# Patient Record
Sex: MALE | Race: WHITE | NOT HISPANIC OR LATINO | Employment: OTHER | URBAN - METROPOLITAN AREA
[De-identification: names, ages, dates, MRNs, and addresses within clinical notes are randomized per-mention and may not be internally consistent; named-entity substitution may affect disease eponyms.]

---

## 2018-07-09 ENCOUNTER — CLINICAL SUPPORT (OUTPATIENT)
Dept: FAMILY MEDICINE | Facility: CLINIC | Age: 63
End: 2018-07-09

## 2018-07-09 VITALS
WEIGHT: 220 LBS | DIASTOLIC BLOOD PRESSURE: 74 MMHG | HEART RATE: 79 BPM | TEMPERATURE: 97 F | HEIGHT: 68 IN | OXYGEN SATURATION: 100 % | SYSTOLIC BLOOD PRESSURE: 143 MMHG | BODY MASS INDEX: 33.34 KG/M2

## 2018-07-09 DIAGNOSIS — Z02.1 PRE-EMPLOYMENT EXAMINATION: Primary | ICD-10-CM

## 2018-07-09 PROCEDURE — 99499 UNLISTED E&M SERVICE: CPT | Mod: S$GLB,,, | Performed by: NURSE PRACTITIONER

## 2018-07-09 NOTE — PROGRESS NOTES
Chief Complaint  Chief Complaint   Patient presents with    Employment Physical     W/C       HPI  Sivakumar Thacker is a 63 y.o. male with medical diagnoses as listed within the medical history and problem list that presents for pre-employment physical exam. He notes hx HTN, lymphoma, HLP and has had a cervical fusion in the past. He is in remission from lymphoma and is only on maintenance therapy. He denies any alarming symptoms today and is compliant with medications and PCP follow up.     PAST MEDICAL HISTORY:  Past Medical History:   Diagnosis Date    Cancer     lymphoma currently on chemo    Hypertension     Neuropathy     Reflux esophagitis        PAST SURGICAL HISTORY:  Past Surgical History:   Procedure Laterality Date    HAND SURGERY      amputation left index finger    PORTACATH PLACEMENT      SPINE SURGERY         SOCIAL HISTORY:  Social History     Social History    Marital status:      Spouse name: N/A    Number of children: N/A    Years of education: N/A     Occupational History    Not on file.     Social History Main Topics    Smoking status: Former Smoker     Packs/day: 0.50     Years: 2.00    Smokeless tobacco: Not on file    Alcohol use Yes      Comment: rarely    Drug use: No    Sexual activity: Not on file     Other Topics Concern    Not on file     Social History Narrative    No narrative on file       FAMILY HISTORY:  History reviewed. No pertinent family history.    ALLERGIES AND MEDICATIONS: updated and reviewed.  Review of patient's allergies indicates:  No Known Allergies  Current Outpatient Prescriptions   Medication Sig Dispense Refill    amlodipine (NORVASC) 2.5 MG tablet Take 2.5 mg by mouth once daily.      atorvastatin (LIPITOR) 20 MG tablet Take 20 mg by mouth once daily.      gabapentin (NEURONTIN) 600 MG tablet Take 600 mg by mouth 2 (two) times daily.      INV IGI 10% injection Inject into the vein every 28 days.      losartan (COZAAR) 100 MG tablet  "Take 100 mg by mouth once daily.      METHYL-B12/L-MEFOLATE/B6 PHOS (METANX ORAL) Take 1-2 tablets by mouth 2 (two) times daily as needed.      montelukast (SINGULAIR) 10 mg tablet Take 10 mg by mouth nightly as needed.      MULTIVITAMIN ORAL Take 1 tablet by mouth once daily.      naproxen (NAPROSYN) 500 MG tablet Take 500 mg by mouth 2 (two) times daily as needed.      omeprazole (PRILOSEC) 40 MG capsule Take 40 mg by mouth once daily.      tizanidine (ZANAFLEX) 4 MG tablet Take 4 mg by mouth daily as needed.       No current facility-administered medications for this visit.          ROS  Review of Systems   Constitutional: Negative for fatigue.   Eyes: Negative for visual disturbance.   Respiratory: Negative for shortness of breath.    Cardiovascular: Negative for chest pain and palpitations.   Gastrointestinal: Negative for abdominal pain.   Genitourinary: Negative for difficulty urinating.   Musculoskeletal: Negative for back pain and neck pain.   Skin: Negative for pallor and rash.   Neurological: Negative for dizziness, light-headedness and headaches.           PHYSICAL EXAM  Vitals:    07/09/18 1043   BP: (!) 160/96   BP Location: Left arm   Patient Position: Sitting   BP Method: Large (Automatic)   Pulse: 79   Temp: 97.2 °F (36.2 °C)   TempSrc: Tympanic   SpO2: 100%   Weight: 99.8 kg (220 lb)   Height: 5' 8" (1.727 m)    Body mass index is 33.45 kg/m².  Weight: 99.8 kg (220 lb)   Height: 5' 8" (172.7 cm)       Physical Exam   Constitutional: He is oriented to person, place, and time. He appears well-developed and well-nourished.   HENT:   Head: Normocephalic and atraumatic.   Eyes: EOM are normal. Pupils are equal, round, and reactive to light.   Neck: Normal range of motion. Neck supple.   Cardiovascular: Normal rate and regular rhythm.    Pulmonary/Chest: Effort normal and breath sounds normal.   Abdominal: Soft. Bowel sounds are normal. No hernia.   Musculoskeletal: Normal range of motion. "   Neurological: He is alert and oriented to person, place, and time.   Psychiatric: He has a normal mood and affect. His behavior is normal. Judgment and thought content normal.   Vitals reviewed.        Health Maintenance    Patient has no pending health maintenance at this time              Assessment & Plan    Sivakumar was seen today for employment physical.    Diagnoses and all orders for this visit:    Pre-employment examination    Cleared for hire, follow up with PCP for HTN.    Follow-up: No Follow-up on file.

## 2018-12-04 VITALS — BODY MASS INDEX: 33.59 KG/M2 | WEIGHT: 214 LBS | HEIGHT: 67 IN

## 2018-12-04 DIAGNOSIS — D80.1 NONFAMILIAL HYPOGAMMAGLOBULINEMIA: ICD-10-CM

## 2018-12-04 DIAGNOSIS — C82.30 FOLLICULAR LYMPHOMA GRADE IIIA, UNSPECIFIED BODY REGION: ICD-10-CM

## 2019-01-07 NOTE — PROGRESS NOTES
[facility] Hematolgy/Oncology  History & Physical    Subjective:      Patient ID:   NAME: Sivakumar Thacker : 1955     63 y.o. male    Referring Doc: Lea Nunez MD  Other Physicians: Gary Brock        Chief Complaint: NHL      HPI:  63 y.o. male with diagnosis of Follicular NHL who has been referred by Dr Gary Chen with [facility] for continuation of care by medical hematology/oncology. He is here by himself. He was a  by trade. He originally was diagnosed in  and had parotid excision at [facility]. He subsequently went to [facility] and was treated with bendamustine-rituximab. He has been on rituximab maintenance every 8 weeks and IV IgG monthly. Dr Chen's plan was to keep him on maintenace therapy for as long as he could tolerate the treatments. He denies any new areas of swelling, night sweats, fevers or weight loss. He denies any CP, SOB, HA's or N/V. He no longer follows up with [facility].               ROS:   GEN: normal without any fever, night sweats or weight loss  HEENT: normal with no HA's, sore throat, stiff neck, changes in vision  CV: normal with no CP, SOB, PND, VASQUEZ or orthopnea  PULM: normal with no SOB, cough, hemoptysis, sputum or pleuritic pain  GI: normal with no abdominal pain, nausea, vomiting, constipation, diarrhea, melanotic stools, BRBPR, or hematemesis  : normal with no hematuria, dysuria  BREAST: normal with no mass, discharge, pain  SKIN: normal with no rash, erythema, bruising, or swelling       Past Medical/Surgical History:  Past Medical History:   Diagnosis Date    Cancer     lymphoma currently on chemo    Hypertension     Neuropathy     Reflux esophagitis      Past Surgical History:   Procedure Laterality Date    EXTRACTION-CATARACT-IOL Left 3/9/2016    [facility]    EXTRACTION-CATARACT-IOL Right 2016    Per[facility]    HAND SURGERY      amputation left index finger     PORTACATH PLACEMENT      SPINE SURGERY           Allergies:  Review of patient's allergies indicates:  No Known Allergies    Social/Family History:  Social History     Socioeconomic History    Marital status:      Spouse name: Not on file    Number of children: Not on file    Years of education: Not on file    Highest education level: Not on file   Social Needs    Financial resource strain: Not on file    Food insecurity - worry: Not on file    Food insecurity - inability: Not on file    Transportation needs - medical: Not on file    Transportation needs - non-medical: Not on file   Occupational History    Not on file   Tobacco Use    Smoking status: Former Smoker     Packs/day: 0.50     Years: 2.00     Pack years: 1.00   Substance and Sexual Activity    Alcohol use: Yes     Comment: rarely    Drug use: No    Sexual activity: Not on file   Other Topics Concern    Not on file   Social History Narrative    Not on file     History reviewed. No pertinent family history.      Medications:    Current Outpatient Medications:     amlodipine (NORVASC) 2.5 MG tablet, Take 2.5 mg by mouth once daily., Disp: , Rfl:     aspirin (ECOTRIN) 81 MG EC tablet, Take 81 mg by mouth once daily., Disp: , Rfl:     atorvastatin (LIPITOR) 20 MG tablet, Take 20 mg by mouth once daily., Disp: , Rfl:     gabapentin (NEURONTIN) 600 MG tablet, Take 600 mg by mouth 2 (two) times daily., Disp: , Rfl:     INV IGI 10% injection, Inject into the vein every 28 days., Disp: , Rfl:     losartan (COZAAR) 100 MG tablet, Take 100 mg by mouth once daily., Disp: , Rfl:     METHYL-B12/L-MEFOLATE/B6 PHOS (METANX ORAL), Take 1-2 tablets by mouth 2 (two) times daily as needed., Disp: , Rfl:     montelukast (SINGULAIR) 10 mg tablet, Take 10 mg by mouth nightly as needed., Disp: , Rfl:     MULTIVITAMIN ORAL, Take 1 tablet by mouth once daily., Disp: , Rfl:     naproxen (NAPROSYN) 500 MG tablet, Take 500 mg by mouth 2 (two) times  "daily as needed., Disp: , Rfl:     omeprazole (PRILOSEC) 40 MG capsule, Take 40 mg by mouth once daily., Disp: , Rfl:     tizanidine (ZANAFLEX) 4 MG tablet, Take 4 mg by mouth daily as needed., Disp: , Rfl:       Pathology:  Cancer Staging  No matching staging information was found for the patient.      Objective:   Vitals:  Blood pressure (!) 152/91, pulse 73, temperature 98.2 °F (36.8 °C), resp. rate 20, height 5' 8" (1.727 m), weight 100.2 kg (221 lb).    Physical Examination:   GEN: no apparent distress, comfortable; AAOx3  HEAD: atraumatic and normocephalic  EYES: no pallor, no icterus, PERRLA  ENT: OMM, no pharyngeal erythema, external ears WNL; no nasal discharge; no thrush  NECK: no masses, thyroid normal, trachea midline, no LAD/LN's, supple  CV: RRR with no murmur; normal pulse; normal S1 and S2; no pedal edema  CHEST: Normal respiratory effort; CTAB; normal breath sounds; no wheeze or crackles  ABDOM: nontender and nondistended; soft; normal bowel sounds; no rebound/guarding  MUSC/Skeletal: ROM normal; no crepitus; joints normal; no deformities or arthropathy  EXTREM: no clubbing, cyanosis, inflammation or swelling  SKIN: no rashes, lesions, ulcers, petechiae or subcutaneous nodules  : no gibbs  NEURO: grossly intact; motor/sensory WNL; AAOx3; no tremors  PSYCH: normal mood, affect and behavior  LYMPH: normal cervical, supraclavicular, axillary and groin LN's      Labs:       1/8/2018    Radiology/Diagnostic Studies:          All lab results and imaging results have been reviewed and discussed with the patient    Assessment:   (1) 63 y.o. male with diagnosis of NHL Follicular Stage IIIA who has been referred by Dr Gary Chen with [facility] for continuation of care by medical hematology/oncology.   - He originally was diagnosed in 2012 and had parotid excision at [facility]. He subsequently went to [facility] and was treated with bendamustine-rituximab. He has been on rituximab maintenance every 8 weeks " and IV IgG monthly. Dr Chen's plan was to keep him on maintenace therapy for as long as he could tolerate the treatments  - s/p 6 cycles of Bendamustine and Retuximab with subsequent complete remission  - 1st maintenance cycle rituximab was in March 2016  - he has been on maintenance rituximab and IV IgG - last rituximab 11/15/2018; last IV IgG on Dec 14th 2018  - last PET was on 9/26/2018 with no evidence of recurrence  - originally diagnosed in Dec 2012 with left parotid and left periparotid LN excision on 12/11/2012    (2) HTN    (3) Neuropathy    (4) GERD    (5) DM    (6) Hypogammaglobulinemia - on Iv IgG monthly    (7) Steatosis of liver    (8) Degenerative disc disease of back    (9) Diverticular disease        VISIT DIAGNOSES:              Follicular lymphoma grade IIIa, unspecified body region            Plan:     PLAN:  1. Continue his rituximab every 8 weeks as long as he is tolerating the therapy as per Dr Chen's prior recommendations and directives  2.continue IV IgG monthly  3. Check labs every 8 weeks  4. F/u with PCP  RTC in 8 weeks   Fax note to Lea Nunez MD; April        I have explained and the patient understands all of  the current recommendation(s). I have answered all of their questions to the best of my ability and to their complete satisfaction.             Thank you for allowing me to participate in this patient's care. Please call with any questions or concerns.    Electronically signed Jefferson Ibarra MD

## 2019-01-08 ENCOUNTER — OFFICE VISIT (OUTPATIENT)
Dept: HEMATOLOGY/ONCOLOGY | Facility: CLINIC | Age: 64
End: 2019-01-08
Payer: OTHER GOVERNMENT

## 2019-01-08 VITALS
TEMPERATURE: 98 F | RESPIRATION RATE: 20 BRPM | HEART RATE: 73 BPM | DIASTOLIC BLOOD PRESSURE: 91 MMHG | HEIGHT: 68 IN | SYSTOLIC BLOOD PRESSURE: 152 MMHG | WEIGHT: 221 LBS | BODY MASS INDEX: 33.49 KG/M2

## 2019-01-08 DIAGNOSIS — C82.30 FOLLICULAR LYMPHOMA GRADE IIIA, UNSPECIFIED BODY REGION: Primary | ICD-10-CM

## 2019-01-08 PROCEDURE — 99204 OFFICE O/P NEW MOD 45 MIN: CPT | Mod: ,,, | Performed by: INTERNAL MEDICINE

## 2019-01-08 PROCEDURE — 99204 PR OFFICE/OUTPT VISIT, NEW, LEVL IV, 45-59 MIN: ICD-10-PCS | Mod: ,,, | Performed by: INTERNAL MEDICINE

## 2019-01-08 RX ORDER — SODIUM CHLORIDE 0.9 % (FLUSH) 0.9 %
10 SYRINGE (ML) INJECTION
Status: CANCELLED
Start: 2019-01-10

## 2019-01-08 RX ORDER — FAMOTIDINE 10 MG/ML
20 INJECTION INTRAVENOUS
Status: CANCELLED | OUTPATIENT
Start: 2019-01-17

## 2019-01-08 RX ORDER — ASPIRIN 81 MG/1
81 TABLET ORAL DAILY
COMMUNITY

## 2019-01-08 RX ORDER — HEPARIN 100 UNIT/ML
500 SYRINGE INTRAVENOUS
Status: CANCELLED | OUTPATIENT
Start: 2019-01-10

## 2019-01-08 RX ORDER — HEPARIN 100 UNIT/ML
500 SYRINGE INTRAVENOUS
Status: CANCELLED | OUTPATIENT
Start: 2019-01-17

## 2019-01-08 RX ORDER — SODIUM CHLORIDE 0.9 % (FLUSH) 0.9 %
10 SYRINGE (ML) INJECTION
Status: CANCELLED | OUTPATIENT
Start: 2019-01-10

## 2019-01-08 RX ORDER — ACETAMINOPHEN 325 MG/1
650 TABLET ORAL
Status: CANCELLED | OUTPATIENT
Start: 2019-01-10

## 2019-01-08 RX ORDER — FAMOTIDINE 10 MG/ML
20 INJECTION INTRAVENOUS
Status: CANCELLED | OUTPATIENT
Start: 2019-01-10

## 2019-01-08 RX ORDER — ACETAMINOPHEN 325 MG/1
650 TABLET ORAL
Status: CANCELLED | OUTPATIENT
Start: 2019-01-17

## 2019-01-08 RX ORDER — SODIUM CHLORIDE 0.9 % (FLUSH) 0.9 %
10 SYRINGE (ML) INJECTION
Status: CANCELLED | OUTPATIENT
Start: 2019-01-17

## 2019-01-08 RX ORDER — MEPERIDINE HYDROCHLORIDE 50 MG/ML
25 INJECTION INTRAMUSCULAR; INTRAVENOUS; SUBCUTANEOUS
Status: CANCELLED | OUTPATIENT
Start: 2019-01-17

## 2019-01-08 RX ORDER — HEPARIN 100 UNIT/ML
500 SYRINGE INTRAVENOUS
Status: CANCELLED
Start: 2019-01-10

## 2019-01-08 NOTE — LETTER
January 8, 2019      Lea Nunez MD  [address]  [address]  [place] [address]           [facility] - Hematology Oncology  [address]  [address]  [facility] [address]  Phone: [phone]  Fax: [phone]          Patient: Sivakumar Thacker   MR Number: [id number]   YOB: 1955   Date of Visit: 1/8/2019       Dear Dr. Lea Nunez:    Thank you for referring Sivakumar Thacker to me for evaluation. Attached you will find relevant portions of my assessment and plan of care.    If you have questions, please do not hesitate to call me. I look forward to following Sivakumar Thacker along with you.    Sincerely,    Jefferson Ibarra MD    Enclosure  CC:  No Recipients    If you would like to receive this communication electronically, please contact [email or web address] or [phone] to request more information on [employer] Link access.    For providers and/or their staff who would like to refer a patient to [facility], please contact us through our one-stop-shop provider referral line, [facility], at [phone].    If you feel you have received this communication in error or would no longer like to receive these types of communications, please e-mail [email or web address]

## 2019-01-11 ENCOUNTER — TELEPHONE (OUTPATIENT)
Dept: HEMATOLOGY/ONCOLOGY | Facility: CLINIC | Age: 64
End: 2019-01-11

## 2019-01-11 DIAGNOSIS — C82.30 FOLLICULAR LYMPHOMA GRADE IIIA, UNSPECIFIED BODY REGION: Primary | ICD-10-CM

## 2019-01-11 DIAGNOSIS — D80.1 NONFAMILIAL HYPOGAMMAGLOBULINEMIA: ICD-10-CM

## 2019-01-11 LAB
ALBUMIN SERPL-MCNC: 4 G/DL (ref 3.1–4.7)
ALP SERPL-CCNC: 92 IU/L (ref 40–104)
ALT (SGPT): 35 IU/L (ref 3–33)
AST SERPL-CCNC: 36 IU/L (ref 10–40)
BASOPHILS NFR BLD: 0.1 K/UL (ref 0–0.2)
BASOPHILS NFR BLD: 1.2 %
BILIRUB SERPL-MCNC: 1.2 MG/DL (ref 0.3–1)
BUN SERPL-MCNC: 14 MG/DL (ref 8–20)
CALCIUM SERPL-MCNC: 8.9 MG/DL (ref 7.7–10.4)
CHLORIDE: 102 MMOL/L (ref 98–110)
CO2 SERPL-SCNC: 25.9 MMOL/L (ref 22.8–31.6)
CREATININE: 0.86 MG/DL (ref 0.6–1.4)
EOSINOPHIL NFR BLD: 0.1 K/UL (ref 0–0.7)
EOSINOPHIL NFR BLD: 2.9 %
ERYTHROCYTE [DISTWIDTH] IN BLOOD BY AUTOMATED COUNT: 12.8 % (ref 12.5–14.5)
GLUCOSE: 229 MG/DL (ref 70–99)
GRAN #: 2.3 K/UL (ref 1.4–6.5)
GRAN%: 57.1 %
HCT VFR BLD AUTO: 42.6 % (ref 39–55)
HGB BLD-MCNC: 14.5 G/DL (ref 14–16)
IMMATURE GRANS (ABS): 0.1 K/UL (ref 0–1)
IMMATURE GRANULOCYTES: 1.7 %
LYMPH #: 1 K/UL (ref 1.2–3.4)
LYMPH%: 24.8 %
MCH RBC QN AUTO: 30.7 PG (ref 25–35)
MCHC RBC AUTO-ENTMCNC: 34 G/DL (ref 31–36)
MCV RBC AUTO: 90.1 FL (ref 80–100)
MONO #: 0.5 K/UL (ref 0.1–0.6)
MONO%: 12.3 %
NUCLEATED RBCS: 0 %
NUCLEATED RED BLOOD CELLS: 0 /100 WBC
PERFORMED BY:: ABNORMAL
PLATELET # BLD AUTO: 208 K/UL (ref 140–440)
PMV BLD AUTO: 9.2 FL (ref 8.8–12.7)
POTASSIUM SERPL-SCNC: 4 MMOL/L (ref 3.5–5)
PROT SERPL-MCNC: 6.5 G/DL (ref 6–8.2)
RBC # BLD AUTO: 4.73 M/UL (ref 4.3–5.9)
SODIUM: 138 MMOL/L (ref 134–144)
WBC # BLD: 4.1 K/UL (ref 5–10)

## 2019-01-29 RX ORDER — FAMOTIDINE 10 MG/ML
20 INJECTION INTRAVENOUS
Status: CANCELLED | OUTPATIENT
Start: 2019-02-08

## 2019-01-29 RX ORDER — SODIUM CHLORIDE 0.9 % (FLUSH) 0.9 %
10 SYRINGE (ML) INJECTION
Status: CANCELLED | OUTPATIENT
Start: 2019-02-08

## 2019-01-29 RX ORDER — HEPARIN 100 UNIT/ML
500 SYRINGE INTRAVENOUS
Status: CANCELLED | OUTPATIENT
Start: 2019-02-08

## 2019-01-29 RX ORDER — ACETAMINOPHEN 325 MG/1
650 TABLET ORAL
Status: CANCELLED | OUTPATIENT
Start: 2019-02-08

## 2019-03-06 ENCOUNTER — TELEPHONE (OUTPATIENT)
Dept: HEMATOLOGY/ONCOLOGY | Facility: CLINIC | Age: 64
End: 2019-03-06

## 2019-03-06 RX ORDER — MEPERIDINE HYDROCHLORIDE 50 MG/ML
25 INJECTION INTRAMUSCULAR; INTRAVENOUS; SUBCUTANEOUS
Status: CANCELLED | OUTPATIENT
Start: 2019-03-07

## 2019-03-06 RX ORDER — HEPARIN 100 UNIT/ML
500 SYRINGE INTRAVENOUS
Status: CANCELLED | OUTPATIENT
Start: 2019-03-07

## 2019-03-06 RX ORDER — FAMOTIDINE 10 MG/ML
20 INJECTION INTRAVENOUS
Status: CANCELLED | OUTPATIENT
Start: 2019-03-07

## 2019-03-06 RX ORDER — SODIUM CHLORIDE 0.9 % (FLUSH) 0.9 %
10 SYRINGE (ML) INJECTION
Status: CANCELLED | OUTPATIENT
Start: 2019-03-07

## 2019-03-06 RX ORDER — ACETAMINOPHEN 325 MG/1
650 TABLET ORAL
Status: CANCELLED | OUTPATIENT
Start: 2019-03-07

## 2019-03-06 NOTE — PROGRESS NOTES
[facility] Hematology/Oncology  PROGRESS NOTE - 2nd Follow-up Visit      Subjective:       Patient ID:   NAME: Sivakumar Thacker : 1955     64 y.o. male    Referring Doc: Lea Nunez MD  Other Physicians: Vinod Chen    Chief Complaint:  NHL f/u    History of Present Illness:     Patient returns today for a 2nd regularly scheduled follow-up visit.  The patient is here today to go over the results of the recently ordered labs, tests and studies. He is now on metformin for diabetes per direction of Dr Nunez. He is doing weill with the rituximab infusions. He denies any CP, SOB, HA's or N/V. He is here by himself.             ROS:   GEN: normal without any fever, night sweats or weight loss  HEENT: normal with no HA's, sore throat, stiff neck, changes in vision  CV: normal with no CP, SOB, PND, VASQUEZ or orthopnea  PULM: normal with no SOB, cough, hemoptysis, sputum or pleuritic pain  GI: normal with no abdominal pain, nausea, vomiting, constipation, diarrhea, melanotic stools, BRBPR, or hematemesis  : normal with no hematuria, dysuria  BREAST: normal with no mass, discharge, pain  SKIN: normal with no rash, erythema, bruising, or swelling    Allergies:  Review of patient's allergies indicates:  No Known Allergies    Medications:    Current Outpatient Medications:     amlodipine (NORVASC) 2.5 MG tablet, Take 2.5 mg by mouth once daily., Disp: , Rfl:     aspirin (ECOTRIN) 81 MG EC tablet, Take 81 mg by mouth once daily., Disp: , Rfl:     atorvastatin (LIPITOR) 20 MG tablet, Take 20 mg by mouth once daily., Disp: , Rfl:     gabapentin (NEURONTIN) 600 MG tablet, Take 600 mg by mouth 2 (two) times daily., Disp: , Rfl:     INV IGI 10% injection, Inject into the vein every 28 days., Disp: , Rfl:     losartan (COZAAR) 100 MG tablet, Take 100 mg by mouth once daily., Disp: , Rfl:     metFORMIN (GLUCOPHAGE) 500 MG tablet, metformin 500 mg tablet  Take 2 tablets twice a day by oral route with meals for 90  days., Disp: , Rfl:     METHYL-B12/L-MEFOLATE/B6 PHOS (METANX ORAL), Take 1-2 tablets by mouth 2 (two) times daily as needed., Disp: , Rfl:     montelukast (SINGULAIR) 10 mg tablet, Take 10 mg by mouth nightly as needed., Disp: , Rfl:     MULTIVITAMIN ORAL, Take 1 tablet by mouth once daily., Disp: , Rfl:     naproxen (NAPROSYN) 500 MG tablet, Take 500 mg by mouth 2 (two) times daily as needed., Disp: , Rfl:     omeprazole (PRILOSEC) 40 MG capsule, Take 40 mg by mouth once daily., Disp: , Rfl:     tizanidine (ZANAFLEX) 4 MG tablet, Take 4 mg by mouth daily as needed., Disp: , Rfl:     PMHx/PSHx Updates:  See patient's last visit with me on 1/8/2019.  See H&P on 1/8/2019        Pathology:  Cancer Staging  No matching staging information was found for the patient.          Objective:     Vitals:  Blood pressure 137/83, pulse 73, temperature 98.9 °F (37.2 °C), temperature source Oral, resp. rate 20, weight 99.1 kg (218 lb 8 oz).    Physical Examination:   GEN: no apparent distress, comfortable; AAOx3  HEAD: atraumatic and normocephalic  EYES: no pallor, no icterus, PERRLA  ENT: OMM, no pharyngeal erythema, external ears WNL; no nasal discharge; no thrush  NECK: no masses, thyroid normal, trachea midline, no LAD/LN's, supple  CV: RRR with no murmur; normal pulse; normal S1 and S2; no pedal edema  CHEST: Normal respiratory effort; CTAB; normal breath sounds; no wheeze or crackles  ABDOM: nontender and nondistended; soft; normal bowel sounds; no rebound/guarding  MUSC/Skeletal: ROM normal; no crepitus; joints normal; no deformities or arthropathy  EXTREM: no clubbing, cyanosis, inflammation or swelling  SKIN: no rashes, lesions, ulcers, petechiae or subcutaneous nodules  : no gibbs  NEURO: grossly intact; motor/sensory WNL; AAOx3; no tremors  PSYCH: normal mood, affect and behavior  LYMPH: normal cervical, supraclavicular, axillary and groin LN's            Labs:     pending    Radiology/Diagnostic Studies:    No  results found.    I have reviewed all available lab results and radiology reports.    Assessment/Plan:   (1) 64 y.o. male with diagnosis of NHL Follicular Stage IIIA who has been referred by Dr Gary Chen with [facility] for continuation of care by medical hematology/oncology.   - He originally was diagnosed in 2012 and had parotid excision at [facility]. He subsequently went to [facility] and was treated with bendamustine-rituximab. He has been on rituximab maintenance every 8 weeks and IV IgG monthly. Dr Chen's plan was to keep him on maintenace therapy for as long as he could tolerate the treatments  - s/p 6 cycles of Bendamustine and Retuximab with subsequent complete remission  - 1st maintenance cycle rituximab was in March 2016  - he has been on maintenance rituximab and IV IgG - last rituximab 11/15/2018; last IV IgG on Dec 14th 2018  - last PET was on 9/26/2018 with no evidence of recurrence  - originally diagnosed in Dec 2012 with left parotid and left periparotid LN excision on 12/11/2012     (2) HTN     (3) Neuropathy     (4) GERD     (5) DM     (6) Hypogammaglobulinemia - on Iv IgG monthly     (7) Steatosis of liver     (8) Degenerative disc disease of back     (9) Diverticular disease           VISIT DIAGNOSES:      Follicular lymphoma grade IIIa, unspecified body region          PLAN:  1. Continue his rituximab every 8 weeks as long as he is tolerating the therapy as per Dr Chen's prior recommendations and directives  2. continue IV IgG monthly  3. Check labs every 8 weeks  4. F/u with PCP  5. PET - will schedule for Sept 2019  RTC in 8 weeks   Fax note to Lea Nunez MD; April         Discussion:       I spent over 25 mins of time with the patient. Reviewed results of the recently ordered labs, tests and studies; made directives with regards to the results. Over half of this time was spent couseling and coordinating care.    I have explained all of the above in detail and the patient  understands all of the current recommendation(s). I have answered all of their questions to the best of my ability and to their complete satisfaction.   The patient is to continue with the current management plan.            Electronically signed by Jefferson Ibarra MD

## 2019-03-07 ENCOUNTER — OFFICE VISIT (OUTPATIENT)
Dept: HEMATOLOGY/ONCOLOGY | Facility: CLINIC | Age: 64
End: 2019-03-07
Payer: OTHER GOVERNMENT

## 2019-03-07 ENCOUNTER — TELEPHONE (OUTPATIENT)
Dept: HEMATOLOGY/ONCOLOGY | Facility: CLINIC | Age: 64
End: 2019-03-07

## 2019-03-07 VITALS
WEIGHT: 218.5 LBS | HEART RATE: 73 BPM | DIASTOLIC BLOOD PRESSURE: 83 MMHG | RESPIRATION RATE: 20 BRPM | TEMPERATURE: 99 F | SYSTOLIC BLOOD PRESSURE: 137 MMHG | BODY MASS INDEX: 33.22 KG/M2

## 2019-03-07 DIAGNOSIS — C82.30 FOLLICULAR LYMPHOMA GRADE IIIA, UNSPECIFIED BODY REGION: Primary | ICD-10-CM

## 2019-03-07 PROCEDURE — 99214 OFFICE O/P EST MOD 30 MIN: CPT | Mod: ,,, | Performed by: INTERNAL MEDICINE

## 2019-03-07 PROCEDURE — 99214 PR OFFICE/OUTPT VISIT, EST, LEVL IV, 30-39 MIN: ICD-10-PCS | Mod: ,,, | Performed by: INTERNAL MEDICINE

## 2019-03-07 RX ORDER — HEPARIN 100 UNIT/ML
500 SYRINGE INTRAVENOUS
Status: CANCELLED | OUTPATIENT
Start: 2019-03-08

## 2019-03-07 RX ORDER — METFORMIN HYDROCHLORIDE 500 MG/1
TABLET ORAL
COMMUNITY
End: 2020-09-16 | Stop reason: SDUPTHER

## 2019-03-07 RX ORDER — SODIUM CHLORIDE 0.9 % (FLUSH) 0.9 %
10 SYRINGE (ML) INJECTION
Status: CANCELLED | OUTPATIENT
Start: 2019-03-08

## 2019-03-07 RX ORDER — ACETAMINOPHEN 325 MG/1
650 TABLET ORAL
Status: CANCELLED | OUTPATIENT
Start: 2019-03-08

## 2019-03-07 RX ORDER — FAMOTIDINE 10 MG/ML
20 INJECTION INTRAVENOUS
Status: CANCELLED | OUTPATIENT
Start: 2019-03-08

## 2019-03-07 NOTE — LETTER
March 7, 2019      Lea Nunez MD  [address]  [address]  [place] [address]           [facility] - Hematology Oncology  [address]  [address]  [place] [address]  Phone: [phone]  Fax: [phone]          Patient: Sivakumar Thacker   MR Number: [id number]   YOB: 1955   Date of Visit: 3/7/2019       Dear Dr. Lea Nunez:    Thank you for referring Sivakumar Thacker to me for evaluation. Attached you will find relevant portions of my assessment and plan of care.    If you have questions, please do not hesitate to call me. I look forward to following Sivakumar Thacker along with you.    Sincerely,    Jefferson Ibarra MD    Enclosure  CC:  No Recipients    If you would like to receive this communication electronically, please contact [email or web address] or [phone] to request more information on [employer] Link access.    For providers and/or their staff who would like to refer a patient to [facility], please contact us through our one-stop-shop provider referral line, [facility], at [phone].    If you feel you have received this communication in error or would no longer like to receive these types of communications, please e-mail [email or web address]

## 2019-03-11 ENCOUNTER — TELEPHONE (OUTPATIENT)
Dept: HEMATOLOGY/ONCOLOGY | Facility: CLINIC | Age: 64
End: 2019-03-11

## 2019-03-11 DIAGNOSIS — C82.81: Primary | ICD-10-CM

## 2019-03-12 ENCOUNTER — CLINICAL SUPPORT (OUTPATIENT)
Dept: INTERNAL MEDICINE | Facility: CLINIC | Age: 64
End: 2019-03-12

## 2019-03-12 VITALS
BODY MASS INDEX: 33.34 KG/M2 | HEIGHT: 68 IN | SYSTOLIC BLOOD PRESSURE: 150 MMHG | HEART RATE: 69 BPM | DIASTOLIC BLOOD PRESSURE: 78 MMHG | WEIGHT: 220 LBS | OXYGEN SATURATION: 99 %

## 2019-03-12 DIAGNOSIS — Z02.89 ENCOUNTER FOR PHYSICAL EXAMINATION RELATED TO EMPLOYMENT: Primary | ICD-10-CM

## 2019-03-12 PROCEDURE — 99499 PR PHYSICAL - DOT/CDL: ICD-10-PCS | Mod: ,,, | Performed by: NURSE PRACTITIONER

## 2019-03-12 PROCEDURE — 99499 UNLISTED E&M SERVICE: CPT | Mod: ,,, | Performed by: NURSE PRACTITIONER

## 2019-03-12 NOTE — PROGRESS NOTES
Pre-Employment or Annual Employment Physical > [facility] [employer] [facility] [employer] > See Handwritten Documentation Scanned Into Chart

## 2019-04-04 RX ORDER — FAMOTIDINE 10 MG/ML
20 INJECTION INTRAVENOUS
Status: CANCELLED | OUTPATIENT
Start: 2019-04-05

## 2019-04-04 RX ORDER — SODIUM CHLORIDE 0.9 % (FLUSH) 0.9 %
10 SYRINGE (ML) INJECTION
Status: CANCELLED | OUTPATIENT
Start: 2019-04-05

## 2019-04-04 RX ORDER — HEPARIN 100 UNIT/ML
500 SYRINGE INTRAVENOUS
Status: CANCELLED | OUTPATIENT
Start: 2019-04-05

## 2019-04-04 RX ORDER — ACETAMINOPHEN 325 MG/1
650 TABLET ORAL
Status: CANCELLED | OUTPATIENT
Start: 2019-04-05

## 2019-04-30 NOTE — PROGRESS NOTES
[facility] Hematology/Oncology  PROGRESS NOTE -  Follow-up Visit      Subjective:       Patient ID:   NAME: Sivakumar Thacker : 1955     64 y.o. male    Referring Doc: Lea Nunez MD  Other Physicians: Vinod Chen    Chief Complaint:  NHL f/u    History of Present Illness:     Patient returns today for a regularly scheduled follow-up visit.  The patient is here today to go over the results of the recently ordered labs, tests and studies. He has been on metformin for diabetes per direction of Dr Nunez and it seems to be working per patient. He is doing weill with the rituximab infusions. He denies any CP, SOB, HA's or N/V. He is here by himself.             ROS:   GEN: normal without any fever, night sweats or weight loss  HEENT: normal with no HA's, sore throat, stiff neck, changes in vision  CV: normal with no CP, SOB, PND, VASQUEZ or orthopnea  PULM: normal with no SOB, cough, hemoptysis, sputum or pleuritic pain  GI: normal with no abdominal pain, nausea, vomiting, constipation, diarrhea, melanotic stools, BRBPR, or hematemesis  : normal with no hematuria, dysuria  BREAST: normal with no mass, discharge, pain  SKIN: normal with no rash, erythema, bruising, or swelling    Allergies:  Review of patient's allergies indicates:  No Known Allergies    Medications:    Current Outpatient Medications:     amlodipine (NORVASC) 2.5 MG tablet, Take 2.5 mg by mouth once daily., Disp: , Rfl:     aspirin (ECOTRIN) 81 MG EC tablet, Take 81 mg by mouth once daily., Disp: , Rfl:     atorvastatin (LIPITOR) 20 MG tablet, Take 20 mg by mouth once daily., Disp: , Rfl:     gabapentin (NEURONTIN) 600 MG tablet, Take 600 mg by mouth 2 (two) times daily., Disp: , Rfl:     INV IGI 10% injection, Inject into the vein every 28 days., Disp: , Rfl:     losartan (COZAAR) 100 MG tablet, Take 100 mg by mouth once daily., Disp: , Rfl:     metFORMIN (GLUCOPHAGE) 500 MG tablet, metformin 500 mg tablet  Take 2 tablets twice a day by  oral route with meals for 90 days., Disp: , Rfl:     METHYL-B12/L-MEFOLATE/B6 PHOS (METANX ORAL), Take 1-2 tablets by mouth 2 (two) times daily as needed., Disp: , Rfl:     montelukast (SINGULAIR) 10 mg tablet, Take 10 mg by mouth nightly as needed., Disp: , Rfl:     MULTIVITAMIN ORAL, Take 1 tablet by mouth once daily., Disp: , Rfl:     naproxen (NAPROSYN) 500 MG tablet, Take 500 mg by mouth 2 (two) times daily as needed., Disp: , Rfl:     omeprazole (PRILOSEC) 40 MG capsule, Take 40 mg by mouth once daily., Disp: , Rfl:     tizanidine (ZANAFLEX) 4 MG tablet, Take 4 mg by mouth daily as needed., Disp: , Rfl:     PMHx/PSHx Updates:  See patient's last visit with me on 3/7/2019.  See H&P on 1/8/2019        Pathology:  Cancer Staging  No matching staging information was found for the patient.          Objective:     Vitals:  Blood pressure 137/85, pulse 75, temperature 98.6 °F (37 °C), temperature source Oral, resp. rate 20, weight 97.6 kg (215 lb 1.6 oz).    Physical Examination:   GEN: no apparent distress, comfortable; AAOx3  HEAD: atraumatic and normocephalic  EYES: no pallor, no icterus, PERRLA  ENT: OMM, no pharyngeal erythema, external ears WNL; no nasal discharge; no thrush  NECK: no masses, thyroid normal, trachea midline, no LAD/LN's, supple  CV: RRR with no murmur; normal pulse; normal S1 and S2; no pedal edema  CHEST: Normal respiratory effort; CTAB; normal breath sounds; mild diffuse wheeze  ABDOM: nontender and nondistended; soft; normal bowel sounds; no rebound/guarding  MUSC/Skeletal: ROM normal; no crepitus; joints normal; no deformities or arthropathy  EXTREM: no clubbing, cyanosis, inflammation or swelling  SKIN: no rashes, lesions, ulcers, petechiae or subcutaneous nodules  : no gibbs  NEURO: grossly intact; motor/sensory WNL; AAOx3; no tremors  PSYCH: normal mood, affect and behavior  LYMPH: normal cervical, supraclavicular, axillary and groin LN's            Labs:     4/25/2019  Lab  Results   Component Value Date    WBC 4.2 (L) 04/25/2019    HGB 14.7 04/25/2019    HCT 44.3 04/25/2019     04/25/2019     CMP  Sodium   Date Value Ref Range Status   04/25/2019 144 134 - 144 mmol/L      Potassium   Date Value Ref Range Status   04/25/2019 4.0 3.5 - 5.0 mmol/L      Chloride   Date Value Ref Range Status   04/25/2019 107 98 - 110 mmol/L      CO2   Date Value Ref Range Status   04/25/2019 26.2 22.8 - 31.6 mmol/L      Glucose   Date Value Ref Range Status   04/25/2019 177 (H) 70 - 99 mg/dL      BUN, Bld   Date Value Ref Range Status   04/25/2019 11 8 - 20 mg/dL      Creatinine   Date Value Ref Range Status   04/25/2019 0.83 0.60 - 1.40 mg/dL      Calcium   Date Value Ref Range Status   04/25/2019 9.4 7.7 - 10.4 mg/dL      Total Protein   Date Value Ref Range Status   04/25/2019 7.3 6.0 - 8.2 g/dL      Albumin   Date Value Ref Range Status   04/25/2019 4.4 3.1 - 4.7 g/dL      Total Bilirubin   Date Value Ref Range Status   04/25/2019 1.1 (H) 0.3 - 1.0 mg/dL      Alkaline Phosphatase   Date Value Ref Range Status   04/25/2019 91 40 - 104 IU/L      AST   Date Value Ref Range Status   04/25/2019 34 10 - 40 IU/L          Radiology/Diagnostic Studies:    No results found.    I have reviewed all available lab results and radiology reports.    Assessment/Plan:   (1) 64 y.o. male with diagnosis of NHL Follicular Stage IIIA who has been referred by Dr Gary Chen with [facility] for continuation of care by medical hematology/oncology.   - He originally was diagnosed in 2012 and had parotid excision at [facility]. He subsequently went to [facility] and was treated with bendamustine-rituximab. He has been on rituximab maintenance every 8 weeks and IV IgG monthly. Dr Chen's plan was to keep him on maintenace therapy for as long as he could tolerate the treatments  - s/p 6 cycles of Bendamustine and Retuximab with subsequent complete remission  - 1st maintenance cycle rituximab was in March 2016  - he has been  on maintenance rituximab and IV IgG - last rituximab 11/15/2018; last IV IgG on Dec 14th 2018  - last PET was on 9/26/2018 with no evidence of recurrence  - originally diagnosed in Dec 2012 with left parotid and left periparotid LN excision on 12/11/2012     (2) HTN     (3) Neuropathy     (4) GERD     (5) DM - on metformin per Dr Nunez     (6) Hypogammaglobulinemia - on Iv IgG monthly     (7) Steatosis of liver     (8) Degenerative disc disease of back     (9) Diverticular disease           VISIT DIAGNOSES:      Follicular lymphoma grade IIIa, unspecified body region          PLAN:  1. Continue his rituximab every 8 weeks as long as he is tolerating the therapy as per Dr Chen's prior recommendations and directives (due tomorrow)  2. continue IV IgG monthly (due Frid)  3. Check labs every 8 weeks  4. F/u with PCP  5. PET - scheduled for Sept 2019  RTC in 8 weeks   Fax note to Lea Nunez MD; April         Discussion:       I spent over 25 mins of time with the patient. Reviewed results of the recently ordered labs, tests and studies; made directives with regards to the results. Over half of this time was spent couseling and coordinating care.    I have explained all of the above in detail and the patient understands all of the current recommendation(s). I have answered all of their questions to the best of my ability and to their complete satisfaction.   The patient is to continue with the current management plan.            Electronically signed by Jefferson Ibarra MD

## 2019-05-01 ENCOUNTER — OFFICE VISIT (OUTPATIENT)
Dept: HEMATOLOGY/ONCOLOGY | Facility: CLINIC | Age: 64
End: 2019-05-01
Payer: OTHER GOVERNMENT

## 2019-05-01 VITALS
RESPIRATION RATE: 20 BRPM | TEMPERATURE: 99 F | BODY MASS INDEX: 32.71 KG/M2 | DIASTOLIC BLOOD PRESSURE: 85 MMHG | WEIGHT: 215.13 LBS | SYSTOLIC BLOOD PRESSURE: 137 MMHG | HEART RATE: 75 BPM

## 2019-05-01 DIAGNOSIS — C82.30 FOLLICULAR LYMPHOMA GRADE IIIA, UNSPECIFIED BODY REGION: Primary | ICD-10-CM

## 2019-05-01 PROCEDURE — 99214 OFFICE O/P EST MOD 30 MIN: CPT | Mod: ,,, | Performed by: INTERNAL MEDICINE

## 2019-05-01 PROCEDURE — 99214 PR OFFICE/OUTPT VISIT, EST, LEVL IV, 30-39 MIN: ICD-10-PCS | Mod: ,,, | Performed by: INTERNAL MEDICINE

## 2019-05-01 RX ORDER — HEPARIN 100 UNIT/ML
500 SYRINGE INTRAVENOUS
Status: CANCELLED | OUTPATIENT
Start: 2019-05-03

## 2019-05-01 RX ORDER — FAMOTIDINE 10 MG/ML
20 INJECTION INTRAVENOUS
Status: CANCELLED | OUTPATIENT
Start: 2019-05-02

## 2019-05-01 RX ORDER — SODIUM CHLORIDE 0.9 % (FLUSH) 0.9 %
10 SYRINGE (ML) INJECTION
Status: CANCELLED | OUTPATIENT
Start: 2019-05-02

## 2019-05-01 RX ORDER — ACETAMINOPHEN 325 MG/1
650 TABLET ORAL
Status: CANCELLED | OUTPATIENT
Start: 2019-05-02

## 2019-05-01 RX ORDER — SODIUM CHLORIDE 0.9 % (FLUSH) 0.9 %
10 SYRINGE (ML) INJECTION
Status: CANCELLED | OUTPATIENT
Start: 2019-05-03

## 2019-05-01 RX ORDER — HEPARIN 100 UNIT/ML
500 SYRINGE INTRAVENOUS
Status: CANCELLED | OUTPATIENT
Start: 2019-05-02

## 2019-05-01 RX ORDER — MEPERIDINE HYDROCHLORIDE 50 MG/ML
25 INJECTION INTRAMUSCULAR; INTRAVENOUS; SUBCUTANEOUS
Status: CANCELLED | OUTPATIENT
Start: 2019-05-02

## 2019-05-01 RX ORDER — ACETAMINOPHEN 325 MG/1
650 TABLET ORAL
Status: CANCELLED | OUTPATIENT
Start: 2019-05-03

## 2019-05-01 RX ORDER — FAMOTIDINE 10 MG/ML
20 INJECTION INTRAVENOUS
Status: CANCELLED | OUTPATIENT
Start: 2019-05-03

## 2019-05-01 NOTE — LETTER
May 1, 2019      Lea Nunez MD  [address]  [address]  [place] [address]           [facility] - Hematology Oncology  [address]  [address]  [place] [address]  Phone: [phone]  Fax: [phone]          Patient: Sivakumar Thacker   MR Number: [id number]   YOB: 1955   Date of Visit: 5/1/2019       Dear Dr. Lea Nunez:    Thank you for referring Sivakumar Thacker to me for evaluation. Attached you will find relevant portions of my assessment and plan of care.    If you have questions, please do not hesitate to call me. I look forward to following Sivakumar Thacker along with you.    Sincerely,    Jefferson Ibarra MD    Enclosure  CC:  No Recipients    If you would like to receive this communication electronically, please contact [email or web address] or [phone] to request more information on [employer] Link access.    For providers and/or their staff who would like to refer a patient to [facility], please contact us through our one-stop-shop provider referral line, [facility], at [phone].    If you feel you have received this communication in error or would no longer like to receive these types of communications, please e-mail [email or web address]

## 2019-05-27 LAB
BILIRUB SERPL-MCNC: ABNORMAL MG/DL
BLOOD, POC UA: ABNORMAL
GLUCOSE UR QL STRIP: ABNORMAL
KETONES UR QL STRIP: ABNORMAL
LEUKOCYTE ESTERASE URINE, POC: ABNORMAL
NITRITE, POC UA: ABNORMAL
PH, POC UA: ABNORMAL
PROTEIN, POC: ABNORMAL
SPECIFIC GRAVITY, POC UA: 1.01
UROBILINOGEN, POC UA: ABNORMAL

## 2019-05-30 RX ORDER — FAMOTIDINE 10 MG/ML
20 INJECTION INTRAVENOUS
Status: CANCELLED | OUTPATIENT
Start: 2019-05-31

## 2019-05-30 RX ORDER — HEPARIN 100 UNIT/ML
500 SYRINGE INTRAVENOUS
Status: CANCELLED | OUTPATIENT
Start: 2019-05-31

## 2019-05-30 RX ORDER — SODIUM CHLORIDE 0.9 % (FLUSH) 0.9 %
10 SYRINGE (ML) INJECTION
Status: CANCELLED | OUTPATIENT
Start: 2019-05-31

## 2019-05-30 RX ORDER — ACETAMINOPHEN 325 MG/1
650 TABLET ORAL
Status: CANCELLED | OUTPATIENT
Start: 2019-05-31

## 2019-05-31 ENCOUNTER — TELEPHONE (OUTPATIENT)
Dept: HEMATOLOGY/ONCOLOGY | Facility: CLINIC | Age: 64
End: 2019-05-31

## 2019-05-31 NOTE — TELEPHONE ENCOUNTER
Already scheduled     ----- Message from Tori Jacobo sent at 5/30/2019  4:44 PM CDT -----  Authorization received and has been approved. The patient is ready to be scheduled. IVIG etc

## 2019-06-20 RX ORDER — ACETAMINOPHEN 325 MG/1
650 TABLET ORAL
Status: CANCELLED | OUTPATIENT
Start: 2019-06-27

## 2019-06-20 RX ORDER — SODIUM CHLORIDE 0.9 % (FLUSH) 0.9 %
10 SYRINGE (ML) INJECTION
Status: CANCELLED | OUTPATIENT
Start: 2019-06-27

## 2019-06-20 RX ORDER — MEPERIDINE HYDROCHLORIDE 50 MG/ML
25 INJECTION INTRAMUSCULAR; INTRAVENOUS; SUBCUTANEOUS
Status: CANCELLED | OUTPATIENT
Start: 2019-06-27

## 2019-06-20 RX ORDER — FAMOTIDINE 10 MG/ML
20 INJECTION INTRAVENOUS
Status: CANCELLED | OUTPATIENT
Start: 2019-06-27

## 2019-06-20 RX ORDER — HEPARIN 100 UNIT/ML
500 SYRINGE INTRAVENOUS
Status: CANCELLED | OUTPATIENT
Start: 2019-06-27

## 2019-06-26 ENCOUNTER — TELEPHONE (OUTPATIENT)
Dept: HEMATOLOGY/ONCOLOGY | Facility: CLINIC | Age: 64
End: 2019-06-26

## 2019-06-26 DIAGNOSIS — C82.30 FOLLICULAR LYMPHOMA GRADE IIIA, UNSPECIFIED BODY REGION: Primary | ICD-10-CM

## 2019-06-26 DIAGNOSIS — D80.1 NONFAMILIAL HYPOGAMMAGLOBULINEMIA: ICD-10-CM

## 2019-06-26 NOTE — TELEPHONE ENCOUNTER
Called to see if the pt had any labs done prior to f/u appt.    SANDRA for the pt to have his labs rechecked @ [facility].

## 2019-06-26 NOTE — PROGRESS NOTES
[facility] Hematology/Oncology  PROGRESS NOTE -  Follow-up Visit      Subjective:       Patient ID:   NAME: Sivakumar Thacker : 1955     64 y.o. male    Referring Doc: Lea Nunez MD  Other Physicians: Vinod Chen    Chief Complaint:  NHL f/u    History of Present Illness:     Patient returns today for a regularly scheduled follow-up visit.  The patient is here today to go over the results of the recently ordered labs, tests and studies. . He is doing well with the rituximab infusions. He denies any CP, SOB, HA's or N/V. He is here by himself.             ROS:   GEN: normal without any fever, night sweats or weight loss  HEENT: normal with no HA's, sore throat, stiff neck, changes in vision  CV: normal with no CP, SOB, PND, VASQUEZ or orthopnea  PULM: normal with no SOB, cough, hemoptysis, sputum or pleuritic pain  GI: normal with no abdominal pain, nausea, vomiting, constipation, diarrhea, melanotic stools, BRBPR, or hematemesis  : normal with no hematuria, dysuria  BREAST: normal with no mass, discharge, pain  SKIN: normal with no rash, erythema, bruising, or swelling    Allergies:  Review of patient's allergies indicates:  No Known Allergies    Medications:    Current Outpatient Medications:     amlodipine (NORVASC) 2.5 MG tablet, Take 2.5 mg by mouth once daily., Disp: , Rfl:     aspirin (ECOTRIN) 81 MG EC tablet, Take 81 mg by mouth once daily., Disp: , Rfl:     atorvastatin (LIPITOR) 20 MG tablet, Take 20 mg by mouth once daily., Disp: , Rfl:     gabapentin (NEURONTIN) 600 MG tablet, Take 600 mg by mouth 2 (two) times daily., Disp: , Rfl:     INV IGI 10% injection, Inject into the vein every 28 days., Disp: , Rfl:     losartan (COZAAR) 100 MG tablet, Take 100 mg by mouth once daily., Disp: , Rfl:     metFORMIN (GLUCOPHAGE) 500 MG tablet, metformin 500 mg tablet  Take 2 tablets twice a day by oral route with meals for 90 days., Disp: , Rfl:     METHYL-B12/L-MEFOLATE/B6 PHOS (METANX ORAL), Take  1-2 tablets by mouth 2 (two) times daily as needed., Disp: , Rfl:     montelukast (SINGULAIR) 10 mg tablet, Take 10 mg by mouth nightly as needed., Disp: , Rfl:     MULTIVITAMIN ORAL, Take 1 tablet by mouth once daily., Disp: , Rfl:     naproxen (NAPROSYN) 500 MG tablet, Take 500 mg by mouth 2 (two) times daily as needed., Disp: , Rfl:     omeprazole (PRILOSEC) 40 MG capsule, Take 40 mg by mouth once daily., Disp: , Rfl:     tizanidine (ZANAFLEX) 4 MG tablet, Take 4 mg by mouth daily as needed., Disp: , Rfl:     PMHx/PSHx Updates:  See patient's last visit with me on 5/1/2019.  See H&P on 1/8/2019        Pathology:  Cancer Staging  No matching staging information was found for the patient.          Objective:     Vitals:  Blood pressure 135/87, pulse 73, temperature 98.7 °F (37.1 °C), resp. rate 20, weight 96.4 kg (212 lb 8.4 oz).    Physical Examination:   GEN: no apparent distress, comfortable; AAOx3  HEAD: atraumatic and normocephalic  EYES: no pallor, no icterus, PERRLA  ENT: OMM, no pharyngeal erythema, external ears WNL; no nasal discharge; no thrush  NECK: no masses, thyroid normal, trachea midline, no LAD/LN's, supple  CV: RRR with no murmur; normal pulse; normal S1 and S2; no pedal edema  CHEST: Normal respiratory effort; CTAB; normal breath sounds; mild diffuse wheeze  ABDOM: nontender and nondistended; soft; normal bowel sounds; no rebound/guarding  MUSC/Skeletal: ROM normal; no crepitus; joints normal; no deformities or arthropathy  EXTREM: no clubbing, cyanosis, inflammation or swelling  SKIN: no rashes, lesions, ulcers, petechiae or subcutaneous nodules  : no gibbs  NEURO: grossly intact; motor/sensory WNL; AAOx3; no tremors  PSYCH: normal mood, affect and behavior  LYMPH: normal cervical, supraclavicular, axillary and groin LN's            Labs:     6/26/2019 on chart      4/25/2019  Lab Results   Component Value Date    WBC 4.2 (L) 04/25/2019    HGB 14.7 04/25/2019    HCT 44.3 04/25/2019      04/25/2019     CMP  Sodium   Date Value Ref Range Status   04/25/2019 144 134 - 144 mmol/L      Potassium   Date Value Ref Range Status   04/25/2019 4.0 3.5 - 5.0 mmol/L      Chloride   Date Value Ref Range Status   04/25/2019 107 98 - 110 mmol/L      CO2   Date Value Ref Range Status   04/25/2019 26.2 22.8 - 31.6 mmol/L      Glucose   Date Value Ref Range Status   04/25/2019 177 (H) 70 - 99 mg/dL      BUN, Bld   Date Value Ref Range Status   04/25/2019 11 8 - 20 mg/dL      Creatinine   Date Value Ref Range Status   04/25/2019 0.83 0.60 - 1.40 mg/dL      Calcium   Date Value Ref Range Status   04/25/2019 9.4 7.7 - 10.4 mg/dL      Total Protein   Date Value Ref Range Status   04/25/2019 7.3 6.0 - 8.2 g/dL      Albumin   Date Value Ref Range Status   04/25/2019 4.4 3.1 - 4.7 g/dL      Total Bilirubin   Date Value Ref Range Status   04/25/2019 1.1 (H) 0.3 - 1.0 mg/dL      Alkaline Phosphatase   Date Value Ref Range Status   04/25/2019 91 40 - 104 IU/L      AST   Date Value Ref Range Status   04/25/2019 34 10 - 40 IU/L          Radiology/Diagnostic Studies:    No results found.    I have reviewed all available lab results and radiology reports.    Assessment/Plan:   (1) 64 y.o. male with diagnosis of NHL Follicular Stage IIIA who has been referred by Dr Gary Chen with [facility] for continuation of care by medical hematology/oncology.   - He originally was diagnosed in 2012 and had parotid excision at [facility]. He subsequently went to [facility] and was treated with bendamustine-rituximab. He has been on rituximab maintenance every 8 weeks and IV IgG monthly. Dr Chen's plan was to keep him on maintenace therapy for as long as he could tolerate the treatments  - s/p 6 cycles of Bendamustine and Retuximab with subsequent complete remission  - 1st maintenance cycle rituximab was in March 2016  - he has been on maintenance rituximab and IV IgG - last rituximab 11/15/2018; last IV IgG on Dec 14th 2018  - last PET  was on 9/26/2018 with no evidence of recurrence  - originally diagnosed in Dec 2012 with left parotid and left periparotid LN excision on 12/11/2012     (2) HTN     (3) Neuropathy     (4) GERD     (5) DM - on metformin per Dr Nunez     (6) Hypogammaglobulinemia - on Iv IgG monthly     (7) Steatosis of liver     (8) Degenerative disc disease of back     (9) Diverticular disease           VISIT DIAGNOSES:      Follicular lymphoma grade IIIa, unspecified body region          PLAN:  1. Continue his rituximab every 8 weeks as long as he is tolerating the therapy as per Dr Chen's prior recommendations and directives    2. continue IV IgG monthly   3. Check labs every 8 weeks  4. F/u with PCP  5. PET - scheduled for Sept 2019  RTC in 8 weeks   Fax note to Lea Nunez MD;          Discussion:       I spent over 25 mins of time with the patient. Reviewed results of the recently ordered labs, tests and studies; made directives with regards to the results. Over half of this time was spent couseling and coordinating care.    I have explained all of the above in detail and the patient understands all of the current recommendation(s). I have answered all of their questions to the best of my ability and to their complete satisfaction.   The patient is to continue with the current management plan.            Electronically signed by Jefferson Ibarra MD

## 2019-06-27 ENCOUNTER — OFFICE VISIT (OUTPATIENT)
Dept: HEMATOLOGY/ONCOLOGY | Facility: CLINIC | Age: 64
End: 2019-06-27
Payer: OTHER GOVERNMENT

## 2019-06-27 ENCOUNTER — TELEPHONE (OUTPATIENT)
Dept: HEMATOLOGY/ONCOLOGY | Facility: CLINIC | Age: 64
End: 2019-06-27

## 2019-06-27 VITALS
RESPIRATION RATE: 20 BRPM | HEART RATE: 73 BPM | WEIGHT: 212.5 LBS | BODY MASS INDEX: 32.31 KG/M2 | TEMPERATURE: 99 F | SYSTOLIC BLOOD PRESSURE: 135 MMHG | DIASTOLIC BLOOD PRESSURE: 87 MMHG

## 2019-06-27 DIAGNOSIS — C82.30 FOLLICULAR LYMPHOMA GRADE IIIA, UNSPECIFIED BODY REGION: Primary | ICD-10-CM

## 2019-06-27 PROCEDURE — 99214 OFFICE O/P EST MOD 30 MIN: CPT | Mod: ,,, | Performed by: INTERNAL MEDICINE

## 2019-06-27 PROCEDURE — 99214 PR OFFICE/OUTPT VISIT, EST, LEVL IV, 30-39 MIN: ICD-10-PCS | Mod: ,,, | Performed by: INTERNAL MEDICINE

## 2019-06-27 RX ORDER — SODIUM CHLORIDE 0.9 % (FLUSH) 0.9 %
10 SYRINGE (ML) INJECTION
Status: CANCELLED | OUTPATIENT
Start: 2019-06-28

## 2019-06-27 RX ORDER — ACETAMINOPHEN 500 MG
500 TABLET ORAL
Status: CANCELLED | OUTPATIENT
Start: 2019-06-28

## 2019-06-27 RX ORDER — HEPARIN 100 UNIT/ML
500 SYRINGE INTRAVENOUS
Status: CANCELLED | OUTPATIENT
Start: 2019-06-28

## 2019-06-27 RX ORDER — ACETAMINOPHEN 325 MG/1
650 TABLET ORAL
Status: CANCELLED | OUTPATIENT
Start: 2019-06-28

## 2019-06-27 RX ORDER — FAMOTIDINE 10 MG/ML
20 INJECTION INTRAVENOUS
Status: CANCELLED | OUTPATIENT
Start: 2019-06-28

## 2019-06-27 RX ORDER — DIPHENHYDRAMINE HCL 25 MG
25 CAPSULE ORAL
Status: CANCELLED | OUTPATIENT
Start: 2019-06-28

## 2019-06-27 RX ORDER — DIPHENHYDRAMINE HYDROCHLORIDE 50 MG/ML
25 INJECTION INTRAMUSCULAR; INTRAVENOUS
Status: CANCELLED | OUTPATIENT
Start: 2019-06-28

## 2019-06-27 NOTE — TELEPHONE ENCOUNTER
Patient already scheduled     ----- Message from Tori Jacobo sent at 6/27/2019  9:29 AM CDT -----  Authorization received and has been approved. The patient is ready to be scheduled. IVIG

## 2019-06-27 NOTE — LETTER
June 27, 2019      Lea Nunez MD  [address]  [address]  [place] [address]           [facility] - Hematology Oncology  [address]  [address]  [place] [address]  Phone: [phone]  Fax: [phone]          Patient: Sivakumar Thacker   MR Number: [id number]   YOB: 1955   Date of Visit: 6/27/2019       Dear Dr. Lea Nunez:    Thank you for referring Sivakumar Thacker to me for evaluation. Attached you will find relevant portions of my assessment and plan of care.    If you have questions, please do not hesitate to call me. I look forward to following Sivakumar Thacker along with you.    Sincerely,    Jefferson Ibarra MD    Enclosure  CC:  No Recipients    If you would like to receive this communication electronically, please contact [email or web address] or [phone] to request more information on [employer] Link access.    For providers and/or their staff who would like to refer a patient to [facility], please contact us through our one-stop-shop provider referral line, [facility], at [phone].    If you feel you have received this communication in error or would no longer like to receive these types of communications, please e-mail [email or web address]

## 2019-07-08 ENCOUNTER — TELEPHONE (OUTPATIENT)
Dept: FAMILY MEDICINE | Facility: CLINIC | Age: 64
End: 2019-07-08

## 2019-07-08 NOTE — TELEPHONE ENCOUNTER
Pt states that Dr. Mcmahon is not listed as a  provider.   Informed pt that I will send an email to get clarification of this, because these providers have been with  in the past.   Pt voiced understanding.         ----- Message from Livia Prado sent at 7/8/2019  2:18 PM CDT -----  Type: Needs Medical Advice    Who Called:  Ivette Mccabe  Best Call Back Number: [phone]  Additional Information: please contact to follow up on previous message regarding Dr. Mcmahon being recognized as a provider for     Thank you

## 2019-07-26 RX ORDER — DIPHENHYDRAMINE HYDROCHLORIDE 50 MG/ML
25 INJECTION INTRAMUSCULAR; INTRAVENOUS
Status: CANCELLED | OUTPATIENT
Start: 2019-08-23

## 2019-07-26 RX ORDER — DIPHENHYDRAMINE HCL 25 MG
25 CAPSULE ORAL
Status: CANCELLED | OUTPATIENT
Start: 2019-08-23

## 2019-07-26 RX ORDER — HEPARIN 100 UNIT/ML
500 SYRINGE INTRAVENOUS
Status: CANCELLED | OUTPATIENT
Start: 2019-08-23

## 2019-07-26 RX ORDER — SODIUM CHLORIDE 0.9 % (FLUSH) 0.9 %
10 SYRINGE (ML) INJECTION
Status: CANCELLED | OUTPATIENT
Start: 2019-08-23

## 2019-07-26 RX ORDER — ACETAMINOPHEN 500 MG
500 TABLET ORAL
Status: CANCELLED | OUTPATIENT
Start: 2019-08-23

## 2019-07-30 ENCOUNTER — TELEPHONE (OUTPATIENT)
Dept: FAMILY MEDICINE | Facility: CLINIC | Age: 64
End: 2019-07-30

## 2019-07-30 NOTE — TELEPHONE ENCOUNTER
----- Message from Kamilah Christianson LPN sent at 7/29/2019 11:50 AM CDT -----   Any news on ??  ----- Message -----  From: Saira Gibbons  Sent: 7/29/2019   9:37 AM  To: [facility] Staff    Type: Needs Medical Advice    Who Called:  Daughter / Linn Mccabe   Symptoms (please be specific):  Requesting to discuss  insurance How long has patient had these symptoms:  Has Dr Mcmahon been approved for primary care  management physician   Pharmacy name and phone #:     Best Call Back Number: [phone]   Additional Information: prefers to see DR Mcmahon only

## 2019-07-30 NOTE — TELEPHONE ENCOUNTER
I have spoken with [employer] at [phone] and Dr. Mcmahon is showing as an in-network provider.  Pt notified.  Dora

## 2019-07-30 NOTE — TELEPHONE ENCOUNTER
----- Message from Ny Cristina sent at 7/30/2019 10:56 AM CDT -----  Contact: pt  Pt stating  contacted  and they are telling him Dr Mcmahon is not a  provider manager ,that there  is a difference between a manger and provider. Would like to speak to adrian Meza about it....[phone]

## 2019-07-30 NOTE — TELEPHONE ENCOUNTER
I have called pt and informed him that I have emailed my manager in the attempt to have this situation fixed so that we can get him in to see Dr. Mcmahon.  Dora

## 2019-08-09 ENCOUNTER — TELEPHONE (OUTPATIENT)
Dept: FAMILY MEDICINE | Facility: CLINIC | Age: 64
End: 2019-08-09

## 2019-08-09 NOTE — TELEPHONE ENCOUNTER
Poke with patient about . I informed him it is the insurance and he will have to discuss with insurance company

## 2019-08-09 NOTE — TELEPHONE ENCOUNTER
----- Message from Kristian Ken sent at 8/9/2019 10:59 AM CDT -----  Contact: PATIENT  PATIENT WOULD LIKE TO SEE DR PARR AS A PRIMARY CARE MANAGER  BUT DR PARR ISN'T NETWORKED WITH [facility] IN THAT WAY .  PLEASE CALL PATIENT [phone]  ANUP MATT, 10/15/1956 HAS AN APPT WITH DR PARR IN A COUPLE OF WEEKS BUT IS IN THE SAME PREDICAMENT.

## 2019-08-13 RX ORDER — MEPERIDINE HYDROCHLORIDE 50 MG/ML
25 INJECTION INTRAMUSCULAR; INTRAVENOUS; SUBCUTANEOUS
Status: CANCELLED | OUTPATIENT
Start: 2019-08-22

## 2019-08-13 RX ORDER — SODIUM CHLORIDE 0.9 % (FLUSH) 0.9 %
10 SYRINGE (ML) INJECTION
Status: CANCELLED | OUTPATIENT
Start: 2019-08-22

## 2019-08-13 RX ORDER — FAMOTIDINE 10 MG/ML
20 INJECTION INTRAVENOUS
Status: CANCELLED | OUTPATIENT
Start: 2019-08-22

## 2019-08-13 RX ORDER — HEPARIN 100 UNIT/ML
500 SYRINGE INTRAVENOUS
Status: CANCELLED | OUTPATIENT
Start: 2019-08-22

## 2019-08-13 RX ORDER — ACETAMINOPHEN 325 MG/1
650 TABLET ORAL
Status: CANCELLED | OUTPATIENT
Start: 2019-08-22

## 2019-08-19 ENCOUNTER — LAB VISIT (OUTPATIENT)
Dept: LAB | Facility: HOSPITAL | Age: 64
End: 2019-08-19
Attending: INTERNAL MEDICINE
Payer: OTHER GOVERNMENT

## 2019-08-19 ENCOUNTER — TELEPHONE (OUTPATIENT)
Dept: HEMATOLOGY/ONCOLOGY | Facility: CLINIC | Age: 64
End: 2019-08-19

## 2019-08-19 DIAGNOSIS — C82.30 FOLLICULAR LYMPHOMA GRADE IIIA, UNSPECIFIED BODY REGION: ICD-10-CM

## 2019-08-19 DIAGNOSIS — C82.30 FOLLICULAR LYMPHOMA GRADE IIIA, UNSPECIFIED BODY REGION: Primary | ICD-10-CM

## 2019-08-19 LAB
ALBUMIN SERPL BCP-MCNC: 4.1 G/DL (ref 3.5–5.2)
ALP SERPL-CCNC: 72 U/L (ref 55–135)
ALT SERPL W/O P-5'-P-CCNC: 30 U/L (ref 10–44)
ANION GAP SERPL CALC-SCNC: 7 MMOL/L (ref 8–16)
AST SERPL-CCNC: 27 U/L (ref 10–40)
BASOPHILS # BLD AUTO: 0.05 K/UL (ref 0–0.2)
BASOPHILS NFR BLD: 1.3 % (ref 0–1.9)
BILIRUB SERPL-MCNC: 0.7 MG/DL (ref 0.1–1)
BUN SERPL-MCNC: 12 MG/DL (ref 8–23)
CALCIUM SERPL-MCNC: 9.1 MG/DL (ref 8.7–10.5)
CHLORIDE SERPL-SCNC: 107 MMOL/L (ref 95–110)
CO2 SERPL-SCNC: 27 MMOL/L (ref 23–29)
CREAT SERPL-MCNC: 0.9 MG/DL (ref 0.5–1.4)
DIFFERENTIAL METHOD: ABNORMAL
EOSINOPHIL # BLD AUTO: 0.4 K/UL (ref 0–0.5)
EOSINOPHIL NFR BLD: 10.4 % (ref 0–8)
ERYTHROCYTE [DISTWIDTH] IN BLOOD BY AUTOMATED COUNT: 13.3 % (ref 11.5–14.5)
EST. GFR  (AFRICAN AMERICAN): >60 ML/MIN/1.73 M^2
EST. GFR  (NON AFRICAN AMERICAN): >60 ML/MIN/1.73 M^2
GLUCOSE SERPL-MCNC: 126 MG/DL (ref 70–110)
HCT VFR BLD AUTO: 43 % (ref 40–54)
HGB BLD-MCNC: 14.5 G/DL (ref 14–18)
IMM GRANULOCYTES # BLD AUTO: 0.19 K/UL (ref 0–0.04)
IMM GRANULOCYTES NFR BLD AUTO: 4.8 % (ref 0–0.5)
LYMPHOCYTES # BLD AUTO: 1.1 K/UL (ref 1–4.8)
LYMPHOCYTES NFR BLD: 26.7 % (ref 18–48)
MCH RBC QN AUTO: 29.8 PG (ref 27–31)
MCHC RBC AUTO-ENTMCNC: 33.7 G/DL (ref 32–36)
MCV RBC AUTO: 89 FL (ref 82–98)
MONOCYTES # BLD AUTO: 0.5 K/UL (ref 0.3–1)
MONOCYTES NFR BLD: 12.7 % (ref 4–15)
NEUTROPHILS # BLD AUTO: 1.7 K/UL (ref 1.8–7.7)
NEUTROPHILS NFR BLD: 44.1 % (ref 38–73)
NRBC BLD-RTO: 0 /100 WBC
PLATELET # BLD AUTO: 226 K/UL (ref 150–350)
PMV BLD AUTO: 8.9 FL (ref 9.2–12.9)
POTASSIUM SERPL-SCNC: 3.8 MMOL/L (ref 3.5–5.1)
PROT SERPL-MCNC: 6.6 G/DL (ref 6–8.4)
RBC # BLD AUTO: 4.86 M/UL (ref 4.6–6.2)
SODIUM SERPL-SCNC: 141 MMOL/L (ref 136–145)
WBC # BLD AUTO: 3.93 K/UL (ref 3.9–12.7)

## 2019-08-19 PROCEDURE — 85025 COMPLETE CBC W/AUTO DIFF WBC: CPT

## 2019-08-19 PROCEDURE — 80053 COMPREHEN METABOLIC PANEL: CPT

## 2019-08-21 NOTE — PROGRESS NOTES
[facility] Hematology/Oncology  PROGRESS NOTE -  Follow-up Visit      Subjective:       Patient ID:   NAME: Sivakumar Thacker : 1955     64 y.o. male    Referring Doc: Lea Nunez MD  Other Physicians: Vinod Chen    Chief Complaint:  NHL f/u    History of Present Illness:     Patient returns today for a regularly scheduled follow-up visit.  The patient is here today to go over the results of the recently ordered labs, tests and studies. . He is doing well with the rituximab infusions. He denies any CP, SOB, HA's or N/V. He is here by himself. His wife unfortunately passed away recently.             ROS:   GEN: normal without any fever, night sweats or weight loss  HEENT: normal with no HA's, sore throat, stiff neck, changes in vision  CV: normal with no CP, SOB, PND, VASQUEZ or orthopnea  PULM: normal with no SOB, cough, hemoptysis, sputum or pleuritic pain  GI: normal with no abdominal pain, nausea, vomiting, constipation, diarrhea, melanotic stools, BRBPR, or hematemesis  : normal with no hematuria, dysuria  BREAST: normal with no mass, discharge, pain  SKIN: normal with no rash, erythema, bruising, or swelling    Allergies:  Review of patient's allergies indicates:  No Known Allergies    Medications:    Current Outpatient Medications:     amlodipine (NORVASC) 2.5 MG tablet, Take 2.5 mg by mouth once daily., Disp: , Rfl:     aspirin (ECOTRIN) 81 MG EC tablet, Take 81 mg by mouth once daily., Disp: , Rfl:     atorvastatin (LIPITOR) 20 MG tablet, Take 20 mg by mouth once daily., Disp: , Rfl:     gabapentin (NEURONTIN) 600 MG tablet, Take 600 mg by mouth 2 (two) times daily., Disp: , Rfl:     INV IGI 10% injection, Inject into the vein every 28 days., Disp: , Rfl:     losartan (COZAAR) 100 MG tablet, Take 100 mg by mouth once daily., Disp: , Rfl:     metFORMIN (GLUCOPHAGE) 500 MG tablet, metformin 500 mg tablet  Take 2 tablets twice a day by oral route with meals for 90 days., Disp: , Rfl:      METHYL-B12/L-MEFOLATE/B6 PHOS (METANX ORAL), Take 1-2 tablets by mouth 2 (two) times daily as needed., Disp: , Rfl:     montelukast (SINGULAIR) 10 mg tablet, Take 10 mg by mouth nightly as needed., Disp: , Rfl:     MULTIVITAMIN ORAL, Take 1 tablet by mouth once daily., Disp: , Rfl:     naproxen (NAPROSYN) 500 MG tablet, Take 500 mg by mouth 2 (two) times daily as needed., Disp: , Rfl:     omeprazole (PRILOSEC) 40 MG capsule, Take 40 mg by mouth once daily., Disp: , Rfl:     tizanidine (ZANAFLEX) 4 MG tablet, Take 4 mg by mouth daily as needed., Disp: , Rfl:   No current facility-administered medications for this visit.     Facility-Administered Medications Ordered in Other Visits:     alteplase injection 2 mg, 2 mg, Intra-Catheter, PRN, Jefferson Ibarra MD    heparin, porcine (PF) 100 unit/mL injection flush 500 Units, 500 Units, Intravenous, PRN, Jefferson Ibarra MD    meperidine (PF) injection 25 mg, 25 mg, Intravenous, PRN, Jefferson Ibarra MD    riTUXimab (RITUXAN) 375 mg/m2 = 821 mg in sodium chloride 0.9% 821 mL infusion (conc: 1 mg/mL), 375 mg/m2 (Treatment Plan Recorded), Intravenous, 1 time in Clinic/HOD, Jefferson Ibarra MD, 821 mg at 08/22/19 0839    sodium chloride 0.9% 250 mL flush bag, , Intravenous, 1 time in Clinic/HOD, Jefferson Ibarra MD    sodium chloride 0.9% flush 10 mL, 10 mL, Intravenous, PRN, Jefferson Ibarra MD    PMHx/PSHx Updates:  See patient's last visit with me on 6/27/2019.  See H&P on 1/8/2019        Pathology:  Cancer Staging  No matching staging information was found for the patient.          Objective:     Vitals:  Blood pressure (!) 144/84, pulse 80, temperature 98.6 °F (37 °C), resp. rate 20, weight 95.8 kg (211 lb 5 oz).    Physical Examination:   GEN: no apparent distress, comfortable; AAOx3  HEAD: atraumatic and normocephalic  EYES: no pallor, no icterus, PERRLA  ENT: OMM, no pharyngeal erythema, external ears WNL; no nasal discharge; no  thrush  NECK: no masses, thyroid normal, trachea midline, no LAD/LN's, supple  CV: RRR with no murmur; normal pulse; normal S1 and S2; no pedal edema  CHEST: Normal respiratory effort; CTAB; normal breath sounds; mild diffuse wheeze  ABDOM: nontender and nondistended; soft; normal bowel sounds; no rebound/guarding  MUSC/Skeletal: ROM normal; no crepitus; joints normal; no deformities or arthropathy  EXTREM: no clubbing, cyanosis, inflammation or swelling  SKIN: no rashes, lesions, ulcers, petechiae or subcutaneous nodules  : no gibbs  NEURO: grossly intact; motor/sensory WNL; AAOx3; no tremors  PSYCH: normal mood, affect and behavior  LYMPH: normal cervical, supraclavicular, axillary and groin LN's            Labs:     8/19/2019  Lab Results   Component Value Date    WBC 3.93 08/19/2019    HGB 14.5 08/19/2019    HCT 43.0 08/19/2019    MCV 89 08/19/2019     08/19/2019     CMP  Sodium   Date Value Ref Range Status   08/19/2019 141 136 - 145 mmol/L Final   04/25/2019 144 134 - 144 mmol/L      Potassium   Date Value Ref Range Status   08/19/2019 3.8 3.5 - 5.1 mmol/L Final     Chloride   Date Value Ref Range Status   08/19/2019 107 95 - 110 mmol/L Final   04/25/2019 107 98 - 110 mmol/L      CO2   Date Value Ref Range Status   08/19/2019 27 23 - 29 mmol/L Final     Glucose   Date Value Ref Range Status   08/19/2019 126 (H) 70 - 110 mg/dL Final   04/25/2019 177 (H) 70 - 99 mg/dL      BUN, Bld   Date Value Ref Range Status   08/19/2019 12 8 - 23 mg/dL Final     Creatinine   Date Value Ref Range Status   08/19/2019 0.9 0.5 - 1.4 mg/dL Final   04/25/2019 0.83 0.60 - 1.40 mg/dL      Calcium   Date Value Ref Range Status   08/19/2019 9.1 8.7 - 10.5 mg/dL Final     Total Protein   Date Value Ref Range Status   08/19/2019 6.6 6.0 - 8.4 g/dL Final     Albumin   Date Value Ref Range Status   08/19/2019 4.1 3.5 - 5.2 g/dL Final   04/25/2019 4.4 3.1 - 4.7 g/dL      Total Bilirubin   Date Value Ref Range Status   08/19/2019  0.7 0.1 - 1.0 mg/dL Final     Comment:     For infants and newborns, interpretation of results should be based  on gestational age, weight and in agreement with clinical  observations.  Premature Infant recommended reference ranges:  Up to 24 hours.............<8.0 mg/dL  Up to 48 hours............<12.0 mg/dL  3-5 days..................<15.0 mg/dL  6-29 days.................<15.0 mg/dL       Alkaline Phosphatase   Date Value Ref Range Status   08/19/2019 72 55 - 135 U/L Final     AST   Date Value Ref Range Status   08/19/2019 27 10 - 40 U/L Final     ALT   Date Value Ref Range Status   08/19/2019 30 10 - 44 U/L Final     Anion Gap   Date Value Ref Range Status   08/19/2019 7 (L) 8 - 16 mmol/L Final     eGFR if    Date Value Ref Range Status   08/19/2019 >60.0 >60 mL/min/1.73 m^2 Final     eGFR if non    Date Value Ref Range Status   08/19/2019 >60.0 >60 mL/min/1.73 m^2 Final     Comment:     Calculation used to obtain the estimated glomerular filtration  rate (eGFR) is the CKD-EPI equation.            Radiology/Diagnostic Studies:    No results found.    I have reviewed all available lab results and radiology reports.    Assessment/Plan:   (1) 64 y.o. male with diagnosis of NHL Follicular Stage IIIA who has been referred by Dr Gary Chen with [facility] for continuation of care by medical hematology/oncology.   - He originally was diagnosed in 2012 and had parotid excision at [facility]. He subsequently went to [facility] and was treated with bendamustine-rituximab. He has been on rituximab maintenance every 8 weeks and IV IgG monthly. Dr Chen's plan was to keep him on maintenace therapy for as long as he could tolerate the treatments  - s/p 6 cycles of Bendamustine and Retuximab with subsequent complete remission  - 1st maintenance cycle rituximab was in March 2016  - he has been on maintenance rituximab and IV IgG - last rituximab 11/15/2018; last IV IgG on Dec 14th 2018  - last PET  was on 9/26/2018 with no evidence of recurrence  - originally diagnosed in Dec 2012 with left parotid and left periparotid LN excision on 12/11/2012     (2) HTN     (3) Neuropathy     (4) GERD     (5) DM - on metformin per Dr Nunez     (6) Hypogammaglobulinemia - on Iv IgG monthly     (7) Steatosis of liver     (8) Degenerative disc disease of back     (9) Diverticular disease           VISIT DIAGNOSES:      Follicular lymphoma grade IIIa, unspecified body region          PLAN:  1. Continue his rituximab every 8 weeks as long as he is tolerating the therapy as per Dr Chen's prior recommendations and directives    2. continue IV IgG monthly   3. Check labs every 8 weeks  4. F/u with PCP  5. PET - scheduled for Sept 2019  RTC in 8 weeks   Fax note to Lea Nunez MD;          Discussion:       I spent over 25 mins of time with the patient. Reviewed results of the recently ordered labs, tests and studies; made directives with regards to the results. Over half of this time was spent couseling and coordinating care.    I have explained all of the above in detail and the patient understands all of the current recommendation(s). I have answered all of their questions to the best of my ability and to their complete satisfaction.   The patient is to continue with the current management plan.            Electronically signed by Jefferson Ibarra MD

## 2019-08-22 ENCOUNTER — OFFICE VISIT (OUTPATIENT)
Dept: HEMATOLOGY/ONCOLOGY | Facility: CLINIC | Age: 64
End: 2019-08-22
Payer: OTHER GOVERNMENT

## 2019-08-22 ENCOUNTER — INFUSION (OUTPATIENT)
Dept: INFUSION THERAPY | Facility: HOSPITAL | Age: 64
End: 2019-08-22
Attending: INTERNAL MEDICINE
Payer: OTHER GOVERNMENT

## 2019-08-22 VITALS
WEIGHT: 211.31 LBS | BODY MASS INDEX: 32.13 KG/M2 | DIASTOLIC BLOOD PRESSURE: 84 MMHG | SYSTOLIC BLOOD PRESSURE: 144 MMHG | TEMPERATURE: 99 F | RESPIRATION RATE: 20 BRPM | HEART RATE: 80 BPM

## 2019-08-22 VITALS
HEART RATE: 69 BPM | TEMPERATURE: 98 F | RESPIRATION RATE: 19 BRPM | BODY MASS INDEX: 32.13 KG/M2 | SYSTOLIC BLOOD PRESSURE: 147 MMHG | DIASTOLIC BLOOD PRESSURE: 82 MMHG | WEIGHT: 211.31 LBS

## 2019-08-22 DIAGNOSIS — C82.30 FOLLICULAR LYMPHOMA GRADE IIIA, UNSPECIFIED BODY REGION: Primary | ICD-10-CM

## 2019-08-22 PROCEDURE — 99214 PR OFFICE/OUTPT VISIT, EST, LEVL IV, 30-39 MIN: ICD-10-PCS | Mod: S$GLB,,, | Performed by: INTERNAL MEDICINE

## 2019-08-22 PROCEDURE — 99214 OFFICE O/P EST MOD 30 MIN: CPT | Mod: S$GLB,,, | Performed by: INTERNAL MEDICINE

## 2019-08-22 PROCEDURE — 96415 CHEMO IV INFUSION ADDL HR: CPT

## 2019-08-22 PROCEDURE — S0028 INJECTION, FAMOTIDINE, 20 MG: HCPCS | Performed by: INTERNAL MEDICINE

## 2019-08-22 PROCEDURE — 96375 TX/PRO/DX INJ NEW DRUG ADDON: CPT

## 2019-08-22 PROCEDURE — 96367 TX/PROPH/DG ADDL SEQ IV INF: CPT

## 2019-08-22 PROCEDURE — 63600175 PHARM REV CODE 636 W HCPCS: Performed by: INTERNAL MEDICINE

## 2019-08-22 PROCEDURE — 96413 CHEMO IV INFUSION 1 HR: CPT | Mod: PO

## 2019-08-22 PROCEDURE — 25000003 PHARM REV CODE 250: Performed by: INTERNAL MEDICINE

## 2019-08-22 RX ORDER — FAMOTIDINE 10 MG/ML
20 INJECTION INTRAVENOUS
Status: COMPLETED | OUTPATIENT
Start: 2019-08-22 | End: 2019-08-22

## 2019-08-22 RX ORDER — HEPARIN 100 UNIT/ML
500 SYRINGE INTRAVENOUS
Status: DISCONTINUED | OUTPATIENT
Start: 2019-08-22 | End: 2019-08-22 | Stop reason: HOSPADM

## 2019-08-22 RX ORDER — MEPERIDINE HYDROCHLORIDE 25 MG/ML
25 INJECTION INTRAMUSCULAR; INTRAVENOUS; SUBCUTANEOUS
Status: DISCONTINUED | OUTPATIENT
Start: 2019-08-22 | End: 2019-08-22 | Stop reason: HOSPADM

## 2019-08-22 RX ORDER — ACETAMINOPHEN 325 MG/1
650 TABLET ORAL
Status: COMPLETED | OUTPATIENT
Start: 2019-08-22 | End: 2019-08-22

## 2019-08-22 RX ORDER — SODIUM CHLORIDE 0.9 % (FLUSH) 0.9 %
10 SYRINGE (ML) INJECTION
Status: DISCONTINUED | OUTPATIENT
Start: 2019-08-22 | End: 2019-08-22 | Stop reason: HOSPADM

## 2019-08-22 RX ADMIN — DIPHENHYDRAMINE HYDROCHLORIDE 50 MG: 50 INJECTION INTRAMUSCULAR; INTRAVENOUS at 08:08

## 2019-08-22 RX ADMIN — HEPARIN 500 UNITS: 100 SYRINGE at 11:08

## 2019-08-22 RX ADMIN — ACETAMINOPHEN 650 MG: 325 TABLET ORAL at 08:08

## 2019-08-22 RX ADMIN — SODIUM CHLORIDE: 0.9 INJECTION, SOLUTION INTRAVENOUS at 08:08

## 2019-08-22 RX ADMIN — RITUXIMAB 821 MG: 10 INJECTION, SOLUTION INTRAVENOUS at 08:08

## 2019-08-22 RX ADMIN — FAMOTIDINE 20 MG: 10 INJECTION INTRAVENOUS at 08:08

## 2019-08-23 ENCOUNTER — INFUSION (OUTPATIENT)
Dept: INFUSION THERAPY | Facility: HOSPITAL | Age: 64
End: 2019-08-23
Attending: INTERNAL MEDICINE
Payer: OTHER GOVERNMENT

## 2019-08-23 VITALS
SYSTOLIC BLOOD PRESSURE: 151 MMHG | DIASTOLIC BLOOD PRESSURE: 87 MMHG | HEIGHT: 68 IN | TEMPERATURE: 98 F | WEIGHT: 211.38 LBS | BODY MASS INDEX: 32.04 KG/M2 | OXYGEN SATURATION: 98 % | HEART RATE: 68 BPM | RESPIRATION RATE: 18 BRPM

## 2019-08-23 DIAGNOSIS — C82.30 FOLLICULAR LYMPHOMA GRADE IIIA, UNSPECIFIED BODY REGION: Primary | ICD-10-CM

## 2019-08-23 PROCEDURE — 63600175 PHARM REV CODE 636 W HCPCS: Performed by: INTERNAL MEDICINE

## 2019-08-23 PROCEDURE — 96413 CHEMO IV INFUSION 1 HR: CPT | Mod: PO

## 2019-08-23 PROCEDURE — 96415 CHEMO IV INFUSION ADDL HR: CPT | Mod: PO

## 2019-08-23 PROCEDURE — 25000003 PHARM REV CODE 250: Performed by: INTERNAL MEDICINE

## 2019-08-23 RX ORDER — SODIUM CHLORIDE 0.9 % (FLUSH) 0.9 %
10 SYRINGE (ML) INJECTION
Status: CANCELLED | OUTPATIENT
Start: 2019-09-20

## 2019-08-23 RX ORDER — HEPARIN 100 UNIT/ML
500 SYRINGE INTRAVENOUS
Status: CANCELLED | OUTPATIENT
Start: 2019-09-20

## 2019-08-23 RX ORDER — DIPHENHYDRAMINE HYDROCHLORIDE 50 MG/ML
25 INJECTION INTRAMUSCULAR; INTRAVENOUS
Status: CANCELLED | OUTPATIENT
Start: 2019-09-20

## 2019-08-23 RX ORDER — ACETAMINOPHEN 500 MG
500 TABLET ORAL
Status: DISCONTINUED | OUTPATIENT
Start: 2019-08-23 | End: 2019-08-23 | Stop reason: HOSPADM

## 2019-08-23 RX ORDER — ACETAMINOPHEN 500 MG
500 TABLET ORAL
Status: CANCELLED | OUTPATIENT
Start: 2019-09-20

## 2019-08-23 RX ORDER — HEPARIN 100 UNIT/ML
500 SYRINGE INTRAVENOUS
Status: DISCONTINUED | OUTPATIENT
Start: 2019-08-23 | End: 2019-08-23 | Stop reason: HOSPADM

## 2019-08-23 RX ORDER — DIPHENHYDRAMINE HCL 25 MG
25 CAPSULE ORAL
Status: CANCELLED | OUTPATIENT
Start: 2019-09-20

## 2019-08-23 RX ORDER — DIPHENHYDRAMINE HYDROCHLORIDE 50 MG/ML
25 INJECTION INTRAMUSCULAR; INTRAVENOUS
Status: DISCONTINUED | OUTPATIENT
Start: 2019-08-23 | End: 2019-08-23 | Stop reason: SDUPTHER

## 2019-08-23 RX ORDER — SODIUM CHLORIDE 0.9 % (FLUSH) 0.9 %
10 SYRINGE (ML) INJECTION
Status: DISCONTINUED | OUTPATIENT
Start: 2019-08-23 | End: 2019-08-23 | Stop reason: HOSPADM

## 2019-08-23 RX ORDER — DIPHENHYDRAMINE HCL 25 MG
25 CAPSULE ORAL
Status: DISCONTINUED | OUTPATIENT
Start: 2019-08-23 | End: 2019-08-23 | Stop reason: HOSPADM

## 2019-08-23 RX ADMIN — DIPHENHYDRAMINE HYDROCHLORIDE 25 MG: 50 INJECTION INTRAMUSCULAR; INTRAVENOUS at 08:08

## 2019-08-23 RX ADMIN — ACETAMINOPHEN 500 MG: 500 TABLET ORAL at 08:08

## 2019-08-23 RX ADMIN — IMMUNE GLOBULIN (HUMAN) 30 G: 10 INJECTION INTRAVENOUS; SUBCUTANEOUS at 08:08

## 2019-08-23 RX ADMIN — HEPARIN 500 UNITS: 100 SYRINGE at 10:08

## 2019-09-11 ENCOUNTER — TELEPHONE (OUTPATIENT)
Dept: HEMATOLOGY/ONCOLOGY | Facility: CLINIC | Age: 64
End: 2019-09-11

## 2019-09-11 ENCOUNTER — HOSPITAL ENCOUNTER (OUTPATIENT)
Dept: RADIOLOGY | Facility: HOSPITAL | Age: 64
Discharge: HOME | End: 2019-09-11
Attending: INTERNAL MEDICINE
Payer: OTHER GOVERNMENT

## 2019-09-11 VITALS — BODY MASS INDEX: 32.58 KG/M2 | WEIGHT: 215 LBS | HEIGHT: 68 IN

## 2019-09-11 DIAGNOSIS — C82.30 FOLLICULAR LYMPHOMA GRADE IIIA, UNSPECIFIED BODY REGION: ICD-10-CM

## 2019-09-11 LAB — GLUCOSE SERPL-MCNC: 130 MG/DL (ref 70–110)

## 2019-09-11 PROCEDURE — 78815 PET IMAGE W/CT SKULL-THIGH: CPT | Mod: TC,PO

## 2019-09-11 PROCEDURE — A9552 F18 FDG: HCPCS | Mod: PO

## 2019-09-11 NOTE — TELEPHONE ENCOUNTER
Patient given PET results and verbalized understanding.    ----- Message from Jefferson Ibarra MD sent at 9/11/2019 11:15 AM CDT -----  Call him with good report

## 2019-09-20 ENCOUNTER — INFUSION (OUTPATIENT)
Dept: INFUSION THERAPY | Facility: HOSPITAL | Age: 64
End: 2019-09-20
Attending: INTERNAL MEDICINE
Payer: OTHER GOVERNMENT

## 2019-09-20 VITALS
DIASTOLIC BLOOD PRESSURE: 76 MMHG | RESPIRATION RATE: 18 BRPM | HEIGHT: 68 IN | BODY MASS INDEX: 31.24 KG/M2 | HEART RATE: 71 BPM | TEMPERATURE: 98 F | SYSTOLIC BLOOD PRESSURE: 127 MMHG | WEIGHT: 206.13 LBS | OXYGEN SATURATION: 96 %

## 2019-09-20 DIAGNOSIS — C82.30 FOLLICULAR LYMPHOMA GRADE IIIA, UNSPECIFIED BODY REGION: Primary | ICD-10-CM

## 2019-09-20 PROCEDURE — 96415 CHEMO IV INFUSION ADDL HR: CPT

## 2019-09-20 PROCEDURE — 25000003 PHARM REV CODE 250: Performed by: INTERNAL MEDICINE

## 2019-09-20 PROCEDURE — A4216 STERILE WATER/SALINE, 10 ML: HCPCS | Performed by: INTERNAL MEDICINE

## 2019-09-20 PROCEDURE — 96413 CHEMO IV INFUSION 1 HR: CPT

## 2019-09-20 PROCEDURE — 96367 TX/PROPH/DG ADDL SEQ IV INF: CPT

## 2019-09-20 PROCEDURE — 63600175 PHARM REV CODE 636 W HCPCS: Performed by: INTERNAL MEDICINE

## 2019-09-20 RX ORDER — HEPARIN 100 UNIT/ML
500 SYRINGE INTRAVENOUS
Status: DISCONTINUED | OUTPATIENT
Start: 2019-09-20 | End: 2019-09-20 | Stop reason: HOSPADM

## 2019-09-20 RX ORDER — ACETAMINOPHEN 500 MG
500 TABLET ORAL
Status: CANCELLED | OUTPATIENT
Start: 2019-10-18

## 2019-09-20 RX ORDER — SODIUM CHLORIDE 0.9 % (FLUSH) 0.9 %
10 SYRINGE (ML) INJECTION
Status: CANCELLED | OUTPATIENT
Start: 2019-10-18

## 2019-09-20 RX ORDER — HEPARIN 100 UNIT/ML
500 SYRINGE INTRAVENOUS
Status: CANCELLED | OUTPATIENT
Start: 2019-10-18

## 2019-09-20 RX ORDER — ACETAMINOPHEN 500 MG
500 TABLET ORAL
Status: DISCONTINUED | OUTPATIENT
Start: 2019-09-20 | End: 2019-09-20 | Stop reason: HOSPADM

## 2019-09-20 RX ORDER — SODIUM CHLORIDE 0.9 % (FLUSH) 0.9 %
10 SYRINGE (ML) INJECTION
Status: DISCONTINUED | OUTPATIENT
Start: 2019-09-20 | End: 2019-09-20 | Stop reason: HOSPADM

## 2019-09-20 RX ORDER — DIPHENHYDRAMINE HCL 25 MG
25 CAPSULE ORAL
Status: CANCELLED | OUTPATIENT
Start: 2019-10-18

## 2019-09-20 RX ORDER — DIPHENHYDRAMINE HYDROCHLORIDE 50 MG/ML
25 INJECTION INTRAMUSCULAR; INTRAVENOUS
Status: CANCELLED | OUTPATIENT
Start: 2019-10-18

## 2019-09-20 RX ORDER — DIPHENHYDRAMINE HCL 25 MG
25 CAPSULE ORAL
Status: DISCONTINUED | OUTPATIENT
Start: 2019-09-20 | End: 2019-09-20

## 2019-09-20 RX ADMIN — SODIUM CHLORIDE, PRESERVATIVE FREE 10 ML: 5 INJECTION INTRAVENOUS at 10:09

## 2019-09-20 RX ADMIN — DIPHENHYDRAMINE HYDROCHLORIDE 25 MG: 50 INJECTION, SOLUTION INTRAMUSCULAR; INTRAVENOUS at 08:09

## 2019-09-20 RX ADMIN — ACETAMINOPHEN 500 MG: 500 TABLET ORAL at 08:09

## 2019-09-20 RX ADMIN — IMMUNE GLOBULIN (HUMAN) 30 G: 10 INJECTION INTRAVENOUS; SUBCUTANEOUS at 08:09

## 2019-09-20 RX ADMIN — HEPARIN 500 UNITS: 100 SYRINGE at 10:09

## 2019-10-11 RX ORDER — FAMOTIDINE 10 MG/ML
20 INJECTION INTRAVENOUS
Status: CANCELLED | OUTPATIENT
Start: 2019-10-17

## 2019-10-11 RX ORDER — FAMOTIDINE 10 MG/ML
20 INJECTION INTRAVENOUS
Status: CANCELLED | OUTPATIENT
Start: 2019-10-11

## 2019-10-11 RX ORDER — ACETAMINOPHEN 325 MG/1
650 TABLET ORAL
Status: CANCELLED | OUTPATIENT
Start: 2019-10-17

## 2019-10-11 RX ORDER — SODIUM CHLORIDE 0.9 % (FLUSH) 0.9 %
10 SYRINGE (ML) INJECTION
Status: CANCELLED | OUTPATIENT
Start: 2019-10-17

## 2019-10-11 RX ORDER — HEPARIN 100 UNIT/ML
500 SYRINGE INTRAVENOUS
Status: CANCELLED | OUTPATIENT
Start: 2019-10-17

## 2019-10-11 RX ORDER — MEPERIDINE HYDROCHLORIDE 50 MG/ML
25 INJECTION INTRAMUSCULAR; INTRAVENOUS; SUBCUTANEOUS
Status: CANCELLED | OUTPATIENT
Start: 2019-10-17

## 2019-10-11 RX ORDER — HEPARIN 100 UNIT/ML
500 SYRINGE INTRAVENOUS
Status: CANCELLED | OUTPATIENT
Start: 2019-10-11

## 2019-10-11 RX ORDER — ACETAMINOPHEN 325 MG/1
650 TABLET ORAL
Status: CANCELLED | OUTPATIENT
Start: 2019-10-11

## 2019-10-11 RX ORDER — SODIUM CHLORIDE 0.9 % (FLUSH) 0.9 %
10 SYRINGE (ML) INJECTION
Status: CANCELLED | OUTPATIENT
Start: 2019-10-11

## 2019-10-16 ENCOUNTER — LAB VISIT (OUTPATIENT)
Dept: LAB | Facility: HOSPITAL | Age: 64
End: 2019-10-16
Attending: INTERNAL MEDICINE
Payer: OTHER GOVERNMENT

## 2019-10-16 ENCOUNTER — TELEPHONE (OUTPATIENT)
Dept: HEMATOLOGY/ONCOLOGY | Facility: CLINIC | Age: 64
End: 2019-10-16

## 2019-10-16 DIAGNOSIS — C82.30 FOLLICULAR LYMPHOMA GRADE IIIA, UNSPECIFIED BODY REGION: ICD-10-CM

## 2019-10-16 DIAGNOSIS — D80.1 NONFAMILIAL HYPOGAMMAGLOBULINEMIA: ICD-10-CM

## 2019-10-16 LAB
ALBUMIN SERPL BCP-MCNC: 4.3 G/DL (ref 3.5–5.2)
ALP SERPL-CCNC: 86 U/L (ref 55–135)
ALT SERPL W/O P-5'-P-CCNC: 30 U/L (ref 10–44)
ANION GAP SERPL CALC-SCNC: 12 MMOL/L (ref 8–16)
AST SERPL-CCNC: 27 U/L (ref 10–40)
BASOPHILS # BLD AUTO: 0.05 K/UL (ref 0–0.2)
BASOPHILS NFR BLD: 1 % (ref 0–1.9)
BILIRUB SERPL-MCNC: 0.6 MG/DL (ref 0.1–1)
BUN SERPL-MCNC: 17 MG/DL (ref 8–23)
CALCIUM SERPL-MCNC: 9.1 MG/DL (ref 8.7–10.5)
CHLORIDE SERPL-SCNC: 104 MMOL/L (ref 95–110)
CO2 SERPL-SCNC: 24 MMOL/L (ref 23–29)
CREAT SERPL-MCNC: 0.8 MG/DL (ref 0.5–1.4)
DIFFERENTIAL METHOD: ABNORMAL
EOSINOPHIL # BLD AUTO: 0.5 K/UL (ref 0–0.5)
EOSINOPHIL NFR BLD: 9.7 % (ref 0–8)
ERYTHROCYTE [DISTWIDTH] IN BLOOD BY AUTOMATED COUNT: 13.2 % (ref 11.5–14.5)
EST. GFR  (AFRICAN AMERICAN): >60 ML/MIN/1.73 M^2
EST. GFR  (NON AFRICAN AMERICAN): >60 ML/MIN/1.73 M^2
GLUCOSE SERPL-MCNC: 169 MG/DL (ref 70–110)
HCT VFR BLD AUTO: 45 % (ref 40–54)
HGB BLD-MCNC: 15.2 G/DL (ref 14–18)
IMM GRANULOCYTES # BLD AUTO: 0.07 K/UL (ref 0–0.04)
IMM GRANULOCYTES NFR BLD AUTO: 1.4 % (ref 0–0.5)
LYMPHOCYTES # BLD AUTO: 1.4 K/UL (ref 1–4.8)
LYMPHOCYTES NFR BLD: 29.6 % (ref 18–48)
MCH RBC QN AUTO: 30 PG (ref 27–31)
MCHC RBC AUTO-ENTMCNC: 33.8 G/DL (ref 32–36)
MCV RBC AUTO: 89 FL (ref 82–98)
MONOCYTES # BLD AUTO: 0.7 K/UL (ref 0.3–1)
MONOCYTES NFR BLD: 14.6 % (ref 4–15)
NEUTROPHILS # BLD AUTO: 2.1 K/UL (ref 1.8–7.7)
NEUTROPHILS NFR BLD: 43.7 % (ref 38–73)
NRBC BLD-RTO: 0 /100 WBC
PLATELET # BLD AUTO: 253 K/UL (ref 150–350)
PMV BLD AUTO: 9.1 FL (ref 9.2–12.9)
POTASSIUM SERPL-SCNC: 3.7 MMOL/L (ref 3.5–5.1)
PROT SERPL-MCNC: 7 G/DL (ref 6–8.4)
RBC # BLD AUTO: 5.06 M/UL (ref 4.6–6.2)
SODIUM SERPL-SCNC: 140 MMOL/L (ref 136–145)
WBC # BLD AUTO: 4.87 K/UL (ref 3.9–12.7)

## 2019-10-16 PROCEDURE — 36415 COLL VENOUS BLD VENIPUNCTURE: CPT

## 2019-10-16 PROCEDURE — 85025 COMPLETE CBC W/AUTO DIFF WBC: CPT

## 2019-10-16 PROCEDURE — 80053 COMPREHEN METABOLIC PANEL: CPT

## 2019-10-16 NOTE — PROGRESS NOTES
[facility] Hematology/Oncology  PROGRESS NOTE -  Follow-up Visit      Subjective:       Patient ID:   NAME: Sivakumar Thacker : 1955     64 y.o. male    Referring Doc: Lea Nunez MD  Other Physicians: Vinod Chen    Chief Complaint:  NHL f/u    History of Present Illness:     Patient returns today for a regularly scheduled follow-up visit.  The patient is here today to go over the results of the recently ordered labs, tests and studies. . He is doing well with the rituximab infusions. He denies any CP, SOB, HA's or N/V. He is here by himself. He has some sinus issues which have persisted but no fevers.           ROS:   GEN: normal without any fever, night sweats or weight loss  HEENT: normal with no HA's, sore throat, stiff neck, changes in vision; sinus issues   CV: normal with no CP, SOB, PND, VASQUEZ or orthopnea  PULM: normal with no SOB, cough, hemoptysis, sputum or pleuritic pain  GI: normal with no abdominal pain, nausea, vomiting, constipation, diarrhea, melanotic stools, BRBPR, or hematemesis  : normal with no hematuria, dysuria  BREAST: normal with no mass, discharge, pain  SKIN: normal with no rash, erythema, bruising, or swelling    Allergies:  Review of patient's allergies indicates:  No Known Allergies    Medications:    Current Outpatient Medications:     amlodipine (NORVASC) 2.5 MG tablet, Take 2.5 mg by mouth once daily., Disp: , Rfl:     aspirin (ECOTRIN) 81 MG EC tablet, Take 81 mg by mouth once daily., Disp: , Rfl:     atorvastatin (LIPITOR) 20 MG tablet, Take 20 mg by mouth once daily., Disp: , Rfl:     gabapentin (NEURONTIN) 600 MG tablet, Take 600 mg by mouth 2 (two) times daily., Disp: , Rfl:     INV IGI 10% injection, Inject into the vein every 28 days., Disp: , Rfl:     losartan (COZAAR) 100 MG tablet, Take 100 mg by mouth once daily., Disp: , Rfl:     metFORMIN (GLUCOPHAGE) 500 MG tablet, metformin 500 mg tablet  Take 2 tablets twice a day by oral route with meals for 90  days., Disp: , Rfl:     METHYL-B12/L-MEFOLATE/B6 PHOS (METANX ORAL), Take 1-2 tablets by mouth 2 (two) times daily as needed., Disp: , Rfl:     montelukast (SINGULAIR) 10 mg tablet, Take 10 mg by mouth nightly as needed., Disp: , Rfl:     MULTIVITAMIN ORAL, Take 1 tablet by mouth once daily., Disp: , Rfl:     naproxen (NAPROSYN) 500 MG tablet, Take 500 mg by mouth 2 (two) times daily as needed., Disp: , Rfl:     omeprazole (PRILOSEC) 40 MG capsule, Take 40 mg by mouth once daily., Disp: , Rfl:     tizanidine (ZANAFLEX) 4 MG tablet, Take 4 mg by mouth daily as needed., Disp: , Rfl:   No current facility-administered medications for this visit.     Facility-Administered Medications Ordered in Other Visits:     alteplase injection 2 mg, 2 mg, Intra-Catheter, PRN, Jefefrson Ibarra MD    heparin, porcine (PF) 100 unit/mL injection flush 500 Units, 500 Units, Intravenous, PRN, Jefferson Ibarra MD    meperidine (PF) injection 25 mg, 25 mg, Intravenous, PRN, Jefferson Ibarra MD    riTUXimab (RITUXAN) 375 mg/m2 = 821 mg in sodium chloride 0.9% 821 mL infusion (conc: 1 mg/mL), 375 mg/m2 (Treatment Plan Recorded), Intravenous, 1 time in Clinic/HOD, Jefferson Ibarra MD, 821 mg at 10/17/19 0906    sodium chloride 0.9% flush 10 mL, 10 mL, Intravenous, PRN, Jefferson Ibarra MD    PMHx/PSHx Updates:  See patient's last visit with me on 6/27/2019.  See H&P on 1/8/2019        Pathology:  Cancer Staging  No matching staging information was found for the patient.          Objective:     Vitals:  Blood pressure (!) 154/87, pulse 81, temperature 98.3 °F (36.8 °C), resp. rate 18, weight 94.3 kg (208 lb).    Physical Examination:   GEN: no apparent distress, comfortable; AAOx3  HEAD: atraumatic and normocephalic  EYES: no pallor, no icterus, PERRLA  ENT: OMM, no pharyngeal erythema, external ears WNL; no nasal discharge; no thrush  NECK: no masses, thyroid normal, trachea midline, no LAD/LN's, supple  CV: RRR with  no murmur; normal pulse; normal S1 and S2; no pedal edema  CHEST: Normal respiratory effort; CTAB; normal breath sounds; mild diffuse wheeze  ABDOM: nontender and nondistended; soft; normal bowel sounds; no rebound/guarding  MUSC/Skeletal: ROM normal; no crepitus; joints normal; no deformities or arthropathy  EXTREM: no clubbing, cyanosis, inflammation or swelling  SKIN: no rashes, lesions, ulcers, petechiae or subcutaneous nodules  : no gibbs  NEURO: grossly intact; motor/sensory WNL; AAOx3; no tremors  PSYCH: normal mood, affect and behavior  LYMPH: normal cervical, supraclavicular, axillary and groin LN's            Labs:     10/16/2019  Lab Results   Component Value Date    WBC 4.87 10/16/2019    HGB 15.2 10/16/2019    HCT 45.0 10/16/2019    MCV 89 10/16/2019     10/16/2019     CMP  Sodium   Date Value Ref Range Status   10/16/2019 140 136 - 145 mmol/L Final   04/25/2019 144 134 - 144 mmol/L      Potassium   Date Value Ref Range Status   10/16/2019 3.7 3.5 - 5.1 mmol/L Final     Chloride   Date Value Ref Range Status   10/16/2019 104 95 - 110 mmol/L Final   04/25/2019 107 98 - 110 mmol/L      CO2   Date Value Ref Range Status   10/16/2019 24 23 - 29 mmol/L Final     Glucose   Date Value Ref Range Status   10/16/2019 169 (H) 70 - 110 mg/dL Final   04/25/2019 177 (H) 70 - 99 mg/dL      BUN, Bld   Date Value Ref Range Status   10/16/2019 17 8 - 23 mg/dL Final     Creatinine   Date Value Ref Range Status   10/16/2019 0.8 0.5 - 1.4 mg/dL Final   04/25/2019 0.83 0.60 - 1.40 mg/dL      Calcium   Date Value Ref Range Status   10/16/2019 9.1 8.7 - 10.5 mg/dL Final     Total Protein   Date Value Ref Range Status   10/16/2019 7.0 6.0 - 8.4 g/dL Final     Albumin   Date Value Ref Range Status   10/16/2019 4.3 3.5 - 5.2 g/dL Final   04/25/2019 4.4 3.1 - 4.7 g/dL      Total Bilirubin   Date Value Ref Range Status   10/16/2019 0.6 0.1 - 1.0 mg/dL Final     Comment:     For infants and newborns, interpretation of  results should be based  on gestational age, weight and in agreement with clinical  observations.  Premature Infant recommended reference ranges:  Up to 24 hours.............<8.0 mg/dL  Up to 48 hours............<12.0 mg/dL  3-5 days..................<15.0 mg/dL  6-29 days.................<15.0 mg/dL       Alkaline Phosphatase   Date Value Ref Range Status   10/16/2019 86 55 - 135 U/L Final     AST   Date Value Ref Range Status   10/16/2019 27 10 - 40 U/L Final     ALT   Date Value Ref Range Status   10/16/2019 30 10 - 44 U/L Final     Anion Gap   Date Value Ref Range Status   10/16/2019 12 8 - 16 mmol/L Final     eGFR if    Date Value Ref Range Status   10/16/2019 >60.0 >60 mL/min/1.73 m^2 Final     eGFR if non    Date Value Ref Range Status   10/16/2019 >60.0 >60 mL/min/1.73 m^2 Final     Comment:     Calculation used to obtain the estimated glomerular filtration  rate (eGFR) is the CKD-EPI equation.            Radiology/Diagnostic Studies:    PET 9/11/2019   Impression       No evidence of FDG avid residual or recurrent lymphoma       I have reviewed all available lab results and radiology reports.    Assessment/Plan:   (1) 64 y.o. male with diagnosis of NHL Follicular Stage IIIA who has been referred by Dr Gary Chen with [facility] for continuation of care by medical hematology/oncology.   - He originally was diagnosed in 2012 and had parotid excision at [facility]. He subsequently went to [facility] and was treated with bendamustine-rituximab. He has been on rituximab maintenance every 8 weeks and IV IgG monthly. Dr Chen's plan was to keep him on maintenace therapy for as long as he could tolerate the treatments  - s/p 6 cycles of Bendamustine and Retuximab with subsequent complete remission  - 1st maintenance cycle rituximab was in March 2016  - he has been on maintenance rituximab and IV IgG - last rituximab 11/15/2018; last IV IgG on Dec 14th 2018  - last PET was on 9/26/2018  with no evidence of recurrence  - originally diagnosed in Dec 2012 with left parotid and left periparotid LN excision on 12/11/2012  - PEt 9/11/2019 was good     (2) HTN     (3) Neuropathy     (4) GERD     (5) DM - on metformin per Dr Nunez     (6) Hypogammaglobulinemia - on Iv IgG monthly     (7) Steatosis of liver     (8) Degenerative disc disease of back     (9) Diverticular disease           VISIT DIAGNOSES:      Follicular lymphoma grade IIIa, unspecified body region          PLAN:  1. Continue his rituximab every 8 weeks as long as he is tolerating the therapy as per Dr Chen's prior recommendations and directives    2. continue IV IgG monthly   3. Check labs every 8 weeks  4. F/u with PCP  5. Z-pack abx for the sinus issues     RTC in 8 weeks   Fax note to Lea Nunez MD;          Discussion:       I spent over 25 mins of time with the patient. Reviewed results of the recently ordered labs, tests and studies; made directives with regards to the results. Over half of this time was spent couseling and coordinating care.    I have explained all of the above in detail and the patient understands all of the current recommendation(s). I have answered all of their questions to the best of my ability and to their complete satisfaction.   The patient is to continue with the current management plan.            Electronically signed by Jefferson Ibarra MD

## 2019-10-16 NOTE — TELEPHONE ENCOUNTER
Called to see if the pt had any labs done prior to f/u appt.    Pt will have the labs done @ [facility].

## 2019-10-17 ENCOUNTER — INFUSION (OUTPATIENT)
Dept: INFUSION THERAPY | Facility: HOSPITAL | Age: 64
End: 2019-10-17
Attending: INTERNAL MEDICINE
Payer: OTHER GOVERNMENT

## 2019-10-17 ENCOUNTER — OFFICE VISIT (OUTPATIENT)
Dept: HEMATOLOGY/ONCOLOGY | Facility: CLINIC | Age: 64
End: 2019-10-17
Payer: OTHER GOVERNMENT

## 2019-10-17 VITALS
HEART RATE: 66 BPM | RESPIRATION RATE: 18 BRPM | OXYGEN SATURATION: 98 % | BODY MASS INDEX: 31.55 KG/M2 | WEIGHT: 208.19 LBS | TEMPERATURE: 98 F | HEIGHT: 68 IN | DIASTOLIC BLOOD PRESSURE: 81 MMHG | SYSTOLIC BLOOD PRESSURE: 150 MMHG

## 2019-10-17 VITALS
BODY MASS INDEX: 31.63 KG/M2 | TEMPERATURE: 98 F | HEART RATE: 81 BPM | WEIGHT: 208 LBS | SYSTOLIC BLOOD PRESSURE: 154 MMHG | DIASTOLIC BLOOD PRESSURE: 87 MMHG | RESPIRATION RATE: 18 BRPM

## 2019-10-17 DIAGNOSIS — C82.30 FOLLICULAR LYMPHOMA GRADE IIIA, UNSPECIFIED BODY REGION: Primary | ICD-10-CM

## 2019-10-17 DIAGNOSIS — J32.9 SINUSITIS, UNSPECIFIED CHRONICITY, UNSPECIFIED LOCATION: ICD-10-CM

## 2019-10-17 PROCEDURE — 99214 PR OFFICE/OUTPT VISIT, EST, LEVL IV, 30-39 MIN: ICD-10-PCS | Mod: S$GLB,,, | Performed by: INTERNAL MEDICINE

## 2019-10-17 PROCEDURE — S0028 INJECTION, FAMOTIDINE, 20 MG: HCPCS | Performed by: INTERNAL MEDICINE

## 2019-10-17 PROCEDURE — 96413 CHEMO IV INFUSION 1 HR: CPT

## 2019-10-17 PROCEDURE — A4216 STERILE WATER/SALINE, 10 ML: HCPCS | Performed by: INTERNAL MEDICINE

## 2019-10-17 PROCEDURE — 99214 OFFICE O/P EST MOD 30 MIN: CPT | Mod: S$GLB,,, | Performed by: INTERNAL MEDICINE

## 2019-10-17 PROCEDURE — 96367 TX/PROPH/DG ADDL SEQ IV INF: CPT

## 2019-10-17 PROCEDURE — 25000003 PHARM REV CODE 250: Performed by: INTERNAL MEDICINE

## 2019-10-17 PROCEDURE — 96375 TX/PRO/DX INJ NEW DRUG ADDON: CPT

## 2019-10-17 PROCEDURE — 96415 CHEMO IV INFUSION ADDL HR: CPT

## 2019-10-17 PROCEDURE — 63600175 PHARM REV CODE 636 W HCPCS: Mod: JG | Performed by: INTERNAL MEDICINE

## 2019-10-17 RX ORDER — FAMOTIDINE 10 MG/ML
20 INJECTION INTRAVENOUS
Status: COMPLETED | OUTPATIENT
Start: 2019-10-17 | End: 2019-10-17

## 2019-10-17 RX ORDER — HEPARIN 100 UNIT/ML
500 SYRINGE INTRAVENOUS
Status: DISCONTINUED | OUTPATIENT
Start: 2019-10-17 | End: 2019-10-17 | Stop reason: HOSPADM

## 2019-10-17 RX ORDER — SODIUM CHLORIDE 0.9 % (FLUSH) 0.9 %
10 SYRINGE (ML) INJECTION
Status: DISCONTINUED | OUTPATIENT
Start: 2019-10-17 | End: 2019-10-17 | Stop reason: HOSPADM

## 2019-10-17 RX ORDER — MEPERIDINE HYDROCHLORIDE 25 MG/ML
25 INJECTION INTRAMUSCULAR; INTRAVENOUS; SUBCUTANEOUS
Status: DISCONTINUED | OUTPATIENT
Start: 2019-10-17 | End: 2019-10-17 | Stop reason: HOSPADM

## 2019-10-17 RX ORDER — ACETAMINOPHEN 325 MG/1
650 TABLET ORAL
Status: COMPLETED | OUTPATIENT
Start: 2019-10-17 | End: 2019-10-17

## 2019-10-17 RX ORDER — AZITHROMYCIN 250 MG/1
TABLET, FILM COATED ORAL
Qty: 6 TABLET | Refills: 0 | Status: SHIPPED | OUTPATIENT
Start: 2019-10-17 | End: 2019-10-22

## 2019-10-17 RX ADMIN — ACETAMINOPHEN 650 MG: 325 TABLET ORAL at 08:10

## 2019-10-17 RX ADMIN — SODIUM CHLORIDE, PRESERVATIVE FREE 10 ML: 5 INJECTION INTRAVENOUS at 12:10

## 2019-10-17 RX ADMIN — HEPARIN 500 UNITS: 100 SYRINGE at 12:10

## 2019-10-17 RX ADMIN — FAMOTIDINE 20 MG: 10 INJECTION, SOLUTION INTRAVENOUS at 08:10

## 2019-10-17 RX ADMIN — DIPHENHYDRAMINE HYDROCHLORIDE 50 MG: 50 INJECTION INTRAMUSCULAR; INTRAVENOUS at 08:10

## 2019-10-17 RX ADMIN — RITUXIMAB 821 MG: 10 INJECTION, SOLUTION INTRAVENOUS at 09:10

## 2019-10-17 RX ADMIN — SODIUM CHLORIDE: 0.9 INJECTION, SOLUTION INTRAVENOUS at 08:10

## 2019-10-18 ENCOUNTER — INFUSION (OUTPATIENT)
Dept: INFUSION THERAPY | Facility: HOSPITAL | Age: 64
End: 2019-10-18
Attending: INTERNAL MEDICINE
Payer: OTHER GOVERNMENT

## 2019-10-18 VITALS
OXYGEN SATURATION: 99 % | SYSTOLIC BLOOD PRESSURE: 145 MMHG | RESPIRATION RATE: 18 BRPM | DIASTOLIC BLOOD PRESSURE: 85 MMHG | BODY MASS INDEX: 31.61 KG/M2 | WEIGHT: 207.88 LBS | TEMPERATURE: 98 F | HEART RATE: 68 BPM

## 2019-10-18 DIAGNOSIS — C82.30 FOLLICULAR LYMPHOMA GRADE IIIA, UNSPECIFIED BODY REGION: Primary | ICD-10-CM

## 2019-10-18 PROCEDURE — 96413 CHEMO IV INFUSION 1 HR: CPT

## 2019-10-18 PROCEDURE — 63600175 PHARM REV CODE 636 W HCPCS: Performed by: INTERNAL MEDICINE

## 2019-10-18 PROCEDURE — 96415 CHEMO IV INFUSION ADDL HR: CPT

## 2019-10-18 PROCEDURE — 96367 TX/PROPH/DG ADDL SEQ IV INF: CPT

## 2019-10-18 PROCEDURE — 25000003 PHARM REV CODE 250: Performed by: INTERNAL MEDICINE

## 2019-10-18 PROCEDURE — A4216 STERILE WATER/SALINE, 10 ML: HCPCS | Performed by: INTERNAL MEDICINE

## 2019-10-18 RX ORDER — HEPARIN 100 UNIT/ML
500 SYRINGE INTRAVENOUS
Status: DISCONTINUED | OUTPATIENT
Start: 2019-10-18 | End: 2019-10-18 | Stop reason: HOSPADM

## 2019-10-18 RX ORDER — ACETAMINOPHEN 500 MG
500 TABLET ORAL
Status: CANCELLED | OUTPATIENT
Start: 2019-11-15

## 2019-10-18 RX ORDER — HEPARIN 100 UNIT/ML
500 SYRINGE INTRAVENOUS
Status: CANCELLED | OUTPATIENT
Start: 2019-11-15

## 2019-10-18 RX ORDER — ACETAMINOPHEN 500 MG
500 TABLET ORAL
Status: DISCONTINUED | OUTPATIENT
Start: 2019-10-18 | End: 2019-10-18 | Stop reason: HOSPADM

## 2019-10-18 RX ORDER — DIPHENHYDRAMINE HCL 25 MG
25 CAPSULE ORAL
Status: CANCELLED | OUTPATIENT
Start: 2019-11-15

## 2019-10-18 RX ORDER — SODIUM CHLORIDE 0.9 % (FLUSH) 0.9 %
10 SYRINGE (ML) INJECTION
Status: CANCELLED | OUTPATIENT
Start: 2019-11-15

## 2019-10-18 RX ORDER — SODIUM CHLORIDE 0.9 % (FLUSH) 0.9 %
10 SYRINGE (ML) INJECTION
Status: DISCONTINUED | OUTPATIENT
Start: 2019-10-18 | End: 2019-10-18 | Stop reason: HOSPADM

## 2019-10-18 RX ADMIN — HEPARIN 500 UNITS: 100 SYRINGE at 10:10

## 2019-10-18 RX ADMIN — ACETAMINOPHEN 500 MG: 500 TABLET ORAL at 08:10

## 2019-10-18 RX ADMIN — IMMUNE GLOBULIN (HUMAN) 30 G: 10 INJECTION INTRAVENOUS; SUBCUTANEOUS at 08:10

## 2019-10-18 RX ADMIN — SODIUM CHLORIDE, PRESERVATIVE FREE 10 ML: 5 INJECTION INTRAVENOUS at 10:10

## 2019-10-18 RX ADMIN — SODIUM CHLORIDE: 0.9 INJECTION, SOLUTION INTRAVENOUS at 08:10

## 2019-10-18 RX ADMIN — DIPHENHYDRAMINE HYDROCHLORIDE 25 MG: 50 INJECTION INTRAMUSCULAR; INTRAVENOUS at 08:10

## 2019-11-15 ENCOUNTER — INFUSION (OUTPATIENT)
Dept: INFUSION THERAPY | Facility: HOSPITAL | Age: 64
End: 2019-11-15
Attending: INTERNAL MEDICINE
Payer: OTHER GOVERNMENT

## 2019-11-15 VITALS
DIASTOLIC BLOOD PRESSURE: 83 MMHG | RESPIRATION RATE: 18 BRPM | SYSTOLIC BLOOD PRESSURE: 147 MMHG | BODY MASS INDEX: 31.82 KG/M2 | HEART RATE: 69 BPM | WEIGHT: 209.31 LBS | TEMPERATURE: 98 F

## 2019-11-15 DIAGNOSIS — C82.30 FOLLICULAR LYMPHOMA GRADE IIIA, UNSPECIFIED BODY REGION: Primary | ICD-10-CM

## 2019-11-15 PROCEDURE — 96413 CHEMO IV INFUSION 1 HR: CPT

## 2019-11-15 PROCEDURE — 25000003 PHARM REV CODE 250: Performed by: INTERNAL MEDICINE

## 2019-11-15 PROCEDURE — 96367 TX/PROPH/DG ADDL SEQ IV INF: CPT

## 2019-11-15 PROCEDURE — 96415 CHEMO IV INFUSION ADDL HR: CPT

## 2019-11-15 PROCEDURE — 63600175 PHARM REV CODE 636 W HCPCS: Performed by: INTERNAL MEDICINE

## 2019-11-15 RX ORDER — HEPARIN 100 UNIT/ML
500 SYRINGE INTRAVENOUS
Status: DISCONTINUED | OUTPATIENT
Start: 2019-11-15 | End: 2019-11-15 | Stop reason: HOSPADM

## 2019-11-15 RX ORDER — DIPHENHYDRAMINE HCL 25 MG
25 CAPSULE ORAL
Status: DISCONTINUED | OUTPATIENT
Start: 2019-11-15 | End: 2019-11-15 | Stop reason: HOSPADM

## 2019-11-15 RX ORDER — ACETAMINOPHEN 500 MG
500 TABLET ORAL
Status: CANCELLED | OUTPATIENT
Start: 2019-12-13

## 2019-11-15 RX ORDER — DIPHENHYDRAMINE HCL 25 MG
25 CAPSULE ORAL
Status: CANCELLED | OUTPATIENT
Start: 2019-12-13

## 2019-11-15 RX ORDER — ACETAMINOPHEN 500 MG
500 TABLET ORAL
Status: DISCONTINUED | OUTPATIENT
Start: 2019-11-15 | End: 2019-11-15 | Stop reason: HOSPADM

## 2019-11-15 RX ORDER — HEPARIN 100 UNIT/ML
500 SYRINGE INTRAVENOUS
Status: CANCELLED | OUTPATIENT
Start: 2019-12-13

## 2019-11-15 RX ORDER — SODIUM CHLORIDE 0.9 % (FLUSH) 0.9 %
10 SYRINGE (ML) INJECTION
Status: DISCONTINUED | OUTPATIENT
Start: 2019-11-15 | End: 2019-11-15 | Stop reason: HOSPADM

## 2019-11-15 RX ORDER — SODIUM CHLORIDE 0.9 % (FLUSH) 0.9 %
10 SYRINGE (ML) INJECTION
Status: CANCELLED | OUTPATIENT
Start: 2019-12-13

## 2019-11-15 RX ADMIN — HEPARIN 500 UNITS: 100 SYRINGE at 10:11

## 2019-11-15 RX ADMIN — ACETAMINOPHEN 500 MG: 500 TABLET ORAL at 08:11

## 2019-11-15 RX ADMIN — IMMUNE GLOBULIN (HUMAN) 30 G: 10 INJECTION INTRAVENOUS; SUBCUTANEOUS at 08:11

## 2019-11-15 RX ADMIN — DIPHENHYDRAMINE HYDROCHLORIDE 25 MG: 50 INJECTION INTRAMUSCULAR; INTRAVENOUS at 08:11

## 2019-11-25 ENCOUNTER — OFFICE VISIT (OUTPATIENT)
Dept: FAMILY MEDICINE | Facility: CLINIC | Age: 64
End: 2019-11-25
Payer: OTHER GOVERNMENT

## 2019-11-25 VITALS
DIASTOLIC BLOOD PRESSURE: 87 MMHG | HEIGHT: 68 IN | BODY MASS INDEX: 31.95 KG/M2 | RESPIRATION RATE: 16 BRPM | WEIGHT: 210.81 LBS | OXYGEN SATURATION: 97 % | HEART RATE: 75 BPM | SYSTOLIC BLOOD PRESSURE: 143 MMHG

## 2019-11-25 DIAGNOSIS — E11.9 TYPE 2 DIABETES MELLITUS WITHOUT COMPLICATION, WITHOUT LONG-TERM CURRENT USE OF INSULIN: Primary | ICD-10-CM

## 2019-11-25 DIAGNOSIS — I10 ESSENTIAL HYPERTENSION: ICD-10-CM

## 2019-11-25 DIAGNOSIS — Z12.5 PROSTATE CANCER SCREENING: ICD-10-CM

## 2019-11-25 PROCEDURE — 99999 PR PBB SHADOW E&M-EST. PATIENT-LVL III: ICD-10-PCS | Mod: PBBFAC,,, | Performed by: FAMILY MEDICINE

## 2019-11-25 PROCEDURE — 99214 PR OFFICE/OUTPT VISIT, EST, LEVL IV, 30-39 MIN: ICD-10-PCS | Mod: S$PBB,,, | Performed by: FAMILY MEDICINE

## 2019-11-25 PROCEDURE — 99999 PR PBB SHADOW E&M-EST. PATIENT-LVL III: CPT | Mod: PBBFAC,,, | Performed by: FAMILY MEDICINE

## 2019-11-25 PROCEDURE — 99214 OFFICE O/P EST MOD 30 MIN: CPT | Mod: S$PBB,,, | Performed by: FAMILY MEDICINE

## 2019-11-25 PROCEDURE — 99213 OFFICE O/P EST LOW 20 MIN: CPT | Mod: PBBFAC,PN | Performed by: FAMILY MEDICINE

## 2019-11-25 RX ORDER — AMLODIPINE BESYLATE 10 MG/1
10 TABLET ORAL DAILY
Qty: 90 TABLET | Refills: 3 | Status: SHIPPED | OUTPATIENT
Start: 2019-11-25 | End: 2020-09-16 | Stop reason: SDUPTHER

## 2019-11-25 RX ORDER — AMLODIPINE BESYLATE 5 MG/1
5 TABLET ORAL DAILY
COMMUNITY
Start: 2019-09-24 | End: 2019-11-25 | Stop reason: SDUPTHER

## 2019-11-25 NOTE — PROGRESS NOTES
Subjective:       Patient ID: Sivakumar Thacker is a 64 y.o. male.    Chief Complaint: Establish Care (Colonoscopy completed @ [facility])    Establish care    In regard to the patient's diabetes, patient reports that blood sugars have been well controlled. Patient Denies hypoglycemia. Patient is not currently on insulin therapy. Patient is on ACE/ARB and is on statin. Last a1c was No results found for: HGBA1C        In regards to the patient's hypertension, patient denies chest pain/sob, and reports compliance with medication regimen.    In regards to the patient's dyslipidemia, patient reports compliance with medication regimen without side effects.    Follicular lymphoma  On bimonthly infusions  stable      Review of Systems   Constitutional: Negative for activity change, appetite change, fatigue and fever.   HENT: Negative for congestion, dental problem, ear discharge, hearing loss, postnasal drip, sinus pain, sneezing and trouble swallowing.    Eyes: Negative for photophobia and discharge.   Respiratory: Negative for apnea, cough and shortness of breath.    Cardiovascular: Negative for chest pain.   Gastrointestinal: Negative for abdominal distention, abdominal pain, blood in stool, diarrhea, nausea and vomiting.   Endocrine: Negative for cold intolerance.   Genitourinary: Negative for difficulty urinating, flank pain, frequency, hematuria and testicular pain.   Musculoskeletal: Negative for arthralgias and myalgias.   Skin: Negative for color change.   Allergic/Immunologic: Negative for environmental allergies, food allergies and immunocompromised state.   Neurological: Negative for dizziness, syncope, numbness and headaches.   Hematological: Negative for adenopathy. Does not bruise/bleed easily.   Psychiatric/Behavioral: Positive for dysphoric mood. Negative for agitation, confusion, decreased concentration, hallucinations, self-injury and sleep disturbance.   All other systems reviewed and are negative.     "    Reviewed family, medical, surgical, and social history.    Objective:      BP (!) 143/87 (BP Location: Left arm, Patient Position: Sitting, BP Method: Medium (Automatic))   Pulse 75   Resp 16   Ht 5' 8" (1.727 m)   Wt 95.6 kg (210 lb 12.8 oz)   SpO2 97%   BMI 32.05 kg/m²   Physical Exam   Constitutional: He is oriented to person, place, and time. He appears well-developed and well-nourished. No distress.   HENT:   Head: Normocephalic and atraumatic.   Nose: Nose normal.   Mouth/Throat: Oropharynx is clear and moist. No oropharyngeal exudate.   Eyes: Pupils are equal, round, and reactive to light. Conjunctivae and EOM are normal.   Neck: Normal range of motion. No thyromegaly present.   Cardiovascular: Normal rate, regular rhythm, normal heart sounds and intact distal pulses. Exam reveals no gallop and no friction rub.   No murmur heard.  Pulmonary/Chest: Effort normal and breath sounds normal. No respiratory distress. He has no wheezes. He has no rales.   Abdominal: Soft. He exhibits no distension. There is no tenderness. There is no guarding.   Musculoskeletal: Normal range of motion. He exhibits no edema, tenderness or deformity.   Neurological: He is alert and oriented to person, place, and time. He displays normal reflexes. No cranial nerve deficit or sensory deficit. He exhibits normal muscle tone. Coordination normal.   Skin: Skin is warm and dry. No rash noted. He is not diaphoretic. No erythema. No pallor.   Psychiatric: He has a normal mood and affect. His behavior is normal. Judgment and thought content normal.   Nursing note and vitals reviewed.      Assessment:       1. Type 2 diabetes mellitus without complication, without long-term current use of insulin    2. Essential hypertension    3. Prostate cancer screening        Plan:       Type 2 diabetes mellitus without complication, without long-term current use of insulin  -     CBC auto differential; Future; Expected date: 11/25/2019  -     " Comprehensive metabolic panel; Future; Expected date: 11/25/2019  -     Microalbumin/creatinine urine ratio; Future  -     Lipid panel; Future; Expected date: 11/25/2019  -     TSH; Future; Expected date: 11/25/2019  -     T4, free; Future; Expected date: 11/25/2019  -     Hemoglobin A1c; Future; Expected date: 11/25/2019  -     PSA, Screening; Future; Expected date: 11/25/2019    Essential hypertension  -     CBC auto differential; Future; Expected date: 11/25/2019  -     Comprehensive metabolic panel; Future; Expected date: 11/25/2019  -     Microalbumin/creatinine urine ratio; Future  -     Lipid panel; Future; Expected date: 11/25/2019  -     TSH; Future; Expected date: 11/25/2019  -     T4, free; Future; Expected date: 11/25/2019  -     Hemoglobin A1c; Future; Expected date: 11/25/2019  -     PSA, Screening; Future; Expected date: 11/25/2019  -     amLODIPine (NORVASC) 10 MG tablet; Take 1 tablet (10 mg total) by mouth once daily.  Dispense: 90 tablet; Refill: 3    Prostate cancer screening  -     CBC auto differential; Future; Expected date: 11/25/2019  -     Comprehensive metabolic panel; Future; Expected date: 11/25/2019  -     Microalbumin/creatinine urine ratio; Future  -     Lipid panel; Future; Expected date: 11/25/2019  -     TSH; Future; Expected date: 11/25/2019  -     T4, free; Future; Expected date: 11/25/2019  -     Hemoglobin A1c; Future; Expected date: 11/25/2019  -     PSA, Screening; Future; Expected date: 11/25/2019    Last a1c 6.3  HTN not controlled  Will follow up in two weeks with me with labs  Increase amlodipine to 10mg in interim        Risks, benefits, and side effects were discussed with the patient. All questions were answered to the fullest satisfaction of the patient, and pt verbalized understanding and agreement to treatment plan. Pt was to call with any new or worsening symptoms, or present to the ER.

## 2019-12-05 DIAGNOSIS — Z12.11 COLON CANCER SCREENING: ICD-10-CM

## 2019-12-08 RX ORDER — HEPARIN 100 UNIT/ML
500 SYRINGE INTRAVENOUS
Status: CANCELLED | OUTPATIENT
Start: 2019-12-12

## 2019-12-08 RX ORDER — MEPERIDINE HYDROCHLORIDE 50 MG/ML
25 INJECTION INTRAMUSCULAR; INTRAVENOUS; SUBCUTANEOUS
Status: CANCELLED | OUTPATIENT
Start: 2019-12-12

## 2019-12-08 RX ORDER — FAMOTIDINE 10 MG/ML
20 INJECTION INTRAVENOUS
Status: CANCELLED | OUTPATIENT
Start: 2019-12-12

## 2019-12-08 RX ORDER — SODIUM CHLORIDE 0.9 % (FLUSH) 0.9 %
10 SYRINGE (ML) INJECTION
Status: CANCELLED | OUTPATIENT
Start: 2019-12-12

## 2019-12-08 RX ORDER — ACETAMINOPHEN 325 MG/1
650 TABLET ORAL
Status: CANCELLED | OUTPATIENT
Start: 2019-12-12

## 2019-12-09 ENCOUNTER — LAB VISIT (OUTPATIENT)
Dept: LAB | Facility: HOSPITAL | Age: 64
End: 2019-12-09
Attending: INTERNAL MEDICINE
Payer: OTHER GOVERNMENT

## 2019-12-09 DIAGNOSIS — C82.30 FOLLICULAR LYMPHOMA GRADE IIIA, UNSPECIFIED BODY REGION: ICD-10-CM

## 2019-12-11 NOTE — PROGRESS NOTES
[facility] Hematology/Oncology  PROGRESS NOTE -  Follow-up Visit      Subjective:       Patient ID:   NAME: Sivakumar Thacker : 1955     64 y.o. male    Referring Doc: [facility] (New PCP in [place])  Other Physicians: Vinod Chen    Chief Complaint:  NHL f/u    History of Present Illness:     Patient returns today for a regularly scheduled follow-up visit.  The patient is here today to go over the results of the recently ordered labs, tests and studies. . He is doing well with the rituximab infusions. He denies any CP, SOB, HA's or N/V. He is here by himself.          ROS:   GEN: normal without any fever, night sweats or weight loss  HEENT: normal with no HA's, sore throat, stiff neck, changes in vision; sinus issues resolved   CV: normal with no CP, SOB, PND, VASQUEZ or orthopnea  PULM: normal with no SOB, cough, hemoptysis, sputum or pleuritic pain  GI: normal with no abdominal pain, nausea, vomiting, constipation, diarrhea, melanotic stools, BRBPR, or hematemesis  : normal with no hematuria, dysuria  BREAST: normal with no mass, discharge, pain  SKIN: normal with no rash, erythema, bruising, or swelling    Allergies:  Review of patient's allergies indicates:  No Known Allergies    Medications:    Current Outpatient Medications:     amLODIPine (NORVASC) 10 MG tablet, Take 1 tablet (10 mg total) by mouth once daily., Disp: 90 tablet, Rfl: 3    aspirin (ECOTRIN) 81 MG EC tablet, Take 81 mg by mouth once daily., Disp: , Rfl:     atorvastatin (LIPITOR) 20 MG tablet, Take 20 mg by mouth once daily., Disp: , Rfl:     INV IGI 10% injection, Inject into the vein every 28 days., Disp: , Rfl:     losartan (COZAAR) 100 MG tablet, Take 100 mg by mouth once daily., Disp: , Rfl:     metFORMIN (GLUCOPHAGE) 500 MG tablet, metformin 500 mg tablet  Take 2 tablets twice a day by oral route with meals for 90 days., Disp: , Rfl:     METHYL-B12/L-MEFOLATE/B6 PHOS (METANX ORAL), Take 1-2 tablets by mouth 2 (two) times daily  as needed., Disp: , Rfl:     montelukast (SINGULAIR) 10 mg tablet, Take 10 mg by mouth nightly as needed., Disp: , Rfl:     MULTIVITAMIN ORAL, Take 1 tablet by mouth once daily., Disp: , Rfl:     naproxen (NAPROSYN) 500 MG tablet, Take 500 mg by mouth 2 (two) times daily as needed., Disp: , Rfl:     omeprazole (PRILOSEC) 40 MG capsule, Take 40 mg by mouth once daily., Disp: , Rfl:     tizanidine (ZANAFLEX) 4 MG tablet, Take 4 mg by mouth daily as needed., Disp: , Rfl:   No current facility-administered medications for this visit.     Facility-Administered Medications Ordered in Other Visits:     alteplase injection 2 mg, 2 mg, Intra-Catheter, PRN, Jefferson Ibarra MD    heparin, porcine (PF) 100 unit/mL injection flush 500 Units, 500 Units, Intravenous, PRN, Jefferson Ibarra MD    meperidine (PF) injection 25 mg, 25 mg, Intravenous, PRN, Jefferson Ibarra MD    riTUXimab (RITUXAN) 375 mg/m2 = 821 mg in sodium chloride 0.9% 821 mL infusion (conc: 1 mg/mL), 375 mg/m2 (Treatment Plan Recorded), Intravenous, 1 time in Clinic/HOD, Jefferson Ibarra MD, 821 mg at 12/12/19 0902    sodium chloride 0.9% 250 mL flush bag, , Intravenous, 1 time in Clinic/HOD, Jefferson Ibarra MD    sodium chloride 0.9% flush 10 mL, 10 mL, Intravenous, PRN, Jefferson Ibarra MD    PMHx/PSHx Updates:  See patient's last visit with me on 10/17/2019.  See H&P on 1/8/2019        Pathology:  Cancer Staging  No matching staging information was found for the patient.          Objective:     Vitals:  Blood pressure (!) 145/88, pulse 79, temperature 98.4 °F (36.9 °C), resp. rate 18, weight 95.3 kg (210 lb 1.6 oz).    Physical Examination:   GEN: no apparent distress, comfortable; AAOx3  HEAD: atraumatic and normocephalic  EYES: no pallor, no icterus, PERRLA  ENT: OMM, no pharyngeal erythema, external ears WNL; no nasal discharge; no thrush  NECK: no masses, thyroid normal, trachea midline, no LAD/LN's, supple  CV: RRR with no  murmur; normal pulse; normal S1 and S2; no pedal edema  CHEST: Normal respiratory effort; CTAB; normal breath sounds; mild diffuse wheeze  ABDOM: nontender and nondistended; soft; normal bowel sounds; no rebound/guarding  MUSC/Skeletal: ROM normal; no crepitus; joints normal; no deformities or arthropathy  EXTREM: no clubbing, cyanosis, inflammation or swelling  SKIN: no rashes, lesions, ulcers, petechiae or subcutaneous nodules  : no gibbs  NEURO: grossly intact; motor/sensory WNL; AAOx3; no tremors  PSYCH: normal mood, affect and behavior  LYMPH: normal cervical, supraclavicular, axillary and groin LN's            Labs:     12/9/2019  Lab Results   Component Value Date    WBC 4.94 12/09/2019    HGB 15.6 12/09/2019    HCT 46.2 12/09/2019    MCV 89 12/09/2019     12/09/2019     CMP  Sodium   Date Value Ref Range Status   12/09/2019 142 136 - 145 mmol/L Final   04/25/2019 144 134 - 144 mmol/L      Potassium   Date Value Ref Range Status   12/09/2019 3.6 3.5 - 5.1 mmol/L Final     Chloride   Date Value Ref Range Status   12/09/2019 104 95 - 110 mmol/L Final   04/25/2019 107 98 - 110 mmol/L      CO2   Date Value Ref Range Status   12/09/2019 28 23 - 29 mmol/L Final     Glucose   Date Value Ref Range Status   12/09/2019 137 (H) 70 - 110 mg/dL Final   04/25/2019 177 (H) 70 - 99 mg/dL      BUN, Bld   Date Value Ref Range Status   12/09/2019 12 8 - 23 mg/dL Final     Creatinine   Date Value Ref Range Status   12/09/2019 0.7 0.5 - 1.4 mg/dL Final   04/25/2019 0.83 0.60 - 1.40 mg/dL      Calcium   Date Value Ref Range Status   12/09/2019 8.7 8.7 - 10.5 mg/dL Final     Total Protein   Date Value Ref Range Status   12/09/2019 7.3 6.0 - 8.4 g/dL Final     Albumin   Date Value Ref Range Status   12/09/2019 4.3 3.5 - 5.2 g/dL Final   04/25/2019 4.4 3.1 - 4.7 g/dL      Total Bilirubin   Date Value Ref Range Status   12/09/2019 0.3 0.1 - 1.0 mg/dL Final     Comment:     For infants and newborns, interpretation of results  should be based  on gestational age, weight and in agreement with clinical  observations.  Premature Infant recommended reference ranges:  Up to 24 hours.............<8.0 mg/dL  Up to 48 hours............<12.0 mg/dL  3-5 days..................<15.0 mg/dL  6-29 days.................<15.0 mg/dL       Alkaline Phosphatase   Date Value Ref Range Status   12/09/2019 88 55 - 135 U/L Final     AST   Date Value Ref Range Status   12/09/2019 28 10 - 40 U/L Final     ALT   Date Value Ref Range Status   12/09/2019 35 10 - 44 U/L Final     Anion Gap   Date Value Ref Range Status   12/09/2019 10 8 - 16 mmol/L Final     eGFR if    Date Value Ref Range Status   12/09/2019 >60.0 >60 mL/min/1.73 m^2 Final     eGFR if non    Date Value Ref Range Status   12/09/2019 >60.0 >60 mL/min/1.73 m^2 Final     Comment:     Calculation used to obtain the estimated glomerular filtration  rate (eGFR) is the CKD-EPI equation.            Radiology/Diagnostic Studies:    PET 9/11/2019   Impression       No evidence of FDG avid residual or recurrent lymphoma       I have reviewed all available lab results and radiology reports.    Assessment/Plan:   (1) 64 y.o. male with diagnosis of NHL Follicular Stage IIIA who has been referred by Dr Gary Chen with [facility] for continuation of care by medical hematology/oncology.   - He originally was diagnosed in 2012 and had parotid excision at [facility]. He subsequently went to [facility] and was treated with bendamustine-rituximab. He has been on rituximab maintenance every 8 weeks and IV IgG monthly. Dr Chen's plan was to keep him on maintenace therapy for as long as he could tolerate the treatments  - s/p 6 cycles of Bendamustine and Retuximab with subsequent complete remission  - 1st maintenance cycle rituximab was in March 2016  - he has been on maintenance rituximab and IV IgG - last rituximab 11/15/2018; last IV IgG on Dec 14th 2018  - last PET was on 9/26/2018 with no  evidence of recurrence  - originally diagnosed in Dec 2012 with left parotid and left periparotid LN excision on 12/11/2012  - PET scan done on  9/11/2019 was good     (2) HTN     (3) Neuropathy     (4) GERD     (5) DM - on metformin per Dr Nunez     (6) Hypogammaglobulinemia - on Iv IgG monthly     (7) Steatosis of liver     (8) Degenerative disc disease of back     (9) Diverticular disease           VISIT DIAGNOSES:      Follicular lymphoma grade IIIa, unspecified body region          PLAN:  1. Continue his rituximab every 8 weeks as long as he is tolerating the therapy as per Dr Chen's prior recommendations and directives    2. continue IV IgG monthly   3. Check labs every 8 weeks  4. F/u with PCP  5. Z-pack abx for the sinus issues     RTC in 8 weeks   Fax note to [facility];          Discussion:       I spent over 25 mins of time with the patient. Reviewed results of the recently ordered labs, tests and studies; made directives with regards to the results. Over half of this time was spent couseling and coordinating care.    I have explained all of the above in detail and the patient understands all of the current recommendation(s). I have answered all of their questions to the best of my ability and to their complete satisfaction.   The patient is to continue with the current management plan.            Electronically signed by Jefferson Ibarra MD

## 2019-12-12 ENCOUNTER — OFFICE VISIT (OUTPATIENT)
Dept: HEMATOLOGY/ONCOLOGY | Facility: CLINIC | Age: 64
End: 2019-12-12
Payer: OTHER GOVERNMENT

## 2019-12-12 ENCOUNTER — INFUSION (OUTPATIENT)
Dept: INFUSION THERAPY | Facility: HOSPITAL | Age: 64
End: 2019-12-12
Attending: INTERNAL MEDICINE
Payer: OTHER GOVERNMENT

## 2019-12-12 VITALS
SYSTOLIC BLOOD PRESSURE: 145 MMHG | WEIGHT: 210.13 LBS | TEMPERATURE: 98 F | HEART RATE: 79 BPM | BODY MASS INDEX: 31.95 KG/M2 | RESPIRATION RATE: 18 BRPM | DIASTOLIC BLOOD PRESSURE: 88 MMHG

## 2019-12-12 VITALS
WEIGHT: 210.19 LBS | OXYGEN SATURATION: 96 % | TEMPERATURE: 98 F | HEIGHT: 68 IN | DIASTOLIC BLOOD PRESSURE: 90 MMHG | RESPIRATION RATE: 18 BRPM | HEART RATE: 71 BPM | SYSTOLIC BLOOD PRESSURE: 152 MMHG | BODY MASS INDEX: 31.86 KG/M2

## 2019-12-12 DIAGNOSIS — C82.30 FOLLICULAR LYMPHOMA GRADE IIIA, UNSPECIFIED BODY REGION: Primary | ICD-10-CM

## 2019-12-12 PROCEDURE — 25000003 PHARM REV CODE 250: Performed by: INTERNAL MEDICINE

## 2019-12-12 PROCEDURE — 96367 TX/PROPH/DG ADDL SEQ IV INF: CPT

## 2019-12-12 PROCEDURE — 99214 OFFICE O/P EST MOD 30 MIN: CPT | Mod: S$GLB,,, | Performed by: INTERNAL MEDICINE

## 2019-12-12 PROCEDURE — 96375 TX/PRO/DX INJ NEW DRUG ADDON: CPT

## 2019-12-12 PROCEDURE — A4216 STERILE WATER/SALINE, 10 ML: HCPCS | Performed by: INTERNAL MEDICINE

## 2019-12-12 PROCEDURE — 63600175 PHARM REV CODE 636 W HCPCS: Performed by: INTERNAL MEDICINE

## 2019-12-12 PROCEDURE — 96413 CHEMO IV INFUSION 1 HR: CPT

## 2019-12-12 PROCEDURE — S0028 INJECTION, FAMOTIDINE, 20 MG: HCPCS | Performed by: INTERNAL MEDICINE

## 2019-12-12 PROCEDURE — 99214 PR OFFICE/OUTPT VISIT, EST, LEVL IV, 30-39 MIN: ICD-10-PCS | Mod: S$GLB,,, | Performed by: INTERNAL MEDICINE

## 2019-12-12 PROCEDURE — 96415 CHEMO IV INFUSION ADDL HR: CPT

## 2019-12-12 RX ORDER — ACETAMINOPHEN 325 MG/1
650 TABLET ORAL
Status: COMPLETED | OUTPATIENT
Start: 2019-12-12 | End: 2019-12-12

## 2019-12-12 RX ORDER — MEPERIDINE HYDROCHLORIDE 25 MG/ML
25 INJECTION INTRAMUSCULAR; INTRAVENOUS; SUBCUTANEOUS
Status: DISCONTINUED | OUTPATIENT
Start: 2019-12-12 | End: 2019-12-12 | Stop reason: HOSPADM

## 2019-12-12 RX ORDER — HEPARIN 100 UNIT/ML
500 SYRINGE INTRAVENOUS
Status: DISCONTINUED | OUTPATIENT
Start: 2019-12-12 | End: 2019-12-12 | Stop reason: HOSPADM

## 2019-12-12 RX ORDER — SODIUM CHLORIDE 0.9 % (FLUSH) 0.9 %
10 SYRINGE (ML) INJECTION
Status: DISCONTINUED | OUTPATIENT
Start: 2019-12-12 | End: 2019-12-12 | Stop reason: HOSPADM

## 2019-12-12 RX ORDER — FAMOTIDINE 10 MG/ML
20 INJECTION INTRAVENOUS
Status: COMPLETED | OUTPATIENT
Start: 2019-12-12 | End: 2019-12-12

## 2019-12-12 RX ADMIN — SODIUM CHLORIDE, PRESERVATIVE FREE 10 ML: 5 INJECTION INTRAVENOUS at 12:12

## 2019-12-12 RX ADMIN — RITUXIMAB 821 MG: 10 INJECTION, SOLUTION INTRAVENOUS at 09:12

## 2019-12-12 RX ADMIN — ACETAMINOPHEN 650 MG: 325 TABLET ORAL at 08:12

## 2019-12-12 RX ADMIN — DIPHENHYDRAMINE HYDROCHLORIDE 50 MG: 50 INJECTION INTRAMUSCULAR; INTRAVENOUS at 08:12

## 2019-12-12 RX ADMIN — HEPARIN 500 UNITS: 100 SYRINGE at 12:12

## 2019-12-12 RX ADMIN — SODIUM CHLORIDE: 0.9 INJECTION, SOLUTION INTRAVENOUS at 08:12

## 2019-12-12 RX ADMIN — FAMOTIDINE 20 MG: 10 INJECTION, SOLUTION INTRAVENOUS at 08:12

## 2019-12-13 ENCOUNTER — INFUSION (OUTPATIENT)
Dept: INFUSION THERAPY | Facility: HOSPITAL | Age: 64
End: 2019-12-13
Attending: INTERNAL MEDICINE
Payer: OTHER GOVERNMENT

## 2019-12-13 VITALS
BODY MASS INDEX: 31.98 KG/M2 | HEART RATE: 65 BPM | WEIGHT: 210.31 LBS | RESPIRATION RATE: 18 BRPM | DIASTOLIC BLOOD PRESSURE: 80 MMHG | SYSTOLIC BLOOD PRESSURE: 128 MMHG | TEMPERATURE: 98 F | OXYGEN SATURATION: 97 %

## 2019-12-13 DIAGNOSIS — C82.30 FOLLICULAR LYMPHOMA GRADE IIIA, UNSPECIFIED BODY REGION: Primary | ICD-10-CM

## 2019-12-13 DIAGNOSIS — D80.1 HYPOGAMMAGLOBULINEMIA: Primary | ICD-10-CM

## 2019-12-13 DIAGNOSIS — D80.1 NONFAMILIAL HYPOGAMMAGLOBULINEMIA: ICD-10-CM

## 2019-12-13 PROCEDURE — 96367 TX/PROPH/DG ADDL SEQ IV INF: CPT

## 2019-12-13 PROCEDURE — 25000003 PHARM REV CODE 250: Performed by: INTERNAL MEDICINE

## 2019-12-13 PROCEDURE — 96415 CHEMO IV INFUSION ADDL HR: CPT

## 2019-12-13 PROCEDURE — 63600175 PHARM REV CODE 636 W HCPCS: Mod: JG | Performed by: INTERNAL MEDICINE

## 2019-12-13 PROCEDURE — A4216 STERILE WATER/SALINE, 10 ML: HCPCS | Performed by: INTERNAL MEDICINE

## 2019-12-13 PROCEDURE — 96413 CHEMO IV INFUSION 1 HR: CPT

## 2019-12-13 RX ORDER — HEPARIN 100 UNIT/ML
500 SYRINGE INTRAVENOUS
Status: CANCELLED | OUTPATIENT
Start: 2020-01-10

## 2019-12-13 RX ORDER — SODIUM CHLORIDE 0.9 % (FLUSH) 0.9 %
10 SYRINGE (ML) INJECTION
Status: CANCELLED | OUTPATIENT
Start: 2020-01-10

## 2019-12-13 RX ORDER — DIPHENHYDRAMINE HCL 25 MG
25 CAPSULE ORAL
Status: CANCELLED | OUTPATIENT
Start: 2020-01-10

## 2019-12-13 RX ORDER — ACETAMINOPHEN 500 MG
500 TABLET ORAL
Status: DISCONTINUED | OUTPATIENT
Start: 2019-12-13 | End: 2019-12-13 | Stop reason: HOSPADM

## 2019-12-13 RX ORDER — SODIUM CHLORIDE 0.9 % (FLUSH) 0.9 %
10 SYRINGE (ML) INJECTION
Status: DISCONTINUED | OUTPATIENT
Start: 2019-12-13 | End: 2019-12-13 | Stop reason: HOSPADM

## 2019-12-13 RX ORDER — HEPARIN 100 UNIT/ML
500 SYRINGE INTRAVENOUS
Status: DISCONTINUED | OUTPATIENT
Start: 2019-12-13 | End: 2019-12-13 | Stop reason: HOSPADM

## 2019-12-13 RX ORDER — ACETAMINOPHEN 500 MG
500 TABLET ORAL
Status: CANCELLED | OUTPATIENT
Start: 2020-01-10

## 2019-12-13 RX ADMIN — ACETAMINOPHEN 500 MG: 500 TABLET ORAL at 08:12

## 2019-12-13 RX ADMIN — DIPHENHYDRAMINE HYDROCHLORIDE 25 MG: 50 INJECTION INTRAMUSCULAR; INTRAVENOUS at 08:12

## 2019-12-13 RX ADMIN — SODIUM CHLORIDE, PRESERVATIVE FREE 10 ML: 5 INJECTION INTRAVENOUS at 10:12

## 2019-12-13 RX ADMIN — SODIUM CHLORIDE: 0.9 INJECTION, SOLUTION INTRAVENOUS at 08:12

## 2019-12-13 RX ADMIN — IMMUNE GLOBULIN (HUMAN) 30 G: 10 INJECTION INTRAVENOUS; SUBCUTANEOUS at 09:12

## 2019-12-13 RX ADMIN — HEPARIN 500 UNITS: 100 SYRINGE at 10:12

## 2019-12-16 ENCOUNTER — PATIENT OUTREACH (OUTPATIENT)
Dept: ADMINISTRATIVE | Facility: HOSPITAL | Age: 64
End: 2019-12-16

## 2019-12-16 NOTE — LETTER
December 24, 2019    Sivakumar Thacker  [address]  [facility] [place] [address]             [facility]  [address]  [facility] [address]  Phone: [phone] Dear Michael [facility] is committed to your overall health and would like to ensure that you are up to date on your recommended test and/or procedures.   Barry Mcmahon MD  has found that your chart shows you may be due for the following:     Shingles Vaccine(1 of 2) due on 03/05/2006   Colonoscopy due on 03/05/2006     If you have had any of the above done at another facility, please let us know so that we may obtain copies from that facility.  If you have a copy of these records, please provide a copy for us to scan into your chart.  You are welcome to request that the report be faxed to us at  ([phone]).     Otherwise, please schedule these appointments at your earliest convenience by calling [phone] or going to [email or web address].     If you have an upcoming scheduled appointment for the item above, please disregard this letter.     Sincerely,   Your [facility] Team   MD Lucia Mireles L.P.N. Clinical Care Coordinator   [address] [place] [address], [place] [address] [phone]

## 2019-12-23 ENCOUNTER — TELEPHONE (OUTPATIENT)
Dept: FAMILY MEDICINE | Facility: CLINIC | Age: 64
End: 2019-12-23

## 2019-12-23 NOTE — TELEPHONE ENCOUNTER
Confirmed with patient that appointment is for 7am on 12/30/19 & he does need to bring outside results with him. Verbalized understanding.    ----- Message from [facility] sent at 12/23/2019  3:43 PM CST -----  Type: Needs Medical Advice    Who Called:  Patient    Best Call Back Number: [phone] (home)     Additional Information: Wants to talk to office concerning if he needs to bring in his outside colonoscopy results at next appointment. He states he is still coming in for 7:00am. Please call to advise.

## 2019-12-24 NOTE — PROGRESS NOTES
"Attempted to outreach patient for pre-visit via "[facility]LF", no answer after a week. Sending outreach via Mail Out Letter now.    "

## 2019-12-30 ENCOUNTER — OFFICE VISIT (OUTPATIENT)
Dept: FAMILY MEDICINE | Facility: CLINIC | Age: 64
End: 2019-12-30
Payer: OTHER GOVERNMENT

## 2019-12-30 ENCOUNTER — PATIENT OUTREACH (OUTPATIENT)
Dept: ADMINISTRATIVE | Facility: HOSPITAL | Age: 64
End: 2019-12-30

## 2019-12-30 VITALS
WEIGHT: 211.81 LBS | BODY MASS INDEX: 32.1 KG/M2 | DIASTOLIC BLOOD PRESSURE: 84 MMHG | RESPIRATION RATE: 20 BRPM | SYSTOLIC BLOOD PRESSURE: 133 MMHG | HEART RATE: 73 BPM | OXYGEN SATURATION: 96 % | HEIGHT: 68 IN

## 2019-12-30 DIAGNOSIS — R73.03 PREDIABETES: ICD-10-CM

## 2019-12-30 DIAGNOSIS — E78.41 ELEVATED LIPOPROTEIN(A): ICD-10-CM

## 2019-12-30 DIAGNOSIS — I10 ESSENTIAL HYPERTENSION: ICD-10-CM

## 2019-12-30 PROCEDURE — 99213 OFFICE O/P EST LOW 20 MIN: CPT | Mod: PBBFAC,PN | Performed by: FAMILY MEDICINE

## 2019-12-30 PROCEDURE — 99214 OFFICE O/P EST MOD 30 MIN: CPT | Mod: S$PBB,,, | Performed by: FAMILY MEDICINE

## 2019-12-30 PROCEDURE — 99999 PR PBB SHADOW E&M-EST. PATIENT-LVL III: CPT | Mod: PBBFAC,,, | Performed by: FAMILY MEDICINE

## 2019-12-30 PROCEDURE — 99999 PR PBB SHADOW E&M-EST. PATIENT-LVL III: ICD-10-PCS | Mod: PBBFAC,,, | Performed by: FAMILY MEDICINE

## 2019-12-30 PROCEDURE — 99214 PR OFFICE/OUTPT VISIT, EST, LEVL IV, 30-39 MIN: ICD-10-PCS | Mod: S$PBB,,, | Performed by: FAMILY MEDICINE

## 2019-12-30 NOTE — PROGRESS NOTES
"Subjective:       Patient ID: Sivakumar Thacker is a 64 y.o. male.    Chief Complaint: Follow-up (BW review)    In regards to the patient's dyslipidemia, patient reports compliance with medication regimen without side effects.    In regards to the patient's hypertension, patient denies chest pain/sob, and reports compliance with medication regimen.    Review of Systems   Constitutional: Negative for activity change, appetite change, fatigue and fever.   HENT: Negative for congestion, dental problem, ear discharge, hearing loss, postnasal drip, sinus pain, sneezing and trouble swallowing.    Eyes: Negative for photophobia and discharge.   Respiratory: Negative for apnea, cough and shortness of breath.    Cardiovascular: Negative for chest pain.   Gastrointestinal: Negative for abdominal distention, abdominal pain, blood in stool, diarrhea, nausea and vomiting.   Endocrine: Negative for cold intolerance.   Genitourinary: Negative for difficulty urinating, flank pain, frequency, hematuria and testicular pain.   Musculoskeletal: Negative for arthralgias and myalgias.   Skin: Negative for color change.   Allergic/Immunologic: Negative for environmental allergies, food allergies and immunocompromised state.   Neurological: Negative for dizziness, syncope, numbness and headaches.   Hematological: Negative for adenopathy. Does not bruise/bleed easily.   Psychiatric/Behavioral: Negative for agitation, confusion, decreased concentration, hallucinations, self-injury and sleep disturbance.   All other systems reviewed and are negative.        Reviewed family, medical, surgical, and social history.    Objective:      /84 (BP Location: Left arm, Patient Position: Sitting, BP Method: Medium (Automatic))   Pulse 73   Resp 20   Ht 5' 8" (1.727 m)   Wt 96.1 kg (211 lb 12.8 oz)   SpO2 96%   BMI 32.20 kg/m²   Physical Exam   Constitutional: He is oriented to person, place, and time. He appears well-developed and " well-nourished. No distress.   HENT:   Head: Normocephalic and atraumatic.   Nose: Nose normal.   Mouth/Throat: Oropharynx is clear and moist. No oropharyngeal exudate.   Eyes: Pupils are equal, round, and reactive to light. Conjunctivae and EOM are normal.   Neck: Normal range of motion. No thyromegaly present.   Cardiovascular: Normal rate, regular rhythm, normal heart sounds and intact distal pulses. Exam reveals no gallop and no friction rub.   No murmur heard.  Pulmonary/Chest: Effort normal and breath sounds normal. No respiratory distress. He has no wheezes. He has no rales.   Abdominal: Soft. He exhibits no distension. There is no tenderness. There is no guarding.   Musculoskeletal: Normal range of motion. He exhibits no edema, tenderness or deformity.   Neurological: He is alert and oriented to person, place, and time. He displays normal reflexes. No cranial nerve deficit or sensory deficit. He exhibits normal muscle tone. Coordination normal.   Skin: Skin is warm and dry. No rash noted. He is not diaphoretic. No erythema. No pallor.   Psychiatric: He has a normal mood and affect. His behavior is normal. Judgment and thought content normal.   Nursing note and vitals reviewed.      Assessment:       1. Essential hypertension    2. Elevated lipoprotein(a)    3. Prediabetes        Plan:       Essential hypertension    Elevated lipoprotein(a)    Prediabetes    All 3 well controlled, continue current med, reviewed lab work with patient.        Risks, benefits, and side effects were discussed with the patient. All questions were answered to the fullest satisfaction of the patient, and pt verbalized understanding and agreement to treatment plan. Pt was to call with any new or worsening symptoms, or present to the ER.

## 2020-01-10 ENCOUNTER — INFUSION (OUTPATIENT)
Dept: INFUSION THERAPY | Facility: HOSPITAL | Age: 65
End: 2020-01-10
Attending: INTERNAL MEDICINE
Payer: OTHER GOVERNMENT

## 2020-01-10 ENCOUNTER — OFFICE VISIT (OUTPATIENT)
Dept: HEMATOLOGY/ONCOLOGY | Facility: CLINIC | Age: 65
End: 2020-01-10
Payer: OTHER GOVERNMENT

## 2020-01-10 VITALS
TEMPERATURE: 99 F | HEART RATE: 98 BPM | HEIGHT: 68 IN | SYSTOLIC BLOOD PRESSURE: 151 MMHG | RESPIRATION RATE: 20 BRPM | DIASTOLIC BLOOD PRESSURE: 80 MMHG | WEIGHT: 210.31 LBS | BODY MASS INDEX: 31.87 KG/M2

## 2020-01-10 VITALS
TEMPERATURE: 99 F | RESPIRATION RATE: 18 BRPM | DIASTOLIC BLOOD PRESSURE: 77 MMHG | WEIGHT: 210.31 LBS | BODY MASS INDEX: 31.87 KG/M2 | HEIGHT: 68 IN | SYSTOLIC BLOOD PRESSURE: 128 MMHG | HEART RATE: 90 BPM | OXYGEN SATURATION: 100 %

## 2020-01-10 DIAGNOSIS — R05.9 COUGH: ICD-10-CM

## 2020-01-10 DIAGNOSIS — D80.1 NONFAMILIAL HYPOGAMMAGLOBULINEMIA: ICD-10-CM

## 2020-01-10 DIAGNOSIS — D80.1 HYPOGAMMAGLOBULINEMIA: Primary | ICD-10-CM

## 2020-01-10 DIAGNOSIS — C82.30 FOLLICULAR LYMPHOMA GRADE IIIA, UNSPECIFIED BODY REGION: ICD-10-CM

## 2020-01-10 DIAGNOSIS — J32.9 SINUSITIS, UNSPECIFIED CHRONICITY, UNSPECIFIED LOCATION: ICD-10-CM

## 2020-01-10 DIAGNOSIS — C82.30 FOLLICULAR LYMPHOMA GRADE IIIA, UNSPECIFIED BODY REGION: Primary | ICD-10-CM

## 2020-01-10 PROCEDURE — 25000003 PHARM REV CODE 250: Performed by: INTERNAL MEDICINE

## 2020-01-10 PROCEDURE — 63600175 PHARM REV CODE 636 W HCPCS: Performed by: INTERNAL MEDICINE

## 2020-01-10 PROCEDURE — 96413 CHEMO IV INFUSION 1 HR: CPT

## 2020-01-10 PROCEDURE — 96367 TX/PROPH/DG ADDL SEQ IV INF: CPT

## 2020-01-10 PROCEDURE — 99213 OFFICE O/P EST LOW 20 MIN: CPT | Mod: S$GLB,,, | Performed by: NURSE PRACTITIONER

## 2020-01-10 PROCEDURE — 99213 PR OFFICE/OUTPT VISIT, EST, LEVL III, 20-29 MIN: ICD-10-PCS | Mod: S$GLB,,, | Performed by: NURSE PRACTITIONER

## 2020-01-10 PROCEDURE — A4216 STERILE WATER/SALINE, 10 ML: HCPCS | Performed by: INTERNAL MEDICINE

## 2020-01-10 RX ORDER — SODIUM CHLORIDE 0.9 % (FLUSH) 0.9 %
10 SYRINGE (ML) INJECTION
Status: CANCELLED | OUTPATIENT
Start: 2020-02-07

## 2020-01-10 RX ORDER — SODIUM CHLORIDE 0.9 % (FLUSH) 0.9 %
10 SYRINGE (ML) INJECTION
Status: DISCONTINUED | OUTPATIENT
Start: 2020-01-10 | End: 2020-01-10 | Stop reason: HOSPADM

## 2020-01-10 RX ORDER — ACETAMINOPHEN 500 MG
500 TABLET ORAL
Status: CANCELLED | OUTPATIENT
Start: 2020-02-07

## 2020-01-10 RX ORDER — HEPARIN 100 UNIT/ML
500 SYRINGE INTRAVENOUS
Status: DISCONTINUED | OUTPATIENT
Start: 2020-01-10 | End: 2020-01-10 | Stop reason: HOSPADM

## 2020-01-10 RX ORDER — FLUTICASONE PROPIONATE 50 MCG
1 SPRAY, SUSPENSION (ML) NASAL 2 TIMES DAILY
Qty: 15.8 ML | Refills: 2 | Status: SHIPPED | OUTPATIENT
Start: 2020-01-10 | End: 2020-03-23 | Stop reason: SDUPTHER

## 2020-01-10 RX ORDER — DOXYCYCLINE 100 MG/1
100 CAPSULE ORAL 2 TIMES DAILY
Qty: 20 CAPSULE | Refills: 0 | Status: SHIPPED | OUTPATIENT
Start: 2020-01-10 | End: 2020-01-10

## 2020-01-10 RX ORDER — DIPHENHYDRAMINE HCL 25 MG
25 CAPSULE ORAL
Status: CANCELLED | OUTPATIENT
Start: 2020-02-07

## 2020-01-10 RX ORDER — HEPARIN 100 UNIT/ML
500 SYRINGE INTRAVENOUS
Status: CANCELLED | OUTPATIENT
Start: 2020-02-07

## 2020-01-10 RX ORDER — ACETAMINOPHEN 500 MG
500 TABLET ORAL
Status: DISCONTINUED | OUTPATIENT
Start: 2020-01-10 | End: 2020-01-10 | Stop reason: HOSPADM

## 2020-01-10 RX ORDER — AMOXICILLIN AND CLAVULANATE POTASSIUM 875; 125 MG/1; MG/1
1 TABLET, FILM COATED ORAL 2 TIMES DAILY
Qty: 20 TABLET | Refills: 0 | Status: SHIPPED | OUTPATIENT
Start: 2020-01-10 | End: 2020-01-20

## 2020-01-10 RX ADMIN — IMMUNE GLOBULIN (HUMAN) 30 G: 10 INJECTION INTRAVENOUS; SUBCUTANEOUS at 01:01

## 2020-01-10 RX ADMIN — SODIUM CHLORIDE, PRESERVATIVE FREE 10 ML: 5 INJECTION INTRAVENOUS at 03:01

## 2020-01-10 RX ADMIN — ACETAMINOPHEN 500 MG: 500 TABLET ORAL at 12:01

## 2020-01-10 RX ADMIN — DIPHENHYDRAMINE HYDROCHLORIDE 25 MG: 50 INJECTION INTRAMUSCULAR; INTRAVENOUS at 01:01

## 2020-01-10 RX ADMIN — HEPARIN 500 UNITS: 100 SYRINGE at 03:01

## 2020-01-10 RX ADMIN — SODIUM CHLORIDE: 0.9 INJECTION, SOLUTION INTRAVENOUS at 12:01

## 2020-01-10 NOTE — PROGRESS NOTES
[facility] HEM/ONC PROGRESS NOTE      Subjective:       Patient ID:   Sivakumar Thacker  64 y.o. male.  1955    Chief Complaint:  Sinus Problem and Cough      HPI    Patient returns today for an add on visit for cough, sinus congestions for 7 days. Patient denies fever, chills, SOB Wheezing, diarrhea, sore throat, nausea, or vomiting. Denies otalgia, does have some ear fullness.   Review of Systems   Constitutional: Positive for malaise/fatigue. Negative for chills, diaphoresis, fever and weight loss.   HENT: Positive for congestion and sinus pain. Negative for ear discharge, ear pain, hearing loss and sore throat.    Respiratory: Positive for cough and sputum production (green/yellow sputum). Negative for hemoptysis, shortness of breath and wheezing.    Cardiovascular: Positive for PND. Negative for chest pain, palpitations, orthopnea, claudication and leg swelling.   Gastrointestinal: Negative for abdominal pain, blood in stool, constipation, diarrhea, heartburn, melena, nausea and vomiting.   Genitourinary: Negative for dysuria, frequency and urgency.     GEN: normal without any fever, night sweats or weight loss  HEENT: No visual changes.   CV: No chest pain or SOB.   PULM: No SOB. + cough.   GI: No abdominal pain, melena, BRBPR.   : normal with no hematuria, dysuria  SKIN: No rash    Allergies:  Review of patient's allergies indicates:  No Known Allergies    Medications:    Current Outpatient Medications:     amLODIPine (NORVASC) 10 MG tablet, Take 1 tablet (10 mg total) by mouth once daily., Disp: 90 tablet, Rfl: 3    amoxicillin-clavulanate 875-125mg (AUGMENTIN) 875-125 mg per tablet, Take 1 tablet by mouth 2 (two) times daily. for 10 days, Disp: 20 tablet, Rfl: 0    aspirin (ECOTRIN) 81 MG EC tablet, Take 81 mg by mouth once daily., Disp: , Rfl:     atorvastatin (LIPITOR) 20 MG tablet, Take 20 mg by mouth once daily., Disp: , Rfl:     fluticasone propionate (FLONASE) 50 mcg/actuation nasal spray, 1  "spray (50 mcg total) by Each Nostril route 2 (two) times daily., Disp: 15.8 mL, Rfl: 2    INV IGI 10% injection, Inject into the vein every 28 days., Disp: , Rfl:     losartan (COZAAR) 100 MG tablet, Take 100 mg by mouth once daily., Disp: , Rfl:     metFORMIN (GLUCOPHAGE) 500 MG tablet, metformin 500 mg tablet  Take 2 tablets twice a day by oral route with meals for 90 days., Disp: , Rfl:     METHYL-B12/L-MEFOLATE/B6 PHOS (METANX ORAL), Take 1-2 tablets by mouth 2 (two) times daily as needed., Disp: , Rfl:     montelukast (SINGULAIR) 10 mg tablet, Take 10 mg by mouth nightly as needed., Disp: , Rfl:     MULTIVITAMIN ORAL, Take 1 tablet by mouth once daily., Disp: , Rfl:     naproxen (NAPROSYN) 500 MG tablet, Take 500 mg by mouth 2 (two) times daily as needed., Disp: , Rfl:     omeprazole (PRILOSEC) 40 MG capsule, Take 40 mg by mouth once daily., Disp: , Rfl:     tizanidine (ZANAFLEX) 4 MG tablet, Take 4 mg by mouth daily as needed., Disp: , Rfl:   No current facility-administered medications for this visit.     Facility-Administered Medications Ordered in Other Visits:     acetaminophen tablet 500 mg, 500 mg, Oral, PRN, Jefferson Ibarra MD, 500 mg at 01/10/20 1255    heparin, porcine (PF) 100 unit/mL injection flush 500 Units, 500 Units, Intravenous, PRN, Jefferson Ibarra MD    immune globulin (human) (IGG) injection 10% (GAMUNEX), 30 g, Intravenous, 1 time in Clinic/HOD, Jefferson Ibarra MD, 30 g at 01/10/20 1330    sodium chloride 0.9% flush 10 mL, 10 mL, Intravenous, PRN, Jefferson Ibarra MD      Objective:     Vitals:  Blood pressure (!) 151/80, pulse 98, temperature 98.7 °F (37.1 °C), resp. rate 20, height 5' 8" (1.727 m), weight 95.4 kg (210 lb 5.1 oz).    Physical Exam   Constitutional: He is well-developed, well-nourished, and in no distress.   HENT:   Head: Normocephalic and atraumatic.   Right Ear: External ear normal.   Left Ear: External ear normal.   Mouth/Throat: Oropharynx is " clear and moist.   Nasal Turbinates red and irritated   Neck: Normal range of motion. Neck supple.   Cardiovascular: Normal rate and regular rhythm. Exam reveals friction rub. Exam reveals no gallop.   No murmur heard.  Pulmonary/Chest: Effort normal and breath sounds normal. No respiratory distress. He has no wheezes. He has no rales. He exhibits no tenderness.   Abdominal: Soft. Bowel sounds are normal. He exhibits no distension.      GEN: no apparent distress, comfortable; AAOx3  HEAD: atraumatic and normocephalic  EYES: no pallor, no icterus, PERRLA  ENT: OMM, no pharyngeal erythema, external ears WNL; no nasal discharge; no thrush  NECK: no masses, thyroid normal, trachea midline, no LAD/LN's, supple  CV: RRR with no murmur; normal pulse; normal S1 and S2; no pedal edema  CHEST: Normal respiratory effort; CTAB; normal breath sounds; no wheeze or crackles  ABDOM: nontender and nondistended; soft; normal bowel sounds.   EXTREM: no clubbing, cyanosis, inflammation or swelling  NEURO: grossly intact; motor/sensory grossly intact; AAOx3; no tremors  PSYCH: normal mood, affect and behavior  LYMPH: normal with no cervical, supraclavicular, axillary lymph nodes palpable.       Labs:   Lab Results   Component Value Date    WBC 4.94 12/09/2019    HGB 15.6 12/09/2019    HCT 46.2 12/09/2019    MCV 89 12/09/2019     12/09/2019    CMP  Sodium   Date Value Ref Range Status   12/09/2019 142 136 - 145 mmol/L Final   04/25/2019 144 134 - 144 mmol/L      Potassium   Date Value Ref Range Status   12/09/2019 3.6 3.5 - 5.1 mmol/L Final     Chloride   Date Value Ref Range Status   12/09/2019 104 95 - 110 mmol/L Final   04/25/2019 107 98 - 110 mmol/L      CO2   Date Value Ref Range Status   12/09/2019 28 23 - 29 mmol/L Final     Glucose   Date Value Ref Range Status   12/09/2019 137 (H) 70 - 110 mg/dL Final   04/25/2019 177 (H) 70 - 99 mg/dL      BUN, Bld   Date Value Ref Range Status   12/09/2019 12 8 - 23 mg/dL Final      Creatinine   Date Value Ref Range Status   12/09/2019 0.7 0.5 - 1.4 mg/dL Final   04/25/2019 0.83 0.60 - 1.40 mg/dL      Calcium   Date Value Ref Range Status   12/09/2019 8.7 8.7 - 10.5 mg/dL Final     Total Protein   Date Value Ref Range Status   12/09/2019 7.3 6.0 - 8.4 g/dL Final     Albumin   Date Value Ref Range Status   12/09/2019 4.3 3.5 - 5.2 g/dL Final   04/25/2019 4.4 3.1 - 4.7 g/dL      Total Bilirubin   Date Value Ref Range Status   12/09/2019 0.3 0.1 - 1.0 mg/dL Final     Comment:     For infants and newborns, interpretation of results should be based  on gestational age, weight and in agreement with clinical  observations.  Premature Infant recommended reference ranges:  Up to 24 hours.............<8.0 mg/dL  Up to 48 hours............<12.0 mg/dL  3-5 days..................<15.0 mg/dL  6-29 days.................<15.0 mg/dL       Alkaline Phosphatase   Date Value Ref Range Status   12/09/2019 88 55 - 135 U/L Final     AST   Date Value Ref Range Status   12/09/2019 28 10 - 40 U/L Final     ALT   Date Value Ref Range Status   12/09/2019 35 10 - 44 U/L Final     Anion Gap   Date Value Ref Range Status   12/09/2019 10 8 - 16 mmol/L Final     eGFR if    Date Value Ref Range Status   12/09/2019 >60.0 >60 mL/min/1.73 m^2 Final     eGFR if non    Date Value Ref Range Status   12/09/2019 >60.0 >60 mL/min/1.73 m^2 Final     Comment:     Calculation used to obtain the estimated glomerular filtration  rate (eGFR) is the CKD-EPI equation.        I have reviewed all available lab results and radiology reports    Assessment/Plan:   (1) 64 y.o. male with diagnosis of Follicular Lympphoma    (2) Nonfamilial Hypogammaglobulinemia- Continue with IVIG    (3) Sinusitis- Start Augmentin BID, Flonase BID, Saline nasal spray BID prior to Flonase.    (4) Cough- Patient has an OTC cough medicine at home and declines Tessalon Perles. Continue Mucinex BID.        ICD-10-CM ICD-9-CM   1.  Follicular lymphoma grade IIIa, unspecified body region C82.30 202.00   2. Nonfamilial hypogammaglobulinemia D80.1 279.00   3. Sinusitis, unspecified chronicity, unspecified location J32.9 473.9   4. Cough R05 786.2       Discussion:     I have explained and the patient understands all of  the current recommendation(s). I have answered all of their questions to the best of my ability and to their complete satisfaction.   The patient is to continue with the current management plan.    RTC 2/6/2020 as scheduled.        Electronically signed by: Portia Mcfarland, MSN, APRN, AGNP-C, OCN

## 2020-01-10 NOTE — NURSING
"Seen by SULMA Mcfarland NP for "cold" and given prescription for antibiotic and cough medication. Patient tolerated treatment well and aware to call [facility] office if symptoms worsens or doesn't improve. Patient voiced understanding  "

## 2020-01-30 ENCOUNTER — TELEPHONE (OUTPATIENT)
Dept: FAMILY MEDICINE | Facility: CLINIC | Age: 65
End: 2020-01-30

## 2020-01-30 NOTE — TELEPHONE ENCOUNTER
Appt scheduled tomorrow at 2pm.  No other concerns at this time.        ----- Message from Honey Fishman sent at 1/30/2020 10:03 AM CST -----  Type:  Appointment Request    Caller is requesting a same day appointment.  Caller declined first available appointment listed below.      Name of Caller:  Sivakumar  Symptoms:  Bad cough for 5-6 weeks  Best Call Back Number:  [phone]  Additional Information:   He is requesting an appt for tomorrow. He only wants to see Dr Mcmahon.  Thank you!

## 2020-01-31 ENCOUNTER — HOSPITAL ENCOUNTER (OUTPATIENT)
Dept: RADIOLOGY | Facility: HOSPITAL | Age: 65
Discharge: HOME | End: 2020-01-31
Attending: FAMILY MEDICINE
Payer: OTHER GOVERNMENT

## 2020-01-31 ENCOUNTER — OFFICE VISIT (OUTPATIENT)
Dept: FAMILY MEDICINE | Facility: CLINIC | Age: 65
End: 2020-01-31
Payer: OTHER GOVERNMENT

## 2020-01-31 VITALS
DIASTOLIC BLOOD PRESSURE: 88 MMHG | WEIGHT: 205.69 LBS | OXYGEN SATURATION: 96 % | SYSTOLIC BLOOD PRESSURE: 138 MMHG | RESPIRATION RATE: 20 BRPM | TEMPERATURE: 100 F | HEIGHT: 68 IN | HEART RATE: 97 BPM | BODY MASS INDEX: 31.17 KG/M2

## 2020-01-31 DIAGNOSIS — J40 BRONCHITIS: Primary | ICD-10-CM

## 2020-01-31 DIAGNOSIS — J40 BRONCHITIS: ICD-10-CM

## 2020-01-31 PROCEDURE — 71046 X-RAY EXAM CHEST 2 VIEWS: CPT | Mod: TC,PN

## 2020-01-31 PROCEDURE — 71046 XR CHEST PA AND LATERAL: ICD-10-PCS | Mod: 26,,, | Performed by: RADIOLOGY

## 2020-01-31 PROCEDURE — 96372 THER/PROPH/DIAG INJ SC/IM: CPT | Mod: PBBFAC,PN

## 2020-01-31 PROCEDURE — 71046 X-RAY EXAM CHEST 2 VIEWS: CPT | Mod: 26,,, | Performed by: RADIOLOGY

## 2020-01-31 PROCEDURE — 99999 PR PBB SHADOW E&M-EST. PATIENT-LVL III: ICD-10-PCS | Mod: PBBFAC,,, | Performed by: FAMILY MEDICINE

## 2020-01-31 PROCEDURE — 99999 PR PBB SHADOW E&M-EST. PATIENT-LVL III: CPT | Mod: PBBFAC,,, | Performed by: FAMILY MEDICINE

## 2020-01-31 PROCEDURE — 99214 OFFICE O/P EST MOD 30 MIN: CPT | Mod: S$PBB,,, | Performed by: FAMILY MEDICINE

## 2020-01-31 PROCEDURE — 99214 PR OFFICE/OUTPT VISIT, EST, LEVL IV, 30-39 MIN: ICD-10-PCS | Mod: S$PBB,,, | Performed by: FAMILY MEDICINE

## 2020-01-31 PROCEDURE — 99213 OFFICE O/P EST LOW 20 MIN: CPT | Mod: PBBFAC,PN | Performed by: FAMILY MEDICINE

## 2020-01-31 RX ORDER — DOXYCYCLINE HYCLATE 100 MG
100 TABLET ORAL 2 TIMES DAILY
Qty: 20 TABLET | Refills: 0 | Status: SHIPPED | OUTPATIENT
Start: 2020-01-31 | End: 2020-02-19

## 2020-01-31 RX ORDER — CEFTRIAXONE 1 G/1
1 INJECTION, POWDER, FOR SOLUTION INTRAMUSCULAR; INTRAVENOUS
Status: COMPLETED | OUTPATIENT
Start: 2020-01-31 | End: 2020-01-31

## 2020-01-31 RX ORDER — DEXAMETHASONE SODIUM PHOSPHATE 100 MG/10ML
10 INJECTION INTRAMUSCULAR; INTRAVENOUS
Status: COMPLETED | OUTPATIENT
Start: 2020-01-31 | End: 2020-01-31

## 2020-01-31 RX ORDER — PREDNISONE 20 MG/1
20 TABLET ORAL 2 TIMES DAILY
Qty: 10 TABLET | Refills: 0 | Status: SHIPPED | OUTPATIENT
Start: 2020-01-31 | End: 2020-02-05

## 2020-01-31 RX ORDER — BENZONATATE 200 MG/1
200 CAPSULE ORAL 3 TIMES DAILY PRN
Qty: 30 CAPSULE | Refills: 0 | Status: SHIPPED | OUTPATIENT
Start: 2020-01-31 | End: 2020-02-10

## 2020-01-31 RX ADMIN — DEXAMETHASONE SODIUM PHOSPHATE 10 MG: 10 INJECTION INTRAMUSCULAR; INTRAVENOUS at 02:01

## 2020-01-31 RX ADMIN — CEFTRIAXONE SODIUM 1 G: 1 INJECTION, POWDER, FOR SOLUTION INTRAMUSCULAR; INTRAVENOUS at 02:01

## 2020-01-31 NOTE — PROGRESS NOTES
"Subjective:       Patient ID: Sivakumar Thacker is a 64 y.o. male.    Chief Complaint: Follow-up and Cough    Cough  Last 4 weeks  Worsening  Associated with wheezing, non-productive  Nothing makes better or worse  + sick contacts      Review of Systems   Constitutional: Positive for fever. Negative for activity change, appetite change and fatigue.   HENT: Positive for congestion, ear pain, postnasal drip, rhinorrhea, sinus pressure, sinus pain, sneezing and sore throat. Negative for dental problem, ear discharge, hearing loss and trouble swallowing.    Eyes: Negative for photophobia and discharge.   Respiratory: Positive for shortness of breath and wheezing. Negative for apnea and cough.    Cardiovascular: Negative for chest pain.   Gastrointestinal: Negative for abdominal distention, abdominal pain, blood in stool, diarrhea, nausea and vomiting.   Endocrine: Negative for cold intolerance.   Genitourinary: Negative for difficulty urinating, flank pain, frequency, hematuria and testicular pain.   Musculoskeletal: Negative for arthralgias and myalgias.   Skin: Negative for color change.   Allergic/Immunologic: Negative for environmental allergies, food allergies and immunocompromised state.   Neurological: Negative for dizziness, syncope, numbness and headaches.   Hematological: Negative for adenopathy. Does not bruise/bleed easily.   Psychiatric/Behavioral: Negative for agitation, confusion, decreased concentration, hallucinations, self-injury and sleep disturbance.   All other systems reviewed and are negative.        Reviewed family, medical, surgical, and social history.    Objective:      /88   Pulse 97   Temp 99.9 °F (37.7 °C)   Resp 20   Ht 5' 8" (1.727 m)   Wt 93.3 kg (205 lb 11.2 oz)   SpO2 96%   BMI 31.28 kg/m²   Physical Exam   Constitutional: He is oriented to person, place, and time. He appears well-developed and well-nourished. No distress.   HENT:   Head: Normocephalic and atraumatic. "   Nose: Rhinorrhea present. Right sinus exhibits maxillary sinus tenderness and frontal sinus tenderness. Left sinus exhibits maxillary sinus tenderness and frontal sinus tenderness.   Mouth/Throat: Posterior oropharyngeal edema and posterior oropharyngeal erythema present. No oropharyngeal exudate.   Eyes: Pupils are equal, round, and reactive to light. Conjunctivae and EOM are normal.   Neck: Normal range of motion. No thyromegaly present.   Cardiovascular: Normal rate, regular rhythm, normal heart sounds and intact distal pulses. Exam reveals no gallop and no friction rub.   No murmur heard.  Pulmonary/Chest: Effort normal. No respiratory distress. He has wheezes. He has no rales.   Abdominal: Soft. He exhibits no distension. There is no tenderness. There is no guarding.   Musculoskeletal: Normal range of motion. He exhibits no edema, tenderness or deformity.   Neurological: He is alert and oriented to person, place, and time. He displays normal reflexes. No cranial nerve deficit or sensory deficit. He exhibits normal muscle tone. Coordination normal.   Skin: Skin is warm and dry. No rash noted. He is not diaphoretic. No erythema. No pallor.   Psychiatric: He has a normal mood and affect. His behavior is normal. Judgment and thought content normal.   Nursing note and vitals reviewed.      Assessment:       1. Bronchitis        Plan:       Bronchitis  -     doxycycline (VIBRA-TABS) 100 MG tablet; Take 1 tablet (100 mg total) by mouth 2 (two) times daily.  Dispense: 20 tablet; Refill: 0  -     predniSONE (DELTASONE) 20 MG tablet; Take 1 tablet (20 mg total) by mouth 2 (two) times daily. for 5 days  Dispense: 10 tablet; Refill: 0  -     benzonatate (TESSALON) 200 MG capsule; Take 1 capsule (200 mg total) by mouth 3 (three) times daily as needed for Cough.  Dispense: 30 capsule; Refill: 0  -     X-Ray Chest PA And Lateral; Future; Expected date: 01/31/2020  -     dexamethasone injection 10 mg  -     cefTRIAXone  injection 1 g            Risks, benefits, and side effects were discussed with the patient. All questions were answered to the fullest satisfaction of the patient, and pt verbalized understanding and agreement to treatment plan. Pt was to call with any new or worsening symptoms, or present to the ER.

## 2020-02-02 RX ORDER — ACETAMINOPHEN 325 MG/1
650 TABLET ORAL
Status: CANCELLED | OUTPATIENT
Start: 2020-02-06

## 2020-02-02 RX ORDER — MEPERIDINE HYDROCHLORIDE 50 MG/ML
25 INJECTION INTRAMUSCULAR; INTRAVENOUS; SUBCUTANEOUS
Status: CANCELLED | OUTPATIENT
Start: 2020-02-06

## 2020-02-02 RX ORDER — SODIUM CHLORIDE 0.9 % (FLUSH) 0.9 %
10 SYRINGE (ML) INJECTION
Status: CANCELLED | OUTPATIENT
Start: 2020-02-06

## 2020-02-02 RX ORDER — HEPARIN 100 UNIT/ML
500 SYRINGE INTRAVENOUS
Status: CANCELLED | OUTPATIENT
Start: 2020-02-06

## 2020-02-02 RX ORDER — FAMOTIDINE 10 MG/ML
20 INJECTION INTRAVENOUS
Status: CANCELLED | OUTPATIENT
Start: 2020-02-06

## 2020-02-05 ENCOUNTER — LAB VISIT (OUTPATIENT)
Dept: LAB | Facility: HOSPITAL | Age: 65
End: 2020-02-05
Attending: INTERNAL MEDICINE
Payer: OTHER GOVERNMENT

## 2020-02-05 DIAGNOSIS — C82.30 FOLLICULAR LYMPHOMA GRADE IIIA, UNSPECIFIED BODY REGION: ICD-10-CM

## 2020-02-05 LAB
ALBUMIN SERPL BCP-MCNC: 4 G/DL (ref 3.5–5.2)
ALP SERPL-CCNC: 78 U/L (ref 55–135)
ALT SERPL W/O P-5'-P-CCNC: 24 U/L (ref 10–44)
ANION GAP SERPL CALC-SCNC: 13 MMOL/L (ref 8–16)
AST SERPL-CCNC: 30 U/L (ref 10–40)
BASOPHILS NFR BLD: 0 % (ref 0–1.9)
BILIRUB SERPL-MCNC: 0.3 MG/DL (ref 0.1–1)
BUN SERPL-MCNC: 19 MG/DL (ref 8–23)
CALCIUM SERPL-MCNC: 9 MG/DL (ref 8.7–10.5)
CHLORIDE SERPL-SCNC: 102 MMOL/L (ref 95–110)
CO2 SERPL-SCNC: 21 MMOL/L (ref 23–29)
CREAT SERPL-MCNC: 1.1 MG/DL (ref 0.5–1.4)
DIFFERENTIAL METHOD: NORMAL
EOSINOPHIL NFR BLD: 0 % (ref 0–8)
ERYTHROCYTE [DISTWIDTH] IN BLOOD BY AUTOMATED COUNT: 12.6 % (ref 11.5–14.5)
EST. GFR  (AFRICAN AMERICAN): >60 ML/MIN/1.73 M^2
EST. GFR  (NON AFRICAN AMERICAN): >60 ML/MIN/1.73 M^2
GLUCOSE SERPL-MCNC: 295 MG/DL (ref 70–110)
HCT VFR BLD AUTO: 44.7 % (ref 40–54)
HGB BLD-MCNC: 15.3 G/DL (ref 14–18)
IMM GRANULOCYTES # BLD AUTO: NORMAL K/UL (ref 0–0.04)
IMM GRANULOCYTES NFR BLD AUTO: NORMAL % (ref 0–0.5)
LYMPHOCYTES NFR BLD: 29 % (ref 18–48)
MCH RBC QN AUTO: 29.9 PG (ref 27–31)
MCHC RBC AUTO-ENTMCNC: 34.2 G/DL (ref 32–36)
MCV RBC AUTO: 88 FL (ref 82–98)
MONOCYTES NFR BLD: 4 % (ref 4–15)
NEUTROPHILS NFR BLD: 65 % (ref 38–73)
NEUTS BAND NFR BLD MANUAL: 2 %
NRBC BLD-RTO: 0 /100 WBC
PLATELET # BLD AUTO: 278 K/UL (ref 150–350)
PMV BLD AUTO: 9.3 FL (ref 9.2–12.9)
POTASSIUM SERPL-SCNC: 3.9 MMOL/L (ref 3.5–5.1)
PROT SERPL-MCNC: 6.7 G/DL (ref 6–8.4)
RBC # BLD AUTO: 5.11 M/UL (ref 4.6–6.2)
SODIUM SERPL-SCNC: 136 MMOL/L (ref 136–145)
WBC # BLD AUTO: 7.07 K/UL (ref 3.9–12.7)

## 2020-02-05 PROCEDURE — 80053 COMPREHEN METABOLIC PANEL: CPT

## 2020-02-05 PROCEDURE — 85007 BL SMEAR W/DIFF WBC COUNT: CPT

## 2020-02-05 PROCEDURE — 85027 COMPLETE CBC AUTOMATED: CPT

## 2020-02-05 PROCEDURE — 36415 COLL VENOUS BLD VENIPUNCTURE: CPT

## 2020-02-05 NOTE — PROGRESS NOTES
[facility] Hematology/Oncology  PROGRESS NOTE -  Follow-up Visit      Subjective:       Patient ID:   NAME: Sivakumar Thacker : 1955     64 y.o. male    Referring Doc: Itz (New PCP in [place])  Other Physicians: Vinod Chen    Chief Complaint:  NHL f/u    History of Present Illness:     Patient returns today for a regularly scheduled follow-up visit.  The patient is here today to go over the results of the recently ordered labs, tests and studies. . He is doing well with the rituximab infusions. He denies any CP, SOB, HA's or N/V. He is here by himself. He had recent upper respiratory cold treated with antibiotics. He is feeling better now.           ROS:   GEN: normal without any fever, night sweats or weight loss  HEENT: normal with no HA's, sore throat, stiff neck, changes in vision; sinus issues resolving  CV: normal with no CP, SOB, PND, VASQUEZ or orthopnea  PULM: normal with no SOB, cough, hemoptysis, sputum or pleuritic pain  GI: normal with no abdominal pain, nausea, vomiting, constipation, diarrhea, melanotic stools, BRBPR, or hematemesis  : normal with no hematuria, dysuria  BREAST: normal with no mass, discharge, pain  SKIN: normal with no rash, erythema, bruising, or swelling    Allergies:  Review of patient's allergies indicates:  No Known Allergies    Medications:    Current Outpatient Medications:     amLODIPine (NORVASC) 10 MG tablet, Take 1 tablet (10 mg total) by mouth once daily., Disp: 90 tablet, Rfl: 3    aspirin (ECOTRIN) 81 MG EC tablet, Take 81 mg by mouth once daily., Disp: , Rfl:     atorvastatin (LIPITOR) 20 MG tablet, Take 20 mg by mouth once daily., Disp: , Rfl:     benzonatate (TESSALON) 200 MG capsule, Take 1 capsule (200 mg total) by mouth 3 (three) times daily as needed for Cough., Disp: 30 capsule, Rfl: 0    doxycycline (VIBRA-TABS) 100 MG tablet, Take 1 tablet (100 mg total) by mouth 2 (two) times daily., Disp: 20 tablet, Rfl: 0    fluticasone propionate (FLONASE)  50 mcg/actuation nasal spray, 1 spray (50 mcg total) by Each Nostril route 2 (two) times daily., Disp: 15.8 mL, Rfl: 2    INV IGI 10% injection, Inject into the vein every 28 days., Disp: , Rfl:     losartan (COZAAR) 100 MG tablet, Take 100 mg by mouth once daily., Disp: , Rfl:     metFORMIN (GLUCOPHAGE) 500 MG tablet, metformin 500 mg tablet  Take 2 tablets twice a day by oral route with meals for 90 days., Disp: , Rfl:     METHYL-B12/L-MEFOLATE/B6 PHOS (METANX ORAL), Take 1-2 tablets by mouth 2 (two) times daily as needed., Disp: , Rfl:     montelukast (SINGULAIR) 10 mg tablet, Take 10 mg by mouth nightly as needed., Disp: , Rfl:     MULTIVITAMIN ORAL, Take 1 tablet by mouth once daily., Disp: , Rfl:     naproxen (NAPROSYN) 500 MG tablet, Take 500 mg by mouth 2 (two) times daily as needed., Disp: , Rfl:     omeprazole (PRILOSEC) 40 MG capsule, Take 40 mg by mouth once daily., Disp: , Rfl:     tizanidine (ZANAFLEX) 4 MG tablet, Take 4 mg by mouth daily as needed., Disp: , Rfl:   No current facility-administered medications for this visit.     Facility-Administered Medications Ordered in Other Visits:     alteplase injection 2 mg, 2 mg, Intra-Catheter, PRN, Jefferson Ibarra MD    heparin, porcine (PF) 100 unit/mL injection flush 500 Units, 500 Units, Intravenous, PRN, Jefferson Ibarra MD    meperidine (PF) injection 25 mg, 25 mg, Intravenous, PRN, Jefferson Ibarra MD    riTUXimab (RITUXAN) 375 mg/m2 = 821 mg in sodium chloride 0.9% 821 mL infusion (conc: 1 mg/mL), 375 mg/m2 (Treatment Plan Recorded), Intravenous, 1 time in Clinic/HOD, Jefferson Ibarra MD    sodium chloride 0.9% 250 mL flush bag, , Intravenous, 1 time in Clinic/HOD, Jefferson Ibarra MD    sodium chloride 0.9% flush 10 mL, 10 mL, Intravenous, PRN, Jefferson Ibarra MD    PMHx/PSHx Updates:  See patient's last visit with me on 12/12/2019.  See H&P on 1/8/2019        Pathology:  Cancer Staging  No matching staging  information was found for the patient.          Objective:     Vitals:  Blood pressure (!) 150/89, pulse 89, temperature 98 °F (36.7 °C), resp. rate 18, weight 93.9 kg (207 lb).    Physical Examination:   GEN: no apparent distress, comfortable; AAOx3  HEAD: atraumatic and normocephalic  EYES: no pallor, no icterus, PERRLA  ENT: OMM, no pharyngeal erythema, external ears WNL; no nasal discharge; no thrush  NECK: no masses, thyroid normal, trachea midline, no LAD/LN's, supple  CV: RRR with no murmur; normal pulse; normal S1 and S2; no pedal edema  CHEST: Normal respiratory effort; CTAB; normal breath sounds; no current wheeze  ABDOM: nontender and nondistended; soft; normal bowel sounds; no rebound/guarding  MUSC/Skeletal: ROM normal; no crepitus; joints normal; no deformities or arthropathy  EXTREM: no clubbing, cyanosis, inflammation or swelling  SKIN: no rashes, lesions, ulcers, petechiae or subcutaneous nodules  : no gibbs  NEURO: grossly intact; motor/sensory WNL; AAOx3; no tremors  PSYCH: normal mood, affect and behavior  LYMPH: normal cervical, supraclavicular, axillary and groin LN's            Labs:     2/5/2020  Lab Results   Component Value Date    WBC 7.07 02/05/2020    HGB 15.3 02/05/2020    HCT 44.7 02/05/2020    MCV 88 02/05/2020     02/05/2020     CMP  Sodium   Date Value Ref Range Status   02/05/2020 136 136 - 145 mmol/L Final   04/25/2019 144 134 - 144 mmol/L      Potassium   Date Value Ref Range Status   02/05/2020 3.9 3.5 - 5.1 mmol/L Final     Chloride   Date Value Ref Range Status   02/05/2020 102 95 - 110 mmol/L Final   04/25/2019 107 98 - 110 mmol/L      CO2   Date Value Ref Range Status   02/05/2020 21 (L) 23 - 29 mmol/L Final     Glucose   Date Value Ref Range Status   02/05/2020 295 (H) 70 - 110 mg/dL Final   04/25/2019 177 (H) 70 - 99 mg/dL      BUN, Bld   Date Value Ref Range Status   02/05/2020 19 8 - 23 mg/dL Final     Creatinine   Date Value Ref Range Status   02/05/2020 1.1  0.5 - 1.4 mg/dL Final   04/25/2019 0.83 0.60 - 1.40 mg/dL      Calcium   Date Value Ref Range Status   02/05/2020 9.0 8.7 - 10.5 mg/dL Final     Total Protein   Date Value Ref Range Status   02/05/2020 6.7 6.0 - 8.4 g/dL Final     Albumin   Date Value Ref Range Status   02/05/2020 4.0 3.5 - 5.2 g/dL Final   04/25/2019 4.4 3.1 - 4.7 g/dL      Total Bilirubin   Date Value Ref Range Status   02/05/2020 0.3 0.1 - 1.0 mg/dL Final     Comment:     For infants and newborns, interpretation of results should be based  on gestational age, weight and in agreement with clinical  observations.  Premature Infant recommended reference ranges:  Up to 24 hours.............<8.0 mg/dL  Up to 48 hours............<12.0 mg/dL  3-5 days..................<15.0 mg/dL  6-29 days.................<15.0 mg/dL       Alkaline Phosphatase   Date Value Ref Range Status   02/05/2020 78 55 - 135 U/L Final     AST   Date Value Ref Range Status   02/05/2020 30 10 - 40 U/L Final     ALT   Date Value Ref Range Status   02/05/2020 24 10 - 44 U/L Final     Anion Gap   Date Value Ref Range Status   02/05/2020 13 8 - 16 mmol/L Final     eGFR if    Date Value Ref Range Status   02/05/2020 >60.0 >60 mL/min/1.73 m^2 Final     eGFR if non    Date Value Ref Range Status   02/05/2020 >60.0 >60 mL/min/1.73 m^2 Final     Comment:     Calculation used to obtain the estimated glomerular filtration  rate (eGFR) is the CKD-EPI equation.            Radiology/Diagnostic Studies:    CXR  1/31/2020    Impression       1. No acute chest disease.  2. Left subclavian Port-A-Cath.               PET 9/11/2019   Impression       No evidence of FDG avid residual or recurrent lymphoma       I have reviewed all available lab results and radiology reports.    Assessment/Plan:   (1) 64 y.o. male with diagnosis of NHL Follicular Stage IIIA who has been referred by Dr Gary Chen with [facility] for continuation of care by medical hematology/oncology.   - He  originally was diagnosed in 2012 and had parotid excision at [facility]. He subsequently went to [facility] and was treated with bendamustine-rituximab. He has been on rituximab maintenance every 8 weeks and IV IgG monthly. Dr Chen's plan was to keep him on maintenace therapy for as long as he could tolerate the treatments  - s/p 6 cycles of Bendamustine and Retuximab with subsequent complete remission  - 1st maintenance cycle rituximab was in March 2016  - he has been on maintenance rituximab and IV IgG - last rituximab 11/15/2018; last IV IgG on Dec 14th 2018  - last PET was on 9/26/2018 with no evidence of recurrence  - originally diagnosed in Dec 2012 with left parotid and left periparotid LN excision on 12/11/2012  - PET scan done on  9/11/2019 was good     (2) HTN     (3) Neuropathy     (4) GERD     (5) DM - on metformin per Dr Nunez     (6) Hypogammaglobulinemia - on Iv IgG monthly     (7) Steatosis of liver     (8) Degenerative disc disease of back     (9) Diverticular disease           VISIT DIAGNOSES:      Follicular lymphoma grade IIIa, unspecified body region          PLAN:  1. Continue his rituximab every 8 weeks as long as he is tolerating the therapy as per Dr Chen's prior recommendations and directives    2. continue IV IgG monthly   3. Check labs every 8 weeks  4. F/u with PCP  5. Repeat PEt in Sept 2020        RTC in 8 weeks   Fax note to Itz;          Discussion:       I spent over 25 mins of time with the patient. Reviewed results of the recently ordered labs, tests and studies; made directives with regards to the results. Over half of this time was spent couseling and coordinating care.    I have explained all of the above in detail and the patient understands all of the current recommendation(s). I have answered all of their questions to the best of my ability and to their complete satisfaction.   The patient is to continue with the current management plan.            Electronically  signed by Jefferson Ibarra MD

## 2020-02-06 ENCOUNTER — OFFICE VISIT (OUTPATIENT)
Dept: HEMATOLOGY/ONCOLOGY | Facility: CLINIC | Age: 65
End: 2020-02-06
Payer: OTHER GOVERNMENT

## 2020-02-06 ENCOUNTER — INFUSION (OUTPATIENT)
Dept: INFUSION THERAPY | Facility: HOSPITAL | Age: 65
End: 2020-02-06
Attending: INTERNAL MEDICINE
Payer: OTHER GOVERNMENT

## 2020-02-06 VITALS
DIASTOLIC BLOOD PRESSURE: 87 MMHG | WEIGHT: 207.13 LBS | TEMPERATURE: 64 F | SYSTOLIC BLOOD PRESSURE: 151 MMHG | BODY MASS INDEX: 31.39 KG/M2 | OXYGEN SATURATION: 99 % | HEIGHT: 68 IN | RESPIRATION RATE: 80 BRPM | HEART RATE: 75 BPM

## 2020-02-06 VITALS
SYSTOLIC BLOOD PRESSURE: 150 MMHG | HEART RATE: 89 BPM | BODY MASS INDEX: 31.47 KG/M2 | WEIGHT: 207 LBS | RESPIRATION RATE: 18 BRPM | DIASTOLIC BLOOD PRESSURE: 89 MMHG | TEMPERATURE: 98 F

## 2020-02-06 DIAGNOSIS — D80.1 HYPOGAMMAGLOBULINEMIA: Primary | ICD-10-CM

## 2020-02-06 DIAGNOSIS — C82.30 FOLLICULAR LYMPHOMA GRADE IIIA, UNSPECIFIED BODY REGION: ICD-10-CM

## 2020-02-06 DIAGNOSIS — C82.30 FOLLICULAR LYMPHOMA GRADE IIIA, UNSPECIFIED BODY REGION: Primary | ICD-10-CM

## 2020-02-06 PROCEDURE — 99214 OFFICE O/P EST MOD 30 MIN: CPT | Mod: S$GLB,,, | Performed by: INTERNAL MEDICINE

## 2020-02-06 PROCEDURE — 96375 TX/PRO/DX INJ NEW DRUG ADDON: CPT

## 2020-02-06 PROCEDURE — 96413 CHEMO IV INFUSION 1 HR: CPT

## 2020-02-06 PROCEDURE — A4216 STERILE WATER/SALINE, 10 ML: HCPCS | Performed by: INTERNAL MEDICINE

## 2020-02-06 PROCEDURE — 96415 CHEMO IV INFUSION ADDL HR: CPT

## 2020-02-06 PROCEDURE — S0028 INJECTION, FAMOTIDINE, 20 MG: HCPCS | Performed by: INTERNAL MEDICINE

## 2020-02-06 PROCEDURE — 25000003 PHARM REV CODE 250: Performed by: INTERNAL MEDICINE

## 2020-02-06 PROCEDURE — 96367 TX/PROPH/DG ADDL SEQ IV INF: CPT

## 2020-02-06 PROCEDURE — 99214 PR OFFICE/OUTPT VISIT, EST, LEVL IV, 30-39 MIN: ICD-10-PCS | Mod: S$GLB,,, | Performed by: INTERNAL MEDICINE

## 2020-02-06 PROCEDURE — 63600175 PHARM REV CODE 636 W HCPCS: Performed by: INTERNAL MEDICINE

## 2020-02-06 RX ORDER — ACETAMINOPHEN 325 MG/1
650 TABLET ORAL
Status: COMPLETED | OUTPATIENT
Start: 2020-02-06 | End: 2020-02-06

## 2020-02-06 RX ORDER — HEPARIN 100 UNIT/ML
500 SYRINGE INTRAVENOUS
Status: DISCONTINUED | OUTPATIENT
Start: 2020-02-06 | End: 2020-02-06 | Stop reason: HOSPADM

## 2020-02-06 RX ORDER — MEPERIDINE HYDROCHLORIDE 25 MG/ML
25 INJECTION INTRAMUSCULAR; INTRAVENOUS; SUBCUTANEOUS
Status: DISCONTINUED | OUTPATIENT
Start: 2020-02-06 | End: 2020-02-06 | Stop reason: HOSPADM

## 2020-02-06 RX ORDER — SODIUM CHLORIDE 0.9 % (FLUSH) 0.9 %
10 SYRINGE (ML) INJECTION
Status: DISCONTINUED | OUTPATIENT
Start: 2020-02-06 | End: 2020-02-06 | Stop reason: HOSPADM

## 2020-02-06 RX ORDER — FAMOTIDINE 10 MG/ML
20 INJECTION INTRAVENOUS
Status: COMPLETED | OUTPATIENT
Start: 2020-02-06 | End: 2020-02-06

## 2020-02-06 RX ADMIN — DIPHENHYDRAMINE HYDROCHLORIDE 50 MG: 50 INJECTION INTRAMUSCULAR; INTRAVENOUS at 08:02

## 2020-02-06 RX ADMIN — RITUXIMAB 821 MG: 10 INJECTION, SOLUTION INTRAVENOUS at 09:02

## 2020-02-06 RX ADMIN — ACETAMINOPHEN 650 MG: 325 TABLET ORAL at 08:02

## 2020-02-06 RX ADMIN — SODIUM CHLORIDE, PRESERVATIVE FREE 10 ML: 5 INJECTION INTRAVENOUS at 12:02

## 2020-02-06 RX ADMIN — SODIUM CHLORIDE: 0.9 INJECTION, SOLUTION INTRAVENOUS at 08:02

## 2020-02-06 RX ADMIN — FAMOTIDINE 20 MG: 10 INJECTION, SOLUTION INTRAVENOUS at 09:02

## 2020-02-06 RX ADMIN — HEPARIN 500 UNITS: 100 SYRINGE at 12:02

## 2020-02-07 ENCOUNTER — INFUSION (OUTPATIENT)
Dept: INFUSION THERAPY | Facility: HOSPITAL | Age: 65
End: 2020-02-07
Attending: INTERNAL MEDICINE
Payer: OTHER GOVERNMENT

## 2020-02-07 VITALS
DIASTOLIC BLOOD PRESSURE: 85 MMHG | BODY MASS INDEX: 31.32 KG/M2 | WEIGHT: 206 LBS | OXYGEN SATURATION: 95 % | TEMPERATURE: 99 F | SYSTOLIC BLOOD PRESSURE: 136 MMHG | HEART RATE: 77 BPM | RESPIRATION RATE: 20 BRPM

## 2020-02-07 DIAGNOSIS — D80.1 NONFAMILIAL HYPOGAMMAGLOBULINEMIA: ICD-10-CM

## 2020-02-07 DIAGNOSIS — C82.30 FOLLICULAR LYMPHOMA GRADE IIIA, UNSPECIFIED BODY REGION: ICD-10-CM

## 2020-02-07 DIAGNOSIS — D80.1 HYPOGAMMAGLOBULINEMIA: Primary | ICD-10-CM

## 2020-02-07 PROCEDURE — 25000003 PHARM REV CODE 250: Performed by: INTERNAL MEDICINE

## 2020-02-07 PROCEDURE — 96367 TX/PROPH/DG ADDL SEQ IV INF: CPT

## 2020-02-07 PROCEDURE — A4216 STERILE WATER/SALINE, 10 ML: HCPCS | Performed by: INTERNAL MEDICINE

## 2020-02-07 PROCEDURE — 63600175 PHARM REV CODE 636 W HCPCS: Mod: JG | Performed by: INTERNAL MEDICINE

## 2020-02-07 PROCEDURE — 96413 CHEMO IV INFUSION 1 HR: CPT

## 2020-02-07 PROCEDURE — 96415 CHEMO IV INFUSION ADDL HR: CPT

## 2020-02-07 RX ORDER — SODIUM CHLORIDE 0.9 % (FLUSH) 0.9 %
10 SYRINGE (ML) INJECTION
Status: CANCELLED | OUTPATIENT
Start: 2020-03-06

## 2020-02-07 RX ORDER — SODIUM CHLORIDE 0.9 % (FLUSH) 0.9 %
10 SYRINGE (ML) INJECTION
Status: DISCONTINUED | OUTPATIENT
Start: 2020-02-07 | End: 2020-02-07 | Stop reason: HOSPADM

## 2020-02-07 RX ORDER — HEPARIN 100 UNIT/ML
500 SYRINGE INTRAVENOUS
Status: DISCONTINUED | OUTPATIENT
Start: 2020-02-07 | End: 2020-02-07 | Stop reason: HOSPADM

## 2020-02-07 RX ORDER — DIPHENHYDRAMINE HCL 25 MG
25 CAPSULE ORAL
Status: CANCELLED | OUTPATIENT
Start: 2020-03-06

## 2020-02-07 RX ORDER — ACETAMINOPHEN 500 MG
500 TABLET ORAL
Status: DISCONTINUED | OUTPATIENT
Start: 2020-02-07 | End: 2020-02-07 | Stop reason: HOSPADM

## 2020-02-07 RX ORDER — ACETAMINOPHEN 500 MG
500 TABLET ORAL
Status: CANCELLED | OUTPATIENT
Start: 2020-03-06

## 2020-02-07 RX ORDER — HEPARIN 100 UNIT/ML
500 SYRINGE INTRAVENOUS
Status: CANCELLED | OUTPATIENT
Start: 2020-03-06

## 2020-02-07 RX ADMIN — DIPHENHYDRAMINE HYDROCHLORIDE 25 MG: 50 INJECTION INTRAMUSCULAR; INTRAVENOUS at 08:02

## 2020-02-07 RX ADMIN — HEPARIN 500 UNITS: 100 SYRINGE at 10:02

## 2020-02-07 RX ADMIN — SODIUM CHLORIDE, PRESERVATIVE FREE 10 ML: 5 INJECTION INTRAVENOUS at 10:02

## 2020-02-07 RX ADMIN — SODIUM CHLORIDE: 0.9 INJECTION, SOLUTION INTRAVENOUS at 08:02

## 2020-02-07 RX ADMIN — IMMUNE GLOBULIN (HUMAN) 30 G: 10 INJECTION INTRAVENOUS; SUBCUTANEOUS at 08:02

## 2020-02-07 RX ADMIN — ACETAMINOPHEN 500 MG: 500 TABLET ORAL at 08:02

## 2020-02-19 ENCOUNTER — OFFICE VISIT (OUTPATIENT)
Dept: FAMILY MEDICINE | Facility: CLINIC | Age: 65
End: 2020-02-19
Payer: OTHER GOVERNMENT

## 2020-02-19 ENCOUNTER — HOSPITAL ENCOUNTER (OUTPATIENT)
Dept: RADIOLOGY | Facility: HOSPITAL | Age: 65
Discharge: HOME | End: 2020-02-19
Attending: FAMILY MEDICINE
Payer: OTHER GOVERNMENT

## 2020-02-19 VITALS
SYSTOLIC BLOOD PRESSURE: 147 MMHG | DIASTOLIC BLOOD PRESSURE: 87 MMHG | RESPIRATION RATE: 17 BRPM | WEIGHT: 204 LBS | HEIGHT: 68 IN | TEMPERATURE: 97 F | OXYGEN SATURATION: 97 % | BODY MASS INDEX: 30.92 KG/M2 | HEART RATE: 99 BPM

## 2020-02-19 DIAGNOSIS — R05.9 COUGH: ICD-10-CM

## 2020-02-19 DIAGNOSIS — R10.30 LOWER ABDOMINAL PAIN: ICD-10-CM

## 2020-02-19 DIAGNOSIS — K57.92 DIVERTICULITIS: ICD-10-CM

## 2020-02-19 DIAGNOSIS — K92.1 HEMATOCHEZIA: Primary | ICD-10-CM

## 2020-02-19 DIAGNOSIS — K92.1 HEMATOCHEZIA: ICD-10-CM

## 2020-02-19 PROCEDURE — 99215 OFFICE O/P EST HI 40 MIN: CPT | Mod: S$GLB,,, | Performed by: FAMILY MEDICINE

## 2020-02-19 PROCEDURE — 74177 CT ABD & PELVIS W/CONTRAST: CPT | Mod: TC

## 2020-02-19 PROCEDURE — 71046 X-RAY EXAM CHEST 2 VIEWS: CPT | Mod: 26,,, | Performed by: RADIOLOGY

## 2020-02-19 PROCEDURE — 71046 X-RAY EXAM CHEST 2 VIEWS: CPT | Mod: TC,FY

## 2020-02-19 PROCEDURE — 25500020 PHARM REV CODE 255: Performed by: FAMILY MEDICINE

## 2020-02-19 PROCEDURE — 74177 CT ABDOMEN PELVIS WITH CONTRAST: ICD-10-PCS | Mod: 26,,, | Performed by: RADIOLOGY

## 2020-02-19 PROCEDURE — 71046 XR CHEST PA AND LATERAL: ICD-10-PCS | Mod: 26,,, | Performed by: RADIOLOGY

## 2020-02-19 PROCEDURE — 74177 CT ABD & PELVIS W/CONTRAST: CPT | Mod: 26,,, | Performed by: RADIOLOGY

## 2020-02-19 PROCEDURE — 99215 PR OFFICE/OUTPT VISIT, EST, LEVL V, 40-54 MIN: ICD-10-PCS | Mod: S$GLB,,, | Performed by: FAMILY MEDICINE

## 2020-02-19 RX ADMIN — IOHEXOL 100 ML: 350 INJECTION, SOLUTION INTRAVENOUS at 07:02

## 2020-02-19 NOTE — PROGRESS NOTES
"  [facility] - Clinic Note    Subjective      Mr. Thacker is a 64 y.o. male who presents to clinic for acute care visit.  Friday  Lifted a table saw into the truck.  Slimy balls of stool over the weekend, tarry black  Cough - January - has had two rounds of antibiotics plus steroid shots  Cough is starting to come back  Sore throat  Productive cough  Augmentin and flonase   RLQ pain  Colonoscopy - 2 years ago was normal  Bright red blood in the toilet      PMH Sivakumar has a past medical history of Cancer, Diabetes mellitus, type 2, Hypertension, Neuropathy, and Reflux esophagitis.   PSXH Sivakumar has a past surgical history that includes Portacath placement; Hand surgery; Spine surgery; and Colonoscopy (2018).    Sivakumar's family history includes Diabetes in his father.   SH Sivakumar reports that he has quit smoking. He has a 1.00 pack-year smoking history. He has never used smokeless tobacco. He reports that he drinks alcohol. He reports that he does not use drugs.   ALG Sivakumar has No Known Allergies.   MED Sivakumar has a current medication list which includes the following prescription(s): amlodipine, aspirin, atorvastatin, fluticasone propionate, losartan, metformin, montelukast, multivitamin, naproxen, omeprazole, tizanidine, benzonatate, ciprofloxacin hcl, and metronidazole.     Review of Systems   Constitutional: Negative for fever and unexpected weight change.   HENT: Positive for congestion, postnasal drip, rhinorrhea and sore throat. Negative for ear discharge.    Respiratory: Positive for cough.    Gastrointestinal: Positive for abdominal pain and blood in stool (black and bright red).     ROS otherwise negative  Objective     BP (!) 147/87 (BP Location: Right arm, Patient Position: Sitting, BP Method: Medium (Automatic))   Pulse 99   Temp 97 °F (36.1 °C) (Oral)   Resp 17   Ht 5' 8" (1.727 m)   Wt 92.5 kg (204 lb)   SpO2 97%   BMI 31.02 kg/m²     Physical Exam   Constitutional: He is oriented to " person, place, and time. No distress.   Anxious, concerned, uncomfortable, nontoxic   HENT:   Head: Normocephalic and atraumatic.   Right Ear: Tympanic membrane is retracted.   Left Ear: Tympanic membrane is retracted.   Nose: Rhinorrhea present.   Mouth/Throat: Posterior oropharyngeal erythema present.   Eyes: Pupils are equal, round, and reactive to light. Conjunctivae are normal.   Cardiovascular: Regular rhythm, normal heart sounds and intact distal pulses.   No murmur heard.  Mild tachycardia   Pulmonary/Chest: Effort normal and breath sounds normal.   Upper airway transmitted noises, possible scattered wheezes   Abdominal: Soft. Bowel sounds are normal. He exhibits no distension and no mass. There is tenderness (generalized, worse in lower abdominal region). There is no guarding.   Musculoskeletal: He exhibits no edema or deformity.   Neurological: He is alert and oriented to person, place, and time.   Skin: Skin is warm and dry. He is not diaphoretic.   Psychiatric: He has a normal mood and affect.   Vitals reviewed.     Assessment/Plan     1. Hematochezia  CT Abdomen Pelvis With Contrast    Comprehensive metabolic panel    CBC auto differential   2. Lower abdominal pain  CT Abdomen Pelvis With Contrast    Comprehensive metabolic panel    CBC auto differential   3. Cough  X-Ray Chest PA And Lateral    benzonatate (TESSALON) 100 MG capsule   4. Diverticulitis  ciprofloxacin HCl (CIPRO) 500 MG tablet    metroNIDAZOLE (FLAGYL) 500 MG tablet       Reviewed imaging and lab work with patient. Will treat for mild diverticulitis given sx. Strict precautions given to patient for presentation to ER.   Cipro will have cross coverage for possible respiratory infection though less likely      Future Appointments   Date Time Provider Department [place]   3/6/2020  8:00 AM CHAIR 07 [facility] CHEMO [facility] [place]   3/23/2020  7:00 AM Barry Mcmahon MD [facility]   4/2/2020  8:00 AM CHAIR 02 [facility] CHEMO  [facility] [place]   4/2/2020  8:45 AM Jefferson Ibarra MD [facility] HEM ONC [facility] [place]   4/3/2020  8:00 AM CHAIR 11 [facility] CHEMO [facility] [place]   4/30/2020  8:00 AM CHAIR 04 [facility] CHEMO [facility] [place]   5/28/2020  8:00 AM CHAIR 04 [facility] CHEMO [facility] [place]   7/23/2020  8:00 AM CHAIR 04 [facility] CHEMO [facility] [place] Ivey MD  Family Medicine  [facility]  [phone]

## 2020-02-20 RX ORDER — METRONIDAZOLE 500 MG/1
500 TABLET ORAL EVERY 8 HOURS
Qty: 30 TABLET | Refills: 0 | Status: SHIPPED | OUTPATIENT
Start: 2020-02-20 | End: 2020-08-17

## 2020-02-20 RX ORDER — CIPROFLOXACIN 500 MG/1
500 TABLET ORAL EVERY 12 HOURS
Qty: 20 TABLET | Refills: 0 | Status: SHIPPED | OUTPATIENT
Start: 2020-02-20 | End: 2020-06-16 | Stop reason: ALTCHOICE

## 2020-02-20 RX ORDER — BENZONATATE 100 MG/1
100 CAPSULE ORAL 3 TIMES DAILY PRN
Qty: 60 CAPSULE | Refills: 0 | Status: SHIPPED | OUTPATIENT
Start: 2020-02-20 | End: 2021-02-01 | Stop reason: SDUPTHER

## 2020-03-06 ENCOUNTER — INFUSION (OUTPATIENT)
Dept: INFUSION THERAPY | Facility: HOSPITAL | Age: 65
End: 2020-03-06
Attending: INTERNAL MEDICINE
Payer: OTHER GOVERNMENT

## 2020-03-06 VITALS
SYSTOLIC BLOOD PRESSURE: 124 MMHG | HEIGHT: 68 IN | TEMPERATURE: 98 F | DIASTOLIC BLOOD PRESSURE: 76 MMHG | BODY MASS INDEX: 31.72 KG/M2 | HEART RATE: 90 BPM | OXYGEN SATURATION: 96 % | WEIGHT: 209.31 LBS | RESPIRATION RATE: 18 BRPM

## 2020-03-06 DIAGNOSIS — D80.1 HYPOGAMMAGLOBULINEMIA: Primary | ICD-10-CM

## 2020-03-06 DIAGNOSIS — D80.1 NONFAMILIAL HYPOGAMMAGLOBULINEMIA: ICD-10-CM

## 2020-03-06 DIAGNOSIS — C82.30 FOLLICULAR LYMPHOMA GRADE IIIA, UNSPECIFIED BODY REGION: ICD-10-CM

## 2020-03-06 PROCEDURE — 63600175 PHARM REV CODE 636 W HCPCS: Mod: JG | Performed by: INTERNAL MEDICINE

## 2020-03-06 PROCEDURE — 96367 TX/PROPH/DG ADDL SEQ IV INF: CPT

## 2020-03-06 PROCEDURE — 96415 CHEMO IV INFUSION ADDL HR: CPT

## 2020-03-06 PROCEDURE — 96413 CHEMO IV INFUSION 1 HR: CPT

## 2020-03-06 PROCEDURE — 25000003 PHARM REV CODE 250: Performed by: INTERNAL MEDICINE

## 2020-03-06 RX ORDER — DIPHENHYDRAMINE HCL 25 MG
25 CAPSULE ORAL
Status: CANCELLED | OUTPATIENT
Start: 2020-04-03

## 2020-03-06 RX ORDER — SODIUM CHLORIDE 0.9 % (FLUSH) 0.9 %
10 SYRINGE (ML) INJECTION
Status: CANCELLED | OUTPATIENT
Start: 2020-04-03

## 2020-03-06 RX ORDER — ACETAMINOPHEN 500 MG
500 TABLET ORAL
Status: DISCONTINUED | OUTPATIENT
Start: 2020-03-06 | End: 2020-03-06 | Stop reason: HOSPADM

## 2020-03-06 RX ORDER — HEPARIN 100 UNIT/ML
500 SYRINGE INTRAVENOUS
Status: DISCONTINUED | OUTPATIENT
Start: 2020-03-06 | End: 2020-03-06 | Stop reason: HOSPADM

## 2020-03-06 RX ORDER — HEPARIN 100 UNIT/ML
500 SYRINGE INTRAVENOUS
Status: CANCELLED | OUTPATIENT
Start: 2020-04-03

## 2020-03-06 RX ORDER — SODIUM CHLORIDE 0.9 % (FLUSH) 0.9 %
10 SYRINGE (ML) INJECTION
Status: DISCONTINUED | OUTPATIENT
Start: 2020-03-06 | End: 2020-03-06 | Stop reason: HOSPADM

## 2020-03-06 RX ORDER — ACETAMINOPHEN 500 MG
500 TABLET ORAL
Status: CANCELLED | OUTPATIENT
Start: 2020-04-03

## 2020-03-06 RX ADMIN — DIPHENHYDRAMINE HYDROCHLORIDE 25 MG: 50 INJECTION INTRAMUSCULAR; INTRAVENOUS at 08:03

## 2020-03-06 RX ADMIN — IMMUNE GLOBULIN (HUMAN) 30 G: 10 INJECTION INTRAVENOUS; SUBCUTANEOUS at 08:03

## 2020-03-06 RX ADMIN — ACETAMINOPHEN 500 MG: 500 TABLET ORAL at 08:03

## 2020-03-06 NOTE — PLAN OF CARE
Problem: Fatigue  Goal: Improved Activity Tolerance  Outcome: Ongoing, Progressing  Intervention: Promote Energy Conservation  Flowsheets (Taken 3/6/2020 3721)  Fatigue Management: frequent rest breaks encouraged  Sleep/Rest Enhancement: reading promoted  Activity Management: ambulated - L4; activity encouraged

## 2020-03-17 ENCOUNTER — TELEPHONE (OUTPATIENT)
Dept: FAMILY MEDICINE | Facility: CLINIC | Age: 65
End: 2020-03-17

## 2020-03-17 NOTE — TELEPHONE ENCOUNTER
----- Message from Danya Khan sent at 3/17/2020  3:38 PM CDT -----  Contact: pt daughter Natalie [phone]  Pt daughter called, pt is experiencing symptoms and wants to know what he should do. She said Dr Mcmahon treated him for Bronchitis and his coughing breathing is not getting better please call [phone]

## 2020-03-18 ENCOUNTER — TELEPHONE (OUTPATIENT)
Dept: FAMILY MEDICINE | Facility: CLINIC | Age: 65
End: 2020-03-18

## 2020-03-18 DIAGNOSIS — R05.3 COUGH, PERSISTENT: ICD-10-CM

## 2020-03-18 NOTE — TELEPHONE ENCOUNTER
----- Message from Cathy Paulino sent at 3/18/2020  4:22 PM CDT -----  Contact: Patient daughter apurva peterson #[phone]   Patient daughter apurva peterson #[phone]   Requesting medical advice from Dr Mcmahon   Patient have had a cough off and on for months  Patient has a history of Hodgkins Lymphoma   Patient daughter called on 3/17/2020 was connected to the covid hotline and they advised patient to contact pcp.  Patient daughter Dr Mcmahon off on 3/17/2020 left message and still haven't heard back.

## 2020-03-19 ENCOUNTER — HOSPITAL ENCOUNTER (OUTPATIENT)
Dept: RADIOLOGY | Facility: HOSPITAL | Age: 65
Discharge: HOME | End: 2020-03-19
Attending: FAMILY MEDICINE
Payer: OTHER GOVERNMENT

## 2020-03-19 DIAGNOSIS — R05.3 COUGH, PERSISTENT: ICD-10-CM

## 2020-03-19 PROCEDURE — 71046 X-RAY EXAM CHEST 2 VIEWS: CPT | Mod: TC,PN

## 2020-03-19 PROCEDURE — 71046 X-RAY EXAM CHEST 2 VIEWS: CPT | Mod: 26,,, | Performed by: RADIOLOGY

## 2020-03-19 PROCEDURE — 71046 XR CHEST PA AND LATERAL: ICD-10-PCS | Mod: 26,,, | Performed by: RADIOLOGY

## 2020-03-19 PROCEDURE — 71250 CT THORAX DX C-: CPT | Mod: TC,PN

## 2020-03-19 PROCEDURE — 71250 CT CHEST WITHOUT CONTRAST: ICD-10-PCS | Mod: 26,,, | Performed by: RADIOLOGY

## 2020-03-19 PROCEDURE — 71250 CT THORAX DX C-: CPT | Mod: 26,,, | Performed by: RADIOLOGY

## 2020-03-20 DIAGNOSIS — R05.3 CHRONIC COUGH: Primary | ICD-10-CM

## 2020-03-20 RX ORDER — LEVOFLOXACIN 500 MG/1
500 TABLET, FILM COATED ORAL DAILY
Qty: 5 TABLET | Refills: 0 | Status: SHIPPED | OUTPATIENT
Start: 2020-03-20 | End: 2020-06-16 | Stop reason: ALTCHOICE

## 2020-03-20 RX ORDER — PREDNISONE 20 MG/1
20 TABLET ORAL 2 TIMES DAILY
Qty: 10 TABLET | Refills: 0 | Status: SHIPPED | OUTPATIENT
Start: 2020-03-20 | End: 2020-03-25

## 2020-03-23 ENCOUNTER — OFFICE VISIT (OUTPATIENT)
Dept: FAMILY MEDICINE | Facility: CLINIC | Age: 65
End: 2020-03-23
Payer: OTHER GOVERNMENT

## 2020-03-23 VITALS
TEMPERATURE: 97 F | SYSTOLIC BLOOD PRESSURE: 137 MMHG | OXYGEN SATURATION: 98 % | DIASTOLIC BLOOD PRESSURE: 77 MMHG | WEIGHT: 204.13 LBS | HEART RATE: 74 BPM | RESPIRATION RATE: 16 BRPM | HEIGHT: 68 IN | BODY MASS INDEX: 30.94 KG/M2

## 2020-03-23 DIAGNOSIS — J32.9 SINUSITIS, UNSPECIFIED CHRONICITY, UNSPECIFIED LOCATION: ICD-10-CM

## 2020-03-23 DIAGNOSIS — R05.3 CHRONIC COUGH: Primary | ICD-10-CM

## 2020-03-23 PROCEDURE — 99999 PR PBB SHADOW E&M-EST. PATIENT-LVL III: CPT | Mod: PBBFAC,,, | Performed by: FAMILY MEDICINE

## 2020-03-23 PROCEDURE — 99999 PR PBB SHADOW E&M-EST. PATIENT-LVL III: ICD-10-PCS | Mod: PBBFAC,,, | Performed by: FAMILY MEDICINE

## 2020-03-23 PROCEDURE — 99213 OFFICE O/P EST LOW 20 MIN: CPT | Mod: PBBFAC,PN | Performed by: FAMILY MEDICINE

## 2020-03-23 PROCEDURE — 99214 OFFICE O/P EST MOD 30 MIN: CPT | Mod: S$PBB,,, | Performed by: FAMILY MEDICINE

## 2020-03-23 PROCEDURE — 99214 PR OFFICE/OUTPT VISIT, EST, LEVL IV, 30-39 MIN: ICD-10-PCS | Mod: S$PBB,,, | Performed by: FAMILY MEDICINE

## 2020-03-23 RX ORDER — FLUTICASONE PROPIONATE 50 MCG
1 SPRAY, SUSPENSION (ML) NASAL 2 TIMES DAILY
Qty: 15.8 ML | Refills: 2 | Status: SHIPPED | OUTPATIENT
Start: 2020-03-23 | End: 2020-09-16 | Stop reason: SDUPTHER

## 2020-03-23 RX ORDER — ALBUTEROL SULFATE 90 UG/1
2 AEROSOL, METERED RESPIRATORY (INHALATION) EVERY 6 HOURS PRN
Qty: 18 G | Refills: 0 | Status: SHIPPED | OUTPATIENT
Start: 2020-03-23 | End: 2021-04-26

## 2020-03-23 RX ORDER — PROMETHAZINE HYDROCHLORIDE AND CODEINE PHOSPHATE 6.25; 1 MG/5ML; MG/5ML
5 SOLUTION ORAL EVERY 4 HOURS PRN
Qty: 240 ML | Refills: 1 | Status: SHIPPED | OUTPATIENT
Start: 2020-03-23 | End: 2020-03-23 | Stop reason: SDUPTHER

## 2020-03-23 RX ORDER — PROMETHAZINE HYDROCHLORIDE AND CODEINE PHOSPHATE 6.25; 1 MG/5ML; MG/5ML
5 SOLUTION ORAL EVERY 4 HOURS PRN
Qty: 240 ML | Refills: 1 | Status: SHIPPED | OUTPATIENT
Start: 2020-03-23 | End: 2020-04-02

## 2020-03-23 RX ORDER — DOXYCYCLINE HYCLATE 100 MG
100 TABLET ORAL 2 TIMES DAILY
Qty: 28 TABLET | Refills: 0 | Status: SHIPPED | OUTPATIENT
Start: 2020-03-23 | End: 2020-04-06

## 2020-03-23 NOTE — PROGRESS NOTES
"Subjective:       Patient ID: Sivakumar Thacker is a 65 y.o. male.    Chief Complaint: Follow-up    Chronic cough  Last 3 months  CT showed atypical infection  Multiple rounds of abx and steroids with partial, temporary relief  Some sob with coughing spells  No fevers    Review of Systems   Constitutional: Negative for activity change, appetite change, fatigue and fever.   HENT: Negative for congestion, dental problem, ear discharge, hearing loss, postnasal drip, sinus pain, sneezing and trouble swallowing.    Eyes: Negative for photophobia and discharge.   Respiratory: Positive for cough, choking and shortness of breath. Negative for apnea.    Cardiovascular: Negative for chest pain.   Gastrointestinal: Negative for abdominal distention, abdominal pain, blood in stool, diarrhea, nausea and vomiting.   Endocrine: Negative for cold intolerance.   Genitourinary: Negative for difficulty urinating, flank pain, frequency, hematuria and testicular pain.   Musculoskeletal: Negative for arthralgias and myalgias.   Skin: Negative for color change.   Allergic/Immunologic: Negative for environmental allergies, food allergies and immunocompromised state.   Neurological: Negative for dizziness, syncope, numbness and headaches.   Hematological: Negative for adenopathy. Does not bruise/bleed easily.   Psychiatric/Behavioral: Negative for agitation, confusion, decreased concentration, hallucinations, self-injury and sleep disturbance.   All other systems reviewed and are negative.        Reviewed family, medical, surgical, and social history.    Objective:      /77 (BP Location: Right arm, Patient Position: Sitting, BP Method: Medium (Automatic))   Pulse 74   Temp 97 °F (36.1 °C) (Tympanic)   Resp 16   Ht 5' 8" (1.727 m)   Wt 92.6 kg (204 lb 1.6 oz)   SpO2 98%   BMI 31.03 kg/m²   Physical Exam   Constitutional: He is oriented to person, place, and time. He appears well-developed and well-nourished. No distress.   HENT: "   Head: Normocephalic and atraumatic.   Nose: Nose normal.   Mouth/Throat: Oropharynx is clear and moist. No oropharyngeal exudate.   Eyes: Pupils are equal, round, and reactive to light. Conjunctivae and EOM are normal.   Neck: Normal range of motion. No thyromegaly present.   Cardiovascular: Normal rate, regular rhythm, normal heart sounds and intact distal pulses. Exam reveals no gallop and no friction rub.   No murmur heard.  Pulmonary/Chest: Effort normal and breath sounds normal. No respiratory distress. He has no wheezes. He has no rales.   Abdominal: Soft. He exhibits no distension. There is no tenderness. There is no guarding.   Musculoskeletal: Normal range of motion. He exhibits no edema, tenderness or deformity.   Neurological: He is alert and oriented to person, place, and time. He displays normal reflexes. No cranial nerve deficit or sensory deficit. He exhibits normal muscle tone. Coordination normal.   Skin: Skin is warm and dry. No rash noted. He is not diaphoretic. No erythema. No pallor.   Psychiatric: He has a normal mood and affect. His behavior is normal. Judgment and thought content normal.   Nursing note and vitals reviewed.      Assessment:       1. Chronic cough    2. Sinusitis, unspecified chronicity, unspecified location        Plan:       Chronic cough  -     Ambulatory referral/consult to Pulmonology; Future; Expected date: 03/30/2020  -     doxycycline (VIBRA-TABS) 100 MG tablet; Take 1 tablet (100 mg total) by mouth 2 (two) times daily. for 14 days  Dispense: 28 tablet; Refill: 0  -     Discontinue: promethazine-codeine 6.25-10 mg/5 ml (PHENERGAN WITH CODEINE) 6.25-10 mg/5 mL syrup; Take 5 mLs by mouth every 4 (four) hours as needed for Cough.  Dispense: 240 mL; Refill: 1  -     albuterol (PROVENTIL HFA) 90 mcg/actuation inhaler; Inhale 2 puffs into the lungs every 6 (six) hours as needed for Wheezing. Rescue  Dispense: 18 g; Refill: 0  -     Discontinue: promethazine-codeine 6.25-10  mg/5 ml (PHENERGAN WITH CODEINE) 6.25-10 mg/5 mL syrup; Take 5 mLs by mouth every 4 (four) hours as needed for Cough.  Dispense: 240 mL; Refill: 1  -     promethazine-codeine 6.25-10 mg/5 ml (PHENERGAN WITH CODEINE) 6.25-10 mg/5 mL syrup; Take 5 mLs by mouth every 4 (four) hours as needed for Cough.  Dispense: 240 mL; Refill: 1    Sinusitis, unspecified chronicity, unspecified location  -     fluticasone propionate (FLONASE) 50 mcg/actuation nasal spray; 1 spray (50 mcg total) by Each Nostril route 2 (two) times daily.  Dispense: 15.8 mL; Refill: 2            Risks, benefits, and side effects were discussed with the patient. All questions were answered to the fullest satisfaction of the patient, and pt verbalized understanding and agreement to treatment plan. Pt was to call with any new or worsening symptoms, or present to the ER.

## 2020-03-26 ENCOUNTER — TELEPHONE (OUTPATIENT)
Dept: FAMILY MEDICINE | Facility: CLINIC | Age: 65
End: 2020-03-26

## 2020-03-26 NOTE — TELEPHONE ENCOUNTER
Referral sent to [phone] with confirmation.       ----- Message from Honey Fishman sent at 3/26/2020 11:40 AM CDT -----  Contact: self [phone]  He is calling to follow up on the referral to Dr Veliz.  It is pending review.

## 2020-03-27 RX ORDER — HEPARIN 100 UNIT/ML
500 SYRINGE INTRAVENOUS
Status: CANCELLED | OUTPATIENT
Start: 2020-04-02

## 2020-03-27 RX ORDER — ACETAMINOPHEN 325 MG/1
650 TABLET ORAL
Status: CANCELLED | OUTPATIENT
Start: 2020-04-02

## 2020-03-27 RX ORDER — MEPERIDINE HYDROCHLORIDE 50 MG/ML
25 INJECTION INTRAMUSCULAR; INTRAVENOUS; SUBCUTANEOUS
Status: CANCELLED | OUTPATIENT
Start: 2020-04-02

## 2020-03-27 RX ORDER — FAMOTIDINE 10 MG/ML
20 INJECTION INTRAVENOUS
Status: CANCELLED | OUTPATIENT
Start: 2020-04-02

## 2020-03-27 RX ORDER — SODIUM CHLORIDE 0.9 % (FLUSH) 0.9 %
10 SYRINGE (ML) INJECTION
Status: CANCELLED | OUTPATIENT
Start: 2020-04-02

## 2020-03-30 ENCOUNTER — TELEPHONE (OUTPATIENT)
Dept: FAMILY MEDICINE | Facility: CLINIC | Age: 65
End: 2020-03-30

## 2020-03-30 NOTE — TELEPHONE ENCOUNTER
Re submitted referral.    Spoke with pt. Informed referral will be re faxed and sent to [facility]. Verbalized an understanding. Informed of clinic phone number.     ----- Message from Vivienne Basilio sent at 3/30/2020 10:44 AM CDT -----  Contact: Patient  Type: Needs Medical Advice    Who Called:  Patient  Best Call Back Number:   Additional Information: Calling to check the status of the authorization for his referral to Dr Veliz.

## 2020-04-01 ENCOUNTER — LAB VISIT (OUTPATIENT)
Dept: LAB | Facility: HOSPITAL | Age: 65
End: 2020-04-01
Attending: INTERNAL MEDICINE
Payer: OTHER GOVERNMENT

## 2020-04-01 DIAGNOSIS — D80.1 NONFAMILIAL HYPOGAMMAGLOBULINEMIA: ICD-10-CM

## 2020-04-01 DIAGNOSIS — C82.30 FOLLICULAR LYMPHOMA GRADE IIIA, UNSPECIFIED BODY REGION: ICD-10-CM

## 2020-04-01 PROBLEM — Z51.11 ENCOUNTER FOR ANTINEOPLASTIC CHEMOTHERAPY: Status: ACTIVE | Noted: 2020-04-01

## 2020-04-01 LAB
ALBUMIN SERPL BCP-MCNC: 4.3 G/DL (ref 3.5–5.2)
ALP SERPL-CCNC: 87 U/L (ref 55–135)
ALT SERPL W/O P-5'-P-CCNC: 29 U/L (ref 10–44)
ANION GAP SERPL CALC-SCNC: 11 MMOL/L (ref 8–16)
AST SERPL-CCNC: 39 U/L (ref 10–40)
BASOPHILS # BLD AUTO: 0.05 K/UL (ref 0–0.2)
BASOPHILS NFR BLD: 0.9 % (ref 0–1.9)
BILIRUB SERPL-MCNC: 0.7 MG/DL (ref 0.1–1)
BUN SERPL-MCNC: 18 MG/DL (ref 8–23)
CALCIUM SERPL-MCNC: 9.2 MG/DL (ref 8.7–10.5)
CHLORIDE SERPL-SCNC: 104 MMOL/L (ref 95–110)
CO2 SERPL-SCNC: 25 MMOL/L (ref 23–29)
CREAT SERPL-MCNC: 0.7 MG/DL (ref 0.5–1.4)
DIFFERENTIAL METHOD: ABNORMAL
EOSINOPHIL # BLD AUTO: 0.6 K/UL (ref 0–0.5)
EOSINOPHIL NFR BLD: 10.6 % (ref 0–8)
ERYTHROCYTE [DISTWIDTH] IN BLOOD BY AUTOMATED COUNT: 12.9 % (ref 11.5–14.5)
EST. GFR  (AFRICAN AMERICAN): >60 ML/MIN/1.73 M^2
EST. GFR  (NON AFRICAN AMERICAN): >60 ML/MIN/1.73 M^2
GLUCOSE SERPL-MCNC: 193 MG/DL (ref 70–110)
HCT VFR BLD AUTO: 44.9 % (ref 40–54)
HGB BLD-MCNC: 14.9 G/DL (ref 14–18)
IMM GRANULOCYTES # BLD AUTO: 0.15 K/UL (ref 0–0.04)
IMM GRANULOCYTES NFR BLD AUTO: 2.8 % (ref 0–0.5)
LYMPHOCYTES # BLD AUTO: 1.2 K/UL (ref 1–4.8)
LYMPHOCYTES NFR BLD: 22.9 % (ref 18–48)
MCH RBC QN AUTO: 29.4 PG (ref 27–31)
MCHC RBC AUTO-ENTMCNC: 33.2 G/DL (ref 32–36)
MCV RBC AUTO: 89 FL (ref 82–98)
MONOCYTES # BLD AUTO: 0.7 K/UL (ref 0.3–1)
MONOCYTES NFR BLD: 13.8 % (ref 4–15)
NEUTROPHILS # BLD AUTO: 2.6 K/UL (ref 1.8–7.7)
NEUTROPHILS NFR BLD: 49 % (ref 38–73)
NRBC BLD-RTO: 0 /100 WBC
PLATELET # BLD AUTO: 269 K/UL (ref 150–350)
PMV BLD AUTO: 9.2 FL (ref 9.2–12.9)
POTASSIUM SERPL-SCNC: 3.8 MMOL/L (ref 3.5–5.1)
PROT SERPL-MCNC: 7 G/DL (ref 6–8.4)
RBC # BLD AUTO: 5.06 M/UL (ref 4.6–6.2)
SODIUM SERPL-SCNC: 140 MMOL/L (ref 136–145)
WBC # BLD AUTO: 5.28 K/UL (ref 3.9–12.7)

## 2020-04-01 PROCEDURE — 85025 COMPLETE CBC W/AUTO DIFF WBC: CPT

## 2020-04-01 PROCEDURE — 36415 COLL VENOUS BLD VENIPUNCTURE: CPT

## 2020-04-01 PROCEDURE — 80053 COMPREHEN METABOLIC PANEL: CPT

## 2020-04-01 NOTE — PROGRESS NOTES
[facility] Hematology/Oncology  PROGRESS NOTE -  Follow-up Visit      Subjective:       Patient ID:   NAME: Sivakumar Thacker : 1955     65 y.o. male    Referring Doc: [facility] (New PCP in [place])  Other Physicians: Vinod Chen    Chief Complaint:  NHL f/u    History of Present Illness:     Patient returns today for a regularly scheduled follow-up visit.  The patient is here today to go over the results of the recently ordered labs, tests and studies. . He is doing well with the rituximab infusions. He denies any CP, SOB, HA's or N/V. He is here by himself.     I discussed covid19 precautions        ROS:   GEN: normal without any fever, night sweats or weight loss  HEENT: normal with no HA's, sore throat, stiff neck, changes in vision; sinus issues resolved  CV: normal with no CP, SOB, PND, VASQUEZ or orthopnea  PULM: normal with no SOB, cough, hemoptysis, sputum or pleuritic pain  GI: normal with no abdominal pain, nausea, vomiting, constipation, diarrhea, melanotic stools, BRBPR, or hematemesis  : normal with no hematuria, dysuria  BREAST: normal with no mass, discharge, pain  SKIN: normal with no rash, erythema, bruising, or swelling    Allergies:  Review of patient's allergies indicates:  No Known Allergies    Medications:    Current Outpatient Medications:     albuterol (PROVENTIL HFA) 90 mcg/actuation inhaler, Inhale 2 puffs into the lungs every 6 (six) hours as needed for Wheezing. Rescue, Disp: 18 g, Rfl: 0    amLODIPine (NORVASC) 10 MG tablet, Take 1 tablet (10 mg total) by mouth once daily., Disp: 90 tablet, Rfl: 3    aspirin (ECOTRIN) 81 MG EC tablet, Take 81 mg by mouth once daily., Disp: , Rfl:     atorvastatin (LIPITOR) 20 MG tablet, Take 20 mg by mouth once daily., Disp: , Rfl:     ciprofloxacin HCl (CIPRO) 500 MG tablet, Take 1 tablet (500 mg total) by mouth every 12 (twelve) hours., Disp: 20 tablet, Rfl: 0    doxycycline (VIBRA-TABS) 100 MG tablet, Take 1 tablet (100 mg total) by mouth 2  (two) times daily. for 14 days, Disp: 28 tablet, Rfl: 0    fluticasone propionate (FLONASE) 50 mcg/actuation nasal spray, 1 spray (50 mcg total) by Each Nostril route 2 (two) times daily., Disp: 15.8 mL, Rfl: 2    levoFLOXacin (LEVAQUIN) 500 MG tablet, Take 1 tablet (500 mg total) by mouth once daily., Disp: 5 tablet, Rfl: 0    losartan (COZAAR) 100 MG tablet, Take 100 mg by mouth once daily., Disp: , Rfl:     metFORMIN (GLUCOPHAGE) 500 MG tablet, metformin 500 mg tablet  Take 2 tablets twice a day by oral route with meals for 90 days., Disp: , Rfl:     metroNIDAZOLE (FLAGYL) 500 MG tablet, Take 1 tablet (500 mg total) by mouth every 8 (eight) hours., Disp: 30 tablet, Rfl: 0    montelukast (SINGULAIR) 10 mg tablet, Take 10 mg by mouth nightly as needed., Disp: , Rfl:     MULTIVITAMIN ORAL, Take 1 tablet by mouth once daily., Disp: , Rfl:     naproxen (NAPROSYN) 500 MG tablet, Take 500 mg by mouth 2 (two) times daily as needed., Disp: , Rfl:     omeprazole (PRILOSEC) 40 MG capsule, Take 40 mg by mouth once daily., Disp: , Rfl:     promethazine-codeine 6.25-10 mg/5 ml (PHENERGAN WITH CODEINE) 6.25-10 mg/5 mL syrup, Take 5 mLs by mouth every 4 (four) hours as needed for Cough., Disp: 240 mL, Rfl: 1    tizanidine (ZANAFLEX) 4 MG tablet, Take 4 mg by mouth daily as needed., Disp: , Rfl:   No current facility-administered medications for this visit.     Facility-Administered Medications Ordered in Other Visits:     diphenhydramine (BENADRYL) 50 mg in NS 50 mL IVPB, 50 mg, Intravenous, 1 time in Clinic/HOD, Jefferson Ibarra MD, Last Rate: 150 mL/hr at 04/02/20 0823, 50 mg at 04/02/20 0823    heparin, porcine (PF) 100 unit/mL injection flush 500 Units, 500 Units, Intravenous, PRN, Jefferson Ibarra MD    meperidine (PF) injection 25 mg, 25 mg, Intravenous, PRN, Jefferson Ibarra MD    riTUXimab (RITUXAN) 375 mg/m2 = 821 mg in sodium chloride 0.9% 821 mL infusion (conc: 1 mg/mL), 375 mg/m2 (Treatment  Plan Recorded), Intravenous, 1 time in Clinic/HOD, Jefferson Ibarra MD    sodium chloride 0.9% 250 mL flush bag, , Intravenous, 1 time in Clinic/HOD, Jefferson Ibarra MD    sodium chloride 0.9% flush 10 mL, 10 mL, Intravenous, PRN, Jefferson Ibarra MD    PMHx/PSHx Updates:  See patient's last visit with me on 2/6/2020  See H&P on 1/8/2019        Pathology:  Cancer Staging  No matching staging information was found for the patient.          Objective:     Vitals:  Blood pressure (!) 150/91, pulse 80, temperature 98.4 °F (36.9 °C), temperature source Oral, resp. rate 18, weight 94.2 kg (207 lb 10.8 oz).    Physical Examination:   GEN: no apparent distress, comfortable; AAOx3  HEAD: atraumatic and normocephalic  EYES: no pallor, no icterus, PERRLA  ENT: OMM, no pharyngeal erythema, external ears WNL; no nasal discharge; no thrush  NECK: no masses, thyroid normal, trachea midline, no LAD/LN's, supple  CV: RRR with no murmur; normal pulse; normal S1 and S2; no pedal edema  CHEST: Normal respiratory effort; CTAB; normal breath sounds; no current wheeze  ABDOM: nontender and nondistended; soft; normal bowel sounds; no rebound/guarding  MUSC/Skeletal: ROM normal; no crepitus; joints normal; no deformities or arthropathy  EXTREM: no clubbing, cyanosis, inflammation or swelling  SKIN: no rashes, lesions, ulcers, petechiae or subcutaneous nodules  : no gibbs  NEURO: grossly intact; motor/sensory WNL; AAOx3; no tremors  PSYCH: normal mood, affect and behavior  LYMPH: normal cervical, supraclavicular, axillary and groin LN's            Labs:     4/1/2020  Lab Results   Component Value Date    WBC 5.28 04/01/2020    HGB 14.9 04/01/2020    HCT 44.9 04/01/2020    MCV 89 04/01/2020     04/01/2020     CMP  Sodium   Date Value Ref Range Status   04/01/2020 140 136 - 145 mmol/L Final   04/25/2019 144 134 - 144 mmol/L      Potassium   Date Value Ref Range Status   04/01/2020 3.8 3.5 - 5.1 mmol/L Final     Chloride    Date Value Ref Range Status   04/01/2020 104 95 - 110 mmol/L Final   04/25/2019 107 98 - 110 mmol/L      CO2   Date Value Ref Range Status   04/01/2020 25 23 - 29 mmol/L Final     Glucose   Date Value Ref Range Status   04/01/2020 193 (H) 70 - 110 mg/dL Final   04/25/2019 177 (H) 70 - 99 mg/dL      BUN, Bld   Date Value Ref Range Status   04/01/2020 18 8 - 23 mg/dL Final     Creatinine   Date Value Ref Range Status   04/01/2020 0.7 0.5 - 1.4 mg/dL Final   04/25/2019 0.83 0.60 - 1.40 mg/dL      Calcium   Date Value Ref Range Status   04/01/2020 9.2 8.7 - 10.5 mg/dL Final     Total Protein   Date Value Ref Range Status   04/01/2020 7.0 6.0 - 8.4 g/dL Final     Albumin   Date Value Ref Range Status   04/01/2020 4.3 3.5 - 5.2 g/dL Final   04/25/2019 4.4 3.1 - 4.7 g/dL      Total Bilirubin   Date Value Ref Range Status   04/01/2020 0.7 0.1 - 1.0 mg/dL Final     Comment:     For infants and newborns, interpretation of results should be based  on gestational age, weight and in agreement with clinical  observations.  Premature Infant recommended reference ranges:  Up to 24 hours.............<8.0 mg/dL  Up to 48 hours............<12.0 mg/dL  3-5 days..................<15.0 mg/dL  6-29 days.................<15.0 mg/dL       Alkaline Phosphatase   Date Value Ref Range Status   04/01/2020 87 55 - 135 U/L Final     AST   Date Value Ref Range Status   04/01/2020 39 10 - 40 U/L Final     ALT   Date Value Ref Range Status   04/01/2020 29 10 - 44 U/L Final     Anion Gap   Date Value Ref Range Status   04/01/2020 11 8 - 16 mmol/L Final     eGFR if    Date Value Ref Range Status   04/01/2020 >60.0 >60 mL/min/1.73 m^2 Final     eGFR if non    Date Value Ref Range Status   04/01/2020 >60.0 >60 mL/min/1.73 m^2 Final     Comment:     Calculation used to obtain the estimated glomerular filtration  rate (eGFR) is the CKD-EPI equation.            Radiology/Diagnostic Studies:    CXR  1/31/2020    Impression        1. No acute chest disease.  2. Left subclavian Port-A-Cath.               PET 9/11/2019   Impression       No evidence of FDG avid residual or recurrent lymphoma       I have reviewed all available lab results and radiology reports.    Assessment/Plan:   (1) 65 y.o. male with diagnosis of NHL Follicular Stage IIIA who has been referred by Dr Gary Chen with [facility] for continuation of care by medical hematology/oncology.   - He originally was diagnosed in 2012 and had parotid excision at [facility]. He subsequently went to [facility] and was treated with bendamustine-rituximab. He has been on rituximab maintenance every 8 weeks and IV IgG monthly. Dr Chen's plan was to keep him on maintenace therapy for as long as he could tolerate the treatments  - s/p 6 cycles of Bendamustine and Retuximab with subsequent complete remission  - 1st maintenance cycle rituximab was in March 2016  - he has been on maintenance rituximab and IV IgG - last rituximab 11/15/2018; last IV IgG on Dec 14th 2018  - last PET was on 9/26/2018 with no evidence of recurrence  - originally diagnosed in Dec 2012 with left parotid and left periparotid LN excision on 12/11/2012  - PET scan done on  9/11/2019 was good     (2) HTN     (3) Neuropathy     (4) GERD     (5) DM - on metformin per Dr Nunez     (6) Hypogammaglobulinemia - on Iv IgG monthly     (7) Steatosis of liver     (8) Degenerative disc disease of back     (9) Diverticular disease           VISIT DIAGNOSES:      Follicular lymphoma grade IIIa, unspecified body region    Encounter for antineoplastic chemotherapy          PLAN:  1. Continue his rituximab every 8 weeks as long as he is tolerating the therapy as per Dr Chen's prior recommendations and directives    2. continue IV IgG monthly   3. Check labs every 8 weeks  4. F/u with PCP  5. Repeat PEt in Sept 2020        RTC in 8 weeks   Fax note to [facility];          Discussion:       I spent over 25 mins of time with the  patient. Reviewed results of the recently ordered labs, tests and studies; made directives with regards to the results. Over half of this time was spent couseling and coordinating care.    I have explained all of the above in detail and the patient understands all of the current recommendation(s). I have answered all of their questions to the best of my ability and to their complete satisfaction.   The patient is to continue with the current management plan.            Electronically signed by Jefferson Ibarra MD

## 2020-04-02 ENCOUNTER — OFFICE VISIT (OUTPATIENT)
Dept: HEMATOLOGY/ONCOLOGY | Facility: CLINIC | Age: 65
End: 2020-04-02
Payer: OTHER GOVERNMENT

## 2020-04-02 ENCOUNTER — INFUSION (OUTPATIENT)
Dept: INFUSION THERAPY | Facility: HOSPITAL | Age: 65
End: 2020-04-02
Attending: INTERNAL MEDICINE
Payer: OTHER GOVERNMENT

## 2020-04-02 VITALS
BODY MASS INDEX: 31.58 KG/M2 | SYSTOLIC BLOOD PRESSURE: 150 MMHG | WEIGHT: 207.69 LBS | TEMPERATURE: 98 F | DIASTOLIC BLOOD PRESSURE: 91 MMHG | RESPIRATION RATE: 18 BRPM | HEART RATE: 80 BPM

## 2020-04-02 VITALS
OXYGEN SATURATION: 97 % | HEART RATE: 72 BPM | HEIGHT: 68 IN | WEIGHT: 207.63 LBS | SYSTOLIC BLOOD PRESSURE: 149 MMHG | BODY MASS INDEX: 31.47 KG/M2 | TEMPERATURE: 98 F | DIASTOLIC BLOOD PRESSURE: 88 MMHG | RESPIRATION RATE: 18 BRPM

## 2020-04-02 DIAGNOSIS — C82.30 FOLLICULAR LYMPHOMA GRADE IIIA, UNSPECIFIED BODY REGION: ICD-10-CM

## 2020-04-02 DIAGNOSIS — D80.1 HYPOGAMMAGLOBULINEMIA: Primary | ICD-10-CM

## 2020-04-02 DIAGNOSIS — Z51.11 ENCOUNTER FOR ANTINEOPLASTIC CHEMOTHERAPY: ICD-10-CM

## 2020-04-02 DIAGNOSIS — C82.30 FOLLICULAR LYMPHOMA GRADE IIIA, UNSPECIFIED BODY REGION: Primary | ICD-10-CM

## 2020-04-02 PROCEDURE — 25000003 PHARM REV CODE 250: Performed by: INTERNAL MEDICINE

## 2020-04-02 PROCEDURE — 63600175 PHARM REV CODE 636 W HCPCS: Performed by: INTERNAL MEDICINE

## 2020-04-02 PROCEDURE — 96367 TX/PROPH/DG ADDL SEQ IV INF: CPT

## 2020-04-02 PROCEDURE — 99214 OFFICE O/P EST MOD 30 MIN: CPT | Mod: S$GLB,,, | Performed by: INTERNAL MEDICINE

## 2020-04-02 PROCEDURE — 96375 TX/PRO/DX INJ NEW DRUG ADDON: CPT

## 2020-04-02 PROCEDURE — 99214 PR OFFICE/OUTPT VISIT, EST, LEVL IV, 30-39 MIN: ICD-10-PCS | Mod: S$GLB,,, | Performed by: INTERNAL MEDICINE

## 2020-04-02 PROCEDURE — 96413 CHEMO IV INFUSION 1 HR: CPT

## 2020-04-02 PROCEDURE — 96415 CHEMO IV INFUSION ADDL HR: CPT

## 2020-04-02 PROCEDURE — A4216 STERILE WATER/SALINE, 10 ML: HCPCS | Performed by: INTERNAL MEDICINE

## 2020-04-02 PROCEDURE — S0028 INJECTION, FAMOTIDINE, 20 MG: HCPCS | Performed by: INTERNAL MEDICINE

## 2020-04-02 RX ORDER — ACETAMINOPHEN 325 MG/1
650 TABLET ORAL
Status: COMPLETED | OUTPATIENT
Start: 2020-04-02 | End: 2020-04-02

## 2020-04-02 RX ORDER — FAMOTIDINE 10 MG/ML
20 INJECTION INTRAVENOUS
Status: COMPLETED | OUTPATIENT
Start: 2020-04-02 | End: 2020-04-02

## 2020-04-02 RX ORDER — MEPERIDINE HYDROCHLORIDE 25 MG/ML
25 INJECTION INTRAMUSCULAR; INTRAVENOUS; SUBCUTANEOUS
Status: DISCONTINUED | OUTPATIENT
Start: 2020-04-02 | End: 2020-04-02 | Stop reason: HOSPADM

## 2020-04-02 RX ORDER — HEPARIN 100 UNIT/ML
500 SYRINGE INTRAVENOUS
Status: DISCONTINUED | OUTPATIENT
Start: 2020-04-02 | End: 2020-04-02 | Stop reason: HOSPADM

## 2020-04-02 RX ORDER — SODIUM CHLORIDE 0.9 % (FLUSH) 0.9 %
10 SYRINGE (ML) INJECTION
Status: DISCONTINUED | OUTPATIENT
Start: 2020-04-02 | End: 2020-04-02 | Stop reason: HOSPADM

## 2020-04-02 RX ADMIN — ACETAMINOPHEN 650 MG: 325 TABLET ORAL at 08:04

## 2020-04-02 RX ADMIN — RITUXIMAB 821 MG: 10 INJECTION, SOLUTION INTRAVENOUS at 08:04

## 2020-04-02 RX ADMIN — FAMOTIDINE 20 MG: 10 INJECTION, SOLUTION INTRAVENOUS at 08:04

## 2020-04-02 RX ADMIN — HEPARIN 500 UNITS: 100 SYRINGE at 11:04

## 2020-04-02 RX ADMIN — SODIUM CHLORIDE, PRESERVATIVE FREE 10 ML: 5 INJECTION INTRAVENOUS at 11:04

## 2020-04-02 RX ADMIN — DIPHENHYDRAMINE HYDROCHLORIDE 50 MG: 50 INJECTION INTRAMUSCULAR; INTRAVENOUS at 08:04

## 2020-04-02 NOTE — PLAN OF CARE
Problem: Fatigue  Goal: Improved Activity Tolerance  Outcome: Ongoing, Progressing  Intervention: Promote Energy Conservation  Flowsheets (Taken 4/2/2020 5774)  Fatigue Management: frequent rest breaks encouraged; paced activity encouraged  Activity Management: activity encouraged

## 2020-04-03 ENCOUNTER — INFUSION (OUTPATIENT)
Dept: INFUSION THERAPY | Facility: HOSPITAL | Age: 65
End: 2020-04-03
Attending: INTERNAL MEDICINE
Payer: OTHER GOVERNMENT

## 2020-04-03 VITALS
BODY MASS INDEX: 31.45 KG/M2 | OXYGEN SATURATION: 97 % | HEIGHT: 68 IN | RESPIRATION RATE: 18 BRPM | HEART RATE: 79 BPM | WEIGHT: 207.5 LBS | TEMPERATURE: 99 F | DIASTOLIC BLOOD PRESSURE: 80 MMHG | SYSTOLIC BLOOD PRESSURE: 149 MMHG

## 2020-04-03 DIAGNOSIS — D80.1 NONFAMILIAL HYPOGAMMAGLOBULINEMIA: ICD-10-CM

## 2020-04-03 DIAGNOSIS — D80.1 HYPOGAMMAGLOBULINEMIA: Primary | ICD-10-CM

## 2020-04-03 DIAGNOSIS — C82.30 FOLLICULAR LYMPHOMA GRADE IIIA, UNSPECIFIED BODY REGION: ICD-10-CM

## 2020-04-03 PROCEDURE — A4216 STERILE WATER/SALINE, 10 ML: HCPCS | Performed by: INTERNAL MEDICINE

## 2020-04-03 PROCEDURE — 96415 CHEMO IV INFUSION ADDL HR: CPT

## 2020-04-03 PROCEDURE — 63600175 PHARM REV CODE 636 W HCPCS: Performed by: INTERNAL MEDICINE

## 2020-04-03 PROCEDURE — 96413 CHEMO IV INFUSION 1 HR: CPT

## 2020-04-03 PROCEDURE — 96367 TX/PROPH/DG ADDL SEQ IV INF: CPT

## 2020-04-03 PROCEDURE — 25000003 PHARM REV CODE 250: Performed by: INTERNAL MEDICINE

## 2020-04-03 RX ORDER — DIPHENHYDRAMINE HCL 25 MG
25 CAPSULE ORAL
Status: CANCELLED | OUTPATIENT
Start: 2020-05-01

## 2020-04-03 RX ORDER — ACETAMINOPHEN 500 MG
500 TABLET ORAL
Status: CANCELLED | OUTPATIENT
Start: 2020-05-01

## 2020-04-03 RX ORDER — HEPARIN 100 UNIT/ML
500 SYRINGE INTRAVENOUS
Status: DISCONTINUED | OUTPATIENT
Start: 2020-04-03 | End: 2020-04-03 | Stop reason: HOSPADM

## 2020-04-03 RX ORDER — SODIUM CHLORIDE 0.9 % (FLUSH) 0.9 %
10 SYRINGE (ML) INJECTION
Status: DISCONTINUED | OUTPATIENT
Start: 2020-04-03 | End: 2020-04-03 | Stop reason: HOSPADM

## 2020-04-03 RX ORDER — ACETAMINOPHEN 500 MG
500 TABLET ORAL
Status: DISCONTINUED | OUTPATIENT
Start: 2020-04-03 | End: 2020-04-03 | Stop reason: HOSPADM

## 2020-04-03 RX ORDER — HEPARIN 100 UNIT/ML
500 SYRINGE INTRAVENOUS
Status: CANCELLED | OUTPATIENT
Start: 2020-05-01

## 2020-04-03 RX ORDER — SODIUM CHLORIDE 0.9 % (FLUSH) 0.9 %
10 SYRINGE (ML) INJECTION
Status: CANCELLED | OUTPATIENT
Start: 2020-05-01

## 2020-04-03 RX ADMIN — ACETAMINOPHEN 500 MG: 500 TABLET ORAL at 08:04

## 2020-04-03 RX ADMIN — HEPARIN 500 UNITS: 100 SYRINGE at 10:04

## 2020-04-03 RX ADMIN — DIPHENHYDRAMINE HYDROCHLORIDE 25 MG: 50 INJECTION INTRAMUSCULAR; INTRAVENOUS at 08:04

## 2020-04-03 RX ADMIN — SODIUM CHLORIDE, PRESERVATIVE FREE 10 ML: 5 INJECTION INTRAVENOUS at 10:04

## 2020-04-03 RX ADMIN — IMMUNE GLOBULIN (HUMAN) 30 G: 10 INJECTION INTRAVENOUS; SUBCUTANEOUS at 08:04

## 2020-04-03 NOTE — PLAN OF CARE
Problem: Fatigue  Goal: Improved Activity Tolerance  Outcome: Ongoing, Progressing  Intervention: Promote Energy Conservation  Flowsheets (Taken 4/3/2020 8740)  Fatigue Management: frequent rest breaks encouraged; paced activity encouraged  Activity Management: activity encouraged

## 2020-04-09 ENCOUNTER — PATIENT MESSAGE (OUTPATIENT)
Dept: FAMILY MEDICINE | Facility: CLINIC | Age: 65
End: 2020-04-09

## 2020-04-09 ENCOUNTER — TELEPHONE (OUTPATIENT)
Dept: FAMILY MEDICINE | Facility: CLINIC | Age: 65
End: 2020-04-09

## 2020-04-09 NOTE — TELEPHONE ENCOUNTER
----- Message from Vivienne Basilio sent at 4/9/2020 11:05 AM CDT -----  Contact: Patient  Type: Needs Medical Advice  Who Called:  Patient  Best Call Back Number:   Additional Information: Patient advised he has been waiting to speak with someone about his referral to Dr Veliz since 3/31. Would like to speak to the nurse.

## 2020-04-09 NOTE — TELEPHONE ENCOUNTER
Called patient back.  He stated that he called [facility] and [facility] states they are missing information from us in order to process the referral.  Advised patient that I would resend with OV notes.  Patient verbalized understanding.

## 2020-04-10 ENCOUNTER — PATIENT MESSAGE (OUTPATIENT)
Dept: FAMILY MEDICINE | Facility: CLINIC | Age: 65
End: 2020-04-10

## 2020-04-14 ENCOUNTER — PATIENT MESSAGE (OUTPATIENT)
Dept: FAMILY MEDICINE | Facility: CLINIC | Age: 65
End: 2020-04-14

## 2020-04-17 ENCOUNTER — TELEPHONE (OUTPATIENT)
Dept: PULMONOLOGY | Facility: CLINIC | Age: 65
End: 2020-04-17

## 2020-04-17 NOTE — TELEPHONE ENCOUNTER
Gave appt for 4/20/2020 with Lucia Georges NP at 9:00am.       ----- Message from Narda Barboza MA sent at 4/17/2020 10:04 AM CDT -----  Contact: patient  Name of Caller Sivakumar   Reason for Visit/Symptoms infection  Best Contact Number or Confirm if Mychart Preferred   Preferred Date/Time of Appointment asap   Interested in Virtual Visit (yes/no)  Additional Information also wants to check on referral

## 2020-04-20 ENCOUNTER — LAB VISIT (OUTPATIENT)
Dept: LAB | Facility: HOSPITAL | Age: 65
End: 2020-04-20
Attending: NURSE PRACTITIONER
Payer: OTHER GOVERNMENT

## 2020-04-20 ENCOUNTER — OFFICE VISIT (OUTPATIENT)
Dept: PULMONOLOGY | Facility: CLINIC | Age: 65
End: 2020-04-20
Payer: OTHER GOVERNMENT

## 2020-04-20 VITALS
SYSTOLIC BLOOD PRESSURE: 149 MMHG | DIASTOLIC BLOOD PRESSURE: 78 MMHG | WEIGHT: 207.88 LBS | HEART RATE: 80 BPM | BODY MASS INDEX: 31.61 KG/M2 | TEMPERATURE: 100 F | OXYGEN SATURATION: 98 %

## 2020-04-20 DIAGNOSIS — J47.0 BRONCHIECTASIS WITH ACUTE LOWER RESPIRATORY INFECTION: Primary | ICD-10-CM

## 2020-04-20 DIAGNOSIS — R05.3 CHRONIC COUGH: ICD-10-CM

## 2020-04-20 DIAGNOSIS — J47.0 BRONCHIECTASIS WITH ACUTE LOWER RESPIRATORY INFECTION: ICD-10-CM

## 2020-04-20 DIAGNOSIS — J44.9 CHRONIC OBSTRUCTIVE PULMONARY DISEASE, UNSPECIFIED COPD TYPE: ICD-10-CM

## 2020-04-20 PROCEDURE — 99999 PR PBB SHADOW E&M-EST. PATIENT-LVL III: CPT | Mod: PBBFAC,,, | Performed by: NURSE PRACTITIONER

## 2020-04-20 PROCEDURE — 99999 PR PBB SHADOW E&M-EST. PATIENT-LVL III: ICD-10-PCS | Mod: PBBFAC,,, | Performed by: NURSE PRACTITIONER

## 2020-04-20 PROCEDURE — 99205 PR OFFICE/OUTPT VISIT, NEW, LEVL V, 60-74 MIN: ICD-10-PCS | Mod: S$PBB,,, | Performed by: NURSE PRACTITIONER

## 2020-04-20 PROCEDURE — 99205 OFFICE O/P NEW HI 60 MIN: CPT | Mod: S$PBB,,, | Performed by: NURSE PRACTITIONER

## 2020-04-20 PROCEDURE — 87070 CULTURE OTHR SPECIMN AEROBIC: CPT

## 2020-04-20 PROCEDURE — 99213 OFFICE O/P EST LOW 20 MIN: CPT | Mod: PBBFAC,PO | Performed by: NURSE PRACTITIONER

## 2020-04-20 PROCEDURE — 87205 SMEAR GRAM STAIN: CPT

## 2020-04-20 RX ORDER — PROMETHAZINE HYDROCHLORIDE AND CODEINE PHOSPHATE 6.25; 1 MG/5ML; MG/5ML
5 SOLUTION ORAL EVERY 4 HOURS PRN
Qty: 210 ML | Refills: 0 | Status: SHIPPED | OUTPATIENT
Start: 2020-04-20 | End: 2020-04-27

## 2020-04-20 RX ORDER — GUAIFENESIN 600 MG/1
1200 TABLET, EXTENDED RELEASE ORAL 2 TIMES DAILY
Qty: 120 TABLET | Refills: 2 | Status: SHIPPED | OUTPATIENT
Start: 2020-04-20 | End: 2020-05-20

## 2020-04-20 RX ORDER — IPRATROPIUM BROMIDE AND ALBUTEROL SULFATE 2.5; .5 MG/3ML; MG/3ML
3 SOLUTION RESPIRATORY (INHALATION) EVERY 6 HOURS PRN
Qty: 120 VIAL | Refills: 5 | Status: SHIPPED | OUTPATIENT
Start: 2020-04-20 | End: 2022-10-17 | Stop reason: SDUPTHER

## 2020-04-20 NOTE — PATIENT INSTRUCTIONS
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   Do breathing treatments 2 x a day followed by 10 minutes of laying face down on cough or bed with 2-3 pillows under stomach. Allow gravity to pull mucous out of lower airways and spit mucous into trash can  Have someone beat on upper back or use hand held massager on back if able to help dislodge mucous    Start medication Trelegy 1 puff once a day every day, rinse mouth after using due to risk for thrush if mouth or tongue has white sores contact clinic to evaluate for COPD    Lung function test when hospital starts doing outpatient test again

## 2020-04-20 NOTE — PROGRESS NOTES
4/20/2020    Sivakumar Thacker  New Patient Consult    Chief Complaint   Patient presents with    Cough       HPI: 4/20/2020- Chronic cough onset 2013, followed by pulmonologist Dr. Case in [place]. Put Advair with no benefit. Had negative bronchoscopy and negative AFB, had appointment with Dr. Brock in 2014. Hx of non Hodgkins lymphoma followed by Dr. Ibarra on Rotuxin and immune therapy.  Cough Worsened in Jan 2020, associated with recurrent fever improves with tylenol, states cough improves with antibiotics but returns when finished medications. Productive occasionally, can feel something rattling in chest, thick white mucous. 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 2 pack year  Family Hx: no Lung Cancer, no COPD, no Asthma  Medical Hx: no previous pneumonia ; no previous shoulder/chest surgery        The chief compliant  problem is new to me  PFSH:  Past Medical History:   Diagnosis Date    Cancer     lymphoma currently on chemo    Diabetes mellitus, type 2     Encounter for antineoplastic chemotherapy 4/1/2020    Hypertension     Neuropathy     Reflux esophagitis          Past Surgical History:   Procedure Laterality Date    COLONOSCOPY  2018    HAND SURGERY      amputation left index finger    PORTACATH PLACEMENT      SPINE SURGERY       Social History     Tobacco Use    Smoking status: Former Smoker     Packs/day: 0.50     Years: 2.00     Pack years: 1.00    Smokeless tobacco: Never Used   Substance Use Topics    Alcohol use: Yes     Comment: rarely    Drug use: No     Family History   Problem Relation Age of Onset    Diabetes Father      Review of patient's allergies indicates:  No Known Allergies  I have  reviewed past medical, family, and social history. I have reviewed previous nurse notes.    Performance Status:The patient's activity level is no limits with regular activity.          Review of Systems   Constitutional: Negative for activity change, appetite change, chills, and unexpected weight change. Positive for fever, fatigue, diaphoresis,   HENT: Negative for dental problem, sinus pressure, sinus pain, sneezing, sore throat, trouble swallowing and voice change.  Positive sinus drainage, post nasal drip,   Respiratory: Negative for apnea, chest tightness, shortness of breath, wheezing and stridor.  Positive for cough  Cardiovascular: Negative for chest pain, palpitations and leg swelling.   Gastrointestinal: Negative for abdominal distention, abdominal pain, constipation and nausea.   Musculoskeletal: Negative for gait problem, myalgias and neck pain.   Skin: Negative for color change and pallor.   Allergic/Immunologic: Negative for environmental allergies and food allergies.   Neurological: Negative for dizziness, speech difficulty, weakness, light-headedness, numbness and headaches.   Hematological: Negative for adenopathy. Does not bruise/bleed easily.   Psychiatric/Behavioral: Negative for dysphoric mood and sleep disturbance. The patient is not nervous/anxious.           Exam:Comprehensive exam done. BP (!) 149/78 (BP Location: Left arm, Patient Position: Sitting)   Pulse 80   Temp 99.6 °F (37.6 °C)   Wt 94.3 kg (207 lb 14.3 oz)   SpO2 98% Comment: on room air at rest  BMI 31.61 kg/m²   Exam included Vitals as listed  Constitutional: He is oriented to person, place, and time. He appears well-developed. No distress.   Nose: Nose normal.   Mouth/Throat: Uvula is midline, oropharynx is clear and moist and mucous membranes are normal. No dental caries. No oropharyngeal exudate, posterior oropharyngeal edema, posterior oropharyngeal erythema or tonsillar abscesses.  Mallapatti (M) score 2  Eyes: Pupils  are equal, round, and reactive to light.   Neck: No JVD present. No thyromegaly present.   Cardiovascular: Normal rate, regular rhythm and normal heart sounds. Exam reveals no gallop and no friction rub.   No murmur heard.  Pulmonary/Chest: Effort normal and breath sounds normal. No accessory muscle usage or stridor. No apnea and no tachypnea. No respiratory distress, decreased breath sounds, wheezes, rhonchi, rales, or tenderness.   Abdominal: Soft. He exhibits no mass. There is no tenderness. No hepatosplenomegaly, hernias and normoactive bowel sounds  Musculoskeletal: Normal range of motion. exhibits no edema.   Lymphadenopathy:     He has no cervical adenopathy.     He has no axillary adenopathy.   Neurological:  alert and oriented to person, place, and time. not disoriented.   Skin: Skin is warm and dry. Capillary refill takes less 2 sec. No cyanosis or erythema. No pallor. Nails show no clubbing.   Psychiatric: normal mood and affect. behavior is normal. Judgment and thought content normal.       Radiographs (ct chest and cxr) reviewed: view by direct vision   X-Ray Chest PA And Lateral 03/19/2020   No acute cardiopulmonary abnormality appreciated radiographically.  No interval detrimental change in the radiographic appearance of the chest when compared to the previous study.     CT Chest Without 03/19/2020   1. Mild bronchiectasis in the left lower lobe and tree-in-bud micro nodules at the left lung base suggesting underlying bronchiolitis, possibly due to a non tuberculous mycobacterial infection or viral bronchiolitis.  2. Fatty infiltration of the liver  3. Small hiatal hernia.     NM PET CT Routine Skull to Mid Thigh 09/11/2019   No evidence of FDG avid residual or recurrent lymphoma         Labs reviewed       Lab Results   Component Value Date    WBC 5.28 04/01/2020    RBC 5.06 04/01/2020    HGB 14.9 04/01/2020    HCT 44.9 04/01/2020    MCV 89 04/01/2020    MCH 29.4 04/01/2020    MCHC 33.2 04/01/2020     RDW 12.9 04/01/2020     04/01/2020    MPV 9.2 04/01/2020    GRAN 2.6 04/01/2020    GRAN 49.0 04/01/2020    LYMPH 1.2 04/01/2020    LYMPH 22.9 04/01/2020    MONO 0.7 04/01/2020    MONO 13.8 04/01/2020    EOS 0.6 (H) 04/01/2020    BASO 0.05 04/01/2020    EOSINOPHIL 10.6 (H) 04/01/2020    BASOPHIL 0.9 04/01/2020   Results for CHRIS GUTIERREZ (MRN [id number]) as of 4/20/2020 09:15   Ref. Range 4/1/2020 15:45   Albumin Latest Ref Range: 3.5 - 5.2 g/dL 4.3   BILIRUBIN TOTAL Latest Ref Range: 0.1 - 1.0 mg/dL 0.7   AST Latest Ref Range: 10 - 40 U/L 39       PFT will be done and results to be reviewed  Pulmonary Functions Testing Results:    7/17/2013 PFT Data: Complete pulmonary function studies were obtained today and are independently reviewed. Spirometry shows no evidence of airflow obstruction today. Lung was performed by plethysmography and are consistent with air trapping without hyperinflation. Residual volume is 122% predicted, and the total capacity is 98% predicted. Diffusion capacity for carbon monoxide is in the normal range at 102% predicted. Overall, these are fairly normal pulmonary function studies and do not suggest any obstructive lung disease.       Plan:  Clinical impression is apparently straight forward and impression with management as below.    Chris was seen today for cough.    Diagnoses and all orders for this visit:    Bronchiectasis with acute lower respiratory infection  -     albuterol-ipratropium (DUO-NEB) 2.5 mg-0.5 mg/3 mL nebulizer solution; Take 3 mLs by nebulization every 6 (six) hours as needed for Wheezing or Shortness of Breath. Rescue  -     AFB Culture & Smear; Standing  -     Culture, Respiratory with Gram Stain; Future  -     guaiFENesin (MUCINEX) 600 mg 12 hr tablet; Take 2 tablets (1,200 mg total) by mouth 2 (two) times daily.  -     Complete PFT with bronchodilator; Future  -     promethazine-codeine 6.25-10 mg/5 ml (PHENERGAN WITH CODEINE) 6.25-10 mg/5 mL syrup; Take 5  mLs by mouth every 4 (four) hours as needed for Cough.    Chronic obstructive pulmonary disease, unspecified COPD type  -     albuterol-ipratropium (DUO-NEB) 2.5 mg-0.5 mg/3 mL nebulizer solution; Take 3 mLs by nebulization every 6 (six) hours as needed for Wheezing or Shortness of Breath. Rescue  -     fluticasone-umeclidin-vilanter (TRELEGY ELLIPTA) 100-62.5-25 mcg DsDv; Inhale 1 puff into the lungs once daily.    Chronic cough  -     promethazine-codeine 6.25-10 mg/5 ml (PHENERGAN WITH CODEINE) 6.25-10 mg/5 mL syrup; Take 5 mLs by mouth every 4 (four) hours as needed for Cough.        Follow up in about 2 months (around 6/20/2020), or if symptoms worsen or fail to improve.    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   Do breathing treatments 2 x a day followed by 10 minutes of laying face down on cough or bed with 2-3 pillows under stomach. Allow gravity to pull mucous out of lower airways and spit mucous into trash can  Have someone beat on upper back or use hand held massager on back if able to help dislodge mucous    Start medication Trelegy 1 puff once a day every day, rinse mouth after using due to risk for thrush if mouth or tongue has white sores contact clinic to evaluate for COPD    Lung function test when hospital starts doing outpatient test again

## 2020-04-20 NOTE — LETTER
April 20, 2020      Barry Mcmahon MD  [address]  [place] [address]           [facility]  [address]  [place] [address]  Phone: [phone]  Fax: [phone]          Patient: Sivakumar Thacker   MR Number: [id number]   YOB: 1955   Date of Visit: 4/20/2020       Dear Dr. Barry Mcmahon:    Thank you for referring Sivakumar Thacker to me for evaluation. Attached you will find relevant portions of my assessment and plan of care.    If you have questions, please do not hesitate to call me. I look forward to following Sivakumar Thacker along with you.    Sincerely,    Lucia Georges, DAMI    Enclosure  CC:  No Recipients    If you would like to receive this communication electronically, please contact [email or web address] or [phone] to request more information on [employer] Link access.    For providers and/or their staff who would like to refer a patient to [facility], please contact us through our one-stop-shop provider referral line, [facility], at [phone].    If you feel you have received this communication in error or would no longer like to receive these types of communications, please e-mail [email or web address]

## 2020-04-23 LAB
BACTERIA SPEC AEROBE CULT: NORMAL
BACTERIA SPEC AEROBE CULT: NORMAL
GRAM STN SPEC: NORMAL

## 2020-04-27 ENCOUNTER — LAB VISIT (OUTPATIENT)
Dept: LAB | Facility: HOSPITAL | Age: 65
End: 2020-04-27
Attending: NURSE PRACTITIONER
Payer: OTHER GOVERNMENT

## 2020-04-27 DIAGNOSIS — J47.0 BRONCHIECTASIS WITH ACUTE LOWER RESPIRATORY INFECTION: ICD-10-CM

## 2020-04-27 DIAGNOSIS — Z03.818 ENCNTR FOR OBS FOR SUSP EXPSR TO OTH BIOLG AGENTS RULED OUT: Primary | ICD-10-CM

## 2020-04-27 PROCEDURE — 87015 SPECIMEN INFECT AGNT CONCNTJ: CPT

## 2020-04-27 PROCEDURE — 87206 SMEAR FLUORESCENT/ACID STAI: CPT

## 2020-04-27 PROCEDURE — 87116 MYCOBACTERIA CULTURE: CPT

## 2020-04-28 ENCOUNTER — LAB VISIT (OUTPATIENT)
Dept: INFUSION THERAPY | Facility: HOSPITAL | Age: 65
End: 2020-04-28
Attending: NURSE PRACTITIONER
Payer: OTHER GOVERNMENT

## 2020-04-28 DIAGNOSIS — Z03.818 ENCNTR FOR OBS FOR SUSP EXPSR TO OTH BIOLG AGENTS RULED OUT: ICD-10-CM

## 2020-04-28 PROCEDURE — U0002 COVID-19 LAB TEST NON-CDC: HCPCS

## 2020-04-29 LAB — SARS-COV-2 RNA RESP QL NAA+PROBE: NOT DETECTED

## 2020-05-01 ENCOUNTER — INFUSION (OUTPATIENT)
Dept: INFUSION THERAPY | Facility: HOSPITAL | Age: 65
End: 2020-05-01
Attending: INTERNAL MEDICINE
Payer: OTHER GOVERNMENT

## 2020-05-01 VITALS
BODY MASS INDEX: 31.77 KG/M2 | OXYGEN SATURATION: 98 % | DIASTOLIC BLOOD PRESSURE: 84 MMHG | RESPIRATION RATE: 18 BRPM | HEIGHT: 68 IN | HEART RATE: 70 BPM | TEMPERATURE: 98 F | SYSTOLIC BLOOD PRESSURE: 136 MMHG | WEIGHT: 209.63 LBS

## 2020-05-01 DIAGNOSIS — C82.30 FOLLICULAR LYMPHOMA GRADE IIIA, UNSPECIFIED BODY REGION: ICD-10-CM

## 2020-05-01 DIAGNOSIS — D80.1 HYPOGAMMAGLOBULINEMIA: Primary | ICD-10-CM

## 2020-05-01 DIAGNOSIS — D80.1 NONFAMILIAL HYPOGAMMAGLOBULINEMIA: ICD-10-CM

## 2020-05-01 PROCEDURE — 25000003 PHARM REV CODE 250: Performed by: INTERNAL MEDICINE

## 2020-05-01 PROCEDURE — 96367 TX/PROPH/DG ADDL SEQ IV INF: CPT

## 2020-05-01 PROCEDURE — 63600175 PHARM REV CODE 636 W HCPCS: Performed by: INTERNAL MEDICINE

## 2020-05-01 PROCEDURE — 96413 CHEMO IV INFUSION 1 HR: CPT

## 2020-05-01 PROCEDURE — 96415 CHEMO IV INFUSION ADDL HR: CPT

## 2020-05-01 RX ORDER — ACETAMINOPHEN 500 MG
500 TABLET ORAL
Status: DISCONTINUED | OUTPATIENT
Start: 2020-05-01 | End: 2020-05-01 | Stop reason: HOSPADM

## 2020-05-01 RX ORDER — ACETAMINOPHEN 500 MG
500 TABLET ORAL
Status: CANCELLED | OUTPATIENT
Start: 2020-05-29

## 2020-05-01 RX ORDER — HEPARIN 100 UNIT/ML
500 SYRINGE INTRAVENOUS
Status: DISCONTINUED | OUTPATIENT
Start: 2020-05-01 | End: 2020-05-01 | Stop reason: HOSPADM

## 2020-05-01 RX ORDER — SODIUM CHLORIDE 0.9 % (FLUSH) 0.9 %
10 SYRINGE (ML) INJECTION
Status: DISCONTINUED | OUTPATIENT
Start: 2020-05-01 | End: 2020-05-01 | Stop reason: HOSPADM

## 2020-05-01 RX ORDER — HEPARIN 100 UNIT/ML
500 SYRINGE INTRAVENOUS
Status: CANCELLED | OUTPATIENT
Start: 2020-05-29

## 2020-05-01 RX ORDER — DIPHENHYDRAMINE HCL 25 MG
25 CAPSULE ORAL
Status: CANCELLED | OUTPATIENT
Start: 2020-05-29

## 2020-05-01 RX ORDER — SODIUM CHLORIDE 0.9 % (FLUSH) 0.9 %
10 SYRINGE (ML) INJECTION
Status: CANCELLED | OUTPATIENT
Start: 2020-05-29

## 2020-05-01 RX ADMIN — HEPARIN 500 UNITS: 100 SYRINGE at 11:05

## 2020-05-01 RX ADMIN — DIPHENHYDRAMINE HYDROCHLORIDE 25 MG: 50 INJECTION INTRAMUSCULAR; INTRAVENOUS at 08:05

## 2020-05-01 RX ADMIN — IMMUNE GLOBULIN (HUMAN) 30 G: 10 INJECTION INTRAVENOUS; SUBCUTANEOUS at 08:05

## 2020-05-01 RX ADMIN — ACETAMINOPHEN 500 MG: 500 TABLET ORAL at 08:05

## 2020-05-01 NOTE — PLAN OF CARE
Problem: Neutropenia  Goal: Absence of Infection  Outcome: Ongoing, Progressing  Intervention: Prevent Infection and Maximize Resistance  Flowsheets (Taken 5/1/2020 0807)  Infection Prevention: personal protective equipment utilized; equipment surfaces disinfected; visitors restricted/screened

## 2020-05-05 ENCOUNTER — PATIENT MESSAGE (OUTPATIENT)
Dept: ADMINISTRATIVE | Facility: HOSPITAL | Age: 65
End: 2020-05-05

## 2020-05-11 ENCOUNTER — LAB VISIT (OUTPATIENT)
Dept: LAB | Facility: HOSPITAL | Age: 65
End: 2020-05-11
Attending: NURSE PRACTITIONER
Payer: OTHER GOVERNMENT

## 2020-05-11 DIAGNOSIS — J47.0 BRONCHIECTASIS WITH ACUTE LOWER RESPIRATORY INFECTION: ICD-10-CM

## 2020-05-11 PROCEDURE — 87116 MYCOBACTERIA CULTURE: CPT

## 2020-05-11 PROCEDURE — 87015 SPECIMEN INFECT AGNT CONCNTJ: CPT

## 2020-05-11 PROCEDURE — 87206 SMEAR FLUORESCENT/ACID STAI: CPT

## 2020-05-15 ENCOUNTER — PATIENT MESSAGE (OUTPATIENT)
Dept: PULMONOLOGY | Facility: CLINIC | Age: 65
End: 2020-05-15

## 2020-05-20 ENCOUNTER — PATIENT MESSAGE (OUTPATIENT)
Dept: ADMINISTRATIVE | Facility: HOSPITAL | Age: 65
End: 2020-05-20

## 2020-05-21 DIAGNOSIS — Z03.818 ENCNTR FOR OBS FOR SUSP EXPSR TO OTH BIOLG AGENTS RULED OUT: Primary | ICD-10-CM

## 2020-05-25 NOTE — PROGRESS NOTES
[facility] Hematology/Oncology  PROGRESS NOTE -  Follow-up Visit      Subjective:       Patient ID:   NAME: Sivakumar Thacker : 1955     65 y.o. male    Referring Doc: Barry Mcmahon (New PCP in [place])  Other Physicians: Vinod Chen/José Manuel    Chief Complaint:  NHL f/u    History of Present Illness:     Patient returns today for a regularly scheduled follow-up visit.  The patient is here today to go over the results of the recently ordered labs, tests and studies. . He is doing well with the rituximab infusions. He denies any CP, SOB, HA's or N/V. He is here by himself.     He has been seeing pulmonary about his chronic cough issues and they have ordered TONY studies. He saw Lucia Georges on 2020. He had Chest CT on 3/19/2020    I discussed continued Covid19 precautions        ROS:   GEN: normal without any fever, night sweats or weight loss  HEENT: normal with no HA's, sore throat, stiff neck, changes in vision; sinus issues resolved  CV: normal with no CP, SOB, PND, VASQUEZ or orthopnea  PULM: normal with no SOB,  hemoptysis, sputum or pleuritic pain; chronic cough since 2020  GI: normal with no abdominal pain, nausea, vomiting, constipation, diarrhea, melanotic stools, BRBPR, or hematemesis  : normal with no hematuria, dysuria  BREAST: normal with no mass, discharge, pain  SKIN: normal with no rash, erythema, bruising, or swelling    Allergies:  Review of patient's allergies indicates:  No Known Allergies    Medications:    Current Outpatient Medications:     albuterol (PROVENTIL HFA) 90 mcg/actuation inhaler, Inhale 2 puffs into the lungs every 6 (six) hours as needed for Wheezing. Rescue, Disp: 18 g, Rfl: 0    albuterol-ipratropium (DUO-NEB) 2.5 mg-0.5 mg/3 mL nebulizer solution, Take 3 mLs by nebulization every 6 (six) hours as needed for Wheezing or Shortness of Breath. Rescue, Disp: 120 vial, Rfl: 5    amLODIPine (NORVASC) 10 MG tablet, Take 1 tablet (10 mg total) by mouth once daily., Disp:  90 tablet, Rfl: 3    aspirin (ECOTRIN) 81 MG EC tablet, Take 81 mg by mouth once daily., Disp: , Rfl:     atorvastatin (LIPITOR) 20 MG tablet, Take 20 mg by mouth once daily., Disp: , Rfl:     ciprofloxacin HCl (CIPRO) 500 MG tablet, Take 1 tablet (500 mg total) by mouth every 12 (twelve) hours. (Patient not taking: Reported on 4/20/2020), Disp: 20 tablet, Rfl: 0    fluticasone propionate (FLONASE) 50 mcg/actuation nasal spray, 1 spray (50 mcg total) by Each Nostril route 2 (two) times daily., Disp: 15.8 mL, Rfl: 2    fluticasone-umeclidin-vilanter (TRELEGY ELLIPTA) 100-62.5-25 mcg DsDv, Inhale 1 puff into the lungs once daily., Disp: 60 each, Rfl: 5    levoFLOXacin (LEVAQUIN) 500 MG tablet, Take 1 tablet (500 mg total) by mouth once daily., Disp: 5 tablet, Rfl: 0    losartan (COZAAR) 100 MG tablet, Take 100 mg by mouth once daily., Disp: , Rfl:     metFORMIN (GLUCOPHAGE) 500 MG tablet, metformin 500 mg tablet  Take 2 tablets twice a day by oral route with meals for 90 days., Disp: , Rfl:     metroNIDAZOLE (FLAGYL) 500 MG tablet, Take 1 tablet (500 mg total) by mouth every 8 (eight) hours., Disp: 30 tablet, Rfl: 0    montelukast (SINGULAIR) 10 mg tablet, Take 10 mg by mouth nightly as needed., Disp: , Rfl:     MULTIVITAMIN ORAL, Take 1 tablet by mouth once daily., Disp: , Rfl:     naproxen (NAPROSYN) 500 MG tablet, Take 500 mg by mouth 2 (two) times daily as needed., Disp: , Rfl:     omeprazole (PRILOSEC) 40 MG capsule, Take 40 mg by mouth once daily., Disp: , Rfl:     tizanidine (ZANAFLEX) 4 MG tablet, Take 4 mg by mouth daily as needed., Disp: , Rfl:   No current facility-administered medications for this visit.     Facility-Administered Medications Ordered in Other Visits:     acetaminophen tablet 650 mg, 650 mg, Oral, 1 time in Clinic/HOD, MIRELA Woo    alteplase injection 2 mg, 2 mg, Intra-Catheter, PRN, MIRELA Woo    diphenhydramine (BENADRYL) 50 mg in NS 50 mL IVPB, 50 mg,  Intravenous, 1 time in Clinic/HOD, MIRELA Woo    famotidine (PF) injection 20 mg, 20 mg, Intravenous, 1 time in Clinic/HOD, MIRELA Woo    heparin, porcine (PF) 100 unit/mL injection flush 500 Units, 500 Units, Intravenous, PRN, MIRELA Woo    meperidine (PF) injection 25 mg, 25 mg, Intravenous, PRN, MIRELA Woo    riTUXimab (RITUXAN) 375 mg/m2 = 821 mg in sodium chloride 0.9% 821 mL infusion (conc: 1 mg/mL), 375 mg/m2 (Treatment Plan Recorded), Intravenous, 1 time in Clinic/HOD, MIRELA Woo    sodium chloride 0.9% 250 mL flush bag, , Intravenous, 1 time in Clinic/HOD, MIRELA Woo    sodium chloride 0.9% flush 10 mL, 10 mL, Intravenous, PRN, MIRELA Woo    PMHx/PSHx Updates:  See patient's last visit with me on 4/2/2020  See H&P on 1/8/2019        Pathology:  Cancer Staging  No matching staging information was found for the patient.          Objective:     Vitals:  Blood pressure 135/77, pulse 76, temperature 98.2 °F (36.8 °C), resp. rate 18, weight 95.3 kg (210 lb 1.6 oz).    Physical Examination:   GEN: no apparent distress, comfortable; AAOx3  HEAD: atraumatic and normocephalic  EYES: no pallor, no icterus, PERRLA  ENT: OMM, no pharyngeal erythema, external ears WNL; no nasal discharge; no thrush  NECK: no masses, thyroid normal, trachea midline, no LAD/LN's, supple  CV: RRR with no murmur; normal pulse; normal S1 and S2; no pedal edema  CHEST: Normal respiratory effort; CTAB; normal breath sounds; no current wheeze  ABDOM: nontender and nondistended; soft; normal bowel sounds; no rebound/guarding  MUSC/Skeletal: ROM normal; no crepitus; joints normal; no deformities or arthropathy  EXTREM: no clubbing, cyanosis, inflammation or swelling  SKIN: no rashes, lesions, ulcers, petechiae or subcutaneous nodules  : no gibbs  NEURO: grossly intact; motor/sensory WNL; AAOx3; no tremors  PSYCH: normal mood, affect and behavior  LYMPH: normal cervical,  supraclavicular, axillary and groin LN's            Labs:   5/28/2020  Pending    4/1/2020  Lab Results   Component Value Date    WBC 5.28 04/01/2020    HGB 14.9 04/01/2020    HCT 44.9 04/01/2020    MCV 89 04/01/2020     04/01/2020     CMP  Sodium   Date Value Ref Range Status   04/01/2020 140 136 - 145 mmol/L Final   04/25/2019 144 134 - 144 mmol/L      Potassium   Date Value Ref Range Status   04/01/2020 3.8 3.5 - 5.1 mmol/L Final     Chloride   Date Value Ref Range Status   04/01/2020 104 95 - 110 mmol/L Final   04/25/2019 107 98 - 110 mmol/L      CO2   Date Value Ref Range Status   04/01/2020 25 23 - 29 mmol/L Final     Glucose   Date Value Ref Range Status   04/01/2020 193 (H) 70 - 110 mg/dL Final   04/25/2019 177 (H) 70 - 99 mg/dL      BUN, Bld   Date Value Ref Range Status   04/01/2020 18 8 - 23 mg/dL Final     Creatinine   Date Value Ref Range Status   04/01/2020 0.7 0.5 - 1.4 mg/dL Final   04/25/2019 0.83 0.60 - 1.40 mg/dL      Calcium   Date Value Ref Range Status   04/01/2020 9.2 8.7 - 10.5 mg/dL Final     Total Protein   Date Value Ref Range Status   04/01/2020 7.0 6.0 - 8.4 g/dL Final     Albumin   Date Value Ref Range Status   04/01/2020 4.3 3.5 - 5.2 g/dL Final   04/25/2019 4.4 3.1 - 4.7 g/dL      Total Bilirubin   Date Value Ref Range Status   04/01/2020 0.7 0.1 - 1.0 mg/dL Final     Comment:     For infants and newborns, interpretation of results should be based  on gestational age, weight and in agreement with clinical  observations.  Premature Infant recommended reference ranges:  Up to 24 hours.............<8.0 mg/dL  Up to 48 hours............<12.0 mg/dL  3-5 days..................<15.0 mg/dL  6-29 days.................<15.0 mg/dL       Alkaline Phosphatase   Date Value Ref Range Status   04/01/2020 87 55 - 135 U/L Final     AST   Date Value Ref Range Status   04/01/2020 39 10 - 40 U/L Final     ALT   Date Value Ref Range Status   04/01/2020 29 10 - 44 U/L Final     Anion Gap   Date Value  Ref Range Status   04/01/2020 11 8 - 16 mmol/L Final     eGFR if    Date Value Ref Range Status   04/01/2020 >60.0 >60 mL/min/1.73 m^2 Final     eGFR if non    Date Value Ref Range Status   04/01/2020 >60.0 >60 mL/min/1.73 m^2 Final     Comment:     Calculation used to obtain the estimated glomerular filtration  rate (eGFR) is the CKD-EPI equation.            Radiology/Diagnostic Studies:    Chest CT  3/19/2020:      Impression       1. Mild bronchiectasis in the left lower lobe and tree-in-bud micro nodules at the left lung base suggesting underlying bronchiolitis, possibly due to a non tuberculous mycobacterial infection or viral bronchiolitis.  2. Fatty infiltration of the liver  3. Small hiatal hernia.           CXR  1/31/2020    Impression       1. No acute chest disease.  2. Left subclavian Port-A-Cath.               PET 9/11/2019   Impression       No evidence of FDG avid residual or recurrent lymphoma       I have reviewed all available lab results and radiology reports.    Assessment/Plan:   (1) 65 y.o. male with diagnosis of NHL Follicular Stage IIIA who has been referred by Dr Gary Chen with [facility] for continuation of care by medical hematology/oncology.   - He originally was diagnosed in 2012 and had parotid excision at [facility]. He subsequently went to [facility] and was treated with bendamustine-rituximab. He has been on rituximab maintenance every 8 weeks and IV IgG monthly. Dr Chen's plan was to keep him on maintenace therapy for as long as he could tolerate the treatments  - s/p 6 cycles of Bendamustine and Retuximab with subsequent complete remission  - 1st maintenance cycle rituximab was in March 2016  - he has been on maintenance rituximab and IV IgG - last rituximab 11/15/2018; last IV IgG on Dec 14th 2018  - last PET was on 9/26/2018 with no evidence of recurrence  - originally diagnosed in Dec 2012 with left parotid and left periparotid LN excision on  12/11/2012  - PET scan done on  9/11/2019 was good     (2) HTN     (3) Neuropathy     (4) GERD     (5) DM - on metformin per Dr Nunez     (6) Hypogammaglobulinemia - on Iv IgG monthly     (7) Steatosis of liver     (8) Degenerative disc disease of back     (9) Diverticular disease    (10) Mild bronchiectasis in the left lower lobe and tree-in-bud micro nodules at the left lung base suggesting underlying bronchiolitis  - seeing Lucia Georges           VISIT DIAGNOSES:      Follicular lymphoma grade IIIa, unspecified body region    Encounter for antineoplastic chemotherapy          PLAN:  1. Continue his rituximab every 8 weeks as long as he is tolerating the therapy as per Dr Chen's prior recommendations and directives    2. continue IV IgG monthly   3. Check labs every 8 weeks  4. F/u with PCP, Pulm etc  5. Repeat PET in Sept 2020        RTC in 8 weeks   Fax note to Barry Mcmahon; José Manuel Brock         Discussion:       I spent over 25 mins of time with the patient. Reviewed results of the recently ordered labs, tests and studies; made directives with regards to the results. Over half of this time was spent couseling and coordinating care.      COVID-19 Discussion:    I had long discussion with patient and any applicable family about the COVID-19 coronavirus epidemic and the recommended precautions with regard to cancer and/or hematology patients. I have re-iterated the CDC recommendations for adequate hand washing, use of hand -like products, and coughing into elbow, etc. In addition, especially for our patients who are on chemotherapy and/or our otherwise immunocompromised patients, I have recommended avoidance of crowds, including movie theaters, restaurants, churches, etc. I have recommended avoidance of any sick or symptomatic family members and/or friends. I have also recommended avoidance of any raw and unwashed food products, and general avoidance of food items that have not been prepared by  themselves. The patient has been asked to call us immediately with any symptom developments, issues, questions or other general concerns.     I have explained all of the above in detail and the patient understands all of the current recommendation(s). I have answered all of their questions to the best of my ability and to their complete satisfaction.   The patient is to continue with the current management plan.            Electronically signed by Jefferson Ibarra MD        Answers for HPI/ROS submitted by the patient on 5/26/2020   appetite change : No  unexpected weight change: No  visual disturbance: No  cough: Yes  shortness of breath: No  chest pain: No  abdominal pain: No  diarrhea: No  frequency: No  back pain: No  rash: No  headaches: No  adenopathy: No  nervous/ anxious: No

## 2020-05-26 ENCOUNTER — LAB VISIT (OUTPATIENT)
Dept: LAB | Facility: HOSPITAL | Age: 65
End: 2020-05-26
Attending: NURSE PRACTITIONER
Payer: OTHER GOVERNMENT

## 2020-05-26 ENCOUNTER — LAB VISIT (OUTPATIENT)
Dept: INFUSION THERAPY | Facility: HOSPITAL | Age: 65
End: 2020-05-26
Attending: INTERNAL MEDICINE
Payer: OTHER GOVERNMENT

## 2020-05-26 DIAGNOSIS — Z03.818 ENCNTR FOR OBS FOR SUSP EXPSR TO OTH BIOLG AGENTS RULED OUT: ICD-10-CM

## 2020-05-26 DIAGNOSIS — J47.0 BRONCHIECTASIS WITH ACUTE LOWER RESPIRATORY INFECTION: ICD-10-CM

## 2020-05-26 PROCEDURE — 87015 SPECIMEN INFECT AGNT CONCNTJ: CPT

## 2020-05-26 PROCEDURE — 87116 MYCOBACTERIA CULTURE: CPT

## 2020-05-26 PROCEDURE — U0003 INFECTIOUS AGENT DETECTION BY NUCLEIC ACID (DNA OR RNA); SEVERE ACUTE RESPIRATORY SYNDROME CORONAVIRUS 2 (SARS-COV-2) (CORONAVIRUS DISEASE [COVID-19]), AMPLIFIED PROBE TECHNIQUE, MAKING USE OF HIGH THROUGHPUT TECHNOLOGIES AS DESCRIBED BY CMS-2020-01-R: HCPCS

## 2020-05-26 PROCEDURE — 87206 SMEAR FLUORESCENT/ACID STAI: CPT

## 2020-05-27 LAB — SARS-COV-2 RNA RESP QL NAA+PROBE: NOT DETECTED

## 2020-05-27 RX ORDER — HEPARIN 100 UNIT/ML
500 SYRINGE INTRAVENOUS
Status: CANCELLED | OUTPATIENT
Start: 2020-05-28

## 2020-05-27 RX ORDER — SODIUM CHLORIDE 0.9 % (FLUSH) 0.9 %
10 SYRINGE (ML) INJECTION
Status: CANCELLED | OUTPATIENT
Start: 2020-05-28

## 2020-05-27 RX ORDER — MEPERIDINE HYDROCHLORIDE 50 MG/ML
25 INJECTION INTRAMUSCULAR; INTRAVENOUS; SUBCUTANEOUS
Status: CANCELLED | OUTPATIENT
Start: 2020-05-28

## 2020-05-27 RX ORDER — FAMOTIDINE 10 MG/ML
20 INJECTION INTRAVENOUS
Status: CANCELLED | OUTPATIENT
Start: 2020-05-28

## 2020-05-27 RX ORDER — ACETAMINOPHEN 325 MG/1
650 TABLET ORAL
Status: CANCELLED | OUTPATIENT
Start: 2020-05-28

## 2020-05-28 ENCOUNTER — OFFICE VISIT (OUTPATIENT)
Dept: HEMATOLOGY/ONCOLOGY | Facility: CLINIC | Age: 65
End: 2020-05-28
Payer: OTHER GOVERNMENT

## 2020-05-28 ENCOUNTER — PATIENT MESSAGE (OUTPATIENT)
Dept: HEMATOLOGY/ONCOLOGY | Facility: CLINIC | Age: 65
End: 2020-05-28

## 2020-05-28 ENCOUNTER — INFUSION (OUTPATIENT)
Dept: INFUSION THERAPY | Facility: HOSPITAL | Age: 65
End: 2020-05-28
Attending: INTERNAL MEDICINE
Payer: OTHER GOVERNMENT

## 2020-05-28 VITALS
HEART RATE: 76 BPM | WEIGHT: 210.13 LBS | RESPIRATION RATE: 18 BRPM | BODY MASS INDEX: 31.95 KG/M2 | TEMPERATURE: 98 F | SYSTOLIC BLOOD PRESSURE: 135 MMHG | DIASTOLIC BLOOD PRESSURE: 77 MMHG

## 2020-05-28 VITALS
TEMPERATURE: 98 F | OXYGEN SATURATION: 96 % | RESPIRATION RATE: 18 BRPM | HEIGHT: 68 IN | WEIGHT: 210.13 LBS | BODY MASS INDEX: 31.85 KG/M2 | HEART RATE: 77 BPM | SYSTOLIC BLOOD PRESSURE: 133 MMHG | DIASTOLIC BLOOD PRESSURE: 81 MMHG

## 2020-05-28 DIAGNOSIS — I10 ESSENTIAL HYPERTENSION: ICD-10-CM

## 2020-05-28 DIAGNOSIS — C82.30 FOLLICULAR LYMPHOMA GRADE IIIA, UNSPECIFIED BODY REGION: ICD-10-CM

## 2020-05-28 DIAGNOSIS — E78.41 ELEVATED LIPOPROTEIN(A): ICD-10-CM

## 2020-05-28 DIAGNOSIS — Z51.11 ENCOUNTER FOR ANTINEOPLASTIC CHEMOTHERAPY: ICD-10-CM

## 2020-05-28 DIAGNOSIS — D80.1 NONFAMILIAL HYPOGAMMAGLOBULINEMIA: ICD-10-CM

## 2020-05-28 DIAGNOSIS — D80.1 HYPOGAMMAGLOBULINEMIA: Primary | ICD-10-CM

## 2020-05-28 DIAGNOSIS — C82.30 FOLLICULAR LYMPHOMA GRADE IIIA, UNSPECIFIED BODY REGION: Primary | ICD-10-CM

## 2020-05-28 DIAGNOSIS — R73.03 PREDIABETES: ICD-10-CM

## 2020-05-28 LAB
ALBUMIN SERPL BCP-MCNC: 4.1 G/DL (ref 3.5–5.2)
ALP SERPL-CCNC: 100 U/L (ref 55–135)
ALT SERPL W/O P-5'-P-CCNC: 23 U/L (ref 10–44)
ANION GAP SERPL CALC-SCNC: 10 MMOL/L (ref 8–16)
AST SERPL-CCNC: 28 U/L (ref 10–40)
BASOPHILS # BLD AUTO: 0.03 K/UL (ref 0–0.2)
BASOPHILS NFR BLD: 0.9 % (ref 0–1.9)
BILIRUB SERPL-MCNC: 0.6 MG/DL (ref 0.1–1)
BUN SERPL-MCNC: 12 MG/DL (ref 8–23)
CALCIUM SERPL-MCNC: 8.6 MG/DL (ref 8.7–10.5)
CHLORIDE SERPL-SCNC: 107 MMOL/L (ref 95–110)
CO2 SERPL-SCNC: 23 MMOL/L (ref 23–29)
CREAT SERPL-MCNC: 0.8 MG/DL (ref 0.5–1.4)
DIFFERENTIAL METHOD: ABNORMAL
EOSINOPHIL # BLD AUTO: 0.3 K/UL (ref 0–0.5)
EOSINOPHIL NFR BLD: 8.3 % (ref 0–8)
ERYTHROCYTE [DISTWIDTH] IN BLOOD BY AUTOMATED COUNT: 13.2 % (ref 11.5–14.5)
EST. GFR  (AFRICAN AMERICAN): >60 ML/MIN/1.73 M^2
EST. GFR  (NON AFRICAN AMERICAN): >60 ML/MIN/1.73 M^2
GLUCOSE SERPL-MCNC: 191 MG/DL (ref 70–110)
HCT VFR BLD AUTO: 41.2 % (ref 40–54)
HGB BLD-MCNC: 13.4 G/DL (ref 14–18)
IMM GRANULOCYTES # BLD AUTO: 0.08 K/UL (ref 0–0.04)
IMM GRANULOCYTES NFR BLD AUTO: 2.4 % (ref 0–0.5)
LYMPHOCYTES # BLD AUTO: 0.9 K/UL (ref 1–4.8)
LYMPHOCYTES NFR BLD: 26.6 % (ref 18–48)
MCH RBC QN AUTO: 29.2 PG (ref 27–31)
MCHC RBC AUTO-ENTMCNC: 32.5 G/DL (ref 32–36)
MCV RBC AUTO: 90 FL (ref 82–98)
MONOCYTES # BLD AUTO: 0.5 K/UL (ref 0.3–1)
MONOCYTES NFR BLD: 14.1 % (ref 4–15)
NEUTROPHILS # BLD AUTO: 1.6 K/UL (ref 1.8–7.7)
NEUTROPHILS NFR BLD: 47.7 % (ref 38–73)
NRBC BLD-RTO: 0 /100 WBC
PLATELET # BLD AUTO: 263 K/UL (ref 150–350)
PMV BLD AUTO: 8.9 FL (ref 9.2–12.9)
POTASSIUM SERPL-SCNC: 3.9 MMOL/L (ref 3.5–5.1)
PROT SERPL-MCNC: 6.6 G/DL (ref 6–8.4)
RBC # BLD AUTO: 4.59 M/UL (ref 4.6–6.2)
SODIUM SERPL-SCNC: 140 MMOL/L (ref 136–145)
WBC # BLD AUTO: 3.27 K/UL (ref 3.9–12.7)

## 2020-05-28 PROCEDURE — 99214 OFFICE O/P EST MOD 30 MIN: CPT | Mod: S$GLB,,, | Performed by: INTERNAL MEDICINE

## 2020-05-28 PROCEDURE — 96376 TX/PRO/DX INJ SAME DRUG ADON: CPT

## 2020-05-28 PROCEDURE — 96367 TX/PROPH/DG ADDL SEQ IV INF: CPT

## 2020-05-28 PROCEDURE — 85025 COMPLETE CBC W/AUTO DIFF WBC: CPT

## 2020-05-28 PROCEDURE — 80053 COMPREHEN METABOLIC PANEL: CPT

## 2020-05-28 PROCEDURE — 96415 CHEMO IV INFUSION ADDL HR: CPT

## 2020-05-28 PROCEDURE — 25000003 PHARM REV CODE 250: Performed by: NURSE PRACTITIONER

## 2020-05-28 PROCEDURE — 96413 CHEMO IV INFUSION 1 HR: CPT

## 2020-05-28 PROCEDURE — 99214 PR OFFICE/OUTPT VISIT, EST, LEVL IV, 30-39 MIN: ICD-10-PCS | Mod: S$GLB,,, | Performed by: INTERNAL MEDICINE

## 2020-05-28 PROCEDURE — S0028 INJECTION, FAMOTIDINE, 20 MG: HCPCS | Performed by: NURSE PRACTITIONER

## 2020-05-28 PROCEDURE — A4216 STERILE WATER/SALINE, 10 ML: HCPCS | Performed by: NURSE PRACTITIONER

## 2020-05-28 PROCEDURE — 63600175 PHARM REV CODE 636 W HCPCS: Performed by: NURSE PRACTITIONER

## 2020-05-28 RX ORDER — FAMOTIDINE 10 MG/ML
20 INJECTION INTRAVENOUS
Status: COMPLETED | OUTPATIENT
Start: 2020-05-28 | End: 2020-05-28

## 2020-05-28 RX ORDER — SODIUM CHLORIDE 0.9 % (FLUSH) 0.9 %
10 SYRINGE (ML) INJECTION
Status: DISCONTINUED | OUTPATIENT
Start: 2020-05-28 | End: 2020-05-28 | Stop reason: HOSPADM

## 2020-05-28 RX ORDER — ACETAMINOPHEN 325 MG/1
650 TABLET ORAL
Status: COMPLETED | OUTPATIENT
Start: 2020-05-28 | End: 2020-05-28

## 2020-05-28 RX ORDER — HEPARIN 100 UNIT/ML
500 SYRINGE INTRAVENOUS
Status: DISCONTINUED | OUTPATIENT
Start: 2020-05-28 | End: 2020-05-28 | Stop reason: HOSPADM

## 2020-05-28 RX ORDER — MEPERIDINE HYDROCHLORIDE 25 MG/ML
25 INJECTION INTRAMUSCULAR; INTRAVENOUS; SUBCUTANEOUS
Status: DISCONTINUED | OUTPATIENT
Start: 2020-05-28 | End: 2020-05-28 | Stop reason: HOSPADM

## 2020-05-28 RX ADMIN — FAMOTIDINE 20 MG: 10 INJECTION, SOLUTION INTRAVENOUS at 08:05

## 2020-05-28 RX ADMIN — HEPARIN 500 UNITS: 100 SYRINGE at 12:05

## 2020-05-28 RX ADMIN — RITUXIMAB 821 MG: 10 INJECTION, SOLUTION INTRAVENOUS at 09:05

## 2020-05-28 RX ADMIN — Medication 50 MG: at 08:05

## 2020-05-28 RX ADMIN — SODIUM CHLORIDE, PRESERVATIVE FREE 10 ML: 5 INJECTION INTRAVENOUS at 12:05

## 2020-05-28 RX ADMIN — ACETAMINOPHEN 650 MG: 325 TABLET ORAL at 08:05

## 2020-05-29 ENCOUNTER — INFUSION (OUTPATIENT)
Dept: INFUSION THERAPY | Facility: HOSPITAL | Age: 65
End: 2020-05-29
Attending: INTERNAL MEDICINE
Payer: OTHER GOVERNMENT

## 2020-05-29 VITALS
WEIGHT: 209.31 LBS | DIASTOLIC BLOOD PRESSURE: 85 MMHG | TEMPERATURE: 99 F | BODY MASS INDEX: 31.83 KG/M2 | HEART RATE: 79 BPM | RESPIRATION RATE: 18 BRPM | SYSTOLIC BLOOD PRESSURE: 149 MMHG

## 2020-05-29 DIAGNOSIS — D80.1 NONFAMILIAL HYPOGAMMAGLOBULINEMIA: ICD-10-CM

## 2020-05-29 DIAGNOSIS — C82.30 FOLLICULAR LYMPHOMA GRADE IIIA, UNSPECIFIED BODY REGION: ICD-10-CM

## 2020-05-29 DIAGNOSIS — D80.1 HYPOGAMMAGLOBULINEMIA: Primary | ICD-10-CM

## 2020-05-29 PROCEDURE — 25000003 PHARM REV CODE 250: Performed by: INTERNAL MEDICINE

## 2020-05-29 PROCEDURE — 96367 TX/PROPH/DG ADDL SEQ IV INF: CPT

## 2020-05-29 PROCEDURE — 63600175 PHARM REV CODE 636 W HCPCS: Mod: JG | Performed by: INTERNAL MEDICINE

## 2020-05-29 PROCEDURE — 96415 CHEMO IV INFUSION ADDL HR: CPT

## 2020-05-29 PROCEDURE — 96413 CHEMO IV INFUSION 1 HR: CPT

## 2020-05-29 RX ORDER — HEPARIN 100 UNIT/ML
500 SYRINGE INTRAVENOUS
Status: DISCONTINUED | OUTPATIENT
Start: 2020-05-29 | End: 2020-05-29 | Stop reason: HOSPADM

## 2020-05-29 RX ORDER — HEPARIN 100 UNIT/ML
500 SYRINGE INTRAVENOUS
Status: CANCELLED | OUTPATIENT
Start: 2020-06-26

## 2020-05-29 RX ORDER — DIPHENHYDRAMINE HCL 25 MG
25 CAPSULE ORAL
Status: CANCELLED | OUTPATIENT
Start: 2020-06-26

## 2020-05-29 RX ORDER — DIPHENHYDRAMINE HCL 25 MG
25 CAPSULE ORAL
Status: DISCONTINUED | OUTPATIENT
Start: 2020-05-29 | End: 2020-05-29 | Stop reason: SDUPTHER

## 2020-05-29 RX ORDER — ACETAMINOPHEN 500 MG
500 TABLET ORAL
Status: CANCELLED | OUTPATIENT
Start: 2020-06-26

## 2020-05-29 RX ORDER — SODIUM CHLORIDE 0.9 % (FLUSH) 0.9 %
10 SYRINGE (ML) INJECTION
Status: CANCELLED | OUTPATIENT
Start: 2020-06-26

## 2020-05-29 RX ORDER — SODIUM CHLORIDE 0.9 % (FLUSH) 0.9 %
10 SYRINGE (ML) INJECTION
Status: DISCONTINUED | OUTPATIENT
Start: 2020-05-29 | End: 2020-05-29 | Stop reason: HOSPADM

## 2020-05-29 RX ORDER — ACETAMINOPHEN 500 MG
500 TABLET ORAL
Status: DISCONTINUED | OUTPATIENT
Start: 2020-05-29 | End: 2020-05-29 | Stop reason: HOSPADM

## 2020-05-29 RX ADMIN — IMMUNE GLOBULIN (HUMAN) 30 G: 10 INJECTION INTRAVENOUS; SUBCUTANEOUS at 08:05

## 2020-05-29 RX ADMIN — DIPHENHYDRAMINE HYDROCHLORIDE 25 MG: 50 INJECTION INTRAMUSCULAR; INTRAVENOUS at 08:05

## 2020-05-29 RX ADMIN — HEPARIN 500 UNITS: 100 SYRINGE at 11:05

## 2020-05-29 RX ADMIN — ACETAMINOPHEN 500 MG: 500 TABLET ORAL at 08:05

## 2020-06-10 ENCOUNTER — TELEPHONE (OUTPATIENT)
Dept: PULMONOLOGY | Facility: CLINIC | Age: 65
End: 2020-06-10

## 2020-06-10 ENCOUNTER — LAB VISIT (OUTPATIENT)
Dept: LAB | Facility: HOSPITAL | Age: 65
End: 2020-06-10
Attending: NURSE PRACTITIONER
Payer: OTHER GOVERNMENT

## 2020-06-10 DIAGNOSIS — J47.0 BRONCHIECTASIS WITH ACUTE LOWER RESPIRATORY INFECTION: ICD-10-CM

## 2020-06-10 DIAGNOSIS — J47.0 BRONCHIECTASIS WITH ACUTE LOWER RESPIRATORY INFECTION: Primary | ICD-10-CM

## 2020-06-10 PROCEDURE — 87070 CULTURE OTHR SPECIMN AEROBIC: CPT

## 2020-06-10 PROCEDURE — 87205 SMEAR GRAM STAIN: CPT

## 2020-06-12 ENCOUNTER — TELEPHONE (OUTPATIENT)
Dept: PULMONOLOGY | Facility: CLINIC | Age: 65
End: 2020-06-12

## 2020-06-12 LAB
BACTERIA SPEC AEROBE CULT: NORMAL
BACTERIA SPEC AEROBE CULT: NORMAL
GRAM STN SPEC: NORMAL

## 2020-06-12 NOTE — TELEPHONE ENCOUNTER
Reviewed results with patient, patient verbally understood.     ----- Message from Lucia Georges NP sent at 6/12/2020  2:08 PM CDT -----  Sputum culture is negative for abnormal bacteria. Continue current treatment plan. Will review images and results at next appointment.

## 2020-06-15 ENCOUNTER — PATIENT OUTREACH (OUTPATIENT)
Dept: ADMINISTRATIVE | Facility: OTHER | Age: 65
End: 2020-06-15

## 2020-06-15 NOTE — PROGRESS NOTES
Patient's chart was reviewed.   Requested updates within Care Everywhere.   Patient was not found in LINKS.   Health Maintenance was updated.

## 2020-06-16 ENCOUNTER — OFFICE VISIT (OUTPATIENT)
Dept: PULMONOLOGY | Facility: CLINIC | Age: 65
End: 2020-06-16
Payer: OTHER GOVERNMENT

## 2020-06-16 VITALS
WEIGHT: 204.94 LBS | TEMPERATURE: 98 F | BODY MASS INDEX: 31.16 KG/M2 | OXYGEN SATURATION: 96 % | SYSTOLIC BLOOD PRESSURE: 142 MMHG | DIASTOLIC BLOOD PRESSURE: 81 MMHG | HEART RATE: 97 BPM

## 2020-06-16 DIAGNOSIS — R05.9 COUGH: ICD-10-CM

## 2020-06-16 DIAGNOSIS — J45.51 SEVERE PERSISTENT ASTHMA WITH ACUTE EXACERBATION: ICD-10-CM

## 2020-06-16 DIAGNOSIS — J47.0 BRONCHIECTASIS WITH ACUTE LOWER RESPIRATORY INFECTION: ICD-10-CM

## 2020-06-16 DIAGNOSIS — J45.51 SEVERE PERSISTENT ASTHMA WITH ACUTE EXACERBATION: Primary | ICD-10-CM

## 2020-06-16 PROCEDURE — 99214 OFFICE O/P EST MOD 30 MIN: CPT | Mod: PBBFAC,PO | Performed by: NURSE PRACTITIONER

## 2020-06-16 PROCEDURE — 99999 PR PBB SHADOW E&M-EST. PATIENT-LVL IV: ICD-10-PCS | Mod: PBBFAC,,, | Performed by: NURSE PRACTITIONER

## 2020-06-16 PROCEDURE — 99213 OFFICE O/P EST LOW 20 MIN: CPT | Mod: S$PBB,,, | Performed by: NURSE PRACTITIONER

## 2020-06-16 PROCEDURE — 99999 PR PBB SHADOW E&M-EST. PATIENT-LVL IV: CPT | Mod: PBBFAC,,, | Performed by: NURSE PRACTITIONER

## 2020-06-16 PROCEDURE — 99213 PR OFFICE/OUTPT VISIT, EST, LEVL III, 20-29 MIN: ICD-10-PCS | Mod: S$PBB,,, | Performed by: NURSE PRACTITIONER

## 2020-06-16 RX ORDER — FLUTICASONE FUROATE AND VILANTEROL TRIFENATATE 200; 25 UG/1; UG/1
1 POWDER RESPIRATORY (INHALATION) DAILY
Qty: 1 EACH | Refills: 5 | Status: SHIPPED | OUTPATIENT
Start: 2020-06-16 | End: 2020-06-16 | Stop reason: SDUPTHER

## 2020-06-16 RX ORDER — SULFAMETHOXAZOLE AND TRIMETHOPRIM 400; 80 MG/1; MG/1
2 TABLET ORAL 2 TIMES DAILY
Qty: 28 TABLET | Refills: 0 | Status: SHIPPED | OUTPATIENT
Start: 2020-06-16 | End: 2020-06-23

## 2020-06-16 RX ORDER — FLUTICASONE FUROATE AND VILANTEROL TRIFENATATE 200; 25 UG/1; UG/1
1 POWDER RESPIRATORY (INHALATION) DAILY
Qty: 1 EACH | Refills: 5 | Status: SHIPPED | OUTPATIENT
Start: 2020-06-16 | End: 2020-11-17 | Stop reason: ALTCHOICE

## 2020-06-16 RX ORDER — BUDESONIDE 0.5 MG/2ML
0.5 INHALANT ORAL 2 TIMES DAILY
Qty: 120 ML | Refills: 5 | Status: SHIPPED | OUTPATIENT
Start: 2020-06-16 | End: 2020-11-17 | Stop reason: ALTCHOICE

## 2020-06-16 RX ORDER — CODEINE PHOSPHATE AND GUAIFENESIN 10; 100 MG/5ML; MG/5ML
5 SOLUTION ORAL EVERY 4 HOURS PRN
Qty: 300 ML | Refills: 0 | Status: SHIPPED | OUTPATIENT
Start: 2020-06-16 | End: 2020-06-26

## 2020-06-16 NOTE — PROGRESS NOTES
6/16/2020    Sivakumar Thacker  New Patient Consult    Chief Complaint   Patient presents with    Cough     2 month f/u       HPI:    6/16/2020- Cough unchanged with coughing fits when trying to sleep. States no improvement with Trelegy inhaler. States Levaquin antibiotic help a little bit and cough returned after finishing. Has been on IgG replacement therapy every 4 weeks since 2012.  Brought in 5 sputum sample, all still processing.       4/20/2020- Chronic cough onset 2013, followed by pulmonologist Dr. Case in [place]. Put Advair with no benefit. Had negative bronchoscopy and negative AFB, had appointment with Dr. Brock in 2014. Hx of non Hodgkins lymphoma followed by Dr. Ibarra on Rotuxin and immune therapy.  Cough Worsened in Jan 2020, associated with recurrent fever improves with tylenol, states cough improves with antibiotics but returns when finished medications. Productive occasionally, can feel something rattling in chest, thick white mucous. 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 2 pack year  Family Hx: no Lung Cancer, no COPD, no Asthma  Medical Hx: no previous pneumonia ; no previous shoulder/chest surgery        The chief compliant  problem is stable  PFSH:  Past Medical History:   Diagnosis Date    Cancer     lymphoma currently on chemo    Diabetes mellitus, type 2     Encounter for antineoplastic chemotherapy 4/1/2020    Hypertension     Neuropathy     Reflux esophagitis          Past Surgical History:   Procedure Laterality Date    COLONOSCOPY  2018    HAND SURGERY      amputation left index finger    PORTACATH PLACEMENT      SPINE SURGERY       Social History     Tobacco Use    Smoking status: Former  Smoker     Packs/day: 0.50     Years: 2.00     Pack years: 1.00    Smokeless tobacco: Never Used   Substance Use Topics    Alcohol use: Yes     Comment: rarely    Drug use: No     Family History   Problem Relation Age of Onset    Diabetes Father      Review of patient's allergies indicates:  No Known Allergies  I have reviewed past medical, family, and social history. I have reviewed previous nurse notes.    Performance Status:The patient's activity level is no limits with regular activity.          Review of Systems   Constitutional: Negative for activity change, appetite change, chills, and unexpected weight change. Positive for fever, fatigue, diaphoresis,   HENT: Negative for dental problem, sinus pressure, sinus pain, sneezing, sore throat, trouble swallowing and voice change.  Positive sinus drainage, post nasal drip,   Respiratory: Negative for apnea, chest tightness, shortness of breath, wheezing and stridor.  Positive for cough  Cardiovascular: Negative for chest pain, palpitations and leg swelling.   Gastrointestinal: Negative for abdominal distention, abdominal pain, constipation and nausea.   Musculoskeletal: Negative for gait problem, myalgias and neck pain.   Skin: Negative for color change and pallor.   Allergic/Immunologic: Negative for environmental allergies and food allergies.   Neurological: Negative for dizziness, speech difficulty, weakness, light-headedness, numbness and headaches.   Hematological: Negative for adenopathy. Does not bruise/bleed easily.   Psychiatric/Behavioral: Negative for dysphoric mood and sleep disturbance. The patient is not nervous/anxious.           Exam:Comprehensive exam done. BP (!) 142/81 (BP Location: Left arm, Patient Position: Sitting)   Pulse 97   Temp 98.2 °F (36.8 °C)   Wt 92.9 kg (204 lb 14.7 oz)   SpO2 96% Comment: on room air at rest  BMI 31.16 kg/m²   Exam included Vitals as listed  Constitutional: He is oriented to person, place, and time. He  appears well-developed. No distress.   Nose: Nose normal.   Mouth/Throat: Uvula is midline, oropharynx is clear and moist and mucous membranes are normal. No dental caries. No oropharyngeal exudate, posterior oropharyngeal edema, posterior oropharyngeal erythema or tonsillar abscesses.  Mallapatti (M) score 2  Eyes: Pupils are equal, round, and reactive to light.   Neck: No JVD present. No thyromegaly present.   Cardiovascular: Normal rate, regular rhythm and normal heart sounds. Exam reveals no gallop and no friction rub.   No murmur heard.  Pulmonary/Chest: Effort normal and breath sounds abnormal: bilateral rhonchi and wheeze.   No accessory muscle usage or stridor. No apnea and no tachypnea. No respiratory distress, decreased breath sounds, or tenderness.   Abdominal: Soft. He exhibits no mass. There is no tenderness. No hepatosplenomegaly, hernias and normoactive bowel sounds  Musculoskeletal: Normal range of motion. exhibits no edema.   Lymphadenopathy:     He has no cervical adenopathy.   Neurological:  alert and oriented to person, place, and time. not disoriented.   Skin: Skin is warm and dry. Capillary refill takes less 2 sec. No cyanosis or erythema. No pallor. Nails show no clubbing.   Psychiatric: normal mood and affect. behavior is normal. Judgment and thought content normal.       Radiographs (ct chest and cxr) reviewed: view by direct vision   X-Ray Chest PA And Lateral 03/19/2020   No acute cardiopulmonary abnormality appreciated radiographically.  No interval detrimental change in the radiographic appearance of the chest when compared to the previous study.     CT Chest Without 03/19/2020   1. Mild bronchiectasis in the left lower lobe and tree-in-bud micro nodules at the left lung base suggesting underlying bronchiolitis, possibly due to a non tuberculous mycobacterial infection or viral bronchiolitis.  2. Fatty infiltration of the liver  3. Small hiatal hernia.     NM PET CT Routine Skull to Mid  Thigh 09/11/2019   No evidence of FDG avid residual or recurrent lymphoma         Labs reviewed       Lab Results   Component Value Date    WBC 3.27 (L) 05/28/2020    RBC 4.59 (L) 05/28/2020    HGB 13.4 (L) 05/28/2020    HCT 41.2 05/28/2020    MCV 90 05/28/2020    MCH 29.2 05/28/2020    MCHC 32.5 05/28/2020    RDW 13.2 05/28/2020     05/28/2020    MPV 8.9 (L) 05/28/2020    GRAN 1.6 (L) 05/28/2020    GRAN 47.7 05/28/2020    LYMPH 0.9 (L) 05/28/2020    LYMPH 26.6 05/28/2020    MONO 0.5 05/28/2020    MONO 14.1 05/28/2020    EOS 0.3 05/28/2020    BASO 0.03 05/28/2020    EOSINOPHIL 8.3 (H) 05/28/2020    BASOPHIL 0.9 05/28/2020   Results for CHRIS UGTIERREZ (MRN [id number]) as of 4/20/2020 09:15   Ref. Range 4/1/2020 15:45   Albumin Latest Ref Range: 3.5 - 5.2 g/dL 4.3   BILIRUBIN TOTAL Latest Ref Range: 0.1 - 1.0 mg/dL 0.7   AST Latest Ref Range: 10 - 40 U/L 39       PFT will be done and results to be reviewed  Pulmonary Functions Testing Results:    7/17/2013 PFT Data: Complete pulmonary function studies were obtained today and are independently reviewed. Spirometry shows no evidence of airflow obstruction today. Lung was performed by plethysmography and are consistent with air trapping without hyperinflation. Residual volume is 122% predicted, and the total capacity is 98% predicted. Diffusion capacity for carbon monoxide is in the normal range at 102% predicted. Overall, these are fairly normal pulmonary function studies and do not suggest any obstructive lung disease.       Plan:  Clinical impression is apparently straight forward and impression with management as below.    Chris was seen today for cough.    Diagnoses and all orders for this visit:    Severe persistent asthma with acute exacerbation  -     fluticasone furoate-vilanteroL (BREO ELLIPTA) 200-25 mcg/dose DsDv diskus inhaler; Inhale 1 puff into the lungs once daily. Controller  -     Complete PFT with bronchodilator; Future  -     budesonide  (PULMICORT) 0.5 mg/2 mL nebulizer solution; Take 2 mLs (0.5 mg total) by nebulization 2 (two) times daily. Controller    Bronchiectasis with acute lower respiratory infection  -     sulfamethoxazole-trimethoprim 400-80mg (BACTRIM,SEPTRA) 400-80 mg per tablet; Take 2 tablets by mouth 2 (two) times daily. for 7 days  -     Basic metabolic panel; Future  -     guaifenesin-codeine 100-10 mg/5 ml (TUSSI-ORGANIDIN NR)  mg/5 mL syrup; Take 5 mLs by mouth every 4 (four) hours as needed for Cough.  -     budesonide (PULMICORT) 0.5 mg/2 mL nebulizer solution; Take 2 mLs (0.5 mg total) by nebulization 2 (two) times daily. Controller    Cough  -     guaifenesin-codeine 100-10 mg/5 ml (TUSSI-ORGANIDIN NR)  mg/5 mL syrup; Take 5 mLs by mouth every 4 (four) hours as needed for Cough.        Follow up in about 2 months (around 8/16/2020), or if symptoms worsen or fail to improve.    Discussed with patient above for education the following:      Patient Instructions   Scheduling a lung function test    Stop Trelegy and start   Breo 200 1 puff once a day every day, rinse mouth after using due to risk for thrush if mouth or tongue has white sores contact clinic      Will have to wait for 2 positive sputum samples before able to start therapy    Take Bactrim antibiotic now    Percussion therapy instruction  Do breathing treatments 2 x a day followed by 10 minutes of laying face down on cough or bed with 2-3 pillows under stomach. Make sure someone else is home in case you become dizzy.  Have someone beat on upper back or use hand held massager on back

## 2020-06-16 NOTE — PATIENT INSTRUCTIONS
Scheduling a lung function test    Stop Trelegy and start   Breo 200 1 puff once a day every day, rinse mouth after using due to risk for thrush if mouth or tongue has white sores contact clinic      Will have to wait for 2 positive sputum samples before able to start therapy    Take Bactrim antibiotic now    Percussion therapy instruction  Do breathing treatments 2 x a day followed by 10 minutes of laying face down on cough or bed with 2-3 pillows under stomach. Make sure someone else is home in case you become dizzy.  Have someone beat on upper back or use hand held massager on back

## 2020-06-23 ENCOUNTER — HOSPITAL ENCOUNTER (OUTPATIENT)
Dept: PULMONOLOGY | Facility: HOSPITAL | Age: 65
Discharge: HOME | End: 2020-06-23
Attending: NURSE PRACTITIONER
Payer: OTHER GOVERNMENT

## 2020-06-23 DIAGNOSIS — J45.51 SEVERE PERSISTENT ASTHMA WITH ACUTE EXACERBATION: ICD-10-CM

## 2020-06-23 LAB
BRPFT: ABNORMAL
DLCO ADJ PRE: 29.49 ML/(MIN*MMHG) (ref 19.85–33.7)
DLCO SINGLE BREATH LLN: 19.85
DLCO SINGLE BREATH PRE REF: 110.1 %
DLCO SINGLE BREATH REF: 26.77
DLCOC SBVA LLN: 2.74
DLCOC SBVA PRE REF: 122.5 %
DLCOC SBVA REF: 3.97
DLCOC SINGLE BREATH LLN: 19.85
DLCOC SINGLE BREATH PRE REF: 110.1 %
DLCOC SINGLE BREATH REF: 26.77
DLCOVA LLN: 2.74
DLCOVA PRE REF: 122.5 %
DLCOVA PRE: 4.87 ML/(MIN*MMHG*L) (ref 2.74–5.2)
DLCOVA REF: 3.97
DLVAADJ PRE: 4.87 ML/(MIN*MMHG*L) (ref 2.74–5.2)
ERVN2 LLN: -16448.91
ERVN2 PRE REF: 21.9 %
ERVN2 PRE: 0.24 L (ref -16448.91–16451.09)
ERVN2 REF: 1.09
FEF 25 75 CHG: 3.6 %
FEF 25 75 LLN: 1.22
FEF 25 75 POST REF: 140.8 %
FEF 25 75 PRE REF: 136 %
FEF 25 75 REF: 2.62
FET100 CHG: 13.5 %
FEV1 CHG: 1 %
FEV1 FVC CHG: 0.9 %
FEV1 FVC LLN: 65
FEV1 FVC POST REF: 105 %
FEV1 FVC PRE REF: 104.1 %
FEV1 FVC REF: 77
FEV1 LLN: 2.39
FEV1 POST REF: 108.8 %
FEV1 PRE REF: 107.7 %
FEV1 REF: 3.22
FRCN2 LLN: 2.55
FRCN2 PRE REF: 32.5 %
FRCN2 REF: 3.53
FVC CHG: 0.1 %
FVC LLN: 3.16
FVC POST REF: 103.4 %
FVC PRE REF: 103.3 %
FVC REF: 4.19
IVC PRE: 3.92 L (ref 3.16–5.22)
IVC SINGLE BREATH LLN: 3.16
IVC SINGLE BREATH PRE REF: 93.7 %
IVC SINGLE BREATH REF: 4.19
MVV LLN: 105
MVV PRE REF: 75.2 %
MVV REF: 124
PEF CHG: -21 %
PEF LLN: 6.3
PEF POST REF: 57.8 %
PEF PRE REF: 73.2 %
PEF REF: 8.49
POST FEF 25 75: 3.69 L/S (ref 1.22–4.01)
POST FET 100: 6.54 SEC
POST FEV1 FVC: 81.04 % (ref 64.52–89.8)
POST FEV1: 3.51 L (ref 2.39–4.06)
POST FVC: 4.33 L (ref 3.16–5.22)
POST PEF: 4.91 L/S (ref 6.3–10.68)
PRE DLCO: 29.49 ML/(MIN*MMHG) (ref 19.85–33.7)
PRE FEF 25 75: 3.56 L/S (ref 1.22–4.01)
PRE FET 100: 5.76 SEC
PRE FEV1 FVC: 80.32 % (ref 64.52–89.8)
PRE FEV1: 3.47 L (ref 2.39–4.06)
PRE FRC N2: 1.15 L
PRE FVC: 4.33 L (ref 3.16–5.22)
PRE MVV: 93 L/MIN (ref 105.16–142.28)
PRE PEF: 6.21 L/S (ref 6.3–10.68)
RVN2 LLN: 1.77
RVN2 PRE REF: 12.2 %
RVN2 PRE: 0.3 L (ref 1.77–3.12)
RVN2 REF: 2.44
RVN2TLCN2 LLN: 29.94
RVN2TLCN2 PRE REF: 16.6 %
RVN2TLCN2 PRE: 6.45 % (ref 29.94–47.9)
RVN2TLCN2 REF: 38.92
TLCN2 LLN: 5.59
TLCN2 PRE REF: 68.6 %
TLCN2 PRE: 4.62 L (ref 5.59–7.89)
TLCN2 REF: 6.74
VA PRE: 6.06 L (ref 6.59–6.59)
VA SINGLE BREATH LLN: 6.59
VA SINGLE BREATH PRE REF: 92 %
VA SINGLE BREATH REF: 6.59
VCMAXN2 LLN: 3.16
VCMAXN2 PRE REF: 103.3 %
VCMAXN2 PRE: 4.33 L (ref 3.16–5.22)
VCMAXN2 REF: 4.19

## 2020-06-23 PROCEDURE — 94729 DIFFUSING CAPACITY: CPT | Mod: 26,,, | Performed by: INTERNAL MEDICINE

## 2020-06-23 PROCEDURE — 94727 GAS DIL/WSHOT DETER LNG VOL: CPT | Mod: 26,,, | Performed by: INTERNAL MEDICINE

## 2020-06-23 PROCEDURE — 94729 DIFFUSING CAPACITY: CPT

## 2020-06-23 PROCEDURE — 94060 PR EVAL OF BRONCHOSPASM: ICD-10-PCS | Mod: 26,,, | Performed by: INTERNAL MEDICINE

## 2020-06-23 PROCEDURE — 94727 PR PULM FUNCTION TEST BY GAS: ICD-10-PCS | Mod: 26,,, | Performed by: INTERNAL MEDICINE

## 2020-06-23 PROCEDURE — 94060 EVALUATION OF WHEEZING: CPT

## 2020-06-23 PROCEDURE — 94729 PR C02/MEMBANE DIFFUSE CAPACITY: ICD-10-PCS | Mod: 26,,, | Performed by: INTERNAL MEDICINE

## 2020-06-23 PROCEDURE — 94727 GAS DIL/WSHOT DETER LNG VOL: CPT

## 2020-06-23 PROCEDURE — 94060 EVALUATION OF WHEEZING: CPT | Mod: 26,,, | Performed by: INTERNAL MEDICINE

## 2020-06-24 ENCOUNTER — LAB VISIT (OUTPATIENT)
Dept: LAB | Facility: HOSPITAL | Age: 65
End: 2020-06-24
Attending: FAMILY MEDICINE
Payer: OTHER GOVERNMENT

## 2020-06-24 ENCOUNTER — LAB VISIT (OUTPATIENT)
Dept: LAB | Facility: HOSPITAL | Age: 65
End: 2020-06-24
Attending: INTERNAL MEDICINE
Payer: OTHER GOVERNMENT

## 2020-06-24 DIAGNOSIS — D80.1 NONFAMILIAL HYPOGAMMAGLOBULINEMIA: ICD-10-CM

## 2020-06-24 DIAGNOSIS — C82.30 FOLLICULAR LYMPHOMA GRADE IIIA, UNSPECIFIED BODY REGION: ICD-10-CM

## 2020-06-24 DIAGNOSIS — E78.41 ELEVATED LIPOPROTEIN(A): ICD-10-CM

## 2020-06-24 DIAGNOSIS — R73.03 PREDIABETES: ICD-10-CM

## 2020-06-24 DIAGNOSIS — I10 ESSENTIAL HYPERTENSION: ICD-10-CM

## 2020-06-24 LAB
ALBUMIN SERPL BCP-MCNC: 4.3 G/DL (ref 3.5–5.2)
ALP SERPL-CCNC: 97 U/L (ref 55–135)
ALT SERPL W/O P-5'-P-CCNC: 25 U/L (ref 10–44)
ANION GAP SERPL CALC-SCNC: 9 MMOL/L (ref 8–16)
AST SERPL-CCNC: 25 U/L (ref 10–40)
BASOPHILS # BLD AUTO: 0.04 K/UL (ref 0–0.2)
BASOPHILS NFR BLD: 1 % (ref 0–1.9)
BILIRUB SERPL-MCNC: 0.3 MG/DL (ref 0.1–1)
BUN SERPL-MCNC: 17 MG/DL (ref 8–23)
CALCIUM SERPL-MCNC: 9.6 MG/DL (ref 8.7–10.5)
CHLORIDE SERPL-SCNC: 103 MMOL/L (ref 95–110)
CO2 SERPL-SCNC: 26 MMOL/L (ref 23–29)
CREAT SERPL-MCNC: 1 MG/DL (ref 0.5–1.4)
DIFFERENTIAL METHOD: ABNORMAL
EOSINOPHIL # BLD AUTO: 0.4 K/UL (ref 0–0.5)
EOSINOPHIL NFR BLD: 11.3 % (ref 0–8)
ERYTHROCYTE [DISTWIDTH] IN BLOOD BY AUTOMATED COUNT: 12.7 % (ref 11.5–14.5)
EST. GFR  (AFRICAN AMERICAN): >60 ML/MIN/1.73 M^2
EST. GFR  (NON AFRICAN AMERICAN): >60 ML/MIN/1.73 M^2
ESTIMATED AVG GLUCOSE: 143 MG/DL (ref 68–131)
GLUCOSE SERPL-MCNC: 123 MG/DL (ref 70–110)
HBA1C MFR BLD HPLC: 6.6 % (ref 4.5–6.2)
HCT VFR BLD AUTO: 43 % (ref 40–54)
HGB BLD-MCNC: 14.1 G/DL (ref 14–18)
IMM GRANULOCYTES # BLD AUTO: 0.07 K/UL (ref 0–0.04)
IMM GRANULOCYTES NFR BLD AUTO: 1.8 % (ref 0–0.5)
LYMPHOCYTES # BLD AUTO: 1.2 K/UL (ref 1–4.8)
LYMPHOCYTES NFR BLD: 31.8 % (ref 18–48)
MCH RBC QN AUTO: 29.1 PG (ref 27–31)
MCHC RBC AUTO-ENTMCNC: 32.8 G/DL (ref 32–36)
MCV RBC AUTO: 89 FL (ref 82–98)
MONOCYTES # BLD AUTO: 0.6 K/UL (ref 0.3–1)
MONOCYTES NFR BLD: 15.1 % (ref 4–15)
NEUTROPHILS # BLD AUTO: 1.5 K/UL (ref 1.8–7.7)
NEUTROPHILS NFR BLD: 39 % (ref 38–73)
NRBC BLD-RTO: 0 /100 WBC
PLATELET # BLD AUTO: 266 K/UL (ref 150–350)
PMV BLD AUTO: 8.7 FL (ref 9.2–12.9)
POTASSIUM SERPL-SCNC: 4.5 MMOL/L (ref 3.5–5.1)
PROT SERPL-MCNC: 6.9 G/DL (ref 6–8.4)
RBC # BLD AUTO: 4.85 M/UL (ref 4.6–6.2)
SODIUM SERPL-SCNC: 138 MMOL/L (ref 136–145)
WBC # BLD AUTO: 3.9 K/UL (ref 3.9–12.7)

## 2020-06-24 PROCEDURE — 85025 COMPLETE CBC W/AUTO DIFF WBC: CPT

## 2020-06-24 PROCEDURE — 80053 COMPREHEN METABOLIC PANEL: CPT

## 2020-06-24 PROCEDURE — 83036 HEMOGLOBIN GLYCOSYLATED A1C: CPT

## 2020-06-25 ENCOUNTER — TELEPHONE (OUTPATIENT)
Dept: PULMONOLOGY | Facility: CLINIC | Age: 65
End: 2020-06-25

## 2020-06-25 NOTE — TELEPHONE ENCOUNTER
Reviewed results with patient and paper verbally understood.     ----- Message from Lucia Georges NP sent at 6/24/2020  7:56 PM CDT -----  Lung function test is good, Continue current treatment plan. Will review images and results at next appointment.

## 2020-06-26 ENCOUNTER — INFUSION (OUTPATIENT)
Dept: INFUSION THERAPY | Facility: HOSPITAL | Age: 65
End: 2020-06-26
Attending: INTERNAL MEDICINE
Payer: OTHER GOVERNMENT

## 2020-06-26 VITALS
DIASTOLIC BLOOD PRESSURE: 75 MMHG | RESPIRATION RATE: 18 BRPM | TEMPERATURE: 98 F | HEIGHT: 68 IN | OXYGEN SATURATION: 98 % | WEIGHT: 205.19 LBS | HEART RATE: 93 BPM | SYSTOLIC BLOOD PRESSURE: 115 MMHG | BODY MASS INDEX: 31.1 KG/M2

## 2020-06-26 DIAGNOSIS — C82.30 FOLLICULAR LYMPHOMA GRADE IIIA, UNSPECIFIED BODY REGION: ICD-10-CM

## 2020-06-26 DIAGNOSIS — D80.1 NONFAMILIAL HYPOGAMMAGLOBULINEMIA: ICD-10-CM

## 2020-06-26 DIAGNOSIS — D80.1 HYPOGAMMAGLOBULINEMIA: Primary | ICD-10-CM

## 2020-06-26 PROCEDURE — 63600175 PHARM REV CODE 636 W HCPCS: Performed by: INTERNAL MEDICINE

## 2020-06-26 PROCEDURE — A4216 STERILE WATER/SALINE, 10 ML: HCPCS | Performed by: INTERNAL MEDICINE

## 2020-06-26 PROCEDURE — 96415 CHEMO IV INFUSION ADDL HR: CPT

## 2020-06-26 PROCEDURE — 25000003 PHARM REV CODE 250: Performed by: INTERNAL MEDICINE

## 2020-06-26 PROCEDURE — 96367 TX/PROPH/DG ADDL SEQ IV INF: CPT

## 2020-06-26 PROCEDURE — 96413 CHEMO IV INFUSION 1 HR: CPT

## 2020-06-26 RX ORDER — SODIUM CHLORIDE 0.9 % (FLUSH) 0.9 %
10 SYRINGE (ML) INJECTION
Status: CANCELLED | OUTPATIENT
Start: 2020-07-24

## 2020-06-26 RX ORDER — ACETAMINOPHEN 500 MG
500 TABLET ORAL
Status: CANCELLED | OUTPATIENT
Start: 2020-07-24

## 2020-06-26 RX ORDER — HEPARIN 100 UNIT/ML
500 SYRINGE INTRAVENOUS
Status: CANCELLED | OUTPATIENT
Start: 2020-07-24

## 2020-06-26 RX ORDER — SODIUM CHLORIDE 0.9 % (FLUSH) 0.9 %
10 SYRINGE (ML) INJECTION
Status: DISCONTINUED | OUTPATIENT
Start: 2020-06-26 | End: 2020-06-26 | Stop reason: HOSPADM

## 2020-06-26 RX ORDER — HEPARIN 100 UNIT/ML
500 SYRINGE INTRAVENOUS
Status: DISCONTINUED | OUTPATIENT
Start: 2020-06-26 | End: 2020-06-26 | Stop reason: HOSPADM

## 2020-06-26 RX ORDER — ACETAMINOPHEN 500 MG
500 TABLET ORAL
Status: DISCONTINUED | OUTPATIENT
Start: 2020-06-26 | End: 2020-06-26 | Stop reason: HOSPADM

## 2020-06-26 RX ORDER — DIPHENHYDRAMINE HCL 25 MG
25 CAPSULE ORAL
Status: CANCELLED | OUTPATIENT
Start: 2020-07-24

## 2020-06-26 RX ADMIN — ACETAMINOPHEN 500 MG: 500 TABLET ORAL at 08:06

## 2020-06-26 RX ADMIN — SODIUM CHLORIDE, PRESERVATIVE FREE 10 ML: 5 INJECTION INTRAVENOUS at 11:06

## 2020-06-26 RX ADMIN — HEPARIN 500 UNITS: 100 SYRINGE at 11:06

## 2020-06-26 RX ADMIN — IMMUNE GLOBULIN (HUMAN) 30 G: 10 INJECTION INTRAVENOUS; SUBCUTANEOUS at 08:06

## 2020-06-26 RX ADMIN — DIPHENHYDRAMINE HYDROCHLORIDE 25 MG: 50 INJECTION INTRAMUSCULAR; INTRAVENOUS at 08:06

## 2020-06-26 NOTE — PLAN OF CARE
Problem: Infection  Goal: Infection Symptom Resolution  Outcome: Ongoing, Progressing  Intervention: Prevent or Manage Infection  Flowsheets (Taken 6/26/2020 0814)  Infection Management: aseptic technique maintained  Isolation Precautions: protective environment maintained

## 2020-06-30 LAB
ACID FAST MOD KINY STN SPEC: NORMAL
MYCOBACTERIUM SPEC QL CULT: NORMAL

## 2020-07-14 LAB
ACID FAST MOD KINY STN SPEC: NORMAL
MYCOBACTERIUM SPEC QL CULT: NORMAL

## 2020-07-16 RX ORDER — HEPARIN 100 UNIT/ML
500 SYRINGE INTRAVENOUS
Status: CANCELLED | OUTPATIENT
Start: 2020-07-23

## 2020-07-16 RX ORDER — ACETAMINOPHEN 325 MG/1
650 TABLET ORAL
Status: CANCELLED | OUTPATIENT
Start: 2020-07-23

## 2020-07-16 RX ORDER — FAMOTIDINE 10 MG/ML
20 INJECTION INTRAVENOUS
Status: CANCELLED | OUTPATIENT
Start: 2020-07-23

## 2020-07-16 RX ORDER — MEPERIDINE HYDROCHLORIDE 50 MG/ML
25 INJECTION INTRAMUSCULAR; INTRAVENOUS; SUBCUTANEOUS
Status: CANCELLED | OUTPATIENT
Start: 2020-07-23

## 2020-07-16 RX ORDER — SODIUM CHLORIDE 0.9 % (FLUSH) 0.9 %
10 SYRINGE (ML) INJECTION
Status: CANCELLED | OUTPATIENT
Start: 2020-07-23

## 2020-07-20 ENCOUNTER — LAB VISIT (OUTPATIENT)
Dept: LAB | Facility: HOSPITAL | Age: 65
End: 2020-07-20
Attending: INTERNAL MEDICINE
Payer: OTHER GOVERNMENT

## 2020-07-20 DIAGNOSIS — C82.30 FOLLICULAR LYMPHOMA GRADE IIIA, UNSPECIFIED BODY REGION: ICD-10-CM

## 2020-07-20 DIAGNOSIS — D80.1 NONFAMILIAL HYPOGAMMAGLOBULINEMIA: ICD-10-CM

## 2020-07-20 LAB
ALBUMIN SERPL BCP-MCNC: 4.5 G/DL (ref 3.5–5.2)
ALP SERPL-CCNC: 96 U/L (ref 55–135)
ALT SERPL W/O P-5'-P-CCNC: 28 U/L (ref 10–44)
ANION GAP SERPL CALC-SCNC: 11 MMOL/L (ref 8–16)
AST SERPL-CCNC: 27 U/L (ref 10–40)
BASOPHILS # BLD AUTO: 0.05 K/UL (ref 0–0.2)
BASOPHILS NFR BLD: 1 % (ref 0–1.9)
BILIRUB SERPL-MCNC: 0.6 MG/DL (ref 0.1–1)
BUN SERPL-MCNC: 16 MG/DL (ref 8–23)
CALCIUM SERPL-MCNC: 9.5 MG/DL (ref 8.7–10.5)
CHLORIDE SERPL-SCNC: 103 MMOL/L (ref 95–110)
CO2 SERPL-SCNC: 26 MMOL/L (ref 23–29)
CREAT SERPL-MCNC: 0.8 MG/DL (ref 0.5–1.4)
DIFFERENTIAL METHOD: ABNORMAL
EOSINOPHIL # BLD AUTO: 0.2 K/UL (ref 0–0.5)
EOSINOPHIL NFR BLD: 4.9 % (ref 0–8)
ERYTHROCYTE [DISTWIDTH] IN BLOOD BY AUTOMATED COUNT: 12.6 % (ref 11.5–14.5)
EST. GFR  (AFRICAN AMERICAN): >60 ML/MIN/1.73 M^2
EST. GFR  (NON AFRICAN AMERICAN): >60 ML/MIN/1.73 M^2
GLUCOSE SERPL-MCNC: 153 MG/DL (ref 70–110)
HCT VFR BLD AUTO: 45.2 % (ref 40–54)
HGB BLD-MCNC: 15.1 G/DL (ref 14–18)
IMM GRANULOCYTES # BLD AUTO: 0.15 K/UL (ref 0–0.04)
IMM GRANULOCYTES NFR BLD AUTO: 3 % (ref 0–0.5)
LYMPHOCYTES # BLD AUTO: 1.4 K/UL (ref 1–4.8)
LYMPHOCYTES NFR BLD: 27.4 % (ref 18–48)
MCH RBC QN AUTO: 29.4 PG (ref 27–31)
MCHC RBC AUTO-ENTMCNC: 33.4 G/DL (ref 32–36)
MCV RBC AUTO: 88 FL (ref 82–98)
MONOCYTES # BLD AUTO: 0.7 K/UL (ref 0.3–1)
MONOCYTES NFR BLD: 13.8 % (ref 4–15)
NEUTROPHILS # BLD AUTO: 2.5 K/UL (ref 1.8–7.7)
NEUTROPHILS NFR BLD: 49.9 % (ref 38–73)
NRBC BLD-RTO: 0 /100 WBC
PLATELET # BLD AUTO: 283 K/UL (ref 150–350)
PMV BLD AUTO: 9.2 FL (ref 9.2–12.9)
POTASSIUM SERPL-SCNC: 3.9 MMOL/L (ref 3.5–5.1)
PROT SERPL-MCNC: 7.1 G/DL (ref 6–8.4)
RBC # BLD AUTO: 5.13 M/UL (ref 4.6–6.2)
SODIUM SERPL-SCNC: 140 MMOL/L (ref 136–145)
WBC # BLD AUTO: 4.92 K/UL (ref 3.9–12.7)

## 2020-07-20 PROCEDURE — 36415 COLL VENOUS BLD VENIPUNCTURE: CPT

## 2020-07-20 PROCEDURE — 85025 COMPLETE CBC W/AUTO DIFF WBC: CPT

## 2020-07-20 PROCEDURE — 80053 COMPREHEN METABOLIC PANEL: CPT

## 2020-07-22 NOTE — PROGRESS NOTES
[facility] Hematology/Oncology  PROGRESS NOTE -  Follow-up Visit      Subjective:       Patient ID:   NAME: Sivakumar Thacker : 1955     65 y.o. male    Referring Doc: Barry Mcmahon (New PCP in [place])  Other Physicians: Vinod Chen/José Manuel    Chief Complaint:  NHL f/u    History of Present Illness:     Patient returns today for a regularly scheduled follow-up visit.  The patient is here today to go over the results of the recently ordered labs, tests and studies. . He is doing well with the rituximab infusions. He denies any CP, SOB, HA's or N/V. He is here by himself.     He has been seeing pulmonary about his chronic cough  He saw Lucia Georges again on 2020. He had Chest CT on 3/19/2020    He has a nodule on the auricle of the left ear which looks like a skin cancer - will refer him to ENT    He is due for a repeat PEt scan    I discussed continued Covid19 precautions        ROS:   GEN: normal without any fever, night sweats or weight loss  HEENT: normal with no HA's, sore throat, stiff neck, changes in vision; sinus issues resolved  CV: normal with no CP, SOB, PND, VASQUEZ or orthopnea  PULM: normal with no SOB,  hemoptysis, sputum or pleuritic pain; chronic cough since 2020  GI: normal with no abdominal pain, nausea, vomiting, constipation, diarrhea, melanotic stools, BRBPR, or hematemesis  : normal with no hematuria, dysuria  BREAST: normal with no mass, discharge, pain  SKIN: normal with no rash, erythema, bruising, or swelling    Allergies:  Review of patient's allergies indicates:  No Known Allergies    Medications:    Current Outpatient Medications:     albuterol (PROVENTIL HFA) 90 mcg/actuation inhaler, Inhale 2 puffs into the lungs every 6 (six) hours as needed for Wheezing. Rescue, Disp: 18 g, Rfl: 0    albuterol-ipratropium (DUO-NEB) 2.5 mg-0.5 mg/3 mL nebulizer solution, Take 3 mLs by nebulization every 6 (six) hours as needed for Wheezing or Shortness of Breath. Rescue, Disp: 120  vial, Rfl: 5    amLODIPine (NORVASC) 10 MG tablet, Take 1 tablet (10 mg total) by mouth once daily., Disp: 90 tablet, Rfl: 3    aspirin (ECOTRIN) 81 MG EC tablet, Take 81 mg by mouth once daily., Disp: , Rfl:     atorvastatin (LIPITOR) 20 MG tablet, Take 20 mg by mouth once daily., Disp: , Rfl:     budesonide (PULMICORT) 0.5 mg/2 mL nebulizer solution, Take 2 mLs (0.5 mg total) by nebulization 2 (two) times daily. Controller, Disp: 120 mL, Rfl: 5    fluticasone furoate-vilanteroL (BREO ELLIPTA) 200-25 mcg/dose DsDv diskus inhaler, Inhale 1 puff into the lungs once daily. Controller, Disp: 1 each, Rfl: 5    fluticasone propionate (FLONASE) 50 mcg/actuation nasal spray, 1 spray (50 mcg total) by Each Nostril route 2 (two) times daily., Disp: 15.8 mL, Rfl: 2    losartan (COZAAR) 100 MG tablet, Take 100 mg by mouth once daily., Disp: , Rfl:     metFORMIN (GLUCOPHAGE) 500 MG tablet, metformin 500 mg tablet  Take 2 tablets twice a day by oral route with meals for 90 days., Disp: , Rfl:     metroNIDAZOLE (FLAGYL) 500 MG tablet, Take 1 tablet (500 mg total) by mouth every 8 (eight) hours., Disp: 30 tablet, Rfl: 0    montelukast (SINGULAIR) 10 mg tablet, Take 10 mg by mouth nightly as needed., Disp: , Rfl:     MULTIVITAMIN ORAL, Take 1 tablet by mouth once daily., Disp: , Rfl:     naproxen (NAPROSYN) 500 MG tablet, Take 500 mg by mouth 2 (two) times daily as needed., Disp: , Rfl:     omeprazole (PRILOSEC) 40 MG capsule, Take 40 mg by mouth once daily., Disp: , Rfl:     tizanidine (ZANAFLEX) 4 MG tablet, Take 4 mg by mouth daily as needed., Disp: , Rfl:   No current facility-administered medications for this visit.     Facility-Administered Medications Ordered in Other Visits:     acetaminophen tablet 650 mg, 650 mg, Oral, 1 time in Clinic/HOD, Jefferson Ibarra MD    alteplase injection 2 mg, 2 mg, Intra-Catheter, PRN, Jefferson Ibarra MD    diphenhydramine (BENADRYL) 50 mg in NS 50 mL IVPB, 50 mg,  Intravenous, 1 time in Clinic/HOD, Jefferson Ibarra MD    famotidine (PF) injection 20 mg, 20 mg, Intravenous, 1 time in Clinic/HOD, Jefferson Ibarra MD    heparin, porcine (PF) 100 unit/mL injection flush 500 Units, 500 Units, Intravenous, PRN, Jefferson Ibarra MD    meperidine (PF) injection 25 mg, 25 mg, Intravenous, PRN, Jefferson Ibarra MD    riTUXimab (RITUXAN) 375 mg/m2 = 821 mg in sodium chloride 0.9% 821 mL infusion (conc: 1 mg/mL), 375 mg/m2 (Treatment Plan Recorded), Intravenous, 1 time in Clinic/HOD, Jefferson Ibarra MD    sodium chloride 0.9% 250 mL flush bag, , Intravenous, 1 time in Clinic/HOD, Jefferson Ibarra MD    sodium chloride 0.9% flush 10 mL, 10 mL, Intravenous, PRN, Jefferson Ibarra MD    PMHx/PSHx Updates:  See patient's last visit with me on 4/2/2020  See H&P on 1/8/2019        Pathology:  Cancer Staging  No matching staging information was found for the patient.          Objective:     Vitals:  Blood pressure (!) 144/83, pulse 87, temperature 97.9 °F (36.6 °C), resp. rate 18, weight 97.9 kg (215 lb 13.3 oz).    Physical Examination:   GEN: no apparent distress, comfortable; AAOx3  HEAD: atraumatic and normocephalic  EYES: no pallor, no icterus, PERRLA  ENT: OMM, no pharyngeal erythema, external ears WNL; no nasal discharge; no thrush; new nodule on auricle of left ear (painful)  NECK: no masses, thyroid normal, trachea midline, no LAD/LN's, supple  CV: RRR with no murmur; normal pulse; normal S1 and S2; no pedal edema; portacath  CHEST: Normal respiratory effort; CTAB; normal breath sounds; no current wheeze  ABDOM: nontender and nondistended; soft; normal bowel sounds; no rebound/guarding  MUSC/Skeletal: ROM normal; no crepitus; joints normal; no deformities or arthropathy  EXTREM: no clubbing, cyanosis, inflammation or swelling  SKIN: no rashes, lesions, ulcers, petechiae or subcutaneous nodules (see above HEENt)  : no gibbs  NEURO: grossly intact;  motor/sensory WNL; AAOx3; no tremors  PSYCH: normal mood, affect and behavior  LYMPH: normal cervical, supraclavicular, axillary and groin LN's            Labs:     Lab Results   Component Value Date    WBC 4.92 07/20/2020    HGB 15.1 07/20/2020    HCT 45.2 07/20/2020    MCV 88 07/20/2020     07/20/2020     CMP  Sodium   Date Value Ref Range Status   07/20/2020 140 136 - 145 mmol/L Final   04/25/2019 144 134 - 144 mmol/L      Potassium   Date Value Ref Range Status   07/20/2020 3.9 3.5 - 5.1 mmol/L Final     Chloride   Date Value Ref Range Status   07/20/2020 103 95 - 110 mmol/L Final   04/25/2019 107 98 - 110 mmol/L      CO2   Date Value Ref Range Status   07/20/2020 26 23 - 29 mmol/L Final     Glucose   Date Value Ref Range Status   07/20/2020 153 (H) 70 - 110 mg/dL Final   04/25/2019 177 (H) 70 - 99 mg/dL      BUN, Bld   Date Value Ref Range Status   07/20/2020 16 8 - 23 mg/dL Final     Creatinine   Date Value Ref Range Status   07/20/2020 0.8 0.5 - 1.4 mg/dL Final   04/25/2019 0.83 0.60 - 1.40 mg/dL      Calcium   Date Value Ref Range Status   07/20/2020 9.5 8.7 - 10.5 mg/dL Final     Total Protein   Date Value Ref Range Status   07/20/2020 7.1 6.0 - 8.4 g/dL Final     Albumin   Date Value Ref Range Status   07/20/2020 4.5 3.5 - 5.2 g/dL Final   04/25/2019 4.4 3.1 - 4.7 g/dL      Total Bilirubin   Date Value Ref Range Status   07/20/2020 0.6 0.1 - 1.0 mg/dL Final     Comment:     For infants and newborns, interpretation of results should be based  on gestational age, weight and in agreement with clinical  observations.  Premature Infant recommended reference ranges:  Up to 24 hours.............<8.0 mg/dL  Up to 48 hours............<12.0 mg/dL  3-5 days..................<15.0 mg/dL  6-29 days.................<15.0 mg/dL       Alkaline Phosphatase   Date Value Ref Range Status   07/20/2020 96 55 - 135 U/L Final     AST   Date Value Ref Range Status   07/20/2020 27 10 - 40 U/L Final     ALT   Date Value Ref  Range Status   07/20/2020 28 10 - 44 U/L Final     Anion Gap   Date Value Ref Range Status   07/20/2020 11 8 - 16 mmol/L Final     eGFR if    Date Value Ref Range Status   07/20/2020 >60.0 >60 mL/min/1.73 m^2 Final     eGFR if non    Date Value Ref Range Status   07/20/2020 >60.0 >60 mL/min/1.73 m^2 Final     Comment:     Calculation used to obtain the estimated glomerular filtration  rate (eGFR) is the CKD-EPI equation.            Radiology/Diagnostic Studies:    Chest CT  3/19/2020:      Impression       1. Mild bronchiectasis in the left lower lobe and tree-in-bud micro nodules at the left lung base suggesting underlying bronchiolitis, possibly due to a non tuberculous mycobacterial infection or viral bronchiolitis.  2. Fatty infiltration of the liver  3. Small hiatal hernia.           CXR  1/31/2020    Impression       1. No acute chest disease.  2. Left subclavian Port-A-Cath.               PET 9/11/2019   Impression       No evidence of FDG avid residual or recurrent lymphoma       I have reviewed all available lab results and radiology reports.    Assessment/Plan:   (1) 65 y.o. male with diagnosis of NHL Follicular Stage IIIA who has been referred by Dr Gary Chen with [facility] for continuation of care by medical hematology/oncology.   - He originally was diagnosed in 2012 and had parotid excision at [facility]. He subsequently went to [facility] and was treated with bendamustine-rituximab. He has been on rituximab maintenance every 8 weeks and IV IgG monthly. Dr Chen's plan was to keep him on maintenace therapy for as long as he could tolerate the treatments  - s/p 6 cycles of Bendamustine and Retuximab with subsequent complete remission  - 1st maintenance cycle rituximab was in March 2016  - he has been on maintenance rituximab and IV IgG - last rituximab 11/15/2018; last IV IgG on Dec 14th 2018  - last PET was on 9/26/2018 with no evidence of recurrence  - originally  diagnosed in Dec 2012 with left parotid and left periparotid LN excision on 12/11/2012  - PET scan done on  9/11/2019 was good    7/23/2020:  - new nodule on auricle of left ear suspicious for a skin cancer - will refer him to Dr Avilez with ENT  - he is due for repeat PET     (2) HTN     (3) Neuropathy     (4) GERD     (5) DM - on metformin per Dr Nunez     (6) Hypogammaglobulinemia - on Iv IgG monthly     (7) Steatosis of liver     (8) Degenerative disc disease of back     (9) Diverticular disease    (10) Mild bronchiectasis in the left lower lobe and tree-in-bud micro nodules at the left lung base suggesting underlying bronchiolitis  - seeing Lucia Georges           VISIT DIAGNOSES:      Follicular lymphoma grade IIIa, unspecified body region    Encounter for antineoplastic chemotherapy          PLAN:  1. Continue his rituximab every 8 weeks as long as he is tolerating the therapy as per Dr Chen's prior recommendations and directives    2. continue IV IgG monthly   3. Check labs every 8 weeks  4. F/u with PCP, Pulm etc  5. Repeat PET in Sept 2020  6. Refer to Dr Avilez with ENT for probable skin cancer on left ear        RTC in 8 weeks   Fax note to Barry Mcmahon; José Manuel Brock; Fatmata         Discussion:       I spent over 25 mins of time with the patient. Reviewed results of the recently ordered labs, tests and studies; made directives with regards to the results. Over half of this time was spent couseling and coordinating care.      COVID-19 Discussion:    I had long discussion with patient and any applicable family about the COVID-19 coronavirus epidemic and the recommended precautions with regard to cancer and/or hematology patients. I have re-iterated the CDC recommendations for adequate hand washing, use of hand -like products, and coughing into elbow, etc. In addition, especially for our patients who are on chemotherapy and/or our otherwise immunocompromised patients, I have recommended  avoidance of crowds, including movie theaters, restaurants, churches, etc. I have recommended avoidance of any sick or symptomatic family members and/or friends. I have also recommended avoidance of any raw and unwashed food products, and general avoidance of food items that have not been prepared by themselves. The patient has been asked to call us immediately with any symptom developments, issues, questions or other general concerns.     I have explained all of the above in detail and the patient understands all of the current recommendation(s). I have answered all of their questions to the best of my ability and to their complete satisfaction.   The patient is to continue with the current management plan.            Electronically signed by Jefferson Ibarra MD             Answers for HPI/ROS submitted by the patient on 7/21/2020   appetite change : No  unexpected weight change: No  visual disturbance: No  cough: No  shortness of breath: No  chest pain: No  abdominal pain: No  diarrhea: No  frequency: No  back pain: No  rash: Yes  headaches: No  adenopathy: No  nervous/ anxious: No

## 2020-07-23 ENCOUNTER — TELEPHONE (OUTPATIENT)
Dept: HEMATOLOGY/ONCOLOGY | Facility: CLINIC | Age: 65
End: 2020-07-23

## 2020-07-23 ENCOUNTER — TELEPHONE (OUTPATIENT)
Dept: OTOLARYNGOLOGY | Facility: CLINIC | Age: 65
End: 2020-07-23

## 2020-07-23 ENCOUNTER — INFUSION (OUTPATIENT)
Dept: INFUSION THERAPY | Facility: HOSPITAL | Age: 65
End: 2020-07-23
Attending: INTERNAL MEDICINE
Payer: OTHER GOVERNMENT

## 2020-07-23 ENCOUNTER — OFFICE VISIT (OUTPATIENT)
Dept: HEMATOLOGY/ONCOLOGY | Facility: CLINIC | Age: 65
End: 2020-07-23
Payer: OTHER GOVERNMENT

## 2020-07-23 VITALS
BODY MASS INDEX: 32.82 KG/M2 | TEMPERATURE: 98 F | RESPIRATION RATE: 18 BRPM | WEIGHT: 215.81 LBS | DIASTOLIC BLOOD PRESSURE: 83 MMHG | HEART RATE: 87 BPM | SYSTOLIC BLOOD PRESSURE: 144 MMHG

## 2020-07-23 VITALS
RESPIRATION RATE: 16 BRPM | SYSTOLIC BLOOD PRESSURE: 150 MMHG | WEIGHT: 208.13 LBS | OXYGEN SATURATION: 99 % | TEMPERATURE: 97 F | BODY MASS INDEX: 31.54 KG/M2 | HEART RATE: 81 BPM | HEIGHT: 68 IN | DIASTOLIC BLOOD PRESSURE: 76 MMHG

## 2020-07-23 DIAGNOSIS — C82.30 FOLLICULAR LYMPHOMA GRADE IIIA, UNSPECIFIED BODY REGION: Primary | ICD-10-CM

## 2020-07-23 DIAGNOSIS — Z51.11 ENCOUNTER FOR ANTINEOPLASTIC CHEMOTHERAPY: ICD-10-CM

## 2020-07-23 DIAGNOSIS — C82.81: ICD-10-CM

## 2020-07-23 DIAGNOSIS — C82.30 FOLLICULAR LYMPHOMA GRADE IIIA, UNSPECIFIED BODY REGION: ICD-10-CM

## 2020-07-23 DIAGNOSIS — D80.1 HYPOGAMMAGLOBULINEMIA: Primary | ICD-10-CM

## 2020-07-23 PROCEDURE — 96415 CHEMO IV INFUSION ADDL HR: CPT

## 2020-07-23 PROCEDURE — 96375 TX/PRO/DX INJ NEW DRUG ADDON: CPT

## 2020-07-23 PROCEDURE — 99214 PR OFFICE/OUTPT VISIT, EST, LEVL IV, 30-39 MIN: ICD-10-PCS | Mod: S$GLB,,, | Performed by: INTERNAL MEDICINE

## 2020-07-23 PROCEDURE — 63600175 PHARM REV CODE 636 W HCPCS: Performed by: INTERNAL MEDICINE

## 2020-07-23 PROCEDURE — 96413 CHEMO IV INFUSION 1 HR: CPT

## 2020-07-23 PROCEDURE — 96367 TX/PROPH/DG ADDL SEQ IV INF: CPT

## 2020-07-23 PROCEDURE — A4216 STERILE WATER/SALINE, 10 ML: HCPCS | Performed by: INTERNAL MEDICINE

## 2020-07-23 PROCEDURE — 25000003 PHARM REV CODE 250: Performed by: INTERNAL MEDICINE

## 2020-07-23 PROCEDURE — 99214 OFFICE O/P EST MOD 30 MIN: CPT | Mod: S$GLB,,, | Performed by: INTERNAL MEDICINE

## 2020-07-23 PROCEDURE — S0028 INJECTION, FAMOTIDINE, 20 MG: HCPCS | Performed by: INTERNAL MEDICINE

## 2020-07-23 RX ORDER — FAMOTIDINE 10 MG/ML
20 INJECTION INTRAVENOUS
Status: COMPLETED | OUTPATIENT
Start: 2020-07-23 | End: 2020-07-23

## 2020-07-23 RX ORDER — HEPARIN 100 UNIT/ML
500 SYRINGE INTRAVENOUS
Status: DISCONTINUED | OUTPATIENT
Start: 2020-07-23 | End: 2020-07-23 | Stop reason: HOSPADM

## 2020-07-23 RX ORDER — MEPERIDINE HYDROCHLORIDE 25 MG/ML
25 INJECTION INTRAMUSCULAR; INTRAVENOUS; SUBCUTANEOUS
Status: DISCONTINUED | OUTPATIENT
Start: 2020-07-23 | End: 2020-07-23 | Stop reason: HOSPADM

## 2020-07-23 RX ORDER — ACETAMINOPHEN 325 MG/1
650 TABLET ORAL
Status: COMPLETED | OUTPATIENT
Start: 2020-07-23 | End: 2020-07-23

## 2020-07-23 RX ORDER — SODIUM CHLORIDE 0.9 % (FLUSH) 0.9 %
10 SYRINGE (ML) INJECTION
Status: DISCONTINUED | OUTPATIENT
Start: 2020-07-23 | End: 2020-07-23 | Stop reason: HOSPADM

## 2020-07-23 RX ADMIN — HEPARIN 500 UNITS: 100 SYRINGE at 12:07

## 2020-07-23 RX ADMIN — DIPHENHYDRAMINE HYDROCHLORIDE 50 MG: 50 INJECTION INTRAMUSCULAR; INTRAVENOUS at 08:07

## 2020-07-23 RX ADMIN — RITUXIMAB 821 MG: 10 INJECTION, SOLUTION INTRAVENOUS at 09:07

## 2020-07-23 RX ADMIN — SODIUM CHLORIDE: 0.9 INJECTION, SOLUTION INTRAVENOUS at 08:07

## 2020-07-23 RX ADMIN — SODIUM CHLORIDE, PRESERVATIVE FREE 10 ML: 5 INJECTION INTRAVENOUS at 12:07

## 2020-07-23 RX ADMIN — FAMOTIDINE 20 MG: 10 INJECTION INTRAVENOUS at 08:07

## 2020-07-23 RX ADMIN — ACETAMINOPHEN 650 MG: 325 TABLET ORAL at 08:07

## 2020-07-23 NOTE — TELEPHONE ENCOUNTER
----- Message from Sara Watkins, RN sent at 7/23/2020 12:15 PM CDT -----  I spoke with the pt and offered an appt next week in [place] for something sooner or Tuesday 8/4 when Dr. Avilez is in [place]. Pt requests to wait to schedule til Jose Luis approves it. Pt states he will call when he knows.     I can put him down for whenever as soon as he gets aproval.  ----- Message -----  From: Jeaneth Juan  Sent: 7/23/2020   8:24 AM CDT  To: Fatmata Gonzalez Staff    Good morning,    Dr. Ibarra saw Mr. Thacker this morning and is referring him to see Dr. Avilez in [place]. Dr. Ibarra suspects he may have a spot of cancer in the ear. Could someone please give him a call with an appointment?    Thank you.

## 2020-07-23 NOTE — PLAN OF CARE
Problem: Fatigue  Goal: Improved Activity Tolerance  Outcome: Ongoing, Progressing  Intervention: Promote Energy Conservation  Flowsheets (Taken 7/23/2020 0805)  Fatigue Management: frequent rest breaks encouraged  Sleep/Rest Enhancement: regular sleep/rest pattern promoted  Activity Management: activity encouraged

## 2020-07-23 NOTE — TELEPHONE ENCOUNTER
Spoke with  Kirstie regarding setting him up to see Dr. Avilez regarding referral from Dr. Richardson for a spot in his ear. I offered an appt in [place] for 7/28 if he was willing to drive down here or an appt when Dr. Avilez is in [place] for 8/4. Pt lynn he is waiting for [facility] to approve the referral and requests to wait to schedule once it is approved.

## 2020-07-24 ENCOUNTER — INFUSION (OUTPATIENT)
Dept: INFUSION THERAPY | Facility: HOSPITAL | Age: 65
End: 2020-07-24
Attending: INTERNAL MEDICINE
Payer: OTHER GOVERNMENT

## 2020-07-24 VITALS
WEIGHT: 210 LBS | BODY MASS INDEX: 31.93 KG/M2 | OXYGEN SATURATION: 99 % | RESPIRATION RATE: 18 BRPM | TEMPERATURE: 98 F | DIASTOLIC BLOOD PRESSURE: 86 MMHG | SYSTOLIC BLOOD PRESSURE: 136 MMHG | HEART RATE: 69 BPM

## 2020-07-24 DIAGNOSIS — D80.1 NONFAMILIAL HYPOGAMMAGLOBULINEMIA: ICD-10-CM

## 2020-07-24 DIAGNOSIS — D80.1 HYPOGAMMAGLOBULINEMIA: Primary | ICD-10-CM

## 2020-07-24 DIAGNOSIS — C82.30 FOLLICULAR LYMPHOMA GRADE IIIA, UNSPECIFIED BODY REGION: ICD-10-CM

## 2020-07-24 PROCEDURE — 25000003 PHARM REV CODE 250: Performed by: NURSE PRACTITIONER

## 2020-07-24 PROCEDURE — 63600175 PHARM REV CODE 636 W HCPCS: Performed by: INTERNAL MEDICINE

## 2020-07-24 PROCEDURE — 96413 CHEMO IV INFUSION 1 HR: CPT

## 2020-07-24 PROCEDURE — 25000003 PHARM REV CODE 250: Performed by: INTERNAL MEDICINE

## 2020-07-24 PROCEDURE — A4216 STERILE WATER/SALINE, 10 ML: HCPCS | Performed by: NURSE PRACTITIONER

## 2020-07-24 PROCEDURE — 96415 CHEMO IV INFUSION ADDL HR: CPT

## 2020-07-24 PROCEDURE — 63600175 PHARM REV CODE 636 W HCPCS: Performed by: NURSE PRACTITIONER

## 2020-07-24 PROCEDURE — 96367 TX/PROPH/DG ADDL SEQ IV INF: CPT

## 2020-07-24 RX ORDER — DIPHENHYDRAMINE HCL 25 MG
25 CAPSULE ORAL
Status: CANCELLED | OUTPATIENT
Start: 2020-07-24

## 2020-07-24 RX ORDER — ACETAMINOPHEN 500 MG
500 TABLET ORAL
Status: CANCELLED | OUTPATIENT
Start: 2020-08-21

## 2020-07-24 RX ORDER — SODIUM CHLORIDE 0.9 % (FLUSH) 0.9 %
10 SYRINGE (ML) INJECTION
Status: DISCONTINUED | OUTPATIENT
Start: 2020-07-24 | End: 2020-07-24 | Stop reason: HOSPADM

## 2020-07-24 RX ORDER — ACETAMINOPHEN 500 MG
500 TABLET ORAL
Status: CANCELLED | OUTPATIENT
Start: 2020-07-24

## 2020-07-24 RX ORDER — ACETAMINOPHEN 500 MG
500 TABLET ORAL
Status: DISCONTINUED | OUTPATIENT
Start: 2020-07-24 | End: 2020-07-24 | Stop reason: HOSPADM

## 2020-07-24 RX ORDER — HEPARIN 100 UNIT/ML
500 SYRINGE INTRAVENOUS
Status: CANCELLED | OUTPATIENT
Start: 2020-07-24

## 2020-07-24 RX ORDER — HEPARIN 100 UNIT/ML
500 SYRINGE INTRAVENOUS
Status: CANCELLED | OUTPATIENT
Start: 2020-08-21

## 2020-07-24 RX ORDER — DIPHENHYDRAMINE HCL 25 MG
25 CAPSULE ORAL
Status: CANCELLED | OUTPATIENT
Start: 2020-08-21

## 2020-07-24 RX ORDER — SODIUM CHLORIDE 0.9 % (FLUSH) 0.9 %
10 SYRINGE (ML) INJECTION
Status: CANCELLED | OUTPATIENT
Start: 2020-07-24

## 2020-07-24 RX ORDER — SODIUM CHLORIDE 0.9 % (FLUSH) 0.9 %
10 SYRINGE (ML) INJECTION
Status: CANCELLED | OUTPATIENT
Start: 2020-08-21

## 2020-07-24 RX ORDER — HEPARIN 100 UNIT/ML
500 SYRINGE INTRAVENOUS
Status: DISCONTINUED | OUTPATIENT
Start: 2020-07-24 | End: 2020-07-24 | Stop reason: HOSPADM

## 2020-07-24 RX ADMIN — IMMUNE GLOBULIN (HUMAN) 30 G: 10 INJECTION INTRAVENOUS; SUBCUTANEOUS at 09:07

## 2020-07-24 RX ADMIN — HEPARIN 500 UNITS: 100 SYRINGE at 10:07

## 2020-07-24 RX ADMIN — SODIUM CHLORIDE, PRESERVATIVE FREE 10 ML: 5 INJECTION INTRAVENOUS at 10:07

## 2020-07-24 RX ADMIN — ACETAMINOPHEN 500 MG: 500 TABLET ORAL at 08:07

## 2020-07-24 RX ADMIN — DIPHENHYDRAMINE HYDROCHLORIDE 25 MG: 50 INJECTION INTRAMUSCULAR; INTRAVENOUS at 08:07

## 2020-07-28 LAB
ACID FAST MOD KINY STN SPEC: NORMAL
MYCOBACTERIUM SPEC QL CULT: NORMAL

## 2020-07-30 LAB
LEFT EYE DM RETINOPATHY: NEGATIVE
RIGHT EYE DM RETINOPATHY: NEGATIVE

## 2020-08-17 ENCOUNTER — OFFICE VISIT (OUTPATIENT)
Dept: PULMONOLOGY | Facility: CLINIC | Age: 65
End: 2020-08-17
Payer: OTHER GOVERNMENT

## 2020-08-17 VITALS
OXYGEN SATURATION: 98 % | DIASTOLIC BLOOD PRESSURE: 80 MMHG | SYSTOLIC BLOOD PRESSURE: 136 MMHG | WEIGHT: 208.44 LBS | BODY MASS INDEX: 31.69 KG/M2 | HEART RATE: 93 BPM

## 2020-08-17 DIAGNOSIS — R05.3 PERSISTENT COUGH: ICD-10-CM

## 2020-08-17 DIAGNOSIS — J47.0 BRONCHIECTASIS WITH ACUTE LOWER RESPIRATORY INFECTION: Primary | ICD-10-CM

## 2020-08-17 PROCEDURE — 99213 PR OFFICE/OUTPT VISIT, EST, LEVL III, 20-29 MIN: ICD-10-PCS | Mod: S$PBB,,, | Performed by: NURSE PRACTITIONER

## 2020-08-17 PROCEDURE — 99213 OFFICE O/P EST LOW 20 MIN: CPT | Mod: S$PBB,,, | Performed by: NURSE PRACTITIONER

## 2020-08-17 PROCEDURE — 99214 OFFICE O/P EST MOD 30 MIN: CPT | Mod: PBBFAC,PO | Performed by: NURSE PRACTITIONER

## 2020-08-17 PROCEDURE — 99999 PR PBB SHADOW E&M-EST. PATIENT-LVL IV: ICD-10-PCS | Mod: PBBFAC,,, | Performed by: NURSE PRACTITIONER

## 2020-08-17 PROCEDURE — 99999 PR PBB SHADOW E&M-EST. PATIENT-LVL IV: CPT | Mod: PBBFAC,,, | Performed by: NURSE PRACTITIONER

## 2020-08-17 NOTE — PROGRESS NOTES
8/17/2020    Sivakumar Thacker  Office note    Chief Complaint   Patient presents with    Follow-up     some cough still, green mucus    Results     PFT results       HPI:    8/17/20- Cough- improved following antibiotic therapy, returned after 8 weeks, productive, light green color, dime size, worse in early morning, no chest tightness or wheeze.  No SOB. Using nebulizer 2x daily for 2 weeks, undergoing chemo and IVIG for Lymphoma and skin cancer.     6/16/2020- Cough unchanged with coughing fits when trying to sleep. States no improvement with Trelegy inhaler. States Levaquin antibiotic help a little bit and cough returned after finishing. Has been on IgG replacement therapy every 4 weeks since 2012.  Brought in 5 sputum sample, all still processing.       4/20/2020- Chronic cough onset 2013, followed by pulmonologist Dr. Case in [place]. Put Advair with no benefit. Had negative bronchoscopy and negative AFB, had appointment with Dr. Brock in 2014. Hx of non Hodgkins lymphoma followed by Dr. Ibarra on Rotuxin and immune therapy.  Cough Worsened in Jan 2020, associated with recurrent fever improves with tylenol, states cough improves with antibiotics but returns when finished medications. Productive occasionally, can feel something rattling in chest, thick white mucous. 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 2 pack year  Family Hx: no Lung Cancer, no COPD, no Asthma  Medical Hx: no previous pneumonia ; no previous shoulder/chest surgery        The chief compliant  problem is stable  PFSH:  Past Medical History:   Diagnosis Date    Cancer     lymphoma currently on chemo    Diabetes mellitus, type 2     Encounter for  antineoplastic chemotherapy 4/1/2020    Hypertension     Neuropathy     Reflux esophagitis          Past Surgical History:   Procedure Laterality Date    COLONOSCOPY  2018    HAND SURGERY      amputation left index finger    PORTACATH PLACEMENT      SPINE SURGERY       Social History     Tobacco Use    Smoking status: Former Smoker     Packs/day: 0.50     Years: 2.00     Pack years: 1.00    Smokeless tobacco: Never Used   Substance Use Topics    Alcohol use: Yes     Comment: rarely    Drug use: No     Family History   Problem Relation Age of Onset    Diabetes Father      Review of patient's allergies indicates:  No Known Allergies  I have reviewed past medical, family, and social history. I have reviewed previous nurse notes.    Performance Status:The patient's activity level is no limits with regular activity.          Review of Systems   Constitutional: Negative for activity change, appetite change, chills, and unexpected weight change. Positive for fatigue, diaphoresis,   HENT: Negative for dental problem, sinus pressure, sinus pain, sneezing, sore throat, trouble swallowing and voice change.  Positive sinus drainage, post nasal drip,   Respiratory: Negative for apnea, chest tightness, shortness of breath, wheezing and stridor.  Positive for cough  Cardiovascular: Negative for chest pain, palpitations and leg swelling.   Gastrointestinal: Negative for abdominal distention, abdominal pain, constipation and nausea.   Musculoskeletal: Negative for gait problem, myalgias and neck pain.   Skin: Negative for color change and pallor.   Allergic/Immunologic: Negative for environmental allergies and food allergies.   Neurological: Negative for dizziness, speech difficulty, weakness, light-headedness, numbness and headaches.   Hematological: Negative for adenopathy. Does not bruise/bleed easily.   Psychiatric/Behavioral: Negative for dysphoric mood and sleep disturbance. The patient is not nervous/anxious.            Exam:Comprehensive exam done. /80 (BP Location: Right arm, Patient Position: Sitting)   Pulse 93   Wt 94.6 kg (208 lb 7.1 oz)   SpO2 98% Comment: on room air at rest  BMI 31.69 kg/m²   Exam included Vitals as listed  Constitutional: He is oriented to person, place, and time. He appears well-developed. No distress.   Nose: Nose normal.   Mouth/Throat: Uvula is midline, oropharynx is clear and moist and mucous membranes are normal. No dental caries. No oropharyngeal exudate, posterior oropharyngeal edema, posterior oropharyngeal erythema or tonsillar abscesses.  Mallapatti (M) score 2  Eyes: Pupils are equal, round, and reactive to light.   Neck: No JVD present. No thyromegaly present.   Cardiovascular: Normal rate, regular rhythm and normal heart sounds. Exam reveals no gallop and no friction rub.   No murmur heard.  Pulmonary/Chest: Effort normal and breath sounds abnormal: bilateral rhonchi and wheeze.   No accessory muscle usage or stridor. No apnea and no tachypnea. No respiratory distress, decreased breath sounds, or tenderness.   Abdominal: Soft. He exhibits no mass. There is no tenderness. No hepatosplenomegaly, hernias and normoactive bowel sounds  Musculoskeletal: Normal range of motion. exhibits no edema.   Lymphadenopathy:     He has no cervical adenopathy.   Neurological:  alert and oriented to person, place, and time. not disoriented.   Skin: Skin is warm and dry. Capillary refill takes less 2 sec. No cyanosis or erythema. No pallor. Nails show no clubbing.   Psychiatric: normal mood and affect. behavior is normal. Judgment and thought content normal.       Radiographs (ct chest and cxr) reviewed: view by direct vision   X-Ray Chest PA And Lateral 03/19/2020   No acute cardiopulmonary abnormality appreciated radiographically.  No interval detrimental change in the radiographic appearance of the chest when compared to the previous study.     CT Chest Without 03/19/2020   1. Mild  bronchiectasis in the left lower lobe and tree-in-bud micro nodules at the left lung base suggesting underlying bronchiolitis, possibly due to a non tuberculous mycobacterial infection or viral bronchiolitis.  2. Fatty infiltration of the liver  3. Small hiatal hernia.     NM PET CT Routine Skull to Mid Thigh 09/11/2019   No evidence of FDG avid residual or recurrent lymphoma         Labs reviewed       Lab Results   Component Value Date    WBC 4.92 07/20/2020    RBC 5.13 07/20/2020    HGB 15.1 07/20/2020    HCT 45.2 07/20/2020    MCV 88 07/20/2020    MCH 29.4 07/20/2020    MCHC 33.4 07/20/2020    RDW 12.6 07/20/2020     07/20/2020    MPV 9.2 07/20/2020    GRAN 2.5 07/20/2020    GRAN 49.9 07/20/2020    LYMPH 1.4 07/20/2020    LYMPH 27.4 07/20/2020    MONO 0.7 07/20/2020    MONO 13.8 07/20/2020    EOS 0.2 07/20/2020    BASO 0.05 07/20/2020    EOSINOPHIL 4.9 07/20/2020    BASOPHIL 1.0 07/20/2020   Results for CHRIS GUTIERREZ (MRN [id number]) as of 4/20/2020 09:15   Ref. Range 4/1/2020 15:45   Albumin Latest Ref Range: 3.5 - 5.2 g/dL 4.3   BILIRUBIN TOTAL Latest Ref Range: 0.1 - 1.0 mg/dL 0.7   AST Latest Ref Range: 10 - 40 U/L 39     AFB x 3 all negative following 8 week incubation, Respiratory cultures show normal dank only    PFT  reviewed  Pulmonary Functions Testing Results:    7/17/2013 PFT Data: Complete pulmonary function studies were obtained today and are independently reviewed. Spirometry shows no evidence of airflow obstruction today. Lung was performed by plethysmography and are consistent with air trapping without hyperinflation. Residual volume is 122% predicted, and the total capacity is 98% predicted. Diffusion capacity for carbon monoxide is in the normal range at 102% predicted. Overall, these are fairly normal pulmonary function studies and do not suggest any obstructive lung disease.    Spirometry bronchodilator, lung volume by gas dilution, diffusion capacity measured June 23, 2020.  The  FEV1 FVC ratio was 80% indicating no airflow obstruction by spirometry technique.  The FEV1 was 108% of predicted or 3.5 L.  There was no   significant improvement following bronchodilator.  Total lung capacity on lung volume by gas dilution was 69% and a little reduced.  Diffusion was normal.       Spirometry was normal and was no significant bronchodilator response.  Total lung capacity was reduced consistent with restriction.  Diffusion was normal.  Clinical correlation recommended.         Plan:  Clinical impression is apparently straight forward and impression with management as below.    Sivakumar was seen today for follow-up and results.    Diagnoses and all orders for this visit:    Bronchiectasis with acute lower respiratory infection  -     Case request GI: Bronchoscopy    Persistent cough  -     Case request GI: Bronchoscopy        Follow up in about 3 months (around 11/17/2020), or if symptoms worsen or fail to improve.    Discussed with patient above for education the following:      Patient Instructions   Due to sputum sample being negative, will proceed with bronchoscopy for diagnosis of respiratory bug.    This is a result of your weakened immune system from your lymphoma disease.     Treating with sporadic doses of antibiotics will only lead to resistance. Will not treat with antibiotic now to preserve bronchoscopy sample.  Use nebulizer before procedure.     Need diagnosis to start 18 month to 5 years therapy plan to eradicate offending bacteria.    Lung function test is normal. No COPD is seen.

## 2020-08-17 NOTE — PATIENT INSTRUCTIONS
Due to sputum sample being negative, will proceed with bronchoscopy for diagnosis of respiratory bug.    This is a result of your weakened immune system from your lymphoma disease.     Treating with sporadic doses of antibiotics will only lead to resistance. Will not treat with antibiotic now to preserve bronchoscopy sample.  Use nebulizer before procedure.     Need diagnosis to start 18 month to 5 years therapy plan to eradicate offending bacteria.    Lung function test is normal. No COPD is seen.

## 2020-08-18 ENCOUNTER — TELEPHONE (OUTPATIENT)
Dept: PULMONOLOGY | Facility: CLINIC | Age: 65
End: 2020-08-18

## 2020-08-18 ENCOUNTER — LAB VISIT (OUTPATIENT)
Dept: LAB | Facility: HOSPITAL | Age: 65
End: 2020-08-18
Attending: INTERNAL MEDICINE
Payer: OTHER GOVERNMENT

## 2020-08-18 DIAGNOSIS — C82.30 FOLLICULAR LYMPHOMA GRADE IIIA, UNSPECIFIED BODY REGION: ICD-10-CM

## 2020-08-18 DIAGNOSIS — D80.1 NONFAMILIAL HYPOGAMMAGLOBULINEMIA: ICD-10-CM

## 2020-08-18 LAB
ALBUMIN SERPL BCP-MCNC: 4.4 G/DL (ref 3.5–5.2)
ALP SERPL-CCNC: 98 U/L (ref 55–135)
ALT SERPL W/O P-5'-P-CCNC: 23 U/L (ref 10–44)
ANION GAP SERPL CALC-SCNC: 12 MMOL/L (ref 8–16)
AST SERPL-CCNC: 29 U/L (ref 10–40)
BASOPHILS # BLD AUTO: 0.04 K/UL (ref 0–0.2)
BASOPHILS NFR BLD: 1 % (ref 0–1.9)
BILIRUB SERPL-MCNC: 0.9 MG/DL (ref 0.1–1)
BUN SERPL-MCNC: 16 MG/DL (ref 8–23)
CALCIUM SERPL-MCNC: 9.4 MG/DL (ref 8.7–10.5)
CHLORIDE SERPL-SCNC: 101 MMOL/L (ref 95–110)
CO2 SERPL-SCNC: 23 MMOL/L (ref 23–29)
CREAT SERPL-MCNC: 0.8 MG/DL (ref 0.5–1.4)
DIFFERENTIAL METHOD: ABNORMAL
EOSINOPHIL # BLD AUTO: 0.2 K/UL (ref 0–0.5)
EOSINOPHIL NFR BLD: 4.3 % (ref 0–8)
ERYTHROCYTE [DISTWIDTH] IN BLOOD BY AUTOMATED COUNT: 12.8 % (ref 11.5–14.5)
EST. GFR  (AFRICAN AMERICAN): >60 ML/MIN/1.73 M^2
EST. GFR  (NON AFRICAN AMERICAN): >60 ML/MIN/1.73 M^2
GLUCOSE SERPL-MCNC: 144 MG/DL (ref 70–110)
HCT VFR BLD AUTO: 44.3 % (ref 40–54)
HGB BLD-MCNC: 14.7 G/DL (ref 14–18)
IMM GRANULOCYTES # BLD AUTO: 0.2 K/UL (ref 0–0.04)
IMM GRANULOCYTES NFR BLD AUTO: 5 % (ref 0–0.5)
LYMPHOCYTES # BLD AUTO: 1.1 K/UL (ref 1–4.8)
LYMPHOCYTES NFR BLD: 28 % (ref 18–48)
MCH RBC QN AUTO: 28.9 PG (ref 27–31)
MCHC RBC AUTO-ENTMCNC: 33.2 G/DL (ref 32–36)
MCV RBC AUTO: 87 FL (ref 82–98)
MONOCYTES # BLD AUTO: 0.6 K/UL (ref 0.3–1)
MONOCYTES NFR BLD: 15 % (ref 4–15)
NEUTROPHILS # BLD AUTO: 1.9 K/UL (ref 1.8–7.7)
NEUTROPHILS NFR BLD: 46.7 % (ref 38–73)
NRBC BLD-RTO: 0 /100 WBC
PLATELET # BLD AUTO: 261 K/UL (ref 150–350)
PMV BLD AUTO: 9.4 FL (ref 9.2–12.9)
POTASSIUM SERPL-SCNC: 4.7 MMOL/L (ref 3.5–5.1)
PROT SERPL-MCNC: 7.2 G/DL (ref 6–8.4)
RBC # BLD AUTO: 5.09 M/UL (ref 4.6–6.2)
SODIUM SERPL-SCNC: 136 MMOL/L (ref 136–145)
WBC # BLD AUTO: 4 K/UL (ref 3.9–12.7)

## 2020-08-18 PROCEDURE — 85027 COMPLETE CBC AUTOMATED: CPT

## 2020-08-18 PROCEDURE — 85007 BL SMEAR W/DIFF WBC COUNT: CPT

## 2020-08-18 PROCEDURE — 80053 COMPREHEN METABOLIC PANEL: CPT

## 2020-08-18 PROCEDURE — 36415 COLL VENOUS BLD VENIPUNCTURE: CPT

## 2020-08-18 NOTE — TELEPHONE ENCOUNTER
Spoke to patient. Informed him that upcoming Bronch tis friday with Dr. Veliz was going to be rescheduled to another date and time. One of the providers would call him to reschedule.  ----- Message from Haven Reich sent at 8/18/2020  1:35 PM CDT -----  Regarding: Pls call pt to reschedule procedure on friday  Contact: Sivakumar vaz  Type: Needs Medical Advice  Who Called:  Sivakumar Wong Call Back Number: [phone]  Additional Information: Pls call pt regarding rescheduling procedure on friday

## 2020-08-21 ENCOUNTER — INFUSION (OUTPATIENT)
Dept: INFUSION THERAPY | Facility: HOSPITAL | Age: 65
End: 2020-08-21
Attending: INTERNAL MEDICINE
Payer: OTHER GOVERNMENT

## 2020-08-21 VITALS
WEIGHT: 211.31 LBS | TEMPERATURE: 97 F | BODY MASS INDEX: 32.13 KG/M2 | DIASTOLIC BLOOD PRESSURE: 70 MMHG | RESPIRATION RATE: 18 BRPM | OXYGEN SATURATION: 97 % | SYSTOLIC BLOOD PRESSURE: 115 MMHG | HEART RATE: 79 BPM

## 2020-08-21 DIAGNOSIS — C82.30 FOLLICULAR LYMPHOMA GRADE IIIA, UNSPECIFIED BODY REGION: ICD-10-CM

## 2020-08-21 DIAGNOSIS — D80.1 HYPOGAMMAGLOBULINEMIA: Primary | ICD-10-CM

## 2020-08-21 DIAGNOSIS — D80.1 NONFAMILIAL HYPOGAMMAGLOBULINEMIA: ICD-10-CM

## 2020-08-21 PROCEDURE — 63600175 PHARM REV CODE 636 W HCPCS: Mod: JG | Performed by: NURSE PRACTITIONER

## 2020-08-21 PROCEDURE — 96413 CHEMO IV INFUSION 1 HR: CPT

## 2020-08-21 PROCEDURE — 96415 CHEMO IV INFUSION ADDL HR: CPT

## 2020-08-21 PROCEDURE — 25000003 PHARM REV CODE 250: Performed by: NURSE PRACTITIONER

## 2020-08-21 PROCEDURE — 96367 TX/PROPH/DG ADDL SEQ IV INF: CPT

## 2020-08-21 RX ORDER — SODIUM CHLORIDE 0.9 % (FLUSH) 0.9 %
10 SYRINGE (ML) INJECTION
Status: DISCONTINUED | OUTPATIENT
Start: 2020-08-21 | End: 2020-08-21 | Stop reason: HOSPADM

## 2020-08-21 RX ORDER — DIPHENHYDRAMINE HCL 25 MG
25 CAPSULE ORAL
Status: CANCELLED | OUTPATIENT
Start: 2020-09-18

## 2020-08-21 RX ORDER — HEPARIN 100 UNIT/ML
500 SYRINGE INTRAVENOUS
Status: CANCELLED | OUTPATIENT
Start: 2020-09-18

## 2020-08-21 RX ORDER — ACETAMINOPHEN 500 MG
500 TABLET ORAL
Status: DISCONTINUED | OUTPATIENT
Start: 2020-08-21 | End: 2020-08-21 | Stop reason: HOSPADM

## 2020-08-21 RX ORDER — HEPARIN 100 UNIT/ML
500 SYRINGE INTRAVENOUS
Status: DISCONTINUED | OUTPATIENT
Start: 2020-08-21 | End: 2020-08-21 | Stop reason: HOSPADM

## 2020-08-21 RX ORDER — SODIUM CHLORIDE 0.9 % (FLUSH) 0.9 %
10 SYRINGE (ML) INJECTION
Status: CANCELLED | OUTPATIENT
Start: 2020-09-18

## 2020-08-21 RX ORDER — ACETAMINOPHEN 500 MG
500 TABLET ORAL
Status: CANCELLED | OUTPATIENT
Start: 2020-09-18

## 2020-08-21 RX ORDER — DIPHENHYDRAMINE HCL 25 MG
25 CAPSULE ORAL
Status: DISCONTINUED | OUTPATIENT
Start: 2020-08-21 | End: 2020-08-21 | Stop reason: HOSPADM

## 2020-08-21 RX ADMIN — SODIUM CHLORIDE: 0.9 INJECTION, SOLUTION INTRAVENOUS at 09:08

## 2020-08-21 RX ADMIN — IMMUNE GLOBULIN (HUMAN) 30 G: 10 INJECTION INTRAVENOUS; SUBCUTANEOUS at 09:08

## 2020-08-21 RX ADMIN — DIPHENHYDRAMINE HYDROCHLORIDE 25 MG: 50 INJECTION INTRAMUSCULAR; INTRAVENOUS at 09:08

## 2020-08-21 RX ADMIN — HEPARIN 500 UNITS: 100 SYRINGE at 11:08

## 2020-08-21 NOTE — PLAN OF CARE
Patient given IVIG with premedications per MD orders. Tolerated well. Port remains free of signs and symptoms of infection.

## 2020-08-25 ENCOUNTER — TELEPHONE (OUTPATIENT)
Dept: PULMONOLOGY | Facility: CLINIC | Age: 65
End: 2020-08-25

## 2020-08-25 DIAGNOSIS — J47.0 BRONCHIECTASIS WITH ACUTE LOWER RESPIRATORY INFECTION: ICD-10-CM

## 2020-08-25 DIAGNOSIS — R91.8 MULTIPLE NODULES OF LUNG: Primary | ICD-10-CM

## 2020-08-25 NOTE — TELEPHONE ENCOUNTER
Spoke to pt on phone, will delay bronchoscopy until repeat CT of chest and sputum cultures reviewed.

## 2020-08-28 ENCOUNTER — LAB VISIT (OUTPATIENT)
Dept: LAB | Facility: HOSPITAL | Age: 65
End: 2020-08-28
Attending: NURSE PRACTITIONER
Payer: OTHER GOVERNMENT

## 2020-08-28 DIAGNOSIS — R91.8 MULTIPLE NODULES OF LUNG: ICD-10-CM

## 2020-08-28 DIAGNOSIS — J47.0 BRONCHIECTASIS WITH ACUTE LOWER RESPIRATORY INFECTION: ICD-10-CM

## 2020-08-28 PROCEDURE — 87205 SMEAR GRAM STAIN: CPT

## 2020-08-28 PROCEDURE — 87070 CULTURE OTHR SPECIMN AEROBIC: CPT

## 2020-08-31 LAB
BACTERIA SPEC AEROBE CULT: NORMAL
BACTERIA SPEC AEROBE CULT: NORMAL
GRAM STN SPEC: NORMAL

## 2020-09-01 ENCOUNTER — HOSPITAL ENCOUNTER (OUTPATIENT)
Dept: RADIOLOGY | Facility: HOSPITAL | Age: 65
Discharge: HOME | End: 2020-09-01
Attending: NURSE PRACTITIONER
Payer: OTHER GOVERNMENT

## 2020-09-01 DIAGNOSIS — J47.0 BRONCHIECTASIS WITH ACUTE LOWER RESPIRATORY INFECTION: ICD-10-CM

## 2020-09-01 DIAGNOSIS — R91.8 MULTIPLE NODULES OF LUNG: ICD-10-CM

## 2020-09-01 PROCEDURE — 71250 CT THORAX DX C-: CPT | Mod: TC

## 2020-09-01 PROCEDURE — 71250 CT CHEST WITHOUT CONTRAST: ICD-10-PCS | Mod: 26,,, | Performed by: RADIOLOGY

## 2020-09-01 PROCEDURE — 71250 CT THORAX DX C-: CPT | Mod: 26,,, | Performed by: RADIOLOGY

## 2020-09-02 ENCOUNTER — HOSPITAL ENCOUNTER (OUTPATIENT)
Dept: RADIOLOGY | Facility: HOSPITAL | Age: 65
Discharge: HOME | End: 2020-09-02
Attending: INTERNAL MEDICINE
Payer: OTHER GOVERNMENT

## 2020-09-02 VITALS — WEIGHT: 210 LBS | HEIGHT: 68 IN | BODY MASS INDEX: 31.83 KG/M2

## 2020-09-02 DIAGNOSIS — C82.81: ICD-10-CM

## 2020-09-02 LAB — GLUCOSE SERPL-MCNC: 128 MG/DL (ref 70–110)

## 2020-09-02 PROCEDURE — A9552 F18 FDG: HCPCS | Mod: PO

## 2020-09-02 PROCEDURE — 78815 PET IMAGE W/CT SKULL-THIGH: CPT | Mod: TC,PO

## 2020-09-03 ENCOUNTER — TELEPHONE (OUTPATIENT)
Dept: HEMATOLOGY/ONCOLOGY | Facility: CLINIC | Age: 65
End: 2020-09-03

## 2020-09-03 NOTE — TELEPHONE ENCOUNTER
----- Message from Jefferson Ibarra MD sent at 9/2/2020 12:18 PM CDT -----  Call him with the stable report

## 2020-09-04 ENCOUNTER — TELEPHONE (OUTPATIENT)
Dept: PULMONOLOGY | Facility: CLINIC | Age: 65
End: 2020-09-04

## 2020-09-04 NOTE — TELEPHONE ENCOUNTER
Spoke with patient and reviewed CT results, patient verbally understood.     ----- Message from Lucia Georges NP sent at 9/4/2020 11:34 AM CDT -----  CT is stable. No changes, area of concern appears to be old scare tissue and not current infection.

## 2020-09-13 RX ORDER — ACETAMINOPHEN 325 MG/1
650 TABLET ORAL
Status: CANCELLED | OUTPATIENT
Start: 2020-09-17

## 2020-09-13 RX ORDER — FAMOTIDINE 10 MG/ML
20 INJECTION INTRAVENOUS
Status: CANCELLED | OUTPATIENT
Start: 2020-09-17

## 2020-09-13 RX ORDER — MEPERIDINE HYDROCHLORIDE 50 MG/ML
25 INJECTION INTRAMUSCULAR; INTRAVENOUS; SUBCUTANEOUS
Status: CANCELLED | OUTPATIENT
Start: 2020-09-17

## 2020-09-13 RX ORDER — HEPARIN 100 UNIT/ML
500 SYRINGE INTRAVENOUS
Status: CANCELLED | OUTPATIENT
Start: 2020-09-17

## 2020-09-13 RX ORDER — SODIUM CHLORIDE 0.9 % (FLUSH) 0.9 %
10 SYRINGE (ML) INJECTION
Status: CANCELLED | OUTPATIENT
Start: 2020-09-17

## 2020-09-16 ENCOUNTER — LAB VISIT (OUTPATIENT)
Dept: LAB | Facility: HOSPITAL | Age: 65
End: 2020-09-16
Attending: INTERNAL MEDICINE
Payer: OTHER GOVERNMENT

## 2020-09-16 ENCOUNTER — OFFICE VISIT (OUTPATIENT)
Dept: FAMILY MEDICINE | Facility: CLINIC | Age: 65
End: 2020-09-16
Payer: OTHER GOVERNMENT

## 2020-09-16 ENCOUNTER — TELEPHONE (OUTPATIENT)
Dept: FAMILY MEDICINE | Facility: CLINIC | Age: 65
End: 2020-09-16

## 2020-09-16 VITALS
DIASTOLIC BLOOD PRESSURE: 72 MMHG | HEART RATE: 77 BPM | HEIGHT: 68 IN | WEIGHT: 209.13 LBS | RESPIRATION RATE: 20 BRPM | TEMPERATURE: 98 F | OXYGEN SATURATION: 97 % | BODY MASS INDEX: 31.7 KG/M2 | SYSTOLIC BLOOD PRESSURE: 129 MMHG

## 2020-09-16 DIAGNOSIS — R91.8 MULTIPLE NODULES OF LUNG: ICD-10-CM

## 2020-09-16 DIAGNOSIS — I10 ESSENTIAL HYPERTENSION: ICD-10-CM

## 2020-09-16 DIAGNOSIS — C82.30 FOLLICULAR LYMPHOMA GRADE IIIA, UNSPECIFIED BODY REGION: ICD-10-CM

## 2020-09-16 DIAGNOSIS — J47.0 BRONCHIECTASIS WITH ACUTE LOWER RESPIRATORY INFECTION: ICD-10-CM

## 2020-09-16 DIAGNOSIS — J32.9 SINUSITIS, UNSPECIFIED CHRONICITY, UNSPECIFIED LOCATION: ICD-10-CM

## 2020-09-16 DIAGNOSIS — E11.9 TYPE 2 DIABETES MELLITUS WITHOUT COMPLICATION, WITHOUT LONG-TERM CURRENT USE OF INSULIN: Primary | ICD-10-CM

## 2020-09-16 LAB
ALBUMIN SERPL BCP-MCNC: 3.9 G/DL (ref 3.5–5.2)
ALP SERPL-CCNC: 93 U/L (ref 55–135)
ALT SERPL W/O P-5'-P-CCNC: 26 U/L (ref 10–44)
ANION GAP SERPL CALC-SCNC: 11 MMOL/L (ref 8–16)
AST SERPL-CCNC: 23 U/L (ref 10–40)
BASOPHILS # BLD AUTO: 0.02 K/UL (ref 0–0.2)
BASOPHILS NFR BLD: 0.5 % (ref 0–1.9)
BILIRUB SERPL-MCNC: 0.5 MG/DL (ref 0.1–1)
BUN SERPL-MCNC: 12 MG/DL (ref 8–23)
CALCIUM SERPL-MCNC: 8.5 MG/DL (ref 8.7–10.5)
CHLORIDE SERPL-SCNC: 102 MMOL/L (ref 95–110)
CO2 SERPL-SCNC: 26 MMOL/L (ref 23–29)
CREAT SERPL-MCNC: 0.8 MG/DL (ref 0.5–1.4)
DIFFERENTIAL METHOD: ABNORMAL
EOSINOPHIL # BLD AUTO: 0.2 K/UL (ref 0–0.5)
EOSINOPHIL NFR BLD: 4.2 % (ref 0–8)
ERYTHROCYTE [DISTWIDTH] IN BLOOD BY AUTOMATED COUNT: 13.2 % (ref 11.5–14.5)
EST. GFR  (AFRICAN AMERICAN): >60 ML/MIN/1.73 M^2
EST. GFR  (NON AFRICAN AMERICAN): >60 ML/MIN/1.73 M^2
GLUCOSE SERPL-MCNC: 166 MG/DL (ref 70–110)
HCT VFR BLD AUTO: 41.6 % (ref 40–54)
HGB BLD-MCNC: 14 G/DL (ref 14–18)
IMM GRANULOCYTES # BLD AUTO: 0.17 K/UL (ref 0–0.04)
IMM GRANULOCYTES NFR BLD AUTO: 4 % (ref 0–0.5)
LYMPHOCYTES # BLD AUTO: 1.6 K/UL (ref 1–4.8)
LYMPHOCYTES NFR BLD: 36.6 % (ref 18–48)
MCH RBC QN AUTO: 29.1 PG (ref 27–31)
MCHC RBC AUTO-ENTMCNC: 33.7 G/DL (ref 32–36)
MCV RBC AUTO: 87 FL (ref 82–98)
MONOCYTES # BLD AUTO: 0.6 K/UL (ref 0.3–1)
MONOCYTES NFR BLD: 14.3 % (ref 4–15)
NEUTROPHILS # BLD AUTO: 1.7 K/UL (ref 1.8–7.7)
NEUTROPHILS NFR BLD: 40.4 % (ref 38–73)
NRBC BLD-RTO: 0 /100 WBC
PLATELET # BLD AUTO: 262 K/UL (ref 150–350)
PMV BLD AUTO: 9.5 FL (ref 9.2–12.9)
POTASSIUM SERPL-SCNC: 3.4 MMOL/L (ref 3.5–5.1)
PROT SERPL-MCNC: 6.5 G/DL (ref 6–8.4)
RBC # BLD AUTO: 4.81 M/UL (ref 4.6–6.2)
SODIUM SERPL-SCNC: 139 MMOL/L (ref 136–145)
WBC # BLD AUTO: 4.26 K/UL (ref 3.9–12.7)

## 2020-09-16 PROCEDURE — 80053 COMPREHEN METABOLIC PANEL: CPT

## 2020-09-16 PROCEDURE — 87205 SMEAR GRAM STAIN: CPT

## 2020-09-16 PROCEDURE — 99999 PR PBB SHADOW E&M-EST. PATIENT-LVL IV: CPT | Mod: PBBFAC,,, | Performed by: FAMILY MEDICINE

## 2020-09-16 PROCEDURE — 99214 OFFICE O/P EST MOD 30 MIN: CPT | Mod: S$PBB,,, | Performed by: FAMILY MEDICINE

## 2020-09-16 PROCEDURE — 99214 OFFICE O/P EST MOD 30 MIN: CPT | Mod: PBBFAC,PN | Performed by: FAMILY MEDICINE

## 2020-09-16 PROCEDURE — 99999 PR PBB SHADOW E&M-EST. PATIENT-LVL IV: ICD-10-PCS | Mod: PBBFAC,,, | Performed by: FAMILY MEDICINE

## 2020-09-16 PROCEDURE — 87070 CULTURE OTHR SPECIMN AEROBIC: CPT

## 2020-09-16 PROCEDURE — 87077 CULTURE AEROBIC IDENTIFY: CPT

## 2020-09-16 PROCEDURE — 87185 SC STD ENZYME DETCJ PER NZM: CPT

## 2020-09-16 PROCEDURE — 99214 PR OFFICE/OUTPT VISIT, EST, LEVL IV, 30-39 MIN: ICD-10-PCS | Mod: S$PBB,,, | Performed by: FAMILY MEDICINE

## 2020-09-16 PROCEDURE — 85025 COMPLETE CBC W/AUTO DIFF WBC: CPT

## 2020-09-16 PROCEDURE — 36415 COLL VENOUS BLD VENIPUNCTURE: CPT

## 2020-09-16 RX ORDER — FLUTICASONE PROPIONATE 50 MCG
1 SPRAY, SUSPENSION (ML) NASAL 2 TIMES DAILY
Qty: 15.8 ML | Refills: 2 | Status: SHIPPED | OUTPATIENT
Start: 2020-09-16 | End: 2021-06-23

## 2020-09-16 RX ORDER — BLOOD-GLUCOSE METER
KIT MISCELLANEOUS
Qty: 200 EACH | Refills: 11 | Status: SHIPPED | OUTPATIENT
Start: 2020-09-16 | End: 2021-06-23 | Stop reason: SDUPTHER

## 2020-09-16 RX ORDER — OMEPRAZOLE 40 MG/1
40 CAPSULE, DELAYED RELEASE ORAL DAILY
Qty: 90 CAPSULE | Refills: 3 | Status: SHIPPED | OUTPATIENT
Start: 2020-09-16 | End: 2021-06-23 | Stop reason: SDUPTHER

## 2020-09-16 RX ORDER — TIZANIDINE 4 MG/1
4 TABLET ORAL DAILY PRN
Qty: 90 TABLET | Refills: 3 | Status: SHIPPED | OUTPATIENT
Start: 2020-09-16 | End: 2021-06-23 | Stop reason: SDUPTHER

## 2020-09-16 RX ORDER — METFORMIN HYDROCHLORIDE 500 MG/1
TABLET ORAL
Qty: 360 TABLET | Refills: 3 | Status: SHIPPED | OUTPATIENT
Start: 2020-09-16 | End: 2021-06-23 | Stop reason: SDUPTHER

## 2020-09-16 RX ORDER — ATORVASTATIN CALCIUM 20 MG/1
20 TABLET, FILM COATED ORAL DAILY
Qty: 90 TABLET | Refills: 3 | Status: SHIPPED | OUTPATIENT
Start: 2020-09-16 | End: 2021-06-23 | Stop reason: SDUPTHER

## 2020-09-16 RX ORDER — AMLODIPINE BESYLATE 10 MG/1
10 TABLET ORAL DAILY
Qty: 90 TABLET | Refills: 3 | Status: SHIPPED | OUTPATIENT
Start: 2020-09-16 | End: 2021-06-23 | Stop reason: SDUPTHER

## 2020-09-16 RX ORDER — LOSARTAN POTASSIUM 100 MG/1
100 TABLET ORAL DAILY
Qty: 90 TABLET | Refills: 3 | Status: SHIPPED | OUTPATIENT
Start: 2020-09-16 | End: 2021-06-23 | Stop reason: SDUPTHER

## 2020-09-16 RX ORDER — MONTELUKAST SODIUM 10 MG/1
10 TABLET ORAL NIGHTLY PRN
Qty: 90 TABLET | Refills: 3 | Status: SHIPPED | OUTPATIENT
Start: 2020-09-16 | End: 2021-06-23 | Stop reason: SDUPTHER

## 2020-09-16 RX ORDER — NAPROXEN 500 MG/1
500 TABLET ORAL 2 TIMES DAILY PRN
Qty: 180 TABLET | Refills: 3 | Status: SHIPPED | OUTPATIENT
Start: 2020-09-16 | End: 2021-06-23 | Stop reason: SDUPTHER

## 2020-09-16 NOTE — PROGRESS NOTES
Subjective:       Patient ID: Sivakumar Thacker is a 65 y.o. male.    Chief Complaint: Follow-up (Eye exam completed w/Dr. Andersen, would like foot exam ) and Medication Refill    In regard to the patient's diabetes, patient reports that blood sugars have been well controlled. Patient Denies hypoglycemia. Patient is not currently on insulin therapy. Patient is on ACE/ARB and is on statin. Last a1c was Hemoglobin A1C       Date                     Value               Ref Range           Status                06/24/2020               6.6 (H)             4.5 - 6.2 %         Final              Comment:    According to ADA guidelines, hemoglobin A1C <7.0% represents  optimal control in non-pregnant diabetic patients.  Different  metrics may apply to specific populations.   Standards of Medical Care in Diabetes - 2016.  For the purpose of screening for the presence of diabetes:  <5.7%     Consistent with the absence of diabetes  5.7-6.4%  Consistent with increasing risk for diabetes   (prediabetes)  >or=6.5%  Consistent with diabetes  Currently no consensus exists for use of hemoglobin A1C  for diagnosis of diabetes for children.         12/09/2019               6.2                 4.5 - 6.2 %         Final              Comment:    According to ADA guidelines, hemoglobin A1C <7.0% represents  optimal control in non-pregnant diabetic patients.  Different  metrics may apply to specific populations.   Standards of Medical Care in Diabetes - 2016.  For the purpose of screening for the presence of diabetes:  <5.7%     Consistent with the absence of diabetes  5.7-6.4%  Consistent with increasing risk for diabetes   (prediabetes)  >or=6.5%  Consistent with diabetes  Currently no consensus exists for use of hemoglobin A1C  for diagnosis of diabetes for children.    ----------      Review of Systems   Constitutional: Negative for activity change, appetite change, fatigue and fever.   HENT: Negative for congestion, dental problem,  "ear discharge, hearing loss, postnasal drip, sinus pain, sneezing and trouble swallowing.    Eyes: Negative for photophobia and discharge.   Respiratory: Negative for apnea, cough and shortness of breath.    Cardiovascular: Negative for chest pain.   Gastrointestinal: Negative for abdominal distention, abdominal pain, blood in stool, diarrhea, nausea and vomiting.   Endocrine: Negative for cold intolerance.   Genitourinary: Negative for difficulty urinating, flank pain, frequency, hematuria and testicular pain.   Musculoskeletal: Negative for arthralgias and myalgias.   Skin: Negative for color change.   Allergic/Immunologic: Negative for environmental allergies, food allergies and immunocompromised state.   Neurological: Negative for dizziness, syncope, numbness and headaches.   Hematological: Negative for adenopathy. Does not bruise/bleed easily.   Psychiatric/Behavioral: Negative for agitation, confusion, decreased concentration, hallucinations, self-injury and sleep disturbance.   All other systems reviewed and are negative.        Reviewed family, medical, surgical, and social history.    Objective:      /72 (BP Location: Right arm, Patient Position: Sitting, BP Method: Medium (Automatic))   Pulse 77   Temp 98 °F (36.7 °C) (Oral)   Resp 20   Ht 5' 8" (1.727 m)   Wt 94.8 kg (209 lb 1.6 oz)   SpO2 97%   BMI 31.79 kg/m²   Physical Exam  Vitals signs and nursing note reviewed.   Constitutional:       General: He is not in acute distress.     Appearance: He is well-developed. He is not diaphoretic.   HENT:      Head: Normocephalic and atraumatic.      Nose: Nose normal.      Mouth/Throat:      Pharynx: No oropharyngeal exudate.   Eyes:      Conjunctiva/sclera: Conjunctivae normal.      Pupils: Pupils are equal, round, and reactive to light.   Neck:      Musculoskeletal: Normal range of motion.      Thyroid: No thyromegaly.   Cardiovascular:      Rate and Rhythm: Normal rate and regular rhythm.      " Heart sounds: Normal heart sounds. No murmur. No friction rub. No gallop.    Pulmonary:      Effort: Pulmonary effort is normal. No respiratory distress.      Breath sounds: Normal breath sounds. No wheezing or rales.   Abdominal:      General: There is no distension.      Palpations: Abdomen is soft.      Tenderness: There is no abdominal tenderness. There is no guarding.   Musculoskeletal: Normal range of motion.         General: No tenderness or deformity.   Skin:     General: Skin is warm and dry.      Coloration: Skin is not pale.      Findings: No erythema or rash.   Neurological:      Mental Status: He is alert and oriented to person, place, and time.      Cranial Nerves: No cranial nerve deficit.      Sensory: No sensory deficit.      Motor: No abnormal muscle tone.      Coordination: Coordination normal.      Deep Tendon Reflexes: Reflexes normal.   Psychiatric:         Behavior: Behavior normal.         Thought Content: Thought content normal.         Judgment: Judgment normal.         Assessment:       1. Type 2 diabetes mellitus without complication, without long-term current use of insulin    2. Sinusitis, unspecified chronicity, unspecified location    3. Essential hypertension        Plan:       Type 2 diabetes mellitus without complication, without long-term current use of insulin  -     Lipid Panel; Future; Expected date: 12/16/2020  -     CBC auto differential; Future; Expected date: 12/16/2020  -     Comprehensive metabolic panel; Future; Expected date: 12/16/2020  -     TSH; Future; Expected date: 12/16/2020  -     Microalbumin/creatinine urine ratio; Future; Expected date: 12/16/2020  -     Hemoglobin A1C; Future; Expected date: 12/16/2020    Sinusitis, unspecified chronicity, unspecified location  -     fluticasone propionate (FLONASE) 50 mcg/actuation nasal spray; 1 spray (50 mcg total) by Each Nostril route 2 (two) times daily.  Dispense: 15.8 mL; Refill: 2    Essential hypertension  -      amLODIPine (NORVASC) 10 MG tablet; Take 1 tablet (10 mg total) by mouth once daily.  Dispense: 90 tablet; Refill: 3    Other orders  -     atorvastatin (LIPITOR) 20 MG tablet; Take 1 tablet (20 mg total) by mouth once daily.  Dispense: 90 tablet; Refill: 3  -     losartan (COZAAR) 100 MG tablet; Take 1 tablet (100 mg total) by mouth once daily.  Dispense: 90 tablet; Refill: 3  -     metFORMIN (GLUCOPHAGE) 500 MG tablet; metformin 500 mg tablet  Take 2 tablets twice a day by oral route with meals for 90 days.  Dispense: 360 tablet; Refill: 3  -     montelukast (SINGULAIR) 10 mg tablet; Take 1 tablet (10 mg total) by mouth nightly as needed.  Dispense: 90 tablet; Refill: 3  -     naproxen (NAPROSYN) 500 MG tablet; Take 1 tablet (500 mg total) by mouth 2 (two) times daily as needed.  Dispense: 180 tablet; Refill: 3  -     omeprazole (PRILOSEC) 40 MG capsule; Take 1 capsule (40 mg total) by mouth once daily.  Dispense: 90 capsule; Refill: 3  -     tiZANidine (ZANAFLEX) 4 MG tablet; Take 1 tablet (4 mg total) by mouth daily as needed.  Dispense: 90 tablet; Refill: 3  -     blood sugar diagnostic (FREESTYLE LITE STRIPS) Strp; Check blood sugar 2 (two) times daily as needed.  Dispense: 200 each; Refill: 11            Risks, benefits, and side effects were discussed with the patient. All questions were answered to the fullest satisfaction of the patient, and pt verbalized understanding and agreement to treatment plan. Pt was to call with any new or worsening symptoms, or present to the ER.

## 2020-09-16 NOTE — TELEPHONE ENCOUNTER
Same day appt scheduled for med refills as confirmed in MyChart message.  No other concerns at this time.

## 2020-09-16 NOTE — PROGRESS NOTES
[facility] Hematology/Oncology  PROGRESS NOTE -  Follow-up Visit      Subjective:       Patient ID:   NAME: Sivakumar Thacker : 1955     65 y.o. male    Referring Doc: Barry Mcmahon (New PCP in [place])  Other Physicians: Vinod Chen/José Manuel    Chief Complaint:  NHL f/u    History of Present Illness:     Patient returns today for a regularly scheduled follow-up visit.  The patient is here today to go over the results of the recently ordered labs, tests and studies. . He is doing well with the rituximab infusions. He denies any CP, SOB, HA's or N/V. He is here by himself.     He has been seeing pulmonary about his chronic cough  He saw Lucia Georges again on 2020. He had PET on 2020    [facility] - I asked patient to make sure they send us the path reports         I discussed continued Covid19 precautions        ROS:   GEN: normal without any fever, night sweats or weight loss  HEENT: normal with no HA's, sore throat, stiff neck, changes in vision; sinus issues resolved  CV: normal with no CP, SOB, PND, VASQUEZ or orthopnea  PULM: normal with no SOB,  hemoptysis, sputum or pleuritic pain; chronic cough since 2020  GI: normal with no abdominal pain, nausea, vomiting, constipation, diarrhea, melanotic stools, BRBPR, or hematemesis  : normal with no hematuria, dysuria  BREAST: normal with no mass, discharge, pain  SKIN: normal with no rash, erythema, bruising, or swelling    Allergies:  Review of patient's allergies indicates:  No Known Allergies    Medications:    Current Outpatient Medications:     albuterol (PROVENTIL HFA) 90 mcg/actuation inhaler, Inhale 2 puffs into the lungs every 6 (six) hours as needed for Wheezing. Rescue, Disp: 18 g, Rfl: 0    albuterol-ipratropium (DUO-NEB) 2.5 mg-0.5 mg/3 mL nebulizer solution, Take 3 mLs by nebulization every 6 (six) hours as needed for Wheezing or Shortness of Breath. Rescue, Disp: 120 vial, Rfl:  5    amLODIPine (NORVASC) 10 MG tablet, Take 1 tablet (10 mg total) by mouth once daily., Disp: 90 tablet, Rfl: 3    aspirin (ECOTRIN) 81 MG EC tablet, Take 81 mg by mouth once daily., Disp: , Rfl:     atorvastatin (LIPITOR) 20 MG tablet, Take 1 tablet (20 mg total) by mouth once daily., Disp: 90 tablet, Rfl: 3    blood sugar diagnostic (FREESTYLE LITE STRIPS) Strp, Check blood sugar 2 (two) times daily as needed., Disp: 200 each, Rfl: 11    budesonide (PULMICORT) 0.5 mg/2 mL nebulizer solution, Take 2 mLs (0.5 mg total) by nebulization 2 (two) times daily. Controller, Disp: 120 mL, Rfl: 5    fluticasone furoate-vilanteroL (BREO ELLIPTA) 200-25 mcg/dose DsDv diskus inhaler, Inhale 1 puff into the lungs once daily. Controller, Disp: 1 each, Rfl: 5    fluticasone propionate (FLONASE) 50 mcg/actuation nasal spray, 1 spray (50 mcg total) by Each Nostril route 2 (two) times daily., Disp: 15.8 mL, Rfl: 2    losartan (COZAAR) 100 MG tablet, Take 1 tablet (100 mg total) by mouth once daily., Disp: 90 tablet, Rfl: 3    metFORMIN (GLUCOPHAGE) 500 MG tablet, metformin 500 mg tablet  Take 2 tablets twice a day by oral route with meals for 90 days., Disp: 360 tablet, Rfl: 3    montelukast (SINGULAIR) 10 mg tablet, Take 1 tablet (10 mg total) by mouth nightly as needed., Disp: 90 tablet, Rfl: 3    MULTIVITAMIN ORAL, Take 1 tablet by mouth once daily., Disp: , Rfl:     naproxen (NAPROSYN) 500 MG tablet, Take 1 tablet (500 mg total) by mouth 2 (two) times daily as needed., Disp: 180 tablet, Rfl: 3    omeprazole (PRILOSEC) 40 MG capsule, Take 1 capsule (40 mg total) by mouth once daily., Disp: 90 capsule, Rfl: 3    tiZANidine (ZANAFLEX) 4 MG tablet, Take 1 tablet (4 mg total) by mouth daily as needed., Disp: 90 tablet, Rfl: 3  No current facility-administered medications for this visit.     Facility-Administered Medications Ordered in Other Visits:     alteplase injection 2 mg, 2 mg, Intra-Catheter, MARTHAN, Jefferson GAMBOA  MD Stacey    heparin, porcine (PF) 100 unit/mL injection flush 500 Units, 500 Units, Intravenous, PRN, Jefferson Ibarra MD    meperidine (PF) injection 25 mg, 25 mg, Intravenous, PRN, Jefferson Ibarra MD    riTUXimab (RITUXAN) 821 mg in sodium chloride 0.9% 821 mL infusion (conc: 1 mg/mL), 375 mg/m2 (Treatment Plan Recorded), Intravenous, 1 time in Clinic/HOD, Jefferson Ibarra MD    sodium chloride 0.9% flush 10 mL, 10 mL, Intravenous, PRN, Jefferson Ibarra MD    PMHx/PSHx Updates:  See patient's last visit with me on 7/23/2020  See H&P on 1/8/2019        Pathology:  Cancer Staging  No matching staging information was found for the patient.          Objective:     Vitals:  Blood pressure (!) 149/79, pulse 72, temperature 98 °F (36.7 °C), resp. rate 17, weight 95.3 kg (210 lb).    Physical Examination:   GEN: no apparent distress, comfortable; AAOx3  HEAD: atraumatic and normocephalic  EYES: no pallor, no icterus, PERRLA  ENT: OMM, no pharyngeal erythema, external ears WNL; no nasal discharge; no thrush; new nodule on auricle of left ear (painful)  NECK: no masses, thyroid normal, trachea midline, no LAD/LN's, supple  CV: RRR with no murmur; normal pulse; normal S1 and S2; no pedal edema; portacath  CHEST: Normal respiratory effort; CTAB; normal breath sounds; no current wheeze  ABDOM: nontender and nondistended; soft; normal bowel sounds; no rebound/guarding  MUSC/Skeletal: ROM normal; no crepitus; joints normal; no deformities or arthropathy  EXTREM: no clubbing, cyanosis, inflammation or swelling  SKIN: no rashes, lesions, ulcers, petechiae or subcutaneous nodules (see above HEENt)  : no gibbs  NEURO: grossly intact; motor/sensory WNL; AAOx3; no tremors  PSYCH: normal mood, affect and behavior  LYMPH: normal cervical, supraclavicular, axillary and groin LN's            Labs:     Lab Results   Component Value Date    WBC 4.26 09/16/2020    HGB 14.0 09/16/2020    HCT 41.6 09/16/2020    MCV 87  09/16/2020     09/16/2020     CMP  Sodium   Date Value Ref Range Status   09/16/2020 139 136 - 145 mmol/L Final   04/25/2019 144 134 - 144 mmol/L      Potassium   Date Value Ref Range Status   09/16/2020 3.4 (L) 3.5 - 5.1 mmol/L Final     Chloride   Date Value Ref Range Status   09/16/2020 102 95 - 110 mmol/L Final   04/25/2019 107 98 - 110 mmol/L      CO2   Date Value Ref Range Status   09/16/2020 26 23 - 29 mmol/L Final     Glucose   Date Value Ref Range Status   09/16/2020 166 (H) 70 - 110 mg/dL Final   04/25/2019 177 (H) 70 - 99 mg/dL      BUN, Bld   Date Value Ref Range Status   09/16/2020 12 8 - 23 mg/dL Final     Creatinine   Date Value Ref Range Status   09/16/2020 0.8 0.5 - 1.4 mg/dL Final   04/25/2019 0.83 0.60 - 1.40 mg/dL      Calcium   Date Value Ref Range Status   09/16/2020 8.5 (L) 8.7 - 10.5 mg/dL Final     Total Protein   Date Value Ref Range Status   09/16/2020 6.5 6.0 - 8.4 g/dL Final     Albumin   Date Value Ref Range Status   09/16/2020 3.9 3.5 - 5.2 g/dL Final   04/25/2019 4.4 3.1 - 4.7 g/dL      Total Bilirubin   Date Value Ref Range Status   09/16/2020 0.5 0.1 - 1.0 mg/dL Final     Comment:     For infants and newborns, interpretation of results should be based  on gestational age, weight and in agreement with clinical  observations.  Premature Infant recommended reference ranges:  Up to 24 hours.............<8.0 mg/dL  Up to 48 hours............<12.0 mg/dL  3-5 days..................<15.0 mg/dL  6-29 days.................<15.0 mg/dL       Alkaline Phosphatase   Date Value Ref Range Status   09/16/2020 93 55 - 135 U/L Final     AST   Date Value Ref Range Status   09/16/2020 23 10 - 40 U/L Final     ALT   Date Value Ref Range Status   09/16/2020 26 10 - 44 U/L Final     Anion Gap   Date Value Ref Range Status   09/16/2020 11 8 - 16 mmol/L Final     eGFR if    Date Value Ref Range Status   09/16/2020 >60.0 >60 mL/min/1.73 m^2 Final     eGFR if non    Date  Value Ref Range Status   09/16/2020 >60.0 >60 mL/min/1.73 m^2 Final     Comment:     Calculation used to obtain the estimated glomerular filtration  rate (eGFR) is the CKD-EPI equation.            Radiology/Diagnostic Studies:    PET 9/2/2020:      Impression:     Negative for active lymphoproliferative disease.         Chest CT  3/19/2020:      Impression       1. Mild bronchiectasis in the left lower lobe and tree-in-bud micro nodules at the left lung base suggesting underlying bronchiolitis, possibly due to a non tuberculous mycobacterial infection or viral bronchiolitis.  2. Fatty infiltration of the liver  3. Small hiatal hernia.           CXR  1/31/2020    Impression       1. No acute chest disease.  2. Left subclavian Port-A-Cath.               PET 9/11/2019   Impression       No evidence of FDG avid residual or recurrent lymphoma       I have reviewed all available lab results and radiology reports.    Assessment/Plan:   (1) 65 y.o. male with diagnosis of NHL Follicular Stage IIIA who has been referred by Dr Gary Chen with [facility] for continuation of care by medical hematology/oncology.   - He originally was diagnosed in 2012 and had parotid excision at [facility]. He subsequently went to [facility] and was treated with bendamustine-rituximab. He has been on rituximab maintenance every 8 weeks and IV IgG monthly. Dr Chen's plan was to keep him on maintenace therapy for as long as he could tolerate the treatments  - s/p 6 cycles of Bendamustine and Retuximab with subsequent complete remission  - 1st maintenance cycle rituximab was in March 2016  - he has been on maintenance rituximab and IV IgG - last rituximab 11/15/2018; last IV IgG on Dec 14th 2018  - last PET was on 9/26/2018 with no evidence of recurrence  - originally diagnosed in Dec 2012 with left parotid and left periparotid LN excision on 12/11/2012  - PET scan done on  9/11/2019 was good    7/23/2020:  - new nodule on auricle of left ear  suspicious for a skin cancer - will refer him to Dr Avilez with ENT  - he is due for repeat PET    9/17/2020:  - PET scan on 9/2/2020 is adequate  - [facility]      (2) HTN     (3) Neuropathy     (4) GERD     (5) DM - on metformin per Dr Nunez     (6) Hypogammaglobulinemia - on Iv IgG monthly     (7) Steatosis of liver     (8) Degenerative disc disease of back     (9) Diverticular disease    (10) Mild bronchiectasis in the left lower lobe and tree-in-bud micro nodules at the left lung base suggesting underlying bronchiolitis  - seeing Lucia Georges           VISIT DIAGNOSES:      Follicular lymphoma grade IIIa, unspecified body region    Encounter for antineoplastic chemotherapy          PLAN:  1. Continue his rituximab every 8 weeks as long as he is tolerating the therapy as per Dr Chen's prior recommendations and directives    2. continue IV IgG monthly   3. Check labs every 8 weeks  4. F/u with PCP, Pulm etc  5. [facility]        RTC in 8 weeks   Fax note to Barry Mcmahon; José Manuel Brock;          Discussion:       I spent over 25 mins of time with the patient. Reviewed results of the recently ordered labs, tests and studies; made directives with regards to the results. Over half of this time was spent couseling and coordinating care.      COVID-19 Discussion:    I had long discussion with patient and any applicable family about the COVID-19 coronavirus epidemic and the recommended precautions with regard to cancer and/or hematology patients. I have re-iterated the CDC recommendations for adequate hand washing, use of hand -like products, and coughing into elbow, etc. In addition, especially for our patients who are on chemotherapy and/or our otherwise immunocompromised patients, I have recommended avoidance of crowds, including movie theaters, restaurants, churches, etc. I have recommended avoidance of any  sick or symptomatic family members and/or friends. I have also recommended avoidance of any raw and unwashed food products, and general avoidance of food items that have not been prepared by themselves. The patient has been asked to call us immediately with any symptom developments, issues, questions or other general concerns.     I have explained all of the above in detail and the patient understands all of the current recommendation(s). I have answered all of their questions to the best of my ability and to their complete satisfaction.   The patient is to continue with the current management plan.            Electronically signed by Jefferson Ibarra MD                Answers for HPI/ROS submitted by the patient on 9/15/2020   appetite change : No  unexpected weight change: No  mouth sores: No  visual disturbance: No  cough: Yes  shortness of breath: No  chest pain: No  abdominal pain: No  diarrhea: No  frequency: No  back pain: No  rash: No  headaches: No  adenopathy: No  nervous/ anxious: No

## 2020-09-16 NOTE — TELEPHONE ENCOUNTER
----- Message from Honey Fishman sent at 9/16/2020 10:48 AM CDT -----  Patient is calling to confirm his appt this afternoon with Dr Mcmahon.  I do not see the appt, but I do see notes in his chart about scheduling one for today at 3:40.  Please call him about this at [phone].  Thank you!

## 2020-09-17 ENCOUNTER — INFUSION (OUTPATIENT)
Dept: INFUSION THERAPY | Facility: HOSPITAL | Age: 65
End: 2020-09-17
Attending: INTERNAL MEDICINE
Payer: OTHER GOVERNMENT

## 2020-09-17 ENCOUNTER — OFFICE VISIT (OUTPATIENT)
Dept: HEMATOLOGY/ONCOLOGY | Facility: CLINIC | Age: 65
End: 2020-09-17
Payer: OTHER GOVERNMENT

## 2020-09-17 VITALS
SYSTOLIC BLOOD PRESSURE: 149 MMHG | RESPIRATION RATE: 17 BRPM | HEART RATE: 72 BPM | TEMPERATURE: 98 F | WEIGHT: 210 LBS | DIASTOLIC BLOOD PRESSURE: 79 MMHG | BODY MASS INDEX: 31.93 KG/M2

## 2020-09-17 VITALS
BODY MASS INDEX: 31.85 KG/M2 | WEIGHT: 210.13 LBS | HEIGHT: 68 IN | HEART RATE: 79 BPM | SYSTOLIC BLOOD PRESSURE: 165 MMHG | RESPIRATION RATE: 17 BRPM | DIASTOLIC BLOOD PRESSURE: 88 MMHG | OXYGEN SATURATION: 99 % | TEMPERATURE: 98 F

## 2020-09-17 DIAGNOSIS — C82.30 FOLLICULAR LYMPHOMA GRADE IIIA, UNSPECIFIED BODY REGION: Primary | ICD-10-CM

## 2020-09-17 DIAGNOSIS — D80.1 HYPOGAMMAGLOBULINEMIA: Primary | ICD-10-CM

## 2020-09-17 DIAGNOSIS — Z51.11 ENCOUNTER FOR ANTINEOPLASTIC CHEMOTHERAPY: ICD-10-CM

## 2020-09-17 DIAGNOSIS — C82.30 FOLLICULAR LYMPHOMA GRADE IIIA, UNSPECIFIED BODY REGION: ICD-10-CM

## 2020-09-17 PROCEDURE — A4216 STERILE WATER/SALINE, 10 ML: HCPCS | Performed by: INTERNAL MEDICINE

## 2020-09-17 PROCEDURE — 96413 CHEMO IV INFUSION 1 HR: CPT

## 2020-09-17 PROCEDURE — 99214 PR OFFICE/OUTPT VISIT, EST, LEVL IV, 30-39 MIN: ICD-10-PCS | Mod: S$GLB,,, | Performed by: INTERNAL MEDICINE

## 2020-09-17 PROCEDURE — 63600175 PHARM REV CODE 636 W HCPCS: Performed by: INTERNAL MEDICINE

## 2020-09-17 PROCEDURE — 96367 TX/PROPH/DG ADDL SEQ IV INF: CPT

## 2020-09-17 PROCEDURE — 96415 CHEMO IV INFUSION ADDL HR: CPT

## 2020-09-17 PROCEDURE — 99214 OFFICE O/P EST MOD 30 MIN: CPT | Mod: S$GLB,,, | Performed by: INTERNAL MEDICINE

## 2020-09-17 PROCEDURE — 25000003 PHARM REV CODE 250: Performed by: INTERNAL MEDICINE

## 2020-09-17 PROCEDURE — 96375 TX/PRO/DX INJ NEW DRUG ADDON: CPT

## 2020-09-17 PROCEDURE — S0028 INJECTION, FAMOTIDINE, 20 MG: HCPCS | Performed by: INTERNAL MEDICINE

## 2020-09-17 RX ORDER — ACETAMINOPHEN 325 MG/1
650 TABLET ORAL
Status: COMPLETED | OUTPATIENT
Start: 2020-09-17 | End: 2020-09-17

## 2020-09-17 RX ORDER — MEPERIDINE HYDROCHLORIDE 25 MG/ML
25 INJECTION INTRAMUSCULAR; INTRAVENOUS; SUBCUTANEOUS
Status: DISCONTINUED | OUTPATIENT
Start: 2020-09-17 | End: 2020-09-17 | Stop reason: HOSPADM

## 2020-09-17 RX ORDER — HEPARIN 100 UNIT/ML
500 SYRINGE INTRAVENOUS
Status: DISCONTINUED | OUTPATIENT
Start: 2020-09-17 | End: 2020-09-17 | Stop reason: HOSPADM

## 2020-09-17 RX ORDER — SODIUM CHLORIDE 0.9 % (FLUSH) 0.9 %
10 SYRINGE (ML) INJECTION
Status: DISCONTINUED | OUTPATIENT
Start: 2020-09-17 | End: 2020-09-17 | Stop reason: HOSPADM

## 2020-09-17 RX ORDER — FAMOTIDINE 10 MG/ML
20 INJECTION INTRAVENOUS
Status: COMPLETED | OUTPATIENT
Start: 2020-09-17 | End: 2020-09-17

## 2020-09-17 RX ADMIN — DIPHENHYDRAMINE HYDROCHLORIDE 50 MG: 50 INJECTION INTRAMUSCULAR; INTRAVENOUS at 08:09

## 2020-09-17 RX ADMIN — ACETAMINOPHEN 650 MG: 325 TABLET ORAL at 08:09

## 2020-09-17 RX ADMIN — SODIUM CHLORIDE, PRESERVATIVE FREE 10 ML: 5 INJECTION INTRAVENOUS at 12:09

## 2020-09-17 RX ADMIN — HEPARIN 500 UNITS: 100 SYRINGE at 12:09

## 2020-09-17 RX ADMIN — RITUXIMAB 821 MG: 10 INJECTION, SOLUTION INTRAVENOUS at 09:09

## 2020-09-17 RX ADMIN — SODIUM CHLORIDE: 0.9 INJECTION, SOLUTION INTRAVENOUS at 08:09

## 2020-09-17 RX ADMIN — FAMOTIDINE 20 MG: 10 INJECTION INTRAVENOUS at 08:09

## 2020-09-18 ENCOUNTER — TELEPHONE (OUTPATIENT)
Dept: PULMONOLOGY | Facility: CLINIC | Age: 65
End: 2020-09-18

## 2020-09-18 ENCOUNTER — INFUSION (OUTPATIENT)
Dept: INFUSION THERAPY | Facility: HOSPITAL | Age: 65
End: 2020-09-18
Attending: INTERNAL MEDICINE
Payer: OTHER GOVERNMENT

## 2020-09-18 VITALS
WEIGHT: 212.06 LBS | SYSTOLIC BLOOD PRESSURE: 146 MMHG | TEMPERATURE: 99 F | DIASTOLIC BLOOD PRESSURE: 77 MMHG | HEART RATE: 73 BPM | HEIGHT: 68 IN | BODY MASS INDEX: 32.14 KG/M2 | RESPIRATION RATE: 18 BRPM | OXYGEN SATURATION: 98 %

## 2020-09-18 DIAGNOSIS — J47.0 BRONCHIECTASIS WITH ACUTE LOWER RESPIRATORY INFECTION: Primary | ICD-10-CM

## 2020-09-18 DIAGNOSIS — D80.1 NONFAMILIAL HYPOGAMMAGLOBULINEMIA: ICD-10-CM

## 2020-09-18 DIAGNOSIS — D80.1 HYPOGAMMAGLOBULINEMIA: Primary | ICD-10-CM

## 2020-09-18 DIAGNOSIS — C82.30 FOLLICULAR LYMPHOMA GRADE IIIA, UNSPECIFIED BODY REGION: ICD-10-CM

## 2020-09-18 LAB
BACTERIA SPEC AEROBE CULT: ABNORMAL
BACTERIA SPEC AEROBE CULT: ABNORMAL
GRAM STN SPEC: ABNORMAL

## 2020-09-18 PROCEDURE — 96367 TX/PROPH/DG ADDL SEQ IV INF: CPT

## 2020-09-18 PROCEDURE — 25000003 PHARM REV CODE 250: Performed by: NURSE PRACTITIONER

## 2020-09-18 PROCEDURE — 96413 CHEMO IV INFUSION 1 HR: CPT

## 2020-09-18 PROCEDURE — 96415 CHEMO IV INFUSION ADDL HR: CPT

## 2020-09-18 PROCEDURE — 63600175 PHARM REV CODE 636 W HCPCS: Mod: JG | Performed by: NURSE PRACTITIONER

## 2020-09-18 RX ORDER — SODIUM CHLORIDE 0.9 % (FLUSH) 0.9 %
10 SYRINGE (ML) INJECTION
Status: CANCELLED | OUTPATIENT
Start: 2020-10-16

## 2020-09-18 RX ORDER — AMOXICILLIN 500 MG/1
500 TABLET, FILM COATED ORAL 3 TIMES DAILY
Qty: 42 TABLET | Refills: 0 | Status: SHIPPED | OUTPATIENT
Start: 2020-09-18 | End: 2020-10-02

## 2020-09-18 RX ORDER — ACETAMINOPHEN 500 MG
500 TABLET ORAL
Status: CANCELLED | OUTPATIENT
Start: 2020-10-16

## 2020-09-18 RX ORDER — HEPARIN 100 UNIT/ML
500 SYRINGE INTRAVENOUS
Status: DISCONTINUED | OUTPATIENT
Start: 2020-09-18 | End: 2020-09-18 | Stop reason: HOSPADM

## 2020-09-18 RX ORDER — SODIUM CHLORIDE 0.9 % (FLUSH) 0.9 %
10 SYRINGE (ML) INJECTION
Status: DISCONTINUED | OUTPATIENT
Start: 2020-09-18 | End: 2020-09-18 | Stop reason: HOSPADM

## 2020-09-18 RX ORDER — DIPHENHYDRAMINE HCL 25 MG
25 CAPSULE ORAL
Status: CANCELLED | OUTPATIENT
Start: 2020-10-16

## 2020-09-18 RX ORDER — HEPARIN 100 UNIT/ML
500 SYRINGE INTRAVENOUS
Status: CANCELLED | OUTPATIENT
Start: 2020-10-16

## 2020-09-18 RX ORDER — ACETAMINOPHEN 500 MG
500 TABLET ORAL
Status: DISCONTINUED | OUTPATIENT
Start: 2020-09-18 | End: 2020-09-18 | Stop reason: HOSPADM

## 2020-09-18 RX ADMIN — DIPHENHYDRAMINE HYDROCHLORIDE 25 MG: 50 INJECTION INTRAMUSCULAR; INTRAVENOUS at 07:09

## 2020-09-18 RX ADMIN — IMMUNE GLOBULIN (HUMAN) 30 G: 10 INJECTION INTRAVENOUS; SUBCUTANEOUS at 07:09

## 2020-09-18 RX ADMIN — HEPARIN 500 UNITS: 100 SYRINGE at 10:09

## 2020-09-18 RX ADMIN — ACETAMINOPHEN 500 MG: 500 TABLET ORAL at 07:09

## 2020-09-18 NOTE — TELEPHONE ENCOUNTER
Spoke with patient. Notified him of sputum results. Instructed patient to  antibiotic from pharmacy and start today.----- Message from Lucia Georges NP sent at 9/18/2020  3:53 PM CDT -----  Sputum sample shows a particular bacteria. Antibiotic sent to pharmacy

## 2020-10-05 ENCOUNTER — PATIENT MESSAGE (OUTPATIENT)
Dept: ADMINISTRATIVE | Facility: HOSPITAL | Age: 65
End: 2020-10-05

## 2020-10-12 ENCOUNTER — TELEPHONE (OUTPATIENT)
Dept: HEMATOLOGY/ONCOLOGY | Facility: CLINIC | Age: 65
End: 2020-10-12

## 2020-10-12 DIAGNOSIS — C82.30 FOLLICULAR LYMPHOMA GRADE IIIA, UNSPECIFIED BODY REGION: Primary | ICD-10-CM

## 2020-10-12 DIAGNOSIS — D80.1 NONFAMILIAL HYPOGAMMAGLOBULINEMIA: ICD-10-CM

## 2020-10-12 NOTE — TELEPHONE ENCOUNTER
----- Message from Livia Bro sent at 10/12/2020  2:38 PM CDT -----  The patient needs new standing orders for labs put in to [facility] lab. He is going tomorrow.

## 2020-10-13 ENCOUNTER — LAB VISIT (OUTPATIENT)
Dept: LAB | Facility: HOSPITAL | Age: 65
End: 2020-10-13
Attending: INTERNAL MEDICINE
Payer: OTHER GOVERNMENT

## 2020-10-13 DIAGNOSIS — C82.30 FOLLICULAR LYMPHOMA GRADE IIIA, UNSPECIFIED BODY REGION: ICD-10-CM

## 2020-10-13 DIAGNOSIS — D80.1 NONFAMILIAL HYPOGAMMAGLOBULINEMIA: ICD-10-CM

## 2020-10-13 LAB
ALBUMIN SERPL BCP-MCNC: 4.6 G/DL (ref 3.5–5.2)
ALP SERPL-CCNC: 71 U/L (ref 55–135)
ALT SERPL W/O P-5'-P-CCNC: 29 U/L (ref 10–44)
ANION GAP SERPL CALC-SCNC: 10 MMOL/L (ref 8–16)
AST SERPL-CCNC: 29 U/L (ref 10–40)
BASOPHILS # BLD AUTO: 0.04 K/UL (ref 0–0.2)
BASOPHILS NFR BLD: 0.9 % (ref 0–1.9)
BILIRUB SERPL-MCNC: 1.4 MG/DL (ref 0.1–1)
BUN SERPL-MCNC: 15 MG/DL (ref 8–23)
CALCIUM SERPL-MCNC: 9.3 MG/DL (ref 8.7–10.5)
CHLORIDE SERPL-SCNC: 107 MMOL/L (ref 95–110)
CO2 SERPL-SCNC: 26 MMOL/L (ref 23–29)
CREAT SERPL-MCNC: 0.9 MG/DL (ref 0.5–1.4)
DIFFERENTIAL METHOD: ABNORMAL
EOSINOPHIL # BLD AUTO: 0.1 K/UL (ref 0–0.5)
EOSINOPHIL NFR BLD: 3.2 % (ref 0–8)
ERYTHROCYTE [DISTWIDTH] IN BLOOD BY AUTOMATED COUNT: 13.9 % (ref 11.5–14.5)
EST. GFR  (AFRICAN AMERICAN): >60 ML/MIN/1.73 M^2
EST. GFR  (NON AFRICAN AMERICAN): >60 ML/MIN/1.73 M^2
GLUCOSE SERPL-MCNC: 149 MG/DL (ref 70–110)
HCT VFR BLD AUTO: 44.1 % (ref 40–54)
HGB BLD-MCNC: 14.9 G/DL (ref 14–18)
IMM GRANULOCYTES # BLD AUTO: 0.06 K/UL (ref 0–0.04)
IMM GRANULOCYTES NFR BLD AUTO: 1.4 % (ref 0–0.5)
LYMPHOCYTES # BLD AUTO: 1 K/UL (ref 1–4.8)
LYMPHOCYTES NFR BLD: 23.9 % (ref 18–48)
MCH RBC QN AUTO: 29.1 PG (ref 27–31)
MCHC RBC AUTO-ENTMCNC: 33.8 G/DL (ref 32–36)
MCV RBC AUTO: 86 FL (ref 82–98)
MONOCYTES # BLD AUTO: 0.5 K/UL (ref 0.3–1)
MONOCYTES NFR BLD: 10.8 % (ref 4–15)
NEUTROPHILS # BLD AUTO: 2.6 K/UL (ref 1.8–7.7)
NEUTROPHILS NFR BLD: 59.8 % (ref 38–73)
NRBC BLD-RTO: 0 /100 WBC
PLATELET # BLD AUTO: 275 K/UL (ref 150–350)
PMV BLD AUTO: 9 FL (ref 9.2–12.9)
POTASSIUM SERPL-SCNC: 3.6 MMOL/L (ref 3.5–5.1)
PROT SERPL-MCNC: 7.2 G/DL (ref 6–8.4)
RBC # BLD AUTO: 5.12 M/UL (ref 4.6–6.2)
SODIUM SERPL-SCNC: 143 MMOL/L (ref 136–145)
WBC # BLD AUTO: 4.36 K/UL (ref 3.9–12.7)

## 2020-10-13 PROCEDURE — 85025 COMPLETE CBC W/AUTO DIFF WBC: CPT

## 2020-10-13 PROCEDURE — 80053 COMPREHEN METABOLIC PANEL: CPT

## 2020-10-13 PROCEDURE — 36415 COLL VENOUS BLD VENIPUNCTURE: CPT

## 2020-10-16 ENCOUNTER — INFUSION (OUTPATIENT)
Dept: INFUSION THERAPY | Facility: HOSPITAL | Age: 65
End: 2020-10-16
Attending: INTERNAL MEDICINE
Payer: OTHER GOVERNMENT

## 2020-10-16 VITALS
RESPIRATION RATE: 18 BRPM | TEMPERATURE: 98 F | WEIGHT: 206.19 LBS | HEART RATE: 73 BPM | DIASTOLIC BLOOD PRESSURE: 84 MMHG | BODY MASS INDEX: 31.35 KG/M2 | SYSTOLIC BLOOD PRESSURE: 136 MMHG

## 2020-10-16 DIAGNOSIS — C82.30 FOLLICULAR LYMPHOMA GRADE IIIA, UNSPECIFIED BODY REGION: ICD-10-CM

## 2020-10-16 DIAGNOSIS — D80.1 HYPOGAMMAGLOBULINEMIA: Primary | ICD-10-CM

## 2020-10-16 DIAGNOSIS — D80.1 NONFAMILIAL HYPOGAMMAGLOBULINEMIA: ICD-10-CM

## 2020-10-16 PROCEDURE — 63600175 PHARM REV CODE 636 W HCPCS: Mod: JG | Performed by: NURSE PRACTITIONER

## 2020-10-16 PROCEDURE — 96415 CHEMO IV INFUSION ADDL HR: CPT

## 2020-10-16 PROCEDURE — 25000003 PHARM REV CODE 250: Performed by: NURSE PRACTITIONER

## 2020-10-16 PROCEDURE — 96367 TX/PROPH/DG ADDL SEQ IV INF: CPT

## 2020-10-16 PROCEDURE — 96413 CHEMO IV INFUSION 1 HR: CPT

## 2020-10-16 RX ORDER — SODIUM CHLORIDE 0.9 % (FLUSH) 0.9 %
10 SYRINGE (ML) INJECTION
Status: DISCONTINUED | OUTPATIENT
Start: 2020-10-16 | End: 2020-10-19 | Stop reason: HOSPADM

## 2020-10-16 RX ORDER — ACETAMINOPHEN 500 MG
500 TABLET ORAL
Status: CANCELLED | OUTPATIENT
Start: 2020-11-13

## 2020-10-16 RX ORDER — SODIUM CHLORIDE 0.9 % (FLUSH) 0.9 %
10 SYRINGE (ML) INJECTION
Status: CANCELLED | OUTPATIENT
Start: 2020-11-13

## 2020-10-16 RX ORDER — ACETAMINOPHEN 500 MG
500 TABLET ORAL
Status: DISCONTINUED | OUTPATIENT
Start: 2020-10-16 | End: 2020-10-19 | Stop reason: HOSPADM

## 2020-10-16 RX ORDER — DIPHENHYDRAMINE HCL 25 MG
25 CAPSULE ORAL
Status: CANCELLED | OUTPATIENT
Start: 2020-11-13

## 2020-10-16 RX ORDER — DIPHENHYDRAMINE HCL 25 MG
25 CAPSULE ORAL
Status: DISCONTINUED | OUTPATIENT
Start: 2020-10-16 | End: 2020-10-19 | Stop reason: HOSPADM

## 2020-10-16 RX ORDER — HEPARIN 100 UNIT/ML
500 SYRINGE INTRAVENOUS
Status: CANCELLED | OUTPATIENT
Start: 2020-11-13

## 2020-10-16 RX ORDER — HEPARIN 100 UNIT/ML
500 SYRINGE INTRAVENOUS
Status: DISCONTINUED | OUTPATIENT
Start: 2020-10-16 | End: 2020-10-19 | Stop reason: HOSPADM

## 2020-10-16 RX ADMIN — IMMUNE GLOBULIN (HUMAN) 30 G: 10 INJECTION INTRAVENOUS; SUBCUTANEOUS at 08:10

## 2020-10-16 RX ADMIN — DIPHENHYDRAMINE HYDROCHLORIDE 25 MG: 50 INJECTION INTRAMUSCULAR; INTRAVENOUS at 08:10

## 2020-10-16 RX ADMIN — ACETAMINOPHEN 500 MG: 500 TABLET ORAL at 08:10

## 2020-10-16 RX ADMIN — HEPARIN 500 UNITS: 100 SYRINGE at 11:10

## 2020-11-06 ENCOUNTER — PATIENT MESSAGE (OUTPATIENT)
Dept: HEMATOLOGY/ONCOLOGY | Facility: CLINIC | Age: 65
End: 2020-11-06

## 2020-11-06 ENCOUNTER — LAB VISIT (OUTPATIENT)
Dept: LAB | Facility: HOSPITAL | Age: 65
End: 2020-11-06
Attending: INTERNAL MEDICINE
Payer: OTHER GOVERNMENT

## 2020-11-06 DIAGNOSIS — D80.1 NONFAMILIAL HYPOGAMMAGLOBULINEMIA: ICD-10-CM

## 2020-11-06 DIAGNOSIS — C82.30 FOLLICULAR LYMPHOMA GRADE IIIA, UNSPECIFIED BODY REGION: ICD-10-CM

## 2020-11-06 LAB
ALBUMIN SERPL BCP-MCNC: 4.4 G/DL (ref 3.5–5.2)
ALP SERPL-CCNC: 83 U/L (ref 55–135)
ALT SERPL W/O P-5'-P-CCNC: 28 U/L (ref 10–44)
ANION GAP SERPL CALC-SCNC: 11 MMOL/L (ref 8–16)
AST SERPL-CCNC: 30 U/L (ref 10–40)
BASOPHILS # BLD AUTO: 0.03 K/UL (ref 0–0.2)
BASOPHILS NFR BLD: 0.7 % (ref 0–1.9)
BILIRUB SERPL-MCNC: 0.7 MG/DL (ref 0.1–1)
BUN SERPL-MCNC: 15 MG/DL (ref 8–23)
CALCIUM SERPL-MCNC: 9.1 MG/DL (ref 8.7–10.5)
CHLORIDE SERPL-SCNC: 104 MMOL/L (ref 95–110)
CO2 SERPL-SCNC: 25 MMOL/L (ref 23–29)
CREAT SERPL-MCNC: 0.8 MG/DL (ref 0.5–1.4)
DIFFERENTIAL METHOD: ABNORMAL
EOSINOPHIL # BLD AUTO: 0.1 K/UL (ref 0–0.5)
EOSINOPHIL NFR BLD: 2.2 % (ref 0–8)
ERYTHROCYTE [DISTWIDTH] IN BLOOD BY AUTOMATED COUNT: 13.2 % (ref 11.5–14.5)
EST. GFR  (AFRICAN AMERICAN): >60 ML/MIN/1.73 M^2
EST. GFR  (NON AFRICAN AMERICAN): >60 ML/MIN/1.73 M^2
GLUCOSE SERPL-MCNC: 158 MG/DL (ref 70–110)
HCT VFR BLD AUTO: 45.5 % (ref 40–54)
HGB BLD-MCNC: 15.2 G/DL (ref 14–18)
IMM GRANULOCYTES # BLD AUTO: 0.16 K/UL (ref 0–0.04)
IMM GRANULOCYTES NFR BLD AUTO: 3.5 % (ref 0–0.5)
LYMPHOCYTES # BLD AUTO: 1.2 K/UL (ref 1–4.8)
LYMPHOCYTES NFR BLD: 25.6 % (ref 18–48)
MCH RBC QN AUTO: 29.4 PG (ref 27–31)
MCHC RBC AUTO-ENTMCNC: 33.4 G/DL (ref 32–36)
MCV RBC AUTO: 88 FL (ref 82–98)
MONOCYTES # BLD AUTO: 0.6 K/UL (ref 0.3–1)
MONOCYTES NFR BLD: 12.5 % (ref 4–15)
NEUTROPHILS # BLD AUTO: 2.5 K/UL (ref 1.8–7.7)
NEUTROPHILS NFR BLD: 55.5 % (ref 38–73)
NRBC BLD-RTO: 0 /100 WBC
PLATELET # BLD AUTO: 284 K/UL (ref 150–350)
PMV BLD AUTO: 9.7 FL (ref 9.2–12.9)
POTASSIUM SERPL-SCNC: 3.8 MMOL/L (ref 3.5–5.1)
PROT SERPL-MCNC: 7.2 G/DL (ref 6–8.4)
RBC # BLD AUTO: 5.17 M/UL (ref 4.6–6.2)
SODIUM SERPL-SCNC: 140 MMOL/L (ref 136–145)
WBC # BLD AUTO: 4.57 K/UL (ref 3.9–12.7)

## 2020-11-06 PROCEDURE — 85025 COMPLETE CBC W/AUTO DIFF WBC: CPT

## 2020-11-06 PROCEDURE — 80053 COMPREHEN METABOLIC PANEL: CPT

## 2020-11-06 PROCEDURE — 36415 COLL VENOUS BLD VENIPUNCTURE: CPT

## 2020-11-09 ENCOUNTER — TELEPHONE (OUTPATIENT)
Dept: INFUSION THERAPY | Facility: HOSPITAL | Age: 65
End: 2020-11-09

## 2020-11-09 NOTE — TELEPHONE ENCOUNTER
----- Message from Hanna Álvarez RN sent at 11/6/2020  3:16 PM CST -----  Regarding: FW: Rituxan orders  Portia is putting in new orders for the Rituxan.  He is due on the 12th of November.  He will also need a covid test.  ----- Message -----  From: Nohelia Keller  Sent: 11/6/2020  11:55 AM CST  To: Stacey Paulino Staff, #  Subject: Rituxan orders                                   Patient called saying he should be due for Rituxan.   Please advise if needing to be scheduled for more Rituxan and add orders.        Thank you!

## 2020-11-10 ENCOUNTER — APPOINTMENT (OUTPATIENT)
Dept: INFUSION THERAPY | Facility: HOSPITAL | Age: 65
End: 2020-11-10
Attending: NURSE PRACTITIONER
Payer: OTHER GOVERNMENT

## 2020-11-10 DIAGNOSIS — Z03.818 ENCOUNTER FOR OBSERVATION FOR SUSPECTED EXPOSURE TO OTHER BIOLOGICAL AGENTS RULED OUT: Primary | ICD-10-CM

## 2020-11-10 PROCEDURE — U0003 INFECTIOUS AGENT DETECTION BY NUCLEIC ACID (DNA OR RNA); SEVERE ACUTE RESPIRATORY SYNDROME CORONAVIRUS 2 (SARS-COV-2) (CORONAVIRUS DISEASE [COVID-19]), AMPLIFIED PROBE TECHNIQUE, MAKING USE OF HIGH THROUGHPUT TECHNOLOGIES AS DESCRIBED BY CMS-2020-01-R: HCPCS

## 2020-11-10 NOTE — PROGRESS NOTES
[facility] Hematology/Oncology  PROGRESS NOTE -  Follow-up Visit      Subjective:       Patient ID:   NAME: Sivakumar Thacker : 1955     65 y.o. male    Referring Doc: Barry Mcmahon (New PCP in [place])  Other Physicians: Vinod Chen/José Manuel    Chief Complaint:  NHL f/u    History of Present Illness:     Patient returns today for a regularly scheduled follow-up visit.  The patient is here today to go over the results of the recently ordered labs, tests and studies. . He is doing well with the rituximab infusions. He denies any CP, SOB, HA's or N/V. He is here by himself.     He has been seeing pulmonary about his chronic cough  He last saw Lucia Georges again on 2020. He had latest PET on 2020    I discussed continued Covid19 precautions        ROS:   GEN: normal without any fever, night sweats or weight loss  HEENT: normal with no HA's, sore throat, stiff neck, changes in vision; sinus issues resolved  CV: normal with no CP, SOB, PND, VASQUEZ or orthopnea  PULM: normal with no SOB,  hemoptysis, sputum or pleuritic pain; chronic cough since 2020  GI: normal with no abdominal pain, nausea, vomiting, constipation, diarrhea, melanotic stools, BRBPR, or hematemesis  : normal with no hematuria, dysuria  BREAST: normal with no mass, discharge, pain  SKIN: normal with no rash, erythema, bruising, or swelling    Allergies:  Review of patient's allergies indicates:  No Known Allergies    Medications:    Current Outpatient Medications:     albuterol (PROVENTIL HFA) 90 mcg/actuation inhaler, Inhale 2 puffs into the lungs every 6 (six) hours as needed for Wheezing. Rescue, Disp: 18 g, Rfl: 0    albuterol-ipratropium (DUO-NEB) 2.5 mg-0.5 mg/3 mL nebulizer solution, Take 3 mLs by nebulization every 6 (six) hours as needed for Wheezing or Shortness of Breath. Rescue, Disp: 120 vial, Rfl: 5    amLODIPine (NORVASC) 10 MG tablet, Take 1 tablet (10 mg total) by mouth once daily., Disp: 90 tablet, Rfl: 3    aspirin  (ECOTRIN) 81 MG EC tablet, Take 81 mg by mouth once daily., Disp: , Rfl:     atorvastatin (LIPITOR) 20 MG tablet, Take 1 tablet (20 mg total) by mouth once daily., Disp: 90 tablet, Rfl: 3    blood sugar diagnostic (FREESTYLE LITE STRIPS) Strp, Check blood sugar 2 (two) times daily as needed., Disp: 200 each, Rfl: 11    budesonide (PULMICORT) 0.5 mg/2 mL nebulizer solution, Take 2 mLs (0.5 mg total) by nebulization 2 (two) times daily. Controller, Disp: 120 mL, Rfl: 5    fluticasone furoate-vilanteroL (BREO ELLIPTA) 200-25 mcg/dose DsDv diskus inhaler, Inhale 1 puff into the lungs once daily. Controller, Disp: 1 each, Rfl: 5    fluticasone propionate (FLONASE) 50 mcg/actuation nasal spray, 1 spray (50 mcg total) by Each Nostril route 2 (two) times daily., Disp: 15.8 mL, Rfl: 2    losartan (COZAAR) 100 MG tablet, Take 1 tablet (100 mg total) by mouth once daily., Disp: 90 tablet, Rfl: 3    metFORMIN (GLUCOPHAGE) 500 MG tablet, metformin 500 mg tablet  Take 2 tablets twice a day by oral route with meals for 90 days., Disp: 360 tablet, Rfl: 3    montelukast (SINGULAIR) 10 mg tablet, Take 1 tablet (10 mg total) by mouth nightly as needed., Disp: 90 tablet, Rfl: 3    MULTIVITAMIN ORAL, Take 1 tablet by mouth once daily., Disp: , Rfl:     naproxen (NAPROSYN) 500 MG tablet, Take 1 tablet (500 mg total) by mouth 2 (two) times daily as needed., Disp: 180 tablet, Rfl: 3    omeprazole (PRILOSEC) 40 MG capsule, Take 1 capsule (40 mg total) by mouth once daily., Disp: 90 capsule, Rfl: 3    tiZANidine (ZANAFLEX) 4 MG tablet, Take 1 tablet (4 mg total) by mouth daily as needed., Disp: 90 tablet, Rfl: 3  No current facility-administered medications for this visit.     Facility-Administered Medications Ordered in Other Visits:     alteplase injection 2 mg, 2 mg, Intra-Catheter, PRN, Jefferson R. Calabresi, MD    heparin, porcine (PF) 100 unit/mL injection flush 500 Units, 500 Units, Intravenous, PRN, Jefferson Ibarra,  MD    meperidine (PF) injection 25 mg, 25 mg, Intravenous, PRN, Jefferson Ibarra MD    riTUXimab (RITUXAN) 800 mg in sodium chloride 0.9% 800 mL infusion (conc: 1 mg/mL), 800 mg, Intravenous, 1 time in Clinic/HOD, Jefferson Ibarra MD, 800 mg at 11/12/20 0744    sodium chloride 0.9% flush 10 mL, 10 mL, Intravenous, PRN, Jefferson Ibarra MD    PMHx/PSHx Updates:  See patient's last visit with me on 9/17/2020  See H&P on 1/8/2019        Pathology:  Cancer Staging  No matching staging information was found for the patient.          Objective:     Vitals:  Blood pressure 134/77, pulse 79, temperature 97.7 °F (36.5 °C), resp. rate 18, weight 94.3 kg (208 lb).    Physical Examination:   GEN: no apparent distress, comfortable; AAOx3  HEAD: atraumatic and normocephalic  EYES: no pallor, no icterus, PERRLA  ENT: OMM, no pharyngeal erythema, external ears WNL; no nasal discharge; no thrush; new nodule on auricle of left ear (painful)  NECK: no masses, thyroid normal, trachea midline, no LAD/LN's, supple  CV: RRR with no murmur; normal pulse; normal S1 and S2; no pedal edema; portacath  CHEST: Normal respiratory effort; CTAB; normal breath sounds; no current wheeze  ABDOM: nontender and nondistended; soft; normal bowel sounds; no rebound/guarding  MUSC/Skeletal: ROM normal; no crepitus; joints normal; no deformities or arthropathy  EXTREM: no clubbing, cyanosis, inflammation or swelling  SKIN: no rashes, lesions, ulcers, petechiae or subcutaneous nodules (see above HEENt)  : no gibbs  NEURO: grossly intact; motor/sensory WNL; AAOx3; no tremors  PSYCH: normal mood, affect and behavior  LYMPH: normal cervical, supraclavicular, axillary and groin LN's            Labs:     Lab Results   Component Value Date    WBC 4.57 11/06/2020    HGB 15.2 11/06/2020    HCT 45.5 11/06/2020    MCV 88 11/06/2020     11/06/2020     CMP  Sodium   Date Value Ref Range Status   11/06/2020 140 136 - 145 mmol/L Final   04/25/2019 144  134 - 144 mmol/L      Potassium   Date Value Ref Range Status   11/06/2020 3.8 3.5 - 5.1 mmol/L Final     Chloride   Date Value Ref Range Status   11/06/2020 104 95 - 110 mmol/L Final   04/25/2019 107 98 - 110 mmol/L      CO2   Date Value Ref Range Status   11/06/2020 25 23 - 29 mmol/L Final     Glucose   Date Value Ref Range Status   11/06/2020 158 (H) 70 - 110 mg/dL Final   04/25/2019 177 (H) 70 - 99 mg/dL      BUN   Date Value Ref Range Status   11/06/2020 15 8 - 23 mg/dL Final     Creatinine   Date Value Ref Range Status   11/06/2020 0.8 0.5 - 1.4 mg/dL Final   04/25/2019 0.83 0.60 - 1.40 mg/dL      Calcium   Date Value Ref Range Status   11/06/2020 9.1 8.7 - 10.5 mg/dL Final     Total Protein   Date Value Ref Range Status   11/06/2020 7.2 6.0 - 8.4 g/dL Final     Albumin   Date Value Ref Range Status   11/06/2020 4.4 3.5 - 5.2 g/dL Final   04/25/2019 4.4 3.1 - 4.7 g/dL      Total Bilirubin   Date Value Ref Range Status   11/06/2020 0.7 0.1 - 1.0 mg/dL Final     Comment:     For infants and newborns, interpretation of results should be based  on gestational age, weight and in agreement with clinical  observations.  Premature Infant recommended reference ranges:  Up to 24 hours.............<8.0 mg/dL  Up to 48 hours............<12.0 mg/dL  3-5 days..................<15.0 mg/dL  6-29 days.................<15.0 mg/dL       Alkaline Phosphatase   Date Value Ref Range Status   11/06/2020 83 55 - 135 U/L Final     AST   Date Value Ref Range Status   11/06/2020 30 10 - 40 U/L Final     ALT   Date Value Ref Range Status   11/06/2020 28 10 - 44 U/L Final     Anion Gap   Date Value Ref Range Status   11/06/2020 11 8 - 16 mmol/L Final     eGFR if    Date Value Ref Range Status   11/06/2020 >60.0 >60 mL/min/1.73 m^2 Final     eGFR if non    Date Value Ref Range Status   11/06/2020 >60.0 >60 mL/min/1.73 m^2 Final     Comment:     Calculation used to obtain the estimated glomerular  filtration  rate (eGFR) is the CKD-EPI equation.            Radiology/Diagnostic Studies:    PET 9/2/2020:      Impression:     Negative for active lymphoproliferative disease.         Chest CT  3/19/2020:      Impression       1. Mild bronchiectasis in the left lower lobe and tree-in-bud micro nodules at the left lung base suggesting underlying bronchiolitis, possibly due to a non tuberculous mycobacterial infection or viral bronchiolitis.  2. Fatty infiltration of the liver  3. Small hiatal hernia.           CXR  1/31/2020    Impression       1. No acute chest disease.  2. Left subclavian Port-A-Cath.               PET 9/11/2019   Impression       No evidence of FDG avid residual or recurrent lymphoma       I have reviewed all available lab results and radiology reports.    Assessment/Plan:   (1) 65 y.o. male with diagnosis of NHL Follicular Stage IIIA who has been referred by Dr Gary Chen with [facility] for continuation of care by medical hematology/oncology.   - He originally was diagnosed in 2012 and had parotid excision at [facility]. He subsequently went to [facility] and was treated with bendamustine-rituximab. He has been on rituximab maintenance every 8 weeks and IV IgG monthly. Dr Chen's plan was to keep him on maintenace therapy for as long as he could tolerate the treatments  - s/p 6 cycles of Bendamustine and Retuximab with subsequent complete remission  - 1st maintenance cycle rituximab was in March 2016  - he has been on maintenance rituximab and IV IgG - last rituximab 11/15/2018; last IV IgG on Dec 14th 2018  - last PET was on 9/26/2018 with no evidence of recurrence  - originally diagnosed in Dec 2012 with left parotid and left periparotid LN excision on 12/11/2012  - PET scan done on  9/11/2019 was good    7/23/2020:  - new nodule on auricle of left ear suspicious for a skin cancer - will refer him to Dr Avilez with ENT  - he is due for repeat PET    9/17/2020:  - PET scan on 9/2/2020 is  adequate  - [facility]     11/12/2020:  - continued on the current regimen  - doing well with no new issues     (2) HTN     (3) Neuropathy     (4) GERD     (5) DM - on metformin per Dr Nunez     (6) Hypogammaglobulinemia - on Iv IgG monthly     (7) Steatosis of liver     (8) Degenerative disc disease of back     (9) Diverticular disease    (10) Mild bronchiectasis in the left lower lobe and tree-in-bud micro nodules at the left lung base suggesting underlying bronchiolitis  - seeing Lucia Georges           VISIT DIAGNOSES:      Follicular lymphoma grade IIIa, unspecified body region    Encounter for antineoplastic chemotherapy          PLAN:  1. Continue his rituximab every 8 weeks as long as he is tolerating the therapy as per Dr Chen's prior recommendations and directives    2. continue IV IgG monthly   3. Check labs every 8 weeks  4. F/u with PCP, Pulm etc        RTC in 8 weeks   Fax note to Barry Mcmahon; José Manuel Brock;          Discussion:       I spent over 25 mins of time with the patient. Reviewed results of the recently ordered labs, tests and studies; made directives with regards to the results. Over half of this time was spent couseling and coordinating care.      COVID-19 Discussion:    I had long discussion with patient and any applicable family about the COVID-19 coronavirus epidemic and the recommended precautions with regard to cancer and/or hematology patients. I have re-iterated the CDC recommendations for adequate hand washing, use of hand -like products, and coughing into elbow, etc. In addition, especially for our patients who are on chemotherapy and/or our otherwise immunocompromised patients, I have recommended avoidance of crowds, including movie theaters, restaurants, churches, etc. I have recommended avoidance of any sick or symptomatic family members and/or friends. I have also recommended avoidance of any raw and  unwashed food products, and general avoidance of food items that have not been prepared by themselves. The patient has been asked to call us immediately with any symptom developments, issues, questions or other general concerns.     I have explained all of the above in detail and the patient understands all of the current recommendation(s). I have answered all of their questions to the best of my ability and to their complete satisfaction.   The patient is to continue with the current management plan.            Electronically signed by Jefferson Ibarra MD                     Answers for HPI/ROS submitted by the patient on 11/9/2020   appetite change : No  unexpected weight change: No  mouth sores: No  visual disturbance: No  cough: No  shortness of breath: No  chest pain: No  abdominal pain: No  diarrhea: No  frequency: No  back pain: No  rash: No  headaches: No  adenopathy: No  nervous/ anxious: No

## 2020-11-11 LAB — SARS-COV-2 RNA RESP QL NAA+PROBE: NOT DETECTED

## 2020-11-11 RX ORDER — SODIUM CHLORIDE 0.9 % (FLUSH) 0.9 %
10 SYRINGE (ML) INJECTION
Status: CANCELLED | OUTPATIENT
Start: 2020-11-12

## 2020-11-11 RX ORDER — HEPARIN 100 UNIT/ML
500 SYRINGE INTRAVENOUS
Status: CANCELLED | OUTPATIENT
Start: 2020-11-12

## 2020-11-11 RX ORDER — MEPERIDINE HYDROCHLORIDE 25 MG/ML
25 INJECTION INTRAMUSCULAR; INTRAVENOUS; SUBCUTANEOUS
Status: CANCELLED | OUTPATIENT
Start: 2020-11-12

## 2020-11-11 RX ORDER — ACETAMINOPHEN 325 MG/1
650 TABLET ORAL
Status: CANCELLED | OUTPATIENT
Start: 2020-11-12

## 2020-11-11 RX ORDER — FAMOTIDINE 10 MG/ML
20 INJECTION INTRAVENOUS
Status: CANCELLED | OUTPATIENT
Start: 2020-11-12

## 2020-11-12 ENCOUNTER — INFUSION (OUTPATIENT)
Dept: INFUSION THERAPY | Facility: HOSPITAL | Age: 65
End: 2020-11-12
Attending: INTERNAL MEDICINE
Payer: OTHER GOVERNMENT

## 2020-11-12 ENCOUNTER — OFFICE VISIT (OUTPATIENT)
Dept: HEMATOLOGY/ONCOLOGY | Facility: CLINIC | Age: 65
End: 2020-11-12
Payer: OTHER GOVERNMENT

## 2020-11-12 ENCOUNTER — TELEPHONE (OUTPATIENT)
Dept: HEMATOLOGY/ONCOLOGY | Facility: CLINIC | Age: 65
End: 2020-11-12

## 2020-11-12 VITALS
BODY MASS INDEX: 31.67 KG/M2 | HEIGHT: 68 IN | TEMPERATURE: 97 F | DIASTOLIC BLOOD PRESSURE: 79 MMHG | WEIGHT: 209 LBS | HEART RATE: 82 BPM | RESPIRATION RATE: 18 BRPM | OXYGEN SATURATION: 98 % | SYSTOLIC BLOOD PRESSURE: 139 MMHG

## 2020-11-12 VITALS
WEIGHT: 208 LBS | RESPIRATION RATE: 18 BRPM | SYSTOLIC BLOOD PRESSURE: 134 MMHG | HEART RATE: 79 BPM | TEMPERATURE: 98 F | DIASTOLIC BLOOD PRESSURE: 77 MMHG | BODY MASS INDEX: 31.63 KG/M2

## 2020-11-12 DIAGNOSIS — Z03.818 ENCNTR FOR OBS FOR SUSP EXPSR TO OTH BIOLG AGENTS RULED OUT: Primary | ICD-10-CM

## 2020-11-12 DIAGNOSIS — C82.30 FOLLICULAR LYMPHOMA GRADE IIIA, UNSPECIFIED BODY REGION: ICD-10-CM

## 2020-11-12 DIAGNOSIS — D80.1 HYPOGAMMAGLOBULINEMIA: Primary | ICD-10-CM

## 2020-11-12 DIAGNOSIS — C82.30 FOLLICULAR LYMPHOMA GRADE IIIA, UNSPECIFIED BODY REGION: Primary | ICD-10-CM

## 2020-11-12 DIAGNOSIS — Z51.11 ENCOUNTER FOR ANTINEOPLASTIC CHEMOTHERAPY: ICD-10-CM

## 2020-11-12 PROCEDURE — A4216 STERILE WATER/SALINE, 10 ML: HCPCS | Performed by: INTERNAL MEDICINE

## 2020-11-12 PROCEDURE — 99214 PR OFFICE/OUTPT VISIT, EST, LEVL IV, 30-39 MIN: ICD-10-PCS | Mod: S$GLB,,, | Performed by: INTERNAL MEDICINE

## 2020-11-12 PROCEDURE — 25000003 PHARM REV CODE 250: Performed by: INTERNAL MEDICINE

## 2020-11-12 PROCEDURE — 96375 TX/PRO/DX INJ NEW DRUG ADDON: CPT

## 2020-11-12 PROCEDURE — 63600175 PHARM REV CODE 636 W HCPCS: Performed by: INTERNAL MEDICINE

## 2020-11-12 PROCEDURE — 99214 OFFICE O/P EST MOD 30 MIN: CPT | Mod: S$GLB,,, | Performed by: INTERNAL MEDICINE

## 2020-11-12 PROCEDURE — 96415 CHEMO IV INFUSION ADDL HR: CPT

## 2020-11-12 PROCEDURE — 96367 TX/PROPH/DG ADDL SEQ IV INF: CPT

## 2020-11-12 PROCEDURE — 96413 CHEMO IV INFUSION 1 HR: CPT

## 2020-11-12 RX ORDER — ACETAMINOPHEN 325 MG/1
650 TABLET ORAL
Status: COMPLETED | OUTPATIENT
Start: 2020-11-12 | End: 2020-11-12

## 2020-11-12 RX ORDER — MEPERIDINE HYDROCHLORIDE 25 MG/ML
25 INJECTION INTRAMUSCULAR; INTRAVENOUS; SUBCUTANEOUS
Status: DISCONTINUED | OUTPATIENT
Start: 2020-11-12 | End: 2020-11-12 | Stop reason: HOSPADM

## 2020-11-12 RX ORDER — FAMOTIDINE 10 MG/ML
20 INJECTION INTRAVENOUS
Status: COMPLETED | OUTPATIENT
Start: 2020-11-12 | End: 2020-11-12

## 2020-11-12 RX ORDER — HEPARIN 100 UNIT/ML
500 SYRINGE INTRAVENOUS
Status: DISCONTINUED | OUTPATIENT
Start: 2020-11-12 | End: 2020-11-12 | Stop reason: HOSPADM

## 2020-11-12 RX ORDER — SODIUM CHLORIDE 0.9 % (FLUSH) 0.9 %
10 SYRINGE (ML) INJECTION
Status: DISCONTINUED | OUTPATIENT
Start: 2020-11-12 | End: 2020-11-12 | Stop reason: HOSPADM

## 2020-11-12 RX ADMIN — RITUXIMAB 800 MG: 10 INJECTION, SOLUTION INTRAVENOUS at 07:11

## 2020-11-12 RX ADMIN — HEPARIN 500 UNITS: 100 SYRINGE at 10:11

## 2020-11-12 RX ADMIN — SODIUM CHLORIDE, PRESERVATIVE FREE 10 ML: 5 INJECTION INTRAVENOUS at 10:11

## 2020-11-12 RX ADMIN — ACETAMINOPHEN 650 MG: 325 TABLET ORAL at 07:11

## 2020-11-12 RX ADMIN — FAMOTIDINE 20 MG: 10 INJECTION INTRAVENOUS at 07:11

## 2020-11-12 RX ADMIN — DIPHENHYDRAMINE HYDROCHLORIDE 50 MG: 50 INJECTION INTRAMUSCULAR; INTRAVENOUS at 07:11

## 2020-11-12 RX ADMIN — SODIUM CHLORIDE: 0.9 INJECTION, SOLUTION INTRAVENOUS at 07:11

## 2020-11-12 NOTE — TELEPHONE ENCOUNTER
----- Message from Nohelia Keller sent at 11/12/2020 11:54 AM CST -----  Please enter a Covid Test order for [facility], clinic collect.    Need testing prior to Rituxan maintenance Q 8 weeks.       Thank you!

## 2020-11-12 NOTE — PROGRESS NOTES
Rituxan dose rounded from 821 mg to 800 mg (within 5% for chemotherapy and 10% for monoclonal antibodies) per rounding protocol; original order 375 mg/m2

## 2020-11-13 ENCOUNTER — INFUSION (OUTPATIENT)
Dept: INFUSION THERAPY | Facility: HOSPITAL | Age: 65
End: 2020-11-13
Attending: INTERNAL MEDICINE
Payer: OTHER GOVERNMENT

## 2020-11-13 VITALS
OXYGEN SATURATION: 97 % | TEMPERATURE: 98 F | BODY MASS INDEX: 31.78 KG/M2 | HEIGHT: 68 IN | HEART RATE: 76 BPM | RESPIRATION RATE: 18 BRPM | DIASTOLIC BLOOD PRESSURE: 80 MMHG | WEIGHT: 209.69 LBS | SYSTOLIC BLOOD PRESSURE: 143 MMHG

## 2020-11-13 DIAGNOSIS — D80.1 HYPOGAMMAGLOBULINEMIA: Primary | ICD-10-CM

## 2020-11-13 DIAGNOSIS — C82.30 FOLLICULAR LYMPHOMA GRADE IIIA, UNSPECIFIED BODY REGION: ICD-10-CM

## 2020-11-13 DIAGNOSIS — D80.1 NONFAMILIAL HYPOGAMMAGLOBULINEMIA: ICD-10-CM

## 2020-11-13 PROCEDURE — 96367 TX/PROPH/DG ADDL SEQ IV INF: CPT

## 2020-11-13 PROCEDURE — 96415 CHEMO IV INFUSION ADDL HR: CPT

## 2020-11-13 PROCEDURE — 96413 CHEMO IV INFUSION 1 HR: CPT

## 2020-11-13 PROCEDURE — 25000003 PHARM REV CODE 250: Performed by: NURSE PRACTITIONER

## 2020-11-13 PROCEDURE — 63600175 PHARM REV CODE 636 W HCPCS: Mod: JG | Performed by: NURSE PRACTITIONER

## 2020-11-13 RX ORDER — HEPARIN 100 UNIT/ML
500 SYRINGE INTRAVENOUS
Status: CANCELLED | OUTPATIENT
Start: 2020-12-11

## 2020-11-13 RX ORDER — DIPHENHYDRAMINE HCL 25 MG
25 CAPSULE ORAL
Status: CANCELLED | OUTPATIENT
Start: 2020-12-11

## 2020-11-13 RX ORDER — SODIUM CHLORIDE 0.9 % (FLUSH) 0.9 %
10 SYRINGE (ML) INJECTION
Status: CANCELLED | OUTPATIENT
Start: 2020-12-11

## 2020-11-13 RX ORDER — ACETAMINOPHEN 500 MG
500 TABLET ORAL
Status: CANCELLED | OUTPATIENT
Start: 2020-12-11

## 2020-11-13 RX ORDER — SODIUM CHLORIDE 0.9 % (FLUSH) 0.9 %
10 SYRINGE (ML) INJECTION
Status: DISCONTINUED | OUTPATIENT
Start: 2020-11-13 | End: 2020-11-13 | Stop reason: HOSPADM

## 2020-11-13 RX ORDER — ACETAMINOPHEN 500 MG
500 TABLET ORAL
Status: DISCONTINUED | OUTPATIENT
Start: 2020-11-13 | End: 2020-11-13 | Stop reason: HOSPADM

## 2020-11-13 RX ORDER — HEPARIN 100 UNIT/ML
500 SYRINGE INTRAVENOUS
Status: DISCONTINUED | OUTPATIENT
Start: 2020-11-13 | End: 2020-11-13 | Stop reason: HOSPADM

## 2020-11-13 RX ADMIN — HEPARIN 500 UNITS: 100 SYRINGE at 10:11

## 2020-11-13 RX ADMIN — SODIUM CHLORIDE: 9 INJECTION, SOLUTION INTRAVENOUS at 08:11

## 2020-11-13 RX ADMIN — IMMUNE GLOBULIN (HUMAN) 30 G: 10 INJECTION INTRAVENOUS; SUBCUTANEOUS at 08:11

## 2020-11-13 RX ADMIN — DIPHENHYDRAMINE HYDROCHLORIDE 25 MG: 50 INJECTION INTRAMUSCULAR; INTRAVENOUS at 08:11

## 2020-11-13 RX ADMIN — ACETAMINOPHEN 500 MG: 500 TABLET ORAL at 08:11

## 2020-11-16 ENCOUNTER — PATIENT OUTREACH (OUTPATIENT)
Dept: ADMINISTRATIVE | Facility: OTHER | Age: 65
End: 2020-11-16

## 2020-11-16 NOTE — PROGRESS NOTES
Chart was reviewed for overdue Proactive [facility] Encounters (FIGUEROA)  topics  Updates were requested from care everywhere  Health Maintenance has been updated  [place] immunization registry triggered  Per Dr. Mcmahon eye exam is up to date for the year with Dr. Andersen. In order to request records more info is needed as to location.

## 2020-11-17 ENCOUNTER — OFFICE VISIT (OUTPATIENT)
Dept: PULMONOLOGY | Facility: CLINIC | Age: 65
End: 2020-11-17
Payer: OTHER GOVERNMENT

## 2020-11-17 VITALS
DIASTOLIC BLOOD PRESSURE: 83 MMHG | HEART RATE: 72 BPM | WEIGHT: 209.56 LBS | BODY MASS INDEX: 31.76 KG/M2 | HEIGHT: 68 IN | SYSTOLIC BLOOD PRESSURE: 128 MMHG | OXYGEN SATURATION: 98 %

## 2020-11-17 DIAGNOSIS — J47.9 BRONCHIECTASIS WITHOUT COMPLICATION: Primary | ICD-10-CM

## 2020-11-17 PROCEDURE — 99999 PR PBB SHADOW E&M-EST. PATIENT-LVL IV: CPT | Mod: PBBFAC,,, | Performed by: NURSE PRACTITIONER

## 2020-11-17 PROCEDURE — 99999 PR PBB SHADOW E&M-EST. PATIENT-LVL IV: ICD-10-PCS | Mod: PBBFAC,,, | Performed by: NURSE PRACTITIONER

## 2020-11-17 PROCEDURE — 99213 OFFICE O/P EST LOW 20 MIN: CPT | Mod: S$PBB,,, | Performed by: NURSE PRACTITIONER

## 2020-11-17 PROCEDURE — 99213 PR OFFICE/OUTPT VISIT, EST, LEVL III, 20-29 MIN: ICD-10-PCS | Mod: S$PBB,,, | Performed by: NURSE PRACTITIONER

## 2020-11-17 PROCEDURE — 99214 OFFICE O/P EST MOD 30 MIN: CPT | Mod: PBBFAC,PO | Performed by: NURSE PRACTITIONER

## 2020-11-17 NOTE — PATIENT INSTRUCTIONS
Take singulair daily for allergies      Percussion therapy instruction for bronchiectasis  Do breathing treatments 3 x a week followed by 10 minutes of laying face down on cough or bed with 2-3 pillows under stomach. Make sure someone else is home in case you become dizzy.  Have someone beat on upper back or use hand held massager on back

## 2020-11-17 NOTE — PROGRESS NOTES
11/17/2020    Sivakumar Thacker  Office note    Chief Complaint   Patient presents with    Follow-up     3m f/u       HPI:    11/17/2020- states doing well, cough has decreased to occasional to 2 times weekly that is mild, productive clear mucous.   States no fever, night sweats, shortness of breath,   Complaint of sinus drainage: daily, clear, singulair most days,      8/17/20- Cough- improved following antibiotic therapy, returned after 8 weeks, productive, light green color, dime size, worse in early morning, no chest tightness or wheeze.  No SOB. Using nebulizer 2x daily for 2 weeks, undergoing chemo and IVIG for Lymphoma and skin cancer.     6/16/2020- Cough unchanged with coughing fits when trying to sleep. States no improvement with Trelegy inhaler. States Levaquin antibiotic help a little bit and cough returned after finishing. Has been on IgG replacement therapy every 4 weeks since 2012.  Brought in 5 sputum sample, all still processing.       4/20/2020- Chronic cough onset 2013, followed by pulmonologist Dr. Case in [place]. Put Advair with no benefit. Had negative bronchoscopy and negative AFB, had appointment with Dr. Brock in 2014. Hx of non Hodgkins lymphoma followed by Dr. Ibarra on Rotuxin and immune therapy.  Cough Worsened in Jan 2020, associated with recurrent fever improves with tylenol, states cough improves with antibiotics but returns when finished medications. Productive occasionally, can feel something rattling in chest, thick white mucous. 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 2 pack year  Family Hx: no Lung Cancer, no COPD, no Asthma  Medical Hx: no previous pneumonia ; no previous shoulder/chest  surgery        The chief compliant  problem is stable  PFSH:  Past Medical History:   Diagnosis Date    Cancer     lymphoma currently on chemo    Diabetes mellitus, type 2     Encounter for antineoplastic chemotherapy 4/1/2020    Hypertension     Neuropathy     Reflux esophagitis          Past Surgical History:   Procedure Laterality Date    COLONOSCOPY  2018    HAND SURGERY      amputation left index finger    PORTACATH PLACEMENT      SPINE SURGERY       Social History     Tobacco Use    Smoking status: Former Smoker     Packs/day: 0.50     Years: 2.00     Pack years: 1.00    Smokeless tobacco: Never Used   Substance Use Topics    Alcohol use: Yes     Comment: rarely    Drug use: No     Family History   Problem Relation Age of Onset    Diabetes Father      Review of patient's allergies indicates:  No Known Allergies  I have reviewed past medical, family, and social history. I have reviewed previous nurse notes.    Performance Status:The patient's activity level is no limits with regular activity.          Review of Systems   Constitutional: Negative for activity change, appetite change, chills, and unexpected weight change. Positive for fatigue, diaphoresis,   HENT: Negative for dental problem, sinus pressure, sinus pain, sneezing, sore throat, trouble swallowing and voice change.  Positive sinus drainage, post nasal drip,   Respiratory: Negative for apnea, chest tightness, shortness of breath, wheezing and stridor.  Positive for cough  Cardiovascular: Negative for chest pain, palpitations and leg swelling.   Gastrointestinal: Negative for abdominal distention, abdominal pain, constipation and nausea.   Musculoskeletal: Negative for gait problem, myalgias and neck pain.   Skin: Negative for color change and pallor.   Allergic/Immunologic: Negative for environmental allergies and food allergies.   Neurological: Negative for dizziness, speech difficulty, weakness, light-headedness, numbness and  "headaches.   Hematological: Negative for adenopathy. Does not bruise/bleed easily.   Psychiatric/Behavioral: Negative for dysphoric mood and sleep disturbance. The patient is not nervous/anxious.           Exam:Comprehensive exam done. /83 (BP Location: Left arm, Patient Position: Sitting, BP Method: Medium (Automatic))   Pulse 72   Ht 5' 8" (1.727 m)   Wt 95.1 kg (209 lb 8.8 oz)   SpO2 98% Comment: on room air at rest  BMI 31.86 kg/m²   Exam included Vitals as listed  Constitutional: He is oriented to person, place, and time. He appears well-developed. No distress.   Nose: Nose normal.   Mouth/Throat: Uvula is midline, oropharynx is clear and moist and mucous membranes are normal. No dental caries. No oropharyngeal exudate, posterior oropharyngeal edema, posterior oropharyngeal erythema or tonsillar abscesses.  Mallapatti (M) score 2  Eyes: Pupils are equal, round, and reactive to light.   Neck: No JVD present. No thyromegaly present.   Cardiovascular: Normal rate, regular rhythm and normal heart sounds. Exam reveals no gallop and no friction rub.   No murmur heard.  Pulmonary/Chest: Effort normal and breath sounds abnormal: bilateral rhonchi and wheeze.   No accessory muscle usage or stridor. No apnea and no tachypnea. No respiratory distress, decreased breath sounds, or tenderness.   Abdominal: Soft. He exhibits no mass. There is no tenderness. No hepatosplenomegaly, hernias and normoactive bowel sounds  Musculoskeletal: Normal range of motion. exhibits no edema.   Lymphadenopathy:     He has no cervical adenopathy.   Neurological:  alert and oriented to person, place, and time. not disoriented.   Skin: Skin is warm and dry. Capillary refill takes less 2 sec. No cyanosis or erythema. No pallor. Nails show no clubbing.   Psychiatric: normal mood and affect. behavior is normal. Judgment and thought content normal.       Radiographs (ct chest and cxr) reviewed: view by direct vision   X-Ray Chest PA And " Lateral 03/19/2020   No acute cardiopulmonary abnormality appreciated radiographically.  No interval detrimental change in the radiographic appearance of the chest when compared to the previous study.     CT Chest Without 03/19/2020   1. Mild bronchiectasis in the left lower lobe and tree-in-bud micro nodules at the left lung base suggesting underlying bronchiolitis, possibly due to a non tuberculous mycobacterial infection or viral bronchiolitis.  2. Fatty infiltration of the liver  3. Small hiatal hernia.     NM PET CT Routine Skull to Mid Thigh 09/11/2019   No evidence of FDG avid residual or recurrent lymphoma       CT Chest Without Contrast 09/01/2020   Unchanged left upper lobe 3 mm nodule.     Mild old granulomatous disease.     Minimal bronchiectasis which is nonspecific.  Adjacent micro nodules and tree-in-bud opacities are also unchanged.  Findings favor sequelae of an old atypical infectious process.     Atherosclerosis.  Small hiatal hernia.      Labs reviewed       Lab Results   Component Value Date    WBC 4.57 11/06/2020    RBC 5.17 11/06/2020    HGB 15.2 11/06/2020    HCT 45.5 11/06/2020    MCV 88 11/06/2020    MCH 29.4 11/06/2020    MCHC 33.4 11/06/2020    RDW 13.2 11/06/2020     11/06/2020    MPV 9.7 11/06/2020    GRAN 2.5 11/06/2020    GRAN 55.5 11/06/2020    LYMPH 1.2 11/06/2020    LYMPH 25.6 11/06/2020    MONO 0.6 11/06/2020    MONO 12.5 11/06/2020    EOS 0.1 11/06/2020    BASO 0.03 11/06/2020    EOSINOPHIL 2.2 11/06/2020    BASOPHIL 0.7 11/06/2020   Results for CHRIS GUTIERREZ (MRN [id number]) as of 4/20/2020 09:15   Ref. Range 4/1/2020 15:45   Albumin Latest Ref Range: 3.5 - 5.2 g/dL 4.3   BILIRUBIN TOTAL Latest Ref Range: 0.1 - 1.0 mg/dL 0.7   AST Latest Ref Range: 10 - 40 U/L 39     AFB x 3 all negative following 8 week incubation, Respiratory cultures show normal dank only    Culture, Respiratory with Gram Stain 09/18/20   HAEMOPHILUS INFLUENZAE       PFT  reviewed  Pulmonary  Functions Testing Results:    7/17/2013 PFT Data: Complete pulmonary function studies were obtained today and are independently reviewed. Spirometry shows no evidence of airflow obstruction today. Lung was performed by plethysmography and are consistent with air trapping without hyperinflation. Residual volume is 122% predicted, and the total capacity is 98% predicted. Diffusion capacity for carbon monoxide is in the normal range at 102% predicted. Overall, these are fairly normal pulmonary function studies and do not suggest any obstructive lung disease.    Spirometry bronchodilator, lung volume by gas dilution, diffusion capacity measured June 23, 2020.  The FEV1 FVC ratio was 80% indicating no airflow obstruction by spirometry technique.  The FEV1 was 108% of predicted or 3.5 L.  There was no   significant improvement following bronchodilator.  Total lung capacity on lung volume by gas dilution was 69% and a little reduced.  Diffusion was normal.       Spirometry was normal and was no significant bronchodilator response.  Total lung capacity was reduced consistent with restriction.  Diffusion was normal.  Clinical correlation recommended.         Plan:  Clinical impression is apparently straight forward and impression with management as below.    Sivakumar was seen today for follow-up.    Diagnoses and all orders for this visit:    Bronchiectasis without complication  -     Culture, Respiratory with Gram Stain; Standing  -     AFB Culture & Smear; Standing        Follow up in about 6 months (around 5/17/2021), or if symptoms worsen or fail to improve.    Discussed with patient above for education the following:      Patient Instructions   Take singulair daily for allergies      Percussion therapy instruction for bronchiectasis  Do breathing treatments 3 x a week followed by 10 minutes of laying face down on cough or bed with 2-3 pillows under stomach. Make sure someone else is home in case you become dizzy.  Have  someone beat on upper back or use hand held massager on back

## 2020-12-04 DIAGNOSIS — E11.9 TYPE 2 DIABETES MELLITUS WITHOUT COMPLICATION, UNSPECIFIED WHETHER LONG TERM INSULIN USE: ICD-10-CM

## 2020-12-11 ENCOUNTER — INFUSION (OUTPATIENT)
Dept: INFUSION THERAPY | Facility: HOSPITAL | Age: 65
End: 2020-12-11
Attending: INTERNAL MEDICINE
Payer: OTHER GOVERNMENT

## 2020-12-11 VITALS
SYSTOLIC BLOOD PRESSURE: 132 MMHG | HEART RATE: 71 BPM | TEMPERATURE: 97 F | DIASTOLIC BLOOD PRESSURE: 72 MMHG | HEIGHT: 68 IN | RESPIRATION RATE: 18 BRPM | OXYGEN SATURATION: 99 % | BODY MASS INDEX: 31.7 KG/M2 | WEIGHT: 209.13 LBS

## 2020-12-11 DIAGNOSIS — C82.30 FOLLICULAR LYMPHOMA GRADE IIIA, UNSPECIFIED BODY REGION: ICD-10-CM

## 2020-12-11 DIAGNOSIS — D80.1 HYPOGAMMAGLOBULINEMIA: Primary | ICD-10-CM

## 2020-12-11 DIAGNOSIS — D80.1 NONFAMILIAL HYPOGAMMAGLOBULINEMIA: ICD-10-CM

## 2020-12-11 PROCEDURE — 63600175 PHARM REV CODE 636 W HCPCS: Performed by: NURSE PRACTITIONER

## 2020-12-11 PROCEDURE — 96415 CHEMO IV INFUSION ADDL HR: CPT

## 2020-12-11 PROCEDURE — 96367 TX/PROPH/DG ADDL SEQ IV INF: CPT

## 2020-12-11 PROCEDURE — 25000003 PHARM REV CODE 250: Performed by: NURSE PRACTITIONER

## 2020-12-11 PROCEDURE — 96413 CHEMO IV INFUSION 1 HR: CPT

## 2020-12-11 RX ORDER — DIPHENHYDRAMINE HCL 25 MG
25 CAPSULE ORAL
Status: CANCELLED | OUTPATIENT
Start: 2021-01-08

## 2020-12-11 RX ORDER — ACETAMINOPHEN 500 MG
500 TABLET ORAL
Status: CANCELLED | OUTPATIENT
Start: 2021-01-08

## 2020-12-11 RX ORDER — SODIUM CHLORIDE 0.9 % (FLUSH) 0.9 %
10 SYRINGE (ML) INJECTION
Status: DISCONTINUED | OUTPATIENT
Start: 2020-12-11 | End: 2020-12-11 | Stop reason: HOSPADM

## 2020-12-11 RX ORDER — HEPARIN 100 UNIT/ML
500 SYRINGE INTRAVENOUS
Status: CANCELLED | OUTPATIENT
Start: 2021-01-08

## 2020-12-11 RX ORDER — SODIUM CHLORIDE 0.9 % (FLUSH) 0.9 %
10 SYRINGE (ML) INJECTION
Status: CANCELLED | OUTPATIENT
Start: 2021-01-08

## 2020-12-11 RX ORDER — ACETAMINOPHEN 500 MG
500 TABLET ORAL
Status: DISCONTINUED | OUTPATIENT
Start: 2020-12-11 | End: 2020-12-11 | Stop reason: HOSPADM

## 2020-12-11 RX ORDER — HEPARIN 100 UNIT/ML
500 SYRINGE INTRAVENOUS
Status: DISCONTINUED | OUTPATIENT
Start: 2020-12-11 | End: 2020-12-11 | Stop reason: HOSPADM

## 2020-12-11 RX ADMIN — IMMUNE GLOBULIN (HUMAN) 30 G: 10 INJECTION INTRAVENOUS; SUBCUTANEOUS at 08:12

## 2020-12-11 RX ADMIN — DIPHENHYDRAMINE HYDROCHLORIDE 25 MG: 50 INJECTION INTRAMUSCULAR; INTRAVENOUS at 08:12

## 2020-12-11 RX ADMIN — HEPARIN 500 UNITS: 100 SYRINGE at 11:12

## 2020-12-11 RX ADMIN — ACETAMINOPHEN 500 MG: 500 TABLET ORAL at 08:12

## 2020-12-14 ENCOUNTER — LAB VISIT (OUTPATIENT)
Dept: LAB | Facility: HOSPITAL | Age: 65
End: 2020-12-14
Attending: NURSE PRACTITIONER
Payer: OTHER GOVERNMENT

## 2020-12-14 DIAGNOSIS — E11.9 TYPE 2 DIABETES MELLITUS WITHOUT COMPLICATION, WITHOUT LONG-TERM CURRENT USE OF INSULIN: ICD-10-CM

## 2020-12-14 DIAGNOSIS — J47.0 BRONCHIECTASIS WITH ACUTE LOWER RESPIRATORY INFECTION: ICD-10-CM

## 2020-12-14 LAB
ALBUMIN SERPL BCP-MCNC: 4.1 G/DL (ref 3.5–5.2)
ALP SERPL-CCNC: 72 U/L (ref 55–135)
ALT SERPL W/O P-5'-P-CCNC: 24 U/L (ref 10–44)
ANION GAP SERPL CALC-SCNC: 10 MMOL/L (ref 8–16)
ANION GAP SERPL CALC-SCNC: 10 MMOL/L (ref 8–16)
AST SERPL-CCNC: 29 U/L (ref 10–40)
BASOPHILS # BLD AUTO: 0.03 K/UL (ref 0–0.2)
BASOPHILS NFR BLD: 0.8 % (ref 0–1.9)
BILIRUB SERPL-MCNC: 0.6 MG/DL (ref 0.1–1)
BUN SERPL-MCNC: 16 MG/DL (ref 8–23)
BUN SERPL-MCNC: 16 MG/DL (ref 8–23)
CALCIUM SERPL-MCNC: 9.5 MG/DL (ref 8.7–10.5)
CALCIUM SERPL-MCNC: 9.5 MG/DL (ref 8.7–10.5)
CHLORIDE SERPL-SCNC: 101 MMOL/L (ref 95–110)
CHLORIDE SERPL-SCNC: 101 MMOL/L (ref 95–110)
CHOLEST SERPL-MCNC: 110 MG/DL (ref 120–199)
CHOLEST/HDLC SERPL: 4.1 {RATIO} (ref 2–5)
CO2 SERPL-SCNC: 31 MMOL/L (ref 23–29)
CO2 SERPL-SCNC: 31 MMOL/L (ref 23–29)
CREAT SERPL-MCNC: 0.9 MG/DL (ref 0.5–1.4)
CREAT SERPL-MCNC: 0.9 MG/DL (ref 0.5–1.4)
DIFFERENTIAL METHOD: ABNORMAL
EOSINOPHIL # BLD AUTO: 0.1 K/UL (ref 0–0.5)
EOSINOPHIL NFR BLD: 3.6 % (ref 0–8)
ERYTHROCYTE [DISTWIDTH] IN BLOOD BY AUTOMATED COUNT: 12.8 % (ref 11.5–14.5)
EST. GFR  (AFRICAN AMERICAN): >60 ML/MIN/1.73 M^2
EST. GFR  (AFRICAN AMERICAN): >60 ML/MIN/1.73 M^2
EST. GFR  (NON AFRICAN AMERICAN): >60 ML/MIN/1.73 M^2
EST. GFR  (NON AFRICAN AMERICAN): >60 ML/MIN/1.73 M^2
ESTIMATED AVG GLUCOSE: 126 MG/DL (ref 68–131)
GLUCOSE SERPL-MCNC: 116 MG/DL (ref 70–110)
GLUCOSE SERPL-MCNC: 116 MG/DL (ref 70–110)
HBA1C MFR BLD HPLC: 6 % (ref 4.5–6.2)
HCT VFR BLD AUTO: 44.6 % (ref 40–54)
HDLC SERPL-MCNC: 27 MG/DL (ref 40–75)
HDLC SERPL: 24.5 % (ref 20–50)
HGB BLD-MCNC: 14.9 G/DL (ref 14–18)
IMM GRANULOCYTES # BLD AUTO: 0.06 K/UL (ref 0–0.04)
IMM GRANULOCYTES NFR BLD AUTO: 1.5 % (ref 0–0.5)
LDLC SERPL CALC-MCNC: 51 MG/DL (ref 63–159)
LYMPHOCYTES # BLD AUTO: 1.3 K/UL (ref 1–4.8)
LYMPHOCYTES NFR BLD: 32.7 % (ref 18–48)
MCH RBC QN AUTO: 29.6 PG (ref 27–31)
MCHC RBC AUTO-ENTMCNC: 33.4 G/DL (ref 32–36)
MCV RBC AUTO: 89 FL (ref 82–98)
MONOCYTES # BLD AUTO: 0.5 K/UL (ref 0.3–1)
MONOCYTES NFR BLD: 11.9 % (ref 4–15)
NEUTROPHILS # BLD AUTO: 1.9 K/UL (ref 1.8–7.7)
NEUTROPHILS NFR BLD: 49.5 % (ref 38–73)
NONHDLC SERPL-MCNC: 83 MG/DL
NRBC BLD-RTO: 0 /100 WBC
PLATELET # BLD AUTO: 266 K/UL (ref 150–350)
PMV BLD AUTO: 8.7 FL (ref 9.2–12.9)
POTASSIUM SERPL-SCNC: 3.8 MMOL/L (ref 3.5–5.1)
POTASSIUM SERPL-SCNC: 3.8 MMOL/L (ref 3.5–5.1)
PROT SERPL-MCNC: 7.3 G/DL (ref 6–8.4)
RBC # BLD AUTO: 5.03 M/UL (ref 4.6–6.2)
SODIUM SERPL-SCNC: 142 MMOL/L (ref 136–145)
SODIUM SERPL-SCNC: 142 MMOL/L (ref 136–145)
TRIGL SERPL-MCNC: 160 MG/DL (ref 30–150)
TSH SERPL DL<=0.005 MIU/L-ACNC: 5.19 UIU/ML (ref 0.34–5.6)
WBC # BLD AUTO: 3.88 K/UL (ref 3.9–12.7)

## 2020-12-14 PROCEDURE — 36415 COLL VENOUS BLD VENIPUNCTURE: CPT

## 2020-12-14 PROCEDURE — 80053 COMPREHEN METABOLIC PANEL: CPT

## 2020-12-14 PROCEDURE — 85025 COMPLETE CBC W/AUTO DIFF WBC: CPT

## 2020-12-14 PROCEDURE — 84443 ASSAY THYROID STIM HORMONE: CPT

## 2020-12-14 PROCEDURE — 80061 LIPID PANEL: CPT

## 2020-12-14 PROCEDURE — 83036 HEMOGLOBIN GLYCOSYLATED A1C: CPT

## 2020-12-22 ENCOUNTER — TELEPHONE (OUTPATIENT)
Dept: FAMILY MEDICINE | Facility: CLINIC | Age: 65
End: 2020-12-22

## 2020-12-22 NOTE — TELEPHONE ENCOUNTER
Called Ashli (Jennie) at [phone]. She was not in at the moment and I wanted to let her know that  referral/ PA for new visits with Dr. Ibarra has been started. PA is still Pending and Jennie states that pt does have appt with Dr. Ibarra on 1/7/20

## 2020-12-22 NOTE — TELEPHONE ENCOUNTER
----- Message from Liza Cerda sent at 12/22/2020 10:55 AM CST -----  Contact: Ashli with DR Perdomo OFFICE  Call placed to pod.   Ashli called back from Dr Perdomo office she is returning a call back to the nurse Zander     Call back [phone]  direct line

## 2020-12-23 ENCOUNTER — OFFICE VISIT (OUTPATIENT)
Dept: FAMILY MEDICINE | Facility: CLINIC | Age: 65
End: 2020-12-23
Payer: OTHER GOVERNMENT

## 2020-12-23 VITALS
SYSTOLIC BLOOD PRESSURE: 134 MMHG | BODY MASS INDEX: 30.92 KG/M2 | TEMPERATURE: 98 F | RESPIRATION RATE: 15 BRPM | DIASTOLIC BLOOD PRESSURE: 83 MMHG | HEIGHT: 68 IN | WEIGHT: 204 LBS | HEART RATE: 94 BPM | OXYGEN SATURATION: 97 %

## 2020-12-23 DIAGNOSIS — E78.41 ELEVATED LIPOPROTEIN(A): ICD-10-CM

## 2020-12-23 DIAGNOSIS — R73.03 PREDIABETES: ICD-10-CM

## 2020-12-23 DIAGNOSIS — I10 ESSENTIAL HYPERTENSION: Primary | ICD-10-CM

## 2020-12-23 PROCEDURE — 99215 OFFICE O/P EST HI 40 MIN: CPT | Mod: PBBFAC,PN | Performed by: FAMILY MEDICINE

## 2020-12-23 PROCEDURE — 99999 PR PBB SHADOW E&M-EST. PATIENT-LVL V: CPT | Mod: PBBFAC,,, | Performed by: FAMILY MEDICINE

## 2020-12-23 PROCEDURE — 99214 PR OFFICE/OUTPT VISIT, EST, LEVL IV, 30-39 MIN: ICD-10-PCS | Mod: S$PBB,,, | Performed by: FAMILY MEDICINE

## 2020-12-23 PROCEDURE — 99214 OFFICE O/P EST MOD 30 MIN: CPT | Mod: S$PBB,,, | Performed by: FAMILY MEDICINE

## 2020-12-23 PROCEDURE — 99999 PR PBB SHADOW E&M-EST. PATIENT-LVL V: ICD-10-PCS | Mod: PBBFAC,,, | Performed by: FAMILY MEDICINE

## 2020-12-23 NOTE — PROGRESS NOTES
Subjective:       Patient ID: Sivakumar Thacker is a 65 y.o. male.    Chief Complaint: Follow-up (Pt c/o cough persistent x 1 year; Pt brought in BP log to discuss.)    In regard to the patient's diabetes, patient reports that blood sugars have been well controlled. Patient Denies hypoglycemia. Patient is not currently on insulin therapy. Patient is on ACE/ARB and is on statin. Last a1c was Hemoglobin A1C       Date                     Value               Ref Range           Status                12/14/2020               6.0                 4.5 - 6.2 %         Final              Comment:    According to ADA guidelines, hemoglobin A1C <7.0% represents  optimal control in non-pregnant diabetic patients.  Different  metrics may apply to specific populations.   Standards of Medical Care in Diabetes - 2016.  For the purpose of screening for the presence of diabetes:  <5.7%     Consistent with the absence of diabetes  5.7-6.4%  Consistent with increasing risk for diabetes   (prediabetes)  >or=6.5%  Consistent with diabetes  Currently no consensus exists for use of hemoglobin A1C  for diagnosis of diabetes for children.         06/24/2020               6.6 (H)             4.5 - 6.2 %         Final              Comment:    According to ADA guidelines, hemoglobin A1C <7.0% represents  optimal control in non-pregnant diabetic patients.  Different  metrics may apply to specific populations.   Standards of Medical Care in Diabetes - 2016.  For the purpose of screening for the presence of diabetes:  <5.7%     Consistent with the absence of diabetes  5.7-6.4%  Consistent with increasing risk for diabetes   (prediabetes)  >or=6.5%  Consistent with diabetes  Currently no consensus exists for use of hemoglobin A1C  for diagnosis of diabetes for children.         12/09/2019               6.2                 4.5 - 6.2 %         Final              Comment:    According to ADA guidelines, hemoglobin A1C <7.0% represents  optimal control  in non-pregnant diabetic patients.  Different  metrics may apply to specific populations.   Standards of Medical Care in Diabetes - 2016.  For the purpose of screening for the presence of diabetes:  <5.7%     Consistent with the absence of diabetes  5.7-6.4%  Consistent with increasing risk for diabetes   (prediabetes)  >or=6.5%  Consistent with diabetes  Currently no consensus exists for use of hemoglobin A1C  for diagnosis of diabetes for children.    ----------    In regards to the patient's dyslipidemia, patient reports compliance with medication regimen without side effects.    In regards to the patient's hypertension, patient denies chest pain/sob, and reports compliance with medication regimen.    Review of Systems   Constitutional: Negative for activity change, appetite change, fatigue and fever.   HENT: Negative for congestion, dental problem, ear discharge, hearing loss, postnasal drip, sinus pain, sneezing and trouble swallowing.    Eyes: Negative for photophobia and discharge.   Respiratory: Negative for apnea, cough and shortness of breath.    Cardiovascular: Negative for chest pain.   Gastrointestinal: Negative for abdominal distention, abdominal pain, blood in stool, diarrhea, nausea and vomiting.   Endocrine: Negative for cold intolerance.   Genitourinary: Negative for difficulty urinating, flank pain, frequency, hematuria and testicular pain.   Musculoskeletal: Negative for arthralgias and myalgias.   Skin: Negative for color change.   Allergic/Immunologic: Negative for environmental allergies, food allergies and immunocompromised state.   Neurological: Negative for dizziness, syncope, numbness and headaches.   Hematological: Negative for adenopathy. Does not bruise/bleed easily.   Psychiatric/Behavioral: Negative for agitation, confusion, decreased concentration, hallucinations, self-injury and sleep disturbance.   All other systems reviewed and are negative.        Reviewed family, medical,  "surgical, and social history.    Objective:      /83 (BP Location: Left arm, Patient Position: Sitting, BP Method: Medium (Manual))   Pulse 94   Temp 98.1 °F (36.7 °C) (Temporal)   Resp 15   Ht 5' 8" (1.727 m)   Wt 92.5 kg (204 lb)   SpO2 97%   BMI 31.02 kg/m²   Physical Exam  Vitals signs and nursing note reviewed.   Constitutional:       General: He is not in acute distress.     Appearance: He is well-developed. He is not diaphoretic.   HENT:      Head: Normocephalic and atraumatic.      Nose: Nose normal.      Mouth/Throat:      Pharynx: No oropharyngeal exudate.   Eyes:      Conjunctiva/sclera: Conjunctivae normal.      Pupils: Pupils are equal, round, and reactive to light.   Neck:      Musculoskeletal: Normal range of motion.      Thyroid: No thyromegaly.   Cardiovascular:      Rate and Rhythm: Normal rate and regular rhythm.      Heart sounds: Normal heart sounds. No murmur. No friction rub. No gallop.    Pulmonary:      Effort: Pulmonary effort is normal. No respiratory distress.      Breath sounds: Normal breath sounds. No wheezing or rales.   Abdominal:      General: There is no distension.      Palpations: Abdomen is soft.      Tenderness: There is no abdominal tenderness. There is no guarding.   Musculoskeletal: Normal range of motion.         General: No tenderness or deformity.   Skin:     General: Skin is warm and dry.      Coloration: Skin is not pale.      Findings: No erythema or rash.   Neurological:      Mental Status: He is alert and oriented to person, place, and time.      Cranial Nerves: No cranial nerve deficit.      Sensory: No sensory deficit.      Motor: No abnormal muscle tone.      Coordination: Coordination normal.      Deep Tendon Reflexes: Reflexes normal.   Psychiatric:         Behavior: Behavior normal.         Thought Content: Thought content normal.         Judgment: Judgment normal.         Assessment:       1. Essential hypertension    2. Elevated lipoprotein(a)  "   3. Prediabetes        Plan:       Essential hypertension    Elevated lipoprotein(a)    Prediabetes    Well controlled  Continue current medicines        Risks, benefits, and side effects were discussed with the patient. All questions were answered to the fullest satisfaction of the patient, and pt verbalized understanding and agreement to treatment plan. Pt was to call with any new or worsening symptoms, or present to the ER.

## 2020-12-24 ENCOUNTER — LAB VISIT (OUTPATIENT)
Dept: LAB | Facility: HOSPITAL | Age: 65
End: 2020-12-24
Attending: NURSE PRACTITIONER
Payer: OTHER GOVERNMENT

## 2020-12-24 DIAGNOSIS — J47.9 BRONCHIECTASIS WITHOUT COMPLICATION: ICD-10-CM

## 2020-12-24 PROCEDURE — 87015 SPECIMEN INFECT AGNT CONCNTJ: CPT

## 2020-12-24 PROCEDURE — 87206 SMEAR FLUORESCENT/ACID STAI: CPT

## 2020-12-24 PROCEDURE — 87116 MYCOBACTERIA CULTURE: CPT

## 2020-12-30 ENCOUNTER — LAB VISIT (OUTPATIENT)
Dept: LAB | Facility: HOSPITAL | Age: 65
End: 2020-12-30
Attending: NURSE PRACTITIONER
Payer: OTHER GOVERNMENT

## 2020-12-30 DIAGNOSIS — J47.9 BRONCHIECTASIS WITHOUT COMPLICATION: ICD-10-CM

## 2020-12-30 PROCEDURE — 87015 SPECIMEN INFECT AGNT CONCNTJ: CPT

## 2020-12-30 PROCEDURE — 87116 MYCOBACTERIA CULTURE: CPT

## 2020-12-30 PROCEDURE — 87205 SMEAR GRAM STAIN: CPT

## 2020-12-30 PROCEDURE — 87070 CULTURE OTHR SPECIMN AEROBIC: CPT

## 2020-12-30 PROCEDURE — 87206 SMEAR FLUORESCENT/ACID STAI: CPT

## 2021-01-02 LAB
BACTERIA SPEC AEROBE CULT: NORMAL
BACTERIA SPEC AEROBE CULT: NORMAL
GRAM STN SPEC: NORMAL

## 2021-01-04 ENCOUNTER — PATIENT MESSAGE (OUTPATIENT)
Dept: ADMINISTRATIVE | Facility: HOSPITAL | Age: 66
End: 2021-01-04

## 2021-01-04 ENCOUNTER — LAB VISIT (OUTPATIENT)
Dept: LAB | Facility: HOSPITAL | Age: 66
End: 2021-01-04
Attending: INTERNAL MEDICINE
Payer: OTHER GOVERNMENT

## 2021-01-04 ENCOUNTER — LAB VISIT (OUTPATIENT)
Dept: FAMILY MEDICINE | Facility: CLINIC | Age: 66
End: 2021-01-04
Payer: OTHER GOVERNMENT

## 2021-01-04 DIAGNOSIS — C82.30 FOLLICULAR LYMPHOMA GRADE IIIA, UNSPECIFIED BODY REGION: ICD-10-CM

## 2021-01-04 DIAGNOSIS — Z03.818 ENCNTR FOR OBS FOR SUSP EXPSR TO OTH BIOLG AGENTS RULED OUT: ICD-10-CM

## 2021-01-04 DIAGNOSIS — D80.1 NONFAMILIAL HYPOGAMMAGLOBULINEMIA: ICD-10-CM

## 2021-01-04 LAB
ALBUMIN SERPL BCP-MCNC: 4.2 G/DL (ref 3.5–5.2)
ALP SERPL-CCNC: 107 U/L (ref 55–135)
ALT SERPL W/O P-5'-P-CCNC: 25 U/L (ref 10–44)
ANION GAP SERPL CALC-SCNC: 8 MMOL/L (ref 8–16)
AST SERPL-CCNC: 26 U/L (ref 10–40)
BASOPHILS # BLD AUTO: 0.05 K/UL (ref 0–0.2)
BASOPHILS NFR BLD: 0.9 % (ref 0–1.9)
BILIRUB SERPL-MCNC: 0.5 MG/DL (ref 0.1–1)
BUN SERPL-MCNC: 19 MG/DL (ref 8–23)
CALCIUM SERPL-MCNC: 9 MG/DL (ref 8.7–10.5)
CHLORIDE SERPL-SCNC: 103 MMOL/L (ref 95–110)
CO2 SERPL-SCNC: 30 MMOL/L (ref 23–29)
CREAT SERPL-MCNC: 1 MG/DL (ref 0.5–1.4)
DIFFERENTIAL METHOD: ABNORMAL
EOSINOPHIL # BLD AUTO: 0.3 K/UL (ref 0–0.5)
EOSINOPHIL NFR BLD: 5.9 % (ref 0–8)
ERYTHROCYTE [DISTWIDTH] IN BLOOD BY AUTOMATED COUNT: 12.4 % (ref 11.5–14.5)
EST. GFR  (AFRICAN AMERICAN): >60 ML/MIN/1.73 M^2
EST. GFR  (NON AFRICAN AMERICAN): >60 ML/MIN/1.73 M^2
GLUCOSE SERPL-MCNC: 142 MG/DL (ref 70–110)
HCT VFR BLD AUTO: 45.9 % (ref 40–54)
HGB BLD-MCNC: 15.2 G/DL (ref 14–18)
IMM GRANULOCYTES # BLD AUTO: 0.13 K/UL (ref 0–0.04)
IMM GRANULOCYTES NFR BLD AUTO: 2.4 % (ref 0–0.5)
LYMPHOCYTES # BLD AUTO: 2 K/UL (ref 1–4.8)
LYMPHOCYTES NFR BLD: 36.7 % (ref 18–48)
MCH RBC QN AUTO: 29.4 PG (ref 27–31)
MCHC RBC AUTO-ENTMCNC: 33.1 G/DL (ref 32–36)
MCV RBC AUTO: 89 FL (ref 82–98)
MONOCYTES # BLD AUTO: 0.7 K/UL (ref 0.3–1)
MONOCYTES NFR BLD: 12.2 % (ref 4–15)
NEUTROPHILS # BLD AUTO: 2.3 K/UL (ref 1.8–7.7)
NEUTROPHILS NFR BLD: 41.9 % (ref 38–73)
NRBC BLD-RTO: 0 /100 WBC
PLATELET # BLD AUTO: 297 K/UL (ref 150–350)
PMV BLD AUTO: 9 FL (ref 9.2–12.9)
POTASSIUM SERPL-SCNC: 4 MMOL/L (ref 3.5–5.1)
PROT SERPL-MCNC: 6.9 G/DL (ref 6–8.4)
RBC # BLD AUTO: 5.17 M/UL (ref 4.6–6.2)
SARS-COV-2 RNA RESP QL NAA+PROBE: NOT DETECTED
SODIUM SERPL-SCNC: 141 MMOL/L (ref 136–145)
WBC # BLD AUTO: 5.39 K/UL (ref 3.9–12.7)

## 2021-01-04 PROCEDURE — 85025 COMPLETE CBC W/AUTO DIFF WBC: CPT

## 2021-01-04 PROCEDURE — 80053 COMPREHEN METABOLIC PANEL: CPT

## 2021-01-04 PROCEDURE — U0003 INFECTIOUS AGENT DETECTION BY NUCLEIC ACID (DNA OR RNA); SEVERE ACUTE RESPIRATORY SYNDROME CORONAVIRUS 2 (SARS-COV-2) (CORONAVIRUS DISEASE [COVID-19]), AMPLIFIED PROBE TECHNIQUE, MAKING USE OF HIGH THROUGHPUT TECHNOLOGIES AS DESCRIBED BY CMS-2020-01-R: HCPCS

## 2021-01-04 PROCEDURE — 36415 COLL VENOUS BLD VENIPUNCTURE: CPT

## 2021-01-05 RX ORDER — SODIUM CHLORIDE 0.9 % (FLUSH) 0.9 %
10 SYRINGE (ML) INJECTION
Status: CANCELLED | OUTPATIENT
Start: 2021-01-07

## 2021-01-05 RX ORDER — ACETAMINOPHEN 325 MG/1
650 TABLET ORAL
Status: CANCELLED | OUTPATIENT
Start: 2021-01-07

## 2021-01-05 RX ORDER — MEPERIDINE HYDROCHLORIDE 25 MG/ML
25 INJECTION INTRAMUSCULAR; INTRAVENOUS; SUBCUTANEOUS
Status: CANCELLED | OUTPATIENT
Start: 2021-01-07

## 2021-01-05 RX ORDER — HEPARIN 100 UNIT/ML
500 SYRINGE INTRAVENOUS
Status: CANCELLED | OUTPATIENT
Start: 2021-01-07

## 2021-01-05 RX ORDER — FAMOTIDINE 10 MG/ML
20 INJECTION INTRAVENOUS
Status: CANCELLED | OUTPATIENT
Start: 2021-01-07

## 2021-01-06 ENCOUNTER — TELEPHONE (OUTPATIENT)
Dept: PULMONOLOGY | Facility: CLINIC | Age: 66
End: 2021-01-06

## 2021-01-07 ENCOUNTER — INFUSION (OUTPATIENT)
Dept: INFUSION THERAPY | Facility: HOSPITAL | Age: 66
End: 2021-01-07
Attending: INTERNAL MEDICINE
Payer: OTHER GOVERNMENT

## 2021-01-07 ENCOUNTER — OFFICE VISIT (OUTPATIENT)
Dept: HEMATOLOGY/ONCOLOGY | Facility: CLINIC | Age: 66
End: 2021-01-07
Payer: OTHER GOVERNMENT

## 2021-01-07 VITALS
HEIGHT: 68 IN | HEART RATE: 83 BPM | RESPIRATION RATE: 18 BRPM | OXYGEN SATURATION: 100 % | SYSTOLIC BLOOD PRESSURE: 136 MMHG | TEMPERATURE: 98 F | DIASTOLIC BLOOD PRESSURE: 89 MMHG | WEIGHT: 209.5 LBS | BODY MASS INDEX: 31.75 KG/M2

## 2021-01-07 VITALS
TEMPERATURE: 98 F | WEIGHT: 209.44 LBS | BODY MASS INDEX: 31.84 KG/M2 | RESPIRATION RATE: 18 BRPM | HEART RATE: 88 BPM | SYSTOLIC BLOOD PRESSURE: 134 MMHG | DIASTOLIC BLOOD PRESSURE: 82 MMHG

## 2021-01-07 DIAGNOSIS — Z51.11 ENCOUNTER FOR ANTINEOPLASTIC CHEMOTHERAPY: ICD-10-CM

## 2021-01-07 DIAGNOSIS — C82.30 FOLLICULAR LYMPHOMA GRADE IIIA, UNSPECIFIED BODY REGION: ICD-10-CM

## 2021-01-07 DIAGNOSIS — D80.1 HYPOGAMMAGLOBULINEMIA: Primary | ICD-10-CM

## 2021-01-07 DIAGNOSIS — C82.30 FOLLICULAR LYMPHOMA GRADE IIIA, UNSPECIFIED BODY REGION: Primary | ICD-10-CM

## 2021-01-07 PROCEDURE — 99214 PR OFFICE/OUTPT VISIT, EST, LEVL IV, 30-39 MIN: ICD-10-PCS | Mod: S$GLB,,, | Performed by: INTERNAL MEDICINE

## 2021-01-07 PROCEDURE — 63600175 PHARM REV CODE 636 W HCPCS: Performed by: INTERNAL MEDICINE

## 2021-01-07 PROCEDURE — 96375 TX/PRO/DX INJ NEW DRUG ADDON: CPT

## 2021-01-07 PROCEDURE — 96367 TX/PROPH/DG ADDL SEQ IV INF: CPT

## 2021-01-07 PROCEDURE — 96415 CHEMO IV INFUSION ADDL HR: CPT

## 2021-01-07 PROCEDURE — A4216 STERILE WATER/SALINE, 10 ML: HCPCS | Performed by: INTERNAL MEDICINE

## 2021-01-07 PROCEDURE — 25000003 PHARM REV CODE 250: Performed by: INTERNAL MEDICINE

## 2021-01-07 PROCEDURE — 99214 OFFICE O/P EST MOD 30 MIN: CPT | Mod: S$GLB,,, | Performed by: INTERNAL MEDICINE

## 2021-01-07 PROCEDURE — 96413 CHEMO IV INFUSION 1 HR: CPT

## 2021-01-07 RX ORDER — MEPERIDINE HYDROCHLORIDE 25 MG/ML
25 INJECTION INTRAMUSCULAR; INTRAVENOUS; SUBCUTANEOUS
Status: DISCONTINUED | OUTPATIENT
Start: 2021-01-07 | End: 2021-01-07 | Stop reason: HOSPADM

## 2021-01-07 RX ORDER — ACETAMINOPHEN 325 MG/1
650 TABLET ORAL
Status: COMPLETED | OUTPATIENT
Start: 2021-01-07 | End: 2021-01-07

## 2021-01-07 RX ORDER — SODIUM CHLORIDE 0.9 % (FLUSH) 0.9 %
10 SYRINGE (ML) INJECTION
Status: DISCONTINUED | OUTPATIENT
Start: 2021-01-07 | End: 2021-01-07 | Stop reason: HOSPADM

## 2021-01-07 RX ORDER — HEPARIN 100 UNIT/ML
500 SYRINGE INTRAVENOUS
Status: DISCONTINUED | OUTPATIENT
Start: 2021-01-07 | End: 2021-01-07 | Stop reason: HOSPADM

## 2021-01-07 RX ORDER — FAMOTIDINE 10 MG/ML
20 INJECTION INTRAVENOUS
Status: COMPLETED | OUTPATIENT
Start: 2021-01-07 | End: 2021-01-07

## 2021-01-07 RX ADMIN — ACETAMINOPHEN 650 MG: 325 TABLET ORAL at 08:01

## 2021-01-07 RX ADMIN — FAMOTIDINE 20 MG: 10 INJECTION INTRAVENOUS at 08:01

## 2021-01-07 RX ADMIN — SODIUM CHLORIDE: 0.9 INJECTION, SOLUTION INTRAVENOUS at 08:01

## 2021-01-07 RX ADMIN — SODIUM CHLORIDE, PRESERVATIVE FREE 10 ML: 5 INJECTION INTRAVENOUS at 12:01

## 2021-01-07 RX ADMIN — HEPARIN 500 UNITS: 100 SYRINGE at 12:01

## 2021-01-07 RX ADMIN — RITUXIMAB 800 MG: 10 INJECTION, SOLUTION INTRAVENOUS at 09:01

## 2021-01-07 RX ADMIN — DIPHENHYDRAMINE HYDROCHLORIDE 50 MG: 50 INJECTION INTRAMUSCULAR; INTRAVENOUS at 08:01

## 2021-01-08 ENCOUNTER — INFUSION (OUTPATIENT)
Dept: INFUSION THERAPY | Facility: HOSPITAL | Age: 66
End: 2021-01-08
Attending: INTERNAL MEDICINE
Payer: OTHER GOVERNMENT

## 2021-01-08 VITALS
BODY MASS INDEX: 31.99 KG/M2 | OXYGEN SATURATION: 98 % | WEIGHT: 210.38 LBS | TEMPERATURE: 98 F | SYSTOLIC BLOOD PRESSURE: 134 MMHG | RESPIRATION RATE: 18 BRPM | DIASTOLIC BLOOD PRESSURE: 78 MMHG | HEART RATE: 76 BPM

## 2021-01-08 DIAGNOSIS — D80.1 NONFAMILIAL HYPOGAMMAGLOBULINEMIA: ICD-10-CM

## 2021-01-08 DIAGNOSIS — C82.30 FOLLICULAR LYMPHOMA GRADE IIIA, UNSPECIFIED BODY REGION: ICD-10-CM

## 2021-01-08 DIAGNOSIS — D80.1 HYPOGAMMAGLOBULINEMIA: Primary | ICD-10-CM

## 2021-01-08 PROCEDURE — 96415 CHEMO IV INFUSION ADDL HR: CPT

## 2021-01-08 PROCEDURE — 25000003 PHARM REV CODE 250: Performed by: NURSE PRACTITIONER

## 2021-01-08 PROCEDURE — 96413 CHEMO IV INFUSION 1 HR: CPT

## 2021-01-08 PROCEDURE — A4216 STERILE WATER/SALINE, 10 ML: HCPCS | Performed by: NURSE PRACTITIONER

## 2021-01-08 PROCEDURE — 63600175 PHARM REV CODE 636 W HCPCS: Performed by: NURSE PRACTITIONER

## 2021-01-08 PROCEDURE — 96367 TX/PROPH/DG ADDL SEQ IV INF: CPT

## 2021-01-08 RX ORDER — SODIUM CHLORIDE 0.9 % (FLUSH) 0.9 %
10 SYRINGE (ML) INJECTION
Status: DISCONTINUED | OUTPATIENT
Start: 2021-01-08 | End: 2021-01-08 | Stop reason: HOSPADM

## 2021-01-08 RX ORDER — ACETAMINOPHEN 500 MG
500 TABLET ORAL
Status: DISCONTINUED | OUTPATIENT
Start: 2021-01-08 | End: 2021-01-08 | Stop reason: HOSPADM

## 2021-01-08 RX ORDER — HEPARIN 100 UNIT/ML
500 SYRINGE INTRAVENOUS
Status: DISCONTINUED | OUTPATIENT
Start: 2021-01-08 | End: 2021-01-08 | Stop reason: HOSPADM

## 2021-01-08 RX ORDER — SODIUM CHLORIDE 0.9 % (FLUSH) 0.9 %
10 SYRINGE (ML) INJECTION
Status: CANCELLED | OUTPATIENT
Start: 2021-02-05

## 2021-01-08 RX ORDER — HEPARIN 100 UNIT/ML
500 SYRINGE INTRAVENOUS
Status: CANCELLED | OUTPATIENT
Start: 2021-02-05

## 2021-01-08 RX ORDER — DIPHENHYDRAMINE HCL 25 MG
25 CAPSULE ORAL
Status: CANCELLED | OUTPATIENT
Start: 2021-02-05

## 2021-01-08 RX ORDER — ACETAMINOPHEN 500 MG
500 TABLET ORAL
Status: CANCELLED | OUTPATIENT
Start: 2021-02-05

## 2021-01-08 RX ADMIN — IMMUNE GLOBULIN (HUMAN) 30 G: 10 INJECTION INTRAVENOUS; SUBCUTANEOUS at 08:01

## 2021-01-08 RX ADMIN — DIPHENHYDRAMINE HYDROCHLORIDE 25 MG: 50 INJECTION INTRAMUSCULAR; INTRAVENOUS at 08:01

## 2021-01-08 RX ADMIN — HEPARIN 500 UNITS: 100 SYRINGE at 10:01

## 2021-01-08 RX ADMIN — SODIUM CHLORIDE: 0.9 INJECTION, SOLUTION INTRAVENOUS at 08:01

## 2021-01-08 RX ADMIN — SODIUM CHLORIDE, PRESERVATIVE FREE 10 ML: 5 INJECTION INTRAVENOUS at 10:01

## 2021-01-08 RX ADMIN — ACETAMINOPHEN 500 MG: 500 TABLET ORAL at 08:01

## 2021-01-15 ENCOUNTER — LAB VISIT (OUTPATIENT)
Dept: LAB | Facility: HOSPITAL | Age: 66
End: 2021-01-15
Attending: NURSE PRACTITIONER
Payer: OTHER GOVERNMENT

## 2021-01-15 DIAGNOSIS — J47.9 BRONCHIECTASIS WITHOUT COMPLICATION: ICD-10-CM

## 2021-01-15 PROCEDURE — 87185 SC STD ENZYME DETCJ PER NZM: CPT

## 2021-01-15 PROCEDURE — 87205 SMEAR GRAM STAIN: CPT

## 2021-01-15 PROCEDURE — 87077 CULTURE AEROBIC IDENTIFY: CPT

## 2021-01-15 PROCEDURE — 87116 MYCOBACTERIA CULTURE: CPT

## 2021-01-15 PROCEDURE — 87015 SPECIMEN INFECT AGNT CONCNTJ: CPT

## 2021-01-15 PROCEDURE — 87206 SMEAR FLUORESCENT/ACID STAI: CPT

## 2021-01-15 PROCEDURE — 87070 CULTURE OTHR SPECIMN AEROBIC: CPT

## 2021-01-18 LAB
BACTERIA SPEC AEROBE CULT: ABNORMAL
BACTERIA SPEC AEROBE CULT: ABNORMAL
GRAM STN SPEC: ABNORMAL

## 2021-01-21 ENCOUNTER — PATIENT MESSAGE (OUTPATIENT)
Dept: PULMONOLOGY | Facility: CLINIC | Age: 66
End: 2021-01-21

## 2021-01-21 ENCOUNTER — LAB VISIT (OUTPATIENT)
Dept: LAB | Facility: HOSPITAL | Age: 66
End: 2021-01-21
Attending: NURSE PRACTITIONER
Payer: OTHER GOVERNMENT

## 2021-01-21 ENCOUNTER — TELEPHONE (OUTPATIENT)
Dept: PULMONOLOGY | Facility: CLINIC | Age: 66
End: 2021-01-21

## 2021-01-21 DIAGNOSIS — J47.0 BRONCHIECTASIS WITH ACUTE LOWER RESPIRATORY INFECTION: ICD-10-CM

## 2021-01-21 DIAGNOSIS — R05.3 PERSISTENT COUGH: ICD-10-CM

## 2021-01-21 DIAGNOSIS — R05.3 CHRONIC COUGH: ICD-10-CM

## 2021-01-21 DIAGNOSIS — R05.3 PERSISTENT COUGH: Primary | ICD-10-CM

## 2021-01-21 PROCEDURE — 87206 SMEAR FLUORESCENT/ACID STAI: CPT

## 2021-01-21 PROCEDURE — 87185 SC STD ENZYME DETCJ PER NZM: CPT

## 2021-01-21 PROCEDURE — 87077 CULTURE AEROBIC IDENTIFY: CPT

## 2021-01-21 PROCEDURE — 87015 SPECIMEN INFECT AGNT CONCNTJ: CPT

## 2021-01-21 PROCEDURE — 87205 SMEAR GRAM STAIN: CPT

## 2021-01-21 PROCEDURE — 87070 CULTURE OTHR SPECIMN AEROBIC: CPT

## 2021-01-21 PROCEDURE — 87116 MYCOBACTERIA CULTURE: CPT

## 2021-01-23 LAB
BACTERIA SPEC AEROBE CULT: ABNORMAL
BACTERIA SPEC AEROBE CULT: ABNORMAL
GRAM STN SPEC: ABNORMAL

## 2021-01-26 RX ORDER — ALBUTEROL SULFATE 90 UG/1
2 AEROSOL, METERED RESPIRATORY (INHALATION) EVERY 6 HOURS PRN
Qty: 18 G | Refills: 0 | OUTPATIENT
Start: 2021-01-26 | End: 2022-01-26

## 2021-01-29 DIAGNOSIS — A49.2 HAEMOPHILUS INFLUENZAE INFECTION: ICD-10-CM

## 2021-01-29 DIAGNOSIS — J47.0 BRONCHIECTASIS WITH ACUTE LOWER RESPIRATORY INFECTION: Primary | ICD-10-CM

## 2021-01-29 RX ORDER — AMOXICILLIN AND CLAVULANATE POTASSIUM 875; 125 MG/1; MG/1
1 TABLET, FILM COATED ORAL 2 TIMES DAILY
Qty: 20 TABLET | Refills: 0 | Status: SHIPPED | OUTPATIENT
Start: 2021-01-29 | End: 2021-02-08

## 2021-02-01 ENCOUNTER — TELEPHONE (OUTPATIENT)
Dept: PULMONOLOGY | Facility: CLINIC | Age: 66
End: 2021-02-01

## 2021-02-01 DIAGNOSIS — R05.9 COUGH: Primary | ICD-10-CM

## 2021-02-01 RX ORDER — BENZONATATE 100 MG/1
100 CAPSULE ORAL 3 TIMES DAILY PRN
Qty: 60 CAPSULE | Refills: 0 | Status: SHIPPED | OUTPATIENT
Start: 2021-02-01 | End: 2021-02-11

## 2021-02-05 ENCOUNTER — INFUSION (OUTPATIENT)
Dept: INFUSION THERAPY | Facility: HOSPITAL | Age: 66
End: 2021-02-05
Attending: INTERNAL MEDICINE
Payer: OTHER GOVERNMENT

## 2021-02-05 VITALS
HEART RATE: 75 BPM | BODY MASS INDEX: 32.1 KG/M2 | WEIGHT: 211.13 LBS | TEMPERATURE: 98 F | SYSTOLIC BLOOD PRESSURE: 142 MMHG | OXYGEN SATURATION: 97 % | RESPIRATION RATE: 16 BRPM | DIASTOLIC BLOOD PRESSURE: 82 MMHG

## 2021-02-05 DIAGNOSIS — C82.30 FOLLICULAR LYMPHOMA GRADE IIIA, UNSPECIFIED BODY REGION: ICD-10-CM

## 2021-02-05 DIAGNOSIS — D80.1 NONFAMILIAL HYPOGAMMAGLOBULINEMIA: ICD-10-CM

## 2021-02-05 DIAGNOSIS — D80.1 HYPOGAMMAGLOBULINEMIA: Primary | ICD-10-CM

## 2021-02-05 PROCEDURE — 96367 TX/PROPH/DG ADDL SEQ IV INF: CPT

## 2021-02-05 PROCEDURE — A4216 STERILE WATER/SALINE, 10 ML: HCPCS | Performed by: NURSE PRACTITIONER

## 2021-02-05 PROCEDURE — 96415 CHEMO IV INFUSION ADDL HR: CPT

## 2021-02-05 PROCEDURE — 96413 CHEMO IV INFUSION 1 HR: CPT

## 2021-02-05 PROCEDURE — 25000003 PHARM REV CODE 250: Performed by: NURSE PRACTITIONER

## 2021-02-05 PROCEDURE — 63600175 PHARM REV CODE 636 W HCPCS: Performed by: NURSE PRACTITIONER

## 2021-02-05 RX ORDER — SODIUM CHLORIDE 0.9 % (FLUSH) 0.9 %
10 SYRINGE (ML) INJECTION
Status: DISCONTINUED | OUTPATIENT
Start: 2021-02-05 | End: 2021-02-05 | Stop reason: HOSPADM

## 2021-02-05 RX ORDER — ACETAMINOPHEN 500 MG
500 TABLET ORAL
Status: DISCONTINUED | OUTPATIENT
Start: 2021-02-05 | End: 2021-02-05 | Stop reason: HOSPADM

## 2021-02-05 RX ORDER — DIPHENHYDRAMINE HCL 25 MG
25 CAPSULE ORAL
Status: CANCELLED | OUTPATIENT
Start: 2021-03-05

## 2021-02-05 RX ORDER — SODIUM CHLORIDE 0.9 % (FLUSH) 0.9 %
10 SYRINGE (ML) INJECTION
Status: CANCELLED | OUTPATIENT
Start: 2021-03-05

## 2021-02-05 RX ORDER — HEPARIN 100 UNIT/ML
500 SYRINGE INTRAVENOUS
Status: DISCONTINUED | OUTPATIENT
Start: 2021-02-05 | End: 2021-02-05 | Stop reason: HOSPADM

## 2021-02-05 RX ORDER — HEPARIN 100 UNIT/ML
500 SYRINGE INTRAVENOUS
Status: CANCELLED | OUTPATIENT
Start: 2021-03-05

## 2021-02-05 RX ORDER — ACETAMINOPHEN 500 MG
500 TABLET ORAL
Status: CANCELLED | OUTPATIENT
Start: 2021-03-05

## 2021-02-05 RX ADMIN — IMMUNE GLOBULIN (HUMAN) 30 G: 10 INJECTION INTRAVENOUS; SUBCUTANEOUS at 08:02

## 2021-02-05 RX ADMIN — HEPARIN 500 UNITS: 100 SYRINGE at 10:02

## 2021-02-05 RX ADMIN — SODIUM CHLORIDE, PRESERVATIVE FREE 10 ML: 5 INJECTION INTRAVENOUS at 10:02

## 2021-02-05 RX ADMIN — DIPHENHYDRAMINE HYDROCHLORIDE 25 MG: 50 INJECTION INTRAMUSCULAR; INTRAVENOUS at 08:02

## 2021-02-05 RX ADMIN — ACETAMINOPHEN 500 MG: 500 TABLET ORAL at 08:02

## 2021-02-05 RX ADMIN — SODIUM CHLORIDE: 0.9 INJECTION, SOLUTION INTRAVENOUS at 08:02

## 2021-02-11 ENCOUNTER — PATIENT MESSAGE (OUTPATIENT)
Dept: HEMATOLOGY/ONCOLOGY | Facility: CLINIC | Age: 66
End: 2021-02-11

## 2021-02-25 LAB
ACID FAST MOD KINY STN SPEC: NORMAL
MYCOBACTERIUM SPEC QL CULT: NORMAL

## 2021-03-01 ENCOUNTER — LAB VISIT (OUTPATIENT)
Dept: PRIMARY CARE CLINIC | Facility: CLINIC | Age: 66
End: 2021-03-01
Payer: MEDICARE

## 2021-03-01 DIAGNOSIS — Z03.818 ENCOUNTER FOR OBSERVATION FOR SUSPECTED EXPOSURE TO OTHER BIOLOGICAL AGENTS RULED OUT: ICD-10-CM

## 2021-03-01 PROCEDURE — U0003 INFECTIOUS AGENT DETECTION BY NUCLEIC ACID (DNA OR RNA); SEVERE ACUTE RESPIRATORY SYNDROME CORONAVIRUS 2 (SARS-COV-2) (CORONAVIRUS DISEASE [COVID-19]), AMPLIFIED PROBE TECHNIQUE, MAKING USE OF HIGH THROUGHPUT TECHNOLOGIES AS DESCRIBED BY CMS-2020-01-R: HCPCS

## 2021-03-01 PROCEDURE — U0005 INFEC AGEN DETEC AMPLI PROBE: HCPCS

## 2021-03-02 ENCOUNTER — LAB VISIT (OUTPATIENT)
Dept: LAB | Facility: HOSPITAL | Age: 66
End: 2021-03-02
Attending: INTERNAL MEDICINE
Payer: MEDICARE

## 2021-03-02 DIAGNOSIS — D80.1 NONFAMILIAL HYPOGAMMAGLOBULINEMIA: ICD-10-CM

## 2021-03-02 DIAGNOSIS — C82.30 FOLLICULAR LYMPHOMA GRADE IIIA, UNSPECIFIED BODY REGION: ICD-10-CM

## 2021-03-02 LAB
ALBUMIN SERPL BCP-MCNC: 4.3 G/DL (ref 3.5–5.2)
ALP SERPL-CCNC: 102 U/L (ref 55–135)
ALT SERPL W/O P-5'-P-CCNC: 23 U/L (ref 10–44)
ANION GAP SERPL CALC-SCNC: 9 MMOL/L (ref 8–16)
AST SERPL-CCNC: 26 U/L (ref 10–40)
BASOPHILS # BLD AUTO: 0.04 K/UL (ref 0–0.2)
BASOPHILS NFR BLD: 0.8 % (ref 0–1.9)
BILIRUB SERPL-MCNC: 0.7 MG/DL (ref 0.1–1)
BUN SERPL-MCNC: 16 MG/DL (ref 8–23)
CALCIUM SERPL-MCNC: 9.2 MG/DL (ref 8.7–10.5)
CHLORIDE SERPL-SCNC: 104 MMOL/L (ref 95–110)
CO2 SERPL-SCNC: 28 MMOL/L (ref 23–29)
CREAT SERPL-MCNC: 1 MG/DL (ref 0.5–1.4)
DIFFERENTIAL METHOD: ABNORMAL
EOSINOPHIL # BLD AUTO: 0.2 K/UL (ref 0–0.5)
EOSINOPHIL NFR BLD: 4.6 % (ref 0–8)
ERYTHROCYTE [DISTWIDTH] IN BLOOD BY AUTOMATED COUNT: 13.2 % (ref 11.5–14.5)
EST. GFR  (AFRICAN AMERICAN): >60 ML/MIN/1.73 M^2
EST. GFR  (NON AFRICAN AMERICAN): >60 ML/MIN/1.73 M^2
GLUCOSE SERPL-MCNC: 162 MG/DL (ref 70–110)
HCT VFR BLD AUTO: 45.8 % (ref 40–54)
HGB BLD-MCNC: 15.2 G/DL (ref 14–18)
IMM GRANULOCYTES # BLD AUTO: 0.13 K/UL (ref 0–0.04)
IMM GRANULOCYTES NFR BLD AUTO: 2.6 % (ref 0–0.5)
LYMPHOCYTES # BLD AUTO: 1.6 K/UL (ref 1–4.8)
LYMPHOCYTES NFR BLD: 32.4 % (ref 18–48)
MCH RBC QN AUTO: 29.2 PG (ref 27–31)
MCHC RBC AUTO-ENTMCNC: 33.2 G/DL (ref 32–36)
MCV RBC AUTO: 88 FL (ref 82–98)
MONOCYTES # BLD AUTO: 0.6 K/UL (ref 0.3–1)
MONOCYTES NFR BLD: 12.5 % (ref 4–15)
NEUTROPHILS # BLD AUTO: 2.4 K/UL (ref 1.8–7.7)
NEUTROPHILS NFR BLD: 47.1 % (ref 38–73)
NRBC BLD-RTO: 0 /100 WBC
PLATELET # BLD AUTO: 302 K/UL (ref 150–350)
PMV BLD AUTO: 9.2 FL (ref 9.2–12.9)
POTASSIUM SERPL-SCNC: 3.8 MMOL/L (ref 3.5–5.1)
PROT SERPL-MCNC: 7.2 G/DL (ref 6–8.4)
RBC # BLD AUTO: 5.2 M/UL (ref 4.6–6.2)
SARS-COV-2 RNA RESP QL NAA+PROBE: NOT DETECTED
SODIUM SERPL-SCNC: 141 MMOL/L (ref 136–145)
WBC # BLD AUTO: 5.03 K/UL (ref 3.9–12.7)

## 2021-03-02 PROCEDURE — 80053 COMPREHEN METABOLIC PANEL: CPT

## 2021-03-02 PROCEDURE — 85025 COMPLETE CBC W/AUTO DIFF WBC: CPT

## 2021-03-02 PROCEDURE — 36415 COLL VENOUS BLD VENIPUNCTURE: CPT

## 2021-03-02 RX ORDER — MEPERIDINE HYDROCHLORIDE 25 MG/ML
25 INJECTION INTRAMUSCULAR; INTRAVENOUS; SUBCUTANEOUS
Status: CANCELLED | OUTPATIENT
Start: 2021-03-04 | End: 2021-03-04

## 2021-03-02 RX ORDER — ACETAMINOPHEN 325 MG/1
650 TABLET ORAL
Status: CANCELLED | OUTPATIENT
Start: 2021-03-04

## 2021-03-02 RX ORDER — HEPARIN 100 UNIT/ML
500 SYRINGE INTRAVENOUS
Status: CANCELLED | OUTPATIENT
Start: 2021-03-04

## 2021-03-02 RX ORDER — FAMOTIDINE 10 MG/ML
20 INJECTION INTRAVENOUS
Status: CANCELLED | OUTPATIENT
Start: 2021-03-04

## 2021-03-02 RX ORDER — SODIUM CHLORIDE 0.9 % (FLUSH) 0.9 %
10 SYRINGE (ML) INJECTION
Status: CANCELLED | OUTPATIENT
Start: 2021-03-04

## 2021-03-03 LAB
ACID FAST MOD KINY STN SPEC: NORMAL
MYCOBACTERIUM SPEC QL CULT: NORMAL

## 2021-03-04 ENCOUNTER — OFFICE VISIT (OUTPATIENT)
Dept: HEMATOLOGY/ONCOLOGY | Facility: CLINIC | Age: 66
End: 2021-03-04
Payer: MEDICARE

## 2021-03-04 ENCOUNTER — INFUSION (OUTPATIENT)
Dept: INFUSION THERAPY | Facility: HOSPITAL | Age: 66
End: 2021-03-04
Attending: INTERNAL MEDICINE
Payer: MEDICARE

## 2021-03-04 VITALS
SYSTOLIC BLOOD PRESSURE: 124 MMHG | HEART RATE: 76 BPM | WEIGHT: 208 LBS | TEMPERATURE: 99 F | RESPIRATION RATE: 18 BRPM | DIASTOLIC BLOOD PRESSURE: 78 MMHG | OXYGEN SATURATION: 97 % | BODY MASS INDEX: 31.52 KG/M2 | HEIGHT: 68 IN

## 2021-03-04 VITALS
WEIGHT: 207.88 LBS | BODY MASS INDEX: 31.61 KG/M2 | RESPIRATION RATE: 18 BRPM | DIASTOLIC BLOOD PRESSURE: 80 MMHG | HEART RATE: 88 BPM | OXYGEN SATURATION: 96 % | TEMPERATURE: 98 F | SYSTOLIC BLOOD PRESSURE: 144 MMHG

## 2021-03-04 DIAGNOSIS — C82.30 FOLLICULAR LYMPHOMA GRADE IIIA, UNSPECIFIED BODY REGION: ICD-10-CM

## 2021-03-04 DIAGNOSIS — D80.1 HYPOGAMMAGLOBULINEMIA: Primary | ICD-10-CM

## 2021-03-04 DIAGNOSIS — C82.30 FOLLICULAR LYMPHOMA GRADE IIIA, UNSPECIFIED BODY REGION: Primary | ICD-10-CM

## 2021-03-04 DIAGNOSIS — R91.8 OTHER NONSPECIFIC ABNORMAL FINDING OF LUNG FIELD: ICD-10-CM

## 2021-03-04 PROCEDURE — 96413 CHEMO IV INFUSION 1 HR: CPT

## 2021-03-04 PROCEDURE — 99214 OFFICE O/P EST MOD 30 MIN: CPT | Mod: S$GLB,,, | Performed by: INTERNAL MEDICINE

## 2021-03-04 PROCEDURE — 96415 CHEMO IV INFUSION ADDL HR: CPT

## 2021-03-04 PROCEDURE — 99214 PR OFFICE/OUTPT VISIT, EST, LEVL IV, 30-39 MIN: ICD-10-PCS | Mod: S$GLB,,, | Performed by: INTERNAL MEDICINE

## 2021-03-04 PROCEDURE — 63600175 PHARM REV CODE 636 W HCPCS: Performed by: INTERNAL MEDICINE

## 2021-03-04 PROCEDURE — 96375 TX/PRO/DX INJ NEW DRUG ADDON: CPT

## 2021-03-04 PROCEDURE — 96367 TX/PROPH/DG ADDL SEQ IV INF: CPT

## 2021-03-04 PROCEDURE — 25000003 PHARM REV CODE 250: Performed by: INTERNAL MEDICINE

## 2021-03-04 RX ORDER — MEPERIDINE HYDROCHLORIDE 25 MG/ML
25 INJECTION INTRAMUSCULAR; INTRAVENOUS; SUBCUTANEOUS
Status: DISCONTINUED | OUTPATIENT
Start: 2021-03-04 | End: 2021-03-04 | Stop reason: HOSPADM

## 2021-03-04 RX ORDER — ACETAMINOPHEN 325 MG/1
650 TABLET ORAL
Status: COMPLETED | OUTPATIENT
Start: 2021-03-04 | End: 2021-03-04

## 2021-03-04 RX ORDER — FAMOTIDINE 10 MG/ML
20 INJECTION INTRAVENOUS
Status: COMPLETED | OUTPATIENT
Start: 2021-03-04 | End: 2021-03-04

## 2021-03-04 RX ORDER — SODIUM CHLORIDE 0.9 % (FLUSH) 0.9 %
10 SYRINGE (ML) INJECTION
Status: DISCONTINUED | OUTPATIENT
Start: 2021-03-04 | End: 2021-03-04 | Stop reason: HOSPADM

## 2021-03-04 RX ORDER — HEPARIN 100 UNIT/ML
500 SYRINGE INTRAVENOUS
Status: DISCONTINUED | OUTPATIENT
Start: 2021-03-04 | End: 2021-03-04 | Stop reason: HOSPADM

## 2021-03-04 RX ADMIN — DIPHENHYDRAMINE HYDROCHLORIDE 50 MG: 50 INJECTION INTRAMUSCULAR; INTRAVENOUS at 08:03

## 2021-03-04 RX ADMIN — SODIUM CHLORIDE: 0.9 INJECTION, SOLUTION INTRAVENOUS at 08:03

## 2021-03-04 RX ADMIN — HEPARIN 500 UNITS: 100 SYRINGE at 12:03

## 2021-03-04 RX ADMIN — FAMOTIDINE 20 MG: 10 INJECTION INTRAVENOUS at 08:03

## 2021-03-04 RX ADMIN — ACETAMINOPHEN 650 MG: 325 TABLET ORAL at 08:03

## 2021-03-04 RX ADMIN — RITUXIMAB 800 MG: 10 INJECTION, SOLUTION INTRAVENOUS at 09:03

## 2021-03-05 ENCOUNTER — INFUSION (OUTPATIENT)
Dept: INFUSION THERAPY | Facility: HOSPITAL | Age: 66
End: 2021-03-05
Attending: INTERNAL MEDICINE
Payer: MEDICARE

## 2021-03-05 VITALS
OXYGEN SATURATION: 97 % | WEIGHT: 208.5 LBS | SYSTOLIC BLOOD PRESSURE: 141 MMHG | TEMPERATURE: 98 F | DIASTOLIC BLOOD PRESSURE: 72 MMHG | RESPIRATION RATE: 18 BRPM | HEART RATE: 87 BPM | BODY MASS INDEX: 31.7 KG/M2

## 2021-03-05 DIAGNOSIS — D80.1 NONFAMILIAL HYPOGAMMAGLOBULINEMIA: ICD-10-CM

## 2021-03-05 DIAGNOSIS — C82.30 FOLLICULAR LYMPHOMA GRADE IIIA, UNSPECIFIED BODY REGION: ICD-10-CM

## 2021-03-05 DIAGNOSIS — D80.1 HYPOGAMMAGLOBULINEMIA: Primary | ICD-10-CM

## 2021-03-05 PROCEDURE — 96413 CHEMO IV INFUSION 1 HR: CPT

## 2021-03-05 PROCEDURE — 25000003 PHARM REV CODE 250: Performed by: NURSE PRACTITIONER

## 2021-03-05 PROCEDURE — A4216 STERILE WATER/SALINE, 10 ML: HCPCS | Performed by: NURSE PRACTITIONER

## 2021-03-05 PROCEDURE — 96415 CHEMO IV INFUSION ADDL HR: CPT

## 2021-03-05 PROCEDURE — 96367 TX/PROPH/DG ADDL SEQ IV INF: CPT

## 2021-03-05 PROCEDURE — 63600175 PHARM REV CODE 636 W HCPCS: Mod: JG | Performed by: NURSE PRACTITIONER

## 2021-03-05 RX ORDER — SODIUM CHLORIDE 0.9 % (FLUSH) 0.9 %
10 SYRINGE (ML) INJECTION
Status: CANCELLED | OUTPATIENT
Start: 2021-04-02

## 2021-03-05 RX ORDER — ACETAMINOPHEN 500 MG
500 TABLET ORAL
Status: DISCONTINUED | OUTPATIENT
Start: 2021-03-05 | End: 2021-03-05 | Stop reason: HOSPADM

## 2021-03-05 RX ORDER — ACETAMINOPHEN 500 MG
500 TABLET ORAL
Status: CANCELLED | OUTPATIENT
Start: 2021-04-02

## 2021-03-05 RX ORDER — HEPARIN 100 UNIT/ML
500 SYRINGE INTRAVENOUS
Status: DISCONTINUED | OUTPATIENT
Start: 2021-03-05 | End: 2021-03-05 | Stop reason: HOSPADM

## 2021-03-05 RX ORDER — DIPHENHYDRAMINE HCL 25 MG
25 CAPSULE ORAL
Status: CANCELLED | OUTPATIENT
Start: 2021-04-02

## 2021-03-05 RX ORDER — HEPARIN 100 UNIT/ML
500 SYRINGE INTRAVENOUS
Status: CANCELLED | OUTPATIENT
Start: 2021-04-02

## 2021-03-05 RX ORDER — SODIUM CHLORIDE 0.9 % (FLUSH) 0.9 %
10 SYRINGE (ML) INJECTION
Status: DISCONTINUED | OUTPATIENT
Start: 2021-03-05 | End: 2021-03-05 | Stop reason: HOSPADM

## 2021-03-05 RX ADMIN — DIPHENHYDRAMINE HYDROCHLORIDE 25 MG: 50 INJECTION INTRAMUSCULAR; INTRAVENOUS at 08:03

## 2021-03-05 RX ADMIN — ACETAMINOPHEN 500 MG: 500 TABLET ORAL at 08:03

## 2021-03-05 RX ADMIN — SODIUM CHLORIDE, PRESERVATIVE FREE 10 ML: 5 INJECTION INTRAVENOUS at 10:03

## 2021-03-05 RX ADMIN — HEPARIN 500 UNITS: 100 SYRINGE at 10:03

## 2021-03-05 RX ADMIN — SODIUM CHLORIDE: 0.9 INJECTION, SOLUTION INTRAVENOUS at 08:03

## 2021-03-05 RX ADMIN — IMMUNE GLOBULIN (HUMAN) 30 G: 10 INJECTION INTRAVENOUS; SUBCUTANEOUS at 08:03

## 2021-03-19 ENCOUNTER — HOSPITAL ENCOUNTER (OUTPATIENT)
Dept: RADIOLOGY | Facility: HOSPITAL | Age: 66
Discharge: HOME | End: 2021-03-19
Attending: INTERNAL MEDICINE
Payer: MEDICARE

## 2021-03-19 VITALS — BODY MASS INDEX: 31.83 KG/M2 | HEIGHT: 68 IN | WEIGHT: 210 LBS

## 2021-03-19 DIAGNOSIS — C82.30 FOLLICULAR LYMPHOMA GRADE IIIA, UNSPECIFIED BODY REGION: ICD-10-CM

## 2021-03-19 DIAGNOSIS — R91.8 OTHER NONSPECIFIC ABNORMAL FINDING OF LUNG FIELD: ICD-10-CM

## 2021-03-19 LAB
ACID FAST MOD KINY STN SPEC: NORMAL
GLUCOSE SERPL-MCNC: 187 MG/DL (ref 70–110)
MYCOBACTERIUM SPEC QL CULT: NORMAL

## 2021-03-19 PROCEDURE — 78815 PET IMAGE W/CT SKULL-THIGH: CPT | Mod: TC,PO

## 2021-03-23 ENCOUNTER — TELEPHONE (OUTPATIENT)
Dept: PULMONOLOGY | Facility: CLINIC | Age: 66
End: 2021-03-23

## 2021-03-23 DIAGNOSIS — A49.2 HAEMOPHILUS INFLUENZAE INFECTION: ICD-10-CM

## 2021-03-23 DIAGNOSIS — J44.0 CHRONIC OBSTRUCTIVE PULMONARY DISEASE WITH ACUTE LOWER RESPIRATORY INFECTION: Primary | ICD-10-CM

## 2021-03-23 RX ORDER — LEVOFLOXACIN 500 MG/1
500 TABLET, FILM COATED ORAL DAILY
Qty: 7 TABLET | Refills: 0 | Status: SHIPPED | OUTPATIENT
Start: 2021-03-23 | End: 2021-03-30

## 2021-03-24 ENCOUNTER — OFFICE VISIT (OUTPATIENT)
Dept: FAMILY MEDICINE | Facility: CLINIC | Age: 66
End: 2021-03-24
Payer: MEDICARE

## 2021-03-24 VITALS
SYSTOLIC BLOOD PRESSURE: 137 MMHG | RESPIRATION RATE: 18 BRPM | WEIGHT: 210 LBS | TEMPERATURE: 98 F | DIASTOLIC BLOOD PRESSURE: 88 MMHG | BODY MASS INDEX: 31.83 KG/M2 | OXYGEN SATURATION: 96 % | HEIGHT: 68 IN | HEART RATE: 88 BPM

## 2021-03-24 DIAGNOSIS — R05.9 COUGH: ICD-10-CM

## 2021-03-24 DIAGNOSIS — E11.9 ENCOUNTER FOR DIABETIC FOOT EXAM: ICD-10-CM

## 2021-03-24 DIAGNOSIS — E11.9 TYPE 2 DIABETES MELLITUS WITHOUT COMPLICATION, WITHOUT LONG-TERM CURRENT USE OF INSULIN: Primary | ICD-10-CM

## 2021-03-24 PROCEDURE — 99215 OFFICE O/P EST HI 40 MIN: CPT | Mod: PBBFAC,PN | Performed by: FAMILY MEDICINE

## 2021-03-24 PROCEDURE — 99999 PR PBB SHADOW E&M-EST. PATIENT-LVL V: ICD-10-PCS | Mod: PBBFAC,,, | Performed by: FAMILY MEDICINE

## 2021-03-24 PROCEDURE — 99999 PR PBB SHADOW E&M-EST. PATIENT-LVL V: CPT | Mod: PBBFAC,,, | Performed by: FAMILY MEDICINE

## 2021-03-24 PROCEDURE — 99213 OFFICE O/P EST LOW 20 MIN: CPT | Mod: S$PBB,,, | Performed by: FAMILY MEDICINE

## 2021-03-24 PROCEDURE — 99213 PR OFFICE/OUTPT VISIT, EST, LEVL III, 20-29 MIN: ICD-10-PCS | Mod: S$PBB,,, | Performed by: FAMILY MEDICINE

## 2021-03-24 RX ORDER — BENZONATATE 200 MG/1
200 CAPSULE ORAL 3 TIMES DAILY PRN
Qty: 90 CAPSULE | Refills: 2 | Status: SHIPPED | OUTPATIENT
Start: 2021-03-24 | End: 2021-04-26

## 2021-03-25 ENCOUNTER — HOSPITAL ENCOUNTER (OUTPATIENT)
Dept: RADIOLOGY | Facility: HOSPITAL | Age: 66
Discharge: HOME | End: 2021-03-25
Attending: NURSE PRACTITIONER
Payer: MEDICARE

## 2021-03-25 DIAGNOSIS — A49.2 HAEMOPHILUS INFLUENZAE INFECTION: ICD-10-CM

## 2021-03-25 DIAGNOSIS — J44.0 CHRONIC OBSTRUCTIVE PULMONARY DISEASE WITH ACUTE LOWER RESPIRATORY INFECTION: ICD-10-CM

## 2021-03-25 LAB
ACID FAST MOD KINY STN SPEC: NORMAL
MYCOBACTERIUM SPEC QL CULT: NORMAL

## 2021-03-25 PROCEDURE — 71046 X-RAY EXAM CHEST 2 VIEWS: CPT | Mod: TC,FY

## 2021-03-25 PROCEDURE — 71046 XR CHEST PA AND LATERAL: ICD-10-PCS | Mod: 26,,, | Performed by: RADIOLOGY

## 2021-03-25 PROCEDURE — 71046 X-RAY EXAM CHEST 2 VIEWS: CPT | Mod: 26,,, | Performed by: RADIOLOGY

## 2021-03-26 ENCOUNTER — TELEPHONE (OUTPATIENT)
Dept: PULMONOLOGY | Facility: CLINIC | Age: 66
End: 2021-03-26

## 2021-03-29 ENCOUNTER — TELEPHONE (OUTPATIENT)
Dept: PULMONOLOGY | Facility: CLINIC | Age: 66
End: 2021-03-29

## 2021-03-31 ENCOUNTER — INFUSION (OUTPATIENT)
Dept: INFUSION THERAPY | Facility: HOSPITAL | Age: 66
End: 2021-03-31
Attending: INTERNAL MEDICINE
Payer: MEDICARE

## 2021-03-31 VITALS
RESPIRATION RATE: 18 BRPM | HEART RATE: 87 BPM | BODY MASS INDEX: 31.37 KG/M2 | TEMPERATURE: 98 F | DIASTOLIC BLOOD PRESSURE: 79 MMHG | SYSTOLIC BLOOD PRESSURE: 122 MMHG | OXYGEN SATURATION: 97 % | WEIGHT: 207 LBS | HEIGHT: 68 IN

## 2021-03-31 DIAGNOSIS — D80.1 HYPOGAMMAGLOBULINEMIA: Primary | ICD-10-CM

## 2021-03-31 DIAGNOSIS — C82.30 FOLLICULAR LYMPHOMA GRADE IIIA, UNSPECIFIED BODY REGION: ICD-10-CM

## 2021-03-31 DIAGNOSIS — D80.1 NONFAMILIAL HYPOGAMMAGLOBULINEMIA: ICD-10-CM

## 2021-03-31 PROCEDURE — A4216 STERILE WATER/SALINE, 10 ML: HCPCS | Performed by: NURSE PRACTITIONER

## 2021-03-31 PROCEDURE — 96415 CHEMO IV INFUSION ADDL HR: CPT

## 2021-03-31 PROCEDURE — 96413 CHEMO IV INFUSION 1 HR: CPT

## 2021-03-31 PROCEDURE — 63600175 PHARM REV CODE 636 W HCPCS: Performed by: NURSE PRACTITIONER

## 2021-03-31 PROCEDURE — 96367 TX/PROPH/DG ADDL SEQ IV INF: CPT

## 2021-03-31 PROCEDURE — 25000003 PHARM REV CODE 250: Performed by: NURSE PRACTITIONER

## 2021-03-31 RX ORDER — DIPHENHYDRAMINE HCL 25 MG
25 CAPSULE ORAL
Status: CANCELLED | OUTPATIENT
Start: 2021-04-28

## 2021-03-31 RX ORDER — SODIUM CHLORIDE 0.9 % (FLUSH) 0.9 %
10 SYRINGE (ML) INJECTION
Status: CANCELLED | OUTPATIENT
Start: 2021-04-28

## 2021-03-31 RX ORDER — SODIUM CHLORIDE 0.9 % (FLUSH) 0.9 %
10 SYRINGE (ML) INJECTION
Status: DISCONTINUED | OUTPATIENT
Start: 2021-03-31 | End: 2021-03-31 | Stop reason: HOSPADM

## 2021-03-31 RX ORDER — HEPARIN 100 UNIT/ML
500 SYRINGE INTRAVENOUS
Status: CANCELLED | OUTPATIENT
Start: 2021-04-28

## 2021-03-31 RX ORDER — ACETAMINOPHEN 500 MG
500 TABLET ORAL
Status: DISCONTINUED | OUTPATIENT
Start: 2021-03-31 | End: 2021-03-31 | Stop reason: HOSPADM

## 2021-03-31 RX ORDER — ACETAMINOPHEN 500 MG
500 TABLET ORAL
Status: CANCELLED | OUTPATIENT
Start: 2021-04-28

## 2021-03-31 RX ORDER — HEPARIN 100 UNIT/ML
500 SYRINGE INTRAVENOUS
Status: DISCONTINUED | OUTPATIENT
Start: 2021-03-31 | End: 2021-03-31 | Stop reason: HOSPADM

## 2021-03-31 RX ADMIN — ACETAMINOPHEN 500 MG: 500 TABLET ORAL at 08:03

## 2021-03-31 RX ADMIN — HEPARIN 500 UNITS: 100 SYRINGE at 10:03

## 2021-03-31 RX ADMIN — IMMUNE GLOBULIN (HUMAN) 30 G: 10 INJECTION INTRAVENOUS; SUBCUTANEOUS at 08:03

## 2021-03-31 RX ADMIN — SODIUM CHLORIDE 10 ML: 9 INJECTION INTRAMUSCULAR; INTRAVENOUS; SUBCUTANEOUS at 10:03

## 2021-03-31 RX ADMIN — DIPHENHYDRAMINE HYDROCHLORIDE 25 MG: 50 INJECTION INTRAMUSCULAR; INTRAVENOUS at 08:03

## 2021-04-12 ENCOUNTER — PATIENT MESSAGE (OUTPATIENT)
Dept: HEMATOLOGY/ONCOLOGY | Facility: CLINIC | Age: 66
End: 2021-04-12

## 2021-04-12 ENCOUNTER — TELEPHONE (OUTPATIENT)
Dept: HEMATOLOGY/ONCOLOGY | Facility: CLINIC | Age: 66
End: 2021-04-12

## 2021-04-13 ENCOUNTER — TELEPHONE (OUTPATIENT)
Dept: HEMATOLOGY/ONCOLOGY | Facility: CLINIC | Age: 66
End: 2021-04-13

## 2021-04-13 ENCOUNTER — PATIENT MESSAGE (OUTPATIENT)
Dept: HEMATOLOGY/ONCOLOGY | Facility: CLINIC | Age: 66
End: 2021-04-13

## 2021-04-14 ENCOUNTER — PATIENT MESSAGE (OUTPATIENT)
Dept: HEMATOLOGY/ONCOLOGY | Facility: CLINIC | Age: 66
End: 2021-04-14

## 2021-04-15 ENCOUNTER — TELEPHONE (OUTPATIENT)
Dept: PODIATRY | Facility: CLINIC | Age: 66
End: 2021-04-15

## 2021-04-19 ENCOUNTER — PATIENT MESSAGE (OUTPATIENT)
Dept: HEMATOLOGY/ONCOLOGY | Facility: CLINIC | Age: 66
End: 2021-04-19

## 2021-04-22 ENCOUNTER — PATIENT OUTREACH (OUTPATIENT)
Dept: ADMINISTRATIVE | Facility: OTHER | Age: 66
End: 2021-04-22

## 2021-04-23 ENCOUNTER — OFFICE VISIT (OUTPATIENT)
Dept: PODIATRY | Facility: CLINIC | Age: 66
End: 2021-04-23
Payer: MEDICARE

## 2021-04-23 ENCOUNTER — LAB VISIT (OUTPATIENT)
Dept: LAB | Facility: HOSPITAL | Age: 66
End: 2021-04-23
Attending: INTERNAL MEDICINE
Payer: MEDICARE

## 2021-04-23 VITALS
HEART RATE: 78 BPM | DIASTOLIC BLOOD PRESSURE: 82 MMHG | BODY MASS INDEX: 31.83 KG/M2 | TEMPERATURE: 97 F | WEIGHT: 210 LBS | RESPIRATION RATE: 18 BRPM | HEIGHT: 68 IN | SYSTOLIC BLOOD PRESSURE: 129 MMHG

## 2021-04-23 DIAGNOSIS — E11.9 COMPREHENSIVE DIABETIC FOOT EXAMINATION, TYPE 2 DM, ENCOUNTER FOR: ICD-10-CM

## 2021-04-23 DIAGNOSIS — D80.1 NONFAMILIAL HYPOGAMMAGLOBULINEMIA: ICD-10-CM

## 2021-04-23 DIAGNOSIS — C82.30 FOLLICULAR LYMPHOMA GRADE IIIA, UNSPECIFIED BODY REGION: Primary | ICD-10-CM

## 2021-04-23 DIAGNOSIS — Z51.11 ENCOUNTER FOR ANTINEOPLASTIC CHEMOTHERAPY: ICD-10-CM

## 2021-04-23 DIAGNOSIS — E11.49 TYPE II DIABETES MELLITUS WITH NEUROLOGICAL MANIFESTATIONS: Primary | ICD-10-CM

## 2021-04-23 DIAGNOSIS — C82.30 FOLLICULAR LYMPHOMA GRADE IIIA, UNSPECIFIED BODY REGION: ICD-10-CM

## 2021-04-23 DIAGNOSIS — R91.8 OTHER NONSPECIFIC ABNORMAL FINDING OF LUNG FIELD: ICD-10-CM

## 2021-04-23 DIAGNOSIS — Z51.11 MAINTENANCE ANTINEOPLASTIC CHEMOTHERAPY: ICD-10-CM

## 2021-04-23 LAB
ALBUMIN SERPL BCP-MCNC: 4.3 G/DL (ref 3.5–5.2)
ALP SERPL-CCNC: 98 U/L (ref 55–135)
ALT SERPL W/O P-5'-P-CCNC: 30 U/L (ref 10–44)
ANION GAP SERPL CALC-SCNC: 11 MMOL/L (ref 8–16)
AST SERPL-CCNC: 27 U/L (ref 10–40)
BASOPHILS # BLD AUTO: 0.04 K/UL (ref 0–0.2)
BASOPHILS NFR BLD: 0.9 % (ref 0–1.9)
BILIRUB SERPL-MCNC: 0.7 MG/DL (ref 0.1–1)
BUN SERPL-MCNC: 20 MG/DL (ref 8–23)
CALCIUM SERPL-MCNC: 9.5 MG/DL (ref 8.7–10.5)
CHLORIDE SERPL-SCNC: 106 MMOL/L (ref 95–110)
CO2 SERPL-SCNC: 25 MMOL/L (ref 23–29)
CREAT SERPL-MCNC: 0.8 MG/DL (ref 0.5–1.4)
DIFFERENTIAL METHOD: ABNORMAL
EOSINOPHIL # BLD AUTO: 0.1 K/UL (ref 0–0.5)
EOSINOPHIL NFR BLD: 3 % (ref 0–8)
ERYTHROCYTE [DISTWIDTH] IN BLOOD BY AUTOMATED COUNT: 13.3 % (ref 11.5–14.5)
EST. GFR  (AFRICAN AMERICAN): >60 ML/MIN/1.73 M^2
EST. GFR  (NON AFRICAN AMERICAN): >60 ML/MIN/1.73 M^2
GLUCOSE SERPL-MCNC: 153 MG/DL (ref 70–110)
HCT VFR BLD AUTO: 41.8 % (ref 40–54)
HGB BLD-MCNC: 14.2 G/DL (ref 14–18)
IMM GRANULOCYTES # BLD AUTO: 0.11 K/UL (ref 0–0.04)
IMM GRANULOCYTES NFR BLD AUTO: 2.4 % (ref 0–0.5)
LYMPHOCYTES # BLD AUTO: 1.3 K/UL (ref 1–4.8)
LYMPHOCYTES NFR BLD: 27.5 % (ref 18–48)
MCH RBC QN AUTO: 29.5 PG (ref 27–31)
MCHC RBC AUTO-ENTMCNC: 34 G/DL (ref 32–36)
MCV RBC AUTO: 87 FL (ref 82–98)
MONOCYTES # BLD AUTO: 0.6 K/UL (ref 0.3–1)
MONOCYTES NFR BLD: 11.9 % (ref 4–15)
NEUTROPHILS # BLD AUTO: 2.5 K/UL (ref 1.8–7.7)
NEUTROPHILS NFR BLD: 54.3 % (ref 38–73)
NRBC BLD-RTO: 0 /100 WBC
PLATELET # BLD AUTO: 266 K/UL (ref 150–450)
PMV BLD AUTO: 9.2 FL (ref 9.2–12.9)
POTASSIUM SERPL-SCNC: 4.1 MMOL/L (ref 3.5–5.1)
PROT SERPL-MCNC: 6.7 G/DL (ref 6–8.4)
RBC # BLD AUTO: 4.82 M/UL (ref 4.6–6.2)
SODIUM SERPL-SCNC: 142 MMOL/L (ref 136–145)
WBC # BLD AUTO: 4.62 K/UL (ref 3.9–12.7)

## 2021-04-23 PROCEDURE — 99203 PR OFFICE/OUTPT VISIT, NEW, LEVL III, 30-44 MIN: ICD-10-PCS | Mod: S$PBB,,, | Performed by: PODIATRIST

## 2021-04-23 PROCEDURE — 99999 PR PBB SHADOW E&M-EST. PATIENT-LVL IV: CPT | Mod: PBBFAC,,, | Performed by: PODIATRIST

## 2021-04-23 PROCEDURE — 99203 OFFICE O/P NEW LOW 30 MIN: CPT | Mod: S$PBB,,, | Performed by: PODIATRIST

## 2021-04-23 PROCEDURE — 36415 COLL VENOUS BLD VENIPUNCTURE: CPT | Performed by: INTERNAL MEDICINE

## 2021-04-23 PROCEDURE — 99214 OFFICE O/P EST MOD 30 MIN: CPT | Mod: PBBFAC | Performed by: PODIATRIST

## 2021-04-23 PROCEDURE — 80053 COMPREHEN METABOLIC PANEL: CPT | Performed by: INTERNAL MEDICINE

## 2021-04-23 PROCEDURE — 99999 PR PBB SHADOW E&M-EST. PATIENT-LVL IV: ICD-10-PCS | Mod: PBBFAC,,, | Performed by: PODIATRIST

## 2021-04-23 PROCEDURE — 85025 COMPLETE CBC W/AUTO DIFF WBC: CPT | Performed by: INTERNAL MEDICINE

## 2021-04-23 RX ORDER — BENZONATATE 100 MG/1
CAPSULE ORAL
COMMUNITY
Start: 2021-02-01 | End: 2021-04-26

## 2021-04-23 RX ORDER — GABAPENTIN 100 MG/1
100 CAPSULE ORAL 3 TIMES DAILY
Qty: 42 CAPSULE | Refills: 0 | Status: SHIPPED | OUTPATIENT
Start: 2021-04-23 | End: 2021-05-07 | Stop reason: DRUGHIGH

## 2021-04-26 PROBLEM — E11.49 TYPE II DIABETES MELLITUS WITH NEUROLOGICAL MANIFESTATIONS: Status: ACTIVE | Noted: 2021-04-26

## 2021-04-28 RX ORDER — ACETAMINOPHEN 325 MG/1
650 TABLET ORAL
Status: CANCELLED | OUTPATIENT
Start: 2021-04-29

## 2021-04-28 RX ORDER — MEPERIDINE HYDROCHLORIDE 25 MG/ML
25 INJECTION INTRAMUSCULAR; INTRAVENOUS; SUBCUTANEOUS
Status: CANCELLED | OUTPATIENT
Start: 2021-04-29 | End: 2021-04-29

## 2021-04-28 RX ORDER — FAMOTIDINE 10 MG/ML
20 INJECTION INTRAVENOUS
Status: CANCELLED | OUTPATIENT
Start: 2021-04-29

## 2021-04-28 RX ORDER — HEPARIN 100 UNIT/ML
500 SYRINGE INTRAVENOUS
Status: CANCELLED | OUTPATIENT
Start: 2021-04-29

## 2021-04-28 RX ORDER — SODIUM CHLORIDE 0.9 % (FLUSH) 0.9 %
10 SYRINGE (ML) INJECTION
Status: CANCELLED | OUTPATIENT
Start: 2021-04-29

## 2021-04-29 ENCOUNTER — OFFICE VISIT (OUTPATIENT)
Dept: HEMATOLOGY/ONCOLOGY | Facility: CLINIC | Age: 66
End: 2021-04-29
Payer: MEDICARE

## 2021-04-29 ENCOUNTER — INFUSION (OUTPATIENT)
Dept: INFUSION THERAPY | Facility: HOSPITAL | Age: 66
End: 2021-04-29
Attending: INTERNAL MEDICINE
Payer: MEDICARE

## 2021-04-29 VITALS
RESPIRATION RATE: 18 BRPM | HEART RATE: 65 BPM | SYSTOLIC BLOOD PRESSURE: 140 MMHG | BODY MASS INDEX: 32.66 KG/M2 | DIASTOLIC BLOOD PRESSURE: 79 MMHG | TEMPERATURE: 98 F | OXYGEN SATURATION: 97 % | WEIGHT: 214.81 LBS

## 2021-04-29 VITALS
SYSTOLIC BLOOD PRESSURE: 111 MMHG | DIASTOLIC BLOOD PRESSURE: 71 MMHG | WEIGHT: 214 LBS | RESPIRATION RATE: 18 BRPM | OXYGEN SATURATION: 98 % | BODY MASS INDEX: 32.43 KG/M2 | HEART RATE: 81 BPM | HEIGHT: 68 IN

## 2021-04-29 DIAGNOSIS — C82.30 FOLLICULAR LYMPHOMA GRADE IIIA, UNSPECIFIED BODY REGION: ICD-10-CM

## 2021-04-29 DIAGNOSIS — Z51.11 ENCOUNTER FOR ANTINEOPLASTIC CHEMOTHERAPY: ICD-10-CM

## 2021-04-29 DIAGNOSIS — D80.1 HYPOGAMMAGLOBULINEMIA: Primary | ICD-10-CM

## 2021-04-29 DIAGNOSIS — C82.30 FOLLICULAR LYMPHOMA GRADE IIIA, UNSPECIFIED BODY REGION: Primary | ICD-10-CM

## 2021-04-29 PROCEDURE — 96375 TX/PRO/DX INJ NEW DRUG ADDON: CPT

## 2021-04-29 PROCEDURE — 96415 CHEMO IV INFUSION ADDL HR: CPT

## 2021-04-29 PROCEDURE — 99214 PR OFFICE/OUTPT VISIT, EST, LEVL IV, 30-39 MIN: ICD-10-PCS | Mod: S$GLB,,, | Performed by: INTERNAL MEDICINE

## 2021-04-29 PROCEDURE — 99214 OFFICE O/P EST MOD 30 MIN: CPT | Mod: S$GLB,,, | Performed by: INTERNAL MEDICINE

## 2021-04-29 PROCEDURE — A4216 STERILE WATER/SALINE, 10 ML: HCPCS | Performed by: INTERNAL MEDICINE

## 2021-04-29 PROCEDURE — 63600175 PHARM REV CODE 636 W HCPCS: Performed by: INTERNAL MEDICINE

## 2021-04-29 PROCEDURE — 96413 CHEMO IV INFUSION 1 HR: CPT

## 2021-04-29 PROCEDURE — 96367 TX/PROPH/DG ADDL SEQ IV INF: CPT

## 2021-04-29 PROCEDURE — 25000003 PHARM REV CODE 250: Performed by: INTERNAL MEDICINE

## 2021-04-29 RX ORDER — SODIUM CHLORIDE 0.9 % (FLUSH) 0.9 %
10 SYRINGE (ML) INJECTION
Status: DISCONTINUED | OUTPATIENT
Start: 2021-04-29 | End: 2021-04-29 | Stop reason: HOSPADM

## 2021-04-29 RX ORDER — MEPERIDINE HYDROCHLORIDE 25 MG/ML
25 INJECTION INTRAMUSCULAR; INTRAVENOUS; SUBCUTANEOUS
Status: DISCONTINUED | OUTPATIENT
Start: 2021-04-29 | End: 2021-04-29 | Stop reason: HOSPADM

## 2021-04-29 RX ORDER — FAMOTIDINE 10 MG/ML
20 INJECTION INTRAVENOUS
Status: COMPLETED | OUTPATIENT
Start: 2021-04-29 | End: 2021-04-29

## 2021-04-29 RX ORDER — HEPARIN 100 UNIT/ML
500 SYRINGE INTRAVENOUS
Status: DISCONTINUED | OUTPATIENT
Start: 2021-04-29 | End: 2021-04-29 | Stop reason: HOSPADM

## 2021-04-29 RX ORDER — ACETAMINOPHEN 325 MG/1
650 TABLET ORAL
Status: COMPLETED | OUTPATIENT
Start: 2021-04-29 | End: 2021-04-29

## 2021-04-29 RX ADMIN — ACETAMINOPHEN 650 MG: 325 TABLET ORAL at 08:04

## 2021-04-29 RX ADMIN — HEPARIN 500 UNITS: 100 SYRINGE at 12:04

## 2021-04-29 RX ADMIN — SODIUM CHLORIDE, PRESERVATIVE FREE 10 ML: 5 INJECTION INTRAVENOUS at 12:04

## 2021-04-29 RX ADMIN — DIPHENHYDRAMINE HYDROCHLORIDE 50 MG: 50 INJECTION INTRAMUSCULAR; INTRAVENOUS at 08:04

## 2021-04-29 RX ADMIN — SODIUM CHLORIDE: 0.9 INJECTION, SOLUTION INTRAVENOUS at 08:04

## 2021-04-29 RX ADMIN — FAMOTIDINE 20 MG: 10 INJECTION INTRAVENOUS at 08:04

## 2021-04-29 RX ADMIN — RITUXIMAB 800 MG: 10 INJECTION, SOLUTION INTRAVENOUS at 08:04

## 2021-04-30 ENCOUNTER — INFUSION (OUTPATIENT)
Dept: INFUSION THERAPY | Facility: HOSPITAL | Age: 66
End: 2021-04-30
Attending: INTERNAL MEDICINE
Payer: MEDICARE

## 2021-04-30 VITALS
SYSTOLIC BLOOD PRESSURE: 117 MMHG | OXYGEN SATURATION: 97 % | TEMPERATURE: 98 F | RESPIRATION RATE: 16 BRPM | BODY MASS INDEX: 32.95 KG/M2 | DIASTOLIC BLOOD PRESSURE: 71 MMHG | HEART RATE: 70 BPM | WEIGHT: 216.69 LBS

## 2021-04-30 DIAGNOSIS — D80.1 HYPOGAMMAGLOBULINEMIA: Primary | ICD-10-CM

## 2021-04-30 DIAGNOSIS — D80.1 NONFAMILIAL HYPOGAMMAGLOBULINEMIA: ICD-10-CM

## 2021-04-30 DIAGNOSIS — C82.30 FOLLICULAR LYMPHOMA GRADE IIIA, UNSPECIFIED BODY REGION: ICD-10-CM

## 2021-04-30 PROCEDURE — 96413 CHEMO IV INFUSION 1 HR: CPT

## 2021-04-30 PROCEDURE — 25000003 PHARM REV CODE 250: Performed by: NURSE PRACTITIONER

## 2021-04-30 PROCEDURE — 96415 CHEMO IV INFUSION ADDL HR: CPT

## 2021-04-30 PROCEDURE — A4216 STERILE WATER/SALINE, 10 ML: HCPCS | Performed by: NURSE PRACTITIONER

## 2021-04-30 PROCEDURE — 63600175 PHARM REV CODE 636 W HCPCS: Performed by: NURSE PRACTITIONER

## 2021-04-30 PROCEDURE — 96367 TX/PROPH/DG ADDL SEQ IV INF: CPT

## 2021-04-30 RX ORDER — HEPARIN 100 UNIT/ML
500 SYRINGE INTRAVENOUS
Status: DISCONTINUED | OUTPATIENT
Start: 2021-04-30 | End: 2021-04-30 | Stop reason: HOSPADM

## 2021-04-30 RX ORDER — ACETAMINOPHEN 500 MG
500 TABLET ORAL
Status: CANCELLED | OUTPATIENT
Start: 2021-05-28

## 2021-04-30 RX ORDER — HEPARIN 100 UNIT/ML
500 SYRINGE INTRAVENOUS
Status: CANCELLED | OUTPATIENT
Start: 2021-05-28

## 2021-04-30 RX ORDER — SODIUM CHLORIDE 0.9 % (FLUSH) 0.9 %
10 SYRINGE (ML) INJECTION
Status: CANCELLED | OUTPATIENT
Start: 2021-05-28

## 2021-04-30 RX ORDER — ACETAMINOPHEN 500 MG
500 TABLET ORAL
Status: DISCONTINUED | OUTPATIENT
Start: 2021-04-30 | End: 2021-04-30 | Stop reason: HOSPADM

## 2021-04-30 RX ORDER — DIPHENHYDRAMINE HCL 25 MG
25 CAPSULE ORAL
Status: DISCONTINUED | OUTPATIENT
Start: 2021-04-30 | End: 2021-04-30 | Stop reason: HOSPADM

## 2021-04-30 RX ORDER — DIPHENHYDRAMINE HCL 25 MG
25 CAPSULE ORAL
Status: CANCELLED | OUTPATIENT
Start: 2021-05-28

## 2021-04-30 RX ORDER — SODIUM CHLORIDE 0.9 % (FLUSH) 0.9 %
10 SYRINGE (ML) INJECTION
Status: DISCONTINUED | OUTPATIENT
Start: 2021-04-30 | End: 2021-04-30 | Stop reason: HOSPADM

## 2021-04-30 RX ADMIN — SODIUM CHLORIDE, PRESERVATIVE FREE 10 ML: 5 INJECTION INTRAVENOUS at 10:04

## 2021-04-30 RX ADMIN — SODIUM CHLORIDE: 0.9 INJECTION, SOLUTION INTRAVENOUS at 08:04

## 2021-04-30 RX ADMIN — IMMUNE GLOBULIN (HUMAN) 30 G: 10 INJECTION INTRAVENOUS; SUBCUTANEOUS at 08:04

## 2021-04-30 RX ADMIN — HEPARIN 500 UNITS: 100 SYRINGE at 10:04

## 2021-04-30 RX ADMIN — DIPHENHYDRAMINE HYDROCHLORIDE 25 MG: 50 INJECTION INTRAMUSCULAR; INTRAVENOUS at 08:04

## 2021-05-07 ENCOUNTER — OFFICE VISIT (OUTPATIENT)
Dept: PODIATRY | Facility: CLINIC | Age: 66
End: 2021-05-07
Payer: MEDICARE

## 2021-05-07 VITALS
WEIGHT: 214 LBS | SYSTOLIC BLOOD PRESSURE: 145 MMHG | HEART RATE: 75 BPM | RESPIRATION RATE: 18 BRPM | BODY MASS INDEX: 32.43 KG/M2 | DIASTOLIC BLOOD PRESSURE: 86 MMHG | TEMPERATURE: 98 F | HEIGHT: 68 IN

## 2021-05-07 DIAGNOSIS — M79.672 BILATERAL FOOT PAIN: ICD-10-CM

## 2021-05-07 DIAGNOSIS — E11.49 TYPE II DIABETES MELLITUS WITH NEUROLOGICAL MANIFESTATIONS: Primary | ICD-10-CM

## 2021-05-07 DIAGNOSIS — G57.93 NEUROPATHY OF BOTH FEET: ICD-10-CM

## 2021-05-07 DIAGNOSIS — M79.671 BILATERAL FOOT PAIN: ICD-10-CM

## 2021-05-07 PROCEDURE — 99999 PR PBB SHADOW E&M-EST. PATIENT-LVL IV: CPT | Mod: PBBFAC,,, | Performed by: PODIATRIST

## 2021-05-07 PROCEDURE — 99214 PR OFFICE/OUTPT VISIT, EST, LEVL IV, 30-39 MIN: ICD-10-PCS | Mod: S$PBB,,, | Performed by: PODIATRIST

## 2021-05-07 PROCEDURE — 99214 OFFICE O/P EST MOD 30 MIN: CPT | Mod: S$PBB,,, | Performed by: PODIATRIST

## 2021-05-07 PROCEDURE — 99999 PR PBB SHADOW E&M-EST. PATIENT-LVL IV: ICD-10-PCS | Mod: PBBFAC,,, | Performed by: PODIATRIST

## 2021-05-07 PROCEDURE — 99214 OFFICE O/P EST MOD 30 MIN: CPT | Mod: PBBFAC | Performed by: PODIATRIST

## 2021-05-07 RX ORDER — GABAPENTIN 300 MG/1
300 CAPSULE ORAL 3 TIMES DAILY
Qty: 90 CAPSULE | Refills: 2 | Status: SHIPPED | OUTPATIENT
Start: 2021-05-07 | End: 2021-06-23 | Stop reason: SDUPTHER

## 2021-05-17 ENCOUNTER — OFFICE VISIT (OUTPATIENT)
Dept: PULMONOLOGY | Facility: CLINIC | Age: 66
End: 2021-05-17
Payer: MEDICARE

## 2021-05-17 VITALS
SYSTOLIC BLOOD PRESSURE: 141 MMHG | HEART RATE: 85 BPM | HEIGHT: 68 IN | OXYGEN SATURATION: 96 % | DIASTOLIC BLOOD PRESSURE: 85 MMHG | BODY MASS INDEX: 32.73 KG/M2 | WEIGHT: 215.94 LBS

## 2021-05-17 DIAGNOSIS — J84.9 ILD (INTERSTITIAL LUNG DISEASE): ICD-10-CM

## 2021-05-17 DIAGNOSIS — J47.9 BRONCHIECTASIS WITHOUT COMPLICATION: Primary | ICD-10-CM

## 2021-05-17 PROCEDURE — 99213 PR OFFICE/OUTPT VISIT, EST, LEVL III, 20-29 MIN: ICD-10-PCS | Mod: S$PBB,,, | Performed by: NURSE PRACTITIONER

## 2021-05-17 PROCEDURE — 99999 PR PBB SHADOW E&M-EST. PATIENT-LVL IV: ICD-10-PCS | Mod: PBBFAC,,, | Performed by: NURSE PRACTITIONER

## 2021-05-17 PROCEDURE — 99999 PR PBB SHADOW E&M-EST. PATIENT-LVL IV: CPT | Mod: PBBFAC,,, | Performed by: NURSE PRACTITIONER

## 2021-05-17 PROCEDURE — 99213 OFFICE O/P EST LOW 20 MIN: CPT | Mod: S$PBB,,, | Performed by: NURSE PRACTITIONER

## 2021-05-17 PROCEDURE — 99214 OFFICE O/P EST MOD 30 MIN: CPT | Mod: PBBFAC,PO | Performed by: NURSE PRACTITIONER

## 2021-05-17 RX ORDER — AMOXICILLIN AND CLAVULANATE POTASSIUM 875; 125 MG/1; MG/1
TABLET, FILM COATED ORAL
COMMUNITY
Start: 2021-01-29 | End: 2021-05-17 | Stop reason: ALTCHOICE

## 2021-05-17 RX ORDER — AMOXICILLIN 500 MG/1
TABLET, FILM COATED ORAL
COMMUNITY
Start: 2020-09-18 | End: 2021-05-17 | Stop reason: ALTCHOICE

## 2021-05-17 RX ORDER — BENZONATATE 200 MG/1
200 CAPSULE ORAL 2 TIMES DAILY PRN
COMMUNITY
Start: 2021-03-24 | End: 2022-02-14

## 2021-05-17 RX ORDER — BUDESONIDE 0.5 MG/2ML
INHALANT ORAL
COMMUNITY
Start: 2020-06-16 | End: 2021-05-17 | Stop reason: ALTCHOICE

## 2021-05-17 RX ORDER — GUAIFENESIN 1200 MG/1
1200 TABLET, EXTENDED RELEASE ORAL DAILY
Qty: 30 TABLET | Refills: 1 | Status: SHIPPED | OUTPATIENT
Start: 2021-05-17 | End: 2021-06-16

## 2021-05-18 ENCOUNTER — PATIENT MESSAGE (OUTPATIENT)
Dept: ADMINISTRATIVE | Facility: HOSPITAL | Age: 66
End: 2021-05-18

## 2021-05-24 ENCOUNTER — PATIENT MESSAGE (OUTPATIENT)
Dept: HEMATOLOGY/ONCOLOGY | Facility: CLINIC | Age: 66
End: 2021-05-24

## 2021-05-28 ENCOUNTER — INFUSION (OUTPATIENT)
Dept: INFUSION THERAPY | Facility: HOSPITAL | Age: 66
End: 2021-05-28
Attending: INTERNAL MEDICINE
Payer: MEDICARE

## 2021-05-28 VITALS
DIASTOLIC BLOOD PRESSURE: 78 MMHG | TEMPERATURE: 97 F | RESPIRATION RATE: 18 BRPM | HEIGHT: 68 IN | WEIGHT: 217.13 LBS | SYSTOLIC BLOOD PRESSURE: 136 MMHG | OXYGEN SATURATION: 97 % | HEART RATE: 78 BPM | BODY MASS INDEX: 32.91 KG/M2

## 2021-05-28 DIAGNOSIS — C82.30 FOLLICULAR LYMPHOMA GRADE IIIA, UNSPECIFIED BODY REGION: ICD-10-CM

## 2021-05-28 DIAGNOSIS — D80.1 HYPOGAMMAGLOBULINEMIA: Primary | ICD-10-CM

## 2021-05-28 DIAGNOSIS — D80.1 NONFAMILIAL HYPOGAMMAGLOBULINEMIA: ICD-10-CM

## 2021-05-28 PROCEDURE — 63600175 PHARM REV CODE 636 W HCPCS: Performed by: NURSE PRACTITIONER

## 2021-05-28 PROCEDURE — 96367 TX/PROPH/DG ADDL SEQ IV INF: CPT

## 2021-05-28 PROCEDURE — A4216 STERILE WATER/SALINE, 10 ML: HCPCS | Performed by: NURSE PRACTITIONER

## 2021-05-28 PROCEDURE — 96415 CHEMO IV INFUSION ADDL HR: CPT

## 2021-05-28 PROCEDURE — 25000003 PHARM REV CODE 250: Performed by: NURSE PRACTITIONER

## 2021-05-28 PROCEDURE — 96413 CHEMO IV INFUSION 1 HR: CPT

## 2021-05-28 RX ORDER — SODIUM CHLORIDE 0.9 % (FLUSH) 0.9 %
10 SYRINGE (ML) INJECTION
Status: CANCELLED | OUTPATIENT
Start: 2021-06-25

## 2021-05-28 RX ORDER — SODIUM CHLORIDE 0.9 % (FLUSH) 0.9 %
10 SYRINGE (ML) INJECTION
Status: DISCONTINUED | OUTPATIENT
Start: 2021-05-28 | End: 2021-05-28 | Stop reason: HOSPADM

## 2021-05-28 RX ORDER — HEPARIN 100 UNIT/ML
500 SYRINGE INTRAVENOUS
Status: CANCELLED | OUTPATIENT
Start: 2021-06-25

## 2021-05-28 RX ORDER — HEPARIN 100 UNIT/ML
500 SYRINGE INTRAVENOUS
Status: DISCONTINUED | OUTPATIENT
Start: 2021-05-28 | End: 2021-05-28 | Stop reason: HOSPADM

## 2021-05-28 RX ADMIN — IMMUNE GLOBULIN (HUMAN) 30 G: 10 INJECTION INTRAVENOUS; SUBCUTANEOUS at 07:05

## 2021-05-28 RX ADMIN — DIPHENHYDRAMINE HYDROCHLORIDE 25 MG: 50 INJECTION INTRAMUSCULAR; INTRAVENOUS at 07:05

## 2021-05-28 RX ADMIN — HEPARIN 500 UNITS: 100 SYRINGE at 09:05

## 2021-05-28 RX ADMIN — SODIUM CHLORIDE: 0.9 INJECTION, SOLUTION INTRAVENOUS at 07:05

## 2021-05-28 RX ADMIN — SODIUM CHLORIDE 10 ML: 9 INJECTION INTRAMUSCULAR; INTRAVENOUS; SUBCUTANEOUS at 09:05

## 2021-06-09 ENCOUNTER — PATIENT MESSAGE (OUTPATIENT)
Dept: ADMINISTRATIVE | Facility: HOSPITAL | Age: 66
End: 2021-06-09

## 2021-06-09 ENCOUNTER — PATIENT OUTREACH (OUTPATIENT)
Dept: ADMINISTRATIVE | Facility: HOSPITAL | Age: 66
End: 2021-06-09

## 2021-06-10 ENCOUNTER — PATIENT OUTREACH (OUTPATIENT)
Dept: ADMINISTRATIVE | Facility: HOSPITAL | Age: 66
End: 2021-06-10

## 2021-06-21 ENCOUNTER — HOSPITAL ENCOUNTER (OUTPATIENT)
Dept: RADIOLOGY | Facility: HOSPITAL | Age: 66
Discharge: HOME | End: 2021-06-21
Attending: NURSE PRACTITIONER
Payer: MEDICARE

## 2021-06-21 DIAGNOSIS — J84.9 ILD (INTERSTITIAL LUNG DISEASE): ICD-10-CM

## 2021-06-21 PROCEDURE — 71250 CT THORAX DX C-: CPT | Mod: 26,,, | Performed by: RADIOLOGY

## 2021-06-21 PROCEDURE — 71250 CT THORAX DX C-: CPT | Mod: TC

## 2021-06-21 PROCEDURE — 71250 CT CHEST WITHOUT CONTRAST: ICD-10-PCS | Mod: 26,,, | Performed by: RADIOLOGY

## 2021-06-22 RX ORDER — FAMOTIDINE 10 MG/ML
20 INJECTION INTRAVENOUS
Status: CANCELLED | OUTPATIENT
Start: 2021-06-24

## 2021-06-22 RX ORDER — SODIUM CHLORIDE 0.9 % (FLUSH) 0.9 %
10 SYRINGE (ML) INJECTION
Status: CANCELLED | OUTPATIENT
Start: 2021-06-24

## 2021-06-22 RX ORDER — MEPERIDINE HYDROCHLORIDE 25 MG/ML
25 INJECTION INTRAMUSCULAR; INTRAVENOUS; SUBCUTANEOUS
Status: CANCELLED | OUTPATIENT
Start: 2021-06-24 | End: 2021-06-24

## 2021-06-22 RX ORDER — HEPARIN 100 UNIT/ML
500 SYRINGE INTRAVENOUS
Status: CANCELLED | OUTPATIENT
Start: 2021-06-24

## 2021-06-22 RX ORDER — ACETAMINOPHEN 325 MG/1
650 TABLET ORAL
Status: CANCELLED | OUTPATIENT
Start: 2021-06-24

## 2021-06-23 ENCOUNTER — PATIENT MESSAGE (OUTPATIENT)
Dept: FAMILY MEDICINE | Facility: CLINIC | Age: 66
End: 2021-06-23

## 2021-06-23 ENCOUNTER — LAB VISIT (OUTPATIENT)
Dept: LAB | Facility: HOSPITAL | Age: 66
End: 2021-06-23
Attending: FAMILY MEDICINE
Payer: MEDICARE

## 2021-06-23 ENCOUNTER — OFFICE VISIT (OUTPATIENT)
Dept: FAMILY MEDICINE | Facility: CLINIC | Age: 66
End: 2021-06-23
Payer: MEDICARE

## 2021-06-23 VITALS
HEART RATE: 77 BPM | DIASTOLIC BLOOD PRESSURE: 80 MMHG | HEIGHT: 68 IN | WEIGHT: 216.81 LBS | OXYGEN SATURATION: 97 % | SYSTOLIC BLOOD PRESSURE: 138 MMHG | BODY MASS INDEX: 32.86 KG/M2 | RESPIRATION RATE: 17 BRPM

## 2021-06-23 DIAGNOSIS — J32.9 SINUSITIS, UNSPECIFIED CHRONICITY, UNSPECIFIED LOCATION: ICD-10-CM

## 2021-06-23 DIAGNOSIS — L98.9 SKIN LESION: ICD-10-CM

## 2021-06-23 DIAGNOSIS — I10 ESSENTIAL HYPERTENSION: ICD-10-CM

## 2021-06-23 DIAGNOSIS — E11.9 TYPE 2 DIABETES MELLITUS WITHOUT COMPLICATION, WITHOUT LONG-TERM CURRENT USE OF INSULIN: Primary | ICD-10-CM

## 2021-06-23 DIAGNOSIS — E11.9 TYPE 2 DIABETES MELLITUS WITHOUT COMPLICATION, WITHOUT LONG-TERM CURRENT USE OF INSULIN: ICD-10-CM

## 2021-06-23 LAB
ESTIMATED AVG GLUCOSE: 146 MG/DL (ref 68–131)
HBA1C MFR BLD: 6.7 % (ref 4–5.6)

## 2021-06-23 PROCEDURE — 99214 OFFICE O/P EST MOD 30 MIN: CPT | Mod: S$PBB,,, | Performed by: FAMILY MEDICINE

## 2021-06-23 PROCEDURE — 36415 COLL VENOUS BLD VENIPUNCTURE: CPT | Performed by: FAMILY MEDICINE

## 2021-06-23 PROCEDURE — 99999 PR PBB SHADOW E&M-EST. PATIENT-LVL III: ICD-10-PCS | Mod: PBBFAC,,, | Performed by: FAMILY MEDICINE

## 2021-06-23 PROCEDURE — 99999 PR PBB SHADOW E&M-EST. PATIENT-LVL III: CPT | Mod: PBBFAC,,, | Performed by: FAMILY MEDICINE

## 2021-06-23 PROCEDURE — 99213 OFFICE O/P EST LOW 20 MIN: CPT | Mod: PBBFAC,PN | Performed by: FAMILY MEDICINE

## 2021-06-23 PROCEDURE — 99214 PR OFFICE/OUTPT VISIT, EST, LEVL IV, 30-39 MIN: ICD-10-PCS | Mod: S$PBB,,, | Performed by: FAMILY MEDICINE

## 2021-06-23 PROCEDURE — 83036 HEMOGLOBIN GLYCOSYLATED A1C: CPT | Performed by: FAMILY MEDICINE

## 2021-06-23 RX ORDER — GABAPENTIN 300 MG/1
300 CAPSULE ORAL 3 TIMES DAILY
Qty: 90 CAPSULE | Refills: 2 | Status: SHIPPED | OUTPATIENT
Start: 2021-06-23 | End: 2022-02-14 | Stop reason: SDUPTHER

## 2021-06-23 RX ORDER — OMEPRAZOLE 40 MG/1
40 CAPSULE, DELAYED RELEASE ORAL DAILY
Qty: 90 CAPSULE | Refills: 3 | Status: SHIPPED | OUTPATIENT
Start: 2021-06-23 | End: 2022-07-01 | Stop reason: SDUPTHER

## 2021-06-23 RX ORDER — AMLODIPINE BESYLATE 10 MG/1
10 TABLET ORAL DAILY
Qty: 90 TABLET | Refills: 3 | Status: SHIPPED | OUTPATIENT
Start: 2021-06-23 | End: 2022-07-01 | Stop reason: SDUPTHER

## 2021-06-23 RX ORDER — MONTELUKAST SODIUM 10 MG/1
10 TABLET ORAL NIGHTLY PRN
Qty: 90 TABLET | Refills: 3 | Status: SHIPPED | OUTPATIENT
Start: 2021-06-23 | End: 2022-07-01 | Stop reason: SDUPTHER

## 2021-06-23 RX ORDER — NAPROXEN 500 MG/1
500 TABLET ORAL 2 TIMES DAILY PRN
Qty: 180 TABLET | Refills: 3 | Status: SHIPPED | OUTPATIENT
Start: 2021-06-23 | End: 2022-07-01 | Stop reason: SDUPTHER

## 2021-06-23 RX ORDER — ATORVASTATIN CALCIUM 20 MG/1
20 TABLET, FILM COATED ORAL DAILY
Qty: 90 TABLET | Refills: 3 | Status: SHIPPED | OUTPATIENT
Start: 2021-06-23 | End: 2023-01-17 | Stop reason: SDUPTHER

## 2021-06-23 RX ORDER — LOSARTAN POTASSIUM 100 MG/1
100 TABLET ORAL DAILY
Qty: 90 TABLET | Refills: 3 | Status: SHIPPED | OUTPATIENT
Start: 2021-06-23 | End: 2022-07-01 | Stop reason: SDUPTHER

## 2021-06-23 RX ORDER — FLUTICASONE PROPIONATE 50 MCG
SPRAY, SUSPENSION (ML) NASAL
Qty: 48 G | Refills: 11 | Status: SHIPPED | OUTPATIENT
Start: 2021-06-23 | End: 2022-07-01 | Stop reason: SDUPTHER

## 2021-06-23 RX ORDER — METFORMIN HYDROCHLORIDE 500 MG/1
TABLET ORAL
Qty: 360 TABLET | Refills: 3 | Status: SHIPPED | OUTPATIENT
Start: 2021-06-23 | End: 2022-07-01 | Stop reason: SDUPTHER

## 2021-06-23 RX ORDER — TIZANIDINE 4 MG/1
4 TABLET ORAL DAILY PRN
Qty: 90 TABLET | Refills: 3 | Status: SHIPPED | OUTPATIENT
Start: 2021-06-23 | End: 2022-07-01 | Stop reason: SDUPTHER

## 2021-06-24 ENCOUNTER — INFUSION (OUTPATIENT)
Dept: INFUSION THERAPY | Facility: HOSPITAL | Age: 66
End: 2021-06-24
Attending: INTERNAL MEDICINE
Payer: MEDICARE

## 2021-06-24 ENCOUNTER — OFFICE VISIT (OUTPATIENT)
Dept: HEMATOLOGY/ONCOLOGY | Facility: CLINIC | Age: 66
End: 2021-06-24
Payer: MEDICARE

## 2021-06-24 VITALS
RESPIRATION RATE: 18 BRPM | BODY MASS INDEX: 32.84 KG/M2 | HEIGHT: 68 IN | WEIGHT: 216.69 LBS | DIASTOLIC BLOOD PRESSURE: 79 MMHG | TEMPERATURE: 98 F | SYSTOLIC BLOOD PRESSURE: 135 MMHG | HEART RATE: 73 BPM | OXYGEN SATURATION: 97 %

## 2021-06-24 VITALS
RESPIRATION RATE: 18 BRPM | OXYGEN SATURATION: 98 % | HEART RATE: 80 BPM | DIASTOLIC BLOOD PRESSURE: 78 MMHG | SYSTOLIC BLOOD PRESSURE: 114 MMHG | WEIGHT: 216 LBS | HEIGHT: 68 IN | BODY MASS INDEX: 32.74 KG/M2 | TEMPERATURE: 98 F

## 2021-06-24 DIAGNOSIS — C82.30 FOLLICULAR LYMPHOMA GRADE IIIA, UNSPECIFIED BODY REGION: Primary | ICD-10-CM

## 2021-06-24 DIAGNOSIS — D80.1 HYPOGAMMAGLOBULINEMIA: ICD-10-CM

## 2021-06-24 DIAGNOSIS — C82.30 FOLLICULAR LYMPHOMA GRADE IIIA, UNSPECIFIED BODY REGION: ICD-10-CM

## 2021-06-24 DIAGNOSIS — D80.1 HYPOGAMMAGLOBULINEMIA: Primary | ICD-10-CM

## 2021-06-24 DIAGNOSIS — E11.9 TYPE 2 DIABETES MELLITUS WITHOUT COMPLICATION, WITHOUT LONG-TERM CURRENT USE OF INSULIN: ICD-10-CM

## 2021-06-24 PROCEDURE — A4216 STERILE WATER/SALINE, 10 ML: HCPCS | Performed by: INTERNAL MEDICINE

## 2021-06-24 PROCEDURE — 96375 TX/PRO/DX INJ NEW DRUG ADDON: CPT

## 2021-06-24 PROCEDURE — 96367 TX/PROPH/DG ADDL SEQ IV INF: CPT

## 2021-06-24 PROCEDURE — 63600175 PHARM REV CODE 636 W HCPCS: Performed by: INTERNAL MEDICINE

## 2021-06-24 PROCEDURE — 99214 OFFICE O/P EST MOD 30 MIN: CPT | Mod: S$GLB,,, | Performed by: NURSE PRACTITIONER

## 2021-06-24 PROCEDURE — 25000003 PHARM REV CODE 250: Performed by: INTERNAL MEDICINE

## 2021-06-24 PROCEDURE — 96415 CHEMO IV INFUSION ADDL HR: CPT

## 2021-06-24 PROCEDURE — 99214 PR OFFICE/OUTPT VISIT, EST, LEVL IV, 30-39 MIN: ICD-10-PCS | Mod: S$GLB,,, | Performed by: NURSE PRACTITIONER

## 2021-06-24 PROCEDURE — 96413 CHEMO IV INFUSION 1 HR: CPT

## 2021-06-24 RX ORDER — SODIUM CHLORIDE 0.9 % (FLUSH) 0.9 %
10 SYRINGE (ML) INJECTION
Status: DISCONTINUED | OUTPATIENT
Start: 2021-06-24 | End: 2021-06-24 | Stop reason: HOSPADM

## 2021-06-24 RX ORDER — HEPARIN 100 UNIT/ML
500 SYRINGE INTRAVENOUS
Status: DISCONTINUED | OUTPATIENT
Start: 2021-06-24 | End: 2021-06-24 | Stop reason: HOSPADM

## 2021-06-24 RX ORDER — ACETAMINOPHEN 325 MG/1
650 TABLET ORAL
Status: COMPLETED | OUTPATIENT
Start: 2021-06-24 | End: 2021-06-24

## 2021-06-24 RX ORDER — MEPERIDINE HYDROCHLORIDE 25 MG/ML
25 INJECTION INTRAMUSCULAR; INTRAVENOUS; SUBCUTANEOUS
Status: DISCONTINUED | OUTPATIENT
Start: 2021-06-24 | End: 2021-06-24 | Stop reason: HOSPADM

## 2021-06-24 RX ORDER — FAMOTIDINE 10 MG/ML
20 INJECTION INTRAVENOUS
Status: COMPLETED | OUTPATIENT
Start: 2021-06-24 | End: 2021-06-24

## 2021-06-24 RX ADMIN — ACETAMINOPHEN 650 MG: 325 TABLET ORAL at 08:06

## 2021-06-24 RX ADMIN — HEPARIN 500 UNITS: 100 SYRINGE at 11:06

## 2021-06-24 RX ADMIN — FAMOTIDINE 20 MG: 10 INJECTION INTRAVENOUS at 08:06

## 2021-06-24 RX ADMIN — RITUXIMAB 800 MG: 10 INJECTION, SOLUTION INTRAVENOUS at 08:06

## 2021-06-24 RX ADMIN — SODIUM CHLORIDE: 0.9 INJECTION, SOLUTION INTRAVENOUS at 08:06

## 2021-06-24 RX ADMIN — SODIUM CHLORIDE, PRESERVATIVE FREE 10 ML: 5 INJECTION INTRAVENOUS at 11:06

## 2021-06-24 RX ADMIN — DIPHENHYDRAMINE HYDROCHLORIDE 50 MG: 50 INJECTION INTRAMUSCULAR; INTRAVENOUS at 08:06

## 2021-06-25 ENCOUNTER — INFUSION (OUTPATIENT)
Dept: INFUSION THERAPY | Facility: HOSPITAL | Age: 66
End: 2021-06-25
Attending: INTERNAL MEDICINE
Payer: MEDICARE

## 2021-06-25 VITALS
BODY MASS INDEX: 33.21 KG/M2 | TEMPERATURE: 98 F | HEART RATE: 77 BPM | OXYGEN SATURATION: 98 % | DIASTOLIC BLOOD PRESSURE: 52 MMHG | SYSTOLIC BLOOD PRESSURE: 151 MMHG | WEIGHT: 218.38 LBS | RESPIRATION RATE: 18 BRPM

## 2021-06-25 DIAGNOSIS — C82.30 FOLLICULAR LYMPHOMA GRADE IIIA, UNSPECIFIED BODY REGION: ICD-10-CM

## 2021-06-25 DIAGNOSIS — D80.1 NONFAMILIAL HYPOGAMMAGLOBULINEMIA: ICD-10-CM

## 2021-06-25 DIAGNOSIS — D80.1 HYPOGAMMAGLOBULINEMIA: Primary | ICD-10-CM

## 2021-06-25 PROCEDURE — 25000003 PHARM REV CODE 250: Performed by: NURSE PRACTITIONER

## 2021-06-25 PROCEDURE — 96413 CHEMO IV INFUSION 1 HR: CPT

## 2021-06-25 PROCEDURE — 96415 CHEMO IV INFUSION ADDL HR: CPT

## 2021-06-25 PROCEDURE — A4216 STERILE WATER/SALINE, 10 ML: HCPCS | Performed by: NURSE PRACTITIONER

## 2021-06-25 PROCEDURE — 63600175 PHARM REV CODE 636 W HCPCS: Performed by: NURSE PRACTITIONER

## 2021-06-25 PROCEDURE — 96367 TX/PROPH/DG ADDL SEQ IV INF: CPT

## 2021-06-25 RX ORDER — SODIUM CHLORIDE 0.9 % (FLUSH) 0.9 %
10 SYRINGE (ML) INJECTION
Status: CANCELLED | OUTPATIENT
Start: 2021-07-23

## 2021-06-25 RX ORDER — HEPARIN 100 UNIT/ML
500 SYRINGE INTRAVENOUS
Status: DISCONTINUED | OUTPATIENT
Start: 2021-06-25 | End: 2021-06-25 | Stop reason: HOSPADM

## 2021-06-25 RX ORDER — HEPARIN 100 UNIT/ML
500 SYRINGE INTRAVENOUS
Status: CANCELLED | OUTPATIENT
Start: 2021-07-23

## 2021-06-25 RX ORDER — SODIUM CHLORIDE 0.9 % (FLUSH) 0.9 %
10 SYRINGE (ML) INJECTION
Status: DISCONTINUED | OUTPATIENT
Start: 2021-06-25 | End: 2021-06-25 | Stop reason: HOSPADM

## 2021-06-25 RX ADMIN — SODIUM CHLORIDE, PRESERVATIVE FREE 10 ML: 5 INJECTION INTRAVENOUS at 11:06

## 2021-06-25 RX ADMIN — IMMUNE GLOBULIN (HUMAN) 30 G: 10 INJECTION INTRAVENOUS; SUBCUTANEOUS at 08:06

## 2021-06-25 RX ADMIN — SODIUM CHLORIDE: 0.9 INJECTION, SOLUTION INTRAVENOUS at 08:06

## 2021-06-25 RX ADMIN — HEPARIN 500 UNITS: 100 SYRINGE at 11:06

## 2021-06-25 RX ADMIN — DIPHENHYDRAMINE HYDROCHLORIDE 25 MG: 50 INJECTION INTRAMUSCULAR; INTRAVENOUS at 08:06

## 2021-06-28 ENCOUNTER — PATIENT MESSAGE (OUTPATIENT)
Dept: PULMONOLOGY | Facility: CLINIC | Age: 66
End: 2021-06-28

## 2021-07-22 ENCOUNTER — PATIENT MESSAGE (OUTPATIENT)
Dept: PULMONOLOGY | Facility: CLINIC | Age: 66
End: 2021-07-22

## 2021-07-22 ENCOUNTER — LAB VISIT (OUTPATIENT)
Dept: LAB | Facility: HOSPITAL | Age: 66
End: 2021-07-22
Attending: NURSE PRACTITIONER
Payer: MEDICARE

## 2021-07-22 DIAGNOSIS — J47.9 BRONCHIECTASIS WITHOUT COMPLICATION: ICD-10-CM

## 2021-07-22 PROCEDURE — 87205 SMEAR GRAM STAIN: CPT | Performed by: NURSE PRACTITIONER

## 2021-07-22 PROCEDURE — 87070 CULTURE OTHR SPECIMN AEROBIC: CPT | Performed by: NURSE PRACTITIONER

## 2021-07-22 PROCEDURE — 87077 CULTURE AEROBIC IDENTIFY: CPT | Performed by: NURSE PRACTITIONER

## 2021-07-22 PROCEDURE — 87185 SC STD ENZYME DETCJ PER NZM: CPT | Performed by: NURSE PRACTITIONER

## 2021-07-22 PROCEDURE — 87186 SC STD MICRODIL/AGAR DIL: CPT | Mod: 59 | Performed by: NURSE PRACTITIONER

## 2021-07-22 RX ORDER — FAMOTIDINE 10 MG/ML
20 INJECTION INTRAVENOUS
Status: CANCELLED | OUTPATIENT
Start: 2021-08-19

## 2021-07-22 RX ORDER — ACETAMINOPHEN 325 MG/1
650 TABLET ORAL
Status: CANCELLED | OUTPATIENT
Start: 2021-08-19

## 2021-07-22 RX ORDER — MEPERIDINE HYDROCHLORIDE 25 MG/ML
25 INJECTION INTRAMUSCULAR; INTRAVENOUS; SUBCUTANEOUS
Status: CANCELLED | OUTPATIENT
Start: 2021-08-19 | End: 2021-08-19

## 2021-07-22 RX ORDER — SODIUM CHLORIDE 0.9 % (FLUSH) 0.9 %
10 SYRINGE (ML) INJECTION
Status: CANCELLED | OUTPATIENT
Start: 2021-08-19

## 2021-07-22 RX ORDER — HEPARIN 100 UNIT/ML
500 SYRINGE INTRAVENOUS
Status: CANCELLED | OUTPATIENT
Start: 2021-08-19

## 2021-07-23 ENCOUNTER — INFUSION (OUTPATIENT)
Dept: INFUSION THERAPY | Facility: HOSPITAL | Age: 66
End: 2021-07-23
Attending: INTERNAL MEDICINE
Payer: MEDICARE

## 2021-07-23 VITALS
WEIGHT: 214.63 LBS | RESPIRATION RATE: 18 BRPM | TEMPERATURE: 99 F | DIASTOLIC BLOOD PRESSURE: 83 MMHG | BODY MASS INDEX: 32.63 KG/M2 | SYSTOLIC BLOOD PRESSURE: 152 MMHG | OXYGEN SATURATION: 95 % | HEART RATE: 94 BPM

## 2021-07-23 DIAGNOSIS — C82.30 FOLLICULAR LYMPHOMA GRADE IIIA, UNSPECIFIED BODY REGION: ICD-10-CM

## 2021-07-23 DIAGNOSIS — D80.1 NONFAMILIAL HYPOGAMMAGLOBULINEMIA: ICD-10-CM

## 2021-07-23 DIAGNOSIS — D80.1 HYPOGAMMAGLOBULINEMIA: Primary | ICD-10-CM

## 2021-07-23 PROCEDURE — 63600175 PHARM REV CODE 636 W HCPCS: Mod: JG | Performed by: NURSE PRACTITIONER

## 2021-07-23 PROCEDURE — A4216 STERILE WATER/SALINE, 10 ML: HCPCS | Performed by: NURSE PRACTITIONER

## 2021-07-23 PROCEDURE — 96415 CHEMO IV INFUSION ADDL HR: CPT

## 2021-07-23 PROCEDURE — 25000003 PHARM REV CODE 250: Performed by: NURSE PRACTITIONER

## 2021-07-23 PROCEDURE — 96367 TX/PROPH/DG ADDL SEQ IV INF: CPT

## 2021-07-23 PROCEDURE — 96413 CHEMO IV INFUSION 1 HR: CPT

## 2021-07-23 RX ORDER — HEPARIN 100 UNIT/ML
500 SYRINGE INTRAVENOUS
Status: CANCELLED | OUTPATIENT
Start: 2021-08-20

## 2021-07-23 RX ORDER — SODIUM CHLORIDE 0.9 % (FLUSH) 0.9 %
10 SYRINGE (ML) INJECTION
Status: DISCONTINUED | OUTPATIENT
Start: 2021-07-23 | End: 2021-07-23 | Stop reason: HOSPADM

## 2021-07-23 RX ORDER — SODIUM CHLORIDE 0.9 % (FLUSH) 0.9 %
10 SYRINGE (ML) INJECTION
Status: CANCELLED | OUTPATIENT
Start: 2021-08-20

## 2021-07-23 RX ORDER — HEPARIN 100 UNIT/ML
500 SYRINGE INTRAVENOUS
Status: DISCONTINUED | OUTPATIENT
Start: 2021-07-23 | End: 2021-07-23 | Stop reason: HOSPADM

## 2021-07-23 RX ADMIN — DIPHENHYDRAMINE HYDROCHLORIDE 25 MG: 50 INJECTION INTRAMUSCULAR; INTRAVENOUS at 08:07

## 2021-07-23 RX ADMIN — SODIUM CHLORIDE: 0.9 INJECTION, SOLUTION INTRAVENOUS at 08:07

## 2021-07-23 RX ADMIN — SODIUM CHLORIDE, PRESERVATIVE FREE 10 ML: 5 INJECTION INTRAVENOUS at 11:07

## 2021-07-23 RX ADMIN — IMMUNE GLOBULIN (HUMAN) 30 G: 10 INJECTION INTRAVENOUS; SUBCUTANEOUS at 08:07

## 2021-07-23 RX ADMIN — HEPARIN 500 UNITS: 100 SYRINGE at 11:07

## 2021-07-26 ENCOUNTER — PATIENT MESSAGE (OUTPATIENT)
Dept: PULMONOLOGY | Facility: CLINIC | Age: 66
End: 2021-07-26

## 2021-07-26 LAB
BACTERIA SPEC AEROBE CULT: ABNORMAL
GRAM STN SPEC: ABNORMAL

## 2021-07-28 ENCOUNTER — PATIENT MESSAGE (OUTPATIENT)
Dept: PULMONOLOGY | Facility: CLINIC | Age: 66
End: 2021-07-28

## 2021-07-28 DIAGNOSIS — J47.0 BRONCHIECTASIS WITH ACUTE LOWER RESPIRATORY INFECTION: Primary | ICD-10-CM

## 2021-07-28 RX ORDER — CIPROFLOXACIN 500 MG/1
500 TABLET ORAL 2 TIMES DAILY
Qty: 28 TABLET | Refills: 0 | Status: SHIPPED | OUTPATIENT
Start: 2021-07-28 | End: 2021-08-17 | Stop reason: SDUPTHER

## 2021-08-03 ENCOUNTER — PATIENT MESSAGE (OUTPATIENT)
Dept: PULMONOLOGY | Facility: CLINIC | Age: 66
End: 2021-08-03

## 2021-08-03 DIAGNOSIS — A49.8 PSEUDOMONAS AERUGINOSA INFECTION: Primary | ICD-10-CM

## 2021-08-04 ENCOUNTER — PATIENT OUTREACH (OUTPATIENT)
Dept: ADMINISTRATIVE | Facility: OTHER | Age: 66
End: 2021-08-04

## 2021-08-10 ENCOUNTER — OFFICE VISIT (OUTPATIENT)
Dept: PODIATRY | Facility: CLINIC | Age: 66
End: 2021-08-10
Payer: MEDICARE

## 2021-08-10 VITALS
BODY MASS INDEX: 34.1 KG/M2 | DIASTOLIC BLOOD PRESSURE: 70 MMHG | RESPIRATION RATE: 18 BRPM | HEIGHT: 68 IN | HEART RATE: 91 BPM | WEIGHT: 225 LBS | SYSTOLIC BLOOD PRESSURE: 105 MMHG | TEMPERATURE: 98 F

## 2021-08-10 DIAGNOSIS — G57.93 NEUROPATHY OF BOTH FEET: Primary | ICD-10-CM

## 2021-08-10 DIAGNOSIS — E11.49 TYPE II DIABETES MELLITUS WITH NEUROLOGICAL MANIFESTATIONS: ICD-10-CM

## 2021-08-10 PROCEDURE — 99213 OFFICE O/P EST LOW 20 MIN: CPT | Mod: S$PBB,,, | Performed by: PODIATRIST

## 2021-08-10 PROCEDURE — 99999 PR PBB SHADOW E&M-EST. PATIENT-LVL V: CPT | Mod: PBBFAC,,, | Performed by: PODIATRIST

## 2021-08-10 PROCEDURE — 99215 OFFICE O/P EST HI 40 MIN: CPT | Mod: PBBFAC | Performed by: PODIATRIST

## 2021-08-10 PROCEDURE — 99213 PR OFFICE/OUTPT VISIT, EST, LEVL III, 20-29 MIN: ICD-10-PCS | Mod: S$PBB,,, | Performed by: PODIATRIST

## 2021-08-10 PROCEDURE — 99999 PR PBB SHADOW E&M-EST. PATIENT-LVL V: ICD-10-PCS | Mod: PBBFAC,,, | Performed by: PODIATRIST

## 2021-08-10 RX ORDER — MONTELUKAST SODIUM 10 MG/1
TABLET ORAL
COMMUNITY
Start: 2021-06-23 | End: 2021-08-12 | Stop reason: SDUPTHER

## 2021-08-12 ENCOUNTER — OFFICE VISIT (OUTPATIENT)
Dept: DERMATOLOGY | Facility: CLINIC | Age: 66
End: 2021-08-12
Payer: MEDICARE

## 2021-08-12 DIAGNOSIS — L57.0 ACTINIC KERATOSIS: ICD-10-CM

## 2021-08-12 DIAGNOSIS — L82.0 SEBORRHEIC KERATOSES, INFLAMED: ICD-10-CM

## 2021-08-12 DIAGNOSIS — D23.9 DERMATOFIBROMA: ICD-10-CM

## 2021-08-12 DIAGNOSIS — Z85.828 HISTORY OF NONMELANOMA SKIN CANCER: ICD-10-CM

## 2021-08-12 DIAGNOSIS — D22.9 BENIGN NEVUS: ICD-10-CM

## 2021-08-12 DIAGNOSIS — L90.5 SCAR: ICD-10-CM

## 2021-08-12 DIAGNOSIS — L82.1 SEBORRHEIC KERATOSES: Primary | ICD-10-CM

## 2021-08-12 PROCEDURE — 17000 DESTRUCT PREMALG LESION: CPT | Mod: S$PBB,,, | Performed by: STUDENT IN AN ORGANIZED HEALTH CARE EDUCATION/TRAINING PROGRAM

## 2021-08-12 PROCEDURE — 17003 DESTRUCTION, PREMALIGNANT LESIONS; SECOND THROUGH 14 LESIONS: ICD-10-PCS | Mod: S$PBB,,, | Performed by: STUDENT IN AN ORGANIZED HEALTH CARE EDUCATION/TRAINING PROGRAM

## 2021-08-12 PROCEDURE — 99203 OFFICE O/P NEW LOW 30 MIN: CPT | Mod: 25,S$PBB,, | Performed by: STUDENT IN AN ORGANIZED HEALTH CARE EDUCATION/TRAINING PROGRAM

## 2021-08-12 PROCEDURE — 17003 DESTRUCT PREMALG LES 2-14: CPT | Mod: S$PBB,,, | Performed by: STUDENT IN AN ORGANIZED HEALTH CARE EDUCATION/TRAINING PROGRAM

## 2021-08-12 PROCEDURE — 17000 PR DESTRUCTION(LASER SURGERY,CRYOSURGERY,CHEMOSURGERY),PREMALIGNANT LESIONS,FIRST LESION: ICD-10-PCS | Mod: S$PBB,,, | Performed by: STUDENT IN AN ORGANIZED HEALTH CARE EDUCATION/TRAINING PROGRAM

## 2021-08-12 PROCEDURE — 17000 DESTRUCT PREMALG LESION: CPT | Mod: PBBFAC,PO | Performed by: STUDENT IN AN ORGANIZED HEALTH CARE EDUCATION/TRAINING PROGRAM

## 2021-08-12 PROCEDURE — 99203 PR OFFICE/OUTPT VISIT, NEW, LEVL III, 30-44 MIN: ICD-10-PCS | Mod: 25,S$PBB,, | Performed by: STUDENT IN AN ORGANIZED HEALTH CARE EDUCATION/TRAINING PROGRAM

## 2021-08-12 PROCEDURE — 99214 OFFICE O/P EST MOD 30 MIN: CPT | Mod: PBBFAC,PO | Performed by: STUDENT IN AN ORGANIZED HEALTH CARE EDUCATION/TRAINING PROGRAM

## 2021-08-12 PROCEDURE — 99999 PR PBB SHADOW E&M-EST. PATIENT-LVL IV: CPT | Mod: PBBFAC,,, | Performed by: STUDENT IN AN ORGANIZED HEALTH CARE EDUCATION/TRAINING PROGRAM

## 2021-08-12 PROCEDURE — 99999 PR PBB SHADOW E&M-EST. PATIENT-LVL IV: ICD-10-PCS | Mod: PBBFAC,,, | Performed by: STUDENT IN AN ORGANIZED HEALTH CARE EDUCATION/TRAINING PROGRAM

## 2021-08-12 PROCEDURE — 17003 DESTRUCT PREMALG LES 2-14: CPT | Mod: PBBFAC,PO | Performed by: STUDENT IN AN ORGANIZED HEALTH CARE EDUCATION/TRAINING PROGRAM

## 2021-08-13 ENCOUNTER — LAB VISIT (OUTPATIENT)
Dept: LAB | Facility: HOSPITAL | Age: 66
End: 2021-08-13
Attending: INTERNAL MEDICINE
Payer: MEDICARE

## 2021-08-13 DIAGNOSIS — Z51.11 ENCOUNTER FOR ANTINEOPLASTIC CHEMOTHERAPY: ICD-10-CM

## 2021-08-13 DIAGNOSIS — R91.8 OTHER NONSPECIFIC ABNORMAL FINDING OF LUNG FIELD: ICD-10-CM

## 2021-08-13 DIAGNOSIS — C82.30 FOLLICULAR LYMPHOMA GRADE IIIA, UNSPECIFIED BODY REGION: ICD-10-CM

## 2021-08-13 DIAGNOSIS — D80.1 NONFAMILIAL HYPOGAMMAGLOBULINEMIA: ICD-10-CM

## 2021-08-13 LAB
ALBUMIN SERPL BCP-MCNC: 4 G/DL (ref 3.5–5.2)
ALP SERPL-CCNC: 117 U/L (ref 55–135)
ALT SERPL W/O P-5'-P-CCNC: 23 U/L (ref 10–44)
ANION GAP SERPL CALC-SCNC: 12 MMOL/L (ref 8–16)
AST SERPL-CCNC: 21 U/L (ref 10–40)
BASOPHILS # BLD AUTO: 0.08 K/UL (ref 0–0.2)
BASOPHILS NFR BLD: 1.2 % (ref 0–1.9)
BILIRUB SERPL-MCNC: 0.3 MG/DL (ref 0.1–1)
BUN SERPL-MCNC: 19 MG/DL (ref 8–23)
CALCIUM SERPL-MCNC: 9.6 MG/DL (ref 8.7–10.5)
CHLORIDE SERPL-SCNC: 104 MMOL/L (ref 95–110)
CO2 SERPL-SCNC: 25 MMOL/L (ref 23–29)
CREAT SERPL-MCNC: 0.9 MG/DL (ref 0.5–1.4)
DIFFERENTIAL METHOD: ABNORMAL
EOSINOPHIL # BLD AUTO: 0.6 K/UL (ref 0–0.5)
EOSINOPHIL NFR BLD: 9.2 % (ref 0–8)
ERYTHROCYTE [DISTWIDTH] IN BLOOD BY AUTOMATED COUNT: 13 % (ref 11.5–14.5)
EST. GFR  (AFRICAN AMERICAN): >60 ML/MIN/1.73 M^2
EST. GFR  (NON AFRICAN AMERICAN): >60 ML/MIN/1.73 M^2
GLUCOSE SERPL-MCNC: 227 MG/DL (ref 70–110)
HCT VFR BLD AUTO: 45.3 % (ref 40–54)
HGB BLD-MCNC: 15.1 G/DL (ref 14–18)
IMM GRANULOCYTES # BLD AUTO: 0.23 K/UL (ref 0–0.04)
IMM GRANULOCYTES NFR BLD AUTO: 3.6 % (ref 0–0.5)
LYMPHOCYTES # BLD AUTO: 1.3 K/UL (ref 1–4.8)
LYMPHOCYTES NFR BLD: 19.8 % (ref 18–48)
MCH RBC QN AUTO: 29.5 PG (ref 27–31)
MCHC RBC AUTO-ENTMCNC: 33.3 G/DL (ref 32–36)
MCV RBC AUTO: 89 FL (ref 82–98)
MONOCYTES # BLD AUTO: 0.8 K/UL (ref 0.3–1)
MONOCYTES NFR BLD: 12.6 % (ref 4–15)
NEUTROPHILS # BLD AUTO: 3.5 K/UL (ref 1.8–7.7)
NEUTROPHILS NFR BLD: 53.6 % (ref 38–73)
NRBC BLD-RTO: 0 /100 WBC
PLATELET # BLD AUTO: 297 K/UL (ref 150–450)
PMV BLD AUTO: 9.3 FL (ref 9.2–12.9)
POTASSIUM SERPL-SCNC: 4.4 MMOL/L (ref 3.5–5.1)
PROT SERPL-MCNC: 6.8 G/DL (ref 6–8.4)
RBC # BLD AUTO: 5.11 M/UL (ref 4.6–6.2)
SODIUM SERPL-SCNC: 141 MMOL/L (ref 136–145)
WBC # BLD AUTO: 6.43 K/UL (ref 3.9–12.7)

## 2021-08-13 PROCEDURE — 80053 COMPREHEN METABOLIC PANEL: CPT | Performed by: INTERNAL MEDICINE

## 2021-08-13 PROCEDURE — 85025 COMPLETE CBC W/AUTO DIFF WBC: CPT | Performed by: INTERNAL MEDICINE

## 2021-08-13 PROCEDURE — 36415 COLL VENOUS BLD VENIPUNCTURE: CPT | Performed by: INTERNAL MEDICINE

## 2021-08-17 ENCOUNTER — TELEPHONE (OUTPATIENT)
Dept: PULMONOLOGY | Facility: CLINIC | Age: 66
End: 2021-08-17

## 2021-08-17 ENCOUNTER — OFFICE VISIT (OUTPATIENT)
Dept: PULMONOLOGY | Facility: CLINIC | Age: 66
End: 2021-08-17
Payer: MEDICARE

## 2021-08-17 VITALS
OXYGEN SATURATION: 97 % | TEMPERATURE: 98 F | HEIGHT: 68 IN | DIASTOLIC BLOOD PRESSURE: 69 MMHG | HEART RATE: 89 BPM | BODY MASS INDEX: 32.83 KG/M2 | SYSTOLIC BLOOD PRESSURE: 119 MMHG | WEIGHT: 216.63 LBS

## 2021-08-17 DIAGNOSIS — A49.8 PSEUDOMONAS AERUGINOSA INFECTION: ICD-10-CM

## 2021-08-17 DIAGNOSIS — J47.0 BRONCHIECTASIS WITH ACUTE LOWER RESPIRATORY INFECTION: Primary | ICD-10-CM

## 2021-08-17 PROCEDURE — 99214 OFFICE O/P EST MOD 30 MIN: CPT | Mod: S$PBB,,, | Performed by: NURSE PRACTITIONER

## 2021-08-17 PROCEDURE — 99999 PR PBB SHADOW E&M-EST. PATIENT-LVL V: CPT | Mod: PBBFAC,,, | Performed by: NURSE PRACTITIONER

## 2021-08-17 PROCEDURE — 99215 OFFICE O/P EST HI 40 MIN: CPT | Mod: PBBFAC,PO | Performed by: NURSE PRACTITIONER

## 2021-08-17 PROCEDURE — 99999 PR PBB SHADOW E&M-EST. PATIENT-LVL V: ICD-10-PCS | Mod: PBBFAC,,, | Performed by: NURSE PRACTITIONER

## 2021-08-17 PROCEDURE — 99214 PR OFFICE/OUTPT VISIT, EST, LEVL IV, 30-39 MIN: ICD-10-PCS | Mod: S$PBB,,, | Performed by: NURSE PRACTITIONER

## 2021-08-17 RX ORDER — FLUTICASONE PROPIONATE 220 UG/1
1 AEROSOL, METERED RESPIRATORY (INHALATION) 2 TIMES DAILY
Qty: 12 G | Refills: 2 | Status: SHIPPED | OUTPATIENT
Start: 2021-08-17 | End: 2022-08-17

## 2021-08-17 RX ORDER — CIPROFLOXACIN 500 MG/1
500 TABLET ORAL 2 TIMES DAILY
Qty: 28 TABLET | Refills: 0 | Status: SHIPPED | OUTPATIENT
Start: 2021-08-17 | End: 2021-08-31

## 2021-08-18 ENCOUNTER — PATIENT MESSAGE (OUTPATIENT)
Dept: PULMONOLOGY | Facility: CLINIC | Age: 66
End: 2021-08-18

## 2021-08-19 ENCOUNTER — OFFICE VISIT (OUTPATIENT)
Dept: HEMATOLOGY/ONCOLOGY | Facility: CLINIC | Age: 66
End: 2021-08-19
Payer: MEDICARE

## 2021-08-19 ENCOUNTER — INFUSION (OUTPATIENT)
Dept: INFUSION THERAPY | Facility: HOSPITAL | Age: 66
End: 2021-08-19
Attending: INTERNAL MEDICINE
Payer: MEDICARE

## 2021-08-19 VITALS
HEART RATE: 89 BPM | HEIGHT: 68 IN | RESPIRATION RATE: 18 BRPM | SYSTOLIC BLOOD PRESSURE: 140 MMHG | OXYGEN SATURATION: 96 % | TEMPERATURE: 99 F | DIASTOLIC BLOOD PRESSURE: 79 MMHG | BODY MASS INDEX: 32.54 KG/M2 | WEIGHT: 214.69 LBS

## 2021-08-19 VITALS
OXYGEN SATURATION: 96 % | RESPIRATION RATE: 17 BRPM | WEIGHT: 214 LBS | BODY MASS INDEX: 32.54 KG/M2 | DIASTOLIC BLOOD PRESSURE: 74 MMHG | SYSTOLIC BLOOD PRESSURE: 129 MMHG | TEMPERATURE: 98 F | HEART RATE: 93 BPM

## 2021-08-19 DIAGNOSIS — C83.39 DIFFUSE LARGE B-CELL LYMPHOMA, EXTRANODAL AND SOLID ORGAN SITES: ICD-10-CM

## 2021-08-19 DIAGNOSIS — Z51.11 ENCOUNTER FOR ANTINEOPLASTIC CHEMOTHERAPY: ICD-10-CM

## 2021-08-19 DIAGNOSIS — C82.30 FOLLICULAR LYMPHOMA GRADE IIIA, UNSPECIFIED BODY REGION: Primary | ICD-10-CM

## 2021-08-19 DIAGNOSIS — D80.1 HYPOGAMMAGLOBULINEMIA: Primary | ICD-10-CM

## 2021-08-19 DIAGNOSIS — C82.30 FOLLICULAR LYMPHOMA GRADE IIIA, UNSPECIFIED BODY REGION: ICD-10-CM

## 2021-08-19 PROCEDURE — 63600175 PHARM REV CODE 636 W HCPCS: Performed by: INTERNAL MEDICINE

## 2021-08-19 PROCEDURE — 25000003 PHARM REV CODE 250: Performed by: INTERNAL MEDICINE

## 2021-08-19 PROCEDURE — 99214 OFFICE O/P EST MOD 30 MIN: CPT | Mod: S$GLB,,, | Performed by: INTERNAL MEDICINE

## 2021-08-19 PROCEDURE — 96375 TX/PRO/DX INJ NEW DRUG ADDON: CPT

## 2021-08-19 PROCEDURE — 96415 CHEMO IV INFUSION ADDL HR: CPT

## 2021-08-19 PROCEDURE — 96367 TX/PROPH/DG ADDL SEQ IV INF: CPT

## 2021-08-19 PROCEDURE — 96413 CHEMO IV INFUSION 1 HR: CPT

## 2021-08-19 PROCEDURE — 99214 PR OFFICE/OUTPT VISIT, EST, LEVL IV, 30-39 MIN: ICD-10-PCS | Mod: S$GLB,,, | Performed by: INTERNAL MEDICINE

## 2021-08-19 RX ORDER — ACETAMINOPHEN 325 MG/1
650 TABLET ORAL
Status: COMPLETED | OUTPATIENT
Start: 2021-08-19 | End: 2021-08-19

## 2021-08-19 RX ORDER — MEPERIDINE HYDROCHLORIDE 25 MG/ML
25 INJECTION INTRAMUSCULAR; INTRAVENOUS; SUBCUTANEOUS
Status: DISCONTINUED | OUTPATIENT
Start: 2021-08-19 | End: 2021-08-19 | Stop reason: HOSPADM

## 2021-08-19 RX ORDER — HEPARIN 100 UNIT/ML
500 SYRINGE INTRAVENOUS
Status: CANCELLED | OUTPATIENT
Start: 2021-08-20

## 2021-08-19 RX ORDER — SODIUM CHLORIDE 0.9 % (FLUSH) 0.9 %
10 SYRINGE (ML) INJECTION
Status: CANCELLED | OUTPATIENT
Start: 2021-08-20

## 2021-08-19 RX ORDER — HEPARIN 100 UNIT/ML
500 SYRINGE INTRAVENOUS
Status: DISCONTINUED | OUTPATIENT
Start: 2021-08-19 | End: 2021-08-19 | Stop reason: HOSPADM

## 2021-08-19 RX ORDER — FAMOTIDINE 10 MG/ML
20 INJECTION INTRAVENOUS
Status: COMPLETED | OUTPATIENT
Start: 2021-08-19 | End: 2021-08-19

## 2021-08-19 RX ORDER — SODIUM CHLORIDE 0.9 % (FLUSH) 0.9 %
10 SYRINGE (ML) INJECTION
Status: DISCONTINUED | OUTPATIENT
Start: 2021-08-19 | End: 2021-08-19 | Stop reason: HOSPADM

## 2021-08-19 RX ADMIN — DIPHENHYDRAMINE HYDROCHLORIDE 50 MG: 50 INJECTION INTRAMUSCULAR; INTRAVENOUS at 08:08

## 2021-08-19 RX ADMIN — RITUXIMAB 800 MG: 10 INJECTION, SOLUTION INTRAVENOUS at 08:08

## 2021-08-19 RX ADMIN — HEPARIN 500 UNITS: 100 SYRINGE at 11:08

## 2021-08-19 RX ADMIN — FAMOTIDINE 20 MG: 10 INJECTION INTRAVENOUS at 08:08

## 2021-08-19 RX ADMIN — ACETAMINOPHEN 650 MG: 325 TABLET ORAL at 08:08

## 2021-08-20 ENCOUNTER — INFUSION (OUTPATIENT)
Dept: INFUSION THERAPY | Facility: HOSPITAL | Age: 66
End: 2021-08-20
Attending: INTERNAL MEDICINE
Payer: MEDICARE

## 2021-08-20 VITALS
OXYGEN SATURATION: 95 % | SYSTOLIC BLOOD PRESSURE: 104 MMHG | WEIGHT: 215.5 LBS | DIASTOLIC BLOOD PRESSURE: 66 MMHG | RESPIRATION RATE: 18 BRPM | HEART RATE: 76 BPM | TEMPERATURE: 98 F | HEIGHT: 68 IN | BODY MASS INDEX: 32.66 KG/M2

## 2021-08-20 DIAGNOSIS — C82.30 FOLLICULAR LYMPHOMA GRADE IIIA, UNSPECIFIED BODY REGION: ICD-10-CM

## 2021-08-20 DIAGNOSIS — D80.1 NONFAMILIAL HYPOGAMMAGLOBULINEMIA: ICD-10-CM

## 2021-08-20 DIAGNOSIS — D80.1 HYPOGAMMAGLOBULINEMIA: Primary | ICD-10-CM

## 2021-08-20 LAB — GLUCOSE SERPL-MCNC: 238 MG/DL (ref 70–110)

## 2021-08-20 PROCEDURE — 96415 CHEMO IV INFUSION ADDL HR: CPT

## 2021-08-20 PROCEDURE — 96413 CHEMO IV INFUSION 1 HR: CPT

## 2021-08-20 PROCEDURE — 25000003 PHARM REV CODE 250: Performed by: NURSE PRACTITIONER

## 2021-08-20 PROCEDURE — 63600175 PHARM REV CODE 636 W HCPCS: Performed by: NURSE PRACTITIONER

## 2021-08-20 PROCEDURE — 96367 TX/PROPH/DG ADDL SEQ IV INF: CPT

## 2021-08-20 RX ORDER — HEPARIN 100 UNIT/ML
500 SYRINGE INTRAVENOUS
Status: CANCELLED | OUTPATIENT
Start: 2021-09-17

## 2021-08-20 RX ORDER — SODIUM CHLORIDE 0.9 % (FLUSH) 0.9 %
10 SYRINGE (ML) INJECTION
Status: CANCELLED | OUTPATIENT
Start: 2021-09-17

## 2021-08-20 RX ORDER — HEPARIN 100 UNIT/ML
500 SYRINGE INTRAVENOUS
Status: DISCONTINUED | OUTPATIENT
Start: 2021-08-20 | End: 2021-08-20 | Stop reason: HOSPADM

## 2021-08-20 RX ORDER — SODIUM CHLORIDE 0.9 % (FLUSH) 0.9 %
10 SYRINGE (ML) INJECTION
Status: DISCONTINUED | OUTPATIENT
Start: 2021-08-20 | End: 2021-08-20 | Stop reason: HOSPADM

## 2021-08-20 RX ADMIN — SODIUM CHLORIDE: 0.9 INJECTION, SOLUTION INTRAVENOUS at 08:08

## 2021-08-20 RX ADMIN — DIPHENHYDRAMINE HYDROCHLORIDE 25 MG: 50 INJECTION INTRAMUSCULAR; INTRAVENOUS at 08:08

## 2021-08-20 RX ADMIN — IMMUNE GLOBULIN (HUMAN) 30 G: 10 INJECTION INTRAVENOUS; SUBCUTANEOUS at 08:08

## 2021-08-20 RX ADMIN — HEPARIN 500 UNITS: 100 SYRINGE at 10:08

## 2021-09-17 ENCOUNTER — INFUSION (OUTPATIENT)
Dept: INFUSION THERAPY | Facility: HOSPITAL | Age: 66
End: 2021-09-17
Attending: INTERNAL MEDICINE
Payer: MEDICARE

## 2021-09-17 VITALS
HEIGHT: 68 IN | TEMPERATURE: 98 F | DIASTOLIC BLOOD PRESSURE: 83 MMHG | HEART RATE: 85 BPM | RESPIRATION RATE: 18 BRPM | BODY MASS INDEX: 32.31 KG/M2 | SYSTOLIC BLOOD PRESSURE: 145 MMHG | WEIGHT: 213.19 LBS | OXYGEN SATURATION: 96 %

## 2021-09-17 DIAGNOSIS — C82.30 FOLLICULAR LYMPHOMA GRADE IIIA, UNSPECIFIED BODY REGION: ICD-10-CM

## 2021-09-17 DIAGNOSIS — D80.1 HYPOGAMMAGLOBULINEMIA: Primary | ICD-10-CM

## 2021-09-17 DIAGNOSIS — D80.1 NONFAMILIAL HYPOGAMMAGLOBULINEMIA: ICD-10-CM

## 2021-09-17 PROCEDURE — 96413 CHEMO IV INFUSION 1 HR: CPT

## 2021-09-17 PROCEDURE — 96415 CHEMO IV INFUSION ADDL HR: CPT

## 2021-09-17 PROCEDURE — 63600175 PHARM REV CODE 636 W HCPCS: Performed by: NURSE PRACTITIONER

## 2021-09-17 PROCEDURE — 96367 TX/PROPH/DG ADDL SEQ IV INF: CPT

## 2021-09-17 PROCEDURE — 25000003 PHARM REV CODE 250: Performed by: NURSE PRACTITIONER

## 2021-09-17 RX ORDER — HEPARIN 100 UNIT/ML
500 SYRINGE INTRAVENOUS
Status: CANCELLED | OUTPATIENT
Start: 2021-10-15

## 2021-09-17 RX ORDER — HEPARIN 100 UNIT/ML
500 SYRINGE INTRAVENOUS
Status: DISCONTINUED | OUTPATIENT
Start: 2021-09-17 | End: 2021-09-17 | Stop reason: HOSPADM

## 2021-09-17 RX ORDER — SODIUM CHLORIDE 0.9 % (FLUSH) 0.9 %
10 SYRINGE (ML) INJECTION
Status: CANCELLED | OUTPATIENT
Start: 2021-10-15

## 2021-09-17 RX ORDER — SODIUM CHLORIDE 0.9 % (FLUSH) 0.9 %
10 SYRINGE (ML) INJECTION
Status: DISCONTINUED | OUTPATIENT
Start: 2021-09-17 | End: 2021-09-17 | Stop reason: HOSPADM

## 2021-09-17 RX ADMIN — DIPHENHYDRAMINE HYDROCHLORIDE 25 MG: 50 INJECTION INTRAMUSCULAR; INTRAVENOUS at 08:09

## 2021-09-17 RX ADMIN — IMMUNE GLOBULIN (HUMAN) 30 G: 10 INJECTION INTRAVENOUS; SUBCUTANEOUS at 08:09

## 2021-09-17 RX ADMIN — HEPARIN 500 UNITS: 100 SYRINGE at 10:09

## 2021-09-20 ENCOUNTER — OFFICE VISIT (OUTPATIENT)
Dept: FAMILY MEDICINE | Facility: CLINIC | Age: 66
End: 2021-09-20
Payer: MEDICARE

## 2021-09-20 VITALS
RESPIRATION RATE: 18 BRPM | OXYGEN SATURATION: 93 % | WEIGHT: 211 LBS | BODY MASS INDEX: 31.98 KG/M2 | SYSTOLIC BLOOD PRESSURE: 137 MMHG | HEIGHT: 68 IN | HEART RATE: 90 BPM | DIASTOLIC BLOOD PRESSURE: 80 MMHG

## 2021-09-20 DIAGNOSIS — Z12.5 PROSTATE CANCER SCREENING: ICD-10-CM

## 2021-09-20 DIAGNOSIS — E11.9 TYPE 2 DIABETES MELLITUS WITHOUT COMPLICATION, WITHOUT LONG-TERM CURRENT USE OF INSULIN: Primary | ICD-10-CM

## 2021-09-20 DIAGNOSIS — J40 BRONCHITIS: ICD-10-CM

## 2021-09-20 PROCEDURE — 99999 PR PBB SHADOW E&M-EST. PATIENT-LVL IV: ICD-10-PCS | Mod: PBBFAC,,, | Performed by: FAMILY MEDICINE

## 2021-09-20 PROCEDURE — 99214 OFFICE O/P EST MOD 30 MIN: CPT | Mod: S$PBB,,, | Performed by: FAMILY MEDICINE

## 2021-09-20 PROCEDURE — 99214 OFFICE O/P EST MOD 30 MIN: CPT | Mod: PBBFAC,PN | Performed by: FAMILY MEDICINE

## 2021-09-20 PROCEDURE — 99999 PR PBB SHADOW E&M-EST. PATIENT-LVL IV: CPT | Mod: PBBFAC,,, | Performed by: FAMILY MEDICINE

## 2021-09-20 PROCEDURE — 99214 PR OFFICE/OUTPT VISIT, EST, LEVL IV, 30-39 MIN: ICD-10-PCS | Mod: S$PBB,,, | Performed by: FAMILY MEDICINE

## 2021-09-20 RX ORDER — AZITHROMYCIN 250 MG/1
TABLET, FILM COATED ORAL
Qty: 6 TABLET | Refills: 0 | Status: SHIPPED | OUTPATIENT
Start: 2021-09-20 | End: 2021-11-17 | Stop reason: ALTCHOICE

## 2021-09-20 RX ORDER — PREDNISONE 20 MG/1
20 TABLET ORAL 2 TIMES DAILY
Qty: 10 TABLET | Refills: 0 | Status: SHIPPED | OUTPATIENT
Start: 2021-09-20 | End: 2021-09-25

## 2021-09-20 RX ORDER — PROMETHAZINE HYDROCHLORIDE AND DEXTROMETHORPHAN HYDROBROMIDE 6.25; 15 MG/5ML; MG/5ML
5 SYRUP ORAL EVERY 6 HOURS PRN
Qty: 240 ML | Refills: 1 | Status: SHIPPED | OUTPATIENT
Start: 2021-09-20 | End: 2022-02-14

## 2021-10-04 ENCOUNTER — PATIENT MESSAGE (OUTPATIENT)
Dept: PULMONOLOGY | Facility: CLINIC | Age: 66
End: 2021-10-04

## 2021-10-04 ENCOUNTER — HOSPITAL ENCOUNTER (OUTPATIENT)
Dept: RADIOLOGY | Facility: HOSPITAL | Age: 66
Discharge: HOME | End: 2021-10-04
Attending: INTERNAL MEDICINE
Payer: MEDICARE

## 2021-10-04 VITALS — BODY MASS INDEX: 32.58 KG/M2 | WEIGHT: 215 LBS | HEIGHT: 68 IN

## 2021-10-04 DIAGNOSIS — C83.39 DIFFUSE LARGE B-CELL LYMPHOMA, EXTRANODAL AND SOLID ORGAN SITES: ICD-10-CM

## 2021-10-04 DIAGNOSIS — C82.30 FOLLICULAR LYMPHOMA GRADE IIIA, UNSPECIFIED BODY REGION: ICD-10-CM

## 2021-10-04 LAB — GLUCOSE SERPL-MCNC: 181 MG/DL (ref 70–110)

## 2021-10-04 PROCEDURE — 78815 PET IMAGE W/CT SKULL-THIGH: CPT | Mod: TC,PO

## 2021-10-08 ENCOUNTER — PATIENT MESSAGE (OUTPATIENT)
Dept: FAMILY MEDICINE | Facility: CLINIC | Age: 66
End: 2021-10-08

## 2021-10-11 RX ORDER — AMOXICILLIN AND CLAVULANATE POTASSIUM 875; 125 MG/1; MG/1
1 TABLET, FILM COATED ORAL EVERY 12 HOURS
Qty: 20 TABLET | Refills: 0 | Status: SHIPPED | OUTPATIENT
Start: 2021-10-11 | End: 2021-10-21

## 2021-10-12 ENCOUNTER — TELEPHONE (OUTPATIENT)
Dept: HEMATOLOGY/ONCOLOGY | Facility: CLINIC | Age: 66
End: 2021-10-12

## 2021-10-12 ENCOUNTER — LAB VISIT (OUTPATIENT)
Dept: LAB | Facility: HOSPITAL | Age: 66
End: 2021-10-12
Attending: INTERNAL MEDICINE
Payer: MEDICARE

## 2021-10-12 DIAGNOSIS — C82.30 FOLLICULAR LYMPHOMA GRADE IIIA, UNSPECIFIED BODY REGION: ICD-10-CM

## 2021-10-12 DIAGNOSIS — R91.8 OTHER NONSPECIFIC ABNORMAL FINDING OF LUNG FIELD: ICD-10-CM

## 2021-10-12 DIAGNOSIS — D80.1 NONFAMILIAL HYPOGAMMAGLOBULINEMIA: ICD-10-CM

## 2021-10-12 DIAGNOSIS — Z51.11 ENCOUNTER FOR ANTINEOPLASTIC CHEMOTHERAPY: ICD-10-CM

## 2021-10-12 LAB
ALBUMIN SERPL BCP-MCNC: 3.7 G/DL (ref 3.5–5.2)
ALP SERPL-CCNC: 124 U/L (ref 55–135)
ALT SERPL W/O P-5'-P-CCNC: 21 U/L (ref 10–44)
ANION GAP SERPL CALC-SCNC: 13 MMOL/L (ref 8–16)
AST SERPL-CCNC: 25 U/L (ref 10–40)
BASOPHILS NFR BLD: 1 % (ref 0–1.9)
BILIRUB SERPL-MCNC: 0.4 MG/DL (ref 0.1–1)
BUN SERPL-MCNC: 11 MG/DL (ref 8–23)
CALCIUM SERPL-MCNC: 9.7 MG/DL (ref 8.7–10.5)
CHLORIDE SERPL-SCNC: 101 MMOL/L (ref 95–110)
CO2 SERPL-SCNC: 25 MMOL/L (ref 23–29)
CREAT SERPL-MCNC: 1 MG/DL (ref 0.5–1.4)
DIFFERENTIAL METHOD: ABNORMAL
EOSINOPHIL NFR BLD: 3 % (ref 0–8)
ERYTHROCYTE [DISTWIDTH] IN BLOOD BY AUTOMATED COUNT: 13.6 % (ref 11.5–14.5)
EST. GFR  (AFRICAN AMERICAN): >60 ML/MIN/1.73 M^2
EST. GFR  (NON AFRICAN AMERICAN): >60 ML/MIN/1.73 M^2
GLUCOSE SERPL-MCNC: 190 MG/DL (ref 70–110)
HCT VFR BLD AUTO: 43.9 % (ref 40–54)
HGB BLD-MCNC: 14.7 G/DL (ref 14–18)
IMM GRANULOCYTES # BLD AUTO: ABNORMAL K/UL
IMM GRANULOCYTES NFR BLD AUTO: ABNORMAL %
LYMPHOCYTES NFR BLD: 42 % (ref 18–48)
MCH RBC QN AUTO: 29.5 PG (ref 27–31)
MCHC RBC AUTO-ENTMCNC: 33.5 G/DL (ref 32–36)
MCV RBC AUTO: 88 FL (ref 82–98)
MONOCYTES NFR BLD: 3 % (ref 4–15)
NEUTROPHILS NFR BLD: 51 % (ref 38–73)
NRBC BLD-RTO: 0 /100 WBC
PLATELET # BLD AUTO: 270 K/UL (ref 150–450)
PMV BLD AUTO: 8.8 FL (ref 9.2–12.9)
POTASSIUM SERPL-SCNC: 4 MMOL/L (ref 3.5–5.1)
PROT SERPL-MCNC: 6.9 G/DL (ref 6–8.4)
RBC # BLD AUTO: 4.98 M/UL (ref 4.6–6.2)
SODIUM SERPL-SCNC: 139 MMOL/L (ref 136–145)
WBC # BLD AUTO: 3.96 K/UL (ref 3.9–12.7)

## 2021-10-12 PROCEDURE — 80053 COMPREHEN METABOLIC PANEL: CPT | Performed by: INTERNAL MEDICINE

## 2021-10-12 PROCEDURE — 36415 COLL VENOUS BLD VENIPUNCTURE: CPT | Performed by: INTERNAL MEDICINE

## 2021-10-12 PROCEDURE — 85027 COMPLETE CBC AUTOMATED: CPT | Performed by: INTERNAL MEDICINE

## 2021-10-12 PROCEDURE — 85007 BL SMEAR W/DIFF WBC COUNT: CPT | Performed by: INTERNAL MEDICINE

## 2021-10-13 RX ORDER — MEPERIDINE HYDROCHLORIDE 25 MG/ML
25 INJECTION INTRAMUSCULAR; INTRAVENOUS; SUBCUTANEOUS
Status: CANCELLED | OUTPATIENT
Start: 2021-10-14 | End: 2021-10-14

## 2021-10-13 RX ORDER — SODIUM CHLORIDE 0.9 % (FLUSH) 0.9 %
10 SYRINGE (ML) INJECTION
Status: CANCELLED | OUTPATIENT
Start: 2021-10-14

## 2021-10-13 RX ORDER — HEPARIN 100 UNIT/ML
500 SYRINGE INTRAVENOUS
Status: CANCELLED | OUTPATIENT
Start: 2021-10-14

## 2021-10-13 RX ORDER — ACETAMINOPHEN 325 MG/1
650 TABLET ORAL
Status: CANCELLED | OUTPATIENT
Start: 2021-10-14

## 2021-10-13 RX ORDER — FAMOTIDINE 10 MG/ML
20 INJECTION INTRAVENOUS
Status: CANCELLED | OUTPATIENT
Start: 2021-10-14

## 2021-10-14 ENCOUNTER — INFUSION (OUTPATIENT)
Dept: INFUSION THERAPY | Facility: HOSPITAL | Age: 66
End: 2021-10-14
Attending: INTERNAL MEDICINE
Payer: MEDICARE

## 2021-10-14 ENCOUNTER — OFFICE VISIT (OUTPATIENT)
Dept: HEMATOLOGY/ONCOLOGY | Facility: CLINIC | Age: 66
End: 2021-10-14
Payer: MEDICARE

## 2021-10-14 VITALS
RESPIRATION RATE: 18 BRPM | SYSTOLIC BLOOD PRESSURE: 128 MMHG | HEART RATE: 87 BPM | OXYGEN SATURATION: 97 % | WEIGHT: 206.19 LBS | BODY MASS INDEX: 31.25 KG/M2 | HEIGHT: 68 IN | DIASTOLIC BLOOD PRESSURE: 80 MMHG | TEMPERATURE: 98 F

## 2021-10-14 VITALS
HEIGHT: 68 IN | DIASTOLIC BLOOD PRESSURE: 82 MMHG | RESPIRATION RATE: 17 BRPM | HEART RATE: 99 BPM | WEIGHT: 206 LBS | OXYGEN SATURATION: 97 % | BODY MASS INDEX: 31.22 KG/M2 | SYSTOLIC BLOOD PRESSURE: 138 MMHG

## 2021-10-14 DIAGNOSIS — C82.30 FOLLICULAR LYMPHOMA GRADE IIIA, UNSPECIFIED BODY REGION: ICD-10-CM

## 2021-10-14 DIAGNOSIS — D80.1 HYPOGAMMAGLOBULINEMIA: Primary | ICD-10-CM

## 2021-10-14 DIAGNOSIS — C82.30 FOLLICULAR LYMPHOMA GRADE IIIA, UNSPECIFIED BODY REGION: Primary | ICD-10-CM

## 2021-10-14 DIAGNOSIS — D80.1 HYPOGAMMAGLOBULINEMIA: ICD-10-CM

## 2021-10-14 DIAGNOSIS — Z51.11 ENCOUNTER FOR ANTINEOPLASTIC CHEMOTHERAPY: ICD-10-CM

## 2021-10-14 PROCEDURE — 96367 TX/PROPH/DG ADDL SEQ IV INF: CPT

## 2021-10-14 PROCEDURE — 99214 PR OFFICE/OUTPT VISIT, EST, LEVL IV, 30-39 MIN: ICD-10-PCS | Mod: S$GLB,,, | Performed by: INTERNAL MEDICINE

## 2021-10-14 PROCEDURE — 96413 CHEMO IV INFUSION 1 HR: CPT

## 2021-10-14 PROCEDURE — 63600175 PHARM REV CODE 636 W HCPCS: Performed by: INTERNAL MEDICINE

## 2021-10-14 PROCEDURE — 96415 CHEMO IV INFUSION ADDL HR: CPT

## 2021-10-14 PROCEDURE — 25000003 PHARM REV CODE 250: Performed by: INTERNAL MEDICINE

## 2021-10-14 PROCEDURE — 99214 OFFICE O/P EST MOD 30 MIN: CPT | Mod: S$GLB,,, | Performed by: INTERNAL MEDICINE

## 2021-10-14 PROCEDURE — 96375 TX/PRO/DX INJ NEW DRUG ADDON: CPT

## 2021-10-14 RX ORDER — HEPARIN 100 UNIT/ML
500 SYRINGE INTRAVENOUS
Status: DISCONTINUED | OUTPATIENT
Start: 2021-10-14 | End: 2021-10-14 | Stop reason: HOSPADM

## 2021-10-14 RX ORDER — FAMOTIDINE 10 MG/ML
20 INJECTION INTRAVENOUS
Status: COMPLETED | OUTPATIENT
Start: 2021-10-14 | End: 2021-10-14

## 2021-10-14 RX ORDER — MEPERIDINE HYDROCHLORIDE 25 MG/ML
25 INJECTION INTRAMUSCULAR; INTRAVENOUS; SUBCUTANEOUS
Status: DISCONTINUED | OUTPATIENT
Start: 2021-10-14 | End: 2021-10-14 | Stop reason: HOSPADM

## 2021-10-14 RX ORDER — ACETAMINOPHEN 325 MG/1
650 TABLET ORAL
Status: COMPLETED | OUTPATIENT
Start: 2021-10-14 | End: 2021-10-14

## 2021-10-14 RX ORDER — SODIUM CHLORIDE 0.9 % (FLUSH) 0.9 %
10 SYRINGE (ML) INJECTION
Status: DISCONTINUED | OUTPATIENT
Start: 2021-10-14 | End: 2021-10-14 | Stop reason: HOSPADM

## 2021-10-14 RX ADMIN — ACETAMINOPHEN 650 MG: 325 TABLET ORAL at 08:10

## 2021-10-14 RX ADMIN — DIPHENHYDRAMINE HYDROCHLORIDE 50 MG: 50 INJECTION INTRAMUSCULAR; INTRAVENOUS at 08:10

## 2021-10-14 RX ADMIN — HEPARIN 500 UNITS: 100 SYRINGE at 11:10

## 2021-10-14 RX ADMIN — RITUXIMAB 800 MG: 10 INJECTION, SOLUTION INTRAVENOUS at 08:10

## 2021-10-14 RX ADMIN — FAMOTIDINE 20 MG: 10 INJECTION INTRAVENOUS at 08:10

## 2021-10-14 RX ADMIN — SODIUM CHLORIDE: 9 INJECTION, SOLUTION INTRAVENOUS at 08:10

## 2021-10-15 ENCOUNTER — INFUSION (OUTPATIENT)
Dept: INFUSION THERAPY | Facility: HOSPITAL | Age: 66
End: 2021-10-15
Attending: INTERNAL MEDICINE
Payer: MEDICARE

## 2021-10-15 VITALS
SYSTOLIC BLOOD PRESSURE: 136 MMHG | RESPIRATION RATE: 18 BRPM | HEIGHT: 68 IN | OXYGEN SATURATION: 95 % | WEIGHT: 207.31 LBS | HEART RATE: 92 BPM | BODY MASS INDEX: 31.42 KG/M2 | DIASTOLIC BLOOD PRESSURE: 83 MMHG | TEMPERATURE: 99 F

## 2021-10-15 DIAGNOSIS — C82.30 FOLLICULAR LYMPHOMA GRADE IIIA, UNSPECIFIED BODY REGION: ICD-10-CM

## 2021-10-15 DIAGNOSIS — D80.1 HYPOGAMMAGLOBULINEMIA: Primary | ICD-10-CM

## 2021-10-15 DIAGNOSIS — D80.1 NONFAMILIAL HYPOGAMMAGLOBULINEMIA: ICD-10-CM

## 2021-10-15 PROCEDURE — 25000003 PHARM REV CODE 250: Performed by: NURSE PRACTITIONER

## 2021-10-15 PROCEDURE — A4216 STERILE WATER/SALINE, 10 ML: HCPCS | Performed by: NURSE PRACTITIONER

## 2021-10-15 PROCEDURE — 63600175 PHARM REV CODE 636 W HCPCS: Performed by: NURSE PRACTITIONER

## 2021-10-15 PROCEDURE — 96367 TX/PROPH/DG ADDL SEQ IV INF: CPT

## 2021-10-15 PROCEDURE — 96413 CHEMO IV INFUSION 1 HR: CPT

## 2021-10-15 PROCEDURE — 96415 CHEMO IV INFUSION ADDL HR: CPT

## 2021-10-15 RX ORDER — HEPARIN 100 UNIT/ML
500 SYRINGE INTRAVENOUS
Status: DISCONTINUED | OUTPATIENT
Start: 2021-10-15 | End: 2021-10-15 | Stop reason: HOSPADM

## 2021-10-15 RX ORDER — SODIUM CHLORIDE 0.9 % (FLUSH) 0.9 %
10 SYRINGE (ML) INJECTION
Status: DISCONTINUED | OUTPATIENT
Start: 2021-10-15 | End: 2021-10-15 | Stop reason: HOSPADM

## 2021-10-15 RX ORDER — HEPARIN 100 UNIT/ML
500 SYRINGE INTRAVENOUS
Status: CANCELLED | OUTPATIENT
Start: 2021-11-12

## 2021-10-15 RX ORDER — SODIUM CHLORIDE 0.9 % (FLUSH) 0.9 %
10 SYRINGE (ML) INJECTION
Status: CANCELLED | OUTPATIENT
Start: 2021-11-12

## 2021-10-15 RX ADMIN — HEPARIN 500 UNITS: 100 SYRINGE at 10:10

## 2021-10-15 RX ADMIN — DIPHENHYDRAMINE HYDROCHLORIDE 25 MG: 50 INJECTION INTRAMUSCULAR; INTRAVENOUS at 08:10

## 2021-10-15 RX ADMIN — SODIUM CHLORIDE: 9 INJECTION, SOLUTION INTRAVENOUS at 08:10

## 2021-10-15 RX ADMIN — SODIUM CHLORIDE, PRESERVATIVE FREE 10 ML: 5 INJECTION INTRAVENOUS at 09:10

## 2021-10-15 RX ADMIN — IMMUNE GLOBULIN (HUMAN) 30 G: 10 INJECTION INTRAVENOUS; SUBCUTANEOUS at 08:10

## 2021-10-19 DIAGNOSIS — B99.9 RECURRENT INFECTIONS: Primary | ICD-10-CM

## 2021-10-29 ENCOUNTER — TELEPHONE (OUTPATIENT)
Dept: FAMILY MEDICINE | Facility: CLINIC | Age: 66
End: 2021-10-29
Payer: MEDICARE

## 2021-11-01 ENCOUNTER — CLINICAL SUPPORT (OUTPATIENT)
Dept: FAMILY MEDICINE | Facility: CLINIC | Age: 66
End: 2021-11-01
Attending: FAMILY MEDICINE
Payer: MEDICARE

## 2021-11-01 DIAGNOSIS — E11.9 TYPE 2 DIABETES MELLITUS WITHOUT COMPLICATION, UNSPECIFIED WHETHER LONG TERM INSULIN USE: ICD-10-CM

## 2021-11-01 PROCEDURE — 92228 IMG RTA DETC/MNTR DS PHY/QHP: CPT | Mod: PBBFAC,PN

## 2021-11-02 ENCOUNTER — LAB VISIT (OUTPATIENT)
Dept: LAB | Facility: HOSPITAL | Age: 66
End: 2021-11-02
Attending: NURSE PRACTITIONER
Payer: MEDICARE

## 2021-11-02 DIAGNOSIS — R05.3 PERSISTENT COUGH: ICD-10-CM

## 2021-11-02 DIAGNOSIS — A49.8 PSEUDOMONAS AERUGINOSA INFECTION: ICD-10-CM

## 2021-11-02 DIAGNOSIS — J47.0 BRONCHIECTASIS WITH ACUTE LOWER RESPIRATORY INFECTION: ICD-10-CM

## 2021-11-02 PROCEDURE — 87205 SMEAR GRAM STAIN: CPT | Performed by: NURSE PRACTITIONER

## 2021-11-02 PROCEDURE — 87070 CULTURE OTHR SPECIMN AEROBIC: CPT | Performed by: NURSE PRACTITIONER

## 2021-11-02 PROCEDURE — 87206 SMEAR FLUORESCENT/ACID STAI: CPT | Performed by: NURSE PRACTITIONER

## 2021-11-02 PROCEDURE — 87185 SC STD ENZYME DETCJ PER NZM: CPT | Performed by: NURSE PRACTITIONER

## 2021-11-02 PROCEDURE — 87116 MYCOBACTERIA CULTURE: CPT | Performed by: NURSE PRACTITIONER

## 2021-11-02 PROCEDURE — 87015 SPECIMEN INFECT AGNT CONCNTJ: CPT | Performed by: NURSE PRACTITIONER

## 2021-11-02 PROCEDURE — 87077 CULTURE AEROBIC IDENTIFY: CPT | Performed by: NURSE PRACTITIONER

## 2021-11-04 ENCOUNTER — TELEPHONE (OUTPATIENT)
Dept: PULMONOLOGY | Facility: CLINIC | Age: 66
End: 2021-11-04
Payer: MEDICARE

## 2021-11-04 DIAGNOSIS — J14 PNEUMONIA DUE TO HAEMOPHILUS INFLUENZAE, UNSPECIFIED LATERALITY, UNSPECIFIED PART OF LUNG: Primary | ICD-10-CM

## 2021-11-04 RX ORDER — AMOXICILLIN AND CLAVULANATE POTASSIUM 875; 125 MG/1; MG/1
1 TABLET, FILM COATED ORAL 2 TIMES DAILY
Qty: 20 TABLET | Refills: 0 | Status: SHIPPED | OUTPATIENT
Start: 2021-11-04 | End: 2021-11-14

## 2021-11-05 LAB
BACTERIA SPEC AEROBE CULT: ABNORMAL
BACTERIA SPEC AEROBE CULT: ABNORMAL
GRAM STN SPEC: ABNORMAL

## 2021-11-12 ENCOUNTER — INFUSION (OUTPATIENT)
Dept: INFUSION THERAPY | Facility: HOSPITAL | Age: 66
End: 2021-11-12
Attending: INTERNAL MEDICINE
Payer: MEDICARE

## 2021-11-12 VITALS
HEART RATE: 81 BPM | BODY MASS INDEX: 31.72 KG/M2 | RESPIRATION RATE: 18 BRPM | SYSTOLIC BLOOD PRESSURE: 112 MMHG | DIASTOLIC BLOOD PRESSURE: 87 MMHG | OXYGEN SATURATION: 96 % | HEIGHT: 68 IN | TEMPERATURE: 98 F | WEIGHT: 209.31 LBS

## 2021-11-12 DIAGNOSIS — C82.30 FOLLICULAR LYMPHOMA GRADE IIIA, UNSPECIFIED BODY REGION: ICD-10-CM

## 2021-11-12 DIAGNOSIS — D80.1 NONFAMILIAL HYPOGAMMAGLOBULINEMIA: ICD-10-CM

## 2021-11-12 DIAGNOSIS — D80.1 HYPOGAMMAGLOBULINEMIA: Primary | ICD-10-CM

## 2021-11-12 PROCEDURE — 63600175 PHARM REV CODE 636 W HCPCS: Performed by: NURSE PRACTITIONER

## 2021-11-12 PROCEDURE — 25000003 PHARM REV CODE 250: Performed by: NURSE PRACTITIONER

## 2021-11-12 PROCEDURE — 96367 TX/PROPH/DG ADDL SEQ IV INF: CPT

## 2021-11-12 PROCEDURE — 96415 CHEMO IV INFUSION ADDL HR: CPT

## 2021-11-12 PROCEDURE — 96413 CHEMO IV INFUSION 1 HR: CPT

## 2021-11-12 RX ORDER — HEPARIN 100 UNIT/ML
500 SYRINGE INTRAVENOUS
Status: DISCONTINUED | OUTPATIENT
Start: 2021-11-12 | End: 2021-11-12 | Stop reason: HOSPADM

## 2021-11-12 RX ORDER — SODIUM CHLORIDE 0.9 % (FLUSH) 0.9 %
10 SYRINGE (ML) INJECTION
Status: DISCONTINUED | OUTPATIENT
Start: 2021-11-12 | End: 2021-11-12 | Stop reason: HOSPADM

## 2021-11-12 RX ORDER — HEPARIN 100 UNIT/ML
500 SYRINGE INTRAVENOUS
Status: CANCELLED | OUTPATIENT
Start: 2021-12-10

## 2021-11-12 RX ORDER — SODIUM CHLORIDE 0.9 % (FLUSH) 0.9 %
10 SYRINGE (ML) INJECTION
Status: CANCELLED | OUTPATIENT
Start: 2021-12-10

## 2021-11-12 RX ADMIN — HEPARIN 500 UNITS: 100 SYRINGE at 10:11

## 2021-11-12 RX ADMIN — DIPHENHYDRAMINE HYDROCHLORIDE 25 MG: 50 INJECTION INTRAMUSCULAR; INTRAVENOUS at 08:11

## 2021-11-12 RX ADMIN — IMMUNE GLOBULIN (HUMAN) 30 G: 10 INJECTION INTRAVENOUS; SUBCUTANEOUS at 08:11

## 2021-11-12 RX ADMIN — SODIUM CHLORIDE: 9 INJECTION, SOLUTION INTRAVENOUS at 08:11

## 2021-11-16 ENCOUNTER — PATIENT OUTREACH (OUTPATIENT)
Dept: ADMINISTRATIVE | Facility: OTHER | Age: 66
End: 2021-11-16
Payer: MEDICARE

## 2021-11-17 ENCOUNTER — OFFICE VISIT (OUTPATIENT)
Dept: PULMONOLOGY | Facility: CLINIC | Age: 66
End: 2021-11-17
Payer: MEDICARE

## 2021-11-17 VITALS
HEIGHT: 68 IN | TEMPERATURE: 99 F | BODY MASS INDEX: 31.29 KG/M2 | OXYGEN SATURATION: 96 % | DIASTOLIC BLOOD PRESSURE: 80 MMHG | SYSTOLIC BLOOD PRESSURE: 131 MMHG | WEIGHT: 206.44 LBS | HEART RATE: 94 BPM

## 2021-11-17 DIAGNOSIS — J45.50 SEVERE PERSISTENT ASTHMA WITHOUT COMPLICATION: ICD-10-CM

## 2021-11-17 DIAGNOSIS — J47.0 BRONCHIECTASIS WITH ACUTE LOWER RESPIRATORY INFECTION: Primary | ICD-10-CM

## 2021-11-17 PROCEDURE — 99999 PR PBB SHADOW E&M-EST. PATIENT-LVL V: ICD-10-PCS | Mod: PBBFAC,,, | Performed by: NURSE PRACTITIONER

## 2021-11-17 PROCEDURE — 99213 OFFICE O/P EST LOW 20 MIN: CPT | Mod: S$PBB,,, | Performed by: NURSE PRACTITIONER

## 2021-11-17 PROCEDURE — 99999 PR PBB SHADOW E&M-EST. PATIENT-LVL V: CPT | Mod: PBBFAC,,, | Performed by: NURSE PRACTITIONER

## 2021-11-17 PROCEDURE — 99213 PR OFFICE/OUTPT VISIT, EST, LEVL III, 20-29 MIN: ICD-10-PCS | Mod: S$PBB,,, | Performed by: NURSE PRACTITIONER

## 2021-11-17 PROCEDURE — 99215 OFFICE O/P EST HI 40 MIN: CPT | Mod: PBBFAC,PO | Performed by: NURSE PRACTITIONER

## 2021-11-17 RX ORDER — FLUTICASONE FUROATE, UMECLIDINIUM BROMIDE AND VILANTEROL TRIFENATATE 200; 62.5; 25 UG/1; UG/1; UG/1
1 POWDER RESPIRATORY (INHALATION) DAILY
Qty: 60 EACH | Refills: 0 | Status: SHIPPED | OUTPATIENT
Start: 2021-11-17 | End: 2022-07-01

## 2021-11-17 RX ORDER — AZITHROMYCIN 500 MG/1
500 TABLET, FILM COATED ORAL
Qty: 39 TABLET | Refills: 0 | Status: SHIPPED | OUTPATIENT
Start: 2021-11-17 | End: 2022-02-17 | Stop reason: SDUPTHER

## 2021-12-04 ENCOUNTER — LAB VISIT (OUTPATIENT)
Dept: LAB | Facility: HOSPITAL | Age: 66
End: 2021-12-04
Attending: FAMILY MEDICINE
Payer: MEDICARE

## 2021-12-04 DIAGNOSIS — Z51.11 ENCOUNTER FOR ANTINEOPLASTIC CHEMOTHERAPY: ICD-10-CM

## 2021-12-04 DIAGNOSIS — C82.30 FOLLICULAR LYMPHOMA GRADE IIIA, UNSPECIFIED BODY REGION: ICD-10-CM

## 2021-12-04 DIAGNOSIS — R91.8 OTHER NONSPECIFIC ABNORMAL FINDING OF LUNG FIELD: ICD-10-CM

## 2021-12-04 DIAGNOSIS — D80.1 NONFAMILIAL HYPOGAMMAGLOBULINEMIA: ICD-10-CM

## 2021-12-04 LAB
ALBUMIN SERPL BCP-MCNC: 4.3 G/DL (ref 3.5–5.2)
ALP SERPL-CCNC: 114 U/L (ref 55–135)
ALT SERPL W/O P-5'-P-CCNC: 26 U/L (ref 10–44)
ANION GAP SERPL CALC-SCNC: 12 MMOL/L (ref 8–16)
AST SERPL-CCNC: 29 U/L (ref 10–40)
BASOPHILS # BLD AUTO: 0.04 K/UL (ref 0–0.2)
BASOPHILS NFR BLD: 0.8 % (ref 0–1.9)
BILIRUB SERPL-MCNC: 0.4 MG/DL (ref 0.1–1)
BUN SERPL-MCNC: 11 MG/DL (ref 8–23)
CALCIUM SERPL-MCNC: 10 MG/DL (ref 8.7–10.5)
CHLORIDE SERPL-SCNC: 105 MMOL/L (ref 95–110)
CO2 SERPL-SCNC: 27 MMOL/L (ref 23–29)
CREAT SERPL-MCNC: 0.9 MG/DL (ref 0.5–1.4)
DIFFERENTIAL METHOD: ABNORMAL
EOSINOPHIL # BLD AUTO: 0.3 K/UL (ref 0–0.5)
EOSINOPHIL NFR BLD: 6 % (ref 0–8)
ERYTHROCYTE [DISTWIDTH] IN BLOOD BY AUTOMATED COUNT: 13.2 % (ref 11.5–14.5)
EST. GFR  (AFRICAN AMERICAN): >60 ML/MIN/1.73 M^2
EST. GFR  (NON AFRICAN AMERICAN): >60 ML/MIN/1.73 M^2
GLUCOSE SERPL-MCNC: 161 MG/DL (ref 70–110)
HCT VFR BLD AUTO: 44.4 % (ref 40–54)
HGB BLD-MCNC: 15 G/DL (ref 14–18)
IMM GRANULOCYTES # BLD AUTO: 0.09 K/UL (ref 0–0.04)
IMM GRANULOCYTES NFR BLD AUTO: 1.7 % (ref 0–0.5)
LYMPHOCYTES # BLD AUTO: 1.6 K/UL (ref 1–4.8)
LYMPHOCYTES NFR BLD: 31.6 % (ref 18–48)
MCH RBC QN AUTO: 29.8 PG (ref 27–31)
MCHC RBC AUTO-ENTMCNC: 33.8 G/DL (ref 32–36)
MCV RBC AUTO: 88 FL (ref 82–98)
MONOCYTES # BLD AUTO: 0.7 K/UL (ref 0.3–1)
MONOCYTES NFR BLD: 14 % (ref 4–15)
NEUTROPHILS # BLD AUTO: 2.4 K/UL (ref 1.8–7.7)
NEUTROPHILS NFR BLD: 45.9 % (ref 38–73)
NRBC BLD-RTO: 0 /100 WBC
PLATELET # BLD AUTO: 283 K/UL (ref 150–450)
PMV BLD AUTO: 8.8 FL (ref 9.2–12.9)
POTASSIUM SERPL-SCNC: 4 MMOL/L (ref 3.5–5.1)
PROT SERPL-MCNC: 6.8 G/DL (ref 6–8.4)
RBC # BLD AUTO: 5.04 M/UL (ref 4.6–6.2)
SODIUM SERPL-SCNC: 144 MMOL/L (ref 136–145)
WBC # BLD AUTO: 5.16 K/UL (ref 3.9–12.7)

## 2021-12-04 PROCEDURE — 36415 COLL VENOUS BLD VENIPUNCTURE: CPT | Performed by: INTERNAL MEDICINE

## 2021-12-04 PROCEDURE — 85025 COMPLETE CBC W/AUTO DIFF WBC: CPT | Performed by: INTERNAL MEDICINE

## 2021-12-04 PROCEDURE — 80053 COMPREHEN METABOLIC PANEL: CPT | Performed by: INTERNAL MEDICINE

## 2021-12-08 RX ORDER — FAMOTIDINE 10 MG/ML
20 INJECTION INTRAVENOUS
Status: CANCELLED | OUTPATIENT
Start: 2021-12-09

## 2021-12-08 RX ORDER — HEPARIN 100 UNIT/ML
500 SYRINGE INTRAVENOUS
Status: CANCELLED | OUTPATIENT
Start: 2021-12-09

## 2021-12-08 RX ORDER — ACETAMINOPHEN 325 MG/1
650 TABLET ORAL
Status: CANCELLED | OUTPATIENT
Start: 2021-12-09

## 2021-12-08 RX ORDER — SODIUM CHLORIDE 0.9 % (FLUSH) 0.9 %
10 SYRINGE (ML) INJECTION
Status: CANCELLED | OUTPATIENT
Start: 2021-12-09

## 2021-12-08 RX ORDER — MEPERIDINE HYDROCHLORIDE 25 MG/ML
25 INJECTION INTRAMUSCULAR; INTRAVENOUS; SUBCUTANEOUS
Status: CANCELLED | OUTPATIENT
Start: 2021-12-09 | End: 2021-12-09

## 2021-12-09 ENCOUNTER — OFFICE VISIT (OUTPATIENT)
Dept: HEMATOLOGY/ONCOLOGY | Facility: CLINIC | Age: 66
End: 2021-12-09
Payer: MEDICARE

## 2021-12-09 ENCOUNTER — INFUSION (OUTPATIENT)
Dept: INFUSION THERAPY | Facility: HOSPITAL | Age: 66
End: 2021-12-09
Attending: INTERNAL MEDICINE
Payer: MEDICARE

## 2021-12-09 VITALS
DIASTOLIC BLOOD PRESSURE: 87 MMHG | RESPIRATION RATE: 18 BRPM | TEMPERATURE: 98 F | SYSTOLIC BLOOD PRESSURE: 139 MMHG | OXYGEN SATURATION: 96 % | BODY MASS INDEX: 31.83 KG/M2 | WEIGHT: 210 LBS | HEIGHT: 68 IN | HEART RATE: 95 BPM

## 2021-12-09 VITALS
RESPIRATION RATE: 18 BRPM | OXYGEN SATURATION: 97 % | DIASTOLIC BLOOD PRESSURE: 80 MMHG | WEIGHT: 210 LBS | SYSTOLIC BLOOD PRESSURE: 144 MMHG | HEIGHT: 68 IN | HEART RATE: 89 BPM | BODY MASS INDEX: 31.83 KG/M2 | TEMPERATURE: 98 F

## 2021-12-09 DIAGNOSIS — C82.30 FOLLICULAR LYMPHOMA GRADE IIIA, UNSPECIFIED BODY REGION: ICD-10-CM

## 2021-12-09 DIAGNOSIS — Z51.11 ENCOUNTER FOR ANTINEOPLASTIC CHEMOTHERAPY: ICD-10-CM

## 2021-12-09 DIAGNOSIS — C82.30 FOLLICULAR LYMPHOMA GRADE IIIA, UNSPECIFIED BODY REGION: Primary | ICD-10-CM

## 2021-12-09 DIAGNOSIS — C83.38 DIFFUSE LARGE B-CELL LYMPHOMA, LYMPH NODES OF MULTIPLE SITES: ICD-10-CM

## 2021-12-09 DIAGNOSIS — D80.1 HYPOGAMMAGLOBULINEMIA: Primary | ICD-10-CM

## 2021-12-09 PROCEDURE — 96375 TX/PRO/DX INJ NEW DRUG ADDON: CPT

## 2021-12-09 PROCEDURE — 25000003 PHARM REV CODE 250: Performed by: INTERNAL MEDICINE

## 2021-12-09 PROCEDURE — 99214 OFFICE O/P EST MOD 30 MIN: CPT | Mod: S$GLB,,, | Performed by: INTERNAL MEDICINE

## 2021-12-09 PROCEDURE — 96367 TX/PROPH/DG ADDL SEQ IV INF: CPT

## 2021-12-09 PROCEDURE — 96415 CHEMO IV INFUSION ADDL HR: CPT

## 2021-12-09 PROCEDURE — 63600175 PHARM REV CODE 636 W HCPCS: Performed by: INTERNAL MEDICINE

## 2021-12-09 PROCEDURE — 96413 CHEMO IV INFUSION 1 HR: CPT

## 2021-12-09 PROCEDURE — 99214 PR OFFICE/OUTPT VISIT, EST, LEVL IV, 30-39 MIN: ICD-10-PCS | Mod: S$GLB,,, | Performed by: INTERNAL MEDICINE

## 2021-12-09 PROCEDURE — A4216 STERILE WATER/SALINE, 10 ML: HCPCS | Performed by: INTERNAL MEDICINE

## 2021-12-09 RX ORDER — FAMOTIDINE 10 MG/ML
20 INJECTION INTRAVENOUS
Status: COMPLETED | OUTPATIENT
Start: 2021-12-09 | End: 2021-12-09

## 2021-12-09 RX ORDER — SODIUM CHLORIDE 0.9 % (FLUSH) 0.9 %
10 SYRINGE (ML) INJECTION
Status: DISCONTINUED | OUTPATIENT
Start: 2021-12-09 | End: 2021-12-09 | Stop reason: HOSPADM

## 2021-12-09 RX ORDER — ACETAMINOPHEN 325 MG/1
650 TABLET ORAL
Status: COMPLETED | OUTPATIENT
Start: 2021-12-09 | End: 2021-12-09

## 2021-12-09 RX ORDER — MEPERIDINE HYDROCHLORIDE 25 MG/ML
25 INJECTION INTRAMUSCULAR; INTRAVENOUS; SUBCUTANEOUS
Status: DISCONTINUED | OUTPATIENT
Start: 2021-12-09 | End: 2021-12-09 | Stop reason: HOSPADM

## 2021-12-09 RX ORDER — HEPARIN 100 UNIT/ML
500 SYRINGE INTRAVENOUS
Status: DISCONTINUED | OUTPATIENT
Start: 2021-12-09 | End: 2021-12-09 | Stop reason: HOSPADM

## 2021-12-09 RX ADMIN — DIPHENHYDRAMINE HYDROCHLORIDE 50 MG: 50 INJECTION INTRAMUSCULAR; INTRAVENOUS at 08:12

## 2021-12-09 RX ADMIN — SODIUM CHLORIDE 10 ML: 9 INJECTION INTRAMUSCULAR; INTRAVENOUS; SUBCUTANEOUS at 11:12

## 2021-12-09 RX ADMIN — RITUXIMAB 800 MG: 10 INJECTION, SOLUTION INTRAVENOUS at 08:12

## 2021-12-09 RX ADMIN — ACETAMINOPHEN 650 MG: 325 TABLET ORAL at 08:12

## 2021-12-09 RX ADMIN — FAMOTIDINE 20 MG: 10 INJECTION INTRAVENOUS at 08:12

## 2021-12-09 RX ADMIN — HEPARIN 500 UNITS: 100 SYRINGE at 11:12

## 2021-12-10 ENCOUNTER — INFUSION (OUTPATIENT)
Dept: INFUSION THERAPY | Facility: HOSPITAL | Age: 66
End: 2021-12-10
Attending: INTERNAL MEDICINE
Payer: MEDICARE

## 2021-12-10 VITALS
WEIGHT: 203 LBS | SYSTOLIC BLOOD PRESSURE: 144 MMHG | OXYGEN SATURATION: 96 % | BODY MASS INDEX: 30.77 KG/M2 | HEIGHT: 68 IN | HEART RATE: 91 BPM | RESPIRATION RATE: 18 BRPM | TEMPERATURE: 98 F | DIASTOLIC BLOOD PRESSURE: 87 MMHG

## 2021-12-10 DIAGNOSIS — D80.1 NONFAMILIAL HYPOGAMMAGLOBULINEMIA: ICD-10-CM

## 2021-12-10 DIAGNOSIS — C82.30 FOLLICULAR LYMPHOMA GRADE IIIA, UNSPECIFIED BODY REGION: ICD-10-CM

## 2021-12-10 DIAGNOSIS — D80.1 HYPOGAMMAGLOBULINEMIA: Primary | ICD-10-CM

## 2021-12-10 PROCEDURE — 63600175 PHARM REV CODE 636 W HCPCS: Performed by: NURSE PRACTITIONER

## 2021-12-10 PROCEDURE — 96415 CHEMO IV INFUSION ADDL HR: CPT

## 2021-12-10 PROCEDURE — 96367 TX/PROPH/DG ADDL SEQ IV INF: CPT

## 2021-12-10 PROCEDURE — 25000003 PHARM REV CODE 250: Performed by: NURSE PRACTITIONER

## 2021-12-10 PROCEDURE — A4216 STERILE WATER/SALINE, 10 ML: HCPCS | Performed by: NURSE PRACTITIONER

## 2021-12-10 PROCEDURE — 96413 CHEMO IV INFUSION 1 HR: CPT

## 2021-12-10 RX ORDER — SODIUM CHLORIDE 0.9 % (FLUSH) 0.9 %
10 SYRINGE (ML) INJECTION
Status: DISCONTINUED | OUTPATIENT
Start: 2021-12-10 | End: 2021-12-10 | Stop reason: HOSPADM

## 2021-12-10 RX ORDER — HEPARIN 100 UNIT/ML
500 SYRINGE INTRAVENOUS
Status: CANCELLED | OUTPATIENT
Start: 2022-01-07

## 2021-12-10 RX ORDER — HEPARIN 100 UNIT/ML
500 SYRINGE INTRAVENOUS
Status: DISCONTINUED | OUTPATIENT
Start: 2021-12-10 | End: 2021-12-10 | Stop reason: HOSPADM

## 2021-12-10 RX ORDER — SODIUM CHLORIDE 0.9 % (FLUSH) 0.9 %
10 SYRINGE (ML) INJECTION
Status: CANCELLED | OUTPATIENT
Start: 2022-01-07

## 2021-12-10 RX ADMIN — HEPARIN 500 UNITS: 100 SYRINGE at 10:12

## 2021-12-10 RX ADMIN — SODIUM CHLORIDE, PRESERVATIVE FREE 10 ML: 5 INJECTION INTRAVENOUS at 10:12

## 2021-12-10 RX ADMIN — IMMUNE GLOBULIN (HUMAN) 30 G: 10 INJECTION INTRAVENOUS; SUBCUTANEOUS at 08:12

## 2021-12-10 RX ADMIN — DIPHENHYDRAMINE HYDROCHLORIDE 25 MG: 50 INJECTION INTRAMUSCULAR; INTRAVENOUS at 08:12

## 2021-12-13 ENCOUNTER — LAB VISIT (OUTPATIENT)
Dept: LAB | Facility: HOSPITAL | Age: 66
End: 2021-12-13
Attending: FAMILY MEDICINE
Payer: MEDICARE

## 2021-12-13 DIAGNOSIS — E11.9 TYPE 2 DIABETES MELLITUS WITHOUT COMPLICATION, WITHOUT LONG-TERM CURRENT USE OF INSULIN: ICD-10-CM

## 2021-12-13 DIAGNOSIS — Z12.5 PROSTATE CANCER SCREENING: ICD-10-CM

## 2021-12-13 LAB
ALBUMIN SERPL BCP-MCNC: 4.1 G/DL (ref 3.5–5.2)
ALP SERPL-CCNC: 99 U/L (ref 55–135)
ALT SERPL W/O P-5'-P-CCNC: 25 U/L (ref 10–44)
ANION GAP SERPL CALC-SCNC: 13 MMOL/L (ref 8–16)
AST SERPL-CCNC: 30 U/L (ref 10–40)
BASOPHILS # BLD AUTO: 0.04 K/UL (ref 0–0.2)
BASOPHILS NFR BLD: 1 % (ref 0–1.9)
BILIRUB SERPL-MCNC: 0.6 MG/DL (ref 0.1–1)
BUN SERPL-MCNC: 16 MG/DL (ref 8–23)
CALCIUM SERPL-MCNC: 9.5 MG/DL (ref 8.7–10.5)
CHLORIDE SERPL-SCNC: 102 MMOL/L (ref 95–110)
CHOLEST SERPL-MCNC: 118 MG/DL (ref 120–199)
CHOLEST/HDLC SERPL: 4.1 {RATIO} (ref 2–5)
CO2 SERPL-SCNC: 25 MMOL/L (ref 23–29)
COMPLEXED PSA SERPL-MCNC: 0.55 NG/ML (ref 0–4)
CREAT SERPL-MCNC: 1 MG/DL (ref 0.5–1.4)
DIFFERENTIAL METHOD: ABNORMAL
EOSINOPHIL # BLD AUTO: 0.2 K/UL (ref 0–0.5)
EOSINOPHIL NFR BLD: 4.3 % (ref 0–8)
ERYTHROCYTE [DISTWIDTH] IN BLOOD BY AUTOMATED COUNT: 12.7 % (ref 11.5–14.5)
EST. GFR  (AFRICAN AMERICAN): >60 ML/MIN/1.73 M^2
EST. GFR  (NON AFRICAN AMERICAN): >60 ML/MIN/1.73 M^2
ESTIMATED AVG GLUCOSE: 146 MG/DL (ref 68–131)
GLUCOSE SERPL-MCNC: 156 MG/DL (ref 70–110)
HBA1C MFR BLD: 6.7 % (ref 4–5.6)
HCT VFR BLD AUTO: 45.1 % (ref 40–54)
HDLC SERPL-MCNC: 29 MG/DL (ref 40–75)
HDLC SERPL: 24.6 % (ref 20–50)
HGB BLD-MCNC: 15.1 G/DL (ref 14–18)
IMM GRANULOCYTES # BLD AUTO: 0.13 K/UL (ref 0–0.04)
IMM GRANULOCYTES NFR BLD AUTO: 3.1 % (ref 0–0.5)
LDLC SERPL CALC-MCNC: 31.4 MG/DL (ref 63–159)
LYMPHOCYTES # BLD AUTO: 1.2 K/UL (ref 1–4.8)
LYMPHOCYTES NFR BLD: 29.1 % (ref 18–48)
MCH RBC QN AUTO: 29.7 PG (ref 27–31)
MCHC RBC AUTO-ENTMCNC: 33.5 G/DL (ref 32–36)
MCV RBC AUTO: 89 FL (ref 82–98)
MONOCYTES # BLD AUTO: 0.6 K/UL (ref 0.3–1)
MONOCYTES NFR BLD: 13.5 % (ref 4–15)
NEUTROPHILS # BLD AUTO: 2 K/UL (ref 1.8–7.7)
NEUTROPHILS NFR BLD: 49 % (ref 38–73)
NONHDLC SERPL-MCNC: 89 MG/DL
NRBC BLD-RTO: 0 /100 WBC
PLATELET # BLD AUTO: 276 K/UL (ref 150–450)
PMV BLD AUTO: 8.9 FL (ref 9.2–12.9)
POTASSIUM SERPL-SCNC: 4.1 MMOL/L (ref 3.5–5.1)
PROT SERPL-MCNC: 7.7 G/DL (ref 6–8.4)
RBC # BLD AUTO: 5.08 M/UL (ref 4.6–6.2)
SODIUM SERPL-SCNC: 140 MMOL/L (ref 136–145)
TRIGL SERPL-MCNC: 288 MG/DL (ref 30–150)
TSH SERPL DL<=0.005 MIU/L-ACNC: 3.96 UIU/ML (ref 0.4–4)
WBC # BLD AUTO: 4.16 K/UL (ref 3.9–12.7)

## 2021-12-13 PROCEDURE — 36415 COLL VENOUS BLD VENIPUNCTURE: CPT | Performed by: FAMILY MEDICINE

## 2021-12-13 PROCEDURE — 83036 HEMOGLOBIN GLYCOSYLATED A1C: CPT | Performed by: FAMILY MEDICINE

## 2021-12-13 PROCEDURE — 84153 ASSAY OF PSA TOTAL: CPT | Performed by: FAMILY MEDICINE

## 2021-12-13 PROCEDURE — 84443 ASSAY THYROID STIM HORMONE: CPT | Performed by: FAMILY MEDICINE

## 2021-12-13 PROCEDURE — 80061 LIPID PANEL: CPT | Performed by: FAMILY MEDICINE

## 2021-12-13 PROCEDURE — 80053 COMPREHEN METABOLIC PANEL: CPT | Performed by: FAMILY MEDICINE

## 2021-12-13 PROCEDURE — 85025 COMPLETE CBC W/AUTO DIFF WBC: CPT | Performed by: FAMILY MEDICINE

## 2021-12-15 ENCOUNTER — TELEPHONE (OUTPATIENT)
Dept: FAMILY MEDICINE | Facility: CLINIC | Age: 66
End: 2021-12-15
Payer: MEDICARE

## 2021-12-22 LAB
ACID FAST MOD KINY STN SPEC: NORMAL
MYCOBACTERIUM SPEC QL CULT: NORMAL

## 2021-12-27 ENCOUNTER — OFFICE VISIT (OUTPATIENT)
Dept: FAMILY MEDICINE | Facility: CLINIC | Age: 66
End: 2021-12-27
Payer: MEDICARE

## 2021-12-27 VITALS
TEMPERATURE: 98 F | SYSTOLIC BLOOD PRESSURE: 112 MMHG | WEIGHT: 212.63 LBS | HEART RATE: 93 BPM | RESPIRATION RATE: 18 BRPM | BODY MASS INDEX: 32.33 KG/M2 | OXYGEN SATURATION: 94 % | DIASTOLIC BLOOD PRESSURE: 69 MMHG

## 2021-12-27 DIAGNOSIS — E11.9 TYPE 2 DIABETES MELLITUS WITHOUT COMPLICATION, WITHOUT LONG-TERM CURRENT USE OF INSULIN: Primary | ICD-10-CM

## 2021-12-27 DIAGNOSIS — I10 ESSENTIAL HYPERTENSION: ICD-10-CM

## 2021-12-27 PROCEDURE — 99999 PR PBB SHADOW E&M-EST. PATIENT-LVL IV: ICD-10-PCS | Mod: PBBFAC,,, | Performed by: FAMILY MEDICINE

## 2021-12-27 PROCEDURE — 99213 OFFICE O/P EST LOW 20 MIN: CPT | Mod: S$PBB,,, | Performed by: FAMILY MEDICINE

## 2021-12-27 PROCEDURE — 99999 PR PBB SHADOW E&M-EST. PATIENT-LVL IV: CPT | Mod: PBBFAC,,, | Performed by: FAMILY MEDICINE

## 2021-12-27 PROCEDURE — 99213 PR OFFICE/OUTPT VISIT, EST, LEVL III, 20-29 MIN: ICD-10-PCS | Mod: S$PBB,,, | Performed by: FAMILY MEDICINE

## 2021-12-27 PROCEDURE — 99214 OFFICE O/P EST MOD 30 MIN: CPT | Mod: PBBFAC,PN | Performed by: FAMILY MEDICINE

## 2022-01-07 ENCOUNTER — INFUSION (OUTPATIENT)
Dept: INFUSION THERAPY | Facility: HOSPITAL | Age: 67
End: 2022-01-07
Attending: INTERNAL MEDICINE
Payer: MEDICARE

## 2022-01-07 VITALS
WEIGHT: 209.69 LBS | SYSTOLIC BLOOD PRESSURE: 154 MMHG | TEMPERATURE: 99 F | BODY MASS INDEX: 31.78 KG/M2 | RESPIRATION RATE: 17 BRPM | DIASTOLIC BLOOD PRESSURE: 83 MMHG | HEIGHT: 68 IN | OXYGEN SATURATION: 94 % | HEART RATE: 86 BPM

## 2022-01-07 DIAGNOSIS — C82.30 FOLLICULAR LYMPHOMA GRADE IIIA, UNSPECIFIED BODY REGION: ICD-10-CM

## 2022-01-07 DIAGNOSIS — D80.1 HYPOGAMMAGLOBULINEMIA: Primary | ICD-10-CM

## 2022-01-07 DIAGNOSIS — D80.1 NONFAMILIAL HYPOGAMMAGLOBULINEMIA: ICD-10-CM

## 2022-01-07 PROCEDURE — 96413 CHEMO IV INFUSION 1 HR: CPT

## 2022-01-07 PROCEDURE — 63600175 PHARM REV CODE 636 W HCPCS: Performed by: NURSE PRACTITIONER

## 2022-01-07 PROCEDURE — 96415 CHEMO IV INFUSION ADDL HR: CPT

## 2022-01-07 PROCEDURE — 25000003 PHARM REV CODE 250: Performed by: NURSE PRACTITIONER

## 2022-01-07 PROCEDURE — 96367 TX/PROPH/DG ADDL SEQ IV INF: CPT

## 2022-01-07 RX ORDER — SODIUM CHLORIDE 0.9 % (FLUSH) 0.9 %
10 SYRINGE (ML) INJECTION
Status: CANCELLED | OUTPATIENT
Start: 2022-02-04

## 2022-01-07 RX ORDER — SODIUM CHLORIDE 0.9 % (FLUSH) 0.9 %
10 SYRINGE (ML) INJECTION
Status: DISCONTINUED | OUTPATIENT
Start: 2022-01-07 | End: 2022-01-07 | Stop reason: HOSPADM

## 2022-01-07 RX ORDER — HEPARIN 100 UNIT/ML
500 SYRINGE INTRAVENOUS
Status: CANCELLED | OUTPATIENT
Start: 2022-02-04

## 2022-01-07 RX ORDER — HEPARIN 100 UNIT/ML
500 SYRINGE INTRAVENOUS
Status: DISCONTINUED | OUTPATIENT
Start: 2022-01-07 | End: 2022-01-07 | Stop reason: HOSPADM

## 2022-01-07 RX ADMIN — IMMUNE GLOBULIN (HUMAN) 30 G: 10 INJECTION INTRAVENOUS; SUBCUTANEOUS at 08:01

## 2022-01-07 RX ADMIN — HEPARIN 500 UNITS: 100 SYRINGE at 10:01

## 2022-01-07 RX ADMIN — DIPHENHYDRAMINE HYDROCHLORIDE 25 MG: 50 INJECTION INTRAMUSCULAR; INTRAVENOUS at 08:01

## 2022-01-07 NOTE — PLAN OF CARE
Problem: Fatigue  Goal: Improved Activity Tolerance  Intervention: Promote Improved Energy  Flowsheets (Taken 1/7/2022 0812)  Fatigue Management: fatigue-related activity identified  Activity Management: Ambulated -L4

## 2022-01-31 ENCOUNTER — LAB VISIT (OUTPATIENT)
Dept: LAB | Facility: HOSPITAL | Age: 67
End: 2022-01-31
Attending: INTERNAL MEDICINE
Payer: MEDICARE

## 2022-01-31 DIAGNOSIS — D80.1 NONFAMILIAL HYPOGAMMAGLOBULINEMIA: ICD-10-CM

## 2022-01-31 DIAGNOSIS — Z51.11 ENCOUNTER FOR ANTINEOPLASTIC CHEMOTHERAPY: ICD-10-CM

## 2022-01-31 DIAGNOSIS — R91.8 OTHER NONSPECIFIC ABNORMAL FINDING OF LUNG FIELD: ICD-10-CM

## 2022-01-31 DIAGNOSIS — C82.30 FOLLICULAR LYMPHOMA GRADE IIIA, UNSPECIFIED BODY REGION: ICD-10-CM

## 2022-01-31 LAB
ALBUMIN SERPL BCP-MCNC: 3.9 G/DL (ref 3.5–5.2)
ALP SERPL-CCNC: 120 U/L (ref 55–135)
ALT SERPL W/O P-5'-P-CCNC: 25 U/L (ref 10–44)
ANION GAP SERPL CALC-SCNC: 14 MMOL/L (ref 8–16)
AST SERPL-CCNC: 27 U/L (ref 10–40)
BASOPHILS # BLD AUTO: 0.05 K/UL (ref 0–0.2)
BASOPHILS NFR BLD: 0.9 % (ref 0–1.9)
BILIRUB SERPL-MCNC: 0.3 MG/DL (ref 0.1–1)
BUN SERPL-MCNC: 21 MG/DL (ref 8–23)
CALCIUM SERPL-MCNC: 9.5 MG/DL (ref 8.7–10.5)
CHLORIDE SERPL-SCNC: 103 MMOL/L (ref 95–110)
CO2 SERPL-SCNC: 24 MMOL/L (ref 23–29)
CREAT SERPL-MCNC: 1 MG/DL (ref 0.5–1.4)
DIFFERENTIAL METHOD: ABNORMAL
EOSINOPHIL # BLD AUTO: 0.2 K/UL (ref 0–0.5)
EOSINOPHIL NFR BLD: 3.9 % (ref 0–8)
ERYTHROCYTE [DISTWIDTH] IN BLOOD BY AUTOMATED COUNT: 13.1 % (ref 11.5–14.5)
EST. GFR  (AFRICAN AMERICAN): >60 ML/MIN/1.73 M^2
EST. GFR  (NON AFRICAN AMERICAN): >60 ML/MIN/1.73 M^2
GLUCOSE SERPL-MCNC: 245 MG/DL (ref 70–110)
HCT VFR BLD AUTO: 42.8 % (ref 40–54)
HGB BLD-MCNC: 13.9 G/DL (ref 14–18)
IMM GRANULOCYTES # BLD AUTO: 0.09 K/UL (ref 0–0.04)
IMM GRANULOCYTES NFR BLD AUTO: 1.5 % (ref 0–0.5)
LYMPHOCYTES # BLD AUTO: 1.7 K/UL (ref 1–4.8)
LYMPHOCYTES NFR BLD: 28.3 % (ref 18–48)
MCH RBC QN AUTO: 29.7 PG (ref 27–31)
MCHC RBC AUTO-ENTMCNC: 32.5 G/DL (ref 32–36)
MCV RBC AUTO: 92 FL (ref 82–98)
MONOCYTES # BLD AUTO: 0.7 K/UL (ref 0.3–1)
MONOCYTES NFR BLD: 11.2 % (ref 4–15)
NEUTROPHILS # BLD AUTO: 3.2 K/UL (ref 1.8–7.7)
NEUTROPHILS NFR BLD: 54.2 % (ref 38–73)
NRBC BLD-RTO: 0 /100 WBC
PLATELET # BLD AUTO: 333 K/UL (ref 150–450)
PMV BLD AUTO: 9.2 FL (ref 9.2–12.9)
POTASSIUM SERPL-SCNC: 4.3 MMOL/L (ref 3.5–5.1)
PROT SERPL-MCNC: 6.8 G/DL (ref 6–8.4)
RBC # BLD AUTO: 4.68 M/UL (ref 4.6–6.2)
SODIUM SERPL-SCNC: 141 MMOL/L (ref 136–145)
WBC # BLD AUTO: 5.87 K/UL (ref 3.9–12.7)

## 2022-01-31 PROCEDURE — 36415 COLL VENOUS BLD VENIPUNCTURE: CPT | Performed by: INTERNAL MEDICINE

## 2022-01-31 PROCEDURE — 85025 COMPLETE CBC W/AUTO DIFF WBC: CPT | Performed by: INTERNAL MEDICINE

## 2022-01-31 PROCEDURE — 80053 COMPREHEN METABOLIC PANEL: CPT | Performed by: INTERNAL MEDICINE

## 2022-02-02 RX ORDER — FAMOTIDINE 10 MG/ML
20 INJECTION INTRAVENOUS
Status: CANCELLED | OUTPATIENT
Start: 2022-07-21

## 2022-02-02 RX ORDER — SODIUM CHLORIDE 0.9 % (FLUSH) 0.9 %
10 SYRINGE (ML) INJECTION
Status: CANCELLED | OUTPATIENT
Start: 2022-07-21

## 2022-02-02 RX ORDER — ACETAMINOPHEN 325 MG/1
650 TABLET ORAL
Status: CANCELLED | OUTPATIENT
Start: 2022-07-21

## 2022-02-02 RX ORDER — MEPERIDINE HYDROCHLORIDE 25 MG/ML
25 INJECTION INTRAMUSCULAR; INTRAVENOUS; SUBCUTANEOUS
Status: CANCELLED | OUTPATIENT
Start: 2022-07-21 | End: 2022-02-03

## 2022-02-02 RX ORDER — HEPARIN 100 UNIT/ML
500 SYRINGE INTRAVENOUS
Status: CANCELLED | OUTPATIENT
Start: 2022-07-21

## 2022-02-02 NOTE — PROGRESS NOTES
[facility] Hematology/Oncology  PROGRESS NOTE -  Follow-up Visit      Subjective:       Patient ID:   NAME: Sivakumar Thacker : 1955     66 y.o. male    Referring Doc: Barry Mcmahon (PCP in [place])  Other Physicians: Vinod Chen/José Manuel      Chief Complaint:  NHL f/u    History of Present Illness:     Patient returns today for a regularly scheduled follow-up visit.  The patient is here today to go over the results of the recently ordered labs, tests and studies. He is here by himself.       He is doing well with the rituximab infusions. He denies any CP, SOB, HA's or N/V. He has been seeing pulmonary about his chronic cough     He is on new antibiotic, and inhaler and is breathing better but still has the cough. He sees pulmonary again in 2 weeks (2022)    continued on Gabapentin for the neuropathy issues in his feet.     He last saw Dr Barry Mcmahon on 2021    I discussed continued Covid19 precautions        ROS:   GEN: normal without any fever, night sweats or weight loss  HEENT: normal with no HA's, sore throat, stiff neck, changes in vision; sinus issues resolved  CV: normal with no CP, SOB, PND, VASQUEZ or orthopnea  PULM: normal with no SOB,  hemoptysis, sputum or pleuritic pain; chronic cough since 2020  GI: normal with no abdominal pain, nausea, vomiting, constipation, diarrhea, melanotic stools, BRBPR, or hematemesis  : normal with no hematuria, dysuria  BREAST: normal with no mass, discharge, pain  SKIN: normal with no rash, erythema, bruising, or swelling    Allergies:  Review of patient's allergies indicates:  No Known Allergies    Medications:    Current Outpatient Medications:     albuterol-ipratropium (DUO-NEB) 2.5 mg-0.5 mg/3 mL nebulizer solution, Take 3 mLs by nebulization every 6 (six) hours as needed for Wheezing or Shortness of Breath. Rescue, Disp: 120 vial, Rfl: 5    amLODIPine (NORVASC) 10 MG tablet, Take 1 tablet (10 mg total) by mouth once daily., Disp: 90 tablet, Rfl:  3    aspirin (ECOTRIN) 81 MG EC tablet, Take 81 mg by mouth once daily., Disp: , Rfl:     atorvastatin (LIPITOR) 20 MG tablet, Take 1 tablet (20 mg total) by mouth once daily., Disp: 90 tablet, Rfl: 3    benzonatate (TESSALON) 200 MG capsule, Take 200 mg by mouth 2 (two) times daily as needed for Cough. , Disp: , Rfl:     blood sugar diagnostic (BLOOD GLUCOSE TEST) Strp, , Disp: , Rfl:     blood sugar diagnostic (FREESTYLE LITE STRIPS) Strp, Check blood sugar 2 (two) times daily as needed., Disp: 200 each, Rfl: 11    fluticasone propionate (FLONASE) 50 mcg/actuation nasal spray, USE 1 SPRAY IN EACH NOSTRIL TWICE A DAY, Disp: 48 g, Rfl: 11    fluticasone propionate (FLOVENT HFA) 220 mcg/actuation inhaler, Inhale 1 puff into the lungs 2 (two) times daily. Controller, Disp: 12 g, Rfl: 2    fluticasone-umeclidin-vilanter (TRELEGY ELLIPTA) 200-62.5-25 mcg inhaler, Inhale 1 puff into the lungs once daily., Disp: 60 each, Rfl: 0    gabapentin (NEURONTIN) 300 MG capsule, Take 1 capsule (300 mg total) by mouth 3 (three) times daily., Disp: 90 capsule, Rfl: 2    losartan (COZAAR) 100 MG tablet, Take 1 tablet (100 mg total) by mouth once daily., Disp: 90 tablet, Rfl: 3    metFORMIN (GLUCOPHAGE) 500 MG tablet, metformin 500 mg tablet  Take 2 tablets twice a day by oral route with meals for 90 days., Disp: 360 tablet, Rfl: 3    montelukast (SINGULAIR) 10 mg tablet, Take 1 tablet (10 mg total) by mouth nightly as needed., Disp: 90 tablet, Rfl: 3    mucus clearing device (ACAPELLA, FLUTTER), 1 Device by Misc.(Non-Drug; Combo Route) route 2 (two) times daily., Disp: 1 Device, Rfl: 0    MULTIVITAMIN ORAL, Take 1 tablet by mouth once daily., Disp: , Rfl:     naproxen (NAPROSYN) 500 MG tablet, Take 1 tablet (500 mg total) by mouth 2 (two) times daily as needed., Disp: 180 tablet, Rfl: 3    omeprazole (PRILOSEC) 40 MG capsule, Take 1 capsule (40 mg total) by mouth once daily., Disp: 90 capsule, Rfl: 3     "promethazine-dextromethorphan (PROMETHAZINE-DM) 6.25-15 mg/5 mL Syrp, Take 5 mLs by mouth every 6 (six) hours as needed (cough)., Disp: 240 mL, Rfl: 1    tiZANidine (ZANAFLEX) 4 MG tablet, Take 1 tablet (4 mg total) by mouth daily as needed., Disp: 90 tablet, Rfl: 3    PMHx/PSHx Updates:  See patient's last visit with me on 12/9/2021  See H&P on 1/8/2019        Pathology:  Cancer Staging  No matching staging information was found for the patient.          Objective:     Vitals:  Blood pressure (!) 149/91, pulse 98, temperature 98 °F (36.7 °C), resp. rate 16, height 5' 8" (1.727 m), weight 94.8 kg (209 lb), SpO2 96 %.    Physical Examination:   GEN: no apparent distress, comfortable; AAOx3  HEAD: atraumatic and normocephalic  EYES: no pallor, no icterus, PERRLA  ENT: OMM, no pharyngeal erythema, external ears WNL; no nasal discharge; no thrush;    NECK: no masses, thyroid normal, trachea midline, no LAD/LN's, supple  CV: RRR with no murmur; normal pulse; normal S1 and S2; no pedal edema; portacath  CHEST: Normal respiratory effort; CTAB; normal breath sounds; no current wheeze  ABDOM: nontender and nondistended; soft; normal bowel sounds; no rebound/guarding  MUSC/Skeletal: ROM normal; no crepitus; joints normal; no deformities or arthropathy  EXTREM: no clubbing, cyanosis, inflammation or swelling  SKIN: no rashes, lesions, ulcers, petechiae or subcutaneous nodules (see above HEENt)  : no gibbs  NEURO: grossly intact; motor/sensory WNL; AAOx3; no tremors  PSYCH: normal mood, affect and behavior  LYMPH: normal cervical, supraclavicular, axillary and groin LN's            Labs:     Lab Results   Component Value Date    WBC 5.87 01/31/2022    HGB 13.9 (L) 01/31/2022    HCT 42.8 01/31/2022    MCV 92 01/31/2022     01/31/2022     CMP  Sodium   Date Value Ref Range Status   01/31/2022 141 136 - 145 mmol/L Final   04/25/2019 144 134 - 144 mmol/L      Potassium   Date Value Ref Range Status   01/31/2022 4.3 3.5 - " 5.1 mmol/L Final     Chloride   Date Value Ref Range Status   01/31/2022 103 95 - 110 mmol/L Final   04/25/2019 107 98 - 110 mmol/L      CO2   Date Value Ref Range Status   01/31/2022 24 23 - 29 mmol/L Final     Glucose   Date Value Ref Range Status   01/31/2022 245 (H) 70 - 110 mg/dL Final   04/25/2019 177 (H) 70 - 99 mg/dL      BUN   Date Value Ref Range Status   01/31/2022 21 8 - 23 mg/dL Final     Creatinine   Date Value Ref Range Status   01/31/2022 1.0 0.5 - 1.4 mg/dL Final   04/25/2019 0.83 0.60 - 1.40 mg/dL      Calcium   Date Value Ref Range Status   01/31/2022 9.5 8.7 - 10.5 mg/dL Final     Total Protein   Date Value Ref Range Status   01/31/2022 6.8 6.0 - 8.4 g/dL Final     Albumin   Date Value Ref Range Status   01/31/2022 3.9 3.5 - 5.2 g/dL Final   04/25/2019 4.4 3.1 - 4.7 g/dL      Total Bilirubin   Date Value Ref Range Status   01/31/2022 0.3 0.1 - 1.0 mg/dL Final     Comment:     For infants and newborns, interpretation of results should be based  on gestational age, weight and in agreement with clinical  observations.    Premature Infant recommended reference ranges:  Up to 24 hours.............<8.0 mg/dL  Up to 48 hours............<12.0 mg/dL  3-5 days..................<15.0 mg/dL  6-29 days.................<15.0 mg/dL       Alkaline Phosphatase   Date Value Ref Range Status   01/31/2022 120 55 - 135 U/L Final     AST   Date Value Ref Range Status   01/31/2022 27 10 - 40 U/L Final     ALT   Date Value Ref Range Status   01/31/2022 25 10 - 44 U/L Final     Anion Gap   Date Value Ref Range Status   01/31/2022 14 8 - 16 mmol/L Final     eGFR if    Date Value Ref Range Status   01/31/2022 >60.0 >60 mL/min/1.73 m^2 Final     eGFR if non    Date Value Ref Range Status   01/31/2022 >60.0 >60 mL/min/1.73 m^2 Final     Comment:     Calculation used to obtain the estimated glomerular filtration  rate (eGFR) is the CKD-EPI equation.            Radiology/Diagnostic  Studies:    PET 10/4/2021:  Impression:     1. Negative for FDG avid recurrent lymphoproliferative disease.  2. New left maxillary sinusitis.  3. New bilateral reticulonodular pulmonary opacities suggesting infectious or inflammatory pneumonitis.  Clinical and laboratory correlation is requested.  4. Coronary artery calcification.         CXR  3/25/2021:    Impression:     Mild interstitial prominence, similar to previous exams.  No focal consolidation      PET 3/19/2021:  IMPRESSION:  1. Mild patchy airspace opacity in the superior segment of the left  lower lobe with increased FDG activity from background suggestive of a  small focal pneumonia (possibly viral in etiology). Consider  correlation with the symptoms, history and CT follow-up in 3 months to  document resolution.  2. No FDG avid lymphadenopathy or additional sites of disease.         PET 9/2/2020:      Impression:     Negative for active lymphoproliferative disease.         Chest CT  3/19/2020:      Impression       1. Mild bronchiectasis in the left lower lobe and tree-in-bud micro nodules at the left lung base suggesting underlying bronchiolitis, possibly due to a non tuberculous mycobacterial infection or viral bronchiolitis.  2. Fatty infiltration of the liver  3. Small hiatal hernia.           CXR  1/31/2020    Impression       1. No acute chest disease.  2. Left subclavian Port-A-Cath.               PET 9/11/2019   Impression       No evidence of FDG avid residual or recurrent lymphoma       I have reviewed all available lab results and radiology reports.    Assessment/Plan:   (1) 66 y.o. male with diagnosis of NHL Follicular Stage IIIA who has been referred by Dr Gary Chen with [facility] for continuation of care by medical hematology/oncology.   - He originally was diagnosed in 2012 and had parotid excision at [facility]. He subsequently went to [facility] and was treated with bendamustine-rituximab. He has been on rituximab maintenance every 8  weeks and IV IgG monthly. Dr Chen's plan was to keep him on maintenace therapy for as long as he could tolerate the treatments  - s/p 6 cycles of Bendamustine and Retuximab with subsequent complete remission  - 1st maintenance cycle rituximab was in March 2016  - he has been on maintenance rituximab and IV IgG - last rituximab 11/15/2018; last IV IgG on Dec 14th 2018  - last PET was on 9/26/2018 with no evidence of recurrence  - originally diagnosed in Dec 2012 with left parotid and left periparotid LN excision on 12/11/2012  - PET scan done on  9/11/2019 was good    7/23/2020:  - new nodule on auricle of left ear suspicious for a skin cancer - will refer him to Dr Avilez with ENT  - he is due for repeat PET    9/17/2020:  - PET scan on 9/2/2020 is adequate  - [facility]     11/12/2020:  - continued on the current regimen  - doing well with no new issues    1/7/2021:  - he is having some persistent cough issues for which he has been obtaining sputum for José Manuel  - last PET was Sept 2020    3/4/2021:  - doing ok  - chronic cough issues - followed by José Manuel  - will set up f/u PET    4/29/2021:  - he had PET scan on 3/19/2021  - He has been seeing pulmonary about his chronic cough  Lucia Georges with pulmonary has seen the recent PET and reported to him that the CXR on 3/25 was stable and he is on some oral antibiotics with improvement of the symptoms  - He is also on Gabapentin for the neuropathy issues in his feet.   - He saw Dr Barry Mcmahon on 3/24/2021 with family med    8/19/2021:  - He has been seeing pulmonary about his chronic cough - Lucia Georges with pulmonary and he has been on periodic treatments of antibiotics. He saw her last just yesterday on 8/17/2021 and is currently on antibiotics.  - Pulmonary is planning to refer him to an ID specialist  - he is on the rituximab every 8 weeks; I am not convinced that this is contributing to the infection  issues as it is not reducing his WBC; however,if pulmonary and/or ID feel it is a contributing factor then we can discontinue. The risk of his lymphoma recurring or progressing would be higher if he were to discontinue the therapy      10/14/2021:  - continued on oral antibiotics per pulmonary and plans to see Pulmonary ID specialist in near future  - continued on current therapy with rituximab  - recent PEt on 10/4/2021    12/9/2021:  - continued on rituximab  - on new antibiotics per pulmonary and he sees them again in Feb 2022  - repeat scans in Feb 2022 or sooner if pulmonary says otherwise    2/2/2022:  - he is seeing pulmonary again in two weeks  - he does not think that the new triple antibiotic regimen is working  - we can get PET in Feb/mar 2022 if pulmonary is inclined, otherwise 6 month surveillance scan in April/May 2022    (2) HTN     (3) Neuropathy     (4) GERD     (5) DM - on metformin per Dr Nunez     (6) Hypogammaglobulinemia - on Iv IgG monthly     (7) Steatosis of liver     (8) Degenerative disc disease of back     (9) Diverticular disease    (10) Mild bronchiectasis in the left lower lobe and tree-in-bud micro nodules at the left lung base suggesting underlying bronchiolitis  - seeing Lucia Georges           VISIT DIAGNOSES:      Follicular lymphoma grade IIIa, unspecified body region          PLAN:  1. Continue his rituximab every 8 weeks as long as he is tolerating the therapy as per Dr Chen's prior recommendations and directives    2. continue IV IgG monthly   3. Check labs every 8 weeks  4. Set up PET scan again in April/March 2022 or sooner if pulmonary desires  5. F/u with PCP, Pulm etc - he sees pulm in 2 weeks        RTC in 8 weeks   Fax note to Barry Mcmahon; José Manuel Brock;          Discussion:       I spent over 25 mins of time with the patient. Reviewed results of the recently ordered labs, tests and studies; made directives with regards to the results. Over half of this time was  spent couseling and coordinating care.      COVID-19 Discussion:    I had long discussion with patient and any applicable family about the COVID-19 coronavirus epidemic and the recommended precautions with regard to cancer and/or hematology patients. I have re-iterated the CDC recommendations for adequate hand washing, use of hand -like products, and coughing into elbow, etc. In addition, especially for our patients who are on chemotherapy and/or our otherwise immunocompromised patients, I have recommended avoidance of crowds, including movie theaters, restaurants, churches, etc. I have recommended avoidance of any sick or symptomatic family members and/or friends. I have also recommended avoidance of any raw and unwashed food products, and general avoidance of food items that have not been prepared by themselves. The patient has been asked to call us immediately with any symptom developments, issues, questions or other general concerns.     I have explained all of the above in detail and the patient understands all of the current recommendation(s). I have answered all of their questions to the best of my ability and to their complete satisfaction.   The patient is to continue with the current management plan.            Electronically signed by Jefferson Ibarra MD                         Answers for HPI/ROS submitted by the patient on 2/1/2022  appetite change : No  unexpected weight change: No  mouth sores: No  visual disturbance: No  cough: Yes  shortness of breath: Yes  chest pain: No  abdominal pain: No  diarrhea: No  frequency: Yes  back pain: No  rash: No  headaches: No  adenopathy: No  nervous/ anxious: No

## 2022-02-03 ENCOUNTER — OFFICE VISIT (OUTPATIENT)
Dept: HEMATOLOGY/ONCOLOGY | Facility: CLINIC | Age: 67
End: 2022-02-03
Payer: MEDICARE

## 2022-02-03 ENCOUNTER — INFUSION (OUTPATIENT)
Dept: INFUSION THERAPY | Facility: HOSPITAL | Age: 67
End: 2022-02-03
Attending: INTERNAL MEDICINE
Payer: MEDICARE

## 2022-02-03 VITALS
TEMPERATURE: 98 F | OXYGEN SATURATION: 96 % | HEIGHT: 68 IN | HEART RATE: 98 BPM | WEIGHT: 209 LBS | DIASTOLIC BLOOD PRESSURE: 91 MMHG | RESPIRATION RATE: 16 BRPM | SYSTOLIC BLOOD PRESSURE: 149 MMHG | BODY MASS INDEX: 31.67 KG/M2

## 2022-02-03 VITALS
RESPIRATION RATE: 16 BRPM | OXYGEN SATURATION: 97 % | HEIGHT: 68 IN | BODY MASS INDEX: 31.77 KG/M2 | DIASTOLIC BLOOD PRESSURE: 82 MMHG | TEMPERATURE: 98 F | HEART RATE: 91 BPM | WEIGHT: 209.63 LBS | SYSTOLIC BLOOD PRESSURE: 146 MMHG

## 2022-02-03 DIAGNOSIS — D80.1 HYPOGAMMAGLOBULINEMIA: Primary | ICD-10-CM

## 2022-02-03 DIAGNOSIS — C82.30 FOLLICULAR LYMPHOMA GRADE IIIA, UNSPECIFIED BODY REGION: Primary | ICD-10-CM

## 2022-02-03 DIAGNOSIS — C82.30 FOLLICULAR LYMPHOMA GRADE IIIA, UNSPECIFIED BODY REGION: ICD-10-CM

## 2022-02-03 PROCEDURE — 96415 CHEMO IV INFUSION ADDL HR: CPT

## 2022-02-03 PROCEDURE — 99214 OFFICE O/P EST MOD 30 MIN: CPT | Mod: S$GLB,,, | Performed by: INTERNAL MEDICINE

## 2022-02-03 PROCEDURE — 99214 PR OFFICE/OUTPT VISIT, EST, LEVL IV, 30-39 MIN: ICD-10-PCS | Mod: S$GLB,,, | Performed by: INTERNAL MEDICINE

## 2022-02-03 PROCEDURE — 25000003 PHARM REV CODE 250: Performed by: INTERNAL MEDICINE

## 2022-02-03 PROCEDURE — 63600175 PHARM REV CODE 636 W HCPCS: Performed by: INTERNAL MEDICINE

## 2022-02-03 PROCEDURE — 96375 TX/PRO/DX INJ NEW DRUG ADDON: CPT

## 2022-02-03 PROCEDURE — 96367 TX/PROPH/DG ADDL SEQ IV INF: CPT

## 2022-02-03 PROCEDURE — 96413 CHEMO IV INFUSION 1 HR: CPT

## 2022-02-03 RX ORDER — HEPARIN 100 UNIT/ML
500 SYRINGE INTRAVENOUS
Status: DISCONTINUED | OUTPATIENT
Start: 2022-02-03 | End: 2022-02-03 | Stop reason: HOSPADM

## 2022-02-03 RX ORDER — MEPERIDINE HYDROCHLORIDE 25 MG/ML
25 INJECTION INTRAMUSCULAR; INTRAVENOUS; SUBCUTANEOUS
Status: DISCONTINUED | OUTPATIENT
Start: 2022-02-03 | End: 2022-02-03 | Stop reason: HOSPADM

## 2022-02-03 RX ORDER — ACETAMINOPHEN 325 MG/1
650 TABLET ORAL
Status: CANCELLED | OUTPATIENT
Start: 2022-02-03

## 2022-02-03 RX ORDER — MEPERIDINE HYDROCHLORIDE 25 MG/ML
25 INJECTION INTRAMUSCULAR; INTRAVENOUS; SUBCUTANEOUS
Status: CANCELLED | OUTPATIENT
Start: 2022-02-03 | End: 2022-02-03

## 2022-02-03 RX ORDER — FAMOTIDINE 10 MG/ML
20 INJECTION INTRAVENOUS
Status: COMPLETED | OUTPATIENT
Start: 2022-02-03 | End: 2022-02-03

## 2022-02-03 RX ORDER — ACETAMINOPHEN 325 MG/1
650 TABLET ORAL
Status: COMPLETED | OUTPATIENT
Start: 2022-02-03 | End: 2022-02-03

## 2022-02-03 RX ORDER — FAMOTIDINE 10 MG/ML
20 INJECTION INTRAVENOUS
Status: CANCELLED | OUTPATIENT
Start: 2022-02-03

## 2022-02-03 RX ORDER — HEPARIN 100 UNIT/ML
500 SYRINGE INTRAVENOUS
Status: CANCELLED | OUTPATIENT
Start: 2022-02-03

## 2022-02-03 RX ORDER — SODIUM CHLORIDE 0.9 % (FLUSH) 0.9 %
10 SYRINGE (ML) INJECTION
Status: DISCONTINUED | OUTPATIENT
Start: 2022-02-03 | End: 2022-02-03 | Stop reason: HOSPADM

## 2022-02-03 RX ORDER — SODIUM CHLORIDE 0.9 % (FLUSH) 0.9 %
10 SYRINGE (ML) INJECTION
Status: CANCELLED | OUTPATIENT
Start: 2022-02-03

## 2022-02-03 RX ADMIN — DIPHENHYDRAMINE HYDROCHLORIDE 50 MG: 50 INJECTION INTRAMUSCULAR; INTRAVENOUS at 08:02

## 2022-02-03 RX ADMIN — SODIUM CHLORIDE: 0.9 INJECTION, SOLUTION INTRAVENOUS at 08:02

## 2022-02-03 RX ADMIN — RITUXIMAB 800 MG: 10 INJECTION, SOLUTION INTRAVENOUS at 09:02

## 2022-02-03 RX ADMIN — FAMOTIDINE 20 MG: 10 INJECTION INTRAVENOUS at 08:02

## 2022-02-03 RX ADMIN — ACETAMINOPHEN 650 MG: 325 TABLET ORAL at 08:02

## 2022-02-03 NOTE — PLAN OF CARE
Problem: Fatigue  Goal: Improved Activity Tolerance  Outcome: Ongoing, Progressing  Intervention: Promote Improved Energy  Flowsheets (Taken 2/3/2022 0820)  Fatigue Management: frequent rest breaks encouraged  Sleep/Rest Enhancement:   regular sleep/rest pattern promoted   relaxation techniques promoted  Activity Management: Ambulated -L4

## 2022-02-04 ENCOUNTER — INFUSION (OUTPATIENT)
Dept: INFUSION THERAPY | Facility: HOSPITAL | Age: 67
End: 2022-02-04
Attending: INTERNAL MEDICINE
Payer: MEDICARE

## 2022-02-04 VITALS
RESPIRATION RATE: 16 BRPM | WEIGHT: 212.13 LBS | BODY MASS INDEX: 32.15 KG/M2 | DIASTOLIC BLOOD PRESSURE: 84 MMHG | OXYGEN SATURATION: 98 % | SYSTOLIC BLOOD PRESSURE: 147 MMHG | HEIGHT: 68 IN | TEMPERATURE: 98 F | HEART RATE: 96 BPM

## 2022-02-04 DIAGNOSIS — C82.30 FOLLICULAR LYMPHOMA GRADE IIIA, UNSPECIFIED BODY REGION: ICD-10-CM

## 2022-02-04 DIAGNOSIS — D80.1 NONFAMILIAL HYPOGAMMAGLOBULINEMIA: ICD-10-CM

## 2022-02-04 DIAGNOSIS — D80.1 HYPOGAMMAGLOBULINEMIA: Primary | ICD-10-CM

## 2022-02-04 PROCEDURE — 63600175 PHARM REV CODE 636 W HCPCS: Mod: JG | Performed by: NURSE PRACTITIONER

## 2022-02-04 PROCEDURE — 96415 CHEMO IV INFUSION ADDL HR: CPT

## 2022-02-04 PROCEDURE — 96413 CHEMO IV INFUSION 1 HR: CPT

## 2022-02-04 PROCEDURE — 25000003 PHARM REV CODE 250: Performed by: NURSE PRACTITIONER

## 2022-02-04 PROCEDURE — A4216 STERILE WATER/SALINE, 10 ML: HCPCS | Performed by: NURSE PRACTITIONER

## 2022-02-04 PROCEDURE — 96367 TX/PROPH/DG ADDL SEQ IV INF: CPT

## 2022-02-04 RX ORDER — HEPARIN 100 UNIT/ML
500 SYRINGE INTRAVENOUS
Status: CANCELLED | OUTPATIENT
Start: 2022-03-04

## 2022-02-04 RX ORDER — SODIUM CHLORIDE 0.9 % (FLUSH) 0.9 %
10 SYRINGE (ML) INJECTION
Status: DISCONTINUED | OUTPATIENT
Start: 2022-02-04 | End: 2022-02-04 | Stop reason: HOSPADM

## 2022-02-04 RX ORDER — HEPARIN 100 UNIT/ML
500 SYRINGE INTRAVENOUS
Status: DISCONTINUED | OUTPATIENT
Start: 2022-02-04 | End: 2022-02-04 | Stop reason: HOSPADM

## 2022-02-04 RX ORDER — SODIUM CHLORIDE 0.9 % (FLUSH) 0.9 %
10 SYRINGE (ML) INJECTION
Status: CANCELLED | OUTPATIENT
Start: 2022-03-04

## 2022-02-04 RX ADMIN — SODIUM CHLORIDE 10 ML: 9 INJECTION, SOLUTION INTRAMUSCULAR; INTRAVENOUS; SUBCUTANEOUS at 10:02

## 2022-02-04 RX ADMIN — DIPHENHYDRAMINE HYDROCHLORIDE 25 MG: 50 INJECTION INTRAMUSCULAR; INTRAVENOUS at 08:02

## 2022-02-04 RX ADMIN — IMMUNE GLOBULIN (HUMAN) 30 G: 10 INJECTION INTRAVENOUS; SUBCUTANEOUS at 08:02

## 2022-02-04 RX ADMIN — HEPARIN 500 UNITS: 100 SYRINGE at 10:02

## 2022-02-04 RX ADMIN — SODIUM CHLORIDE: 0.9 INJECTION, SOLUTION INTRAVENOUS at 08:02

## 2022-02-04 NOTE — PLAN OF CARE
Problem: Fatigue  Goal: Improved Activity Tolerance  Outcome: Ongoing, Progressing  Intervention: Promote Improved Energy  Flowsheets (Taken 2/4/2022 0024)  Fatigue Management: frequent rest breaks encouraged  Sleep/Rest Enhancement:   regular sleep/rest pattern promoted   relaxation techniques promoted  Activity Management: Ambulated -L4

## 2022-02-10 ENCOUNTER — OFFICE VISIT (OUTPATIENT)
Dept: PODIATRY | Facility: CLINIC | Age: 67
End: 2022-02-10
Payer: MEDICARE

## 2022-02-10 VITALS
HEIGHT: 68 IN | BODY MASS INDEX: 31.67 KG/M2 | HEART RATE: 94 BPM | SYSTOLIC BLOOD PRESSURE: 149 MMHG | RESPIRATION RATE: 18 BRPM | WEIGHT: 209 LBS | DIASTOLIC BLOOD PRESSURE: 91 MMHG

## 2022-02-10 DIAGNOSIS — E11.9 COMPREHENSIVE DIABETIC FOOT EXAMINATION, TYPE 2 DM, ENCOUNTER FOR: Primary | ICD-10-CM

## 2022-02-10 DIAGNOSIS — E11.49 TYPE II DIABETES MELLITUS WITH NEUROLOGICAL MANIFESTATIONS: ICD-10-CM

## 2022-02-10 DIAGNOSIS — R21 RASH OF BOTH FEET: ICD-10-CM

## 2022-02-10 DIAGNOSIS — R73.09 ELEVATED GLUCOSE: ICD-10-CM

## 2022-02-10 DIAGNOSIS — G57.93 NEUROPATHIC PAIN OF BOTH FEET: ICD-10-CM

## 2022-02-10 LAB — GLUCOSE SERPL-MCNC: 222 MG/DL (ref 70–110)

## 2022-02-10 PROCEDURE — 99214 OFFICE O/P EST MOD 30 MIN: CPT | Mod: S$PBB,,, | Performed by: PODIATRIST

## 2022-02-10 PROCEDURE — 99215 OFFICE O/P EST HI 40 MIN: CPT | Mod: PBBFAC | Performed by: PODIATRIST

## 2022-02-10 PROCEDURE — 82962 GLUCOSE BLOOD TEST: CPT | Mod: PBBFAC | Performed by: PODIATRIST

## 2022-02-10 PROCEDURE — 99999 PR PBB SHADOW E&M-EST. PATIENT-LVL V: ICD-10-PCS | Mod: PBBFAC,,, | Performed by: PODIATRIST

## 2022-02-10 PROCEDURE — 99999 PR PBB SHADOW E&M-EST. PATIENT-LVL V: CPT | Mod: PBBFAC,,, | Performed by: PODIATRIST

## 2022-02-10 PROCEDURE — 99214 PR OFFICE/OUTPT VISIT, EST, LEVL IV, 30-39 MIN: ICD-10-PCS | Mod: S$PBB,,, | Performed by: PODIATRIST

## 2022-02-10 RX ORDER — AMOXICILLIN AND CLAVULANATE POTASSIUM 875; 125 MG/1; MG/1
TABLET, FILM COATED ORAL
COMMUNITY
Start: 2021-11-04 | End: 2022-02-14

## 2022-02-10 RX ORDER — GABAPENTIN 300 MG/1
CAPSULE ORAL
COMMUNITY
End: 2022-07-01 | Stop reason: SDUPTHER

## 2022-02-10 RX ORDER — AZITHROMYCIN 250 MG/1
TABLET, FILM COATED ORAL
COMMUNITY
End: 2022-02-14

## 2022-02-10 RX ORDER — AZITHROMYCIN 500 MG/1
TABLET, FILM COATED ORAL
COMMUNITY
Start: 2022-01-09 | End: 2022-02-14

## 2022-02-10 RX ORDER — PREDNISONE 20 MG/1
TABLET ORAL
COMMUNITY
Start: 2021-09-20 | End: 2022-07-01

## 2022-02-10 RX ORDER — CIPROFLOXACIN 500 MG/1
TABLET ORAL
COMMUNITY
End: 2022-02-14

## 2022-02-10 RX ORDER — GABAPENTIN 100 MG/1
CAPSULE ORAL
COMMUNITY
End: 2022-07-01

## 2022-02-10 RX ORDER — AZITHROMYCIN 500 MG/1
TABLET, FILM COATED ORAL
COMMUNITY
Start: 2021-11-17 | End: 2022-02-14

## 2022-02-10 RX ORDER — NAPROXEN 500 MG/1
TABLET ORAL
COMMUNITY
Start: 2021-06-23 | End: 2022-02-14 | Stop reason: SDUPTHER

## 2022-02-10 NOTE — PROGRESS NOTES
Subjective:       Patient ID: Sivakumar Thacker is a 66 y.o. male.    Chief Complaint: Follow-up, Diabetes Mellitus, and Rash  Patient for annual diabetic foot exam, complaint pain in both feet, rash.  States feels like there is a tight pad on the ball of his feet when he tries to flex his toes, shooting tingling pain.  Does have neuropathy his feet.  Type 2 diabetic x3 years.  States it is well controlled with A1c between 6 and 7. States he does not check glucose daily.  Also has a history of cervical fusion following an injury June 2000. PCP is increased gabapentin from twice a day to 3 times daily.  Additionally he is on chemotherapy infusion every 8 weeks, IgG every 4 weeks for follicular lymphoma.  He susceptible to long infections and has been on multiple antibiotics.  States he feels well today except for his feet      Past Medical History:   Diagnosis Date    Cancer     lymphoma currently on chemo    Diabetes mellitus, type 2     Encounter for antineoplastic chemotherapy 4/1/2020    Hypertension     Neuropathy     Reflux esophagitis      Past Surgical History:   Procedure Laterality Date    COLONOSCOPY  2018    EYE SURGERY  Approximately 3 years ago    Cataract    HAND SURGERY      amputation left index finger    PORTACATH PLACEMENT      SKIN CANCER EXCISION  09/30/2020    [facility]    SPINE SURGERY      VASECTOMY  1985 ?     Family History   Problem Relation Age of Onset    Diabetes Father      Social History     Socioeconomic History    Marital status:    Tobacco Use    Smoking status: Former Smoker     Packs/day: 0.50     Years: 2.00     Pack years: 1.00    Smokeless tobacco: Never Used   Substance and Sexual Activity    Alcohol use: Not Currently     Alcohol/week: 0.0 standard drinks     Comment: rarely    Drug use: No    Sexual activity: Not Currently       Current Outpatient Medications   Medication Sig Dispense Refill    amLODIPine (NORVASC) 10 MG tablet Take 1 tablet (10 mg  total) by mouth once daily. 90 tablet 3    aspirin (ECOTRIN) 81 MG EC tablet Take 81 mg by mouth once daily.      atorvastatin (LIPITOR) 20 MG tablet Take 1 tablet (20 mg total) by mouth once daily. 90 tablet 3    blood sugar diagnostic (FREESTYLE LITE STRIPS MISC) FreeStyle Lite Strips      blood sugar diagnostic (FREESTYLE LITE STRIPS) Strp Check blood sugar 2 (two) times daily as needed. 200 each 11    blood sugar diagnostic Strp       fluticasone propionate (FLONASE) 50 mcg/actuation nasal spray USE 1 SPRAY IN EACH NOSTRIL TWICE A DAY 48 g 11    fluticasone propionate (FLOVENT HFA) 220 mcg/actuation inhaler Inhale 1 puff into the lungs 2 (two) times daily. Controller 12 g 2    fluticasone-umeclidin-vilanter (TRELEGY ELLIPTA) 200-62.5-25 mcg inhaler Inhale 1 puff into the lungs once daily. 60 each 0    gabapentin (NEURONTIN) 100 MG capsule gabapentin 100 mg capsule      gabapentin (NEURONTIN) 300 MG capsule gabapentin 300 mg capsule      losartan (COZAAR) 100 MG tablet Take 1 tablet (100 mg total) by mouth once daily. 90 tablet 3    metFORMIN (GLUCOPHAGE) 500 MG tablet metformin 500 mg tablet  Take 2 tablets twice a day by oral route with meals for 90 days. 360 tablet 3    montelukast (SINGULAIR) 10 mg tablet Take 1 tablet (10 mg total) by mouth nightly as needed. 90 tablet 3    mucus clearing device (ACAPELLA, FLUTTER) 1 Device by Misc.(Non-Drug; Combo Route) route 2 (two) times daily. 1 Device 0    MULTIVITAMIN ORAL Take 1 tablet by mouth once daily.      naproxen (NAPROSYN) 500 MG tablet Take 1 tablet (500 mg total) by mouth 2 (two) times daily as needed. 180 tablet 3    omeprazole (PRILOSEC) 40 MG capsule Take 1 capsule (40 mg total) by mouth once daily. 90 capsule 3    tiZANidine (ZANAFLEX) 4 MG tablet Take 1 tablet (4 mg total) by mouth daily as needed. 90 tablet 3    albuterol-ipratropium (DUO-NEB) 2.5 mg-0.5 mg/3 mL nebulizer solution Take 3 mLs by nebulization every 6 (six) hours as  "needed for Wheezing or Shortness of Breath. Rescue 120 vial 5    predniSONE (DELTASONE) 20 MG tablet prednisone 20 mg tablet       No current facility-administered medications for this visit.     Review of patient's allergies indicates:  No Known Allergies    Review of Systems   Constitutional: Negative for fever.   HENT: Negative for congestion.    Respiratory: Negative for cough and shortness of breath.    Cardiovascular: Negative for leg swelling.   Musculoskeletal: Negative for gait problem.   Neurological:        Numbness, tingling feet   All other systems reviewed and are negative.      Objective:      Vitals:    02/10/22 0843   BP: (!) 149/91   Pulse: 94   Resp: 18   Weight: 94.8 kg (209 lb)   Height: 5' 8" (1.727 m)     Physical Exam  Vitals and nursing note reviewed.   Constitutional:       General: He is not in acute distress.  Cardiovascular:      Pulses:           Dorsalis pedis pulses are 2+ on the right side and 2+ on the left side.        Posterior tibial pulses are 2+ on the right side and 2+ on the left side.   Pulmonary:      Effort: Pulmonary effort is normal.   Musculoskeletal:      Right foot: No deformity.      Left foot: No deformity.   Feet:      Right foot:      Skin integrity: Skin integrity normal.      Left foot:      Skin integrity: Skin integrity normal.   Skin:     Capillary Refill: Capillary refill takes 2 to 3 seconds.   Neurological:      General: No focal deficit present.      Comments: Decreased pain, paresthesias, diabetic neuropathy well controlled with the increased gabapentin at this time   Psychiatric:         Mood and Affect: Mood normal.         Behavior: Behavior normal.       POCT Glucose, Hand-Held Device    Component Ref Range & Units    POC Glucose 70 - 110 MG/ Abnormal                Hemoglobin A1C    Component Ref Range & Units 2 mo ago   (12/13/21) 7 mo ago   (6/23/21) 1 yr ago   (12/14/20)   Hemoglobin A1C 4.0 - 5.6 % 6.7 High   6.7 High  CM  6.0 R, CM  "   Estimated Avg Glucose 68 - 131 mg/dL 146 High   146 High   126                                           Assessment:       1. Comprehensive diabetic foot examination, type 2 DM, encounter for    2. Type II diabetes mellitus with neurological manifestations    3. Neuropathic pain of both feet    4. Elevated glucose    5. Rash of both feet        Plan:           A diabetic foot exam performed.  Reviewed results of monofilament testing, diminished sensation feet, concern level neuropathic pain changes in feet diabetic related cervical.    Discussion patient regarding the difference between diabetic neuropathy and neuropathy due to spinal issues as well as chemotherapy.  Advised patient gabapentin can be slowly increased by his PCP as long as well tolerated and beneficial but he needs to perform other treatments to come nerves down in his feet as well.  Needs to control swelling and we discussed options including light compression sock and elevation.  We discussed the need to add some type of movement to soothe nerves specially in the evening and discussed massage, soaking with jet/bubbles.  Advised movement/distraction of nerves can be very effective if done on a daily basis  Reviewed diabetic education.    Discussed the importance of continued control and daily glucose and A1c   Instructed patient to use over-the-counter cortisone, hydrocortisone, according 8 cream apply twice daily to rash, should resolve in 3 days  We discussed IM cortisone injection since he does have some evidence of neuritis/inflammation in his feet along with the rash, however glucose checked in the office today was 222  Reviewed importance of proper shoes, wide light appropriate shoes indoors, shoes at all times especially indoors to cushion nerve endings and protect feet  Discussed maintenance of skin and nails and potential complications.    Reviewed need for daily foot checks and instructed patient to contact the office with any area of  redness or swelling which has not improved within 3 days.  Patient was in understanding and agreement with treatment plan.  I counseled the patient on their conditions, implications and medical management.  Instructed patient to contact the office with any changes, questions, concerns, worsening of symptoms.   Total face-to-face time, exam, assessment, treatment, discussion, documentation 20 minutes, more than half this time spent on consultation and coordination of care.  Follow up prn, 3-6 months, 12 months next diabetic foot exam      This note was created using M*Modal voice recognition software that occasionally misinterpreted phrases or words.

## 2022-02-17 ENCOUNTER — OFFICE VISIT (OUTPATIENT)
Dept: PULMONOLOGY | Facility: CLINIC | Age: 67
End: 2022-02-17
Payer: MEDICARE

## 2022-02-17 VITALS
BODY MASS INDEX: 31.83 KG/M2 | HEART RATE: 94 BPM | OXYGEN SATURATION: 96 % | SYSTOLIC BLOOD PRESSURE: 153 MMHG | WEIGHT: 210 LBS | HEIGHT: 68 IN | DIASTOLIC BLOOD PRESSURE: 83 MMHG

## 2022-02-17 DIAGNOSIS — J47.0 BRONCHIECTASIS WITH ACUTE LOWER RESPIRATORY INFECTION: Primary | ICD-10-CM

## 2022-02-17 DIAGNOSIS — R91.8 MULTIPLE LUNG NODULES: ICD-10-CM

## 2022-02-17 DIAGNOSIS — J30.1 SEASONAL ALLERGIC RHINITIS DUE TO POLLEN: ICD-10-CM

## 2022-02-17 PROCEDURE — 99213 OFFICE O/P EST LOW 20 MIN: CPT | Mod: S$PBB,,, | Performed by: NURSE PRACTITIONER

## 2022-02-17 PROCEDURE — 99213 PR OFFICE/OUTPT VISIT, EST, LEVL III, 20-29 MIN: ICD-10-PCS | Mod: S$PBB,,, | Performed by: NURSE PRACTITIONER

## 2022-02-17 PROCEDURE — 99214 OFFICE O/P EST MOD 30 MIN: CPT | Mod: PBBFAC,PO | Performed by: NURSE PRACTITIONER

## 2022-02-17 PROCEDURE — 99999 PR PBB SHADOW E&M-EST. PATIENT-LVL IV: CPT | Mod: PBBFAC,,, | Performed by: NURSE PRACTITIONER

## 2022-02-17 PROCEDURE — 99999 PR PBB SHADOW E&M-EST. PATIENT-LVL IV: ICD-10-PCS | Mod: PBBFAC,,, | Performed by: NURSE PRACTITIONER

## 2022-02-17 RX ORDER — AZITHROMYCIN 500 MG/1
500 TABLET, FILM COATED ORAL
Qty: 39 TABLET | Refills: 1 | Status: SHIPPED | OUTPATIENT
Start: 2022-02-18 | End: 2022-02-17 | Stop reason: SDUPTHER

## 2022-02-17 RX ORDER — PREDNISONE 10 MG/1
TABLET ORAL
Qty: 12 TABLET | Refills: 0 | Status: SHIPPED | OUTPATIENT
Start: 2022-02-17 | End: 2022-07-01

## 2022-02-17 RX ORDER — AZITHROMYCIN 500 MG/1
500 TABLET, FILM COATED ORAL
Qty: 39 TABLET | Refills: 1 | Status: SHIPPED | OUTPATIENT
Start: 2022-02-18 | End: 2022-02-21

## 2022-02-17 NOTE — PATIENT INSTRUCTIONS
Continue current medication regiment    Continue three times a week Azithromcyin regiment    Will review PET scan at next appointment to monitor lung nodules previously seen.    Bring sputum sample to any [facility] lab with in 4 hours of collecting    Need to use VEST device, aerobika, and nebulizer together at least daily to clear excessive mucous and prevent lung infections.

## 2022-02-17 NOTE — PROGRESS NOTES
2/17/2022    Sivakumar Thacker  Office note    Chief Complaint   Patient presents with    3m f/u    Cough       HPI:    2/17/2022-  Noticed improvement after starting vest device and Azithromcyin 3x weekly, using nebulizer with Aerobika 2x daily. Prescription for azithromycin ran out 2 weeks prior.   Complaint of sinus drainage onset 4 weeks, clear drainage, tried flonase with benefit.      On IV IG for 4 years followed by Dr. Ibarra.     11/17/2021- Cough- persistent complaint, daily, through out, nocturnal arousals nightly, using albuterol nebulizer and vest 2x daily. Productive green mucous, dime size amount of mucous. tx with antibiotics for H.Flu with no benefit. Associated with chest tightness and wheeze. Tried Tesselon perles with no benefit. No benefit with codeine cough syrup.     8/17/2021- Cough- recurrent complaint, onset 3 days, worse in morning and during day, improve with albuterol nebulizer, able to clear yellow/green mucous. Started antibiotics 2 weeks ago due to green mucous, took Cipro, cough returned after finishing Cipro prescription. Nocturnal arousals 3x weekly.   Has aerobika from when wife was hospitalized, using.   Using nebulizer daily with Musinex.   SOB- worsened since finishing antibiotic, worse with exertion, no chest tightness or wheeze, improve with coughing up mucous. Associated with fatigue      5/17/2021- had persistent cough with occasional thick, yellow mucous onset January lasted 4 weeks, Felt rattle in chest., improved with antibiotics Augmentin.   has nebulizer but not currently using.   Current cough- mild, 2x weekly, feels mucous in chest but unable to cough up.    No shortness of breath, chest tightness, or wheeze.    11/17/2020- states doing well, cough has decreased to occasional to 2 times weekly that is mild, productive clear mucous.   States no fever, night sweats, shortness of breath,   Complaint of sinus drainage: daily, clear, singulair most days,      8/17/20-  Cough- improved following antibiotic therapy, returned after 8 weeks, productive, light green color, dime size, worse in early morning, no chest tightness or wheeze.  No SOB. Using nebulizer 2x daily for 2 weeks, undergoing chemo and IVIG for Lymphoma and skin cancer.     6/16/2020- Cough unchanged with coughing fits when trying to sleep. States no improvement with Trelegy inhaler. States Levaquin antibiotic help a little bit and cough returned after finishing. Has been on IgG replacement therapy every 4 weeks since 2012.  Brought in 5 sputum sample, all still processing.       4/20/2020- Chronic cough onset 2013, followed by pulmonologist Dr. Case in [place]. Put Advair with no benefit. Had negative bronchoscopy and negative AFB, had appointment with Dr. Brock in 2014. Hx of non Hodgkins lymphoma followed by Dr. Ibarra on Rotuxin and immune therapy.  Cough Worsened in Jan 2020, associated with recurrent fever improves with tylenol, states cough improves with antibiotics but returns when finished medications. Productive occasionally, can feel something rattling in chest, thick white mucous. 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 2 pack year  Family Hx: no Lung Cancer, no COPD, no Asthma  Medical Hx: no previous pneumonia ; no previous shoulder/chest surgery        The chief compliant  problem is stable  PFSH:  Past Medical History:   Diagnosis Date    Cancer     lymphoma currently on chemo    Diabetes mellitus, type 2     Encounter for antineoplastic chemotherapy 4/1/2020    Hypertension     Neuropathy     Reflux esophagitis          Past Surgical History:   Procedure Laterality Date    COLONOSCOPY  2018    EYE SURGERY   "Approximately 3 years ago    Cataract    HAND SURGERY      amputation left index finger    PORTACATH PLACEMENT      SKIN CANCER EXCISION  09/30/2020    [facility]    SPINE SURGERY      VASECTOMY  1985 ?     Social History     Tobacco Use    Smoking status: Former Smoker     Packs/day: 0.50     Years: 2.00     Pack years: 1.00    Smokeless tobacco: Never Used   Substance Use Topics    Alcohol use: Not Currently     Alcohol/week: 0.0 standard drinks     Comment: rarely    Drug use: No     Family History   Problem Relation Age of Onset    Diabetes Father      Review of patient's allergies indicates:  No Known Allergies  I have reviewed past medical, family, and social history. I have reviewed previous nurse notes.    Performance Status:The patient's activity level is no limits with regular activity.      Review of Systems:  a review of eleven systems covering constitutional, Eye, HEENT, Psych, Respiratory, Cardiac, GI, , Musculoskeletal, Endocrine, Dermatologic was negative except for pertinent findings as listed ABOVE and below: pertinent positive as above, rest is good  Cough, shortness of breath, sinus drainage         Exam:Comprehensive exam done. BP (!) 153/83 (BP Location: Right arm, Patient Position: Sitting, BP Method: Medium (Automatic))   Pulse 94   Ht 5' 8" (1.727 m)   Wt 95.2 kg (209 lb 15.8 oz)   SpO2 96% Comment: on room air at rest  BMI 31.93 kg/m²   Exam included Vitals as listed, and patient's appearance and affect and alertness and mood, oral exam for yeast and hygiene and pharynx lesions and Mallapatti (M) score, neck with inspection for jvd and masses and thyroid abnormalities and lymph nodes (supraclavicular and infraclavicular nodes and axillary also examined and noted if abn), chest exam included symmetry and effort and fremitus and percussion and auscultation, cardiac exam included rhythm and gallops and murmur and rubs and jvd and edema, abdominal exam for mass and " hepatosplenomegaly and tenderness and hernias and bowel sounds, Musculoskeletal exam with muscle tone and posture and mobility/gait and  strength, and skin for rashes and cyanosis and pallor and turgor, extremity for clubbing.  Findings were normal except for pertinent findings listed below: M2, bilateral rhonchi.       Radiographs (ct chest and cxr) reviewed: view by direct vision   NM PET CT Routine Skull to Mid Thigh 10/04/2021   New bilateral reticulonodular pulmonary opacities suggesting infectious or inflammatory pneumonitis. Clinical and laboratory correlation is requested.     CT CHEST WITHOUT CONTRAST  06/21/2021   Granulomatous change.  2. Multiple small pulmonary nodules.  Follow-up per Fleischner guidelines.  For multiple solid nodules all <6 mm, Fleischner Society 2017 guidelines recommend no routine follow up for a low risk patient, or follow up with non-contrast chest CT at 12 months after discovery in a high risk patient.  3. Minimal bronchiectasis of the bilateral lower lobes.  4. No significant lymphadenopathy.      NM PET CT Routine Skull to Mid Thigh 03/19/21   1. Mild patchy airspace opacity in the superior segment of the left  lower lobe with increased FDG activity from background suggestive of a  small focal pneumonia (possibly viral in etiology). Consider  correlation with the symptoms, history and CT follow-up in 3 months to  document resolution.    X-Ray Chest PA And Lateral  03/25/2021   Mild interstitial prominence, similar to previous exams.  No focal consolidation.     CT Chest Without 03/19/2020   1. Mild bronchiectasis in the left lower lobe and tree-in-bud micro nodules at the left lung base suggesting underlying bronchiolitis, possibly due to a non tuberculous mycobacterial infection or viral bronchiolitis.  2. Fatty infiltration of the liver  3. Small hiatal hernia.     CT Chest Without Contrast 09/01/2020   Unchanged left upper lobe 3 mm nodule.  Mild old granulomatous  disease.  Minimal bronchiectasis which is nonspecific.  Adjacent micro nodules and tree-in-bud opacities are also unchanged.  Findings favor sequelae of an old atypical infectious process.  Atherosclerosis.  Small hiatal hernia.      Labs reviewed       Lab Results   Component Value Date    WBC 5.87 01/31/2022    RBC 4.68 01/31/2022    HGB 13.9 (L) 01/31/2022    HCT 42.8 01/31/2022    MCV 92 01/31/2022    MCH 29.7 01/31/2022    MCHC 32.5 01/31/2022    RDW 13.1 01/31/2022     01/31/2022    MPV 9.2 01/31/2022    GRAN 3.2 01/31/2022    GRAN 54.2 01/31/2022    LYMPH 1.7 01/31/2022    LYMPH 28.3 01/31/2022    MONO 0.7 01/31/2022    MONO 11.2 01/31/2022    EOS 0.2 01/31/2022    BASO 0.05 01/31/2022    EOSINOPHIL 3.9 01/31/2022    BASOPHIL 0.9 01/31/2022       AFB x 3 all negative following 8 week incubation, Respiratory cultures show normal dank only    Culture, Respiratory with Gram Stain 09/18/20 HAEMOPHILUS INFLUENZAE   Culture, Respiratory with Gram Stain 1/21/2021 HAEMOPHILUS INFLUENZAE   Respiratory with Gram Stain 7/22/21, PSEUDOMONAS AERUGINOSA and HAEMOPHILUS INFLUENZAE       PFT  reviewed  Pulmonary Functions Testing Results:  Spirometry bronchodilator, lung volume by gas dilution, diffusion capacity measured June 23, 2020.  The FEV1 FVC ratio was 80% indicating no airflow obstruction by spirometry technique.  The FEV1 was 108% of predicted or 3.5 L.  There was no   significant improvement following bronchodilator.  Total lung capacity on lung volume by gas dilution was 69% and a little reduced.  Diffusion was normal.       Spirometry was normal and was no significant bronchodilator response.  Total lung capacity was reduced consistent with restriction.  Diffusion was normal.  Clinical correlation recommended.     7/17/2013 PFT Data: Complete pulmonary function studies were obtained today and are independently reviewed. Spirometry shows no evidence of airflow obstruction today. Lung was performed by  plethysmography and are consistent with air trapping without hyperinflation. Residual volume is 122% predicted, and the total capacity is 98% predicted. Diffusion capacity for carbon monoxide is in the normal range at 102% predicted. Overall, these are fairly normal pulmonary function studies and do not suggest any obstructive lung disease.    Spirometry bronchodilator, lung volume by gas dilution, diffusion capacity measured June 23, 2020.  The FEV1 FVC ratio was 80% indicating no airflow obstruction by spirometry technique.  The FEV1 was 108% of predicted or 3.5 L.  There was no   significant improvement following bronchodilator.  Total lung capacity on lung volume by gas dilution was 69% and a little reduced.  Diffusion was normal.       Spirometry was normal and was no significant bronchodilator response.  Total lung capacity was reduced consistent with restriction.  Diffusion was normal.  Clinical correlation recommended.         Plan:  Clinical impression is apparently straight forward and impression with management as below.    Sivakumar was seen today for 3m f/u and cough.    Diagnoses and all orders for this visit:    Bronchiectasis with acute lower respiratory infection  -     Discontinue: azithromycin (ZITHROMAX) 500 MG tablet; Take 1 tablet (500 mg total) by mouth every Mon, Wed, Fri. for 3 days  -     Culture, Respiratory with Gram Stain; Future  -     azithromycin (ZITHROMAX) 500 MG tablet; Take 1 tablet (500 mg total) by mouth every Mon, Wed, Fri. for 3 days  -     predniSONE (DELTASONE) 10 MG tablet; 1-2 pills for 3 days; repeat for cough and sinus congestion    Multiple lung nodules    Seasonal allergic rhinitis due to pollen  -     predniSONE (DELTASONE) 10 MG tablet; 1-2 pills for 3 days; repeat for cough and sinus congestion     - Follow PET scan ordered by Oncology scheduled April 2022    Follow up in about 3 months (around 5/17/2022), or if symptoms worsen or fail to improve.    Discussed with  patient above for education the following:      Patient Instructions   Continue current medication regiment    Continue three times a week Azithromcyin regiment    Will review PET scan at next appointment to monitor lung nodules previously seen.    Bring sputum sample to any [facility] lab with in 4 hours of collecting    Need to use VEST device, aerobika, and nebulizer together at least daily to clear excessive mucous and prevent lung infections.

## 2022-02-18 ENCOUNTER — TELEPHONE (OUTPATIENT)
Dept: PULMONOLOGY | Facility: CLINIC | Age: 67
End: 2022-02-18
Payer: MEDICARE

## 2022-02-18 NOTE — TELEPHONE ENCOUNTER
Spoke with pharmacist Howard. Clarified order for zithromax.    ----- Message from Lino Torres sent at 2/18/2022  3:11 PM CST -----  Regarding: clarifcation  [facility] Nitesh Dowling want to speak with a nurse regarding clarification on antibiotic, please call back at [phone]

## 2022-03-04 ENCOUNTER — INFUSION (OUTPATIENT)
Dept: INFUSION THERAPY | Facility: HOSPITAL | Age: 67
End: 2022-03-04
Attending: INTERNAL MEDICINE
Payer: MEDICARE

## 2022-03-04 VITALS
DIASTOLIC BLOOD PRESSURE: 84 MMHG | HEART RATE: 90 BPM | TEMPERATURE: 98 F | WEIGHT: 210.38 LBS | SYSTOLIC BLOOD PRESSURE: 150 MMHG | OXYGEN SATURATION: 96 % | BODY MASS INDEX: 31.89 KG/M2 | RESPIRATION RATE: 20 BRPM | HEIGHT: 68 IN

## 2022-03-04 DIAGNOSIS — C82.30 FOLLICULAR LYMPHOMA GRADE IIIA, UNSPECIFIED BODY REGION: ICD-10-CM

## 2022-03-04 DIAGNOSIS — D80.1 NONFAMILIAL HYPOGAMMAGLOBULINEMIA: ICD-10-CM

## 2022-03-04 DIAGNOSIS — D80.1 HYPOGAMMAGLOBULINEMIA: Primary | ICD-10-CM

## 2022-03-04 PROCEDURE — 96413 CHEMO IV INFUSION 1 HR: CPT

## 2022-03-04 PROCEDURE — 63600175 PHARM REV CODE 636 W HCPCS: Mod: JG | Performed by: NURSE PRACTITIONER

## 2022-03-04 PROCEDURE — 25000003 PHARM REV CODE 250: Performed by: NURSE PRACTITIONER

## 2022-03-04 PROCEDURE — A4216 STERILE WATER/SALINE, 10 ML: HCPCS | Performed by: NURSE PRACTITIONER

## 2022-03-04 PROCEDURE — 96367 TX/PROPH/DG ADDL SEQ IV INF: CPT

## 2022-03-04 PROCEDURE — 96415 CHEMO IV INFUSION ADDL HR: CPT

## 2022-03-04 RX ORDER — HEPARIN 100 UNIT/ML
500 SYRINGE INTRAVENOUS
Status: CANCELLED | OUTPATIENT
Start: 2022-04-01

## 2022-03-04 RX ORDER — SODIUM CHLORIDE 0.9 % (FLUSH) 0.9 %
10 SYRINGE (ML) INJECTION
Status: CANCELLED | OUTPATIENT
Start: 2022-04-01

## 2022-03-04 RX ORDER — SODIUM CHLORIDE 0.9 % (FLUSH) 0.9 %
10 SYRINGE (ML) INJECTION
Status: DISCONTINUED | OUTPATIENT
Start: 2022-03-04 | End: 2022-03-04 | Stop reason: HOSPADM

## 2022-03-04 RX ORDER — HEPARIN 100 UNIT/ML
500 SYRINGE INTRAVENOUS
Status: DISCONTINUED | OUTPATIENT
Start: 2022-03-04 | End: 2022-03-04 | Stop reason: HOSPADM

## 2022-03-04 RX ADMIN — IMMUNE GLOBULIN (HUMAN) 30 G: 10 INJECTION INTRAVENOUS; SUBCUTANEOUS at 08:03

## 2022-03-04 RX ADMIN — Medication 500 UNITS: at 10:03

## 2022-03-04 RX ADMIN — SODIUM CHLORIDE, PRESERVATIVE FREE 10 ML: 5 INJECTION INTRAVENOUS at 10:03

## 2022-03-04 RX ADMIN — DIPHENHYDRAMINE HYDROCHLORIDE 25 MG: 50 INJECTION INTRAMUSCULAR; INTRAVENOUS at 08:03

## 2022-03-04 RX ADMIN — SODIUM CHLORIDE: 9 INJECTION, SOLUTION INTRAVENOUS at 08:03

## 2022-03-04 NOTE — PLAN OF CARE
Problem: Fatigue  Goal: Improved Activity Tolerance  Outcome: Ongoing, Progressing  Intervention: Promote Improved Energy  Flowsheets (Taken 3/4/2022 0820)  Fatigue Management: frequent rest breaks encouraged

## 2022-03-28 ENCOUNTER — OFFICE VISIT (OUTPATIENT)
Dept: FAMILY MEDICINE | Facility: CLINIC | Age: 67
End: 2022-03-28
Payer: MEDICARE

## 2022-03-28 ENCOUNTER — PATIENT MESSAGE (OUTPATIENT)
Dept: HEMATOLOGY/ONCOLOGY | Facility: CLINIC | Age: 67
End: 2022-03-28

## 2022-03-28 VITALS
OXYGEN SATURATION: 95 % | HEIGHT: 68 IN | BODY MASS INDEX: 31.16 KG/M2 | SYSTOLIC BLOOD PRESSURE: 130 MMHG | HEART RATE: 74 BPM | DIASTOLIC BLOOD PRESSURE: 78 MMHG | WEIGHT: 205.63 LBS

## 2022-03-28 DIAGNOSIS — J44.9 CHRONIC OBSTRUCTIVE PULMONARY DISEASE, UNSPECIFIED COPD TYPE: ICD-10-CM

## 2022-03-28 DIAGNOSIS — E11.9 TYPE 2 DIABETES MELLITUS WITHOUT COMPLICATION, WITHOUT LONG-TERM CURRENT USE OF INSULIN: Primary | ICD-10-CM

## 2022-03-28 DIAGNOSIS — D80.1 HYPOGAMMAGLOBULINEMIA: ICD-10-CM

## 2022-03-28 PROCEDURE — 99214 OFFICE O/P EST MOD 30 MIN: CPT | Mod: S$PBB,,, | Performed by: FAMILY MEDICINE

## 2022-03-28 PROCEDURE — 99999 PR PBB SHADOW E&M-EST. PATIENT-LVL V: ICD-10-PCS | Mod: PBBFAC,,, | Performed by: FAMILY MEDICINE

## 2022-03-28 PROCEDURE — 99999 PR PBB SHADOW E&M-EST. PATIENT-LVL V: CPT | Mod: PBBFAC,,, | Performed by: FAMILY MEDICINE

## 2022-03-28 PROCEDURE — 99214 PR OFFICE/OUTPT VISIT, EST, LEVL IV, 30-39 MIN: ICD-10-PCS | Mod: S$PBB,,, | Performed by: FAMILY MEDICINE

## 2022-03-28 PROCEDURE — 99215 OFFICE O/P EST HI 40 MIN: CPT | Mod: PBBFAC,PN | Performed by: FAMILY MEDICINE

## 2022-03-28 NOTE — PROGRESS NOTES
" Patient ID: Sivakumar Thacker is a 67 y.o. male.    Chief Complaint: Follow-up (3 mth f/u)      Reviewed family, medical, surgical, and social history.    No cp/sob  No change in mental status  No fever  No asymmetrical limb swelling    Objective:      /78 (BP Location: Right arm, Patient Position: Sitting, BP Method: Medium (Manual))   Pulse 74   Ht 5' 8" (1.727 m)   Wt 93.3 kg (205 lb 9.6 oz)   SpO2 95%   BMI 31.26 kg/m²   RRR, CTAB, s/nt/nd, no c/c/e, non-toxic appearing, no focal weakness  Assessment:       1. Type 2 diabetes mellitus without complication, without long-term current use of insulin    2. Chronic obstructive pulmonary disease, unspecified COPD type    3. Hypogammaglobulinemia        Plan:       Type 2 diabetes mellitus without complication, without long-term current use of insulin  -     Hemoglobin A1C; Future; Expected date: 03/28/2022    Chronic obstructive pulmonary disease, unspecified COPD type    Hypogammaglobulinemia            Reviewed, monitored, evaluated, and assessed chronic conditions as outlined above  Continue current medicines, any changes noted above  As shown  Labs, radiology studies, and procedures/notes from the last 3 months were reviewed.    Risks, benefits, and side effects were discussed with the patient. All questions were answered to the fullest satisfaction of the patient, and pt verbalized understanding and agreement to treatment plan. Pt was to call with any new or worsening symptoms, or present to the ER.    "

## 2022-03-29 ENCOUNTER — LAB VISIT (OUTPATIENT)
Dept: LAB | Facility: HOSPITAL | Age: 67
End: 2022-03-29
Attending: INTERNAL MEDICINE
Payer: MEDICARE

## 2022-03-29 ENCOUNTER — TELEPHONE (OUTPATIENT)
Dept: HEMATOLOGY/ONCOLOGY | Facility: CLINIC | Age: 67
End: 2022-03-29
Payer: MEDICARE

## 2022-03-29 DIAGNOSIS — C82.30 FOLLICULAR LYMPHOMA GRADE IIIA, UNSPECIFIED BODY REGION: Primary | ICD-10-CM

## 2022-03-29 DIAGNOSIS — C82.30 FOLLICULAR LYMPHOMA GRADE IIIA, UNSPECIFIED BODY REGION: ICD-10-CM

## 2022-03-29 LAB
ALBUMIN SERPL BCP-MCNC: 3.9 G/DL (ref 3.5–5.2)
ALP SERPL-CCNC: 93 U/L (ref 55–135)
ALT SERPL W/O P-5'-P-CCNC: 19 U/L (ref 10–44)
ANION GAP SERPL CALC-SCNC: 11 MMOL/L (ref 8–16)
AST SERPL-CCNC: 25 U/L (ref 10–40)
BASOPHILS # BLD AUTO: 0.05 K/UL (ref 0–0.2)
BASOPHILS NFR BLD: 1.2 % (ref 0–1.9)
BILIRUB SERPL-MCNC: 0.3 MG/DL (ref 0.1–1)
BUN SERPL-MCNC: 13 MG/DL (ref 8–23)
CALCIUM SERPL-MCNC: 8.8 MG/DL (ref 8.7–10.5)
CHLORIDE SERPL-SCNC: 108 MMOL/L (ref 95–110)
CO2 SERPL-SCNC: 25 MMOL/L (ref 23–29)
CREAT SERPL-MCNC: 0.9 MG/DL (ref 0.5–1.4)
DIFFERENTIAL METHOD: ABNORMAL
EOSINOPHIL # BLD AUTO: 0.2 K/UL (ref 0–0.5)
EOSINOPHIL NFR BLD: 4.8 % (ref 0–8)
ERYTHROCYTE [DISTWIDTH] IN BLOOD BY AUTOMATED COUNT: 13.2 % (ref 11.5–14.5)
EST. GFR  (AFRICAN AMERICAN): >60 ML/MIN/1.73 M^2
EST. GFR  (NON AFRICAN AMERICAN): >60 ML/MIN/1.73 M^2
GLUCOSE SERPL-MCNC: 130 MG/DL (ref 70–110)
HCT VFR BLD AUTO: 41.5 % (ref 40–54)
HGB BLD-MCNC: 13.8 G/DL (ref 14–18)
IMM GRANULOCYTES # BLD AUTO: 0.06 K/UL (ref 0–0.04)
IMM GRANULOCYTES NFR BLD AUTO: 1.4 % (ref 0–0.5)
LYMPHOCYTES # BLD AUTO: 1.1 K/UL (ref 1–4.8)
LYMPHOCYTES NFR BLD: 26.2 % (ref 18–48)
MCH RBC QN AUTO: 29.5 PG (ref 27–31)
MCHC RBC AUTO-ENTMCNC: 33.3 G/DL (ref 32–36)
MCV RBC AUTO: 89 FL (ref 82–98)
MONOCYTES # BLD AUTO: 0.6 K/UL (ref 0.3–1)
MONOCYTES NFR BLD: 15.1 % (ref 4–15)
NEUTROPHILS # BLD AUTO: 2.1 K/UL (ref 1.8–7.7)
NEUTROPHILS NFR BLD: 51.3 % (ref 38–73)
NRBC BLD-RTO: 0 /100 WBC
PLATELET # BLD AUTO: 286 K/UL (ref 150–450)
PMV BLD AUTO: 8.7 FL (ref 9.2–12.9)
POTASSIUM SERPL-SCNC: 3.7 MMOL/L (ref 3.5–5.1)
PROT SERPL-MCNC: 6.7 G/DL (ref 6–8.4)
RBC # BLD AUTO: 4.68 M/UL (ref 4.6–6.2)
SODIUM SERPL-SCNC: 144 MMOL/L (ref 136–145)
WBC # BLD AUTO: 4.16 K/UL (ref 3.9–12.7)

## 2022-03-29 PROCEDURE — 80053 COMPREHEN METABOLIC PANEL: CPT | Performed by: INTERNAL MEDICINE

## 2022-03-29 PROCEDURE — 85025 COMPLETE CBC W/AUTO DIFF WBC: CPT | Performed by: INTERNAL MEDICINE

## 2022-03-29 PROCEDURE — 36415 COLL VENOUS BLD VENIPUNCTURE: CPT | Performed by: INTERNAL MEDICINE

## 2022-03-30 NOTE — PROGRESS NOTES
[facility] Hematology/Oncology  PROGRESS NOTE -  Follow-up Visit      Subjective:       Patient ID:   NAME: Sivakumar Thacker : 1955     67 y.o. male    Referring Doc: Barry Mcmahon (PCP in [place])  Other Physicians: Vinod Chen/José Manuel      Chief Complaint:  NHL f/u    History of Present Illness:     Patient returns today for a regularly scheduled follow-up visit.  The patient is here today to go over the results of the recently ordered labs, tests and studies. He is here by himself.       He is doing well with the rituximab infusions. He denies any CP, SOB, HA's or N/V. He has been seeing pulmonary about his chronic cough and TONY issues; he is on antibiotics 3x/week and he reports that pulmonary feels that he is stable       PET scan scheduled for 2022    continued on Gabapentin for the neuropathy issues in his feet.     He last saw Dr Barry Mcmahon on 3/28/2021 and José aMnuel on 2022    I discussed continued Covid19 precautions        ROS:   GEN: normal without any fever, night sweats or weight loss  HEENT: normal with no HA's, sore throat, stiff neck, changes in vision; sinus issues resolved  CV: normal with no CP, SOB, PND, VASQUEZ or orthopnea  PULM: normal with no SOB,  hemoptysis, sputum or pleuritic pain; chronic cough since 2020  GI: normal with no abdominal pain, nausea, vomiting, constipation, diarrhea, melanotic stools, BRBPR, or hematemesis  : normal with no hematuria, dysuria  BREAST: normal with no mass, discharge, pain  SKIN: normal with no rash, erythema, bruising, or swelling    Allergies:  Review of patient's allergies indicates:  No Known Allergies    Medications:    Current Outpatient Medications:     albuterol-ipratropium (DUO-NEB) 2.5 mg-0.5 mg/3 mL nebulizer solution, Take 3 mLs by nebulization every 6 (six) hours as needed for Wheezing or Shortness of Breath. Rescue, Disp: 120 vial, Rfl: 5    amLODIPine (NORVASC) 10 MG tablet, Take 1 tablet (10 mg total) by mouth once daily.,  Disp: 90 tablet, Rfl: 3    aspirin (ECOTRIN) 81 MG EC tablet, Take 81 mg by mouth once daily., Disp: , Rfl:     atorvastatin (LIPITOR) 20 MG tablet, Take 1 tablet (20 mg total) by mouth once daily., Disp: 90 tablet, Rfl: 3    blood sugar diagnostic (FREESTYLE LITE STRIPS MISC), FreeStyle Lite Strips, Disp: , Rfl:     blood sugar diagnostic (FREESTYLE LITE STRIPS) Strp, Check blood sugar 2 (two) times daily as needed., Disp: 200 each, Rfl: 11    blood sugar diagnostic Strp, , Disp: , Rfl:     fluticasone propionate (FLONASE) 50 mcg/actuation nasal spray, USE 1 SPRAY IN EACH NOSTRIL TWICE A DAY, Disp: 48 g, Rfl: 11    fluticasone propionate (FLOVENT HFA) 220 mcg/actuation inhaler, Inhale 1 puff into the lungs 2 (two) times daily. Controller, Disp: 12 g, Rfl: 2    fluticasone-umeclidin-vilanter (TRELEGY ELLIPTA) 200-62.5-25 mcg inhaler, Inhale 1 puff into the lungs once daily., Disp: 60 each, Rfl: 0    gabapentin (NEURONTIN) 100 MG capsule, gabapentin 100 mg capsule, Disp: , Rfl:     gabapentin (NEURONTIN) 300 MG capsule, gabapentin 300 mg capsule, Disp: , Rfl:     losartan (COZAAR) 100 MG tablet, Take 1 tablet (100 mg total) by mouth once daily., Disp: 90 tablet, Rfl: 3    metFORMIN (GLUCOPHAGE) 500 MG tablet, metformin 500 mg tablet  Take 2 tablets twice a day by oral route with meals for 90 days., Disp: 360 tablet, Rfl: 3    montelukast (SINGULAIR) 10 mg tablet, Take 1 tablet (10 mg total) by mouth nightly as needed., Disp: 90 tablet, Rfl: 3    mucus clearing device (ACAPELLA, FLUTTER), 1 Device by Misc.(Non-Drug; Combo Route) route 2 (two) times daily., Disp: 1 Device, Rfl: 0    MULTIVITAMIN ORAL, Take 1 tablet by mouth once daily., Disp: , Rfl:     naproxen (NAPROSYN) 500 MG tablet, Take 1 tablet (500 mg total) by mouth 2 (two) times daily as needed., Disp: 180 tablet, Rfl: 3    omeprazole (PRILOSEC) 40 MG capsule, Take 1 capsule (40 mg total) by mouth once daily., Disp: 90 capsule, Rfl: 3     "predniSONE (DELTASONE) 10 MG tablet, 1-2 pills for 3 days; repeat for cough and sinus congestion, Disp: 12 tablet, Rfl: 0    predniSONE (DELTASONE) 20 MG tablet, prednisone 20 mg tablet, Disp: , Rfl:     tiZANidine (ZANAFLEX) 4 MG tablet, Take 1 tablet (4 mg total) by mouth daily as needed., Disp: 90 tablet, Rfl: 3  No current facility-administered medications for this visit.    Facility-Administered Medications Ordered in Other Visits:     acetaminophen (TYLENOL) 325 MG tablet, , , ,     diphenhydramine 50 mg in NS 50 mL 50 mg IVPB, , , ,     famotidine (PF) 20 mg/2 mL injection, , , ,     heparin, porcine (PF) 100 unit/mL injection flush 500 Units, 500 Units, Intravenous, PRN, Jefferson Ibarra MD    meperidine (PF) injection 25 mg, 25 mg, Intravenous, PRN, Jefferson Ibarra MD    riTUXimab (RITUXAN) 800 mg in sodium chloride 0.9% 800 mL infusion (conc: 1 mg/mL), 800 mg, Intravenous, 1 time in Clinic/HOD, Jefferson Ibarra MD, 800 mg at 03/31/22 0848    sodium chloride 0.9% flush 10 mL, 10 mL, Intravenous, PRN, Jefferson Ibarra MD    PMHx/PSHx Updates:  See patient's last visit with me on 2/3/2022  See H&P on 1/8/2019        Pathology:  Cancer Staging  No matching staging information was found for the patient.          Objective:     Vitals:  Blood pressure (!) 161/84, pulse 87, temperature 98 °F (36.7 °C), resp. rate 18, height 5' 8" (1.727 m), weight 93.4 kg (206 lb).    Physical Examination:   GEN: no apparent distress, comfortable; AAOx3  HEAD: atraumatic and normocephalic  EYES: no pallor, no icterus, PERRLA  ENT: OMM, no pharyngeal erythema, external ears WNL; no nasal discharge; no thrush;    NECK: no masses, thyroid normal, trachea midline, no LAD/LN's, supple  CV: RRR with no murmur; normal pulse; normal S1 and S2; no pedal edema; portacath  CHEST: Normal respiratory effort; chronic coarseness, wheeze bilaterally but only mild wheeze  ABDOM: nontender and nondistended; soft; normal bowel " sounds; no rebound/guarding  MUSC/Skeletal: ROM normal; no crepitus; joints normal; no deformities or arthropathy  EXTREM: no clubbing, cyanosis, inflammation or swelling  SKIN: no rashes, lesions, ulcers, petechiae or subcutaneous nodules (see above HEENt)  : no gibbs  NEURO: grossly intact; motor/sensory WNL; AAOx3; no tremors  PSYCH: normal mood, affect and behavior  LYMPH: normal cervical, supraclavicular, axillary and groin LN's            Labs:     Lab Results   Component Value Date    WBC 4.16 03/29/2022    HGB 13.8 (L) 03/29/2022    HCT 41.5 03/29/2022    MCV 89 03/29/2022     03/29/2022     CMP  Sodium   Date Value Ref Range Status   03/29/2022 144 136 - 145 mmol/L Final   04/25/2019 144 134 - 144 mmol/L      Potassium   Date Value Ref Range Status   03/29/2022 3.7 3.5 - 5.1 mmol/L Final     Chloride   Date Value Ref Range Status   03/29/2022 108 95 - 110 mmol/L Final   04/25/2019 107 98 - 110 mmol/L      CO2   Date Value Ref Range Status   03/29/2022 25 23 - 29 mmol/L Final     Glucose   Date Value Ref Range Status   03/29/2022 130 (H) 70 - 110 mg/dL Final   04/25/2019 177 (H) 70 - 99 mg/dL      BUN   Date Value Ref Range Status   03/29/2022 13 8 - 23 mg/dL Final     Creatinine   Date Value Ref Range Status   03/29/2022 0.9 0.5 - 1.4 mg/dL Final   04/25/2019 0.83 0.60 - 1.40 mg/dL      Calcium   Date Value Ref Range Status   03/29/2022 8.8 8.7 - 10.5 mg/dL Final     Total Protein   Date Value Ref Range Status   03/29/2022 6.7 6.0 - 8.4 g/dL Final     Albumin   Date Value Ref Range Status   03/29/2022 3.9 3.5 - 5.2 g/dL Final   04/25/2019 4.4 3.1 - 4.7 g/dL      Total Bilirubin   Date Value Ref Range Status   03/29/2022 0.3 0.1 - 1.0 mg/dL Final     Comment:     For infants and newborns, interpretation of results should be based  on gestational age, weight and in agreement with clinical  observations.    Premature Infant recommended reference ranges:  Up to 24 hours.............<8.0 mg/dL  Up to 48  hours............<12.0 mg/dL  3-5 days..................<15.0 mg/dL  6-29 days.................<15.0 mg/dL       Alkaline Phosphatase   Date Value Ref Range Status   03/29/2022 93 55 - 135 U/L Final     AST   Date Value Ref Range Status   03/29/2022 25 10 - 40 U/L Final     ALT   Date Value Ref Range Status   03/29/2022 19 10 - 44 U/L Final     Anion Gap   Date Value Ref Range Status   03/29/2022 11 8 - 16 mmol/L Final     eGFR if    Date Value Ref Range Status   03/29/2022 >60.0 >60 mL/min/1.73 m^2 Final     eGFR if non    Date Value Ref Range Status   03/29/2022 >60.0 >60 mL/min/1.73 m^2 Final     Comment:     Calculation used to obtain the estimated glomerular filtration  rate (eGFR) is the CKD-EPI equation.            Radiology/Diagnostic Studies:    PET 10/4/2021:  Impression:     1. Negative for FDG avid recurrent lymphoproliferative disease.  2. New left maxillary sinusitis.  3. New bilateral reticulonodular pulmonary opacities suggesting infectious or inflammatory pneumonitis.  Clinical and laboratory correlation is requested.  4. Coronary artery calcification.         CXR  3/25/2021:    Impression:     Mild interstitial prominence, similar to previous exams.  No focal consolidation      PET 3/19/2021:  IMPRESSION:  1. Mild patchy airspace opacity in the superior segment of the left  lower lobe with increased FDG activity from background suggestive of a  small focal pneumonia (possibly viral in etiology). Consider  correlation with the symptoms, history and CT follow-up in 3 months to  document resolution.  2. No FDG avid lymphadenopathy or additional sites of disease.         PET 9/2/2020:      Impression:     Negative for active lymphoproliferative disease.         Chest CT  3/19/2020:      Impression       1. Mild bronchiectasis in the left lower lobe and tree-in-bud micro nodules at the left lung base suggesting underlying bronchiolitis, possibly due to a non tuberculous  mycobacterial infection or viral bronchiolitis.  2. Fatty infiltration of the liver  3. Small hiatal hernia.           CXR  1/31/2020    Impression       1. No acute chest disease.  2. Left subclavian Port-A-Cath.               PET 9/11/2019   Impression       No evidence of FDG avid residual or recurrent lymphoma       I have reviewed all available lab results and radiology reports.    Assessment/Plan:   (1) 67 y.o. male with diagnosis of NHL Follicular Stage IIIA who has been referred by Dr Gary Chen with [facility] for continuation of care by medical hematology/oncology.   - He originally was diagnosed in 2012 and had parotid excision at [facility]. He subsequently went to [facility] and was treated with bendamustine-rituximab. He has been on rituximab maintenance every 8 weeks and IV IgG monthly. Dr Chen's plan was to keep him on maintenace therapy for as long as he could tolerate the treatments  - s/p 6 cycles of Bendamustine and Retuximab with subsequent complete remission  - 1st maintenance cycle rituximab was in March 2016  - he has been on maintenance rituximab and IV IgG - last rituximab 11/15/2018; last IV IgG on Dec 14th 2018  - last PET was on 9/26/2018 with no evidence of recurrence  - originally diagnosed in Dec 2012 with left parotid and left periparotid LN excision on 12/11/2012  - PET scan done on  9/11/2019 was good    7/23/2020:  - new nodule on auricle of left ear suspicious for a skin cancer - will refer him to Dr Avilez with ENT  - he is due for repeat PET    9/17/2020:  - PET scan on 9/2/2020 is adequate  - he is [facility] in [facility] near future     11/12/2020:  - continued on the current regimen  - doing well with no new issues    1/7/2021:  - he is having some persistent cough issues for which he has been obtaining sputum for José Manuel  - last PET was Sept 2020    3/4/2021:  - doing ok  - chronic cough issues - followed by José Manuel  - will set up f/u  PET    4/29/2021:  - he had PET scan on 3/19/2021  - He has been seeing pulmonary about his chronic cough  Lucia Georges with pulmonary has seen the recent PET and reported to him that the CXR on 3/25 was stable and he is on some oral antibiotics with improvement of the symptoms  - He is also on Gabapentin for the neuropathy issues in his feet.   - He saw Dr Barry Mcmahon on 3/24/2021 with family [facility]    8/19/2021:  - He has been seeing pulmonary about his chronic cough - Lucia José Manuel with pulmonary and he has been on periodic treatments of antibiotics. He saw her last just yesterday on 8/17/2021 and is currently on antibiotics.  - Pulmonary is planning to refer him to an ID specialist  - he is on the rituximab every 8 weeks; I am not convinced that this is contributing to the infection issues as it is not reducing his WBC; however,if pulmonary and/or ID feel it is a contributing factor then we can discontinue. The risk of his lymphoma recurring or progressing would be higher if he were to discontinue the therapy      10/14/2021:  - continued on oral antibiotics per pulmonary and plans to see Pulmonary ID specialist in near future  - continued on current therapy with rituximab  - recent PEt on 10/4/2021    12/9/2021:  - continued on rituximab  - on new antibiotics per pulmonary and he sees them again in Feb 2022  - repeat scans in Feb 2022 or sooner if pulmonary says otherwise    2/2/2022:  - he is seeing pulmonary again in two weeks  - he does not think that the new triple antibiotic regimen is working  - we can get PET in Feb/mar 2022 if pulmonary is inclined, otherwise 6 month surveillance scan in April/May 2022    3/31/2022:  - He has been seeing pulmonary about his chronic cough and TONY issues; he is on antibiotics 3x/week and he reports that pulmonary feels that he is stable  - PET scan scheduled for 4/14/2022  - He last saw Dr Barry Mcmahon on 3/28/2021 and José Manuel on 2/17/2022  - pulmonary is fine with him  continuing the ritxuimab per his report      (2) HTN     (3) Neuropathy     (4) GERD     (5) DM - on metformin per Dr Nunez     (6) Hypogammaglobulinemia - on Iv IgG monthly     (7) Steatosis of liver     (8) Degenerative disc disease of back     (9) Diverticular disease    (10) Mild bronchiectasis in the left lower lobe and tree-in-bud micro nodules at the left lung base suggesting underlying bronchiolitis  - seeing Lucia Georges           VISIT DIAGNOSES:      Follicular lymphoma grade IIIa, unspecified body region          PLAN:  1. Continue his rituximab every 8 weeks as long as he is tolerating the therapy as per Dr Chen's prior recommendations and directives    2. continue IV IgG monthly   3. Check labs every 8 weeks  4. PEt due on 4/14/2022  5. F/u with PCP, Pulm etcc        RTC in 8 weeks   Fax note to Barry Mcmahon; José Manuel Brock;          Discussion:       I spent over 25 mins of time with the patient. Reviewed results of the recently ordered labs, tests and studies; made directives with regards to the results. Over half of this time was spent couseling and coordinating care.      COVID-19 Discussion:    I had long discussion with patient and any applicable family about the COVID-19 coronavirus epidemic and the recommended precautions with regard to cancer and/or hematology patients. I have re-iterated the CDC recommendations for adequate hand washing, use of hand -like products, and coughing into elbow, etc. In addition, especially for our patients who are on chemotherapy and/or our otherwise immunocompromised patients, I have recommended avoidance of crowds, including movie theaters, restaurants, churches, etc. I have recommended avoidance of any sick or symptomatic family members and/or friends. I have also recommended avoidance of any raw and unwashed food products, and general avoidance of food items that have not been prepared by themselves. The patient has been asked to call us  immediately with any symptom developments, issues, questions or other general concerns.     I have explained all of the above in detail and the patient understands all of the current recommendation(s). I have answered all of their questions to the best of my ability and to their complete satisfaction.   The patient is to continue with the current management plan.            Electronically signed by Jefferson Ibarra MD                              Answers for HPI/ROS submitted by the patient on 3/28/2022  appetite change : No  unexpected weight change: No  mouth sores: No  visual disturbance: No  cough: Yes  shortness of breath: No  chest pain: No  abdominal pain: No  diarrhea: No  frequency: No  back pain: No  rash: No  headaches: No  adenopathy: No  nervous/ anxious: No

## 2022-03-31 ENCOUNTER — OFFICE VISIT (OUTPATIENT)
Dept: HEMATOLOGY/ONCOLOGY | Facility: CLINIC | Age: 67
End: 2022-03-31
Payer: MEDICARE

## 2022-03-31 ENCOUNTER — INFUSION (OUTPATIENT)
Dept: INFUSION THERAPY | Facility: HOSPITAL | Age: 67
End: 2022-03-31
Attending: INTERNAL MEDICINE
Payer: MEDICARE

## 2022-03-31 VITALS
DIASTOLIC BLOOD PRESSURE: 82 MMHG | SYSTOLIC BLOOD PRESSURE: 139 MMHG | HEART RATE: 86 BPM | BODY MASS INDEX: 31.25 KG/M2 | RESPIRATION RATE: 16 BRPM | HEIGHT: 68 IN | OXYGEN SATURATION: 95 % | WEIGHT: 206.19 LBS | TEMPERATURE: 98 F

## 2022-03-31 VITALS
SYSTOLIC BLOOD PRESSURE: 161 MMHG | HEIGHT: 68 IN | HEART RATE: 87 BPM | RESPIRATION RATE: 18 BRPM | WEIGHT: 206 LBS | DIASTOLIC BLOOD PRESSURE: 84 MMHG | BODY MASS INDEX: 31.22 KG/M2 | TEMPERATURE: 98 F

## 2022-03-31 DIAGNOSIS — C82.30 FOLLICULAR LYMPHOMA GRADE IIIA, UNSPECIFIED BODY REGION: ICD-10-CM

## 2022-03-31 DIAGNOSIS — D80.1 HYPOGAMMAGLOBULINEMIA: Primary | ICD-10-CM

## 2022-03-31 DIAGNOSIS — C82.30 FOLLICULAR LYMPHOMA GRADE IIIA, UNSPECIFIED BODY REGION: Primary | ICD-10-CM

## 2022-03-31 PROCEDURE — 63600175 PHARM REV CODE 636 W HCPCS

## 2022-03-31 PROCEDURE — 96367 TX/PROPH/DG ADDL SEQ IV INF: CPT

## 2022-03-31 PROCEDURE — 96415 CHEMO IV INFUSION ADDL HR: CPT

## 2022-03-31 PROCEDURE — 96413 CHEMO IV INFUSION 1 HR: CPT

## 2022-03-31 PROCEDURE — 99214 OFFICE O/P EST MOD 30 MIN: CPT | Mod: S$GLB,,, | Performed by: INTERNAL MEDICINE

## 2022-03-31 PROCEDURE — 99214 PR OFFICE/OUTPT VISIT, EST, LEVL IV, 30-39 MIN: ICD-10-PCS | Mod: S$GLB,,, | Performed by: INTERNAL MEDICINE

## 2022-03-31 PROCEDURE — 96375 TX/PRO/DX INJ NEW DRUG ADDON: CPT

## 2022-03-31 PROCEDURE — 63600175 PHARM REV CODE 636 W HCPCS: Performed by: INTERNAL MEDICINE

## 2022-03-31 PROCEDURE — 25000003 PHARM REV CODE 250: Performed by: INTERNAL MEDICINE

## 2022-03-31 PROCEDURE — 25000003 PHARM REV CODE 250

## 2022-03-31 PROCEDURE — A4216 STERILE WATER/SALINE, 10 ML: HCPCS | Performed by: INTERNAL MEDICINE

## 2022-03-31 RX ORDER — ACETAMINOPHEN 325 MG/1
650 TABLET ORAL
Status: COMPLETED | OUTPATIENT
Start: 2022-03-31 | End: 2022-03-31

## 2022-03-31 RX ORDER — MEPERIDINE HYDROCHLORIDE 25 MG/ML
25 INJECTION INTRAMUSCULAR; INTRAVENOUS; SUBCUTANEOUS
Status: DISCONTINUED | OUTPATIENT
Start: 2022-03-31 | End: 2022-03-31 | Stop reason: HOSPADM

## 2022-03-31 RX ORDER — FAMOTIDINE 10 MG/ML
INJECTION INTRAVENOUS
Status: DISCONTINUED
Start: 2022-03-31 | End: 2022-03-31 | Stop reason: HOSPADM

## 2022-03-31 RX ORDER — HEPARIN 100 UNIT/ML
SYRINGE INTRAVENOUS
Status: DISCONTINUED
Start: 2022-03-31 | End: 2022-03-31 | Stop reason: HOSPADM

## 2022-03-31 RX ORDER — SODIUM CHLORIDE 0.9 % (FLUSH) 0.9 %
10 SYRINGE (ML) INJECTION
Status: CANCELLED | OUTPATIENT
Start: 2022-03-31

## 2022-03-31 RX ORDER — HEPARIN 100 UNIT/ML
500 SYRINGE INTRAVENOUS
Status: DISCONTINUED | OUTPATIENT
Start: 2022-03-31 | End: 2022-03-31 | Stop reason: HOSPADM

## 2022-03-31 RX ORDER — FAMOTIDINE 10 MG/ML
20 INJECTION INTRAVENOUS
Status: CANCELLED | OUTPATIENT
Start: 2022-03-31

## 2022-03-31 RX ORDER — ACETAMINOPHEN 325 MG/1
650 TABLET ORAL
Status: CANCELLED | OUTPATIENT
Start: 2022-03-31

## 2022-03-31 RX ORDER — MEPERIDINE HYDROCHLORIDE 25 MG/ML
25 INJECTION INTRAMUSCULAR; INTRAVENOUS; SUBCUTANEOUS
Status: CANCELLED | OUTPATIENT
Start: 2022-03-31 | End: 2022-03-31

## 2022-03-31 RX ORDER — FAMOTIDINE 10 MG/ML
20 INJECTION INTRAVENOUS
Status: COMPLETED | OUTPATIENT
Start: 2022-03-31 | End: 2022-03-31

## 2022-03-31 RX ORDER — ACETAMINOPHEN 325 MG/1
TABLET ORAL
Status: DISCONTINUED
Start: 2022-03-31 | End: 2022-03-31 | Stop reason: HOSPADM

## 2022-03-31 RX ORDER — SODIUM CHLORIDE 0.9 % (FLUSH) 0.9 %
10 SYRINGE (ML) INJECTION
Status: DISCONTINUED | OUTPATIENT
Start: 2022-03-31 | End: 2022-03-31 | Stop reason: HOSPADM

## 2022-03-31 RX ORDER — HEPARIN 100 UNIT/ML
500 SYRINGE INTRAVENOUS
Status: CANCELLED | OUTPATIENT
Start: 2022-03-31

## 2022-03-31 RX ADMIN — Medication 500 UNITS: at 11:03

## 2022-03-31 RX ADMIN — DIPHENHYDRAMINE HYDROCHLORIDE 50 MG: 50 INJECTION, SOLUTION INTRAMUSCULAR; INTRAVENOUS at 08:03

## 2022-03-31 RX ADMIN — RITUXIMAB 800 MG: 10 INJECTION, SOLUTION INTRAVENOUS at 08:03

## 2022-03-31 RX ADMIN — SODIUM CHLORIDE, PRESERVATIVE FREE 10 ML: 5 INJECTION INTRAVENOUS at 11:03

## 2022-03-31 RX ADMIN — ACETAMINOPHEN 650 MG: 325 TABLET ORAL at 08:03

## 2022-03-31 RX ADMIN — SODIUM CHLORIDE: 9 INJECTION, SOLUTION INTRAVENOUS at 08:03

## 2022-03-31 RX ADMIN — FAMOTIDINE 20 MG: 10 INJECTION INTRAVENOUS at 08:03

## 2022-03-31 NOTE — PLAN OF CARE
Problem: Fatigue  Goal: Improved Activity Tolerance  Outcome: Ongoing, Progressing  Intervention: Promote Improved Energy  Flowsheets (Taken 3/31/2022 1146)  Fatigue Management: frequent rest breaks encouraged  Sleep/Rest Enhancement:   regular sleep/rest pattern promoted   relaxation techniques promoted  Activity Management: Ambulated -L4

## 2022-04-01 ENCOUNTER — INFUSION (OUTPATIENT)
Dept: INFUSION THERAPY | Facility: HOSPITAL | Age: 67
End: 2022-04-01
Attending: INTERNAL MEDICINE
Payer: MEDICARE

## 2022-04-01 VITALS
DIASTOLIC BLOOD PRESSURE: 81 MMHG | SYSTOLIC BLOOD PRESSURE: 139 MMHG | BODY MASS INDEX: 31.35 KG/M2 | HEART RATE: 83 BPM | WEIGHT: 206.88 LBS | OXYGEN SATURATION: 96 % | HEIGHT: 68 IN | TEMPERATURE: 98 F | RESPIRATION RATE: 16 BRPM

## 2022-04-01 DIAGNOSIS — D80.1 NONFAMILIAL HYPOGAMMAGLOBULINEMIA: ICD-10-CM

## 2022-04-01 DIAGNOSIS — D80.1 HYPOGAMMAGLOBULINEMIA: Primary | ICD-10-CM

## 2022-04-01 DIAGNOSIS — C82.30 FOLLICULAR LYMPHOMA GRADE IIIA, UNSPECIFIED BODY REGION: ICD-10-CM

## 2022-04-01 PROCEDURE — 96415 CHEMO IV INFUSION ADDL HR: CPT

## 2022-04-01 PROCEDURE — 25000003 PHARM REV CODE 250: Performed by: NURSE PRACTITIONER

## 2022-04-01 PROCEDURE — 96413 CHEMO IV INFUSION 1 HR: CPT

## 2022-04-01 PROCEDURE — 96367 TX/PROPH/DG ADDL SEQ IV INF: CPT

## 2022-04-01 PROCEDURE — A4216 STERILE WATER/SALINE, 10 ML: HCPCS | Performed by: NURSE PRACTITIONER

## 2022-04-01 PROCEDURE — 63600175 PHARM REV CODE 636 W HCPCS: Performed by: NURSE PRACTITIONER

## 2022-04-01 RX ORDER — HEPARIN 100 UNIT/ML
500 SYRINGE INTRAVENOUS
Status: CANCELLED | OUTPATIENT
Start: 2022-04-29

## 2022-04-01 RX ORDER — SODIUM CHLORIDE 0.9 % (FLUSH) 0.9 %
10 SYRINGE (ML) INJECTION
Status: DISCONTINUED | OUTPATIENT
Start: 2022-04-01 | End: 2022-04-01 | Stop reason: HOSPADM

## 2022-04-01 RX ORDER — HEPARIN 100 UNIT/ML
500 SYRINGE INTRAVENOUS
Status: DISCONTINUED | OUTPATIENT
Start: 2022-04-01 | End: 2022-04-01 | Stop reason: HOSPADM

## 2022-04-01 RX ORDER — SODIUM CHLORIDE 0.9 % (FLUSH) 0.9 %
10 SYRINGE (ML) INJECTION
Status: CANCELLED | OUTPATIENT
Start: 2022-04-29

## 2022-04-01 RX ORDER — HEPARIN 100 UNIT/ML
SYRINGE INTRAVENOUS
Status: DISCONTINUED
Start: 2022-04-01 | End: 2022-04-01 | Stop reason: HOSPADM

## 2022-04-01 RX ADMIN — SODIUM CHLORIDE: 0.9 INJECTION, SOLUTION INTRAVENOUS at 08:04

## 2022-04-01 RX ADMIN — Medication 500 UNITS: at 10:04

## 2022-04-01 RX ADMIN — SODIUM CHLORIDE, PRESERVATIVE FREE 10 ML: 5 INJECTION INTRAVENOUS at 10:04

## 2022-04-01 RX ADMIN — DIPHENHYDRAMINE HYDROCHLORIDE 25 MG: 50 INJECTION INTRAMUSCULAR; INTRAVENOUS at 08:04

## 2022-04-01 RX ADMIN — IMMUNE GLOBULIN (HUMAN) 30 G: 10 INJECTION INTRAVENOUS; SUBCUTANEOUS at 08:04

## 2022-04-01 NOTE — PLAN OF CARE
Problem: Fatigue  Goal: Improved Activity Tolerance  Outcome: Ongoing, Progressing  Intervention: Promote Improved Energy  Flowsheets (Taken 4/1/2022 0810)  Fatigue Management: frequent rest breaks encouraged  Sleep/Rest Enhancement:   regular sleep/rest pattern promoted   relaxation techniques promoted  Activity Management: Ambulated -L4

## 2022-04-05 ENCOUNTER — HOSPITAL ENCOUNTER (OUTPATIENT)
Dept: RADIOLOGY | Facility: HOSPITAL | Age: 67
Discharge: HOME | End: 2022-04-05
Attending: INTERNAL MEDICINE
Payer: MEDICARE

## 2022-04-05 VITALS — HEIGHT: 68 IN | WEIGHT: 209 LBS | BODY MASS INDEX: 31.67 KG/M2

## 2022-04-05 DIAGNOSIS — C83.38 DIFFUSE LARGE B-CELL LYMPHOMA, LYMPH NODES OF MULTIPLE SITES: ICD-10-CM

## 2022-04-05 DIAGNOSIS — C82.30 FOLLICULAR LYMPHOMA GRADE IIIA, UNSPECIFIED BODY REGION: ICD-10-CM

## 2022-04-05 LAB — GLUCOSE SERPL-MCNC: 137 MG/DL (ref 70–110)

## 2022-04-05 PROCEDURE — 78815 PET IMAGE W/CT SKULL-THIGH: CPT | Mod: TC,PO,PS

## 2022-04-06 ENCOUNTER — TELEPHONE (OUTPATIENT)
Dept: HEMATOLOGY/ONCOLOGY | Facility: CLINIC | Age: 67
End: 2022-04-06
Payer: MEDICARE

## 2022-04-06 NOTE — TELEPHONE ENCOUNTER
----- Message from Jefferson Ibarra MD sent at 4/5/2022 10:13 AM CDT -----  Please call him with the report

## 2022-04-29 ENCOUNTER — INFUSION (OUTPATIENT)
Dept: INFUSION THERAPY | Facility: HOSPITAL | Age: 67
End: 2022-04-29
Attending: INTERNAL MEDICINE
Payer: MEDICARE

## 2022-04-29 VITALS
TEMPERATURE: 98 F | OXYGEN SATURATION: 94 % | BODY MASS INDEX: 30.92 KG/M2 | WEIGHT: 204 LBS | SYSTOLIC BLOOD PRESSURE: 125 MMHG | RESPIRATION RATE: 18 BRPM | HEIGHT: 68 IN | DIASTOLIC BLOOD PRESSURE: 81 MMHG | HEART RATE: 95 BPM

## 2022-04-29 DIAGNOSIS — C82.30 FOLLICULAR LYMPHOMA GRADE IIIA, UNSPECIFIED BODY REGION: ICD-10-CM

## 2022-04-29 DIAGNOSIS — D80.1 NONFAMILIAL HYPOGAMMAGLOBULINEMIA: ICD-10-CM

## 2022-04-29 DIAGNOSIS — D80.1 HYPOGAMMAGLOBULINEMIA: Primary | ICD-10-CM

## 2022-04-29 PROCEDURE — 96415 CHEMO IV INFUSION ADDL HR: CPT

## 2022-04-29 PROCEDURE — 96367 TX/PROPH/DG ADDL SEQ IV INF: CPT

## 2022-04-29 PROCEDURE — A4216 STERILE WATER/SALINE, 10 ML: HCPCS | Performed by: NURSE PRACTITIONER

## 2022-04-29 PROCEDURE — 25000003 PHARM REV CODE 250: Performed by: NURSE PRACTITIONER

## 2022-04-29 PROCEDURE — 63600175 PHARM REV CODE 636 W HCPCS: Mod: JG | Performed by: NURSE PRACTITIONER

## 2022-04-29 PROCEDURE — 96413 CHEMO IV INFUSION 1 HR: CPT

## 2022-04-29 RX ORDER — HEPARIN 100 UNIT/ML
500 SYRINGE INTRAVENOUS
Status: DISCONTINUED | OUTPATIENT
Start: 2022-04-29 | End: 2022-04-29 | Stop reason: HOSPADM

## 2022-04-29 RX ORDER — HEPARIN 100 UNIT/ML
500 SYRINGE INTRAVENOUS
Status: CANCELLED | OUTPATIENT
Start: 2022-05-27

## 2022-04-29 RX ORDER — SODIUM CHLORIDE 0.9 % (FLUSH) 0.9 %
10 SYRINGE (ML) INJECTION
Status: CANCELLED | OUTPATIENT
Start: 2022-05-27

## 2022-04-29 RX ORDER — SODIUM CHLORIDE 0.9 % (FLUSH) 0.9 %
10 SYRINGE (ML) INJECTION
Status: DISCONTINUED | OUTPATIENT
Start: 2022-04-29 | End: 2022-04-29 | Stop reason: HOSPADM

## 2022-04-29 RX ADMIN — IMMUNE GLOBULIN (HUMAN) 30 G: 10 INJECTION INTRAVENOUS; SUBCUTANEOUS at 08:04

## 2022-04-29 RX ADMIN — Medication 500 UNITS: at 10:04

## 2022-04-29 RX ADMIN — SODIUM CHLORIDE: 9 INJECTION, SOLUTION INTRAVENOUS at 08:04

## 2022-04-29 RX ADMIN — SODIUM CHLORIDE, PRESERVATIVE FREE 10 ML: 5 INJECTION INTRAVENOUS at 10:04

## 2022-04-29 RX ADMIN — DIPHENHYDRAMINE HYDROCHLORIDE 25 MG: 50 INJECTION INTRAMUSCULAR; INTRAVENOUS at 08:04

## 2022-05-17 ENCOUNTER — OFFICE VISIT (OUTPATIENT)
Dept: PULMONOLOGY | Facility: CLINIC | Age: 67
End: 2022-05-17
Payer: MEDICARE

## 2022-05-17 VITALS
SYSTOLIC BLOOD PRESSURE: 140 MMHG | BODY MASS INDEX: 31.24 KG/M2 | WEIGHT: 205.5 LBS | HEART RATE: 88 BPM | OXYGEN SATURATION: 97 % | DIASTOLIC BLOOD PRESSURE: 78 MMHG

## 2022-05-17 DIAGNOSIS — J14 PNEUMONIA DUE TO HAEMOPHILUS INFLUENZAE, UNSPECIFIED LATERALITY, UNSPECIFIED PART OF LUNG: ICD-10-CM

## 2022-05-17 DIAGNOSIS — A49.8 PSEUDOMONAS AERUGINOSA INFECTION: ICD-10-CM

## 2022-05-17 DIAGNOSIS — J47.0 BRONCHIECTASIS WITH ACUTE LOWER RESPIRATORY INFECTION: Primary | ICD-10-CM

## 2022-05-17 PROCEDURE — 99214 OFFICE O/P EST MOD 30 MIN: CPT | Mod: PBBFAC,PO | Performed by: NURSE PRACTITIONER

## 2022-05-17 PROCEDURE — 99999 PR PBB SHADOW E&M-EST. PATIENT-LVL IV: CPT | Mod: PBBFAC,,, | Performed by: NURSE PRACTITIONER

## 2022-05-17 PROCEDURE — 99999 PR PBB SHADOW E&M-EST. PATIENT-LVL IV: ICD-10-PCS | Mod: PBBFAC,,, | Performed by: NURSE PRACTITIONER

## 2022-05-17 PROCEDURE — 99214 PR OFFICE/OUTPT VISIT, EST, LEVL IV, 30-39 MIN: ICD-10-PCS | Mod: S$PBB,,, | Performed by: NURSE PRACTITIONER

## 2022-05-17 PROCEDURE — 99214 OFFICE O/P EST MOD 30 MIN: CPT | Mod: S$PBB,,, | Performed by: NURSE PRACTITIONER

## 2022-05-17 RX ORDER — CODEINE PHOSPHATE AND GUAIFENESIN 10; 100 MG/5ML; MG/5ML
5 SOLUTION ORAL EVERY 4 HOURS PRN
Qty: 300 ML | Refills: 0 | Status: SHIPPED | OUTPATIENT
Start: 2022-05-17 | End: 2022-05-27

## 2022-05-17 RX ORDER — DOXYCYCLINE HYCLATE 100 MG
100 TABLET ORAL 2 TIMES DAILY
Qty: 20 TABLET | Refills: 0 | Status: SHIPPED | OUTPATIENT
Start: 2022-05-17 | End: 2022-05-27

## 2022-05-17 NOTE — PROGRESS NOTES
5/17/2022    Sivakumar Thacker  Office note    Chief Complaint   Patient presents with    Bronchiectasis       HPI:    5/17/2022- Cough is persistent complaint, on Vest and nebulizer therapy daily, antibiotic azithromycin 500 mg three days weekly,   Sinus complaints persistent.   Cough- coarse cough, productive yellow/green mucous. Associated with shortness of breath.     2/17/2022-  Noticed improvement after starting vest device and Azithromcyin 3x weekly, using nebulizer with Aerobika 2x daily. Prescription for azithromycin ran out 2 weeks prior.   Complaint of sinus drainage onset 4 weeks, clear drainage, tried flonase with benefit.      On IV IG for 4 years followed by Dr. Ibarra.     11/17/2021- Cough- persistent complaint, daily, through out, nocturnal arousals nightly, using albuterol nebulizer and vest 2x daily. Productive green mucous, dime size amount of mucous. tx with antibiotics for H.Flu with no benefit. Associated with chest tightness and wheeze. Tried Tesselon perles with no benefit. No benefit with codeine cough syrup.     8/17/2021- Cough- recurrent complaint, onset 3 days, worse in morning and during day, improve with albuterol nebulizer, able to clear yellow/green mucous. Started antibiotics 2 weeks ago due to green mucous, took Cipro, cough returned after finishing Cipro prescription. Nocturnal arousals 3x weekly.   Has aerobika from when wife was hospitalized, using.   Using nebulizer daily with Musinex.   SOB- worsened since finishing antibiotic, worse with exertion, no chest tightness or wheeze, improve with coughing up mucous. Associated with fatigue      5/17/2021- had persistent cough with occasional thick, yellow mucous onset January lasted 4 weeks, Felt rattle in chest., improved with antibiotics Augmentin.   has nebulizer but not currently using.   Current cough- mild, 2x weekly, feels mucous in chest but unable to cough up.    No shortness of breath, chest tightness, or  wheeze.    11/17/2020- states doing well, cough has decreased to occasional to 2 times weekly that is mild, productive clear mucous.   States no fever, night sweats, shortness of breath,   Complaint of sinus drainage: daily, clear, singulair most days,      8/17/20- Cough- improved following antibiotic therapy, returned after 8 weeks, productive, light green color, dime size, worse in early morning, no chest tightness or wheeze.  No SOB. Using nebulizer 2x daily for 2 weeks, undergoing chemo and IVIG for Lymphoma and skin cancer.     6/16/2020- Cough unchanged with coughing fits when trying to sleep. States no improvement with Trelegy inhaler. States Levaquin antibiotic help a little bit and cough returned after finishing. Has been on IgG replacement therapy every 4 weeks since 2012.  Brought in 5 sputum sample, all still processing.       4/20/2020- Chronic cough onset 2013, followed by pulmonologist Dr. Case in [place]. Put Advair with no benefit. Had negative bronchoscopy and negative AFB, had appointment with Dr. Brock in 2014. Hx of non Hodgkins lymphoma followed by Dr. Ibarra on Rotuxin and immune therapy.  Cough Worsened in Jan 2020, associated with recurrent fever improves with tylenol, states cough improves with antibiotics but returns when finished medications. Productive occasionally, can feel something rattling in chest, thick white mucous. 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 2 pack year  Family Hx: no Lung Cancer, no COPD, no Asthma  Medical Hx: no previous pneumonia ; no previous shoulder/chest surgery        The chief compliant  problem is stable  PFSH:  Past Medical History:   Diagnosis Date    Cancer      lymphoma currently on chemo    Diabetes mellitus, type 2     Encounter for antineoplastic chemotherapy 4/1/2020    Hypertension     Neuropathy     Reflux esophagitis          Past Surgical History:   Procedure Laterality Date    COLONOSCOPY  2018    EYE SURGERY  Approximately 3 years ago    Cataract    HAND SURGERY      amputation left index finger    PORTACATH PLACEMENT      SKIN CANCER EXCISION  09/30/2020    [facility]    SPINE SURGERY      VASECTOMY  1985 ?     Social History     Tobacco Use    Smoking status: Former Smoker     Packs/day: 0.50     Years: 2.00     Pack years: 1.00    Smokeless tobacco: Never Used   Substance Use Topics    Alcohol use: Not Currently     Alcohol/week: 0.0 standard drinks     Comment: rarely    Drug use: No     Family History   Problem Relation Age of Onset    Diabetes Father      Review of patient's allergies indicates:  No Known Allergies  I have reviewed past medical, family, and social history. I have reviewed previous nurse notes.    Performance Status:The patient's activity level is no limits with regular activity.      Review of Systems:  a review of eleven systems covering constitutional, Eye, HEENT, Psych, Respiratory, Cardiac, GI, , Musculoskeletal, Endocrine, Dermatologic was negative except for pertinent findings as listed ABOVE and below: pertinent positive as above, rest is good  Cough, shortness of breath, sinus drainage         Exam:Comprehensive exam done. BP (!) 140/78 (BP Location: Right arm, Patient Position: Sitting)   Pulse 88   Wt 93.2 kg (205 lb 7.5 oz)   SpO2 97%   BMI 31.24 kg/m²   Exam included Vitals as listed, and patient's appearance and affect and alertness and mood, oral exam for yeast and hygiene and pharynx lesions and Mallapatti (M) score, neck with inspection for jvd and masses and thyroid abnormalities and lymph nodes (supraclavicular and infraclavicular nodes and axillary also examined and noted if abn), chest exam included  symmetry and effort and fremitus and percussion and auscultation, cardiac exam included rhythm and gallops and murmur and rubs and jvd and edema, abdominal exam for mass and hepatosplenomegaly and tenderness and hernias and bowel sounds, Musculoskeletal exam with muscle tone and posture and mobility/gait and  strength, and skin for rashes and cyanosis and pallor and turgor, extremity for clubbing.  Findings were normal except for pertinent findings listed below: M2, bilateral rhonchi.       Radiographs (ct chest and cxr) reviewed: view by direct vision   NM PET CT Routine Skull to Mid Thigh 4/5/2022 visualized improvement in bilateral lower lobe mucous plugging when compared to 10/4/21 scan  CT images of the chest show no suspicious pulmonary nodules or masses, with no pleural or pericardial effusion. There is scattered linear subsegmental atelectasis in both lungs, with aortic and coronary arterial calcifications.     NM PET CT Routine Skull to Mid Thigh 10/04/2021   New bilateral reticulonodular pulmonary opacities suggesting infectious or inflammatory pneumonitis. Clinical and laboratory correlation is requested.     CT CHEST WITHOUT CONTRAST  06/21/2021   Granulomatous change.  2. Multiple small pulmonary nodules.  Follow-up per Fleischner guidelines.  For multiple solid nodules all <6 mm, Fleischner Society 2017 guidelines recommend no routine follow up for a low risk patient, or follow up with non-contrast chest CT at 12 months after discovery in a high risk patient.  3. Minimal bronchiectasis of the bilateral lower lobes.  4. No significant lymphadenopathy.      NM PET CT Routine Skull to Mid Thigh 03/19/21   1. Mild patchy airspace opacity in the superior segment of the left  lower lobe with increased FDG activity from background suggestive of a  small focal pneumonia (possibly viral in etiology). Consider  correlation with the symptoms, history and CT follow-up in 3 months to  document  resolution.    X-Ray Chest PA And Lateral  03/25/2021   Mild interstitial prominence, similar to previous exams.  No focal consolidation.     CT Chest Without 03/19/2020   1. Mild bronchiectasis in the left lower lobe and tree-in-bud micro nodules at the left lung base suggesting underlying bronchiolitis, possibly due to a non tuberculous mycobacterial infection or viral bronchiolitis.  2. Fatty infiltration of the liver  3. Small hiatal hernia.     CT Chest Without Contrast 09/01/2020   Unchanged left upper lobe 3 mm nodule.  Mild old granulomatous disease.  Minimal bronchiectasis which is nonspecific.  Adjacent micro nodules and tree-in-bud opacities are also unchanged.  Findings favor sequelae of an old atypical infectious process.  Atherosclerosis.  Small hiatal hernia.      Labs reviewed       Lab Results   Component Value Date    WBC 4.16 03/29/2022    RBC 4.68 03/29/2022    HGB 13.8 (L) 03/29/2022    HCT 41.5 03/29/2022    MCV 89 03/29/2022    MCH 29.5 03/29/2022    MCHC 33.3 03/29/2022    RDW 13.2 03/29/2022     03/29/2022    MPV 8.7 (L) 03/29/2022    GRAN 2.1 03/29/2022    GRAN 51.3 03/29/2022    LYMPH 1.1 03/29/2022    LYMPH 26.2 03/29/2022    MONO 0.6 03/29/2022    MONO 15.1 (H) 03/29/2022    EOS 0.2 03/29/2022    BASO 0.05 03/29/2022    EOSINOPHIL 4.8 03/29/2022    BASOPHIL 1.2 03/29/2022       AFB x 3 all negative following 8 week incubation, Respiratory cultures show normal dank only    Culture, Respiratory with Gram Stain 09/18/20 HAEMOPHILUS INFLUENZAE   Culture, Respiratory with Gram Stain 1/21/2021 HAEMOPHILUS INFLUENZAE   Respiratory with Gram Stain 7/22/21, PSEUDOMONAS AERUGINOSA and HAEMOPHILUS INFLUENZAE       PFT  reviewed  Pulmonary Functions Testing Results:  Spirometry bronchodilator, lung volume by gas dilution, diffusion capacity measured June 23, 2020.  The FEV1 FVC ratio was 80% indicating no airflow obstruction by spirometry technique.  The FEV1 was 108% of predicted or 3.5 L.   There was no   significant improvement following bronchodilator.  Total lung capacity on lung volume by gas dilution was 69% and a little reduced.  Diffusion was normal.       Spirometry was normal and was no significant bronchodilator response.  Total lung capacity was reduced consistent with restriction.  Diffusion was normal.  Clinical correlation recommended.     7/17/2013 PFT Data: Complete pulmonary function studies were obtained today and are independently reviewed. Spirometry shows no evidence of airflow obstruction today. Lung was performed by plethysmography and are consistent with air trapping without hyperinflation. Residual volume is 122% predicted, and the total capacity is 98% predicted. Diffusion capacity for carbon monoxide is in the normal range at 102% predicted. Overall, these are fairly normal pulmonary function studies and do not suggest any obstructive lung disease.    Spirometry bronchodilator, lung volume by gas dilution, diffusion capacity measured June 23, 2020.  The FEV1 FVC ratio was 80% indicating no airflow obstruction by spirometry technique.  The FEV1 was 108% of predicted or 3.5 L.  There was no   significant improvement following bronchodilator.  Total lung capacity on lung volume by gas dilution was 69% and a little reduced.  Diffusion was normal.       Spirometry was normal and was no significant bronchodilator response.  Total lung capacity was reduced consistent with restriction.  Diffusion was normal.  Clinical correlation recommended.        I spent over 30 mins of time with the patient. Reviewed results of the recently ordered labs, tests and studies; made directives with regards to the results. Over half of this time was spent couseling and coordinating care.     I have explained all of the above in detail and the patient understands all of the current recommendation(s). I have answered all of their questions to the best of my ability and to their complete satisfaction.    The patient is to continue with the current management plan.      Plan:  Clinical impression is apparently straight forward and impression with management as below.    Sivakumar was seen today for bronchiectasis.    Diagnoses and all orders for this visit:    Bronchiectasis with acute lower respiratory infection  -     doxycycline (VIBRA-TABS) 100 MG tablet; Take 1 tablet (100 mg total) by mouth 2 (two) times daily. for 10 days  -     guaiFENesin-codeine 100-10 mg/5 ml (TUSSI-ORGANIDIN NR)  mg/5 mL syrup; Take 5 mLs by mouth every 4 (four) hours as needed for Cough.    Pneumonia due to Haemophilus influenzae, unspecified laterality, unspecified part of lung  -     doxycycline (VIBRA-TABS) 100 MG tablet; Take 1 tablet (100 mg total) by mouth 2 (two) times daily. for 10 days    Pseudomonas aeruginosa infection  -     doxycycline (VIBRA-TABS) 100 MG tablet; Take 1 tablet (100 mg total) by mouth 2 (two) times daily. for 10 days        Follow up in about 3 months (around 8/17/2022), or if symptoms worsen or fail to improve.    Discussed with patient above for education the following:      Patient Instructions   Will continue current Bronchiectasis therapy with daily vest and nebulizer treatments 1-2 times daily    Doxy antibiotic for 10 days then resume three day weekly azithromycin    Have next appointment with MD for further evaluation and treatment.

## 2022-05-17 NOTE — PATIENT INSTRUCTIONS
Will continue current Bronchiectasis therapy with daily vest and nebulizer treatments 1-2 times daily    Doxy antibiotic for 10 days then resume three day weekly azithromycin    Have next appointment with MD for further evaluation and treatment.

## 2022-05-18 ENCOUNTER — PATIENT MESSAGE (OUTPATIENT)
Dept: PULMONOLOGY | Facility: CLINIC | Age: 67
End: 2022-05-18
Payer: MEDICARE

## 2022-05-23 ENCOUNTER — LAB VISIT (OUTPATIENT)
Dept: LAB | Facility: HOSPITAL | Age: 67
End: 2022-05-23
Attending: FAMILY MEDICINE
Payer: MEDICARE

## 2022-05-23 DIAGNOSIS — E11.9 TYPE 2 DIABETES MELLITUS WITHOUT COMPLICATION, WITHOUT LONG-TERM CURRENT USE OF INSULIN: ICD-10-CM

## 2022-05-23 LAB
ESTIMATED AVG GLUCOSE: 146 MG/DL (ref 68–131)
HBA1C MFR BLD: 6.7 % (ref 4–5.6)

## 2022-05-23 PROCEDURE — 83036 HEMOGLOBIN GLYCOSYLATED A1C: CPT | Performed by: FAMILY MEDICINE

## 2022-05-23 PROCEDURE — 36415 COLL VENOUS BLD VENIPUNCTURE: CPT | Performed by: FAMILY MEDICINE

## 2022-05-25 RX ORDER — HEPARIN 100 UNIT/ML
500 SYRINGE INTRAVENOUS
Status: CANCELLED | OUTPATIENT
Start: 2022-05-26

## 2022-05-25 RX ORDER — FAMOTIDINE 10 MG/ML
20 INJECTION INTRAVENOUS
Status: CANCELLED | OUTPATIENT
Start: 2022-05-26

## 2022-05-25 RX ORDER — MEPERIDINE HYDROCHLORIDE 25 MG/ML
25 INJECTION INTRAMUSCULAR; INTRAVENOUS; SUBCUTANEOUS
Status: CANCELLED | OUTPATIENT
Start: 2022-05-26 | End: 2022-05-26

## 2022-05-25 RX ORDER — ACETAMINOPHEN 325 MG/1
650 TABLET ORAL
Status: CANCELLED | OUTPATIENT
Start: 2022-05-26

## 2022-05-25 RX ORDER — SODIUM CHLORIDE 0.9 % (FLUSH) 0.9 %
10 SYRINGE (ML) INJECTION
Status: CANCELLED | OUTPATIENT
Start: 2022-05-26

## 2022-05-25 NOTE — PROGRESS NOTES
[facility] Hematology/Oncology  PROGRESS NOTE -  Follow-up Visit      Subjective:       Patient ID:   NAME: Sivakumar Thacker : 1955     67 y.o. male    Referring Doc: Barry Mcmahon (PCP in [place])  Other Physicians: Vinod Chen/José Manuel      Chief Complaint:  NHL f/u    History of Present Illness:     Patient returns today for a regularly scheduled follow-up visit.  The patient is here today to go over the results of the recently ordered labs, tests and studies. He is here by himself.       He is doing well with the rituximab infusions. He denies any CP, SOB, HA's or N/V.     He has been seeing pulmonary about his chronic cough and TONY issues; he saw oRse on 2022 and is now on a new antibiotic and he reports that pulmonary said the PEt scan was better than the last one; he is planning to see Dr Veliz in the near future     PET scan was done on 2022    continued on Gabapentin for the neuropathy issues in his feet.     He last saw Dr Barry Mcmahon on 3/28/2022 and has been on metformin    I discussed continued Covid19 precautions        ROS:   GEN: normal without any fever, night sweats or weight loss  HEENT: normal with no HA's, sore throat, stiff neck, changes in vision; sinus issues resolved  CV: normal with no CP, SOB, PND, VASQUEZ or orthopnea  PULM: normal with no SOB,  hemoptysis, sputum or pleuritic pain; chronic cough since 2020  GI: normal with no abdominal pain, nausea, vomiting, constipation, diarrhea, melanotic stools, BRBPR, or hematemesis  : normal with no hematuria, dysuria  BREAST: normal with no mass, discharge, pain  SKIN: normal with no rash, erythema, bruising, or swelling    Allergies:  Review of patient's allergies indicates:  No Known Allergies    Medications:    Current Outpatient Medications:     albuterol-ipratropium (DUO-NEB) 2.5 mg-0.5 mg/3 mL nebulizer solution, Take 3 mLs by nebulization every 6 (six) hours as needed for Wheezing or Shortness of Breath. Rescue, Disp:  120 vial, Rfl: 5    amLODIPine (NORVASC) 10 MG tablet, Take 1 tablet (10 mg total) by mouth once daily., Disp: 90 tablet, Rfl: 3    aspirin (ECOTRIN) 81 MG EC tablet, Take 81 mg by mouth once daily., Disp: , Rfl:     atorvastatin (LIPITOR) 20 MG tablet, Take 1 tablet (20 mg total) by mouth once daily., Disp: 90 tablet, Rfl: 3    blood sugar diagnostic (FREESTYLE LITE STRIPS MISC), FreeStyle Lite Strips, Disp: , Rfl:     blood sugar diagnostic (FREESTYLE LITE STRIPS) Strp, Check blood sugar 2 (two) times daily as needed., Disp: 200 each, Rfl: 11    blood sugar diagnostic Strp, , Disp: , Rfl:     doxycycline (VIBRA-TABS) 100 MG tablet, Take 1 tablet (100 mg total) by mouth 2 (two) times daily. for 10 days, Disp: 20 tablet, Rfl: 0    fluticasone propionate (FLONASE) 50 mcg/actuation nasal spray, USE 1 SPRAY IN EACH NOSTRIL TWICE A DAY, Disp: 48 g, Rfl: 11    fluticasone propionate (FLOVENT HFA) 220 mcg/actuation inhaler, Inhale 1 puff into the lungs 2 (two) times daily. Controller, Disp: 12 g, Rfl: 2    fluticasone-umeclidin-vilanter (TRELEGY ELLIPTA) 200-62.5-25 mcg inhaler, Inhale 1 puff into the lungs once daily. (Patient not taking: Reported on 5/17/2022), Disp: 60 each, Rfl: 0    gabapentin (NEURONTIN) 100 MG capsule, gabapentin 100 mg capsule, Disp: , Rfl:     gabapentin (NEURONTIN) 300 MG capsule, gabapentin 300 mg capsule, Disp: , Rfl:     guaiFENesin-codeine 100-10 mg/5 ml (TUSSI-ORGANIDIN NR)  mg/5 mL syrup, Take 5 mLs by mouth every 4 (four) hours as needed for Cough., Disp: 300 mL, Rfl: 0    losartan (COZAAR) 100 MG tablet, Take 1 tablet (100 mg total) by mouth once daily., Disp: 90 tablet, Rfl: 3    metFORMIN (GLUCOPHAGE) 500 MG tablet, metformin 500 mg tablet  Take 2 tablets twice a day by oral route with meals for 90 days., Disp: 360 tablet, Rfl: 3    montelukast (SINGULAIR) 10 mg tablet, Take 1 tablet (10 mg total) by mouth nightly as needed., Disp: 90 tablet, Rfl: 3    mucus  "clearing device (ACAPELLA, FLUTTER), 1 Device by Misc.(Non-Drug; Combo Route) route 2 (two) times daily., Disp: 1 Device, Rfl: 0    MULTIVITAMIN ORAL, Take 1 tablet by mouth once daily., Disp: , Rfl:     naproxen (NAPROSYN) 500 MG tablet, Take 1 tablet (500 mg total) by mouth 2 (two) times daily as needed., Disp: 180 tablet, Rfl: 3    omeprazole (PRILOSEC) 40 MG capsule, Take 1 capsule (40 mg total) by mouth once daily., Disp: 90 capsule, Rfl: 3    predniSONE (DELTASONE) 10 MG tablet, 1-2 pills for 3 days; repeat for cough and sinus congestion (Patient not taking: Reported on 5/17/2022), Disp: 12 tablet, Rfl: 0    predniSONE (DELTASONE) 20 MG tablet, prednisone 20 mg tablet, Disp: , Rfl:     tiZANidine (ZANAFLEX) 4 MG tablet, Take 1 tablet (4 mg total) by mouth daily as needed., Disp: 90 tablet, Rfl: 3  No current facility-administered medications for this visit.    Facility-Administered Medications Ordered in Other Visits:     heparin, porcine (PF) 100 unit/mL injection flush 500 Units, 500 Units, Intravenous, PRN, Jefferson Ibarra MD    meperidine (PF) injection 25 mg, 25 mg, Intravenous, PRN, Jefferson Ibarra MD    riTUXimab (RITUXAN) 800 mg in sodium chloride 0.9% 800 mL infusion (conc: 1 mg/mL), 800 mg, Intravenous, 1 time in Clinic/HOD, Jefferson Ibarra MD, 800 mg at 05/26/22 0858    sodium chloride 0.9% flush 10 mL, 10 mL, Intravenous, PRN, Jefferson Ibarra MD    PMHx/PSHx Updates:  See patient's last visit with me on 3/31/2022  See H&P on 1/8/2019        Pathology:  Cancer Staging  No matching staging information was found for the patient.          Objective:     Vitals:  Blood pressure (!) 146/84, pulse 85, temperature 98.5 °F (36.9 °C), resp. rate 18, height 5' 8" (1.727 m), weight 91.2 kg (201 lb), SpO2 96 %.    Physical Examination:   GEN: no apparent distress, comfortable; AAOx3  HEAD: atraumatic and normocephalic  EYES: no pallor, no icterus, PERRLA  ENT: OMM, no pharyngeal " erythema, external ears WNL; no nasal discharge; no thrush;    NECK: no masses, thyroid normal, trachea midline, no LAD/LN's, supple  CV: RRR with no murmur; normal pulse; normal S1 and S2; no pedal edema; portacath  CHEST: Normal respiratory effort; chronic coarseness, wheeze bilaterally but only mild wheeze  ABDOM: nontender and nondistended; soft; normal bowel sounds; no rebound/guarding  MUSC/Skeletal: ROM normal; no crepitus; joints normal; no deformities or arthropathy  EXTREM: no clubbing, cyanosis, inflammation or swelling  SKIN: no rashes, lesions, ulcers, petechiae or subcutaneous nodules (see above HEENt)  : no gibbs  NEURO: grossly intact; motor/sensory WNL; AAOx3; no tremors  PSYCH: normal mood, affect and behavior  LYMPH: normal cervical, supraclavicular, axillary and groin LN's            Labs:     Lab Results   Component Value Date    WBC 4.47 05/23/2022    HGB 14.4 05/23/2022    HCT 44.0 05/23/2022    MCV 89 05/23/2022     05/23/2022     CMP  Sodium   Date Value Ref Range Status   05/23/2022 141 136 - 145 mmol/L Final   04/25/2019 144 134 - 144 mmol/L      Potassium   Date Value Ref Range Status   05/23/2022 4.4 3.5 - 5.1 mmol/L Final     Chloride   Date Value Ref Range Status   05/23/2022 105 95 - 110 mmol/L Final   04/25/2019 107 98 - 110 mmol/L      CO2   Date Value Ref Range Status   05/23/2022 26 23 - 29 mmol/L Final     Glucose   Date Value Ref Range Status   05/23/2022 168 (H) 70 - 110 mg/dL Final   04/25/2019 177 (H) 70 - 99 mg/dL      BUN   Date Value Ref Range Status   05/23/2022 17 8 - 23 mg/dL Final     Creatinine   Date Value Ref Range Status   05/23/2022 0.9 0.5 - 1.4 mg/dL Final   04/25/2019 0.83 0.60 - 1.40 mg/dL      Calcium   Date Value Ref Range Status   05/23/2022 9.1 8.7 - 10.5 mg/dL Final     Total Protein   Date Value Ref Range Status   05/23/2022 6.5 6.0 - 8.4 g/dL Final     Albumin   Date Value Ref Range Status   05/23/2022 3.8 3.5 - 5.2 g/dL Final   04/25/2019 4.4  3.1 - 4.7 g/dL      Total Bilirubin   Date Value Ref Range Status   05/23/2022 0.5 0.1 - 1.0 mg/dL Final     Comment:     For infants and newborns, interpretation of results should be based  on gestational age, weight and in agreement with clinical  observations.    Premature Infant recommended reference ranges:  Up to 24 hours.............<8.0 mg/dL  Up to 48 hours............<12.0 mg/dL  3-5 days..................<15.0 mg/dL  6-29 days.................<15.0 mg/dL       Alkaline Phosphatase   Date Value Ref Range Status   05/23/2022 101 55 - 135 U/L Final     AST   Date Value Ref Range Status   05/23/2022 20 10 - 40 U/L Final     ALT   Date Value Ref Range Status   05/23/2022 19 10 - 44 U/L Final     Anion Gap   Date Value Ref Range Status   05/23/2022 10 8 - 16 mmol/L Final     eGFR if    Date Value Ref Range Status   05/23/2022 >60.0 >60 mL/min/1.73 m^2 Final     eGFR if non    Date Value Ref Range Status   05/23/2022 >60.0 >60 mL/min/1.73 m^2 Final     Comment:     Calculation used to obtain the estimated glomerular filtration  rate (eGFR) is the CKD-EPI equation.            Radiology/Diagnostic Studies:    PET  4/5/2022:    IMPRESSION: No evidence of recurrent or active lymphoproliferative disease        PET 10/4/2021:  Impression:     1. Negative for FDG avid recurrent lymphoproliferative disease.  2. New left maxillary sinusitis.  3. New bilateral reticulonodular pulmonary opacities suggesting infectious or inflammatory pneumonitis.  Clinical and laboratory correlation is requested.  4. Coronary artery calcification.         CXR  3/25/2021:    Impression:     Mild interstitial prominence, similar to previous exams.  No focal consolidation      PET 3/19/2021:  IMPRESSION:  1. Mild patchy airspace opacity in the superior segment of the left  lower lobe with increased FDG activity from background suggestive of a  small focal pneumonia (possibly viral in etiology).  Consider  correlation with the symptoms, history and CT follow-up in 3 months to  document resolution.  2. No FDG avid lymphadenopathy or additional sites of disease.         PET 9/2/2020:      Impression:     Negative for active lymphoproliferative disease.         Chest CT  3/19/2020:      Impression       1. Mild bronchiectasis in the left lower lobe and tree-in-bud micro nodules at the left lung base suggesting underlying bronchiolitis, possibly due to a non tuberculous mycobacterial infection or viral bronchiolitis.  2. Fatty infiltration of the liver  3. Small hiatal hernia.           CXR  1/31/2020    Impression       1. No acute chest disease.  2. Left subclavian Port-A-Cath.               PET 9/11/2019   Impression       No evidence of FDG avid residual or recurrent lymphoma       I have reviewed all available lab results and radiology reports.    Assessment/Plan:   (1) 67 y.o. male with diagnosis of NHL Follicular Stage IIIA who has been referred by Dr Gary Chen with [facility] for continuation of care by medical hematology/oncology.   - He originally was diagnosed in 2012 and had parotid excision at [facility]. He subsequently went to [facility] and was treated with bendamustine-rituximab. He has been on rituximab maintenance every 8 weeks and IV IgG monthly. Dr Chen's plan was to keep him on maintenace therapy for as long as he could tolerate the treatments  - s/p 6 cycles of Bendamustine and Retuximab with subsequent complete remission  - 1st maintenance cycle rituximab was in March 2016  - he has been on maintenance rituximab and IV IgG - last rituximab 11/15/2018; last IV IgG on Dec 14th 2018  - last PET was on 9/26/2018 with no evidence of recurrence  - originally diagnosed in Dec 2012 with left parotid and left periparotid LN excision on 12/11/2012  - PET scan done on  9/11/2019 was good    7/23/2020:  - new nodule on auricle of left ear suspicious for a skin cancer - will refer him to Dr Avilez  with ENT  - he is due for repeat PET    9/17/2020:  - PET scan on 9/2/2020 is adequate  - [facility]     11/12/2020:  - continued on the current regimen  - doing well with no new issues    1/7/2021:  - he is having some persistent cough issues for which he has been obtaining sputum for José Manuel  - last PET was Sept 2020    3/4/2021:  - doing ok  - chronic cough issues - followed by José Manuel  - will set up f/u PET    4/29/2021:  - he had PET scan on 3/19/2021  - He has been seeing pulmonary about his chronic cough  Lucia José Manuel with pulmonary has seen the recent PET and reported to him that the CXR on 3/25 was stable and he is on some oral antibiotics with improvement of the symptoms  - He is also on Gabapentin for the neuropathy issues in his feet.   - He saw Dr Barry Mcamhon on 3/24/2021 with [facility]    8/19/2021:  - He has been seeing pulmonary about his chronic cough - Lucia Georges with pulmonary and he has been on periodic treatments of antibiotics. He saw her last just yesterday on 8/17/2021 and is currently on antibiotics.  - Pulmonary is planning to refer him to an ID specialist  - he is on the rituximab every 8 weeks; I am not convinced that this is contributing to the infection issues as it is not reducing his WBC; however,if pulmonary and/or ID feel it is a contributing factor then we can discontinue. The risk of his lymphoma recurring or progressing would be higher if he were to discontinue the therapy      10/14/2021:  - continued on oral antibiotics per pulmonary and plans to see Pulmonary ID specialist in near future  - continued on current therapy with rituximab  - recent PEt on 10/4/2021    12/9/2021:  - continued on rituximab  - on new antibiotics per pulmonary and he sees them again in Feb 2022  - repeat scans in Feb 2022 or sooner if pulmonary says otherwise    2/2/2022:  - he is seeing pulmonary again in two weeks  - he does not think that the  new triple antibiotic regimen is working  - we can get PET in Feb/mar 2022 if pulmonary is inclined, otherwise 6 month surveillance scan in April/May 2022    3/31/2022:  - He has been seeing pulmonary about his chronic cough and TONY issues; he is on antibiotics 3x/week and he reports that pulmonary feels that he is stable  - PET scan scheduled for 4/14/2022  - He last saw Dr Barry Mcmahon on 3/28/2021 and José Manuel on 2/17/2022  - pulmonary is fine with him continuing the ritxuimab per his report    5/26/2022:  - He has been seeing pulmonary about his chronic cough and TONY issues; he saw Rose on 5/17/2022 and is now on a new antibiotic and he reports that pulmonary said the PEt scan was better than the last one; he is planning to see Dr Veliz in the near future   - PET scan was done on 4/5/2022 with no evidence of recurrence  - continued on rituximab maintenance therapy        (2) HTN     (3) Neuropathy     (4) GERD     (5) DM - on metformin per Dr Nunez     (6) Hypogammaglobulinemia - on Iv IgG monthly     (7) Steatosis of liver     (8) Degenerative disc disease of back     (9) Diverticular disease    (10) Mild bronchiectasis in the left lower lobe and tree-in-bud micro nodules at the left lung base suggesting underlying bronchiolitis  - seeing Lucia Georges           VISIT DIAGNOSES:      Follicular lymphoma grade IIIa, unspecified body region    Encounter for antineoplastic chemotherapy          PLAN:  1. Continue his rituximab every 8 weeks as long as he is tolerating the therapy as per Dr Chen's prior recommendations and directives    2. continue IV IgG monthly   3. Check labs every 8 weeks  4. Repeat PET in 6 months from last one  5. F/u with PCP, Pulm etcc        RTC in 8 weeks   Fax note to Barry Mcmahon; José Manuel Veliz;          Discussion:       I spent over 25 mins of time with the patient. Reviewed results of the recently ordered labs, tests and studies; made directives with regards to the results. Over  half of this time was spent couseling and coordinating care.      COVID-19 Discussion:    I had long discussion with patient and any applicable family about the COVID-19 coronavirus epidemic and the recommended precautions with regard to cancer and/or hematology patients. I have re-iterated the CDC recommendations for adequate hand washing, use of hand -like products, and coughing into elbow, etc. In addition, especially for our patients who are on chemotherapy and/or our otherwise immunocompromised patients, I have recommended avoidance of crowds, including movie theaters, restaurants, churches, etc. I have recommended avoidance of any sick or symptomatic family members and/or friends. I have also recommended avoidance of any raw and unwashed food products, and general avoidance of food items that have not been prepared by themselves. The patient has been asked to call us immediately with any symptom developments, issues, questions or other general concerns.     I have explained all of the above in detail and the patient understands all of the current recommendation(s). I have answered all of their questions to the best of my ability and to their complete satisfaction.   The patient is to continue with the current management plan.            Electronically signed by Jefferson Ibarra MD                            Answers for HPI/ROS submitted by the patient on 5/23/2022  appetite change : No  unexpected weight change: No  mouth sores: No  visual disturbance: No  cough: Yes  shortness of breath: No  chest pain: No  abdominal pain: No  diarrhea: No  frequency: No  back pain: No  rash: No  headaches: No  adenopathy: No  nervous/ anxious: No

## 2022-05-26 ENCOUNTER — OFFICE VISIT (OUTPATIENT)
Dept: HEMATOLOGY/ONCOLOGY | Facility: CLINIC | Age: 67
End: 2022-05-26
Payer: MEDICARE

## 2022-05-26 ENCOUNTER — INFUSION (OUTPATIENT)
Dept: INFUSION THERAPY | Facility: HOSPITAL | Age: 67
End: 2022-05-26
Attending: INTERNAL MEDICINE
Payer: MEDICARE

## 2022-05-26 ENCOUNTER — PATIENT MESSAGE (OUTPATIENT)
Dept: DERMATOLOGY | Facility: CLINIC | Age: 67
End: 2022-05-26
Payer: MEDICARE

## 2022-05-26 VITALS
DIASTOLIC BLOOD PRESSURE: 84 MMHG | WEIGHT: 201 LBS | TEMPERATURE: 98 F | OXYGEN SATURATION: 98 % | SYSTOLIC BLOOD PRESSURE: 147 MMHG | BODY MASS INDEX: 30.46 KG/M2 | HEIGHT: 68 IN | RESPIRATION RATE: 16 BRPM | HEART RATE: 77 BPM

## 2022-05-26 VITALS
HEART RATE: 85 BPM | DIASTOLIC BLOOD PRESSURE: 84 MMHG | WEIGHT: 201 LBS | SYSTOLIC BLOOD PRESSURE: 146 MMHG | OXYGEN SATURATION: 96 % | BODY MASS INDEX: 30.46 KG/M2 | RESPIRATION RATE: 18 BRPM | HEIGHT: 68 IN | TEMPERATURE: 99 F

## 2022-05-26 DIAGNOSIS — C82.30 FOLLICULAR LYMPHOMA GRADE IIIA, UNSPECIFIED BODY REGION: ICD-10-CM

## 2022-05-26 DIAGNOSIS — C82.30 FOLLICULAR LYMPHOMA GRADE IIIA, UNSPECIFIED BODY REGION: Primary | ICD-10-CM

## 2022-05-26 DIAGNOSIS — Z51.11 ENCOUNTER FOR ANTINEOPLASTIC CHEMOTHERAPY: ICD-10-CM

## 2022-05-26 DIAGNOSIS — D80.1 HYPOGAMMAGLOBULINEMIA: Primary | ICD-10-CM

## 2022-05-26 PROCEDURE — 25000003 PHARM REV CODE 250: Performed by: INTERNAL MEDICINE

## 2022-05-26 PROCEDURE — 96415 CHEMO IV INFUSION ADDL HR: CPT

## 2022-05-26 PROCEDURE — 96367 TX/PROPH/DG ADDL SEQ IV INF: CPT

## 2022-05-26 PROCEDURE — 99214 PR OFFICE/OUTPT VISIT, EST, LEVL IV, 30-39 MIN: ICD-10-PCS | Mod: S$GLB,,, | Performed by: INTERNAL MEDICINE

## 2022-05-26 PROCEDURE — A4216 STERILE WATER/SALINE, 10 ML: HCPCS | Performed by: INTERNAL MEDICINE

## 2022-05-26 PROCEDURE — 63600175 PHARM REV CODE 636 W HCPCS: Mod: JG | Performed by: INTERNAL MEDICINE

## 2022-05-26 PROCEDURE — 99214 OFFICE O/P EST MOD 30 MIN: CPT | Mod: S$GLB,,, | Performed by: INTERNAL MEDICINE

## 2022-05-26 PROCEDURE — 96413 CHEMO IV INFUSION 1 HR: CPT

## 2022-05-26 PROCEDURE — 96375 TX/PRO/DX INJ NEW DRUG ADDON: CPT

## 2022-05-26 RX ORDER — HEPARIN 100 UNIT/ML
500 SYRINGE INTRAVENOUS
Status: DISCONTINUED | OUTPATIENT
Start: 2022-05-26 | End: 2022-05-26 | Stop reason: HOSPADM

## 2022-05-26 RX ORDER — MEPERIDINE HYDROCHLORIDE 25 MG/ML
25 INJECTION INTRAMUSCULAR; INTRAVENOUS; SUBCUTANEOUS
Status: DISCONTINUED | OUTPATIENT
Start: 2022-05-26 | End: 2022-05-26 | Stop reason: HOSPADM

## 2022-05-26 RX ORDER — SODIUM CHLORIDE 0.9 % (FLUSH) 0.9 %
10 SYRINGE (ML) INJECTION
Status: DISCONTINUED | OUTPATIENT
Start: 2022-05-26 | End: 2022-05-26 | Stop reason: HOSPADM

## 2022-05-26 RX ORDER — ACETAMINOPHEN 325 MG/1
650 TABLET ORAL
Status: COMPLETED | OUTPATIENT
Start: 2022-05-26 | End: 2022-05-26

## 2022-05-26 RX ORDER — FAMOTIDINE 10 MG/ML
20 INJECTION INTRAVENOUS
Status: COMPLETED | OUTPATIENT
Start: 2022-05-26 | End: 2022-05-26

## 2022-05-26 RX ADMIN — Medication 500 UNITS: at 11:05

## 2022-05-26 RX ADMIN — FAMOTIDINE 20 MG: 10 INJECTION INTRAVENOUS at 08:05

## 2022-05-26 RX ADMIN — SODIUM CHLORIDE, PRESERVATIVE FREE 10 ML: 5 INJECTION INTRAVENOUS at 11:05

## 2022-05-26 RX ADMIN — ACETAMINOPHEN 650 MG: 325 TABLET ORAL at 08:05

## 2022-05-26 RX ADMIN — SODIUM CHLORIDE: 9 INJECTION, SOLUTION INTRAVENOUS at 08:05

## 2022-05-26 RX ADMIN — DIPHENHYDRAMINE HYDROCHLORIDE 50 MG: 50 INJECTION, SOLUTION INTRAMUSCULAR; INTRAVENOUS at 08:05

## 2022-05-26 RX ADMIN — RITUXIMAB 800 MG: 10 INJECTION, SOLUTION INTRAVENOUS at 08:05

## 2022-05-26 NOTE — PLAN OF CARE
Problem: Fall Injury Risk  Goal: Absence of Fall and Fall-Related Injury  Outcome: Ongoing, Progressing  Intervention: Identify and Manage Contributors  Flowsheets (Taken 5/26/2022 0808)  Self-Care Promotion: independence encouraged  Medication Review/Management: medications reviewed  Intervention: Promote Injury-Free Environment  Flowsheets (Taken 5/26/2022 0808)  Safety Promotion/Fall Prevention: medications reviewed

## 2022-05-27 ENCOUNTER — PATIENT MESSAGE (OUTPATIENT)
Dept: PULMONOLOGY | Facility: CLINIC | Age: 67
End: 2022-05-27
Payer: MEDICARE

## 2022-05-27 ENCOUNTER — INFUSION (OUTPATIENT)
Dept: INFUSION THERAPY | Facility: HOSPITAL | Age: 67
End: 2022-05-27
Attending: INTERNAL MEDICINE
Payer: MEDICARE

## 2022-05-27 VITALS
HEART RATE: 77 BPM | WEIGHT: 207.38 LBS | DIASTOLIC BLOOD PRESSURE: 73 MMHG | BODY MASS INDEX: 31.43 KG/M2 | SYSTOLIC BLOOD PRESSURE: 121 MMHG | RESPIRATION RATE: 16 BRPM | HEIGHT: 68 IN | OXYGEN SATURATION: 94 %

## 2022-05-27 DIAGNOSIS — J84.9 ILD (INTERSTITIAL LUNG DISEASE): Primary | ICD-10-CM

## 2022-05-27 DIAGNOSIS — D80.1 HYPOGAMMAGLOBULINEMIA: Primary | ICD-10-CM

## 2022-05-27 DIAGNOSIS — C82.30 FOLLICULAR LYMPHOMA GRADE IIIA, UNSPECIFIED BODY REGION: ICD-10-CM

## 2022-05-27 DIAGNOSIS — D80.1 NONFAMILIAL HYPOGAMMAGLOBULINEMIA: ICD-10-CM

## 2022-05-27 PROCEDURE — 96413 CHEMO IV INFUSION 1 HR: CPT

## 2022-05-27 PROCEDURE — 96415 CHEMO IV INFUSION ADDL HR: CPT

## 2022-05-27 PROCEDURE — 96367 TX/PROPH/DG ADDL SEQ IV INF: CPT

## 2022-05-27 PROCEDURE — 63600175 PHARM REV CODE 636 W HCPCS: Mod: JG | Performed by: NURSE PRACTITIONER

## 2022-05-27 PROCEDURE — A4216 STERILE WATER/SALINE, 10 ML: HCPCS | Performed by: NURSE PRACTITIONER

## 2022-05-27 PROCEDURE — 25000003 PHARM REV CODE 250: Performed by: NURSE PRACTITIONER

## 2022-05-27 RX ORDER — SODIUM CHLORIDE 0.9 % (FLUSH) 0.9 %
10 SYRINGE (ML) INJECTION
Status: DISCONTINUED | OUTPATIENT
Start: 2022-05-27 | End: 2022-05-27 | Stop reason: HOSPADM

## 2022-05-27 RX ORDER — HEPARIN 100 UNIT/ML
500 SYRINGE INTRAVENOUS
Status: CANCELLED | OUTPATIENT
Start: 2022-06-24

## 2022-05-27 RX ORDER — HEPARIN 100 UNIT/ML
500 SYRINGE INTRAVENOUS
Status: DISCONTINUED | OUTPATIENT
Start: 2022-05-27 | End: 2022-05-27 | Stop reason: HOSPADM

## 2022-05-27 RX ORDER — SODIUM CHLORIDE 0.9 % (FLUSH) 0.9 %
10 SYRINGE (ML) INJECTION
Status: CANCELLED | OUTPATIENT
Start: 2022-06-24

## 2022-05-27 RX ADMIN — SODIUM CHLORIDE: 9 INJECTION, SOLUTION INTRAVENOUS at 08:05

## 2022-05-27 RX ADMIN — IMMUNE GLOBULIN (HUMAN) 30 G: 10 INJECTION INTRAVENOUS; SUBCUTANEOUS at 08:05

## 2022-05-27 RX ADMIN — SODIUM CHLORIDE, PRESERVATIVE FREE 10 ML: 5 INJECTION INTRAVENOUS at 10:05

## 2022-05-27 RX ADMIN — DIPHENHYDRAMINE HYDROCHLORIDE 25 MG: 50 INJECTION, SOLUTION INTRAMUSCULAR; INTRAVENOUS at 08:05

## 2022-05-27 RX ADMIN — Medication 500 UNITS: at 10:05

## 2022-05-27 NOTE — PLAN OF CARE
Problem: Fall Injury Risk  Goal: Absence of Fall and Fall-Related Injury  Outcome: Ongoing, Progressing  Intervention: Identify and Manage Contributors  Flowsheets (Taken 5/27/2022 0805)  Self-Care Promotion: independence encouraged  Medication Review/Management: medications reviewed  Intervention: Promote Injury-Free Environment  Flowsheets (Taken 5/27/2022 0805)  Safety Promotion/Fall Prevention: medications reviewed

## 2022-06-06 ENCOUNTER — PATIENT MESSAGE (OUTPATIENT)
Dept: FAMILY MEDICINE | Facility: CLINIC | Age: 67
End: 2022-06-06
Payer: MEDICARE

## 2022-06-07 ENCOUNTER — TELEPHONE (OUTPATIENT)
Dept: FAMILY MEDICINE | Facility: CLINIC | Age: 67
End: 2022-06-07
Payer: MEDICARE

## 2022-06-07 NOTE — TELEPHONE ENCOUNTER
"----- Message from Ruth Ann Obrien sent at 6/7/2022 10:22 AM CDT -----  Regarding: Appointment  "Type:  Patient Call Back    Who Called:CHRIS GUTIERREZ [[id number]]    What is the reqeust in detail:Pt requesting call back in regards to appointment. Please advise    Can the clinic reply by [facility]?no    Best Call Back Number:[phone]      Additional Information:Pt received an call that doctor will be out of office on 6/28. First available is showing September pt states that want work.            "

## 2022-06-24 ENCOUNTER — INFUSION (OUTPATIENT)
Dept: INFUSION THERAPY | Facility: HOSPITAL | Age: 67
End: 2022-06-24
Attending: INTERNAL MEDICINE
Payer: MEDICARE

## 2022-06-24 VITALS
TEMPERATURE: 98 F | OXYGEN SATURATION: 95 % | DIASTOLIC BLOOD PRESSURE: 77 MMHG | HEART RATE: 87 BPM | RESPIRATION RATE: 18 BRPM | HEIGHT: 68 IN | SYSTOLIC BLOOD PRESSURE: 138 MMHG | BODY MASS INDEX: 31.25 KG/M2 | WEIGHT: 206.19 LBS

## 2022-06-24 DIAGNOSIS — C82.30 FOLLICULAR LYMPHOMA GRADE IIIA, UNSPECIFIED BODY REGION: ICD-10-CM

## 2022-06-24 DIAGNOSIS — D80.1 NONFAMILIAL HYPOGAMMAGLOBULINEMIA: ICD-10-CM

## 2022-06-24 DIAGNOSIS — D80.1 HYPOGAMMAGLOBULINEMIA: Primary | ICD-10-CM

## 2022-06-24 PROCEDURE — 63600175 PHARM REV CODE 636 W HCPCS: Performed by: NURSE PRACTITIONER

## 2022-06-24 PROCEDURE — 96367 TX/PROPH/DG ADDL SEQ IV INF: CPT

## 2022-06-24 PROCEDURE — 96413 CHEMO IV INFUSION 1 HR: CPT

## 2022-06-24 PROCEDURE — 96415 CHEMO IV INFUSION ADDL HR: CPT

## 2022-06-24 PROCEDURE — A4216 STERILE WATER/SALINE, 10 ML: HCPCS | Performed by: NURSE PRACTITIONER

## 2022-06-24 PROCEDURE — 25000003 PHARM REV CODE 250: Performed by: NURSE PRACTITIONER

## 2022-06-24 RX ORDER — HEPARIN 100 UNIT/ML
500 SYRINGE INTRAVENOUS
Status: DISCONTINUED | OUTPATIENT
Start: 2022-06-24 | End: 2022-06-24 | Stop reason: HOSPADM

## 2022-06-24 RX ORDER — SODIUM CHLORIDE 0.9 % (FLUSH) 0.9 %
10 SYRINGE (ML) INJECTION
Status: DISCONTINUED | OUTPATIENT
Start: 2022-06-24 | End: 2022-06-24 | Stop reason: HOSPADM

## 2022-06-24 RX ORDER — SODIUM CHLORIDE 0.9 % (FLUSH) 0.9 %
10 SYRINGE (ML) INJECTION
Status: CANCELLED | OUTPATIENT
Start: 2022-07-22

## 2022-06-24 RX ORDER — HEPARIN 100 UNIT/ML
500 SYRINGE INTRAVENOUS
Status: CANCELLED | OUTPATIENT
Start: 2022-07-22

## 2022-06-24 RX ADMIN — SODIUM CHLORIDE, PRESERVATIVE FREE 10 ML: 5 INJECTION INTRAVENOUS at 10:06

## 2022-06-24 RX ADMIN — IMMUNE GLOBULIN (HUMAN) 30 G: 10 INJECTION INTRAVENOUS; SUBCUTANEOUS at 08:06

## 2022-06-24 RX ADMIN — HEPARIN 500 UNITS: 100 SYRINGE at 10:06

## 2022-06-24 RX ADMIN — SODIUM CHLORIDE: 9 INJECTION, SOLUTION INTRAVENOUS at 08:06

## 2022-06-24 RX ADMIN — DIPHENHYDRAMINE HYDROCHLORIDE 25 MG: 50 INJECTION INTRAMUSCULAR; INTRAVENOUS at 08:06

## 2022-06-24 NOTE — PLAN OF CARE
Problem: Fatigue  Goal: Improved Activity Tolerance  Outcome: Ongoing, Progressing  Intervention: Promote Improved Energy  Flowsheets (Taken 6/24/2022 0821)  Fatigue Management: frequent rest breaks encouraged  Sleep/Rest Enhancement:   regular sleep/rest pattern promoted   relaxation techniques promoted  Activity Management: Ambulated -L4   IVIG administered per MD order. Patient tolerated well.

## 2022-07-01 ENCOUNTER — OFFICE VISIT (OUTPATIENT)
Dept: FAMILY MEDICINE | Facility: CLINIC | Age: 67
End: 2022-07-01
Payer: MEDICARE

## 2022-07-01 VITALS
WEIGHT: 205.88 LBS | BODY MASS INDEX: 31.2 KG/M2 | HEIGHT: 68 IN | OXYGEN SATURATION: 95 % | SYSTOLIC BLOOD PRESSURE: 135 MMHG | HEART RATE: 92 BPM | RESPIRATION RATE: 19 BRPM | DIASTOLIC BLOOD PRESSURE: 81 MMHG

## 2022-07-01 DIAGNOSIS — I10 ESSENTIAL HYPERTENSION: ICD-10-CM

## 2022-07-01 DIAGNOSIS — J32.9 SINUSITIS, UNSPECIFIED CHRONICITY, UNSPECIFIED LOCATION: ICD-10-CM

## 2022-07-01 DIAGNOSIS — J30.89 NON-SEASONAL ALLERGIC RHINITIS DUE TO OTHER ALLERGIC TRIGGER: Primary | ICD-10-CM

## 2022-07-01 DIAGNOSIS — E11.9 TYPE 2 DIABETES MELLITUS WITHOUT COMPLICATION, WITHOUT LONG-TERM CURRENT USE OF INSULIN: ICD-10-CM

## 2022-07-01 DIAGNOSIS — Z12.5 PROSTATE CANCER SCREENING: ICD-10-CM

## 2022-07-01 PROCEDURE — 99213 OFFICE O/P EST LOW 20 MIN: CPT | Mod: PBBFAC,PN | Performed by: FAMILY MEDICINE

## 2022-07-01 PROCEDURE — 99999 PR PBB SHADOW E&M-EST. PATIENT-LVL III: ICD-10-PCS | Mod: PBBFAC,,, | Performed by: FAMILY MEDICINE

## 2022-07-01 PROCEDURE — 96372 THER/PROPH/DIAG INJ SC/IM: CPT | Mod: PBBFAC,PN

## 2022-07-01 PROCEDURE — 99214 PR OFFICE/OUTPT VISIT, EST, LEVL IV, 30-39 MIN: ICD-10-PCS | Mod: S$PBB,,, | Performed by: FAMILY MEDICINE

## 2022-07-01 PROCEDURE — 99999 PR PBB SHADOW E&M-EST. PATIENT-LVL III: CPT | Mod: PBBFAC,,, | Performed by: FAMILY MEDICINE

## 2022-07-01 PROCEDURE — 99214 OFFICE O/P EST MOD 30 MIN: CPT | Mod: S$PBB,,, | Performed by: FAMILY MEDICINE

## 2022-07-01 RX ORDER — AMLODIPINE BESYLATE 10 MG/1
10 TABLET ORAL DAILY
Qty: 90 TABLET | Refills: 3 | Status: ON HOLD | OUTPATIENT
Start: 2022-07-01 | End: 2022-10-08 | Stop reason: HOSPADM

## 2022-07-01 RX ORDER — METFORMIN HYDROCHLORIDE 500 MG/1
TABLET ORAL
Qty: 360 TABLET | Refills: 3 | Status: SHIPPED | OUTPATIENT
Start: 2022-07-01 | End: 2023-01-17 | Stop reason: SDUPTHER

## 2022-07-01 RX ORDER — MONTELUKAST SODIUM 10 MG/1
10 TABLET ORAL NIGHTLY PRN
Qty: 90 TABLET | Refills: 3 | Status: SHIPPED | OUTPATIENT
Start: 2022-07-01 | End: 2023-01-17 | Stop reason: SDUPTHER

## 2022-07-01 RX ORDER — NAPROXEN 500 MG/1
500 TABLET ORAL 2 TIMES DAILY PRN
Qty: 180 TABLET | Refills: 3 | Status: ON HOLD | OUTPATIENT
Start: 2022-07-01 | End: 2022-10-08 | Stop reason: SDUPTHER

## 2022-07-01 RX ORDER — FLUTICASONE PROPIONATE 50 MCG
1 SPRAY, SUSPENSION (ML) NASAL 2 TIMES DAILY
Qty: 48 G | Refills: 11 | Status: SHIPPED | OUTPATIENT
Start: 2022-07-01 | End: 2023-01-17 | Stop reason: SDUPTHER

## 2022-07-01 RX ORDER — LOSARTAN POTASSIUM 100 MG/1
100 TABLET ORAL DAILY
Qty: 90 TABLET | Refills: 3 | Status: ON HOLD | OUTPATIENT
Start: 2022-07-01 | End: 2022-10-08 | Stop reason: SDUPTHER

## 2022-07-01 RX ORDER — DEXAMETHASONE SODIUM PHOSPHATE 4 MG/ML
8 INJECTION, SOLUTION INTRA-ARTICULAR; INTRALESIONAL; INTRAMUSCULAR; INTRAVENOUS; SOFT TISSUE ONCE
Status: COMPLETED | OUTPATIENT
Start: 2022-07-01 | End: 2022-07-01

## 2022-07-01 RX ORDER — OMEPRAZOLE 40 MG/1
40 CAPSULE, DELAYED RELEASE ORAL DAILY
Qty: 90 CAPSULE | Refills: 3 | Status: SHIPPED | OUTPATIENT
Start: 2022-07-01 | End: 2023-01-17 | Stop reason: SDUPTHER

## 2022-07-01 RX ORDER — TIZANIDINE 4 MG/1
4 TABLET ORAL DAILY PRN
Qty: 90 TABLET | Refills: 3 | Status: ON HOLD | OUTPATIENT
Start: 2022-07-01 | End: 2022-10-08 | Stop reason: HOSPADM

## 2022-07-01 RX ORDER — GABAPENTIN 300 MG/1
300 CAPSULE ORAL 2 TIMES DAILY
Qty: 180 CAPSULE | Refills: 3 | Status: SHIPPED | OUTPATIENT
Start: 2022-07-01 | End: 2023-01-17 | Stop reason: SDUPTHER

## 2022-07-01 RX ORDER — AMOXICILLIN AND CLAVULANATE POTASSIUM 875; 125 MG/1; MG/1
1 TABLET, FILM COATED ORAL EVERY 12 HOURS
Qty: 20 TABLET | Refills: 0 | Status: SHIPPED | OUTPATIENT
Start: 2022-07-01 | End: 2022-08-15 | Stop reason: SDUPTHER

## 2022-07-01 RX ORDER — PROMETHAZINE HYDROCHLORIDE AND DEXTROMETHORPHAN HYDROBROMIDE 6.25; 15 MG/5ML; MG/5ML
5 SYRUP ORAL EVERY 6 HOURS PRN
Qty: 240 ML | Refills: 1 | Status: SHIPPED | OUTPATIENT
Start: 2022-07-01 | End: 2022-11-11

## 2022-07-01 RX ADMIN — DEXAMETHASONE SODIUM PHOSPHATE 8 MG: 4 INJECTION INTRA-ARTICULAR; INTRALESIONAL; INTRAMUSCULAR; INTRAVENOUS; SOFT TISSUE at 08:07

## 2022-07-01 NOTE — PROGRESS NOTES
" Patient ID: Sivakumar Thacker is a 67 y.o. male.    Chief Complaint: Follow-up (BW review), Cough, Sinus Problem, and Medication Refill      Reviewed family, medical, surgical, and social history.    No cp/sob  No change in mental status  No fever  No asymmetrical limb swelling    Objective:      /81 (BP Location: Right arm, Patient Position: Sitting, BP Method: Medium (Manual))   Pulse 92   Resp 19   Ht 5' 8" (1.727 m)   Wt 93.4 kg (205 lb 14.4 oz)   SpO2 95%   BMI 31.31 kg/m²   RRR, CTAB, s/nt/nd, no c/c/e, non-toxic appearing, no focal weakness  Assessment:       1. Non-seasonal allergic rhinitis due to other allergic trigger    2. Sinusitis, unspecified chronicity, unspecified location    3. Essential hypertension    4. Type 2 diabetes mellitus without complication, without long-term current use of insulin    5. Prostate cancer screening        Plan:       Non-seasonal allergic rhinitis due to other allergic trigger  -     fluticasone propionate (FLONASE) 50 mcg/actuation nasal spray; 1 spray (50 mcg total) by Each Nostril route 2 (two) times daily.  Dispense: 48 g; Refill: 11  -     amLODIPine (NORVASC) 10 MG tablet; Take 1 tablet (10 mg total) by mouth once daily.  Dispense: 90 tablet; Refill: 3  -     gabapentin (NEURONTIN) 300 MG capsule; Take 1 capsule (300 mg total) by mouth 2 (two) times daily.  Dispense: 180 capsule; Refill: 3  -     losartan (COZAAR) 100 MG tablet; Take 1 tablet (100 mg total) by mouth once daily.  Dispense: 90 tablet; Refill: 3  -     metFORMIN (GLUCOPHAGE) 500 MG tablet; metformin 500 mg tablet  Take 2 tablets twice a day by oral route with meals for 90 days.  Dispense: 360 tablet; Refill: 3  -     montelukast (SINGULAIR) 10 mg tablet; Take 1 tablet (10 mg total) by mouth nightly as needed.  Dispense: 90 tablet; Refill: 3  -     naproxen (NAPROSYN) 500 MG tablet; Take 1 tablet (500 mg total) by mouth 2 (two) times daily as needed.  Dispense: 180 tablet; Refill: 3  -     " omeprazole (PRILOSEC) 40 MG capsule; Take 1 capsule (40 mg total) by mouth once daily.  Dispense: 90 capsule; Refill: 3  -     tiZANidine (ZANAFLEX) 4 MG tablet; Take 1 tablet (4 mg total) by mouth daily as needed.  Dispense: 90 tablet; Refill: 3  -     CBC Auto Differential; Future; Expected date: 07/01/2022  -     Comprehensive Metabolic Panel; Future; Expected date: 07/01/2022  -     Microalbumin/Creatinine Ratio, Urine; Future  -     Lipid Panel; Future; Expected date: 07/01/2022  -     TSH; Future; Expected date: 07/01/2022  -     T4, Free; Future; Expected date: 07/01/2022  -     Hemoglobin A1C; Future; Expected date: 07/01/2022  -     PSA, Screening; Future; Expected date: 01/01/2023    Sinusitis, unspecified chronicity, unspecified location  -     fluticasone propionate (FLONASE) 50 mcg/actuation nasal spray; 1 spray (50 mcg total) by Each Nostril route 2 (two) times daily.  Dispense: 48 g; Refill: 11  -     amLODIPine (NORVASC) 10 MG tablet; Take 1 tablet (10 mg total) by mouth once daily.  Dispense: 90 tablet; Refill: 3  -     gabapentin (NEURONTIN) 300 MG capsule; Take 1 capsule (300 mg total) by mouth 2 (two) times daily.  Dispense: 180 capsule; Refill: 3  -     losartan (COZAAR) 100 MG tablet; Take 1 tablet (100 mg total) by mouth once daily.  Dispense: 90 tablet; Refill: 3  -     metFORMIN (GLUCOPHAGE) 500 MG tablet; metformin 500 mg tablet  Take 2 tablets twice a day by oral route with meals for 90 days.  Dispense: 360 tablet; Refill: 3  -     montelukast (SINGULAIR) 10 mg tablet; Take 1 tablet (10 mg total) by mouth nightly as needed.  Dispense: 90 tablet; Refill: 3  -     naproxen (NAPROSYN) 500 MG tablet; Take 1 tablet (500 mg total) by mouth 2 (two) times daily as needed.  Dispense: 180 tablet; Refill: 3  -     omeprazole (PRILOSEC) 40 MG capsule; Take 1 capsule (40 mg total) by mouth once daily.  Dispense: 90 capsule; Refill: 3  -     tiZANidine (ZANAFLEX) 4 MG tablet; Take 1 tablet (4 mg total)  by mouth daily as needed.  Dispense: 90 tablet; Refill: 3  -     CBC Auto Differential; Future; Expected date: 07/01/2022  -     Comprehensive Metabolic Panel; Future; Expected date: 07/01/2022  -     Microalbumin/Creatinine Ratio, Urine; Future  -     Lipid Panel; Future; Expected date: 07/01/2022  -     TSH; Future; Expected date: 07/01/2022  -     T4, Free; Future; Expected date: 07/01/2022  -     Hemoglobin A1C; Future; Expected date: 07/01/2022  -     PSA, Screening; Future; Expected date: 01/01/2023    Essential hypertension  -     fluticasone propionate (FLONASE) 50 mcg/actuation nasal spray; 1 spray (50 mcg total) by Each Nostril route 2 (two) times daily.  Dispense: 48 g; Refill: 11  -     amLODIPine (NORVASC) 10 MG tablet; Take 1 tablet (10 mg total) by mouth once daily.  Dispense: 90 tablet; Refill: 3  -     gabapentin (NEURONTIN) 300 MG capsule; Take 1 capsule (300 mg total) by mouth 2 (two) times daily.  Dispense: 180 capsule; Refill: 3  -     losartan (COZAAR) 100 MG tablet; Take 1 tablet (100 mg total) by mouth once daily.  Dispense: 90 tablet; Refill: 3  -     metFORMIN (GLUCOPHAGE) 500 MG tablet; metformin 500 mg tablet  Take 2 tablets twice a day by oral route with meals for 90 days.  Dispense: 360 tablet; Refill: 3  -     montelukast (SINGULAIR) 10 mg tablet; Take 1 tablet (10 mg total) by mouth nightly as needed.  Dispense: 90 tablet; Refill: 3  -     naproxen (NAPROSYN) 500 MG tablet; Take 1 tablet (500 mg total) by mouth 2 (two) times daily as needed.  Dispense: 180 tablet; Refill: 3  -     omeprazole (PRILOSEC) 40 MG capsule; Take 1 capsule (40 mg total) by mouth once daily.  Dispense: 90 capsule; Refill: 3  -     tiZANidine (ZANAFLEX) 4 MG tablet; Take 1 tablet (4 mg total) by mouth daily as needed.  Dispense: 90 tablet; Refill: 3  -     CBC Auto Differential; Future; Expected date: 07/01/2022  -     Comprehensive Metabolic Panel; Future; Expected date: 07/01/2022  -      Microalbumin/Creatinine Ratio, Urine; Future  -     Lipid Panel; Future; Expected date: 07/01/2022  -     TSH; Future; Expected date: 07/01/2022  -     T4, Free; Future; Expected date: 07/01/2022  -     Hemoglobin A1C; Future; Expected date: 07/01/2022  -     PSA, Screening; Future; Expected date: 01/01/2023    Type 2 diabetes mellitus without complication, without long-term current use of insulin  -     fluticasone propionate (FLONASE) 50 mcg/actuation nasal spray; 1 spray (50 mcg total) by Each Nostril route 2 (two) times daily.  Dispense: 48 g; Refill: 11  -     amLODIPine (NORVASC) 10 MG tablet; Take 1 tablet (10 mg total) by mouth once daily.  Dispense: 90 tablet; Refill: 3  -     gabapentin (NEURONTIN) 300 MG capsule; Take 1 capsule (300 mg total) by mouth 2 (two) times daily.  Dispense: 180 capsule; Refill: 3  -     losartan (COZAAR) 100 MG tablet; Take 1 tablet (100 mg total) by mouth once daily.  Dispense: 90 tablet; Refill: 3  -     metFORMIN (GLUCOPHAGE) 500 MG tablet; metformin 500 mg tablet  Take 2 tablets twice a day by oral route with meals for 90 days.  Dispense: 360 tablet; Refill: 3  -     montelukast (SINGULAIR) 10 mg tablet; Take 1 tablet (10 mg total) by mouth nightly as needed.  Dispense: 90 tablet; Refill: 3  -     naproxen (NAPROSYN) 500 MG tablet; Take 1 tablet (500 mg total) by mouth 2 (two) times daily as needed.  Dispense: 180 tablet; Refill: 3  -     omeprazole (PRILOSEC) 40 MG capsule; Take 1 capsule (40 mg total) by mouth once daily.  Dispense: 90 capsule; Refill: 3  -     tiZANidine (ZANAFLEX) 4 MG tablet; Take 1 tablet (4 mg total) by mouth daily as needed.  Dispense: 90 tablet; Refill: 3  -     CBC Auto Differential; Future; Expected date: 07/01/2022  -     Comprehensive Metabolic Panel; Future; Expected date: 07/01/2022  -     Microalbumin/Creatinine Ratio, Urine; Future  -     Lipid Panel; Future; Expected date: 07/01/2022  -     TSH; Future; Expected date: 07/01/2022  -     T4,  Free; Future; Expected date: 07/01/2022  -     Hemoglobin A1C; Future; Expected date: 07/01/2022  -     PSA, Screening; Future; Expected date: 01/01/2023    Prostate cancer screening  -     CBC Auto Differential; Future; Expected date: 07/01/2022  -     Comprehensive Metabolic Panel; Future; Expected date: 07/01/2022  -     Microalbumin/Creatinine Ratio, Urine; Future  -     Lipid Panel; Future; Expected date: 07/01/2022  -     TSH; Future; Expected date: 07/01/2022  -     T4, Free; Future; Expected date: 07/01/2022  -     Hemoglobin A1C; Future; Expected date: 07/01/2022  -     PSA, Screening; Future; Expected date: 01/01/2023    Other orders  -     amoxicillin-clavulanate 875-125mg (AUGMENTIN) 875-125 mg per tablet; Take 1 tablet by mouth every 12 (twelve) hours. for 10 days  Dispense: 20 tablet; Refill: 0  -     dexamethasone injection 8 mg  -     promethazine-dextromethorphan (PROMETHAZINE-DM) 6.25-15 mg/5 mL Syrp; Take 5 mLs by mouth every 6 (six) hours as needed (cough).  Dispense: 240 mL; Refill: 1            Reviewed, monitored, evaluated, and assessed chronic conditions as outlined above  Continue current medicines, any changes noted above  As shown  Labs, radiology studies, and procedures/notes from the last 3 months were reviewed.    Risks, benefits, and side effects were discussed with the patient. All questions were answered to the fullest satisfaction of the patient, and pt verbalized understanding and agreement to treatment plan. Pt was to call with any new or worsening symptoms, or present to the ER.

## 2022-07-11 ENCOUNTER — OFFICE VISIT (OUTPATIENT)
Dept: DERMATOLOGY | Facility: CLINIC | Age: 67
End: 2022-07-11
Payer: MEDICARE

## 2022-07-11 DIAGNOSIS — L90.5 SCAR: ICD-10-CM

## 2022-07-11 DIAGNOSIS — D22.9 BENIGN NEVUS: ICD-10-CM

## 2022-07-11 DIAGNOSIS — L82.1 SEBORRHEIC KERATOSES: Primary | ICD-10-CM

## 2022-07-11 DIAGNOSIS — D18.01 CHERRY ANGIOMA: ICD-10-CM

## 2022-07-11 DIAGNOSIS — L82.0 SEBORRHEIC KERATOSES, INFLAMED: ICD-10-CM

## 2022-07-11 DIAGNOSIS — L81.4 LENTIGO: ICD-10-CM

## 2022-07-11 DIAGNOSIS — Z85.828 HISTORY OF NONMELANOMA SKIN CANCER: ICD-10-CM

## 2022-07-11 PROCEDURE — 99213 PR OFFICE/OUTPT VISIT, EST, LEVL III, 20-29 MIN: ICD-10-PCS | Mod: S$PBB,,, | Performed by: STUDENT IN AN ORGANIZED HEALTH CARE EDUCATION/TRAINING PROGRAM

## 2022-07-11 PROCEDURE — 99999 PR PBB SHADOW E&M-EST. PATIENT-LVL II: CPT | Mod: PBBFAC,,, | Performed by: STUDENT IN AN ORGANIZED HEALTH CARE EDUCATION/TRAINING PROGRAM

## 2022-07-11 PROCEDURE — 99212 OFFICE O/P EST SF 10 MIN: CPT | Mod: PBBFAC,PO | Performed by: STUDENT IN AN ORGANIZED HEALTH CARE EDUCATION/TRAINING PROGRAM

## 2022-07-11 PROCEDURE — 99999 PR PBB SHADOW E&M-EST. PATIENT-LVL II: ICD-10-PCS | Mod: PBBFAC,,, | Performed by: STUDENT IN AN ORGANIZED HEALTH CARE EDUCATION/TRAINING PROGRAM

## 2022-07-11 PROCEDURE — 99213 OFFICE O/P EST LOW 20 MIN: CPT | Mod: S$PBB,,, | Performed by: STUDENT IN AN ORGANIZED HEALTH CARE EDUCATION/TRAINING PROGRAM

## 2022-07-11 NOTE — PROGRESS NOTES
Subjective:       Patient ID:  Sivakumar Thacker is a 67 y.o. 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     Hx of nonhodgkin lymphoma currently on chemo with rituximab Q8 weeks,         Review of Systems   Constitutional: Negative for fever, chills and fatigue.   Respiratory: Negative for cough and shortness of breath.    Gastrointestinal: Negative for nausea and vomiting.   Skin: Positive for activity-related sunscreen use and wears hat. Negative for daily sunscreen use.   Hematologic/Lymphatic: Does not bruise/bleed easily (asa).        Objective:    Physical Exam   Constitutional: He appears well-developed and well-nourished. No distress.   Neurological: He is alert and oriented to person, place, and time. He is not disoriented.   Psychiatric: He has a normal mood and affect.   Skin:   Areas Examined (abnormalities noted in diagram):   Scalp / Hair Palpated and Inspected  Head / Face Inspection Performed  Neck Inspection Performed  Chest / Axilla Inspection Performed  Abdomen Inspection Performed  Back Inspection Performed  RUE Inspected  LUE Inspection Performed  Nails and Digits Inspection Performed                       Diagram Legend     Erythematous scaling macule/papule c/w actinic keratosis       Vascular papule c/w angioma      Pigmented verrucoid papule/plaque c/w seborrheic keratosis      Yellow umbilicated papule c/w sebaceous hyperplasia      Irregularly shaped tan macule c/w lentigo     1-2 mm smooth white papules consistent with Milia      Movable subcutaneous cyst with punctum c/w epidermal inclusion cyst      Subcutaneous movable cyst c/w pilar cyst      Firm pink to brown papule c/w dermatofibroma      Pedunculated fleshy papule(s) c/w  skin tag(s)      Evenly pigmented macule c/w junctional nevus     Mildly variegated pigmented, slightly irregular-bordered macule c/w mildly atypical nevus      Flesh colored to evenly pigmented papule c/w intradermal nevus       Pink pearly papule/plaque c/w basal cell carcinoma      Erythematous hyperkeratotic cursted plaque c/w SCC      Surgical scar with no sign of skin cancer recurrence      Open and closed comedones      Inflammatory papules and pustules      Verrucoid papule consistent consistent with wart     Erythematous eczematous patches and plaques     Dystrophic onycholytic nail with subungual debris c/w onychomycosis     Umbilicated papule    Erythematous-base heme-crusted tan verrucoid plaque consistent with inflamed seborrheic keratosis     Erythematous Silvery Scaling Plaque c/w Psoriasis     See annotation      Assessment / Plan:        Seborrheic keratoses  These are benign inherited growths without a malignant potential. Reassurance given to patient. No treatment is necessary.     Lentigo  This is a benign hyperpigmented sun induced lesion. Daily sun protection will reduce the number of new lesions. Treatment of these benign lesions are considered cosmetic.    Seborrheic keratoses, inflamed  - left temple  - discussed risks and benefits of LN2    Cherry angioma  This is a benign vascular lesion. Reassurance given. No treatment required.     Benign nevus  Careful dermoscopy evaluation of nevi performed with none identified as needing biopsy today  Monitor for new mole or moles that are becoming bigger, darker, irritated, or developing irregular borders.     History of nonmelanoma skin cancer  Scar  Area(s) of previous NMSC evaluated with no signs of recurrence.    Upper body skin examination performed today including at least 9 points as noted in physical examination. Suspicious lesions noted.  Patient instructed in importance in daily broad spectrum sun protection of at least spf 30. Mineral  sunscreen ingredients preferred (Zinc +/- Titanium) and can be found OTC.   Patient encouraged to wear hat for all outdoor exposure.   Also discussed sun avoidance and use of protective clothing.             1 year  No follow-ups on file.

## 2022-07-15 ENCOUNTER — PATIENT MESSAGE (OUTPATIENT)
Dept: FAMILY MEDICINE | Facility: CLINIC | Age: 67
End: 2022-07-15
Payer: MEDICARE

## 2022-07-15 DIAGNOSIS — E11.49 TYPE II DIABETES MELLITUS WITH NEUROLOGICAL MANIFESTATIONS: Primary | ICD-10-CM

## 2022-07-20 NOTE — PROGRESS NOTES
[facility] Hematology/Oncology  PROGRESS NOTE -  Follow-up Visit      Subjective:       Patient ID:   NAME: Sivakumar Thacker : 1955     67 y.o. male    Referring Doc: Barry Mcmahon (PCP in [place])  Other Physicians: Vinod Chen/José Manuel      Chief Complaint:  NHL f/u    History of Present Illness:     Patient returns today for a regularly scheduled follow-up visit.  The patient is here today to go over the results of the recently ordered labs, tests and studies. He is here by himself.       He is doing well with the rituximab infusions. He denies any CP, SOB, HA's or N/V.     He has been seeing pulmonary about his chronic cough and TONY issues; he saw Rose on 2022 and is now on a new antibiotic and he reports that pulmonary said the PEt scan was better than the last one; he is planning to see Dr Veliz in Aug 2022     PET scan was done on 2022    continued on Gabapentin for the neuropathy issues in his feet.     [place] Dr Barry Mcmahon recently on 2022      I discussed continued Covid19 precautions        ROS:   GEN: normal without any fever, night sweats or weight loss  HEENT: normal with no HA's, sore throat, stiff neck, changes in vision; sinus issues resolved  CV: normal with no CP, SOB, PND, VASQUEZ or orthopnea  PULM: normal with no SOB,  hemoptysis, sputum or pleuritic pain; chronic cough since 2020  GI: normal with no abdominal pain, nausea, vomiting, constipation, diarrhea, melanotic stools, BRBPR, or hematemesis  : normal with no hematuria, dysuria  BREAST: normal with no mass, discharge, pain  SKIN: normal with no rash, erythema, bruising, or swelling    Allergies:  Review of patient's allergies indicates:  No Known Allergies    Medications:    Current Outpatient Medications:     albuterol-ipratropium (DUO-NEB) 2.5 mg-0.5 mg/3 mL nebulizer solution, Take 3 mLs by nebulization every 6 (six) hours as needed for Wheezing or Shortness of Breath. Rescue, Disp: 120 vial, Rfl: 5     amLODIPine (NORVASC) 10 MG tablet, Take 1 tablet (10 mg total) by mouth once daily., Disp: 90 tablet, Rfl: 3    aspirin (ECOTRIN) 81 MG EC tablet, Take 81 mg by mouth once daily., Disp: , Rfl:     atorvastatin (LIPITOR) 20 MG tablet, Take 1 tablet (20 mg total) by mouth once daily., Disp: 90 tablet, Rfl: 3    blood sugar diagnostic (FREESTYLE LITE STRIPS MISC), FreeStyle Lite Strips, Disp: , Rfl:     blood sugar diagnostic (FREESTYLE LITE STRIPS) Strp, Check blood sugar 2 (two) times daily as needed., Disp: 200 each, Rfl: 11    blood sugar diagnostic Strp, , Disp: , Rfl:     fluticasone propionate (FLONASE) 50 mcg/actuation nasal spray, 1 spray (50 mcg total) by Each Nostril route 2 (two) times daily., Disp: 48 g, Rfl: 11    fluticasone propionate (FLOVENT HFA) 220 mcg/actuation inhaler, Inhale 1 puff into the lungs 2 (two) times daily. Controller, Disp: 12 g, Rfl: 2    gabapentin (NEURONTIN) 300 MG capsule, Take 1 capsule (300 mg total) by mouth 2 (two) times daily., Disp: 180 capsule, Rfl: 3    losartan (COZAAR) 100 MG tablet, Take 1 tablet (100 mg total) by mouth once daily., Disp: 90 tablet, Rfl: 3    metFORMIN (GLUCOPHAGE) 500 MG tablet, metformin 500 mg tablet  Take 2 tablets twice a day by oral route with meals for 90 days., Disp: 360 tablet, Rfl: 3    montelukast (SINGULAIR) 10 mg tablet, Take 1 tablet (10 mg total) by mouth nightly as needed., Disp: 90 tablet, Rfl: 3    mucus clearing device (ACAPELLA, FLUTTER), 1 Device by Misc.(Non-Drug; Combo Route) route 2 (two) times daily., Disp: 1 Device, Rfl: 0    MULTIVITAMIN ORAL, Take 1 tablet by mouth once daily., Disp: , Rfl:     naproxen (NAPROSYN) 500 MG tablet, Take 1 tablet (500 mg total) by mouth 2 (two) times daily as needed., Disp: 180 tablet, Rfl: 3    omeprazole (PRILOSEC) 40 MG capsule, Take 1 capsule (40 mg total) by mouth once daily., Disp: 90 capsule, Rfl: 3    promethazine-dextromethorphan (PROMETHAZINE-DM) 6.25-15 mg/5 mL Syrp,  "Take 5 mLs by mouth every 6 (six) hours as needed (cough)., Disp: 240 mL, Rfl: 1    tiZANidine (ZANAFLEX) 4 MG tablet, Take 1 tablet (4 mg total) by mouth daily as needed., Disp: 90 tablet, Rfl: 3  No current facility-administered medications for this visit.    Facility-Administered Medications Ordered in Other Visits:     acetaminophen tablet 650 mg, 650 mg, Oral, 1 time in Clinic/HOD, Jefferson Ibarra MD    alteplase injection 2 mg, 2 mg, Intra-Catheter, PRN, Jefferson Ibarra MD    diphenhydramine (BENADRYL) 50 mg in NS 50 mL IVPB, 50 mg, Intravenous, 1 time in Clinic/HOD, Jefferson Ibarra MD    famotidine (PF) injection 20 mg, 20 mg, Intravenous, 1 time in Clinic/HOD, Jefferson Ibarra MD    heparin, porcine (PF) 100 unit/mL injection flush 500 Units, 500 Units, Intravenous, PRN, Jefferson Ibarra MD    meperidine (PF) injection 25 mg, 25 mg, Intravenous, PRN, Jefferson Ibarra MD    riTUXimab (RITUXAN) 800 mg in sodium chloride 0.9% 800 mL infusion (conc: 1 mg/mL), 800 mg, Intravenous, 1 time in Clinic/HOD, Jefferson Ibarra MD    sodium chloride 0.9% 250 mL flush bag, , Intravenous, 1 time in Clinic/HOD, Jefferson Ibarra MD    sodium chloride 0.9% flush 10 mL, 10 mL, Intravenous, PRN, Jefferson Ibarra MD    PMHx/PSHx Updates:  See patient's last visit with me on 5/26/2022  See H&P on 1/8/2019        Pathology:  Cancer Staging  No matching staging information was found for the patient.          Objective:     Vitals:  Blood pressure 132/82, pulse 74, temperature 97.7 °F (36.5 °C), resp. rate 18, height 5' 8" (1.727 m), weight 93.4 kg (206 lb), SpO2 96 %.    Physical Examination:   GEN: no apparent distress, comfortable; AAOx3  HEAD: atraumatic and normocephalic  EYES: no pallor, no icterus, PERRLA  ENT: OMM, no pharyngeal erythema, external ears WNL; no nasal discharge; no thrush;    NECK: no masses, thyroid normal, trachea midline, no LAD/LN's, supple  CV: RRR with no murmur; " normal pulse; normal S1 and S2; no pedal edema; portacath  CHEST: Normal respiratory effort; chronic coarseness, wheeze bilaterally but only mild wheeze  ABDOM: nontender and nondistended; soft; normal bowel sounds; no rebound/guarding  MUSC/Skeletal: ROM normal; no crepitus; joints normal; no deformities or arthropathy  EXTREM: no clubbing, cyanosis, inflammation or swelling  SKIN: no rashes, lesions, ulcers, petechiae or subcutaneous nodules (see above HEENt)  : no gibbs  NEURO: grossly intact; motor/sensory WNL; AAOx3; no tremors  PSYCH: normal mood, affect and behavior  LYMPH: normal cervical, supraclavicular, axillary and groin LN's            Labs:       Labs pending for today      Lab Results   Component Value Date    WBC 4.47 05/23/2022    HGB 14.4 05/23/2022    HCT 44.0 05/23/2022    MCV 89 05/23/2022     05/23/2022     CMP  Sodium   Date Value Ref Range Status   05/23/2022 141 136 - 145 mmol/L Final   04/25/2019 144 134 - 144 mmol/L      Potassium   Date Value Ref Range Status   05/23/2022 4.4 3.5 - 5.1 mmol/L Final     Chloride   Date Value Ref Range Status   05/23/2022 105 95 - 110 mmol/L Final   04/25/2019 107 98 - 110 mmol/L      CO2   Date Value Ref Range Status   05/23/2022 26 23 - 29 mmol/L Final     Glucose   Date Value Ref Range Status   05/23/2022 168 (H) 70 - 110 mg/dL Final   04/25/2019 177 (H) 70 - 99 mg/dL      BUN   Date Value Ref Range Status   05/23/2022 17 8 - 23 mg/dL Final     Creatinine   Date Value Ref Range Status   05/23/2022 0.9 0.5 - 1.4 mg/dL Final   04/25/2019 0.83 0.60 - 1.40 mg/dL      Calcium   Date Value Ref Range Status   05/23/2022 9.1 8.7 - 10.5 mg/dL Final     Total Protein   Date Value Ref Range Status   05/23/2022 6.5 6.0 - 8.4 g/dL Final     Albumin   Date Value Ref Range Status   05/23/2022 3.8 3.5 - 5.2 g/dL Final   04/25/2019 4.4 3.1 - 4.7 g/dL      Total Bilirubin   Date Value Ref Range Status   05/23/2022 0.5 0.1 - 1.0 mg/dL Final     Comment:     For  infants and newborns, interpretation of results should be based  on gestational age, weight and in agreement with clinical  observations.    Premature Infant recommended reference ranges:  Up to 24 hours.............<8.0 mg/dL  Up to 48 hours............<12.0 mg/dL  3-5 days..................<15.0 mg/dL  6-29 days.................<15.0 mg/dL       Alkaline Phosphatase   Date Value Ref Range Status   05/23/2022 101 55 - 135 U/L Final     AST   Date Value Ref Range Status   05/23/2022 20 10 - 40 U/L Final     ALT   Date Value Ref Range Status   05/23/2022 19 10 - 44 U/L Final     Anion Gap   Date Value Ref Range Status   05/23/2022 10 8 - 16 mmol/L Final     eGFR if    Date Value Ref Range Status   05/23/2022 >60.0 >60 mL/min/1.73 m^2 Final     eGFR if non    Date Value Ref Range Status   05/23/2022 >60.0 >60 mL/min/1.73 m^2 Final     Comment:     Calculation used to obtain the estimated glomerular filtration  rate (eGFR) is the CKD-EPI equation.            Radiology/Diagnostic Studies:    PET  4/5/2022:    IMPRESSION: No evidence of recurrent or active lymphoproliferative disease        PET 10/4/2021:  Impression:     1. Negative for FDG avid recurrent lymphoproliferative disease.  2. New left maxillary sinusitis.  3. New bilateral reticulonodular pulmonary opacities suggesting infectious or inflammatory pneumonitis.  Clinical and laboratory correlation is requested.  4. Coronary artery calcification.         CXR  3/25/2021:    Impression:     Mild interstitial prominence, similar to previous exams.  No focal consolidation      PET 3/19/2021:  IMPRESSION:  1. Mild patchy airspace opacity in the superior segment of the left  lower lobe with increased FDG activity from background suggestive of a  small focal pneumonia (possibly viral in etiology). Consider  correlation with the symptoms, history and CT follow-up in 3 months to  document resolution.  2. No FDG avid lymphadenopathy or  additional sites of disease.         PET 9/2/2020:      Impression:     Negative for active lymphoproliferative disease.         Chest CT  3/19/2020:      Impression       1. Mild bronchiectasis in the left lower lobe and tree-in-bud micro nodules at the left lung base suggesting underlying bronchiolitis, possibly due to a non tuberculous mycobacterial infection or viral bronchiolitis.  2. Fatty infiltration of the liver  3. Small hiatal hernia.           CXR  1/31/2020    Impression       1. No acute chest disease.  2. Left subclavian Port-A-Cath.               PET 9/11/2019   Impression       No evidence of FDG avid residual or recurrent lymphoma       I have reviewed all available lab results and radiology reports.    Assessment/Plan:   (1) 67 y.o. male with diagnosis of NHL Follicular Stage IIIA who has been referred by Dr Gary Chen with [facility] for continuation of care by medical hematology/oncology.   - He originally was diagnosed in 2012 and had parotid excision at [facility]. He subsequently went to [facility] and was treated with bendamustine-rituximab. He has been on rituximab maintenance every 8 weeks and IV IgG monthly. Dr Chen's plan was to keep him on maintenace therapy for as long as he could tolerate the treatments  - s/p 6 cycles of Bendamustine and Retuximab with subsequent complete remission  - 1st maintenance cycle rituximab was in March 2016  - he has been on maintenance rituximab and IV IgG - last rituximab 11/15/2018; last IV IgG on Dec 14th 2018  - last PET was on 9/26/2018 with no evidence of recurrence  - originally diagnosed in Dec 2012 with left parotid and left periparotid LN excision on 12/11/2012  - PET scan done on  9/11/2019 was good    7/23/2020:  - new nodule on auricle of left ear suspicious for a skin cancer - will refer him to Dr Avilez with ENT  - he is due for repeat PET    9/17/2020:  - PET scan on 9/2/2020 is adequate  - [facility]     11/12/2020:  - continued on the current regimen  - doing well with no new issues    1/7/2021:  - he is having some persistent cough issues for which he has been obtaining sputum for José Manuel  - last PET was Sept 2020    3/4/2021:  - doing ok  - chronic cough issues - followed by José Manuel  - will set up f/u PET    4/29/2021:  - he had PET scan on 3/19/2021  - He has been seeing pulmonary about his chronic cough  Lucia José Manuel with pulmonary has seen the recent PET and reported to him that the CXR on 3/25 was stable and he is on some oral antibiotics with improvement of the symptoms  - He is also on Gabapentin for the neuropathy issues in his feet.   - He saw Dr Barry Mcmahon on 3/24/2021 with [facility]    8/19/2021:  - He has been seeing pulmonary about his chronic cough - Lucia Georges with pulmonary and he has been on periodic treatments of antibiotics. He saw her last just yesterday on 8/17/2021 and is currently on antibiotics.  - Pulmonary is planning to refer him to an ID specialist  - he is on the rituximab every 8 weeks; I am not convinced that this is contributing to the infection issues as it is not reducing his WBC; however,if pulmonary and/or ID feel it is a contributing factor then we can discontinue. The risk of his lymphoma recurring or progressing would be higher if he were to discontinue the therapy      10/14/2021:  - continued on oral antibiotics per pulmonary and plans to see Pulmonary ID specialist in near future  - continued on current therapy with rituximab  - recent PEt on 10/4/2021    12/9/2021:  - continued on rituximab  - on new antibiotics per pulmonary and he sees them again in Feb 2022  - repeat scans in Feb 2022 or sooner if pulmonary says otherwise    2/2/2022:  - he is seeing pulmonary again in two weeks  - he does not think that the new triple antibiotic regimen is working  - we can get PET in Feb/mar 2022 if pulmonary is inclined, otherwise 6 month surveillance scan in  April/May 2022    3/31/2022:  - He has been seeing pulmonary about his chronic cough and TONY issues; he is on antibiotics 3x/week and he reports that pulmonary feels that he is stable  - PET scan scheduled for 4/14/2022  - He last saw Dr Barry Mcmahon on 3/28/2021 and José Manuel on 2/17/2022  - pulmonary is fine with him continuing the ritxuimab per his report    5/26/2022:  - He has been seeing pulmonary about his chronic cough and TONY issues; he saw Rose on 5/17/2022 and is now on a new antibiotic and he reports that pulmonary said the PEt scan was better than the last one; he is planning to see Dr Veliz in the near future   - PET scan was done on 4/5/2022 with no evidence of recurrence  - continued on rituximab maintenance therapy    7/21/2022:  - continued under the care of pulmonary  - he is seeing Dr Veliz in Aug 2022  - continued on monthly IV IgG and rituximab every other month  - repeat PET in Oct 2022 expected        (2) HTN     (3) Neuropathy     (4) GERD     (5) DM - on metformin per Dr Nunez     (6) Hypogammaglobulinemia - on Iv IgG monthly     (7) Steatosis of liver     (8) Degenerative disc disease of back     (9) Diverticular disease    (10) Mild bronchiectasis in the left lower lobe and tree-in-bud micro nodules at the left lung base suggesting underlying bronchiolitis  - seeing Lucia Georges           VISIT DIAGNOSES:      Follicular lymphoma grade IIIa, unspecified body region    Encounter for antineoplastic chemotherapy          PLAN:  1. Continue his rituximab every 8 weeks as long as he is tolerating the therapy as per Dr Chen's prior recommendations and directives    2. continue IV IgG monthly   3. Check labs every 8 weeks  4. Repeat PET in 6 months from last one (Oct 2022)  5. F/u with PCP, Pulm etc - sees Dr Veliz in Aug 2022        RTC in 8 weeks   Fax note to Barry Mcmahon; José Manuel Veliz;          Discussion:       I spent over 25 mins of time with the patient. Reviewed results of the  recently ordered labs, tests and studies; made directives with regards to the results. Over half of this time was spent couseling and coordinating care.      COVID-19 Discussion:    I had long discussion with patient and any applicable family about the COVID-19 coronavirus epidemic and the recommended precautions with regard to cancer and/or hematology patients. I have re-iterated the CDC recommendations for adequate hand washing, use of hand -like products, and coughing into elbow, etc. In addition, especially for our patients who are on chemotherapy and/or our otherwise immunocompromised patients, I have recommended avoidance of crowds, including movie theaters, restaurants, churches, etc. I have recommended avoidance of any sick or symptomatic family members and/or friends. I have also recommended avoidance of any raw and unwashed food products, and general avoidance of food items that have not been prepared by themselves. The patient has been asked to call us immediately with any symptom developments, issues, questions or other general concerns.     I have explained all of the above in detail and the patient understands all of the current recommendation(s). I have answered all of their questions to the best of my ability and to their complete satisfaction.   The patient is to continue with the current management plan.            Electronically signed by Jefferson Ibarra MD                                 Answers for HPI/ROS submitted by the patient on 7/18/2022  appetite change : No  unexpected weight change: No  mouth sores: No  visual disturbance: No  cough: Yes  shortness of breath: No  chest pain: No  abdominal pain: No  diarrhea: No  frequency: No  back pain: No  rash: No  headaches: No  adenopathy: No

## 2022-07-21 ENCOUNTER — OFFICE VISIT (OUTPATIENT)
Dept: HEMATOLOGY/ONCOLOGY | Facility: CLINIC | Age: 67
End: 2022-07-21
Payer: MEDICARE

## 2022-07-21 ENCOUNTER — INFUSION (OUTPATIENT)
Dept: INFUSION THERAPY | Facility: HOSPITAL | Age: 67
End: 2022-07-21
Attending: INTERNAL MEDICINE
Payer: MEDICARE

## 2022-07-21 VITALS
SYSTOLIC BLOOD PRESSURE: 143 MMHG | HEIGHT: 68 IN | OXYGEN SATURATION: 99 % | RESPIRATION RATE: 18 BRPM | BODY MASS INDEX: 31.28 KG/M2 | WEIGHT: 206.38 LBS | DIASTOLIC BLOOD PRESSURE: 88 MMHG | TEMPERATURE: 98 F | HEART RATE: 76 BPM

## 2022-07-21 VITALS
BODY MASS INDEX: 31.22 KG/M2 | SYSTOLIC BLOOD PRESSURE: 132 MMHG | HEART RATE: 74 BPM | HEIGHT: 68 IN | RESPIRATION RATE: 18 BRPM | WEIGHT: 206 LBS | OXYGEN SATURATION: 96 % | TEMPERATURE: 98 F | DIASTOLIC BLOOD PRESSURE: 82 MMHG

## 2022-07-21 DIAGNOSIS — C82.30 FOLLICULAR LYMPHOMA GRADE IIIA, UNSPECIFIED BODY REGION: Primary | ICD-10-CM

## 2022-07-21 DIAGNOSIS — Z51.11 ENCOUNTER FOR ANTINEOPLASTIC CHEMOTHERAPY: ICD-10-CM

## 2022-07-21 DIAGNOSIS — D80.1 NONFAMILIAL HYPOGAMMAGLOBULINEMIA: ICD-10-CM

## 2022-07-21 DIAGNOSIS — C83.38 DIFFUSE LARGE B-CELL LYMPHOMA, LYMPH NODES OF MULTIPLE SITES: ICD-10-CM

## 2022-07-21 DIAGNOSIS — D80.1 HYPOGAMMAGLOBULINEMIA: ICD-10-CM

## 2022-07-21 LAB
ALBUMIN SERPL BCP-MCNC: 4.1 G/DL (ref 3.5–5.2)
ALP SERPL-CCNC: 89 U/L (ref 55–135)
ALT SERPL W/O P-5'-P-CCNC: 20 U/L (ref 10–44)
ANION GAP SERPL CALC-SCNC: 10 MMOL/L (ref 8–16)
AST SERPL-CCNC: 28 U/L (ref 10–40)
BASOPHILS # BLD AUTO: 0.03 K/UL (ref 0–0.2)
BASOPHILS NFR BLD: 0.7 % (ref 0–1.9)
BILIRUB SERPL-MCNC: 0.8 MG/DL (ref 0.1–1)
BUN SERPL-MCNC: 15 MG/DL (ref 8–23)
CALCIUM SERPL-MCNC: 8.9 MG/DL (ref 8.7–10.5)
CHLORIDE SERPL-SCNC: 101 MMOL/L (ref 95–110)
CO2 SERPL-SCNC: 28 MMOL/L (ref 23–29)
CREAT SERPL-MCNC: 0.9 MG/DL (ref 0.5–1.4)
DIFFERENTIAL METHOD: ABNORMAL
EOSINOPHIL # BLD AUTO: 0.3 K/UL (ref 0–0.5)
EOSINOPHIL NFR BLD: 7.1 % (ref 0–8)
ERYTHROCYTE [DISTWIDTH] IN BLOOD BY AUTOMATED COUNT: 13.2 % (ref 11.5–14.5)
EST. GFR  (AFRICAN AMERICAN): >60 ML/MIN/1.73 M^2
EST. GFR  (NON AFRICAN AMERICAN): >60 ML/MIN/1.73 M^2
GLUCOSE SERPL-MCNC: 207 MG/DL (ref 70–110)
HCT VFR BLD AUTO: 41.9 % (ref 40–54)
HGB BLD-MCNC: 14.1 G/DL (ref 14–18)
IMM GRANULOCYTES # BLD AUTO: 0.1 K/UL (ref 0–0.04)
IMM GRANULOCYTES NFR BLD AUTO: 2.4 % (ref 0–0.5)
LYMPHOCYTES # BLD AUTO: 0.9 K/UL (ref 1–4.8)
LYMPHOCYTES NFR BLD: 21.4 % (ref 18–48)
MCH RBC QN AUTO: 29.6 PG (ref 27–31)
MCHC RBC AUTO-ENTMCNC: 33.7 G/DL (ref 32–36)
MCV RBC AUTO: 88 FL (ref 82–98)
MONOCYTES # BLD AUTO: 0.7 K/UL (ref 0.3–1)
MONOCYTES NFR BLD: 15.5 % (ref 4–15)
NEUTROPHILS # BLD AUTO: 2.2 K/UL (ref 1.8–7.7)
NEUTROPHILS NFR BLD: 52.9 % (ref 38–73)
NRBC BLD-RTO: 0 /100 WBC
PLATELET # BLD AUTO: 262 K/UL (ref 150–450)
PMV BLD AUTO: 9.4 FL (ref 9.2–12.9)
POTASSIUM SERPL-SCNC: 3.9 MMOL/L (ref 3.5–5.1)
PROT SERPL-MCNC: 6.9 G/DL (ref 6–8.4)
RBC # BLD AUTO: 4.77 M/UL (ref 4.6–6.2)
SODIUM SERPL-SCNC: 139 MMOL/L (ref 136–145)
WBC # BLD AUTO: 4.2 K/UL (ref 3.9–12.7)

## 2022-07-21 PROCEDURE — 96375 TX/PRO/DX INJ NEW DRUG ADDON: CPT

## 2022-07-21 PROCEDURE — 25000003 PHARM REV CODE 250: Performed by: INTERNAL MEDICINE

## 2022-07-21 PROCEDURE — 99214 PR OFFICE/OUTPT VISIT, EST, LEVL IV, 30-39 MIN: ICD-10-PCS | Mod: S$GLB,,, | Performed by: INTERNAL MEDICINE

## 2022-07-21 PROCEDURE — 80053 COMPREHEN METABOLIC PANEL: CPT | Performed by: INTERNAL MEDICINE

## 2022-07-21 PROCEDURE — 99214 OFFICE O/P EST MOD 30 MIN: CPT | Mod: S$GLB,,, | Performed by: INTERNAL MEDICINE

## 2022-07-21 PROCEDURE — 96415 CHEMO IV INFUSION ADDL HR: CPT

## 2022-07-21 PROCEDURE — 63600175 PHARM REV CODE 636 W HCPCS: Performed by: INTERNAL MEDICINE

## 2022-07-21 PROCEDURE — 85025 COMPLETE CBC W/AUTO DIFF WBC: CPT | Performed by: INTERNAL MEDICINE

## 2022-07-21 PROCEDURE — 96413 CHEMO IV INFUSION 1 HR: CPT

## 2022-07-21 PROCEDURE — A4216 STERILE WATER/SALINE, 10 ML: HCPCS | Performed by: INTERNAL MEDICINE

## 2022-07-21 PROCEDURE — 96367 TX/PROPH/DG ADDL SEQ IV INF: CPT

## 2022-07-21 RX ORDER — HEPARIN 100 UNIT/ML
500 SYRINGE INTRAVENOUS
Status: CANCELLED | OUTPATIENT
Start: 2022-08-18

## 2022-07-21 RX ORDER — HEPARIN 100 UNIT/ML
500 SYRINGE INTRAVENOUS
Status: DISCONTINUED | OUTPATIENT
Start: 2022-07-21 | End: 2022-07-21 | Stop reason: CLARIF

## 2022-07-21 RX ORDER — SODIUM CHLORIDE 0.9 % (FLUSH) 0.9 %
10 SYRINGE (ML) INJECTION
Status: CANCELLED | OUTPATIENT
Start: 2022-08-18

## 2022-07-21 RX ORDER — ACETAMINOPHEN 325 MG/1
650 TABLET ORAL
Status: COMPLETED | OUTPATIENT
Start: 2022-07-21 | End: 2022-07-21

## 2022-07-21 RX ORDER — HEPARIN 100 UNIT/ML
500 SYRINGE INTRAVENOUS
Status: DISCONTINUED | OUTPATIENT
Start: 2022-07-21 | End: 2022-07-21 | Stop reason: HOSPADM

## 2022-07-21 RX ORDER — FAMOTIDINE 10 MG/ML
20 INJECTION INTRAVENOUS
Status: COMPLETED | OUTPATIENT
Start: 2022-07-21 | End: 2022-07-21

## 2022-07-21 RX ORDER — SODIUM CHLORIDE 0.9 % (FLUSH) 0.9 %
10 SYRINGE (ML) INJECTION
Status: DISCONTINUED | OUTPATIENT
Start: 2022-07-21 | End: 2022-07-21 | Stop reason: HOSPADM

## 2022-07-21 RX ORDER — SODIUM CHLORIDE 0.9 % (FLUSH) 0.9 %
10 SYRINGE (ML) INJECTION
Status: DISCONTINUED | OUTPATIENT
Start: 2022-07-21 | End: 2022-07-21 | Stop reason: CLARIF

## 2022-07-21 RX ORDER — MEPERIDINE HYDROCHLORIDE 25 MG/ML
25 INJECTION INTRAMUSCULAR; INTRAVENOUS; SUBCUTANEOUS
Status: DISCONTINUED | OUTPATIENT
Start: 2022-07-21 | End: 2022-07-21 | Stop reason: HOSPADM

## 2022-07-21 RX ADMIN — DIPHENHYDRAMINE HYDROCHLORIDE 50 MG: 50 INJECTION, SOLUTION INTRAMUSCULAR; INTRAVENOUS at 08:07

## 2022-07-21 RX ADMIN — RITUXIMAB 800 MG: 10 INJECTION, SOLUTION INTRAVENOUS at 08:07

## 2022-07-21 RX ADMIN — HEPARIN 500 UNITS: 100 SYRINGE at 11:07

## 2022-07-21 RX ADMIN — SODIUM CHLORIDE, PRESERVATIVE FREE 10 ML: 5 INJECTION INTRAVENOUS at 11:07

## 2022-07-21 RX ADMIN — ACETAMINOPHEN 650 MG: 325 TABLET ORAL at 08:07

## 2022-07-21 RX ADMIN — FAMOTIDINE 20 MG: 10 INJECTION INTRAVENOUS at 08:07

## 2022-07-21 NOTE — NURSING
Patient failed to get COVID test done prior to Rituxan infusion today. Dr. Ibarra made aware. Per MD, okay to proceed with treatment without results.

## 2022-07-21 NOTE — PLAN OF CARE
Problem: Fatigue  Goal: Improved Activity Tolerance  Outcome: Ongoing, Progressing  Intervention: Promote Improved Energy  Flowsheets (Taken 7/21/2022 0806)  Fatigue Management:   frequent rest breaks encouraged   fatigue-related activity identified   paced activity encouraged  Sleep/Rest Enhancement:   regular sleep/rest pattern promoted   relaxation techniques promoted

## 2022-07-22 ENCOUNTER — INFUSION (OUTPATIENT)
Dept: INFUSION THERAPY | Facility: HOSPITAL | Age: 67
End: 2022-07-22
Attending: INTERNAL MEDICINE
Payer: MEDICARE

## 2022-07-22 VITALS
HEIGHT: 68 IN | HEART RATE: 81 BPM | TEMPERATURE: 98 F | BODY MASS INDEX: 31.54 KG/M2 | DIASTOLIC BLOOD PRESSURE: 78 MMHG | RESPIRATION RATE: 18 BRPM | OXYGEN SATURATION: 97 % | WEIGHT: 208.13 LBS | SYSTOLIC BLOOD PRESSURE: 134 MMHG

## 2022-07-22 DIAGNOSIS — C82.30 FOLLICULAR LYMPHOMA GRADE IIIA, UNSPECIFIED BODY REGION: ICD-10-CM

## 2022-07-22 DIAGNOSIS — D80.1 NONFAMILIAL HYPOGAMMAGLOBULINEMIA: ICD-10-CM

## 2022-07-22 DIAGNOSIS — D80.1 HYPOGAMMAGLOBULINEMIA: Primary | ICD-10-CM

## 2022-07-22 PROCEDURE — 63600175 PHARM REV CODE 636 W HCPCS: Performed by: NURSE PRACTITIONER

## 2022-07-22 PROCEDURE — 96413 CHEMO IV INFUSION 1 HR: CPT

## 2022-07-22 PROCEDURE — 96415 CHEMO IV INFUSION ADDL HR: CPT

## 2022-07-22 PROCEDURE — 25000003 PHARM REV CODE 250: Performed by: NURSE PRACTITIONER

## 2022-07-22 PROCEDURE — 96367 TX/PROPH/DG ADDL SEQ IV INF: CPT

## 2022-07-22 RX ORDER — HEPARIN 100 UNIT/ML
500 SYRINGE INTRAVENOUS
Status: DISCONTINUED | OUTPATIENT
Start: 2022-07-22 | End: 2022-07-22 | Stop reason: HOSPADM

## 2022-07-22 RX ORDER — HEPARIN 100 UNIT/ML
500 SYRINGE INTRAVENOUS
Status: CANCELLED | OUTPATIENT
Start: 2022-08-19

## 2022-07-22 RX ORDER — SODIUM CHLORIDE 0.9 % (FLUSH) 0.9 %
10 SYRINGE (ML) INJECTION
Status: CANCELLED | OUTPATIENT
Start: 2022-08-19

## 2022-07-22 RX ADMIN — IMMUNE GLOBULIN (HUMAN) 30 G: 10 INJECTION INTRAVENOUS; SUBCUTANEOUS at 08:07

## 2022-07-22 RX ADMIN — DIPHENHYDRAMINE HYDROCHLORIDE 25 MG: 50 INJECTION, SOLUTION INTRAMUSCULAR; INTRAVENOUS at 08:07

## 2022-07-22 RX ADMIN — HEPARIN 500 UNITS: 100 SYRINGE at 10:07

## 2022-07-22 NOTE — PLAN OF CARE
Problem: Fatigue  Goal: Improved Activity Tolerance  Outcome: Ongoing, Progressing  Intervention: Promote Improved Energy  Flowsheets (Taken 7/22/2022 0802)  Fatigue Management:   frequent rest breaks encouraged   fatigue-related activity identified   paced activity encouraged  Sleep/Rest Enhancement:   regular sleep/rest pattern promoted   relaxation techniques promoted

## 2022-08-12 ENCOUNTER — HOSPITAL ENCOUNTER (OUTPATIENT)
Dept: PULMONOLOGY | Facility: HOSPITAL | Age: 67
Discharge: HOME | End: 2022-08-12
Attending: NURSE PRACTITIONER
Payer: MEDICARE

## 2022-08-12 DIAGNOSIS — J84.9 ILD (INTERSTITIAL LUNG DISEASE): ICD-10-CM

## 2022-08-12 PROCEDURE — 94727 GAS DIL/WSHOT DETER LNG VOL: CPT | Mod: 26,,, | Performed by: INTERNAL MEDICINE

## 2022-08-12 PROCEDURE — 94727 GAS DIL/WSHOT DETER LNG VOL: CPT

## 2022-08-12 PROCEDURE — 94729 DIFFUSING CAPACITY: CPT

## 2022-08-12 PROCEDURE — 94060 PR EVAL OF BRONCHOSPASM: ICD-10-PCS | Mod: 26,,, | Performed by: INTERNAL MEDICINE

## 2022-08-12 PROCEDURE — 94729 DIFFUSING CAPACITY: CPT | Mod: 26,,, | Performed by: INTERNAL MEDICINE

## 2022-08-12 PROCEDURE — 94729 PR C02/MEMBANE DIFFUSE CAPACITY: ICD-10-PCS | Mod: 26,,, | Performed by: INTERNAL MEDICINE

## 2022-08-12 PROCEDURE — 94060 EVALUATION OF WHEEZING: CPT

## 2022-08-12 PROCEDURE — 94727 PR PULM FUNCTION TEST BY GAS: ICD-10-PCS | Mod: 26,,, | Performed by: INTERNAL MEDICINE

## 2022-08-12 PROCEDURE — 94060 EVALUATION OF WHEEZING: CPT | Mod: 26,,, | Performed by: INTERNAL MEDICINE

## 2022-08-15 ENCOUNTER — OFFICE VISIT (OUTPATIENT)
Dept: PULMONOLOGY | Facility: CLINIC | Age: 67
End: 2022-08-15
Payer: MEDICARE

## 2022-08-15 VITALS
HEIGHT: 68 IN | SYSTOLIC BLOOD PRESSURE: 144 MMHG | WEIGHT: 207.25 LBS | BODY MASS INDEX: 31.41 KG/M2 | DIASTOLIC BLOOD PRESSURE: 81 MMHG | HEART RATE: 94 BPM | OXYGEN SATURATION: 95 %

## 2022-08-15 DIAGNOSIS — J44.9 CHRONIC OBSTRUCTIVE PULMONARY DISEASE, UNSPECIFIED COPD TYPE: ICD-10-CM

## 2022-08-15 DIAGNOSIS — J47.0 BRONCHIECTASIS WITH ACUTE LOWER RESPIRATORY INFECTION: Primary | ICD-10-CM

## 2022-08-15 DIAGNOSIS — R05.3 CHRONIC COUGH: ICD-10-CM

## 2022-08-15 DIAGNOSIS — D84.9 IMMUNE DEFICIENCY DISORDER: ICD-10-CM

## 2022-08-15 PROCEDURE — 99999 PR PBB SHADOW E&M-EST. PATIENT-LVL IV: CPT | Mod: PBBFAC,,, | Performed by: INTERNAL MEDICINE

## 2022-08-15 PROCEDURE — 99999 PR PBB SHADOW E&M-EST. PATIENT-LVL IV: ICD-10-PCS | Mod: PBBFAC,,, | Performed by: INTERNAL MEDICINE

## 2022-08-15 PROCEDURE — 99214 PR OFFICE/OUTPT VISIT, EST, LEVL IV, 30-39 MIN: ICD-10-PCS | Mod: S$PBB,,, | Performed by: INTERNAL MEDICINE

## 2022-08-15 PROCEDURE — 99214 OFFICE O/P EST MOD 30 MIN: CPT | Mod: S$PBB,,, | Performed by: INTERNAL MEDICINE

## 2022-08-15 PROCEDURE — 99214 OFFICE O/P EST MOD 30 MIN: CPT | Mod: PBBFAC,PO | Performed by: INTERNAL MEDICINE

## 2022-08-15 RX ORDER — PREDNISONE 20 MG/1
TABLET ORAL
Qty: 21 TABLET | Refills: 2 | Status: ON HOLD | OUTPATIENT
Start: 2022-08-15 | End: 2022-10-08 | Stop reason: HOSPADM

## 2022-08-15 RX ORDER — BENZONATATE 200 MG/1
200 CAPSULE ORAL 3 TIMES DAILY PRN
Qty: 270 CAPSULE | Refills: 3 | Status: SHIPPED | OUTPATIENT
Start: 2022-08-15 | End: 2022-08-25

## 2022-08-15 RX ORDER — FLUTICASONE FUROATE, UMECLIDINIUM BROMIDE AND VILANTEROL TRIFENATATE 200; 62.5; 25 UG/1; UG/1; UG/1
1 POWDER RESPIRATORY (INHALATION) DAILY
Qty: 180 EACH | Refills: 3 | Status: SHIPPED | OUTPATIENT
Start: 2022-08-15 | End: 2022-09-12

## 2022-08-15 RX ORDER — AMOXICILLIN AND CLAVULANATE POTASSIUM 875; 125 MG/1; MG/1
1 TABLET, FILM COATED ORAL DAILY
Qty: 90 TABLET | Refills: 3 | Status: ON HOLD | OUTPATIENT
Start: 2022-08-15 | End: 2022-10-08 | Stop reason: HOSPADM

## 2022-08-15 NOTE — PROGRESS NOTES
8/15/2022    Sivakumar Thacker  Office note    Chief Complaint   Patient presents with    Cough     Has had a sever cough for 5 years. Coughs up mucus, color green.    Shortness of Breath     A little bit.    Follow-up     Patient has been having a really sever cough.       HPI:      8/15/2022 -- pt on rituxan and igg replacement for nhl.  Has green mucous.   Had freq sinus infections -- took augmentin last month for sinuses with good results.  On azithromycin 3/wk- last yr with improved chest rattle.   No asthma-- pft worsened with fev 108 % in 2020 to 76% 8/10/2022.     Below from DAMI Georges  5/17/2022- Cough is persistent complaint, on Vest and nebulizer therapy daily, antibiotic azithromycin 500 mg three days weekly,   Sinus complaints persistent.   Cough- coarse cough, productive yellow/green mucous. Associated with shortness of breath.   Patient Instructions   Will continue current Bronchiectasis therapy with daily vest and nebulizer treatments 1-2 times daily    Doxy antibiotic for 10 days then resume three day weekly azithromycin    Have next appointment with MD for further evaluation and treatment.   2/17/2022-  Noticed improvement after starting vest device and Azithromcyin 3x weekly, using nebulizer with Aerobika 2x daily. Prescription for azithromycin ran out 2 weeks prior.   Complaint of sinus drainage onset 4 weeks, clear drainage, tried flonase with benefit.      On IV IG for 4 years followed by Dr. Ibarra.     11/17/2021- Cough- persistent complaint, daily, through out, nocturnal arousals nightly, using albuterol nebulizer and vest 2x daily. Productive green mucous, dime size amount of mucous. tx with antibiotics for H.Flu with no benefit. Associated with chest tightness and wheeze. Tried Tesselon perles with no benefit. No benefit with codeine cough syrup.     8/17/2021- Cough- recurrent complaint, onset 3 days, worse in morning and during day, improve with albuterol nebulizer, able to clear  yellow/green mucous. Started antibiotics 2 weeks ago due to green mucous, took Cipro, cough returned after finishing Cipro prescription. Nocturnal arousals 3x weekly.   Has aerobika from when wife was hospitalized, using.   Using nebulizer daily with Musinex.   SOB- worsened since finishing antibiotic, worse with exertion, no chest tightness or wheeze, improve with coughing up mucous. Associated with fatigue      5/17/2021- had persistent cough with occasional thick, yellow mucous onset January lasted 4 weeks, Felt rattle in chest., improved with antibiotics Augmentin.   has nebulizer but not currently using.   Current cough- mild, 2x weekly, feels mucous in chest but unable to cough up.    No shortness of breath, chest tightness, or wheeze.    11/17/2020- states doing well, cough has decreased to occasional to 2 times weekly that is mild, productive clear mucous.   States no fever, night sweats, shortness of breath,   Complaint of sinus drainage: daily, clear, singulair most days,      8/17/20- Cough- improved following antibiotic therapy, returned after 8 weeks, productive, light green color, dime size, worse in early morning, no chest tightness or wheeze.  No SOB. Using nebulizer 2x daily for 2 weeks, undergoing chemo and IVIG for Lymphoma and skin cancer.     6/16/2020- Cough unchanged with coughing fits when trying to sleep. States no improvement with Trelegy inhaler. States Levaquin antibiotic help a little bit and cough returned after finishing. Has been on IgG replacement therapy every 4 weeks since 2012.  Brought in 5 sputum sample, all still processing.       4/20/2020- Chronic cough onset 2013, followed by pulmonologist Dr. Case in [place]. Put Advair with no benefit. Had negative bronchoscopy and negative AFB, had appointment with Dr. Brock in 2014. Hx of non Hodgkins lymphoma followed by Dr. Ibarra on Rotuxin and immune therapy.  Cough Worsened in Jan 2020, associated with recurrent fever  improves with tylenol, states cough improves with antibiotics but returns when finished medications. Productive occasionally, can feel something rattling in chest, thick white mucous. 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 2 pack year  Family Hx: no Lung Cancer, no COPD, no Asthma  Medical Hx: no previous pneumonia ; no previous shoulder/chest surgery        The chief compliant  problem is stable  PFSH:  Past Medical History:   Diagnosis Date    Cancer     lymphoma currently on chemo    Diabetes mellitus, type 2     Encounter for antineoplastic chemotherapy 04/01/2020    Hypertension     Neuropathy     Reflux esophagitis          Past Surgical History:   Procedure Laterality Date    COLONOSCOPY  2018    EYE SURGERY  Approximately 3 years ago    Cataract    HAND SURGERY      amputation left index finger    PORTACATH PLACEMENT      SKIN CANCER EXCISION  09/30/2020    [facility]    SPINE SURGERY      VASECTOMY  1985 ?     Social History     Tobacco Use    Smoking status: Former Smoker     Packs/day: 0.50     Years: 2.00     Pack years: 1.00    Smokeless tobacco: Never Used   Substance Use Topics    Alcohol use: Not Currently     Alcohol/week: 0.0 standard drinks     Comment: rarely    Drug use: No     Family History   Problem Relation Age of Onset    Diabetes Father      Review of patient's allergies indicates:  No Known Allergies  I have reviewed past medical, family, and social history. I have reviewed previous nurse notes.    Performance Status:The patient's activity level is no limits with regular activity.      Review of Systems:  a review of eleven systems covering constitutional, Eye, HEENT, Psych, Respiratory, Cardiac, GI,  ", Musculoskeletal, Endocrine, Dermatologic was negative except for pertinent findings as listed ABOVE and below: pertinent positive as above, rest is good  Cough, shortness of breath, sinus drainage         Exam:Comprehensive exam done. BP (!) 144/81 (BP Location: Left arm, Patient Position: Sitting, BP Method: Medium (Automatic))   Pulse 94   Ht 5' 8" (1.727 m)   Wt 94 kg (207 lb 3.7 oz)   SpO2 95% Comment: room air at rest  BMI 31.51 kg/m²   Exam included Vitals as listed, and patient's appearance and affect and alertness and mood, oral exam for yeast and hygiene and pharynx lesions and Mallapatti (M) score, neck with inspection for jvd and masses and thyroid abnormalities and lymph nodes (supraclavicular and infraclavicular nodes and axillary also examined and noted if abn), chest exam included symmetry and effort and fremitus and percussion and auscultation, cardiac exam included rhythm and gallops and murmur and rubs and jvd and edema, abdominal exam for mass and hepatosplenomegaly and tenderness and hernias and bowel sounds, Musculoskeletal exam with muscle tone and posture and mobility/gait and  strength, and skin for rashes and cyanosis and pallor and turgor, extremity for clubbing.  Findings were normal except for pertinent findings listed below: M2, bilateral rhonchi. Wheezes. No clubbing.      Radiographs (ct chest and cxr) reviewed: view by direct vision   NM PET CT Routine Skull to Mid Thigh 4/5/2022 visualized improvement in bilateral lower lobe mucous plugging when compared to 10/4/21 scan  CT images of the chest show no suspicious pulmonary nodules or masses, with no pleural or pericardial effusion. There is scattered linear subsegmental atelectasis in both lungs, with aortic and coronary arterial calcifications.     NM PET CT Routine Skull to Mid Thigh 10/04/2021   New bilateral reticulonodular pulmonary opacities suggesting infectious or inflammatory pneumonitis. Clinical and laboratory " correlation is requested.     CT CHEST WITHOUT CONTRAST  06/21/2021   Granulomatous change.  2. Multiple small pulmonary nodules.  Follow-up per Fleischner guidelines.  For multiple solid nodules all <6 mm, Fleischner Society 2017 guidelines recommend no routine follow up for a low risk patient, or follow up with non-contrast chest CT at 12 months after discovery in a high risk patient.  3. Minimal bronchiectasis of the bilateral lower lobes.  4. No significant lymphadenopathy.      NM PET CT Routine Skull to Mid Thigh 03/19/21   1. Mild patchy airspace opacity in the superior segment of the left  lower lobe with increased FDG activity from background suggestive of a  small focal pneumonia (possibly viral in etiology). Consider  correlation with the symptoms, history and CT follow-up in 3 months to  document resolution.    X-Ray Chest PA And Lateral  03/25/2021   Mild interstitial prominence, similar to previous exams.  No focal consolidation.     CT Chest Without 03/19/2020   1. Mild bronchiectasis in the left lower lobe and tree-in-bud micro nodules at the left lung base suggesting underlying bronchiolitis, possibly due to a non tuberculous mycobacterial infection or viral bronchiolitis.  2. Fatty infiltration of the liver  3. Small hiatal hernia.     CT Chest Without Contrast 09/01/2020   Unchanged left upper lobe 3 mm nodule.  Mild old granulomatous disease.  Minimal bronchiectasis which is nonspecific.  Adjacent micro nodules and tree-in-bud opacities are also unchanged.  Findings favor sequelae of an old atypical infectious process.  Atherosclerosis.  Small hiatal hernia.      Labs reviewed       Lab Results   Component Value Date    WBC 4.20 07/21/2022    RBC 4.77 07/21/2022    HGB 14.1 07/21/2022    HCT 41.9 07/21/2022    MCV 88 07/21/2022    MCH 29.6 07/21/2022    MCHC 33.7 07/21/2022    RDW 13.2 07/21/2022     07/21/2022    MPV 9.4 07/21/2022    GRAN 2.2 07/21/2022    GRAN 52.9 07/21/2022    LYMPH  0.9 (L) 07/21/2022    LYMPH 21.4 07/21/2022    MONO 0.7 07/21/2022    MONO 15.5 (H) 07/21/2022    EOS 0.3 07/21/2022    BASO 0.03 07/21/2022    EOSINOPHIL 7.1 07/21/2022    BASOPHIL 0.7 07/21/2022       AFB x 3 all negative following 8 week incubation, Respiratory cultures show normal dank only    Culture, Respiratory with Gram Stain 09/18/20 HAEMOPHILUS INFLUENZAE   Culture, Respiratory with Gram Stain 1/21/2021 HAEMOPHILUS INFLUENZAE   Respiratory with Gram Stain 7/22/21, PSEUDOMONAS AERUGINOSA and HAEMOPHILUS INFLUENZAE       PFT  reviewed  Pulmonary Functions Testing Results:  Spirometry bronchodilator, lung volume by gas dilution, diffusion capacity measured June 23, 2020.  The FEV1 FVC ratio was 80% indicating no airflow obstruction by spirometry technique.  The FEV1 was 108% of predicted or 3.5 L.  There was no   significant improvement following bronchodilator.  Total lung capacity on lung volume by gas dilution was 69% and a little reduced.  Diffusion was normal.       Spirometry was normal and was no significant bronchodilator response.  Total lung capacity was reduced consistent with restriction.  Diffusion was normal.  Clinical correlation recommended.     7/17/2013 PFT Data: Complete pulmonary function studies were obtained today and are independently reviewed. Spirometry shows no evidence of airflow obstruction today. Lung was performed by plethysmography and are consistent with air trapping without hyperinflation. Residual volume is 122% predicted, and the total capacity is 98% predicted. Diffusion capacity for carbon monoxide is in the normal range at 102% predicted. Overall, these are fairly normal pulmonary function studies and do not suggest any obstructive lung disease.    Spirometry bronchodilator, lung volume by gas dilution, diffusion capacity measured June 23, 2020.  The FEV1 FVC ratio was 80% indicating no airflow obstruction by spirometry technique.  The FEV1 was 108% of predicted or 3.5  L.  There was no   significant improvement following bronchodilator.  Total lung capacity on lung volume by gas dilution was 69% and a little reduced.  Diffusion was normal.       Spirometry was normal and was no significant bronchodilator response.  Total lung capacity was reduced consistent with restriction.  Diffusion was normal.  Clinical correlation recommended.        I spent over 30 mins of time with the patient. Reviewed results of the recently ordered labs, tests and studies; made directives with regards to the results. Over half of this time was spent couseling and coordinating care.     I have explained all of the above in detail and the patient understands all of the current recommendation(s). I have answered all of their questions to the best of my ability and to their complete satisfaction.   The patient is to continue with the current management plan.      Plan:  Clinical impression is apparently straight forward and impression with management as below.    Sivakumar was seen today for cough, shortness of breath and follow-up.    Diagnoses and all orders for this visit:    Bronchiectasis with acute lower respiratory infection  -     Culture, Respiratory with Gram Stain; Standing  -     amoxicillin-clavulanate 875-125mg (AUGMENTIN) 875-125 mg per tablet; Take 1 tablet by mouth once daily at 6am.  -     fluticasone-umeclidin-vilanter (TRELEGY ELLIPTA) 200-62.5-25 mcg inhaler; Inhale 1 puff into the lungs once daily.  -     predniSONE (DELTASONE) 20 MG tablet; One daily for 3 days and repeat for flare of lung symptoms as intructed    Chronic obstructive pulmonary disease, unspecified COPD type  -     fluticasone-umeclidin-vilanter (TRELEGY ELLIPTA) 200-62.5-25 mcg inhaler; Inhale 1 puff into the lungs once daily.  -     predniSONE (DELTASONE) 20 MG tablet; One daily for 3 days and repeat for flare of lung symptoms as intructed    Immune deficiency disorder  -     IgG; Future  -     amoxicillin-clavulanate  875-125mg (AUGMENTIN) 875-125 mg per tablet; Take 1 tablet by mouth once daily at 6am.    Chronic cough  -     benzonatate (TESSALON) 200 MG capsule; Take 1 capsule (200 mg total) by mouth 3 (three) times daily as needed for Cough.        Follow up in about 2 months (around 10/15/2022).    Discussed with patient above for education the following:      Patient Instructions   Immune system weakness would cause sinus/lung infections --  would check igg level prior to next infusion on Friday-- need to have good level to have protection all month long.      Use augmentin daily to suppress infections.  Hold off azithromycin    You may have infections from bronchiectasis (pseudomonas germ)-- special antibiotic would be needed. Occasionally iv antibiotics are needed.    Do sputum culture monthly if not doing well to assure antibiotic appropriate.    Pulmonary toilet needed-- breathing medication with flutter device at least 2/d.    Steroids help mucous clearing-- flovent is inhaled steroid-- trelegy has inhaled steroids and 24/7 bronchial medication-- use trelegy if available, otherwise use flovent..      Try to note which antibiotic effective.     Use tessalon as needed    Use prednisone for cough/wheezes.  Use once daily for 3 days.

## 2022-08-15 NOTE — PATIENT INSTRUCTIONS
Immune system weakness would cause sinus/lung infections --  would check igg level prior to next infusion on Friday-- need to have good level to have protection all month long.      Use augmentin daily to suppress infections.  Hold off azithromycin    You may have infections from bronchiectasis (pseudomonas germ)-- special antibiotic would be needed. Occasionally iv antibiotics are needed.    Do sputum culture monthly if not doing well to assure antibiotic appropriate.    Pulmonary toilet needed-- breathing medication with flutter device at least 2/d.    Steroids help mucous clearing-- flovent is inhaled steroid-- trelegy has inhaled steroids and 24/7 bronchial medication-- use trelegy if available, otherwise use flovent..      Try to note which antibiotic effective.     Use tessalon as needed    Use prednisone for cough/wheezes.  Use once daily for 3 days.

## 2022-08-17 ENCOUNTER — LAB VISIT (OUTPATIENT)
Dept: LAB | Facility: HOSPITAL | Age: 67
End: 2022-08-17
Attending: INTERNAL MEDICINE
Payer: MEDICARE

## 2022-08-17 DIAGNOSIS — D84.9 IMMUNE DEFICIENCY DISORDER: ICD-10-CM

## 2022-08-17 DIAGNOSIS — J47.0 BRONCHIECTASIS WITH ACUTE LOWER RESPIRATORY INFECTION: ICD-10-CM

## 2022-08-17 LAB — IGG SERPL-MCNC: 650 MG/DL (ref 650–1600)

## 2022-08-17 PROCEDURE — 36415 COLL VENOUS BLD VENIPUNCTURE: CPT | Performed by: INTERNAL MEDICINE

## 2022-08-17 PROCEDURE — 87077 CULTURE AEROBIC IDENTIFY: CPT | Performed by: INTERNAL MEDICINE

## 2022-08-17 PROCEDURE — 87205 SMEAR GRAM STAIN: CPT | Performed by: INTERNAL MEDICINE

## 2022-08-17 PROCEDURE — 87185 SC STD ENZYME DETCJ PER NZM: CPT | Performed by: INTERNAL MEDICINE

## 2022-08-17 PROCEDURE — 87070 CULTURE OTHR SPECIMN AEROBIC: CPT | Performed by: INTERNAL MEDICINE

## 2022-08-17 PROCEDURE — 82784 ASSAY IGA/IGD/IGG/IGM EACH: CPT | Performed by: INTERNAL MEDICINE

## 2022-08-19 ENCOUNTER — PATIENT MESSAGE (OUTPATIENT)
Dept: PULMONOLOGY | Facility: CLINIC | Age: 67
End: 2022-08-19
Payer: MEDICARE

## 2022-08-19 ENCOUNTER — INFUSION (OUTPATIENT)
Dept: INFUSION THERAPY | Facility: HOSPITAL | Age: 67
End: 2022-08-19
Attending: INTERNAL MEDICINE
Payer: MEDICARE

## 2022-08-19 ENCOUNTER — TELEPHONE (OUTPATIENT)
Dept: PULMONOLOGY | Facility: CLINIC | Age: 67
End: 2022-08-19
Payer: MEDICARE

## 2022-08-19 VITALS
HEIGHT: 68 IN | WEIGHT: 212.06 LBS | DIASTOLIC BLOOD PRESSURE: 81 MMHG | OXYGEN SATURATION: 95 % | HEART RATE: 81 BPM | SYSTOLIC BLOOD PRESSURE: 154 MMHG | BODY MASS INDEX: 32.14 KG/M2 | RESPIRATION RATE: 18 BRPM | TEMPERATURE: 98 F

## 2022-08-19 DIAGNOSIS — C82.30 FOLLICULAR LYMPHOMA GRADE IIIA, UNSPECIFIED BODY REGION: ICD-10-CM

## 2022-08-19 DIAGNOSIS — D80.1 NONFAMILIAL HYPOGAMMAGLOBULINEMIA: ICD-10-CM

## 2022-08-19 DIAGNOSIS — D80.1 HYPOGAMMAGLOBULINEMIA: Primary | ICD-10-CM

## 2022-08-19 LAB
BRPFT: NORMAL
DLCO ADJ PRE: 23.6 ML/(MIN*MMHG)
DLCO SINGLE BREATH LLN: 19.45
DLCO SINGLE BREATH PRE REF: 89.5 %
DLCO SINGLE BREATH REF: 26.38
DLCOC SBVA LLN: 2.68
DLCOC SBVA PRE REF: 120 %
DLCOC SBVA REF: 3.91
DLCOC SINGLE BREATH LLN: 19.45
DLCOC SINGLE BREATH PRE REF: 89.5 %
DLCOC SINGLE BREATH REF: 26.38
DLCOVA LLN: 2.68
DLCOVA PRE REF: 120 %
DLCOVA PRE: 4.69 ML/(MIN*MMHG*L)
DLCOVA REF: 3.91
DLVAADJ PRE: 4.69 ML/(MIN*MMHG*L)
ERVN2 LLN: -16448.94
ERVN2 PRE REF: 33.2 %
ERVN2 PRE: 0.35 L
ERVN2 REF: 1.06
FEF 25 75 CHG: -24.1 %
FEF 25 75 LLN: 1.14
FEF 25 75 POST REF: 69.9 %
FEF 25 75 PRE REF: 92.1 %
FEF 25 75 REF: 2.51
FET100 CHG: -2 %
FEV1 CHG: -13.8 %
FEV1 FVC CHG: -0.8 %
FEV1 FVC LLN: 64
FEV1 FVC POST REF: 101.3 %
FEV1 FVC PRE REF: 102.2 %
FEV1 FVC REF: 77
FEV1 LLN: 2.31
FEV1 POST REF: 67.3 %
FEV1 PRE REF: 78 %
FEV1 REF: 3.15
FRCN2 LLN: 2.57
FRCN2 PRE REF: 69.8 %
FRCN2 REF: 3.55
FVC CHG: -13.1 %
FVC LLN: 3.08
FVC POST REF: 66.3 %
FVC PRE REF: 76.2 %
FVC REF: 4.11
IVC PRE: 2.75 L
IVC SINGLE BREATH LLN: 3.08
IVC SINGLE BREATH PRE REF: 66.9 %
IVC SINGLE BREATH REF: 4.11
MVV LLN: 103
MVV PRE REF: 62.6 %
MVV REF: 121
PEF CHG: 16.4 %
PEF LLN: 6.12
PEF POST REF: 82.3 %
PEF PRE REF: 70.7 %
PEF REF: 8.32
POST FEF 25 75: 1.75 L/S
POST FET 100: 7.31 SEC
POST FEV1 FVC: 77.85 %
POST FEV1: 2.12 L
POST FVC: 2.72 L
POST PEF: 6.85 L/S
PRE DLCO: 23.6 ML/(MIN*MMHG)
PRE FEF 25 75: 2.31 L/S
PRE FET 100: 7.46 SEC
PRE FEV1 FVC: 78.51 %
PRE FEV1: 2.46 L
PRE FRC N2: 2.48 L
PRE FVC: 3.13 L
PRE MVV: 76 L/MIN
PRE PEF: 5.88 L/S
RVN2 LLN: 1.81
RVN2 PRE REF: 79.5 %
RVN2 PRE: 1.98 L
RVN2 REF: 2.49
RVN2TLCN2 LLN: 30.72
RVN2TLCN2 PRE REF: 97.5 %
RVN2TLCN2 PRE: 38.72 %
RVN2TLCN2 REF: 39.7
TLCN2 LLN: 5.59
TLCN2 PRE REF: 75.8 %
TLCN2 PRE: 5.11 L
TLCN2 REF: 6.74
VA PRE: 5.03 L
VA SINGLE BREATH LLN: 6.59
VA SINGLE BREATH PRE REF: 76.3 %
VA SINGLE BREATH REF: 6.59
VCMAXN2 LLN: 3.08
VCMAXN2 PRE REF: 76.2 %
VCMAXN2 PRE: 3.13 L
VCMAXN2 REF: 4.11

## 2022-08-19 PROCEDURE — 96367 TX/PROPH/DG ADDL SEQ IV INF: CPT

## 2022-08-19 PROCEDURE — 63600175 PHARM REV CODE 636 W HCPCS: Performed by: NURSE PRACTITIONER

## 2022-08-19 PROCEDURE — 96413 CHEMO IV INFUSION 1 HR: CPT

## 2022-08-19 PROCEDURE — 96415 CHEMO IV INFUSION ADDL HR: CPT

## 2022-08-19 PROCEDURE — 25000003 PHARM REV CODE 250: Performed by: NURSE PRACTITIONER

## 2022-08-19 RX ORDER — HEPARIN 100 UNIT/ML
500 SYRINGE INTRAVENOUS
Status: CANCELLED | OUTPATIENT
Start: 2022-09-16

## 2022-08-19 RX ORDER — SODIUM CHLORIDE 0.9 % (FLUSH) 0.9 %
10 SYRINGE (ML) INJECTION
Status: DISCONTINUED | OUTPATIENT
Start: 2022-08-19 | End: 2022-08-19 | Stop reason: HOSPADM

## 2022-08-19 RX ORDER — SODIUM CHLORIDE 0.9 % (FLUSH) 0.9 %
10 SYRINGE (ML) INJECTION
Status: CANCELLED | OUTPATIENT
Start: 2022-09-16

## 2022-08-19 RX ORDER — HEPARIN 100 UNIT/ML
500 SYRINGE INTRAVENOUS
Status: DISCONTINUED | OUTPATIENT
Start: 2022-08-19 | End: 2022-08-19 | Stop reason: HOSPADM

## 2022-08-19 RX ADMIN — HEPARIN 500 UNITS: 100 SYRINGE at 10:08

## 2022-08-19 RX ADMIN — IMMUNE GLOBULIN (HUMAN) 30 G: 10 INJECTION INTRAVENOUS; SUBCUTANEOUS at 08:08

## 2022-08-19 RX ADMIN — SODIUM CHLORIDE: 9 INJECTION, SOLUTION INTRAVENOUS at 08:08

## 2022-08-19 RX ADMIN — DIPHENHYDRAMINE HYDROCHLORIDE 25 MG: 50 INJECTION INTRAMUSCULAR; INTRAVENOUS at 08:08

## 2022-08-19 NOTE — TELEPHONE ENCOUNTER
Relayed msg via portal    ----- Message from Raffaele Veliz MD sent at 8/18/2022  4:19 PM CDT -----   Notify patient IgG level is at low normal.  Likely would be better if IgG level was little higher.  It looks like immune deficiency is causing the recurrent lung problems.  Would request Dr. Vee increase ig dosing?  Could send to immunology as may do better on sub q igg?

## 2022-08-19 NOTE — PLAN OF CARE
Problem: Fatigue  Goal: Improved Activity Tolerance  Outcome: Ongoing, Progressing  Intervention: Promote Improved Energy  Flowsheets (Taken 8/19/2022 0810)  Fatigue Management: frequent rest breaks encouraged  Sleep/Rest Enhancement:   regular sleep/rest pattern promoted   relaxation techniques promoted  Activity Management: Ambulated -L4

## 2022-08-20 LAB
BACTERIA SPEC AEROBE CULT: ABNORMAL
BACTERIA SPEC AEROBE CULT: ABNORMAL
GRAM STN SPEC: ABNORMAL

## 2022-08-22 ENCOUNTER — PATIENT MESSAGE (OUTPATIENT)
Dept: HEMATOLOGY/ONCOLOGY | Facility: CLINIC | Age: 67
End: 2022-08-22

## 2022-08-22 ENCOUNTER — PATIENT MESSAGE (OUTPATIENT)
Dept: PULMONOLOGY | Facility: CLINIC | Age: 67
End: 2022-08-22
Payer: MEDICARE

## 2022-08-22 ENCOUNTER — TELEPHONE (OUTPATIENT)
Dept: PULMONOLOGY | Facility: CLINIC | Age: 67
End: 2022-08-22
Payer: MEDICARE

## 2022-08-22 NOTE — TELEPHONE ENCOUNTER
Sent portal message  ----- Message from Lucia Georges NP sent at 8/19/2022  5:38 PM CDT -----  Lung function test shows mild restriction. Continue current treatment plan. Will review images and results at next appointment.

## 2022-08-23 ENCOUNTER — PATIENT MESSAGE (OUTPATIENT)
Dept: PULMONOLOGY | Facility: CLINIC | Age: 67
End: 2022-08-23
Payer: MEDICARE

## 2022-08-23 ENCOUNTER — PATIENT MESSAGE (OUTPATIENT)
Dept: HEMATOLOGY/ONCOLOGY | Facility: CLINIC | Age: 67
End: 2022-08-23

## 2022-08-23 ENCOUNTER — TELEPHONE (OUTPATIENT)
Dept: PULMONOLOGY | Facility: CLINIC | Age: 67
End: 2022-08-23
Payer: MEDICARE

## 2022-08-23 NOTE — TELEPHONE ENCOUNTER
Sent  portal message   ----- Message from Raffaele Veliz MD sent at 8/22/2022  1:05 PM CDT -----  Germ in lung not very bad, should clear up with Augmentin, should be protected from with good immune system to some extent.

## 2022-09-12 ENCOUNTER — PATIENT MESSAGE (OUTPATIENT)
Dept: PULMONOLOGY | Facility: CLINIC | Age: 67
End: 2022-09-12
Payer: MEDICARE

## 2022-09-12 ENCOUNTER — LAB VISIT (OUTPATIENT)
Dept: LAB | Facility: HOSPITAL | Age: 67
End: 2022-09-12
Attending: INTERNAL MEDICINE
Payer: MEDICARE

## 2022-09-12 DIAGNOSIS — C82.30 FOLLICULAR LYMPHOMA GRADE IIIA, UNSPECIFIED BODY REGION: ICD-10-CM

## 2022-09-12 LAB
ALBUMIN SERPL BCP-MCNC: 3.9 G/DL (ref 3.5–5.2)
ALP SERPL-CCNC: 110 U/L (ref 55–135)
ALT SERPL W/O P-5'-P-CCNC: 21 U/L (ref 10–44)
ANION GAP SERPL CALC-SCNC: 14 MMOL/L (ref 8–16)
AST SERPL-CCNC: 24 U/L (ref 10–40)
BASOPHILS # BLD AUTO: 0.06 K/UL (ref 0–0.2)
BASOPHILS NFR BLD: 1.2 % (ref 0–1.9)
BILIRUB SERPL-MCNC: 0.3 MG/DL (ref 0.1–1)
BUN SERPL-MCNC: 20 MG/DL (ref 8–23)
CALCIUM SERPL-MCNC: 9.2 MG/DL (ref 8.7–10.5)
CHLORIDE SERPL-SCNC: 105 MMOL/L (ref 95–110)
CO2 SERPL-SCNC: 24 MMOL/L (ref 23–29)
CREAT SERPL-MCNC: 0.9 MG/DL (ref 0.5–1.4)
DIFFERENTIAL METHOD: ABNORMAL
EOSINOPHIL # BLD AUTO: 0.8 K/UL (ref 0–0.5)
EOSINOPHIL NFR BLD: 15.6 % (ref 0–8)
ERYTHROCYTE [DISTWIDTH] IN BLOOD BY AUTOMATED COUNT: 12.7 % (ref 11.5–14.5)
EST. GFR  (NO RACE VARIABLE): >60 ML/MIN/1.73 M^2
GLUCOSE SERPL-MCNC: 178 MG/DL (ref 70–110)
HCT VFR BLD AUTO: 42.5 % (ref 40–54)
HGB BLD-MCNC: 14.2 G/DL (ref 14–18)
IMM GRANULOCYTES # BLD AUTO: 0.07 K/UL (ref 0–0.04)
IMM GRANULOCYTES NFR BLD AUTO: 1.4 % (ref 0–0.5)
LYMPHOCYTES # BLD AUTO: 1.4 K/UL (ref 1–4.8)
LYMPHOCYTES NFR BLD: 28.2 % (ref 18–48)
MCH RBC QN AUTO: 29.6 PG (ref 27–31)
MCHC RBC AUTO-ENTMCNC: 33.4 G/DL (ref 32–36)
MCV RBC AUTO: 89 FL (ref 82–98)
MONOCYTES # BLD AUTO: 0.6 K/UL (ref 0.3–1)
MONOCYTES NFR BLD: 11.8 % (ref 4–15)
NEUTROPHILS # BLD AUTO: 2.1 K/UL (ref 1.8–7.7)
NEUTROPHILS NFR BLD: 41.8 % (ref 38–73)
NRBC BLD-RTO: 0 /100 WBC
PLATELET # BLD AUTO: 284 K/UL (ref 150–450)
PMV BLD AUTO: 8.9 FL (ref 9.2–12.9)
POTASSIUM SERPL-SCNC: 4.4 MMOL/L (ref 3.5–5.1)
PROT SERPL-MCNC: 6.8 G/DL (ref 6–8.4)
RBC # BLD AUTO: 4.79 M/UL (ref 4.6–6.2)
SODIUM SERPL-SCNC: 143 MMOL/L (ref 136–145)
WBC # BLD AUTO: 4.93 K/UL (ref 3.9–12.7)

## 2022-09-12 PROCEDURE — 80053 COMPREHEN METABOLIC PANEL: CPT | Performed by: INTERNAL MEDICINE

## 2022-09-12 PROCEDURE — 85025 COMPLETE CBC W/AUTO DIFF WBC: CPT | Performed by: INTERNAL MEDICINE

## 2022-09-12 PROCEDURE — 36415 COLL VENOUS BLD VENIPUNCTURE: CPT | Performed by: INTERNAL MEDICINE

## 2022-09-14 RX ORDER — HEPARIN 100 UNIT/ML
500 SYRINGE INTRAVENOUS
Status: CANCELLED | OUTPATIENT
Start: 2022-09-15

## 2022-09-14 RX ORDER — FAMOTIDINE 10 MG/ML
20 INJECTION INTRAVENOUS
Status: CANCELLED | OUTPATIENT
Start: 2022-09-15

## 2022-09-14 RX ORDER — ACETAMINOPHEN 325 MG/1
650 TABLET ORAL
Status: CANCELLED | OUTPATIENT
Start: 2022-09-15

## 2022-09-14 RX ORDER — SODIUM CHLORIDE 0.9 % (FLUSH) 0.9 %
10 SYRINGE (ML) INJECTION
Status: CANCELLED | OUTPATIENT
Start: 2022-09-15

## 2022-09-14 RX ORDER — MEPERIDINE HYDROCHLORIDE 25 MG/ML
25 INJECTION INTRAMUSCULAR; INTRAVENOUS; SUBCUTANEOUS
Status: CANCELLED | OUTPATIENT
Start: 2022-09-15 | End: 2022-09-15

## 2022-09-14 NOTE — PROGRESS NOTES
[facility] Hematology/Oncology  PROGRESS NOTE -  Follow-up Visit      Subjective:       Patient ID:   NAME: Sivakumar Thacker : 1955     67 y.o. male    Referring Doc: Barry Mcmahon (PCP in [place])  Other Physicians: Vinod Chen/José Manuel      Chief Complaint:  NHL f/u    History of Present Illness:     Patient returns today for a regularly scheduled follow-up visit.  The patient is here today to go over the results of the recently ordered labs, tests and studies. He is here by himself.       He is doing well with the rituximab infusions. He denies any CP, SOB, HA's or N/V.     He has been seeing pulmonary about his chronic cough and TONY issues; he has been seeing Rose and saw Dr Veliz on 8/15/2022; Dr veliz is looking at increasing his Ig dose or changing it to SQ weekly    Last PET scan was done on 2022 and repeat has been ordered    continued on Gabapentin for the neuropathy issues in his feet.     He last saw Dr Barry Mcmahon on 2022      I discussed continued Covid19 precautions        ROS:   GEN: normal without any fever, night sweats or weight loss  HEENT: normal with no HA's, sore throat, stiff neck, changes in vision; sinus issues resolved  CV: normal with no CP, SOB, PND, VASQUEZ or orthopnea  PULM: normal with no SOB,  hemoptysis, sputum or pleuritic pain; chronic cough since 2020  GI: normal with no abdominal pain, nausea, vomiting, constipation, diarrhea, melanotic stools, BRBPR, or hematemesis  : normal with no hematuria, dysuria  BREAST: normal with no mass, discharge, pain  SKIN: normal with no rash, erythema, bruising, or swelling    Allergies:  Review of patient's allergies indicates:  No Known Allergies    Medications:    Current Outpatient Medications:     albuterol-ipratropium (DUO-NEB) 2.5 mg-0.5 mg/3 mL nebulizer solution, Take 3 mLs by nebulization every 6 (six) hours as needed for Wheezing or Shortness of Breath. Rescue, Disp: 120 vial, Rfl: 5    amLODIPine (NORVASC) 10 MG  tablet, Take 1 tablet (10 mg total) by mouth once daily., Disp: 90 tablet, Rfl: 3    amoxicillin-clavulanate 875-125mg (AUGMENTIN) 875-125 mg per tablet, Take 1 tablet by mouth once daily at 6am., Disp: 90 tablet, Rfl: 3    aspirin (ECOTRIN) 81 MG EC tablet, Take 81 mg by mouth once daily., Disp: , Rfl:     atorvastatin (LIPITOR) 20 MG tablet, Take 1 tablet (20 mg total) by mouth once daily., Disp: 90 tablet, Rfl: 3    benzonatate (TESSALON) 200 MG capsule, Take 1 capsule (200 mg total) by mouth 3 (three) times daily as needed for Cough., Disp: 30 capsule, Rfl: 3    blood sugar diagnostic (FREESTYLE LITE STRIPS MISC), FreeStyle Lite Strips, Disp: , Rfl:     blood sugar diagnostic (FREESTYLE LITE STRIPS) Strp, Check blood sugar 2 (two) times daily as needed., Disp: 200 each, Rfl: 11    blood sugar diagnostic Strp, , Disp: , Rfl:     fluticasone propionate (FLONASE) 50 mcg/actuation nasal spray, 1 spray (50 mcg total) by Each Nostril route 2 (two) times daily., Disp: 48 g, Rfl: 11    fluticasone-umeclidin-vilanter (TRELEGY ELLIPTA) 200-62.5-25 mcg inhaler, Inhale 1 puff into the lungs once daily., Disp: 1 each, Rfl: 11    gabapentin (NEURONTIN) 300 MG capsule, Take 1 capsule (300 mg total) by mouth 2 (two) times daily., Disp: 180 capsule, Rfl: 3    losartan (COZAAR) 100 MG tablet, Take 1 tablet (100 mg total) by mouth once daily., Disp: 90 tablet, Rfl: 3    metFORMIN (GLUCOPHAGE) 500 MG tablet, metformin 500 mg tablet  Take 2 tablets twice a day by oral route with meals for 90 days., Disp: 360 tablet, Rfl: 3    montelukast (SINGULAIR) 10 mg tablet, Take 1 tablet (10 mg total) by mouth nightly as needed., Disp: 90 tablet, Rfl: 3    mucus clearing device (ACAPELLA, FLUTTER), 1 Device by Misc.(Non-Drug; Combo Route) route 2 (two) times daily., Disp: 1 Device, Rfl: 0    MULTIVITAMIN ORAL, Take 1 tablet by mouth once daily., Disp: , Rfl:     naproxen (NAPROSYN) 500 MG tablet, Take 1 tablet (500 mg total) by mouth 2 (two)  "times daily as needed., Disp: 180 tablet, Rfl: 3    omeprazole (PRILOSEC) 40 MG capsule, Take 1 capsule (40 mg total) by mouth once daily., Disp: 90 capsule, Rfl: 3    predniSONE (DELTASONE) 20 MG tablet, One daily for 3 days and repeat for flare of lung symptoms as intructed, Disp: 21 tablet, Rfl: 2    promethazine-dextromethorphan (PROMETHAZINE-DM) 6.25-15 mg/5 mL Syrp, Take 5 mLs by mouth every 6 (six) hours as needed (cough)., Disp: 240 mL, Rfl: 1    tiZANidine (ZANAFLEX) 4 MG tablet, Take 1 tablet (4 mg total) by mouth daily as needed., Disp: 90 tablet, Rfl: 3  No current facility-administered medications for this visit.    Facility-Administered Medications Ordered in Other Visits:     heparin, porcine (PF) 100 unit/mL injection flush 500 Units, 500 Units, Intravenous, PRN, Jefferson Ibarra MD    meperidine (PF) injection 25 mg, 25 mg, Intravenous, PRN, Jefferson Ibarra MD    riTUXimab (RITUXAN) 800 mg in sodium chloride 0.9% 800 mL infusion (conc: 1 mg/mL), 800 mg, Intravenous, 1 time in Clinic/HOD, Jefferson Ibarra MD, 800 mg at 09/15/22 0840    sodium chloride 0.9% flush 10 mL, 10 mL, Intravenous, PRN, Jefferson Ibarra MD    PMHx/PSHx Updates:  See patient's last visit with me on 7/21/2022  See H&P on 1/8/2019        Pathology:  Cancer Staging  No matching staging information was found for the patient.          Objective:     Vitals:  Blood pressure (!) 158/84, pulse 84, temperature 98.3 °F (36.8 °C), resp. rate 18, height 5' 9" (1.753 m), weight 96.8 kg (213 lb 6.4 oz), SpO2 97 %.    Physical Examination:   GEN: no apparent distress, comfortable; AAOx3  HEAD: atraumatic and normocephalic  EYES: no pallor, no icterus, PERRLA  ENT: OMM, no pharyngeal erythema, external ears WNL; no nasal discharge; no thrush;    NECK: no masses, thyroid normal, trachea midline, no LAD/LN's, supple  CV: RRR with no murmur; normal pulse; normal S1 and S2; no pedal edema; portacath  CHEST: Normal respiratory effort; " chronic coarseness, wheeze bilaterally but only mild wheeze  ABDOM: nontender and nondistended; soft; normal bowel sounds; no rebound/guarding  MUSC/Skeletal: ROM normal; no crepitus; joints normal; no deformities or arthropathy  EXTREM: no clubbing, cyanosis, inflammation or swelling  SKIN: no rashes, lesions, ulcers, petechiae or subcutaneous nodules (see above HEENt)  : no gibbs  NEURO: grossly intact; motor/sensory WNL; AAOx3; no tremors  PSYCH: normal mood, affect and behavior  LYMPH: normal cervical, supraclavicular, axillary and groin LN's            Labs:              Lab Results   Component Value Date    WBC 4.93 09/12/2022    HGB 14.2 09/12/2022    HCT 42.5 09/12/2022    MCV 89 09/12/2022     09/12/2022     CMP  Sodium   Date Value Ref Range Status   09/12/2022 143 136 - 145 mmol/L Final   04/25/2019 144 134 - 144 mmol/L      Potassium   Date Value Ref Range Status   09/12/2022 4.4 3.5 - 5.1 mmol/L Final     Chloride   Date Value Ref Range Status   09/12/2022 105 95 - 110 mmol/L Final   04/25/2019 107 98 - 110 mmol/L      CO2   Date Value Ref Range Status   09/12/2022 24 23 - 29 mmol/L Final     Glucose   Date Value Ref Range Status   09/12/2022 178 (H) 70 - 110 mg/dL Final   04/25/2019 177 (H) 70 - 99 mg/dL      BUN   Date Value Ref Range Status   09/12/2022 20 8 - 23 mg/dL Final     Creatinine   Date Value Ref Range Status   09/12/2022 0.9 0.5 - 1.4 mg/dL Final   04/25/2019 0.83 0.60 - 1.40 mg/dL      Calcium   Date Value Ref Range Status   09/12/2022 9.2 8.7 - 10.5 mg/dL Final     Total Protein   Date Value Ref Range Status   09/12/2022 6.8 6.0 - 8.4 g/dL Final     Albumin   Date Value Ref Range Status   09/12/2022 3.9 3.5 - 5.2 g/dL Final   04/25/2019 4.4 3.1 - 4.7 g/dL      Total Bilirubin   Date Value Ref Range Status   09/12/2022 0.3 0.1 - 1.0 mg/dL Final     Comment:     For infants and newborns, interpretation of results should be based  on gestational age, weight and in agreement with  clinical  observations.    Premature Infant recommended reference ranges:  Up to 24 hours.............<8.0 mg/dL  Up to 48 hours............<12.0 mg/dL  3-5 days..................<15.0 mg/dL  6-29 days.................<15.0 mg/dL       Alkaline Phosphatase   Date Value Ref Range Status   09/12/2022 110 55 - 135 U/L Final     AST   Date Value Ref Range Status   09/12/2022 24 10 - 40 U/L Final     ALT   Date Value Ref Range Status   09/12/2022 21 10 - 44 U/L Final     Anion Gap   Date Value Ref Range Status   09/12/2022 14 8 - 16 mmol/L Final     eGFR if    Date Value Ref Range Status   07/21/2022 >60.0 >60 mL/min/1.73 m^2 Final     eGFR if non    Date Value Ref Range Status   07/21/2022 >60.0 >60 mL/min/1.73 m^2 Final     Comment:     Calculation used to obtain the estimated glomerular filtration  rate (eGFR) is the CKD-EPI equation.            Radiology/Diagnostic Studies:    PET  4/5/2022:    IMPRESSION: No evidence of recurrent or active lymphoproliferative disease        PET 10/4/2021:  Impression:     1. Negative for FDG avid recurrent lymphoproliferative disease.  2. New left maxillary sinusitis.  3. New bilateral reticulonodular pulmonary opacities suggesting infectious or inflammatory pneumonitis.  Clinical and laboratory correlation is requested.  4. Coronary artery calcification.         CXR  3/25/2021:    Impression:     Mild interstitial prominence, similar to previous exams.  No focal consolidation      PET 3/19/2021:  IMPRESSION:  1. Mild patchy airspace opacity in the superior segment of the left  lower lobe with increased FDG activity from background suggestive of a  small focal pneumonia (possibly viral in etiology). Consider  correlation with the symptoms, history and CT follow-up in 3 months to  document resolution.  2. No FDG avid lymphadenopathy or additional sites of disease.         PET 9/2/2020:      Impression:     Negative for active lymphoproliferative  disease.         Chest CT  3/19/2020:      Impression       1. Mild bronchiectasis in the left lower lobe and tree-in-bud micro nodules at the left lung base suggesting underlying bronchiolitis, possibly due to a non tuberculous mycobacterial infection or viral bronchiolitis.  2. Fatty infiltration of the liver  3. Small hiatal hernia.           CXR  1/31/2020    Impression       1. No acute chest disease.  2. Left subclavian Port-A-Cath.               PET 9/11/2019   Impression       No evidence of FDG avid residual or recurrent lymphoma       I have reviewed all available lab results and radiology reports.    Assessment/Plan:   (1) 67 y.o. male with diagnosis of NHL Follicular Stage IIIA who has been referred by Dr Gary Chen with [facility] for continuation of care by medical hematology/oncology.   - He originally was diagnosed in 2012 and had parotid excision at [facility]. He subsequently went to [facility] and was treated with bendamustine-rituximab. He has been on rituximab maintenance every 8 weeks and IV IgG monthly. Dr Chen's plan was to keep him on maintenace therapy for as long as he could tolerate the treatments  - s/p 6 cycles of Bendamustine and Retuximab with subsequent complete remission  - 1st maintenance cycle rituximab was in March 2016  - he has been on maintenance rituximab and IV IgG - last rituximab 11/15/2018; last IV IgG on Dec 14th 2018  - last PET was on 9/26/2018 with no evidence of recurrence  - originally diagnosed in Dec 2012 with left parotid and left periparotid LN excision on 12/11/2012  - PET scan done on  9/11/2019 was good    7/23/2020:  - new nodule on auricle of left ear suspicious for a skin cancer - will refer him to Dr Avilez with ENT  - he is due for repeat PET    9/17/2020:  - PET scan on 9/2/2020 is adequate  - [facility]     11/12/2020:  - continued on the current regimen  - doing well with no new  issues    1/7/2021:  - he is having some persistent cough issues for which he has been obtaining sputum for José Manuel  - last PET was Sept 2020    3/4/2021:  - doing ok  - chronic cough issues - followed by José Manuel  - will set up f/u PET    4/29/2021:  - he had PET scan on 3/19/2021  - He has been seeing pulmonary about his chronic cough  Lucia José Manuel with pulmonary has seen the recent PET and reported to him that the CXR on 3/25 was stable and he is on some oral antibiotics with improvement of the symptoms  - He is also on Gabapentin for the neuropathy issues in his feet.   - He saw Dr Barry Mcmahon on 3/24/2021 with family [facility]    8/19/2021:  - He has been seeing pulmonary about his chronic cough - Lucia Georges with pulmonary and he has been on periodic treatments of antibiotics. He saw her last just yesterday on 8/17/2021 and is currently on antibiotics.  - Pulmonary is planning to refer him to an ID specialist  - he is on the rituximab every 8 weeks; I am not convinced that this is contributing to the infection issues as it is not reducing his WBC; however,if pulmonary and/or ID feel it is a contributing factor then we can discontinue. The risk of his lymphoma recurring or progressing would be higher if he were to discontinue the therapy      10/14/2021:  - continued on oral antibiotics per pulmonary and plans to see Pulmonary ID specialist in near future  - continued on current therapy with rituximab  - recent PEt on 10/4/2021    12/9/2021:  - continued on rituximab  - on new antibiotics per pulmonary and he sees them again in Feb 2022  - repeat scans in Feb 2022 or sooner if pulmonary says otherwise    2/2/2022:  - he is seeing pulmonary again in two weeks  - he does not think that the new triple antibiotic regimen is working  - we can get PET in Feb/mar 2022 if pulmonary is inclined, otherwise 6 month surveillance scan in April/May 2022    3/31/2022:  - He has been seeing pulmonary about his chronic cough and TONY  issues; he is on antibiotics 3x/week and he reports that pulmonary feels that he is stable  - PET scan scheduled for 4/14/2022  - He last saw Dr Barry Mcmahon on 3/28/2021 and José Manuel on 2/17/2022  - pulmonary is fine with him continuing the ritxuimab per his report    5/26/2022:  - He has been seeing pulmonary about his chronic cough and TONY issues; he saw Rose on 5/17/2022 and is now on a new antibiotic and he reports that pulmonary said the PEt scan was better than the last one; he is planning to see Dr Veliz in the near future   - PET scan was done on 4/5/2022 with no evidence of recurrence  - continued on rituximab maintenance therapy    7/21/2022:  - continued under the care of pulmonary  - he is seeing Dr Veliz in Aug 2022  - continued on monthly IV IgG and rituximab every other month  - repeat PET in Oct 2022 expected    9/15/2022:  - He has been seeing pulmonary about his chronic cough and TONY issues; he has been seeing [facility] and saw Dr Veliz on 8/15/2022;   - Dr Veliz is looking at increasing his Ig dose or changing it to SQ weekly  - Last PET scan was done on 4/5/2022 and repeat has been ordered and is due this Oct 2022  - discussed with patient about referral to Dr Nasreen Abarca at [facility] for not only evaluation for 2nd opinion for his chronic lymphoma but also the options of IV IgG infusion therapy, patient is agreeable        (2) HTN     (3) Neuropathy     (4) GERD     (5) DM - on metformin per Dr Nunez     (6) Hypogammaglobulinemia - on Iv IgG monthly     (7) Steatosis of liver     (8) Degenerative disc disease of back     (9) Diverticular disease    (10) Mild bronchiectasis in the left lower lobe and tree-in-bud micro nodules at the left lung base suggesting underlying bronchiolitis  - seeing Lucia Georges           VISIT DIAGNOSES:      Follicular lymphoma grade IIIa, unspecified body region        PLAN:  1. Continue his rituximab every 8 weeks as long as he is tolerating the therapy as per   April's prior recommendations and directives    2. continue IV IgG monthly for now  3. referral to Dr Nasreen Abarca at [facility] for not only evaluation for 2nd opinion for his chronic lymphoma but also the options of IV IgG infusion therapy  4. Check labs every 8 weeks  5. Repeat PET  6 months from last one (Oct 2022)  6. F/u with PCP, Pulm etc - sees Dr Veliz in Aug 2022        RTC in 4- 8 weeks   Fax note to Barry Mcmahon; Bal Veliz         Discussion:       I spent over 25 mins of time with the patient. Reviewed results of the recently ordered labs, tests and studies; made directives with regards to the results. Over half of this time was spent couseling and coordinating care.      COVID-19 Discussion:    I had long discussion with patient and any applicable family about the COVID-19 coronavirus epidemic and the recommended precautions with regard to cancer and/or hematology patients. I have re-iterated the CDC recommendations for adequate hand washing, use of hand -like products, and coughing into elbow, etc. In addition, especially for our patients who are on chemotherapy and/or our otherwise immunocompromised patients, I have recommended avoidance of crowds, including movie theaters, restaurants, churches, etc. I have recommended avoidance of any sick or symptomatic family members and/or friends. I have also recommended avoidance of any raw and unwashed food products, and general avoidance of food items that have not been prepared by themselves. The patient has been asked to call us immediately with any symptom developments, issues, questions or other general concerns.     I have explained all of the above in detail and the patient understands all of the current recommendation(s). I have answered all of their questions to the best of my ability and to their complete satisfaction.   The patient is to continue with the current management plan.            Electronically signed by Jefferson Ibarra,  MD                     Answers submitted by the patient for this visit:  Review of Systems Questionnaire (Submitted on 9/9/2022)  appetite change : No  unexpected weight change: No  mouth sores: No  visual disturbance: No  cough: Yes  shortness of breath: No  chest pain: No  abdominal pain: No  diarrhea: No  frequency: No  back pain: No  rash: No  headaches: No  adenopathy: No  nervous/ anxious: No

## 2022-09-15 ENCOUNTER — OFFICE VISIT (OUTPATIENT)
Dept: HEMATOLOGY/ONCOLOGY | Facility: CLINIC | Age: 67
End: 2022-09-15
Payer: MEDICARE

## 2022-09-15 ENCOUNTER — INFUSION (OUTPATIENT)
Dept: INFUSION THERAPY | Facility: HOSPITAL | Age: 67
End: 2022-09-15
Attending: INTERNAL MEDICINE
Payer: MEDICARE

## 2022-09-15 VITALS
HEIGHT: 69 IN | HEART RATE: 84 BPM | WEIGHT: 213.38 LBS | SYSTOLIC BLOOD PRESSURE: 158 MMHG | RESPIRATION RATE: 18 BRPM | TEMPERATURE: 98 F | DIASTOLIC BLOOD PRESSURE: 84 MMHG | BODY MASS INDEX: 31.6 KG/M2 | OXYGEN SATURATION: 97 %

## 2022-09-15 VITALS
SYSTOLIC BLOOD PRESSURE: 132 MMHG | OXYGEN SATURATION: 100 % | HEART RATE: 71 BPM | DIASTOLIC BLOOD PRESSURE: 78 MMHG | BODY MASS INDEX: 32.34 KG/M2 | RESPIRATION RATE: 16 BRPM | TEMPERATURE: 98 F | WEIGHT: 213.38 LBS | HEIGHT: 68 IN

## 2022-09-15 DIAGNOSIS — C82.30 FOLLICULAR LYMPHOMA GRADE IIIA, UNSPECIFIED BODY REGION: ICD-10-CM

## 2022-09-15 DIAGNOSIS — C82.30 FOLLICULAR LYMPHOMA GRADE IIIA, UNSPECIFIED BODY REGION: Primary | ICD-10-CM

## 2022-09-15 DIAGNOSIS — D80.1 HYPOGAMMAGLOBULINEMIA: Primary | ICD-10-CM

## 2022-09-15 PROCEDURE — 96415 CHEMO IV INFUSION ADDL HR: CPT

## 2022-09-15 PROCEDURE — 99214 OFFICE O/P EST MOD 30 MIN: CPT | Mod: S$GLB,,, | Performed by: INTERNAL MEDICINE

## 2022-09-15 PROCEDURE — 63600175 PHARM REV CODE 636 W HCPCS: Performed by: INTERNAL MEDICINE

## 2022-09-15 PROCEDURE — A4216 STERILE WATER/SALINE, 10 ML: HCPCS | Performed by: INTERNAL MEDICINE

## 2022-09-15 PROCEDURE — 96375 TX/PRO/DX INJ NEW DRUG ADDON: CPT

## 2022-09-15 PROCEDURE — 25000003 PHARM REV CODE 250: Performed by: INTERNAL MEDICINE

## 2022-09-15 PROCEDURE — 96367 TX/PROPH/DG ADDL SEQ IV INF: CPT

## 2022-09-15 PROCEDURE — 99214 PR OFFICE/OUTPT VISIT, EST, LEVL IV, 30-39 MIN: ICD-10-PCS | Mod: S$GLB,,, | Performed by: INTERNAL MEDICINE

## 2022-09-15 PROCEDURE — 96413 CHEMO IV INFUSION 1 HR: CPT

## 2022-09-15 RX ORDER — FAMOTIDINE 10 MG/ML
20 INJECTION INTRAVENOUS
Status: COMPLETED | OUTPATIENT
Start: 2022-09-15 | End: 2022-09-15

## 2022-09-15 RX ORDER — SODIUM CHLORIDE 0.9 % (FLUSH) 0.9 %
10 SYRINGE (ML) INJECTION
Status: CANCELLED | OUTPATIENT
Start: 2022-09-15

## 2022-09-15 RX ORDER — HEPARIN 100 UNIT/ML
500 SYRINGE INTRAVENOUS
Status: DISCONTINUED | OUTPATIENT
Start: 2022-09-15 | End: 2022-09-15 | Stop reason: HOSPADM

## 2022-09-15 RX ORDER — SODIUM CHLORIDE 0.9 % (FLUSH) 0.9 %
10 SYRINGE (ML) INJECTION
Status: DISCONTINUED | OUTPATIENT
Start: 2022-09-15 | End: 2022-09-15 | Stop reason: HOSPADM

## 2022-09-15 RX ORDER — MEPERIDINE HYDROCHLORIDE 25 MG/ML
25 INJECTION INTRAMUSCULAR; INTRAVENOUS; SUBCUTANEOUS
Status: DISCONTINUED | OUTPATIENT
Start: 2022-09-15 | End: 2022-09-15 | Stop reason: HOSPADM

## 2022-09-15 RX ORDER — HEPARIN 100 UNIT/ML
500 SYRINGE INTRAVENOUS
Status: CANCELLED | OUTPATIENT
Start: 2022-09-15

## 2022-09-15 RX ORDER — ACETAMINOPHEN 325 MG/1
650 TABLET ORAL
Status: COMPLETED | OUTPATIENT
Start: 2022-09-15 | End: 2022-09-15

## 2022-09-15 RX ADMIN — DIPHENHYDRAMINE HYDROCHLORIDE 50 MG: 50 INJECTION INTRAMUSCULAR; INTRAVENOUS at 08:09

## 2022-09-15 RX ADMIN — HEPARIN 500 UNITS: 100 SYRINGE at 11:09

## 2022-09-15 RX ADMIN — SODIUM CHLORIDE, PRESERVATIVE FREE 10 ML: 5 INJECTION INTRAVENOUS at 11:09

## 2022-09-15 RX ADMIN — SODIUM CHLORIDE: 0.9 INJECTION, SOLUTION INTRAVENOUS at 08:09

## 2022-09-15 RX ADMIN — FAMOTIDINE 20 MG: 10 INJECTION INTRAVENOUS at 08:09

## 2022-09-15 RX ADMIN — RITUXIMAB 800 MG: 10 INJECTION, SOLUTION INTRAVENOUS at 08:09

## 2022-09-15 RX ADMIN — ACETAMINOPHEN 650 MG: 325 TABLET ORAL at 08:09

## 2022-09-15 NOTE — PLAN OF CARE
Problem: Fall Injury Risk  Goal: Absence of Fall and Fall-Related Injury  Outcome: Ongoing, Progressing  Intervention: Identify and Manage Contributors  Flowsheets (Taken 9/15/2022 0755)  Self-Care Promotion: independence encouraged  Medication Review/Management: medications reviewed  Intervention: Promote Injury-Free Environment  Flowsheets (Taken 9/15/2022 0755)  Safety Promotion/Fall Prevention: medications reviewed

## 2022-09-16 ENCOUNTER — INFUSION (OUTPATIENT)
Dept: INFUSION THERAPY | Facility: HOSPITAL | Age: 67
End: 2022-09-16
Attending: INTERNAL MEDICINE
Payer: MEDICARE

## 2022-09-16 VITALS
RESPIRATION RATE: 16 BRPM | OXYGEN SATURATION: 99 % | HEIGHT: 68 IN | HEART RATE: 73 BPM | SYSTOLIC BLOOD PRESSURE: 132 MMHG | DIASTOLIC BLOOD PRESSURE: 81 MMHG | TEMPERATURE: 98 F | BODY MASS INDEX: 32.4 KG/M2 | WEIGHT: 213.81 LBS

## 2022-09-16 DIAGNOSIS — D80.1 NONFAMILIAL HYPOGAMMAGLOBULINEMIA: ICD-10-CM

## 2022-09-16 DIAGNOSIS — D80.1 HYPOGAMMAGLOBULINEMIA: Primary | ICD-10-CM

## 2022-09-16 DIAGNOSIS — C82.30 FOLLICULAR LYMPHOMA GRADE IIIA, UNSPECIFIED BODY REGION: ICD-10-CM

## 2022-09-16 PROCEDURE — 25000003 PHARM REV CODE 250: Performed by: INTERNAL MEDICINE

## 2022-09-16 PROCEDURE — 96415 CHEMO IV INFUSION ADDL HR: CPT

## 2022-09-16 PROCEDURE — A4216 STERILE WATER/SALINE, 10 ML: HCPCS | Performed by: INTERNAL MEDICINE

## 2022-09-16 PROCEDURE — 96367 TX/PROPH/DG ADDL SEQ IV INF: CPT

## 2022-09-16 PROCEDURE — 63600175 PHARM REV CODE 636 W HCPCS: Performed by: INTERNAL MEDICINE

## 2022-09-16 PROCEDURE — 96413 CHEMO IV INFUSION 1 HR: CPT

## 2022-09-16 RX ORDER — SODIUM CHLORIDE 0.9 % (FLUSH) 0.9 %
10 SYRINGE (ML) INJECTION
Status: CANCELLED | OUTPATIENT
Start: 2022-10-14

## 2022-09-16 RX ORDER — HEPARIN 100 UNIT/ML
500 SYRINGE INTRAVENOUS
Status: DISCONTINUED | OUTPATIENT
Start: 2022-09-16 | End: 2022-09-16 | Stop reason: HOSPADM

## 2022-09-16 RX ORDER — SODIUM CHLORIDE 0.9 % (FLUSH) 0.9 %
10 SYRINGE (ML) INJECTION
Status: DISCONTINUED | OUTPATIENT
Start: 2022-09-16 | End: 2022-09-16 | Stop reason: HOSPADM

## 2022-09-16 RX ORDER — HEPARIN 100 UNIT/ML
500 SYRINGE INTRAVENOUS
Status: CANCELLED | OUTPATIENT
Start: 2022-10-14

## 2022-09-16 RX ADMIN — IMMUNE GLOBULIN (HUMAN) 30 G: 10 INJECTION INTRAVENOUS; SUBCUTANEOUS at 08:09

## 2022-09-16 RX ADMIN — DIPHENHYDRAMINE HYDROCHLORIDE 25 MG: 50 INJECTION INTRAMUSCULAR; INTRAVENOUS at 08:09

## 2022-09-16 RX ADMIN — SODIUM CHLORIDE: 0.9 INJECTION, SOLUTION INTRAVENOUS at 08:09

## 2022-09-16 RX ADMIN — HEPARIN 500 UNITS: 100 SYRINGE at 10:09

## 2022-09-16 RX ADMIN — SODIUM CHLORIDE, PRESERVATIVE FREE 10 ML: 5 INJECTION INTRAVENOUS at 10:09

## 2022-09-16 NOTE — PLAN OF CARE
Problem: Fall Injury Risk  Goal: Absence of Fall and Fall-Related Injury  Outcome: Ongoing, Progressing  Intervention: Identify and Manage Contributors  Flowsheets (Taken 9/16/2022 1023)  Self-Care Promotion: independence encouraged  Medication Review/Management: medications reviewed  Intervention: Promote Injury-Free Environment  Flowsheets (Taken 9/16/2022 0753)  Safety Promotion/Fall Prevention: medications reviewed

## 2022-09-30 ENCOUNTER — PATIENT MESSAGE (OUTPATIENT)
Dept: HEMATOLOGY/ONCOLOGY | Facility: CLINIC | Age: 67
End: 2022-09-30

## 2022-09-30 ENCOUNTER — TELEPHONE (OUTPATIENT)
Dept: HEMATOLOGY/ONCOLOGY | Facility: CLINIC | Age: 67
End: 2022-09-30

## 2022-09-30 ENCOUNTER — HOSPITAL ENCOUNTER (INPATIENT)
Facility: HOSPITAL | Age: 67
LOS: 7 days | Discharge: HOME HEALTH | DRG: 853 | End: 2022-10-08
Attending: EMERGENCY MEDICINE | Admitting: INTERNAL MEDICINE
Payer: MEDICARE

## 2022-09-30 DIAGNOSIS — J30.89 NON-SEASONAL ALLERGIC RHINITIS DUE TO OTHER ALLERGIC TRIGGER: ICD-10-CM

## 2022-09-30 DIAGNOSIS — R07.9 CHEST PAIN AT REST: ICD-10-CM

## 2022-09-30 DIAGNOSIS — D72.819 LEUKOPENIA, UNSPECIFIED TYPE: ICD-10-CM

## 2022-09-30 DIAGNOSIS — L03.90 CELLULITIS: ICD-10-CM

## 2022-09-30 DIAGNOSIS — A41.9 SEPSIS, DUE TO UNSPECIFIED ORGANISM, UNSPECIFIED WHETHER ACUTE ORGAN DYSFUNCTION PRESENT: ICD-10-CM

## 2022-09-30 DIAGNOSIS — R73.9 HYPERGLYCEMIA: ICD-10-CM

## 2022-09-30 DIAGNOSIS — R23.3 PETECHIAE: ICD-10-CM

## 2022-09-30 DIAGNOSIS — L03.313 CELLULITIS OF CHEST WALL: Primary | ICD-10-CM

## 2022-09-30 DIAGNOSIS — C82.30 FOLLICULAR LYMPHOMA GRADE IIIA, UNSPECIFIED BODY REGION: ICD-10-CM

## 2022-09-30 DIAGNOSIS — I10 ESSENTIAL HYPERTENSION: ICD-10-CM

## 2022-09-30 DIAGNOSIS — D61.818 PANCYTOPENIA: ICD-10-CM

## 2022-09-30 DIAGNOSIS — J81.1 PULMONARY EDEMA: ICD-10-CM

## 2022-09-30 DIAGNOSIS — J32.9 SINUSITIS, UNSPECIFIED CHRONICITY, UNSPECIFIED LOCATION: ICD-10-CM

## 2022-09-30 DIAGNOSIS — R55 PRE-SYNCOPE: ICD-10-CM

## 2022-09-30 DIAGNOSIS — L03.112 CELLULITIS OF LEFT AXILLA: ICD-10-CM

## 2022-09-30 DIAGNOSIS — T82.9XXA VASCULAR PORT COMPLICATION, INITIAL ENCOUNTER: ICD-10-CM

## 2022-09-30 DIAGNOSIS — J44.9 CHRONIC OBSTRUCTIVE PULMONARY DISEASE, UNSPECIFIED COPD TYPE: ICD-10-CM

## 2022-09-30 DIAGNOSIS — L03.818 CELLULITIS OF OTHER SPECIFIED SITE: Primary | ICD-10-CM

## 2022-09-30 DIAGNOSIS — D80.1 HYPOGAMMAGLOBULINEMIA: ICD-10-CM

## 2022-09-30 DIAGNOSIS — E11.9 TYPE 2 DIABETES MELLITUS WITHOUT COMPLICATION, WITHOUT LONG-TERM CURRENT USE OF INSULIN: ICD-10-CM

## 2022-09-30 LAB
ALBUMIN SERPL BCP-MCNC: 3.5 G/DL (ref 3.5–5.2)
ALP SERPL-CCNC: 95 U/L (ref 55–135)
ALT SERPL W/O P-5'-P-CCNC: 24 U/L (ref 10–44)
ANION GAP SERPL CALC-SCNC: 10 MMOL/L (ref 8–16)
AST SERPL-CCNC: 30 U/L (ref 10–40)
BACTERIA #/AREA URNS HPF: NEGATIVE /HPF
BASO STIPL BLD QL SMEAR: ABNORMAL
BASOPHILS # BLD AUTO: ABNORMAL K/UL (ref 0–0.2)
BASOPHILS NFR BLD: 1 % (ref 0–1.9)
BILIRUB SERPL-MCNC: 1.2 MG/DL (ref 0.1–1)
BILIRUB UR QL STRIP: NEGATIVE
BNP SERPL-MCNC: 185 PG/ML (ref 0–99)
BUN SERPL-MCNC: 21 MG/DL (ref 8–23)
CALCIUM SERPL-MCNC: 8.6 MG/DL (ref 8.7–10.5)
CHLORIDE SERPL-SCNC: 101 MMOL/L (ref 95–110)
CLARITY UR: CLEAR
CO2 SERPL-SCNC: 26 MMOL/L (ref 23–29)
COLOR UR: YELLOW
CREAT SERPL-MCNC: 1.3 MG/DL (ref 0.5–1.4)
DIFFERENTIAL METHOD: ABNORMAL
EOSINOPHIL # BLD AUTO: ABNORMAL K/UL (ref 0–0.5)
EOSINOPHIL NFR BLD: 0 % (ref 0–8)
ERYTHROCYTE [DISTWIDTH] IN BLOOD BY AUTOMATED COUNT: 12.8 % (ref 11.5–14.5)
EST. GFR  (NO RACE VARIABLE): >60 ML/MIN/1.73 M^2
GLUCOSE SERPL-MCNC: 264 MG/DL (ref 70–110)
GLUCOSE SERPL-MCNC: 273 MG/DL (ref 70–110)
GLUCOSE UR QL STRIP: ABNORMAL
HCT VFR BLD AUTO: 39 % (ref 40–54)
HGB BLD-MCNC: 13 G/DL (ref 14–18)
HGB UR QL STRIP: ABNORMAL
HYALINE CASTS #/AREA URNS LPF: 0 /LPF
IMM GRANULOCYTES # BLD AUTO: ABNORMAL K/UL (ref 0–0.04)
IMM GRANULOCYTES NFR BLD AUTO: ABNORMAL % (ref 0–0.5)
KETONES UR QL STRIP: NEGATIVE
LACTATE SERPL-SCNC: 2.4 MMOL/L (ref 0.5–1.9)
LEUKOCYTE ESTERASE UR QL STRIP: NEGATIVE
LYMPHOCYTES # BLD AUTO: ABNORMAL K/UL (ref 1–4.8)
LYMPHOCYTES NFR BLD: 11 % (ref 18–48)
MCH RBC QN AUTO: 29.7 PG (ref 27–31)
MCHC RBC AUTO-ENTMCNC: 33.3 G/DL (ref 32–36)
MCV RBC AUTO: 89 FL (ref 82–98)
MICROSCOPIC COMMENT: NORMAL
MONOCYTES # BLD AUTO: ABNORMAL K/UL (ref 0.3–1)
MONOCYTES NFR BLD: 10 % (ref 4–15)
MYELOCYTES NFR BLD MANUAL: 1 %
NEUTROPHILS NFR BLD: 71 % (ref 38–73)
NEUTS BAND NFR BLD MANUAL: 6 %
NITRITE UR QL STRIP: NEGATIVE
NRBC BLD-RTO: 0 /100 WBC
PH UR STRIP: 6 [PH] (ref 5–8)
PLATELET # BLD AUTO: 168 K/UL (ref 150–450)
PLATELET BLD QL SMEAR: ABNORMAL
PMV BLD AUTO: 9.6 FL (ref 9.2–12.9)
POTASSIUM SERPL-SCNC: 3.4 MMOL/L (ref 3.5–5.1)
PROT SERPL-MCNC: 6.5 G/DL (ref 6–8.4)
PROT UR QL STRIP: ABNORMAL
RBC # BLD AUTO: 4.37 M/UL (ref 4.6–6.2)
RBC #/AREA URNS HPF: 4 /HPF (ref 0–4)
SARS-COV-2 RDRP RESP QL NAA+PROBE: NEGATIVE
SODIUM SERPL-SCNC: 137 MMOL/L (ref 136–145)
SP GR UR STRIP: >1.03 (ref 1–1.03)
SQUAMOUS #/AREA URNS HPF: 0 /HPF
TROPONIN I SERPL DL<=0.01 NG/ML-MCNC: <0.03 NG/ML
URN SPEC COLLECT METH UR: ABNORMAL
UROBILINOGEN UR STRIP-ACNC: NEGATIVE EU/DL
WBC # BLD AUTO: 5.47 K/UL (ref 3.9–12.7)
WBC #/AREA URNS HPF: 1 /HPF (ref 0–5)
WBC TOXIC VACUOLES BLD QL SMEAR: PRESENT
YEAST URNS QL MICRO: NORMAL

## 2022-09-30 PROCEDURE — U0002 COVID-19 LAB TEST NON-CDC: HCPCS | Performed by: STUDENT IN AN ORGANIZED HEALTH CARE EDUCATION/TRAINING PROGRAM

## 2022-09-30 PROCEDURE — 85027 COMPLETE CBC AUTOMATED: CPT | Performed by: STUDENT IN AN ORGANIZED HEALTH CARE EDUCATION/TRAINING PROGRAM

## 2022-09-30 PROCEDURE — 96366 THER/PROPH/DIAG IV INF ADDON: CPT

## 2022-09-30 PROCEDURE — 96365 THER/PROPH/DIAG IV INF INIT: CPT

## 2022-09-30 PROCEDURE — 83605 ASSAY OF LACTIC ACID: CPT | Mod: 91 | Performed by: INTERNAL MEDICINE

## 2022-09-30 PROCEDURE — G0378 HOSPITAL OBSERVATION PER HR: HCPCS

## 2022-09-30 PROCEDURE — 87040 BLOOD CULTURE FOR BACTERIA: CPT | Mod: 59 | Performed by: STUDENT IN AN ORGANIZED HEALTH CARE EDUCATION/TRAINING PROGRAM

## 2022-09-30 PROCEDURE — 93005 ELECTROCARDIOGRAM TRACING: CPT | Performed by: SPECIALIST

## 2022-09-30 PROCEDURE — 63600175 PHARM REV CODE 636 W HCPCS: Performed by: STUDENT IN AN ORGANIZED HEALTH CARE EDUCATION/TRAINING PROGRAM

## 2022-09-30 PROCEDURE — 93010 EKG 12-LEAD: ICD-10-PCS | Mod: 76,,, | Performed by: SPECIALIST

## 2022-09-30 PROCEDURE — 25500020 PHARM REV CODE 255: Performed by: EMERGENCY MEDICINE

## 2022-09-30 PROCEDURE — 82962 GLUCOSE BLOOD TEST: CPT

## 2022-09-30 PROCEDURE — 83605 ASSAY OF LACTIC ACID: CPT | Performed by: STUDENT IN AN ORGANIZED HEALTH CARE EDUCATION/TRAINING PROGRAM

## 2022-09-30 PROCEDURE — 84484 ASSAY OF TROPONIN QUANT: CPT | Performed by: STUDENT IN AN ORGANIZED HEALTH CARE EDUCATION/TRAINING PROGRAM

## 2022-09-30 PROCEDURE — 80053 COMPREHEN METABOLIC PANEL: CPT | Performed by: STUDENT IN AN ORGANIZED HEALTH CARE EDUCATION/TRAINING PROGRAM

## 2022-09-30 PROCEDURE — 96361 HYDRATE IV INFUSION ADD-ON: CPT

## 2022-09-30 PROCEDURE — 93010 ELECTROCARDIOGRAM REPORT: CPT | Mod: ,,, | Performed by: SPECIALIST

## 2022-09-30 PROCEDURE — 85007 BL SMEAR W/DIFF WBC COUNT: CPT | Performed by: STUDENT IN AN ORGANIZED HEALTH CARE EDUCATION/TRAINING PROGRAM

## 2022-09-30 PROCEDURE — 81001 URINALYSIS AUTO W/SCOPE: CPT | Performed by: STUDENT IN AN ORGANIZED HEALTH CARE EDUCATION/TRAINING PROGRAM

## 2022-09-30 PROCEDURE — 25000003 PHARM REV CODE 250: Performed by: INTERNAL MEDICINE

## 2022-09-30 PROCEDURE — 25000003 PHARM REV CODE 250: Performed by: STUDENT IN AN ORGANIZED HEALTH CARE EDUCATION/TRAINING PROGRAM

## 2022-09-30 PROCEDURE — 83880 ASSAY OF NATRIURETIC PEPTIDE: CPT | Performed by: STUDENT IN AN ORGANIZED HEALTH CARE EDUCATION/TRAINING PROGRAM

## 2022-09-30 PROCEDURE — 99285 EMERGENCY DEPT VISIT HI MDM: CPT | Mod: 25

## 2022-09-30 PROCEDURE — 96367 TX/PROPH/DG ADDL SEQ IV INF: CPT

## 2022-09-30 PROCEDURE — 93010 ELECTROCARDIOGRAM REPORT: CPT | Mod: 76,,, | Performed by: SPECIALIST

## 2022-09-30 RX ORDER — HYDROCODONE BITARTRATE AND ACETAMINOPHEN 5; 325 MG/1; MG/1
1 TABLET ORAL EVERY 4 HOURS PRN
Status: DISCONTINUED | OUTPATIENT
Start: 2022-09-30 | End: 2022-10-01

## 2022-09-30 RX ORDER — CEFEPIME HYDROCHLORIDE 1 G/50ML
2 INJECTION, SOLUTION INTRAVENOUS ONCE
Status: COMPLETED | OUTPATIENT
Start: 2022-09-30 | End: 2022-09-30

## 2022-09-30 RX ORDER — VANCOMYCIN HCL IN 5 % DEXTROSE 1G/250ML
1000 PLASTIC BAG, INJECTION (ML) INTRAVENOUS ONCE
Status: COMPLETED | OUTPATIENT
Start: 2022-09-30 | End: 2022-09-30

## 2022-09-30 RX ORDER — IPRATROPIUM BROMIDE AND ALBUTEROL SULFATE 2.5; .5 MG/3ML; MG/3ML
3 SOLUTION RESPIRATORY (INHALATION) EVERY 4 HOURS PRN
Status: CANCELLED | OUTPATIENT
Start: 2022-09-30

## 2022-09-30 RX ORDER — ACETAMINOPHEN 325 MG/1
650 TABLET ORAL EVERY 8 HOURS PRN
Status: DISCONTINUED | OUTPATIENT
Start: 2022-09-30 | End: 2022-10-08 | Stop reason: HOSPADM

## 2022-09-30 RX ORDER — SODIUM CHLORIDE, SODIUM LACTATE, POTASSIUM CHLORIDE, CALCIUM CHLORIDE 600; 310; 30; 20 MG/100ML; MG/100ML; MG/100ML; MG/100ML
INJECTION, SOLUTION INTRAVENOUS CONTINUOUS
Status: DISCONTINUED | OUTPATIENT
Start: 2022-10-01 | End: 2022-10-02

## 2022-09-30 RX ORDER — CALCIUM GLUCONATE 20 MG/ML
1 INJECTION, SOLUTION INTRAVENOUS ONCE
Status: COMPLETED | OUTPATIENT
Start: 2022-09-30 | End: 2022-10-01

## 2022-09-30 RX ORDER — MORPHINE SULFATE 4 MG/ML
4 INJECTION, SOLUTION INTRAMUSCULAR; INTRAVENOUS
Status: DISCONTINUED | OUTPATIENT
Start: 2022-09-30 | End: 2022-10-01

## 2022-09-30 RX ORDER — SULFAMETHOXAZOLE AND TRIMETHOPRIM 800; 160 MG/1; MG/1
1 TABLET ORAL 2 TIMES DAILY
Qty: 20 TABLET | Refills: 0 | Status: ON HOLD | OUTPATIENT
Start: 2022-09-30 | End: 2022-10-08 | Stop reason: HOSPADM

## 2022-09-30 RX ADMIN — HYDROCODONE BITARTRATE AND ACETAMINOPHEN 1 TABLET: 5; 325 TABLET ORAL at 10:09

## 2022-09-30 RX ADMIN — VANCOMYCIN HYDROCHLORIDE 500 MG: 500 INJECTION, POWDER, LYOPHILIZED, FOR SOLUTION INTRAVENOUS at 09:09

## 2022-09-30 RX ADMIN — SODIUM CHLORIDE 1000 ML: 0.9 INJECTION, SOLUTION INTRAVENOUS at 05:09

## 2022-09-30 RX ADMIN — CEFEPIME HYDROCHLORIDE 2 G: 2 INJECTION, SOLUTION INTRAVENOUS at 05:09

## 2022-09-30 RX ADMIN — IOHEXOL 100 ML: 350 INJECTION, SOLUTION INTRAVENOUS at 05:09

## 2022-09-30 RX ADMIN — VANCOMYCIN HYDROCHLORIDE 1000 MG: 1 INJECTION, POWDER, LYOPHILIZED, FOR SOLUTION INTRAVENOUS at 09:09

## 2022-09-30 RX ADMIN — ACETAMINOPHEN 650 MG: 325 TABLET ORAL at 10:09

## 2022-09-30 NOTE — TELEPHONE ENCOUNTER
Patient called in and states that he has a sore lump under his left arm that is red and zahra.     He states that he also has a low grade fever.     Will send over Bactrim DS and have the patient send me pictures.     Instructed the patient if the fever does not resolve to go to ER.

## 2022-09-30 NOTE — ED PROVIDER NOTES
Encounter Date: 9/30/2022       History     Chief Complaint   Patient presents with    Lymphadenopathy     Starting last night pt developed L sided chest/armpit swelling and redness. Pt has history of non-hodkins lymphoma, last chemo on 9/15, has port access on [facility]. Pt was febrile at home took tylenol or ibuprofen (pt not sure) at 1100. Afebrile here, VSS. Denies recent injury/trauma. Site is not open or draining.      68y/o man, mhx HTN, DMII (non-insulin dependent) with neuropathy, NHL in remission on Rituxin & IVIG, chronic bronchiolitis, here for L axillary pain/swelling. Patient was in his usual state of health until yesterday when he noted mild chills and fatigue. Developed fever today (home temp 101) this morning and noted significant painful swelling with redness to his L axilla spreading over the L chest wall. Has chronic lung infection for which he has been following with Pulmonary; is adherent with daily oral antibiotic. Chronic cough is improving with antibiotic. Denies sore throat, CP/SOB, N/V, abd pain, dysuria/hematuria, diarrhea. New mild BLE swelling x1 month without pain. Denies h/o heart failure. Denies recent long-distance travel/surgery or hormone therapy. Took anti-pyretic for his fever 2 hrs prior to ED arrival. Called his Heme/Onc clinic and was prescribed Bactrim this morning. Prior to starting antibiotics, was called back again by his Heme/Onc clinic and advised to report to ED for further evaluation. Has been on q8 week Rituxin infusion and q4 week IVIG infusions for his NHL.    Review of patient's allergies indicates:  No Known Allergies  Past Medical History:   Diagnosis Date    Cancer     lymphoma currently on chemo    Diabetes mellitus, type 2     Encounter for antineoplastic chemotherapy 04/01/2020    Hypertension     Neuropathy     Reflux esophagitis      Past Surgical History:   Procedure Laterality Date    COLONOSCOPY  2018    EYE SURGERY  Approximately 3 years ago    Cataract     HAND SURGERY      amputation left index finger    PORTACATH PLACEMENT      SKIN CANCER EXCISION  09/30/2020    [facility]    SPINE SURGERY      VASECTOMY  1985 ?     Family History   Problem Relation Age of Onset    Diabetes Father      Social History     Tobacco Use    Smoking status: Former     Packs/day: 0.50     Years: 2.00     Pack years: 1.00     Types: Cigarettes    Smokeless tobacco: Never   Substance Use Topics    Alcohol use: Not Currently     Alcohol/week: 0.0 standard drinks     Comment: rarely    Drug use: No     Review of Systems   Constitutional:  Positive for chills and fever.   HENT:  Negative for congestion and sore throat.    Eyes:  Negative for pain and redness.   Respiratory:  Positive for cough (Cough improving). Negative for shortness of breath.    Cardiovascular:  Positive for leg swelling. Negative for chest pain.   Gastrointestinal:  Negative for abdominal pain, diarrhea, nausea and vomiting.   Genitourinary:  Negative for dysuria and hematuria.   Musculoskeletal:  Negative for back pain and joint swelling.   Skin:  Positive for rash (L axilla/chest wall).   Neurological:  Negative for syncope and headaches.   Psychiatric/Behavioral:  Negative for agitation and confusion.      Physical Exam     Initial Vitals [09/30/22 1438]   BP Pulse Resp Temp SpO2   109/74 97 16 98 °F (36.7 °C) 98 %      MAP       --         Physical Exam    Nursing note and vitals reviewed.  Constitutional: He appears well-developed and well-nourished. He is not diaphoretic. He is cooperative.  Non-toxic appearance. He does not have a sickly appearance. He does not appear ill. No distress.   HENT:   Head: Normocephalic and atraumatic.   Right Ear: External ear normal.   Left Ear: External ear normal.   Mouth/Throat: Oropharynx is clear and moist. No oropharyngeal exudate.   Eyes: Conjunctivae are normal. Pupils are equal, round, and reactive to light. Right eye exhibits no discharge. Left eye exhibits no discharge. No  scleral icterus.   Neck: Neck supple. No tracheal deviation present.   Cardiovascular:  Normal rate, regular rhythm, normal heart sounds and intact distal pulses.           No murmur heard.  Pulmonary/Chest: Breath sounds normal. No respiratory distress. He has no wheezes. He has no rhonchi. He has no rales. He exhibits tenderness (TTP over L chest wall with mild erythema and induration; no crepitus).   Abdominal: Abdomen is soft. Bowel sounds are normal. He exhibits no distension. There is no abdominal tenderness. There is no rebound and no guarding.   Musculoskeletal:         General: Edema (1+ pitting edema to BLE extending up to mid-calves; no overlying skin changes) present. No tenderness.      Cervical back: Neck supple.     Lymphadenopathy:     He has no cervical adenopathy.   Neurological: He is alert and oriented to person, place, and time. GCS score is 15. GCS eye subscore is 4. GCS verbal subscore is 5. GCS motor subscore is 6.   A&Ox4. No facial asymmetry, no dysarthria.  Moving all extremities symmetrically and without abnormal tone or coordination. SILT grossly throughout.   Skin: Skin is warm and dry. No abrasion and no laceration noted. There is erythema (Tender indurated erythema to the L axilla spreading towards the L chest wall; no open wound or fluctuance; no crepitus, vesicles, bullae, blistering).       ED Course   Procedures  Labs Reviewed   CBC W/ AUTO DIFFERENTIAL - Abnormal; Notable for the following components:       Result Value    RBC 4.37 (*)     Hemoglobin 13.0 (*)     Hematocrit 39.0 (*)     Lymph % 11.0 (*)     All other components within normal limits   COMPREHENSIVE METABOLIC PANEL - Abnormal; Notable for the following components:    Potassium 3.4 (*)     Glucose 273 (*)     Calcium 8.6 (*)     Total Bilirubin 1.2 (*)     All other components within normal limits   B-TYPE NATRIURETIC PEPTIDE - Abnormal; Notable for the following components:     (*)     All other components  within normal limits   LACTIC ACID, PLASMA - Abnormal; Notable for the following components:    Lactate (Lactic Acid) 2.4 (*)     All other components within normal limits    Narrative:     LA critical result(s) repeated. Called and verbal readback obtained   from Gloria Gonzalez RN/ED by [id number] 09/30/2022 18:09   POCT GLUCOSE - Abnormal; Notable for the following components:    POC Glucose 264 (*)     All other components within normal limits   CULTURE, BLOOD   CULTURE, BLOOD   TROPONIN I   SARS-COV-2 RNA AMPLIFICATION, QUAL   URINALYSIS, REFLEX TO URINE CULTURE     EKG Readings: (Independently Interpreted)   Initial Reading: No STEMI.   NSR; HR 99. Appropriate axis & intervals; Qtc 446. No HUONG/STD. No TWI. No STEMI.   ECG Results              EKG 12-lead (In process)  Result time 09/30/22 14:45:01      In process by Interface, Lab In [facility] (09/30/22 14:45:01)                   Narrative:    Test Reason : R07.9,    Vent. Rate : 099 BPM     Atrial Rate : 099 BPM     P-R Int : 174 ms          QRS Dur : 088 ms      QT Int : 348 ms       P-R-T Axes : 058 -15 031 degrees     QTc Int : 446 ms    Normal sinus rhythm  Inferior infarct ,age undetermined  Abnormal ECG  No previous ECGs available    Referred By: [email or web address]   SELF           Confirmed By:                                   Imaging Results              US Soft Tissue Axilla, Left (Final result)  Result time 09/30/22 19:38:44   Procedure changed from US Chest Mediastinum     Final result by Lm Merino MD (09/30/22 19:38:44)                   Narrative:    Ultrasound left axilla    CLINICAL DATA: Left axillary erythema and pain. Reported history of lymphoma.    FINDINGS: Sonographic assessment targeted to the left axilla was performed, with comparison evaluation of the right axilla.    Fatty soft tissues in the left axilla appear mildly edematous. No mass or lymphadenopathy is identified. There is no focal drainable fluid collection.    IMPRESSION:  1.  Subcutaneous edema at the left axilla, with no mass, lymphadenopathy, or drainable fluid collection. Findings are nonspecific. Correlate for possible cellulitis.    Electronically signed by:  Lm Merino MD  9/30/2022 7:38 PM CDT Workstation: [id number]                                     CTA Chest Non-Coronary (PE Studies) (Final result)  Result time 09/30/22 17:57:33      Final result by Lm Merino MD (09/30/22 17:57:33)                   Narrative:    CTA CHEST    HISTORY: Shortness of breath. Comparison to June 2021..    CMS MANDATED QUALITY DATA - CT RADIATION  436    All CT scans at this facility utilize dose modulation, iterative reconstruction, and/or weight based dosing when appropriate to reduce radiation dose to as low as reasonably achievable    FINDINGS: PE protocol was utilized.  Thin axial imaging through the chest was performed with 100 cc nonionic IV contrast, with sagittal and coronal reformatted images and 3-D reconstructions performed on an independent workstation, with images stored in the patient's permanent electronic medical record.    The pulmonary arteries are adequately opacified. Opacification of peripheral branches is heterogeneous.    The main pulmonary arteries and segmental branches are normal. Peripheral branches are grossly normal.    Heart size is upper normal. Multifocal coronary artery atherosclerotic calcification is noted. The thoracic aorta is normal in caliber. No pathologic mediastinal lymphadenopathy is identified.    Images at lung windows demonstrate mild interstitial prominence at the lung bases with mild interlobular septal thickening suggesting interstitial edema, new when compared to June 2021. There is superimposed minimal atelectasis or infiltrate at the left lung base. No effusion is identified.    IMPRESSION:      1. No evidence of pulmonary thromboembolic disease.  2. Interstitial prominence at both lung bases suggesting mild interstitial edema,  with superimposed atelectasis or infiltrate at the left lung base.  3. Coronary artery atherosclerosis..    Electronically signed by:  Lm Merino MD  9/30/2022 5:57 PM CDT Workstation: [id number]                                     CT Head Without Contrast (Final result)  Result time 09/30/22 17:49:13      Final result by Lm Merino MD (09/30/22 17:49:13)                   Narrative:    CT HEAD WITHOUT CONTRAST    CMS MANDATED QUALITY DATA - CT RADIATION  436    All CT scans at this facility utilize dose modulation, iterative reconstruction, and/or weight based dosing when appropriate to reduce radiation dose to as low as reasonably achievable    Clinical data: Altered mental status, syncope versus seizure.    FINDINGS: Noninfusion images were obtained from the skull base to the vertex. There is no intracranial mass, hemorrhage, or midline shift. Ventricles and sulci are normal. There are no pathologic extra-axial fluid collections. There is no evidence of acute ischemic change or edema. Small circumscribed hypodensity along the inferior margin of the right basal ganglia could reflect chronic lacunar infarct or prominent perivascular space. Cerebellum and brainstem are normal.    The calvarium is intact.There is a 16 mm mucous retention cyst or polyp in the left maxillary sinus, with mucoperiosteal thickening in the left maxillary and left frontal sinuses. Moderate bilateral ethmoid opacification is noted.    IMPRESSION:    1. No acute intracranial abnormalities.  2. 9 mm circumscribed hypodensity at the inferior margin of the right basal ganglia is consistent with small chronic lacunar infarct or prominent perivascular space.  3. Sinus disease as above.    Electronically signed by:  Lm Merino MD  9/30/2022 5:49 PM CDT Workstation: [id number]                                     X-Ray Chest AP Portable (Final result)  Result time 09/30/22 16:30:09      Final result by Lm Merino MD  (09/30/22 16:30:09)                   Narrative:    Chest single view    CLINICAL DATA: Fever    FINDINGS: AP view is compared to March 2021. Heart size is normal. There is mild aortic atherosclerosis. No active infiltrate or effusion is identified. Left-sided Port-A-Cath is unchanged in position. Osseous structures are unremarkable.    IMPRESSION:  1. No acute radiographic abnormalities.    Electronically signed by:  Lm Merino MD  9/30/2022 4:30 PM CDT Workstation: [id number]                                     Medications   morphine injection 4 mg (has no administration in time range)   vancomycin - pharmacy to dose (has no administration in time range)   vancomycin 500 mg in dextrose 5 % 100 mL IVPB (ready to mix system) (has no administration in time range)     Followed by   vancomycin in dextrose 5 % 1 gram/250 mL IVPB 1,000 mg (has no administration in time range)   cefepime in dextrose 5 % IVPB 2 g (0 g Intravenous Stopped 9/30/22 2027)   sodium chloride 0.9% bolus 1,000 mL (0 mLs Intravenous Stopped 9/30/22 2027)   iohexoL (OMNIPAQUE 350) injection 100 mL (100 mLs Intravenous Given 9/30/22 1739)     Medical Decision Making:   Initial Assessment:   66y/o man, mhx HTN, DMII (non-insulin dependent) with neuropathy, NHL in remission on Rituxin & IVIG, chronic bronchiolitis, here for L axillary pain/swelling x1 day; associated with fevers/chills.  Afebrile, HDS, without resp distress, SpO2 98%RA. Well-developed advanced age man, non-toxic appearing, in NAD, A&Ox4, cooperative. Exam significant for tender indurated erythematous area of the L axilla spreading towards the L chest wall; no fluctuance or open wound; no crepitus, vesicles, blistering/bullae. NCAT; OP clear. No apparent cervical LAD. RRR; CTAB. Benign abd. Mild BLE pitting edema without tenderness or skin changes. Intact distal pulses. GCS 15.  Differential Diagnosis:   Presentation at this time not consistent with necrotizing infection,  sepsis, hemodynamic instability, active hemorrhage, compartment syndrome, resp distress/impending airway, neurovascular injury. Ddx including but not limited to cellulitis/SSTI, LAD, SVC syndrome, malignancy, metabolic/electrolyte derangements, renal injury.  Clinical Tests:   Lab Tests: Ordered and Reviewed  Radiological Study: Ordered and Reviewed  Medical Tests: Ordered and Reviewed  ED Management:  Sepsis/infectious evaluation initiated due to concern for SSTI vs LAD including blood cultures in patient with immunocompromised state. No sepsis criteria upon initial evaluation; gentle IVF resus in setting of LE edema. Initial management also with analgesics and broad-spectrum abx (vanc/cefepime).  While undergoing evaluation, patient suddenly became very nauseous with decreased responsiveness. Witnessed by nurse. Felt lightheadedness with nausea; resolved with lowering HOB and placing in Trendelenburg. Never lost consciousness; no seizure like activity. Remained GCS 15 throughout and moving all extremities. No associated tachycardia, hypotension; accucheck 264. IVF initiated; symptoms resolved within minutes.  Work-up expanded to include CT H. CT chest initially ordered to eval for LAD; changed to PE study.  Work-up reviewed. No leukocytosis or significant anemia; mild LA 2.4. IVF already in process. CT H without acute abnormalities. CT PE study negative for PE. Unable to evaluated L axillary LAD on CT chest; pursuing soft tissue U/S. Patient remaining A&Ox4, HDS, without resp distress. Dispo pending ultrasound. Anticipate admission.  Holly Huang MD  [employer] IM/EM PGY-5  9/30/2022 @ 6:50 PM    PGY-5 Update  Patient consulted to Hospital Medicine & to be admitted for continued care & management.  Holly Huang MD  [employer] IM/EM PGY-5  9/30/2022 @ 8:34 PM           ED Course as of 09/30/22 2034   Fri Sep 30, 2022   1754 Troponin I: <0.030 [TN]   1755 CT Head Without Contrast  IMPRESSION:     1. No acute intracranial  abnormalities.  2. 9 mm circumscribed hypodensity at the inferior margin of the right basal ganglia is consistent with small chronic lacunar infarct or prominent perivascular space.  3. Sinus disease as above. [TN]   1810 Lactate, Karl(!!): 2.4  Receiving IVF [TN]   1810 CTA Chest Non-Coronary (PE Studies)  IMPRESSION:        1. No evidence of pulmonary thromboembolic disease.  2. Interstitial prominence at both lung bases suggesting mild interstitial edema, with superimposed atelectasis or infiltrate at the left lung base.  3. Coronary artery atherosclerosis.. [TN]   1816 Glucose(!): 273  AG 10 with bicarb 26 [TN]   1816 WBC: 5.47 [TN]   1817 Hemoglobin(!): 13.0  Stable [TN]   2004 US Soft Tissue Axilla, Left  IMPRESSION:  1. Subcutaneous edema at the left axilla, with no mass, lymphadenopathy, or drainable fluid collection. Findings are nonspecific. Correlate for possible cellulitis. [TN]      ED Course User Index  [TN] Holly Huang MD                   Clinical Impression:   Final diagnoses:  [R07.9] Chest pain at rest  [R73.9] Hyperglycemia  [L03.313] Cellulitis of chest wall (Primary)  [R55] Pre-syncope               Holly Huang MD  Resident  09/30/22 2034

## 2022-09-30 NOTE — TELEPHONE ENCOUNTER
Per Kaylee Sullivan, NP pt to go to ER due to redness under arm.  It may need to be drained or lanced.

## 2022-10-01 LAB
ANION GAP SERPL CALC-SCNC: 8 MMOL/L (ref 8–16)
BASOPHILS NFR BLD: 0 % (ref 0–1.9)
BUN SERPL-MCNC: 18 MG/DL (ref 8–23)
CALCIUM SERPL-MCNC: 8.2 MG/DL (ref 8.7–10.5)
CHLORIDE SERPL-SCNC: 103 MMOL/L (ref 95–110)
CK SERPL-CCNC: 25 U/L (ref 20–200)
CO2 SERPL-SCNC: 26 MMOL/L (ref 23–29)
CREAT SERPL-MCNC: 1.2 MG/DL (ref 0.5–1.4)
DIFFERENTIAL METHOD: ABNORMAL
EOSINOPHIL NFR BLD: 0 % (ref 0–8)
ERYTHROCYTE [DISTWIDTH] IN BLOOD BY AUTOMATED COUNT: 13.1 % (ref 11.5–14.5)
EST. GFR  (NO RACE VARIABLE): >60 ML/MIN/1.73 M^2
GLUCOSE SERPL-MCNC: 159 MG/DL (ref 70–110)
GLUCOSE SERPL-MCNC: 189 MG/DL (ref 70–110)
HCT VFR BLD AUTO: 38 % (ref 40–54)
HGB BLD-MCNC: 12 G/DL (ref 14–18)
IMM GRANULOCYTES # BLD AUTO: ABNORMAL K/UL (ref 0–0.04)
IMM GRANULOCYTES NFR BLD AUTO: ABNORMAL % (ref 0–0.5)
LACTATE SERPL-SCNC: 1.8 MMOL/L (ref 0.5–1.9)
LACTATE SERPL-SCNC: 2 MMOL/L (ref 0.5–1.9)
LACTATE SERPL-SCNC: 2.1 MMOL/L (ref 0.5–1.9)
LACTATE SERPL-SCNC: 2.3 MMOL/L (ref 0.5–1.9)
LYMPHOCYTES NFR BLD: 24 % (ref 18–48)
MCH RBC QN AUTO: 28.7 PG (ref 27–31)
MCHC RBC AUTO-ENTMCNC: 31.6 G/DL (ref 32–36)
MCV RBC AUTO: 91 FL (ref 82–98)
MONOCYTES NFR BLD: 10 % (ref 4–15)
NEUTROPHILS NFR BLD: 59 % (ref 38–73)
NEUTS BAND NFR BLD MANUAL: 7 %
NRBC BLD-RTO: 0 /100 WBC
PLATELET # BLD AUTO: 123 K/UL (ref 150–450)
PLATELET BLD QL SMEAR: ABNORMAL
PMV BLD AUTO: 9.6 FL (ref 9.2–12.9)
POTASSIUM SERPL-SCNC: 3.6 MMOL/L (ref 3.5–5.1)
PROCALCITONIN SERPL IA-MCNC: 17.25 NG/ML (ref 0–0.5)
RBC # BLD AUTO: 4.18 M/UL (ref 4.6–6.2)
SODIUM SERPL-SCNC: 137 MMOL/L (ref 136–145)
WBC # BLD AUTO: 3.02 K/UL (ref 3.9–12.7)

## 2022-10-01 PROCEDURE — 84145 PROCALCITONIN (PCT): CPT | Performed by: STUDENT IN AN ORGANIZED HEALTH CARE EDUCATION/TRAINING PROGRAM

## 2022-10-01 PROCEDURE — 99900035 HC TECH TIME PER 15 MIN (STAT)

## 2022-10-01 PROCEDURE — 83605 ASSAY OF LACTIC ACID: CPT | Mod: 91 | Performed by: STUDENT IN AN ORGANIZED HEALTH CARE EDUCATION/TRAINING PROGRAM

## 2022-10-01 PROCEDURE — 63600175 PHARM REV CODE 636 W HCPCS: Performed by: STUDENT IN AN ORGANIZED HEALTH CARE EDUCATION/TRAINING PROGRAM

## 2022-10-01 PROCEDURE — 25000242 PHARM REV CODE 250 ALT 637 W/ HCPCS: Performed by: INTERNAL MEDICINE

## 2022-10-01 PROCEDURE — 12000002 HC ACUTE/MED SURGE SEMI-PRIVATE ROOM

## 2022-10-01 PROCEDURE — 87040 BLOOD CULTURE FOR BACTERIA: CPT | Mod: 59 | Performed by: STUDENT IN AN ORGANIZED HEALTH CARE EDUCATION/TRAINING PROGRAM

## 2022-10-01 PROCEDURE — 82550 ASSAY OF CK (CPK): CPT | Performed by: INTERNAL MEDICINE

## 2022-10-01 PROCEDURE — 80048 BASIC METABOLIC PNL TOTAL CA: CPT | Performed by: INTERNAL MEDICINE

## 2022-10-01 PROCEDURE — 94640 AIRWAY INHALATION TREATMENT: CPT

## 2022-10-01 PROCEDURE — 63600175 PHARM REV CODE 636 W HCPCS: Performed by: INTERNAL MEDICINE

## 2022-10-01 PROCEDURE — 36415 COLL VENOUS BLD VENIPUNCTURE: CPT | Performed by: STUDENT IN AN ORGANIZED HEALTH CARE EDUCATION/TRAINING PROGRAM

## 2022-10-01 PROCEDURE — 85027 COMPLETE CBC AUTOMATED: CPT | Performed by: INTERNAL MEDICINE

## 2022-10-01 PROCEDURE — 25000003 PHARM REV CODE 250: Performed by: STUDENT IN AN ORGANIZED HEALTH CARE EDUCATION/TRAINING PROGRAM

## 2022-10-01 PROCEDURE — 25000003 PHARM REV CODE 250: Performed by: INTERNAL MEDICINE

## 2022-10-01 PROCEDURE — 85007 BL SMEAR W/DIFF WBC COUNT: CPT | Performed by: INTERNAL MEDICINE

## 2022-10-01 PROCEDURE — 36415 COLL VENOUS BLD VENIPUNCTURE: CPT | Performed by: INTERNAL MEDICINE

## 2022-10-01 PROCEDURE — 27000221 HC OXYGEN, UP TO 24 HOURS

## 2022-10-01 PROCEDURE — 99900031 HC PATIENT EDUCATION (STAT)

## 2022-10-01 PROCEDURE — 96367 TX/PROPH/DG ADDL SEQ IV INF: CPT

## 2022-10-01 RX ORDER — HYDROCODONE BITARTRATE AND ACETAMINOPHEN 5; 325 MG/1; MG/1
2 TABLET ORAL EVERY 4 HOURS PRN
Status: DISCONTINUED | OUTPATIENT
Start: 2022-10-01 | End: 2022-10-02

## 2022-10-01 RX ORDER — POTASSIUM CHLORIDE 20 MEQ/1
20 TABLET, EXTENDED RELEASE ORAL ONCE
Status: COMPLETED | OUTPATIENT
Start: 2022-10-01 | End: 2022-10-01

## 2022-10-01 RX ORDER — ENOXAPARIN SODIUM 100 MG/ML
40 INJECTION SUBCUTANEOUS EVERY 24 HOURS
Status: DISCONTINUED | OUTPATIENT
Start: 2022-10-01 | End: 2022-10-08 | Stop reason: HOSPADM

## 2022-10-01 RX ORDER — TALC
6 POWDER (GRAM) TOPICAL NIGHTLY PRN
Status: DISCONTINUED | OUTPATIENT
Start: 2022-10-01 | End: 2022-10-08 | Stop reason: HOSPADM

## 2022-10-01 RX ORDER — MONTELUKAST SODIUM 10 MG/1
10 TABLET ORAL DAILY
Status: DISCONTINUED | OUTPATIENT
Start: 2022-10-01 | End: 2022-10-08 | Stop reason: HOSPADM

## 2022-10-01 RX ORDER — NALOXONE HCL 0.4 MG/ML
0.02 VIAL (ML) INJECTION
Status: DISCONTINUED | OUTPATIENT
Start: 2022-10-01 | End: 2022-10-08 | Stop reason: HOSPADM

## 2022-10-01 RX ORDER — IPRATROPIUM BROMIDE AND ALBUTEROL SULFATE 2.5; .5 MG/3ML; MG/3ML
3 SOLUTION RESPIRATORY (INHALATION) EVERY 6 HOURS PRN
Status: DISCONTINUED | OUTPATIENT
Start: 2022-10-01 | End: 2022-10-08 | Stop reason: HOSPADM

## 2022-10-01 RX ORDER — AMOXICILLIN 250 MG
1 CAPSULE ORAL 2 TIMES DAILY PRN
Status: DISCONTINUED | OUTPATIENT
Start: 2022-10-01 | End: 2022-10-08 | Stop reason: HOSPADM

## 2022-10-01 RX ORDER — ASPIRIN 81 MG/1
81 TABLET ORAL DAILY
Status: DISCONTINUED | OUTPATIENT
Start: 2022-10-01 | End: 2022-10-02

## 2022-10-01 RX ORDER — IBUPROFEN 200 MG
16 TABLET ORAL
Status: DISCONTINUED | OUTPATIENT
Start: 2022-10-01 | End: 2022-10-08 | Stop reason: HOSPADM

## 2022-10-01 RX ORDER — PANTOPRAZOLE SODIUM 40 MG/1
40 TABLET, DELAYED RELEASE ORAL DAILY
Status: DISCONTINUED | OUTPATIENT
Start: 2022-10-01 | End: 2022-10-03

## 2022-10-01 RX ORDER — IBUPROFEN 200 MG
24 TABLET ORAL
Status: DISCONTINUED | OUTPATIENT
Start: 2022-10-01 | End: 2022-10-08 | Stop reason: HOSPADM

## 2022-10-01 RX ORDER — POLYETHYLENE GLYCOL 3350 17 G/17G
17 POWDER, FOR SOLUTION ORAL 2 TIMES DAILY PRN
Status: DISCONTINUED | OUTPATIENT
Start: 2022-10-01 | End: 2022-10-08 | Stop reason: HOSPADM

## 2022-10-01 RX ORDER — BENZONATATE 100 MG/1
200 CAPSULE ORAL 3 TIMES DAILY PRN
Status: DISCONTINUED | OUTPATIENT
Start: 2022-10-01 | End: 2022-10-08 | Stop reason: HOSPADM

## 2022-10-01 RX ORDER — SIMETHICONE 80 MG
1 TABLET,CHEWABLE ORAL 4 TIMES DAILY PRN
Status: DISCONTINUED | OUTPATIENT
Start: 2022-10-01 | End: 2022-10-08 | Stop reason: HOSPADM

## 2022-10-01 RX ORDER — CEFEPIME HYDROCHLORIDE 1 G/50ML
1 INJECTION, SOLUTION INTRAVENOUS
Status: DISCONTINUED | OUTPATIENT
Start: 2022-10-01 | End: 2022-10-01

## 2022-10-01 RX ORDER — ATORVASTATIN CALCIUM 20 MG/1
20 TABLET, FILM COATED ORAL DAILY
Status: DISCONTINUED | OUTPATIENT
Start: 2022-10-01 | End: 2022-10-02

## 2022-10-01 RX ORDER — GLUCAGON 1 MG
1 KIT INJECTION
Status: DISCONTINUED | OUTPATIENT
Start: 2022-10-01 | End: 2022-10-08 | Stop reason: HOSPADM

## 2022-10-01 RX ORDER — AMLODIPINE BESYLATE 5 MG/1
10 TABLET ORAL DAILY
Status: DISCONTINUED | OUTPATIENT
Start: 2022-10-01 | End: 2022-10-02

## 2022-10-01 RX ORDER — TIZANIDINE 4 MG/1
4 TABLET ORAL DAILY PRN
Status: DISCONTINUED | OUTPATIENT
Start: 2022-10-01 | End: 2022-10-08 | Stop reason: HOSPADM

## 2022-10-01 RX ORDER — FLUTICASONE PROPIONATE 50 MCG
1 SPRAY, SUSPENSION (ML) NASAL 2 TIMES DAILY
Status: DISCONTINUED | OUTPATIENT
Start: 2022-10-01 | End: 2022-10-08 | Stop reason: HOSPADM

## 2022-10-01 RX ORDER — LOSARTAN POTASSIUM 50 MG/1
100 TABLET ORAL DAILY
Status: DISCONTINUED | OUTPATIENT
Start: 2022-10-01 | End: 2022-10-01

## 2022-10-01 RX ORDER — ONDANSETRON 2 MG/ML
4 INJECTION INTRAMUSCULAR; INTRAVENOUS EVERY 6 HOURS PRN
Status: DISCONTINUED | OUTPATIENT
Start: 2022-10-01 | End: 2022-10-08 | Stop reason: HOSPADM

## 2022-10-01 RX ORDER — INSULIN ASPART 100 [IU]/ML
0-5 INJECTION, SOLUTION INTRAVENOUS; SUBCUTANEOUS
Status: DISCONTINUED | OUTPATIENT
Start: 2022-10-01 | End: 2022-10-08 | Stop reason: HOSPADM

## 2022-10-01 RX ORDER — NAPROXEN 250 MG/1
250 TABLET ORAL 2 TIMES DAILY WITH MEALS
Status: DISCONTINUED | OUTPATIENT
Start: 2022-10-01 | End: 2022-10-01

## 2022-10-01 RX ORDER — CEFEPIME HYDROCHLORIDE 1 G/50ML
2 INJECTION, SOLUTION INTRAVENOUS
Status: DISCONTINUED | OUTPATIENT
Start: 2022-10-01 | End: 2022-10-02

## 2022-10-01 RX ADMIN — IPRATROPIUM BROMIDE AND ALBUTEROL SULFATE 3 ML: .5; 3 SOLUTION RESPIRATORY (INHALATION) at 08:10

## 2022-10-01 RX ADMIN — HYDROCODONE BITARTRATE AND ACETAMINOPHEN 2 TABLET: 5; 325 TABLET ORAL at 08:10

## 2022-10-01 RX ADMIN — ENOXAPARIN SODIUM 40 MG: 40 INJECTION SUBCUTANEOUS at 05:10

## 2022-10-01 RX ADMIN — CEFEPIME HYDROCHLORIDE 1 G: 1 INJECTION, SOLUTION INTRAVENOUS at 01:10

## 2022-10-01 RX ADMIN — CALCIUM GLUCONATE 1 G: 20 INJECTION, SOLUTION INTRAVENOUS at 02:10

## 2022-10-01 RX ADMIN — ASPIRIN 81 MG: 81 TABLET, COATED ORAL at 09:10

## 2022-10-01 RX ADMIN — PANTOPRAZOLE SODIUM 40 MG: 40 TABLET, DELAYED RELEASE ORAL at 05:10

## 2022-10-01 RX ADMIN — MONTELUKAST 10 MG: 10 TABLET, FILM COATED ORAL at 09:10

## 2022-10-01 RX ADMIN — ONDANSETRON 4 MG: 2 INJECTION INTRAMUSCULAR; INTRAVENOUS at 03:10

## 2022-10-01 RX ADMIN — FLUTICASONE PROPIONATE 50 MCG: 50 SPRAY, METERED NASAL at 08:10

## 2022-10-01 RX ADMIN — CEFEPIME HYDROCHLORIDE 2 G: 2 INJECTION, SOLUTION INTRAVENOUS at 06:10

## 2022-10-01 RX ADMIN — ATORVASTATIN CALCIUM 20 MG: 20 TABLET, FILM COATED ORAL at 01:10

## 2022-10-01 RX ADMIN — SODIUM CHLORIDE, SODIUM LACTATE, POTASSIUM CHLORIDE, AND CALCIUM CHLORIDE: .6; .31; .03; .02 INJECTION, SOLUTION INTRAVENOUS at 12:10

## 2022-10-01 RX ADMIN — HYDROCODONE BITARTRATE AND ACETAMINOPHEN 1 TABLET: 5; 325 TABLET ORAL at 11:10

## 2022-10-01 RX ADMIN — SODIUM CHLORIDE, SODIUM LACTATE, POTASSIUM CHLORIDE, AND CALCIUM CHLORIDE 1000 ML: .6; .31; .03; .02 INJECTION, SOLUTION INTRAVENOUS at 03:10

## 2022-10-01 RX ADMIN — CEFEPIME HYDROCHLORIDE 1 G: 1 INJECTION, SOLUTION INTRAVENOUS at 09:10

## 2022-10-01 RX ADMIN — TIZANIDINE 4 MG: 4 TABLET ORAL at 09:10

## 2022-10-01 RX ADMIN — VANCOMYCIN HYDROCHLORIDE 1500 MG: 1.5 INJECTION, POWDER, LYOPHILIZED, FOR SOLUTION INTRAVENOUS at 03:10

## 2022-10-01 RX ADMIN — POTASSIUM CHLORIDE 20 MEQ: 1500 TABLET, EXTENDED RELEASE ORAL at 01:10

## 2022-10-01 RX ADMIN — AMLODIPINE BESYLATE 10 MG: 5 TABLET ORAL at 09:10

## 2022-10-01 RX ADMIN — ACETAMINOPHEN 650 MG: 325 TABLET ORAL at 09:10

## 2022-10-01 RX ADMIN — HYDROCODONE BITARTRATE AND ACETAMINOPHEN 1 TABLET: 5; 325 TABLET ORAL at 03:10

## 2022-10-01 RX ADMIN — HYDROCODONE BITARTRATE AND ACETAMINOPHEN 1 TABLET: 5; 325 TABLET ORAL at 06:10

## 2022-10-01 RX ADMIN — ATORVASTATIN CALCIUM 20 MG: 20 TABLET, FILM COATED ORAL at 08:10

## 2022-10-01 RX ADMIN — FLUTICASONE PROPIONATE 50 MCG: 50 SPRAY, METERED NASAL at 09:10

## 2022-10-01 RX ADMIN — SODIUM CHLORIDE, SODIUM LACTATE, POTASSIUM CHLORIDE, AND CALCIUM CHLORIDE: .6; .31; .03; .02 INJECTION, SOLUTION INTRAVENOUS at 06:10

## 2022-10-01 RX ADMIN — FLUTICASONE PROPIONATE 50 MCG: 50 SPRAY, METERED NASAL at 01:10

## 2022-10-01 NOTE — CONSULTS
[facility]   Hematology/Oncology  Inpatient Consult Note          Patient Name: Sivakumar Thacker  MRN: [id number]  Admission Date: 9/30/2022  Hospital Length of Stay: 0 days  Code Status: Full Code   Attending Provider: John Mojica MD  Referring Provider: Self, Aaareferral  Consulting Provider: Jefferson Ibarra MD  Primary Care Physician: Barry Mcmahon MD  Principal Problem:Cellulitis    Consults  Subjective:     Chief Complaint: Lymphadenopathy (Starting last night pt developed L sided chest/armpit swelling and redness. Pt has history of non-hodkins lymphoma, last chemo on 9/15, has port access on [facility]. Pt was febrile at home took tylenol or ibuprofen (pt not sure) at 1100. Afebrile here, VSS. Denies recent injury/trauma. Site is not open or draining. )          History Present Illness:  67 y.o. male known to my oncology service with diagnosis of low grade NHL for which he has been on rituximab maintenance therapy. He presented to ED last night with redness and discomfort of the left axill. He has been admitted to the hopsitalist service with working diagnosis of cellulitis. I spoke to Dr Helm by phone last night. US of the axilla showed only subcutaneous edema at the left axilla, with no mass, lymphadenopathy, or drainable fluid collection.  CTA was negative for pulmonary emboli. He is sitting up in bed. He has a lot of residual discomfort along the left axilla which travels toward the midline of the chest. He reports that he started with chills on Thursday night.  He reports that he has had some mild pedal swelling for the past 3-4 weeks. The portacath on the [facility] CW looks clean and clear with no swelling or erythema at this time. His two daughters are at bedside. I discussed with floor nursing staff and Dr Mojica in person. ID has been consulted and he is on broad spectrum antibiotics.         Past Medical/Surgical History:  Past Medical History:   Diagnosis Date    Cancer     lymphoma  currently on chemo    Diabetes mellitus, type 2     Encounter for antineoplastic chemotherapy 04/01/2020    Hypertension     Neuropathy     Reflux esophagitis      Past Surgical History:   Procedure Laterality Date    COLONOSCOPY  2018    EYE SURGERY  Approximately 3 years ago    Cataract    HAND SURGERY      amputation left index finger    PORTACATH PLACEMENT      SKIN CANCER EXCISION  09/30/2020    [facility]    SPINE SURGERY      VASECTOMY  1985 ?         Allergies:  Review of patient's allergies indicates:  No Known Allergies    Social/Family History:  Social History     Socioeconomic History    Marital status:    Tobacco Use    Smoking status: Former     Packs/day: 0.50     Years: 2.00     Pack years: 1.00     Types: Cigarettes    Smokeless tobacco: Never   Substance and Sexual Activity    Alcohol use: Not Currently     Alcohol/week: 0.0 standard drinks     Comment: rarely    Drug use: No    Sexual activity: Not Currently     Social Determinants of Health     Financial Resource Strain: Low Risk     Difficulty of Paying Living Expenses: Not hard at all   Food Insecurity: No Food Insecurity    Worried About Running Out of Food in the Last Year: Never true    Ran Out of Food in the Last Year: Never true   Transportation Needs: Unknown    Lack of Transportation (Medical): No    Lack of Transportation (Non-Medical): Patient refused   Physical Activity: Unknown    Days of Exercise per Week: Patient refused   Stress: Unknown    Feeling of Stress : Patient refused   Social Connections: Unknown    Frequency of Communication with Friends and Family: Patient refused    Frequency of Social Gatherings with Friends and Family: Patient refused    Active Member of Clubs or Organizations: Yes    Attends Club or Organization Meetings: Patient refused    Marital Status:    Housing Stability: Unknown    Unable to Pay for Housing in the Last Year: Patient refused    Unstable Housing in the Last Year: Patient refused  "    Family History   Problem Relation Age of Onset    Diabetes Father          ROS:    GEN: chills started this past Thursday, erythema and pain in left axilla, mailasie, fatigue  HEENT: normal with no HA's, sore throat, stiff neck, changes in vision  CV: normal with no CP mild pedal swelling for past 3-4 weeks  PULM: chronic stable SOB, cough, no hemoptysis, sputum or pleuritic pain  GI: normal with no abdominal pain, nausea, vomiting, constipation, diarrhea, melanotic stools, BRBPR, or hematemesis  : normal with no hematuria, dysuria  BREAST: normal with no mass, discharge, pain  SKIN: normal with no rash, erythema, bruising, or swelling        Medications:  Continuous Infusions:   lactated ringers 130 mL/hr at 10/01/22 0030     Scheduled Meds:   amLODIPine  10 mg Oral Daily    aspirin  81 mg Oral Daily    atorvastatin  20 mg Oral Daily    ceFEPime (MAXIPIME) IVPB  1 g Intravenous Q8H    enoxaparin  40 mg Subcutaneous Daily    fluticasone propionate  1 spray Each Nostril BID    losartan  100 mg Oral Daily    montelukast  10 mg Oral Daily    naproxen  250 mg Oral BID WM    pantoprazole  40 mg Oral Daily    vancomycin (VANCOCIN) IVPB  1,500 mg Intravenous Q18H     PRN Meds:acetaminophen, albuterol-ipratropium, benzonatate, dextrose 10%, glucagon (human recombinant), glucose, glucose, HYDROcodone-acetaminophen, insulin aspart U-100, melatonin, naloxone, ondansetron, polyethylene glycol, senna-docusate 8.6-50 mg, simethicone, tiZANidine, vancomycin - pharmacy to dose         Objective:       Physical Exam:    Vitals:  Blood pressure 100/64, pulse 105, temperature 98.2 °F (36.8 °C), temperature source Oral, resp. rate 18, height 5' 8" (1.727 m), weight 95.8 kg (211 lb 3.2 oz), SpO2 (!) 94 %.    GEN: no apparent distress, comfortable; AAOx3  HEAD: atraumatic and normocephalic  EYES: no pallor, no icterus, PERRLA  ENT: OMM, no pharyngeal erythema, external ears WNL; no nasal discharge; no thrush;    NECK: no masses, " thyroid normal, trachea midline, no LAD/LN's, supple  CV: RRR with no murmur; normal pulse; normal S1 and S2; no pedal edema; portacath on rght CW looks clean and clear  CHEST: Normal respiratory effort; chronic coarseness, wheeze bilaterally but only mild wheeze  ABDOM: nontender and nondistended; soft; normal bowel sounds; no rebound/guarding  MUSC/Skeletal: ROM normal; no crepitus; joints normal; no deformities or arthropathy  EXTREM: no clubbing, cyanosis, inflammation or swelling  SKIN: no rashes, lesions, ulcers, petechiae or subcutaneous nodules ; erythematous and painful to touch subcuatenous tissues under left arm/axilla  : no gibbs  NEURO: grossly intact; motor/sensory WNL; AAOx3; no tremors  PSYCH: normal mood, affect and behavior  LYMPH: normal cervical, supraclavicular, axillary and groin LN's    Lab Review:   Lab Results   Component Value Date    WBC 5.47 09/30/2022    HGB 13.0 (L) 09/30/2022    HCT 39.0 (L) 09/30/2022    MCV 89 09/30/2022     09/30/2022       CMP  Sodium   Date Value Ref Range Status   09/30/2022 137 136 - 145 mmol/L Final   04/25/2019 144 134 - 144 mmol/L      Potassium   Date Value Ref Range Status   09/30/2022 3.4 (L) 3.5 - 5.1 mmol/L Final     Chloride   Date Value Ref Range Status   09/30/2022 101 95 - 110 mmol/L Final   04/25/2019 107 98 - 110 mmol/L      CO2   Date Value Ref Range Status   09/30/2022 26 23 - 29 mmol/L Final     Glucose   Date Value Ref Range Status   09/30/2022 273 (H) 70 - 110 mg/dL Final   04/25/2019 177 (H) 70 - 99 mg/dL      BUN   Date Value Ref Range Status   09/30/2022 21 8 - 23 mg/dL Final     Creatinine   Date Value Ref Range Status   09/30/2022 1.3 0.5 - 1.4 mg/dL Final   04/25/2019 0.83 0.60 - 1.40 mg/dL      Calcium   Date Value Ref Range Status   09/30/2022 8.6 (L) 8.7 - 10.5 mg/dL Final     Total Protein   Date Value Ref Range Status   09/30/2022 6.5 6.0 - 8.4 g/dL Final     Albumin   Date Value Ref Range Status   09/30/2022 3.5 3.5 - 5.2  g/dL Final   04/25/2019 4.4 3.1 - 4.7 g/dL      Total Bilirubin   Date Value Ref Range Status   09/30/2022 1.2 (H) 0.1 - 1.0 mg/dL Final     Comment:     For infants and newborns, interpretation of results should be based  on gestational age, weight and in agreement with clinical  observations.    Premature Infant recommended reference ranges:  Up to 24 hours.............<8.0 mg/dL  Up to 48 hours............<12.0 mg/dL  3-5 days..................<15.0 mg/dL  6-29 days.................<15.0 mg/dL       Alkaline Phosphatase   Date Value Ref Range Status   09/30/2022 95 55 - 135 U/L Final     AST   Date Value Ref Range Status   09/30/2022 30 10 - 40 U/L Final     ALT   Date Value Ref Range Status   09/30/2022 24 10 - 44 U/L Final     Anion Gap   Date Value Ref Range Status   09/30/2022 10 8 - 16 mmol/L Final     eGFR if    Date Value Ref Range Status   07/21/2022 >60.0 >60 mL/min/1.73 m^2 Final     eGFR if non    Date Value Ref Range Status   07/21/2022 >60.0 >60 mL/min/1.73 m^2 Final     Comment:     Calculation used to obtain the estimated glomerular filtration  rate (eGFR) is the CKD-EPI equation.            Diagnostic Results:  US Soft Tissue Axilla, Left [[id number]] Collected: 09/30/22 1819   Order Status: Completed Updated: 09/30/22 1940   Narrative:     Ultrasound left axilla     CLINICAL DATA: Left axillary erythema and pain. Reported history of lymphoma.     FINDINGS: Sonographic assessment targeted to the left axilla was performed, with comparison evaluation of the right axilla.     Fatty soft tissues in the left axilla appear mildly edematous. No mass or lymphadenopathy is identified. There is no focal drainable fluid collection.     IMPRESSION:   1. Subcutaneous edema at the left axilla, with no mass, lymphadenopathy, or drainable fluid collection. Findings are nonspecific. Correlate for possible cellulitis.      CTA Chest Non-Coronary (PE Studies) [[id number]] Collected:  09/30/22 1723   Order Status: Completed Updated: 09/30/22 1759   Narrative:     CTA CHEST     HISTORY: Shortness of breath. Comparison to June 2021..     CMS MANDATED QUALITY DATA - CT RADIATION  436     All CT scans at this facility utilize dose modulation, iterative reconstruction, and/or weight based dosing when appropriate to reduce radiation dose to as low as reasonably achievable     FINDINGS: PE protocol was utilized.  Thin axial imaging through the chest was performed with 100 cc nonionic IV contrast, with sagittal and coronal reformatted images and 3-D reconstructions performed on an independent workstation, with images stored in the patient's permanent electronic medical record.     The pulmonary arteries are adequately opacified. Opacification of peripheral branches is heterogeneous.     The main pulmonary arteries and segmental branches are normal. Peripheral branches are grossly normal.     Heart size is upper normal. Multifocal coronary artery atherosclerotic calcification is noted. The thoracic aorta is normal in caliber. No pathologic mediastinal lymphadenopathy is identified.     Images at lung windows demonstrate mild interstitial prominence at the lung bases with mild interlobular septal thickening suggesting interstitial edema, new when compared to June 2021. There is superimposed minimal atelectasis or infiltrate at the left lung base. No effusion is identified.     IMPRESSION:       1. No evidence of pulmonary thromboembolic disease.   2. Interstitial prominence at both lung bases suggesting mild interstitial edema, with superimposed atelectasis or infiltrate at the left lung base.   3. Coronary artery atherosclerosis..      CT Head Without Contrast [[id number]] Collected: 09/30/22 1723   Order Status: Completed Updated: 09/30/22 1751   Narrative:     CT HEAD WITHOUT CONTRAST     CMS MANDATED QUALITY DATA - CT RADIATION  436     All CT scans at this facility utilize dose modulation, iterative  reconstruction, and/or weight based dosing when appropriate to reduce radiation dose to as low as reasonably achievable     Clinical data: Altered mental status, syncope versus seizure.     FINDINGS: Noninfusion images were obtained from the skull base to the vertex. There is no intracranial mass, hemorrhage, or midline shift. Ventricles and sulci are normal. There are no pathologic extra-axial fluid collections. There is no evidence of acute ischemic change or edema. Small circumscribed hypodensity along the inferior margin of the right basal ganglia could reflect chronic lacunar infarct or prominent perivascular space. Cerebellum and brainstem are normal.     The calvarium is intact.There is a 16 mm mucous retention cyst or polyp in the left maxillary sinus, with mucoperiosteal thickening in the left maxillary and left frontal sinuses. Moderate bilateral ethmoid opacification is noted.     IMPRESSION:     1. No acute intracranial abnormalities.   2. 9 mm circumscribed hypodensity at the inferior margin of the right basal ganglia is consistent with small chronic lacunar infarct or prominent perivascular space.   3. Sinus disease as above.      Assessment/Plan:     IMPRESSION:  (1) 67 y.o. male known to my oncology service with diagnosis of low grade NHL for which he has been on rituximab maintenance therapy. He presented to ED last night with redness and discomfort of the left axill. He has been admitted to the hopsitalist service with working diagnosis of cellulitis. I spoke to Dr Helm by phone last night. US of the axilla showed only subcutaneous edema at the left axilla, with no mass, lymphadenopathy, or drainable fluid collection.  CTA was negative for pulmonary emboli.      10/1/2022:  - on broad spectrum antibiotics IV  - ID consulted  - GenSurg consulted  - discussed with Dr Helm last night by phone and Dr Mojica in person today    (2) Low grade follicular NHL - originally diagnosed in 2012  - s/p 6 cycles  of bendamustine-rituximab through [facility]  - . He has been on rituximab maintenance every 8 weeks and IV IgG monthly.    (2) HTN     (3) Neuropathy     (4) GERD     (5) DM - on metformin per Dr Nunez     (6) Hypogammaglobulinemia - on Iv IgG monthly     (7) Steatosis of liver     (8) Degenerative disc disease of back     (9) Diverticular disease     (10) Chronic bronchiectasis/bronchiolitis with TONY - followed by Dr Veliz and Lucia Georges        Active Diagnoses:    Diagnosis Date Noted POA    PRINCIPAL PROBLEM:  Cellulitis [L03.90] 09/30/2022 Yes    Chronic obstructive pulmonary disease, unspecified COPD type [J44.9] 03/28/2022 Yes    Type 2 diabetes mellitus without complication, without long-term current use of insulin [E11.9] 06/23/2021 Yes    Skin lesion [L98.9] 06/23/2021 Yes    Essential hypertension [I10] 12/30/2019 Yes    Follicular lymphoma grade IIIa [C82.30] 12/04/2018 Yes      Problems Resolved During this Admission:           PLAN:   Agree with IV antibiotics for the cellulitis  Recommend consideration for ID consult given his chronic immunocompromised state  Recommend GenSurg evaluation for the axilla  Monitor labs  Will follow with you        Thank you for your consult.      Jefferson Ibarra MD  Hematology/Oncology  [facility]

## 2022-10-01 NOTE — ASSESSMENT & PLAN NOTE
Patient's FSGs are controlled on current medication regimen.  Last A1c reviewed-   Lab Results   Component Value Date    HGBA1C 6.7 (H) 05/23/2022     Most recent fingerstick glucose reviewed- No results for input(s): POCTGLUCOSE in the last 24 hours.  Current correctional scale  Medium  Maintain anti-hyperglycemic dose as follows-   Antihyperglycemics (From admission, onward)    Start     Stop Route Frequency Ordered    10/01/22 0046  insulin aspart U-100 pen 0-5 Units         -- SubQ Before meals & nightly PRN 10/01/22 0046        Hold Oral hypoglycemics while patient is in the hospital.

## 2022-10-01 NOTE — ASSESSMENT & PLAN NOTE
Continue home regimen  Was on intermittent antibiotics for H flu infection?  Hold prednisone given current infection  Follows with Dr. Veliz as an outpatient

## 2022-10-01 NOTE — PROGRESS NOTES
[facility]  Progress Note    Patient Name: Sivakumar Thacker  MRN: [id number]  Patient Class: OP- Observation   Admission Date: 9/30/2022  Length of Stay: 0 days  Attending Physician: John Mojica MD  Primary Care Provider: Barry Mcmahon MD        Subjective:     Principal Problem:Cellulitis        HPI:  Sivakumar Thacker is a 67 y.o. White male   With PMH of non-Hodgkin's lymphoma,  who presents with pain and swelling of left arm head and shoulder.  He presents emergency room with complaint of pain in the armpit .  He admits to fevers and chills recorded temperature 101° but he was afebrile in the emergency room.  He recently had a repeat PET scan which is negative by history for recurrence.  He receives his chemo which includes Rituxan and IVIG his last dose September 15th.  He has a chronic lung infection which she has been followed by Pulmonary and has been compliant with daily medication.  His cough is improving as well as sore throat.  He denies chest pain or shortness of breath no nausea vomiting no abdominal pain or dysuric symptoms.  He has a right clavicle access point which does not show any signs of infection.    Plan to admit continue IV antibiotics obtain blood cultures in consult Hematology.      Overview/Hospital Course:  No notes on file    Interval History:  No acute events overnight, he continues to have high fevers, lactic acid cleared, blood cultures pending.  Patient developed left-sided anterior chest wall tenderness that has been spreading from left to right since late Thursday night.  Fever since Friday morning.  No tenderness around his port site on his right side.  Appears to be moving towards his port site however.  Having diaphoresis with fevers.  Rigors and chills as well.  Night team consulted the patient's oncologist for recommendations.  We will also consult Infectious Disease.  Ongoing workup and evaluation will continue broad-spectrum  antibiotics at this time.    Review of Systems   All other systems reviewed and are negative.  Objective:     Vital Signs (Most Recent):  Temp: 97.5 °F (36.4 °C) (10/01/22 1134)  Pulse: 100 (10/01/22 1134)  Resp: 16 (10/01/22 1134)  BP: 120/74 (10/01/22 1134)  SpO2: (!) 94 % (10/01/22 1134)   Vital Signs (24h Range):  Temp:  [97.5 °F (36.4 °C)-103.1 °F (39.5 °C)] 97.5 °F (36.4 °C)  Pulse:  [] 100  Resp:  [16-31] 16  SpO2:  [92 %-98 %] 94 %  BP: (100-153)/(60-80) 120/74     Weight: 95.8 kg (211 lb 3.2 oz)  Body mass index is 32.11 kg/m².    Intake/Output Summary (Last 24 hours) at 10/1/2022 1232  Last data filed at 10/1/2022 0619  Gross per 24 hour   Intake 2550 ml   Output 500 ml   Net 2050 ml      Physical Exam  Vitals reviewed.   Constitutional:       Appearance: Normal appearance.   HENT:      Head: Normocephalic and atraumatic.      Nose: Nose normal.      Mouth/Throat:      Mouth: Mucous membranes are moist.   Eyes:      Pupils: Pupils are equal, round, and reactive to light.   Cardiovascular:      Rate and Rhythm: Regular rhythm. Tachycardia present.      Pulses: Normal pulses.      Heart sounds: Normal heart sounds. No murmur heard.    No friction rub. No gallop.   Pulmonary:      Effort: Pulmonary effort is normal. No respiratory distress.      Breath sounds: Normal breath sounds.   Abdominal:      General: Abdomen is flat. Bowel sounds are normal. There is no distension.      Palpations: Abdomen is soft.      Tenderness: There is no abdominal tenderness.   Musculoskeletal:         General: Swelling and tenderness present. Normal range of motion.      Cervical back: Normal range of motion and neck supple.      Right lower leg: Edema present.      Left lower leg: Edema present.      Comments: He has limited range of motion of his left shoulder due to pain in his anterior chest wall.  His left shoulder joint is nontender to palpation around the joint itself and there is no overlying erythema or warmth.   He has severe tenderness to palpation of his left pectoralis and anterior chest wall.  No palpable lymphadenopathy however thorough exam was very difficult due to the patient's pain on palpation.   Skin:     General: Skin is warm and dry.      Comments: Erythema and bruising over the left anterior chest wall bordering the axilla, erythema spreading from left to right without a definitive border.   Neurological:      General: No focal deficit present.      Mental Status: He is alert and oriented to person, place, and time.   Psychiatric:         Mood and Affect: Mood normal.         Behavior: Behavior normal.         Thought Content: Thought content normal.         Judgment: Judgment normal.       Significant Labs: All pertinent labs within the past 24 hours have been reviewed.    Significant Imaging: I have reviewed all pertinent imaging results/findings within the past 24 hours.      Assessment/Plan:      * Cellulitis Left Axilla  Severe cellulitis with systemic signs and symptoms of infection  Difficult to ascertain the entry point of the infection  On imaging review there is no significant lymphadenopathy so less likely lymphadenitis  Continuing broad antibiotics  Consult Infectious Disease given that he is immunocompromised  Follow blood cultures closely given that he has port in place and also is having rigors and chills with high fevers suggestive of bloodstream infection  Ice packs to the area for pain management  Address pain control  Would avoid ibuprofen for now continue Tylenol p.r.n. for fevers  Follow vitals closely, recheck lactic acid of worsening.  IV fluid hydration.      Chronic obstructive pulmonary disease, unspecified COPD type  Continue home regimen  Was on intermittent antibiotics for H flu infection?  Hold prednisone given current infection  Follows with Dr. Veliz as an outpatient      Type 2 diabetes mellitus without complication, without long-term current use of insulin  Patient's FSGs are  controlled on current medication regimen.  Last A1c reviewed-   Lab Results   Component Value Date    HGBA1C 6.7 (H) 05/23/2022     Most recent fingerstick glucose reviewed- No results for input(s): POCTGLUCOSE in the last 24 hours.  Current correctional scale  Medium  Maintain anti-hyperglycemic dose as follows-   Antihyperglycemics (From admission, onward)    Start     Stop Route Frequency Ordered    10/01/22 0046  insulin aspart U-100 pen 0-5 Units         -- SubQ Before meals & nightly PRN 10/01/22 0046        Hold Oral hypoglycemics while patient is in the hospital.    Essential hypertension  Continue amlodipine  Hold losartan for now      Hypogammaglobulinemia  Receives IVIG infusions with Oncology  Appreciate any oncology recommendations surrounding his immunology needs given his acute severe infection  Last IVIg infusion 9/16      Follicular lymphoma grade IIIa  Patient with history of non-Hodgkin's lymphoma diagnosed in 2012 status post treatment at [facility] and remission.  He has been on maintenance therapy with rituximab every 8 weeks with Oncology for several years now.  Last Rituxan dose 9/15/22  Night team discussed with Dr Fajardo. Doesn't seem to be related to his malignancy or treatments at this time  Appreciate Oncology recs      VTE Risk Mitigation (From admission, onward)         Ordered     enoxaparin injection 40 mg  Daily         10/01/22 0046     IP VTE HIGH RISK PATIENT  Once         10/01/22 0046     Place sequential compression device  Until discontinued         10/01/22 0046                Discharge Planning   GREGORY:      Code Status: Full Code   Is the patient medically ready for discharge?:     Reason for patient still in hospital (select all that apply): Treatment                     John Mojica MD  Department of Hospital Medicine   [facility]

## 2022-10-01 NOTE — SUBJECTIVE & OBJECTIVE
Interval History:  No acute events overnight, he continues to have high fevers, lactic acid cleared, blood cultures pending.  Patient developed left-sided anterior chest wall tenderness that has been spreading from left to right since late Thursday night.  Fever since Friday morning.  No tenderness around his port site on his right side.  Appears to be moving towards his port site however.  Having diaphoresis with fevers.  Rigors and chills as well.  Night team consulted the patient's oncologist for recommendations.  We will also consult Infectious Disease.  Ongoing workup and evaluation will continue broad-spectrum antibiotics at this time.    Review of Systems   All other systems reviewed and are negative.  Objective:     Vital Signs (Most Recent):  Temp: 97.5 °F (36.4 °C) (10/01/22 1134)  Pulse: 100 (10/01/22 1134)  Resp: 16 (10/01/22 1134)  BP: 120/74 (10/01/22 1134)  SpO2: (!) 94 % (10/01/22 1134)   Vital Signs (24h Range):  Temp:  [97.5 °F (36.4 °C)-103.1 °F (39.5 °C)] 97.5 °F (36.4 °C)  Pulse:  [] 100  Resp:  [16-31] 16  SpO2:  [92 %-98 %] 94 %  BP: (100-153)/(60-80) 120/74     Weight: 95.8 kg (211 lb 3.2 oz)  Body mass index is 32.11 kg/m².    Intake/Output Summary (Last 24 hours) at 10/1/2022 1232  Last data filed at 10/1/2022 0619  Gross per 24 hour   Intake 2550 ml   Output 500 ml   Net 2050 ml      Physical Exam  Vitals reviewed.   Constitutional:       Appearance: Normal appearance.   HENT:      Head: Normocephalic and atraumatic.      Nose: Nose normal.      Mouth/Throat:      Mouth: Mucous membranes are moist.   Eyes:      Pupils: Pupils are equal, round, and reactive to light.   Cardiovascular:      Rate and Rhythm: Regular rhythm. Tachycardia present.      Pulses: Normal pulses.      Heart sounds: Normal heart sounds. No murmur heard.    No friction rub. No gallop.   Pulmonary:      Effort: Pulmonary effort is normal. No respiratory distress.      Breath sounds: Normal breath sounds.    Abdominal:      General: Abdomen is flat. Bowel sounds are normal. There is no distension.      Palpations: Abdomen is soft.      Tenderness: There is no abdominal tenderness.   Musculoskeletal:         General: Swelling and tenderness present. Normal range of motion.      Cervical back: Normal range of motion and neck supple.      Right lower leg: Edema present.      Left lower leg: Edema present.      Comments: He has limited range of motion of his left shoulder due to pain in his anterior chest wall.  His left shoulder joint is nontender to palpation around the joint itself and there is no overlying erythema or warmth.  He has severe tenderness to palpation of his left pectoralis and anterior chest wall.  No palpable lymphadenopathy however thorough exam was very difficult due to the patient's pain on palpation.   Skin:     General: Skin is warm and dry.      Comments: Erythema and bruising over the left anterior chest wall bordering the axilla, erythema spreading from left to right without a definitive border.   Neurological:      General: No focal deficit present.      Mental Status: He is alert and oriented to person, place, and time.   Psychiatric:         Mood and Affect: Mood normal.         Behavior: Behavior normal.         Thought Content: Thought content normal.         Judgment: Judgment normal.       Significant Labs: All pertinent labs within the past 24 hours have been reviewed.    Significant Imaging: I have reviewed all pertinent imaging results/findings within the past 24 hours.

## 2022-10-01 NOTE — ASSESSMENT & PLAN NOTE
Severe cellulitis with systemic signs and symptoms of infection  Difficult to ascertain the entry point of the infection  On imaging review there is no significant lymphadenopathy so less likely lymphadenitis  Continuing broad antibiotics  Consult Infectious Disease given that he is immunocompromised  Follow blood cultures closely given that he has port in place and also is having rigors and chills with high fevers suggestive of bloodstream infection  Ice packs to the area for pain management  Address pain control  Would avoid ibuprofen for now continue Tylenol p.r.n. for fevers  Follow vitals closely, recheck lactic acid of worsening.  IV fluid hydration.

## 2022-10-01 NOTE — PROGRESS NOTES
VANCOMYCIN PHARMACOKINETIC NOTE:  Vancomycin Day # 1    Objective/Assessment:    Diagnosis/Indication for Vancomycin: Cellulitis     67 y.o., male; Actual Body Weight = 97.5 kg (215 lb).    The patient has the following labs:  10/1/2022 Estimated Creatinine Clearance: 63.2 mL/min (based on SCr of 1.3 mg/dL). Lab Results   Component Value Date    BUN 21 09/30/2022     Lab Results   Component Value Date    WBC 5.47 09/30/2022          Plan:  Adjust vancomycin dose and/or frequency based on the patient's actual weight and renal function:  Initiate Vancomycin 1500 mg IV every 18 hours.  Orders have been entered into patient's chart.    Vancomycin dose = 15.4 mg/kg actual body weight    Vancomycin trough level has been ordered for 10/3 at 02:00..    Pharmacy will manage vancomycin therapy, monitor serum vancomycin levels, monitor renal function and adjust regimen as necessary.    Thank you for allowing us to participate in this patient's care.     Dora Merritt 10/1/2022 12:31 AM  Department of Pharmacy  Ext [phone]

## 2022-10-01 NOTE — H&P
[facility]   History & Physical   Patient Name: Sivakumar Thacker  MRN: [id number]  Admission Date: 9/30/2022  2:30 PM  Attending Physician: Modesto Helm MD  Primary Care Provider: Barry Mcmahon MD  Face-to-Face encounter date: 09/30/2022    Patient information was obtained from patient, past medical records, ER physician, and ER records.     HISTORY OF PRESENT ILLNESS:     Sivakumar Thacker is a 67 y.o. White male   With PMH of non-Hodgkin's lymphoma,  who presents with pain and swelling of left arm head and shoulder.  He presents emergency room with complaint of pain in the armpit .  He admits to fevers and chills recorded temperature 101° but he was afebrile in the emergency room.  He recently had a repeat PET scan which is negative by history for recurrence.  He receives his chemo which includes Rituxan and IVIG his last dose September 15th.  He has a chronic lung infection which she has been followed by Pulmonary and has been compliant with daily medication.  His cough is improving as well as sore throat.  He denies chest pain or shortness of breath no nausea vomiting no abdominal pain or dysuric symptoms.  He has a right clavicle access point which does not show any signs of infection.    Plan to admit continue IV antibiotics obtain blood cultures in consult Hematology.    REVIEW OF SYSTEMS:     All systems reviewed and are negative except as noted per above.    PAST MEDICAL HISTORY:     Past Medical History:   Diagnosis Date    Cancer     lymphoma currently on chemo    Diabetes mellitus, type 2     Encounter for antineoplastic chemotherapy 04/01/2020    Hypertension     Neuropathy     Reflux esophagitis        PAST SURGICAL HISTORY:     Past Surgical History:   Procedure Laterality Date    COLONOSCOPY  2018    EYE SURGERY  Approximately 3 years ago    Cataract    HAND SURGERY      amputation left index finger    PORTACATH PLACEMENT      SKIN CANCER EXCISION  09/30/2020    [facility]     SPINE SURGERY      VASECTOMY  1985 ?       ALLERGIES:   Patient has no known allergies.    FAMILY HISTORY:     Family History   Problem Relation Age of Onset    Diabetes Father        SOCIAL HISTORY:     Social History     Tobacco Use    Smoking status: Former     Packs/day: 0.50     Years: 2.00     Pack years: 1.00     Types: Cigarettes    Smokeless tobacco: Never   Substance Use Topics    Alcohol use: Not Currently     Alcohol/week: 0.0 standard drinks     Comment: rarely        Social History     Substance and Sexual Activity   Sexual Activity Not Currently        HOME MEDICATIONS:     Prior to Admission medications    Medication Sig Start Date End Date Taking? Authorizing Provider   albuterol-ipratropium (DUO-NEB) 2.5 mg-0.5 mg/3 mL nebulizer solution Take 3 mLs by nebulization every 6 (six) hours as needed for Wheezing or Shortness of Breath. Rescue 4/20/20 9/30/22 Yes Lucia Georges, DAMI   amLODIPine (NORVASC) 10 MG tablet Take 1 tablet (10 mg total) by mouth once daily. 7/1/22  Yes Barry Mcmahon MD   amoxicillin-clavulanate 875-125mg (AUGMENTIN) 875-125 mg per tablet Take 1 tablet by mouth once daily at 6am. 8/15/22  Yes Raffaele Veliz MD   aspirin (ECOTRIN) 81 MG EC tablet Take 81 mg by mouth once daily.   Yes Historical Provider   atorvastatin (LIPITOR) 20 MG tablet Take 1 tablet (20 mg total) by mouth once daily. 6/23/21  Yes Barry Mcmahon MD   benzonatate (TESSALON) 200 MG capsule Take 1 capsule (200 mg total) by mouth 3 (three) times daily as needed for Cough. 9/12/22  Yes Raffaele Veliz MD   fluticasone propionate (FLONASE) 50 mcg/actuation nasal spray 1 spray (50 mcg total) by Each Nostril route 2 (two) times daily. 7/1/22  Yes Barry Mcmahon MD   fluticasone-umeclidin-vilanter (TRELEGY ELLIPTA) 200-62.5-25 mcg inhaler Inhale 1 puff into the lungs once daily. 9/12/22  Yes Raffaele Veliz MD   gabapentin (NEURONTIN) 300 MG capsule Take 1 capsule (300 mg total) by mouth 2 (two) times daily.  7/1/22  Yes Barry Mcmahon MD   losartan (COZAAR) 100 MG tablet Take 1 tablet (100 mg total) by mouth once daily. 7/1/22  Yes Barry Mcmahon MD   metFORMIN (GLUCOPHAGE) 500 MG tablet metformin 500 mg tablet  Take 2 tablets twice a day by oral route with meals for 90 days.  Patient taking differently: Take 1,000 mg by mouth 2 (two) times daily with meals. metformin 500 mg tablet  Take 2 tablets twice a day by oral route with meals for 90 days. 7/1/22  Yes Barry Mcmahon MD   montelukast (SINGULAIR) 10 mg tablet Take 1 tablet (10 mg total) by mouth nightly as needed. 7/1/22  Yes Barry Mcmahon MD   MULTIVITAMIN ORAL Take 1 tablet by mouth once daily.   Yes Historical Provider   naproxen (NAPROSYN) 500 MG tablet Take 1 tablet (500 mg total) by mouth 2 (two) times daily as needed. 7/1/22  Yes Barry Mcmahon MD   omeprazole (PRILOSEC) 40 MG capsule Take 1 capsule (40 mg total) by mouth once daily. 7/1/22  Yes Barry Mcmahon MD   tiZANidine (ZANAFLEX) 4 MG tablet Take 1 tablet (4 mg total) by mouth daily as needed. 7/1/22  Yes Barry Mcmahon MD   blood sugar diagnostic (FREESTYLE LITE STRIPS MISC) FreeStyle Lite Strips    Historical Provider   blood sugar diagnostic (FREESTYLE LITE STRIPS) Strp Check blood sugar 2 (two) times daily as needed. 6/23/21   Barry Mcmahon MD   blood sugar diagnostic Strp  6/23/21   Historical Provider   mucus clearing device (ACAPELLA, FLUTTER) 1 Device by Misc.(Non-Drug; Combo Route) route 2 (two) times daily. 8/17/21   Lucia Georges NP   predniSONE (DELTASONE) 20 MG tablet One daily for 3 days and repeat for flare of lung symptoms as intructed 8/15/22   Raffaele Veliz MD   promethazine-dextromethorphan (PROMETHAZINE-DM) 6.25-15 mg/5 mL Syrp Take 5 mLs by mouth every 6 (six) hours as needed (cough). 7/1/22   Barry Mcmahon MD   sulfamethoxazole-trimethoprim 800-160mg (BACTRIM DS) 800-160 mg Tab Take 1 tablet by mouth 2 (two) times daily.  Patient taking differently: Take  "1 tablet by mouth 2 (two) times daily. NEW Rx - NOT YET STARTED NEW Rx - NOT YET STARTED 9/30/22   Kaylee Sullivan, DAMI       PHYSICAL EXAM:     BP (!) 143/72   Pulse 109   Temp 98 °F (36.7 °C) (Oral)   Resp 20   Ht 5' 8" (1.727 m)   Wt 97.5 kg (215 lb)   SpO2 (!) 92%   BMI 32.69 kg/m²     Gen:  Mild distress  HEENT:  Eyes - no pallor  External ears with no lesions  Nares patent  Mouth - lips chapped  CV: RRR  Lungs: CTA B/L  Abd: +BS, soft, NT, ND  Ext: no atrophy or edema  Skin:  Erythema tender palpation in the left axilla some lymphangitic spread due to tenderness unable to be identified axillary nodes  Neuro: grossly intact  Psych: pleasant     LABS AND IMAGING:     Labs Reviewed   CBC W/ AUTO DIFFERENTIAL - Abnormal; Notable for the following components:       Result Value    RBC 4.37 (*)     Hemoglobin 13.0 (*)     Hematocrit 39.0 (*)     Lymph % 11.0 (*)     All other components within normal limits   COMPREHENSIVE METABOLIC PANEL - Abnormal; Notable for the following components:    Potassium 3.4 (*)     Glucose 273 (*)     Calcium 8.6 (*)     Total Bilirubin 1.2 (*)     All other components within normal limits   B-TYPE NATRIURETIC PEPTIDE - Abnormal; Notable for the following components:     (*)     All other components within normal limits   LACTIC ACID, PLASMA - Abnormal; Notable for the following components:    Lactate (Lactic Acid) 2.4 (*)     All other components within normal limits    Narrative:     LA critical result(s) repeated. Called and verbal readback obtained   from Gloria Gonzalez RN/ED by JAMAL 09/30/2022 18:09   POCT GLUCOSE - Abnormal; Notable for the following components:    POC Glucose 264 (*)     All other components within normal limits   CULTURE, BLOOD   CULTURE, BLOOD   TROPONIN I   SARS-COV-2 RNA AMPLIFICATION, QUAL   URINALYSIS, REFLEX TO URINE CULTURE       US Soft Tissue Axilla, Left   Final Result      CTA Chest Non-Coronary (PE Studies)   Final Result      CT Head " Without Contrast   Final Result      X-Ray Chest AP Portable   Final Result          ASSESSMENT & PLAN:   Sivakumar Thacker is a 67 y.o. male admitted for axillary cellulitis but could not rule out new adenopathy from lymphoma.    Active Hospital Problems    Diagnosis  POA    *Cellulitis [L03.90]  Yes    Chronic obstructive pulmonary disease, unspecified COPD type [J44.9]  Yes    Type 2 diabetes mellitus without complication, without long-term current use of insulin [E11.9]  Yes    Skin lesion [L98.9]  Yes    Essential hypertension [I10]  Yes    Follicular lymphoma grade IIIa [C82.30]  Yes      Resolved Hospital Problems   No resolved problems to display.        Plan  Admit to observation  Continue IV antibiotics  Obtain blood cultures  Consult Hematology-Oncology  Consider biopsy if needed    Modesto Helm MD  [facility] Hospitalist  09/30/2022

## 2022-10-01 NOTE — CONSULTS
Consulted for nutrition education, pt unavailable - other disciplinary teams in room. Will f/u to provide nutrition education + handout.

## 2022-10-01 NOTE — ASSESSMENT & PLAN NOTE
Receives IVIG infusions with Oncology  Appreciate any oncology recommendations surrounding his immunology needs given his acute severe infection  Last IVIg infusion 9/16

## 2022-10-01 NOTE — PLAN OF CARE
formerly [facility]  Initial Discharge Assessment       Primary Care Provider: Barry Mcmahon MD    Admission Diagnosis: Cellulitis of chest wall [L03.313]    Admission Date: 9/30/2022  Expected Discharge Date:     Discharge Barriers Identified: None    Initial assessment done at bedside with Pt and family. Pt currently lives alone and will have family provide transportation when medically ready for discharge.     Payor: MEDICARE / Plan: MEDICARE PART A & B / Product Type: Government /     Extended Emergency Contact Information  Primary Emergency Contact: GAVIN DECKER  Mobile Phone: [phone]  Relation: Daughter  Preferred language: English   needed? No    Discharge Plan A: Home  Discharge Plan B: Home      [id number] - [place], [place] - [address]  [address]  [place] [address]  Phone: [phone] Fax: [phone]    [facility] - [place], [place] - [address]  [address]  [place] [address]  Phone: [phone] Fax: [phone]    [facility] - [place], [place] - [address] [place] [address]  [address] [facility] [address]  Phone: [phone] Fax: [phone]      Initial Assessment (most recent)       Adult Discharge Assessment - 10/01/22 1403          Discharge Assessment    Assessment Type Discharge Planning Assessment     Confirmed/corrected address, phone number and insurance Yes     Confirmed Demographics Correct on Facesheet     Source of Information patient     When was your last doctors appointment? --   Unknown 3 months ago    Does patient/caregiver understand observation status Yes     Reason For Admission Cellulitis left Axilla     Lives With alone     Facility Arrived From: Home     Do you expect to return to your current living situation? Yes     Do you have help at home or someone to help you manage your care at home? No     Prior to hospitilization cognitive  status: No Deficits     Current cognitive status: No Deficits     Walking or Climbing Stairs Difficulty none     Dressing/Bathing Difficulty none     Equipment Currently Used at Home nebulizer     Readmission within 30 days? No     Patient currently being followed by outpatient case management? No     Do you currently have service(s) that help you manage your care at home? No     Do you take prescription medications? Yes     Do you have prescription coverage? Yes     Coverage      Do you have any problems affording any of your prescribed medications? No     Is the patient taking medications as prescribed? yes     Who is going to help you get home at discharge? family     How do you get to doctors appointments? car, drives self     Are you on dialysis? No     Do you take coumadin? No     Discharge Plan A Home     Discharge Plan B Home     DME Needed Upon Discharge  none     Discharge Plan discussed with: Patient     Discharge Barriers Identified None

## 2022-10-01 NOTE — HPI
Sivakumar Thacker is a 67 y.o. White male   With PMH of non-Hodgkin's lymphoma,  who presents with pain and swelling of left arm head and shoulder.  He presents emergency room with complaint of pain in the armpit .  He admits to fevers and chills recorded temperature 101° but he was afebrile in the emergency room.  He recently had a repeat PET scan which is negative by history for recurrence.  He receives his chemo which includes Rituxan and IVIG his last dose September 15th.  He has a chronic lung infection which she has been followed by Pulmonary and has been compliant with daily medication.  His cough is improving as well as sore throat.  He denies chest pain or shortness of breath no nausea vomiting no abdominal pain or dysuric symptoms.  He has a right clavicle access point which does not show any signs of infection.    Plan to admit continue IV antibiotics obtain blood cultures in consult Hematology.

## 2022-10-01 NOTE — ASSESSMENT & PLAN NOTE
Patient with history of non-Hodgkin's lymphoma diagnosed in 2012 status post treatment at [facility] and remission.  He has been on maintenance therapy with rituximab every 8 weeks with Oncology for several years now.  Last Rituxan dose 9/15/22  Night team discussed with Dr Fajardo. Doesn't seem to be related to his malignancy or treatments at this time  Appreciate Oncology recs

## 2022-10-01 NOTE — PLAN OF CARE
10/01/22 1403   PARKER Message   Medicare Outpatient and Observation Notification regarding financial responsibility Given to patient/caregiver;Explained to patient/caregiver;Signed/date by patient/caregiver   Date PARKER was signed 10/01/22   Time PARKER was signed [id number]

## 2022-10-02 ENCOUNTER — ANESTHESIA (OUTPATIENT)
Dept: INTENSIVE CARE | Facility: HOSPITAL | Age: 67
DRG: 853 | End: 2022-10-02
Payer: MEDICARE

## 2022-10-02 ENCOUNTER — ANESTHESIA EVENT (OUTPATIENT)
Dept: INTENSIVE CARE | Facility: HOSPITAL | Age: 67
DRG: 853 | End: 2022-10-02
Payer: MEDICARE

## 2022-10-02 PROBLEM — D61.818 PANCYTOPENIA: Status: ACTIVE | Noted: 2022-10-02

## 2022-10-02 LAB
ABO + RH BLD: NORMAL
ALBUMIN SERPL BCP-MCNC: 2.8 G/DL (ref 3.5–5.2)
ALLENS TEST: ABNORMAL
ALLENS TEST: ABNORMAL
ALP SERPL-CCNC: 124 U/L (ref 55–135)
ALT SERPL W/O P-5'-P-CCNC: 60 U/L (ref 10–44)
ANION GAP SERPL CALC-SCNC: 5 MMOL/L (ref 8–16)
APTT PPP: 42.9 SEC (ref 23.3–35.1)
AST SERPL-CCNC: 49 U/L (ref 10–40)
BASOPHILS NFR BLD: 0 % (ref 0–1.9)
BASOPHILS NFR BLD: 0 % (ref 0–1.9)
BILIRUB DIRECT SERPL-MCNC: 0.4 MG/DL (ref 0.1–0.3)
BILIRUB SERPL-MCNC: 0.8 MG/DL (ref 0.1–1)
BLD GP AB SCN CELLS X3 SERPL QL: NORMAL
BUN SERPL-MCNC: 16 MG/DL (ref 8–23)
CALCIUM SERPL-MCNC: 8.3 MG/DL (ref 8.7–10.5)
CHLORIDE SERPL-SCNC: 105 MMOL/L (ref 95–110)
CO2 SERPL-SCNC: 27 MMOL/L (ref 23–29)
CREAT SERPL-MCNC: 0.9 MG/DL (ref 0.5–1.4)
CRP SERPL-MCNC: >38 MG/DL
DELSYS: ABNORMAL
DELSYS: ABNORMAL
DIFFERENTIAL METHOD: ABNORMAL
DIFFERENTIAL METHOD: ABNORMAL
EOSINOPHIL NFR BLD: 2 % (ref 0–8)
EOSINOPHIL NFR BLD: 4 % (ref 0–8)
EP: 8
ERYTHROCYTE [DISTWIDTH] IN BLOOD BY AUTOMATED COUNT: 13.4 % (ref 11.5–14.5)
ERYTHROCYTE [DISTWIDTH] IN BLOOD BY AUTOMATED COUNT: 13.4 % (ref 11.5–14.5)
ERYTHROCYTE [SEDIMENTATION RATE] IN BLOOD BY WESTERGREN METHOD: 26 MM/H
EST. GFR  (NO RACE VARIABLE): >60 ML/MIN/1.73 M^2
FIBRINOGEN PPP-MCNC: 854 MG/DL (ref 194–545)
FIO2: 30
GLUCOSE SERPL-MCNC: 160 MG/DL (ref 70–110)
GLUCOSE SERPL-MCNC: 215 MG/DL (ref 70–110)
GLUCOSE SERPL-MCNC: 248 MG/DL (ref 70–110)
HCO3 UR-SCNC: 27.7 MMOL/L (ref 24–28)
HCO3 UR-SCNC: 28.1 MMOL/L (ref 24–28)
HCT VFR BLD AUTO: 39.7 % (ref 40–54)
HCT VFR BLD AUTO: 41.8 % (ref 40–54)
HCT VFR BLD CALC: 37 %PCV (ref 36–54)
HGB BLD-MCNC: 12.5 G/DL (ref 14–18)
HGB BLD-MCNC: 13.3 G/DL (ref 14–18)
IMM GRANULOCYTES # BLD AUTO: ABNORMAL K/UL (ref 0–0.04)
IMM GRANULOCYTES # BLD AUTO: ABNORMAL K/UL (ref 0–0.04)
IMM GRANULOCYTES NFR BLD AUTO: ABNORMAL % (ref 0–0.5)
IMM GRANULOCYTES NFR BLD AUTO: ABNORMAL % (ref 0–0.5)
INR PPP: 1.4
IP: 20
LACTATE SERPL-SCNC: 1.4 MMOL/L (ref 0.5–1.9)
LYMPHOCYTES NFR BLD: 29 % (ref 18–48)
LYMPHOCYTES NFR BLD: 30 % (ref 18–48)
MCH RBC QN AUTO: 29.1 PG (ref 27–31)
MCH RBC QN AUTO: 29.2 PG (ref 27–31)
MCHC RBC AUTO-ENTMCNC: 31.5 G/DL (ref 32–36)
MCHC RBC AUTO-ENTMCNC: 31.8 G/DL (ref 32–36)
MCV RBC AUTO: 92 FL (ref 82–98)
MCV RBC AUTO: 93 FL (ref 82–98)
MIN VOL: 20
MODE: ABNORMAL
MODE: ABNORMAL
MONOCYTES NFR BLD: 4 % (ref 4–15)
MONOCYTES NFR BLD: 5 % (ref 4–15)
NEUTROPHILS NFR BLD: 55 % (ref 38–73)
NEUTROPHILS NFR BLD: 60 % (ref 38–73)
NEUTS BAND NFR BLD MANUAL: 4 %
NEUTS BAND NFR BLD MANUAL: 7 %
NRBC BLD-RTO: 0 /100 WBC
NRBC BLD-RTO: 0 /100 WBC
PCO2 BLDA: 47.9 MMHG (ref 35–45)
PCO2 BLDA: 65.8 MMHG (ref 35–45)
PH SMN: 7.23 [PH] (ref 7.35–7.45)
PH SMN: 7.38 [PH] (ref 7.35–7.45)
PLATELET # BLD AUTO: 69 K/UL (ref 150–450)
PLATELET # BLD AUTO: 79 K/UL (ref 150–450)
PLATELET BLD QL SMEAR: ABNORMAL
PLATELET BLD QL SMEAR: ABNORMAL
PMV BLD AUTO: 10 FL (ref 9.2–12.9)
PMV BLD AUTO: 10 FL (ref 9.2–12.9)
PO2 BLDA: 109 MMHG (ref 80–100)
PO2 BLDA: 93 MMHG (ref 80–100)
POC BE: 0 MMOL/L
POC BE: 3 MMOL/L
POC IONIZED CALCIUM: 1.26 MMOL/L (ref 1.06–1.42)
POC SATURATED O2: 97 % (ref 95–100)
POC SATURATED O2: 97 % (ref 95–100)
POC TCO2: 30 MMOL/L (ref 23–27)
POC TCO2: 30 MMOL/L (ref 23–27)
POTASSIUM BLD-SCNC: 4.5 MMOL/L (ref 3.5–5.1)
POTASSIUM SERPL-SCNC: 4 MMOL/L (ref 3.5–5.1)
PROT SERPL-MCNC: 6.6 G/DL (ref 6–8.4)
PROTHROMBIN TIME: 16.6 SEC (ref 11.4–13.7)
RBC # BLD AUTO: 4.29 M/UL (ref 4.6–6.2)
RBC # BLD AUTO: 4.55 M/UL (ref 4.6–6.2)
SAMPLE: ABNORMAL
SAMPLE: ABNORMAL
SITE: ABNORMAL
SITE: ABNORMAL
SODIUM BLD-SCNC: 138 MMOL/L (ref 136–145)
SODIUM SERPL-SCNC: 137 MMOL/L (ref 136–145)
SP02: 10
SPONT RATE: 26
WBC # BLD AUTO: 1.22 K/UL (ref 3.9–12.7)
WBC # BLD AUTO: 1.4 K/UL (ref 3.9–12.7)

## 2022-10-02 PROCEDURE — 80048 BASIC METABOLIC PNL TOTAL CA: CPT | Performed by: INTERNAL MEDICINE

## 2022-10-02 PROCEDURE — 85014 HEMATOCRIT: CPT

## 2022-10-02 PROCEDURE — 95819 EEG AWAKE AND ASLEEP: CPT

## 2022-10-02 PROCEDURE — 30000890 LABCORP MISCELLANEOUS TEST: Mod: 91 | Performed by: STUDENT IN AN ORGANIZED HEALTH CARE EDUCATION/TRAINING PROGRAM

## 2022-10-02 PROCEDURE — 85610 PROTHROMBIN TIME: CPT | Performed by: INTERNAL MEDICINE

## 2022-10-02 PROCEDURE — 99900035 HC TECH TIME PER 15 MIN (STAT)

## 2022-10-02 PROCEDURE — 85027 COMPLETE CBC AUTOMATED: CPT | Mod: 91 | Performed by: INTERNAL MEDICINE

## 2022-10-02 PROCEDURE — 37799 UNLISTED PX VASCULAR SURGERY: CPT

## 2022-10-02 PROCEDURE — 30000890 LABCORP MISCELLANEOUS TEST: Performed by: INTERNAL MEDICINE

## 2022-10-02 PROCEDURE — 63600175 PHARM REV CODE 636 W HCPCS: Performed by: STUDENT IN AN ORGANIZED HEALTH CARE EDUCATION/TRAINING PROGRAM

## 2022-10-02 PROCEDURE — 36620 INSERTION CATHETER ARTERY: CPT

## 2022-10-02 PROCEDURE — 82330 ASSAY OF CALCIUM: CPT

## 2022-10-02 PROCEDURE — 85730 THROMBOPLASTIN TIME PARTIAL: CPT | Performed by: INTERNAL MEDICINE

## 2022-10-02 PROCEDURE — 86480 TB TEST CELL IMMUN MEASURE: CPT | Performed by: STUDENT IN AN ORGANIZED HEALTH CARE EDUCATION/TRAINING PROGRAM

## 2022-10-02 PROCEDURE — 36415 COLL VENOUS BLD VENIPUNCTURE: CPT | Performed by: STUDENT IN AN ORGANIZED HEALTH CARE EDUCATION/TRAINING PROGRAM

## 2022-10-02 PROCEDURE — 99223 1ST HOSP IP/OBS HIGH 75: CPT | Mod: ,,, | Performed by: STUDENT IN AN ORGANIZED HEALTH CARE EDUCATION/TRAINING PROGRAM

## 2022-10-02 PROCEDURE — 85060 BLOOD SMEAR INTERPRETATION: CPT | Performed by: INTERNAL MEDICINE

## 2022-10-02 PROCEDURE — 80076 HEPATIC FUNCTION PANEL: CPT | Performed by: STUDENT IN AN ORGANIZED HEALTH CARE EDUCATION/TRAINING PROGRAM

## 2022-10-02 PROCEDURE — 86022 PLATELET ANTIBODIES: CPT | Mod: 59 | Performed by: INTERNAL MEDICINE

## 2022-10-02 PROCEDURE — 25000242 PHARM REV CODE 250 ALT 637 W/ HCPCS: Performed by: STUDENT IN AN ORGANIZED HEALTH CARE EDUCATION/TRAINING PROGRAM

## 2022-10-02 PROCEDURE — 85007 BL SMEAR W/DIFF WBC COUNT: CPT | Mod: 91 | Performed by: INTERNAL MEDICINE

## 2022-10-02 PROCEDURE — 36600 WITHDRAWAL OF ARTERIAL BLOOD: CPT

## 2022-10-02 PROCEDURE — 86635 COCCIDIOIDES ANTIBODY: CPT | Mod: 91 | Performed by: STUDENT IN AN ORGANIZED HEALTH CARE EDUCATION/TRAINING PROGRAM

## 2022-10-02 PROCEDURE — 63600175 PHARM REV CODE 636 W HCPCS: Performed by: INTERNAL MEDICINE

## 2022-10-02 PROCEDURE — 86022 PLATELET ANTIBODIES: CPT | Performed by: INTERNAL MEDICINE

## 2022-10-02 PROCEDURE — 84132 ASSAY OF SERUM POTASSIUM: CPT

## 2022-10-02 PROCEDURE — 94660 CPAP INITIATION&MGMT: CPT

## 2022-10-02 PROCEDURE — 85027 COMPLETE CBC AUTOMATED: CPT | Performed by: INTERNAL MEDICINE

## 2022-10-02 PROCEDURE — 84295 ASSAY OF SERUM SODIUM: CPT

## 2022-10-02 PROCEDURE — 20000000 HC ICU ROOM

## 2022-10-02 PROCEDURE — 83605 ASSAY OF LACTIC ACID: CPT | Performed by: STUDENT IN AN ORGANIZED HEALTH CARE EDUCATION/TRAINING PROGRAM

## 2022-10-02 PROCEDURE — 87449 NOS EACH ORGANISM AG IA: CPT | Mod: 91 | Performed by: STUDENT IN AN ORGANIZED HEALTH CARE EDUCATION/TRAINING PROGRAM

## 2022-10-02 PROCEDURE — 99900031 HC PATIENT EDUCATION (STAT)

## 2022-10-02 PROCEDURE — 85007 BL SMEAR W/DIFF WBC COUNT: CPT | Performed by: INTERNAL MEDICINE

## 2022-10-02 PROCEDURE — C1751 CATH, INF, PER/CENT/MIDLINE: HCPCS

## 2022-10-02 PROCEDURE — 25000003 PHARM REV CODE 250: Performed by: STUDENT IN AN ORGANIZED HEALTH CARE EDUCATION/TRAINING PROGRAM

## 2022-10-02 PROCEDURE — 25000003 PHARM REV CODE 250: Performed by: INTERNAL MEDICINE

## 2022-10-02 PROCEDURE — 86850 RBC ANTIBODY SCREEN: CPT | Performed by: SURGERY

## 2022-10-02 PROCEDURE — 86140 C-REACTIVE PROTEIN: CPT | Performed by: STUDENT IN AN ORGANIZED HEALTH CARE EDUCATION/TRAINING PROGRAM

## 2022-10-02 PROCEDURE — 94761 N-INVAS EAR/PLS OXIMETRY MLT: CPT

## 2022-10-02 PROCEDURE — 86022 PLATELET ANTIBODIES: CPT | Mod: 91 | Performed by: INTERNAL MEDICINE

## 2022-10-02 PROCEDURE — 85384 FIBRINOGEN ACTIVITY: CPT | Performed by: INTERNAL MEDICINE

## 2022-10-02 PROCEDURE — 27000221 HC OXYGEN, UP TO 24 HOURS

## 2022-10-02 PROCEDURE — 25500020 PHARM REV CODE 255: Performed by: STUDENT IN AN ORGANIZED HEALTH CARE EDUCATION/TRAINING PROGRAM

## 2022-10-02 PROCEDURE — 36415 COLL VENOUS BLD VENIPUNCTURE: CPT | Performed by: INTERNAL MEDICINE

## 2022-10-02 PROCEDURE — 99223 PR INITIAL HOSPITAL CARE,LEVL III: ICD-10-PCS | Mod: ,,, | Performed by: STUDENT IN AN ORGANIZED HEALTH CARE EDUCATION/TRAINING PROGRAM

## 2022-10-02 PROCEDURE — 82803 BLOOD GASES ANY COMBINATION: CPT

## 2022-10-02 PROCEDURE — 94799 UNLISTED PULMONARY SVC/PX: CPT

## 2022-10-02 RX ORDER — METHYLPREDNISOLONE SOD SUCC 125 MG
60 VIAL (EA) INJECTION EVERY 6 HOURS
Status: DISCONTINUED | OUTPATIENT
Start: 2022-10-02 | End: 2022-10-02

## 2022-10-02 RX ORDER — DIPHENHYDRAMINE HYDROCHLORIDE 50 MG/ML
50 INJECTION INTRAMUSCULAR; INTRAVENOUS ONCE
Status: COMPLETED | OUTPATIENT
Start: 2022-10-02 | End: 2022-10-02

## 2022-10-02 RX ORDER — IPRATROPIUM BROMIDE AND ALBUTEROL SULFATE 2.5; .5 MG/3ML; MG/3ML
3 SOLUTION RESPIRATORY (INHALATION) EVERY 6 HOURS PRN
Status: DISCONTINUED | OUTPATIENT
Start: 2022-10-02 | End: 2022-10-02

## 2022-10-02 RX ORDER — OXYCODONE AND ACETAMINOPHEN 10; 325 MG/1; MG/1
1 TABLET ORAL EVERY 4 HOURS PRN
Status: DISCONTINUED | OUTPATIENT
Start: 2022-10-02 | End: 2022-10-08 | Stop reason: HOSPADM

## 2022-10-02 RX ORDER — CLINDAMYCIN PHOSPHATE 900 MG/50ML
900 INJECTION, SOLUTION INTRAVENOUS
Status: DISCONTINUED | OUTPATIENT
Start: 2022-10-02 | End: 2022-10-02

## 2022-10-02 RX ORDER — MEROPENEM AND SODIUM CHLORIDE 1 G/50ML
1 INJECTION, SOLUTION INTRAVENOUS
Status: DISCONTINUED | OUTPATIENT
Start: 2022-10-02 | End: 2022-10-05

## 2022-10-02 RX ORDER — METHYLPREDNISOLONE SOD SUCC 125 MG
60 VIAL (EA) INJECTION EVERY 6 HOURS
Status: COMPLETED | OUTPATIENT
Start: 2022-10-02 | End: 2022-10-03

## 2022-10-02 RX ORDER — FUROSEMIDE 10 MG/ML
40 INJECTION INTRAMUSCULAR; INTRAVENOUS ONCE
Status: COMPLETED | OUTPATIENT
Start: 2022-10-02 | End: 2022-10-02

## 2022-10-02 RX ORDER — MORPHINE SULFATE 2 MG/ML
2 INJECTION, SOLUTION INTRAMUSCULAR; INTRAVENOUS EVERY 4 HOURS PRN
Status: DISCONTINUED | OUTPATIENT
Start: 2022-10-02 | End: 2022-10-08 | Stop reason: HOSPADM

## 2022-10-02 RX ORDER — FUROSEMIDE 10 MG/ML
INJECTION INTRAMUSCULAR; INTRAVENOUS
Status: DISPENSED
Start: 2022-10-02 | End: 2022-10-03

## 2022-10-02 RX ORDER — FUROSEMIDE 10 MG/ML
40 INJECTION INTRAMUSCULAR; INTRAVENOUS ONCE
Status: COMPLETED | OUTPATIENT
Start: 2022-10-03 | End: 2022-10-03

## 2022-10-02 RX ORDER — DIPHENHYDRAMINE HYDROCHLORIDE 50 MG/ML
25 INJECTION INTRAMUSCULAR; INTRAVENOUS EVERY 6 HOURS
Status: DISPENSED | OUTPATIENT
Start: 2022-10-02 | End: 2022-10-03

## 2022-10-02 RX ORDER — LIDOCAINE HYDROCHLORIDE 20 MG/ML
INJECTION, SOLUTION EPIDURAL; INFILTRATION; INTRACAUDAL; PERINEURAL
Status: DISPENSED
Start: 2022-10-02 | End: 2022-10-03

## 2022-10-02 RX ADMIN — MICAFUNGIN SODIUM 150 MG: 100 INJECTION, POWDER, LYOPHILIZED, FOR SOLUTION INTRAVENOUS at 08:10

## 2022-10-02 RX ADMIN — ASPIRIN 81 MG: 81 TABLET, COATED ORAL at 09:10

## 2022-10-02 RX ADMIN — CEFEPIME HYDROCHLORIDE 2 G: 2 INJECTION, SOLUTION INTRAVENOUS at 01:10

## 2022-10-02 RX ADMIN — ENOXAPARIN SODIUM 40 MG: 40 INJECTION SUBCUTANEOUS at 06:10

## 2022-10-02 RX ADMIN — HYDROCODONE BITARTRATE AND ACETAMINOPHEN 2 TABLET: 5; 325 TABLET ORAL at 02:10

## 2022-10-02 RX ADMIN — AMLODIPINE BESYLATE 10 MG: 5 TABLET ORAL at 09:10

## 2022-10-02 RX ADMIN — FLUTICASONE PROPIONATE 50 MCG: 50 SPRAY, METERED NASAL at 09:10

## 2022-10-02 RX ADMIN — DIPHENHYDRAMINE HYDROCHLORIDE 50 MG: 50 INJECTION, SOLUTION INTRAMUSCULAR; INTRAVENOUS at 01:10

## 2022-10-02 RX ADMIN — DIPHENHYDRAMINE HYDROCHLORIDE 25 MG: 50 INJECTION, SOLUTION INTRAMUSCULAR; INTRAVENOUS at 06:10

## 2022-10-02 RX ADMIN — MONTELUKAST 10 MG: 10 TABLET, FILM COATED ORAL at 09:10

## 2022-10-02 RX ADMIN — IPRATROPIUM BROMIDE AND ALBUTEROL SULFATE 3 ML: .5; 3 SOLUTION RESPIRATORY (INHALATION) at 05:10

## 2022-10-02 RX ADMIN — METHYLPREDNISOLONE SODIUM SUCCINATE 60 MG: 125 INJECTION, POWDER, FOR SOLUTION INTRAMUSCULAR; INTRAVENOUS at 09:10

## 2022-10-02 RX ADMIN — IOHEXOL 100 ML: 350 INJECTION, SOLUTION INTRAVENOUS at 12:10

## 2022-10-02 RX ADMIN — FUROSEMIDE 40 MG: 10 INJECTION, SOLUTION INTRAVENOUS at 06:10

## 2022-10-02 RX ADMIN — MORPHINE SULFATE 2 MG: 2 INJECTION, SOLUTION INTRAMUSCULAR; INTRAVENOUS at 08:10

## 2022-10-02 RX ADMIN — MORPHINE SULFATE 2 MG: 2 INJECTION, SOLUTION INTRAMUSCULAR; INTRAVENOUS at 01:10

## 2022-10-02 RX ADMIN — METHYLPREDNISOLONE SODIUM SUCCINATE 60 MG: 125 INJECTION, POWDER, FOR SOLUTION INTRAMUSCULAR; INTRAVENOUS at 01:10

## 2022-10-02 RX ADMIN — HYDROCODONE BITARTRATE AND ACETAMINOPHEN 2 TABLET: 5; 325 TABLET ORAL at 11:10

## 2022-10-02 RX ADMIN — INSULIN ASPART 1 UNITS: 100 INJECTION, SOLUTION INTRAVENOUS; SUBCUTANEOUS at 11:10

## 2022-10-02 RX ADMIN — DAPTOMYCIN 500 MG: 500 INJECTION, POWDER, LYOPHILIZED, FOR SOLUTION INTRAVENOUS at 07:10

## 2022-10-02 RX ADMIN — PANTOPRAZOLE SODIUM 40 MG: 40 TABLET, DELAYED RELEASE ORAL at 05:10

## 2022-10-02 RX ADMIN — VANCOMYCIN HYDROCHLORIDE 1500 MG: 1.5 INJECTION, POWDER, LYOPHILIZED, FOR SOLUTION INTRAVENOUS at 09:10

## 2022-10-02 RX ADMIN — MEROPENEM AND SODIUM CHLORIDE 1 G: 1 INJECTION, SOLUTION INTRAVENOUS at 07:10

## 2022-10-02 NOTE — ASSESSMENT & PLAN NOTE
Severe cellulitis with systemic signs and symptoms of infection, no high risk exposures to water or other exposures that would place him at risk for Vibrio or other atypical infectious etiologies. Suspect severe cellulitis is due to immunodeficiency.   - check CRP  - Imaging of Head and Neck for venous phlebitis given significant refractory pain  - adjust pain control.   - Adjust abx to Vacomycin, Cefepime, Add Clindamycin  - recheck lactic acid  - Low threshold to move to higher level of care  - Follow cultures.   - Appreciate ID recs  - Seems unlikely his Port precipitated this, but will watch closely for developing port infection  - Ice packs to the area for pain management  - tylenol prn for fever  - careful with IV hydration as he is swelling

## 2022-10-02 NOTE — CONSULTS
Formerly [facility], later [facility]  General Surgery  Consult Note    Inpatient consult to General Surgery  Consult performed by: Franco Venegas III, MD  Consult ordered by: John Mojica MD  Reason for consult: Extensive erythema across the neck and chest, possible soft tissue infection      Subjective:     Chief Complaint/Reason for Admission:  Extensive painful erythema across the anterior chest and neck that seemed to have started from the left axilla    History of Present Illness:  Patient is 67-year-old male with past medical history of follicular non-Hodgkin's lymphoma diagnosed in 2012.  He had Port-A-Cath placed and received 6 cycles of chemotherapy at that time.  He has been on maintenance rituximab every 8 weeks as well as IVIG every 4 weeks for immune deficiency disorder.  He was admitted over the weekend with a spreading erythema across the upper chest and neck that originated in the left axilla suspicious for cellulitis, soft tissue infection.  He presented with fevers, chills and the worsening painful erythema.  CT a chest ruled out pulmonary embolism.  Ultrasound of the axilla showed edema of the soft tissue without drainable collection.  Ultrasound of the venous system showed no evidence of DVT.  The surface area has spread out to the upper belly, entire anterior surface of the chest and anterior neck.  The erythematous skin is very tender to touch.  There is no open draining sites.  The site in the left axilla is more red but not necessarily fluctuant.  The Port-A-Cath site is involved with the erythema but no fluctuance appreciated.  The Port-A-Cath is interesting because it is tunneled from the left subclavian over to the right chest. He is now complaining of pain with swallowing. No complaints of breathing difficulty.     CTA head and neck unremarkable today.     Patient now increasingly neutropenic with white count 1.4 today.  Platelets down to 79.  Did not run any fever today.  Was initially on  vancomycin but has been stopped.        No current facility-administered medications on file prior to encounter.     Current Outpatient Medications on File Prior to Encounter   Medication Sig    albuterol-ipratropium (DUO-NEB) 2.5 mg-0.5 mg/3 mL nebulizer solution Take 3 mLs by nebulization every 6 (six) hours as needed for Wheezing or Shortness of Breath. Rescue    amLODIPine (NORVASC) 10 MG tablet Take 1 tablet (10 mg total) by mouth once daily.    amoxicillin-clavulanate 875-125mg (AUGMENTIN) 875-125 mg per tablet Take 1 tablet by mouth once daily at 6am.    aspirin (ECOTRIN) 81 MG EC tablet Take 81 mg by mouth once daily.    atorvastatin (LIPITOR) 20 MG tablet Take 1 tablet (20 mg total) by mouth once daily.    benzonatate (TESSALON) 200 MG capsule Take 1 capsule (200 mg total) by mouth 3 (three) times daily as needed for Cough.    fluticasone propionate (FLONASE) 50 mcg/actuation nasal spray 1 spray (50 mcg total) by Each Nostril route 2 (two) times daily.    fluticasone-umeclidin-vilanter (TRELEGY ELLIPTA) 200-62.5-25 mcg inhaler Inhale 1 puff into the lungs once daily.    gabapentin (NEURONTIN) 300 MG capsule Take 1 capsule (300 mg total) by mouth 2 (two) times daily.    losartan (COZAAR) 100 MG tablet Take 1 tablet (100 mg total) by mouth once daily.    metFORMIN (GLUCOPHAGE) 500 MG tablet metformin 500 mg tablet  Take 2 tablets twice a day by oral route with meals for 90 days. (Patient taking differently: Take 1,000 mg by mouth 2 (two) times daily with meals. metformin 500 mg tablet  Take 2 tablets twice a day by oral route with meals for 90 days.)    montelukast (SINGULAIR) 10 mg tablet Take 1 tablet (10 mg total) by mouth nightly as needed.    MULTIVITAMIN ORAL Take 1 tablet by mouth once daily.    naproxen (NAPROSYN) 500 MG tablet Take 1 tablet (500 mg total) by mouth 2 (two) times daily as needed.    omeprazole (PRILOSEC) 40 MG capsule Take 1 capsule (40 mg total) by mouth once daily.    tiZANidine  (ZANAFLEX) 4 MG tablet Take 1 tablet (4 mg total) by mouth daily as needed.    blood sugar diagnostic (FREESTYLE LITE STRIPS MISC) FreeStyle Lite Strips    blood sugar diagnostic (FREESTYLE LITE STRIPS) Strp Check blood sugar 2 (two) times daily as needed.    blood sugar diagnostic Strp     mucus clearing device (ACAPELLA, FLUTTER) 1 Device by Misc.(Non-Drug; Combo Route) route 2 (two) times daily.    predniSONE (DELTASONE) 20 MG tablet One daily for 3 days and repeat for flare of lung symptoms as intructed    promethazine-dextromethorphan (PROMETHAZINE-DM) 6.25-15 mg/5 mL Syrp Take 5 mLs by mouth every 6 (six) hours as needed (cough).    sulfamethoxazole-trimethoprim 800-160mg (BACTRIM DS) 800-160 mg Tab Take 1 tablet by mouth 2 (two) times daily. (Patient taking differently: Take 1 tablet by mouth 2 (two) times daily. NEW Rx - NOT YET STARTED NEW Rx - NOT YET STARTED)       Review of patient's allergies indicates:  No Known Allergies    Past Medical History:   Diagnosis Date    Cancer     lymphoma currently on chemo    Diabetes mellitus, type 2     Encounter for antineoplastic chemotherapy 04/01/2020    Hypertension     Neuropathy     Reflux esophagitis      Past Surgical History:   Procedure Laterality Date    COLONOSCOPY  2018    EYE SURGERY  Approximately 3 years ago    Cataract    HAND SURGERY      amputation left index finger    PORTACATH PLACEMENT      SKIN CANCER EXCISION  09/30/2020    [facility]    SPINE SURGERY      VASECTOMY  1985 ?     Family History       Problem Relation (Age of Onset)    Diabetes Father          Tobacco Use    Smoking status: Former     Packs/day: 0.50     Years: 2.00     Pack years: 1.00     Types: Cigarettes    Smokeless tobacco: Never   Substance and Sexual Activity    Alcohol use: Not Currently     Alcohol/week: 0.0 standard drinks     Comment: rarely    Drug use: No    Sexual activity: Not Currently     Review of Systems   Constitutional:  Positive for chills, fatigue and fever.    Skin:  Positive for color change and rash.   Objective:     Vital Signs (Most Recent):  Temp: 98.5 °F (36.9 °C) (10/02/22 1621)  Pulse: 107 (10/02/22 1500)  Resp: 19 (10/02/22 1500)  BP: (!) 160/71 (10/02/22 1500)  SpO2: 99 % (10/02/22 1500)   Vital Signs (24h Range):  Temp:  [97.8 °F (36.6 °C)-98.5 °F (36.9 °C)] 98.5 °F (36.9 °C)  Pulse:  [] 107  Resp:  [16-28] 19  SpO2:  [94 %-99 %] 99 %  BP: (128-160)/(71-85) 160/71     Weight: 95.8 kg (211 lb 3.2 oz)  Body mass index is 32.11 kg/m².      Intake/Output Summary (Last 24 hours) at 10/2/2022 1703  Last data filed at 10/2/2022 1352  Gross per 24 hour   Intake 800 ml   Output 2900 ml   Net -2100 ml       Physical Exam  Vitals reviewed.   Constitutional:       General: He is not in acute distress.     Appearance: He is well-developed. He is ill-appearing.      Comments: Somewhat lethargic after pain medication administration.    Pulmonary:      Effort: Pulmonary effort is normal. No tachypnea, bradypnea, accessory muscle usage or respiratory distress.   Chest:          Comments: Very tender erythematous rash across the entire anterior chest and neck.  Agree that it appears consistent with cellulitis.  The left axilla has an area of brighter erythema with blanching.  Also very tender to touch but not definitely fluctuant.  Lymphadenopathy:      Cervical: No cervical adenopathy.      Upper Body:      Right upper body: No axillary adenopathy.      Left upper body: No axillary adenopathy.   Skin:     Findings: Petechiae (around the ankles) present.   Neurological:      Mental Status: He is lethargic.   Psychiatric:         Behavior: Behavior is cooperative.       Significant Labs:  CBC:   Recent Labs   Lab 10/02/22  0516   WBC 1.40*   RBC 4.29*   HGB 12.5*   HCT 39.7*   PLT 79*   MCV 93   MCH 29.1   MCHC 31.5*     CMP:   Recent Labs   Lab 10/02/22  0516   *   CALCIUM 8.3*      K 4.0   CO2 27      BUN 16   CREATININE 0.9       Significant  Diagnostics:  I have reviewed all pertinent imaging results/findings within the past 24 hours.    Assessment/Plan:     Active Diagnoses:    Diagnosis Date Noted POA    PRINCIPAL PROBLEM:  Cellulitis Left Axilla [L03.90] 09/30/2022 Yes    Pancytopenia [D61.818] 10/02/2022 Unknown    Chronic obstructive pulmonary disease, unspecified COPD type [J44.9] 03/28/2022 Yes    Type 2 diabetes mellitus without complication, without long-term current use of insulin [E11.9] 06/23/2021 Yes    Skin lesion [L98.9] 06/23/2021 Yes    Essential hypertension [I10] 12/30/2019 Yes    Hypogammaglobulinemia [D80.1] 12/13/2019 Yes    Follicular lymphoma grade IIIa [C82.30] 12/04/2018 Yes      Problems Resolved During this Admission:   -difficult case.  My initial thoughts are to perform incision and drainage of the left axillary site, remove the Port-A-Cath and also take skin biopsy.  I had long discussion with family.  I had long discussion with ID and his attending.  Plan on surgery tomorrow.  Thank you for your consult.     Franco Venegas III, MD  General Surgery  [facility]

## 2022-10-02 NOTE — PROGRESS NOTES
[facility] Medicine  Progress Note    Patient Name: Sivakumar Thacker  MRN: [id number]  Patient Class: IP- Inpatient   Admission Date: 9/30/2022  Length of Stay: 1 days  Attending Physician: John Mojica MD  Primary Care Provider: Barry Mcmahon MD        Subjective:     Principal Problem:Cellulitis        HPI:  Sivakumar Thacker is a 67 y.o. White male   With PMH of non-Hodgkin's lymphoma,  who presents with pain and swelling of left arm head and shoulder.  He presents emergency room with complaint of pain in the armpit .  He admits to fevers and chills recorded temperature 101° but he was afebrile in the emergency room.  He recently had a repeat PET scan which is negative by history for recurrence.  He receives his chemo which includes Rituxan and IVIG his last dose September 15th.  He has a chronic lung infection which she has been followed by Pulmonary and has been compliant with daily medication.  His cough is improving as well as sore throat.  He denies chest pain or shortness of breath no nausea vomiting no abdominal pain or dysuric symptoms.  He has a right clavicle access point which does not show any signs of infection.    Plan to admit continue IV antibiotics obtain blood cultures in consult Hematology.      Overview/Hospital Course:  No notes on file    Interval History:  The patient has been afebrile since broad-spectrum antibiotics were initiated.  He has worsening spreading of his cellulitis across his chest wall.  He has significant tenderness associated with the cellulitis.  He has persistent headache and neck pain.  He denies any neurological deficits at this time.  He has not had any nausea or vomiting. He has some petechiae of his lower extremities.     Review of Systems   All other systems reviewed and are negative.  Objective:     Vital Signs (Most Recent):  Temp: 97.8 °F (36.6 °C) (10/02/22 0705)  Pulse: 96 (10/02/22 0705)  Resp: (!) 22 (10/02/22 0705)  BP: 133/72  (10/02/22 0705)  SpO2: 95 % (10/02/22 0705)   Vital Signs (24h Range):  Temp:  [97.5 °F (36.4 °C)-98.4 °F (36.9 °C)] 97.8 °F (36.6 °C)  Pulse:  [] 96  Resp:  [16-28] 22  SpO2:  [93 %-96 %] 95 %  BP: (115-155)/(72-85) 133/72     Weight: 95.8 kg (211 lb 3.2 oz)  Body mass index is 32.11 kg/m².    Intake/Output Summary (Last 24 hours) at 10/2/2022 1028  Last data filed at 10/2/2022 0715  Gross per 24 hour   Intake 800 ml   Output 2200 ml   Net -1400 ml        Physical Exam  Vitals reviewed.   Constitutional:       Appearance: Normal appearance.   HENT:      Head: Normocephalic and atraumatic.      Nose: Nose normal.      Mouth/Throat:      Mouth: Mucous membranes are moist.   Eyes:      Pupils: Pupils are equal, round, and reactive to light.   Cardiovascular:      Rate and Rhythm: Regular rhythm. Tachycardia present.      Pulses: Normal pulses.      Heart sounds: Normal heart sounds. No murmur heard.    No friction rub. No gallop.   Pulmonary:      Effort: Pulmonary effort is normal. No respiratory distress.      Breath sounds: Normal breath sounds.   Abdominal:      General: Abdomen is flat. Bowel sounds are normal. There is no distension.      Palpations: Abdomen is soft.      Tenderness: There is no abdominal tenderness.   Musculoskeletal:         General: Swelling and tenderness present. Normal range of motion.      Cervical back: Normal range of motion and neck supple.      Right lower leg: Edema present.      Left lower leg: Edema present.      Comments: He has limited range of motion of his left shoulder due to pain in his anterior chest wall.  His left shoulder joint is nontender to palpation around the joint itself and there is no overlying erythema or warmth.  He has severe tenderness to palpation of his left pectoralis and anterior chest wall.  No palpable lymphadenopathy however thorough exam was very difficult due to the patient's pain on palpation.   Skin:     General: Skin is warm and dry.       Comments: Erythema and bruising over the left anterior chest wall bordering the axilla, erythema spreading from left to right without a definitive border. Worsening today    Petichiae noted around his ankles.    Neurological:      General: No focal deficit present.      Mental Status: He is alert and oriented to person, place, and time.   Psychiatric:         Mood and Affect: Mood normal.         Behavior: Behavior normal.         Thought Content: Thought content normal.         Judgment: Judgment normal.       Significant Labs: All pertinent labs within the past 24 hours have been reviewed.    Significant Imaging: I have reviewed all pertinent imaging results/findings within the past 24 hours.      Assessment/Plan:      * Cellulitis Left Axilla  Severe cellulitis with systemic signs and symptoms of infection, no high risk exposures to water or other exposures that would place him at risk for Vibrio or other atypical infectious etiologies. Suspect severe cellulitis is due to immunodeficiency.   - check CRP  - Imaging of Head and Neck for venous phlebitis given significant refractory pain  - adjust pain control.   - Adjust abx to Vacomycin, Cefepime, Add Clindamycin  - recheck lactic acid  - Low threshold to move to higher level of care  - Follow cultures.   - Appreciate ID recs  - Seems unlikely his Port precipitated this, but will watch closely for developing port infection  - Ice packs to the area for pain management  - tylenol prn for fever  - careful with IV hydration as he is swelling      Pancytopenia  Due to severe infection  - follow platelets closely  - , follow daily  - Trend Hgb  - Appreciate Hem / Onc recs  - Hopefully counts will improve as infection is treated      Chronic obstructive pulmonary disease, unspecified COPD type  Continue home regimen  Was on intermittent antibiotics for H flu infection?  Hold prednisone given current infection  Follows with Dr. Veliz as an outpatient      Type 2  diabetes mellitus without complication, without long-term current use of insulin  Patient's FSGs are controlled on current medication regimen.  Last A1c reviewed-   Lab Results   Component Value Date    HGBA1C 6.7 (H) 05/23/2022     Most recent fingerstick glucose reviewed- No results for input(s): POCTGLUCOSE in the last 24 hours.  Current correctional scale  Medium  Maintain anti-hyperglycemic dose as follows-   Antihyperglycemics (From admission, onward)    Start     Stop Route Frequency Ordered    10/01/22 0046  insulin aspart U-100 pen 0-5 Units         -- SubQ Before meals & nightly PRN 10/01/22 0046        Hold Oral hypoglycemics while patient is in the hospital.    Essential hypertension  Continue amlodipine  Hold losartan for now      Hypogammaglobulinemia  Receives IVIG infusions with Oncology  Appreciate any oncology recommendations surrounding his immunology needs given his acute severe infection  Would he benefit from IVIgG for acute infection?   Last IVIg infusion 9/16      Follicular lymphoma grade IIIa  Patient with history of non-Hodgkin's lymphoma diagnosed in 2012 status post treatment at [facility] and remission.  He has been on maintenance therapy with rituximab every 8 weeks with Oncology for several years now.  Last Rituxan dose 9/15/22  Night team discussed with Dr Fajardo. Doesn't seem to be related to his malignancy or treatments at this time  Appreciate Oncology recs  Further investigation regarding whether this could be rituxan related? Time course does not appear so        VTE Risk Mitigation (From admission, onward)         Ordered     enoxaparin injection 40 mg  Daily         10/01/22 0046     IP VTE HIGH RISK PATIENT  Once         10/01/22 0046     Place sequential compression device  Until discontinued         10/01/22 0046                Discharge Planning   GREGORY: 10/2/2022     Code Status: Full Code   Is the patient medically ready for discharge?:     Reason for patient still in  hospital (select all that apply): Treatment  Discharge Plan A: Home                  John Mojica MD  Department of Hospital Medicine   Formerly [facility], 1928–1983

## 2022-10-02 NOTE — ASSESSMENT & PLAN NOTE
Patient with history of non-Hodgkin's lymphoma diagnosed in 2012 status post treatment at [facility] and remission.  He has been on maintenance therapy with rituximab every 8 weeks with Oncology for several years now.  Last Rituxan dose 9/15/22  Night team discussed with Dr Fajardo. Doesn't seem to be related to his malignancy or treatments at this time  Appreciate Oncology recs  Further investigation regarding whether this could be rituxan related? Time course does not appear so

## 2022-10-02 NOTE — SUBJECTIVE & OBJECTIVE
Interval History:  The patient has been afebrile since broad-spectrum antibiotics were initiated.  He has worsening spreading of his cellulitis across his chest wall.  He has significant tenderness associated with the cellulitis.  He has persistent headache and neck pain.  He denies any neurological deficits at this time.  He has not had any nausea or vomiting. He has some petechiae of his lower extremities.     Review of Systems   All other systems reviewed and are negative.  Objective:     Vital Signs (Most Recent):  Temp: 97.8 °F (36.6 °C) (10/02/22 0705)  Pulse: 96 (10/02/22 0705)  Resp: (!) 22 (10/02/22 0705)  BP: 133/72 (10/02/22 0705)  SpO2: 95 % (10/02/22 0705)   Vital Signs (24h Range):  Temp:  [97.5 °F (36.4 °C)-98.4 °F (36.9 °C)] 97.8 °F (36.6 °C)  Pulse:  [] 96  Resp:  [16-28] 22  SpO2:  [93 %-96 %] 95 %  BP: (115-155)/(72-85) 133/72     Weight: 95.8 kg (211 lb 3.2 oz)  Body mass index is 32.11 kg/m².    Intake/Output Summary (Last 24 hours) at 10/2/2022 1028  Last data filed at 10/2/2022 0715  Gross per 24 hour   Intake 800 ml   Output 2200 ml   Net -1400 ml        Physical Exam  Vitals reviewed.   Constitutional:       Appearance: Normal appearance.   HENT:      Head: Normocephalic and atraumatic.      Nose: Nose normal.      Mouth/Throat:      Mouth: Mucous membranes are moist.   Eyes:      Pupils: Pupils are equal, round, and reactive to light.   Cardiovascular:      Rate and Rhythm: Regular rhythm. Tachycardia present.      Pulses: Normal pulses.      Heart sounds: Normal heart sounds. No murmur heard.    No friction rub. No gallop.   Pulmonary:      Effort: Pulmonary effort is normal. No respiratory distress.      Breath sounds: Normal breath sounds.   Abdominal:      General: Abdomen is flat. Bowel sounds are normal. There is no distension.      Palpations: Abdomen is soft.      Tenderness: There is no abdominal tenderness.   Musculoskeletal:         General: Swelling and tenderness present.  Normal range of motion.      Cervical back: Normal range of motion and neck supple.      Right lower leg: Edema present.      Left lower leg: Edema present.      Comments: He has limited range of motion of his left shoulder due to pain in his anterior chest wall.  His left shoulder joint is nontender to palpation around the joint itself and there is no overlying erythema or warmth.  He has severe tenderness to palpation of his left pectoralis and anterior chest wall.  No palpable lymphadenopathy however thorough exam was very difficult due to the patient's pain on palpation.   Skin:     General: Skin is warm and dry.      Comments: Erythema and bruising over the left anterior chest wall bordering the axilla, erythema spreading from left to right without a definitive border. Worsening today    Petichiae noted around his ankles.    Neurological:      General: No focal deficit present.      Mental Status: He is alert and oriented to person, place, and time.   Psychiatric:         Mood and Affect: Mood normal.         Behavior: Behavior normal.         Thought Content: Thought content normal.         Judgment: Judgment normal.       Significant Labs: All pertinent labs within the past 24 hours have been reviewed.    Significant Imaging: I have reviewed all pertinent imaging results/findings within the past 24 hours.

## 2022-10-02 NOTE — CLINICAL REVIEW
Transferred to ICU  Plan for I&D and port removal tomorrow  Start Meropenem 1g IV q8h  Micafungin 150mg IV daily  Continue Daptomycin 500mg IV daily   Stop Cefepime and Clindamycin IV    D/w Hospitalist

## 2022-10-02 NOTE — CONSULTS
Consult Note  Infectious Disease    Reason for Consult:  Severe lymphadenitis; L axilla cellulitis     HPI: Sivakumar Thacker is a 67 y.o. male with past medical history of hypertension, diabetes with lower extremity neuropathy, and low-grade non-Hodgkin's lymphoma on rituximab maintenance therapy every 2 months (last 9/15) and monthly IVIG (last 9/16). Presented on 09/30 for worsening redness and discomfort on his left axilla, which progressed to his neck and chest, with associated fever of 103 and chills at home.  He also complains of severe headache, nausea, no vomit, new onset bilateral lower extremity edema for about a month, no pain, and persistent nonproductive cough due to history of chronic bronchiectasis for which he has been on Augmentin and azithromycin 3 times a week, the latter was discontinued at last follow-up.  Patient contacted Heme-Onc before coming to the hospital, was prescribed Bactrim, given worsening redness and severity of pain patient came to the emergency room.  He denies abdominal pain, dysuria increased urinary frequency, or change in bowel movements.      In the ER, tachycardic 109, T-max 102.9°  Labs on admission white count 5.4, bands 6%, H&H 13/39, platelet count 168   Hypokalemia 3.4, creatinine 1.3   Glucose 273, normal LFTs   Lactic acid 1.8-->2.1, procalcitonin 17.2 high  UA with 1+ protein, 4+ glucose, negative for infection   Chest x-ray negative for acute pathology   CT head without contrast negative for acute pathology.  9 mm circumscribed hypodensity at the inferior margin of the right basal ganglia consistent with small chronic lacunar infarct or prominent perivascular space.  Sinus disease as above.  CTA chest negative for PE.  Interstitial prominence at both lung bases suggesting mild interstitial edema, with superimposed atelectasis or infiltrates at the left lung base.  Soft tissue ultrasound left axilla, revealed subcutaneous edema at the left axilla, no mass,  lymphadenopathy, or drainable fluid collection.  Correlate for possible cellulitis.    Patient admitted for sepsis likely secondary to left axilla spreading to neck and chest cellulitis.    Empirically started on vancomycin and cefepime IV.  Hospital course complicated by worsening leukopenia 1.4, and spreading of redness through his chest.    ID consult for left axilla cellulitis in lymphoma patient.    Review of patient's allergies indicates:  No Known Allergies  Past Medical History:   Diagnosis Date    Cancer     lymphoma currently on chemo    Diabetes mellitus, type 2     Encounter for antineoplastic chemotherapy 04/01/2020    Hypertension     Neuropathy     Reflux esophagitis      Past Surgical History:   Procedure Laterality Date    COLONOSCOPY  2018    EYE SURGERY  Approximately 3 years ago    Cataract    HAND SURGERY      amputation left index finger    PORTACATH PLACEMENT      SKIN CANCER EXCISION  09/30/2020    [facility]    SPINE SURGERY      VASECTOMY  1985 ?     Social History     Tobacco Use    Smoking status: Former     Packs/day: 0.50     Years: 2.00     Pack years: 1.00     Types: Cigarettes    Smokeless tobacco: Never   Substance Use Topics    Alcohol use: Not Currently     Alcohol/week: 0.0 standard drinks     Comment: rarely        Family History   Problem Relation Age of Onset    Diabetes Father        Pertinent medications noted:  Rituximab every 8 weeks, IVIG monthly    Review of Systems:   + chills, fever, sweats, weight loss  No change in vision, loss of vision or diplopia  No sinus congestion, purulent nasal discharge, post nasal drip or facial pain  No pain in mouth or throat. No problems with teeth, gums.  No chest pain, palpitations, syncope  + non productive chronic cough, mild shortness of breath, no dyspnea on exertion, pleurisy, hemoptysis  No dysphagia, odynophagia  + nausea, no vomiting, no diarrhea, constipation, blood in stool, or focal abd pain,    No dysuria, hesitancy,  hematuria, retention, incontinence  L axilla with redness, spreading to anterior chest and neck, BLE LE edema, no swelling of other joints, redness of joints, injuries, or new focal pain  No unusual headaches, dizziness, vertigo, numbness, paresthesias, + neuropathy, no falls  No anxiety, depression, substance abuse, sleep disturbance  No bleeding, lymphadenopathy  Left axilla redness as described above, no new lesions, or wounds    Outdoor activities:  Lives at home.  Nonsmoker, occasional drinks alcohol.  Has a dog at home.  Travel:  None  Implants: Port-A-cath 10 years ago -tunneled from L to R  Antibiotic History:  Vancomycin/cefepime IV    EXAM & DIAGNOSTICS REVIEWED:   Vitals:     Temp:  [97.5 °F (36.4 °C)-98.4 °F (36.9 °C)]   Temp: 97.8 °F (36.6 °C) (10/02/22 0705)  Pulse: 96 (10/02/22 0705)  Resp: (!) 22 (10/02/22 0705)  BP: 133/72 (10/02/22 0705)  SpO2: 95 % (10/02/22 0705)    Intake/Output Summary (Last 24 hours) at 10/2/2022 0752  Last data filed at 10/2/2022 0615  Gross per 24 hour   Intake 800 ml   Output 1725 ml   Net -925 ml       General:  In NAD. Alert and attentive, cooperative, comfortable on room air  Eyes:  Anicteric, PERRL  ENT:  No ulcers, exudates, thrush, nares patent, dentition is good  Neck:  Supple, no adenopathy appreciated  Lungs: Rhonchi left base, right mostly clear  Heart:  S1/S2+, regular rhythm, no murmurs  Abd:  +BS, soft, non tender, non distended, no rebound  :  Voids, urine clear, no flank tenderness  Musc:  Joints without effusion, swelling,  erythema, synovitis, ambulatory  Skin:  Warm, L axilla with redness, very tender to palpation, spreading to anterior chest and neck, pain worse on the left side, no evidence of fluctuance   Wound:   Neuro:  Following commands, alert, speech is clear, neuropathy LE b/l  Psych:  Calm, cooperative  Lymphatic:     No cervical, supraclavicular nodes  Extrem: Trace b/l LE edema, non tender to palpation - petechial rash on dorsum of  foot  VAD:  R Port-A-Cath       Isolation: Neutropenic    10/2:      9/30:        General Labs reviewed:  Recent Labs   Lab 09/30/22  1632 10/01/22  0630 10/02/22  0516   WBC 5.47 3.02* 1.40*   HGB 13.0* 12.0* 12.5*   HCT 39.0* 38.0* 39.7*    123* 79*       Recent Labs   Lab 09/30/22  1632 10/01/22  0630 10/02/22  0516    137 137   K 3.4* 3.6 4.0    103 105   CO2 26 26 27   BUN 21 18 16   CREATININE 1.3 1.2 0.9   CALCIUM 8.6* 8.2* 8.3*   PROT 6.5  --   --    BILITOT 1.2*  --   --    ALKPHOS 95  --   --    ALT 24  --   --    AST 30  --   --      No results for input(s): CRP in the last 168 hours.  No results for input(s): SEDRATE in the last 168 hours.    Estimated Creatinine Clearance: 90.7 mL/min (based on SCr of 0.9 mg/dL).     Micro:  Microbiology Results (last 7 days)       Procedure Component Value Units Date/Time    Blood culture #2 **CANNOT BE ORDERED STAT** [[id number]] Collected: 09/30/22 1800    Order Status: Completed Specimen: Blood from Peripheral, Antecubital, Left Updated: 10/01/22 2032     Blood Culture, Routine No Growth to date      No Growth to date    Blood culture #1 **CANNOT BE ORDERED STAT** [[id number]] Collected: 09/30/22 1632    Order Status: Completed Specimen: Blood from Peripheral, Antecubital, Right Updated: 10/01/22 1832     Blood Culture, Routine No Growth to date      No Growth to date    Blood culture [[id number]] Collected: 10/01/22 1058    Order Status: Completed Specimen: Blood from Antecubital, Right Arm Updated: 10/01/22 1758     Blood Culture, Routine No Growth to date    Narrative:      Aerobic and anaerobic    Blood culture [[id number]] Collected: 10/01/22 1058    Order Status: Completed Specimen: Blood Updated: 10/01/22 1758     Blood Culture, Routine No Growth to date    Narrative:      Aerobic and anaerobic            Imaging Reviewed:  Chest x-ray negative for acute pathology - Port-A-Cath tunneled from R to L  CT head without contrast negative for acute  pathology.  9 mm circumscribed hypodensity at the inferior margin of the right basal ganglia consistent with small chronic lacunar infarct or prominent perivascular space.  Sinus disease as above.  CTA chest negative for PE.  Interstitial prominence at both lung bases suggesting mild interstitial edema, with superimposed atelectasis or infiltrates at the left lung base.  Soft tissue ultrasound left axilla, revealed subcutaneous edema at the left axilla, no mass, lymphadenopathy, or drainable fluid collection.  Correlate for possible cellulitis.      Cardiology: EKG QTC 403ms       IMPRESSION & PLAN     Severe left axilla cellulitis extending to neck and anterior chest, rule out nec fasc    Blood cultures x2 9/30 & 10/1 x 2 no growth to date, pending final   Lactic acid 1.8-->2.1, procalcitonin 17.2 high    2. Pancytopenia, h/o low-grade non-Hodgkin's lymphoma on rituximab last dose 9/16, IVIG last dose 9/16   Leukopenia bands 7% , , thrombocytopenia 79  Unclear if skin cellulitis could be related to Rituximab    3. PMHx: hypertension, diabetes with lower extremity neuropathy, bronchiectasis     Recommendations:  CRP added-on   Lactic acid   CTA head and neck  L UE venous US  Continue Vancomycin IV, keep level 15-20  Cefepime 2g IV q8h, until ANC recovers   Clindamycin 900mg IV q8h  Follow cultures   Aspiration precautions   Neutropenic precautions     Will follow     D/w patient, daughters, Dr Mojica, Dr Venegas    Addenddum:   Worsening redness passed chest to abdomen, concern for red-man syndrome, stop vancomycin IV, will start Daptomycin 500mg IV daily   Discussed with Surgery, will remove Port-A-Cath  Agreed to transfer to ICU      Medical Decision Making during this encounter was  [_] Low Complexity  [_] Moderate Complexity  [xx] High Complexity

## 2022-10-02 NOTE — ASSESSMENT & PLAN NOTE
Due to severe infection  - follow platelets closely  - , follow daily  - Trend Hgb  - Appreciate Hem / Onc recs  - Hopefully counts will improve as infection is treated

## 2022-10-02 NOTE — NURSING
0700: report received  0930: in bed with daughters at bedside, no distress, rash is worsening to chest area. Physician on floor.  1125: medicated for pain to chest area  1140: off floor to CT scan  1215: back on floor, complaining of neck and throat hurting and feeling like he's having trouble breathing, Dr. Mojica notified and will come to see the patient.  1310: medicated with IV benadryl, patients oxygen applied, he feels short of breath, no distress but states his throat feels like it's swelling, will transfer to higher level of care.  1330:  gave solumedrol and morphine for pain  1430: moving to the ICU  1500: report to Cassie TROTTER in the ICU, transported patient respiratory therapist

## 2022-10-02 NOTE — PLAN OF CARE
Reevaluated this afternoon, discussed with surgery. May benefit from empiric debridement, but will closely monitor. Repeat u/s of his left chest wall to see if possible fluid collection . CT results are pending. Broadened abx. Starting benadryl and solumedrol given throat symptoms. Pain control adjusted. Moving pt for closer monitoring.

## 2022-10-02 NOTE — RESPIRATORY THERAPY
10/01/22 2000   Patient Assessment/Suction   Level of Consciousness (AVPU) alert   Respiratory Effort Normal;Unlabored   Rhythm/Pattern, Respiratory shortness of breath   PRE-TX-O2   O2 Device (Oxygen Therapy) nasal cannula   $ Is the patient on Low Flow Oxygen? Yes   Flow (L/min) 2   SpO2 (!) 94 %   Pulse Oximetry Type Intermittent   Pulse 103   Resp 16   Aerosol Therapy   $ Aerosol Therapy Charges Aerosol Treatment   Daily Review of Necessity (SVN) completed   Respiratory Treatment Status (SVN) given   Treatment Route (SVN) mask;oxygen   Patient Position (SVN) HOB elevated   Post Treatment Assessment (SVN) breath sounds unchanged   Signs of Intolerance (SVN) none   Education   $ Education Bronchodilator;DME Oxygen;DME Nebulizer;15 min   Respiratory Evaluation   $ Care Plan Tech Time 15 min

## 2022-10-02 NOTE — PROCEDURES
REASON FOR STUDY: Altered mental status.  DATE OF STUDY: 10/2/2022  PHYSICIAN REQUESTING/INSTITUTION: Dr. Mojica.    AED: none.    Methods: This 25-minute EEG was performed with electrodes placement according to the 10/20 international system. Video was added.  EEG FINDINGS:  Background activity: The background activity consisted of posterior dominant rhythm of 7 Hz with good reactivity.   Drowsiness: Absent.  Sleep: Absent.  Photic stimulation was performed with no abnormalities. EEG abnormalities: 1- Generalized theta activity 2- Mildly slow posterior dominant rhythm      IMPRESSION: Abnormal routine EEG study indicating a mild encephalopathy. No interictal epileptiform discharges and no seizures were recorded.      Silvino Dooley MD. FAAN.  [facility]  [phone]

## 2022-10-02 NOTE — CLINICAL REVIEW
"[facility]   Hematology/Oncology  Inpatient Clinical Review Note          Patient Name: Sivakumar Thacker  MRN: [id number]  Admission Date: 9/30/2022  Hospital Length of Stay: 1 days  Code Status: Full Code   Attending Provider: John Mojica MD  Consulting Provider: Jefferson Ibarra MD  Primary Care Physician: Barry Mcmahon MD  Principal Problem:Cellulitis      Subjective:       Patient ID: Sivakumar Thacker is a 67 y.o. male.    Chief Complaint: Lymphadenopathy (Starting last night pt developed L sided chest/armpit swelling and redness. Pt has history of non-hodkins lymphoma, last chemo on 9/15, has port access on RUC. Pt was febrile at home took tylenol or ibuprofen (pt not sure) at 1100. Afebrile here, VSS. Denies recent injury/trauma. Site is not open or draining. )        History Present Illness:     Reviewed patient's chart, notes, labs and radiograph report.; I communicated with Dr Mojica with hospitalist service and Dr Schrader with ID, gave recommendations and placed orders      Objective:     Vitals:  Blood pressure 133/72, pulse 104, temperature 97.8 °F (36.6 °C), temperature source Oral, resp. rate (!) 24, height 5' 8" (1.727 m), weight 95.8 kg (211 lb 3.2 oz), SpO2 (!) 94 %.      Lab Review:   Lab Results   Component Value Date    WBC 1.40 (LL) 10/02/2022    HGB 12.5 (L) 10/02/2022    HCT 39.7 (L) 10/02/2022    MCV 93 10/02/2022    PLT 79 (L) 10/02/2022     Gran % 38.0 - 73.0 % 55.0  59.0      CMP  Sodium   Date Value Ref Range Status   10/02/2022 137 136 - 145 mmol/L Final   04/25/2019 144 134 - 144 mmol/L      Potassium   Date Value Ref Range Status   10/02/2022 4.0 3.5 - 5.1 mmol/L Final     Chloride   Date Value Ref Range Status   10/02/2022 105 95 - 110 mmol/L Final   04/25/2019 107 98 - 110 mmol/L      CO2   Date Value Ref Range Status   10/02/2022 27 23 - 29 mmol/L Final     Glucose   Date Value Ref Range Status   10/02/2022 160 (H) 70 - 110 mg/dL Final   04/25/2019 177 (H) 70 " - 99 mg/dL      BUN   Date Value Ref Range Status   10/02/2022 16 8 - 23 mg/dL Final     Creatinine   Date Value Ref Range Status   10/02/2022 0.9 0.5 - 1.4 mg/dL Final   04/25/2019 0.83 0.60 - 1.40 mg/dL      Calcium   Date Value Ref Range Status   10/02/2022 8.3 (L) 8.7 - 10.5 mg/dL Final     Total Protein   Date Value Ref Range Status   09/30/2022 6.5 6.0 - 8.4 g/dL Final     Albumin   Date Value Ref Range Status   09/30/2022 3.5 3.5 - 5.2 g/dL Final   04/25/2019 4.4 3.1 - 4.7 g/dL      Total Bilirubin   Date Value Ref Range Status   09/30/2022 1.2 (H) 0.1 - 1.0 mg/dL Final     Comment:     For infants and newborns, interpretation of results should be based  on gestational age, weight and in agreement with clinical  observations.    Premature Infant recommended reference ranges:  Up to 24 hours.............<8.0 mg/dL  Up to 48 hours............<12.0 mg/dL  3-5 days..................<15.0 mg/dL  6-29 days.................<15.0 mg/dL       Alkaline Phosphatase   Date Value Ref Range Status   09/30/2022 95 55 - 135 U/L Final     AST   Date Value Ref Range Status   09/30/2022 30 10 - 40 U/L Final     ALT   Date Value Ref Range Status   09/30/2022 24 10 - 44 U/L Final     Anion Gap   Date Value Ref Range Status   10/02/2022 5 (L) 8 - 16 mmol/L Final     eGFR if    Date Value Ref Range Status   07/21/2022 >60.0 >60 mL/min/1.73 m^2 Final     eGFR if non    Date Value Ref Range Status   07/21/2022 >60.0 >60 mL/min/1.73 m^2 Final     Comment:     Calculation used to obtain the estimated glomerular filtration  rate (eGFR) is the CKD-EPI equation.                   Radiology Diagnostic Studies:     US Soft Tissue Axilla, Left [[id number]] Collected: 09/30/22 1819   Order Status: Completed Updated: 09/30/22 1940   Narrative:     Ultrasound left axilla     CLINICAL DATA: Left axillary erythema and pain. Reported history of lymphoma.     FINDINGS: Sonographic assessment targeted to the left  axilla was performed, with comparison evaluation of the right axilla.     Fatty soft tissues in the left axilla appear mildly edematous. No mass or lymphadenopathy is identified. There is no focal drainable fluid collection.     IMPRESSION:   1. Subcutaneous edema at the left axilla, with no mass, lymphadenopathy, or drainable fluid collection. Findings are nonspecific. Correlate for possible cellulitis.       CTA Chest Non-Coronary (PE Studies) [[id number]] Collected: 09/30/22 1723   Order Status: Completed Updated: 09/30/22 1759   Narrative:     CTA CHEST     HISTORY: Shortness of breath. Comparison to June 2021..     CMS MANDATED QUALITY DATA - CT RADIATION  436     All CT scans at this facility utilize dose modulation, iterative reconstruction, and/or weight based dosing when appropriate to reduce radiation dose to as low as reasonably achievable     FINDINGS: PE protocol was utilized.  Thin axial imaging through the chest was performed with 100 cc nonionic IV contrast, with sagittal and coronal reformatted images and 3-D reconstructions performed on an independent workstation, with images stored in the patient's permanent electronic medical record.     The pulmonary arteries are adequately opacified. Opacification of peripheral branches is heterogeneous.     The main pulmonary arteries and segmental branches are normal. Peripheral branches are grossly normal.     Heart size is upper normal. Multifocal coronary artery atherosclerotic calcification is noted. The thoracic aorta is normal in caliber. No pathologic mediastinal lymphadenopathy is identified.     Images at lung windows demonstrate mild interstitial prominence at the lung bases with mild interlobular septal thickening suggesting interstitial edema, new when compared to June 2021. There is superimposed minimal atelectasis or infiltrate at the left lung base. No effusion is identified.     IMPRESSION:       1. No evidence of pulmonary thromboembolic  disease.   2. Interstitial prominence at both lung bases suggesting mild interstitial edema, with superimposed atelectasis or infiltrate at the left lung base.   3. Coronary artery atherosclerosis..       CT Head Without Contrast [[id number]] Collected: 09/30/22 1723   Order Status: Completed Updated: 09/30/22 1751   Narrative:     CT HEAD WITHOUT CONTRAST     CMS MANDATED QUALITY DATA - CT RADIATION  436     All CT scans at this facility utilize dose modulation, iterative reconstruction, and/or weight based dosing when appropriate to reduce radiation dose to as low as reasonably achievable     Clinical data: Altered mental status, syncope versus seizure.     FINDINGS: Noninfusion images were obtained from the skull base to the vertex. There is no intracranial mass, hemorrhage, or midline shift. Ventricles and sulci are normal. There are no pathologic extra-axial fluid collections. There is no evidence of acute ischemic change or edema. Small circumscribed hypodensity along the inferior margin of the right basal ganglia could reflect chronic lacunar infarct or prominent perivascular space. Cerebellum and brainstem are normal.     The calvarium is intact.There is a 16 mm mucous retention cyst or polyp in the left maxillary sinus, with mucoperiosteal thickening in the left maxillary and left frontal sinuses. Moderate bilateral ethmoid opacification is noted.     IMPRESSION:     1. No acute intracranial abnormalities.   2. 9 mm circumscribed hypodensity at the inferior margin of the right basal ganglia is consistent with small chronic lacunar infarct or prominent perivascular space.   3. Sinus disease as above.             Assessment:     IMPRESSION:    (1) 67 y.o. male  known to my oncology service with diagnosis of low grade NHL for which he has been on rituximab maintenance therapy. He presented to ED last night with redness and discomfort of the left axill. He has been admitted to the hopsitalist service with  working diagnosis of cellulitis. I spoke to Dr Helm by phone last night. US of the axilla showed only subcutaneous edema at the left axilla, with no mass, lymphadenopathy, or drainable fluid collection.  CTA was negative for pulmonary emboli.       10/1/2022:  - on broad spectrum antibiotics IV  - ID consulted  - GenSurg consulted  - discussed with Dr Helm last night by phone and Dr Mojica in person today    10/2/2022: Clinical review  - patient seen by ID  - on broad spectrum IV abx for sepsis and cellulitis  - wbc is lower on the latest CBC but he is not on any chemotherapy that would expect to be the etiology  - plats ar 79,000 and also a little lower  - check PT/PTT and fibrinogen, smear for schistocytes  - will discuss GCSFwith PCP and ID  - will ask for repeat CBC as well  - he has been seen by Dr Venegas with GenSurg and is being transferred to ICU for closer monitoring     (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     (2) HTN     (3) Neuropathy     (4) GERD     (5) DM - on metformin per Dr Nunez     (6) Hypogammaglobulinemia - on Iv IgG monthly     (7) Steatosis of liver     (8) Degenerative disc disease of back     (9) Diverticular disease     (10) Chronic bronchiectasis/bronchiolitis with TONY - followed by Dr Veliz and Lucia Georges                1. Cellulitis of chest wall    2. Chest pain at rest    3. Hyperglycemia    4. Pre-syncope           Plan:     PLAN:          IV antibiotics for the cellulitis and inefection workup as per ID directives - he is being transferred to ICU for closer monitoring   Recommended consideration for GenSurg evaluation for the axilla and portacath -he has been seen by Dr Venegas with GenSurg   Monitor labs and will ask for repeat CBC to be drawn today- check PT/PTT and fibrinogen, smear for schistocytes  as well  Discussed use of GCSF with ID and Primary  Will follow with you               Jefferson Ibarra MD  Hematology/Oncology  [facility]

## 2022-10-02 NOTE — ASSESSMENT & PLAN NOTE
Receives IVIG infusions with Oncology  Appreciate any oncology recommendations surrounding his immunology needs given his acute severe infection  Would he benefit from IVIgG for acute infection?   Last IVIg infusion 9/16

## 2022-10-02 NOTE — PLAN OF CARE
10/02/22 1830   PRE-TX-O2   SpO2 100 %   Pulse 99   Resp 15   Preset CPAP/BiPAP Settings   Mode Of Delivery BiPAP   $ CPAP/BiPAP Daily Charge BiPAP/CPAP Daily   $ Initial CPAP/BiPAP Setup? Yes   $ Is patient using? Yes   Size of Mask Large   Sized Appropriately? Yes   Equipment Type V60   Airway Device Type large full face mask   Humidifier not applicable   Ipap 20   EPAP (cm H2O) 8   Pressure Support (cm H2O) 12   Set Rate (Breaths/Min) 26   ITime (sec) 0.85   Rise Time (sec) 2   Patient CPAP/BiPAP Settings   Timed Inspiration (Sec) 0.85   IPAP Rise Time (sec) 2   RR Total (Breaths/Min) 28   Tidal Volume (mL) 701   VE Minute Ventilation (L/min) 18.5 L/min   Peak Inspiratory Pressure (cm H2O) 21   TiTOT (%) 37   Total Leak (L/Min) 11   Patient Trigger - ST Mode Only (%) 79   CPAP/BiPAP Backup Settings   IPAP Backup 20 cmH2O   EPAP Backup 8 cmH2O   Backup Rate 26 breaths per minute (bpm)   FIO2 Backup 30 %   ITIME Backup 0.85 seconds   Rise Time Backup 2 seconds   CPAP/BiPAP Alarms   High Pressure (cm H2O) 50   Low Pressure (cm H2O) 10   Minute Ventilation (L/Min) 5   High RR (breaths/min) 50   Low RR (breaths/min) 10   Apnea (Sec) 20

## 2022-10-03 ENCOUNTER — CLINICAL SUPPORT (OUTPATIENT)
Dept: CARDIOLOGY | Facility: HOSPITAL | Age: 67
DRG: 853 | End: 2022-10-03
Attending: INTERNAL MEDICINE
Payer: MEDICARE

## 2022-10-03 ENCOUNTER — ANESTHESIA (OUTPATIENT)
Dept: SURGERY | Facility: HOSPITAL | Age: 67
DRG: 853 | End: 2022-10-03
Payer: MEDICARE

## 2022-10-03 ENCOUNTER — ANESTHESIA EVENT (OUTPATIENT)
Dept: SURGERY | Facility: HOSPITAL | Age: 67
DRG: 853 | End: 2022-10-03
Payer: MEDICARE

## 2022-10-03 VITALS — WEIGHT: 211.19 LBS | HEIGHT: 68 IN | BODY MASS INDEX: 32.01 KG/M2

## 2022-10-03 PROBLEM — R23.3 PETECHIAE: Status: RESOLVED | Noted: 2022-10-03 | Resolved: 2022-10-03

## 2022-10-03 PROBLEM — A41.9 SEPSIS: Status: ACTIVE | Noted: 2022-10-03

## 2022-10-03 PROBLEM — R23.3 PETECHIAE: Status: ACTIVE | Noted: 2022-10-03

## 2022-10-03 LAB
ALLENS TEST: ABNORMAL
ANION GAP SERPL CALC-SCNC: 11 MMOL/L (ref 8–16)
ANISOCYTOSIS BLD QL SMEAR: SLIGHT
AV INDEX (PROSTH): 0.81
AV MEAN GRADIENT: 3 MMHG
AV VALVE AREA: 3.41 CM2
BASOPHILS NFR BLD: 0 % (ref 0–1.9)
BSA FOR ECHO PROCEDURE: 2.14 M2
BUN SERPL-MCNC: 27 MG/DL (ref 8–23)
CALCIUM SERPL-MCNC: 8.7 MG/DL (ref 8.7–10.5)
CHLORIDE SERPL-SCNC: 102 MMOL/L (ref 95–110)
CO2 SERPL-SCNC: 24 MMOL/L (ref 23–29)
CREAT SERPL-MCNC: 1.3 MG/DL (ref 0.5–1.4)
CV ECHO LV RWT: 0.4 CM
DELSYS: ABNORMAL
DIFFERENTIAL METHOD: ABNORMAL
DOP CALC AO VTI: 19.92 CM
DOP CALC LVOT AREA: 4.2 CM2
DOP CALC LVOT DIAMETER: 2.31 CM
DOP CALC LVOT PEAK VEL: 82.62 M/S
DOP CALC LVOT STROKE VOLUME: 67.98 CM3
DOP CALCLVOT PEAK VEL VTI: 16.23 CM
E WAVE DECELERATION TIME: 136.75 MSEC
E/A RATIO: 1.03
E/E' RATIO: 7.43 M/S
ECHO LV POSTERIOR WALL: 1.09 CM (ref 0.6–1.1)
EJECTION FRACTION: 55 %
EOSINOPHIL NFR BLD: 0 % (ref 0–8)
ERYTHROCYTE [DISTWIDTH] IN BLOOD BY AUTOMATED COUNT: 13.4 % (ref 11.5–14.5)
EST. GFR  (NO RACE VARIABLE): >60 ML/MIN/1.73 M^2
FRACTIONAL SHORTENING: 27 % (ref 28–44)
GLUCOSE SERPL-MCNC: 234 MG/DL (ref 70–110)
GLUCOSE SERPL-MCNC: 263 MG/DL (ref 70–110)
GLUCOSE SERPL-MCNC: 263 MG/DL (ref 70–110)
GLUCOSE SERPL-MCNC: 300 MG/DL (ref 70–110)
HCO3 UR-SCNC: 28.4 MMOL/L (ref 24–28)
HCT VFR BLD AUTO: 37.7 % (ref 40–54)
HGB BLD-MCNC: 12.3 G/DL (ref 14–18)
IMM GRANULOCYTES # BLD AUTO: ABNORMAL K/UL (ref 0–0.04)
IMM GRANULOCYTES NFR BLD AUTO: ABNORMAL % (ref 0–0.5)
INTERVENTRICULAR SEPTUM: 1.1 CM (ref 0.6–1.1)
IVRT: 58.01 MSEC
LACTATE SERPL-SCNC: 1.4 MMOL/L (ref 0.5–1.9)
LEFT ATRIUM SIZE: 3.8 CM
LEFT INTERNAL DIMENSION IN SYSTOLE: 3.97 CM (ref 2.1–4)
LEFT VENTRICLE DIASTOLIC VOLUME INDEX: 76.59 ML/M2
LEFT VENTRICLE DIASTOLIC VOLUME: 160.08 ML
LEFT VENTRICLE MASS INDEX: 113 G/M2
LEFT VENTRICLE SYSTOLIC VOLUME INDEX: 29.8 ML/M2
LEFT VENTRICLE SYSTOLIC VOLUME: 62.35 ML
LEFT VENTRICULAR INTERNAL DIMENSION IN DIASTOLE: 5.43 CM (ref 3.5–6)
LEFT VENTRICULAR MASS: 235.51 G
LV LATERAL E/E' RATIO: 6.5 M/S
LV SEPTAL E/E' RATIO: 8.67 M/S
LYMPHOCYTES NFR BLD: 22 % (ref 18–48)
MCH RBC QN AUTO: 28.9 PG (ref 27–31)
MCHC RBC AUTO-ENTMCNC: 32.6 G/DL (ref 32–36)
MCV RBC AUTO: 89 FL (ref 82–98)
MONOCYTES NFR BLD: 11 % (ref 4–15)
MV PEAK A VEL: 0.76 M/S
MV PEAK E VEL: 0.78 M/S
NEUTROPHILS NFR BLD: 67 % (ref 38–73)
NRBC BLD-RTO: 0 /100 WBC
PCO2 BLDA: 50.1 MMHG (ref 35–45)
PF4 HEPARIN CMPLX AB SER QL: 0.1 OD (ref 0–0.4)
PH SMN: 7.36 [PH] (ref 7.35–7.45)
PISA MRMAX VEL: 457.93 M/S
PISA TR MAX VEL: 2.68 M/S
PLATELET # BLD AUTO: 74 K/UL (ref 150–450)
PLATELET BLD QL SMEAR: ABNORMAL
PMV BLD AUTO: 10.8 FL (ref 9.2–12.9)
PO2 BLDA: 109 MMHG (ref 80–100)
POC BE: 3 MMOL/L
POC SATURATED O2: 98 % (ref 95–100)
POC TCO2: 30 MMOL/L (ref 23–27)
POTASSIUM SERPL-SCNC: 3.4 MMOL/L (ref 3.5–5.1)
RA PRESSURE: 3 MMHG
RBC # BLD AUTO: 4.25 M/UL (ref 4.6–6.2)
RIGHT VENTRICULAR END-DIASTOLIC DIMENSION: 2.46 CM
SAMPLE: ABNORMAL
SITE: ABNORMAL
SODIUM SERPL-SCNC: 137 MMOL/L (ref 136–145)
TDI LATERAL: 0.12 M/S
TDI SEPTAL: 0.09 M/S
TDI: 0.11 M/S
TR MAX PG: 29 MMHG
TRICUSPID ANNULAR PLANE SYSTOLIC EXCURSION: 2.53 CM
TV REST PULMONARY ARTERY PRESSURE: 32 MMHG
WBC # BLD AUTO: 1.94 K/UL (ref 3.9–12.7)

## 2022-10-03 PROCEDURE — 63600175 PHARM REV CODE 636 W HCPCS: Performed by: INTERNAL MEDICINE

## 2022-10-03 PROCEDURE — 27000671 HC TUBING MICROBORE EXT: Performed by: ANESTHESIOLOGY

## 2022-10-03 PROCEDURE — 80048 BASIC METABOLIC PNL TOTAL CA: CPT | Performed by: STUDENT IN AN ORGANIZED HEALTH CARE EDUCATION/TRAINING PROGRAM

## 2022-10-03 PROCEDURE — 94761 N-INVAS EAR/PLS OXIMETRY MLT: CPT

## 2022-10-03 PROCEDURE — 99233 PR SUBSEQUENT HOSPITAL CARE,LEVL III: ICD-10-PCS | Mod: ,,, | Performed by: INTERNAL MEDICINE

## 2022-10-03 PROCEDURE — 93306 TTE W/DOPPLER COMPLETE: CPT | Mod: 26,,, | Performed by: GENERAL PRACTICE

## 2022-10-03 PROCEDURE — 63600175 PHARM REV CODE 636 W HCPCS: Performed by: STUDENT IN AN ORGANIZED HEALTH CARE EDUCATION/TRAINING PROGRAM

## 2022-10-03 PROCEDURE — 86060 ANTISTREPTOLYSIN O TITER: CPT | Performed by: STUDENT IN AN ORGANIZED HEALTH CARE EDUCATION/TRAINING PROGRAM

## 2022-10-03 PROCEDURE — 82803 BLOOD GASES ANY COMBINATION: CPT

## 2022-10-03 PROCEDURE — 30000890 MISCELLANEOUS SENDOUT TEST, BLOOD: Mod: 91 | Performed by: STUDENT IN AN ORGANIZED HEALTH CARE EDUCATION/TRAINING PROGRAM

## 2022-10-03 PROCEDURE — 10061 I&D ABSCESS COMP/MULTIPLE: CPT | Mod: 51,,, | Performed by: SURGERY

## 2022-10-03 PROCEDURE — 36590 PR REMOVAL TUNNELED CV CATH W SUBQ PORT OR PUMP: ICD-10-PCS | Mod: ,,, | Performed by: SURGERY

## 2022-10-03 PROCEDURE — 27201423 OPTIME MED/SURG SUP & DEVICES STERILE SUPPLY: Performed by: SURGERY

## 2022-10-03 PROCEDURE — 94799 UNLISTED PULMONARY SVC/PX: CPT

## 2022-10-03 PROCEDURE — 37000009 HC ANESTHESIA EA ADD 15 MINS: Performed by: SURGERY

## 2022-10-03 PROCEDURE — 99900031 HC PATIENT EDUCATION (STAT)

## 2022-10-03 PROCEDURE — C1751 CATH, INF, PER/CENT/MIDLINE: HCPCS

## 2022-10-03 PROCEDURE — 86612 BLASTOMYCES ANTIBODY: CPT | Mod: 91 | Performed by: STUDENT IN AN ORGANIZED HEALTH CARE EDUCATION/TRAINING PROGRAM

## 2022-10-03 PROCEDURE — 10061 PR DRAIN SKIN ABSCESS COMPLIC: ICD-10-PCS | Mod: 51,,, | Performed by: SURGERY

## 2022-10-03 PROCEDURE — 87102 FUNGUS ISOLATION CULTURE: CPT | Performed by: SURGERY

## 2022-10-03 PROCEDURE — 87075 CULTR BACTERIA EXCEPT BLOOD: CPT | Performed by: SURGERY

## 2022-10-03 PROCEDURE — 88305 TISSUE EXAM BY PATHOLOGIST: CPT | Mod: TC

## 2022-10-03 PROCEDURE — 37000008 HC ANESTHESIA 1ST 15 MINUTES: Performed by: SURGERY

## 2022-10-03 PROCEDURE — 37799 UNLISTED PX VASCULAR SURGERY: CPT

## 2022-10-03 PROCEDURE — 85027 COMPLETE CBC AUTOMATED: CPT | Performed by: STUDENT IN AN ORGANIZED HEALTH CARE EDUCATION/TRAINING PROGRAM

## 2022-10-03 PROCEDURE — 27000221 HC OXYGEN, UP TO 24 HOURS

## 2022-10-03 PROCEDURE — 63600175 PHARM REV CODE 636 W HCPCS: Performed by: NURSE ANESTHETIST, CERTIFIED REGISTERED

## 2022-10-03 PROCEDURE — 20000000 HC ICU ROOM

## 2022-10-03 PROCEDURE — 36415 COLL VENOUS BLD VENIPUNCTURE: CPT | Performed by: STUDENT IN AN ORGANIZED HEALTH CARE EDUCATION/TRAINING PROGRAM

## 2022-10-03 PROCEDURE — 99223 1ST HOSP IP/OBS HIGH 75: CPT | Mod: ,,, | Performed by: INTERNAL MEDICINE

## 2022-10-03 PROCEDURE — 63600175 PHARM REV CODE 636 W HCPCS: Mod: JG | Performed by: INTERNAL MEDICINE

## 2022-10-03 PROCEDURE — 36000706: Performed by: SURGERY

## 2022-10-03 PROCEDURE — 63600175 PHARM REV CODE 636 W HCPCS: Performed by: SURGERY

## 2022-10-03 PROCEDURE — 87077 CULTURE AEROBIC IDENTIFY: CPT | Performed by: SURGERY

## 2022-10-03 PROCEDURE — 25000003 PHARM REV CODE 250: Performed by: SURGERY

## 2022-10-03 PROCEDURE — 87070 CULTURE OTHR SPECIMN AEROBIC: CPT | Mod: 59 | Performed by: SURGERY

## 2022-10-03 PROCEDURE — 94660 CPAP INITIATION&MGMT: CPT

## 2022-10-03 PROCEDURE — 36000707: Performed by: SURGERY

## 2022-10-03 PROCEDURE — 85007 BL SMEAR W/DIFF WBC COUNT: CPT | Performed by: STUDENT IN AN ORGANIZED HEALTH CARE EDUCATION/TRAINING PROGRAM

## 2022-10-03 PROCEDURE — 99233 SBSQ HOSP IP/OBS HIGH 50: CPT | Mod: ,,, | Performed by: INTERNAL MEDICINE

## 2022-10-03 PROCEDURE — 87305 ASPERGILLUS AG IA: CPT | Performed by: STUDENT IN AN ORGANIZED HEALTH CARE EDUCATION/TRAINING PROGRAM

## 2022-10-03 PROCEDURE — 87186 SC STD MICRODIL/AGAR DIL: CPT | Performed by: SURGERY

## 2022-10-03 PROCEDURE — 99223 PR INITIAL HOSPITAL CARE,LEVL III: ICD-10-PCS | Mod: ,,, | Performed by: INTERNAL MEDICINE

## 2022-10-03 PROCEDURE — 93306 ECHO (CUPID ONLY): ICD-10-PCS | Mod: 26,,, | Performed by: GENERAL PRACTICE

## 2022-10-03 PROCEDURE — 93306 TTE W/DOPPLER COMPLETE: CPT

## 2022-10-03 PROCEDURE — 27000673 HC TUBING BLOOD Y: Performed by: ANESTHESIOLOGY

## 2022-10-03 PROCEDURE — 30000890 LABCORP MISCELLANEOUS TEST: Mod: 91 | Performed by: STUDENT IN AN ORGANIZED HEALTH CARE EDUCATION/TRAINING PROGRAM

## 2022-10-03 PROCEDURE — 25000003 PHARM REV CODE 250: Performed by: NURSE ANESTHETIST, CERTIFIED REGISTERED

## 2022-10-03 PROCEDURE — C9113 INJ PANTOPRAZOLE SODIUM, VIA: HCPCS | Performed by: INTERNAL MEDICINE

## 2022-10-03 PROCEDURE — 36590 REMOVAL TUNNELED CV CATH: CPT | Mod: ,,, | Performed by: SURGERY

## 2022-10-03 PROCEDURE — 86635 COCCIDIOIDES ANTIBODY: CPT | Performed by: STUDENT IN AN ORGANIZED HEALTH CARE EDUCATION/TRAINING PROGRAM

## 2022-10-03 PROCEDURE — 87449 NOS EACH ORGANISM AG IA: CPT | Mod: 91 | Performed by: STUDENT IN AN ORGANIZED HEALTH CARE EDUCATION/TRAINING PROGRAM

## 2022-10-03 PROCEDURE — 99900035 HC TECH TIME PER 15 MIN (STAT)

## 2022-10-03 PROCEDURE — 87070 CULTURE OTHR SPECIMN AEROBIC: CPT | Performed by: SURGERY

## 2022-10-03 PROCEDURE — 83605 ASSAY OF LACTIC ACID: CPT | Performed by: STUDENT IN AN ORGANIZED HEALTH CARE EDUCATION/TRAINING PROGRAM

## 2022-10-03 PROCEDURE — 30000890 LABCORP MISCELLANEOUS TEST: Performed by: STUDENT IN AN ORGANIZED HEALTH CARE EDUCATION/TRAINING PROGRAM

## 2022-10-03 RX ORDER — POTASSIUM CHLORIDE 750 MG/1
10 CAPSULE, EXTENDED RELEASE ORAL ONCE
Status: DISCONTINUED | OUTPATIENT
Start: 2022-10-03 | End: 2022-10-08 | Stop reason: HOSPADM

## 2022-10-03 RX ORDER — POTASSIUM CHLORIDE 29.8 MG/ML
40 INJECTION INTRAVENOUS
Status: DISCONTINUED | OUTPATIENT
Start: 2022-10-03 | End: 2022-10-08 | Stop reason: HOSPADM

## 2022-10-03 RX ORDER — POTASSIUM CHLORIDE 29.8 MG/ML
80 INJECTION INTRAVENOUS
Status: DISCONTINUED | OUTPATIENT
Start: 2022-10-03 | End: 2022-10-08 | Stop reason: HOSPADM

## 2022-10-03 RX ORDER — MAGNESIUM SULFATE HEPTAHYDRATE 40 MG/ML
2 INJECTION, SOLUTION INTRAVENOUS
Status: DISCONTINUED | OUTPATIENT
Start: 2022-10-03 | End: 2022-10-08 | Stop reason: HOSPADM

## 2022-10-03 RX ORDER — SODIUM CHLORIDE 0.9 G/100ML
IRRIGANT IRRIGATION
Status: DISCONTINUED | OUTPATIENT
Start: 2022-10-03 | End: 2022-10-03 | Stop reason: HOSPADM

## 2022-10-03 RX ORDER — BUPIVACAINE HYDROCHLORIDE AND EPINEPHRINE 2.5; 5 MG/ML; UG/ML
INJECTION, SOLUTION EPIDURAL; INFILTRATION; INTRACAUDAL; PERINEURAL
Status: DISCONTINUED | OUTPATIENT
Start: 2022-10-03 | End: 2022-10-03 | Stop reason: HOSPADM

## 2022-10-03 RX ORDER — ONDANSETRON 2 MG/ML
INJECTION INTRAMUSCULAR; INTRAVENOUS
Status: DISCONTINUED | OUTPATIENT
Start: 2022-10-03 | End: 2022-10-03

## 2022-10-03 RX ORDER — CALCIUM GLUCONATE 20 MG/ML
3 INJECTION, SOLUTION INTRAVENOUS
Status: DISCONTINUED | OUTPATIENT
Start: 2022-10-03 | End: 2022-10-08 | Stop reason: HOSPADM

## 2022-10-03 RX ORDER — MIDAZOLAM HYDROCHLORIDE 1 MG/ML
INJECTION INTRAMUSCULAR; INTRAVENOUS
Status: DISCONTINUED | OUTPATIENT
Start: 2022-10-03 | End: 2022-10-03

## 2022-10-03 RX ORDER — PANTOPRAZOLE SODIUM 40 MG/10ML
40 INJECTION, POWDER, LYOPHILIZED, FOR SOLUTION INTRAVENOUS DAILY
Status: DISCONTINUED | OUTPATIENT
Start: 2022-10-03 | End: 2022-10-07

## 2022-10-03 RX ORDER — FAMOTIDINE 10 MG/ML
INJECTION INTRAVENOUS
Status: DISCONTINUED | OUTPATIENT
Start: 2022-10-03 | End: 2022-10-03

## 2022-10-03 RX ORDER — CALCIUM GLUCONATE 20 MG/ML
1 INJECTION, SOLUTION INTRAVENOUS
Status: DISCONTINUED | OUTPATIENT
Start: 2022-10-03 | End: 2022-10-08 | Stop reason: HOSPADM

## 2022-10-03 RX ORDER — SODIUM CHLORIDE, SODIUM LACTATE, POTASSIUM CHLORIDE, CALCIUM CHLORIDE 600; 310; 30; 20 MG/100ML; MG/100ML; MG/100ML; MG/100ML
INJECTION, SOLUTION INTRAVENOUS CONTINUOUS PRN
Status: DISCONTINUED | OUTPATIENT
Start: 2022-10-03 | End: 2022-10-03

## 2022-10-03 RX ORDER — PROPOFOL 10 MG/ML
VIAL (ML) INTRAVENOUS
Status: DISCONTINUED | OUTPATIENT
Start: 2022-10-03 | End: 2022-10-03

## 2022-10-03 RX ORDER — POTASSIUM CHLORIDE 14.9 MG/ML
60 INJECTION INTRAVENOUS
Status: DISCONTINUED | OUTPATIENT
Start: 2022-10-03 | End: 2022-10-08 | Stop reason: HOSPADM

## 2022-10-03 RX ORDER — FENTANYL CITRATE 50 UG/ML
INJECTION, SOLUTION INTRAMUSCULAR; INTRAVENOUS
Status: DISCONTINUED | OUTPATIENT
Start: 2022-10-03 | End: 2022-10-03

## 2022-10-03 RX ORDER — MUPIROCIN 20 MG/G
OINTMENT TOPICAL 2 TIMES DAILY
Status: DISPENSED | OUTPATIENT
Start: 2022-10-03 | End: 2022-10-08

## 2022-10-03 RX ORDER — CHLORHEXIDINE GLUCONATE ORAL RINSE 1.2 MG/ML
15 SOLUTION DENTAL 2 TIMES DAILY
Status: DISPENSED | OUTPATIENT
Start: 2022-10-03 | End: 2022-10-08

## 2022-10-03 RX ORDER — CALCIUM GLUCONATE 20 MG/ML
2 INJECTION, SOLUTION INTRAVENOUS
Status: DISCONTINUED | OUTPATIENT
Start: 2022-10-03 | End: 2022-10-08 | Stop reason: HOSPADM

## 2022-10-03 RX ORDER — MAGNESIUM SULFATE HEPTAHYDRATE 40 MG/ML
4 INJECTION, SOLUTION INTRAVENOUS
Status: DISCONTINUED | OUTPATIENT
Start: 2022-10-03 | End: 2022-10-08 | Stop reason: HOSPADM

## 2022-10-03 RX ORDER — DIAZEPAM 5 MG/1
5 TABLET ORAL EVERY 6 HOURS PRN
Status: DISCONTINUED | OUTPATIENT
Start: 2022-10-03 | End: 2022-10-08 | Stop reason: HOSPADM

## 2022-10-03 RX ADMIN — PROPOFOL 50 MG: 10 INJECTION, EMULSION INTRAVENOUS at 12:10

## 2022-10-03 RX ADMIN — MUPIROCIN 1 G: 20 OINTMENT TOPICAL at 09:10

## 2022-10-03 RX ADMIN — INSULIN ASPART 3 UNITS: 100 INJECTION, SOLUTION INTRAVENOUS; SUBCUTANEOUS at 10:10

## 2022-10-03 RX ADMIN — PROPOFOL 50 MG: 10 INJECTION, EMULSION INTRAVENOUS at 11:10

## 2022-10-03 RX ADMIN — MORPHINE SULFATE 2 MG: 2 INJECTION, SOLUTION INTRAMUSCULAR; INTRAVENOUS at 10:10

## 2022-10-03 RX ADMIN — SODIUM CHLORIDE, SODIUM LACTATE, POTASSIUM CHLORIDE, AND CALCIUM CHLORIDE: .6; .31; .03; .02 INJECTION, SOLUTION INTRAVENOUS at 11:10

## 2022-10-03 RX ADMIN — FILGRASTIM-SNDZ 300 MCG: 300 INJECTION, SOLUTION INTRAVENOUS; SUBCUTANEOUS at 10:10

## 2022-10-03 RX ADMIN — PANTOPRAZOLE SODIUM 40 MG: 40 INJECTION, POWDER, FOR SOLUTION INTRAVENOUS at 10:10

## 2022-10-03 RX ADMIN — MEROPENEM AND SODIUM CHLORIDE 1 G: 1 INJECTION, SOLUTION INTRAVENOUS at 03:10

## 2022-10-03 RX ADMIN — ENOXAPARIN SODIUM 40 MG: 40 INJECTION SUBCUTANEOUS at 05:10

## 2022-10-03 RX ADMIN — FENTANYL CITRATE 100 MCG: 50 INJECTION INTRAMUSCULAR; INTRAVENOUS at 11:10

## 2022-10-03 RX ADMIN — DIPHENHYDRAMINE HYDROCHLORIDE 25 MG: 50 INJECTION, SOLUTION INTRAMUSCULAR; INTRAVENOUS at 05:10

## 2022-10-03 RX ADMIN — MORPHINE SULFATE 2 MG: 2 INJECTION, SOLUTION INTRAMUSCULAR; INTRAVENOUS at 03:10

## 2022-10-03 RX ADMIN — INSULIN ASPART 2 UNITS: 100 INJECTION, SOLUTION INTRAVENOUS; SUBCUTANEOUS at 09:10

## 2022-10-03 RX ADMIN — METHYLPREDNISOLONE SODIUM SUCCINATE 60 MG: 125 INJECTION, POWDER, FOR SOLUTION INTRAMUSCULAR; INTRAVENOUS at 10:10

## 2022-10-03 RX ADMIN — POTASSIUM CHLORIDE 60 MEQ: 14.9 INJECTION, SOLUTION INTRAVENOUS at 06:10

## 2022-10-03 RX ADMIN — FENTANYL CITRATE 25 MCG: 50 INJECTION INTRAMUSCULAR; INTRAVENOUS at 12:10

## 2022-10-03 RX ADMIN — CHLORHEXIDINE GLUCONATE 15 ML: 1.2 RINSE ORAL at 09:10

## 2022-10-03 RX ADMIN — FAMOTIDINE 20 MG: 10 INJECTION, SOLUTION INTRAVENOUS at 11:10

## 2022-10-03 RX ADMIN — FENTANYL CITRATE 25 MCG: 50 INJECTION INTRAMUSCULAR; INTRAVENOUS at 11:10

## 2022-10-03 RX ADMIN — MEROPENEM AND SODIUM CHLORIDE 1 G: 1 INJECTION, SOLUTION INTRAVENOUS at 02:10

## 2022-10-03 RX ADMIN — MIDAZOLAM HYDROCHLORIDE 2 MG: 1 INJECTION, SOLUTION INTRAMUSCULAR; INTRAVENOUS at 11:10

## 2022-10-03 RX ADMIN — ONDANSETRON 4 MG: 2 INJECTION INTRAMUSCULAR; INTRAVENOUS at 11:10

## 2022-10-03 RX ADMIN — INSULIN ASPART 3 UNITS: 100 INJECTION, SOLUTION INTRAVENOUS; SUBCUTANEOUS at 04:10

## 2022-10-03 RX ADMIN — FUROSEMIDE 40 MG: 10 INJECTION, SOLUTION INTRAMUSCULAR; INTRAVENOUS at 12:10

## 2022-10-03 RX ADMIN — METHYLPREDNISOLONE SODIUM SUCCINATE 60 MG: 125 INJECTION, POWDER, FOR SOLUTION INTRAMUSCULAR; INTRAVENOUS at 01:10

## 2022-10-03 RX ADMIN — MEROPENEM AND SODIUM CHLORIDE 1 G: 1 INJECTION, SOLUTION INTRAVENOUS at 08:10

## 2022-10-03 RX ADMIN — DAPTOMYCIN 500 MG: 500 INJECTION, POWDER, LYOPHILIZED, FOR SOLUTION INTRAVENOUS at 06:10

## 2022-10-03 RX ADMIN — FILGRASTIM-SNDZ 300 MCG: 300 INJECTION, SOLUTION INTRAVENOUS; SUBCUTANEOUS at 04:10

## 2022-10-03 RX ADMIN — DIPHENHYDRAMINE HYDROCHLORIDE 25 MG: 50 INJECTION, SOLUTION INTRAMUSCULAR; INTRAVENOUS at 12:10

## 2022-10-03 NOTE — PROGRESS NOTES
[facility] Hematology/Oncology Follow-up Note:      Patient had I&D with drain placed and portacath removed; he seems to have tolerated the procedure well; he is awake and alert and currently off the bipap. Discussed with his ICU nurse in person; daughter and another male family member are at bedside

## 2022-10-03 NOTE — CARE UPDATE
10/03/22 1014   PRE-TX-O2   O2 Device (Oxygen Therapy) nasal cannula   $ Is the patient on Low Flow Oxygen? Yes   Flow (L/min) 4   Pulse Oximetry Type Continuous   Resp 20   Education   $ Education DME Oxygen   Respiratory Evaluation   $ Care Plan Tech Time 15 min   $ Eval/Re-eval Charges Re-evaluation

## 2022-10-03 NOTE — CONSULTS
Pulmonary/Critical Care Consult      PATIENT NAME: Sivakumar Thacker  MRN: [id number]  TODAY'S DATE: 10/03/2022  10:14 AM  ADMIT DATE: 2022  AGE: 67 y.o. : 1955    CONSULT REQUESTED BY: Puma Andino MD    REASON FOR CONSULT:   Acute hypercapnic respiratory failure    HISTORY OF PRESENT ILLNESS   Sivakumar Thacker is a 67 y.o. male with a PMH of non-Hodgkins lymphoma on rituximab (last 9/15/22) with right chest port who presented with cellulitis of the chest on whom we have been consulted for acute hypercapnic respiratory failure requiring BiPAP.    A few days ago, the patient noticed a tender lymph node in his left axilla. At the hospital, he was noted to be tachycardic and febrile with a well-demarcated rash on his chest and tenderness at his Port-a-Cath. Procal was 17.2. He was started empirically on vanc and cefepime.     Yesterday, he was given percocet and IV morphine for pain. He then became somewhat obtunded. He was found to be hypercapnic and acidotic, which resolved with BiPAP for several hours. This morning, the patient is awake and alert, saturating 98% on 2 L NC.    REVIEW OF SYSTEMS  GENERAL: Feeling okay. Very tender at port site.  EYES: Vision is good.  ENT: No sinusitis or pharyngitis.   HEART: No chest pain or palpitations.  LUNGS: No cough, sputum, or wheezing.  GI: No abdominal pain, nausea, vomiting, or diarrhea.  : No urinary urgency or abnormal frequency.  SKIN: Painful, red rash across chest.  MUSCULOSKELETAL: No joint pain or myalgias.  NEURO: No headaches or neuropathy.  LYMPH: Very tender, enlarged lymph node in left armpit.  PSYCH: No anxiety or depression.  ENDO: No weight change.    ALLERGIES  Review of patient's allergies indicates:  No Known Allergies    INPATIENT SCHEDULED MEDICATIONS   chlorhexidine  15 mL Mouth/Throat BID    DAPTOmycin (CUBICIN) IV (PEDS and ADULTS)  500 mg Intravenous Q24H    diphenhydrAMINE  25 mg Intravenous Q6H    enoxaparin  40 mg Subcutaneous Daily     filgrastim-sndz  300 mcg Subcutaneous Daily    fluticasone propionate  1 spray Each Nostril BID    meropenem (MERREM) IVPB  1 g Intravenous Q8H    methylPREDNISolone sodium succinate injection  60 mg Intravenous Q6H    micafungin (MYCAMINE) IVPB  150 mg Intravenous Q24H    montelukast  10 mg Oral Daily    mupirocin   Nasal BID    pantoprazole  40 mg Intravenous Daily    potassium chloride  10 mEq Oral Once         MEDICAL AND SURGICAL HISTORY  Past Medical History:   Diagnosis Date    Diabetes mellitus, type 2     Encounter for antineoplastic chemotherapy 04/01/2020    Hypertension     Neuropathy     Non Hodgkin's lymphoma     Reflux esophagitis      Past Surgical History:   Procedure Laterality Date    COLONOSCOPY  2018    EYE SURGERY  Approximately 3 years ago    Cataract    HAND SURGERY      amputation left index finger    PORTACATH PLACEMENT      SKIN CANCER EXCISION  09/30/2020    [facility]    SPINE SURGERY      VASECTOMY  1985 ?       ALCOHOL, TOBACCO AND DRUG USE  Social History     Tobacco Use   Smoking Status Former    Packs/day: 0.50    Years: 2.00    Pack years: 1.00    Types: Cigarettes   Smokeless Tobacco Never     Social History     Substance and Sexual Activity   Alcohol Use Not Currently    Alcohol/week: 0.0 standard drinks    Comment: rarely     Social History     Substance and Sexual Activity   Drug Use No       FAMILY HISTORY  Family History   Problem Relation Age of Onset    Diabetes Father        VITAL SIGNS (MOST RECENT)  Temp: 98.1 °F (36.7 °C) (10/03/22 0301)  Pulse: 92 (10/03/22 0601)  Resp: (!) 27 (10/03/22 0601)  BP: (!) 170/88 (10/03/22 0301)  SpO2: 98 % (10/03/22 0601)    INTAKE AND OUTPUT (LAST 24 HOURS):  Intake/Output Summary (Last 24 hours) at 10/3/2022 1014  Last data filed at 10/3/2022 0624  Gross per 24 hour   Intake 350 ml   Output 2200 ml   Net -1850 ml       WEIGHT  Wt Readings from Last 1 Encounters:   10/01/22 95.8 kg (211 lb 3.2 oz)       PHYSICAL EXAM  GENERAL:  NAD.  HEENT: Extraocular movements intact. Pharynx moist.  NECK: Supple. No JVD or hepatojugular reflux.  HEART: Regular rate and normal rhythm. No murmur or gallop auscultated.  LUNGS: Clear to auscultation and percussion. Lung excursion symmetrical.  ABDOMEN: Soft, non-tender, non-distended, no masses palpated.  EXTREMITIES: Normal muscle tone and joint movement, no cyanosis or clubbing.   LYMPHATICS: Left axilla very tender.  SKIN: Erythematous rash across chest that is exquisitely tender to touch.  NEURO: No gross deficit.  PSYCH: Appropriate affect    ACUTE PHASE REACTANT (LAST 24 HOURS)  Recent Labs   Lab 10/02/22  1709   CRP >38.00*       CBC LAST (LAST 24 HOURS)  Recent Labs   Lab 10/03/22  0400   WBC 1.94*   RBC 4.25*   HGB 12.3*   HCT 37.7*   MCV 89   MCH 28.9   MCHC 32.6   RDW 13.4   PLT 74*   MPV 10.8   GRAN 67.0   LYMPH 22.0   MONO 11.0   NRBC 0       CHEMISTRY LAST (LAST 24 HOURS)  Recent Labs   Lab 10/02/22  1709 10/02/22  1822 10/02/22  2014 10/03/22  0400   NA  --   --   --  137   K  --   --   --  3.4*   CL  --   --   --  102   CO2  --   --   --  24   ANIONGAP  --   --   --  11   BUN  --   --   --  27*   CREATININE  --   --   --  1.3   GLU  --   --   --  234*   CALCIUM  --   --   --  8.7   PH  --    < > 7.376  --    ALBUMIN 2.8*  --   --   --    PROT 6.6  --   --   --    ALKPHOS 124  --   --   --    ALT 60*  --   --   --    AST 49*  --   --   --    BILITOT 0.8  --   --   --     < > = values in this interval not displayed.       COAGULATION LAST (LAST 24 HOURS)  Recent Labs   Lab 10/02/22  1710   LABPT 16.6*   INR 1.4   APTT 42.9*       CARDIAC PROFILE (LAST 24 HOURS)  Recent Labs   Lab 09/30/22  1632 10/01/22  0630   *  --    CPK  --  25   TROPONINI <0.030  --        LAST 7 DAYS MICROBIOLOGY   Microbiology Results (last 7 days)       Procedure Component Value Units Date/Time    Blood culture #2 **CANNOT BE ORDERED STAT** [[id number]] Collected: 09/30/22 1800    Order Status: Completed  Specimen: Blood from Peripheral, Antecubital, Left Updated: 10/02/22 2032     Blood Culture, Routine No Growth to date      No Growth to date      No Growth to date    Blood culture #1 **CANNOT BE ORDERED STAT** [[id number]] Collected: 09/30/22 1632    Order Status: Completed Specimen: Blood from Peripheral, Antecubital, Right Updated: 10/02/22 1832     Blood Culture, Routine No Growth to date      No Growth to date      No Growth to date    Blood culture [[id number]] Collected: 10/01/22 1058    Order Status: Completed Specimen: Blood from Antecubital, Right Arm Updated: 10/02/22 1232     Blood Culture, Routine No Growth to date      No Growth to date    Narrative:      Aerobic and anaerobic    Blood culture [[id number]] Collected: 10/01/22 1058    Order Status: Completed Specimen: Blood Updated: 10/02/22 1232     Blood Culture, Routine No Growth to date      No Growth to date    Narrative:      Aerobic and anaerobic            MOST RECENT IMAGING  X-Ray Chest AP Portable  Chest single view    CLINICAL DATA: Central line placement    FINDINGS: Comparison to 1722 hours. The heart and mediastinum are unchanged. Left-sided Port-A-Cath is unchanged in position. Right IJ central catheter has been placed with tip at the atriocaval junction. No pneumothorax is identified.    Diffuse interstitial prominence is stable. There may be trace amounts of pleural fluid bilaterally.    IMPRESSION:  1. Right IJ central catheter tip is at the atriocaval junction. No pneumothorax.  2. Stable bilateral interstitial prominence.  3. Probable trace amounts of pleural fluid bilaterally.    Electronically signed by:  Lm Merino MD  10/2/2022 7:49 PM CDT Workstation: [id number]  X-Ray Chest AP Portable  Chest single view    CLINICAL DATA: Wheezing    FINDINGS: AP view is compared to September 30. The heart and mediastinum are unchanged. Pulmonary interstitial markings appear mildly increased. No significant effusion is identified.  Left-sided Port-A-Cath is unchanged in position.    IMPRESSION:  1. Slightly increasing interstitial prominence. Correlate clinically for borderline interstitial edema or atypical infiltrate.  2. No other significant changes.    Electronically signed by:  Lm Merino MD  10/2/2022 5:46 PM CDT Workstation: [id number]  CTA Head and Neck (xpd)  CMS MANDATED QUALITY DATA-CT RADIATION DOSE-436, CAROTID-195  All CT scans at this facility use dose modulation, iterative reconstruction, and or weight based dosing when appropriate, to reduce radiation dose to as low as reasonably achievable. NASCET criteria were utilized for evaluation of carotid arterial stenosis.    HISTORY: Severe headache, concern for venous phlebitis, Lemierre's disease? developing Facial swelling on exam    FINDINGS: Thin axial imaging through the head and neck was performed with 100 mL Omnipaque 350 IV contrast, with sagittal and coronal reformatted images and MIP reconstructions performed, and images stored in the patient's permanent electronic medical record. Comparison to multiple prior exams.    CTA HEAD: The distal intracranial segments of the vertebral arteries and the basilar artery are patent. The distal cervical, petrous, cavernous and supraclinoid segments of the internal carotid arteries are widely patent, with calcified plaque of the carotid siphons.    The bilateral anterior, middle and posterior cerebral arteries are widely patent and taper appropriately, with no intracranial aneurysm or vascular malformation. The visualized dural venous sinuses enhance normally.    CTA NECK: Scattered calcified plaque involves the aortic arch and arch vessel origins, with the arch vessel origins widely patent. The visualized subclavian arteries are widely patent, with both common carotid arteries widely patent. Mild calcified plaque involves the carotid bulbs and ICA origins, with the carotid bulbs and internal carotid arteries widely  patent.    The external carotid arteries and branches are patent. The vertebral arteries arise normally from the subclavian arteries, and are widely patent and codominant. There is no arterial dissection or aneurysm.    The internal jugular veins and remaining neck vasculature enhance normally, with no retropharyngeal or paraspinal soft tissue mass or fluid collection. There are no enlarged cervical chain lymph nodes.    The superior mediastinum enhances normally. There are linear and bandlike opacities in the visualized upper lungs, which are otherwise clear. No acute fractures or destructive osseous lesions. There is intervertebral disc space narrowing in the spine with osteophytes.    IMPRESSION:  1. Mild calcified plaque of the carotid bulbs and ICA origins, and the carotid siphons.  2. Otherwise negative CTA head and CTA neck.    Electronically signed by:  José Antonio Diaz MD  10/2/2022 12:56 PM CDT Workstation: [id number]  US Upper Extremity Veins Left  HISTORY: Edema, rule out DVT    FINDINGS: Grayscale, color and spectral Doppler analysis of the left upper extremity deep venous system was performed. Comparison to prior exams. There is normal color and spectral Doppler vascular flow in the left internal jugular and left subclavian veins.    There is normal compressibility, with normal flow by color and spectral Doppler analysis in the left upper extremity deep venous system, with normal augmentation and no evidence of deep venous thrombosis.    IMPRESSION: Negative for left upper extremity deep venous thrombosis.    Electronically signed by:  José Antonio Diaz MD  10/2/2022 11:42 AM CDT Workstation: [id number]      CURRENT VISIT EKG  Results for orders placed or performed during the hospital encounter of 09/30/22   EKG 12-lead    Narrative    Test Reason : R07.9,    Vent. Rate : 067 BPM     Atrial Rate : 078 BPM     P-R Int : 000 ms          QRS Dur : 134 ms      QT Int : 382 ms       P-R-T Axes : 000 -70 069  degrees     QTc Int : 403 ms    Wide QRS rhythm angie rhythm  Left axis deviation  Right bundle branch block  Inferior infarct ,age undetermined  Abnormal ECG  When compared with ECG of 30-SEP-2022 14:38,  Wide QRS rhythm has replaced Sinus rhythm  Confirmed by Jordy JACOBS, Sivakumar MADISON ([id number]) on 10/2/2022 1:08:02 PM    Referred By: [email or web address]   SELF           Confirmed By:Sivakumar Spence MD       ECHOCARDIOGRAM RESULTS  No results found for this or any previous visit.        RESPIRATORY SUPPORT  Oxygen Concentration (%):  [30] 30       LAST ARTERIAL BLOOD GAS  ABG  Recent Labs   Lab 10/02/22  2014   PH 7.376   PO2 93   PCO2 47.9*   HCO3 28.1*   BE 3     PFTs 8/2022  FEV1/FVC 0.79, FEV1 78%, FVC 76%  TLC 76%, ERV 33%, RV 80%  DLCO 90%    ASSESSMENT/PLAN:   Sivakumar Thacker is a 67 y.o. male non-Hodgkins lymphoma on rituximab (last 9/15/22) with right chest port who presented with cellulitis of the chest on whom we have been consulted for acute hypercapnic respiratory failure requiring BiPAP.    NEUROLOGIC  #Opiate-induced hypoactive delirium, resolved  #CO2 narcosis 2/2 above, resolved  #Pain related to cellulitis, possible port infection  - minimize sedating meds, gunjan. opiates  - multimodal analgesia    CARDIAC  #Pulmonary edema  - s/p lasix  - strict I&O  - TTE ordered    PULMONARY  #Acute hypercapnic respiratory failure  Likely precipitated by opiate-induced hypoventilation. Recent PFTs show no obstruction.  #Pulmonary edema  #Possible hospital-acquired pneumonia in an immunocompromised host  - repeat BNP ordered  - s/p Lasix x 1  - BiPAP  - DuoNebs    GASTROINTESTINAL  No acute issues.    HEMATOLOGIC  #Non-Hodgkin's lypmhoma  #Pancytopenia  #Mild neutropenia  - hematology to give GCSF  - defer to hematology    RENAL/GENITOURINARY  #Hypokalemia  - monitor, replete    INFECTIOUS DISEASE  #Severe cellulitis  #Possible port infection  #Possible HAP  - Abx per ID  - agree with port removal    ENDOCRINE  #Hyperglycemia  -  AccuChecks, SSI    DVT prophylaxis: Lovenox ppx  Stress ulcer prophylaxis: on daily PPI  Code status: FULL  Disposition: May step down to floor status.      Boni Leyva MD  [facility]  Department of Pulmonary and Critical Care Medicine  Date of Service: 10/03/2022  10:14 AM

## 2022-10-03 NOTE — ANESTHESIA PROCEDURE NOTES
Arterial line    Diagnosis: Severe cellulitis respiratory failure    Patient location during procedure: ICU  Procedure start time: 10/2/2022 7:25 PM  Timeout: 10/2/2022 7:25 PM  Procedure end time: 10/2/2022 7:30 PM    Staffing  Authorizing Provider: Jarocho Butler MD  Performing Provider: Jarocho Butler MD    Anesthesiologist was present at the time of the procedure.    Preanesthetic Checklist  Completed: patient identified, risks and benefits discussed, timeout performed and anesthesia consent givenArterial line  Skin Prep: chlorhexidine gluconate  Local Infiltration: lidocaine  Orientation: right  Location: radial    Catheter Size: 20 G  Catheter placement by Ultrasound guidance. Heme positive aspiration all ports.   Vessel Caliber: medium, patent, compressibility normal  Vascular Doppler:  not done  Needle advanced into vessel with real time Ultrasound guidance.  Sterile sheath used.Insertion Attempts: 1  Assessment  Dressing: sutured in place and taped and tegaderm  Patient: Tolerated well

## 2022-10-03 NOTE — PROGRESS NOTES
Consult Note  Infectious Disease    Reason for Consult:  Severe lymphadenitis; L axilla cellulitis     HPI: Sivakumar Thacker is a 67 y.o. male with past medical history of hypertension, diabetes with lower extremity neuropathy, and low-grade non-Hodgkin's lymphoma on rituximab maintenance therapy every 2 months (last 9/15) and monthly IVIG (last 9/16). Presented on 09/30 for worsening redness and discomfort on his left axilla, which progressed to his neck and chest, with associated fever of 103 and chills at home.  He also complains of severe headache, nausea, no vomit, new onset bilateral lower extremity edema for about a month, no pain, and persistent nonproductive cough due to history of chronic bronchiectasis for which he has been on Augmentin and azithromycin 3 times a week, the latter was discontinued at last follow-up.  Patient contacted Heme-Onc before coming to the hospital, was prescribed Bactrim, given worsening redness and severity of pain patient came to the emergency room.  He denies abdominal pain, dysuria increased urinary frequency, or change in bowel movements.      In the ER, tachycardic 109, T-max 102.9°  Labs on admission white count 5.4, bands 6%, H&H 13/39, platelet count 168   Hypokalemia 3.4, creatinine 1.3   Glucose 273, normal LFTs   Lactic acid 1.8-->2.1, procalcitonin 17.2 high  UA with 1+ protein, 4+ glucose, negative for infection   Chest x-ray negative for acute pathology   CT head without contrast negative for acute pathology.  9 mm circumscribed hypodensity at the inferior margin of the right basal ganglia consistent with small chronic lacunar infarct or prominent perivascular space.  Sinus disease as above.  CTA chest negative for PE.  Interstitial prominence at both lung bases suggesting mild interstitial edema, with superimposed atelectasis or infiltrates at the left lung base.  Soft tissue ultrasound left axilla, revealed subcutaneous edema at the left axilla, no mass,  lymphadenopathy, or drainable fluid collection.  Correlate for possible cellulitis.    Patient admitted for sepsis likely secondary to left axilla spreading to neck and chest cellulitis.    Empirically started on vancomycin and cefepime IV.  Hospital course complicated by worsening leukopenia 1.4, and spreading of redness through his chest.    ID consult for left axilla cellulitis in lymphoma patient.    10/3 (Felix):  Consult reviewed and discussed with Dr. Schrader.  He takes Augmentin once a day and azithromycin 3 times weekly as prophylactic antibiotics for his hypogammaglobulinemia and recurring lung infections associated with bronchiectasis and sinusitis.  His illness began with chills followed by left axillary pain.  He has an abrasion on the top of his left hand which he states is a common injury for him.  The cellulitis it does extend over most of the chest wall and left upper quadrant.  He does have some abdominal tenderness with normal bowel sounds no nausea vomiting or diarrhea.  There is no fluctuance or crepitance.  He does have adequate range of motion at the shoulder elbow and wrist to exclude septic arthritis.  He does not report any water exposure      EXAM & DIAGNOSTICS REVIEWED:   Vitals:     Temp:  [97.7 °F (36.5 °C)-98.7 °F (37.1 °C)]   Temp: 98.1 °F (36.7 °C) (10/03/22 0301)  Pulse: 92 (10/03/22 0601)  Resp: (!) 27 (10/03/22 0601)  BP: (!) 170/88 (10/03/22 0301)  SpO2: 98 % (10/03/22 0601)    Intake/Output Summary (Last 24 hours) at 10/3/2022 0841  Last data filed at 10/3/2022 0624  Gross per 24 hour   Intake 350 ml   Output 2200 ml   Net -1850 ml       General:  In NAD. Alert and attentive, cooperative, requiring BiPAP   Eyes:  Anicteric, PERRL, extraocular movements are intact  ENT:  No ulcers, exudates, thrush, nares patent, dentition is good, mouth is dry  Neck:  Supple,    Lungs:  mostly clear but impaired by his discomfort in his upper thorax  Heart:  S1/S2+, regular rhythm, no  murmurs  Abd:  +BS, soft, tender to palpation, guards.  But it seems as though this is related to his abdominal wall cellulitis  :  Voids, urine clear, no flank tenderness  Musc:  Joints without effusion, swelling,  erythema, synovitis, ambulatory.  Able to spontaneously and passively move shoulder, elbow, wrist  Skin:  Warm, L axilla with redness, very tender to palpation, spreading to anterior chest and neck, pain worse on the left side, no evidence of fluctuance or crepitance  Wound:   Neuro:  Following commands, alert, speech is clear, neuropathy LE b/l  Psych:  Uncomfortable, cooperative  Lymphatic:   Unable to palpate left axilla secondary to tenderness.  He does have left supraclavicular tenderness  Extrem: Trace b/l LE edema, non tender to palpation - petechial rash on dorsum of  feet and medial ankles  VAD:  R Port-A-Cath       Isolation: Neutropenic    10/2:      9/30:        General Labs reviewed:  Recent Labs   Lab 10/02/22  0516 10/02/22  1711 10/02/22  2014 10/03/22  0400   WBC 1.40* 1.22*  --  1.94*   HGB 12.5* 13.3*  --  12.3*   HCT 39.7* 41.8 37 37.7*   PLT 79* 69*  --  74*       Recent Labs   Lab 09/30/22  1632 10/01/22  0630 10/02/22  0516 10/02/22  1709 10/03/22  0400    137 137  --  137   K 3.4* 3.6 4.0  --  3.4*    103 105  --  102   CO2 26 26 27  --  24   BUN 21 18 16  --  27*   CREATININE 1.3 1.2 0.9  --  1.3   CALCIUM 8.6* 8.2* 8.3*  --  8.7   PROT 6.5  --   --  6.6  --    BILITOT 1.2*  --   --  0.8  --    ALKPHOS 95  --   --  124  --    ALT 24  --   --  60*  --    AST 30  --   --  49*  --      Recent Labs   Lab 10/02/22  1709   CRP >38.00*     No results for input(s): SEDRATE in the last 168 hours.    Estimated Creatinine Clearance: 62.8 mL/min (based on SCr of 1.3 mg/dL).     Micro:  Microbiology Results (last 7 days)       Procedure Component Value Units Date/Time    Blood culture #2 **CANNOT BE ORDERED STAT** [[id number]] Collected: 09/30/22 1800    Order Status: Completed  Specimen: Blood from Peripheral, Antecubital, Left Updated: 10/02/22 2032     Blood Culture, Routine No Growth to date      No Growth to date      No Growth to date    Blood culture #1 **CANNOT BE ORDERED STAT** [[id number]] Collected: 09/30/22 1632    Order Status: Completed Specimen: Blood from Peripheral, Antecubital, Right Updated: 10/02/22 1832     Blood Culture, Routine No Growth to date      No Growth to date      No Growth to date    Blood culture [[id number]] Collected: 10/01/22 1058    Order Status: Completed Specimen: Blood from Antecubital, Right Arm Updated: 10/02/22 1232     Blood Culture, Routine No Growth to date      No Growth to date    Narrative:      Aerobic and anaerobic    Blood culture [[id number]] Collected: 10/01/22 1058    Order Status: Completed Specimen: Blood Updated: 10/02/22 1232     Blood Culture, Routine No Growth to date      No Growth to date    Narrative:      Aerobic and anaerobic            Imaging Reviewed:  Chest x-ray negative for acute pathology - Port-A-Cath tunneled from R to L  CT head without contrast negative for acute pathology.  9 mm circumscribed hypodensity at the inferior margin of the right basal ganglia consistent with small chronic lacunar infarct or prominent perivascular space.  Sinus disease as above.  CTA chest negative for PE.  Interstitial prominence at both lung bases suggesting mild interstitial edema, with superimposed atelectasis or infiltrates at the left lung base.  Soft tissue ultrasound left axilla, revealed subcutaneous edema at the left axilla, no mass, lymphadenopathy, or drainable fluid collection.  Correlate for possible cellulitis.      Cardiology: EKG QTC 403ms       IMPRESSION & PLAN     Severe left axilla cellulitis extending to neck and anterior chest, rule out nec fasc    Blood cultures x2 9/30 & 10/1 x 2 no growth to date, pending final   Lactic acid 1.8-->2.1, procalcitonin 17.2 high   Behaving like a beta-hemolytic strep infection,  despite daily Augmentin and thrice weekly Zithromax    2. Pancytopenia, h/o low-grade non-Hodgkin's lymphoma on rituximab last dose 9/16, IVIG last dose 9/16   Leukopenia   ANC now greater than 1000, thrombocytopenia 79  Unclear if skin cellulitis could be related to Rituximab    3. PMHx: hypertension, diabetes with lower extremity neuropathy, bronchiectasis     Recommendations:   Continue daptomycin and meropenem  Follow cultures   Aspiration precautions   Neutropenic precautions and be discontinued now  For surgery today to remove Port-A-Cath and perhaps explore the left axilla  Will send ASO titer  Will follow     D/w patient, daughters, nursing         Medical Decision Making during this encounter was  [_] Low Complexity  [_] Moderate Complexity  [xxx] High Complexity

## 2022-10-03 NOTE — BRIEF OP NOTE
On license of [facility]  Brief Operative Note    SUMMARY     Surgery Date: 10/3/2022     Surgeon(s) and Role:     * Franco Venegas III, MD - Primary    Assisting Surgeon: None    Pre-op Diagnosis:  Cellulitis of chest wall [L03.313]  Cellulitis of left axilla [L03.112]  Vascular port complication, initial encounter [T82.9XXA]    Post-op Diagnosis:  Post-Op Diagnosis Codes:     * Cellulitis of chest wall [L03.313]     * Cellulitis of left axilla [L03.112]     * Vascular port complication, initial encounter [T82.9XXA]    Procedure(s) (LRB):  REMOVAL left subclavian port a cath  INCISION AND DRAINAGE left axilla suspected abscess   Full thickness skin biopsy.     Anesthesia: General    Operative Findings: Patient brought to the operating room and transferred to the operating room table supine position.  He was given sedation.  The anterior chest, neck and left axilla was prepped and draped.  We 1st turned our attention to the Port-A-Cath.  Was a left subclavian Port-A-Cath was tunneled to the right chest.  I made an incision through the previous incision in the right chest.  Dissection was carried down to the surface of the port.  The fibrous capsule was entered and the port head was removed.  The catheter did not freely withdraw from the vein.  Fluoroscopy was called into the room and under fluoro I trace the path of the catheter toward the vein.  Incision was made in the left chest overlying the catheter about 2 cm inferior to the clavicle.  The dissection was carried down to the surface of the catheter.  It was grasped.  It was withdrawn from the vein.  The port catheter was then fully withdrawn from the right-sided incision.  Port-A-Cath was sent for culture.  We then turned attention left axilla.  An elliptical incision was made over the area of original cellulitis and suspected abscess.  The skin and underlying subcu was sent for biopsy.  I dissected into the subcu.  It was very edematous.  I did not  appreciate any pus.  A separate counter incision was made several cm inferiorly around nipple height.  Penrose drain was used to incisions.  The Port-A-Cath incision was closed with several interrupted subcutaneous Vicryl.  Kerlix was packed into the incisions around the Penrose.  Patient was brought back to the ICU in stable but guarded condition.  Instrument counts correct.  Complications none.    Estimated Blood Loss: 10 mL    Estimated Blood Loss has been documented.         Specimens:   Specimen (24h ago, onward)       Start     Ordered    10/03/22 1219  Specimen to Pathology - Surgery  Once        Comments: Pre-op Diagnosis: Cellulitis of chest wall [L03.313]Cellulitis of left axilla [L03.112]Vascular port complication, initial encounter [T82.9XXA]Procedure(s):REMOVAL, CATHETER, CENTRAL VENOUS, TUNNELED, WITH PORTINCISION AND DRAINAGE, ABSCESS Number of specimens: 2Name of specimens: 1. Skin from left axilla 2. Left chest wall nodule     Question:  Release to patient  Answer:  Immediate    10/03/22 1221                    [id number]

## 2022-10-03 NOTE — CLINICAL REVIEW
I was notified of the consult for this pt who is a 68 yo male with non hodgkins lymphoma on rituximab admitted with fevers, suspected cellulitis of the chest wall with plan for port-a-cath removal tonight. He was transferred to the ICU due to acute change in mentation, found to have acute hypercapneic respiratory failure requiring BIPAP. He is receiving broad spectrum antibiotic coverage per ID. At this time hemodynamics are stable. Pt is getting central line, arterial line placement.   Plan:  Repeat ABG now on bipap settings 20/8  Hold statin and amlodipine  Low threshold to intubate if he worsens or becomes unstable    Full consult note to follow.

## 2022-10-03 NOTE — ANESTHESIA PROCEDURE NOTES
Central Line    Diagnosis: Severe cellulitis respiratory failure  Doctor requesting consult: Dr. Huang  Patient location during procedure: ICU  Timeout: 10/2/2022 7:00 PM  Procedure end time: 10/2/2022 7:20 PM    Staffing  Authorizing Provider: Jarocho Butler MD  Performing Provider: Jarocho Butler MD    Staffing  Performed: anesthesiologist   Anesthesiologist: Jarocho Butler MD  Anesthesiologist was present at the time of the procedure.  Preanesthetic Checklist  Completed: patient identified, site marked, risks and benefits discussed, monitors and equipment checked, timeout performed and anesthesia consent given  Indication   Indication: vascular access     Anesthesia   local infiltration    Central Line   Skin Prep: skin prepped with ChloraPrep, skin prep agent completely dried prior to procedure  Sterile Barriers Followed: Yes    All five maximal barriers used- gloves, gown, cap, mask, and large sterile sheet    hand hygiene performed prior to central venous catheter insertion  Location: right internal jugular.   Catheter type: triple lumen  Catheter Size: 7 Fr  Inserted Catheter Length: 16 cm  Ultrasound: vascular probe with ultrasound   Vessel Caliber: large, patent, compressibility normal  Needle advanced into vessel with real time Ultrasound guidance.  Guidewire confirmed in vessel.  sterile gel and probe cover used in ultrasound-guided central venous catheter insertion  Manometry: none  Insertion Attempts: 1   Securement:line sutured, chlorhexidine patch, sterile dressing applied and blood return through all ports    Post-Procedure   X-Ray: no pneumothorax on x-ray, placement verified by x-ray, tip termination and successful placement  Tip termination comments: SVC   Adverse Events:none      Guidewire Guidewire removed intact.   Additional Notes  No indication of arterial puncture at any time.  All ports aspirated and flushed easily.

## 2022-10-03 NOTE — PLAN OF CARE
The patient has continued to worsen throughout the day. We have moved him to the ICU. Upon discussing with the nurse this evening, I have just been made aware that he only received cefepime today and did not receive vancomycin or clindamycin that were ordered. He has developed volume overload, acute hypercarbic respiratory failure and has been placed on bipap. I have placed consult to critical care and communicated with ICU nursing staff to involve them for care of the critical patient. I have discussed multiple times throughout the day with Dr Schrader as we have worked to determine a cause for his infection and worsening symptoms. We also reviewed with Dr Venegas. Dr Schrader has discussed with Dr Rubi alba and plan to remove his catheter and perform I&D tonight. His antibiotics have been broadened per Dr schrader. His blood pressure and heart rate remain stable. Continue bipap and follow ABG for improvement in hypercarbia.

## 2022-10-03 NOTE — NURSING
Pt transferred from floor on 4 L nasal cannula. One PIV in right FA. Neutropenic and contact precautions put in place. Previously pt A&O x 4 before arrival to ICU (per previous nurse). Alert and oriented x 3 but drowsy & slow to respond upon my assessment. However - IV benadryl, norco, & morphine given before arrival to ICU. Reddened areas noted at both left and right axilla areas and extending down torso. Noted around pt neck and feet as well. Redness marked with black ink this AM. Areas have grown since initial marking and continuing.     Pt pressure mildly elevated on arrival but otherwise hemodynamically stable. Pt tachycardic.. Afebrile but skin warm to touch and diaphoretic. Cooling packs applied. Pain only reported by pt when irritated areas touched.     Questioned attending MD regarding reasoning for EEG order. Reasoning was unsure & due to medications previously given, EEG d/c.

## 2022-10-03 NOTE — NURSING
Alert and oriented this AM, significant improvement since last night. C/o new pain in abdominal region (all over) and described pain as dull/aching/deep sensation, along with continued pain with touch to skin. Minimal movement causes pain throughout entire body. Stiffness seems to be worsening. Throat sore/aching as well. Verbalized to gen surg and ID as well.    Redness does not seem to have worsened since last night over the chest/axillary areas. Rash on leg worsened last night upon transfer to ICU but not much change not noted this AM.    99.4 temp recorded and pt c/o of feeling warm. Will provide ice packs and remove blankets.     1900 cc output over night. Still on BiPAP. Settings 20/8 and 30%. Lung sounds improved.

## 2022-10-03 NOTE — PROGRESS NOTES
Called to see patient and family at 6:45 p.m.    Events of the day noted  Cellulitis continues to worsen  Consider vibrio  Plan surgery a.m.  Removal of foreign bodies and possible skin biopsy  Differential diagnosis remains

## 2022-10-03 NOTE — PROGRESS NOTES
[facility]   Hematology/Oncology  Inpatient Progress Note          Patient Name: Sivakumar Thacker  MRN: [id number]  Admission Date: 9/30/2022  Hospital Length of Stay: 2 days  Code Status: Full Code   Attending Provider: John Mojica MD  Consulting Provider: Jefferson Ibarra MD  Primary Care Physician: Barry Mcmahon MD  Principal Problem:Cellulitis      Subjective:       Patient ID: Sivakumar Thacker is a 67 y.o. male.    Chief Complaint: Lymphadenopathy (Starting last night pt developed L sided chest/armpit swelling and redness. Pt has history of non-hodkins lymphoma, last chemo on 9/15, has port access on RUC. Pt was febrile at home took tylenol or ibuprofen (pt not sure) at 1100. Afebrile here, VSS. Denies recent injury/trauma. Site is not open or draining. )        History Present Illness:    Patient in ICU;  laying in bed, awake and alert; he is on bipap; seems to be breathing a little better after diuresis; less fluid in neck/face/legs; erythema/inflammation on axilla and CW seems to be lightening up; pain under control; no HA's, or N/V; his three daughters are at bedside and I discussed with them and answered any questions they had to their satisfaction; I discussed with ICU nurse in person      Review of Systems:  GEN: chills started this past Thursday, erythema and pain in left axilla, mailasie, fatigue  HEENT: normal with no HA's, sore throat, stiff neck, changes in vision  CV: normal with no CP mild pedal swelling for past 3-4 weeks  PULM: chronic stable SOB, cough, no hemoptysis, sputum or pleuritic pain  GI: normal with no abdominal pain, nausea, vomiting, constipation, diarrhea, melanotic stools, BRBPR, or hematemesis  : normal with no hematuria, dysuria  BREAST: normal with no mass, discharge, pain  SKIN: normal with no rash, erythema, bruising, or swelling      Objective:     Vitals:  Blood pressure (!) 170/88, pulse 92, temperature 98.1 °F (36.7 °C), temperature source Oral,  "resp. rate (!) 27, height 5' 8" (1.727 m), weight 95.8 kg (211 lb 3.2 oz), SpO2 98 %.    Physical Exam:  GEN: no apparent distress, comfortable; AAOx3; on bipap  HEAD: atraumatic and normocephalic  EYES: no pallor, no icterus, PERRLA  ENT: OMM, no pharyngeal erythema, external ears WNL; no nasal discharge; no thrush;    NECK: no masses, thyroid normal, trachea midline, no LAD/LN's, supple; rght IJ  CV: RRR with no murmur; normal pulse; normal S1 and S2; mild pedal edema; portacath on rght CW  CHEST: Normal respiratory effort; chronic coarseness, mild wheeze bilaterally; on bipap  ABDOM: nontender and nondistended; soft; normal bowel sounds; no rebound/guarding  MUSC/Skeletal: ROM normal; no crepitus; joints normal; no deformities or arthropathy  EXTREM: no clubbing, cyanosis, mild areas inflammation on bilateral lower extremities near ankles; mild swelling in hands and lower extremities; IV RUE  SKIN: no rashes, lesions, ulcers, petechiae or subcutaneous nodules ; erythematous and painful to touch subcuatenous tissues under left arm/axilla; with some expansion of the inflammation towards right CW  : no changes  NEURO: grossly intact; motor/sensory WNL; AAOx3; no tremors  PSYCH: normal mood, affect and behavior  LYMPH: normal cervical, supraclavicular, axillary and groin LN's               Lab Review:        Lab Results   Component Value Date    WBC 1.94 (LL) 10/03/2022    HGB 12.3 (L) 10/03/2022    HCT 37.7 (L) 10/03/2022    MCV 89 10/03/2022    PLT 74 (L) 10/03/2022       CMP  Sodium   Date Value Ref Range Status   10/03/2022 137 136 - 145 mmol/L Final   04/25/2019 144 134 - 144 mmol/L      Potassium   Date Value Ref Range Status   10/03/2022 3.4 (L) 3.5 - 5.1 mmol/L Final     Chloride   Date Value Ref Range Status   10/03/2022 102 95 - 110 mmol/L Final   04/25/2019 107 98 - 110 mmol/L      CO2   Date Value Ref Range Status   10/03/2022 24 23 - 29 mmol/L Final     Glucose   Date Value Ref Range Status "   10/03/2022 234 (H) 70 - 110 mg/dL Final   04/25/2019 177 (H) 70 - 99 mg/dL      BUN   Date Value Ref Range Status   10/03/2022 27 (H) 8 - 23 mg/dL Final     Creatinine   Date Value Ref Range Status   10/03/2022 1.3 0.5 - 1.4 mg/dL Final   04/25/2019 0.83 0.60 - 1.40 mg/dL      Calcium   Date Value Ref Range Status   10/03/2022 8.7 8.7 - 10.5 mg/dL Final     Total Protein   Date Value Ref Range Status   10/02/2022 6.6 6.0 - 8.4 g/dL Final     Albumin   Date Value Ref Range Status   10/02/2022 2.8 (L) 3.5 - 5.2 g/dL Final   04/25/2019 4.4 3.1 - 4.7 g/dL      Total Bilirubin   Date Value Ref Range Status   10/02/2022 0.8 0.1 - 1.0 mg/dL Final     Comment:     For infants and newborns, interpretation of results should be based  on gestational age, weight and in agreement with clinical  observations.    Premature Infant recommended reference ranges:  Up to 24 hours.............<8.0 mg/dL  Up to 48 hours............<12.0 mg/dL  3-5 days..................<15.0 mg/dL  6-29 days.................<15.0 mg/dL       Alkaline Phosphatase   Date Value Ref Range Status   10/02/2022 124 55 - 135 U/L Final     AST   Date Value Ref Range Status   10/02/2022 49 (H) 10 - 40 U/L Final     ALT   Date Value Ref Range Status   10/02/2022 60 (H) 10 - 44 U/L Final     Anion Gap   Date Value Ref Range Status   10/03/2022 11 8 - 16 mmol/L Final     eGFR if    Date Value Ref Range Status   07/21/2022 >60.0 >60 mL/min/1.73 m^2 Final     eGFR if non    Date Value Ref Range Status   07/21/2022 >60.0 >60 mL/min/1.73 m^2 Final     Comment:     Calculation used to obtain the estimated glomerular filtration  rate (eGFR) is the CKD-EPI equation.              Radiology Diagnostic Studies:     US Soft Tissue Axilla, Left [[id number]] Collected: 09/30/22 1819   Order Status: Completed Updated: 09/30/22 1940   Narrative:     Ultrasound left axilla     CLINICAL DATA: Left axillary erythema and pain. Reported history of  lymphoma.     FINDINGS: Sonographic assessment targeted to the left axilla was performed, with comparison evaluation of the right axilla.     Fatty soft tissues in the left axilla appear mildly edematous. No mass or lymphadenopathy is identified. There is no focal drainable fluid collection.     IMPRESSION:   1. Subcutaneous edema at the left axilla, with no mass, lymphadenopathy, or drainable fluid collection. Findings are nonspecific. Correlate for possible cellulitis.       CTA Chest Non-Coronary (PE Studies) [[id number]] Collected: 09/30/22 1723   Order Status: Completed Updated: 09/30/22 1759   Narrative:     CTA CHEST     HISTORY: Shortness of breath. Comparison to June 2021..     CMS MANDATED QUALITY DATA - CT RADIATION  436     All CT scans at this facility utilize dose modulation, iterative reconstruction, and/or weight based dosing when appropriate to reduce radiation dose to as low as reasonably achievable     FINDINGS: PE protocol was utilized.  Thin axial imaging through the chest was performed with 100 cc nonionic IV contrast, with sagittal and coronal reformatted images and 3-D reconstructions performed on an independent workstation, with images stored in the patient's permanent electronic medical record.     The pulmonary arteries are adequately opacified. Opacification of peripheral branches is heterogeneous.     The main pulmonary arteries and segmental branches are normal. Peripheral branches are grossly normal.     Heart size is upper normal. Multifocal coronary artery atherosclerotic calcification is noted. The thoracic aorta is normal in caliber. No pathologic mediastinal lymphadenopathy is identified.     Images at lung windows demonstrate mild interstitial prominence at the lung bases with mild interlobular septal thickening suggesting interstitial edema, new when compared to June 2021. There is superimposed minimal atelectasis or infiltrate at the left lung base. No effusion is identified.      IMPRESSION:       1. No evidence of pulmonary thromboembolic disease.   2. Interstitial prominence at both lung bases suggesting mild interstitial edema, with superimposed atelectasis or infiltrate at the left lung base.   3. Coronary artery atherosclerosis..       CT Head Without Contrast [[id number]] Collected: 09/30/22 1723   Order Status: Completed Updated: 09/30/22 1751   Narrative:     CT HEAD WITHOUT CONTRAST     CMS MANDATED QUALITY DATA - CT RADIATION  436     All CT scans at this facility utilize dose modulation, iterative reconstruction, and/or weight based dosing when appropriate to reduce radiation dose to as low as reasonably achievable     Clinical data: Altered mental status, syncope versus seizure.     FINDINGS: Noninfusion images were obtained from the skull base to the vertex. There is no intracranial mass, hemorrhage, or midline shift. Ventricles and sulci are normal. There are no pathologic extra-axial fluid collections. There is no evidence of acute ischemic change or edema. Small circumscribed hypodensity along the inferior margin of the right basal ganglia could reflect chronic lacunar infarct or prominent perivascular space. Cerebellum and brainstem are normal.     The calvarium is intact.There is a 16 mm mucous retention cyst or polyp in the left maxillary sinus, with mucoperiosteal thickening in the left maxillary and left frontal sinuses. Moderate bilateral ethmoid opacification is noted.     IMPRESSION:     1. No acute intracranial abnormalities.   2. 9 mm circumscribed hypodensity at the inferior margin of the right basal ganglia is consistent with small chronic lacunar infarct or prominent perivascular space.   3. Sinus disease as above.                   Assessment:     IMPRESSION:    (1) 67 y.o. male known to my oncology service with diagnosis of low grade NHL for which he has been on rituximab maintenance therapy. He presented to ED last night with redness and discomfort of the  left axill. [facility] I spoke to Dr Helm by phone last night. US of the axilla showed only subcutaneous edema at the left axilla, with no mass, lymphadenopathy, or drainable fluid collection.  CTA was negative for pulmonary emboli.       10/1/2022:  - on broad spectrum antibiotics IV  - ID consulted  - GenSurg consulted  - discussed with Dr Helm last night by phone and Dr Mojica in person today     10/2/2022: Clinical review  - patient seen by ID  - on broad spectrum IV abx for sepsis and cellulitis  - wbc is lower on the latest CBC but he is not on any chemotherapy that would expect to be the etiology  - plats ar 79,000 and also a little lower  - check PT/PTT and fibrinogen, smear for schistocytes  - will discuss GCSFwith PCP and ID  - will ask for repeat CBC as well  - he has been seen by Dr Venegas with GenSurg and is being transferred to ICU for closer monitoring    10/3/2022:  - patient in ICU currently; on bipap  - seen by Dr Schrader with ID, Dr Pratt with pulm critical care and Dr Venegas  - planned portacath removal and drain/biopsy of left axilla  - continued on Zarxio for the neutropenia  - continued on IV antibiotics per direction of ID  - wbc 1.94 and plats 74,000     (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     (2) HTN     (3) Neuropathy     (4) GERD     (5) DM - on metformin per Dr Nunez     (6) Hypogammaglobulinemia - on Iv IgG monthly     (7) Steatosis of liver     (8) Degenerative disc disease of back     (9) Diverticular disease     (10) Chronic bronchiectasis/bronchiolitis with TONY - followed by Dr Veliz and Lucia Georges                      1. Cellulitis of chest wall    2. Chest pain at rest    3. Hyperglycemia    4. Pre-syncope    5. Pancytopenia     6. Cellulitis of left axilla    7. Vascular port complication, initial encounter           Plan:     PLAN:          IV antibiotics for the cellulitis and infection workup as per ID directives   Pulmonary/critical care/bipap management as per pulmonary direction  Appreciate GenSurg evaluation for the axilla and portacath - he has been seen by Dr Venegas with GenSurg and they are planning portacath removal and axillary drain/bx today  Monitor labs daily  Continued with GCSF    Will follow with you               Jefferson Ibarra MD  Hematology/Oncology  [facility]

## 2022-10-03 NOTE — PLAN OF CARE
Problem: Adult Inpatient Plan of Care  Goal: Plan of Care Review  Outcome: Ongoing, Progressing  Goal: Patient-Specific Goal (Individualized)  Outcome: Ongoing, Progressing  Goal: Readiness for Transition of Care  Outcome: Ongoing, Progressing     Problem: Diabetes Comorbidity  Goal: Blood Glucose Level Within Targeted Range  Outcome: Ongoing, Progressing     Problem: Impaired Wound Healing  Goal: Optimal Wound Healing  Outcome: Ongoing, Progressing

## 2022-10-03 NOTE — TRANSFER OF CARE
"Anesthesia Transfer of Care Note    Patient: Sivakumar Thacker    Procedure(s) Performed: Procedure(s) (LRB):  REMOVAL, CATHETER, CENTRAL VENOUS, TUNNELED, WITH PORT (Right)  INCISION AND DRAINAGE, ABSCESS (Left)    Patient location: ICU    Anesthesia Type: general    Transport from OR: Transported from OR on 100% O2 by closed face mask with adequate spontaneous ventilation. Continuous ECG monitoring in transport. Continuous SpO2 monitoring in transport. Continuos invasive BP monitoring in transport    Post pain: adequate analgesia    Post assessment: no apparent anesthetic complications    Post vital signs: stable    Nausea/Vomiting: no nausea/vomiting    Complications: none    Transfer of care protocol was followed      Last vitals:   Visit Vitals  BP (!) 170/88   Pulse 92   Temp 36.7 °C (98.1 °F) (Oral)   Resp 20   Ht 5' 8" (1.727 m)   Wt 95.8 kg (211 lb 3.2 oz)   SpO2 98%   BMI 32.11 kg/m²     "

## 2022-10-03 NOTE — RESPIRATORY THERAPY
10/02/22 2011   Patient Assessment/Suction   Level of Consciousness (AVPU) alert   Respiratory Effort Unlabored   Expansion/Accessory Muscles/Retractions no use of accessory muscles   All Lung Fields Breath Sounds coarse;diminished   PRE-TX-O2   O2 Device (Oxygen Therapy) BiPAP   $ Is the patient on Low Flow Oxygen? Yes   Oxygen Concentration (%) 30   SpO2 100 %   Pulse Oximetry Type Continuous   $ Pulse Oximetry - Multiple Charge Pulse Oximetry - Multiple   Resp (!) 28   Ready to Wean/Extubation Screen   FIO2<=50 (chart decimal) 0.3   Preset CPAP/BiPAP Settings   Mode Of Delivery BiPAP   $ Initial CPAP/BiPAP Setup? No   $ Is patient using? Yes   Size of Mask Large   Sized Appropriately? Yes   Equipment Type V60   Airway Device Type large full face mask   Humidifier not applicable   Ipap 20   EPAP (cm H2O) 8   Pressure Support (cm H2O) 12   Set Rate (Breaths/Min) 26   ITime (sec) 0.85   Rise Time (sec) 2   Patient CPAP/BiPAP Settings   Timed Inspiration (Sec) 0.85   IPAP Rise Time (sec) 2   RR Total (Breaths/Min) 26   Tidal Volume (mL) 407   VE Minute Ventilation (L/min) 10.6 L/min   Peak Inspiratory Pressure (cm H2O) 20   TiTOT (%) 37   Total Leak (L/Min) 52   Patient Trigger - ST Mode Only (%) 19   Education   $ Education BiPAP;15 min   Labs   $ Was an ABG obtained? A Line;ISTAT - Blood gas;ISTAT - Calcium;ISTAT - Hematocrit;ISTAT - Potassium;ISTAT - Sodium   $ Labs Tech Time 15 min   Respiratory Evaluation   $ Care Plan Tech Time 15 min   $ Eval/Re-eval Charges Evaluation

## 2022-10-03 NOTE — NURSING
Consulted attending MD regarding lactic levels & whether we were continuing to trend.     PICC consult placed due to lab having difficulty with blood draws & abx load ordered. Called PICC team and notified them of order - they said they would be down soon to place line.     ABX doses requested from pharmacy. Doses not available.    Pt began coughing up copious amounts of white frothy sputum and wheezing. Violently coughing. Pt also complained of itching. Notified MD about acute change in status. Per pt daughter, which has been at pt bedside throughout day, she further verified acute change.      Due to poor vascular circulation, VBG unable to be obtained and ABG ordered.    Acute change in mentation noted as RT preparing to obtain ABG. Pt was now confused & no longer oriented to situation/place/time.       Attending MD arrived as ABG resulted and ordered BiPAP. Handoff given to relief.           No

## 2022-10-03 NOTE — CARE UPDATE
10/03/22 1828   Preset CPAP/BiPAP Settings   Mode Of Delivery BiPAP   $ CPAP/BiPAP Daily Charge BiPAP/CPAP Daily   $ Initial CPAP/BiPAP Setup? Yes   $ Is patient using? Yes   Size of Mask Large   Sized Appropriately? Yes   Equipment Type V60   Airway Device Type large full face mask   Humidifier not applicable   Ipap 12   EPAP (cm H2O) 6   Pressure Support (cm H2O) 6   Set Rate (Breaths/Min) 12   ITime (sec) 1   Rise Time (sec) 3   Patient CPAP/BiPAP Settings   Timed Inspiration (Sec) 1   RR Total (Breaths/Min) 18   Tidal Volume (mL) 763   VE Minute Ventilation (L/min) 14.4 L/min   Peak Inspiratory Pressure (cm H2O) 16   TiTOT (%) 21   Total Leak (L/Min) 43   Patient Trigger - ST Mode Only (%) 81

## 2022-10-03 NOTE — NURSING
Pt returned from surgery, monitored approx 45 mins and VS stayed WNL Airway removed approx 30 mins. NC applied at 3 L. Surgical dressings serosanguinous with moderate saturation. Dressings intact. Left penrose drain.     Pt seems to have some expressive aphasia but unsure of pt normal baseline to adequately assess. PRN ABG utilized for intermittent confusion.     No using manual BP cuff due to neuropathy BLE, brenden in R arm, and pain/swelling to right arm. Utilizing only a-line to monitor pressure as of now.    Echo performed today. On NC most of day, placed on BiPAP for brief period while waiting for ABG to result.

## 2022-10-03 NOTE — PROGRESS NOTES
[facility]  General Surgery  Progress Note    Subjective:     Interval History:  I was called to patient's bedside tonight with clinical changes.  Patient became disoriented, confused. There was also concern for progressing sepsis.  Found to be hypoxic and hypercapnic on ABG.  Central line and arterial line were placed.  Chest x-ray showed diffuse interstitial prominence.  No evidence of pneumothorax.  Patient was started on noninvasive positive pressure.  Blood gas trended toward normal.  Mental status improved significantly.  Vital signs remained stable through the entire process with systolic blood pressure ranging from 120-170.  Heart rate in the 80s to 90s.    White count also a tad lower at 1.2.  Platelets also lower at 69.  Lactate normal at 1.4    Post-Op Info:  * No surgery found *          Medications:  Continuous Infusions:  Scheduled Meds:   DAPTOmycin (CUBICIN) IV (PEDS and ADULTS)  500 mg Intravenous Q24H    diphenhydrAMINE  25 mg Intravenous Q6H    enoxaparin  40 mg Subcutaneous Daily    filgrastim-sndz  300 mcg Subcutaneous Daily    fluticasone propionate  1 spray Each Nostril BID    furosemide        [START ON 10/3/2022] furosemide (LASIX) injection  40 mg Intravenous Once    LIDOcaine (PF) 20 mg/mL (2%)        meropenem (MERREM) IVPB  1 g Intravenous Q8H    methylPREDNISolone sodium succinate injection  60 mg Intravenous Q6H    micafungin (MYCAMINE) IVPB  150 mg Intravenous Q24H    montelukast  10 mg Oral Daily    pantoprazole  40 mg Oral Daily     PRN Meds:acetaminophen, albuterol-ipratropium, benzonatate, dextrose 10%, glucagon (human recombinant), glucose, glucose, insulin aspart U-100, melatonin, morphine, naloxone, ondansetron, oxyCODONE-acetaminophen, polyethylene glycol, senna-docusate 8.6-50 mg, simethicone, tiZANidine     Objective:     Vital Signs (Most Recent):  Temp: 98.5 °F (36.9 °C) (10/02/22 1621)  Pulse: 95 (10/02/22 2000)  Resp: (!) 28 (10/02/22 2011)  BP: (!) 177/84  (10/02/22 2000)  SpO2: 100 % (10/02/22 2011)   Vital Signs (24h Range):  Temp:  [97.8 °F (36.6 °C)-98.7 °F (37.1 °C)] 98.5 °F (36.9 °C)  Pulse:  [] 95  Resp:  [11-28] 28  SpO2:  [94 %-100 %] 100 %  BP: (111-177)/(66-85) 177/84  Arterial Line BP: (148)/(73) 148/73       Intake/Output Summary (Last 24 hours) at 10/2/2022 2139  Last data filed at 10/2/2022 1352  Gross per 24 hour   Intake 750 ml   Output 1800 ml   Net -1050 ml       Physical Exam  Skin:     Comments: No change in physical exam.  The erythema is still significant and within the borders of the markers from earlier.  No drainage or new fluctuance.  Still very tender with light touch to the erythematous skin       Significant Labs:  ABGs:   Recent Labs   Lab 10/02/22  2014   PH 7.376   PCO2 47.9*   PO2 93   HCO3 28.1*   POCSATURATED 97   BE 3     CBC:   Recent Labs   Lab 10/02/22  1711 10/02/22  2014   WBC 1.22*  --    RBC 4.55*  --    HGB 13.3*  --    HCT 41.8 37   PLT 69*  --    MCV 92  --    MCH 29.2  --    MCHC 31.8*  --      CMP:   Recent Labs   Lab 10/02/22  0516 10/02/22  1709   *  --    CALCIUM 8.3*  --    ALBUMIN  --  2.8*   PROT  --  6.6     --    K 4.0  --    CO2 27  --      --    BUN 16  --    CREATININE 0.9  --    ALKPHOS  --  124   ALT  --  60*   AST  --  49*   BILITOT  --  0.8     Lactic Acid:   Recent Labs   Lab 10/02/22  1709   LACTATE 1.4       Significant Diagnostics:  I have reviewed all pertinent imaging results/findings within the past 24 hours.    Assessment/Plan:     Active Diagnoses:    Diagnosis Date Noted POA    PRINCIPAL PROBLEM:  Cellulitis Left Axilla [L03.90] 09/30/2022 Yes    Pancytopenia [D61.818] 10/02/2022 Unknown    Chronic obstructive pulmonary disease, unspecified COPD type [J44.9] 03/28/2022 Yes    Type 2 diabetes mellitus without complication, without long-term current use of insulin [E11.9] 06/23/2021 Yes    Skin lesion [L98.9] 06/23/2021 Yes    Essential hypertension [I10] 12/30/2019 Yes     Hypogammaglobulinemia [D80.1] 12/13/2019 Yes    Follicular lymphoma grade IIIa [C82.30] 12/04/2018 Yes      Problems Resolved During this Admission:   -clinical change seemed to be due to the hypoxia and hypercapnia.  Clinical status improved after positive pressure therapy.  He will continue on this tonight.  Still plan on removing Port-A-Cath and performing incision and drainage on the left axilla.  Will also include a skin biopsy.      Franco Venegas III, MD  General Surgery  [facility]

## 2022-10-03 NOTE — CARE UPDATE
10/03/22 1717   Labs   $ Was an ABG obtained? A Line;ISTAT - Blood gas   $ Labs Tech Time 15 min   Critical Value Communication   Date Result Received 10/03/22   Time Result Received 1816   Resulting Department of Critical Value Resp   Who communicated critical value from resulting department? TABITHA BOYKIN RT   Name of Notified Physician/Designee EMANUEL BUSH RN   Date Notified 10/03/22   Time Notified 1717   Read Back Verification Yes

## 2022-10-03 NOTE — PROGRESS NOTES
Consulted for picc placement, spoke with Padma RN, pt already has central line access. No picc needed

## 2022-10-03 NOTE — ANESTHESIA PREPROCEDURE EVALUATION
10/03/2022  Sivakumar Thacker is a 67 y.o., male.      Patient Active Problem List   Diagnosis    Allergic rhinitis    Follicular lymphoma grade IIIa    Nonfamilial hypogammaglobulinemia    Hypogammaglobulinemia    Essential hypertension    Elevated lipoprotein(a)    Prediabetes    Encounter for antineoplastic chemotherapy    Bronchiectasis with acute lower respiratory infection    Type II diabetes mellitus with neurological manifestations    Skin lesion    Type 2 diabetes mellitus without complication, without long-term current use of insulin    Chronic obstructive pulmonary disease, unspecified COPD type    Cellulitis Left Axilla    Pancytopenia    Sepsis    Petechiae       Past Surgical History:   Procedure Laterality Date    COLONOSCOPY  2018    EYE SURGERY  Approximately 3 years ago    Cataract    HAND SURGERY      amputation left index finger    PORTACATH PLACEMENT      SKIN CANCER EXCISION  09/30/2020    [facility]    SPINE SURGERY      VASECTOMY  1985 ?        Tobacco Use:  The patient  reports that he has quit smoking. He has a 1.00 pack-year smoking history. He has never used smokeless tobacco.     Results for orders placed or performed during the hospital encounter of 09/30/22   EKG 12-lead    Collection Time: 09/30/22  4:53 PM    Narrative    Test Reason : R07.9,    Vent. Rate : 067 BPM     Atrial Rate : 078 BPM     P-R Int : 000 ms          QRS Dur : 134 ms      QT Int : 382 ms       P-R-T Axes : 000 -70 069 degrees     QTc Int : 403 ms    Wide QRS rhythm angie rhythm  Left axis deviation  Right bundle branch block  Inferior infarct ,age undetermined  Abnormal ECG  When compared with ECG of 30-SEP-2022 14:38,  Wide QRS rhythm has replaced Sinus rhythm  Confirmed by Jordy JACOBS, Sivakumar MADISON ([id number]) on 10/2/2022 1:08:02 PM    Referred By: MARQUISE   SELF           Confirmed  By:Sivakumar Spence MD        Imaging Results          US Soft Tissue Axilla, Left (Final result)  Result time 09/30/22 19:38:44   Procedure changed from US Chest Mediastinum     Final result by Lm Merino MD (09/30/22 19:38:44)                 Narrative:    Ultrasound left axilla    CLINICAL DATA: Left axillary erythema and pain. Reported history of lymphoma.    FINDINGS: Sonographic assessment targeted to the left axilla was performed, with comparison evaluation of the right axilla.    Fatty soft tissues in the left axilla appear mildly edematous. No mass or lymphadenopathy is identified. There is no focal drainable fluid collection.    IMPRESSION:  1. Subcutaneous edema at the left axilla, with no mass, lymphadenopathy, or drainable fluid collection. Findings are nonspecific. Correlate for possible cellulitis.    Electronically signed by:  Lm Merino MD  9/30/2022 7:38 PM CDT Workstation: [id number]                             CTA Chest Non-Coronary (PE Studies) (Final result)  Result time 09/30/22 17:57:33    Final result by Lm Merino MD (09/30/22 17:57:33)                 Narrative:    CTA CHEST    HISTORY: Shortness of breath. Comparison to June 2021..    CMS MANDATED QUALITY DATA - CT RADIATION  436    All CT scans at this facility utilize dose modulation, iterative reconstruction, and/or weight based dosing when appropriate to reduce radiation dose to as low as reasonably achievable    FINDINGS: PE protocol was utilized.  Thin axial imaging through the chest was performed with 100 cc nonionic IV contrast, with sagittal and coronal reformatted images and 3-D reconstructions performed on an independent workstation, with images stored in the patient's permanent electronic medical record.    The pulmonary arteries are adequately opacified. Opacification of peripheral branches is heterogeneous.    The main pulmonary arteries and segmental branches are normal. Peripheral branches are  grossly normal.    Heart size is upper normal. Multifocal coronary artery atherosclerotic calcification is noted. The thoracic aorta is normal in caliber. No pathologic mediastinal lymphadenopathy is identified.    Images at lung windows demonstrate mild interstitial prominence at the lung bases with mild interlobular septal thickening suggesting interstitial edema, new when compared to June 2021. There is superimposed minimal atelectasis or infiltrate at the left lung base. No effusion is identified.    IMPRESSION:      1. No evidence of pulmonary thromboembolic disease.  2. Interstitial prominence at both lung bases suggesting mild interstitial edema, with superimposed atelectasis or infiltrate at the left lung base.  3. Coronary artery atherosclerosis..    Electronically signed by:  Lm Merino MD  9/30/2022 5:57 PM CDT Workstation: [id number]                             CT Head Without Contrast (Final result)  Result time 09/30/22 17:49:13    Final result by Lm Merino MD (09/30/22 17:49:13)                 Narrative:    CT HEAD WITHOUT CONTRAST    CMS MANDATED QUALITY DATA - CT RADIATION  436    All CT scans at this facility utilize dose modulation, iterative reconstruction, and/or weight based dosing when appropriate to reduce radiation dose to as low as reasonably achievable    Clinical data: Altered mental status, syncope versus seizure.    FINDINGS: Noninfusion images were obtained from the skull base to the vertex. There is no intracranial mass, hemorrhage, or midline shift. Ventricles and sulci are normal. There are no pathologic extra-axial fluid collections. There is no evidence of acute ischemic change or edema. Small circumscribed hypodensity along the inferior margin of the right basal ganglia could reflect chronic lacunar infarct or prominent perivascular space. Cerebellum and brainstem are normal.    The calvarium is intact.There is a 16 mm mucous retention cyst or polyp in the left  maxillary sinus, with mucoperiosteal thickening in the left maxillary and left frontal sinuses. Moderate bilateral ethmoid opacification is noted.    IMPRESSION:    1. No acute intracranial abnormalities.  2. 9 mm circumscribed hypodensity at the inferior margin of the right basal ganglia is consistent with small chronic lacunar infarct or prominent perivascular space.  3. Sinus disease as above.    Electronically signed by:  Lm Merino MD  9/30/2022 5:49 PM CDT Workstation: [id number]                             X-Ray Chest AP Portable (Final result)  Result time 09/30/22 16:30:09    Final result by Lm Merino MD (09/30/22 16:30:09)                 Narrative:    Chest single view    CLINICAL DATA: Fever    FINDINGS: AP view is compared to March 2021. Heart size is normal. There is mild aortic atherosclerosis. No active infiltrate or effusion is identified. Left-sided Port-A-Cath is unchanged in position. Osseous structures are unremarkable.    IMPRESSION:  1. No acute radiographic abnormalities.    Electronically signed by:  Lm Merino MD  9/30/2022 4:30 PM CDT Workstation: [id number]                               Lab Results   Component Value Date    WBC 1.94 (LL) 10/03/2022    HGB 12.3 (L) 10/03/2022    HCT 37.7 (L) 10/03/2022    MCV 89 10/03/2022    PLT 74 (L) 10/03/2022     BMP  Lab Results   Component Value Date     10/03/2022    K 3.4 (L) 10/03/2022     10/03/2022    CO2 24 10/03/2022    BUN 27 (H) 10/03/2022    CREATININE 1.3 10/03/2022    CALCIUM 8.7 10/03/2022    ANIONGAP 11 10/03/2022     (H) 10/03/2022     (H) 10/02/2022     (H) 10/01/2022       No results found for this or any previous visit.              Pre-op Assessment    I have reviewed the Patient Summary Reports.     I have reviewed the Nursing Notes. I have reviewed the NPO Status.   I have reviewed the Medications.     Review of Systems  Anesthesia Hx:  No problems with previous  Anesthesia  Denies Family Hx of Anesthesia complications.   Denies Personal Hx of Anesthesia complications.   Social:  Former Smoker    Cardiovascular:   Hypertension, well controlled    Pulmonary:   Pneumonia: hx bronchiectasis. COPD, moderate    Neurological:   Peripheral Neuropathy (diabetic neuropathy)    Endocrine:   Diabetes, poorly controlled, type 2        Physical Exam  General: Well nourished, Cooperative, Alert and Oriented    Airway:  Mallampati: III / II  Mouth Opening: Normal  TM Distance: Normal  Tongue: Normal  Neck ROM: Normal ROM    Dental:  Intact    Chest/Lungs:  Clear to auscultation    Heart:  Rate: Normal  Rhythm: Regular Rhythm  Sounds: Normal    Abdomen:  Normal, Soft, Nontender        Anesthesia Plan  Type of Anesthesia, risks & benefits discussed:    Anesthesia Type: Gen ETT  Intra-op Monitoring Plan: Standard ASA Monitors  Post Op Pain Control Plan: multimodal analgesia and IV/PO Opioids PRN  Induction:  IV  Airway Plan: Video, Post-Induction  Informed Consent: Informed consent signed with the Patient and all parties understand the risks and agree with anesthesia plan.  All questions answered.   ASA Score: 3 Emergent    Ready For Surgery From Anesthesia Perspective.     .

## 2022-10-04 ENCOUNTER — TELEPHONE (OUTPATIENT)
Dept: FAMILY MEDICINE | Facility: CLINIC | Age: 67
End: 2022-10-04
Payer: MEDICARE

## 2022-10-04 PROBLEM — R23.3 PETECHIAE: Status: RESOLVED | Noted: 2022-10-03 | Resolved: 2022-10-04

## 2022-10-04 LAB
ANION GAP SERPL CALC-SCNC: 6 MMOL/L (ref 8–16)
ANISOCYTOSIS BLD QL SMEAR: SLIGHT
BASOPHILS NFR BLD: 0 % (ref 0–1.9)
BNP SERPL-MCNC: 130 PG/ML (ref 0–99)
BUN SERPL-MCNC: 38 MG/DL (ref 8–23)
CALCIUM SERPL-MCNC: 8.3 MG/DL (ref 8.7–10.5)
CHLORIDE SERPL-SCNC: 105 MMOL/L (ref 95–110)
CO2 SERPL-SCNC: 27 MMOL/L (ref 23–29)
CREAT SERPL-MCNC: 1 MG/DL (ref 0.5–1.4)
DIFFERENTIAL METHOD: ABNORMAL
EOSINOPHIL NFR BLD: 0 % (ref 0–8)
ERYTHROCYTE [DISTWIDTH] IN BLOOD BY AUTOMATED COUNT: 13.3 % (ref 11.5–14.5)
EST. GFR  (NO RACE VARIABLE): >60 ML/MIN/1.73 M^2
GLUCOSE SERPL-MCNC: 266 MG/DL (ref 70–110)
GLUCOSE SERPL-MCNC: 277 MG/DL (ref 70–110)
GLUCOSE SERPL-MCNC: 294 MG/DL (ref 70–110)
GLUCOSE SERPL-MCNC: 322 MG/DL (ref 70–110)
GLUCOSE SERPL-MCNC: 340 MG/DL (ref 70–110)
HCT VFR BLD AUTO: 33.8 % (ref 40–54)
HGB BLD-MCNC: 11.1 G/DL (ref 14–18)
IMM GRANULOCYTES # BLD AUTO: ABNORMAL K/UL (ref 0–0.04)
IMM GRANULOCYTES NFR BLD AUTO: ABNORMAL % (ref 0–0.5)
LABCORP MISC TEST CODE: 6031
LABCORP MISC TEST NAME: NORMAL
LABCORP MISCELLANEOUS TEST: NORMAL
LYMPHOCYTES NFR BLD: 12 % (ref 18–48)
MCH RBC QN AUTO: 29.3 PG (ref 27–31)
MCHC RBC AUTO-ENTMCNC: 32.8 G/DL (ref 32–36)
MCV RBC AUTO: 89 FL (ref 82–98)
MONOCYTES NFR BLD: 26 % (ref 4–15)
NEUTROPHILS NFR BLD: 50 % (ref 38–73)
NEUTS BAND NFR BLD MANUAL: 12 %
NRBC BLD-RTO: 0 /100 WBC
PLATELET # BLD AUTO: 78 K/UL (ref 150–450)
PMV BLD AUTO: 11.2 FL (ref 9.2–12.9)
POTASSIUM SERPL-SCNC: 3.6 MMOL/L (ref 3.5–5.1)
RBC # BLD AUTO: 3.79 M/UL (ref 4.6–6.2)
SODIUM SERPL-SCNC: 138 MMOL/L (ref 136–145)
WBC # BLD AUTO: 2.64 K/UL (ref 3.9–12.7)

## 2022-10-04 PROCEDURE — 25000003 PHARM REV CODE 250: Performed by: SURGERY

## 2022-10-04 PROCEDURE — 25000003 PHARM REV CODE 250: Performed by: INTERNAL MEDICINE

## 2022-10-04 PROCEDURE — 99900035 HC TECH TIME PER 15 MIN (STAT)

## 2022-10-04 PROCEDURE — 25000242 PHARM REV CODE 250 ALT 637 W/ HCPCS: Performed by: SURGERY

## 2022-10-04 PROCEDURE — 85027 COMPLETE CBC AUTOMATED: CPT | Performed by: SURGERY

## 2022-10-04 PROCEDURE — 99233 SBSQ HOSP IP/OBS HIGH 50: CPT | Mod: ,,, | Performed by: INTERNAL MEDICINE

## 2022-10-04 PROCEDURE — 63600175 PHARM REV CODE 636 W HCPCS: Performed by: SURGERY

## 2022-10-04 PROCEDURE — 94761 N-INVAS EAR/PLS OXIMETRY MLT: CPT

## 2022-10-04 PROCEDURE — 94660 CPAP INITIATION&MGMT: CPT

## 2022-10-04 PROCEDURE — 12000002 HC ACUTE/MED SURGE SEMI-PRIVATE ROOM

## 2022-10-04 PROCEDURE — C9113 INJ PANTOPRAZOLE SODIUM, VIA: HCPCS | Performed by: SURGERY

## 2022-10-04 PROCEDURE — 97161 PT EVAL LOW COMPLEX 20 MIN: CPT

## 2022-10-04 PROCEDURE — 85007 BL SMEAR W/DIFF WBC COUNT: CPT | Performed by: SURGERY

## 2022-10-04 PROCEDURE — 83880 ASSAY OF NATRIURETIC PEPTIDE: CPT | Performed by: INTERNAL MEDICINE

## 2022-10-04 PROCEDURE — 99233 PR SUBSEQUENT HOSPITAL CARE,LEVL III: ICD-10-PCS | Mod: ,,, | Performed by: INTERNAL MEDICINE

## 2022-10-04 PROCEDURE — 27000221 HC OXYGEN, UP TO 24 HOURS

## 2022-10-04 PROCEDURE — 97530 THERAPEUTIC ACTIVITIES: CPT

## 2022-10-04 PROCEDURE — 94640 AIRWAY INHALATION TREATMENT: CPT

## 2022-10-04 PROCEDURE — 63600175 PHARM REV CODE 636 W HCPCS: Mod: JG | Performed by: SURGERY

## 2022-10-04 PROCEDURE — 80048 BASIC METABOLIC PNL TOTAL CA: CPT | Performed by: SURGERY

## 2022-10-04 RX ORDER — LANOLIN ALCOHOL/MO/W.PET/CERES
800 CREAM (GRAM) TOPICAL
Status: DISCONTINUED | OUTPATIENT
Start: 2022-10-04 | End: 2022-10-08 | Stop reason: HOSPADM

## 2022-10-04 RX ORDER — SODIUM,POTASSIUM PHOSPHATES 280-250MG
2 POWDER IN PACKET (EA) ORAL
Status: DISCONTINUED | OUTPATIENT
Start: 2022-10-04 | End: 2022-10-08 | Stop reason: HOSPADM

## 2022-10-04 RX ORDER — ATORVASTATIN CALCIUM 20 MG/1
20 TABLET, FILM COATED ORAL DAILY
Status: DISCONTINUED | OUTPATIENT
Start: 2022-10-04 | End: 2022-10-08 | Stop reason: HOSPADM

## 2022-10-04 RX ORDER — AMLODIPINE BESYLATE 5 MG/1
5 TABLET ORAL DAILY
Status: DISCONTINUED | OUTPATIENT
Start: 2022-10-04 | End: 2022-10-08 | Stop reason: HOSPADM

## 2022-10-04 RX ORDER — GABAPENTIN 300 MG/1
300 CAPSULE ORAL 3 TIMES DAILY
Status: DISCONTINUED | OUTPATIENT
Start: 2022-10-04 | End: 2022-10-08 | Stop reason: HOSPADM

## 2022-10-04 RX ORDER — METFORMIN HYDROCHLORIDE 500 MG/1
500 TABLET ORAL 2 TIMES DAILY WITH MEALS
Status: DISCONTINUED | OUTPATIENT
Start: 2022-10-04 | End: 2022-10-08 | Stop reason: HOSPADM

## 2022-10-04 RX ORDER — ASPIRIN 81 MG/1
81 TABLET ORAL DAILY
Status: DISCONTINUED | OUTPATIENT
Start: 2022-10-04 | End: 2022-10-08 | Stop reason: HOSPADM

## 2022-10-04 RX ADMIN — GABAPENTIN 300 MG: 300 CAPSULE ORAL at 09:10

## 2022-10-04 RX ADMIN — ENOXAPARIN SODIUM 40 MG: 40 INJECTION SUBCUTANEOUS at 04:10

## 2022-10-04 RX ADMIN — MONTELUKAST 10 MG: 10 TABLET, FILM COATED ORAL at 09:10

## 2022-10-04 RX ADMIN — GABAPENTIN 300 MG: 300 CAPSULE ORAL at 11:10

## 2022-10-04 RX ADMIN — MEROPENEM AND SODIUM CHLORIDE 1 G: 1 INJECTION, SOLUTION INTRAVENOUS at 10:10

## 2022-10-04 RX ADMIN — METFORMIN HYDROCHLORIDE 500 MG: 500 TABLET, FILM COATED ORAL at 12:10

## 2022-10-04 RX ADMIN — FLUTICASONE PROPIONATE 50 MCG: 50 SPRAY, METERED NASAL at 01:10

## 2022-10-04 RX ADMIN — LOSARTAN POTASSIUM 12.5 MG: 25 TABLET, FILM COATED ORAL at 12:10

## 2022-10-04 RX ADMIN — INSULIN ASPART 3 UNITS: 100 INJECTION, SOLUTION INTRAVENOUS; SUBCUTANEOUS at 09:10

## 2022-10-04 RX ADMIN — MICAFUNGIN SODIUM 150 MG: 100 INJECTION, POWDER, LYOPHILIZED, FOR SOLUTION INTRAVENOUS at 01:10

## 2022-10-04 RX ADMIN — IPRATROPIUM BROMIDE AND ALBUTEROL SULFATE 3 ML: .5; 3 SOLUTION RESPIRATORY (INHALATION) at 10:10

## 2022-10-04 RX ADMIN — DAPTOMYCIN 500 MG: 500 INJECTION, POWDER, LYOPHILIZED, FOR SOLUTION INTRAVENOUS at 06:10

## 2022-10-04 RX ADMIN — MEROPENEM AND SODIUM CHLORIDE 1 G: 1 INJECTION, SOLUTION INTRAVENOUS at 12:10

## 2022-10-04 RX ADMIN — INSULIN ASPART 2 UNITS: 100 INJECTION, SOLUTION INTRAVENOUS; SUBCUTANEOUS at 06:10

## 2022-10-04 RX ADMIN — MUPIROCIN 1 G: 20 OINTMENT TOPICAL at 09:10

## 2022-10-04 RX ADMIN — PANTOPRAZOLE SODIUM 40 MG: 40 INJECTION, POWDER, FOR SOLUTION INTRAVENOUS at 09:10

## 2022-10-04 RX ADMIN — FILGRASTIM-SNDZ 300 MCG: 300 INJECTION, SOLUTION INTRAVENOUS; SUBCUTANEOUS at 11:10

## 2022-10-04 RX ADMIN — FLUTICASONE PROPIONATE 50 MCG: 50 SPRAY, METERED NASAL at 09:10

## 2022-10-04 RX ADMIN — CHLORHEXIDINE GLUCONATE 15 ML: 1.2 RINSE ORAL at 09:10

## 2022-10-04 RX ADMIN — GABAPENTIN 300 MG: 300 CAPSULE ORAL at 04:10

## 2022-10-04 RX ADMIN — ASPIRIN 81 MG: 81 TABLET, COATED ORAL at 11:10

## 2022-10-04 RX ADMIN — ATORVASTATIN CALCIUM 20 MG: 20 TABLET, FILM COATED ORAL at 11:10

## 2022-10-04 RX ADMIN — INSULIN ASPART 4 UNITS: 100 INJECTION, SOLUTION INTRAVENOUS; SUBCUTANEOUS at 11:10

## 2022-10-04 RX ADMIN — INSULIN ASPART 1 UNITS: 100 INJECTION, SOLUTION INTRAVENOUS; SUBCUTANEOUS at 09:10

## 2022-10-04 RX ADMIN — MEROPENEM AND SODIUM CHLORIDE 1 G: 1 INJECTION, SOLUTION INTRAVENOUS at 03:10

## 2022-10-04 RX ADMIN — AMLODIPINE BESYLATE 5 MG: 5 TABLET ORAL at 11:10

## 2022-10-04 RX ADMIN — POTASSIUM BICARBONATE 50 MEQ: 977.5 TABLET, EFFERVESCENT ORAL at 06:10

## 2022-10-04 NOTE — PHYSICIAN QUERY
PT Name: Sivakumar Thacker  MR #: [id number]     DOCUMENTATION CLARIFICATION     CDS/: Letty Veronica               Contact information: [phone]  This form is a permanent document in the medical record.     Query Date: October 4, 2022    By submitting this query, we are merely seeking further clarification of documentation.  Please utilize your independent clinical judgment when addressing the question(s) below.      The Medical Record contains the following:    Clinical Information Location in Medical Records   Risk factors; 66yo male presents w/ pain and swelling of left arm head and shoulder. Reports temp of 101 at home. Currently on chemo for NH lymphoma. Noted a chronic lung infx, following w/ Pulm.     10/02 Transferred to ICU w/ cellulitis of chest wall   ED        Transfer orders   Clinical indicators;  No sepsis criteria upon initial evaluation; gentle IVF resus in setting of LE edema. Initial management also with analgesics and broad-spectrum abx (vanc/cefepime).    Patient admitted for sepsis likely secondary to left axilla spreading to neck and chest cellulitis.    on broad spectrum IV abx for sepsis and cellulitis    Sepsis    T-max 103.1 (09/30)  HR  (09/30)  RR 16-31 (09/30)  /60 to 153/72 (09/30)    WBC range 5.47-1.22  Plt range 168-69  Bands range 6>7>4>12  CRP >38 (10/02)  LA 1.8>20.>2.1>1.4  Procalcitonin 17.25  L axilla culture GNR moderate     ED        ID consult 10/02, 10/03      Hematology 10/02 , 10/03, 10/04    Hospitalist PN 10/03    VS flowsheet          Labs    Interventions;  Transfer to ICU 10/02    NS 500nml bolus x1 (ED)  NS 1L bolus (09/30)  LR 1L bolus (10/01)  Cefepime 2g IV x1 (09/30)  Cefepime 1g IV Q8 hrs (10/01)  Cefepime 2g IV Q8 hrs (10/01-10/02)  Vancomycin 1.5g IV (09/30)  Vancomycin 1.5g IV Q18 hrs (10/01-10/02)  Meropenem 1g IV Q8 hrs (10/02 to current)  Daptomycin 500mg IV Q24 hrs (10/02 to current)  Micafungin 150mg IV Q24 hrs  (10/02 to current)      MD ronni PARKS          Dear doctor please clarify the Present on Admission (POA) status of the diagnosis: Sepsis    [  x] Yes (Y)     [  ] No (N)

## 2022-10-04 NOTE — OP NOTE
Surgery Date: 10/3/2022     Surgeon(s) and Role:     * Franco Venegas III, MD - Primary    Assisting Surgeon: None    Pre-op Diagnosis:  Cellulitis of chest wall [L03.313]  Cellulitis of left axilla [L03.112]  Vascular port complication, initial encounter [T82.9XXA]    Post-op Diagnosis:  Post-Op Diagnosis Codes:     * Cellulitis of chest wall [L03.313]     * Cellulitis of left axilla [L03.112]     * Vascular port complication, initial encounter [T82.9XXA]    Procedure(s) (LRB):  REMOVAL left subclavian port a cath  INCISION AND DRAINAGE left axilla suspected abscess   Full thickness skin biopsy.     Anesthesia: General    Operative Findings: Patient brought to the operating room and transferred to the operating room table supine position.  He was given sedation.  The anterior chest, neck and left axilla was prepped and draped.  We 1st turned our attention to the Port-A-Cath.  Was a left subclavian Port-A-Cath was tunneled to the right chest.  I made an incision through the previous incision in the right chest.  Dissection was carried down to the surface of the port.  The fibrous capsule was entered and the port head was removed.  The catheter did not freely withdraw from the vein.  Fluoroscopy was called into the room and under fluoro I trace the path of the catheter toward the vein.  Incision was made in the left chest overlying the catheter about 2 cm inferior to the clavicle.  The dissection was carried down to the surface of the catheter.  It was grasped.  It was withdrawn from the vein.  The port catheter was then fully withdrawn from the right-sided incision.  Port-A-Cath was sent for culture.  We then turned attention left axilla.  An elliptical incision was made over the area of original cellulitis and suspected abscess.  The skin and underlying subcu was sent for biopsy.  I dissected into the subcu.  It was very edematous.  I did not appreciate any pus.  A separate counter incision was made several cm  inferiorly around nipple height.  Penrose drain was used to incisions.  The Port-A-Cath incision was closed with several interrupted subcutaneous Vicryl.  Kerlix was packed into the incisions around the Penrose.  Patient was brought back to the ICU in stable but guarded condition.  Instrument counts correct.  Complications none.    Estimated Blood Loss: 10 mL    Estimated Blood Loss has been documented.         Specimens:   Specimen (24h ago, onward)       Start     Ordered    10/03/22 1219  Specimen to Pathology - Surgery  Once        Comments: Pre-op Diagnosis: Cellulitis of chest wall [L03.313]Cellulitis of left axilla [L03.112]Vascular port complication, initial encounter [T82.9XXA]Procedure(s):REMOVAL, CATHETER, CENTRAL VENOUS, TUNNELED, WITH PORTINCISION AND DRAINAGE, ABSCESS Number of specimens: 2Name of specimens: 1. Skin from left axilla 2. Left chest wall nodule     Question:  Release to patient  Answer:  Immediate    10/03/22 1221                    [id number]

## 2022-10-04 NOTE — SUBJECTIVE & OBJECTIVE
nterval History:     Review of Systems  As per subjective   Objective:     Vital Signs (Most Recent):  Temp: 98.4 °F (36.9 °C) (10/03/22 1300)  Pulse: 89 (10/03/22 1400)  Resp: (!) 22 (10/03/22 1400)  BP: (!) 189/88 (10/03/22 1100)  SpO2: 96 % (10/03/22 1400)   Vital Signs (24h Range):  Temp:  [98.1 °F (36.7 °C)-99.4 °F (37.4 °C)] 98.4 °F (36.9 °C)  Pulse:  [] 89  Resp:  [10-30] 22  SpO2:  [92 %-100 %] 96 %  BP: (129-189)/(67-88) 189/88  Arterial Line BP: ()/(21-76) 139/64     Weight: 95.8 kg (211 lb 3.2 oz)  Body mass index is 32.11 kg/m².    Intake/Output Summary (Last 24 hours) at 10/3/2022 1955  Last data filed at 10/3/2022 0624  Gross per 24 hour   Intake 350 ml   Output 1500 ml   Net -1150 ml      Physical Exam  Constitutional:       General: He is not in acute distress.     Appearance: He is well-developed.   HENT:      Head: Normocephalic.   Eyes:      Pupils: Pupils are equal, round, and reactive to light.   Cardiovascular:      Rate and Rhythm: Normal rate and regular rhythm.   Pulmonary:      Effort: Pulmonary effort is normal. No respiratory distress.   Abdominal:      General: Abdomen is flat. There is no distension.      Tenderness: There is no abdominal tenderness.   Musculoskeletal:      Cervical back: Neck supple.   Skin:     Findings: No rash.   Neurological:      Mental Status: He is alert and oriented to person, place, and time.   Psychiatric:         Mood and Affect: Mood normal.       Significant Labs: All pertinent labs within the past 24 hours have been reviewed.  CBC:   Recent Labs   Lab 10/02/22  0516 10/02/22  1711 10/02/22  2014 10/03/22  0400   WBC 1.40* 1.22*  --  1.94*   HGB 12.5* 13.3*  --  12.3*   HCT 39.7* 41.8 37 37.7*   PLT 79* 69*  --  74*     CMP:   Recent Labs   Lab 10/02/22  0516 10/02/22  1709 10/03/22  0400     --  137   K 4.0  --  3.4*     --  102   CO2 27  --  24   *  --  234*   BUN 16  --  27*   CREATININE 0.9  --  1.3   CALCIUM 8.3*  --   8.7   PROT  --  6.6  --    ALBUMIN  --  2.8*  --    BILITOT  --  0.8  --    ALKPHOS  --  124  --    AST  --  49*  --    ALT  --  60*  --    ANIONGAP 5*  --  11     Cardiac Markers: No results for input(s): CKMB, MYOGLOBIN, BNP, TROPISTAT in the last 48 hours.    Significant Imaging: I have reviewed all pertinent imaging results/findings within the past 24 hours.

## 2022-10-04 NOTE — NURSING
Pt. Requested that BiPaP be removed.  Pt refuses insulin and requests that home meds be started back, especially metformin.  Pt requested to increase mobility instead of sitting/lying in bed.  He was able to stand at the bedside for 5 minutes with very minimal assistance.

## 2022-10-04 NOTE — PLAN OF CARE
Problem: Adult Inpatient Plan of Care  Goal: Plan of Care Review  Outcome: Ongoing, Progressing  Goal: Patient-Specific Goal (Individualized)  Outcome: Ongoing, Progressing  Goal: Readiness for Transition of Care  Outcome: Ongoing, Progressing     Problem: Impaired Wound Healing  Goal: Optimal Wound Healing  Outcome: Ongoing, Progressing     Problem: Infection  Goal: Absence of Infection Signs and Symptoms  Outcome: Ongoing, Progressing

## 2022-10-04 NOTE — PHYSICIAN QUERY
"PT Name: Sivakumar Thacker  MR #: [id number]    DOCUMENTATION CLARIFICATION     CDS/: Letty Veronica               Contact information: [phone]  This form is a permanent document in the medical record.     Query Date: October 4, 2022    By submitting this query, we are merely seeking further clarification of documentation.   Please utilize your independent clinical judgment when addressing the question(s) below.    The Medical Record contains the following:  Indicators   Location in Medical Record   Risk factors; 68yo male presents w/ pain and swelling of left arm head and shoulder. Reports temp of 101 at home. Currently on chemo for NH lymphoma. Noted a chronic lung infx, following w/ Pulm.     Transferred to ICU 10/02   ED        MD orders   Clinical indicators;  "mild encephalopathy"    "Opiate-induced hypoactive delirium, resolved- Yesterday, he was given percocet and IV morphine for pain. He then became somewhat obtunded. He was found to be hypercapnic and acidotic, which resolved with BiPAP for several hours."     EEG 10/02    Pulmonary consult 10/03, 10/04     Interventions;  minimize sedating meds, gunjan. opiates     Pulmonary consult 10/03, 10/04         Dear doctor please specify the diagnosis or diagnoses associated with above clinical findings.    [ x  ] Metabolic Encephalopathy 2nd to Cellulitis and CO2 narcosis     [   ] Toxic Encephalopathy 2nd to Opiate administration     [   ] Delirium 2nd to Opiate administration and CO2 narcosis     [   ] Other Encephalopathy (please specify)      [   ] Other (please specify)         Please document in your progress notes daily for the duration of treatment until resolved, and include in your discharge summary.      "

## 2022-10-04 NOTE — HOSPITAL COURSE
67  M With PMH of non-Hodgkin's lymphoma,  who presents with pain and swelling of left arm head and shoulder with spreading cellulitis.  Admitted to hospital and started IV antibiotic but patient was moved to ICU for possible impending sepsis and surgical consultation was done..  CT scan of the head and neck and upper extremity venin was done and there was no pulmonary embolism  and no clot and no abscess cavity identified.  Later patient had pancytopenia from previous existing lymphoma and G-CSF given and improved.  Later he was brought to IR and  an incision and drainage and placement of the drain was done.  No pus encountered.  Later cellulitis decreased and leukopenia improved and culture came back with SERRATIA MARCESCENS and antibiotic adjusted and discharged with Cipro for 2 weeks as per ID recommendation.  He is to follow-up with hemato oncologist and also surgeon as outpatient.  His steroid was discontinued and  his blood pressure medications were adjusted. His blood culture is negative .

## 2022-10-04 NOTE — ANESTHESIA POSTPROCEDURE EVALUATION
Anesthesia Post Evaluation    Patient: Sivakumar Thacker    Procedure(s) Performed: Procedure(s) (LRB):  REMOVAL, CATHETER, CENTRAL VENOUS, TUNNELED, WITH PORT (Right)  INCISION AND DRAINAGE, ABSCESS (Left)    Final Anesthesia Type: general      Patient location during evaluation: ICU  Patient participation: Yes- Able to Participate  Level of consciousness: awake  Post-procedure vital signs: reviewed and stable  Pain management: adequate  Airway patency: patent    PONV status at discharge: No PONV  Anesthetic complications: no      Cardiovascular status: blood pressure returned to baseline, hemodynamically stable and stable  Respiratory status: unassisted, spontaneous ventilation and nasal cannula  Hydration status: euvolemic  Follow-up not needed.          Vitals Value Taken Time   /51 10/03/22 2054   Temp 36.7 °C (98.1 °F) 10/03/22 1901   Pulse 91 10/03/22 2052   Resp 15 10/03/22 2052   SpO2 98 % 10/03/22 2052   Vitals shown include unvalidated device data.      No case tracking events are documented in the log.      Pain/Gelacio Score: Pain Rating Prior to Med Admin: 7 (10/3/2022 10:14 AM)  Pain Rating Post Med Admin: 0 (10/2/2022  3:28 AM)

## 2022-10-04 NOTE — ASSESSMENT & PLAN NOTE
Most likely spreading cellulitis . Possible port infection and port removed   Follow skin biopsy

## 2022-10-04 NOTE — PROGRESS NOTES
"Formerly [facility], 1928–1983   Hematology/Oncology  Inpatient Progress Note          Patient Name: Sivakumar Thacker  MRN: [id number]  Admission Date: 9/30/2022  Hospital Length of Stay: 3 days  Code Status: Full Code   Attending Provider: Puma Andino MD  Consulting Provider: Jefferson Ibarra MD  Primary Care Physician: Barry Mcmahon MD  Principal Problem:Cellulitis      Subjective:       Patient ID: Sivakumar Thacker is a 67 y.o. male.    Chief Complaint: Lymphadenopathy (Starting last night pt developed L sided chest/armpit swelling and redness. Pt has history of non-hodkins lymphoma, last chemo on 9/15, has port access on RUC. Pt was febrile at home took tylenol or ibuprofen (pt not sure) at 1100. Afebrile here, VSS. Denies recent injury/trauma. Site is not open or draining. )        History Present Illness:    Patient remains in ICU; he is sitting up in bed; he appears to be feeling better, breathing better; no current HA's or N/V; residual swelling in feet and hands;  on O2 per NC; I discussed with his ICU nurse Jimenez in person; one of his daughters is at bedside      Review of Systems:  GEN: chills started this past Thursday, erythema and pain in left axilla, mailasie, fatigue  HEENT: normal with no HA's, sore throat, stiff neck, changes in vision  CV: normal with no CP mild pedal swelling for past 3-4 weeks  PULM: chronic stable SOB, cough, no hemoptysis, sputum or pleuritic pain  GI: normal with no abdominal pain, nausea, vomiting, constipation, diarrhea, melanotic stools, BRBPR, or hematemesis  : normal with no hematuria, dysuria  BREAST: normal with no mass, discharge, pain  SKIN: normal with no rash, erythema, bruising, or swelling      Objective:     Vitals:  Blood pressure (!) 150/72, pulse 89, temperature 98.7 °F (37.1 °C), temperature source Oral, resp. rate 17, height 5' 8" (1.727 m), weight 95.8 kg (211 lb 3.2 oz), SpO2 96 %.    Physical Exam:  GEN: no apparent distress, comfortable; AAOx3; on O2 per " NC  HEAD: atraumatic and normocephalic  EYES: no pallor, no icterus, PERRLA  ENT: OMM, no pharyngeal erythema, external ears WNL; no nasal discharge; no thrush;    NECK: no masses, thyroid normal, trachea midline, no LAD/LN's, supple; rght IJ  CV: RRR with no murmur; normal pulse; normal S1 and S2; mild pedal edema; portacath on rght CW  CHEST: Normal respiratory effort; chronic coarseness, mild wheeze bilaterally; on O2 per NC  ABDOM: nontender and nondistended; soft; normal bowel sounds; no rebound/guarding  MUSC/Skeletal: ROM normal; no crepitus; joints normal; no deformities or arthropathy  EXTREM: no clubbing, cyanosis, mild areas inflammation on bilateral lower extremities near ankles; mild swelling in hands and lower extremities; IV RUE  SKIN: no rashes, lesions, ulcers, petechiae or subcutaneous nodules ; erythematous and painful to touch subcuatenous tissues under left arm/axilla which seems to be improved  : no changes  NEURO: grossly intact; motor/sensory WNL; AAOx3; no tremors  PSYCH: normal mood, affect and behavior  LYMPH: normal cervical, supraclavicular, axillary and groin LN's               Lab Review:        Lab Results   Component Value Date    WBC 2.64 (L) 10/04/2022    HGB 11.1 (L) 10/04/2022    HCT 33.8 (L) 10/04/2022    MCV 89 10/04/2022    PLT 78 (L) 10/04/2022       CMP  Sodium   Date Value Ref Range Status   10/04/2022 138 136 - 145 mmol/L Final   04/25/2019 144 134 - 144 mmol/L      Potassium   Date Value Ref Range Status   10/04/2022 3.6 3.5 - 5.1 mmol/L Final     Chloride   Date Value Ref Range Status   10/04/2022 105 95 - 110 mmol/L Final   04/25/2019 107 98 - 110 mmol/L      CO2   Date Value Ref Range Status   10/04/2022 27 23 - 29 mmol/L Final     Glucose   Date Value Ref Range Status   10/04/2022 294 (H) 70 - 110 mg/dL Final   04/25/2019 177 (H) 70 - 99 mg/dL      BUN   Date Value Ref Range Status   10/04/2022 38 (H) 8 - 23 mg/dL Final     Creatinine   Date Value Ref Range Status    10/04/2022 1.0 0.5 - 1.4 mg/dL Final   04/25/2019 0.83 0.60 - 1.40 mg/dL      Calcium   Date Value Ref Range Status   10/04/2022 8.3 (L) 8.7 - 10.5 mg/dL Final     Total Protein   Date Value Ref Range Status   10/02/2022 6.6 6.0 - 8.4 g/dL Final     Albumin   Date Value Ref Range Status   10/02/2022 2.8 (L) 3.5 - 5.2 g/dL Final   04/25/2019 4.4 3.1 - 4.7 g/dL      Total Bilirubin   Date Value Ref Range Status   10/02/2022 0.8 0.1 - 1.0 mg/dL Final     Comment:     For infants and newborns, interpretation of results should be based  on gestational age, weight and in agreement with clinical  observations.    Premature Infant recommended reference ranges:  Up to 24 hours.............<8.0 mg/dL  Up to 48 hours............<12.0 mg/dL  3-5 days..................<15.0 mg/dL  6-29 days.................<15.0 mg/dL       Alkaline Phosphatase   Date Value Ref Range Status   10/02/2022 124 55 - 135 U/L Final     AST   Date Value Ref Range Status   10/02/2022 49 (H) 10 - 40 U/L Final     ALT   Date Value Ref Range Status   10/02/2022 60 (H) 10 - 44 U/L Final     Anion Gap   Date Value Ref Range Status   10/04/2022 6 (L) 8 - 16 mmol/L Final     eGFR if    Date Value Ref Range Status   07/21/2022 >60.0 >60 mL/min/1.73 m^2 Final     eGFR if non    Date Value Ref Range Status   07/21/2022 >60.0 >60 mL/min/1.73 m^2 Final     Comment:     Calculation used to obtain the estimated glomerular filtration  rate (eGFR) is the CKD-EPI equation.          Heparin Induced Thrombocytopenia 0.00 - 0.40 OD 0.10      Fibrinogen 194 - 545 mg/dL 854        Radiology Diagnostic Studies:     US Soft Tissue Axilla, Left [[id number]] Collected: 09/30/22 1819   Order Status: Completed Updated: 09/30/22 1940   Narrative:     Ultrasound left axilla     CLINICAL DATA: Left axillary erythema and pain. Reported history of lymphoma.     FINDINGS: Sonographic assessment targeted to the left axilla was performed, with comparison  evaluation of the right axilla.     Fatty soft tissues in the left axilla appear mildly edematous. No mass or lymphadenopathy is identified. There is no focal drainable fluid collection.     IMPRESSION:   1. Subcutaneous edema at the left axilla, with no mass, lymphadenopathy, or drainable fluid collection. Findings are nonspecific. Correlate for possible cellulitis.       CTA Chest Non-Coronary (PE Studies) [[id number]] Collected: 09/30/22 1723   Order Status: Completed Updated: 09/30/22 1759   Narrative:     CTA CHEST     HISTORY: Shortness of breath. Comparison to June 2021..     CMS MANDATED QUALITY DATA - CT RADIATION  436     All CT scans at this facility utilize dose modulation, iterative reconstruction, and/or weight based dosing when appropriate to reduce radiation dose to as low as reasonably achievable     FINDINGS: PE protocol was utilized.  Thin axial imaging through the chest was performed with 100 cc nonionic IV contrast, with sagittal and coronal reformatted images and 3-D reconstructions performed on an independent workstation, with images stored in the patient's permanent electronic medical record.     The pulmonary arteries are adequately opacified. Opacification of peripheral branches is heterogeneous.     The main pulmonary arteries and segmental branches are normal. Peripheral branches are grossly normal.     Heart size is upper normal. Multifocal coronary artery atherosclerotic calcification is noted. The thoracic aorta is normal in caliber. No pathologic mediastinal lymphadenopathy is identified.     Images at lung windows demonstrate mild interstitial prominence at the lung bases with mild interlobular septal thickening suggesting interstitial edema, new when compared to June 2021. There is superimposed minimal atelectasis or infiltrate at the left lung base. No effusion is identified.     IMPRESSION:       1. No evidence of pulmonary thromboembolic disease.   2. Interstitial prominence at  both lung bases suggesting mild interstitial edema, with superimposed atelectasis or infiltrate at the left lung base.   3. Coronary artery atherosclerosis..       CT Head Without Contrast [[id number]] Collected: 09/30/22 1723   Order Status: Completed Updated: 09/30/22 1751   Narrative:     CT HEAD WITHOUT CONTRAST     CMS MANDATED QUALITY DATA - CT RADIATION  436     All CT scans at this facility utilize dose modulation, iterative reconstruction, and/or weight based dosing when appropriate to reduce radiation dose to as low as reasonably achievable     Clinical data: Altered mental status, syncope versus seizure.     FINDINGS: Noninfusion images were obtained from the skull base to the vertex. There is no intracranial mass, hemorrhage, or midline shift. Ventricles and sulci are normal. There are no pathologic extra-axial fluid collections. There is no evidence of acute ischemic change or edema. Small circumscribed hypodensity along the inferior margin of the right basal ganglia could reflect chronic lacunar infarct or prominent perivascular space. Cerebellum and brainstem are normal.     The calvarium is intact.There is a 16 mm mucous retention cyst or polyp in the left maxillary sinus, with mucoperiosteal thickening in the left maxillary and left frontal sinuses. Moderate bilateral ethmoid opacification is noted.     IMPRESSION:     1. No acute intracranial abnormalities.   2. 9 mm circumscribed hypodensity at the inferior margin of the right basal ganglia is consistent with small chronic lacunar infarct or prominent perivascular space.   3. Sinus disease as above.                   Assessment:     IMPRESSION:    (1) 67 y.o. male known to my oncology service with diagnosis of low grade NHL for which he has been on rituximab maintenance therapy. He presented to ED last night with redness and discomfort of the left axill. He has been admitted to the hopsitalist service with working diagnosis of cellulitis. I spoke  to Dr Helm by phone last night. US of the axilla showed only subcutaneous edema at the left axilla, with no mass, lymphadenopathy, or drainable fluid collection.  CTA was negative for pulmonary emboli.       10/1/2022:  - on broad spectrum antibiotics IV  - ID consulted  - GenSurg consulted  - discussed with Dr Helm last night by phone and Dr Mojica in person today     10/2/2022: Clinical review  - patient seen by ID  - on broad spectrum IV abx for sepsis and cellulitis  - wbc is lower on the latest CBC but he is not on any chemotherapy that would expect to be the etiology  - plats ar 79,000 and also a little lower  - check PT/PTT and fibrinogen, smear for schistocytes  - will discuss GCSFwith PCP and ID  - will ask for repeat CBC as well  - he has been seen by Dr Venegas with GenSurg and is being transferred to ICU for closer monitoring    10/3/2022:  - patient in ICU currently; on bipap  - seen by Dr Schrader with ID, Dr Pratt with pulm critical care and Dr Venegas  - planned portacath removal and drain/biopsy of left axilla  - continued on Zarxio for the neutropenia  - continued on IV antibiotics per direction of ID  - wbc 1.94 and plats 74,000    10/4/2022:  - patient remains in ICU; cellulitis seems to be improving with the abx  - he had portacath removed and axilla biopsied and drain placed yesterday with Dr Venegas  - ID following  - Wbc better at 2.64, hgb stable at 11.1, plats stable at 78,000  - continued on Zarxio (GCSF)     (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     (2) HTN     (3) Neuropathy     (4) GERD     (5) DM - on metformin per Dr Nunez     (6) Hypogammaglobulinemia - on Iv IgG monthly     (7) Steatosis of liver     (8) Degenerative disc disease of back     (9) Diverticular disease     (10) Chronic  bronchiectasis/bronchiolitis with TONY - followed by Dr Veliz and Lucia Georges                      1. Cellulitis of chest wall    2. Chest pain at rest    3. Hyperglycemia    4. Pre-syncope    5. Pancytopenia    6. Cellulitis of left axilla    7. Vascular port complication, initial encounter    8. Sepsis, due to unspecified organism, unspecified whether acute organ dysfunction present    9. Petechiae    10. Cellulitis    11. Pulmonary edema           Plan:     PLAN:          IV antibiotics for the cellulitis and infection workup as per ID directives   Pulmonary/critical care/bipap management as per pulmonary direction  Appreciate GenSurg evaluation for the axilla and portacath - he has been seen by Dr Venegas with GenSurg and ruby portacath removal and axillary drain/bx on 10/3  Monitor labs daily  Continued with GCSF for now    Will follow with you               Jefferson Ibarra MD  Hematology/Oncology  [facility]

## 2022-10-04 NOTE — PT/OT/SLP PROGRESS
Occupational Therapy      Patient Name:  Sivakumar Thacker   MRN:  [id number]    Patient not seen today secondary to Patient fatigue.  Pt sleeping soundly when attempted this PM; will re-attempt 10/5.     10/4/2022

## 2022-10-04 NOTE — PT/OT/SLP EVAL
Physical Therapy Evaluation    Patient Name:  Sivakumar Thacker   MRN:  [id number]    Recommendations:     Discharge Recommendations:  home health PT   Discharge Equipment Recommendations: none   Barriers to discharge:  medical status    Assessment:     Sivakumar Thacker is a 67 y.o. male admitted with a medical diagnosis of Cellulitis.  He presents with the following impairments/functional limitations:  weakness, impaired endurance, impaired self care skills, impaired functional mobility, gait instability, impaired balance, decreased lower extremity function, decreased safety awareness, pain, impaired cardiopulmonary response to activity.    Pt tolerated eval well today and demonstrated fair functional mobility throughout session. Specifically, pt performed supine to EOB and sit to stand transfers with CGA. During session, patient reported that he had to use the restroom and ambulated ~15 feet to bathroom with no AD and CGA to the restroom. Pt required CGA to sit on toilet, but was otherwise independent with hygiene. Small bowel movement noted and RN notified. Patient then ambulated ~15 ft with no AD and CGA from the restroom into a chair. Patient tolerated session on room air, and RN notified of this tolerance.    Rehab Prognosis: Fair; patient would benefit from acute skilled PT services to address these deficits and reach maximum level of function.    Recent Surgery: Procedure(s) (LRB):  REMOVAL, CATHETER, CENTRAL VENOUS, TUNNELED, WITH PORT (Right)  INCISION AND DRAINAGE, ABSCESS (Left) 1 Day Post-Op    Plan:     During this hospitalization, patient to be seen 5 x/week to address the identified rehab impairments via gait training, therapeutic activities, therapeutic exercises, neuromuscular re-education and progress toward the following goals:    Plan of Care Expires:  11/04/22    Subjective     Chief Complaint: none  Patient/Family Comments/goals: return home  Pain/Comfort:  Pain Rating 1: 0/10    Patients  cultural, spiritual, [employer] conflicts given the current situation:      Living Environment:  Patient lives alone in a 1 story home with no HUONG. (+) . No O2 at home.  Prior to admission, patients level of function was independent.  Equipment used at home: none.  DME owned (not currently used): none.  Upon discharge, patient will have assistance from daughter.    Objective:     Communicated with RN prior to session.  Patient found up in chair with peripheral IV, blood pressure cuff, pulse ox (continuous), oxygen, arterial line  upon PT entry to room.    General Precautions: Standard, fall   Orthopedic Precautions:N/A   Braces: N/A  Respiratory Status: Room air    Exams:  RLE ROM: WFL  RLE Strength: 4+/5  LLE ROM: WFL  LLE Strength: 4+/5    Functional Mobility:  Bed Mobility:     Supine to Sit: contact guard assistance  Transfers:     Sit to Stand:  contact guard assistance with no AD  Toilet Transfer: contact guard assistance with  no AD  using  Stand Pivot  Gait: 15 ft x2 no AD and CGA    Therapeutic Activities and Exercises:   Pt educated on POC, discharge recommendation, importance of time out of bed, need for assist with mobility, use of call bell to seek assistance as needed and fall prevention    AM-PAC 6 CLICK MOBILITY  Total Score:18     Patient left up in chair with all lines intact, call button in reach, RN notified, and daughter present.    GOALS:   Multidisciplinary Problems       Physical Therapy Goals          Problem: Physical Therapy    Goal Priority Disciplines Outcome Goal Variances Interventions   Physical Therapy Goal     PT, PT/OT Ongoing, Progressing     Description: Goals to be met by: 22     Patient will increase functional independence with mobility by performin. Supine to sit with Supervision  2. Sit to stand transfer with Supervision  3. Bed to chair transfer with Supervision using no AD  4. Gait  x 150 feet with Supervision using no AD                              History:     Past Medical History:   Diagnosis Date    Diabetes mellitus, type 2     Encounter for antineoplastic chemotherapy 04/01/2020    Hypertension     Neuropathy     Non Hodgkin's lymphoma     Reflux esophagitis        Past Surgical History:   Procedure Laterality Date    COLONOSCOPY  2018    EYE SURGERY  Approximately 3 years ago    Cataract    HAND SURGERY      amputation left index finger    INCISION AND DRAINAGE OF ABSCESS Left 10/3/2022    Procedure: INCISION AND DRAINAGE, ABSCESS;  Surgeon: Franco Venegas III, MD;  Location: [facility];  Service: General;  Laterality: Left;  axilla    MEDIPORT REMOVAL Right 10/3/2022    Procedure: REMOVAL, CATHETER, CENTRAL VENOUS, TUNNELED, WITH PORT;  Surgeon: Franco Venegas III, MD;  Location: [facility];  Service: General;  Laterality: Right;    PORTACATH PLACEMENT      SKIN CANCER EXCISION  09/30/2020    [facility]    SPINE SURGERY      VASECTOMY  1985 ?       Time Tracking:     PT Received On: 10/04/22  PT Start Time: 1010     PT Stop Time: 1032  PT Total Time (min): 22 min     Billable Minutes: Evaluation 12 and Therapeutic Activity 10      10/04/2022

## 2022-10-04 NOTE — ASSESSMENT & PLAN NOTE
Patient with history of non-Hodgkin's lymphoma diagnosed in 2012 status post treatment at [facility] and remission.  He has been on maintenance therapy with rituximab every 8 weeks with Oncology for several years now.  Last Rituxan dose 9/15/22    PLAN   GCSF as per hematology and ANC improved .  DC as per oncology

## 2022-10-04 NOTE — TELEPHONE ENCOUNTER
----- Message from Evelin Blanchard sent at 10/4/2022  7:57 AM CDT -----  Regarding: advice  Contact: patient  Type: Needs Medical Advice  Who Called:  patient  Symptoms (please be specific):    How long has patient had these symptoms:    Pharmacy name and phone #:    Best Call Back Number: [phone] (home)   Additional Information: Patient missed appointment this morning due to hospital stay at [facility]. Patient will call when released to make appt. Please call patient if any questions.Thanks!

## 2022-10-04 NOTE — ASSESSMENT & PLAN NOTE
Patient with history of non-Hodgkin's lymphoma diagnosed in 2012 status post treatment at [facility] and remission.  He has been on maintenance therapy with rituximab every 8 weeks with Oncology for several years now.  Last Rituxan dose 9/15/22    PLAN   GCSF as per hematology and ANC improved .

## 2022-10-04 NOTE — ASSESSMENT & PLAN NOTE
Most likely cellulitis . Possible port infection   S/p OR . OR Note not ready yet. tentative plan was port removal and skin Bx and drain if needed

## 2022-10-04 NOTE — SUBJECTIVE & OBJECTIVE
Interval History:     Review of Systems  As per subjective  Objective:     Vital Signs (Most Recent):  Temp: 97.9 °F (36.6 °C) (10/04/22 1115)  Pulse: 83 (10/04/22 1115)  Resp: 19 (10/04/22 1115)  BP: (!) 153/61 (10/04/22 1253)  SpO2: 95 % (10/04/22 1115)   Vital Signs (24h Range):  Temp:  [97.9 °F (36.6 °C)-98.7 °F (37.1 °C)] 97.9 °F (36.6 °C)  Pulse:  [] 83  Resp:  [15-31] 19  SpO2:  [91 %-100 %] 95 %  BP: (145-160)/(61-80) 153/61  Arterial Line BP: (130-183)/(52-81) 143/53     Weight: 95.8 kg (211 lb 3.2 oz)  Body mass index is 32.11 kg/m².    Intake/Output Summary (Last 24 hours) at 10/4/2022 1550  Last data filed at 10/4/2022 0145  Gross per 24 hour   Intake --   Output 1325 ml   Net -1325 ml      Physical Exam  Constitutional:       General: He is not in acute distress.     Appearance: He is well-developed.   HENT:      Head: Normocephalic.   Eyes:      Pupils: Pupils are equal, round, and reactive to light.   Cardiovascular:      Rate and Rhythm: Regular rhythm. Tachycardia present.   Pulmonary:      Effort: Pulmonary effort is normal. No respiratory distress.   Abdominal:      General: Abdomen is flat. There is no distension.      Tenderness: There is no abdominal tenderness.   Musculoskeletal:         General: Normal range of motion.      Cervical back: Neck supple.   Skin:     Findings: No rash.   Neurological:      Mental Status: He is alert and oriented to person, place, and time.   Psychiatric:         Mood and Affect: Mood normal.       Significant Labs: All pertinent labs within the past 24 hours have been reviewed.  CBC:   Recent Labs   Lab 10/02/22  1711 10/02/22  2014 10/03/22  0400 10/04/22  0400   WBC 1.22*  --  1.94* 2.64*   HGB 13.3*  --  12.3* 11.1*   HCT 41.8 37 37.7* 33.8*   PLT 69*  --  74* 78*     CMP:   Recent Labs   Lab 10/02/22  1709 10/03/22  0400 10/04/22  0400   NA  --  137 138   K  --  3.4* 3.6   CL  --  102 105   CO2  --  24 27   GLU  --  234* 294*   BUN  --  27* 38*    CREATININE  --  1.3 1.0   CALCIUM  --  8.7 8.3*   PROT 6.6  --   --    ALBUMIN 2.8*  --   --    BILITOT 0.8  --   --    ALKPHOS 124  --   --    AST 49*  --   --    ALT 60*  --   --    ANIONGAP  --  11 6*     Cardiac Markers:   Recent Labs   Lab 10/04/22  0400   *       Significant Imaging: I have reviewed all pertinent imaging results/findings within the past 24 hours.

## 2022-10-04 NOTE — ASSESSMENT & PLAN NOTE
Severe cellulitis with systemic signs and symptoms of infection. Possible port A cath infection?  CTA head and neck not significant     PLAN   contineu dapto and meropenem   Follow final culture   Continue GCSF   Follow surgeon . Awaited official opnote

## 2022-10-04 NOTE — PROGRESS NOTES
[facility] Medicine  Progress Note    Patient Name: Sivakumar Thacker  MRN: [id number]  Patient Class: IP- Inpatient   Admission Date: 9/30/2022  Length of Stay: 2 days  Attending Physician: Puma Andino MD  Primary Care Provider: Barry Mcmahon MD        Subjective:     Principal Problem:Cellulitis        HPI:  Sivakumar Thacker is a 67 y.o. White male   With PMH of non-Hodgkin's lymphoma,  who presents with pain and swelling of left arm head and shoulder.  He presents emergency room with complaint of pain in the armpit .  He admits to fevers and chills recorded temperature 101° but he was afebrile in the emergency room.  He recently had a repeat PET scan which is negative by history for recurrence.  He receives his chemo which includes Rituxan and IVIG his last dose September 15th.  He has a chronic lung infection which she has been followed by Pulmonary and has been compliant with daily medication.  His cough is improving as well as sore throat.  He denies chest pain or shortness of breath no nausea vomiting no abdominal pain or dysuric symptoms.  He has a right clavicle access point which does not show any signs of infection.    Plan to admit continue IV antibiotics obtain blood cultures in consult Hematology.      Overview/Hospital Course:  Assumed care  Seen in am before Spreading cellulitis . No sing of fascitis. Redness tender to touch . Cannot open axilla .  Off bipap. D/w patient and family members      nterval History:     Review of Systems  As per subjective   Objective:     Vital Signs (Most Recent):  Temp: 98.4 °F (36.9 °C) (10/03/22 1300)  Pulse: 89 (10/03/22 1400)  Resp: (!) 22 (10/03/22 1400)  BP: (!) 189/88 (10/03/22 1100)  SpO2: 96 % (10/03/22 1400)   Vital Signs (24h Range):  Temp:  [98.1 °F (36.7 °C)-99.4 °F (37.4 °C)] 98.4 °F (36.9 °C)  Pulse:  [] 89  Resp:  [10-30] 22  SpO2:  [92 %-100 %] 96 %  BP: (129-189)/(67-88) 189/88  Arterial Line BP: ()/(21-76) 139/64      Weight: 95.8 kg (211 lb 3.2 oz)  Body mass index is 32.11 kg/m².    Intake/Output Summary (Last 24 hours) at 10/3/2022 1955  Last data filed at 10/3/2022 0624  Gross per 24 hour   Intake 350 ml   Output 1500 ml   Net -1150 ml      Physical Exam  Constitutional:       General: He is not in acute distress.     Appearance: He is well-developed.   HENT:      Head: Normocephalic.   Eyes:      Pupils: Pupils are equal, round, and reactive to light.   Cardiovascular:      Rate and Rhythm: Normal rate and regular rhythm.   Pulmonary:      Effort: Pulmonary effort is normal. No respiratory distress.   Abdominal:      General: Abdomen is flat. There is no distension.      Tenderness: There is no abdominal tenderness.   Musculoskeletal:      Cervical back: Neck supple.   Skin:     Findings: No rash.   Neurological:      Mental Status: He is alert and oriented to person, place, and time.   Psychiatric:         Mood and Affect: Mood normal.       Significant Labs: All pertinent labs within the past 24 hours have been reviewed.  CBC:   Recent Labs   Lab 10/02/22  0516 10/02/22  1711 10/02/22  2014 10/03/22  0400   WBC 1.40* 1.22*  --  1.94*   HGB 12.5* 13.3*  --  12.3*   HCT 39.7* 41.8 37 37.7*   PLT 79* 69*  --  74*     CMP:   Recent Labs   Lab 10/02/22  0516 10/02/22  1709 10/03/22  0400     --  137   K 4.0  --  3.4*     --  102   CO2 27  --  24   *  --  234*   BUN 16  --  27*   CREATININE 0.9  --  1.3   CALCIUM 8.3*  --  8.7   PROT  --  6.6  --    ALBUMIN  --  2.8*  --    BILITOT  --  0.8  --    ALKPHOS  --  124  --    AST  --  49*  --    ALT  --  60*  --    ANIONGAP 5*  --  11     Cardiac Markers: No results for input(s): CKMB, MYOGLOBIN, BNP, TROPISTAT in the last 48 hours.    Significant Imaging: I have reviewed all pertinent imaging results/findings within the past 24 hours.      Assessment/Plan:      * Cellulitis Left Axilla  Severe cellulitis with systemic signs and symptoms of infection. Possible  port A cath infection?  CTA head and neck not significant     PLAN   contineu dapto and meropenem   Follow final culture   Continue GCSF   Follow surgeon . Awaited official opnote           Sepsis  As above   Follow culture      Pancytopenia  Due to severe infection  GCSF  ANC better and DC reverse isolation       Chronic obstructive pulmonary disease, unspecified COPD type  Continue home regimen   prednisone was held  given current infection        Type 2 diabetes mellitus without complication, without long-term current use of insulin  Hold Oral hypoglycemics while patient is in the hospital.  SSI    Skin lesion  Most likely cellulitis . Possible port infection   S/p OR . OR Note not ready yet. tentative plan was port removal and skin Bx and drain if needed       Essential hypertension  Continue amlodipine  Keep Hold losartan    Hypogammaglobulinemia  Receives IVIG infusions with Oncology  Last IVIg infusion 9/16      Follicular lymphoma grade IIIa  Patient with history of non-Hodgkin's lymphoma diagnosed in 2012 status post treatment at [facility] and remission.  He has been on maintenance therapy with rituximab every 8 weeks with Oncology for several years now.  Last Rituxan dose 9/15/22    PLAN   GCSF as per hematology and ANC improved .              VTE Risk Mitigation (From admission, onward)         Ordered     enoxaparin injection 40 mg  Daily         10/01/22 0046     IP VTE HIGH RISK PATIENT  Once         10/01/22 0046     Place sequential compression device  Until discontinued         10/01/22 0046                Discharge Planning   GREGORY: 10/4/2022     Code Status: Full Code   Is the patient medically ready for discharge?:     Reason for patient still in hospital (select all that apply): Treatment  Discharge Plan A: Home                  Pmua Andino MD  Department of Hospital Medicine   [facility]

## 2022-10-04 NOTE — CARE UPDATE
10/04/22 0712   Patient Assessment/Suction   Level of Consciousness (AVPU) alert   Respiratory Effort Normal;Unlabored   All Lung Fields Breath Sounds equal bilaterally;coarse   Skin Integrity   $ Wound Care Tech Time 15 min   Area Observed Bridge of nose   Skin Appearance without discoloration   PRE-TX-O2   O2 Device (Oxygen Therapy) room air   SpO2 96 %   Pulse Oximetry Type Continuous   $ Pulse Oximetry - Multiple Charge Pulse Oximetry - Multiple   Pulse 89   Resp 17   Aerosol Therapy   $ Aerosol Therapy Charges PRN treatment not required   Airway Safety   Ambu bag with the patient? Yes, Adult Ambu   Is mask with the patient? Yes, Adult Mask   Preset CPAP/BiPAP Settings   Mode Of Delivery BiPAP;Standby   $ CPAP/BiPAP Daily Charge BiPAP/CPAP Daily   $ Is patient using? Yes   Equipment Type V60

## 2022-10-04 NOTE — PROGRESS NOTES
Pulmonary/Critical Care Consult      PATIENT NAME: Sivakumar Thacker  MRN: [id number]  TODAY'S DATE: 10/04/2022  10:14 AM  ADMIT DATE: 2022  AGE: 67 y.o. : 1955    CONSULT REQUESTED BY: Puma Andino MD    REASON FOR CONSULT:   Acute hypercapnic respiratory failure    HISTORY OF PRESENT ILLNESS   Sivakumar Thacker is a 67 y.o. male with a PMH of non-Hodgkins lymphoma on rituximab (last 9/15/22) with right chest port who presented with cellulitis of the chest on whom we have been consulted for acute hypercapnic respiratory failure requiring BiPAP.    A few days ago, the patient noticed a tender lymph node in his left axilla. At the hospital, he was noted to be tachycardic and febrile with a well-demarcated rash on his chest and tenderness at his Port-a-Cath. Procal was 17.2. He was started empirically on vanc and cefepime.     Yesterday, he was given percocet and IV morphine for pain. He then became somewhat obtunded. He was found to be hypercapnic and acidotic, which resolved with BiPAP for several hours. This morning, the patient is awake and alert, saturating 98% on 2 L NC.    10/4/22: Port-a-cath removed and right axillary cellulities gregorio bipsied (no abscess found) yesterday in OR. Went on BiPAP overnight last night after feeling a bit SOB in the afternoon, but he found it uncomfortable, it kept him up, and he took it off in the early morning.    REVIEW OF SYSTEMS  GENERAL: Feeling okay. Tender at chest to touch.  EYES: Vision is good.  ENT: No sinusitis or pharyngitis.   HEART: No chest pain or palpitations.  LUNGS: No cough, sputum, or wheezing.  GI: No abdominal pain, nausea, vomiting, or diarrhea.  : No urinary urgency or abnormal frequency.  SKIN: Painful, red rash across chest.  MUSCULOSKELETAL: No joint pain or myalgias.  NEURO: No headaches or neuropathy.  LYMPH: no LAD palpated  PSYCH: No anxiety or depression.  ENDO: No weight change.    ALLERGIES  Review of patient's allergies indicates:  No  Known Allergies    INPATIENT SCHEDULED MEDICATIONS   chlorhexidine  15 mL Mouth/Throat BID    DAPTOmycin (CUBICIN) IV (PEDS and ADULTS)  500 mg Intravenous Q24H    enoxaparin  40 mg Subcutaneous Daily    filgrastim-sndz  300 mcg Subcutaneous Daily    fluticasone propionate  1 spray Each Nostril BID    meropenem (MERREM) IVPB  1 g Intravenous Q8H    micafungin (MYCAMINE) IVPB  150 mg Intravenous Q24H    montelukast  10 mg Oral Daily    mupirocin   Nasal BID    pantoprazole  40 mg Intravenous Daily    potassium chloride  10 mEq Oral Once         MEDICAL AND SURGICAL HISTORY  Past Medical History:   Diagnosis Date    Diabetes mellitus, type 2     Encounter for antineoplastic chemotherapy 04/01/2020    Hypertension     Neuropathy     Non Hodgkin's lymphoma     Reflux esophagitis      Past Surgical History:   Procedure Laterality Date    COLONOSCOPY  2018    EYE SURGERY  Approximately 3 years ago    Cataract    HAND SURGERY      amputation left index finger    INCISION AND DRAINAGE OF ABSCESS Left 10/3/2022    Procedure: INCISION AND DRAINAGE, ABSCESS;  Surgeon: Franco Venegas III, MD;  Location: [facility] OR;  Service: General;  Laterality: Left;  axilla    MEDIPORT REMOVAL Right 10/3/2022    Procedure: REMOVAL, CATHETER, CENTRAL VENOUS, TUNNELED, WITH PORT;  Surgeon: Franco Venegas III, MD;  Location: [facility] OR;  Service: General;  Laterality: Right;    PORTACATH PLACEMENT      SKIN CANCER EXCISION  09/30/2020    [facility]    SPINE SURGERY      VASECTOMY  1985 ?       ALCOHOL, TOBACCO AND DRUG USE  Social History     Tobacco Use   Smoking Status Former    Packs/day: 0.50    Years: 2.00    Pack years: 1.00    Types: Cigarettes   Smokeless Tobacco Never     Social History     Substance and Sexual Activity   Alcohol Use Not Currently    Alcohol/week: 0.0 standard drinks    Comment: rarely     Social History     Substance and Sexual Activity   Drug Use No       FAMILY HISTORY  Family History   Problem Relation Age of Onset     Diabetes Father        VITAL SIGNS (MOST RECENT)  Temp: 98.7 °F (37.1 °C) (10/04/22 0301)  Pulse: 89 (10/04/22 0712)  Resp: 17 (10/04/22 0712)  BP: (!) 150/72 (10/03/22 2301)  SpO2: 96 % (10/04/22 0712)    INTAKE AND OUTPUT (LAST 24 HOURS):  Intake/Output Summary (Last 24 hours) at 10/4/2022 0858  Last data filed at 10/4/2022 0145  Gross per 24 hour   Intake --   Output 1325 ml   Net -1325 ml       WEIGHT  Wt Readings from Last 1 Encounters:   10/01/22 95.8 kg (211 lb 3.2 oz)       PHYSICAL EXAM  GENERAL: NAD.  HEENT: Extraocular movements intact. Pharynx moist.  NECK: Supple. No JVD or hepatojugular reflux.  HEART: Regular rate and normal rhythm. No murmur or gallop auscultated.  LUNGS: Clear to auscultation and percussion. Lung excursion symmetrical.  ABDOMEN: Soft, non-tender, non-distended, no masses palpated.  EXTREMITIES: Normal muscle tone and joint movement, no cyanosis or clubbing.   LYMPHATICS: Left axilla very tender.  SKIN: Erythematous rash across chest that is exquisitely tender to touch. Bandage over port site on right chest.  NEURO: No gross deficit.  PSYCH: Appropriate affect    ACUTE PHASE REACTANT (LAST 24 HOURS)  No results for input(s): FERRITIN, CRP, LDH, DDIMER in the last 24 hours.      CBC LAST (LAST 24 HOURS)  Recent Labs   Lab 10/04/22  0400   WBC 2.64*   RBC 3.79*   HGB 11.1*   HCT 33.8*   MCV 89   MCH 29.3   MCHC 32.8   RDW 13.3   PLT 78*   MPV 11.2   GRAN 50.0   LYMPH 12.0*   MONO 26.0*   NRBC 0       CHEMISTRY LAST (LAST 24 HOURS)  Recent Labs   Lab 10/03/22  1717 10/04/22  0400   NA  --  138   K  --  3.6   CL  --  105   CO2  --  27   ANIONGAP  --  6*   BUN  --  38*   CREATININE  --  1.0   GLU  --  294*   CALCIUM  --  8.3*   PH 7.361  --        COAGULATION LAST (LAST 24 HOURS)  No results for input(s): LABPT, INR, APTT in the last 24 hours.      CARDIAC PROFILE (LAST 24 HOURS)  Recent Labs   Lab 09/30/22  1632 10/01/22  0630   *  --    CPK  --  25   TROPONINI <0.030  --         LAST 7 DAYS MICROBIOLOGY   Microbiology Results (last 7 days)       Procedure Component Value Units Date/Time    IV catheter culture [[id number]] Collected: 10/03/22 1221    Order Status: Completed Specimen: Catheter Tip, Subclavian Updated: 10/04/22 0707     Aerobic Culture - Cath tip No growth    Narrative:      Port    Aerobic culture [[id number]]  (Abnormal) Collected: 10/03/22 1223    Order Status: Completed Specimen: Abscess from Chest, Left Updated: 10/04/22 0706     Aerobic Bacterial Culture GRAM NEGATIVE KITA, NON-LACTOSE   Moderate  Identification and susceptibility pending      Narrative:      Left axilla    Blood culture #2 **CANNOT BE ORDERED STAT** [[id number]] Collected: 09/30/22 1800    Order Status: Completed Specimen: Blood from Peripheral, Antecubital, Left Updated: 10/03/22 2032     Blood Culture, Routine No Growth to date      No Growth to date      No Growth to date      No Growth to date    Blood culture #1 **CANNOT BE ORDERED STAT** [[id number]] Collected: 09/30/22 1632    Order Status: Completed Specimen: Blood from Peripheral, Antecubital, Right Updated: 10/03/22 1832     Blood Culture, Routine No Growth to date      No Growth to date      No Growth to date      No Growth to date    Fungus culture [[id number]] Collected: 10/03/22 1223    Order Status: Sent Specimen: Abscess from Chest, Left Updated: 10/03/22 1302    Culture, Anaerobe [[id number]] Collected: 10/03/22 1223    Order Status: Sent Specimen: Abscess from Chest, Left Updated: 10/03/22 1301    Culture, Anaerobic [[id number]] Collected: 10/03/22 1223    Order Status: Sent Specimen: Abscess from Chest, Left Updated: 10/03/22 1301    Blood culture [[id number]] Collected: 10/01/22 1058    Order Status: Completed Specimen: Blood from Antecubital, Right Arm Updated: 10/03/22 1232     Blood Culture, Routine No Growth to date      No Growth to date      No Growth to date    Narrative:      Aerobic and anaerobic    Blood culture  [[id number]] Collected: 10/01/22 1058    Order Status: Completed Specimen: Blood Updated: 10/03/22 1232     Blood Culture, Routine No Growth to date      No Growth to date      No Growth to date    Narrative:      Aerobic and anaerobic            MOST RECENT IMAGING  Echo  · The estimated PA systolic pressure is 32 mmHg.  · The left ventricle is normal in size with normal systolic function.  · Normal left ventricular diastolic function.  · Mild right ventricular enlargement with normal right ventricular   systolic function.  · Normal central venous pressure (3 mmHg).  · The estimated ejection fraction is 55%.  · Mild to moderate tricuspid regurgitation.  · Mild-to-moderate mitral regurgitation.  · There are segmental left ventricular wall motion abnormalities.     FL Fluoro For Venous Access  See Notes    This procedure was auto-finalized.      CURRENT VISIT EKG  Results for orders placed or performed during the hospital encounter of 09/30/22   EKG 12-lead    Narrative    Test Reason : R07.9,    Vent. Rate : 067 BPM     Atrial Rate : 078 BPM     P-R Int : 000 ms          QRS Dur : 134 ms      QT Int : 382 ms       P-R-T Axes : 000 -70 069 degrees     QTc Int : 403 ms    Wide QRS rhythm angie rhythm  Left axis deviation  Right bundle branch block  Inferior infarct ,age undetermined  Abnormal ECG  When compared with ECG of 30-SEP-2022 14:38,  Wide QRS rhythm has replaced Sinus rhythm  Confirmed by Jordy JACOBS, Sivakumar MADISON ([id number]) on 10/2/2022 1:08:02 PM    Referred By: [email or web address]   SELF           Confirmed By:Sivakumar Spence MD       ECHOCARDIOGRAM 10/3/22  The estimated PA systolic pressure is 32 mmHg.  The left ventricle is normal in size with normal systolic function.  Normal left ventricular diastolic function.  Mild right ventricular enlargement with normal right ventricular systolic function.  Normal central venous pressure (3 mmHg).  The estimated ejection fraction is 55%.  Mild to moderate tricuspid  regurgitation.  Mild-to-moderate mitral regurgitation.  There are segmental left ventricular wall motion abnormalities.        RESPIRATORY SUPPORT  Oxygen Concentration (%):  [30] 30       LAST ARTERIAL BLOOD GAS  ABG  Recent Labs   Lab 10/03/22  1717   PH 7.361   PO2 109*   PCO2 50.1*   HCO3 28.4*   BE 3     PFTs 8/2022  FEV1/FVC 0.79, FEV1 78%, FVC 76%  TLC 76%, ERV 33%, RV 80%  DLCO 90%    ASSESSMENT/PLAN:   Sivakumar Thacker is a 67 y.o. male non-Hodgkins lymphoma on rituximab (last 9/15/22) with right chest port who presented with cellulitis of the chest on whom we have been consulted for acute hypercapnic respiratory failure requiring BiPAP.    NEUROLOGIC  #Opiate-induced hypoactive delirium, resolved  #CO2 narcosis 2/2 above, resolved  #Pain related to cellulitis, possible port infection  - minimize sedating meds, gunjan. opiates  - multimodal analgesia    CARDIAC  #HFpEF exacerbation, improved  #Pulmonary edema  TTE shows EF 55%, MR, TR, and RV overload, as well as LV wall motion abnormalities. BNP elevated to 185 on admission.  - s/p lasix  - strict I&O    #Hypertension  - control pain  - PRN IV meds if needed  - diurese as needed    PULMONARY  #Acute hypercapnic respiratory failure  Likely precipitated by opiate-induced hypoventilation. Recent PFTs show no obstruction.  #Mild lung restriction  #Pulmonary edema  #Possible hospital-acquired pneumonia in an immunocompromised host  - target euvolemia  - minimize sedating meds  - DuoNebs PRN  - no need for BiPAP unless another episode of hypercapnic delirium    GASTROINTESTINAL  No acute issues.    HEMATOLOGIC  #Non-Hodgkin's lypmhoma  #Pancytopenia  #Mild neutropenia  - s/p GCSF  - defer to hematology    RENAL/GENITOURINARY  #Hypokalemia  - monitor, replete    INFECTIOUS DISEASE  #Severe cellulitis  #Possible port infection  #Possible HAP  Culture from L axillary biopsy growing non-lactose fermentin GNRs. Other cultures, including port tip NGTD.  - Abx per ID  -  follow cultures    ENDOCRINE  #Hyperglycemia  - AccuChecks, SSI    DVT prophylaxis: Lovenox ppx  Stress ulcer prophylaxis: on daily PPI  Code status: FULL  Disposition: Step down to floor status.      Boni Leyva MD  [facility]  Department of Pulmonary and Critical Care Medicine  Date of Service: 10/04/2022  10:14 AM

## 2022-10-04 NOTE — PROGRESS NOTES
Critical access hospital Medicine  Progress Note    Patient Name: Sivakumar Thacker  MRN: [id number]  Patient Class: IP- Inpatient   Admission Date: 9/30/2022  Length of Stay: 3 days  Attending Physician: Puma Andino MD  Primary Care Provider: Barry Mcmahon MD        Subjective:     Principal Problem:Cellulitis        HPI:  Sivakumar Thacker is a 67 y.o. White male   With PMH of non-Hodgkin's lymphoma,  who presents with pain and swelling of left arm head and shoulder.  He presents emergency room with complaint of pain in the armpit .  He admits to fevers and chills recorded temperature 101° but he was afebrile in the emergency room.  He recently had a repeat PET scan which is negative by history for recurrence.  He receives his chemo which includes Rituxan and IVIG his last dose September 15th.  He has a chronic lung infection which she has been followed by Pulmonary and has been compliant with daily medication.  His cough is improving as well as sore throat.  He denies chest pain or shortness of breath no nausea vomiting no abdominal pain or dysuric symptoms.  He has a right clavicle access point which does not show any signs of infection.    Plan to admit continue IV antibiotics obtain blood cultures in consult Hematology.      Overview/Hospital Course:  Assumed care  Seen in am before Spreading cellulitis . No sing of fascitis. Redness tender to touch . Cannot open axilla .  Off bipap. D/w patient and family members    10/4  Seen earlier   Wound dressing clean . BP stable . Line out . BC neg. WBC improved   No pus at surgery . Wound culture growing gram neg        Interval History:     Review of Systems  As per subjective  Objective:     Vital Signs (Most Recent):  Temp: 97.9 °F (36.6 °C) (10/04/22 1115)  Pulse: 83 (10/04/22 1115)  Resp: 19 (10/04/22 1115)  BP: (!) 153/61 (10/04/22 1253)  SpO2: 95 % (10/04/22 1115)   Vital Signs (24h Range):  Temp:  [97.9 °F (36.6 °C)-98.7 °F (37.1 °C)] 97.9 °F (36.6  °C)  Pulse:  [] 83  Resp:  [15-31] 19  SpO2:  [91 %-100 %] 95 %  BP: (145-160)/(61-80) 153/61  Arterial Line BP: (130-183)/(52-81) 143/53     Weight: 95.8 kg (211 lb 3.2 oz)  Body mass index is 32.11 kg/m².    Intake/Output Summary (Last 24 hours) at 10/4/2022 1550  Last data filed at 10/4/2022 0145  Gross per 24 hour   Intake --   Output 1325 ml   Net -1325 ml      Physical Exam  Constitutional:       General: He is not in acute distress.     Appearance: He is well-developed.   HENT:      Head: Normocephalic.   Eyes:      Pupils: Pupils are equal, round, and reactive to light.   Cardiovascular:      Rate and Rhythm: Regular rhythm. Tachycardia present.   Pulmonary:      Effort: Pulmonary effort is normal. No respiratory distress.   Abdominal:      General: Abdomen is flat. There is no distension.      Tenderness: There is no abdominal tenderness.   Musculoskeletal:         General: Normal range of motion.      Cervical back: Neck supple.   Skin:     Findings: No rash.   Neurological:      Mental Status: He is alert and oriented to person, place, and time.   Psychiatric:         Mood and Affect: Mood normal.       Significant Labs: All pertinent labs within the past 24 hours have been reviewed.  CBC:   Recent Labs   Lab 10/02/22  1711 10/02/22  2014 10/03/22  0400 10/04/22  0400   WBC 1.22*  --  1.94* 2.64*   HGB 13.3*  --  12.3* 11.1*   HCT 41.8 37 37.7* 33.8*   PLT 69*  --  74* 78*     CMP:   Recent Labs   Lab 10/02/22  1709 10/03/22  0400 10/04/22  0400   NA  --  137 138   K  --  3.4* 3.6   CL  --  102 105   CO2  --  24 27   GLU  --  234* 294*   BUN  --  27* 38*   CREATININE  --  1.3 1.0   CALCIUM  --  8.7 8.3*   PROT 6.6  --   --    ALBUMIN 2.8*  --   --    BILITOT 0.8  --   --    ALKPHOS 124  --   --    AST 49*  --   --    ALT 60*  --   --    ANIONGAP  --  11 6*     Cardiac Markers:   Recent Labs   Lab 10/04/22  0400   *       Significant Imaging: I have reviewed all pertinent imaging  results/findings within the past 24 hours.      Assessment/Plan:      * Cellulitis Left Axilla  Severe cellulitis with systemic signs and symptoms of infection. Possible port A cath infection?  CTA head and neck not significant   S/p incision and drainage and port removal     PLAN   contineu dapto and meropenem   Stopped mycamine   Follow final culture   Continue GCSF and possible today last dose    Follow surgeon .  Penrose drain in situ           Sepsis  As above   Follow culture      Pancytopenia  Due to severe infection  GCSF  ANC better and DC reverse isolation       Chronic obstructive pulmonary disease, unspecified COPD type  Continue home regimen   prednisone was held  given current infection        Type 2 diabetes mellitus without complication, without long-term current use of insulin  Hold Oral hypoglycemics while patient is in the hospital.  Metformin as per pt wish   SSI    Skin lesion  Most likely spreading cellulitis . Possible port infection and port removed   Follow skin biopsy      Essential hypertension  Continue amlodipine  Resume  losartan    Hypogammaglobulinemia  Receives IVIG infusions with Oncology  Last IVIg infusion 9/16      Follicular lymphoma grade IIIa  Patient with history of non-Hodgkin's lymphoma diagnosed in 2012 status post treatment at [facility] and remission.  He has been on maintenance therapy with rituximab every 8 weeks with Oncology for several years now.  Last Rituxan dose 9/15/22    PLAN   GCSF as per hematology and ANC improved .  DC as per oncology               VTE Risk Mitigation (From admission, onward)         Ordered     enoxaparin injection 40 mg  Daily         10/01/22 0046     IP VTE HIGH RISK PATIENT  Once         10/01/22 0046     Place sequential compression device  Until discontinued         10/01/22 0046                Discharge Planning   GREGORY: 10/4/2022     Code Status: Full Code   Is the patient medically ready for discharge?:     Reason for patient still  in hospital (select all that apply): Treatment  Discharge Plan A: Home                  Puma nAdino MD  Department of Hospital Medicine   [facility]

## 2022-10-04 NOTE — ASSESSMENT & PLAN NOTE
Severe cellulitis with systemic signs and symptoms of infection. Possible port A cath infection?  CTA head and neck not significant   S/p incision and drainage and port removal     PLAN   contineu dapto and meropenem   Stopped mycamine   Follow final culture   Continue GCSF and possible today last dose    Follow surgeon .  Penrose drain in situ

## 2022-10-04 NOTE — PROGRESS NOTES
Consult Note  Infectious Disease    Reason for Consult:  Severe lymphadenitis; L axilla cellulitis     HPI: Sivakumar Thacker is a 67 y.o. male with past medical history of hypertension, diabetes with lower extremity neuropathy, and low-grade non-Hodgkin's lymphoma on rituximab maintenance therapy every 2 months (last 9/15) and monthly IVIG (last 9/16). Presented on 09/30 for worsening redness and discomfort on his left axilla, which progressed to his neck and chest, with associated fever of 103 and chills at home.  He also complains of severe headache, nausea, no vomit, new onset bilateral lower extremity edema for about a month, no pain, and persistent nonproductive cough due to history of chronic bronchiectasis for which he has been on Augmentin and azithromycin 3 times a week, the latter was discontinued at last follow-up.  Patient contacted Heme-Onc before coming to the hospital, was prescribed Bactrim, given worsening redness and severity of pain patient came to the emergency room.  He denies abdominal pain, dysuria increased urinary frequency, or change in bowel movements.      In the ER, tachycardic 109, T-max 102.9°  Labs on admission white count 5.4, bands 6%, H&H 13/39, platelet count 168   Hypokalemia 3.4, creatinine 1.3   Glucose 273, normal LFTs   Lactic acid 1.8-->2.1, procalcitonin 17.2 high  UA with 1+ protein, 4+ glucose, negative for infection   Chest x-ray negative for acute pathology   CT head without contrast negative for acute pathology.  9 mm circumscribed hypodensity at the inferior margin of the right basal ganglia consistent with small chronic lacunar infarct or prominent perivascular space.  Sinus disease as above.  CTA chest negative for PE.  Interstitial prominence at both lung bases suggesting mild interstitial edema, with superimposed atelectasis or infiltrates at the left lung base.  Soft tissue ultrasound left axilla, revealed subcutaneous edema at the left axilla, no mass,  lymphadenopathy, or drainable fluid collection.  Correlate for possible cellulitis.    Patient admitted for sepsis likely secondary to left axilla spreading to neck and chest cellulitis.    Empirically started on vancomycin and cefepime IV.  Hospital course complicated by worsening leukopenia 1.4, and spreading of redness through his chest.    ID consult for left axilla cellulitis in lymphoma patient.    10/3 (Felix):  Consult reviewed and discussed with Dr. Schrader.  He takes Augmentin once a day and azithromycin 3 times weekly as prophylactic antibiotics for his hypogammaglobulinemia and recurring lung infections associated with bronchiectasis and sinusitis.  His illness began with chills followed by left axillary pain.  He has an abrasion on the top of his left hand which he states is a common injury for him.  The cellulitis it does extend over most of the chest wall and left upper quadrant.  He does have some abdominal tenderness with normal bowel sounds no nausea vomiting or diarrhea.  There is no fluctuance or crepitance.  He does have adequate range of motion at the shoulder elbow and wrist to exclude septic arthritis.  He does not report any water exposure  10/4: Interim reviewed.  Discussed with surgery yesterday and no abscess or necrotizing process was discovered. The port was well incorporated. Interestingly there is a gram negative laura growing from th left chest abscess. He denied water exposure yesterday. He is requiring less oxygen, 90% on RA, 99% on 2 liters.     EXAM & DIAGNOSTICS REVIEWED:   Vitals:     Temp:  [98.1 °F (36.7 °C)-98.7 °F (37.1 °C)]   Temp: 98.7 °F (37.1 °C) (10/04/22 0301)  Pulse: 89 (10/04/22 0712)  Resp: 17 (10/04/22 0712)  BP: (!) 150/72 (10/03/22 2301)  SpO2: 96 % (10/04/22 0712)    Intake/Output Summary (Last 24 hours) at 10/4/2022 1229  Last data filed at 10/4/2022 0145  Gross per 24 hour   Intake --   Output 1325 ml   Net -1325 ml       General:  In NAD. Alert and attentive,  cooperative, not talking much  Eyes:  Anicteric, EOMI  ENT:  No ulcers, exudates, thrush, nares patent, dentition is good, mouth is dry  Neck:  Supple,    Lungs: clear  Heart:  S1/S2+, regular rhythm, no murmurs  Abd:  +BS, soft, non tender to palpation,   :  Voids, urine clear, no flank tenderness  Musc:  Joints without effusion, swelling,  erythema, synovitis, ambulatory.  Able to spontaneously and passively move shoulder, elbow, wrist with more ease than yesterday  Skin:  Warm, L axilla with redness, very tender to palpation, and the redness has spread out over thorax a little more than yesterday  Wound:   Neuro:  Following commands, alert, speech is clear, neuropathy LE b/l  Psych:  comfortable, cooperative  Lymphatic:   Unable to palpate left axilla secondary to tenderness.  He does have left supraclavicular tenderness  Extrem: Trace b/l LE edema, non tender to palpation - petechial rash on dorsum of  feet and medial ankles is improved  VAD:  R Port-A-Cath       Isolation: Neutropenic but will discontinue    10/2:      9/30:        General Labs reviewed:  Recent Labs   Lab 10/02/22  1711 10/02/22  2014 10/03/22  0400 10/04/22  0400   WBC 1.22*  --  1.94* 2.64*   HGB 13.3*  --  12.3* 11.1*   HCT 41.8 37 37.7* 33.8*   PLT 69*  --  74* 78*       Recent Labs   Lab 09/30/22  1632 10/01/22  0630 10/02/22  0516 10/02/22  1709 10/03/22  0400 10/04/22  0400      < > 137  --  137 138   K 3.4*   < > 4.0  --  3.4* 3.6      < > 105  --  102 105   CO2 26   < > 27  --  24 27   BUN 21   < > 16  --  27* 38*   CREATININE 1.3   < > 0.9  --  1.3 1.0   CALCIUM 8.6*   < > 8.3*  --  8.7 8.3*   PROT 6.5  --   --  6.6  --   --    BILITOT 1.2*  --   --  0.8  --   --    ALKPHOS 95  --   --  124  --   --    ALT 24  --   --  60*  --   --    AST 30  --   --  49*  --   --     < > = values in this interval not displayed.     Recent Labs   Lab 10/02/22  1709   CRP >38.00*     No results for input(s): SEDRATE in the last 168  hours.    Estimated Creatinine Clearance: 81.6 mL/min (based on SCr of 1 mg/dL).     Micro:  Microbiology Results (last 7 days)       Procedure Component Value Units Date/Time    IV catheter culture [[id number]] Collected: 10/03/22 1221    Order Status: Completed Specimen: Catheter Tip, Subclavian Updated: 10/04/22 0707     Aerobic Culture - Cath tip No growth    Narrative:      Port    Aerobic culture [[id number]]  (Abnormal) Collected: 10/03/22 1223    Order Status: Completed Specimen: Abscess from Chest, Left Updated: 10/04/22 0706     Aerobic Bacterial Culture GRAM NEGATIVE KITA, NON-LACTOSE   Moderate  Identification and susceptibility pending      Narrative:      Left axilla    Blood culture #2 **CANNOT BE ORDERED STAT** [[id number]] Collected: 09/30/22 1800    Order Status: Completed Specimen: Blood from Peripheral, Antecubital, Left Updated: 10/03/22 2032     Blood Culture, Routine No Growth to date      No Growth to date      No Growth to date      No Growth to date    Blood culture #1 **CANNOT BE ORDERED STAT** [[id number]] Collected: 09/30/22 1632    Order Status: Completed Specimen: Blood from Peripheral, Antecubital, Right Updated: 10/03/22 1832     Blood Culture, Routine No Growth to date      No Growth to date      No Growth to date      No Growth to date    Fungus culture [[id number]] Collected: 10/03/22 1223    Order Status: Sent Specimen: Abscess from Chest, Left Updated: 10/03/22 1302    Culture, Anaerobe [[id number]] Collected: 10/03/22 1223    Order Status: Sent Specimen: Abscess from Chest, Left Updated: 10/03/22 1301    Culture, Anaerobic [[id number]] Collected: 10/03/22 1223    Order Status: Sent Specimen: Abscess from Chest, Left Updated: 10/03/22 1301    Blood culture [[id number]] Collected: 10/01/22 1058    Order Status: Completed Specimen: Blood from Antecubital, Right Arm Updated: 10/03/22 1232     Blood Culture, Routine No Growth to date      No Growth to date      No Growth to date     Narrative:      Aerobic and anaerobic    Blood culture [[id number]] Collected: 10/01/22 1058    Order Status: Completed Specimen: Blood Updated: 10/03/22 1232     Blood Culture, Routine No Growth to date      No Growth to date      No Growth to date    Narrative:      Aerobic and anaerobic            Imaging Reviewed:  Chest x-ray negative for acute pathology - Port-A-Cath tunneled from R to L  CT head without contrast negative for acute pathology.  9 mm circumscribed hypodensity at the inferior margin of the right basal ganglia consistent with small chronic lacunar infarct or prominent perivascular space.  Sinus disease as above.  CTA chest negative for PE.  Interstitial prominence at both lung bases suggesting mild interstitial edema, with superimposed atelectasis or infiltrates at the left lung base.  Soft tissue ultrasound left axilla, revealed subcutaneous edema at the left axilla, no mass, lymphadenopathy, or drainable fluid collection.  Correlate for possible cellulitis.      Cardiology: EKG QTC 403ms       IMPRESSION & PLAN     Severe left axilla cellulitis extending to neck and anterior chest, rule out nec fasc    Blood cultures x2 9/30 & 10/1 x 2 no growth to date,     Lactic acid 1.8-->2.1, procalcitonin 17.2 high   GNR on culture, ?serratia   ASO negative    2. Pancytopenia, h/o low-grade non-Hodgkin's lymphoma on rituximab last dose 9/16, IVIG last dose 9/16   Leukopenia   ANC now greater than 1000, thrombocytopenia 79  Unclear if skin cellulitis could be related to Rituximab    3.  Long term use of antibiotics  4. PMHx: hypertension, diabetes with lower extremity neuropathy, bronchiectasis     Recommendations:   Continue daptomycin and meropenem today but will de-escalate more tomorrow  Follow cultures    Stopping mycamine   Would benefit from a little NSAID, but will wait until platelets are better  Will follow     D/w patient, family         Medical Decision Making during this encounter was  [_] Low  Complexity  [_] Moderate Complexity  [xxx] High Complexity

## 2022-10-05 ENCOUNTER — TELEPHONE (OUTPATIENT)
Dept: FAMILY MEDICINE | Facility: CLINIC | Age: 67
End: 2022-10-05
Payer: MEDICARE

## 2022-10-05 LAB
ANION GAP SERPL CALC-SCNC: 6 MMOL/L (ref 8–16)
BACTERIA BLD CULT: NORMAL
BACTERIA BLD CULT: NORMAL
BACTERIA SPEC AEROBE CULT: ABNORMAL
BACTERIA SPEC ANAEROBE CULT: NORMAL
BACTERIA SPEC ANAEROBE CULT: NORMAL
BASOPHILS # BLD AUTO: ABNORMAL K/UL (ref 0–0.2)
BASOPHILS NFR BLD: 0 % (ref 0–1.9)
BUN SERPL-MCNC: 29 MG/DL (ref 8–23)
CALCIUM SERPL-MCNC: 8.1 MG/DL (ref 8.7–10.5)
CHLORIDE SERPL-SCNC: 101 MMOL/L (ref 95–110)
CO2 SERPL-SCNC: 31 MMOL/L (ref 23–29)
CREAT SERPL-MCNC: 0.8 MG/DL (ref 0.5–1.4)
DIFFERENTIAL METHOD: ABNORMAL
EOSINOPHIL # BLD AUTO: ABNORMAL K/UL (ref 0–0.5)
EOSINOPHIL NFR BLD: 8 % (ref 0–8)
ERYTHROCYTE [DISTWIDTH] IN BLOOD BY AUTOMATED COUNT: 13.6 % (ref 11.5–14.5)
EST. GFR  (NO RACE VARIABLE): >60 ML/MIN/1.73 M^2
GAMMA INTERFERON BACKGROUND BLD IA-ACNC: 0.03 IU/ML
GLUCOSE SERPL-MCNC: 161 MG/DL (ref 70–110)
GLUCOSE SERPL-MCNC: 175 MG/DL (ref 70–110)
GLUCOSE SERPL-MCNC: 196 MG/DL (ref 70–110)
HCT VFR BLD AUTO: 33.3 % (ref 40–54)
HGB BLD-MCNC: 10.9 G/DL (ref 14–18)
IMM GRANULOCYTES # BLD AUTO: ABNORMAL K/UL (ref 0–0.04)
IMM GRANULOCYTES NFR BLD AUTO: ABNORMAL % (ref 0–0.5)
LABCORP MISC TEST CODE: NORMAL
LABCORP MISC TEST NAME: NORMAL
LABCORP MISCELLANEOUS TEST: NORMAL
LYMPHOCYTES # BLD AUTO: ABNORMAL K/UL (ref 1–4.8)
LYMPHOCYTES NFR BLD: 35 % (ref 18–48)
M TB IFN-G BLD-IMP: NEGATIVE
M TB IFN-G CD4+ BCKGRND COR BLD-ACNC: 0.03 IU/ML
M TB IFN-G CD4+CD8+ BCKGRND COR BLD-ACNC: 0.03 IU/ML
MCH RBC QN AUTO: 29.3 PG (ref 27–31)
MCHC RBC AUTO-ENTMCNC: 32.7 G/DL (ref 32–36)
MCV RBC AUTO: 90 FL (ref 82–98)
MITOGEN IGNF BLD-ACNC: 9.37 IU/ML
MONOCYTES # BLD AUTO: ABNORMAL K/UL (ref 0.3–1)
MONOCYTES NFR BLD: 14 % (ref 4–15)
NEUTROPHILS NFR BLD: 43 % (ref 38–73)
NRBC BLD-RTO: 0 /100 WBC
PLATELET # BLD AUTO: 82 K/UL (ref 150–450)
PLATELET BLD QL SMEAR: ABNORMAL
PLT AB: GP IA/IIA: NEGATIVE
PLT AB: GP IB/IX: NEGATIVE
PLT AB: GP IIB/IIIA: NEGATIVE
PLT AB: GP IV: NEGATIVE
PLT AB: HLA CLASS 1: NEGATIVE
PMV BLD AUTO: 11.1 FL (ref 9.2–12.9)
POTASSIUM SERPL-SCNC: 3.1 MMOL/L (ref 3.5–5.1)
PROCALCITONIN SERPL IA-MCNC: 1.51 NG/ML (ref 0–0.5)
QUANTIFERON CRITERIA: NORMAL
RBC # BLD AUTO: 3.72 M/UL (ref 4.6–6.2)
SODIUM SERPL-SCNC: 138 MMOL/L (ref 136–145)
WBC # BLD AUTO: 2.62 K/UL (ref 3.9–12.7)

## 2022-10-05 PROCEDURE — 94761 N-INVAS EAR/PLS OXIMETRY MLT: CPT

## 2022-10-05 PROCEDURE — 63600175 PHARM REV CODE 636 W HCPCS: Performed by: SURGERY

## 2022-10-05 PROCEDURE — 99232 PR SUBSEQUENT HOSPITAL CARE,LEVL II: ICD-10-PCS | Mod: ,,, | Performed by: INTERNAL MEDICINE

## 2022-10-05 PROCEDURE — 97165 OT EVAL LOW COMPLEX 30 MIN: CPT

## 2022-10-05 PROCEDURE — 94660 CPAP INITIATION&MGMT: CPT

## 2022-10-05 PROCEDURE — 12000002 HC ACUTE/MED SURGE SEMI-PRIVATE ROOM

## 2022-10-05 PROCEDURE — 80048 BASIC METABOLIC PNL TOTAL CA: CPT | Performed by: SURGERY

## 2022-10-05 PROCEDURE — 25000003 PHARM REV CODE 250: Performed by: SURGERY

## 2022-10-05 PROCEDURE — C1751 CATH, INF, PER/CENT/MIDLINE: HCPCS

## 2022-10-05 PROCEDURE — 63600175 PHARM REV CODE 636 W HCPCS: Mod: JG | Performed by: INTERNAL MEDICINE

## 2022-10-05 PROCEDURE — 63600175 PHARM REV CODE 636 W HCPCS: Performed by: INTERNAL MEDICINE

## 2022-10-05 PROCEDURE — 25000003 PHARM REV CODE 250: Performed by: INTERNAL MEDICINE

## 2022-10-05 PROCEDURE — 84145 PROCALCITONIN (PCT): CPT | Performed by: SURGERY

## 2022-10-05 PROCEDURE — 85027 COMPLETE CBC AUTOMATED: CPT | Performed by: SURGERY

## 2022-10-05 PROCEDURE — 85007 BL SMEAR W/DIFF WBC COUNT: CPT | Performed by: SURGERY

## 2022-10-05 PROCEDURE — 27000221 HC OXYGEN, UP TO 24 HOURS

## 2022-10-05 PROCEDURE — 83036 HEMOGLOBIN GLYCOSYLATED A1C: CPT | Performed by: INTERNAL MEDICINE

## 2022-10-05 PROCEDURE — 99900031 HC PATIENT EDUCATION (STAT)

## 2022-10-05 PROCEDURE — 97116 GAIT TRAINING THERAPY: CPT | Mod: CQ

## 2022-10-05 PROCEDURE — 99900035 HC TECH TIME PER 15 MIN (STAT)

## 2022-10-05 PROCEDURE — C9113 INJ PANTOPRAZOLE SODIUM, VIA: HCPCS | Performed by: SURGERY

## 2022-10-05 PROCEDURE — 99232 SBSQ HOSP IP/OBS MODERATE 35: CPT | Mod: ,,, | Performed by: INTERNAL MEDICINE

## 2022-10-05 RX ORDER — INSULIN ASPART 100 [IU]/ML
1-10 INJECTION, SOLUTION INTRAVENOUS; SUBCUTANEOUS
Status: DISCONTINUED | OUTPATIENT
Start: 2022-10-05 | End: 2022-10-08 | Stop reason: HOSPADM

## 2022-10-05 RX ORDER — IBUPROFEN 200 MG
24 TABLET ORAL
Status: DISCONTINUED | OUTPATIENT
Start: 2022-10-05 | End: 2022-10-08 | Stop reason: HOSPADM

## 2022-10-05 RX ORDER — IBUPROFEN 200 MG
16 TABLET ORAL
Status: DISCONTINUED | OUTPATIENT
Start: 2022-10-05 | End: 2022-10-08 | Stop reason: HOSPADM

## 2022-10-05 RX ORDER — GLUCAGON 1 MG
1 KIT INJECTION
Status: DISCONTINUED | OUTPATIENT
Start: 2022-10-05 | End: 2022-10-08 | Stop reason: HOSPADM

## 2022-10-05 RX ORDER — CEFEPIME HYDROCHLORIDE 1 G/50ML
2 INJECTION, SOLUTION INTRAVENOUS
Status: DISCONTINUED | OUTPATIENT
Start: 2022-10-05 | End: 2022-10-08 | Stop reason: HOSPADM

## 2022-10-05 RX ADMIN — ENOXAPARIN SODIUM 40 MG: 40 INJECTION SUBCUTANEOUS at 06:10

## 2022-10-05 RX ADMIN — MUPIROCIN 1 G: 20 OINTMENT TOPICAL at 09:10

## 2022-10-05 RX ADMIN — METFORMIN HYDROCHLORIDE 500 MG: 500 TABLET, FILM COATED ORAL at 06:10

## 2022-10-05 RX ADMIN — METFORMIN HYDROCHLORIDE 500 MG: 500 TABLET, FILM COATED ORAL at 09:10

## 2022-10-05 RX ADMIN — CEFEPIME HYDROCHLORIDE 2 G: 2 INJECTION, SOLUTION INTRAVENOUS at 06:10

## 2022-10-05 RX ADMIN — ASPIRIN 81 MG: 81 TABLET, COATED ORAL at 09:10

## 2022-10-05 RX ADMIN — GABAPENTIN 300 MG: 300 CAPSULE ORAL at 09:10

## 2022-10-05 RX ADMIN — MORPHINE SULFATE 2 MG: 2 INJECTION, SOLUTION INTRAMUSCULAR; INTRAVENOUS at 02:10

## 2022-10-05 RX ADMIN — CHLORHEXIDINE GLUCONATE 15 ML: 1.2 RINSE ORAL at 09:10

## 2022-10-05 RX ADMIN — MEROPENEM AND SODIUM CHLORIDE 1 G: 1 INJECTION, SOLUTION INTRAVENOUS at 05:10

## 2022-10-05 RX ADMIN — FLUTICASONE PROPIONATE 50 MCG: 50 SPRAY, METERED NASAL at 09:10

## 2022-10-05 RX ADMIN — LOSARTAN POTASSIUM 12.5 MG: 25 TABLET, FILM COATED ORAL at 09:10

## 2022-10-05 RX ADMIN — MEROPENEM AND SODIUM CHLORIDE 1 G: 1 INJECTION, SOLUTION INTRAVENOUS at 12:10

## 2022-10-05 RX ADMIN — SENNOSIDES AND DOCUSATE SODIUM 1 TABLET: 50; 8.6 TABLET ORAL at 09:10

## 2022-10-05 RX ADMIN — ATORVASTATIN CALCIUM 20 MG: 20 TABLET, FILM COATED ORAL at 09:10

## 2022-10-05 RX ADMIN — FILGRASTIM-SNDZ 300 MCG: 300 INJECTION, SOLUTION INTRAVENOUS; SUBCUTANEOUS at 12:10

## 2022-10-05 RX ADMIN — PANTOPRAZOLE SODIUM 40 MG: 40 INJECTION, POWDER, FOR SOLUTION INTRAVENOUS at 09:10

## 2022-10-05 RX ADMIN — GABAPENTIN 300 MG: 300 CAPSULE ORAL at 03:10

## 2022-10-05 RX ADMIN — AMLODIPINE BESYLATE 5 MG: 5 TABLET ORAL at 09:10

## 2022-10-05 RX ADMIN — MONTELUKAST 10 MG: 10 TABLET, FILM COATED ORAL at 09:10

## 2022-10-05 NOTE — PROGRESS NOTES
"[facility]  Adult Nutrition   Consult Note (Nutrition Education) and Progress note    SUMMARY     Recommendations  Recommendation/Intervention:   1. Continue current diet as tolerated.   2. Recommend Unjury with meals.   3  to obtain daily meal choices.    Goals: 1. Intake to be > / = 75% estimated needs. 2. Lab values trend to target range.  Nutrition Goal Status: new    Communication of RD Recs: reviewed with RN    Dietitian Rounds Brief  RD screen for LOS. Consult for education not completed due 10/01/22. Education completed. Patient with decreased intake with history of lymphoma s/p chemo with chest cellulitis and DM 2 and elevated glucose labs.  Patient agreed to Unjury with meals.  Intake < 25% . RD to monitor for need for an appetite stmulant. Follow for intake. No overt signs of malnutrition.    Diet order:   Current Diet Order: Diabetic 2000 kcal diet      Evaluation of Received Nutrient/Fluid Intake  Energy Calories Required: not meeting needs  Protein Required: not meeting needs  Fluid Required: not meeting needs  Tolerance: tolerating     % Intake of Estimated Energy Needs: 0 - 25 %  % Meal Intake: 0 - 25 %      Intake/Output Summary (Last 24 hours) at 10/5/2022 1448  Last data filed at 10/5/2022 0548  Gross per 24 hour   Intake 100.1 ml   Output 1000 ml   Net -899.9 ml        Anthropometrics  Temp: 98.5 °F (36.9 °C)  Height Method: Stated  Height: 5' 8" (172.7 cm)  Height (inches): 68 in  Weight Method: Bed Scale  Weight: 95.8 kg (211 lb 3.2 oz)  Weight (lb): 211.2 lb  Ideal Body Weight (IBW), Male: 154 lb  % Ideal Body Weight, Male (lb): 137.14 %  BMI (Calculated): 32.1       Estimated/Assessed Needs  Weight Used For Calorie Calculations: 95.8 kg (211 lb 3.2 oz)  Energy Calorie Requirements (kcal): 2044-6553 (20-25 kcal/kg     Protein Requirements: 70-84 gm/day (1.2-1.5 gm/kg IBW)  Weight Used For Protein Calculations: 70 kg (154 lb 5.2 oz) (IBW)     Estimated Fluid Requirement " Method: RDA Method  RDA Method (mL): 1916       Reason for Assessment  Reason For Assessment: consult, length of stay (consult for education ; LOS)  Diagnosis: cancer diagnosis/related complications  Relevant Medical History: hypertension, diabetes with lower extremity neuropathy, and low-grade non-Hodgkin's lymphoma on rituximab maintenance therapy every 2 months (last 9/15) and monthly IVIG (last 9/16).  Interdisciplinary Rounds: did not attend    Nutrition/Diet History  Spiritual, Cultural Beliefs, [employer] Practices, Values that Affect Care: no  Food Allergies: NKFA  Factors Affecting Nutritional Intake: decreased appetite    Nutrition Risk Screen  Nutrition Risk Screen: no indicators present     MST Score: 0  Have you recently lost weight without trying?: No  Weight loss score: 0  Have you been eating poorly because of a decreased appetite?: No  Appetite score: 0       Weight History:  Wt Readings from Last 5 Encounters:   10/01/22 95.8 kg (211 lb 3.2 oz)   10/03/22 95.8 kg (211 lb 3.2 oz)   09/16/22 97 kg (213 lb 12.8 oz)   09/15/22 96.8 kg (213 lb 6.4 oz)   09/15/22 96.8 kg (213 lb 6.4 oz)        Lab/Procedures/Meds: Pertinent Labs/Meds Reviewed    Medications:Pertinent Medications Reviewed  Scheduled Meds:   amLODIPine  5 mg Oral Daily    aspirin  81 mg Oral Daily    atorvastatin  20 mg Oral Daily    chlorhexidine  15 mL Mouth/Throat BID    DAPTOmycin (CUBICIN) IV (PEDS and ADULTS)  500 mg Intravenous Q24H    enoxaparin  40 mg Subcutaneous Daily    filgrastim-sndz  300 mcg Subcutaneous Daily    fluticasone propionate  1 spray Each Nostril BID    gabapentin  300 mg Oral TID    losartan  12.5 mg Oral Daily    meropenem (MERREM) IVPB  1 g Intravenous Q8H    metFORMIN  500 mg Oral BID WM    montelukast  10 mg Oral Daily    mupirocin   Nasal BID    pantoprazole  40 mg Intravenous Daily    potassium chloride  10 mEq Oral Once     Continuous Infusions:  PRN Meds:.acetaminophen, albuterol-ipratropium, benzonatate,  calcium gluconate IVPB, calcium gluconate IVPB, calcium gluconate IVPB, dextrose 10%, diazePAM, glucagon (human recombinant), glucose, glucose, insulin aspart U-100, magnesium oxide, magnesium oxide, magnesium sulfate IVPB, magnesium sulfate IVPB, melatonin, morphine, naloxone, ondansetron, oxyCODONE-acetaminophen, polyethylene glycol, potassium bicarbonate, potassium bicarbonate, potassium bicarbonate, potassium chloride in water **AND** potassium chloride in water **AND** potassium chloride in water, potassium, sodium phosphates, potassium, sodium phosphates, potassium, sodium phosphates, senna-docusate 8.6-50 mg, simethicone, sodium phosphate IVPB, sodium phosphate IVPB, sodium phosphate IVPB, tiZANidine    Labs: Pertinent Labs Reviewed  Clinical Chemistry:  Recent Labs   Lab 10/02/22  1709 10/05/22  0545   NA  --  138   K  --  3.1*   CL  --  101   CO2  --  31*   GLU  --  196*   BUN  --  29*   CREATININE  --  0.8   CALCIUM  --  8.1*   PROT 6.6  --    ALBUMIN 2.8*  --    BILITOT 0.8  --    ALKPHOS 124  --    AST 49*  --    ALT 60*  --    ANIONGAP  --  6*    < > = values in this interval not displayed.     CBC:   Recent Labs   Lab 10/05/22  0545   WBC 2.62*   RBC 3.72*   HGB 10.9*   HCT 33.3*   PLT 82*   MCV 90   MCH 29.3   MCHC 32.7     Lipid Panel:  No results for input(s): CHOL, HDL, LDLCALC, TRIG, CHOLHDL in the last 168 hours.  Cardiac Profile:  Recent Labs   Lab 09/30/22  1632 10/01/22  0630 10/04/22  0400   *  --  130*   CPK  --  25  --    TROPONINI <0.030  --   --      Inflammatory Labs:  Recent Labs   Lab 10/02/22  1709   CRP >38.00*     Monitor and Evaluation  Food and Nutrient Intake: energy intake  Food and Nutrient Adminstration: diet order  Knowledge/Beliefs/Attitudes: food and nutrition knowledge/skill  Physical Activity and Function: nutrition-related ADLs and IADLs  Anthropometric Measurements: weight, weight change, body mass index  Biochemical Data, Medical Tests and Procedures:  electrolyte and renal panel, gastrointestinal profile, glucose/endocrine profile, inflammatory profile, lipid profile  Nutrition-Focused Physical Findings: overall appearance     Nutrition Risk  Level of Risk/Frequency of Follow-up: moderate - high     Nutrition Follow-Up  RD Follow-up?: Yes      Livia Worthington RD, LDN 10/05/2022 2:48 PM

## 2022-10-05 NOTE — PLAN OF CARE
Problem: Oral Intake Inadequate  Goal: Improved Oral Intake  Intervention: Promote and Optimize Oral Intake  Flowsheets (Taken 10/5/2022 1455)  Oral Nutrition Promotion: calorie-dense liquids provided

## 2022-10-05 NOTE — PLAN OF CARE
10/05/22 1028   Patient Assessment/Suction   Level of Consciousness (AVPU) alert   Respiratory Effort Normal;Unlabored   All Lung Fields Breath Sounds equal bilaterally;clear   Skin Integrity   $ Wound Care Tech Time 15 min   Area Observed Bridge of nose   Skin Appearance without discoloration   PRE-TX-O2   O2 Device (Oxygen Therapy) room air   SpO2 95 %   Pulse Oximetry Type Intermittent   $ Pulse Oximetry - Multiple Charge Pulse Oximetry - Multiple   Pulse 78   Resp 18   Aerosol Therapy   $ Aerosol Therapy Charges PRN treatment not required   Respiratory Treatment Status (SVN) PRN treatment not required   Preset CPAP/BiPAP Settings   Mode Of Delivery Standby   $ CPAP/BiPAP Daily Charge BiPAP/CPAP Daily   $ Is patient using? Yes   Size of Mask Large   Equipment Type V60   Ipap 12   EPAP (cm H2O) 6   Pressure Support (cm H2O) 6   Set Rate (Breaths/Min) 12   ITime (sec) 1   Rise Time (sec) 3   CPAP/BiPAP Alarms   High Pressure (cm H2O) 50   Low Pressure (cm H2O) 10   Minute Ventilation (L/Min) 5   High RR (breaths/min) 50   Low RR (breaths/min) 10   Apnea (Sec) 20   Education   $ Education BiPAP;Bronchodilator;15 min   Respiratory Evaluation   $ Care Plan Tech Time 15 min

## 2022-10-05 NOTE — ASSESSMENT & PLAN NOTE
Most likely spreading cellulitis . Possible port infection and port removed and now cellulitis resolving    skin biopsy with no malignancy   Follow tissue biopsy  culture

## 2022-10-05 NOTE — PROGRESS NOTES
Consult Note  Infectious Disease    Reason for Consult:  Severe lymphadenitis; L axilla cellulitis     HPI: Sivakumar Thacker is a 67 y.o. male with past medical history of hypertension, diabetes with lower extremity neuropathy, and low-grade non-Hodgkin's lymphoma on rituximab maintenance therapy every 2 months (last 9/15) and monthly IVIG (last 9/16). Presented on 09/30 for worsening redness and discomfort on his left axilla, which progressed to his neck and chest, with associated fever of 103 and chills at home.  He also complains of severe headache, nausea, no vomit, new onset bilateral lower extremity edema for about a month, no pain, and persistent nonproductive cough due to history of chronic bronchiectasis for which he has been on Augmentin and azithromycin 3 times a week, the latter was discontinued at last follow-up.  Patient contacted Heme-Onc before coming to the hospital, was prescribed Bactrim, given worsening redness and severity of pain patient came to the emergency room.  He denies abdominal pain, dysuria increased urinary frequency, or change in bowel movements.      In the ER, tachycardic 109, T-max 102.9°  Labs on admission white count 5.4, bands 6%, H&H 13/39, platelet count 168   Hypokalemia 3.4, creatinine 1.3   Glucose 273, normal LFTs   Lactic acid 1.8-->2.1, procalcitonin 17.2 high  UA with 1+ protein, 4+ glucose, negative for infection   Chest x-ray negative for acute pathology   CT head without contrast negative for acute pathology.  9 mm circumscribed hypodensity at the inferior margin of the right basal ganglia consistent with small chronic lacunar infarct or prominent perivascular space.  Sinus disease as above.  CTA chest negative for PE.  Interstitial prominence at both lung bases suggesting mild interstitial edema, with superimposed atelectasis or infiltrates at the left lung base.  Soft tissue ultrasound left axilla, revealed subcutaneous edema at the left axilla, no mass,  lymphadenopathy, or drainable fluid collection.  Correlate for possible cellulitis.    Patient admitted for sepsis likely secondary to left axilla spreading to neck and chest cellulitis.    Empirically started on vancomycin and cefepime IV.  Hospital course complicated by worsening leukopenia 1.4, and spreading of redness through his chest.    ID consult for left axilla cellulitis in lymphoma patient.    10/3 (Felix):  Consult reviewed and discussed with Dr. Schrader.  He takes Augmentin once a day and azithromycin 3 times weekly as prophylactic antibiotics for his hypogammaglobulinemia and recurring lung infections associated with bronchiectasis and sinusitis.  His illness began with chills followed by left axillary pain.  He has an abrasion on the top of his left hand which he states is a common injury for him.  The cellulitis it does extend over most of the chest wall and left upper quadrant.  He does have some abdominal tenderness with normal bowel sounds no nausea vomiting or diarrhea.  There is no fluctuance or crepitance.  He does have adequate range of motion at the shoulder elbow and wrist to exclude septic arthritis.  He does not report any water exposure  10/4: Interim reviewed.  Discussed with surgery yesterday and no abscess or necrotizing process was discovered. The port was well incorporated. Interestingly there is a gram negative laura growing from th left chest abscess. He denied water exposure yesterday. He is requiring less oxygen, 90% on RA, 99% on 2 liters.   10/5: Interim reviewed.  He is afebrile, white blood cells are 2.6, platelets are up to 82,000.  Culture from left axilla has Serratia sensitive to all the drugs tested.  Overall clinically he is doing significantly better, however the abdominal and chest wall cellulitis is still persistent however slightly less confluent and intense.  He is able to move the left upper extremity much more easily.    EXAM & DIAGNOSTICS REVIEWED:   Vitals:      Temp:  [97.5 °F (36.4 °C)-98.8 °F (37.1 °C)]   Temp: 98.5 °F (36.9 °C) (10/05/22 1237)  Pulse: 79 (10/05/22 1237)  Resp: 18 (10/05/22 1237)  BP: (!) 163/98 (10/05/22 1237)  SpO2: 96 % (10/05/22 1237)    Intake/Output Summary (Last 24 hours) at 10/5/2022 1429  Last data filed at 10/5/2022 0548  Gross per 24 hour   Intake 100.1 ml   Output 1000 ml   Net -899.9 ml       General:  In NAD. Alert and attentive, cooperative, more interactive   Eyes:  Anicteric, EOMI  ENT:  No ulcers, exudates, thrush, nares patent, dentition is good,    Neck:  Supple,    Lungs: Minimal right basilar crackles  Heart:  S1/S2+, regular rhythm, no murmurs  Abd:  +BS, soft, non tender to palpation,   :  Voids,  no flank tenderness  Musc:  Joints without effusion, swelling,  erythema, synovitis, ambulatory.  Able to spontaneously and passively move shoulder, elbow, wrist with more ease than yesterday  Skin:  The intensity of the redness is decreased and a bit more patchy and less confluent.  The left axillary wound has a large Penrose.  The former Port-A-Cath site has no purulence, see photos below  Wound:   Neuro:  Following commands, alert, speech is clear, neuropathy LE b/l  Psych:  comfortable, cooperative  Lymphatic  Extrem: Trace b/l LE edema, non tender to palpation - petechial rash on dorsum of  feet and medial ankles is improved  VAD:  R IJ TLC      Isolation:    10/5      10/2:      9/30:        General Labs reviewed:  Recent Labs   Lab 10/03/22  0400 10/04/22  0400 10/05/22  0545   WBC 1.94* 2.64* 2.62*   HGB 12.3* 11.1* 10.9*   HCT 37.7* 33.8* 33.3*   PLT 74* 78* 82*       Recent Labs   Lab 09/30/22  1632 10/01/22  0630 10/02/22  1709 10/03/22  0400 10/04/22  0400 10/05/22  0545      < >  --  137 138 138   K 3.4*   < >  --  3.4* 3.6 3.1*      < >  --  102 105 101   CO2 26   < >  --  24 27 31*   BUN 21   < >  --  27* 38* 29*   CREATININE 1.3   < >  --  1.3 1.0 0.8   CALCIUM 8.6*   < >  --  8.7 8.3* 8.1*   PROT 6.5   --  6.6  --   --   --    BILITOT 1.2*  --  0.8  --   --   --    ALKPHOS 95  --  124  --   --   --    ALT 24  --  60*  --   --   --    AST 30  --  49*  --   --   --     < > = values in this interval not displayed.     Recent Labs   Lab 10/02/22  1709   CRP >38.00*     No results for input(s): SEDRATE in the last 168 hours.    Estimated Creatinine Clearance: 102 mL/min (based on SCr of 0.8 mg/dL).     Micro:  Microbiology Results (last 7 days)       Procedure Component Value Units Date/Time    Blood culture [[id number]] Collected: 10/01/22 1058    Order Status: Completed Specimen: Blood from Antecubital, Right Arm Updated: 10/05/22 1232     Blood Culture, Routine No Growth to date      No Growth to date      No Growth to date      No Growth to date      No Growth to date    Narrative:      Aerobic and anaerobic    Blood culture [[id number]] Collected: 10/01/22 1058    Order Status: Completed Specimen: Blood Updated: 10/05/22 1232     Blood Culture, Routine No Growth to date      No Growth to date      No Growth to date      No Growth to date      No Growth to date    Narrative:      Aerobic and anaerobic    IV catheter culture [[id number]] Collected: 10/03/22 1221    Order Status: Completed Specimen: Catheter Tip, Subclavian Updated: 10/05/22 0813     Aerobic Culture - Cath tip No growth    Narrative:      Port    Aerobic culture [[id number]]  (Abnormal)  (Susceptibility) Collected: 10/03/22 1223    Order Status: Completed Specimen: Abscess from Chest, Left Updated: 10/05/22 0812     Aerobic Bacterial Culture SERRATIA MARCESCENS  Moderate      Narrative:      Left axilla    Blood culture #2 **CANNOT BE ORDERED STAT** [[id number]] Collected: 09/30/22 1800    Order Status: Completed Specimen: Blood from Peripheral, Antecubital, Left Updated: 10/04/22 2032     Blood Culture, Routine No Growth to date      No Growth to date      No Growth to date      No Growth to date      No Growth to date    Blood culture #1 **CANNOT BE  ORDERED STAT** [[id number]] Collected: 09/30/22 1632    Order Status: Completed Specimen: Blood from Peripheral, Antecubital, Right Updated: 10/04/22 1832     Blood Culture, Routine No Growth to date      No Growth to date      No Growth to date      No Growth to date      No Growth to date    Fungus culture [[id number]] Collected: 10/03/22 1223    Order Status: Sent Specimen: Abscess from Chest, Left Updated: 10/03/22 1302    Culture, Anaerobe [[id number]] Collected: 10/03/22 1223    Order Status: Sent Specimen: Abscess from Chest, Left Updated: 10/03/22 1301    Culture, Anaerobic [[id number]] Collected: 10/03/22 1223    Order Status: Sent Specimen: Abscess from Chest, Left Updated: 10/03/22 1301            Imaging Reviewed:  Chest x-ray negative for acute pathology - Port-A-Cath tunneled from R to L  CT head without contrast negative for acute pathology.  9 mm circumscribed hypodensity at the inferior margin of the right basal ganglia consistent with small chronic lacunar infarct or prominent perivascular space.  Sinus disease as above.  CTA chest negative for PE.  Interstitial prominence at both lung bases suggesting mild interstitial edema, with superimposed atelectasis or infiltrates at the left lung base.  Soft tissue ultrasound left axilla, revealed subcutaneous edema at the left axilla, no mass, lymphadenopathy, or drainable fluid collection.  Correlate for possible cellulitis.      Cardiology: EKG QTC 403ms       IMPRESSION & PLAN     Severe left axilla cellulitis extending to neck and anterior chest, rule out nec fasc    Blood cultures x2 9/30 & 10/1 x 2 no growth to date,     Lactic acid 1.8-->2.1, procalcitonin 17.2 high    Serratia, on culture left axillary focus   ASO negative    2. Pancytopenia, h/o low-grade non-Hodgkin's lymphoma on rituximab last dose 9/16, IVIG last dose 9/16   Leukopenia   ANC now greater than 1000, thrombocytopenia 79  Unclear if skin cellulitis could be related to Rituximab    3.   Long term use of antibiotics  4. PMHx: hypertension, diabetes with lower extremity neuropathy, bronchiectasis     Recommendations:  De-escalation to cefepime alone     Would benefit from a little NSAID, but will wait until platelets are better  Will follow     D/w patient, family, wound care   Encouraged to be out of bed      Medical Decision Making during this encounter was  [_] Low Complexity  [_] Moderate Complexity  [xxx] High Complexity

## 2022-10-05 NOTE — PROGRESS NOTES
[facility]  General Surgery  Progress Note    Subjective:     Interval History:  No acute events overnight.  Patient states he feels a lot better.  Skin much less tender across the chest.  He is able to raise his left arm.  He has been afebrile hemodynamically stable.    Post-Op Info:  Procedure(s) (LRB):  REMOVAL, CATHETER, CENTRAL VENOUS, TUNNELED, WITH PORT (Right)  INCISION AND DRAINAGE, ABSCESS (Left)   1 Day Post-Op      Medications:  Continuous Infusions:  Scheduled Meds:   amLODIPine  5 mg Oral Daily    aspirin  81 mg Oral Daily    atorvastatin  20 mg Oral Daily    chlorhexidine  15 mL Mouth/Throat BID    DAPTOmycin (CUBICIN) IV (PEDS and ADULTS)  500 mg Intravenous Q24H    enoxaparin  40 mg Subcutaneous Daily    fluticasone propionate  1 spray Each Nostril BID    gabapentin  300 mg Oral TID    losartan  12.5 mg Oral Daily    meropenem (MERREM) IVPB  1 g Intravenous Q8H    metFORMIN  500 mg Oral BID WM    montelukast  10 mg Oral Daily    mupirocin   Nasal BID    pantoprazole  40 mg Intravenous Daily    potassium chloride  10 mEq Oral Once     PRN Meds:acetaminophen, albuterol-ipratropium, benzonatate, calcium gluconate IVPB, calcium gluconate IVPB, calcium gluconate IVPB, dextrose 10%, diazePAM, glucagon (human recombinant), glucose, glucose, insulin aspart U-100, magnesium oxide, magnesium oxide, magnesium sulfate IVPB, magnesium sulfate IVPB, melatonin, morphine, naloxone, ondansetron, oxyCODONE-acetaminophen, polyethylene glycol, potassium bicarbonate, potassium bicarbonate, potassium bicarbonate, potassium chloride in water **AND** potassium chloride in water **AND** potassium chloride in water, potassium, sodium phosphates, potassium, sodium phosphates, potassium, sodium phosphates, senna-docusate 8.6-50 mg, simethicone, sodium phosphate IVPB, sodium phosphate IVPB, sodium phosphate IVPB, tiZANidine     Objective:     Vital Signs (Most Recent):  Temp: (P) 98.8 °F (37.1 °C) (10/04/22  2003)  Pulse: (P) 86 (10/04/22 2003)  Resp: (P) 18 (10/04/22 2003)  BP: (!) (P) 167/82 (10/04/22 2003)  SpO2: (!) (P) 94 % (10/04/22 2003)   Vital Signs (24h Range):  Temp:  [97.9 °F (36.6 °C)-98.8 °F (37.1 °C)] (P) 98.8 °F (37.1 °C)  Pulse:  [] (P) 86  Resp:  [15-31] (P) 18  SpO2:  [91 %-100 %] (P) 94 %  BP: (145-159)/(61-73) (P) 167/82  Arterial Line BP: (130-183)/(52-81) 143/53       Intake/Output Summary (Last 24 hours) at 10/4/2022 2032  Last data filed at 10/4/2022 0145  Gross per 24 hour   Intake --   Output 700 ml   Net -700 ml       Physical Exam  Constitutional:       General: He is awake. He is not in acute distress.     Appearance: He is not toxic-appearing.   Pulmonary:      Effort: Pulmonary effort is normal. No respiratory distress.   Chest:      Comments: Cellulitis improved.  Erythema less pronounced.  Wounds are open.  Skin little less tender.  Neurological:      Mental Status: He is alert.   Psychiatric:         Behavior: Behavior is cooperative.       Significant Labs:  CBC:   Recent Labs   Lab 10/04/22  0400   WBC 2.64*   RBC 3.79*   HGB 11.1*   HCT 33.8*   PLT 78*   MCV 89   MCH 29.3   MCHC 32.8     CMP:   Recent Labs   Lab 10/04/22  0400   *   CALCIUM 8.3*      K 3.6   CO2 27      BUN 38*   CREATININE 1.0       Significant Diagnostics:  I have reviewed all pertinent imaging results/findings within the past 24 hours.    Assessment/Plan:     Active Diagnoses:    Diagnosis Date Noted POA    PRINCIPAL PROBLEM:  Cellulitis Left Axilla [L03.90] 09/30/2022 Yes    Sepsis [A41.9] 10/03/2022 Yes    Pancytopenia [D61.818] 10/02/2022 Unknown    Chronic obstructive pulmonary disease, unspecified COPD type [J44.9] 03/28/2022 Yes    Type 2 diabetes mellitus without complication, without long-term current use of insulin [E11.9] 06/23/2021 Yes    Skin lesion [L98.9] 06/23/2021 Yes    Essential hypertension [I10] 12/30/2019 Yes    Hypogammaglobulinemia [D80.1] 12/13/2019 Yes     Follicular lymphoma grade IIIa [C82.30] 12/04/2018 Yes      Problems Resolved During this Admission:    Diagnosis Date Noted Date Resolved POA    Petechiae [R23.3] 10/03/2022 10/04/2022 Yes     -patient feeling a lot better today postop day 1 removal of port, I&D of left axilla.  Cultures pending.  Port cultures pending.  Exam better.  No plan for any further surgical intervention at this point.  Remove packing from wounds today.    Franco Venegas III, MD  General Surgery  [facility]

## 2022-10-05 NOTE — PT/OT/SLP EVAL
Occupational Therapy   Evaluation and Discharge Note    Name: Sivakumar Thacker  MRN: [id number]  Admitting Diagnosis:  Cellulitis   Recent Surgery: Procedure(s) (LRB):  REMOVAL, CATHETER, CENTRAL VENOUS, TUNNELED, WITH PORT (Right)  INCISION AND DRAINAGE, ABSCESS (Left) 2 Days Post-Op    Recommendations:     Discharge Recommendations: home  Discharge Equipment Recommendations:  none  Barriers to discharge:  None    Assessment:     Sivakumar Thacker is a 67 y.o. male with a medical diagnosis of Cellulitis. At this time, patient is functioning at their prior level of function and does not require further acute OT services.     Plan:     During this hospitalization, patient does not require further acute OT services.  Please re-consult if situation changes.    Plan of Care Reviewed with: patient, family    Subjective     Chief Complaint: Left chest pain at surgical site  Patient/Family Comments/goals: none stated    Occupational Profile:  Living Environment: Pt lives alone in a 1 story home with no steps to enter. Pt has a walk-in shower with a shower chair and raised toilet.  Previous level of function: Independent with ADLs, IADLs, and functional mobility  Roles and Routines: primary homemaker; drives  Equipment Used at home:  raised toilet  Assistance upon Discharge: yes, from family/friends    Pain/Comfort:  Pain Rating 1:  (not rated)  Location 1:  (left chest to axilla)  Pain Addressed 1: Reposition, Distraction, Cessation of Activity    Patients cultural, spiritual, Muslim conflicts given the current situation:      Objective:     Communicated with: nursing prior to session.  Patient found HOB elevated with peripheral IV, telemetry upon OT entry to room.    General Precautions: Standard, fall   Orthopedic Precautions:N/A   Braces: N/A  Respiratory Status: Room air     Occupational Performance:    Bed Mobility:    Patient completed Supine to Sit with supervision    Functional Mobility/Transfers:  Patient  completed Sit <> Stand Transfer with stand by assistance  with  no assistive device   Patient completed Toilet Transfer Step Transfer technique with supervision with  no AD  Functional Mobility: pt amb around room with SBA/Sup with no AD (pushing IV pole) with no LOB or SOB    Activities of Daily Living:  Feeding:  independence    Grooming: stand by assistance standing at sink simulated   Lower Body Dressing: supervision seated EOB to don/doff socks  Toileting: independence per pt    Cognitive/Visual Perceptual:  Cognitive/Psychosocial Skills:     -       Oriented to: Person, Place, Time, and Situation   -       Follows Commands/attention:Follows two-step commands  -       Communication: clear/fluent  -       Memory: No Deficits noted  -       Safety awareness/insight to disability: intact   -       Mood/Affect/Coping skills/emotional control: Appropriate to situation, Cooperative, and Pleasant    Physical Exam:  Balance:    -       Good seated/standing balance  Upper Extremity Range of Motion:     -       Right Upper Extremity: WFL  -       Left Upper Extremity: ~90 degrees shldr flex (due to pain); WFL distally  Upper Extremity Strength: not formally tested due to pain, but functional (pt able to get out of bed independently and transfer on and off toilet independently using UE's)   Strength:    -       Right Upper Extremity: WFL  -       Left Upper Extremity: WFL  Fine Motor Coordination:    -       Intact  Gross motor coordination:   WFL    AMPAC 6 Click ADL:  AMPAC Total Score: 24    Treatment & Education:  Pt educated on role of OT/POC, importance of OOB/EOB activity ,use of call bell, HEP UE ROM exercises to perform, and safety during ADLs, transfers, and functional mobility.    Patient left sitting edge of bed with all lines intact, call button in reach, and family member present    GOALS:   Multidisciplinary Problems       Occupational Therapy Goals       Not on file                    History:     Past  Medical History:   Diagnosis Date    Diabetes mellitus, type 2     Encounter for antineoplastic chemotherapy 04/01/2020    Hypertension     Neuropathy     Non Hodgkin's lymphoma     Reflux esophagitis          Past Surgical History:   Procedure Laterality Date    COLONOSCOPY  2018    EYE SURGERY  Approximately 3 years ago    Cataract    HAND SURGERY      amputation left index finger    INCISION AND DRAINAGE OF ABSCESS Left 10/3/2022    Procedure: INCISION AND DRAINAGE, ABSCESS;  Surgeon: Franco Venegas III, MD;  Location[facility] OR;  Service: General;  Laterality: Left;  axilla    MEDIPORT REMOVAL Right 10/3/2022    Procedure: REMOVAL, CATHETER, CENTRAL VENOUS, TUNNELED, WITH PORT;  Surgeon: Franco Venegas III, MD;  Location: [facility];  Service: General;  Laterality: Right;    PORTACATH PLACEMENT      SKIN CANCER EXCISION  09/30/2020    [facility]    SPINE SURGERY      VASECTOMY  1985 ?       Time Tracking:     OT Date of Treatment: 10/05/22  OT Start Time: 0900  OT Stop Time: 0909  OT Total Time (min): 9 min    Billable Minutes:Evaluation 9    10/5/2022

## 2022-10-05 NOTE — ASSESSMENT & PLAN NOTE
Patient with history of non-Hodgkin's lymphoma diagnosed in 2012 status post treatment at [facility] and remission.  He has been on maintenance therapy with rituximab every 8 weeks with Oncology for several years now.  Last Rituxan dose 9/15/22    PLAN   GCSF as per hematology and ANC improved .  DC as per oncology . One dose today

## 2022-10-05 NOTE — PROGRESS NOTES
Critical access hospital Medicine  Progress Note    Patient Name: Sivakumar Thacker  MRN: [id number]  Patient Class: IP- Inpatient   Admission Date: 9/30/2022  Length of Stay: 4 days  Attending Physician: Puma Andino MD  Primary Care Provider: Barry Mcmahon MD        Subjective:     Principal Problem:Cellulitis        HPI:  Sivakumar Thacker is a 67 y.o. White male   With PMH of non-Hodgkin's lymphoma,  who presents with pain and swelling of left arm head and shoulder.  He presents emergency room with complaint of pain in the armpit .  He admits to fevers and chills recorded temperature 101° but he was afebrile in the emergency room.  He recently had a repeat PET scan which is negative by history for recurrence.  He receives his chemo which includes Rituxan and IVIG his last dose September 15th.  He has a chronic lung infection which she has been followed by Pulmonary and has been compliant with daily medication.  His cough is improving as well as sore throat.  He denies chest pain or shortness of breath no nausea vomiting no abdominal pain or dysuric symptoms.  He has a right clavicle access point which does not show any signs of infection.    Plan to admit continue IV antibiotics obtain blood cultures in consult Hematology.      Overview/Hospital Course:  Assumed care  Seen in am before Spreading cellulitis . No sing of fascitis. Redness tender to touch . Cannot open axilla .  Off bipap. D/w patient and family members    10/4  Seen earlier   Wound dressing clean . BP stable . Line out . BC neg. WBC improved   No pus at surgery . Wound culture growing gram neg    10/5  Cellulitis largely resolved at exam  Drain and dressing to be removed possible today by surgeon  The WBC low but granulocytes  % normal.  Dr. Ibarra ordered GCSF again for today  Lung exam alert clear.  Glucose slightly higher side          Interval History:     Review of Systems  As per subjective   Objective:     Vital Signs (Most  Recent):  Temp: 98.5 °F (36.9 °C) (10/05/22 1237)  Pulse: 79 (10/05/22 1237)  Resp: 18 (10/05/22 1451)  BP: (!) 163/98 (10/05/22 1237)  SpO2: 96 % (10/05/22 1237)   Vital Signs (24h Range):  Temp:  [97.5 °F (36.4 °C)-98.8 °F (37.1 °C)] 98.5 °F (36.9 °C)  Pulse:  [78-96] 79  Resp:  [16-24] 18  SpO2:  [94 %-99 %] 96 %  BP: (145-167)/(73-98) 163/98     Weight: 95.8 kg (211 lb 3.2 oz)  Body mass index is 32.11 kg/m².    Intake/Output Summary (Last 24 hours) at 10/5/2022 1515  Last data filed at 10/5/2022 0548  Gross per 24 hour   Intake 100.1 ml   Output 1000 ml   Net -899.9 ml      Physical Exam  Constitutional:       General: He is not in acute distress.     Appearance: He is well-developed.   HENT:      Head: Normocephalic.   Eyes:      Extraocular Movements: Extraocular movements intact.   Cardiovascular:      Rate and Rhythm: Normal rate and regular rhythm.   Pulmonary:      Effort: Pulmonary effort is normal. No respiratory distress.   Abdominal:      General: There is no distension.      Tenderness: There is no abdominal tenderness.   Musculoskeletal:         General: Normal range of motion.      Cervical back: Neck supple.   Skin:     Findings: No rash.   Neurological:      Mental Status: He is alert and oriented to person, place, and time.   Psychiatric:         Mood and Affect: Mood normal.       Significant Labs: All pertinent labs within the past 24 hours have been reviewed.  CBC:   Recent Labs   Lab 10/04/22  0400 10/05/22  0545   WBC 2.64* 2.62*   HGB 11.1* 10.9*   HCT 33.8* 33.3*   PLT 78* 82*     CMP:   Recent Labs   Lab 10/04/22  0400 10/05/22  0545    138   K 3.6 3.1*    101   CO2 27 31*   * 196*   BUN 38* 29*   CREATININE 1.0 0.8   CALCIUM 8.3* 8.1*   ANIONGAP 6* 6*     Cardiac Markers:   Recent Labs   Lab 10/04/22  0400   *       Significant Imaging: I have reviewed all pertinent imaging results/findings within the past 24 hours.      Assessment/Plan:      * Cellulitis Left  Axilla  Severe cellulitis with systemic signs and symptoms of infection. Possible port A cath infection?  CTA head and neck not significant   S/p incision and drainage and port removal and improved significantly     PLAN   contineu dapto and meropenem   Stopped mycamine   Follow final culture . Serratia marcescens growing , will adjust IV ABX   Continue GCSF  And one dose today as per dr arredondo   Follow surgeon .  Penrose drain in situ           Sepsis  As above   Follow culture      Pancytopenia  Due to severe infection  GCSF  ANC better and DC reverse isolation       Chronic obstructive pulmonary disease, unspecified COPD type  Continue home regimen   prednisone was held  given current infection, no hypotension         Type 2 diabetes mellitus without complication, without long-term current use of insulin    Metformin as per pt wish   SSI high dose     Skin lesion  Most likely spreading cellulitis . Possible port infection and port removed and now cellulitis resolving    skin biopsy with no malignancy   Follow tissue biopsy  culture         Essential hypertension  Continue amlodipine  Resume  losartan    Hypogammaglobulinemia  Receives IVIG infusions with Oncology  Last IVIg infusion 9/16  Due on Friday . Inpatient or outpatient Rx      Follicular lymphoma grade IIIa  Patient with history of non-Hodgkin's lymphoma diagnosed in 2012 status post treatment at [facility] and remission.  He has been on maintenance therapy with rituximab every 8 weeks with Oncology for several years now.  Last Rituxan dose 9/15/22    PLAN   GCSF as per hematology and ANC improved .  DC as per oncology . One dose today                 VTE Risk Mitigation (From admission, onward)         Ordered     enoxaparin injection 40 mg  Daily         10/01/22 0046     IP VTE HIGH RISK PATIENT  Once         10/01/22 0046     Place sequential compression device  Until discontinued         10/01/22 0046                Discharge Planning   GREGORY:  10/5/2022     Code Status: Full Code   Is the patient medically ready for discharge?:     Reason for patient still in hospital (select all that apply): Treatment  Discharge Plan A: Home                  Puma Andino MD  Department of Hospital Medicine   [facility]

## 2022-10-05 NOTE — TELEPHONE ENCOUNTER
Spoke with patient, states still in hospital and not sure when he will be discharged.  Instructed to send message or call clinic when discharged and we can get appt scheduled.

## 2022-10-05 NOTE — RESPIRATORY THERAPY
10/04/22 2203   Patient Assessment/Suction   Level of Consciousness (AVPU) alert   Respiratory Effort Normal;Unlabored   Expansion/Accessory Muscles/Retractions no use of accessory muscles   All Lung Fields Breath Sounds equal bilaterally;diminished;crackles, fine   PRE-TX-O2   O2 Device (Oxygen Therapy) room air   SpO2 96 %   Pulse Oximetry Type Intermittent   $ Pulse Oximetry - Multiple Charge Pulse Oximetry - Multiple   Pulse 88   Resp 18   Aerosol Therapy   $ Aerosol Therapy Charges Aerosol Treatment   Daily Review of Necessity (SVN) completed   Respiratory Treatment Status (SVN) given   Treatment Route (SVN) mask   Patient Position (SVN) HOB elevated   Post Treatment Assessment (SVN) breath sounds unchanged   Signs of Intolerance (SVN) none   Breath Sounds Post-Respiratory Treatment   Post-treatment Heart Rate (beats/min) 91   Post-treatment Resp Rate (breaths/min) 20   Preset CPAP/BiPAP Settings   Mode Of Delivery BiPAP S/T   $ Is patient using? Yes   Size of Mask Large   Sized Appropriately? Yes   Equipment Type V60   Airway Device Type large full face mask   Ipap 12   EPAP (cm H2O) 6   Pressure Support (cm H2O) 6   Set Rate (Breaths/Min) 12   ITime (sec) 1   Rise Time (sec) 3   Patient CPAP/BiPAP Settings   RR Total (Breaths/Min) 20   Tidal Volume (mL) 685   VE Minute Ventilation (L/min) 10.6 L/min   TiTOT (%) 33   Total Leak (L/Min) 15   Patient Trigger - ST Mode Only (%) 68

## 2022-10-05 NOTE — ASSESSMENT & PLAN NOTE
Receives IVIG infusions with Oncology  Last IVIg infusion 9/16  Due on Friday . Inpatient or outpatient Rx

## 2022-10-05 NOTE — PROGRESS NOTES
"[facility]   Hematology/Oncology  Inpatient Progress Note          Patient Name: Sivakumar Thacker  MRN: [id number]  Admission Date: 9/30/2022  Hospital Length of Stay: 4 days  Code Status: Full Code   Attending Provider: Puma Andino MD  Consulting Provider: Jefferson Ibarra MD  Primary Care Physician: Barry Mcmahon MD  Principal Problem:Cellulitis      Subjective:       Patient ID: Sivakumar Thacker is a 67 y.o. male.    Chief Complaint: Lymphadenopathy (Starting last night pt developed L sided chest/armpit swelling and redness. Pt has history of non-hodkins lymphoma, last chemo on 9/15, has port access on RUC. Pt was febrile at home took tylenol or ibuprofen (pt not sure) at 1100. Afebrile here, VSS. Denies recent injury/trauma. Site is not open or draining. )        History Present Illness:    Patient was transferred to [facility]; he is awake and alert and is feeling much better; breathing much better; chest wall cellulitis seems to be improving; he is in good spirits; family at bedside       Review of Systems:  GEN: feeling much better  HEENT: normal with no HA's, sore throat, stiff neck, changes in vision  CV: normal with no CP    PULM: chronic stable SOB, cough, no hemoptysis, sputum or pleuritic pain  GI: normal with no abdominal pain, nausea, vomiting, constipation, diarrhea, melanotic stools, BRBPR, or hematemesis  : normal with no hematuria, dysuria  BREAST: normal with no mass, discharge, pain  SKIN: normal with no rash, erythema, bruising, or swelling      Objective:     Vitals:  Blood pressure (!) 160/85, pulse 86, temperature 98.2 °F (36.8 °C), resp. rate 20, height 5' 8" (1.727 m), weight 95.8 kg (211 lb 3.2 oz), SpO2 97 %.    Physical Exam:  GEN: no apparent distress, comfortable; AAOx3; on O2 per NC  HEAD: atraumatic and normocephalic  EYES: no pallor, no icterus, PERRLA  ENT: OMM, no pharyngeal erythema, external ears WNL; no nasal discharge; no thrush;    NECK: no masses, thyroid " normal, trachea midline, no LAD/LN's, supple; rght IJ  CV: RRR with no murmur; normal pulse; normal S1 and S2; mild pedal edema; portacath on rght CW  CHEST: Normal respiratory effort; chronic coarseness, mild wheeze bilaterally; on O2 per NC  ABDOM: nontender and nondistended; soft; normal bowel sounds; no rebound/guarding  MUSC/Skeletal: ROM normal; no crepitus; joints normal; no deformities or arthropathy  EXTREM: no clubbing, cyanosis, mild areas inflammation on bilateral lower extremities near ankles; mild swelling in hands and lower extremities; IV RUE  SKIN: no rashes, lesions, ulcers, petechiae or subcutaneous nodules ; erythematous subcutaneous tissues under left arm/axilla which seems to be improving  : no changes  NEURO: grossly intact; motor/sensory WNL; AAOx3; no tremors  PSYCH: normal mood, affect and behavior  LYMPH: normal cervical, supraclavicular, axillary and groin LN's               Lab Review:        Lab Results   Component Value Date    WBC 2.62 (L) 10/05/2022    HGB 10.9 (L) 10/05/2022    HCT 33.3 (L) 10/05/2022    MCV 90 10/05/2022    PLT 82 (L) 10/05/2022       CMP  Sodium   Date Value Ref Range Status   10/05/2022 138 136 - 145 mmol/L Final   04/25/2019 144 134 - 144 mmol/L      Potassium   Date Value Ref Range Status   10/05/2022 3.1 (L) 3.5 - 5.1 mmol/L Final     Chloride   Date Value Ref Range Status   10/05/2022 101 95 - 110 mmol/L Final   04/25/2019 107 98 - 110 mmol/L      CO2   Date Value Ref Range Status   10/05/2022 31 (H) 23 - 29 mmol/L Final     Glucose   Date Value Ref Range Status   10/05/2022 196 (H) 70 - 110 mg/dL Final   04/25/2019 177 (H) 70 - 99 mg/dL      BUN   Date Value Ref Range Status   10/05/2022 29 (H) 8 - 23 mg/dL Final     Creatinine   Date Value Ref Range Status   10/05/2022 0.8 0.5 - 1.4 mg/dL Final   04/25/2019 0.83 0.60 - 1.40 mg/dL      Calcium   Date Value Ref Range Status   10/05/2022 8.1 (L) 8.7 - 10.5 mg/dL Final     Total Protein   Date Value Ref Range  Status   10/02/2022 6.6 6.0 - 8.4 g/dL Final     Albumin   Date Value Ref Range Status   10/02/2022 2.8 (L) 3.5 - 5.2 g/dL Final   04/25/2019 4.4 3.1 - 4.7 g/dL      Total Bilirubin   Date Value Ref Range Status   10/02/2022 0.8 0.1 - 1.0 mg/dL Final     Comment:     For infants and newborns, interpretation of results should be based  on gestational age, weight and in agreement with clinical  observations.    Premature Infant recommended reference ranges:  Up to 24 hours.............<8.0 mg/dL  Up to 48 hours............<12.0 mg/dL  3-5 days..................<15.0 mg/dL  6-29 days.................<15.0 mg/dL       Alkaline Phosphatase   Date Value Ref Range Status   10/02/2022 124 55 - 135 U/L Final     AST   Date Value Ref Range Status   10/02/2022 49 (H) 10 - 40 U/L Final     ALT   Date Value Ref Range Status   10/02/2022 60 (H) 10 - 44 U/L Final     Anion Gap   Date Value Ref Range Status   10/05/2022 6 (L) 8 - 16 mmol/L Final     eGFR if    Date Value Ref Range Status   07/21/2022 >60.0 >60 mL/min/1.73 m^2 Final     eGFR if non    Date Value Ref Range Status   07/21/2022 >60.0 >60 mL/min/1.73 m^2 Final     Comment:     Calculation used to obtain the estimated glomerular filtration  rate (eGFR) is the CKD-EPI equation.          Heparin Induced Thrombocytopenia 0.00 - 0.40 OD 0.10      Fibrinogen 194 - 545 mg/dL 854        Radiology Diagnostic Studies:     US Soft Tissue Axilla, Left [[id number]] Collected: 09/30/22 1819   Order Status: Completed Updated: 09/30/22 1940   Narrative:     Ultrasound left axilla     CLINICAL DATA: Left axillary erythema and pain. Reported history of lymphoma.     FINDINGS: Sonographic assessment targeted to the left axilla was performed, with comparison evaluation of the right axilla.     Fatty soft tissues in the left axilla appear mildly edematous. No mass or lymphadenopathy is identified. There is no focal drainable fluid collection.     IMPRESSION:    1. Subcutaneous edema at the left axilla, with no mass, lymphadenopathy, or drainable fluid collection. Findings are nonspecific. Correlate for possible cellulitis.       CTA Chest Non-Coronary (PE Studies) [[id number]] Collected: 09/30/22 1723   Order Status: Completed Updated: 09/30/22 1759   Narrative:     CTA CHEST     HISTORY: Shortness of breath. Comparison to June 2021..     CMS MANDATED QUALITY DATA - CT RADIATION  436     All CT scans at this facility utilize dose modulation, iterative reconstruction, and/or weight based dosing when appropriate to reduce radiation dose to as low as reasonably achievable     FINDINGS: PE protocol was utilized.  Thin axial imaging through the chest was performed with 100 cc nonionic IV contrast, with sagittal and coronal reformatted images and 3-D reconstructions performed on an independent workstation, with images stored in the patient's permanent electronic medical record.     The pulmonary arteries are adequately opacified. Opacification of peripheral branches is heterogeneous.     The main pulmonary arteries and segmental branches are normal. Peripheral branches are grossly normal.     Heart size is upper normal. Multifocal coronary artery atherosclerotic calcification is noted. The thoracic aorta is normal in caliber. No pathologic mediastinal lymphadenopathy is identified.     Images at lung windows demonstrate mild interstitial prominence at the lung bases with mild interlobular septal thickening suggesting interstitial edema, new when compared to June 2021. There is superimposed minimal atelectasis or infiltrate at the left lung base. No effusion is identified.     IMPRESSION:       1. No evidence of pulmonary thromboembolic disease.   2. Interstitial prominence at both lung bases suggesting mild interstitial edema, with superimposed atelectasis or infiltrate at the left lung base.   3. Coronary artery atherosclerosis..       CT Head Without Contrast [[id number]]  Collected: 09/30/22 1723   Order Status: Completed Updated: 09/30/22 1751   Narrative:     CT HEAD WITHOUT CONTRAST     CMS MANDATED QUALITY DATA - CT RADIATION  436     All CT scans at this facility utilize dose modulation, iterative reconstruction, and/or weight based dosing when appropriate to reduce radiation dose to as low as reasonably achievable     Clinical data: Altered mental status, syncope versus seizure.     FINDINGS: Noninfusion images were obtained from the skull base to the vertex. There is no intracranial mass, hemorrhage, or midline shift. Ventricles and sulci are normal. There are no pathologic extra-axial fluid collections. There is no evidence of acute ischemic change or edema. Small circumscribed hypodensity along the inferior margin of the right basal ganglia could reflect chronic lacunar infarct or prominent perivascular space. Cerebellum and brainstem are normal.     The calvarium is intact.There is a 16 mm mucous retention cyst or polyp in the left maxillary sinus, with mucoperiosteal thickening in the left maxillary and left frontal sinuses. Moderate bilateral ethmoid opacification is noted.     IMPRESSION:     1. No acute intracranial abnormalities.   2. 9 mm circumscribed hypodensity at the inferior margin of the right basal ganglia is consistent with small chronic lacunar infarct or prominent perivascular space.   3. Sinus disease as above.                   Assessment:     IMPRESSION:    (1) 67 y.o. male known to my oncology service with diagnosis of low grade NHL for which he has been on rituximab maintenance therapy. He presented to ED last night with redness and discomfort of the left axill. He has been admitted to the hopsitalist service with working diagnosis of cellulitis. I spoke to Dr Helm by phone last night. US of the axilla showed only subcutaneous edema at the left axilla, with no mass, lymphadenopathy, or drainable fluid collection.  CTA was negative for pulmonary emboli.        10/1/2022:  - on broad spectrum antibiotics IV  - ID consulted  - GenSurg consulted  - discussed with Dr Helm last night by phone and Dr Mojica in person today     10/2/2022: Clinical review  - patient seen by ID  - on broad spectrum IV abx for sepsis and cellulitis  - wbc is lower on the latest CBC but he is not on any chemotherapy that would expect to be the etiology  - plats ar 79,000 and also a little lower  - check PT/PTT and fibrinogen, smear for schistocytes  - will discuss GCSFwith PCP and ID  - will ask for repeat CBC as well  - he has been seen by Dr Venegas with GenSurg and is being transferred to ICU for closer monitoring    10/3/2022:  - patient in ICU currently; on bipap  - seen by Dr Schrader with ID, Dr Pratt with pulm critical care and Dr Venegas  - planned portacath removal and drain/biopsy of left axilla  - continued on Zarxio for the neutropenia  - continued on IV antibiotics per direction of ID  - wbc 1.94 and plats 74,000    10/4/2022:  - patient remains in ICU; cellulitis seems to be improving with the abx  - he had portacath removed and axilla biopsied and drain placed yesterday with Dr Venegas  - ID following  - Wbc better at 2.64, hgb stable at 11.1, plats stable at 78,000  - continued on Zarxio (GCSF)    10/5/2022:  - wbc 2.62, hgb 10.9, plat 82,000  - continue zarxio for now  - transferred to floor; seems to be feeling much better now     (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     (2) HTN     (3) Neuropathy     (4) GERD     (5) DM - on metformin per Dr Nunez     (6) Hypogammaglobulinemia - on Iv IgG monthly     (7) Steatosis of liver     (8) Degenerative disc disease of back     (9) Diverticular disease     (10) Chronic bronchiectasis/bronchiolitis with TONY - followed by Dr Veliz and Lucia Georges                       1. Cellulitis of chest wall    2. Chest pain at rest    3. Hyperglycemia    4. Pre-syncope    5. Pancytopenia    6. Cellulitis of left axilla    7. Vascular port complication, initial encounter    8. Sepsis, due to unspecified organism, unspecified whether acute organ dysfunction present    9. Petechiae    10. Cellulitis    11. Pulmonary edema    12. Follicular lymphoma grade IIIa, unspecified body region [C82.30 (ICD-10-CM)]    13. Hypogammaglobulinemia [D80.1 (ICD-10-CM)]           Plan:     PLAN:          IV antibiotics for the cellulitis and infection workup as per ID directives   respiratory management as per pulmonary direction  Appreciate GenSurg evaluation and procedures  Monitor labs daily  5.   continued with GCSF for now but can hopefully discontinue after today's dose  6.  Will follow with you               Jefferson Ibarra MD  Hematology/Oncology  [facility]

## 2022-10-05 NOTE — CONSULTS
Bilateral incisions upper chest, cleaned and dried, irrigated incisions with normal saline.  Packed with 1/2inch strip gauze packing,covered with gauze and secured with tegaderm.   left side incisions with penrose drain looped thru both incisions on left chest and lateral chest, cleaned and irrigated and redressed with gauze and Mepilex.  Periwound redness, chest, side and small area on back red.  Upper chest left side is very tender to touch.

## 2022-10-05 NOTE — SUBJECTIVE & OBJECTIVE
Interval History:     Review of Systems  As per subjective   Objective:     Vital Signs (Most Recent):  Temp: 98.5 °F (36.9 °C) (10/05/22 1237)  Pulse: 79 (10/05/22 1237)  Resp: 18 (10/05/22 1451)  BP: (!) 163/98 (10/05/22 1237)  SpO2: 96 % (10/05/22 1237)   Vital Signs (24h Range):  Temp:  [97.5 °F (36.4 °C)-98.8 °F (37.1 °C)] 98.5 °F (36.9 °C)  Pulse:  [78-96] 79  Resp:  [16-24] 18  SpO2:  [94 %-99 %] 96 %  BP: (145-167)/(73-98) 163/98     Weight: 95.8 kg (211 lb 3.2 oz)  Body mass index is 32.11 kg/m².    Intake/Output Summary (Last 24 hours) at 10/5/2022 1515  Last data filed at 10/5/2022 0548  Gross per 24 hour   Intake 100.1 ml   Output 1000 ml   Net -899.9 ml      Physical Exam  Constitutional:       General: He is not in acute distress.     Appearance: He is well-developed.   HENT:      Head: Normocephalic.   Eyes:      Extraocular Movements: Extraocular movements intact.   Cardiovascular:      Rate and Rhythm: Normal rate and regular rhythm.   Pulmonary:      Effort: Pulmonary effort is normal. No respiratory distress.   Abdominal:      General: There is no distension.      Tenderness: There is no abdominal tenderness.   Musculoskeletal:         General: Normal range of motion.      Cervical back: Neck supple.   Skin:     Findings: No rash.   Neurological:      Mental Status: He is alert and oriented to person, place, and time.   Psychiatric:         Mood and Affect: Mood normal.       Significant Labs: All pertinent labs within the past 24 hours have been reviewed.  CBC:   Recent Labs   Lab 10/04/22  0400 10/05/22  0545   WBC 2.64* 2.62*   HGB 11.1* 10.9*   HCT 33.8* 33.3*   PLT 78* 82*     CMP:   Recent Labs   Lab 10/04/22  0400 10/05/22  0545    138   K 3.6 3.1*    101   CO2 27 31*   * 196*   BUN 38* 29*   CREATININE 1.0 0.8   CALCIUM 8.3* 8.1*   ANIONGAP 6* 6*     Cardiac Markers:   Recent Labs   Lab 10/04/22  0400   *       Significant Imaging: I have reviewed all pertinent  imaging results/findings within the past 24 hours.

## 2022-10-05 NOTE — PT/OT/SLP PROGRESS
"Physical Therapy Treatment    Patient Name:  Sivakumar Thacker   MRN:  [id number]    Recommendations:     Discharge Recommendations:  home health PT   Discharge Equipment Recommendations: none   Barriers to discharge:  increased assist with mobility    Assessment:     Sivakumar Thacker is a 67 y.o. male admitted with a medical diagnosis of Cellulitis.  He presents with the following impairments/functional limitations:  pain, impaired skin.  Pt with HOB elevated and family present. Pt agreeable to visit. Pt performed bed mobility with SBA and transfers with CGA due to some unsteadiness. Pt ambulated 250' no AD and CGA with intermittent use of hand rail. Pt noted to veer to the right with slight trunk lean. Pt able to correct with CGA. Pt tolerated session well. Pt and family educated that pt should have someone with him when ambulating long distances due to episodes of unsteadiness.    Rehab Prognosis: Fair; patient would benefit from acute skilled PT services to address these deficits and reach maximum level of function.    Recent Surgery: Procedure(s) (LRB):  REMOVAL, CATHETER, CENTRAL VENOUS, TUNNELED, WITH PORT (Right)  INCISION AND DRAINAGE, ABSCESS (Left) 2 Days Post-Op    Plan:     During this hospitalization, patient to be seen 5 x/week to address the identified rehab impairments via gait training, therapeutic activities, therapeutic exercises, neuromuscular re-education and progress toward the following goals:    Plan of Care Expires:  11/04/22    Subjective     Chief Complaint: wanting to get out of the hospital  Patient/Family Comments/goals: to go home  Pain/Comfort:  Pain Rating 1:  ("a little bit")  Location 1:  (left chest to axilla)  Pain Addressed 1: Reposition, Distraction, Cessation of Activity      Objective:     Communicated with RN prior to session.  Patient found HOB elevated with peripheral IV, telemetry upon PT entry to room.     General Precautions: Standard, fall   Orthopedic Precautions:N/A "   Braces:    Respiratory Status: Room air     Functional Mobility:  Bed Mobility:     Supine to Sit: stand by assistance  Sit to Supine: stand by assistance  Transfers:     Sit to Stand:  contact guard assistance with no AD  Gait: x 250' no AD and CGA with intermittent use of hand rail, two episodes of veering to the right with slight trunk lean able to correct with CGA      AM-PAC 6 CLICK MOBILITY          Therapeutic Activities and Exercises:   Pt educated on importance of time OOB, importance of intermittent mobility, safe techniques for transfers/ambulation, discharge recommendations/options, and use of call light for assistance and fall prevention.      Patient left HOB elevated with all lines intact, call button in reach, RN notified, and family present..    GOALS:   Multidisciplinary Problems       Physical Therapy Goals          Problem: Physical Therapy    Goal Priority Disciplines Outcome Goal Variances Interventions   Physical Therapy Goal     PT, PT/OT Ongoing, Progressing     Description: Goals to be met by: 22     Patient will increase functional independence with mobility by performin. Supine to sit with Supervision  2. Sit to stand transfer with Supervision  3. Bed to chair transfer with Supervision using no AD  4. Gait  x 150 feet with Supervision using no AD                             Time Tracking:     PT Received On: 10/05/22  PT Start Time: 1026     PT Stop Time: 1034  PT Total Time (min): 8 min     Billable Minutes: Gait Training 8    Treatment Type: Treatment  PT/PTA: PTA     PTA Visit Number: 1     10/05/2022

## 2022-10-05 NOTE — ASSESSMENT & PLAN NOTE
Severe cellulitis with systemic signs and symptoms of infection. Possible port A cath infection?  CTA head and neck not significant   S/p incision and drainage and port removal and improved significantly     PLAN   contineu dapto and meropenem   Stopped mycamine   Follow final culture . Serratia marcescens growing , will adjust IV ABX   Continue GCSF  And one dose today as per dr arredondo   Follow surgeon .  Penrose drain in situ

## 2022-10-06 PROBLEM — R23.3 PETECHIAE: Status: RESOLVED | Noted: 2022-10-03 | Resolved: 2022-10-06

## 2022-10-06 LAB
ANION GAP SERPL CALC-SCNC: 7 MMOL/L (ref 8–16)
ANISOCYTOSIS BLD QL SMEAR: SLIGHT
BACTERIA BLD CULT: NORMAL
BACTERIA BLD CULT: NORMAL
BASOPHILS # BLD AUTO: ABNORMAL K/UL (ref 0–0.2)
BASOPHILS NFR BLD: 0 % (ref 0–1.9)
BUN SERPL-MCNC: 22 MG/DL (ref 8–23)
CALCIUM SERPL-MCNC: 8.6 MG/DL (ref 8.7–10.5)
CHLORIDE SERPL-SCNC: 102 MMOL/L (ref 95–110)
CO2 SERPL-SCNC: 33 MMOL/L (ref 23–29)
CREAT SERPL-MCNC: 0.7 MG/DL (ref 0.5–1.4)
DIFFERENTIAL METHOD: ABNORMAL
EOSINOPHIL # BLD AUTO: ABNORMAL K/UL (ref 0–0.5)
EOSINOPHIL NFR BLD: 12 % (ref 0–8)
ERYTHROCYTE [DISTWIDTH] IN BLOOD BY AUTOMATED COUNT: 13.4 % (ref 11.5–14.5)
EST. GFR  (NO RACE VARIABLE): >60 ML/MIN/1.73 M^2
ESTIMATED AVG GLUCOSE: 166 MG/DL (ref 68–131)
GLUCOSE SERPL-MCNC: 181 MG/DL (ref 70–110)
GLUCOSE SERPL-MCNC: 181 MG/DL (ref 70–110)
GLUCOSE SERPL-MCNC: 192 MG/DL (ref 70–110)
GLUCOSE SERPL-MCNC: 230 MG/DL (ref 70–110)
HBA1C MFR BLD: 7.4 % (ref 4.5–6.2)
HCT VFR BLD AUTO: 36.8 % (ref 40–54)
HGB BLD-MCNC: 11.9 G/DL (ref 14–18)
IMM GRANULOCYTES # BLD AUTO: ABNORMAL K/UL (ref 0–0.04)
IMM GRANULOCYTES NFR BLD AUTO: ABNORMAL % (ref 0–0.5)
LABCORP MISC TEST CODE: 5300
LABCORP MISC TEST NAME: NORMAL
LABCORP MISCELLANEOUS TEST: NORMAL
LYMPHOCYTES # BLD AUTO: ABNORMAL K/UL (ref 1–4.8)
LYMPHOCYTES NFR BLD: 31 % (ref 18–48)
MCH RBC QN AUTO: 28.7 PG (ref 27–31)
MCHC RBC AUTO-ENTMCNC: 32.3 G/DL (ref 32–36)
MCV RBC AUTO: 89 FL (ref 82–98)
METAMYELOCYTES NFR BLD MANUAL: 1 %
MONOCYTES # BLD AUTO: ABNORMAL K/UL (ref 0.3–1)
MONOCYTES NFR BLD: 5 % (ref 4–15)
MYELOCYTES NFR BLD MANUAL: 1 %
NEUTROPHILS # BLD AUTO: ABNORMAL K/UL (ref 1.8–7.7)
NEUTROPHILS NFR BLD: 42 % (ref 38–73)
NEUTS BAND NFR BLD MANUAL: 8 %
NRBC BLD-RTO: 0 /100 WBC
PLATELET # BLD AUTO: 122 K/UL (ref 150–450)
PLATELET BLD QL SMEAR: ABNORMAL
PMV BLD AUTO: 11 FL (ref 9.2–12.9)
POTASSIUM SERPL-SCNC: 3.2 MMOL/L (ref 3.5–5.1)
RBC # BLD AUTO: 4.14 M/UL (ref 4.6–6.2)
SODIUM SERPL-SCNC: 142 MMOL/L (ref 136–145)
WBC # BLD AUTO: 2.7 K/UL (ref 3.9–12.7)

## 2022-10-06 PROCEDURE — 99900031 HC PATIENT EDUCATION (STAT)

## 2022-10-06 PROCEDURE — 63600175 PHARM REV CODE 636 W HCPCS: Performed by: SURGERY

## 2022-10-06 PROCEDURE — 99232 SBSQ HOSP IP/OBS MODERATE 35: CPT | Mod: ,,, | Performed by: INTERNAL MEDICINE

## 2022-10-06 PROCEDURE — 94799 UNLISTED PULMONARY SVC/PX: CPT

## 2022-10-06 PROCEDURE — 25000003 PHARM REV CODE 250: Performed by: SURGERY

## 2022-10-06 PROCEDURE — 99232 PR SUBSEQUENT HOSPITAL CARE,LEVL II: ICD-10-PCS | Mod: ,,, | Performed by: INTERNAL MEDICINE

## 2022-10-06 PROCEDURE — 85027 COMPLETE CBC AUTOMATED: CPT | Performed by: SURGERY

## 2022-10-06 PROCEDURE — 94660 CPAP INITIATION&MGMT: CPT

## 2022-10-06 PROCEDURE — 25000003 PHARM REV CODE 250: Performed by: INTERNAL MEDICINE

## 2022-10-06 PROCEDURE — 12000002 HC ACUTE/MED SURGE SEMI-PRIVATE ROOM

## 2022-10-06 PROCEDURE — 99900035 HC TECH TIME PER 15 MIN (STAT)

## 2022-10-06 PROCEDURE — 85007 BL SMEAR W/DIFF WBC COUNT: CPT | Performed by: SURGERY

## 2022-10-06 PROCEDURE — 63600175 PHARM REV CODE 636 W HCPCS: Performed by: INTERNAL MEDICINE

## 2022-10-06 PROCEDURE — 80048 BASIC METABOLIC PNL TOTAL CA: CPT | Performed by: SURGERY

## 2022-10-06 PROCEDURE — 25000242 PHARM REV CODE 250 ALT 637 W/ HCPCS: Performed by: INTERNAL MEDICINE

## 2022-10-06 PROCEDURE — 36415 COLL VENOUS BLD VENIPUNCTURE: CPT | Performed by: STUDENT IN AN ORGANIZED HEALTH CARE EDUCATION/TRAINING PROGRAM

## 2022-10-06 PROCEDURE — C9113 INJ PANTOPRAZOLE SODIUM, VIA: HCPCS | Performed by: SURGERY

## 2022-10-06 PROCEDURE — 94640 AIRWAY INHALATION TREATMENT: CPT

## 2022-10-06 PROCEDURE — 97116 GAIT TRAINING THERAPY: CPT | Mod: CQ

## 2022-10-06 PROCEDURE — 94761 N-INVAS EAR/PLS OXIMETRY MLT: CPT

## 2022-10-06 RX ORDER — ARFORMOTEROL TARTRATE 15 UG/2ML
15 SOLUTION RESPIRATORY (INHALATION) 2 TIMES DAILY
Status: DISCONTINUED | OUTPATIENT
Start: 2022-10-06 | End: 2022-10-08 | Stop reason: HOSPADM

## 2022-10-06 RX ORDER — BUDESONIDE 0.5 MG/2ML
1 INHALANT ORAL EVERY 12 HOURS
Status: DISCONTINUED | OUTPATIENT
Start: 2022-10-06 | End: 2022-10-06

## 2022-10-06 RX ORDER — ALBUTEROL SULFATE 0.83 MG/ML
2.5 SOLUTION RESPIRATORY (INHALATION) EVERY 12 HOURS
Status: DISCONTINUED | OUTPATIENT
Start: 2022-10-06 | End: 2022-10-06

## 2022-10-06 RX ORDER — BUDESONIDE 0.5 MG/2ML
0.5 INHALANT ORAL EVERY 12 HOURS
Status: DISCONTINUED | OUTPATIENT
Start: 2022-10-06 | End: 2022-10-08 | Stop reason: HOSPADM

## 2022-10-06 RX ORDER — ARFORMOTEROL TARTRATE 15 UG/2ML
15 SOLUTION RESPIRATORY (INHALATION) 2 TIMES DAILY
Status: DISCONTINUED | OUTPATIENT
Start: 2022-10-06 | End: 2022-10-06

## 2022-10-06 RX ORDER — CELECOXIB 100 MG/1
100 CAPSULE ORAL 2 TIMES DAILY
Status: DISCONTINUED | OUTPATIENT
Start: 2022-10-06 | End: 2022-10-08 | Stop reason: HOSPADM

## 2022-10-06 RX ORDER — ALBUTEROL SULFATE 0.83 MG/ML
2.5 SOLUTION RESPIRATORY (INHALATION) EVERY 12 HOURS
Status: COMPLETED | OUTPATIENT
Start: 2022-10-06 | End: 2022-10-07

## 2022-10-06 RX ORDER — FLUTICASONE FUROATE AND VILANTEROL 100; 25 UG/1; UG/1
1 POWDER RESPIRATORY (INHALATION) DAILY
Status: DISCONTINUED | OUTPATIENT
Start: 2022-10-06 | End: 2022-10-06

## 2022-10-06 RX ORDER — IPRATROPIUM BROMIDE 0.5 MG/2.5ML
0.5 SOLUTION RESPIRATORY (INHALATION) EVERY 6 HOURS
Status: DISCONTINUED | OUTPATIENT
Start: 2022-10-06 | End: 2022-10-06

## 2022-10-06 RX ADMIN — MONTELUKAST 10 MG: 10 TABLET, FILM COATED ORAL at 08:10

## 2022-10-06 RX ADMIN — ARFORMOTEROL TARTRATE 15 MCG: 15 SOLUTION RESPIRATORY (INHALATION) at 07:10

## 2022-10-06 RX ADMIN — POTASSIUM BICARBONATE 35 MEQ: 391 TABLET, EFFERVESCENT ORAL at 03:10

## 2022-10-06 RX ADMIN — GABAPENTIN 300 MG: 300 CAPSULE ORAL at 03:10

## 2022-10-06 RX ADMIN — ATORVASTATIN CALCIUM 20 MG: 20 TABLET, FILM COATED ORAL at 09:10

## 2022-10-06 RX ADMIN — CEFEPIME HYDROCHLORIDE 2 G: 2 INJECTION, SOLUTION INTRAVENOUS at 05:10

## 2022-10-06 RX ADMIN — FLUTICASONE PROPIONATE 50 MCG: 50 SPRAY, METERED NASAL at 08:10

## 2022-10-06 RX ADMIN — ALBUTEROL SULFATE 2.5 MG: 2.5 SOLUTION RESPIRATORY (INHALATION) at 07:10

## 2022-10-06 RX ADMIN — AMLODIPINE BESYLATE 5 MG: 5 TABLET ORAL at 08:10

## 2022-10-06 RX ADMIN — CEFEPIME HYDROCHLORIDE 2 G: 2 INJECTION, SOLUTION INTRAVENOUS at 08:10

## 2022-10-06 RX ADMIN — POTASSIUM BICARBONATE 35 MEQ: 391 TABLET, EFFERVESCENT ORAL at 12:10

## 2022-10-06 RX ADMIN — CELECOXIB 100 MG: 100 CAPSULE ORAL at 10:10

## 2022-10-06 RX ADMIN — CHLORHEXIDINE GLUCONATE 15 ML: 1.2 RINSE ORAL at 08:10

## 2022-10-06 RX ADMIN — IPRATROPIUM BROMIDE 0.5 MG: 0.5 SOLUTION RESPIRATORY (INHALATION) at 01:10

## 2022-10-06 RX ADMIN — ASPIRIN 81 MG: 81 TABLET, COATED ORAL at 08:10

## 2022-10-06 RX ADMIN — GABAPENTIN 300 MG: 300 CAPSULE ORAL at 08:10

## 2022-10-06 RX ADMIN — OXYCODONE HYDROCHLORIDE AND ACETAMINOPHEN 1 TABLET: 10; 325 TABLET ORAL at 08:10

## 2022-10-06 RX ADMIN — LOSARTAN POTASSIUM 12.5 MG: 25 TABLET, FILM COATED ORAL at 08:10

## 2022-10-06 RX ADMIN — ENOXAPARIN SODIUM 40 MG: 40 INJECTION SUBCUTANEOUS at 06:10

## 2022-10-06 RX ADMIN — CELECOXIB 100 MG: 100 CAPSULE ORAL at 08:10

## 2022-10-06 RX ADMIN — OXYCODONE HYDROCHLORIDE AND ACETAMINOPHEN 1 TABLET: 10; 325 TABLET ORAL at 07:10

## 2022-10-06 RX ADMIN — MUPIROCIN 1 G: 20 OINTMENT TOPICAL at 08:10

## 2022-10-06 RX ADMIN — CEFEPIME HYDROCHLORIDE 2 G: 2 INJECTION, SOLUTION INTRAVENOUS at 01:10

## 2022-10-06 RX ADMIN — INSULIN ASPART 2 UNITS: 100 INJECTION, SOLUTION INTRAVENOUS; SUBCUTANEOUS at 12:10

## 2022-10-06 RX ADMIN — PANTOPRAZOLE SODIUM 40 MG: 40 INJECTION, POWDER, FOR SOLUTION INTRAVENOUS at 08:10

## 2022-10-06 RX ADMIN — BUDESONIDE 0.5 MG: 0.5 INHALANT RESPIRATORY (INHALATION) at 07:10

## 2022-10-06 RX ADMIN — BENZONATATE 200 MG: 100 CAPSULE ORAL at 08:10

## 2022-10-06 RX ADMIN — METFORMIN HYDROCHLORIDE 500 MG: 500 TABLET, FILM COATED ORAL at 08:10

## 2022-10-06 RX ADMIN — METFORMIN HYDROCHLORIDE 500 MG: 500 TABLET, FILM COATED ORAL at 05:10

## 2022-10-06 RX ADMIN — INSULIN ASPART 0 UNITS: 100 INJECTION, SOLUTION INTRAVENOUS; SUBCUTANEOUS at 08:10

## 2022-10-06 RX ADMIN — POLYETHYLENE GLYCOL 3350 17 G: 17 POWDER, FOR SOLUTION ORAL at 10:10

## 2022-10-06 RX ADMIN — MORPHINE SULFATE 2 MG: 2 INJECTION, SOLUTION INTRAMUSCULAR; INTRAVENOUS at 10:10

## 2022-10-06 NOTE — ASSESSMENT & PLAN NOTE
Receives IVIG infusions with Oncology  Last IVIg infusion 9/16  Due on next week  Friday . Inpatient or outpatient Rx

## 2022-10-06 NOTE — PLAN OF CARE
Patient and daughter Patience decided on [facility] for Home Health Service.   service address noted in care port comment [id number] [place] [id number]       10/06/22 0836   Post-Acute Status   Post-Acute Authorization Home Health   Home Health Status Referrals Sent   Patient choice form signed by patient/caregiver List with quality metrics by geographic area provided   Discharge Plan   Discharge Plan A Home Health   Discharge Plan B Home Health

## 2022-10-06 NOTE — ASSESSMENT & PLAN NOTE
Patient with history of non-Hodgkin's lymphoma diagnosed in 2012 status post treatment at [facility] and remission.  He has been on maintenance therapy with rituximab every 8 weeks with Oncology for several years now.  Last Rituxan dose 9/15/22    PLAN   GCSF as per hematology and ANC improved .  DC as per oncology . One dose yesterday

## 2022-10-06 NOTE — PROGRESS NOTES
[facility]  General Surgery  Progress Note    Subjective:     Interval History: no acute events overnight. Afebrile. Alert and oriented. Breathing comfortably.   Cultures from axilla I and D growing serratia.     Post-Op Info:  Procedure(s) (LRB):  REMOVAL, CATHETER, CENTRAL VENOUS, TUNNELED, WITH PORT (Right)  INCISION AND DRAINAGE, ABSCESS (Left)   2 Days Post-Op      Medications:  Continuous Infusions:  Scheduled Meds:   amLODIPine  5 mg Oral Daily    aspirin  81 mg Oral Daily    atorvastatin  20 mg Oral Daily    ceFEPime (MAXIPIME) IVPB  2 g Intravenous Q8H    chlorhexidine  15 mL Mouth/Throat BID    enoxaparin  40 mg Subcutaneous Daily    filgrastim-sndz  300 mcg Subcutaneous Daily    fluticasone propionate  1 spray Each Nostril BID    gabapentin  300 mg Oral TID    losartan  12.5 mg Oral Daily    metFORMIN  500 mg Oral BID WM    montelukast  10 mg Oral Daily    mupirocin   Nasal BID    pantoprazole  40 mg Intravenous Daily    potassium chloride  10 mEq Oral Once     PRN Meds:acetaminophen, albuterol-ipratropium, benzonatate, calcium gluconate IVPB, calcium gluconate IVPB, calcium gluconate IVPB, dextrose 10%, dextrose 10%, dextrose 10%, diazePAM, glucagon (human recombinant), glucagon (human recombinant), glucose, glucose, glucose, glucose, insulin aspart U-100, insulin aspart U-100, magnesium oxide, magnesium oxide, magnesium sulfate IVPB, magnesium sulfate IVPB, melatonin, morphine, naloxone, ondansetron, oxyCODONE-acetaminophen, polyethylene glycol, potassium bicarbonate, potassium bicarbonate, potassium bicarbonate, potassium chloride in water **AND** potassium chloride in water **AND** potassium chloride in water, potassium, sodium phosphates, potassium, sodium phosphates, potassium, sodium phosphates, senna-docusate 8.6-50 mg, simethicone, sodium phosphate IVPB, sodium phosphate IVPB, sodium phosphate IVPB, tiZANidine     Objective:     Vital Signs (Most Recent):  Temp: 98.4 °F (36.9 °C)  (10/05/22 1910)  Pulse: 82 (10/05/22 1910)  Resp: 18 (10/05/22 1910)  BP: (!) 165/65 (10/05/22 1910)  SpO2: 95 % (10/05/22 1910)   Vital Signs (24h Range):  Temp:  [97.5 °F (36.4 °C)-98.8 °F (37.1 °C)] 98.4 °F (36.9 °C)  Pulse:  [78-96] 82  Resp:  [16-20] 18  SpO2:  [94 %-99 %] 95 %  BP: (145-165)/(65-98) 165/65       Intake/Output Summary (Last 24 hours) at 10/5/2022 2122  Last data filed at 10/5/2022 1755  Gross per 24 hour   Intake 580.1 ml   Output 1200 ml   Net -619.9 ml       Physical Exam  Constitutional:       General: He is awake. He is not in acute distress.     Appearance: He is not toxic-appearing.   Pulmonary:      Effort: Pulmonary effort is normal. No respiratory distress.   Chest:      Comments: Cellulitis improved from admission.  Erythema less pronounced but still present across the chest and upper abdomen.  Wounds are open.  Skin little less tender.  Neurological:      Mental Status: He is alert.   Psychiatric:         Behavior: Behavior is cooperative.       Significant Labs:  CBC:   Recent Labs   Lab 10/05/22  0545   WBC 2.62*   RBC 3.72*   HGB 10.9*   HCT 33.3*   PLT 82*   MCV 90   MCH 29.3   MCHC 32.7     CMP:   Recent Labs   Lab 10/05/22  0545   *   CALCIUM 8.1*      K 3.1*   CO2 31*      BUN 29*   CREATININE 0.8       Significant Diagnostics:  I have reviewed all pertinent imaging results/findings within the past 24 hours.    Assessment/Plan:     Active Diagnoses:    Diagnosis Date Noted POA    PRINCIPAL PROBLEM:  Cellulitis Left Axilla [L03.90] 09/30/2022 Yes    Sepsis [A41.9] 10/03/2022 Yes    Petechiae [R23.3] 10/03/2022 Yes    Pancytopenia [D61.818] 10/02/2022 Unknown    Chronic obstructive pulmonary disease, unspecified COPD type [J44.9] 03/28/2022 Yes    Type 2 diabetes mellitus without complication, without long-term current use of insulin [E11.9] 06/23/2021 Yes    Skin lesion [L98.9] 06/23/2021 Yes    Essential hypertension [I10] 12/30/2019 Yes     Hypogammaglobulinemia [D80.1] 12/13/2019 Yes    Follicular lymphoma grade IIIa [C82.30] 12/04/2018 Yes      Problems Resolved During this Admission:     -clinically improved. Cultures growing serratia.   -wound dressings changed.   -wound care consulted. Wounds will be irrigated daily. Outer dressings changed every shift and PRN     Franco Venegas III, MD  General Surgery  [facility]

## 2022-10-06 NOTE — PROGRESS NOTES
Automatic Inhaler to Nebulizer Interchange    fluticasone/vilanterol (Breo Ellipta) 100 mcg/25 mcg changed to budesonide 0.5 mg twice daily AND arformoterol 15 mcg twice daily per [facility] Automatic Therapeutic Sustitutions Protocol.    Please contact pharmacy at extension [phone] with any questions.     Thank you,   Carlos Huang

## 2022-10-06 NOTE — SUBJECTIVE & OBJECTIVE
Interval History:     Review of Systems  As per subjective   Objective:     Vital Signs (Most Recent):  Temp: 97.7 °F (36.5 °C) (10/06/22 1202)  Pulse: 77 (10/06/22 1357)  Resp: 18 (10/06/22 1357)  BP: (!) 164/100 (10/06/22 1202)  SpO2: 97 % (10/06/22 1357)   Vital Signs (24h Range):  Temp:  [97.6 °F (36.4 °C)-98.4 °F (36.9 °C)] 97.7 °F (36.5 °C)  Pulse:  [67-83] 77  Resp:  [16-18] 18  SpO2:  [94 %-97 %] 97 %  BP: (157-165)/() 164/100     Weight: 95.8 kg (211 lb 3.2 oz)  Body mass index is 32.11 kg/m².    Intake/Output Summary (Last 24 hours) at 10/6/2022 1418  Last data filed at 10/6/2022 0530  Gross per 24 hour   Intake 410 ml   Output 200 ml   Net 210 ml      Physical Exam  Constitutional:       General: He is not in acute distress.     Appearance: He is well-developed.   HENT:      Head: Normocephalic.   Eyes:      Pupils: Pupils are equal, round, and reactive to light.   Cardiovascular:      Rate and Rhythm: Normal rate and regular rhythm.   Pulmonary:      Effort: Pulmonary effort is normal. No respiratory distress.   Abdominal:      General: There is no distension.      Tenderness: There is no abdominal tenderness.   Musculoskeletal:      Cervical back: Neck supple.   Skin:     General: Skin is warm.      Findings: No rash.   Neurological:      Mental Status: He is alert and oriented to person, place, and time.   Psychiatric:         Mood and Affect: Mood normal.       Significant Labs: All pertinent labs within the past 24 hours have been reviewed.  CBC:   Recent Labs   Lab 10/05/22  0545 10/06/22  0620   WBC 2.62* 2.70*   HGB 10.9* 11.9*   HCT 33.3* 36.8*   PLT 82* 122*     CMP:   Recent Labs   Lab 10/05/22  0545 10/06/22  0620    142   K 3.1* 3.2*    102   CO2 31* 33*   * 181*   BUN 29* 22   CREATININE 0.8 0.7   CALCIUM 8.1* 8.6*   ANIONGAP 6* 7*     Cardiac Markers: No results for input(s): CKMB, MYOGLOBIN, BNP, TROPISTAT in the last 48 hours.    Significant Imaging: I have  reviewed all pertinent imaging results/findings within the past 24 hours.

## 2022-10-06 NOTE — PROGRESS NOTES
Consult Note  Infectious Disease    Reason for Consult:  Severe lymphadenitis; L axilla cellulitis     HPI: Sivakumar Thacker is a 67 y.o. male with past medical history of hypertension, diabetes with lower extremity neuropathy, and low-grade non-Hodgkin's lymphoma on rituximab maintenance therapy every 2 months (last 9/15) and monthly IVIG (last 9/16). Presented on 09/30 for worsening redness and discomfort on his left axilla, which progressed to his neck and chest, with associated fever of 103 and chills at home.  He also complains of severe headache, nausea, no vomit, new onset bilateral lower extremity edema for about a month, no pain, and persistent nonproductive cough due to history of chronic bronchiectasis for which he has been on Augmentin and azithromycin 3 times a week, the latter was discontinued at last follow-up.  Patient contacted Heme-Onc before coming to the hospital, was prescribed Bactrim, given worsening redness and severity of pain patient came to the emergency room.  He denies abdominal pain, dysuria increased urinary frequency, or change in bowel movements.      In the ER, tachycardic 109, T-max 102.9°  Labs on admission white count 5.4, bands 6%, H&H 13/39, platelet count 168   Hypokalemia 3.4, creatinine 1.3   Glucose 273, normal LFTs   Lactic acid 1.8-->2.1, procalcitonin 17.2 high  UA with 1+ protein, 4+ glucose, negative for infection   Chest x-ray negative for acute pathology   CT head without contrast negative for acute pathology.  9 mm circumscribed hypodensity at the inferior margin of the right basal ganglia consistent with small chronic lacunar infarct or prominent perivascular space.  Sinus disease as above.  CTA chest negative for PE.  Interstitial prominence at both lung bases suggesting mild interstitial edema, with superimposed atelectasis or infiltrates at the left lung base.  Soft tissue ultrasound left axilla, revealed subcutaneous edema at the left axilla, no mass,  lymphadenopathy, or drainable fluid collection.  Correlate for possible cellulitis.    Patient admitted for sepsis likely secondary to left axilla spreading to neck and chest cellulitis.    Empirically started on vancomycin and cefepime IV.  Hospital course complicated by worsening leukopenia 1.4, and spreading of redness through his chest.    ID consult for left axilla cellulitis in lymphoma patient.    10/3 (Felix):  Consult reviewed and discussed with Dr. Schrader.  He takes Augmentin once a day and azithromycin 3 times weekly as prophylactic antibiotics for his hypogammaglobulinemia and recurring lung infections associated with bronchiectasis and sinusitis.  His illness began with chills followed by left axillary pain.  He has an abrasion on the top of his left hand which he states is a common injury for him.  The cellulitis it does extend over most of the chest wall and left upper quadrant.  He does have some abdominal tenderness with normal bowel sounds no nausea vomiting or diarrhea.  There is no fluctuance or crepitance.  He does have adequate range of motion at the shoulder elbow and wrist to exclude septic arthritis.  He does not report any water exposure  10/4: Interim reviewed.  Discussed with surgery yesterday and no abscess or necrotizing process was discovered. The port was well incorporated. Interestingly there is a gram negative laura growing from th left chest abscess. He denied water exposure yesterday. He is requiring less oxygen, 90% on RA, 99% on 2 liters.   10/5: Interim reviewed.  He is afebrile, white blood cells are 2.6, platelets are up to 82,000.  Culture from left axilla has Serratia sensitive to all the drugs tested.  Overall clinically he is doing significantly better, however the abdominal and chest wall cellulitis is still persistent however slightly less confluent and intense.  He is able to move the left upper extremity much more easily.  10/6: interim reviewed. He is afebrile, up in  the chair. Chest wall erythema is much improved, but still has dependent edema and erythema in flanks. No new culture data. He is not receiving his usual COPD inhalers and sounds just a little congested today. He is eating well, voiding without difficulty. No adverse effect of antibiotics.     EXAM & DIAGNOSTICS REVIEWED:   Vitals:     Temp:  [97.6 °F (36.4 °C)-98.5 °F (36.9 °C)]   Temp: 97.6 °F (36.4 °C) (10/05/22 2320)  Pulse: 67 (10/06/22 0819)  Resp: 18 (10/06/22 0819)  BP: (!) 157/84 (10/05/22 2320)  SpO2: 95 % (10/06/22 0819)    Intake/Output Summary (Last 24 hours) at 10/6/2022 0832  Last data filed at 10/6/2022 0530  Gross per 24 hour   Intake 650 ml   Output 200 ml   Net 450 ml       General:  In NAD. Alert and attentive, cooperative,  interactive   Eyes:  Anicteric, EOMI  ENT:  No ulcers, exudates, thrush, nares patent, dentition is good,    Neck:  Supple,    Lungs: Minimal right basilar crackles  Heart:  S1/S2+, regular rhythm, no murmurs  Abd:  +BS, soft, non tender to palpation,   :  Voids,  no flank tenderness  Musc:  Joints without effusion, swelling,  erythema, synovitis, ambulatory.  Able to spontaneously and passively move shoulder, elbow, wrist with more ease than yesterday  Skin:  The intensity of the redness is decreased over the chest and abdomen but still has dependent erythema and edema of flanks.  The left axillary wound has a large Penrose.  The former Port-A-Cath site has no purulence, see photos below  Wound:   Neuro:   alert, speech is clear,no focal deficits neuropathy LE b/l  Psych:  comfortable, cooperative  Lymphatic  Extrem: Trace b/l LE edema, non tender to palpation - petechial rash on dorsum of  feet and medial ankles is improved  VAD:  R IJ TLC      Isolation:    10/5      10/2:      9/30:        General Labs reviewed:  Recent Labs   Lab 10/04/22  0400 10/05/22  0545 10/06/22  0620   WBC 2.64* 2.62* 2.70*   HGB 11.1* 10.9* 11.9*   HCT 33.8* 33.3* 36.8*   PLT 78* 82* 122*        Recent Labs   Lab 09/30/22  1632 10/01/22  0630 10/02/22  1709 10/03/22  0400 10/04/22  0400 10/05/22  0545 10/06/22  0620      < >  --    < > 138 138 142   K 3.4*   < >  --    < > 3.6 3.1* 3.2*      < >  --    < > 105 101 102   CO2 26   < >  --    < > 27 31* 33*   BUN 21   < >  --    < > 38* 29* 22   CREATININE 1.3   < >  --    < > 1.0 0.8 0.7   CALCIUM 8.6*   < >  --    < > 8.3* 8.1* 8.6*   PROT 6.5  --  6.6  --   --   --   --    BILITOT 1.2*  --  0.8  --   --   --   --    ALKPHOS 95  --  124  --   --   --   --    ALT 24  --  60*  --   --   --   --    AST 30  --  49*  --   --   --   --     < > = values in this interval not displayed.     Recent Labs   Lab 10/02/22  1709   CRP >38.00*     No results for input(s): SEDRATE in the last 168 hours.    Estimated Creatinine Clearance: 116.6 mL/min (based on SCr of 0.7 mg/dL).     Micro:  Microbiology Results (last 7 days)       Procedure Component Value Units Date/Time    Blood culture #2 **CANNOT BE ORDERED STAT** [[id number]] Collected: 09/30/22 1800    Order Status: Completed Specimen: Blood from Peripheral, Antecubital, Left Updated: 10/05/22 2032     Blood Culture, Routine No growth after 5 days.    Blood culture #1 **CANNOT BE ORDERED STAT** [[id number]] Collected: 09/30/22 1632    Order Status: Completed Specimen: Blood from Peripheral, Antecubital, Right Updated: 10/05/22 1832     Blood Culture, Routine No growth after 5 days.    Culture, Anaerobic [[id number]] Collected: 10/03/22 1223    Order Status: Completed Specimen: Abscess from Chest, Left Updated: 10/05/22 1538     Anaerobic Culture No anaerobes isolated    Narrative:      Port    Culture, Anaerobe [[id number]] Collected: 10/03/22 1223    Order Status: Completed Specimen: Abscess from Chest, Left Updated: 10/05/22 1537     Anaerobic Culture No anaerobes isolated    Narrative:      Left axilla    Blood culture [[id number]] Collected: 10/01/22 1058    Order Status: Completed Specimen: Blood from  Antecubital, Right Arm Updated: 10/05/22 1232     Blood Culture, Routine No Growth to date      No Growth to date      No Growth to date      No Growth to date      No Growth to date    Narrative:      Aerobic and anaerobic    Blood culture [[id number]] Collected: 10/01/22 1058    Order Status: Completed Specimen: Blood Updated: 10/05/22 1232     Blood Culture, Routine No Growth to date      No Growth to date      No Growth to date      No Growth to date      No Growth to date    Narrative:      Aerobic and anaerobic    IV catheter culture [[id number]] Collected: 10/03/22 1221    Order Status: Completed Specimen: Catheter Tip, Subclavian Updated: 10/05/22 0813     Aerobic Culture - Cath tip No growth    Narrative:      Port    Aerobic culture [[id number]]  (Abnormal)  (Susceptibility) Collected: 10/03/22 1223    Order Status: Completed Specimen: Abscess from Chest, Left Updated: 10/05/22 0812     Aerobic Bacterial Culture SERRATIA MARCESCENS  Moderate      Narrative:      Left axilla    Fungus culture [[id number]] Collected: 10/03/22 1223    Order Status: Sent Specimen: Abscess from Chest, Left Updated: 10/03/22 1302            Imaging Reviewed:  Chest x-ray negative for acute pathology - Port-A-Cath tunneled from R to L  CT head without contrast negative for acute pathology.  9 mm circumscribed hypodensity at the inferior margin of the right basal ganglia consistent with small chronic lacunar infarct or prominent perivascular space.  Sinus disease as above.  CTA chest negative for PE.  Interstitial prominence at both lung bases suggesting mild interstitial edema, with superimposed atelectasis or infiltrates at the left lung base.  Soft tissue ultrasound left axilla, revealed subcutaneous edema at the left axilla, no mass, lymphadenopathy, or drainable fluid collection.  Correlate for possible cellulitis.      Cardiology: EKG QTC 403ms       IMPRESSION & PLAN     Severe left axilla cellulitis extending to neck and  anterior chest, rule out nec fasc    Blood cultures x2 9/30 & 10/1 x 2 no growth to date,     Lactic acid 1.8-->2.1, procalcitonin 17.2 high    Serratia, on culture left axillary focus   ASO negative    2. Pancytopenia, h/o low-grade non-Hodgkin's lymphoma on rituximab last dose 9/16, IVIG last dose 9/16   Leukopenia   ANC now greater than 1000, thrombocytopenia 79  Unclear if skin cellulitis could be related to Rituximab    3.  Long term use of antibiotics  4. PMHx: hypertension, diabetes with lower extremity neuropathy, bronchiectasis     Recommendations:  Continue cefepime alone     Would benefit from a little NSAID,  will add some celebrex  Adding usual inhalers available on formulary and bid aerosol albuterol    D/w patient, family,           Medical Decision Making during this encounter was  [_] Low Complexity  [_] Moderate Complexity  [xxx] High Complexity

## 2022-10-06 NOTE — PROGRESS NOTES
[facility] Medicine  Progress Note    Patient Name: Sivakumar Thacker  MRN: [id number]  Patient Class: IP- Inpatient   Admission Date: 9/30/2022  Length of Stay: 5 days  Attending Physician: Puma Andino MD  Primary Care Provider: Barry Mcmahon MD        Subjective:     Principal Problem:Cellulitis        HPI:  Sivakumar Thacker is a 67 y.o. White male   With PMH of non-Hodgkin's lymphoma,  who presents with pain and swelling of left arm head and shoulder.  He presents emergency room with complaint of pain in the armpit .  He admits to fevers and chills recorded temperature 101° but he was afebrile in the emergency room.  He recently had a repeat PET scan which is negative by history for recurrence.  He receives his chemo which includes Rituxan and IVIG his last dose September 15th.  He has a chronic lung infection which she has been followed by Pulmonary and has been compliant with daily medication.  His cough is improving as well as sore throat.  He denies chest pain or shortness of breath no nausea vomiting no abdominal pain or dysuric symptoms.  He has a right clavicle access point which does not show any signs of infection.    Plan to admit continue IV antibiotics obtain blood cultures in consult Hematology.      Overview/Hospital Course:  Assumed care  Seen in am before Spreading cellulitis . No sing of fascitis. Redness tender to touch . Cannot open axilla .  Off bipap. D/w patient and family members    10/4  Seen earlier   Wound dressing clean . BP stable . Line out . BC neg. WBC improved   No pus at surgery . Wound culture growing gram neg    10/5  Cellulitis largely resolved at exam  Drain and dressing to be removed possible today by surgeon  The WBC low but granulocytes  % normal.  Dr. Ibarra ordered GCSF again for today  Lung exam alert clear.  Glucose slightly higher side    10/6  Better , less redness  , no fever , no SOB . Getting GCSF today       Interval History:     Review  of Systems  As per subjective   Objective:     Vital Signs (Most Recent):  Temp: 97.7 °F (36.5 °C) (10/06/22 1202)  Pulse: 77 (10/06/22 1357)  Resp: 18 (10/06/22 1357)  BP: (!) 164/100 (10/06/22 1202)  SpO2: 97 % (10/06/22 1357)   Vital Signs (24h Range):  Temp:  [97.6 °F (36.4 °C)-98.4 °F (36.9 °C)] 97.7 °F (36.5 °C)  Pulse:  [67-83] 77  Resp:  [16-18] 18  SpO2:  [94 %-97 %] 97 %  BP: (157-165)/() 164/100     Weight: 95.8 kg (211 lb 3.2 oz)  Body mass index is 32.11 kg/m².    Intake/Output Summary (Last 24 hours) at 10/6/2022 1418  Last data filed at 10/6/2022 0530  Gross per 24 hour   Intake 410 ml   Output 200 ml   Net 210 ml      Physical Exam  Constitutional:       General: He is not in acute distress.     Appearance: He is well-developed.   HENT:      Head: Normocephalic.   Eyes:      Pupils: Pupils are equal, round, and reactive to light.   Cardiovascular:      Rate and Rhythm: Normal rate and regular rhythm.   Pulmonary:      Effort: Pulmonary effort is normal. No respiratory distress.   Abdominal:      General: There is no distension.      Tenderness: There is no abdominal tenderness.   Musculoskeletal:      Cervical back: Neck supple.   Skin:     General: Skin is warm.      Findings: No rash.   Neurological:      Mental Status: He is alert and oriented to person, place, and time.   Psychiatric:         Mood and Affect: Mood normal.       Significant Labs: All pertinent labs within the past 24 hours have been reviewed.  CBC:   Recent Labs   Lab 10/05/22  0545 10/06/22  0620   WBC 2.62* 2.70*   HGB 10.9* 11.9*   HCT 33.3* 36.8*   PLT 82* 122*     CMP:   Recent Labs   Lab 10/05/22  0545 10/06/22  0620    142   K 3.1* 3.2*    102   CO2 31* 33*   * 181*   BUN 29* 22   CREATININE 0.8 0.7   CALCIUM 8.1* 8.6*   ANIONGAP 6* 7*     Cardiac Markers: No results for input(s): CKMB, MYOGLOBIN, BNP, TROPISTAT in the last 48 hours.    Significant Imaging: I have reviewed all pertinent imaging  results/findings within the past 24 hours.      Assessment/Plan:      * Cellulitis Left Axilla  Severe cellulitis with systemic signs and symptoms of infection. Possible port A cath infection?  CTA head and neck not significant   S/p incision and drainage and port removal and improved significantly     PLAN   Cefepime   Stopped mycamine   Follow final culture . Serratia marcescens growing , switched to   Continue GCSF  And  as per dr arredondo   Follow surgeon .  Penrose drain in situ           Sepsis  As above   Follow culture      Pancytopenia  Due to severe infection  GCSF  ANC better and DC reverse isolation       Chronic obstructive pulmonary disease, unspecified COPD type  Continue home regimen   prednisone was held  given current infection, no hypotension   Resume breo   duoneb prn        Type 2 diabetes mellitus without complication, without long-term current use of insulin    Metformin   SSI high dose     Skin lesion  Most likely spreading cellulitis . Possible port infection and port removed and now cellulitis resolving    skin biopsy with no malignancy   Follow tissue biopsy  culture         Essential hypertension  Continue amlodipine  Resumed  Losartan  Resume breo     Hypogammaglobulinemia  Receives IVIG infusions with Oncology  Last IVIg infusion 9/16  Due on next week  Friday . Inpatient or outpatient Rx      Follicular lymphoma grade IIIa  Patient with history of non-Hodgkin's lymphoma diagnosed in 2012 status post treatment at [facility] and remission.  He has been on maintenance therapy with rituximab every 8 weeks with Oncology for several years now.  Last Rituxan dose 9/15/22    PLAN   GCSF as per hematology and ANC improved .  DC as per oncology . One dose yesterday                VTE Risk Mitigation (From admission, onward)         Ordered     enoxaparin injection 40 mg  Daily         10/01/22 0046     IP VTE HIGH RISK PATIENT  Once         10/01/22 0046     Place sequential compression device   Until discontinued         10/01/22 0046                Discharge Planning   GREGORY: 10/6/2022     Code Status: Full Code   Is the patient medically ready for discharge?:     Reason for patient still in hospital (select all that apply): Treatment  Discharge Plan A: Home Health   Discharge Delays: None known at this time              Puma Andino MD  Department of Hospital Medicine   [facility]

## 2022-10-06 NOTE — ASSESSMENT & PLAN NOTE
Continue home regimen   prednisone was held  given current infection, no hypotension   Resume breo   duoneb prn

## 2022-10-06 NOTE — ASSESSMENT & PLAN NOTE
Severe cellulitis with systemic signs and symptoms of infection. Possible port A cath infection?  CTA head and neck not significant   S/p incision and drainage and port removal and improved significantly     PLAN   Cefepime   Stopped mycamine   Follow final culture . Serratia marcescens growing , switched to   Continue GCSF  And  as per dr arredondo   Follow surgeon .  Penrose drain in situ

## 2022-10-06 NOTE — PROGRESS NOTES
"[facility]   Hematology/Oncology  Inpatient Progress Note          Patient Name: Sivakumar Thacker  MRN: [id number]  Admission Date: 9/30/2022  Hospital Length of Stay: 5 days  Code Status: Full Code   Attending Provider: Puma Andino MD  Consulting Provider: Jefferson Ibarra MD  Primary Care Physician: Barry Mcmahon MD  Principal Problem:Cellulitis      Subjective:       Patient ID: Sivakumar Thacker is a 67 y.o. male.    Chief Complaint: Lymphadenopathy (Starting last night pt developed L sided chest/armpit swelling and redness. Pt has history of non-hodkins lymphoma, last chemo on 9/15, has port access on RUC. Pt was febrile at home took tylenol or ibuprofen (pt not sure) at 1100. Afebrile here, VSS. Denies recent injury/trauma. Site is not open or draining. )        History Present Illness:    Patient sitting up in bed; he is awake and alert and seems to be feeling much better overall; breathing is adequate; no CP, HA's or N/V; cellulitis seems to be improving; his two daughters are at bedside; discussed with floor staff and sent communication to Dr Andino         Review of Systems:  GEN: feeling much better  HEENT: normal with no HA's, sore throat, stiff neck, changes in vision  CV: normal with no CP    PULM: chronic stable SOB, cough, no hemoptysis, sputum or pleuritic pain  GI: normal with no abdominal pain, nausea, vomiting, constipation, diarrhea, melanotic stools, BRBPR, or hematemesis  : normal with no hematuria, dysuria  BREAST: normal with no mass, discharge, pain  SKIN: normal with no rash, erythema, bruising, or swelling      Objective:     Vitals:  Blood pressure (!) 157/84, pulse 67, temperature 97.6 °F (36.4 °C), resp. rate 18, height 5' 8" (1.727 m), weight 95.8 kg (211 lb 3.2 oz), SpO2 (!) 94 %.    Physical Exam:  GEN: no apparent distress, comfortable; AAOx3; on O2 per NC  HEAD: atraumatic and normocephalic  EYES: no pallor, no icterus, PERRLA  ENT: OMM, no pharyngeal erythema, " external ears WNL; no nasal discharge; no thrush;    NECK: no masses, thyroid normal, trachea midline, no LAD/LN's, supple; rght IJ  CV: RRR with no murmur; normal pulse; normal S1 and S2; mild pedal edema; portacath on rght CW  CHEST: Normal respiratory effort; chronic coarseness, mild wheeze bilaterally; on O2 per NC  ABDOM: nontender and nondistended; soft; normal bowel sounds; no rebound/guarding  MUSC/Skeletal: ROM normal; no crepitus; joints normal; no deformities or arthropathy  EXTREM: no clubbing, cyanosis, mild areas inflammation on bilateral lower extremities near ankles; mild swelling in hands and lower extremities; IV RUE  SKIN: no rashes, lesions, ulcers, petechiae or subcutaneous nodules ; erythematous subcutaneous tissues under left arm/axilla which seems to be improving; some erythema along rib cage under right arm  : no changes  NEURO: grossly intact; motor/sensory WNL; AAOx3; no tremors  PSYCH: normal mood, affect and behavior  LYMPH: normal cervical, supraclavicular, axillary and groin LN's               Lab Review:        Lab Results   Component Value Date    WBC 2.70 (L) 10/06/2022    HGB 11.9 (L) 10/06/2022    HCT 36.8 (L) 10/06/2022    MCV 89 10/06/2022     (L) 10/06/2022       CMP  Sodium   Date Value Ref Range Status   10/06/2022 142 136 - 145 mmol/L Final   04/25/2019 144 134 - 144 mmol/L      Potassium   Date Value Ref Range Status   10/06/2022 3.2 (L) 3.5 - 5.1 mmol/L Final     Chloride   Date Value Ref Range Status   10/06/2022 102 95 - 110 mmol/L Final   04/25/2019 107 98 - 110 mmol/L      CO2   Date Value Ref Range Status   10/06/2022 33 (H) 23 - 29 mmol/L Final     Glucose   Date Value Ref Range Status   10/06/2022 181 (H) 70 - 110 mg/dL Final   04/25/2019 177 (H) 70 - 99 mg/dL      BUN   Date Value Ref Range Status   10/06/2022 22 8 - 23 mg/dL Final     Creatinine   Date Value Ref Range Status   10/06/2022 0.7 0.5 - 1.4 mg/dL Final   04/25/2019 0.83 0.60 - 1.40 mg/dL       Calcium   Date Value Ref Range Status   10/06/2022 8.6 (L) 8.7 - 10.5 mg/dL Final     Total Protein   Date Value Ref Range Status   10/02/2022 6.6 6.0 - 8.4 g/dL Final     Albumin   Date Value Ref Range Status   10/02/2022 2.8 (L) 3.5 - 5.2 g/dL Final   04/25/2019 4.4 3.1 - 4.7 g/dL      Total Bilirubin   Date Value Ref Range Status   10/02/2022 0.8 0.1 - 1.0 mg/dL Final     Comment:     For infants and newborns, interpretation of results should be based  on gestational age, weight and in agreement with clinical  observations.    Premature Infant recommended reference ranges:  Up to 24 hours.............<8.0 mg/dL  Up to 48 hours............<12.0 mg/dL  3-5 days..................<15.0 mg/dL  6-29 days.................<15.0 mg/dL       Alkaline Phosphatase   Date Value Ref Range Status   10/02/2022 124 55 - 135 U/L Final     AST   Date Value Ref Range Status   10/02/2022 49 (H) 10 - 40 U/L Final     ALT   Date Value Ref Range Status   10/02/2022 60 (H) 10 - 44 U/L Final     Anion Gap   Date Value Ref Range Status   10/06/2022 7 (L) 8 - 16 mmol/L Final     eGFR if    Date Value Ref Range Status   07/21/2022 >60.0 >60 mL/min/1.73 m^2 Final     eGFR if non    Date Value Ref Range Status   07/21/2022 >60.0 >60 mL/min/1.73 m^2 Final     Comment:     Calculation used to obtain the estimated glomerular filtration  rate (eGFR) is the CKD-EPI equation.          Heparin Induced Thrombocytopenia 0.00 - 0.40 OD 0.10      Fibrinogen 194 - 545 mg/dL 854        Plt Ab: HLA Class 1 Negative Negative    Plt Ab: GP IIb/IIIa Negative Negative    Plt Ab: GP Ib/IX Negative Negative    Plt Ab: GP Ia/IIA Negative Negative    PLT AB: GP IV Negative Negative      [facility] Miscellaneous Test COMMENT    Comment: Test Ordered: [id number] Platelet Antibody Profile   HLA Class 1 Antibody           Negative                  BN     Reference Range: Negative                               IIb/IIIa Antibody               Negative                  BN     Reference Range: Negative                               Ib/IX Antibody                 Negative                  BN     Reference Range: Negative                               Ia/IIa Antibody                Negative                  BN     Reference Range: Negative                               Glycoprotein IV Antibody       Negative                         Pathology:  DIAGNOSIS:   10/04/2022   RDC/johanna     1.  SKIN FROM LEFT AXILLA:   - ACUTE CELLULITIS WITH MULTIFOCAL ABSCESS FORMATION INVOLVING DERMAL    CONNECTIVE TISSUE   AND SUBCUTANEOUS TISSUE CONSISTENT WITH INFECTIOUS ETIOLOGY;    CORRELATION WITH TISSUE     CULTURES IF OBTAINED, IS RECOMMENDED.     - NO GRANULOMAS ARE IDENTIFIED.   - NO EVIDENCE OF MALIGNANCY IS IDENTIFIED.     2.  LEFT CHEST WALL NODULE, PUNCH BIOPSY:   - CAPILLARY HEMANGIOMA.   - NEGATIVE FOR MALIGNANCY.           Radiology Diagnostic Studies:     US Soft Tissue Axilla, Left [[id number]] Collected: 09/30/22 1819   Order Status: Completed Updated: 09/30/22 1940   Narrative:     Ultrasound left axilla     CLINICAL DATA: Left axillary erythema and pain. Reported history of lymphoma.     FINDINGS: Sonographic assessment targeted to the left axilla was performed, with comparison evaluation of the right axilla.     Fatty soft tissues in the left axilla appear mildly edematous. No mass or lymphadenopathy is identified. There is no focal drainable fluid collection.     IMPRESSION:   1. Subcutaneous edema at the left axilla, with no mass, lymphadenopathy, or drainable fluid collection. Findings are nonspecific. Correlate for possible cellulitis.       CTA Chest Non-Coronary (PE Studies) [[id number]] Collected: 09/30/22 1723   Order Status: Completed Updated: 09/30/22 1759   Narrative:     CTA CHEST     HISTORY: Shortness of breath. Comparison to June 2021..     CMS MANDATED QUALITY DATA - CT RADIATION  436     All CT scans at this facility utilize dose modulation,  iterative reconstruction, and/or weight based dosing when appropriate to reduce radiation dose to as low as reasonably achievable     FINDINGS: PE protocol was utilized.  Thin axial imaging through the chest was performed with 100 cc nonionic IV contrast, with sagittal and coronal reformatted images and 3-D reconstructions performed on an independent workstation, with images stored in the patient's permanent electronic medical record.     The pulmonary arteries are adequately opacified. Opacification of peripheral branches is heterogeneous.     The main pulmonary arteries and segmental branches are normal. Peripheral branches are grossly normal.     Heart size is upper normal. Multifocal coronary artery atherosclerotic calcification is noted. The thoracic aorta is normal in caliber. No pathologic mediastinal lymphadenopathy is identified.     Images at lung windows demonstrate mild interstitial prominence at the lung bases with mild interlobular septal thickening suggesting interstitial edema, new when compared to June 2021. There is superimposed minimal atelectasis or infiltrate at the left lung base. No effusion is identified.     IMPRESSION:       1. No evidence of pulmonary thromboembolic disease.   2. Interstitial prominence at both lung bases suggesting mild interstitial edema, with superimposed atelectasis or infiltrate at the left lung base.   3. Coronary artery atherosclerosis..       CT Head Without Contrast [[id number]] Collected: 09/30/22 1723   Order Status: Completed Updated: 09/30/22 1751   Narrative:     CT HEAD WITHOUT CONTRAST     CMS MANDATED QUALITY DATA - CT RADIATION  436     All CT scans at this facility utilize dose modulation, iterative reconstruction, and/or weight based dosing when appropriate to reduce radiation dose to as low as reasonably achievable     Clinical data: Altered mental status, syncope versus seizure.     FINDINGS: Noninfusion images were obtained from the skull base to the  vertex. There is no intracranial mass, hemorrhage, or midline shift. Ventricles and sulci are normal. There are no pathologic extra-axial fluid collections. There is no evidence of acute ischemic change or edema. Small circumscribed hypodensity along the inferior margin of the right basal ganglia could reflect chronic lacunar infarct or prominent perivascular space. Cerebellum and brainstem are normal.     The calvarium is intact.There is a 16 mm mucous retention cyst or polyp in the left maxillary sinus, with mucoperiosteal thickening in the left maxillary and left frontal sinuses. Moderate bilateral ethmoid opacification is noted.     IMPRESSION:     1. No acute intracranial abnormalities.   2. 9 mm circumscribed hypodensity at the inferior margin of the right basal ganglia is consistent with small chronic lacunar infarct or prominent perivascular space.   3. Sinus disease as above.                   Assessment:     IMPRESSION:    (1) 67 y.o. male known to my oncology service with diagnosis of low grade NHL for which he has been on rituximab maintenance therapy. He presented to ED last night with redness and discomfort of the left axill. He has been admitted to the hopsitalist service with working diagnosis of cellulitis. I spoke to Dr Helm by phone last night. US of the axilla showed only subcutaneous edema at the left axilla, with no mass, lymphadenopathy, or drainable fluid collection.  CTA was negative for pulmonary emboli.       10/1/2022:  - on broad spectrum antibiotics IV  - ID consulted  - GenSurg consulted  - discussed with Dr Helm last night by phone and Dr Mojica in person today     10/2/2022: Clinical review  - patient seen by ID  - on broad spectrum IV abx for sepsis and cellulitis  - wbc is lower on the latest CBC but he is not on any chemotherapy that would expect to be the etiology  - plats ar 79,000 and also a little lower  - check PT/PTT and fibrinogen, smear for schistocytes  - will discuss  GCSFwith PCP and ID  - will ask for repeat CBC as well  - he has been seen by Dr Veneags with GenSurg and is being transferred to ICU for closer monitoring    10/3/2022:  - patient in ICU currently; on bipap  - seen by Dr Schrader with ID, Dr Pratt with pulm critical care and Dr Venegas  - planned portacath removal and drain/biopsy of left axilla  - continued on Zarxio for the neutropenia  - continued on IV antibiotics per direction of ID  - wbc 1.94 and plats 74,000    10/4/2022:  - patient remains in ICU; cellulitis seems to be improving with the abx  - he had portacath removed and axilla biopsied and drain placed yesterday with Dr Venegas  - ID following  - Wbc better at 2.64, hgb stable at 11.1, plats stable at 78,000  - continued on Zarxio (GCSF)    10/5/2022:  - wbc 2.62, hgb 10.9, plat 82,000  - continue zarxio for now  - transferred to floor; seems to be feeling much better now    10/6/2022:  - wbc 2.7; hgb 11.9, plats 122,000  - wbc slowly improving and ANC >1,100; can discontinue zarxio after today's dose  - hopefully home tomorrow  - IV IgG due again next week  - will hold any further rituximab in meantime  - path from 10/3 showed only cellulitis with no evidence of malignancy     (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     (2) HTN     (3) Neuropathy     (4) GERD     (5) DM - on metformin per Dr Nunez     (6) Hypogammaglobulinemia - on Iv IgG monthly     (7) Steatosis of liver     (8) Degenerative disc disease of back     (9) Diverticular disease     (10) Chronic bronchiectasis/bronchiolitis with TONY - followed by Dr Veliz and Lucia Georges                      1. Cellulitis of chest wall    2. Chest pain at rest    3. Hyperglycemia    4. Pre-syncope    5. Pancytopenia    6. Cellulitis of left axilla    7. Vascular port complication,  initial encounter    8. Sepsis, due to unspecified organism, unspecified whether acute organ dysfunction present    9. Petechiae    10. Cellulitis    11. Pulmonary edema    12. Follicular lymphoma grade IIIa, unspecified body region [C82.30 (ICD-10-CM)]    13. Hypogammaglobulinemia [D80.1 (ICD-10-CM)]    14. Leukopenia, unspecified type           Plan:     PLAN:          IV antibiotics for the cellulitis and infection workup as per ID directives   respiratory management as per pulmonary direction  Appreciate GenSurg evaluation and procedures  Monitor labs daily  Continued with GCSF but can discontinue after today's dose  Will follow with you while he remains in-house; f/u in oncology clinic next week upon discharge; Dr Aiden De Anda is covering for me through the weekend               Jefferson Ibarra MD  Hematology/Oncology  [facility]

## 2022-10-06 NOTE — PT/OT/SLP PROGRESS
Physical Therapy Treatment    Patient Name:  Sivakumar Thacker   MRN:  [id number]    Recommendations:     Discharge Recommendations:  home health PT   Discharge Equipment Recommendations: none   Barriers to discharge:  increased assist with mobility    Assessment:     Sivakumar Thacker is a 67 y.o. male admitted with a medical diagnosis of Cellulitis.  He presents with the following impairments/functional limitations:  pain, impaired skin.  Pt with HOB elevated and family present. Pt performed bed mobility and transfers with supervision. Pt ambulated 250' no AD and SBA progressing to supervision. D/w daughter and pt discharging from therapy as pt is much improved mobility wise and has been ambulating with family. Pt ambulated to bathroom with supervision and performed toileting independently.    Rehab Prognosis: Fair; patient would benefit from acute skilled PT services to address these deficits and reach maximum level of function.    Recent Surgery: Procedure(s) (LRB):  REMOVAL, CATHETER, CENTRAL VENOUS, TUNNELED, WITH PORT (Right)  INCISION AND DRAINAGE, ABSCESS (Left) 3 Days Post-Op    Plan:     During this hospitalization, patient to be seen 5 x/week to address the identified rehab impairments via gait training, therapeutic activities, therapeutic exercises, neuromuscular re-education and progress toward the following goals:    Plan of Care Expires:  11/04/22    Subjective     Chief Complaint: none verbalized  Patient/Family Comments/goals: Pt wanting to get back to riding motorcycles  Pain/Comfort:  Pain Rating 1: 0/10      Objective:     Communicated with RN prior to session.  Patient found HOB elevated with peripheral IV, telemetry upon PT entry to room.     General Precautions: Standard, fall   Orthopedic Precautions:N/A   Braces:    Respiratory Status: Room air     Functional Mobility:  Bed Mobility:     Supine to Sit: supervision  Transfers:     Sit to Stand:  supervision with no AD  Gait: x 250' no AD and SBA  progressing to supervision      AM-PAC 6 CLICK MOBILITY          Therapeutic Activities and Exercises:   Pt educated on importance of time OOB, importance of intermittent mobility, safe techniques for transfers/ambulation, discharge recommendations/options, and use of call light for assistance and fall prevention.      Patient left sitting edge of bed with all lines intact, call button in reach, RN notified, and family present..    GOALS:   Multidisciplinary Problems       Physical Therapy Goals          Problem: Physical Therapy    Goal Priority Disciplines Outcome Goal Variances Interventions   Physical Therapy Goal     PT, PT/OT Ongoing, Progressing     Description: Goals to be met by: 22     Patient will increase functional independence with mobility by performin. Supine to sit with Supervision  2. Sit to stand transfer with Supervision  3. Bed to chair transfer with Supervision using no AD  4. Gait  x 150 feet with Supervision using no AD                             Time Tracking:     PT Received On: 10/06/22  PT Start Time: 1326     PT Stop Time: 1335  PT Total Time (min): 9 min     Billable Minutes: Gait Training 9    Treatment Type: Treatment  PT/PTA: PTA     PTA Visit Number: 2     10/06/2022

## 2022-10-06 NOTE — CARE UPDATE
10/06/22 0819   Patient Assessment/Suction   Level of Consciousness (AVPU) alert   Respiratory Effort Normal;Unlabored   Expansion/Accessory Muscles/Retractions expansion symmetric   All Lung Fields Breath Sounds clear   Rhythm/Pattern, Respiratory unlabored   PRE-TX-O2   O2 Device (Oxygen Therapy) room air   SpO2 95 %   Pulse Oximetry Type Intermittent   $ Pulse Oximetry - Multiple Charge Pulse Oximetry - Multiple   Pulse 67   Resp 18   Education   $ Education 15 min   Respiratory Evaluation   $ Care Plan Tech Time 15 min

## 2022-10-06 NOTE — PT/OT/SLP DISCHARGE
Physical Therapy Discharge Summary    Name: Sviakumar Thacker  MRN: [id number]   Principal Problem: Cellulitis     Patient Discharged from acute Physical Therapy on 10/6/22.  Please refer to prior PT noted date on 10/6/22 for functional status.     Assessment:     Goals partially met. Patient and daughter in agreement to discharge at this time. Daughter ambulating daily with patient without concern.     Objective:     GOALS:   Multidisciplinary Problems       Physical Therapy Goals          Problem: Physical Therapy    Goal Priority Disciplines Outcome Goal Variances Interventions   Physical Therapy Goal     PT, PT/OT Ongoing, Progressing     Description: Goals to be met by: 22     Patient will increase functional independence with mobility by performin. Supine to sit with Supervision  2. Sit to stand transfer with Supervision  3. Bed to chair transfer with Supervision using no AD  4. Gait  x 150 feet with Supervision using no AD                             Reasons for Discontinuation of Therapy Services  Satisfactory goal achievement.      Plan:     Patient Discharged to: Home with Home Health Service.      10/6/2022

## 2022-10-06 NOTE — CONSULTS
Small amount of drainage on dressing, Right chest incision, cleaned and irrigated, repacked, redressed. Continues with periwound redness, skin prep applied    Small amount of drainage on dressing, Left chest incision, cleaned and irrigated with normal saline, repacked with 1/2 inch strip gauze packing, redressed, skin prep applied to periwound prior to dressing due to irriation and tape blisters x 2        Old dressing with moderate amount of drainage did not strike thru dressing. Left chest incisions, penrose remains intact.  Cleaned and irrigated with normal saline, redressed with gauze and Mepilex silicone border dressing. Periwound redness continues on chest, sides, back, and small area to right arm

## 2022-10-07 LAB
ANION GAP SERPL CALC-SCNC: 4 MMOL/L (ref 8–16)
ANISOCYTOSIS BLD QL SMEAR: SLIGHT
BACTERIA CATH TIP CULT: NO GROWTH
BASOPHILS NFR BLD: 0 % (ref 0–1.9)
BUN SERPL-MCNC: 26 MG/DL (ref 8–23)
CALCIUM SERPL-MCNC: 8.6 MG/DL (ref 8.7–10.5)
CHLORIDE SERPL-SCNC: 99 MMOL/L (ref 95–110)
CO2 SERPL-SCNC: 35 MMOL/L (ref 23–29)
CREAT SERPL-MCNC: 0.7 MG/DL (ref 0.5–1.4)
DIFFERENTIAL METHOD: ABNORMAL
EOSINOPHIL NFR BLD: 30 % (ref 0–8)
ERYTHROCYTE [DISTWIDTH] IN BLOOD BY AUTOMATED COUNT: 13.2 % (ref 11.5–14.5)
EST. GFR  (NO RACE VARIABLE): >60 ML/MIN/1.73 M^2
GALACTOMANNAN AG SPEC IA-ACNC: 0.16 INDEX (ref 0–0.49)
GLUCOSE SERPL-MCNC: 209 MG/DL (ref 70–110)
GLUCOSE SERPL-MCNC: 220 MG/DL (ref 70–110)
GLUCOSE SERPL-MCNC: 226 MG/DL (ref 70–110)
GLUCOSE SERPL-MCNC: 237 MG/DL (ref 70–110)
GLUCOSE SERPL-MCNC: 239 MG/DL (ref 70–110)
HCT VFR BLD AUTO: 34.7 % (ref 40–54)
HGB BLD-MCNC: 11.1 G/DL (ref 14–18)
IMM GRANULOCYTES # BLD AUTO: ABNORMAL K/UL (ref 0–0.04)
IMM GRANULOCYTES NFR BLD AUTO: ABNORMAL % (ref 0–0.5)
LYMPHOCYTES NFR BLD: 33 % (ref 18–48)
MCH RBC QN AUTO: 28.8 PG (ref 27–31)
MCHC RBC AUTO-ENTMCNC: 32 G/DL (ref 32–36)
MCV RBC AUTO: 90 FL (ref 82–98)
MONOCYTES NFR BLD: 5 % (ref 4–15)
NEUTROPHILS NFR BLD: 30 % (ref 38–73)
NEUTS BAND NFR BLD MANUAL: 2 %
NRBC BLD-RTO: 0 /100 WBC
PLATELET # BLD AUTO: 149 K/UL (ref 150–450)
PLATELET BLD QL SMEAR: ABNORMAL
PMV BLD AUTO: 10.9 FL (ref 9.2–12.9)
POTASSIUM SERPL-SCNC: 3.7 MMOL/L (ref 3.5–5.1)
RBC # BLD AUTO: 3.85 M/UL (ref 4.6–6.2)
SODIUM SERPL-SCNC: 138 MMOL/L (ref 136–145)
WBC # BLD AUTO: 3.14 K/UL (ref 3.9–12.7)

## 2022-10-07 PROCEDURE — 12000002 HC ACUTE/MED SURGE SEMI-PRIVATE ROOM

## 2022-10-07 PROCEDURE — 99900035 HC TECH TIME PER 15 MIN (STAT)

## 2022-10-07 PROCEDURE — 94640 AIRWAY INHALATION TREATMENT: CPT

## 2022-10-07 PROCEDURE — 99232 PR SUBSEQUENT HOSPITAL CARE,LEVL II: ICD-10-PCS | Mod: ,,, | Performed by: INTERNAL MEDICINE

## 2022-10-07 PROCEDURE — 94761 N-INVAS EAR/PLS OXIMETRY MLT: CPT

## 2022-10-07 PROCEDURE — 63600175 PHARM REV CODE 636 W HCPCS: Performed by: SURGERY

## 2022-10-07 PROCEDURE — 85007 BL SMEAR W/DIFF WBC COUNT: CPT | Performed by: SURGERY

## 2022-10-07 PROCEDURE — 99900031 HC PATIENT EDUCATION (STAT)

## 2022-10-07 PROCEDURE — 25000003 PHARM REV CODE 250: Performed by: SURGERY

## 2022-10-07 PROCEDURE — 25000003 PHARM REV CODE 250: Performed by: INTERNAL MEDICINE

## 2022-10-07 PROCEDURE — 80048 BASIC METABOLIC PNL TOTAL CA: CPT | Performed by: SURGERY

## 2022-10-07 PROCEDURE — 99232 SBSQ HOSP IP/OBS MODERATE 35: CPT | Mod: ,,, | Performed by: INTERNAL MEDICINE

## 2022-10-07 PROCEDURE — C9113 INJ PANTOPRAZOLE SODIUM, VIA: HCPCS | Performed by: SURGERY

## 2022-10-07 PROCEDURE — 85027 COMPLETE CBC AUTOMATED: CPT | Performed by: SURGERY

## 2022-10-07 PROCEDURE — 99233 PR SUBSEQUENT HOSPITAL CARE,LEVL III: ICD-10-PCS | Mod: ,,, | Performed by: INTERNAL MEDICINE

## 2022-10-07 PROCEDURE — 63600175 PHARM REV CODE 636 W HCPCS: Performed by: INTERNAL MEDICINE

## 2022-10-07 PROCEDURE — 99233 SBSQ HOSP IP/OBS HIGH 50: CPT | Mod: ,,, | Performed by: INTERNAL MEDICINE

## 2022-10-07 PROCEDURE — 25000242 PHARM REV CODE 250 ALT 637 W/ HCPCS: Performed by: INTERNAL MEDICINE

## 2022-10-07 PROCEDURE — C1751 CATH, INF, PER/CENT/MIDLINE: HCPCS

## 2022-10-07 RX ORDER — PANTOPRAZOLE SODIUM 40 MG/1
40 TABLET, DELAYED RELEASE ORAL DAILY
Status: DISCONTINUED | OUTPATIENT
Start: 2022-10-08 | End: 2022-10-08 | Stop reason: HOSPADM

## 2022-10-07 RX ADMIN — INSULIN ASPART 2 UNITS: 100 INJECTION, SOLUTION INTRAVENOUS; SUBCUTANEOUS at 12:10

## 2022-10-07 RX ADMIN — MONTELUKAST 10 MG: 10 TABLET, FILM COATED ORAL at 08:10

## 2022-10-07 RX ADMIN — ALBUTEROL SULFATE 2.5 MG: 2.5 SOLUTION RESPIRATORY (INHALATION) at 07:10

## 2022-10-07 RX ADMIN — MORPHINE SULFATE 2 MG: 2 INJECTION, SOLUTION INTRAMUSCULAR; INTRAVENOUS at 04:10

## 2022-10-07 RX ADMIN — GABAPENTIN 300 MG: 300 CAPSULE ORAL at 08:10

## 2022-10-07 RX ADMIN — MUPIROCIN 1 G: 20 OINTMENT TOPICAL at 09:10

## 2022-10-07 RX ADMIN — CEFEPIME HYDROCHLORIDE 2 G: 2 INJECTION, SOLUTION INTRAVENOUS at 10:10

## 2022-10-07 RX ADMIN — SENNOSIDES AND DOCUSATE SODIUM 1 TABLET: 50; 8.6 TABLET ORAL at 08:10

## 2022-10-07 RX ADMIN — BUDESONIDE 0.5 MG: 0.5 INHALANT RESPIRATORY (INHALATION) at 08:10

## 2022-10-07 RX ADMIN — ARFORMOTEROL TARTRATE 15 MCG: 15 SOLUTION RESPIRATORY (INHALATION) at 08:10

## 2022-10-07 RX ADMIN — PANTOPRAZOLE SODIUM 40 MG: 40 INJECTION, POWDER, FOR SOLUTION INTRAVENOUS at 08:10

## 2022-10-07 RX ADMIN — BENZONATATE 200 MG: 100 CAPSULE ORAL at 07:10

## 2022-10-07 RX ADMIN — POTASSIUM BICARBONATE 50 MEQ: 977.5 TABLET, EFFERVESCENT ORAL at 07:10

## 2022-10-07 RX ADMIN — Medication 6 MG: at 09:10

## 2022-10-07 RX ADMIN — GABAPENTIN 300 MG: 300 CAPSULE ORAL at 09:10

## 2022-10-07 RX ADMIN — INSULIN ASPART 2 UNITS: 100 INJECTION, SOLUTION INTRAVENOUS; SUBCUTANEOUS at 07:10

## 2022-10-07 RX ADMIN — ENOXAPARIN SODIUM 40 MG: 40 INJECTION SUBCUTANEOUS at 05:10

## 2022-10-07 RX ADMIN — ASPIRIN 81 MG: 81 TABLET, COATED ORAL at 08:10

## 2022-10-07 RX ADMIN — ARFORMOTEROL TARTRATE 15 MCG: 15 SOLUTION RESPIRATORY (INHALATION) at 07:10

## 2022-10-07 RX ADMIN — METFORMIN HYDROCHLORIDE 500 MG: 500 TABLET, FILM COATED ORAL at 05:10

## 2022-10-07 RX ADMIN — CHLORHEXIDINE GLUCONATE 15 ML: 1.2 RINSE ORAL at 09:10

## 2022-10-07 RX ADMIN — FLUTICASONE PROPIONATE 50 MCG: 50 SPRAY, METERED NASAL at 09:10

## 2022-10-07 RX ADMIN — CHLORHEXIDINE GLUCONATE 15 ML: 1.2 RINSE ORAL at 08:10

## 2022-10-07 RX ADMIN — OXYCODONE HYDROCHLORIDE AND ACETAMINOPHEN 1 TABLET: 10; 325 TABLET ORAL at 09:10

## 2022-10-07 RX ADMIN — CEFEPIME HYDROCHLORIDE 2 G: 2 INJECTION, SOLUTION INTRAVENOUS at 05:10

## 2022-10-07 RX ADMIN — CEFEPIME HYDROCHLORIDE 2 G: 2 INJECTION, SOLUTION INTRAVENOUS at 12:10

## 2022-10-07 RX ADMIN — METFORMIN HYDROCHLORIDE 500 MG: 500 TABLET, FILM COATED ORAL at 07:10

## 2022-10-07 RX ADMIN — CELECOXIB 100 MG: 100 CAPSULE ORAL at 09:10

## 2022-10-07 RX ADMIN — GABAPENTIN 300 MG: 300 CAPSULE ORAL at 03:10

## 2022-10-07 RX ADMIN — AMLODIPINE BESYLATE 5 MG: 5 TABLET ORAL at 08:10

## 2022-10-07 RX ADMIN — LOSARTAN POTASSIUM 12.5 MG: 25 TABLET, FILM COATED ORAL at 08:10

## 2022-10-07 RX ADMIN — INSULIN ASPART 2 UNITS: 100 INJECTION, SOLUTION INTRAVENOUS; SUBCUTANEOUS at 09:10

## 2022-10-07 RX ADMIN — OXYCODONE HYDROCHLORIDE AND ACETAMINOPHEN 1 TABLET: 10; 325 TABLET ORAL at 08:10

## 2022-10-07 RX ADMIN — ATORVASTATIN CALCIUM 20 MG: 20 TABLET, FILM COATED ORAL at 08:10

## 2022-10-07 RX ADMIN — MUPIROCIN 1 G: 20 OINTMENT TOPICAL at 08:10

## 2022-10-07 RX ADMIN — BUDESONIDE 0.5 MG: 0.5 INHALANT RESPIRATORY (INHALATION) at 07:10

## 2022-10-07 RX ADMIN — CELECOXIB 100 MG: 100 CAPSULE ORAL at 08:10

## 2022-10-07 RX ADMIN — TIZANIDINE 4 MG: 4 TABLET ORAL at 07:10

## 2022-10-07 NOTE — PROGRESS NOTES
Pending sale to [facility]  Adult Nutrition   Progress Note (Follow-Up)    SUMMARY      Recommendations:   1. Continue current diet a tolerated.  2. Continue Unjury with meals.  3.  to obtain daily meal choices.     Goals:   Goals: 1. Intake to be > / = 75% estimated   needs. Met  2. Lab values trend to target range. Ongoing    Dietitian Rounds Brief  Patient eating well per nursing. Followed by ID, HO and GSx. IV abx and IgG monthly. Possible dc today. Labs show some improvement. Last BM 10/2/22. No new weight. RD to follow moderate, as needed.    Diet order: Diabetic 2000 kcal    Oral Supplement: Unjury with meals.    % Intake of Estimated Energy Needs: 50 - 75 %  % Meal Intake: 50 - 75 %    Estimated/Assessed Needs  Weight Used For Calorie Calculations: 95.8 kg (211 lb 3.2 oz)  Energy Calorie Requirements (kcal): 1723-9407 (20-25 kcal/kg     Protein Requirements: 70-84 gm/day (1.2-1.5 gm/kg IBW)  Weight Used For Protein Calculations: 70 kg (154 lb 5.2 oz) (IBW)     Estimated Fluid Requirement Method: RDA Method  RDA Method (mL): 1916     Weight History:  Wt Readings from Last 5 Encounters:   10/01/22 95.8 kg (211 lb 3.2 oz)   10/03/22 95.8 kg (211 lb 3.2 oz)   09/16/22 97 kg (213 lb 12.8 oz)   09/15/22 96.8 kg (213 lb 6.4 oz)   09/15/22 96.8 kg (213 lb 6.4 oz)      Reason for Assessment  Reason For Assessment: consult, length of stay (consult for education ; LOS)  Diagnosis: cancer diagnosis/related complications  Relevant Medical History: hypertension, diabetes with lower extremity neuropathy, and low-grade non-Hodgkin's lymphoma on rituximab maintenance therapy every 2 months (last 9/15) and monthly IVIG (last 9/16).  Interdisciplinary Rounds: did not attend    Medications:Pertinent Medications Reviewed  Scheduled Meds:   amLODIPine  5 mg Oral Daily    budesonide  0.5 mg Nebulization Q12H    And    arformoteroL  15 mcg Nebulization BID    aspirin  81 mg Oral Daily    atorvastatin  20 mg Oral Daily     ceFEPime (MAXIPIME) IVPB  2 g Intravenous Q8H    celecoxib  100 mg Oral BID    chlorhexidine  15 mL Mouth/Throat BID    enoxaparin  40 mg Subcutaneous Daily    fluticasone propionate  1 spray Each Nostril BID    gabapentin  300 mg Oral TID    losartan  12.5 mg Oral Daily    metFORMIN  500 mg Oral BID WM    montelukast  10 mg Oral Daily    mupirocin   Nasal BID    pantoprazole  40 mg Intravenous Daily    potassium chloride  10 mEq Oral Once     Continuous Infusions:  PRN Meds:.acetaminophen, albuterol-ipratropium, benzonatate, calcium gluconate IVPB, calcium gluconate IVPB, calcium gluconate IVPB, dextrose 10%, dextrose 10%, dextrose 10%, diazePAM, glucagon (human recombinant), glucagon (human recombinant), glucose, glucose, glucose, glucose, insulin aspart U-100, insulin aspart U-100, magnesium oxide, magnesium oxide, magnesium sulfate IVPB, magnesium sulfate IVPB, melatonin, morphine, naloxone, ondansetron, oxyCODONE-acetaminophen, polyethylene glycol, potassium bicarbonate, potassium bicarbonate, potassium bicarbonate, potassium chloride in water **AND** potassium chloride in water **AND** potassium chloride in water, potassium, sodium phosphates, potassium, sodium phosphates, potassium, sodium phosphates, senna-docusate 8.6-50 mg, simethicone, sodium phosphate IVPB, sodium phosphate IVPB, sodium phosphate IVPB, tiZANidine    Labs: Pertinent Labs Reviewed  Clinical Chemistry:  Recent Labs   Lab 10/02/22  1709 10/07/22  0406   NA  --  138   K  --  3.7   CL  --  99   CO2  --  35*   GLU  --  237*   BUN  --  26*   CREATININE  --  0.7   CALCIUM  --  8.6*   PROT 6.6  --    ALBUMIN 2.8*  --    BILITOT 0.8  --    ALKPHOS 124  --    AST 49*  --    ALT 60*  --    ANIONGAP  --  4*    < > = values in this interval not displayed.     CBC:   Recent Labs   Lab 10/07/22  0406   WBC 3.14*   RBC 3.85*   HGB 11.1*   HCT 34.7*   *   MCV 90   MCH 28.8   MCHC 32.0     Cardiac Profile:  Recent Labs   Lab 09/30/22  7692  10/01/22  0630 10/04/22  0400   *  --  130*   CPK  --  25  --    TROPONINI <0.030  --   --      Inflammatory Labs:  Recent Labs   Lab 10/02/22  1709   CRP >38.00*     Diabetes:  Recent Labs   Lab 10/05/22  0545   HGBA1C 7.4*     Monitor and Evaluation  Food and Nutrient Intake: energy intake  Food and Nutrient Adminstration: diet order  Knowledge/Beliefs/Attitudes: food and nutrition knowledge/skill  Physical Activity and Function: nutrition-related ADLs and IADLs  Anthropometric Measurements: weight, weight change, body mass index  Biochemical Data, Medical Tests and Procedures: electrolyte and renal panel, gastrointestinal profile, glucose/endocrine profile, inflammatory profile, lipid profile  Nutrition-Focused Physical Findings: overall appearance     Nutrition Risk  Level of Risk/Frequency of Follow-up: moderate - high     Nutrition Follow-Up  RD Follow-up?: Yes    Livia Worthington RD 10/07/2022 10:17 AM

## 2022-10-07 NOTE — PROGRESS NOTES
Pharmacist Intervention IV to PO Note    Sivakumar Tahcker is a 67 y.o. male being treated with IV medication Pantoprazole    Patient Data:    Vital Signs (Most Recent):  Temp: 97.6 °F (36.4 °C) (10/07/22 0803)  Pulse: 74 (10/07/22 0803)  Resp: 18 (10/07/22 0806)  BP: (!) 151/85 (10/07/22 0803)  SpO2: 100 % (10/07/22 0803)   Vital Signs (72h Range):  Temp:  [97.4 °F (36.3 °C)-98.8 °F (37.1 °C)]   Pulse:  [67-96]   Resp:  [16-24]   BP: (145-167)/()   SpO2:  [94 %-100 %]      CBC:  Recent Labs   Lab 10/05/22  0545 10/06/22  0620 10/07/22  0406   WBC 2.62* 2.70* 3.14*   RBC 3.72* 4.14* 3.85*   HGB 10.9* 11.9* 11.1*   HCT 33.3* 36.8* 34.7*   PLT 82* 122* 149*   MCV 90 89 90   MCH 29.3 28.7 28.8   MCHC 32.7 32.3 32.0     CMP:     Recent Labs   Lab 09/30/22  1632 10/01/22  0630 10/02/22  1709 10/03/22  0400 10/05/22  0545 10/06/22  0620 10/07/22  0406   *   < >  --    < > 196* 181* 237*   CALCIUM 8.6*   < >  --    < > 8.1* 8.6* 8.6*   ALBUMIN 3.5  --  2.8*  --   --   --   --    PROT 6.5  --  6.6  --   --   --   --       < >  --    < > 138 142 138   K 3.4*   < >  --    < > 3.1* 3.2* 3.7   CO2 26   < >  --    < > 31* 33* 35*      < >  --    < > 101 102 99   BUN 21   < >  --    < > 29* 22 26*   CREATININE 1.3   < >  --    < > 0.8 0.7 0.7   ALKPHOS 95  --  124  --   --   --   --    ALT 24  --  60*  --   --   --   --    AST 30  --  49*  --   --   --   --    BILITOT 1.2*  --  0.8  --   --   --   --     < > = values in this interval not displayed.       Dietary Orders:  Diet Orders            Dietary nutrition supplements [facility]; Unjury starting at 10/05 1715    Diet diabetic [facility]; 2000 Calorie: Diabetic starting at 10/03 1704            Based on the following criteria, this patient qualifies for intravenous to oral conversion:  [x] The patients gastrointestinal tract is functioning (tolerating medications via oral or enteral route for 24 hours and tolerating food or enteral feeds for 24 hours).  [x] The  patient is not scheduled for surgery within the next 24 hours.    Pantoprazole 40 mg IV Daily will be changed to Pantoprazole 40 mg PO Daily     Pharmacist's Name: Bill Reyes  Pharmacist's Extension: [phone]

## 2022-10-07 NOTE — ASSESSMENT & PLAN NOTE
Patient has improved from this standpoint.  Platelet count nearly normal now.  Continue to monitor.

## 2022-10-07 NOTE — SUBJECTIVE & OBJECTIVE
Interval History:       Oncology Treatment Plan:   OP RITUXIMAB Q8W    Medications:  Continuous Infusions:  Scheduled Meds:   amLODIPine  5 mg Oral Daily    budesonide  0.5 mg Nebulization Q12H    And    arformoteroL  15 mcg Nebulization BID    aspirin  81 mg Oral Daily    atorvastatin  20 mg Oral Daily    ceFEPime (MAXIPIME) IVPB  2 g Intravenous Q8H    celecoxib  100 mg Oral BID    chlorhexidine  15 mL Mouth/Throat BID    enoxaparin  40 mg Subcutaneous Daily    fluticasone propionate  1 spray Each Nostril BID    gabapentin  300 mg Oral TID    losartan  12.5 mg Oral Daily    metFORMIN  500 mg Oral BID WM    montelukast  10 mg Oral Daily    mupirocin   Nasal BID    [START ON 10/8/2022] pantoprazole  40 mg Oral Daily    potassium chloride  10 mEq Oral Once     PRN Meds:acetaminophen, albuterol-ipratropium, benzonatate, calcium gluconate IVPB, calcium gluconate IVPB, calcium gluconate IVPB, dextrose 10%, dextrose 10%, dextrose 10%, diazePAM, glucagon (human recombinant), glucagon (human recombinant), glucose, glucose, glucose, glucose, insulin aspart U-100, insulin aspart U-100, magnesium oxide, magnesium oxide, magnesium sulfate IVPB, magnesium sulfate IVPB, melatonin, morphine, naloxone, ondansetron, oxyCODONE-acetaminophen, polyethylene glycol, potassium bicarbonate, potassium bicarbonate, potassium bicarbonate, potassium chloride in water **AND** potassium chloride in water **AND** potassium chloride in water, potassium, sodium phosphates, potassium, sodium phosphates, potassium, sodium phosphates, senna-docusate 8.6-50 mg, simethicone, sodium phosphate IVPB, sodium phosphate IVPB, sodium phosphate IVPB, tiZANidine     Review of Systems   Constitutional:  Negative for fever and unexpected weight change.   HENT:  Negative for nosebleeds.    Respiratory:  Negative for chest tightness and shortness of breath.    Cardiovascular:  Negative for chest pain.   Gastrointestinal:  Negative for abdominal pain and blood in  stool.   Genitourinary:  Negative for hematuria.   Skin:  Negative for rash.   Hematological:  Does not bruise/bleed easily.   Objective:     Vital Signs (Most Recent):  Temp: 97.6 °F (36.4 °C) (10/07/22 0803)  Pulse: 74 (10/07/22 0803)  Resp: 18 (10/07/22 0806)  BP: (!) 151/85 (10/07/22 0803)  SpO2: 100 % (10/07/22 0803)   Vital Signs (24h Range):  Temp:  [97.4 °F (36.3 °C)-98.6 °F (37 °C)] 97.6 °F (36.4 °C)  Pulse:  [74-87] 74  Resp:  [16-18] 18  SpO2:  [94 %-100 %] 100 %  BP: (151-165)/() 151/85     Weight: 95.8 kg (211 lb 3.2 oz)  Body mass index is 32.11 kg/m².  Body surface area is 2.14 meters squared.    No intake or output data in the 24 hours ending 10/07/22 1154    Physical Exam  Vitals reviewed.   Constitutional:       Appearance: He is well-developed.   HENT:      Head: Normocephalic and atraumatic.      Right Ear: External ear normal.      Left Ear: External ear normal.   Eyes:      General: No scleral icterus.     Conjunctiva/sclera: Conjunctivae normal.      Pupils: Pupils are equal, round, and reactive to light.   Neck:      Comments: Left chest wall lesion dressed currently.  Mild TTP over area.    Cardiovascular:      Rate and Rhythm: Normal rate and regular rhythm.      Heart sounds: Normal heart sounds. No murmur heard.    No friction rub. No gallop.   Pulmonary:      Effort: Pulmonary effort is normal. No respiratory distress.      Breath sounds: Normal breath sounds. No rales.   Chest:      Chest wall: No tenderness.   Abdominal:      General: Bowel sounds are normal. There is no distension.      Palpations: Abdomen is soft. There is no mass.      Tenderness: There is no abdominal tenderness. There is no guarding or rebound.   Lymphadenopathy:      Head:      Right side of head: No tonsillar adenopathy.      Left side of head: No tonsillar adenopathy.      Upper Body:      Right upper body: No supraclavicular adenopathy.      Left upper body: No supraclavicular adenopathy.   Neurological:       Mental Status: He is alert and oriented to person, place, and time.   Psychiatric:         Behavior: Behavior normal.         Thought Content: Thought content normal.         Judgment: Judgment normal.       Significant Labs:   CBC:   Recent Labs   Lab 10/06/22  0620 10/07/22  0406   WBC 2.70* 3.14*   HGB 11.9* 11.1*   HCT 36.8* 34.7*   * 149*    and CMP:   Recent Labs   Lab 10/06/22  0620 10/07/22  0406    138   K 3.2* 3.7    99   CO2 33* 35*   * 237*   BUN 22 26*   CREATININE 0.7 0.7   CALCIUM 8.6* 8.6*   ANIONGAP 7* 4*       Diagnostic Results:  None

## 2022-10-07 NOTE — ASSESSMENT & PLAN NOTE
Patient has been on Rituxan maintenance which is now on hold per Dr. Ibarra.  Will likely have full staging in the next 2 months, once this wound his better healed.

## 2022-10-07 NOTE — PROGRESS NOTES
Consult Note  Infectious Disease    Reason for Consult:  Severe lymphadenitis; L axilla cellulitis     HPI: Sivakumar Thacker is a 67 y.o. male with past medical history of hypertension, diabetes with lower extremity neuropathy, and low-grade non-Hodgkin's lymphoma on rituximab maintenance therapy every 2 months (last 9/15) and monthly IVIG (last 9/16). Presented on 09/30 for worsening redness and discomfort on his left axilla, which progressed to his neck and chest, with associated fever of 103 and chills at home.  He also complains of severe headache, nausea, no vomit, new onset bilateral lower extremity edema for about a month, no pain, and persistent nonproductive cough due to history of chronic bronchiectasis for which he has been on Augmentin and azithromycin 3 times a week, the latter was discontinued at last follow-up.  Patient contacted Heme-Onc before coming to the hospital, was prescribed Bactrim, given worsening redness and severity of pain patient came to the emergency room.  He denies abdominal pain, dysuria increased urinary frequency, or change in bowel movements.      In the ER, tachycardic 109, T-max 102.9°  Labs on admission white count 5.4, bands 6%, H&H 13/39, platelet count 168   Hypokalemia 3.4, creatinine 1.3   Glucose 273, normal LFTs   Lactic acid 1.8-->2.1, procalcitonin 17.2 high  UA with 1+ protein, 4+ glucose, negative for infection   Chest x-ray negative for acute pathology   CT head without contrast negative for acute pathology.  9 mm circumscribed hypodensity at the inferior margin of the right basal ganglia consistent with small chronic lacunar infarct or prominent perivascular space.  Sinus disease as above.  CTA chest negative for PE.  Interstitial prominence at both lung bases suggesting mild interstitial edema, with superimposed atelectasis or infiltrates at the left lung base.  Soft tissue ultrasound left axilla, revealed subcutaneous edema at the left axilla, no mass,  lymphadenopathy, or drainable fluid collection.  Correlate for possible cellulitis.    Patient admitted for sepsis likely secondary to left axilla spreading to neck and chest cellulitis.    Empirically started on vancomycin and cefepime IV.  Hospital course complicated by worsening leukopenia 1.4, and spreading of redness through his chest.    ID consult for left axilla cellulitis in lymphoma patient.    10/3 (Felix):  Consult reviewed and discussed with Dr. Schrader.  He takes Augmentin once a day and azithromycin 3 times weekly as prophylactic antibiotics for his hypogammaglobulinemia and recurring lung infections associated with bronchiectasis and sinusitis.  His illness began with chills followed by left axillary pain.  He has an abrasion on the top of his left hand which he states is a common injury for him.  The cellulitis it does extend over most of the chest wall and left upper quadrant.  He does have some abdominal tenderness with normal bowel sounds no nausea vomiting or diarrhea.  There is no fluctuance or crepitance.  He does have adequate range of motion at the shoulder elbow and wrist to exclude septic arthritis.  He does not report any water exposure  10/4: Interim reviewed.  Discussed with surgery yesterday and no abscess or necrotizing process was discovered. The port was well incorporated. Interestingly there is a gram negative laura growing from th left chest abscess. He denied water exposure yesterday. He is requiring less oxygen, 90% on RA, 99% on 2 liters.   10/5: Interim reviewed.  He is afebrile, white blood cells are 2.6, platelets are up to 82,000.  Culture from left axilla has Serratia sensitive to all the drugs tested.  Overall clinically he is doing significantly better, however the abdominal and chest wall cellulitis is still persistent however slightly less confluent and intense.  He is able to move the left upper extremity much more easily.  10/6: interim reviewed. He is afebrile, up in  the chair. Chest wall erythema is much improved, but still has dependent edema and erythema in flanks. No new culture data. He is not receiving his usual COPD inhalers and sounds just a little congested today. He is eating well, voiding without difficulty. No adverse effect of antibiotics.   10/7: Interim reviewed.  He is afebrile, white blood cells are now over 3000.  O2 saturation is 100% on room air.  He is not coughing up anything.  The flank erythema has definitely lightened compared to yesterday.  He was offered discharge but, he is going to be staying at his sister's home and he is concerned about the preparations and he seems a little anxious about it.  We discussed at length about his antibiotic exposure, hypogammaglobulinemia, chronic left maxillary sinusitis, and chronic bronchitis.  We discussed pulmonary toilet for people have bronchiectasis.  We discussed selection of resistant organisms from chronic antibiotic exposure.  He would like to get the TLC out and I am in favor of this as well.  He still has soreness in the left chest in particular.    EXAM & DIAGNOSTICS REVIEWED:   Vitals:     Temp:  [97.4 °F (36.3 °C)-98.6 °F (37 °C)]   Temp: 97.6 °F (36.4 °C) (10/07/22 0803)  Pulse: 74 (10/07/22 0803)  Resp: 18 (10/07/22 0806)  BP: (!) 151/85 (10/07/22 0803)  SpO2: 100 % (10/07/22 0803)    Intake/Output Summary (Last 24 hours) at 10/7/2022 0946  Last data filed at 10/6/2022 1145  Gross per 24 hour   Intake 240 ml   Output 2 ml   Net 238 ml       General:  In NAD. Alert and attentive, cooperative,  interactive   Eyes:  Anicteric, EOMI  ENT:  No ulcers, exudates, thrush, nares patent, dentition is good,    Neck:  Supple,    Lungs: Minimal right basilar crackles  Heart:  S1/S2+, regular rhythm, no murmurs  Abd:  +BS, soft, non tender to palpation,   :  Voids,  no flank tenderness  Musc:  Joints without effusion, swelling,  erythema, synovitis, ambulatory.  Able to spontaneously and passively move shoulder,  elbow, wrist with more ease each day  Skin:  The intensity of the redness is markedly decreased over the chest and abdomen but still has dependent erythema and edema of flanks which is also improved from yesterday.  The left axillary wound has a large Penrose.  The former Port-A-Cath site has no purulence, see photos below  Wound:   Neuro:   alert, speech is clear,no focal deficits neuropathy LE b/l  Psych:  comfortable, cooperative  Lymphatic  Extrem: Trace b/l LE edema, non tender to palpation - petechial rash on dorsum of  feet and medial ankles is improved  VAD:  R IJ TLC      Isolation:    10/5      10/2:      9/30:        General Labs reviewed:  Recent Labs   Lab 10/05/22  0545 10/06/22  0620 10/07/22  0406   WBC 2.62* 2.70* 3.14*   HGB 10.9* 11.9* 11.1*   HCT 33.3* 36.8* 34.7*   PLT 82* 122* 149*       Recent Labs   Lab 09/30/22  1632 10/01/22  0630 10/02/22  1709 10/03/22  0400 10/05/22  0545 10/06/22  0620 10/07/22  0406      < >  --    < > 138 142 138   K 3.4*   < >  --    < > 3.1* 3.2* 3.7      < >  --    < > 101 102 99   CO2 26   < >  --    < > 31* 33* 35*   BUN 21   < >  --    < > 29* 22 26*   CREATININE 1.3   < >  --    < > 0.8 0.7 0.7   CALCIUM 8.6*   < >  --    < > 8.1* 8.6* 8.6*   PROT 6.5  --  6.6  --   --   --   --    BILITOT 1.2*  --  0.8  --   --   --   --    ALKPHOS 95  --  124  --   --   --   --    ALT 24  --  60*  --   --   --   --    AST 30  --  49*  --   --   --   --     < > = values in this interval not displayed.     Recent Labs   Lab 10/02/22  1709   CRP >38.00*     No results for input(s): SEDRATE in the last 168 hours.    Estimated Creatinine Clearance: 116.6 mL/min (based on SCr of 0.7 mg/dL).     Micro:  Microbiology Results (last 7 days)       Procedure Component Value Units Date/Time    IV catheter culture [[id number]] Collected: 10/03/22 1221    Order Status: Completed Specimen: Catheter Tip, Subclavian Updated: 10/07/22 0832     Aerobic Culture - Cath tip No growth     Narrative:      Port    Blood culture [[id number]] Collected: 10/01/22 1058    Order Status: Completed Specimen: Blood Updated: 10/06/22 1232     Blood Culture, Routine No growth after 5 days.    Narrative:      Aerobic and anaerobic    Blood culture [[id number]] Collected: 10/01/22 1058    Order Status: Completed Specimen: Blood from Antecubital, Right Arm Updated: 10/06/22 1232     Blood Culture, Routine No growth after 5 days.    Narrative:      Aerobic and anaerobic    Blood culture #2 **CANNOT BE ORDERED STAT** [[id number]] Collected: 09/30/22 1800    Order Status: Completed Specimen: Blood from Peripheral, Antecubital, Left Updated: 10/05/22 2032     Blood Culture, Routine No growth after 5 days.    Blood culture #1 **CANNOT BE ORDERED STAT** [[id number]] Collected: 09/30/22 1632    Order Status: Completed Specimen: Blood from Peripheral, Antecubital, Right Updated: 10/05/22 1832     Blood Culture, Routine No growth after 5 days.    Culture, Anaerobic [[id number]] Collected: 10/03/22 1223    Order Status: Completed Specimen: Abscess from Chest, Left Updated: 10/05/22 1538     Anaerobic Culture No anaerobes isolated    Narrative:      Port    Culture, Anaerobe [[id number]] Collected: 10/03/22 1223    Order Status: Completed Specimen: Abscess from Chest, Left Updated: 10/05/22 1537     Anaerobic Culture No anaerobes isolated    Narrative:      Left axilla    Aerobic culture [[id number]]  (Abnormal)  (Susceptibility) Collected: 10/03/22 1223    Order Status: Completed Specimen: Abscess from Chest, Left Updated: 10/05/22 0812     Aerobic Bacterial Culture SERRATIA MARCESCENS  Moderate      Narrative:      Left axilla    Fungus culture [[id number]] Collected: 10/03/22 1223    Order Status: Sent Specimen: Abscess from Chest, Left Updated: 10/03/22 1302            Imaging Reviewed:  Chest x-ray negative for acute pathology - Port-A-Cath tunneled from R to L  CT head without contrast negative for acute pathology.  9 mm  circumscribed hypodensity at the inferior margin of the right basal ganglia consistent with small chronic lacunar infarct or prominent perivascular space.  Sinus disease as above.  CTA chest negative for PE.  Interstitial prominence at both lung bases suggesting mild interstitial edema, with superimposed atelectasis or infiltrates at the left lung base.  Soft tissue ultrasound left axilla, revealed subcutaneous edema at the left axilla, no mass, lymphadenopathy, or drainable fluid collection.  Correlate for possible cellulitis.      Cardiology: EKG QTC 403ms       IMPRESSION & PLAN     Severe left axilla cellulitis extending to neck and anterior chest, rule out nec fasc    Blood cultures x2 9/30 & 10/1 x 2 no growth to date,     Lactic acid 1.8-->2.1, procalcitonin 17.2 high    Serratia, on culture left axillary focus   ASO negative    2. Pancytopenia, h/o low-grade non-Hodgkin's lymphoma on rituximab last dose 9/16, IVIG last dose 9/16   Leukopenia   ANC now greater than 1000, thrombocytopenia 79  Unclear if skin cellulitis could be related to Rituximab    3.  Long term use of antibiotics   Recurrent upper respiratory infections, chronic sinusitis   Now on IgG replacement (08/17/2022 total IgG 650) over 1 year  4. PMHx: hypertension, diabetes with lower extremity neuropathy, bronchiectasis     Recommendations:  Continue cefepime alone  Continue Celebrex  Home tomorrow on 2 weeks of ciprofloxacin, in the hope that this might help clear his chronic sinusitis.   usual inhalers available on formulary and bid aerosol albuterol    D/w patient, family,           Medical Decision Making during this encounter was  [_] Low Complexity  [_] Moderate Complexity  [xxx] High Complexity

## 2022-10-07 NOTE — PROGRESS NOTES
Pending sale to [facility]  Hematology/Oncology  Progress Note    Patient Name: Sivakumar Thacker  Admission Date: 9/30/2022  Hospital Length of Stay: 6 days  Code Status: Full Code     Subjective:     HPI:  Mr. Thacker states he is doing better today.  The left upper chest wall lesion still hurts but is improved.  He has no new pain, sob or new complaints o/w.        Interval History:       Oncology Treatment Plan:   OP RITUXIMAB Q8W    Medications:  Continuous Infusions:  Scheduled Meds:   amLODIPine  5 mg Oral Daily    budesonide  0.5 mg Nebulization Q12H    And    arformoteroL  15 mcg Nebulization BID    aspirin  81 mg Oral Daily    atorvastatin  20 mg Oral Daily    ceFEPime (MAXIPIME) IVPB  2 g Intravenous Q8H    celecoxib  100 mg Oral BID    chlorhexidine  15 mL Mouth/Throat BID    enoxaparin  40 mg Subcutaneous Daily    fluticasone propionate  1 spray Each Nostril BID    gabapentin  300 mg Oral TID    losartan  12.5 mg Oral Daily    metFORMIN  500 mg Oral BID WM    montelukast  10 mg Oral Daily    mupirocin   Nasal BID    [START ON 10/8/2022] pantoprazole  40 mg Oral Daily    potassium chloride  10 mEq Oral Once     PRN Meds:acetaminophen, albuterol-ipratropium, benzonatate, calcium gluconate IVPB, calcium gluconate IVPB, calcium gluconate IVPB, dextrose 10%, dextrose 10%, dextrose 10%, diazePAM, glucagon (human recombinant), glucagon (human recombinant), glucose, glucose, glucose, glucose, insulin aspart U-100, insulin aspart U-100, magnesium oxide, magnesium oxide, magnesium sulfate IVPB, magnesium sulfate IVPB, melatonin, morphine, naloxone, ondansetron, oxyCODONE-acetaminophen, polyethylene glycol, potassium bicarbonate, potassium bicarbonate, potassium bicarbonate, potassium chloride in water **AND** potassium chloride in water **AND** potassium chloride in water, potassium, sodium phosphates, potassium, sodium phosphates, potassium, sodium phosphates, senna-docusate 8.6-50 mg, simethicone,  sodium phosphate IVPB, sodium phosphate IVPB, sodium phosphate IVPB, tiZANidine     Review of Systems   Constitutional:  Negative for fever and unexpected weight change.   HENT:  Negative for nosebleeds.    Respiratory:  Negative for chest tightness and shortness of breath.    Cardiovascular:  Negative for chest pain.   Gastrointestinal:  Negative for abdominal pain and blood in stool.   Genitourinary:  Negative for hematuria.   Skin:  Negative for rash.   Hematological:  Does not bruise/bleed easily.   Objective:     Vital Signs (Most Recent):  Temp: 97.6 °F (36.4 °C) (10/07/22 0803)  Pulse: 74 (10/07/22 0803)  Resp: 18 (10/07/22 0806)  BP: (!) 151/85 (10/07/22 0803)  SpO2: 100 % (10/07/22 0803)   Vital Signs (24h Range):  Temp:  [97.4 °F (36.3 °C)-98.6 °F (37 °C)] 97.6 °F (36.4 °C)  Pulse:  [74-87] 74  Resp:  [16-18] 18  SpO2:  [94 %-100 %] 100 %  BP: (151-165)/() 151/85     Weight: 95.8 kg (211 lb 3.2 oz)  Body mass index is 32.11 kg/m².  Body surface area is 2.14 meters squared.    No intake or output data in the 24 hours ending 10/07/22 1154    Physical Exam  Vitals reviewed.   Constitutional:       Appearance: He is well-developed.   HENT:      Head: Normocephalic and atraumatic.      Right Ear: External ear normal.      Left Ear: External ear normal.   Eyes:      General: No scleral icterus.     Conjunctiva/sclera: Conjunctivae normal.      Pupils: Pupils are equal, round, and reactive to light.   Neck:      Comments: Left chest wall lesion dressed currently.  Mild TTP over area.    Cardiovascular:      Rate and Rhythm: Normal rate and regular rhythm.      Heart sounds: Normal heart sounds. No murmur heard.    No friction rub. No gallop.   Pulmonary:      Effort: Pulmonary effort is normal. No respiratory distress.      Breath sounds: Normal breath sounds. No rales.   Chest:      Chest wall: No tenderness.   Abdominal:      General: Bowel sounds are normal. There is no distension.      Palpations:  Abdomen is soft. There is no mass.      Tenderness: There is no abdominal tenderness. There is no guarding or rebound.   Lymphadenopathy:      Head:      Right side of head: No tonsillar adenopathy.      Left side of head: No tonsillar adenopathy.      Upper Body:      Right upper body: No supraclavicular adenopathy.      Left upper body: No supraclavicular adenopathy.   Neurological:      Mental Status: He is alert and oriented to person, place, and time.   Psychiatric:         Behavior: Behavior normal.         Thought Content: Thought content normal.         Judgment: Judgment normal.       Significant Labs:   CBC:   Recent Labs   Lab 10/06/22  0620 10/07/22  0406   WBC 2.70* 3.14*   HGB 11.9* 11.1*   HCT 36.8* 34.7*   * 149*    and CMP:   Recent Labs   Lab 10/06/22  0620 10/07/22  0406    138   K 3.2* 3.7    99   CO2 33* 35*   * 237*   BUN 22 26*   CREATININE 0.7 0.7   CALCIUM 8.6* 8.6*   ANIONGAP 7* 4*       Diagnostic Results:  None    Assessment/Plan:     * Cellulitis Left Axilla  Patient seems to be getting better from this standpoint.  Review of chart shows wound infection with Serratia, blood cultures negative.  He is on Cefepime and wound care.  Port has been removed.  Continue current care approach.      Sepsis  Patient continues on cefepime and is doing ok with this.  VSS are stable and he has been afebrile.      Pancytopenia  Patient has improved from this standpoint.  Platelet count nearly normal now.  Continue to monitor.     Chronic obstructive pulmonary disease, unspecified COPD type  He has no current sob and feeling ok from this standpoint.     Hypogammaglobulinemia  Patient on monthly immunoglobulin therapy.  Would continue this as OP.      Follicular lymphoma grade IIIa  Patient has been on Rituxan maintenance which is now on hold per Dr. Ibarra.  Will likely have full staging in the next 2 months, once this wound his better healed.          Thank you for your  consult. I will follow-up with patient. Please contact us if you have any additional questions.     Garry Rucker MD  Hematology/Oncology  Count includes the [facility]

## 2022-10-07 NOTE — SUBJECTIVE & OBJECTIVE
Interval History:     Review of Systems  Objective:     Vital Signs (Most Recent):  Temp: 98.7 °F (37.1 °C) (10/07/22 1235)  Pulse: 84 (10/07/22 1235)  Resp: 18 (10/07/22 1235)  BP: (!) 153/93 (10/07/22 1235)  SpO2: 95 % (10/07/22 1235)   Vital Signs (24h Range):  Temp:  [97.4 °F (36.3 °C)-98.7 °F (37.1 °C)] 98.7 °F (37.1 °C)  Pulse:  [74-87] 84  Resp:  [16-18] 18  SpO2:  [94 %-100 %] 95 %  BP: (151-165)/(85-93) 153/93     Weight: 95.8 kg (211 lb 3.2 oz)  Body mass index is 32.11 kg/m².  No intake or output data in the 24 hours ending 10/07/22 1503   Physical Exam  Constitutional:       General: He is not in acute distress.     Appearance: He is well-developed.   HENT:      Head: Normocephalic.   Eyes:      Pupils: Pupils are equal, round, and reactive to light.   Cardiovascular:      Rate and Rhythm: Normal rate and regular rhythm.      Pulses: Normal pulses.   Pulmonary:      Effort: No respiratory distress.   Abdominal:      General: Abdomen is flat. There is no distension.      Tenderness: There is no abdominal tenderness.   Musculoskeletal:      Cervical back: Neck supple.   Skin:     Findings: No rash.   Neurological:      Mental Status: He is alert and oriented to person, place, and time.   Psychiatric:         Mood and Affect: Mood normal.       Significant Labs: All pertinent labs within the past 24 hours have been reviewed.  BMP:   Recent Labs   Lab 10/07/22  0406   *      K 3.7   CL 99   CO2 35*   BUN 26*   CREATININE 0.7   CALCIUM 8.6*     CBC:   Recent Labs   Lab 10/06/22  0620 10/07/22  0406   WBC 2.70* 3.14*   HGB 11.9* 11.1*   HCT 36.8* 34.7*   * 149*     CMP:   Recent Labs   Lab 10/06/22  0620 10/07/22  0406    138   K 3.2* 3.7    99   CO2 33* 35*   * 237*   BUN 22 26*   CREATININE 0.7 0.7   CALCIUM 8.6* 8.6*   ANIONGAP 7* 4*       Significant Imaging: I have reviewed all pertinent imaging results/findings within the past 24 hours.

## 2022-10-07 NOTE — ASSESSMENT & PLAN NOTE
Severe cellulitis with systemic signs and symptoms of infection. Possible port A cath infection?  CTA head and neck not significant   S/p incision and drainage and port removal and improved significantly     PLAN   Cefepime   Stopped mycamine   Follow final culture . Serratia marcescens growing , Follow surgeon .  Penrose drain in situ   DC tomorrow with Cipro

## 2022-10-07 NOTE — RESPIRATORY THERAPY
10/07/22 0707   Patient Assessment/Suction   Level of Consciousness (AVPU) alert   Respiratory Effort Normal;Unlabored   Expansion/Accessory Muscles/Retractions no use of accessory muscles   All Lung Fields Breath Sounds coarse;diminished   Cough Frequency no cough   PRE-TX-O2   O2 Device (Oxygen Therapy) room air   SpO2 98 %   Pulse Oximetry Type Intermittent   $ Pulse Oximetry - Multiple Charge Pulse Oximetry - Multiple   Pulse 75   Resp 16   Aerosol Therapy   $ Aerosol Therapy Charges Aerosol Treatment   Daily Review of Necessity (SVN) completed   Respiratory Treatment Status (SVN) given   Treatment Route (SVN) mask;mouthpiece   Patient Position (SVN) HOB elevated   Post Treatment Assessment (SVN) breath sounds unchanged   Signs of Intolerance (SVN) none   Education   $ Education Bronchodilator;DME Nebulizer;15 min   Respiratory Evaluation   $ Care Plan Tech Time 15 min

## 2022-10-07 NOTE — ASSESSMENT & PLAN NOTE
Patient with history of non-Hodgkin's lymphoma diagnosed in 2012 status post treatment at [facility] and remission.  He has been on maintenance therapy with rituximab every 8 weeks with Oncology for several years now.  Last Rituxan dose 9/15/22    PLAN   GCSF  Stopped   DC as per oncology .

## 2022-10-07 NOTE — PROGRESS NOTES
[facility]  Progress Note    Patient Name: Sivakumar Thacker  MRN: [id number]  Patient Class: IP- Inpatient   Admission Date: 9/30/2022  Length of Stay: 6 days  Attending Physician: Puma Andino MD  Primary Care Provider: Barry Mcmahon MD        Subjective:     Principal Problem:Cellulitis        HPI:  Sivakumar Thacker is a 67 y.o. White male   With PMH of non-Hodgkin's lymphoma,  who presents with pain and swelling of left arm head and shoulder.  He presents emergency room with complaint of pain in the armpit .  He admits to fevers and chills recorded temperature 101° but he was afebrile in the emergency room.  He recently had a repeat PET scan which is negative by history for recurrence.  He receives his chemo which includes Rituxan and IVIG his last dose September 15th.  He has a chronic lung infection which she has been followed by Pulmonary and has been compliant with daily medication.  His cough is improving as well as sore throat.  He denies chest pain or shortness of breath no nausea vomiting no abdominal pain or dysuric symptoms.  He has a right clavicle access point which does not show any signs of infection.    Plan to admit continue IV antibiotics obtain blood cultures in consult Hematology.      Overview/Hospital Course:  Assumed care  Seen in am before Spreading cellulitis . No sing of fascitis. Redness tender to touch . Cannot open axilla .  Off bipap. D/w patient and family members    10/4  Seen earlier   Wound dressing clean . BP stable . Line out . BC neg. WBC improved   No pus at surgery . Wound culture growing gram neg    10/5  Cellulitis largely resolved at exam  Drain and dressing to be removed possible today by surgeon  The WBC low but granulocytes  % normal.  Dr. Ibarra ordered GCSF again for today  Lung exam alert clear.  Glucose slightly higher side    10/6  Better , less redness  , no fever , no SOB . Getting GCSF today     10/7  Cellulitis almost gone.  Ordered central line removal       Interval History:     Review of Systems  Objective:     Vital Signs (Most Recent):  Temp: 98.7 °F (37.1 °C) (10/07/22 1235)  Pulse: 84 (10/07/22 1235)  Resp: 18 (10/07/22 1235)  BP: (!) 153/93 (10/07/22 1235)  SpO2: 95 % (10/07/22 1235)   Vital Signs (24h Range):  Temp:  [97.4 °F (36.3 °C)-98.7 °F (37.1 °C)] 98.7 °F (37.1 °C)  Pulse:  [74-87] 84  Resp:  [16-18] 18  SpO2:  [94 %-100 %] 95 %  BP: (151-165)/(85-93) 153/93     Weight: 95.8 kg (211 lb 3.2 oz)  Body mass index is 32.11 kg/m².  No intake or output data in the 24 hours ending 10/07/22 1503   Physical Exam  Constitutional:       General: He is not in acute distress.     Appearance: He is well-developed.   HENT:      Head: Normocephalic.   Eyes:      Pupils: Pupils are equal, round, and reactive to light.   Cardiovascular:      Rate and Rhythm: Normal rate and regular rhythm.      Pulses: Normal pulses.   Pulmonary:      Effort: No respiratory distress.   Abdominal:      General: Abdomen is flat. There is no distension.      Tenderness: There is no abdominal tenderness.   Musculoskeletal:      Cervical back: Neck supple.   Skin:     Findings: No rash.   Neurological:      Mental Status: He is alert and oriented to person, place, and time.   Psychiatric:         Mood and Affect: Mood normal.       Significant Labs: All pertinent labs within the past 24 hours have been reviewed.  BMP:   Recent Labs   Lab 10/07/22  0406   *      K 3.7   CL 99   CO2 35*   BUN 26*   CREATININE 0.7   CALCIUM 8.6*     CBC:   Recent Labs   Lab 10/06/22  0620 10/07/22  0406   WBC 2.70* 3.14*   HGB 11.9* 11.1*   HCT 36.8* 34.7*   * 149*     CMP:   Recent Labs   Lab 10/06/22  0620 10/07/22  0406    138   K 3.2* 3.7    99   CO2 33* 35*   * 237*   BUN 22 26*   CREATININE 0.7 0.7   CALCIUM 8.6* 8.6*   ANIONGAP 7* 4*       Significant Imaging: I have reviewed all pertinent imaging results/findings within the past 24  hours.      Assessment/Plan:      * Cellulitis Left Axilla  Severe cellulitis with systemic signs and symptoms of infection. Possible port A cath infection?  CTA head and neck not significant   S/p incision and drainage and port removal and improved significantly     PLAN   Cefepime   Stopped mycamine   Follow final culture . Serratia marcescens growing , Follow surgeon .  Penrose drain in situ   DC tomorrow with Cipro          Sepsis  As above   Follow final  culture      Pancytopenia  Due to severe infection        Chronic obstructive pulmonary disease, unspecified COPD type  Continue home regimen   prednisone was held  given current infection, no hypotension   Resume breo   duoneb prn        Type 2 diabetes mellitus without complication, without long-term current use of insulin    Metformin   SSI high dose     Skin lesion  Most likely spreading cellulitis . Possible port infection and port removed and now cellulitis resolving    skin biopsy with no malignancy   Follow tissue biopsy  culture         Essential hypertension  Continue amlodipine  Resumed  Losartan  Resume breo     Hypogammaglobulinemia  Receives IVIG infusions with Oncology  Last IVIg infusion 9/16  Due on next week  Friday .outpatient Rx      Follicular lymphoma grade IIIa  Patient with history of non-Hodgkin's lymphoma diagnosed in 2012 status post treatment at [facility] and remission.  He has been on maintenance therapy with rituximab every 8 weeks with Oncology for several years now.  Last Rituxan dose 9/15/22    PLAN   GCSF  Stopped   DC as per oncology .                VTE Risk Mitigation (From admission, onward)         Ordered     enoxaparin injection 40 mg  Daily         10/01/22 0046     IP VTE HIGH RISK PATIENT  Once         10/01/22 0046     Place sequential compression device  Until discontinued         10/01/22 0046                Discharge Planning   GREGORY: 10/8/2022     Code Status: Full Code   Is the patient medically ready for  discharge?:     Reason for patient still in hospital (select all that apply): Laboratory test  Discharge Plan A: Home Health   Discharge Delays: None known at this time              Puma Andino MD  Department of Hospital Medicine   [facility]

## 2022-10-07 NOTE — ASSESSMENT & PLAN NOTE
Patient seems to be getting better from this standpoint.  Review of chart shows wound infection with Serratia, blood cultures negative.  He is on Cefepime and wound care.  Port has been removed.  Continue current care approach.

## 2022-10-07 NOTE — ASSESSMENT & PLAN NOTE
Receives IVIG infusions with Oncology  Last IVIg infusion 9/16  Due on next week  Friday .outpatient Rx

## 2022-10-07 NOTE — ASSESSMENT & PLAN NOTE
Patient continues on cefepime and is doing ok with this.  VSS are stable and he has been afebrile.

## 2022-10-08 VITALS
WEIGHT: 211.19 LBS | TEMPERATURE: 99 F | HEART RATE: 89 BPM | DIASTOLIC BLOOD PRESSURE: 81 MMHG | OXYGEN SATURATION: 96 % | SYSTOLIC BLOOD PRESSURE: 137 MMHG | RESPIRATION RATE: 16 BRPM | HEIGHT: 68 IN | BODY MASS INDEX: 32.01 KG/M2

## 2022-10-08 LAB
ANION GAP SERPL CALC-SCNC: 5 MMOL/L (ref 8–16)
ANISOCYTOSIS BLD QL SMEAR: SLIGHT
BASOPHILS NFR BLD: 1 % (ref 0–1.9)
BUN SERPL-MCNC: 26 MG/DL (ref 8–23)
CALCIUM SERPL-MCNC: 8.9 MG/DL (ref 8.7–10.5)
CHLORIDE SERPL-SCNC: 99 MMOL/L (ref 95–110)
CO2 SERPL-SCNC: 33 MMOL/L (ref 23–29)
CREAT SERPL-MCNC: 0.7 MG/DL (ref 0.5–1.4)
DIFFERENTIAL METHOD: ABNORMAL
EOSINOPHIL NFR BLD: 34 % (ref 0–8)
ERYTHROCYTE [DISTWIDTH] IN BLOOD BY AUTOMATED COUNT: 13 % (ref 11.5–14.5)
EST. GFR  (NO RACE VARIABLE): >60 ML/MIN/1.73 M^2
GLUCOSE SERPL-MCNC: 229 MG/DL (ref 70–110)
GLUCOSE SERPL-MCNC: 236 MG/DL (ref 70–110)
HCT VFR BLD AUTO: 34.3 % (ref 40–54)
HGB BLD-MCNC: 11 G/DL (ref 14–18)
IMM GRANULOCYTES # BLD AUTO: ABNORMAL K/UL (ref 0–0.04)
IMM GRANULOCYTES NFR BLD AUTO: ABNORMAL % (ref 0–0.5)
LYMPHOCYTES NFR BLD: 37 % (ref 18–48)
MCH RBC QN AUTO: 28.9 PG (ref 27–31)
MCHC RBC AUTO-ENTMCNC: 32.1 G/DL (ref 32–36)
MCV RBC AUTO: 90 FL (ref 82–98)
METAMYELOCYTES NFR BLD MANUAL: 1 %
MONOCYTES NFR BLD: 3 % (ref 4–15)
NEUTROPHILS NFR BLD: 20 % (ref 38–73)
NEUTS BAND NFR BLD MANUAL: 4 %
NRBC BLD-RTO: 0 /100 WBC
PLATELET # BLD AUTO: 198 K/UL (ref 150–450)
PLATELET BLD QL SMEAR: ABNORMAL
PMV BLD AUTO: 10.5 FL (ref 9.2–12.9)
POIKILOCYTOSIS BLD QL SMEAR: SLIGHT
POLYCHROMASIA BLD QL SMEAR: ABNORMAL
POTASSIUM SERPL-SCNC: 4.1 MMOL/L (ref 3.5–5.1)
RBC # BLD AUTO: 3.81 M/UL (ref 4.6–6.2)
SODIUM SERPL-SCNC: 137 MMOL/L (ref 136–145)
WBC # BLD AUTO: 3.19 K/UL (ref 3.9–12.7)

## 2022-10-08 PROCEDURE — 99900031 HC PATIENT EDUCATION (STAT)

## 2022-10-08 PROCEDURE — 25000003 PHARM REV CODE 250: Performed by: SURGERY

## 2022-10-08 PROCEDURE — 25000003 PHARM REV CODE 250: Performed by: INTERNAL MEDICINE

## 2022-10-08 PROCEDURE — 80048 BASIC METABOLIC PNL TOTAL CA: CPT | Performed by: SURGERY

## 2022-10-08 PROCEDURE — 25000242 PHARM REV CODE 250 ALT 637 W/ HCPCS: Performed by: INTERNAL MEDICINE

## 2022-10-08 PROCEDURE — 85027 COMPLETE CBC AUTOMATED: CPT | Performed by: SURGERY

## 2022-10-08 PROCEDURE — 99900035 HC TECH TIME PER 15 MIN (STAT)

## 2022-10-08 PROCEDURE — 99232 SBSQ HOSP IP/OBS MODERATE 35: CPT | Mod: ,,, | Performed by: INTERNAL MEDICINE

## 2022-10-08 PROCEDURE — 63600175 PHARM REV CODE 636 W HCPCS: Performed by: INTERNAL MEDICINE

## 2022-10-08 PROCEDURE — 99232 PR SUBSEQUENT HOSPITAL CARE,LEVL II: ICD-10-PCS | Mod: ,,, | Performed by: INTERNAL MEDICINE

## 2022-10-08 PROCEDURE — 94761 N-INVAS EAR/PLS OXIMETRY MLT: CPT

## 2022-10-08 PROCEDURE — 85007 BL SMEAR W/DIFF WBC COUNT: CPT | Performed by: SURGERY

## 2022-10-08 PROCEDURE — 94640 AIRWAY INHALATION TREATMENT: CPT

## 2022-10-08 RX ORDER — NAPROXEN 500 MG/1
500 TABLET ORAL 2 TIMES DAILY WITH MEALS
Qty: 14 TABLET | Refills: 3 | Status: ON HOLD | OUTPATIENT
Start: 2022-10-08 | End: 2022-11-15 | Stop reason: HOSPADM

## 2022-10-08 RX ORDER — LOSARTAN POTASSIUM 100 MG/1
50 TABLET ORAL DAILY
Qty: 90 TABLET | Refills: 3 | Status: ON HOLD | OUTPATIENT
Start: 2022-10-08 | End: 2023-01-12 | Stop reason: HOSPADM

## 2022-10-08 RX ORDER — AMLODIPINE BESYLATE 5 MG/1
5 TABLET ORAL DAILY
Qty: 90 TABLET | Refills: 0 | Status: SHIPPED | OUTPATIENT
Start: 2022-10-09 | End: 2023-01-06

## 2022-10-08 RX ORDER — CIPROFLOXACIN 500 MG/1
500 TABLET ORAL EVERY 12 HOURS
Qty: 28 TABLET | Refills: 0 | Status: SHIPPED | OUTPATIENT
Start: 2022-10-08 | End: 2022-11-11

## 2022-10-08 RX ORDER — OXYCODONE AND ACETAMINOPHEN 5; 325 MG/1; MG/1
1 TABLET ORAL EVERY 6 HOURS PRN
Qty: 14 TABLET | Refills: 0 | Status: SHIPPED | OUTPATIENT
Start: 2022-10-08 | End: 2022-11-11

## 2022-10-08 RX ADMIN — GABAPENTIN 300 MG: 300 CAPSULE ORAL at 09:10

## 2022-10-08 RX ADMIN — FLUTICASONE PROPIONATE 50 MCG: 50 SPRAY, METERED NASAL at 09:10

## 2022-10-08 RX ADMIN — AMLODIPINE BESYLATE 5 MG: 5 TABLET ORAL at 09:10

## 2022-10-08 RX ADMIN — CEFEPIME HYDROCHLORIDE 2 G: 2 INJECTION, SOLUTION INTRAVENOUS at 09:10

## 2022-10-08 RX ADMIN — ATORVASTATIN CALCIUM 20 MG: 20 TABLET, FILM COATED ORAL at 09:10

## 2022-10-08 RX ADMIN — ARFORMOTEROL TARTRATE 15 MCG: 15 SOLUTION RESPIRATORY (INHALATION) at 07:10

## 2022-10-08 RX ADMIN — BUDESONIDE 0.5 MG: 0.5 INHALANT RESPIRATORY (INHALATION) at 07:10

## 2022-10-08 RX ADMIN — MONTELUKAST 10 MG: 10 TABLET, FILM COATED ORAL at 09:10

## 2022-10-08 RX ADMIN — ASPIRIN 81 MG: 81 TABLET, COATED ORAL at 09:10

## 2022-10-08 RX ADMIN — PANTOPRAZOLE SODIUM 40 MG: 40 TABLET, DELAYED RELEASE ORAL at 05:10

## 2022-10-08 RX ADMIN — CEFEPIME HYDROCHLORIDE 2 G: 2 INJECTION, SOLUTION INTRAVENOUS at 01:10

## 2022-10-08 RX ADMIN — OXYCODONE HYDROCHLORIDE AND ACETAMINOPHEN 1 TABLET: 10; 325 TABLET ORAL at 12:10

## 2022-10-08 RX ADMIN — METFORMIN HYDROCHLORIDE 500 MG: 500 TABLET, FILM COATED ORAL at 07:10

## 2022-10-08 RX ADMIN — LOSARTAN POTASSIUM 12.5 MG: 25 TABLET, FILM COATED ORAL at 09:10

## 2022-10-08 RX ADMIN — CELECOXIB 100 MG: 100 CAPSULE ORAL at 09:10

## 2022-10-08 RX ADMIN — INSULIN ASPART 2 UNITS: 100 INJECTION, SOLUTION INTRAVENOUS; SUBCUTANEOUS at 10:10

## 2022-10-08 NOTE — CARE UPDATE
10/08/22 0723   Patient Assessment/Suction   Level of Consciousness (AVPU) alert   Respiratory Effort Unlabored;Normal   Expansion/Accessory Muscles/Retractions no use of accessory muscles;no retractions;expansion symmetric   All Lung Fields Breath Sounds diminished   Rhythm/Pattern, Respiratory unlabored;pattern regular;depth regular;chest wiggle adequate;no shortness of breath reported   Cough Frequency no cough   PRE-TX-O2   O2 Device (Oxygen Therapy) room air   SpO2 96 %   Pulse Oximetry Type Intermittent   $ Pulse Oximetry - Multiple Charge Pulse Oximetry - Multiple   Pulse 86   Resp 18   Aerosol Therapy   $ Aerosol Therapy Charges Aerosol Treatment   Daily Review of Necessity (SVN) completed   Respiratory Treatment Status (SVN) given   Treatment Route (SVN) mouthpiece;oxygen   Patient Position (SVN) HOB elevated   Post Treatment Assessment (SVN) increased aeration   Signs of Intolerance (SVN) none   Breath Sounds Post-Respiratory Treatment   Throughout All Fields Post-Treatment All Fields   Throughout All Fields Post-Treatment aeration increased   Post-treatment Heart Rate (beats/min) 85   Education   $ Education Bronchodilator;15 min   Respiratory Evaluation   $ Care Plan Tech Time 15 min

## 2022-10-08 NOTE — ASSESSMENT & PLAN NOTE
Patient seems to be getting better from this standpoint.  He will go home with home health for dressing changes and will be on Cipro orally.  Will follow up in the office in the next 1 to 2 weeks.

## 2022-10-08 NOTE — PROGRESS NOTES
[facility]  Hematology/Oncology  Progress Note    Patient Name: Sivakumar Thacker  Admission Date: 9/30/2022  Hospital Length of Stay: 7 days  Code Status: Full Code     Subjective:     HPI:  Patient doing ok today.  No new complaints.        Interval History:       Oncology Treatment Plan:   OP RITUXIMAB Q8W    Medications:  Continuous Infusions:  Scheduled Meds:   amLODIPine  5 mg Oral Daily    budesonide  0.5 mg Nebulization Q12H    And    arformoteroL  15 mcg Nebulization BID    aspirin  81 mg Oral Daily    atorvastatin  20 mg Oral Daily    ceFEPime (MAXIPIME) IVPB  2 g Intravenous Q8H    celecoxib  100 mg Oral BID    enoxaparin  40 mg Subcutaneous Daily    fluticasone propionate  1 spray Each Nostril BID    gabapentin  300 mg Oral TID    losartan  12.5 mg Oral Daily    metFORMIN  500 mg Oral BID WM    montelukast  10 mg Oral Daily    pantoprazole  40 mg Oral Daily    potassium chloride  10 mEq Oral Once     PRN Meds:acetaminophen, albuterol-ipratropium, benzonatate, calcium gluconate IVPB, calcium gluconate IVPB, calcium gluconate IVPB, dextrose 10%, dextrose 10%, dextrose 10%, diazePAM, glucagon (human recombinant), glucagon (human recombinant), glucose, glucose, glucose, glucose, insulin aspart U-100, insulin aspart U-100, magnesium oxide, magnesium oxide, magnesium sulfate IVPB, magnesium sulfate IVPB, melatonin, morphine, naloxone, ondansetron, oxyCODONE-acetaminophen, polyethylene glycol, potassium bicarbonate, potassium bicarbonate, potassium bicarbonate, potassium chloride in water **AND** potassium chloride in water **AND** potassium chloride in water, potassium, sodium phosphates, potassium, sodium phosphates, potassium, sodium phosphates, senna-docusate 8.6-50 mg, simethicone, sodium phosphate IVPB, sodium phosphate IVPB, sodium phosphate IVPB, tiZANidine     Review of Systems   Constitutional:  Negative for fever and unexpected weight change.   HENT:  Negative for  nosebleeds.    Respiratory:  Negative for chest tightness and shortness of breath.    Cardiovascular:  Negative for chest pain.   Gastrointestinal:  Negative for abdominal pain and blood in stool.   Genitourinary:  Negative for hematuria.   Skin:  Negative for rash.   Hematological:  Does not bruise/bleed easily.   Objective:     Vital Signs (Most Recent):  Temp: 98.6 °F (37 °C) (10/08/22 0840)  Pulse: 89 (10/08/22 0840)  Resp: 18 (10/08/22 0840)  BP: 137/81 (10/08/22 0840)  SpO2: 96 % (10/08/22 0840)   Vital Signs (24h Range):  Temp:  [98.4 °F (36.9 °C)-98.8 °F (37.1 °C)] 98.6 °F (37 °C)  Pulse:  [74-89] 89  Resp:  [8-18] 18  SpO2:  [94 %-96 %] 96 %  BP: (126-153)/(81-93) 137/81     Weight: 95.8 kg (211 lb 3.2 oz)  Body mass index is 32.11 kg/m².  Body surface area is 2.14 meters squared.      Intake/Output Summary (Last 24 hours) at 10/8/2022 1207  Last data filed at 10/7/2022 1659  Gross per 24 hour   Intake 240 ml   Output 10 ml   Net 230 ml       Physical Exam  Vitals reviewed.   Constitutional:       Appearance: He is well-developed.   HENT:      Head: Normocephalic and atraumatic.      Right Ear: External ear normal.      Left Ear: External ear normal.   Eyes:      General: No scleral icterus.     Conjunctiva/sclera: Conjunctivae normal.      Pupils: Pupils are equal, round, and reactive to light.   Neck:      Comments: Left chest wall lesion dressed currently.  Mild TTP over area.    Cardiovascular:      Rate and Rhythm: Normal rate and regular rhythm.      Heart sounds: Normal heart sounds. No murmur heard.    No friction rub. No gallop.   Pulmonary:      Effort: Pulmonary effort is normal. No respiratory distress.      Breath sounds: Normal breath sounds. No rales.   Chest:      Chest wall: No tenderness.   Abdominal:      General: Bowel sounds are normal. There is no distension.      Palpations: Abdomen is soft. There is no mass.      Tenderness: There is no abdominal tenderness. There is no guarding or  rebound.   Lymphadenopathy:      Head:      Right side of head: No tonsillar adenopathy.      Left side of head: No tonsillar adenopathy.      Upper Body:      Right upper body: No supraclavicular adenopathy.      Left upper body: No supraclavicular adenopathy.   Neurological:      Mental Status: He is alert and oriented to person, place, and time.   Psychiatric:         Behavior: Behavior normal.         Thought Content: Thought content normal.         Judgment: Judgment normal.       Significant Labs:   CBC:   Recent Labs   Lab 10/07/22  0406 10/08/22  0419   WBC 3.14* 3.19*   HGB 11.1* 11.0*   HCT 34.7* 34.3*   * 198      and CMP:   Recent Labs   Lab 10/07/22  0406 10/08/22  0418    137   K 3.7 4.1   CL 99 99   CO2 35* 33*   * 236*   BUN 26* 26*   CREATININE 0.7 0.7   CALCIUM 8.6* 8.9   ANIONGAP 4* 5*         Diagnostic Results:  None    Assessment/Plan:     * Cellulitis Left Axilla  Patient seems to be getting better from this standpoint.  He will go home with home health for dressing changes and will be on Cipro orally.  Will follow up in the office in the next 1 to 2 weeks.     Sepsis  Patient doing well. For discharge today.     Pancytopenia  Patient has improved from this standpoint.  Counts stable today.     Chronic obstructive pulmonary disease, unspecified COPD type  He has no current sob and feeling ok from this standpoint.     Hypogammaglobulinemia  Patient on monthly immunoglobulin therapy.  He is due next Friday and I recommended to him that he proceed with this therapy.      Follicular lymphoma grade IIIa  Patient has been on Rituxan maintenance which is now on hold per Dr. Ibarra.  Will likely have full staging in the next 2 months, once this wound his better healed.          Thank you for your consult. I will follow-up with patient. Please contact us if you have any additional questions.     Garry Rucker MD  Hematology/Oncology  [facility]

## 2022-10-08 NOTE — RESPIRATORY THERAPY
10/07/22 2009   Patient Assessment/Suction   Level of Consciousness (AVPU) alert   Respiratory Effort Normal;Unlabored   Expansion/Accessory Muscles/Retractions no use of accessory muscles   All Lung Fields Breath Sounds coarse   Cough Frequency no cough   PRE-TX-O2   O2 Device (Oxygen Therapy) room air   Pulse Oximetry Type Intermittent   $ Pulse Oximetry - Multiple Charge Pulse Oximetry - Multiple   Aerosol Therapy   $ Aerosol Therapy Charges Aerosol Treatment   Daily Review of Necessity (SVN) completed   Respiratory Treatment Status (SVN) given   Treatment Route (SVN) oxygen;mouthpiece   Patient Position (SVN) HOB elevated   Post Treatment Assessment (SVN) breath sounds unchanged   Signs of Intolerance (SVN) none   Education   $ Education DME Nebulizer;15 min   Respiratory Evaluation   $ Care Plan Tech Time 15 min

## 2022-10-08 NOTE — DISCHARGE SUMMARY
[facility] Medicine  Discharge Summary      Patient Name: Sivakumar Thacker  MRN: [id number]  Patient Class: IP- Inpatient  Admission Date: 9/30/2022  Hospital Length of Stay: 7 days  Discharge Date and Time:  10/08/2022 3:05 PM  Attending Physician: Puma Andino MD   Discharging Provider: Puma Andino MD  Primary Care Provider: Barry Mcmahon MD      HPI:   Sivakumar Thacker is a 67 y.o. White male   With PMH of non-Hodgkin's lymphoma,  who presents with pain and swelling of left arm head and shoulder.  He presents emergency room with complaint of pain in the armpit .  He admits to fevers and chills recorded temperature 101° but he was afebrile in the emergency room.  He recently had a repeat PET scan which is negative by history for recurrence.  He receives his chemo which includes Rituxan and IVIG his last dose September 15th.  He has a chronic lung infection which she has been followed by Pulmonary and has been compliant with daily medication.  His cough is improving as well as sore throat.  He denies chest pain or shortness of breath no nausea vomiting no abdominal pain or dysuric symptoms.  He has a right clavicle access point which does not show any signs of infection.    Plan to admit continue IV antibiotics obtain blood cultures in consult Hematology.      Procedure(s) (LRB):  REMOVAL, CATHETER, CENTRAL VENOUS, TUNNELED, WITH PORT (Right)  INCISION AND DRAINAGE, ABSCESS (Left)      Hospital Course:    67  M With PMH of non-Hodgkin's lymphoma,  who presents with pain and swelling of left arm head and shoulder with spreading cellulitis.  Admitted to hospital and started IV antibiotic but patient was moved to ICU for possible impending sepsis and surgical consultation was done..  CT scan of the head and neck and upper extremity venin was done and there was no pulmonary embolism  and no clot and no abscess cavity identified.  Later patient had pancytopenia from previous existing lymphoma  and G-CSF given and improved.  Later he was brought to IR and  an incision and drainage and placement of the drain was done.  No pus encountered.  Later cellulitis decreased and leukopenia improved and culture came back with SERRATIA MARCESCENS and antibiotic adjusted and discharged with Cipro for 2 weeks as per ID recommendation.  He is to follow-up with hemato oncologist and also surgeon as outpatient.  His steroid was discontinued and  his blood pressure medications were adjusted. His blood culture is negative .       Goals of Care Treatment Preferences:  Code Status: Full Code      Consults:   Consults (From admission, onward)        Status Ordering Provider     Inpatient consult to Social Work/Case Management  Once        Provider:  (Not yet assigned)    Acknowledged CHAVA MCARTHUR     Inpatient consult to Pulmonology  Once        Provider:  Gloria Pratt MD    Completed MOUNA KIRBY     Inpatient consult to General Surgery  Once        Provider:  Santosh Peoples III, MD    Completed MOUNA KIRBY     Inpatient consult to Infectious Diseases  Once        Provider:  Bertha Quevedo MD    Completed MOUNA KIRBY     Inpatient consult to Registered Dietitian/Nutritionist  Once        Provider:  (Not yet assigned)    Completed TAMIKO HELM     Inpatient consult to Hematology Oncology  Once        Provider:  Jefferson Ibarra MD    Acknowledged SANTOSH PEOPLES III     Inpatient consult to Hospitalist  Once        Provider:  Tamiko Helm MD    Acknowledged SANTOSH PEOPLES III          No new Assessment & Plan notes have been filed under this hospital service since the last note was generated.  Service: Hospital Medicine    Final Active Diagnoses:    Diagnosis Date Noted POA    PRINCIPAL PROBLEM:  Cellulitis Left Axilla [L03.90] 09/30/2022 Yes    Sepsis [A41.9] 10/03/2022 Yes    Pancytopenia [D61.818] 10/02/2022 Unknown    Chronic obstructive pulmonary disease,  unspecified COPD type [J44.9] 03/28/2022 Yes    Type 2 diabetes mellitus without complication, without long-term current use of insulin [E11.9] 06/23/2021 Yes    Skin lesion [L98.9] 06/23/2021 Yes    Essential hypertension [I10] 12/30/2019 Yes    Hypogammaglobulinemia [D80.1] 12/13/2019 Yes    Follicular lymphoma grade IIIa [C82.30] 12/04/2018 Yes      Problems Resolved During this Admission:    Diagnosis Date Noted Date Resolved POA    Petechiae [R23.3] 10/03/2022 10/06/2022 Yes       Discharged Condition: good    [place] [facility] [place]   Follow-up Information     Barry Mcmahon MD Follow up.    Specialty: Family Medicine  Contact information:  [address]  [place] [address]  [phone]             Heaven Washington MD Follow up in 2 week(s).    Specialty: Infectious Diseases  Contact information:  [address]  [address]  [place] [address]  [phone]             Jefferson Ibarra MD Follow up in 1 month(s).    Specialties: Hematology, Hematology and Oncology, Oncology  Contact information:  [address]  [address]  [place] [address]  [phone]             Franco Venegas III, MD Follow up in 1 week(s).    Specialties: General Surgery, Surgery  Contact information:  [address] [address]  [address]  [place] [address]  [phone]                       Patient Instructions:      Diet diabetic     Change dressing (specify)   Order Comments: Dressing change: dressing change every three days . And follow up with surgeon in 1 week     Activity as tolerated       Significant Diagnostic Studies: Labs:   CMP   Recent Labs   Lab 10/07/22  0406 10/08/22  0418    137   K 3.7 4.1   CL 99 99   CO2 35* 33*   * 236*   BUN 26* 26*   CREATININE 0.7 0.7   CALCIUM 8.6* 8.9   ANIONGAP 4* 5*    and CBC   Recent Labs   Lab 10/07/22  0406 10/08/22  0419   WBC 3.14* 3.19*   HGB 11.1* 11.0*   HCT 34.7* 34.3*   * 198       Pending Diagnostic Studies:     Procedure  Component Value Units Date/Time    Flow Cytometry Analysis (Body Fluid) Other (Specify) (Skin left axilla) [[id number]] Collected: 10/03/22 1224    Order Status: Sent Lab Status: No result     Specimen: Body Fluid          Medications:  Reconciled Home Medications:      Medication List      START taking these medications    ciprofloxacin HCl 500 MG tablet  Commonly known as: CIPRO  Take 1 tablet (500 mg total) by mouth every 12 (twelve) hours.     oxyCODONE-acetaminophen 5-325 mg per tablet  Commonly known as: PERCOCET  Take 1 tablet by mouth every 6 (six) hours as needed for Pain (if severe pain).        CHANGE how you take these medications    amLODIPine 5 MG tablet  Commonly known as: NORVASC  Take 1 tablet (5 mg total) by mouth once daily.  Start taking on: October 9, 2022  What changed:   · medication strength  · how much to take     losartan 100 MG tablet  Commonly known as: COZAAR  Take 0.5 tablets (50 mg total) by mouth once daily.  What changed: how much to take     metFORMIN 500 MG tablet  Commonly known as: GLUCOPHAGE  metformin 500 mg tablet  Take 2 tablets twice a day by oral route with meals for 90 days.  What changed:   · how much to take  · how to take this  · when to take this     naproxen 500 MG tablet  Commonly known as: NAPROSYN  Take 1 tablet (500 mg total) by mouth 2 (two) times daily with meals.  What changed:   · when to take this  · reasons to take this        CONTINUE taking these medications    albuterol-ipratropium 2.5 mg-0.5 mg/3 mL nebulizer solution  Commonly known as: DUO-NEB  Take 3 mLs by nebulization every 6 (six) hours as needed for Wheezing or Shortness of Breath. Rescue     aspirin 81 MG EC tablet  Commonly known as: ECOTRIN  Take 81 mg by mouth once daily.     atorvastatin 20 MG tablet  Commonly known as: LIPITOR  Take 1 tablet (20 mg total) by mouth once daily.     benzonatate 200 MG capsule  Commonly known as: TESSALON  Take 1 capsule (200 mg total) by mouth 3 (three) times  daily as needed for Cough.     * blood sugar diagnostic Strp  Commonly known as: FREESTYLE LITE STRIPS  Check blood sugar 2 (two) times daily as needed.     * blood sugar diagnostic Strp     * FREESTYLE LITE STRIPS MISC  FreeStyle Lite Strips     fluticasone propionate 50 mcg/actuation nasal spray  Commonly known as: FLONASE  1 spray (50 mcg total) by Each Nostril route 2 (two) times daily.     gabapentin 300 MG capsule  Commonly known as: NEURONTIN  Take 1 capsule (300 mg total) by mouth 2 (two) times daily.     montelukast 10 mg tablet  Commonly known as: SINGULAIR  Take 1 tablet (10 mg total) by mouth nightly as needed.     mucus clearing device  Commonly known as: ACAPELLA, FLUTTER  1 Device by Misc.(Non-Drug; Combo Route) route 2 (two) times daily.     MULTIVITAMIN ORAL  Take 1 tablet by mouth once daily.     omeprazole 40 MG capsule  Commonly known as: PRILOSEC  Take 1 capsule (40 mg total) by mouth once daily.     promethazine-dextromethorphan 6.25-15 mg/5 mL Syrp  Commonly known as: PROMETHAZINE-DM  Take 5 mLs by mouth every 6 (six) hours as needed (cough).     TRELEGY ELLIPTA 200-62.5-25 mcg inhaler  Generic drug: fluticasone-umeclidin-vilanter  Inhale 1 puff into the lungs once daily.         * This list has 3 medication(s) that are the same as other medications prescribed for you. Read the directions carefully, and ask your doctor or other care provider to review them with you.            STOP taking these medications    amoxicillin-clavulanate 875-125mg 875-125 mg per tablet  Commonly known as: AUGMENTIN     predniSONE 20 MG tablet  Commonly known as: DELTASONE     sulfamethoxazole-trimethoprim 800-160mg 800-160 mg Tab  Commonly known as: BACTRIM DS     tiZANidine 4 MG tablet  Commonly known as: ZANAFLEX            Indwelling Lines/Drains at time of discharge:   Lines/Drains/Airways     Drain  Duration                Open Drain 10/03/22 1227 Left  Penrose 1 inch 5 days              General: Patient  resting comfortably in no acute distress. Appears as stated age. Calm  Eyes: EOM intact. No conjunctivae injection. No scleral icterus.  ENT: Hearing grossly intact. No discharge from ears. No nasal discharge.   CVS: RRR. No LE edema BL.  Lung No accessory muscle use. No acute respiratory distress  Neuro: non focal , Follows commands. Responds appropriately  Time spent on the discharge of patient: 44 minutes         Puma Andino MD  Department of Hospital Medicine  [facility]

## 2022-10-08 NOTE — PLAN OF CARE
10/08/22 1157   Post-Acute Status   Post-Acute Authorization Home Health   Discharge Delays None known at this time   Discharge Plan   Discharge Plan A Home Health;Home with family   Discharge Plan B Home Health;Home with family   Pt [facility]. Fax discharge summary and orders to branch and confirmed receipt.    No other needs indicated at this time.

## 2022-10-08 NOTE — ASSESSMENT & PLAN NOTE
Patient on monthly immunoglobulin therapy.  He is due next Friday and I recommended to him that he proceed with this therapy.

## 2022-10-08 NOTE — PLAN OF CARE
10/08/22 1158   Final Note   Assessment Type Final Discharge Note   Anticipated Discharge Disposition Home   What phone number can be called within the next 1-3 days to see how you are doing after discharge? [phone]   Post-Acute Status   Discharge Delays None known at this time

## 2022-10-08 NOTE — SUBJECTIVE & OBJECTIVE
Interval History:       Oncology Treatment Plan:   OP RITUXIMAB Q8W    Medications:  Continuous Infusions:  Scheduled Meds:   amLODIPine  5 mg Oral Daily    budesonide  0.5 mg Nebulization Q12H    And    arformoteroL  15 mcg Nebulization BID    aspirin  81 mg Oral Daily    atorvastatin  20 mg Oral Daily    ceFEPime (MAXIPIME) IVPB  2 g Intravenous Q8H    celecoxib  100 mg Oral BID    enoxaparin  40 mg Subcutaneous Daily    fluticasone propionate  1 spray Each Nostril BID    gabapentin  300 mg Oral TID    losartan  12.5 mg Oral Daily    metFORMIN  500 mg Oral BID WM    montelukast  10 mg Oral Daily    pantoprazole  40 mg Oral Daily    potassium chloride  10 mEq Oral Once     PRN Meds:acetaminophen, albuterol-ipratropium, benzonatate, calcium gluconate IVPB, calcium gluconate IVPB, calcium gluconate IVPB, dextrose 10%, dextrose 10%, dextrose 10%, diazePAM, glucagon (human recombinant), glucagon (human recombinant), glucose, glucose, glucose, glucose, insulin aspart U-100, insulin aspart U-100, magnesium oxide, magnesium oxide, magnesium sulfate IVPB, magnesium sulfate IVPB, melatonin, morphine, naloxone, ondansetron, oxyCODONE-acetaminophen, polyethylene glycol, potassium bicarbonate, potassium bicarbonate, potassium bicarbonate, potassium chloride in water **AND** potassium chloride in water **AND** potassium chloride in water, potassium, sodium phosphates, potassium, sodium phosphates, potassium, sodium phosphates, senna-docusate 8.6-50 mg, simethicone, sodium phosphate IVPB, sodium phosphate IVPB, sodium phosphate IVPB, tiZANidine     Review of Systems   Constitutional:  Negative for fever and unexpected weight change.   HENT:  Negative for nosebleeds.    Respiratory:  Negative for chest tightness and shortness of breath.    Cardiovascular:  Negative for chest pain.   Gastrointestinal:  Negative for abdominal pain and blood in stool.   Genitourinary:  Negative for hematuria.   Skin:  Negative for rash.    Hematological:  Does not bruise/bleed easily.   Objective:     Vital Signs (Most Recent):  Temp: 98.6 °F (37 °C) (10/08/22 0840)  Pulse: 89 (10/08/22 0840)  Resp: 18 (10/08/22 0840)  BP: 137/81 (10/08/22 0840)  SpO2: 96 % (10/08/22 0840)   Vital Signs (24h Range):  Temp:  [98.4 °F (36.9 °C)-98.8 °F (37.1 °C)] 98.6 °F (37 °C)  Pulse:  [74-89] 89  Resp:  [8-18] 18  SpO2:  [94 %-96 %] 96 %  BP: (126-153)/(81-93) 137/81     Weight: 95.8 kg (211 lb 3.2 oz)  Body mass index is 32.11 kg/m².  Body surface area is 2.14 meters squared.      Intake/Output Summary (Last 24 hours) at 10/8/2022 1207  Last data filed at 10/7/2022 1659  Gross per 24 hour   Intake 240 ml   Output 10 ml   Net 230 ml       Physical Exam  Vitals reviewed.   Constitutional:       Appearance: He is well-developed.   HENT:      Head: Normocephalic and atraumatic.      Right Ear: External ear normal.      Left Ear: External ear normal.   Eyes:      General: No scleral icterus.     Conjunctiva/sclera: Conjunctivae normal.      Pupils: Pupils are equal, round, and reactive to light.   Neck:      Comments: Left chest wall lesion dressed currently.  Mild TTP over area.    Cardiovascular:      Rate and Rhythm: Normal rate and regular rhythm.      Heart sounds: Normal heart sounds. No murmur heard.    No friction rub. No gallop.   Pulmonary:      Effort: Pulmonary effort is normal. No respiratory distress.      Breath sounds: Normal breath sounds. No rales.   Chest:      Chest wall: No tenderness.   Abdominal:      General: Bowel sounds are normal. There is no distension.      Palpations: Abdomen is soft. There is no mass.      Tenderness: There is no abdominal tenderness. There is no guarding or rebound.   Lymphadenopathy:      Head:      Right side of head: No tonsillar adenopathy.      Left side of head: No tonsillar adenopathy.      Upper Body:      Right upper body: No supraclavicular adenopathy.      Left upper body: No supraclavicular adenopathy.    Neurological:      Mental Status: He is alert and oriented to person, place, and time.   Psychiatric:         Behavior: Behavior normal.         Thought Content: Thought content normal.         Judgment: Judgment normal.       Significant Labs:   CBC:   Recent Labs   Lab 10/07/22  0406 10/08/22  0419   WBC 3.14* 3.19*   HGB 11.1* 11.0*   HCT 34.7* 34.3*   * 198      and CMP:   Recent Labs   Lab 10/07/22  0406 10/08/22  0418    137   K 3.7 4.1   CL 99 99   CO2 35* 33*   * 236*   BUN 26* 26*   CREATININE 0.7 0.7   CALCIUM 8.6* 8.9   ANIONGAP 4* 5*         Diagnostic Results:  None

## 2022-10-09 ENCOUNTER — PATIENT MESSAGE (OUTPATIENT)
Dept: FAMILY MEDICINE | Facility: CLINIC | Age: 67
End: 2022-10-09
Payer: MEDICARE

## 2022-10-10 ENCOUNTER — TELEPHONE (OUTPATIENT)
Dept: HEMATOLOGY/ONCOLOGY | Facility: CLINIC | Age: 67
End: 2022-10-10

## 2022-10-10 ENCOUNTER — PATIENT MESSAGE (OUTPATIENT)
Dept: SURGERY | Facility: CLINIC | Age: 67
End: 2022-10-10
Payer: MEDICARE

## 2022-10-10 DIAGNOSIS — D80.1 NONFAMILIAL HYPOGAMMAGLOBULINEMIA: Primary | ICD-10-CM

## 2022-10-10 NOTE — TELEPHONE ENCOUNTER
----- Message from Jefferson Ibarra MD sent at 10/10/2022  8:23 AM CDT -----  Let's see what we can do but I believed we looked at this before      ----- Message -----  From: Heaven Washington MD  Sent: 10/7/2022  12:41 PM CDT  To: Jefferson Ibarra MD    Hi,   Mr. Thacker was recently in hospital with a serious infection of soft tissues of the chest wall, starting in left arm/axilla and spreading across the chest. He had to have an I and D and his port was removed, though it did not look infected under the skin. Unexpectedly, it was Serratia, but this is like because he has been on augmentin daily as a preventative, per Dr. Veliz. If his IVIG dose could be increased a little, so that his trough was well into the normal range(it was 650 on 8/17), then he could stop the augmentin, as that is not a recognized prophylaxis practice and we will just select out for resistant organisms.     Would you mind increasing the IVIG dose a little?  He could get another line in a couple of weeks when his wounds close, but he had a weird set up before. Dr. Venegas can tell you what his options are for that.    Thanks,  Yoli

## 2022-10-11 ENCOUNTER — PATIENT MESSAGE (OUTPATIENT)
Dept: HEMATOLOGY/ONCOLOGY | Facility: CLINIC | Age: 67
End: 2022-10-11

## 2022-10-11 NOTE — PLAN OF CARE
Pt accepted by [facility].       10/10/22 2018   Final Note   Assessment Type Final Discharge Note   Anticipated Discharge Disposition Home-Health   Post-Acute Status   Post-Acute Authorization Home Health   Home Health Status Set-up Complete/Auth obtained   Discharge Delays None known at this time

## 2022-10-12 LAB
LABCORP MISC TEST CODE: NORMAL
LABCORP MISC TEST NAME: NORMAL
LABCORP MISCELLANEOUS TEST: NORMAL

## 2022-10-12 NOTE — PLAN OF CARE
10/12/22 0920   Discharge Reassessment   Assessment Type Discharge Planning Reassessment     Per Ro in [place] admitted to [facility] on Sunday, 10/9/2022.

## 2022-10-13 ENCOUNTER — TELEPHONE (OUTPATIENT)
Dept: FAMILY MEDICINE | Facility: CLINIC | Age: 67
End: 2022-10-13
Payer: MEDICARE

## 2022-10-13 LAB
LABCORP MISC TEST CODE: NORMAL
LABCORP MISC TEST NAME: NORMAL
LABCORP MISCELLANEOUS TEST: NORMAL

## 2022-10-13 NOTE — TELEPHONE ENCOUNTER
----- Message from Teodora Boyle LPN sent at 10/13/2022  9:07 AM CDT -----  Regarding: FW: return call    ----- Message -----  From: Ese Liao  Sent: 10/13/2022   9:04 AM CDT  To: Lisandro Reddy Staff  Subject: return call                                      Type:  Patient Returning Call    Who Called: CHRIS GUTIERREZ [[id number]]    Who Left Message for Patient:Angie    Does the patient know what this is regarding? Reschedule appt    Best Call Back Number:[phone]     Additional Information: first appt available I offered was too far out for the pt on 10/25. Please advise

## 2022-10-14 ENCOUNTER — INFUSION (OUTPATIENT)
Dept: INFUSION THERAPY | Facility: HOSPITAL | Age: 67
End: 2022-10-14
Attending: INTERNAL MEDICINE
Payer: MEDICARE

## 2022-10-14 VITALS
HEART RATE: 74 BPM | RESPIRATION RATE: 18 BRPM | HEIGHT: 68 IN | WEIGHT: 200.38 LBS | TEMPERATURE: 98 F | DIASTOLIC BLOOD PRESSURE: 86 MMHG | BODY MASS INDEX: 30.37 KG/M2 | SYSTOLIC BLOOD PRESSURE: 134 MMHG | OXYGEN SATURATION: 97 %

## 2022-10-14 DIAGNOSIS — C82.30 FOLLICULAR LYMPHOMA GRADE IIIA, UNSPECIFIED BODY REGION: ICD-10-CM

## 2022-10-14 DIAGNOSIS — D80.1 NONFAMILIAL HYPOGAMMAGLOBULINEMIA: ICD-10-CM

## 2022-10-14 DIAGNOSIS — D80.1 HYPOGAMMAGLOBULINEMIA: Primary | ICD-10-CM

## 2022-10-14 PROCEDURE — 96367 TX/PROPH/DG ADDL SEQ IV INF: CPT

## 2022-10-14 PROCEDURE — 25000003 PHARM REV CODE 250: Performed by: INTERNAL MEDICINE

## 2022-10-14 PROCEDURE — 96413 CHEMO IV INFUSION 1 HR: CPT

## 2022-10-14 PROCEDURE — 63600175 PHARM REV CODE 636 W HCPCS: Performed by: INTERNAL MEDICINE

## 2022-10-14 PROCEDURE — 96415 CHEMO IV INFUSION ADDL HR: CPT

## 2022-10-14 PROCEDURE — 63600175 PHARM REV CODE 636 W HCPCS: Mod: JG | Performed by: NURSE PRACTITIONER

## 2022-10-14 RX ORDER — HEPARIN 100 UNIT/ML
500 SYRINGE INTRAVENOUS
Status: CANCELLED | OUTPATIENT
Start: 2022-11-11

## 2022-10-14 RX ORDER — SODIUM CHLORIDE 0.9 % (FLUSH) 0.9 %
10 SYRINGE (ML) INJECTION
Status: DISCONTINUED | OUTPATIENT
Start: 2022-10-14 | End: 2022-10-14 | Stop reason: HOSPADM

## 2022-10-14 RX ORDER — SODIUM CHLORIDE 0.9 % (FLUSH) 0.9 %
10 SYRINGE (ML) INJECTION
Status: CANCELLED | OUTPATIENT
Start: 2022-11-11

## 2022-10-14 RX ADMIN — IMMUNE GLOBULIN (HUMAN) 50 G: 10 INJECTION INTRAVENOUS; SUBCUTANEOUS at 08:10

## 2022-10-14 RX ADMIN — SODIUM CHLORIDE: 0.9 INJECTION, SOLUTION INTRAVENOUS at 08:10

## 2022-10-14 RX ADMIN — DIPHENHYDRAMINE HYDROCHLORIDE 25 MG: 50 INJECTION INTRAMUSCULAR; INTRAVENOUS at 08:10

## 2022-10-14 NOTE — PLAN OF CARE
Problem: Infection  Goal: Absence of Infection Signs and Symptoms  Outcome: Ongoing, Progressing  Intervention: Prevent or Manage Infection  Flowsheets (Taken 10/14/2022 0670)  Infection Management: aseptic technique maintained      PT Evaluation     Today's date: 3/11/2020  Patient name: Gissel Quigley  : 1943  MRN: [id number]  Referring provider: Drea Hilliard 

## 2022-10-17 ENCOUNTER — OFFICE VISIT (OUTPATIENT)
Dept: PULMONOLOGY | Facility: CLINIC | Age: 67
End: 2022-10-17
Payer: MEDICARE

## 2022-10-17 VITALS
HEIGHT: 68 IN | SYSTOLIC BLOOD PRESSURE: 135 MMHG | HEART RATE: 80 BPM | WEIGHT: 203.5 LBS | OXYGEN SATURATION: 97 % | DIASTOLIC BLOOD PRESSURE: 79 MMHG | BODY MASS INDEX: 30.84 KG/M2

## 2022-10-17 DIAGNOSIS — D84.9 IMMUNE DEFICIENCY DISORDER: ICD-10-CM

## 2022-10-17 DIAGNOSIS — J44.9 CHRONIC OBSTRUCTIVE PULMONARY DISEASE, UNSPECIFIED COPD TYPE: ICD-10-CM

## 2022-10-17 DIAGNOSIS — D80.1 HYPOGAMMAGLOBULINEMIA: ICD-10-CM

## 2022-10-17 DIAGNOSIS — J47.0 BRONCHIECTASIS WITH ACUTE LOWER RESPIRATORY INFECTION: Primary | ICD-10-CM

## 2022-10-17 DIAGNOSIS — R09.89 CHRONIC SINUS COMPLAINTS: ICD-10-CM

## 2022-10-17 PROCEDURE — 99214 OFFICE O/P EST MOD 30 MIN: CPT | Mod: PBBFAC,PO | Performed by: INTERNAL MEDICINE

## 2022-10-17 PROCEDURE — 99214 OFFICE O/P EST MOD 30 MIN: CPT | Mod: S$PBB,,, | Performed by: INTERNAL MEDICINE

## 2022-10-17 PROCEDURE — 99999 PR PBB SHADOW E&M-EST. PATIENT-LVL IV: ICD-10-PCS | Mod: PBBFAC,,, | Performed by: INTERNAL MEDICINE

## 2022-10-17 PROCEDURE — 99999 PR PBB SHADOW E&M-EST. PATIENT-LVL IV: CPT | Mod: PBBFAC,,, | Performed by: INTERNAL MEDICINE

## 2022-10-17 PROCEDURE — 99214 PR OFFICE/OUTPT VISIT, EST, LEVL IV, 30-39 MIN: ICD-10-PCS | Mod: S$PBB,,, | Performed by: INTERNAL MEDICINE

## 2022-10-17 RX ORDER — PREDNISONE 20 MG/1
TABLET ORAL
Qty: 12 TABLET | Refills: 0 | Status: SHIPPED | OUTPATIENT
Start: 2022-10-17 | End: 2022-11-11

## 2022-10-17 RX ORDER — AZELASTINE 1 MG/ML
2 SPRAY, METERED NASAL 2 TIMES DAILY PRN
Qty: 90 ML | Refills: 1 | Status: SHIPPED | OUTPATIENT
Start: 2022-10-17 | End: 2023-01-06

## 2022-10-17 RX ORDER — IPRATROPIUM BROMIDE AND ALBUTEROL SULFATE 2.5; .5 MG/3ML; MG/3ML
3 SOLUTION RESPIRATORY (INHALATION) EVERY 6 HOURS PRN
Qty: 120 EACH | Refills: 5 | Status: SHIPPED | OUTPATIENT
Start: 2022-10-17 | End: 2022-10-17

## 2022-10-17 RX ORDER — IPRATROPIUM BROMIDE AND ALBUTEROL SULFATE 2.5; .5 MG/3ML; MG/3ML
3 SOLUTION RESPIRATORY (INHALATION) EVERY 6 HOURS PRN
Qty: 120 EACH | Refills: 11 | Status: SHIPPED | OUTPATIENT
Start: 2022-10-17 | End: 2023-08-31

## 2022-10-17 NOTE — PATIENT INSTRUCTIONS
Would do sputum culture if yellow mucous returns.     Use nebulizer -- would encourage daily at least with aerobika to clear lungs.   Would be most important to use with colored mucous.    May use prednisone for only 3 days-- if lungs short breath or severe bronchitis.    Will order igg level check for December with copy to Dr Ibarra.    Astalayna nasal may help drip. Continue flonase.

## 2022-10-17 NOTE — PROGRESS NOTES
10/17/2022    Sivakumar Thacker  Office note    Chief Complaint   Patient presents with    Follow-up     2 month f/u   Hospital f/u - 9/30/2022 admit date       HPI:      10/17/2022 pt had chest wall infection ppt admit- had chill acutely wk before and then developed chest pain with red swollen chest.     Pt grew out serratia-- ended up surg but no pus.  Pt was leukopenic.  Lungs had been doing well.  Pt was on suppressive abx but stopped-- ivig dose increased.  Rituxan stopped for now.  No clear source found.      Pt now off morphine and steroids.  Mucous now clear. On cipro and to have drain removed in near future. No port.      Lungs doing well last 3-4 wks.  Uses aerobika and albuterol prn -- sounds minimal.        Ct chest 9/30/2022 mucous plugging and some cellulits suggested ant left chest near port runs from right to left subclavian.    8/15/2022 -- pt on rituxan and igg replacement for nhl.  Has green mucous.   Had freq sinus infections -- took augmentin last month for sinuses with good results.  On azithromycin 3/wk- last yr with improved chest rattle.   No asthma-- pft worsened with fev 108 % in 2020 to 76% 8/10/2022.   Patient Instructions   Immune system weakness would cause sinus/lung infections --  would check igg level prior to next infusion on Friday-- need to have good level to have protection all month long.  Use augmentin daily to suppress infections.  Hold off azithromycin  You may have infections from bronchiectasis (pseudomonas germ)-- special antibiotic would be needed. Occasionally iv antibiotics are needed.  Do sputum culture monthly if not doing well to assure antibiotic appropriate.  Pulmonary toilet needed-- breathing medication with flutter device at least 2/d.  Steroids help mucous clearing-- flovent is inhaled steroid-- trelegy has inhaled steroids and 24/7 bronchial medication-- use trelegy if available, otherwise use flovent..    Try to note which antibiotic effective.   Use tessalon  as needed  Use prednisone for cough/wheezes.  Use once daily for 3 days.        Below from DAMI Georges  5/17/2022- Cough is persistent complaint, on Vest and nebulizer therapy daily, antibiotic azithromycin 500 mg three days weekly,   Sinus complaints persistent.   Cough- coarse cough, productive yellow/green mucous. Associated with shortness of breath.   Patient Instructions   Will continue current Bronchiectasis therapy with daily vest and nebulizer treatments 1-2 times daily    Doxy antibiotic for 10 days then resume three day weekly azithromycin    Have next appointment with MD for further evaluation and treatment.   2/17/2022-  Noticed improvement after starting vest device and Azithromcyin 3x weekly, using nebulizer with Aerobika 2x daily. Prescription for azithromycin ran out 2 weeks prior.   Complaint of sinus drainage onset 4 weeks, clear drainage, tried flonase with benefit.      On IV IG for 4 years followed by Dr. Ibarra.     11/17/2021- Cough- persistent complaint, daily, through out, nocturnal arousals nightly, using albuterol nebulizer and vest 2x daily. Productive green mucous, dime size amount of mucous. tx with antibiotics for H.Flu with no benefit. Associated with chest tightness and wheeze. Tried Tesselon perles with no benefit. No benefit with codeine cough syrup.     8/17/2021- Cough- recurrent complaint, onset 3 days, worse in morning and during day, improve with albuterol nebulizer, able to clear yellow/green mucous. Started antibiotics 2 weeks ago due to green mucous, took Cipro, cough returned after finishing Cipro prescription. Nocturnal arousals 3x weekly.   Has aerobika from when wife was hospitalized, using.   Using nebulizer daily with Musinex.   SOB- worsened since finishing antibiotic, worse with exertion, no chest tightness or wheeze, improve with coughing up mucous. Associated with fatigue      5/17/2021- had persistent cough with occasional thick, yellow mucous onset January  lasted 4 weeks, Felt rattle in chest., improved with antibiotics Augmentin.   has nebulizer but not currently using.   Current cough- mild, 2x weekly, feels mucous in chest but unable to cough up.    No shortness of breath, chest tightness, or wheeze.    11/17/2020- states doing well, cough has decreased to occasional to 2 times weekly that is mild, productive clear mucous.   States no fever, night sweats, shortness of breath,   Complaint of sinus drainage: daily, clear, singulair most days,      8/17/20- Cough- improved following antibiotic therapy, returned after 8 weeks, productive, light green color, dime size, worse in early morning, no chest tightness or wheeze.  No SOB. Using nebulizer 2x daily for 2 weeks, undergoing chemo and IVIG for Lymphoma and skin cancer.     6/16/2020- Cough unchanged with coughing fits when trying to sleep. States no improvement with Trelegy inhaler. States Levaquin antibiotic help a little bit and cough returned after finishing. Has been on IgG replacement therapy every 4 weeks since 2012.  Brought in 5 sputum sample, all still processing.       4/20/2020- Chronic cough onset 2013, followed by pulmonologist Dr. Case in [place]. Put Advair with no benefit. Had negative bronchoscopy and negative AFB, had appointment with Dr. Brock in 2014. Hx of non Hodgkins lymphoma followed by Dr. Ibarra on Rotuxin and immune therapy.  Cough Worsened in Jan 2020, associated with recurrent fever improves with tylenol, states cough improves with antibiotics but returns when finished medications. Productive occasionally, can feel something rattling in chest, thick white mucous. 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  2 pack year  Family Hx: no Lung Cancer, no COPD, no Asthma  Medical Hx: no previous pneumonia ; no previous shoulder/chest surgery        The chief compliant  problem is stable  PFSH:  Past Medical History:   Diagnosis Date    Diabetes mellitus, type 2     Encounter for antineoplastic chemotherapy 04/01/2020    Hypertension     Neuropathy     Non Hodgkin's lymphoma     Reflux esophagitis          Past Surgical History:   Procedure Laterality Date    COLONOSCOPY  2018    EYE SURGERY  Approximately 3 years ago    Cataract    HAND SURGERY      amputation left index finger    INCISION AND DRAINAGE OF ABSCESS Left 10/3/2022    Procedure: INCISION AND DRAINAGE, ABSCESS;  Surgeon: Franco Venegas III, MD;  Location: [facility] OR;  Service: General;  Laterality: Left;  axilla    MEDIPORT REMOVAL Right 10/3/2022    Procedure: REMOVAL, CATHETER, CENTRAL VENOUS, TUNNELED, WITH PORT;  Surgeon: Franco Venegas III, MD;  Location: [facility] OR;  Service: General;  Laterality: Right;    PORTACATH PLACEMENT      SKIN CANCER EXCISION  09/30/2020    [facility]    SPINE SURGERY      VASECTOMY  1985 ?     Social History     Tobacco Use    Smoking status: Former     Packs/day: 0.50     Years: 2.00     Pack years: 1.00     Types: Cigarettes    Smokeless tobacco: Never   Substance Use Topics    Alcohol use: Not Currently     Alcohol/week: 0.0 standard drinks     Comment: rarely    Drug use: No     Family History   Problem Relation Age of Onset    Diabetes Father      Review of patient's allergies indicates:  No Known Allergies  I have reviewed past medical, family, and social history. I have reviewed previous nurse notes.    Performance Status:The patient's activity level is no limits with regular activity.      Review of Systems:  a review of eleven systems covering constitutional, Eye, HEENT, Psych, Respiratory, Cardiac, GI, , Musculoskeletal, Endocrine, Dermatologic was negative except for pertinent findings as listed ABOVE and below:  "pertinent positive as above, rest is good  Cough, shortness of breath, sinus drainage         Exam:Comprehensive exam done. /79 (BP Location: Right arm, Patient Position: Sitting, BP Method: Medium (Automatic))   Pulse 80   Ht 5' 8" (1.727 m)   Wt 92.3 kg (203 lb 7.8 oz)   SpO2 97% Comment: on room air at rest  BMI 30.94 kg/m²   Exam included Vitals as listed, and patient's appearance and affect and alertness and mood, oral exam for yeast and hygiene and pharynx lesions and Mallapatti (M) score, neck with inspection for jvd and masses and thyroid abnormalities and lymph nodes (supraclavicular and infraclavicular nodes and axillary also examined and noted if abn), chest exam included symmetry and effort and fremitus and percussion and auscultation, cardiac exam included rhythm and gallops and murmur and rubs and jvd and edema, abdominal exam for mass and hepatosplenomegaly and tenderness and hernias and bowel sounds, Musculoskeletal exam with muscle tone and posture and mobility/gait and  strength, and skin for rashes and cyanosis and pallor and turgor, extremity for clubbing.  Findings were normal except for pertinent findings listed below: M2,lungs clear, No clubbing.      Radiographs (ct chest and cxr) reviewed: view by direct vision   Ct chest 9/30/2022 mucous plugging and some cellulits suggested ant left chest near port runs from right to left subclavian.    NM PET CT Routine Skull to Mid Thigh 4/5/2022 visualized improvement in bilateral lower lobe mucous plugging when compared to 10/4/21 scan  CT images of the chest show no suspicious pulmonary nodules or masses, with no pleural or pericardial effusion. There is scattered linear subsegmental atelectasis in both lungs, with aortic and coronary arterial calcifications.     NM PET CT Routine Skull to Mid Thigh 10/04/2021   New bilateral reticulonodular pulmonary opacities suggesting infectious or inflammatory pneumonitis. Clinical and laboratory " correlation is requested.     CT CHEST WITHOUT CONTRAST  06/21/2021   Granulomatous change.  2. Multiple small pulmonary nodules.  Follow-up per Fleischner guidelines.  For multiple solid nodules all <6 mm, Fleischner Society 2017 guidelines recommend no routine follow up for a low risk patient, or follow up with non-contrast chest CT at 12 months after discovery in a high risk patient.  3. Minimal bronchiectasis of the bilateral lower lobes.  4. No significant lymphadenopathy.      NM PET CT Routine Skull to Mid Thigh 03/19/21   1. Mild patchy airspace opacity in the superior segment of the left  lower lobe with increased FDG activity from background suggestive of a  small focal pneumonia (possibly viral in etiology). Consider  correlation with the symptoms, history and CT follow-up in 3 months to  document resolution.    X-Ray Chest PA And Lateral  03/25/2021   Mild interstitial prominence, similar to previous exams.  No focal consolidation.     CT Chest Without 03/19/2020   1. Mild bronchiectasis in the left lower lobe and tree-in-bud micro nodules at the left lung base suggesting underlying bronchiolitis, possibly due to a non tuberculous mycobacterial infection or viral bronchiolitis.  2. Fatty infiltration of the liver  3. Small hiatal hernia.     CT Chest Without Contrast 09/01/2020   Unchanged left upper lobe 3 mm nodule.  Mild old granulomatous disease.  Minimal bronchiectasis which is nonspecific.  Adjacent micro nodules and tree-in-bud opacities are also unchanged.  Findings favor sequelae of an old atypical infectious process.  Atherosclerosis.  Small hiatal hernia.      Labs reviewed       Lab Results   Component Value Date    WBC 3.19 (L) 10/08/2022    RBC 3.81 (L) 10/08/2022    HGB 11.0 (L) 10/08/2022    HCT 34.3 (L) 10/08/2022    MCV 90 10/08/2022    MCH 28.9 10/08/2022    MCHC 32.1 10/08/2022    RDW 13.0 10/08/2022     10/08/2022    MPV 10.5 10/08/2022    GRAN 20.0 (L) 10/08/2022    LYMPH  37.0 10/08/2022    MONO 3.0 (L) 10/08/2022    EOS CANCELED 10/06/2022    BASO CANCELED 10/06/2022    EOSINOPHIL 34.0 (H) 10/08/2022    BASOPHIL 1.0 10/08/2022       AFB x 3 all negative following 8 week incubation, Respiratory cultures show normal dank only    Culture, Respiratory with Gram Stain 09/18/20 HAEMOPHILUS INFLUENZAE   Culture, Respiratory with Gram Stain 1/21/2021 HAEMOPHILUS INFLUENZAE   Respiratory with Gram Stain 7/22/21, PSEUDOMONAS AERUGINOSA and HAEMOPHILUS INFLUENZAE       PFT  reviewed  Pulmonary Functions Testing Results:  Spirometry bronchodilator, lung volume by gas dilution, diffusion capacity measured June 23, 2020.  The FEV1 FVC ratio was 80% indicating no airflow obstruction by spirometry technique.  The FEV1 was 108% of predicted or 3.5 L.  There was no   significant improvement following bronchodilator.  Total lung capacity on lung volume by gas dilution was 69% and a little reduced.  Diffusion was normal.       Spirometry was normal and was no significant bronchodilator response.  Total lung capacity was reduced consistent with restriction.  Diffusion was normal.  Clinical correlation recommended.     7/17/2013 PFT Data: Complete pulmonary function studies were obtained today and are independently reviewed. Spirometry shows no evidence of airflow obstruction today. Lung was performed by plethysmography and are consistent with air trapping without hyperinflation. Residual volume is 122% predicted, and the total capacity is 98% predicted. Diffusion capacity for carbon monoxide is in the normal range at 102% predicted. Overall, these are fairly normal pulmonary function studies and do not suggest any obstructive lung disease.    Spirometry bronchodilator, lung volume by gas dilution, diffusion capacity measured June 23, 2020.  The FEV1 FVC ratio was 80% indicating no airflow obstruction by spirometry technique.  The FEV1 was 108% of predicted or 3.5 L.  There was no   significant  improvement following bronchodilator.  Total lung capacity on lung volume by gas dilution was 69% and a little reduced.  Diffusion was normal.       Spirometry was normal and was no significant bronchodilator response.  Total lung capacity was reduced consistent with restriction.  Diffusion was normal.  Clinical correlation recommended.        I spent over 30 mins of time with the patient. Reviewed results of the recently ordered labs, tests and studies; made directives with regards to the results. Over half of this time was spent couseling and coordinating care.     I have explained all of the above in detail and the patient understands all of the current recommendation(s). I have answered all of their questions to the best of my ability and to their complete satisfaction.   The patient is to continue with the current management plan.      Plan:  Clinical impression is apparently straight forward and impression with management as below.    Sivakumar was seen today for follow-up.    Diagnoses and all orders for this visit:    Bronchiectasis with acute lower respiratory infection  -     Discontinue: albuterol-ipratropium (DUO-NEB) 2.5 mg-0.5 mg/3 mL nebulizer solution; Take 3 mLs by nebulization every 6 (six) hours as needed for Wheezing or Shortness of Breath. Rescue  -     albuterol-ipratropium (DUO-NEB) 2.5 mg-0.5 mg/3 mL nebulizer solution; Take 3 mLs by nebulization every 6 (six) hours as needed for Wheezing. Rescue  -     predniSONE (DELTASONE) 20 MG tablet; Take one daily for 3 days and may repeat for shortness of breath.    Chronic obstructive pulmonary disease, unspecified COPD type  -     Discontinue: albuterol-ipratropium (DUO-NEB) 2.5 mg-0.5 mg/3 mL nebulizer solution; Take 3 mLs by nebulization every 6 (six) hours as needed for Wheezing or Shortness of Breath. Rescue  -     albuterol-ipratropium (DUO-NEB) 2.5 mg-0.5 mg/3 mL nebulizer solution; Take 3 mLs by nebulization every 6 (six) hours as needed for  Wheezing. Rescue  -     predniSONE (DELTASONE) 20 MG tablet; Take one daily for 3 days and may repeat for shortness of breath.    Hypogammaglobulinemia  -     IgG; Future    Immune deficiency disorder  -     IgG; Future    Chronic sinus complaints  -     azelastine (ASTELIN) 137 mcg (0.1 %) nasal spray; 2 sprays (274 mcg total) by Nasal route 2 (two) times daily as needed for Rhinitis.        Follow up in about 4 months (around 2/17/2023), or if symptoms worsen or fail to improve.    Discussed with patient above for education the following:      Patient Instructions   Would do sputum culture if yellow mucous returns.     Use nebulizer -- would encourage daily at least with aerobika to clear lungs.   Would be most important to use with colored mucous.    May use prednisone for only 3 days-- if lungs short breath or severe bronchitis.    Will order igg level check for December with copy to Dr Ibarra.    Astelin nasal may help drip. Continue flonase.           Eval took 30 min

## 2022-10-18 ENCOUNTER — OFFICE VISIT (OUTPATIENT)
Dept: FAMILY MEDICINE | Facility: CLINIC | Age: 67
End: 2022-10-18
Payer: MEDICARE

## 2022-10-18 VITALS
DIASTOLIC BLOOD PRESSURE: 70 MMHG | SYSTOLIC BLOOD PRESSURE: 138 MMHG | BODY MASS INDEX: 30.94 KG/M2 | RESPIRATION RATE: 18 BRPM | OXYGEN SATURATION: 95 % | WEIGHT: 204.13 LBS | HEART RATE: 75 BPM | HEIGHT: 68 IN

## 2022-10-18 DIAGNOSIS — E11.9 TYPE 2 DIABETES MELLITUS WITHOUT COMPLICATION, WITHOUT LONG-TERM CURRENT USE OF INSULIN: ICD-10-CM

## 2022-10-18 DIAGNOSIS — Z76.89 ENCOUNTER FOR SUPPORT AND COORDINATION OF TRANSITION OF CARE: Primary | ICD-10-CM

## 2022-10-18 PROCEDURE — 99214 PR OFFICE/OUTPT VISIT, EST, LEVL IV, 30-39 MIN: ICD-10-PCS | Mod: S$PBB,,, | Performed by: FAMILY MEDICINE

## 2022-10-18 PROCEDURE — 99214 OFFICE O/P EST MOD 30 MIN: CPT | Mod: PBBFAC,PN | Performed by: FAMILY MEDICINE

## 2022-10-18 PROCEDURE — 99999 PR PBB SHADOW E&M-EST. PATIENT-LVL IV: CPT | Mod: PBBFAC,,, | Performed by: FAMILY MEDICINE

## 2022-10-18 PROCEDURE — 99214 OFFICE O/P EST MOD 30 MIN: CPT | Mod: S$PBB,,, | Performed by: FAMILY MEDICINE

## 2022-10-18 PROCEDURE — 99999 PR PBB SHADOW E&M-EST. PATIENT-LVL IV: ICD-10-PCS | Mod: PBBFAC,,, | Performed by: FAMILY MEDICINE

## 2022-10-18 NOTE — PROGRESS NOTES
" Patient ID: Sivakumar Thacker is a 67 y.o. male.    Chief Complaint: Hospital Follow Up      Reviewed family, medical, surgical, and social history.    No cp/sob  No change in mental status  No fever  No asymmetrical limb swelling    Objective:      /70 (BP Location: Right arm, Patient Position: Sitting, BP Method: Medium (Manual))   Pulse 75   Resp 18   Ht 5' 8" (1.727 m)   Wt 92.6 kg (204 lb 1.6 oz)   SpO2 95%   BMI 31.03 kg/m²   RRR, CTAB, s/nt/nd, no c/c/e, non-toxic appearing, no focal weakness  Assessment:       1. Encounter for support and coordination of transition of care    2. Type 2 diabetes mellitus without complication, without long-term current use of insulin        Plan:       Encounter for support and coordination of transition of care    Type 2 diabetes mellitus without complication, without long-term current use of insulin  -     Hemoglobin A1C; Future; Expected date: 10/18/2022            Continue current medicines, any changes noted above  Appropriate follow up and support  A1c in 3 months  Labs, radiology studies, and procedures/notes from the last 3 months were reviewed.    Risks, benefits, and side effects were discussed with the patient. All questions were answered to the fullest satisfaction of the patient, and pt verbalized understanding and agreement to treatment plan. Pt was to call with any new or worsening symptoms, or present to the ER.      "

## 2022-10-19 ENCOUNTER — EXTERNAL HOME HEALTH (OUTPATIENT)
Dept: HOME HEALTH SERVICES | Facility: HOSPITAL | Age: 67
End: 2022-10-19
Payer: MEDICARE

## 2022-10-20 ENCOUNTER — OFFICE VISIT (OUTPATIENT)
Dept: SURGERY | Facility: CLINIC | Age: 67
End: 2022-10-20
Payer: MEDICARE

## 2022-10-20 VITALS
TEMPERATURE: 98 F | HEART RATE: 79 BPM | SYSTOLIC BLOOD PRESSURE: 142 MMHG | RESPIRATION RATE: 18 BRPM | WEIGHT: 204.13 LBS | DIASTOLIC BLOOD PRESSURE: 80 MMHG | BODY MASS INDEX: 30.94 KG/M2 | HEIGHT: 68 IN

## 2022-10-20 DIAGNOSIS — D80.1 NONFAMILIAL HYPOGAMMAGLOBULINEMIA: ICD-10-CM

## 2022-10-20 DIAGNOSIS — L03.313 CELLULITIS OF CHEST WALL: Primary | ICD-10-CM

## 2022-10-20 PROCEDURE — 99024 POSTOP FOLLOW-UP VISIT: CPT | Mod: S$GLB,POP,, | Performed by: SURGERY

## 2022-10-20 PROCEDURE — 99024 PR POST-OP FOLLOW-UP VISIT: ICD-10-PCS | Mod: S$GLB,POP,, | Performed by: SURGERY

## 2022-10-20 NOTE — PROGRESS NOTES
Subjective:       Patient ID: Sivakumar Thacker is a 67 y.o. male.    Chief Complaint: Post-op Evaluation      HPI:  Patient is 67-year-old male with history of immune deficiency, follicular lymphoma on maintenance chemotherapy.  He had admission for diffuse chest wall cellulitis with sepsis.  Incision and drainage of chest wall was performed as well as removal of his Port-A-Cath.  He has done very well since procedure.  He has 4 total incision sites that are open.  There is a Penrose drain near the left axilla.  He has been afebrile.  Tenderness is nearly resolved.  His maintenance chemotherapy has been held.    Review of Systems    Objective:      Physical Exam  Constitutional:       General: He is not in acute distress.  Pulmonary:      Effort: Pulmonary effort is normal. No respiratory distress.   Chest:          Comments: Wounds are healing well.  No cellulitis.  Wound Depth still about a cm per wound.  Wounds repacked.  Neurological:      Mental Status: He is alert and oriented to person, place, and time.       Assessment/Plan:   Cellulitis of chest wall    Nonfamilial hypogammaglobulinemia    Status post port removal and incision and drainage.  Penrose drain removed.  Wounds repacked.  Would recommend to continue wet-to-dry packing for the next week at least.  Follow up with me in 2 weeks.

## 2022-10-21 LAB
LABCORP MISC TEST CODE: NORMAL
LABCORP MISC TEST NAME: NORMAL
LABCORP MISCELLANEOUS TEST: NORMAL

## 2022-10-27 ENCOUNTER — PATIENT MESSAGE (OUTPATIENT)
Dept: SURGERY | Facility: CLINIC | Age: 67
End: 2022-10-27
Payer: MEDICARE

## 2022-10-31 LAB — FUNGUS SPEC CULT: NORMAL

## 2022-11-01 ENCOUNTER — PATIENT MESSAGE (OUTPATIENT)
Dept: HEMATOLOGY/ONCOLOGY | Facility: CLINIC | Age: 67
End: 2022-11-01

## 2022-11-10 NOTE — PROGRESS NOTES
[facility] Hematology/Oncology  PROGRESS NOTE -  Follow-up Visit      Subjective:       Patient ID:   NAME: Sivakumar Thacker : 1955     67 y.o. male    Referring Doc: Barry Mcmahon (PCP in [place])  Other Physicians: Vinod Chen/José Manuel      Chief Complaint:  NHL f/u    History of Present Illness:     Patient returns today for a regularly scheduled follow-up visit.  The patient is here today to go over the results of the recently ordered labs, tests and studies. He is here by himself.       Patient was seen by Dr Abarca at [facility] since last visit and his recommendation was to hold off on further rituximab therapy and proceed with just surveillance scanning.    He was recently hospitalized at [facility] in Oct 2022 with upper torso cellulitis with Serratia marcescens septicemia. He is still followed by wound care and is getting the prior drain site packed, etc. He has not followed up with ID as of yet     He denies any CP, SOB, HA's or N/V.     He has been seeing pulmonary about his chronic cough and TONY issues; he has been seeing [facility] and saw Dr Veliz recently on 10/17/2022;       continued on Gabapentin for the neuropathy issues in his feet.     He last saw Dr Barry Mcmahon on 10/18/2022      I discussed continued Covid19 precautions        ROS:   GEN: normal without any fever, night sweats or weight loss  HEENT: normal with no HA's, sore throat, stiff neck, changes in vision; no current sinus issues    CV: normal with no CP, SOB, PND, VASQUEZ or orthopnea  PULM: normal with no SOB,  hemoptysis, sputum or pleuritic pain; chronic cough since 2020  GI: normal with no abdominal pain, nausea, vomiting, constipation, diarrhea, melanotic stools, BRBPR, or hematemesis  : normal with no hematuria, dysuria  BREAST: normal with no mass, discharge, pain  SKIN: normal with no rash, erythema, bruising, or swelling; wound under left axilla with packing etc    Allergies:  Review of patient's allergies indicates:  No Known  Allergies    Medications:    Current Outpatient Medications:     albuterol-ipratropium (DUO-NEB) 2.5 mg-0.5 mg/3 mL nebulizer solution, Take 3 mLs by nebulization every 6 (six) hours as needed for Wheezing. Rescue (Patient not taking: Reported on 10/20/2022), Disp: 120 each, Rfl: 11    amLODIPine (NORVASC) 5 MG tablet, Take 1 tablet (5 mg total) by mouth once daily., Disp: 90 tablet, Rfl: 0    aspirin (ECOTRIN) 81 MG EC tablet, Take 81 mg by mouth once daily., Disp: , Rfl:     atorvastatin (LIPITOR) 20 MG tablet, Take 1 tablet (20 mg total) by mouth once daily., Disp: 90 tablet, Rfl: 3    azelastine (ASTELIN) 137 mcg (0.1 %) nasal spray, 2 sprays (274 mcg total) by Nasal route 2 (two) times daily as needed for Rhinitis., Disp: 90 mL, Rfl: 1    benzonatate (TESSALON) 200 MG capsule, Take 1 capsule (200 mg total) by mouth 3 (three) times daily as needed for Cough., Disp: 30 capsule, Rfl: 3    blood sugar diagnostic (FREESTYLE LITE STRIPS MISC), FreeStyle Lite Strips, Disp: , Rfl:     blood sugar diagnostic (FREESTYLE LITE STRIPS) Strp, Check blood sugar 2 (two) times daily as needed. (Patient not taking: Reported on 10/20/2022), Disp: 200 each, Rfl: 11    blood sugar diagnostic Strp, , Disp: , Rfl:     ciprofloxacin HCl (CIPRO) 500 MG tablet, Take 1 tablet (500 mg total) by mouth every 12 (twelve) hours., Disp: 28 tablet, Rfl: 0    fluticasone propionate (FLONASE) 50 mcg/actuation nasal spray, 1 spray (50 mcg total) by Each Nostril route 2 (two) times daily., Disp: 48 g, Rfl: 11    gabapentin (NEURONTIN) 300 MG capsule, Take 1 capsule (300 mg total) by mouth 2 (two) times daily., Disp: 180 capsule, Rfl: 3    losartan (COZAAR) 100 MG tablet, Take 0.5 tablets (50 mg total) by mouth once daily. (Patient taking differently: Take 100 mg by mouth once daily.), Disp: 90 tablet, Rfl: 3    metFORMIN (GLUCOPHAGE) 500 MG tablet, metformin 500 mg tablet  Take 2 tablets twice a day by oral route with meals for 90 days. (Patient  taking differently: Take 1,000 mg by mouth 2 (two) times daily with meals. metformin 500 mg tablet  Take 2 tablets twice a day by oral route with meals for 90 days.), Disp: 360 tablet, Rfl: 3    montelukast (SINGULAIR) 10 mg tablet, Take 1 tablet (10 mg total) by mouth nightly as needed., Disp: 90 tablet, Rfl: 3    mucus clearing device (ACAPELLA, FLUTTER), 1 Device by Misc.(Non-Drug; Combo Route) route 2 (two) times daily., Disp: 1 Device, Rfl: 0    MULTIVITAMIN ORAL, Take 1 tablet by mouth once daily., Disp: , Rfl:     naproxen (NAPROSYN) 500 MG tablet, Take 1 tablet (500 mg total) by mouth 2 (two) times daily with meals., Disp: 14 tablet, Rfl: 3    omeprazole (PRILOSEC) 40 MG capsule, Take 1 capsule (40 mg total) by mouth once daily., Disp: 90 capsule, Rfl: 3    oxyCODONE-acetaminophen (PERCOCET) 5-325 mg per tablet, Take 1 tablet by mouth every 6 (six) hours as needed for Pain (if severe pain). (Patient not taking: Reported on 10/20/2022), Disp: 14 tablet, Rfl: 0    predniSONE (DELTASONE) 20 MG tablet, Take one daily for 3 days and may repeat for shortness of breath., Disp: 12 tablet, Rfl: 0    promethazine-dextromethorphan (PROMETHAZINE-DM) 6.25-15 mg/5 mL Syrp, Take 5 mLs by mouth every 6 (six) hours as needed (cough)., Disp: 240 mL, Rfl: 1  No current facility-administered medications for this visit.    Facility-Administered Medications Ordered in Other Visits:     alteplase injection 2 mg, 2 mg, Intra-Catheter, PRN, Jefferson Ibarra MD    heparin, porcine (PF) 100 unit/mL injection flush 500 Units, 500 Units, Intravenous, PRN, Jefferson Ibarra MD    immune globulin (human) (IGG) injection 10% (GAMUNEX), 50 g, Intravenous, 1 time in Clinic/HOD, [facility], , 50 g at 11/11/22 0845    sodium chloride 0.9% flush 10 mL, 10 mL, Intravenous, PRN, Jefferson Ibarra MD    PMHx/PSHx Updates:  See patient's last visit with me on 9/15/2022  See H&P on 1/8/2019        Pathology:  Cancer Staging  No  "matching staging information was found for the patient.          Objective:     Vitals:  Blood pressure (!) 143/73, pulse 109, temperature 98.3 °F (36.8 °C), resp. rate 16, height 5' 8" (1.727 m), weight 95.7 kg (211 lb).    Physical Examination:   GEN: no apparent distress, comfortable; AAOx3  HEAD: atraumatic and normocephalic  EYES: no pallor, no icterus, PERRLA  ENT: OMM, no pharyngeal erythema, external ears WNL; no nasal discharge; no thrush;    NECK: no masses, thyroid normal, trachea midline, no LAD/LN's, supple  CV: RRR with no murmur; normal pulse; normal S1 and S2; no pedal edema; portacath since removed  CHEST: Normal respiratory effort; chronic coarseness, wheeze bilaterally but only mild wheeze  ABDOM: nontender and nondistended; soft; normal bowel sounds; no rebound/guarding  MUSC/Skeletal: ROM normal; no crepitus; joints normal; no deformities or arthropathy  EXTREM: no clubbing, cyanosis, inflammation or swelling  SKIN: no rashes, lesions, ulcers, petechiae or subcutaneous nodules; wound under left axilla with packing etc  : no gibbs  NEURO: grossly intact; motor/sensory WNL; AAOx3; no tremors  PSYCH: normal mood, affect and behavior  LYMPH: normal cervical, supraclavicular, axillary and groin LN's            Labs:              Lab Results   Component Value Date    WBC 3.19 (L) 10/08/2022    HGB 11.0 (L) 10/08/2022    HCT 34.3 (L) 10/08/2022    MCV 90 10/08/2022     10/08/2022     CMP  Sodium   Date Value Ref Range Status   10/08/2022 137 136 - 145 mmol/L Final   04/25/2019 144 134 - 144 mmol/L      Potassium   Date Value Ref Range Status   10/08/2022 4.1 3.5 - 5.1 mmol/L Final     Chloride   Date Value Ref Range Status   10/08/2022 99 95 - 110 mmol/L Final   04/25/2019 107 98 - 110 mmol/L      CO2   Date Value Ref Range Status   10/08/2022 33 (H) 23 - 29 mmol/L Final     Glucose   Date Value Ref Range Status   10/08/2022 236 (H) 70 - 110 mg/dL Final   04/25/2019 177 (H) 70 - 99 mg/dL  "     BUN   Date Value Ref Range Status   10/08/2022 26 (H) 8 - 23 mg/dL Final     Creatinine   Date Value Ref Range Status   10/08/2022 0.7 0.5 - 1.4 mg/dL Final   04/25/2019 0.83 0.60 - 1.40 mg/dL      Calcium   Date Value Ref Range Status   10/08/2022 8.9 8.7 - 10.5 mg/dL Final     Total Protein   Date Value Ref Range Status   10/02/2022 6.6 6.0 - 8.4 g/dL Final     Albumin   Date Value Ref Range Status   10/02/2022 2.8 (L) 3.5 - 5.2 g/dL Final   04/25/2019 4.4 3.1 - 4.7 g/dL      Total Bilirubin   Date Value Ref Range Status   10/02/2022 0.8 0.1 - 1.0 mg/dL Final     Comment:     For infants and newborns, interpretation of results should be based  on gestational age, weight and in agreement with clinical  observations.    Premature Infant recommended reference ranges:  Up to 24 hours.............<8.0 mg/dL  Up to 48 hours............<12.0 mg/dL  3-5 days..................<15.0 mg/dL  6-29 days.................<15.0 mg/dL       Alkaline Phosphatase   Date Value Ref Range Status   10/02/2022 124 55 - 135 U/L Final     AST   Date Value Ref Range Status   10/02/2022 49 (H) 10 - 40 U/L Final     ALT   Date Value Ref Range Status   10/02/2022 60 (H) 10 - 44 U/L Final     Anion Gap   Date Value Ref Range Status   10/08/2022 5 (L) 8 - 16 mmol/L Final     eGFR if    Date Value Ref Range Status   07/21/2022 >60.0 >60 mL/min/1.73 m^2 Final     eGFR if non    Date Value Ref Range Status   07/21/2022 >60.0 >60 mL/min/1.73 m^2 Final     Comment:     Calculation used to obtain the estimated glomerular filtration  rate (eGFR) is the CKD-EPI equation.            Radiology/Diagnostic Studies:    PET  4/5/2022:    IMPRESSION: No evidence of recurrent or active lymphoproliferative disease        PET 10/4/2021:  Impression:     1. Negative for FDG avid recurrent lymphoproliferative disease.  2. New left maxillary sinusitis.  3. New bilateral reticulonodular pulmonary opacities suggesting infectious or  inflammatory pneumonitis.  Clinical and laboratory correlation is requested.  4. Coronary artery calcification.         CXR  3/25/2021:    Impression:     Mild interstitial prominence, similar to previous exams.  No focal consolidation      PET 3/19/2021:  IMPRESSION:  1. Mild patchy airspace opacity in the superior segment of the left  lower lobe with increased FDG activity from background suggestive of a  small focal pneumonia (possibly viral in etiology). Consider  correlation with the symptoms, history and CT follow-up in 3 months to  document resolution.  2. No FDG avid lymphadenopathy or additional sites of disease.         PET 9/2/2020:      Impression:     Negative for active lymphoproliferative disease.         Chest CT  3/19/2020:      Impression       1. Mild bronchiectasis in the left lower lobe and tree-in-bud micro nodules at the left lung base suggesting underlying bronchiolitis, possibly due to a non tuberculous mycobacterial infection or viral bronchiolitis.  2. Fatty infiltration of the liver  3. Small hiatal hernia.           CXR  1/31/2020    Impression       1. No acute chest disease.  2. Left subclavian Port-A-Cath.               PET 9/11/2019   Impression       No evidence of FDG avid residual or recurrent lymphoma       I have reviewed all available lab results and radiology reports.    Assessment/Plan:   (1) 67 y.o. male with diagnosis of NHL Follicular Stage IIIA who has been referred by Dr Gary Chen with [facility] for continuation of care by medical hematology/oncology.   - He originally was diagnosed in 2012 and had parotid excision at [facility]. He subsequently went to [facility] and was treated with bendamustine-rituximab. He has been on rituximab maintenance every 8 weeks and IV IgG monthly. Dr Chen's plan was to keep him on maintenace therapy for as long as he could tolerate the treatments  - s/p 6 cycles of Bendamustine and Retuximab with subsequent complete remission  - 1st  maintenance cycle rituximab was in March 2016  - he has been on maintenance rituximab and IV IgG - last rituximab 11/15/2018; last IV IgG on Dec 14th 2018  - last PET was on 9/26/2018 with no evidence of recurrence  - originally diagnosed in Dec 2012 with left parotid and left periparotid LN excision on 12/11/2012  - PET scan done on  9/11/2019 was good    7/23/2020:  - new nodule on auricle of left ear suspicious for a skin cancer - will refer him to Dr Avilez with ENT  - he is due for repeat PET    9/17/2020:  - PET scan on 9/2/2020 is adequate  - [facility]     11/12/2020:  - continued on the current regimen  - doing well with no new issues    1/7/2021:  - he is having some persistent cough issues for which he has been obtaining sputum for José Manuel  - last PET was Sept 2020    3/4/2021:  - doing ok  - chronic cough issues - followed by José Manuel  - will set up f/u PET    4/29/2021:  - he had PET scan on 3/19/2021  - He has been seeing pulmonary about his chronic cough  Lucia Georges with pulmonary has seen the recent PET and reported to him that the CXR on 3/25 was stable and he is on some oral antibiotics with improvement of the symptoms  - He is also on Gabapentin for the neuropathy issues in his feet.   - He saw Dr Barry Mcmahon on 3/24/2021 with [facility]    8/19/2021:  - He has been seeing pulmonary about his chronic cough - Lucia Georges with pulmonary and he has been on periodic treatments of antibiotics. He saw her last just yesterday on 8/17/2021 and is currently on antibiotics.  - Pulmonary is planning to refer him to an ID specialist  - he is on the rituximab every 8 weeks; I am not convinced that this is contributing to the infection issues as it is not reducing his WBC; however,if pulmonary and/or ID feel it is a contributing factor then we can discontinue. The risk of his lymphoma recurring or progressing would be higher if he were to discontinue the  therapy      10/14/2021:  - continued on oral antibiotics per pulmonary and plans to see Pulmonary ID specialist in near future  - continued on current therapy with rituximab  - recent PEt on 10/4/2021    12/9/2021:  - continued on rituximab  - on new antibiotics per pulmonary and he sees them again in Feb 2022  - repeat scans in Feb 2022 or sooner if pulmonary says otherwise    2/2/2022:  - he is seeing pulmonary again in two weeks  - he does not think that the new triple antibiotic regimen is working  - we can get PET in Feb/mar 2022 if pulmonary is inclined, otherwise 6 month surveillance scan in April/May 2022    3/31/2022:  - He has been seeing pulmonary about his chronic cough and TONY issues; he is on antibiotics 3x/week and he reports that pulmonary feels that he is stable  - PET scan scheduled for 4/14/2022  - He last saw Dr Barry Mcmahon on 3/28/2021 and José Manuel on 2/17/2022  - pulmonary is fine with him continuing the ritxuimab per his report    5/26/2022:  - He has been seeing pulmonary about his chronic cough and TONY issues; he saw Rose on 5/17/2022 and is now on a new antibiotic and he reports that pulmonary said the PEt scan was better than the last one; he is planning to see Dr Veliz in the near future   - PET scan was done on 4/5/2022 with no evidence of recurrence  - continued on rituximab maintenance therapy    7/21/2022:  - continued under the care of pulmonary  - he is seeing Dr Veliz in Aug 2022  - continued on monthly IV IgG and rituximab every other month  - repeat PET in Oct 2022 expected    9/15/2022:  - He has been seeing pulmonary about his chronic cough and TONY issues; he has been seeing [facility] and saw Dr Veliz on 8/15/2022;   - Dr Veliz is looking at increasing his Ig dose or changing it to SQ weekly  - Last PET scan was done on 4/5/2022 and repeat has been ordered and is due this Oct 2022  - discussed with patient about referral to Dr Nasreen Abarca at [facility] for not only evaluation for 2nd  opinion for his chronic lymphoma but also the options of IV IgG infusion therapy, patient is agreeable    11/10/2022:  - Patient was seen by Dr Abarca at [facility] since last visit and his recommendation was to hold off on further rituximab therapy and proceed with just surveillance scanning.  - He was recently hospitalized at [facility] in Oct 2022 with upper torso cellulitis with Serratia marcescens septicemia  - He was recently hospitalized at [facility] in Oct 2022 with upper torso cellulitis with Serratia marcescens septicemia. He is still followed by wound care and is getting the prior drain site packed, etc. He has not followed up with ID as of yet          (2) HTN     (3) Neuropathy     (4) GERD     (5) DM - on metformin per Dr Nunez     (6) Hypogammaglobulinemia - on Iv IgG monthly     (7) Steatosis of liver     (8) Degenerative disc disease of back     (9) Diverticular disease    (10) Mild bronchiectasis in the left lower lobe and tree-in-bud micro nodules at the left lung base suggesting underlying bronchiolitis  - seeing Lucia Georges           VISIT DIAGNOSES:      Follicular lymphoma grade IIIa, unspecified body region    Pancytopenia    Hypogammaglobulinemia        PLAN:  1. will hold further rituximab therapy and proceed with surveillance scans    2. continue IV IgG     3. F/u with Dr Nasreen Abarca at [facility] as directed  4. Check labs every 8 weeks  5. Repeat PET every 6 months  6. F/u with PCP, Pulm etc    7. Continue with wound care and will ask Dr Washington to f/u patient in clinic        RTC in 4 weeks with Whit and 8 weeks with myself   Fax note to Barry Mcmahon; José Manuel [facility]iz; Ran Washington         Discussion:       I spent over 25 mins of time with the patient. Reviewed results of the recently ordered labs, tests and studies; made directives with regards to the results. Over half of this time was spent couseling and coordinating care.      COVID-19 Discussion:    I had long discussion with patient and any applicable  family about the COVID-19 coronavirus epidemic and the recommended precautions with regard to cancer and/or hematology patients. I have re-iterated the CDC recommendations for adequate hand washing, use of hand -like products, and coughing into elbow, etc. In addition, especially for our patients who are on chemotherapy and/or our otherwise immunocompromised patients, I have recommended avoidance of crowds, including movie theaters, restaurants, churches, etc. I have recommended avoidance of any sick or symptomatic family members and/or friends. I have also recommended avoidance of any raw and unwashed food products, and general avoidance of food items that have not been prepared by themselves. The patient has been asked to call us immediately with any symptom developments, issues, questions or other general concerns.     I have explained all of the above in detail and the patient understands all of the current recommendation(s). I have answered all of their questions to the best of my ability and to their complete satisfaction.   The patient is to continue with the current management plan.            Electronically signed by Jefferson Ibarra MD                      Answers submitted by the patient for this visit:  Review of Systems Questionnaire (Submitted on 11/8/2022)  appetite change : No  unexpected weight change: No  mouth sores: No  visual disturbance: No  cough: No  shortness of breath: No  chest pain: No  abdominal pain: No  diarrhea: No  frequency: No  back pain: Yes  rash: No  headaches: No  adenopathy: No  nervous/ anxious: No

## 2022-11-11 ENCOUNTER — PATIENT MESSAGE (OUTPATIENT)
Dept: HEMATOLOGY/ONCOLOGY | Facility: CLINIC | Age: 67
End: 2022-11-11

## 2022-11-11 ENCOUNTER — INFUSION (OUTPATIENT)
Dept: INFUSION THERAPY | Facility: HOSPITAL | Age: 67
DRG: 603 | End: 2022-11-11
Attending: INTERNAL MEDICINE
Payer: MEDICARE

## 2022-11-11 ENCOUNTER — HOSPITAL ENCOUNTER (INPATIENT)
Facility: HOSPITAL | Age: 67
LOS: 2 days | Discharge: HOME HEALTH | DRG: 603 | End: 2022-11-15
Attending: EMERGENCY MEDICINE | Admitting: INTERNAL MEDICINE
Payer: MEDICARE

## 2022-11-11 ENCOUNTER — OFFICE VISIT (OUTPATIENT)
Dept: HEMATOLOGY/ONCOLOGY | Facility: CLINIC | Age: 67
End: 2022-11-11
Payer: MEDICARE

## 2022-11-11 ENCOUNTER — DOCUMENT SCAN (OUTPATIENT)
Dept: HOME HEALTH SERVICES | Facility: HOSPITAL | Age: 67
End: 2022-11-11
Payer: MEDICARE

## 2022-11-11 ENCOUNTER — TELEPHONE (OUTPATIENT)
Dept: SURGERY | Facility: CLINIC | Age: 67
End: 2022-11-11

## 2022-11-11 VITALS
DIASTOLIC BLOOD PRESSURE: 73 MMHG | HEART RATE: 109 BPM | RESPIRATION RATE: 16 BRPM | HEIGHT: 68 IN | TEMPERATURE: 98 F | SYSTOLIC BLOOD PRESSURE: 143 MMHG | WEIGHT: 211 LBS | BODY MASS INDEX: 31.98 KG/M2

## 2022-11-11 VITALS
OXYGEN SATURATION: 97 % | SYSTOLIC BLOOD PRESSURE: 129 MMHG | HEART RATE: 87 BPM | HEIGHT: 68 IN | RESPIRATION RATE: 15 BRPM | TEMPERATURE: 98 F | BODY MASS INDEX: 31.98 KG/M2 | DIASTOLIC BLOOD PRESSURE: 74 MMHG | WEIGHT: 211 LBS

## 2022-11-11 DIAGNOSIS — R50.9 FEVER: ICD-10-CM

## 2022-11-11 DIAGNOSIS — L03.90 CELLULITIS: ICD-10-CM

## 2022-11-11 DIAGNOSIS — D80.1 HYPOGAMMAGLOBULINEMIA: ICD-10-CM

## 2022-11-11 DIAGNOSIS — A41.9 SEPSIS: ICD-10-CM

## 2022-11-11 DIAGNOSIS — C82.30 FOLLICULAR LYMPHOMA GRADE IIIA, UNSPECIFIED BODY REGION: Primary | ICD-10-CM

## 2022-11-11 DIAGNOSIS — R07.9 CHEST PAIN: ICD-10-CM

## 2022-11-11 DIAGNOSIS — D80.1 HYPOGAMMAGLOBULINEMIA: Primary | ICD-10-CM

## 2022-11-11 DIAGNOSIS — D61.818 PANCYTOPENIA: ICD-10-CM

## 2022-11-11 DIAGNOSIS — C82.30 FOLLICULAR LYMPHOMA GRADE IIIA, UNSPECIFIED BODY REGION: ICD-10-CM

## 2022-11-11 DIAGNOSIS — D80.1 NONFAMILIAL HYPOGAMMAGLOBULINEMIA: ICD-10-CM

## 2022-11-11 DIAGNOSIS — L03.313 CELLULITIS OF CHEST WALL: ICD-10-CM

## 2022-11-11 DIAGNOSIS — A41.9 SEPSIS, DUE TO UNSPECIFIED ORGANISM, UNSPECIFIED WHETHER ACUTE ORGAN DYSFUNCTION PRESENT: Primary | ICD-10-CM

## 2022-11-11 DIAGNOSIS — C85.90 NON-HODGKIN'S LYMPHOMA, UNSPECIFIED BODY REGION, UNSPECIFIED NON-HODGKIN LYMPHOMA TYPE: ICD-10-CM

## 2022-11-11 PROBLEM — J47.0 BRONCHIECTASIS WITH ACUTE LOWER RESPIRATORY INFECTION: Status: RESOLVED | Noted: 2020-06-16 | Resolved: 2022-11-11

## 2022-11-11 PROBLEM — E83.42 HYPOMAGNESEMIA: Status: ACTIVE | Noted: 2022-11-11

## 2022-11-11 LAB
ALBUMIN SERPL BCP-MCNC: 3.4 G/DL (ref 3.5–5.2)
ALBUMIN SERPL BCP-MCNC: 3.4 G/DL (ref 3.5–5.2)
ALP SERPL-CCNC: 105 U/L (ref 55–135)
ALP SERPL-CCNC: 105 U/L (ref 55–135)
ALT SERPL W/O P-5'-P-CCNC: 26 U/L (ref 10–44)
ALT SERPL W/O P-5'-P-CCNC: 26 U/L (ref 10–44)
ANION GAP SERPL CALC-SCNC: 6 MMOL/L (ref 8–16)
ANION GAP SERPL CALC-SCNC: 6 MMOL/L (ref 8–16)
APTT PPP: 34.1 SEC (ref 23.3–35.1)
APTT PPP: 34.1 SEC (ref 23.3–35.1)
AST SERPL-CCNC: 34 U/L (ref 10–40)
AST SERPL-CCNC: 34 U/L (ref 10–40)
BACTERIA #/AREA URNS HPF: NEGATIVE /HPF
BASOPHILS NFR BLD: 0 % (ref 0–1.9)
BASOPHILS NFR BLD: 0 % (ref 0–1.9)
BILIRUB SERPL-MCNC: 0.4 MG/DL (ref 0.1–1)
BILIRUB SERPL-MCNC: 0.4 MG/DL (ref 0.1–1)
BILIRUB UR QL STRIP: NEGATIVE
BNP SERPL-MCNC: 107 PG/ML (ref 0–99)
BNP SERPL-MCNC: 107 PG/ML (ref 0–99)
BUN SERPL-MCNC: 25 MG/DL (ref 8–23)
BUN SERPL-MCNC: 25 MG/DL (ref 8–23)
CALCIUM SERPL-MCNC: 8.7 MG/DL (ref 8.7–10.5)
CALCIUM SERPL-MCNC: 8.7 MG/DL (ref 8.7–10.5)
CHLORIDE SERPL-SCNC: 102 MMOL/L (ref 95–110)
CHLORIDE SERPL-SCNC: 102 MMOL/L (ref 95–110)
CK SERPL-CCNC: 18 U/L (ref 20–200)
CLARITY UR: CLEAR
CO2 SERPL-SCNC: 26 MMOL/L (ref 23–29)
CO2 SERPL-SCNC: 26 MMOL/L (ref 23–29)
COLOR UR: YELLOW
CREAT SERPL-MCNC: 1 MG/DL (ref 0.5–1.4)
CREAT SERPL-MCNC: 1 MG/DL (ref 0.5–1.4)
DIFFERENTIAL METHOD: ABNORMAL
DIFFERENTIAL METHOD: ABNORMAL
EOSINOPHIL NFR BLD: 3 % (ref 0–8)
EOSINOPHIL NFR BLD: 3 % (ref 0–8)
ERYTHROCYTE [DISTWIDTH] IN BLOOD BY AUTOMATED COUNT: 13.3 % (ref 11.5–14.5)
ERYTHROCYTE [DISTWIDTH] IN BLOOD BY AUTOMATED COUNT: 13.3 % (ref 11.5–14.5)
EST. GFR  (NO RACE VARIABLE): >60 ML/MIN/1.73 M^2
EST. GFR  (NO RACE VARIABLE): >60 ML/MIN/1.73 M^2
GLUCOSE SERPL-MCNC: 301 MG/DL (ref 70–110)
GLUCOSE SERPL-MCNC: 301 MG/DL (ref 70–110)
GLUCOSE UR QL STRIP: ABNORMAL
HCT VFR BLD AUTO: 35.1 % (ref 40–54)
HCT VFR BLD AUTO: 35.1 % (ref 40–54)
HGB BLD-MCNC: 11.2 G/DL (ref 14–18)
HGB BLD-MCNC: 11.2 G/DL (ref 14–18)
HGB UR QL STRIP: ABNORMAL
HYALINE CASTS #/AREA URNS LPF: 0 /LPF
IMM GRANULOCYTES # BLD AUTO: ABNORMAL K/UL (ref 0–0.04)
IMM GRANULOCYTES # BLD AUTO: ABNORMAL K/UL (ref 0–0.04)
IMM GRANULOCYTES NFR BLD AUTO: ABNORMAL % (ref 0–0.5)
IMM GRANULOCYTES NFR BLD AUTO: ABNORMAL % (ref 0–0.5)
INFLUENZA A, MOLECULAR: NEGATIVE
INFLUENZA B, MOLECULAR: NEGATIVE
INR PPP: 1.2
KETONES UR QL STRIP: NEGATIVE
LACTATE SERPL-SCNC: 1.8 MMOL/L (ref 0.5–1.9)
LEUKOCYTE ESTERASE UR QL STRIP: NEGATIVE
LIPASE SERPL-CCNC: 21 U/L (ref 4–60)
LYMPHOCYTES NFR BLD: 11 % (ref 18–48)
LYMPHOCYTES NFR BLD: 11 % (ref 18–48)
MAGNESIUM SERPL-MCNC: 1.4 MG/DL (ref 1.6–2.6)
MAGNESIUM SERPL-MCNC: 1.4 MG/DL (ref 1.6–2.6)
MCH RBC QN AUTO: 28.6 PG (ref 27–31)
MCH RBC QN AUTO: 28.6 PG (ref 27–31)
MCHC RBC AUTO-ENTMCNC: 31.9 G/DL (ref 32–36)
MCHC RBC AUTO-ENTMCNC: 31.9 G/DL (ref 32–36)
MCV RBC AUTO: 90 FL (ref 82–98)
MCV RBC AUTO: 90 FL (ref 82–98)
MICROSCOPIC COMMENT: NORMAL
MONOCYTES NFR BLD: 12 % (ref 4–15)
MONOCYTES NFR BLD: 12 % (ref 4–15)
NEUTROPHILS NFR BLD: 55 % (ref 38–73)
NEUTROPHILS NFR BLD: 55 % (ref 38–73)
NEUTS BAND NFR BLD MANUAL: 19 %
NEUTS BAND NFR BLD MANUAL: 19 %
NITRITE UR QL STRIP: NEGATIVE
NRBC BLD-RTO: 0 /100 WBC
NRBC BLD-RTO: 0 /100 WBC
PH UR STRIP: 6 [PH] (ref 5–8)
PHOSPHATE SERPL-MCNC: 2.9 MG/DL (ref 2.7–4.5)
PLATELET # BLD AUTO: 198 K/UL (ref 150–450)
PLATELET # BLD AUTO: 198 K/UL (ref 150–450)
PMV BLD AUTO: 9.4 FL (ref 9.2–12.9)
PMV BLD AUTO: 9.4 FL (ref 9.2–12.9)
POTASSIUM SERPL-SCNC: 3.7 MMOL/L (ref 3.5–5.1)
POTASSIUM SERPL-SCNC: 3.7 MMOL/L (ref 3.5–5.1)
PROCALCITONIN SERPL IA-MCNC: 2.56 NG/ML (ref 0–0.5)
PROT SERPL-MCNC: 7.1 G/DL (ref 6–8.4)
PROT SERPL-MCNC: 7.1 G/DL (ref 6–8.4)
PROT UR QL STRIP: ABNORMAL
PROTHROMBIN TIME: 14.6 SEC (ref 11.4–13.7)
RBC # BLD AUTO: 3.91 M/UL (ref 4.6–6.2)
RBC # BLD AUTO: 3.91 M/UL (ref 4.6–6.2)
RBC #/AREA URNS HPF: 3 /HPF (ref 0–4)
SARS-COV-2 RDRP RESP QL NAA+PROBE: NEGATIVE
SODIUM SERPL-SCNC: 134 MMOL/L (ref 136–145)
SODIUM SERPL-SCNC: 134 MMOL/L (ref 136–145)
SP GR UR STRIP: 1.02 (ref 1–1.03)
SPECIMEN SOURCE: NORMAL
SQUAMOUS #/AREA URNS HPF: 1 /HPF
TROPONIN I SERPL DL<=0.01 NG/ML-MCNC: <0.03 NG/ML
TSH SERPL DL<=0.005 MIU/L-ACNC: 0.88 UIU/ML (ref 0.34–5.6)
URN SPEC COLLECT METH UR: ABNORMAL
UROBILINOGEN UR STRIP-ACNC: NEGATIVE EU/DL
WBC # BLD AUTO: 6.46 K/UL (ref 3.9–12.7)
WBC # BLD AUTO: 6.46 K/UL (ref 3.9–12.7)
WBC #/AREA URNS HPF: 1 /HPF (ref 0–5)
YEAST URNS QL MICRO: NORMAL

## 2022-11-11 PROCEDURE — 96367 TX/PROPH/DG ADDL SEQ IV INF: CPT

## 2022-11-11 PROCEDURE — 96415 CHEMO IV INFUSION ADDL HR: CPT

## 2022-11-11 PROCEDURE — 99214 OFFICE O/P EST MOD 30 MIN: CPT | Mod: S$GLB,,, | Performed by: INTERNAL MEDICINE

## 2022-11-11 PROCEDURE — 85730 THROMBOPLASTIN TIME PARTIAL: CPT | Performed by: EMERGENCY MEDICINE

## 2022-11-11 PROCEDURE — 85027 COMPLETE CBC AUTOMATED: CPT | Performed by: EMERGENCY MEDICINE

## 2022-11-11 PROCEDURE — 99214 PR OFFICE/OUTPT VISIT, EST, LEVL IV, 30-39 MIN: ICD-10-PCS | Mod: S$GLB,,, | Performed by: INTERNAL MEDICINE

## 2022-11-11 PROCEDURE — 84100 ASSAY OF PHOSPHORUS: CPT | Performed by: EMERGENCY MEDICINE

## 2022-11-11 PROCEDURE — 93005 ELECTROCARDIOGRAM TRACING: CPT | Performed by: SPECIALIST

## 2022-11-11 PROCEDURE — 96376 TX/PRO/DX INJ SAME DRUG ADON: CPT

## 2022-11-11 PROCEDURE — 63600175 PHARM REV CODE 636 W HCPCS: Performed by: INTERNAL MEDICINE

## 2022-11-11 PROCEDURE — G0378 HOSPITAL OBSERVATION PER HR: HCPCS

## 2022-11-11 PROCEDURE — 25000003 PHARM REV CODE 250: Performed by: EMERGENCY MEDICINE

## 2022-11-11 PROCEDURE — 84484 ASSAY OF TROPONIN QUANT: CPT | Performed by: EMERGENCY MEDICINE

## 2022-11-11 PROCEDURE — 81001 URINALYSIS AUTO W/SCOPE: CPT | Performed by: EMERGENCY MEDICINE

## 2022-11-11 PROCEDURE — 63600175 PHARM REV CODE 636 W HCPCS: Performed by: NURSE PRACTITIONER

## 2022-11-11 PROCEDURE — 99285 EMERGENCY DEPT VISIT HI MDM: CPT | Mod: 25

## 2022-11-11 PROCEDURE — 83880 ASSAY OF NATRIURETIC PEPTIDE: CPT | Performed by: EMERGENCY MEDICINE

## 2022-11-11 PROCEDURE — 96365 THER/PROPH/DIAG IV INF INIT: CPT

## 2022-11-11 PROCEDURE — 25000003 PHARM REV CODE 250: Performed by: INTERNAL MEDICINE

## 2022-11-11 PROCEDURE — 80053 COMPREHEN METABOLIC PANEL: CPT | Performed by: EMERGENCY MEDICINE

## 2022-11-11 PROCEDURE — 93010 EKG 12-LEAD: ICD-10-PCS | Mod: ,,, | Performed by: SPECIALIST

## 2022-11-11 PROCEDURE — 96375 TX/PRO/DX INJ NEW DRUG ADDON: CPT

## 2022-11-11 PROCEDURE — 85610 PROTHROMBIN TIME: CPT | Performed by: EMERGENCY MEDICINE

## 2022-11-11 PROCEDURE — 93010 ELECTROCARDIOGRAM REPORT: CPT | Mod: ,,, | Performed by: SPECIALIST

## 2022-11-11 PROCEDURE — 83690 ASSAY OF LIPASE: CPT | Performed by: EMERGENCY MEDICINE

## 2022-11-11 PROCEDURE — 83735 ASSAY OF MAGNESIUM: CPT | Performed by: EMERGENCY MEDICINE

## 2022-11-11 PROCEDURE — U0002 COVID-19 LAB TEST NON-CDC: HCPCS | Performed by: EMERGENCY MEDICINE

## 2022-11-11 PROCEDURE — 82550 ASSAY OF CK (CPK): CPT | Performed by: EMERGENCY MEDICINE

## 2022-11-11 PROCEDURE — 63600175 PHARM REV CODE 636 W HCPCS: Performed by: EMERGENCY MEDICINE

## 2022-11-11 PROCEDURE — 96413 CHEMO IV INFUSION 1 HR: CPT

## 2022-11-11 PROCEDURE — 87040 BLOOD CULTURE FOR BACTERIA: CPT | Performed by: EMERGENCY MEDICINE

## 2022-11-11 PROCEDURE — 63600175 PHARM REV CODE 636 W HCPCS: Mod: JG | Performed by: NURSE PRACTITIONER

## 2022-11-11 PROCEDURE — 85007 BL SMEAR W/DIFF WBC COUNT: CPT | Performed by: EMERGENCY MEDICINE

## 2022-11-11 PROCEDURE — 84145 PROCALCITONIN (PCT): CPT | Performed by: EMERGENCY MEDICINE

## 2022-11-11 PROCEDURE — 83605 ASSAY OF LACTIC ACID: CPT | Performed by: EMERGENCY MEDICINE

## 2022-11-11 PROCEDURE — 96366 THER/PROPH/DIAG IV INF ADDON: CPT

## 2022-11-11 PROCEDURE — 87070 CULTURE OTHR SPECIMN AEROBIC: CPT | Performed by: NURSE PRACTITIONER

## 2022-11-11 PROCEDURE — 84443 ASSAY THYROID STIM HORMONE: CPT | Performed by: EMERGENCY MEDICINE

## 2022-11-11 PROCEDURE — 87502 INFLUENZA DNA AMP PROBE: CPT | Performed by: EMERGENCY MEDICINE

## 2022-11-11 RX ORDER — HEPARIN 100 UNIT/ML
500 SYRINGE INTRAVENOUS
Status: CANCELLED | OUTPATIENT
Start: 2022-12-09

## 2022-11-11 RX ORDER — IPRATROPIUM BROMIDE AND ALBUTEROL SULFATE 2.5; .5 MG/3ML; MG/3ML
3 SOLUTION RESPIRATORY (INHALATION) EVERY 6 HOURS PRN
Status: DISCONTINUED | OUTPATIENT
Start: 2022-11-12 | End: 2022-11-12

## 2022-11-11 RX ORDER — IBUPROFEN 200 MG
16 TABLET ORAL
Status: DISCONTINUED | OUTPATIENT
Start: 2022-11-11 | End: 2022-11-15 | Stop reason: HOSPADM

## 2022-11-11 RX ORDER — IBUPROFEN 200 MG
24 TABLET ORAL
Status: DISCONTINUED | OUTPATIENT
Start: 2022-11-11 | End: 2022-11-15 | Stop reason: HOSPADM

## 2022-11-11 RX ORDER — ACETAMINOPHEN 500 MG
1000 TABLET ORAL
Status: COMPLETED | OUTPATIENT
Start: 2022-11-11 | End: 2022-11-11

## 2022-11-11 RX ORDER — MAGNESIUM SULFATE HEPTAHYDRATE 40 MG/ML
2 INJECTION, SOLUTION INTRAVENOUS ONCE
Status: COMPLETED | OUTPATIENT
Start: 2022-11-11 | End: 2022-11-12

## 2022-11-11 RX ORDER — SODIUM CHLORIDE 0.9 % (FLUSH) 0.9 %
10 SYRINGE (ML) INJECTION
Status: CANCELLED | OUTPATIENT
Start: 2022-12-09

## 2022-11-11 RX ORDER — GLUCAGON 1 MG
1 KIT INJECTION
Status: DISCONTINUED | OUTPATIENT
Start: 2022-11-11 | End: 2022-11-15 | Stop reason: HOSPADM

## 2022-11-11 RX ORDER — HEPARIN 100 UNIT/ML
500 SYRINGE INTRAVENOUS
Status: DISCONTINUED | OUTPATIENT
Start: 2022-11-11 | End: 2022-11-11 | Stop reason: HOSPADM

## 2022-11-11 RX ORDER — CEFEPIME HYDROCHLORIDE 1 G/50ML
1 INJECTION, SOLUTION INTRAVENOUS
Status: COMPLETED | OUTPATIENT
Start: 2022-11-11 | End: 2022-11-11

## 2022-11-11 RX ORDER — CEFEPIME HYDROCHLORIDE 1 G/50ML
2 INJECTION, SOLUTION INTRAVENOUS
Status: DISCONTINUED | OUTPATIENT
Start: 2022-11-12 | End: 2022-11-14

## 2022-11-11 RX ORDER — SODIUM CHLORIDE 0.9 % (FLUSH) 0.9 %
10 SYRINGE (ML) INJECTION
Status: DISCONTINUED | OUTPATIENT
Start: 2022-11-11 | End: 2022-11-11 | Stop reason: HOSPADM

## 2022-11-11 RX ORDER — VANCOMYCIN HCL IN 5 % DEXTROSE 1G/250ML
1000 PLASTIC BAG, INJECTION (ML) INTRAVENOUS ONCE
Status: COMPLETED | OUTPATIENT
Start: 2022-11-11 | End: 2022-11-12

## 2022-11-11 RX ORDER — ONDANSETRON 2 MG/ML
4 INJECTION INTRAMUSCULAR; INTRAVENOUS
Status: COMPLETED | OUTPATIENT
Start: 2022-11-11 | End: 2022-11-11

## 2022-11-11 RX ADMIN — CEFEPIME HYDROCHLORIDE 1 G: 1 INJECTION, SOLUTION INTRAVENOUS at 10:11

## 2022-11-11 RX ADMIN — IMMUNE GLOBULIN (HUMAN) 50 G: 10 INJECTION INTRAVENOUS; SUBCUTANEOUS at 08:11

## 2022-11-11 RX ADMIN — ONDANSETRON 4 MG: 2 INJECTION INTRAMUSCULAR; INTRAVENOUS at 09:11

## 2022-11-11 RX ADMIN — MAGNESIUM SULFATE HEPTAHYDRATE 2 G: 40 INJECTION, SOLUTION INTRAVENOUS at 10:11

## 2022-11-11 RX ADMIN — ACETAMINOPHEN 1000 MG: 500 TABLET, FILM COATED ORAL at 09:11

## 2022-11-11 RX ADMIN — VANCOMYCIN HYDROCHLORIDE 500 MG: 500 INJECTION, POWDER, LYOPHILIZED, FOR SOLUTION INTRAVENOUS at 09:11

## 2022-11-11 RX ADMIN — SODIUM CHLORIDE: 9 INJECTION, SOLUTION INTRAVENOUS at 08:11

## 2022-11-11 RX ADMIN — DIPHENHYDRAMINE HYDROCHLORIDE 25 MG: 50 INJECTION INTRAMUSCULAR; INTRAVENOUS at 08:11

## 2022-11-11 NOTE — PLAN OF CARE
Problem: Fatigue  Goal: Improved Activity Tolerance  Outcome: Ongoing, Progressing  Intervention: Promote Improved Energy  Flowsheets (Taken 11/11/2022 6132)  Fatigue Management:   fatigue-related activity identified   frequent rest breaks encouraged   paced activity encouraged  Sleep/Rest Enhancement: relaxation techniques promoted

## 2022-11-12 PROBLEM — L02.213 CHEST WALL ABSCESS: Status: ACTIVE | Noted: 2022-09-30

## 2022-11-12 PROBLEM — C85.90 NON-HODGKIN'S LYMPHOMA: Status: ACTIVE | Noted: 2022-11-12

## 2022-11-12 PROBLEM — L03.313 CELLULITIS OF CHEST WALL: Status: ACTIVE | Noted: 2022-11-12

## 2022-11-12 LAB
ANION GAP SERPL CALC-SCNC: 4 MMOL/L (ref 8–16)
ANISOCYTOSIS BLD QL SMEAR: SLIGHT
BASOPHILS NFR BLD: 0 % (ref 0–1.9)
BUN SERPL-MCNC: 17 MG/DL (ref 8–23)
CALCIUM SERPL-MCNC: 8.3 MG/DL (ref 8.7–10.5)
CHLORIDE SERPL-SCNC: 105 MMOL/L (ref 95–110)
CO2 SERPL-SCNC: 27 MMOL/L (ref 23–29)
CREAT SERPL-MCNC: 1 MG/DL (ref 0.5–1.4)
DIFFERENTIAL METHOD: ABNORMAL
EOSINOPHIL NFR BLD: 5 % (ref 0–8)
ERYTHROCYTE [DISTWIDTH] IN BLOOD BY AUTOMATED COUNT: 13.4 % (ref 11.5–14.5)
EST. GFR  (NO RACE VARIABLE): >60 ML/MIN/1.73 M^2
GLUCOSE SERPL-MCNC: 217 MG/DL (ref 70–110)
GLUCOSE SERPL-MCNC: 221 MG/DL (ref 70–110)
GLUCOSE SERPL-MCNC: 222 MG/DL (ref 70–110)
GLUCOSE SERPL-MCNC: 224 MG/DL (ref 70–110)
GLUCOSE SERPL-MCNC: 236 MG/DL (ref 70–110)
GLUCOSE SERPL-MCNC: 326 MG/DL (ref 70–110)
HCT VFR BLD AUTO: 36.1 % (ref 40–54)
HGB BLD-MCNC: 11.4 G/DL (ref 14–18)
IMM GRANULOCYTES # BLD AUTO: ABNORMAL K/UL (ref 0–0.04)
IMM GRANULOCYTES NFR BLD AUTO: ABNORMAL % (ref 0–0.5)
LACTATE SERPL-SCNC: 1.3 MMOL/L (ref 0.5–1.9)
LYMPHOCYTES NFR BLD: 7 % (ref 18–48)
MAGNESIUM SERPL-MCNC: 1.7 MG/DL (ref 1.6–2.6)
MCH RBC QN AUTO: 28.2 PG (ref 27–31)
MCHC RBC AUTO-ENTMCNC: 31.6 G/DL (ref 32–36)
MCV RBC AUTO: 89 FL (ref 82–98)
MONOCYTES NFR BLD: 10 % (ref 4–15)
NEUTROPHILS NFR BLD: 73 % (ref 38–73)
NEUTS BAND NFR BLD MANUAL: 5 %
NRBC BLD-RTO: 0 /100 WBC
PLATELET # BLD AUTO: 156 K/UL (ref 150–450)
PMV BLD AUTO: 9.3 FL (ref 9.2–12.9)
POTASSIUM SERPL-SCNC: 3.7 MMOL/L (ref 3.5–5.1)
RBC # BLD AUTO: 4.04 M/UL (ref 4.6–6.2)
SODIUM SERPL-SCNC: 136 MMOL/L (ref 136–145)
WBC # BLD AUTO: 5.99 K/UL (ref 3.9–12.7)

## 2022-11-12 PROCEDURE — 25000003 PHARM REV CODE 250: Performed by: NURSE PRACTITIONER

## 2022-11-12 PROCEDURE — 25500020 PHARM REV CODE 255: Performed by: INTERNAL MEDICINE

## 2022-11-12 PROCEDURE — G0378 HOSPITAL OBSERVATION PER HR: HCPCS

## 2022-11-12 PROCEDURE — 25000003 PHARM REV CODE 250: Performed by: INTERNAL MEDICINE

## 2022-11-12 PROCEDURE — 99204 OFFICE O/P NEW MOD 45 MIN: CPT | Mod: ,,, | Performed by: SURGERY

## 2022-11-12 PROCEDURE — 85027 COMPLETE CBC AUTOMATED: CPT | Performed by: NURSE PRACTITIONER

## 2022-11-12 PROCEDURE — 25000242 PHARM REV CODE 250 ALT 637 W/ HCPCS: Performed by: NURSE PRACTITIONER

## 2022-11-12 PROCEDURE — 82962 GLUCOSE BLOOD TEST: CPT

## 2022-11-12 PROCEDURE — 80048 BASIC METABOLIC PNL TOTAL CA: CPT | Performed by: NURSE PRACTITIONER

## 2022-11-12 PROCEDURE — 96376 TX/PRO/DX INJ SAME DRUG ADON: CPT

## 2022-11-12 PROCEDURE — 96361 HYDRATE IV INFUSION ADD-ON: CPT

## 2022-11-12 PROCEDURE — 99215 PR OFFICE/OUTPT VISIT, EST, LEVL V, 40-54 MIN: ICD-10-PCS | Mod: ,,, | Performed by: INTERNAL MEDICINE

## 2022-11-12 PROCEDURE — 63600175 PHARM REV CODE 636 W HCPCS: Performed by: INTERNAL MEDICINE

## 2022-11-12 PROCEDURE — 83735 ASSAY OF MAGNESIUM: CPT | Performed by: NURSE PRACTITIONER

## 2022-11-12 PROCEDURE — 83605 ASSAY OF LACTIC ACID: CPT | Performed by: EMERGENCY MEDICINE

## 2022-11-12 PROCEDURE — 96372 THER/PROPH/DIAG INJ SC/IM: CPT | Performed by: NURSE PRACTITIONER

## 2022-11-12 PROCEDURE — 99204 PR OFFICE/OUTPT VISIT, NEW, LEVL IV, 45-59 MIN: ICD-10-PCS | Mod: ,,, | Performed by: SURGERY

## 2022-11-12 PROCEDURE — 96375 TX/PRO/DX INJ NEW DRUG ADDON: CPT

## 2022-11-12 PROCEDURE — 99215 OFFICE O/P EST HI 40 MIN: CPT | Mod: ,,, | Performed by: INTERNAL MEDICINE

## 2022-11-12 PROCEDURE — 36415 COLL VENOUS BLD VENIPUNCTURE: CPT | Performed by: EMERGENCY MEDICINE

## 2022-11-12 PROCEDURE — 36415 COLL VENOUS BLD VENIPUNCTURE: CPT | Performed by: NURSE PRACTITIONER

## 2022-11-12 PROCEDURE — 85007 BL SMEAR W/DIFF WBC COUNT: CPT | Performed by: NURSE PRACTITIONER

## 2022-11-12 PROCEDURE — 63600175 PHARM REV CODE 636 W HCPCS: Performed by: NURSE PRACTITIONER

## 2022-11-12 PROCEDURE — 96366 THER/PROPH/DIAG IV INF ADDON: CPT

## 2022-11-12 RX ORDER — PANTOPRAZOLE SODIUM 40 MG/1
40 TABLET, DELAYED RELEASE ORAL
Status: DISCONTINUED | OUTPATIENT
Start: 2022-11-12 | End: 2022-11-15 | Stop reason: HOSPADM

## 2022-11-12 RX ORDER — IBUPROFEN 400 MG/1
400 TABLET ORAL EVERY 6 HOURS PRN
Status: DISCONTINUED | OUTPATIENT
Start: 2022-11-12 | End: 2022-11-15 | Stop reason: HOSPADM

## 2022-11-12 RX ORDER — ATORVASTATIN CALCIUM 20 MG/1
20 TABLET, FILM COATED ORAL DAILY
Status: DISCONTINUED | OUTPATIENT
Start: 2022-11-12 | End: 2022-11-15 | Stop reason: HOSPADM

## 2022-11-12 RX ORDER — CELECOXIB 100 MG/1
100 CAPSULE ORAL 2 TIMES DAILY
Status: DISCONTINUED | OUTPATIENT
Start: 2022-11-12 | End: 2022-11-13

## 2022-11-12 RX ORDER — IBUPROFEN 200 MG
16 TABLET ORAL
Status: DISCONTINUED | OUTPATIENT
Start: 2022-11-12 | End: 2022-11-15 | Stop reason: HOSPADM

## 2022-11-12 RX ORDER — AMLODIPINE BESYLATE 5 MG/1
5 TABLET ORAL DAILY
Status: DISCONTINUED | OUTPATIENT
Start: 2022-11-12 | End: 2022-11-15 | Stop reason: HOSPADM

## 2022-11-12 RX ORDER — GABAPENTIN 300 MG/1
300 CAPSULE ORAL 2 TIMES DAILY
Status: DISCONTINUED | OUTPATIENT
Start: 2022-11-12 | End: 2022-11-15 | Stop reason: HOSPADM

## 2022-11-12 RX ORDER — LOSARTAN POTASSIUM 50 MG/1
50 TABLET ORAL DAILY
Status: DISCONTINUED | OUTPATIENT
Start: 2022-11-12 | End: 2022-11-15 | Stop reason: HOSPADM

## 2022-11-12 RX ORDER — INSULIN ASPART 100 [IU]/ML
0-5 INJECTION, SOLUTION INTRAVENOUS; SUBCUTANEOUS
Status: DISCONTINUED | OUTPATIENT
Start: 2022-11-12 | End: 2022-11-15 | Stop reason: HOSPADM

## 2022-11-12 RX ORDER — LANOLIN ALCOHOL/MO/W.PET/CERES
800 CREAM (GRAM) TOPICAL
Status: DISCONTINUED | OUTPATIENT
Start: 2022-11-12 | End: 2022-11-15 | Stop reason: HOSPADM

## 2022-11-12 RX ORDER — OXYCODONE AND ACETAMINOPHEN 10; 325 MG/1; MG/1
1 TABLET ORAL EVERY 4 HOURS PRN
Status: DISCONTINUED | OUTPATIENT
Start: 2022-11-12 | End: 2022-11-15 | Stop reason: HOSPADM

## 2022-11-12 RX ORDER — ONDANSETRON 2 MG/ML
4 INJECTION INTRAMUSCULAR; INTRAVENOUS EVERY 8 HOURS PRN
Status: DISCONTINUED | OUTPATIENT
Start: 2022-11-12 | End: 2022-11-15 | Stop reason: HOSPADM

## 2022-11-12 RX ORDER — ASPIRIN 81 MG/1
81 TABLET ORAL DAILY
Status: DISCONTINUED | OUTPATIENT
Start: 2022-11-12 | End: 2022-11-15 | Stop reason: HOSPADM

## 2022-11-12 RX ORDER — ENOXAPARIN SODIUM 100 MG/ML
40 INJECTION SUBCUTANEOUS EVERY 24 HOURS
Status: DISCONTINUED | OUTPATIENT
Start: 2022-11-12 | End: 2022-11-15 | Stop reason: HOSPADM

## 2022-11-12 RX ORDER — OXYCODONE HYDROCHLORIDE 5 MG/1
5 TABLET ORAL EVERY 6 HOURS PRN
Status: DISCONTINUED | OUTPATIENT
Start: 2022-11-12 | End: 2022-11-12

## 2022-11-12 RX ORDER — IBUPROFEN 200 MG
24 TABLET ORAL
Status: DISCONTINUED | OUTPATIENT
Start: 2022-11-12 | End: 2022-11-15 | Stop reason: HOSPADM

## 2022-11-12 RX ORDER — ACETAMINOPHEN 325 MG/1
650 TABLET ORAL EVERY 4 HOURS PRN
Status: DISCONTINUED | OUTPATIENT
Start: 2022-11-12 | End: 2022-11-15 | Stop reason: HOSPADM

## 2022-11-12 RX ORDER — POLYETHYLENE GLYCOL 3350 17 G/17G
17 POWDER, FOR SOLUTION ORAL 2 TIMES DAILY PRN
Status: DISCONTINUED | OUTPATIENT
Start: 2022-11-12 | End: 2022-11-15 | Stop reason: HOSPADM

## 2022-11-12 RX ORDER — NALOXONE HCL 0.4 MG/ML
0.02 VIAL (ML) INJECTION
Status: DISCONTINUED | OUTPATIENT
Start: 2022-11-12 | End: 2022-11-15 | Stop reason: HOSPADM

## 2022-11-12 RX ORDER — MORPHINE SULFATE 4 MG/ML
4 INJECTION, SOLUTION INTRAMUSCULAR; INTRAVENOUS EVERY 4 HOURS PRN
Status: DISCONTINUED | OUTPATIENT
Start: 2022-11-12 | End: 2022-11-12

## 2022-11-12 RX ORDER — AZELASTINE 1 MG/ML
2 SPRAY, METERED NASAL 2 TIMES DAILY PRN
Status: DISCONTINUED | OUTPATIENT
Start: 2022-11-12 | End: 2022-11-15 | Stop reason: HOSPADM

## 2022-11-12 RX ORDER — HYDROMORPHONE HYDROCHLORIDE 1 MG/ML
0.5 INJECTION, SOLUTION INTRAMUSCULAR; INTRAVENOUS; SUBCUTANEOUS EVERY 4 HOURS PRN
Status: DISCONTINUED | OUTPATIENT
Start: 2022-11-12 | End: 2022-11-15 | Stop reason: HOSPADM

## 2022-11-12 RX ORDER — AMOXICILLIN 250 MG
1 CAPSULE ORAL 2 TIMES DAILY
Status: DISCONTINUED | OUTPATIENT
Start: 2022-11-12 | End: 2022-11-15 | Stop reason: HOSPADM

## 2022-11-12 RX ORDER — IPRATROPIUM BROMIDE AND ALBUTEROL SULFATE 2.5; .5 MG/3ML; MG/3ML
3 SOLUTION RESPIRATORY (INHALATION) EVERY 6 HOURS PRN
Status: DISCONTINUED | OUTPATIENT
Start: 2022-11-12 | End: 2022-11-15

## 2022-11-12 RX ORDER — MONTELUKAST SODIUM 10 MG/1
10 TABLET ORAL NIGHTLY
Status: DISCONTINUED | OUTPATIENT
Start: 2022-11-12 | End: 2022-11-15 | Stop reason: HOSPADM

## 2022-11-12 RX ORDER — SODIUM CHLORIDE 0.9 % (FLUSH) 0.9 %
10 SYRINGE (ML) INJECTION EVERY 12 HOURS PRN
Status: DISCONTINUED | OUTPATIENT
Start: 2022-11-12 | End: 2022-11-15 | Stop reason: HOSPADM

## 2022-11-12 RX ORDER — TALC
6 POWDER (GRAM) TOPICAL NIGHTLY PRN
Status: DISCONTINUED | OUTPATIENT
Start: 2022-11-12 | End: 2022-11-15 | Stop reason: HOSPADM

## 2022-11-12 RX ORDER — SODIUM CHLORIDE, SODIUM LACTATE, POTASSIUM CHLORIDE, CALCIUM CHLORIDE 600; 310; 30; 20 MG/100ML; MG/100ML; MG/100ML; MG/100ML
INJECTION, SOLUTION INTRAVENOUS CONTINUOUS
Status: DISCONTINUED | OUTPATIENT
Start: 2022-11-12 | End: 2022-11-12

## 2022-11-12 RX ORDER — GLUCAGON 1 MG
1 KIT INJECTION
Status: DISCONTINUED | OUTPATIENT
Start: 2022-11-12 | End: 2022-11-15 | Stop reason: HOSPADM

## 2022-11-12 RX ORDER — FLUTICASONE PROPIONATE 50 MCG
1 SPRAY, SUSPENSION (ML) NASAL 2 TIMES DAILY
Status: DISCONTINUED | OUTPATIENT
Start: 2022-11-12 | End: 2022-11-15 | Stop reason: HOSPADM

## 2022-11-12 RX ADMIN — VANCOMYCIN HYDROCHLORIDE 1500 MG: 1.5 INJECTION, POWDER, LYOPHILIZED, FOR SOLUTION INTRAVENOUS at 10:11

## 2022-11-12 RX ADMIN — CEFEPIME HYDROCHLORIDE 2 G: 2 INJECTION, SOLUTION INTRAVENOUS at 04:11

## 2022-11-12 RX ADMIN — SENNOSIDES AND DOCUSATE SODIUM 1 TABLET: 50; 8.6 TABLET ORAL at 11:11

## 2022-11-12 RX ADMIN — ENOXAPARIN SODIUM 40 MG: 100 INJECTION SUBCUTANEOUS at 05:11

## 2022-11-12 RX ADMIN — VANCOMYCIN HYDROCHLORIDE 1500 MG: 1.5 INJECTION, POWDER, LYOPHILIZED, FOR SOLUTION INTRAVENOUS at 12:11

## 2022-11-12 RX ADMIN — MORPHINE SULFATE 4 MG: 4 INJECTION, SOLUTION INTRAMUSCULAR; INTRAVENOUS at 08:11

## 2022-11-12 RX ADMIN — SODIUM CHLORIDE, SODIUM LACTATE, POTASSIUM CHLORIDE, AND CALCIUM CHLORIDE: .6; .31; .03; .02 INJECTION, SOLUTION INTRAVENOUS at 01:11

## 2022-11-12 RX ADMIN — INSULIN ASPART 1 UNITS: 100 INJECTION, SOLUTION INTRAVENOUS; SUBCUTANEOUS at 08:11

## 2022-11-12 RX ADMIN — AMLODIPINE BESYLATE 5 MG: 5 TABLET ORAL at 11:11

## 2022-11-12 RX ADMIN — MORPHINE SULFATE 4 MG: 4 INJECTION, SOLUTION INTRAMUSCULAR; INTRAVENOUS at 12:11

## 2022-11-12 RX ADMIN — SENNOSIDES AND DOCUSATE SODIUM 1 TABLET: 50; 8.6 TABLET ORAL at 01:11

## 2022-11-12 RX ADMIN — CELECOXIB 100 MG: 100 CAPSULE ORAL at 12:11

## 2022-11-12 RX ADMIN — IOHEXOL 100 ML: 350 INJECTION, SOLUTION INTRAVENOUS at 12:11

## 2022-11-12 RX ADMIN — GABAPENTIN 300 MG: 300 CAPSULE ORAL at 11:11

## 2022-11-12 RX ADMIN — ONDANSETRON 4 MG: 2 INJECTION INTRAMUSCULAR; INTRAVENOUS at 12:11

## 2022-11-12 RX ADMIN — GABAPENTIN 300 MG: 300 CAPSULE ORAL at 08:11

## 2022-11-12 RX ADMIN — SENNOSIDES AND DOCUSATE SODIUM 1 TABLET: 50; 8.6 TABLET ORAL at 08:11

## 2022-11-12 RX ADMIN — MONTELUKAST 10 MG: 10 TABLET, FILM COATED ORAL at 08:11

## 2022-11-12 RX ADMIN — HYDROMORPHONE HYDROCHLORIDE 0.5 MG: 1 INJECTION, SOLUTION INTRAMUSCULAR; INTRAVENOUS; SUBCUTANEOUS at 10:11

## 2022-11-12 RX ADMIN — ASPIRIN 81 MG: 81 TABLET, COATED ORAL at 11:11

## 2022-11-12 RX ADMIN — FLUTICASONE PROPIONATE 50 MCG: 50 SPRAY, METERED NASAL at 08:11

## 2022-11-12 RX ADMIN — ATORVASTATIN CALCIUM 20 MG: 20 TABLET, FILM COATED ORAL at 11:11

## 2022-11-12 RX ADMIN — OXYCODONE HYDROCHLORIDE AND ACETAMINOPHEN 1 TABLET: 10; 325 TABLET ORAL at 08:11

## 2022-11-12 RX ADMIN — INSULIN ASPART 2 UNITS: 100 INJECTION, SOLUTION INTRAVENOUS; SUBCUTANEOUS at 01:11

## 2022-11-12 RX ADMIN — FLUTICASONE PROPIONATE 50 MCG: 50 SPRAY, METERED NASAL at 11:11

## 2022-11-12 RX ADMIN — LOSARTAN POTASSIUM 50 MG: 50 TABLET, FILM COATED ORAL at 11:11

## 2022-11-12 RX ADMIN — THERA TABS 1 TABLET: TAB at 11:11

## 2022-11-12 RX ADMIN — CEFEPIME HYDROCHLORIDE 2 G: 2 INJECTION, SOLUTION INTRAVENOUS at 09:11

## 2022-11-12 RX ADMIN — CELECOXIB 100 MG: 100 CAPSULE ORAL at 08:11

## 2022-11-12 RX ADMIN — VANCOMYCIN HYDROCHLORIDE 1000 MG: 1 INJECTION, POWDER, LYOPHILIZED, FOR SOLUTION INTRAVENOUS at 12:11

## 2022-11-12 NOTE — PLAN OF CARE
Problem: Adult Inpatient Plan of Care  Goal: Plan of Care Review  Outcome: Ongoing, Progressing  Goal: Absence of Hospital-Acquired Illness or Injury  Outcome: Ongoing, Progressing  Goal: Optimal Comfort and Wellbeing  Outcome: Ongoing, Progressing  Goal: Readiness for Transition of Care  Outcome: Ongoing, Progressing     Problem: Diabetes Comorbidity  Goal: Blood Glucose Level Within Targeted Range  Outcome: Ongoing, Progressing     Problem: Adjustment to Illness (Sepsis/Septic Shock)  Goal: Optimal Coping  Outcome: Ongoing, Progressing     Problem: Glycemic Control Impaired (Sepsis/Septic Shock)  Goal: Blood Glucose Level Within Desired Range  Outcome: Ongoing, Progressing     Problem: Impaired Wound Healing  Goal: Optimal Wound Healing  Outcome: Ongoing, Progressing

## 2022-11-12 NOTE — SUBJECTIVE & OBJECTIVE
Current Facility-Administered Medications on File Prior to Encounter   Medication    [DISCONTINUED] alteplase injection 2 mg    [DISCONTINUED] heparin, porcine (PF) 100 unit/mL injection flush 500 Units    [DISCONTINUED] sodium chloride 0.9% flush 10 mL     Current Outpatient Medications on File Prior to Encounter   Medication Sig    albuterol-ipratropium (DUO-NEB) 2.5 mg-0.5 mg/3 mL nebulizer solution Take 3 mLs by nebulization every 6 (six) hours as needed for Wheezing. Rescue    amLODIPine (NORVASC) 5 MG tablet Take 1 tablet (5 mg total) by mouth once daily.    aspirin (ECOTRIN) 81 MG EC tablet Take 81 mg by mouth once daily.    atorvastatin (LIPITOR) 20 MG tablet Take 1 tablet (20 mg total) by mouth once daily.    azelastine (ASTELIN) 137 mcg (0.1 %) nasal spray 2 sprays (274 mcg total) by Nasal route 2 (two) times daily as needed for Rhinitis.    fluticasone propionate (FLONASE) 50 mcg/actuation nasal spray 1 spray (50 mcg total) by Each Nostril route 2 (two) times daily.    gabapentin (NEURONTIN) 300 MG capsule Take 1 capsule (300 mg total) by mouth 2 (two) times daily.    losartan (COZAAR) 100 MG tablet Take 0.5 tablets (50 mg total) by mouth once daily. (Patient taking differently: Take 100 mg by mouth once daily.)    metFORMIN (GLUCOPHAGE) 500 MG tablet metformin 500 mg tablet  Take 2 tablets twice a day by oral route with meals for 90 days. (Patient taking differently: Take 1,000 mg by mouth 2 (two) times daily with meals. metformin 500 mg tablet  Take 2 tablets twice a day by oral route with meals for 90 days.)    montelukast (SINGULAIR) 10 mg tablet Take 1 tablet (10 mg total) by mouth nightly as needed.    MULTIVITAMIN ORAL Take 1 tablet by mouth once daily.    naproxen (NAPROSYN) 500 MG tablet Take 1 tablet (500 mg total) by mouth 2 (two) times daily with meals.    blood sugar diagnostic (FREESTYLE LITE STRIPS MISC) FreeStyle Lite Strips    blood sugar diagnostic (FREESTYLE LITE STRIPS) Strp Check  blood sugar 2 (two) times daily as needed. (Patient not taking: Reported on 10/20/2022)    blood sugar diagnostic Strp     mucus clearing device (ACAPELLA, FLUTTER) 1 Device by Misc.(Non-Drug; Combo Route) route 2 (two) times daily.    omeprazole (PRILOSEC) 40 MG capsule Take 1 capsule (40 mg total) by mouth once daily.       Review of patient's allergies indicates:  No Known Allergies    Past Medical History:   Diagnosis Date    Chest wall abscess 9/30/2022    Chronic obstructive pulmonary disease, unspecified COPD type 3/28/2022    Diabetes mellitus, type 2     Encounter for antineoplastic chemotherapy 04/01/2020    Hypertension     Hypogammaglobulinemia 12/13/2019    Neuropathy     Non Hodgkin's lymphoma     Reflux esophagitis     Ringing in ear, bilateral      Past Surgical History:   Procedure Laterality Date    COLONOSCOPY  2018    EYE SURGERY  Approximately 3 years ago    Cataract    HAND SURGERY      amputation left index finger    INCISION AND DRAINAGE OF ABSCESS Left 10/3/2022    Procedure: INCISION AND DRAINAGE, ABSCESS;  Surgeon: Franco Venegas III, MD;  Location: [facility] OR;  Service: General;  Laterality: Left;  axilla    MEDIPORT REMOVAL Right 10/3/2022    Procedure: REMOVAL, CATHETER, CENTRAL VENOUS, TUNNELED, WITH PORT;  Surgeon: Franco Venegas III, MD;  Location: [facility] OR;  Service: General;  Laterality: Right;    PORTACATH PLACEMENT      SKIN CANCER EXCISION  09/30/2020    [facility]    SPINE SURGERY      VASECTOMY  1985 ?     Family History       Problem Relation (Age of Onset)    Diabetes Father          Tobacco Use    Smoking status: Former     Packs/day: 0.50     Years: 2.00     Pack years: 1.00     Types: Cigarettes    Smokeless tobacco: Never   Substance and Sexual Activity    Alcohol use: Not Currently     Alcohol/week: 0.0 standard drinks     Comment: rarely    Drug use: No    Sexual activity: Not Currently     Review of Systems   Respiratory: Negative.     Cardiovascular: Negative.     Gastrointestinal: Negative.    Skin:         See HPI.   Objective:     Vital Signs (Most Recent):  Temp: 98.9 °F (37.2 °C) (11/12/22 1148)  Pulse: 96 (11/12/22 1148)  Resp: 18 (11/12/22 1148)  BP: (!) 151/91 (nurse notified) (11/12/22 1148)  SpO2: 97 % (11/12/22 1148)   Vital Signs (24h Range):  Temp:  [98.3 °F (36.8 °C)-100.9 °F (38.3 °C)] 98.9 °F (37.2 °C)  Pulse:  [] 96  Resp:  [16-26] 18  SpO2:  [95 %-99 %] 97 %  BP: (118-164)/(61-91) 151/91     Weight: 94.4 kg (208 lb 1.8 oz)  Body mass index is 31.64 kg/m².    Physical Exam  Constitutional:       General: He is not in acute distress.     Appearance: He is well-developed.   HENT:      Head: Normocephalic and atraumatic.   Neck:      Thyroid: No thyromegaly.   Cardiovascular:      Rate and Rhythm: Normal rate and regular rhythm.      Heart sounds: Normal heart sounds. No murmur heard.  Pulmonary:      Effort: Pulmonary effort is normal. No respiratory distress.      Breath sounds: Normal breath sounds. No wheezing or rales.   Abdominal:      General: Bowel sounds are normal. There is no distension.      Palpations: Abdomen is soft.      Tenderness: There is no abdominal tenderness.      Hernia: No hernia is present.   Musculoskeletal:         General: No tenderness. Normal range of motion.      Cervical back: Normal range of motion and neck supple.   Lymphadenopathy:      Cervical: No cervical adenopathy.   Skin:     General: Skin is warm and dry.      Findings: No erythema or rash.      Comments: Of the upper chest wall and lateral into the axilla there is some slight induration.  There are 2 previously drained areas which are packed but have no current drainage.  There is no distinct cellulitis.  There is no fluctuant mass.  The point of maximal tenderness is in the very anterior axilla around the site of 1 of the previous incisions.  There is however minimal erythema in that area.   Neurological:      Mental Status: He is alert and oriented to person,  place, and time.   Psychiatric:         Behavior: Behavior normal.         Judgment: Judgment normal.       Significant Labs:  I have reviewed all pertinent lab results within the past 24 hours.  CBC:   Recent Labs   Lab 11/12/22  0606   WBC 5.99   RBC 4.04*   HGB 11.4*   HCT 36.1*      MCV 89   MCH 28.2   MCHC 31.6*     CMP:   Recent Labs   Lab 11/11/22 2015 11/12/22  0606   *  301* 236*   CALCIUM 8.7  8.7 8.3*   ALBUMIN 3.4*  3.4*  --    PROT 7.1  7.1  --    *  134* 136   K 3.7  3.7 3.7   CO2 26  26 27     102 105   BUN 25*  25* 17   CREATININE 1.0  1.0 1.0   ALKPHOS 105  105  --    ALT 26  26  --    AST 34  34  --    BILITOT 0.4  0.4  --

## 2022-11-12 NOTE — ASSESSMENT & PLAN NOTE
Per CT : Mild subcutaneous edema within the left chest wall anterior to the pectoralis muscle compatible with cellulitis. There is no abscess  History of extensive chest wall cellulitis with axillary abscess, growing Serratia  Continue iv abx

## 2022-11-12 NOTE — ASSESSMENT & PLAN NOTE
Admit to med/tele  Blood cultures   Wound culture of wound 3  Empirically start cefepime/vanc  Recent complicated cellulitis and abscess with serratia treated by Dr. Venegas and Dr. Washington - will consult both   IV hydration

## 2022-11-12 NOTE — PROGRESS NOTES
Dr. Washington contacted and aware of new consult on pt readmitted for Left Axilla cellulitis and sepsis; safety intact with assessment ongoing    General Sx also contacted r/t new consult for same as above, waiting to hear back from Dr. Mon

## 2022-11-12 NOTE — HPI
67-year-old gentleman with lymphoma who was treated with Rituxan was admitted with worsening of the pain of his left axilla and lateral upper chest.  He has a history of an incision and drainage performed by Dr. Landeros in early October for an abscess in this area.  Upon reviewing the op note, there was a minimal amount of pus evacuated at the time.  A Penrose drain was placed.  He has been undergoing wound care and the wound has been closing without difficulty.  He received a course of oral antibiotics upon discharge at that time.  Yesterday he began to have some severe pain in this area.  There is no fluctuant mass and there has been no drainage from the site of the previous I&D.

## 2022-11-12 NOTE — SUBJECTIVE & OBJECTIVE
Past Medical History:   Diagnosis Date    Diabetes mellitus, type 2     Encounter for antineoplastic chemotherapy 04/01/2020    Hypertension     Neuropathy     Non Hodgkin's lymphoma     Reflux esophagitis        Past Surgical History:   Procedure Laterality Date    COLONOSCOPY  2018    EYE SURGERY  Approximately 3 years ago    Cataract    HAND SURGERY      amputation left index finger    INCISION AND DRAINAGE OF ABSCESS Left 10/3/2022    Procedure: INCISION AND DRAINAGE, ABSCESS;  Surgeon: Franco Venegas III, MD;  Location: [facility] OR;  Service: General;  Laterality: Left;  axilla    MEDIPORT REMOVAL Right 10/3/2022    Procedure: REMOVAL, CATHETER, CENTRAL VENOUS, TUNNELED, WITH PORT;  Surgeon: Franco Venegas III, MD;  Location: [facility] OR;  Service: General;  Laterality: Right;    PORTACATH PLACEMENT      SKIN CANCER EXCISION  09/30/2020    [facility]    SPINE SURGERY      VASECTOMY  1985 ?       Review of patient's allergies indicates:  No Known Allergies    Current Facility-Administered Medications on File Prior to Encounter   Medication    [COMPLETED] diphenhydrAMINE (BENADRYL) 25 mg in NS 50 mL IVPB    [COMPLETED] immune globulin (human) (IGG) injection 10% (GAMUNEX)    [COMPLETED] sodium chloride 0.9% 100 mL flush bag    [DISCONTINUED] alteplase injection 2 mg    [DISCONTINUED] heparin, porcine (PF) 100 unit/mL injection flush 500 Units    [DISCONTINUED] sodium chloride 0.9% flush 10 mL     Current Outpatient Medications on File Prior to Encounter   Medication Sig    albuterol-ipratropium (DUO-NEB) 2.5 mg-0.5 mg/3 mL nebulizer solution Take 3 mLs by nebulization every 6 (six) hours as needed for Wheezing. Rescue    amLODIPine (NORVASC) 5 MG tablet Take 1 tablet (5 mg total) by mouth once daily.    aspirin (ECOTRIN) 81 MG EC tablet Take 81 mg by mouth once daily.    atorvastatin (LIPITOR) 20 MG tablet Take 1 tablet (20 mg total) by mouth once daily.    azelastine (ASTELIN) 137 mcg (0.1 %) nasal spray 2 sprays  (274 mcg total) by Nasal route 2 (two) times daily as needed for Rhinitis.    fluticasone propionate (FLONASE) 50 mcg/actuation nasal spray 1 spray (50 mcg total) by Each Nostril route 2 (two) times daily.    gabapentin (NEURONTIN) 300 MG capsule Take 1 capsule (300 mg total) by mouth 2 (two) times daily.    losartan (COZAAR) 100 MG tablet Take 0.5 tablets (50 mg total) by mouth once daily. (Patient taking differently: Take 100 mg by mouth once daily.)    metFORMIN (GLUCOPHAGE) 500 MG tablet metformin 500 mg tablet  Take 2 tablets twice a day by oral route with meals for 90 days. (Patient taking differently: Take 1,000 mg by mouth 2 (two) times daily with meals. metformin 500 mg tablet  Take 2 tablets twice a day by oral route with meals for 90 days.)    montelukast (SINGULAIR) 10 mg tablet Take 1 tablet (10 mg total) by mouth nightly as needed.    MULTIVITAMIN ORAL Take 1 tablet by mouth once daily.    naproxen (NAPROSYN) 500 MG tablet Take 1 tablet (500 mg total) by mouth 2 (two) times daily with meals.    blood sugar diagnostic (FREESTYLE LITE STRIPS MISC) FreeStyle Lite Strips    blood sugar diagnostic (FREESTYLE LITE STRIPS) Strp Check blood sugar 2 (two) times daily as needed. (Patient not taking: Reported on 10/20/2022)    blood sugar diagnostic Strp     mucus clearing device (ACAPELLA, FLUTTER) 1 Device by Misc.(Non-Drug; Combo Route) route 2 (two) times daily.    omeprazole (PRILOSEC) 40 MG capsule Take 1 capsule (40 mg total) by mouth once daily.    [DISCONTINUED] benzonatate (TESSALON) 200 MG capsule Take 1 capsule (200 mg total) by mouth 3 (three) times daily as needed for Cough.    [DISCONTINUED] ciprofloxacin HCl (CIPRO) 500 MG tablet Take 1 tablet (500 mg total) by mouth every 12 (twelve) hours.    [DISCONTINUED] oxyCODONE-acetaminophen (PERCOCET) 5-325 mg per tablet Take 1 tablet by mouth every 6 (six) hours as needed for Pain (if severe pain). (Patient not taking: Reported on 10/20/2022)     [DISCONTINUED] predniSONE (DELTASONE) 20 MG tablet Take one daily for 3 days and may repeat for shortness of breath.    [DISCONTINUED] promethazine-dextromethorphan (PROMETHAZINE-DM) 6.25-15 mg/5 mL Syrp Take 5 mLs by mouth every 6 (six) hours as needed (cough).     Family History       Problem Relation (Age of Onset)    Diabetes Father          Tobacco Use    Smoking status: Former     Packs/day: 0.50     Years: 2.00     Pack years: 1.00     Types: Cigarettes    Smokeless tobacco: Never   Substance and Sexual Activity    Alcohol use: Not Currently     Alcohol/week: 0.0 standard drinks     Comment: rarely    Drug use: No    Sexual activity: Not Currently     Review of Systems   Constitutional:  Positive for chills, fatigue and fever. Negative for diaphoresis.   HENT:  Negative for congestion, ear pain, sore throat and trouble swallowing.    Eyes:  Negative for pain, discharge and visual disturbance.   Respiratory:  Negative for cough, chest tightness, shortness of breath and wheezing.    Cardiovascular:  Negative for chest pain, palpitations and leg swelling.   Gastrointestinal:  Positive for nausea. Negative for abdominal distention, abdominal pain, blood in stool, constipation, diarrhea and vomiting.   Endocrine: Negative for polydipsia, polyphagia and polyuria.   Genitourinary:  Negative for dysuria, flank pain, frequency and urgency.   Musculoskeletal:  Negative for back pain, joint swelling, neck pain and neck stiffness.        Left axilla pain   Skin:  Negative for rash and wound.   Allergic/Immunologic: Negative for immunocompromised state.   Neurological:  Positive for headaches. Negative for dizziness, syncope, speech difficulty, weakness, light-headedness and numbness.   Hematological:  Negative for adenopathy.   Psychiatric/Behavioral:  Negative for confusion and suicidal ideas. The patient is not nervous/anxious.    All other systems reviewed and are negative.  Objective:     Vital Signs (Most  Recent):  Temp: 99 °F (37.2 °C) (11/11/22 2124)  Pulse: 99 (11/11/22 2124)  Resp: (!) 24 (11/11/22 2124)  BP: 125/64 (11/11/22 2100)  SpO2: 98 % (11/11/22 2124)   Vital Signs (24h Range):  Temp:  [98.3 °F (36.8 °C)-100.7 °F (38.2 °C)] 99 °F (37.2 °C)  Pulse:  [] 99  Resp:  [15-26] 24  SpO2:  [95 %-99 %] 98 %  BP: (106-151)/(61-74) 125/64     Weight: 95.7 kg (211 lb)  Body mass index is 32.08 kg/m².    Physical Exam  Vitals and nursing note reviewed.   Constitutional:       Appearance: He is well-developed. He is obese.   HENT:      Head: Normocephalic and atraumatic.   Eyes:      Conjunctiva/sclera: Conjunctivae normal.      Pupils: Pupils are equal, round, and reactive to light.   Cardiovascular:      Rate and Rhythm: Regular rhythm. Tachycardia present.      Pulses: Normal pulses.   Pulmonary:      Effort: Pulmonary effort is normal.      Breath sounds: Normal breath sounds.   Chest:          Comments: Old portacath site is clean dry and intact without drainage  Wound 1 with scant serous drainage, slightly open will approximated  Wound 2 - pea size area open without drainage, well approximated at the edges  Wound 3, less than 1 cm wound track with serosanguinous drainage  Area 4 is indurated and tender  Abdominal:      General: Bowel sounds are normal.      Palpations: Abdomen is soft.   Musculoskeletal:         General: Normal range of motion.      Cervical back: Normal range of motion and neck supple.   Skin:     General: Skin is warm.      Capillary Refill: Capillary refill takes less than 2 seconds.      Findings: Erythema present.      Comments: Slight erythema to the right  of the left most distal wound adjacent to the axilla between the nipple and wound Dressings were removed and trauma of tape removal did cause some mild patchy redness that wasn't there before the tape removal    Neurological:      Mental Status: He is alert and oriented to person, place, and time.   Psychiatric:         Behavior:  Behavior normal.         Thought Content: Thought content normal.         Judgment: Judgment normal.         CRANIAL NERVES     CN III, IV, VI   Pupils are equal, round, and reactive to light.     Significant Labs: All pertinent labs within the past 24 hours have been reviewed.  CBC:   Recent Labs   Lab 11/11/22 2015   WBC 6.46  6.46   HGB 11.2*  11.2*   HCT 35.1*  35.1*     198     CMP:   Recent Labs   Lab 11/11/22 2015   *  134*   K 3.7  3.7     102   CO2 26  26   *  301*   BUN 25*  25*   CREATININE 1.0  1.0   CALCIUM 8.7  8.7   PROT 7.1  7.1   ALBUMIN 3.4*  3.4*   BILITOT 0.4  0.4   ALKPHOS 105  105   AST 34  34   ALT 26  26   ANIONGAP 6*  6*     Lactic Acid:   Recent Labs   Lab 11/11/22 2055   LACTATE 1.8       Significant Imaging: I have reviewed all pertinent imaging results/findings within the past 24 hours.  EKG: I have reviewed all pertinent results/findings within the past 24 hours and my personal findings are: NSR previous inferior infarct, no acute ischemic changes

## 2022-11-12 NOTE — HPI
Kirstie is a 67 y.o. male with past medical history of hypertension, diabetes with lower extremity neuropathy, and low-grade non-Hodgkin's lymphoma on recently on rituximab maintenance therapy (last 9/15 - now on surveillance) and monthly IVIG (last 11/11), recent admission for chest wall cellulitis and abscesses culturing pansensitive serratia who presents today with complaints of left axillary pain. It is severe. It is associated with fever T max 100.7, nausea, headache, and a small area of erythema to right of lowest chest wall wound near the left axilla. He denies chest pain, SOB, V/D, dizziness, or LOC. He had a complicated stay on his last admission requiring ICU care. He was discharged home with penrose drains and 2 weeks of cipro, and f/u with Dr. Venegas on 10/20 to have them removed and packing was placed. He is getting wound care with packing exchange every 72 hours. The packing was changed today, and while the nurses were there he became febrile and reported the pain to the axilla and was directed to the ED. In the ED, he was noted to be febrile 100.7, tachycardic 111, normotensive 148/74. WBC 6K. Procal 2.56. Lactate 1.8. Negative for flu and covid.

## 2022-11-12 NOTE — ASSESSMENT & PLAN NOTE
1. No drainable collections on physical exam.  2. Discussed findings with Dr. Washington.  3. Will obtain CT scan to further evaluate for drainable fluid collections.  4. Following.

## 2022-11-12 NOTE — CONSULTS
Consult Note  Infectious Disease    Reason for Consult:  chest wall cellulitis    HPI: Sivakumar Thacker is a 67 y.o. male Known to our service from consultation and follow-up, 10/2 until 10/7 for extensive left chest wall and axillary cellulitis with abscess formation, requiring extensive incision and drainage, Port-A-Cath removal and antibiotics.  Cultures from the abscess unexpectedly grew Serratia.  At the time of discharge he was given 2 additional weeks of oral ciprofloxacin.  He was seen by Dr. Venegas on October 20th and the drains were removed from his chest and he has been having wet-to-dry dressing changes since then.  He has not yet followed up with Dr. Venegas again. He noted a low grade temp on 11/10 pm with increased discomfort in left pectoral/axillary area.   He was seen by Dr. Ibarra yesterday(but forgot to mention this) and later in the evening he presented to the emergency room with complaints of fever, pain and chest wall redness.  He was diagnosed with cellulitis of the chest wall and admitted to Hospital Medicine and placed on vancomycin and cefepime.  He has a history of non-Hodgkin's lymphoma (Rituxan is on hold) and hypogammaglobulinemia for which he is receiving immunoglobulin replacement, last received yesterday am.  He was previously on suppressive/preventative antibiotics with Augmentin and azithromycin (from pulmonary). Procalcitonin 2.56, WBC WNL, lactic acid normal. His chronic sinusitis and bronchitis are much improved after the recent antibiotic course.     Review of patient's allergies indicates:  No Known Allergies  Past Medical History:   Diagnosis Date    Chest wall abscess 9/30/2022    Chronic obstructive pulmonary disease, unspecified COPD type 3/28/2022    Diabetes mellitus, type 2     Encounter for antineoplastic chemotherapy 04/01/2020    Hypertension     Hypogammaglobulinemia 12/13/2019    Neuropathy     Non Hodgkin's lymphoma     Reflux esophagitis     Ringing in ear,  bilateral      Past Surgical History:   Procedure Laterality Date    COLONOSCOPY  2018    EYE SURGERY  Approximately 3 years ago    Cataract    HAND SURGERY      amputation left index finger    INCISION AND DRAINAGE OF ABSCESS Left 10/3/2022    Procedure: INCISION AND DRAINAGE, ABSCESS;  Surgeon: Franco Venegas III, MD;  Location: [facility];  Service: General;  Laterality: Left;  axilla    MEDIPORT REMOVAL Right 10/3/2022    Procedure: REMOVAL, CATHETER, CENTRAL VENOUS, TUNNELED, WITH PORT;  Surgeon: Franco Venegas III, MD;  Location: [facility] OR;  Service: General;  Laterality: Right;    PORTACATH PLACEMENT      SKIN CANCER EXCISION  09/30/2020    [facility]    SPINE SURGERY      VASECTOMY  1985 ?     Social History     Socioeconomic History    Marital status:    Tobacco Use    Smoking status: Former     Packs/day: 0.50     Years: 2.00     Pack years: 1.00     Types: Cigarettes    Smokeless tobacco: Never   Substance and Sexual Activity    Alcohol use: Not Currently     Alcohol/week: 0.0 standard drinks     Comment: rarely    Drug use: No    Sexual activity: Not Currently     Social Determinants of Health     Financial Resource Strain: Low Risk     Difficulty of Paying Living Expenses: Not very hard   Food Insecurity: No Food Insecurity    Worried About Running Out of Food in the Last Year: Never true    Ran Out of Food in the Last Year: Never true   Transportation Needs: No Transportation Needs    Lack of Transportation (Medical): No    Lack of Transportation (Non-Medical): No   Physical Activity: Unknown    Days of Exercise per Week: 3 days   Stress: No Stress Concern Present    Feeling of Stress : Only a little   Social Connections: Unknown    Frequency of Communication with Friends and Family: More than three times a week    Frequency of Social Gatherings with Friends and Family: More than three times a week    Active Member of Clubs or Organizations: Yes    Attends Club or Organization Meetings: More  than 4 times per year    Marital Status:    Housing Stability: Unknown    Unable to Pay for Housing in the Last Year: No    Unstable Housing in the Last Year: No     Family History   Problem Relation Age of Onset    Diabetes Father          Review of Systems:   No chills, but mild fever and nausea  No change in vision,   No sinus congestion, purulent nasal discharge,    No pain in mouth or throat.  .  No angina, palpitations, syncope  No cough, sputum production, shortness of breath,    Nausea ,No  vomiting, diarrhea, constipation,  or focal abd pain,    No dysuria,    No swelling of joints, redness of joints, injuries,  and he does not appreciate pain in the joint, only in the axilla  No unusual headaches, dizziness,    No anxiety, depression, substance abuse,    Diabetes with A1c 7.4%  No bleeding, lymphadenopathy,   unusual bruising  No new rashes,      Outdoor activities: n/a but has been allowed to shower  Travel:   Implants: none  Antibiotic History:  see HPI    EXAM & DIAGNOSTICS REVIEWED:   Vitals:     Temp:  [98.3 °F (36.8 °C)-100.9 °F (38.3 °C)]   Temp: 98.3 °F (36.8 °C) (11/12/22 0718)  Pulse: 97 (11/12/22 0718)  Resp: 16 (11/12/22 0835)  BP: (!) 164/82 (11/12/22 0718)  SpO2: 95 % (11/12/22 0718)    Intake/Output Summary (Last 24 hours) at 11/12/2022 1133  Last data filed at 11/12/2022 0940  Gross per 24 hour   Intake 50 ml   Output 2375 ml   Net -2325 ml       General:  In NAD. Alert and attentive, cooperative, comfortable  Eyes:  Anicteric,   EOMI  ENT:  No ulcers, exudates, thrush, nares patent,    Neck:  supple, no masses or adenopathy appreciated  Lungs: Clear, no consolidation, rales, wheezes, rub  Heart:  RRR, no gallop/murmur/rub noted  Abd:  Soft, NT, ND, normal BS, no masses or organomegaly appreciated.  :  Voids/  no flank tenderness  Musc:  Joints without effusion, swelling, erythema, synovitis, muscle wasting.  He has pain in axilla when shoulder is moved.   Skin:  No rashes. No  palmar or plantar lesions. No subungual petechiae  Neuro:             Alert, attentive, speech fluent, face symmetric, moves all extremities, no focal weakness. Ambulatory  Psych: Calm, cooperative  Lymphatic:     No cervical, supraclavicular, axillary,  nodes  Extrem: No edema, erythema, phlebitis,  warm and well perfused  VAD:       Isolation:    Wound: 11/11 11/12  The erythema has decreased . There is no purulence from any of the wounds. The axilla itself is  non tender, but pain in axilla is present when shoulder/arm is moved. There is induration superior to nipple, almost to clavicle. No crepitance or ischemia.    Lines/Tubes/Drains:    General Labs reviewed:  Recent Labs   Lab 11/11/22 2015 11/12/22  0606   WBC 6.46  6.46 5.99   HGB 11.2*  11.2* 11.4*   HCT 35.1*  35.1* 36.1*     198 156       Recent Labs   Lab 11/11/22 2015 11/12/22  0606   *  134* 136   K 3.7  3.7 3.7     102 105   CO2 26  26 27   BUN 25*  25* 17   CREATININE 1.0  1.0 1.0   CALCIUM 8.7  8.7 8.3*   PROT 7.1  7.1  --    BILITOT 0.4  0.4  --    ALKPHOS 105  105  --    ALT 26  26  --    AST 34  34  --      No results for input(s): CRP in the last 168 hours.      Micro:  Microbiology Results (last 7 days)       Procedure Component Value Units Date/Time    Aerobic culture [[id number]] Collected: 11/11/22 2322    Order Status: Completed Specimen: Incision site from Chest, Left Updated: 11/12/22 0910     Aerobic Bacterial Culture No growth    Blood culture x two cultures. Draw prior to antibiotics. [[id number]] Collected: 11/11/22 1955    Order Status: Completed Specimen: Blood from Peripheral, Forearm, Right Updated: 11/12/22 0358     Blood Culture, Routine No Growth to date    Narrative:      Aerobic and anaerobic    Blood culture x two cultures. Draw prior to antibiotics. [[id number]] Collected: 11/11/22 2010    Order Status: Completed Specimen: Blood from Peripheral, Antecubital, Right Updated: 11/12/22  0358     Blood Culture, Routine No Growth to date    Narrative:      Aerobic and anaerobic            Imaging Reviewed:        Cardiology:    IMPRESSION & PLAN   1. Nild chest wall cellulitis, no abscess palpable or visible    2. History of extensive chest wall cellulitis with axillary abscess, growing Serratia (after long term antibiotic prophylaxis), 10/3/22    3. NHL, not on treatment currently  4. Hypogammaglobulinemia, received IVIG 11/11  5. Diabetes with fair control, but hyperglycemia now      Recommendations:  Continue vanc and cefepime  As I do not believe he will tolerate an US, will obtain contrasted CT  Adding some NSAID    D/w patient, wife, nursing, and Dr. Banks    Medical Decision Making during this encounter was  [_] Low Complexity  [_] Moderate Complexity  [ xx ] High Complexity

## 2022-11-12 NOTE — ASSESSMENT & PLAN NOTE
Admit to med/tele  Blood cultures   Wound culture of wound 3  Empirically start cefepime/vanc  Recent complicated cellulitis and abscess with serratia treated by Dr. Vneegas and Dr. Washington - will consult both   IV hydration

## 2022-11-12 NOTE — H&P
[facility]  History & Physical    DOS: 11/11/2022  10:24 PM      Patient Name: Sivakumar Thacker  MRN: [id number]  Patient Class: OP- Observation  Admission Date: 11/11/2022  Attending Physician: Dr. Moe  Primary Care Provider: Barry Mcmahon MD         Patient information was obtained from patient, relative(s), past medical records and ER records.     Subjective:     Principal Problem:Sepsis    Chief Complaint:   Chief Complaint   Patient presents with    wound check left armpit     Patient had a chest wall port-o-cath removed on Oct. 3rd. Fever, pain, and weakness since last night.  Patient has non Hodgkins lymphoma.        HPI: Kirstie is a 67 y.o. male with past medical history of hypertension, diabetes with lower extremity neuropathy, and low-grade non-Hodgkin's lymphoma on recently on rituximab maintenance therapy (last 9/15 - now on surveillance) and monthly IVIG (last 11/11), recent admission for chest wall cellulitis and abscesses culturing pansensitive serratia who presents today with complaints of left axillary pain. It is severe. It is associated with fever T max 100.7, nausea, headache, and a small area of erythema to right of lowest chest wall wound near the left axilla. He denies chest pain, SOB, V/D, dizziness, or LOC. He had a complicated stay on his last admission requiring ICU care. He was discharged home with penrose drains and 2 weeks of cipro, and f/u with Dr. Venegas on 10/20 to have them removed and packing was placed. He is getting wound care with packing exchange every 72 hours. The packing was changed today, and while the nurses were there he became febrile and reported the pain to the axilla and was directed to the ED. In the ED, he was noted to be febrile 100.7, tachycardic 111, normotensive 148/74. WBC 6K. Procal 2.56. Lactate 1.8. Negative for flu and covid.       Past Medical History:   Diagnosis Date    Diabetes mellitus, type 2     Encounter for  antineoplastic chemotherapy 04/01/2020    Hypertension     Neuropathy     Non Hodgkin's lymphoma     Reflux esophagitis        Past Surgical History:   Procedure Laterality Date    COLONOSCOPY  2018    EYE SURGERY  Approximately 3 years ago    Cataract    HAND SURGERY      amputation left index finger    INCISION AND DRAINAGE OF ABSCESS Left 10/3/2022    Procedure: INCISION AND DRAINAGE, ABSCESS;  Surgeon: Franco Venegas III, MD;  Location: [facility] OR;  Service: General;  Laterality: Left;  axilla    MEDIPORT REMOVAL Right 10/3/2022    Procedure: REMOVAL, CATHETER, CENTRAL VENOUS, TUNNELED, WITH PORT;  Surgeon: Franco Venegas III, MD;  Location: [facility] OR;  Service: General;  Laterality: Right;    PORTACATH PLACEMENT      SKIN CANCER EXCISION  09/30/2020    [facility]    SPINE SURGERY      VASECTOMY  1985 ?       Review of patient's allergies indicates:  No Known Allergies    Current Facility-Administered Medications on File Prior to Encounter   Medication    [COMPLETED] diphenhydrAMINE (BENADRYL) 25 mg in NS 50 mL IVPB    [COMPLETED] immune globulin (human) (IGG) injection 10% (GAMUNEX)    [COMPLETED] sodium chloride 0.9% 100 mL flush bag    [DISCONTINUED] alteplase injection 2 mg    [DISCONTINUED] heparin, porcine (PF) 100 unit/mL injection flush 500 Units    [DISCONTINUED] sodium chloride 0.9% flush 10 mL     Current Outpatient Medications on File Prior to Encounter   Medication Sig    albuterol-ipratropium (DUO-NEB) 2.5 mg-0.5 mg/3 mL nebulizer solution Take 3 mLs by nebulization every 6 (six) hours as needed for Wheezing. Rescue    amLODIPine (NORVASC) 5 MG tablet Take 1 tablet (5 mg total) by mouth once daily.    aspirin (ECOTRIN) 81 MG EC tablet Take 81 mg by mouth once daily.    atorvastatin (LIPITOR) 20 MG tablet Take 1 tablet (20 mg total) by mouth once daily.    azelastine (ASTELIN) 137 mcg (0.1 %) nasal spray 2 sprays (274 mcg total) by Nasal route 2 (two) times daily as needed  for Rhinitis.    fluticasone propionate (FLONASE) 50 mcg/actuation nasal spray 1 spray (50 mcg total) by Each Nostril route 2 (two) times daily.    gabapentin (NEURONTIN) 300 MG capsule Take 1 capsule (300 mg total) by mouth 2 (two) times daily.    losartan (COZAAR) 100 MG tablet Take 0.5 tablets (50 mg total) by mouth once daily. (Patient taking differently: Take 100 mg by mouth once daily.)    metFORMIN (GLUCOPHAGE) 500 MG tablet metformin 500 mg tablet  Take 2 tablets twice a day by oral route with meals for 90 days. (Patient taking differently: Take 1,000 mg by mouth 2 (two) times daily with meals. metformin 500 mg tablet  Take 2 tablets twice a day by oral route with meals for 90 days.)    montelukast (SINGULAIR) 10 mg tablet Take 1 tablet (10 mg total) by mouth nightly as needed.    MULTIVITAMIN ORAL Take 1 tablet by mouth once daily.    naproxen (NAPROSYN) 500 MG tablet Take 1 tablet (500 mg total) by mouth 2 (two) times daily with meals.    blood sugar diagnostic (FREESTYLE LITE STRIPS MISC) FreeStyle Lite Strips    blood sugar diagnostic (FREESTYLE LITE STRIPS) Strp Check blood sugar 2 (two) times daily as needed. (Patient not taking: Reported on 10/20/2022)    blood sugar diagnostic Strp     mucus clearing device (ACAPELLA, FLUTTER) 1 Device by Misc.(Non-Drug; Combo Route) route 2 (two) times daily.    omeprazole (PRILOSEC) 40 MG capsule Take 1 capsule (40 mg total) by mouth once daily.    [DISCONTINUED] benzonatate (TESSALON) 200 MG capsule Take 1 capsule (200 mg total) by mouth 3 (three) times daily as needed for Cough.    [DISCONTINUED] ciprofloxacin HCl (CIPRO) 500 MG tablet Take 1 tablet (500 mg total) by mouth every 12 (twelve) hours.    [DISCONTINUED] oxyCODONE-acetaminophen (PERCOCET) 5-325 mg per tablet Take 1 tablet by mouth every 6 (six) hours as needed for Pain (if severe pain). (Patient not taking: Reported on 10/20/2022)    [DISCONTINUED] predniSONE (DELTASONE) 20 MG tablet  Take one daily for 3 days and may repeat for shortness of breath.    [DISCONTINUED] promethazine-dextromethorphan (PROMETHAZINE-DM) 6.25-15 mg/5 mL Syrp Take 5 mLs by mouth every 6 (six) hours as needed (cough).     Family History       Problem Relation (Age of Onset)    Diabetes Father          Tobacco Use    Smoking status: Former     Packs/day: 0.50     Years: 2.00     Pack years: 1.00     Types: Cigarettes    Smokeless tobacco: Never   Substance and Sexual Activity    Alcohol use: Not Currently     Alcohol/week: 0.0 standard drinks     Comment: rarely    Drug use: No    Sexual activity: Not Currently     Review of Systems   Constitutional:  Positive for chills, fatigue and fever. Negative for diaphoresis.   HENT:  Negative for congestion, ear pain, sore throat and trouble swallowing.    Eyes:  Negative for pain, discharge and visual disturbance.   Respiratory:  Negative for cough, chest tightness, shortness of breath and wheezing.    Cardiovascular:  Negative for chest pain, palpitations and leg swelling.   Gastrointestinal:  Positive for nausea. Negative for abdominal distention, abdominal pain, blood in stool, constipation, diarrhea and vomiting.   Endocrine: Negative for polydipsia, polyphagia and polyuria.   Genitourinary:  Negative for dysuria, flank pain, frequency and urgency.   Musculoskeletal:  Negative for back pain, joint swelling, neck pain and neck stiffness.        Left axilla pain   Skin:  Negative for rash and wound.   Allergic/Immunologic: Negative for immunocompromised state.   Neurological:  Positive for headaches. Negative for dizziness, syncope, speech difficulty, weakness, light-headedness and numbness.   Hematological:  Negative for adenopathy.   Psychiatric/Behavioral:  Negative for confusion and suicidal ideas. The patient is not nervous/anxious.    All other systems reviewed and are negative.  Objective:     Vital Signs (Most Recent):  Temp: 99 °F (37.2 °C) (11/11/22 2124)  Pulse:  99 (11/11/22 2124)  Resp: (!) 24 (11/11/22 2124)  BP: 125/64 (11/11/22 2100)  SpO2: 98 % (11/11/22 2124)   Vital Signs (24h Range):  Temp:  [98.3 °F (36.8 °C)-100.7 °F (38.2 °C)] 99 °F (37.2 °C)  Pulse:  [] 99  Resp:  [15-26] 24  SpO2:  [95 %-99 %] 98 %  BP: (106-151)/(61-74) 125/64     Weight: 95.7 kg (211 lb)  Body mass index is 32.08 kg/m².    Physical Exam  Vitals and nursing note reviewed.   Constitutional:       Appearance: He is well-developed. He is obese.   HENT:      Head: Normocephalic and atraumatic.   Eyes:      Conjunctiva/sclera: Conjunctivae normal.      Pupils: Pupils are equal, round, and reactive to light.   Cardiovascular:      Rate and Rhythm: Regular rhythm. Tachycardia present.      Pulses: Normal pulses.   Pulmonary:      Effort: Pulmonary effort is normal.      Breath sounds: Normal breath sounds.   Chest:          Comments: Old portacath site is clean dry and intact without drainage  Wound 1 with scant serous drainage, slightly open will approximated  Wound 2 - pea size area open without drainage, well approximated at the edges  Wound 3, less than 1 cm wound track with serosanguinous drainage  Area 4 is indurated and tender  Abdominal:      General: Bowel sounds are normal.      Palpations: Abdomen is soft.   Musculoskeletal:         General: Normal range of motion.      Cervical back: Normal range of motion and neck supple.   Skin:     General: Skin is warm.      Capillary Refill: Capillary refill takes less than 2 seconds.      Findings: Erythema present.      Comments: Slight erythema to the right  of the left most distal wound adjacent to the axilla between the nipple and wound Dressings were removed and trauma of tape removal did cause some mild patchy redness that wasn't there before the tape removal    Neurological:      Mental Status: He is alert and oriented to person, place, and time.   Psychiatric:         Behavior: Behavior normal.         Thought Content: Thought content  normal.         Judgment: Judgment normal.         CRANIAL NERVES     CN III, IV, VI   Pupils are equal, round, and reactive to light.     Significant Labs: All pertinent labs within the past 24 hours have been reviewed.  CBC:   Recent Labs   Lab 11/11/22 2015   WBC 6.46  6.46   HGB 11.2*  11.2*   HCT 35.1*  35.1*     198     CMP:   Recent Labs   Lab 11/11/22 2015   *  134*   K 3.7  3.7     102   CO2 26  26   *  301*   BUN 25*  25*   CREATININE 1.0  1.0   CALCIUM 8.7  8.7   PROT 7.1  7.1   ALBUMIN 3.4*  3.4*   BILITOT 0.4  0.4   ALKPHOS 105  105   AST 34  34   ALT 26  26   ANIONGAP 6*  6*     Lactic Acid:   Recent Labs   Lab 11/11/22 2055   LACTATE 1.8       Significant Imaging: I have reviewed all pertinent imaging results/findings within the past 24 hours.  EKG: I have reviewed all pertinent results/findings within the past 24 hours and my personal findings are: NSR previous inferior infarct, no acute ischemic changes                Assessment/Plan:     * Sepsis  Admit to med/tele  Blood cultures   Wound culture of wound 3  Empirically start cefepime/vanc  Recent complicated cellulitis and abscess with serratia treated by Dr. Venegas and Dr. Washington - will consult both   IV hydration         Hypomagnesemia  Replete now with 2 g IV mag  Repeat mag level in AM  Further repletion per electrolyte ss      Cellulitis Left Axilla  See sepsis plan  Did require I&D in OR on previous admission  Will keep NPO after MN tonight in the event he requires this again   Pain control  Personally removed previous packing, cleaned site with saline and chlorhexidine only repacked wound 3 and redressed, will consult wound care      Chronic obstructive pulmonary disease, unspecified COPD type  Continue PRN nebs      Type 2 diabetes mellitus without complication, without long-term current use of insulin  Patient's FSGs are controlled on current medication regimen.  Last A1c reviewed-   Lab  Results   Component Value Date    HGBA1C 7.4 (H) 10/05/2022     Most recent fingerstick glucose reviewed- No results for input(s): POCTGLUCOSE in the last 24 hours.  Current correctional scale  Low  Maintain anti-hyperglycemic dose as follows-   Antihyperglycemics (From admission, onward)    None        Hold Oral hypoglycemics while patient is in the hospital.    Essential hypertension  Continue home antihypertensives      Hypogammaglobulinemia  IVIG treatment today       Follicular lymphoma grade IIIa  Now just on surveillance  Neutropenia has resolved      VTE Risk Mitigation (From admission, onward)    None             Suni Baxter NP  Department of Hospital Medicine   [facility]

## 2022-11-12 NOTE — ED PROVIDER NOTES
Encounter Date: 11/11/2022       History     Chief Complaint   Patient presents with    wound check left armpit     Patient had a chest wall port-o-cath removed on Oct. 3rd. Fever, pain, and weakness since last night.  Patient has non Hodgkins lymphoma.     67-year-old male with a history of non-Hodgkin's lymphoma, diabetes and previous admission for chest wall cellulitis presents complaining of fever, pain to his left axillary area and chest wall discomfort and redness as he had approximately a month ago when he was diagnosed with chest wall cellulitis and sepsis.  He has been followed by both Infectious Disease as well as General surgery.  Symptoms have reoccurred since yesterday.  He is had fever malaise an aching pain to the left axillary area and aching pain to the anterior chest wall.  Denies shortness of breath.  He denies any pleuritic discomfort.  Denies any headache, neck pain or stiffness.  He denies abdominal pain nausea vomiting or diarrhea.  He is felt somewhat lightheaded but denies syncope.  He denies any drainage from the areas previous I&D to the chest wall.  Denies any other problems or complaints.      Review of patient's allergies indicates:  No Known Allergies  Past Medical History:   Diagnosis Date    Diabetes mellitus, type 2     Encounter for antineoplastic chemotherapy 04/01/2020    Hypertension     Neuropathy     Non Hodgkin's lymphoma     Reflux esophagitis      Past Surgical History:   Procedure Laterality Date    COLONOSCOPY  2018    EYE SURGERY  Approximately 3 years ago    Cataract    HAND SURGERY      amputation left index finger    INCISION AND DRAINAGE OF ABSCESS Left 10/3/2022    Procedure: INCISION AND DRAINAGE, ABSCESS;  Surgeon: Franco Venegas III, MD;  Location: [facility] OR;  Service: General;  Laterality: Left;  axilla    MEDIPORT REMOVAL Right 10/3/2022    Procedure: REMOVAL, CATHETER, CENTRAL VENOUS, TUNNELED, WITH PORT;  Surgeon: Franco Venegas III, MD;  Location: [facility]  OR;  Service: General;  Laterality: Right;    PORTACATH PLACEMENT      SK[facility]/30/2020    [facility]    SPINE SURGERY      VASECTOMY  1985 ?     Family History   Problem Relation Age of Onset    Diabetes Father      Social History     Tobacco Use    Smoking status: Former     Packs/day: 0.50     Years: 2.00     Pack years: 1.00     Types: Cigarettes    Smokeless tobacco: Never   Substance Use Topics    Alcohol use: Not Currently     Alcohol/week: 0.0 standard drinks     Comment: rarely    Drug use: No     Review of Systems   Constitutional:  Positive for activity change, appetite change, chills, fatigue and fever. Negative for diaphoresis and unexpected weight change.   HENT: Negative.  Negative for congestion, dental problem, ear pain, nosebleeds, postnasal drip, rhinorrhea, sinus pressure, sinus pain, sore throat, tinnitus, trouble swallowing and voice change.    Eyes: Negative.  Negative for pain and visual disturbance.   Respiratory: Negative.  Negative for cough, chest tightness, shortness of breath and wheezing.    Cardiovascular: Negative.  Negative for chest pain, palpitations and leg swelling.   Gastrointestinal: Negative.  Negative for abdominal distention, abdominal pain, anal bleeding, blood in stool, constipation, diarrhea, nausea, rectal pain and vomiting.   Endocrine: Negative.    Genitourinary: Negative.  Negative for difficulty urinating, dysuria, flank pain, frequency, penile pain, scrotal swelling, testicular pain and urgency.   Musculoskeletal: Negative.  Negative for arthralgias, back pain, gait problem, joint swelling, myalgias, neck pain and neck stiffness.   Skin:  Positive for wound. Negative for color change, pallor and rash.   Neurological: Negative.  Negative for dizziness, seizures, syncope, facial asymmetry, speech difficulty, weakness, light-headedness, numbness and headaches.   Hematological: Negative.  Does not bruise/bleed easily.   Psychiatric/Behavioral: Negative.   Negative for confusion.    All other systems reviewed and are negative.    Physical Exam     Initial Vitals [11/11/22 1544]   BP Pulse Resp Temp SpO2   (!) 148/74 (!) 111 20 (!) 100.7 °F (38.2 °C) 95 %      MAP       --         Physical Exam    Nursing note and vitals reviewed.  Constitutional: He appears well-nourished. He is cooperative.  Non-toxic appearance. He does not have a sickly appearance. He does not appear ill. No distress.   HENT:   Head: Normocephalic and atraumatic.   Right Ear: Tympanic membrane normal.   Left Ear: Tympanic membrane normal.   Nose: Nose normal. No mucosal edema or rhinorrhea.   Mouth/Throat: Uvula is midline, oropharynx is clear and moist and mucous membranes are normal. No oral lesions. No trismus in the jaw. No uvula swelling. No oropharyngeal exudate, posterior oropharyngeal edema, posterior oropharyngeal erythema or tonsillar abscesses.   Eyes: Conjunctivae, EOM and lids are normal. Pupils are equal, round, and reactive to light. Right eye exhibits no discharge. Left eye exhibits no discharge. No scleral icterus.   Neck: Trachea normal and phonation normal. Neck supple. No stridor present. No JVD present.   Normal range of motion.   Full passive range of motion without pain.     Cardiovascular:  Normal rate, regular rhythm, normal heart sounds, intact distal pulses and normal pulses.     Exam reveals no gallop, no distant heart sounds, no friction rub and no decreased pulses.       No murmur heard.  Pulmonary/Chest: Effort normal and breath sounds normal. No stridor. No respiratory distress. He has no decreased breath sounds. He has no wheezes. He has no rhonchi. He has no rales. Chest wall is not dull to percussion. He exhibits tenderness and edema. He exhibits no mass, no bony tenderness, no laceration, no crepitus, no deformity and no retraction.   Tenderness to palpation to the left axilla with no associated edema, erythema or increased warmth.  Left chest wall wounds  examined, packing present, no purulence on expression, tenderness around area noted.  Mild edema noted, questionable erythema noted, no increased warmth.  No pain out of proportion to exam.   Abdominal: Abdomen is soft. Bowel sounds are normal. He exhibits no distension and no mass. There is no abdominal tenderness. No hernia. There is no rebound and no guarding.   Musculoskeletal:         General: No tenderness or edema. Normal range of motion.      Right hand: Normal. No tenderness or bony tenderness. Normal range of motion. Normal strength. Normal sensation. Normal capillary refill. Normal pulse.      Left hand: Normal. No tenderness or bony tenderness. Normal range of motion. Normal strength. Normal sensation. Normal capillary refill. Normal pulse.      Cervical back: Normal, full passive range of motion without pain, normal range of motion and neck supple. No swelling, edema, erythema, rigidity, tenderness or bony tenderness. No spinous process tenderness or muscular tenderness. Normal range of motion and normal range of motion. Normal.      Thoracic back: Normal. No swelling, tenderness or bony tenderness. Normal range of motion.      Lumbar back: Normal. No swelling, edema, tenderness or bony tenderness. Normal range of motion.      Right foot: Normal. Normal range of motion and normal capillary refill. No swelling, tenderness or bony tenderness. Normal pulse.      Left foot: Normal. Normal range of motion and normal capillary refill. No swelling, tenderness or bony tenderness. Normal pulse.      Comments: Pulses 2+ throughout, no extremity abnormalities     Neurological: He is alert and oriented to person, place, and time. He has normal strength. No cranial nerve deficit or sensory deficit. Coordination and gait normal. GCS score is 15. GCS eye subscore is 4. GCS verbal subscore is 5. GCS motor subscore is 6.   Skin: Skin is warm and dry. Capillary refill takes less than 2 seconds. No ecchymosis, no  petechiae and no rash noted. No cyanosis or erythema. No pallor.   Psychiatric: He has a normal mood and affect. His speech is normal. Judgment normal. Cognition and memory are normal.       ED Course   Procedures  Labs Reviewed   CBC W/ AUTO DIFFERENTIAL - Abnormal; Notable for the following components:       Result Value    RBC 3.91 (*)     Hemoglobin 11.2 (*)     Hematocrit 35.1 (*)     MCHC 31.9 (*)     Lymph % 11.0 (*)     All other components within normal limits   COMPREHENSIVE METABOLIC PANEL - Abnormal; Notable for the following components:    Sodium 134 (*)     Glucose 301 (*)     BUN 25 (*)     Albumin 3.4 (*)     Anion Gap 6 (*)     All other components within normal limits   URINALYSIS, REFLEX TO URINE CULTURE - Abnormal; Notable for the following components:    Protein, UA Trace (*)     Glucose, UA 4+ (*)     Occult Blood UA Trace (*)     All other components within normal limits    Narrative:     Specimen Source->Urine   MAGNESIUM - Abnormal; Notable for the following components:    Magnesium 1.4 (*)     All other components within normal limits   PROTIME-INR - Abnormal; Notable for the following components:    PT 14.6 (*)     All other components within normal limits   B-TYPE NATRIURETIC PEPTIDE - Abnormal; Notable for the following components:     (*)     All other components within normal limits   PROCALCITONIN - Abnormal; Notable for the following components:    Procalcitonin 2.56 (*)     All other components within normal limits   B-TYPE NATRIURETIC PEPTIDE - Abnormal; Notable for the following components:     (*)     All other components within normal limits   CBC W/ AUTO DIFFERENTIAL - Abnormal; Notable for the following components:    RBC 3.91 (*)     Hemoglobin 11.2 (*)     Hematocrit 35.1 (*)     MCHC 31.9 (*)     Lymph % 11.0 (*)     All other components within normal limits   COMPREHENSIVE METABOLIC PANEL - Abnormal; Notable for the following components:    Sodium 134 (*)      Glucose 301 (*)     BUN 25 (*)     Albumin 3.4 (*)     Anion Gap 6 (*)     All other components within normal limits   MAGNESIUM - Abnormal; Notable for the following components:    Magnesium 1.4 (*)     All other components within normal limits   CK - Abnormal; Notable for the following components:    CPK 18 (*)     All other components within normal limits   CULTURE, BLOOD   CULTURE, BLOOD   LACTIC ACID, PLASMA   INFLUENZA A AND B ANTIGEN    Narrative:     Specimen Source->Nasopharyngeal Swab   PHOSPHORUS   APTT   LIPASE   TROPONIN I   APTT   TSH   SARS-COV-2 RNA AMPLIFICATION, QUAL   URINALYSIS MICROSCOPIC    Narrative:     Specimen Source->Urine   LACTIC ACID, PLASMA          Imaging Results              X-Ray Chest 1 View (In process)                        Medical Decision Making:   Clinical Tests:   Lab Tests: Reviewed  Radiological Study: Reviewed  Medical Tests: Reviewed  ED Management:  Secondary to fever, history of previous symptoms of this nature and constellation of symptoms, blood cultures obtained, IV antibiotics given.  I have discussed the case with the hospitalist provider who has assumed care will admit.  The patient is not exhibiting any evidence of shock or hypoperfusion.                        Clinical Impression:   Final diagnoses:  [R50.9] Fever  [A41.9] Sepsis        ED Disposition Condition    Observation                 Rebeka Romo MD  11/11/22 2514

## 2022-11-12 NOTE — PLAN OF CARE
11/12/22 1731   PARKER Message   Medicare Outpatient and Observation Notification regarding financial responsibility Given to patient/caregiver   Date PARKER was signed 11/12/22   Time PARKER was signed [id number]

## 2022-11-12 NOTE — ASSESSMENT & PLAN NOTE
See sepsis plan  Did require I&D in OR on previous admission  Will keep NPO after MN tonight in the event he requires this again   Pain control  Personally removed previous packing, cleaned site with saline and chlorhexidine only repacked wound 3 and redressed, will consult wound care

## 2022-11-12 NOTE — ASSESSMENT & PLAN NOTE
Patient's FSGs are controlled on current medication regimen.  Last A1c reviewed-   Lab Results   Component Value Date    HGBA1C 7.4 (H) 10/05/2022     Most recent fingerstick glucose reviewed- No results for input(s): POCTGLUCOSE in the last 24 hours.  Current correctional scale  Low  Maintain anti-hyperglycemic dose as follows-   Antihyperglycemics (From admission, onward)    None        Hold Oral hypoglycemics while patient is in the hospital.

## 2022-11-12 NOTE — CONSULTS
[facility]  General Surgery  Consult Note    Patient Name: Sivakumar Thacker  MRN: [id number]  Code Status: Full Code  Admission Date: 11/11/2022  Hospital Length of Stay: 0 days  Attending Physician: Maxime Arredondo MD  Primary Care Provider: Barry Mcmahon MD         Inpatient consult to General Surgery  Consult performed by: Kem Banks MD  Consult ordered by: Suni Baxter NP        Subjective:     Principal Problem: Sepsis    History of Present Illness: 67-year-old gentleman with lymphoma who was treated with Rituxan was admitted with worsening of the pain of his left axilla and lateral upper chest.  He has a history of an incision and drainage performed by Dr. Landeros in early October for an abscess in this area.  Upon reviewing the op note, there was a minimal amount of pus evacuated at the time.  A Penrose drain was placed.  He has been undergoing wound care and the wound has been closing without difficulty.  He received a course of oral antibiotics upon discharge at that time.  Yesterday he began to have some severe pain in this area.  There is no fluctuant mass and there has been no drainage from the site of the previous I&D.      Current Facility-Administered Medications on File Prior to Encounter   Medication    [DISCONTINUED] alteplase injection 2 mg    [DISCONTINUED] heparin, porcine (PF) 100 unit/mL injection flush 500 Units    [DISCONTINUED] sodium chloride 0.9% flush 10 mL     Current Outpatient Medications on File Prior to Encounter   Medication Sig    albuterol-ipratropium (DUO-NEB) 2.5 mg-0.5 mg/3 mL nebulizer solution Take 3 mLs by nebulization every 6 (six) hours as needed for Wheezing. Rescue    amLODIPine (NORVASC) 5 MG tablet Take 1 tablet (5 mg total) by mouth once daily.    aspirin (ECOTRIN) 81 MG EC tablet Take 81 mg by mouth once daily.    atorvastatin (LIPITOR) 20 MG tablet Take 1 tablet (20 mg total) by mouth once daily.    azelastine (ASTELIN) 137 mcg (0.1 %)  nasal spray 2 sprays (274 mcg total) by Nasal route 2 (two) times daily as needed for Rhinitis.    fluticasone propionate (FLONASE) 50 mcg/actuation nasal spray 1 spray (50 mcg total) by Each Nostril route 2 (two) times daily.    gabapentin (NEURONTIN) 300 MG capsule Take 1 capsule (300 mg total) by mouth 2 (two) times daily.    losartan (COZAAR) 100 MG tablet Take 0.5 tablets (50 mg total) by mouth once daily. (Patient taking differently: Take 100 mg by mouth once daily.)    metFORMIN (GLUCOPHAGE) 500 MG tablet metformin 500 mg tablet  Take 2 tablets twice a day by oral route with meals for 90 days. (Patient taking differently: Take 1,000 mg by mouth 2 (two) times daily with meals. metformin 500 mg tablet  Take 2 tablets twice a day by oral route with meals for 90 days.)    montelukast (SINGULAIR) 10 mg tablet Take 1 tablet (10 mg total) by mouth nightly as needed.    MULTIVITAMIN ORAL Take 1 tablet by mouth once daily.    naproxen (NAPROSYN) 500 MG tablet Take 1 tablet (500 mg total) by mouth 2 (two) times daily with meals.    blood sugar diagnostic (FREESTYLE LITE STRIPS MISC) FreeStyle Lite Strips    blood sugar diagnostic (FREESTYLE LITE STRIPS) Strp Check blood sugar 2 (two) times daily as needed. (Patient not taking: Reported on 10/20/2022)    blood sugar diagnostic Strp     mucus clearing device (ACAPELLA, FLUTTER) 1 Device by Misc.(Non-Drug; Combo Route) route 2 (two) times daily.    omeprazole (PRILOSEC) 40 MG capsule Take 1 capsule (40 mg total) by mouth once daily.       Review of patient's allergies indicates:  No Known Allergies    Past Medical History:   Diagnosis Date    Chest wall abscess 9/30/2022    Chronic obstructive pulmonary disease, unspecified COPD type 3/28/2022    Diabetes mellitus, type 2     Encounter for antineoplastic chemotherapy 04/01/2020    Hypertension     Hypogammaglobulinemia 12/13/2019    Neuropathy     Non Hodgkin's lymphoma     Reflux esophagitis      Ringing in ear, bilateral      Past Surgical History:   Procedure Laterality Date    COLONOSCOPY  2018    EYE SURGERY  Approximately 3 years ago    Cataract    HAND SURGERY      amputation left index finger    INCISION AND DRAINAGE OF ABSCESS Left 10/3/2022    Procedure: INCISION AND DRAINAGE, ABSCESS;  Surgeon: Franco Venegas III, MD;  Location: [facility] OR;  Service: General;  Laterality: Left;  axilla    MEDIPORT REMOVAL Right 10/3/2022    Procedure: REMOVAL, CATHETER, CENTRAL VENOUS, TUNNELED, WITH PORT;  Surgeon: Franco Venegas III, MD;  Location: [facility] OR;  Service: General;  Laterality: Right;    PORTACATH PLACEMENT      SKIN CANCER EXCISION  09/30/2020    [facility]    SPINE SURGERY      VASECTOMY  1985 ?     Family History       Problem Relation (Age of Onset)    Diabetes Father          Tobacco Use    Smoking status: Former     Packs/day: 0.50     Years: 2.00     Pack years: 1.00     Types: Cigarettes    Smokeless tobacco: Never   Substance and Sexual Activity    Alcohol use: Not Currently     Alcohol/week: 0.0 standard drinks     Comment: rarely    Drug use: No    Sexual activity: Not Currently     Review of Systems   Respiratory: Negative.     Cardiovascular: Negative.    Gastrointestinal: Negative.    Skin:         See HPI.   Objective:     Vital Signs (Most Recent):  Temp: 98.9 °F (37.2 °C) (11/12/22 1148)  Pulse: 96 (11/12/22 1148)  Resp: 18 (11/12/22 1148)  BP: (!) 151/91 (nurse notified) (11/12/22 1148)  SpO2: 97 % (11/12/22 1148)   Vital Signs (24h Range):  Temp:  [98.3 °F (36.8 °C)-100.9 °F (38.3 °C)] 98.9 °F (37.2 °C)  Pulse:  [] 96  Resp:  [16-26] 18  SpO2:  [95 %-99 %] 97 %  BP: (118-164)/(61-91) 151/91     Weight: 94.4 kg (208 lb 1.8 oz)  Body mass index is 31.64 kg/m².    Physical Exam  Constitutional:       General: He is not in acute distress.     Appearance: He is well-developed.   HENT:      Head: Normocephalic and atraumatic.   Neck:      Thyroid: No thyromegaly.    Cardiovascular:      Rate and Rhythm: Normal rate and regular rhythm.      Heart sounds: Normal heart sounds. No murmur heard.  Pulmonary:      Effort: Pulmonary effort is normal. No respiratory distress.      Breath sounds: Normal breath sounds. No wheezing or rales.   Abdominal:      General: Bowel sounds are normal. There is no distension.      Palpations: Abdomen is soft.      Tenderness: There is no abdominal tenderness.      Hernia: No hernia is present.   Musculoskeletal:         General: No tenderness. Normal range of motion.      Cervical back: Normal range of motion and neck supple.   Lymphadenopathy:      Cervical: No cervical adenopathy.   Skin:     General: Skin is warm and dry.      Findings: No erythema or rash.      Comments: Of the upper chest wall and lateral into the axilla there is some slight induration.  There are 2 previously drained areas which are packed but have no current drainage.  There is no distinct cellulitis.  There is no fluctuant mass.  The point of maximal tenderness is in the very anterior axilla around the site of 1 of the previous incisions.  There is however minimal erythema in that area.   Neurological:      Mental Status: He is alert and oriented to person, place, and time.   Psychiatric:         Behavior: Behavior normal.         Judgment: Judgment normal.       Significant Labs:  I have reviewed all pertinent lab results within the past 24 hours.  CBC:   Recent Labs   Lab 11/12/22  0606   WBC 5.99   RBC 4.04*   HGB 11.4*   HCT 36.1*      MCV 89   MCH 28.2   MCHC 31.6*     CMP:   Recent Labs   Lab 11/11/22 2015 11/12/22  0606   *  301* 236*   CALCIUM 8.7  8.7 8.3*   ALBUMIN 3.4*  3.4*  --    PROT 7.1  7.1  --    *  134* 136   K 3.7  3.7 3.7   CO2 26  26 27     102 105   BUN 25*  25* 17   CREATININE 1.0  1.0 1.0   ALKPHOS 105  105  --    ALT 26  26  --    AST 34  34  --    BILITOT 0.4  0.4  --            Assessment/Plan:      Cellulitis Left Axilla  1. No drainable collections on physical exam.  2. Discussed findings with Dr. Washington.  3. Will obtain CT scan to further evaluate for drainable fluid collections.  4. Following.      VTE Risk Mitigation (From admission, onward)         Ordered     enoxaparin injection 40 mg  Daily         11/12/22 0008     IP VTE HIGH RISK PATIENT  Once         11/12/22 0008     Place sequential compression device  Until discontinued         11/12/22 0008                Thank you for your consult. I will follow-up with patient. Please contact us if you have any additional questions.    Kem Banks MD  General Surgery  [facility]

## 2022-11-12 NOTE — PROGRESS NOTES
Pharmacokinetic Initial Assessment: IV Vancomycin    Assessment/Plan:    Initiate intravenous vancomycin with loading dose of 1500 mg once followed by a maintenance dose of vancomycin 1500mg IV every 12 hours  Desired empiric serum trough concentration is 10 to 15 mcg/mL  Draw vancomycin trough level 60 min prior to fourth dose on 11/13 at approximately 1000  Pharmacy will continue to follow and monitor vancomycin.      Please contact pharmacy at extension [phone] with any questions regarding this assessment.     Thank you for the consult,   Elan Nelson       Patient brief summary:  Sivakumar Thacker is a 67 y.o. male initiated on antimicrobial therapy with IV Vancomycin for treatment of suspected sepsis    Drug Allergies:   Review of patient's allergies indicates:  No Known Allergies    Actual Body Weight:   94.4 kg    Renal Function:   Estimated Creatinine Clearance: 81 mL/min (based on SCr of 1 mg/dL).,     CBC (last 72 hours):  Recent Labs   Lab Result Units 11/11/22 2015   WBC K/uL 6.46  6.46   Hemoglobin g/dL 11.2*  11.2*   Hematocrit % 35.1*  35.1*   Platelets K/uL 198  198   Gran % % 55.0  55.0   Lymph % % 11.0*  11.0*   Mono % % 12.0  12.0   Eosinophil % % 3.0  3.0   Basophil % % 0.0  0.0   Differential Method  Manual  Manual       Metabolic Panel (last 72 hours):  Recent Labs   Lab Result Units 11/11/22 2015 11/11/22  2050   Sodium mmol/L 134*  134*  --    Potassium mmol/L 3.7  3.7  --    Chloride mmol/L 102  102  --    CO2 mmol/L 26  26  --    Glucose mg/dL 301*  301*  --    Glucose, UA   --  4+*   BUN mg/dL 25*  25*  --    Creatinine mg/dL 1.0  1.0  --    Albumin g/dL 3.4*  3.4*  --    Total Bilirubin mg/dL 0.4  0.4  --    Alkaline Phosphatase U/L 105  105  --    AST U/L 34  34  --    ALT U/L 26  26  --    Magnesium mg/dL 1.4*  1.4*  --    Phosphorus mg/dL 2.9  --        Drug levels (last 3 results):  No results for input(s): VANCOMYCINRA, VANCORANDOM, VANCOMYCINPE, VANCOPEAK,  VANCOMYCINTR, VANCOTROUGH in the last 72 hours.    Microbiologic Results:  Microbiology Results (last 7 days)       Procedure Component Value Units Date/Time    Aerobic culture [[id number]] Collected: 11/11/22 2322    Order Status: Sent Specimen: Incision site from Chest, Left Updated: 11/11/22 2331    Blood culture x two cultures. Draw prior to antibiotics. [[id number]] Collected: 11/11/22 1955    Order Status: Sent Specimen: Blood from Peripheral, Forearm, Right Updated: 11/11/22 2053    Blood culture x two cultures. Draw prior to antibiotics. [[id number]] Collected: 11/11/22 2010    Order Status: Sent Specimen: Blood from Peripheral, Antecubital, Right Updated: 11/11/22 2052

## 2022-11-12 NOTE — SUBJECTIVE & OBJECTIVE
Interval History:     Review of Systems   Constitutional:  Negative for activity change and appetite change.   HENT:  Negative for congestion and dental problem.    Eyes:  Negative for discharge and itching.   Respiratory:  Negative for shortness of breath.    Cardiovascular:  Positive for chest pain.   Gastrointestinal:  Negative for abdominal distention and abdominal pain.   Endocrine: Negative for cold intolerance.   Genitourinary:  Negative for difficulty urinating and dysuria.   Musculoskeletal:  Negative for arthralgias and back pain.   Skin:  Negative for color change.   Neurological:  Negative for dizziness and facial asymmetry.   Hematological:  Negative for adenopathy.   Psychiatric/Behavioral:  Negative for agitation and behavioral problems.    Objective:     Vital Signs (Most Recent):  Temp: 98.6 °F (37 °C) (11/12/22 1232)  Pulse: 96 (11/12/22 1148)  Resp: 16 (11/12/22 1251)  BP: (!) 151/91 (nurse notified) (11/12/22 1148)  SpO2: 97 % (11/12/22 1148)   Vital Signs (24h Range):  Temp:  [98.3 °F (36.8 °C)-100.9 °F (38.3 °C)] 98.6 °F (37 °C)  Pulse:  [] 96  Resp:  [16-26] 16  SpO2:  [95 %-99 %] 97 %  BP: (118-164)/(61-91) 151/91     Weight: 94.4 kg (208 lb 1.8 oz)  Body mass index is 31.64 kg/m².    Intake/Output Summary (Last 24 hours) at 11/12/2022 1501  Last data filed at 11/12/2022 0940  Gross per 24 hour   Intake 50 ml   Output 2375 ml   Net -2325 ml      Physical Exam  Vitals and nursing note reviewed.   Constitutional:       Appearance: He is well-developed.   HENT:      Head: Atraumatic.      Right Ear: External ear normal.      Left Ear: External ear normal.      Nose: Nose normal.      Mouth/Throat:      Mouth: Mucous membranes are moist.   Cardiovascular:      Rate and Rhythm: Normal rate.   Pulmonary:      Effort: Pulmonary effort is normal.   Abdominal:      Palpations: Abdomen is soft.   Musculoskeletal:         General: Normal range of motion.      Cervical back: Full passive range of  motion without pain and normal range of motion.   Skin:     General: Skin is warm.      Comments: L chest wall area cellulitis    Neurological:      Mental Status: He is alert and oriented to person, place, and time.   Psychiatric:         Behavior: Behavior normal.       Significant Labs: All pertinent labs within the past 24 hours have been reviewed.  CBC:   Recent Labs   Lab 11/11/22 2015 11/12/22  0606   WBC 6.46  6.46 5.99   HGB 11.2*  11.2* 11.4*   HCT 35.1*  35.1* 36.1*     198 156     CMP:   Recent Labs   Lab 11/11/22 2015 11/12/22  0606   *  134* 136   K 3.7  3.7 3.7     102 105   CO2 26  26 27   *  301* 236*   BUN 25*  25* 17   CREATININE 1.0  1.0 1.0   CALCIUM 8.7  8.7 8.3*   PROT 7.1  7.1  --    ALBUMIN 3.4*  3.4*  --    BILITOT 0.4  0.4  --    ALKPHOS 105  105  --    AST 34  34  --    ALT 26  26  --    ANIONGAP 6*  6* 4*       Significant Imaging: I have reviewed all pertinent imaging results/findings within the past 24 hours.

## 2022-11-12 NOTE — PROGRESS NOTES
[facility] Medicine  Progress Note    Patient Name: Sivakumar Thacker  MRN: [id number]  Patient Class: OP- Observation   Admission Date: 11/11/2022  Length of Stay: 0 days  Attending Physician: Maxime Arredondo MD  Primary Care Provider: Barry Mcmahon MD        Subjective:     Principal Problem:Cellulitis        HPI:  Kirstie is a 67 y.o. male with past medical history of hypertension, diabetes with lower extremity neuropathy, and low-grade non-Hodgkin's lymphoma on recently on rituximab maintenance therapy (last 9/15 - now on surveillance) and monthly IVIG (last 11/11), recent admission for chest wall cellulitis and abscesses culturing pansensitive serratia who presents today with complaints of left axillary pain. It is severe. It is associated with fever T max 100.7, nausea, headache, and a small area of erythema to right of lowest chest wall wound near the left axilla. He denies chest pain, SOB, V/D, dizziness, or LOC. He had a complicated stay on his last admission requiring ICU care. He was discharged home with penrose drains and 2 weeks of cipro, and f/u with Dr. Venegas on 10/20 to have them removed and packing was placed. He is getting wound care with packing exchange every 72 hours. The packing was changed today, and while the nurses were there he became febrile and reported the pain to the axilla and was directed to the ED. In the ED, he was noted to be febrile 100.7, tachycardic 111, normotensive 148/74. WBC 6K. Procal 2.56. Lactate 1.8. Negative for flu and covid.       Overview/Hospital Course:  11/12  CT showed cellulitis not abscess  Pt has excess pain on L chest wall and back area       Interval History:     Review of Systems   Constitutional:  Negative for activity change and appetite change.   HENT:  Negative for congestion and dental problem.    Eyes:  Negative for discharge and itching.   Respiratory:  Negative for shortness of breath.    Cardiovascular:  Positive for chest pain.    Gastrointestinal:  Negative for abdominal distention and abdominal pain.   Endocrine: Negative for cold intolerance.   Genitourinary:  Negative for difficulty urinating and dysuria.   Musculoskeletal:  Negative for arthralgias and back pain.   Skin:  Negative for color change.   Neurological:  Negative for dizziness and facial asymmetry.   Hematological:  Negative for adenopathy.   Psychiatric/Behavioral:  Negative for agitation and behavioral problems.    Objective:     Vital Signs (Most Recent):  Temp: 98.6 °F (37 °C) (11/12/22 1232)  Pulse: 96 (11/12/22 1148)  Resp: 16 (11/12/22 1251)  BP: (!) 151/91 (nurse notified) (11/12/22 1148)  SpO2: 97 % (11/12/22 1148)   Vital Signs (24h Range):  Temp:  [98.3 °F (36.8 °C)-100.9 °F (38.3 °C)] 98.6 °F (37 °C)  Pulse:  [] 96  Resp:  [16-26] 16  SpO2:  [95 %-99 %] 97 %  BP: (118-164)/(61-91) 151/91     Weight: 94.4 kg (208 lb 1.8 oz)  Body mass index is 31.64 kg/m².    Intake/Output Summary (Last 24 hours) at 11/12/2022 1501  Last data filed at 11/12/2022 0940  Gross per 24 hour   Intake 50 ml   Output 2375 ml   Net -2325 ml      Physical Exam  Vitals and nursing note reviewed.   Constitutional:       Appearance: He is well-developed.   HENT:      Head: Atraumatic.      Right Ear: External ear normal.      Left Ear: External ear normal.      Nose: Nose normal.      Mouth/Throat:      Mouth: Mucous membranes are moist.   Cardiovascular:      Rate and Rhythm: Normal rate.   Pulmonary:      Effort: Pulmonary effort is normal.   Abdominal:      Palpations: Abdomen is soft.   Musculoskeletal:         General: Normal range of motion.      Cervical back: Full passive range of motion without pain and normal range of motion.   Skin:     General: Skin is warm.      Comments: L chest wall area cellulitis    Neurological:      Mental Status: He is alert and oriented to person, place, and time.   Psychiatric:         Behavior: Behavior normal.       Significant Labs: All pertinent  labs within the past 24 hours have been reviewed.  CBC:   Recent Labs   Lab 11/11/22 2015 11/12/22  0606   WBC 6.46  6.46 5.99   HGB 11.2*  11.2* 11.4*   HCT 35.1*  35.1* 36.1*     198 156     CMP:   Recent Labs   Lab 11/11/22 2015 11/12/22  0606   *  134* 136   K 3.7  3.7 3.7     102 105   CO2 26  26 27   *  301* 236*   BUN 25*  25* 17   CREATININE 1.0  1.0 1.0   CALCIUM 8.7  8.7 8.3*   PROT 7.1  7.1  --    ALBUMIN 3.4*  3.4*  --    BILITOT 0.4  0.4  --    ALKPHOS 105  105  --    AST 34  34  --    ALT 26  26  --    ANIONGAP 6*  6* 4*       Significant Imaging: I have reviewed all pertinent imaging results/findings within the past 24 hours.      Assessment/Plan:      * Cellulitis Left Axilla  Per CT : Mild subcutaneous edema within the left chest wall anterior to the pectoralis muscle compatible with cellulitis. There is no abscess  History of extensive chest wall cellulitis with axillary abscess, growing Serratia  Continue iv abx      Cellulitis of chest wall  As above       Non-Hodgkin's lymphoma  Aware       Chest wall abscess  H/o Chest wall abscess  No evident abscess this time  Continue iv abx       Hypomagnesemia  Monitor and replete       Chronic obstructive pulmonary disease, unspecified COPD type  Continue PRN nebs      Type 2 diabetes mellitus without complication, without long-term current use of insulin  Maintain present regime     Essential hypertension  Continue home antihypertensives      Hypogammaglobulinemia  IVIG treatment       Follicular lymphoma grade IIIa  Aware         VTE Risk Mitigation (From admission, onward)         Ordered     enoxaparin injection 40 mg  Daily         11/12/22 0008     IP VTE HIGH RISK PATIENT  Once         11/12/22 0008     Place sequential compression device  Until discontinued         11/12/22 0008                Discharge Planning   GREGORY:      Code Status: Full Code   Is the patient medically ready for discharge?:      Reason for patient still in hospital (select all that apply): Treatment                     Maxime Arredondo MD  Department of Hospital Medicine   [facility]

## 2022-11-12 NOTE — HOSPITAL COURSE
Patient with H/o Hypogammaglobulinemia , Follicular lymphoma grade IIIa , with H/o extensive chest wall cellulitis with axillary abscess(serratia) s/p surgery in the past , got admitted with L Axilla cellulitis.  CT showed Mild subcutaneous edema within the left chest wall anterior to the pectoralis muscle compatible with cellulitis. There was  no abscess  Pt was evaluated by ID MD/Surgeon  Pt was started on iv abx and later changed to PO anbx  Also noticed to have redness/pain/swelling on L Flank area   In view with H/o surgery on L axilla area , ?might have disrupted the lymphatic drainage  Pt was educated about this and was later discharged to home with PO abx

## 2022-11-13 PROBLEM — A41.9 SEPSIS: Status: ACTIVE | Noted: 2022-11-13

## 2022-11-13 LAB
ANION GAP SERPL CALC-SCNC: 4 MMOL/L (ref 8–16)
ANISOCYTOSIS BLD QL SMEAR: SLIGHT
BASOPHILS NFR BLD: 1 % (ref 0–1.9)
BUN SERPL-MCNC: 16 MG/DL (ref 8–23)
CALCIUM SERPL-MCNC: 8.6 MG/DL (ref 8.7–10.5)
CHLORIDE SERPL-SCNC: 100 MMOL/L (ref 95–110)
CO2 SERPL-SCNC: 28 MMOL/L (ref 23–29)
CREAT SERPL-MCNC: 1 MG/DL (ref 0.5–1.4)
DIFFERENTIAL METHOD: ABNORMAL
EOSINOPHIL NFR BLD: 20 % (ref 0–8)
ERYTHROCYTE [DISTWIDTH] IN BLOOD BY AUTOMATED COUNT: 13.4 % (ref 11.5–14.5)
EST. GFR  (NO RACE VARIABLE): >60 ML/MIN/1.73 M^2
GLUCOSE SERPL-MCNC: 231 MG/DL (ref 70–110)
GLUCOSE SERPL-MCNC: 235 MG/DL (ref 70–110)
GLUCOSE SERPL-MCNC: 280 MG/DL (ref 70–110)
GLUCOSE SERPL-MCNC: 313 MG/DL (ref 70–110)
GLUCOSE SERPL-MCNC: 377 MG/DL (ref 70–110)
HCT VFR BLD AUTO: 35.1 % (ref 40–54)
HGB BLD-MCNC: 10.9 G/DL (ref 14–18)
IMM GRANULOCYTES # BLD AUTO: ABNORMAL K/UL (ref 0–0.04)
IMM GRANULOCYTES NFR BLD AUTO: ABNORMAL % (ref 0–0.5)
LYMPHOCYTES NFR BLD: 16 % (ref 18–48)
MAGNESIUM SERPL-MCNC: 1.7 MG/DL (ref 1.6–2.6)
MCH RBC QN AUTO: 28.4 PG (ref 27–31)
MCHC RBC AUTO-ENTMCNC: 31.1 G/DL (ref 32–36)
MCV RBC AUTO: 91 FL (ref 82–98)
MONOCYTES NFR BLD: 16 % (ref 4–15)
NEUTROPHILS NFR BLD: 42 % (ref 38–73)
NEUTS BAND NFR BLD MANUAL: 5 %
NRBC BLD-RTO: 0 /100 WBC
PLATELET # BLD AUTO: 145 K/UL (ref 150–450)
PLATELET BLD QL SMEAR: ABNORMAL
PMV BLD AUTO: 9.6 FL (ref 9.2–12.9)
POIKILOCYTOSIS BLD QL SMEAR: SLIGHT
POTASSIUM SERPL-SCNC: 3.9 MMOL/L (ref 3.5–5.1)
RBC # BLD AUTO: 3.84 M/UL (ref 4.6–6.2)
SODIUM SERPL-SCNC: 132 MMOL/L (ref 136–145)
VANCOMYCIN SERPL-MCNC: 11.2 UG/ML
VANCOMYCIN TROUGH SERPL-MCNC: 21.3 UG/ML
WBC # BLD AUTO: 3.43 K/UL (ref 3.9–12.7)

## 2022-11-13 PROCEDURE — 80202 ASSAY OF VANCOMYCIN: CPT | Mod: 91 | Performed by: INTERNAL MEDICINE

## 2022-11-13 PROCEDURE — 63600175 PHARM REV CODE 636 W HCPCS: Performed by: NURSE PRACTITIONER

## 2022-11-13 PROCEDURE — 36415 COLL VENOUS BLD VENIPUNCTURE: CPT | Performed by: NURSE PRACTITIONER

## 2022-11-13 PROCEDURE — 36415 COLL VENOUS BLD VENIPUNCTURE: CPT | Performed by: INTERNAL MEDICINE

## 2022-11-13 PROCEDURE — 80048 BASIC METABOLIC PNL TOTAL CA: CPT | Performed by: NURSE PRACTITIONER

## 2022-11-13 PROCEDURE — 99214 PR OFFICE/OUTPT VISIT, EST, LEVL IV, 30-39 MIN: ICD-10-PCS | Mod: ,,, | Performed by: INTERNAL MEDICINE

## 2022-11-13 PROCEDURE — 94799 UNLISTED PULMONARY SVC/PX: CPT

## 2022-11-13 PROCEDURE — 99900035 HC TECH TIME PER 15 MIN (STAT)

## 2022-11-13 PROCEDURE — 99214 OFFICE O/P EST MOD 30 MIN: CPT | Mod: ,,, | Performed by: INTERNAL MEDICINE

## 2022-11-13 PROCEDURE — 12000002 HC ACUTE/MED SURGE SEMI-PRIVATE ROOM

## 2022-11-13 PROCEDURE — 82962 GLUCOSE BLOOD TEST: CPT

## 2022-11-13 PROCEDURE — 94761 N-INVAS EAR/PLS OXIMETRY MLT: CPT

## 2022-11-13 PROCEDURE — 83735 ASSAY OF MAGNESIUM: CPT | Performed by: NURSE PRACTITIONER

## 2022-11-13 PROCEDURE — 96366 THER/PROPH/DIAG IV INF ADDON: CPT

## 2022-11-13 PROCEDURE — 85027 COMPLETE CBC AUTOMATED: CPT | Performed by: NURSE PRACTITIONER

## 2022-11-13 PROCEDURE — 63600175 PHARM REV CODE 636 W HCPCS: Performed by: INTERNAL MEDICINE

## 2022-11-13 PROCEDURE — 25000003 PHARM REV CODE 250: Performed by: NURSE PRACTITIONER

## 2022-11-13 PROCEDURE — 80202 ASSAY OF VANCOMYCIN: CPT | Performed by: INTERNAL MEDICINE

## 2022-11-13 PROCEDURE — 25000003 PHARM REV CODE 250: Performed by: INTERNAL MEDICINE

## 2022-11-13 PROCEDURE — 85007 BL SMEAR W/DIFF WBC COUNT: CPT | Performed by: NURSE PRACTITIONER

## 2022-11-13 PROCEDURE — 96376 TX/PRO/DX INJ SAME DRUG ADON: CPT

## 2022-11-13 RX ORDER — CELECOXIB 100 MG/1
200 CAPSULE ORAL 2 TIMES DAILY
Status: DISCONTINUED | OUTPATIENT
Start: 2022-11-13 | End: 2022-11-15 | Stop reason: HOSPADM

## 2022-11-13 RX ADMIN — CEFEPIME HYDROCHLORIDE 2 G: 2 INJECTION, SOLUTION INTRAVENOUS at 02:11

## 2022-11-13 RX ADMIN — OXYCODONE HYDROCHLORIDE AND ACETAMINOPHEN 1 TABLET: 10; 325 TABLET ORAL at 08:11

## 2022-11-13 RX ADMIN — CEFEPIME HYDROCHLORIDE 2 G: 2 INJECTION, SOLUTION INTRAVENOUS at 10:11

## 2022-11-13 RX ADMIN — LOSARTAN POTASSIUM 50 MG: 50 TABLET, FILM COATED ORAL at 08:11

## 2022-11-13 RX ADMIN — PANTOPRAZOLE SODIUM 40 MG: 40 TABLET, DELAYED RELEASE ORAL at 05:11

## 2022-11-13 RX ADMIN — HYDROMORPHONE HYDROCHLORIDE 0.5 MG: 1 INJECTION, SOLUTION INTRAMUSCULAR; INTRAVENOUS; SUBCUTANEOUS at 10:11

## 2022-11-13 RX ADMIN — SENNOSIDES AND DOCUSATE SODIUM 1 TABLET: 50; 8.6 TABLET ORAL at 08:11

## 2022-11-13 RX ADMIN — ASPIRIN 81 MG: 81 TABLET, COATED ORAL at 08:11

## 2022-11-13 RX ADMIN — INSULIN ASPART 3 UNITS: 100 INJECTION, SOLUTION INTRAVENOUS; SUBCUTANEOUS at 08:11

## 2022-11-13 RX ADMIN — AMLODIPINE BESYLATE 5 MG: 5 TABLET ORAL at 08:11

## 2022-11-13 RX ADMIN — INSULIN ASPART 4 UNITS: 100 INJECTION, SOLUTION INTRAVENOUS; SUBCUTANEOUS at 05:11

## 2022-11-13 RX ADMIN — GABAPENTIN 300 MG: 300 CAPSULE ORAL at 08:11

## 2022-11-13 RX ADMIN — CEFEPIME HYDROCHLORIDE 2 G: 2 INJECTION, SOLUTION INTRAVENOUS at 05:11

## 2022-11-13 RX ADMIN — THERA TABS 1 TABLET: TAB at 08:11

## 2022-11-13 RX ADMIN — OXYCODONE HYDROCHLORIDE AND ACETAMINOPHEN 1 TABLET: 10; 325 TABLET ORAL at 07:11

## 2022-11-13 RX ADMIN — CELECOXIB 200 MG: 100 CAPSULE ORAL at 08:11

## 2022-11-13 RX ADMIN — OXYCODONE HYDROCHLORIDE AND ACETAMINOPHEN 1 TABLET: 10; 325 TABLET ORAL at 01:11

## 2022-11-13 RX ADMIN — HYDROMORPHONE HYDROCHLORIDE 0.5 MG: 1 INJECTION, SOLUTION INTRAMUSCULAR; INTRAVENOUS; SUBCUTANEOUS at 08:11

## 2022-11-13 RX ADMIN — ENOXAPARIN SODIUM 40 MG: 100 INJECTION SUBCUTANEOUS at 05:11

## 2022-11-13 RX ADMIN — INSULIN ASPART 3 UNITS: 100 INJECTION, SOLUTION INTRAVENOUS; SUBCUTANEOUS at 01:11

## 2022-11-13 RX ADMIN — MONTELUKAST 10 MG: 10 TABLET, FILM COATED ORAL at 08:11

## 2022-11-13 RX ADMIN — FLUTICASONE PROPIONATE 50 MCG: 50 SPRAY, METERED NASAL at 08:11

## 2022-11-13 RX ADMIN — CELECOXIB 100 MG: 100 CAPSULE ORAL at 08:11

## 2022-11-13 RX ADMIN — VANCOMYCIN HYDROCHLORIDE 1500 MG: 1.5 INJECTION, POWDER, LYOPHILIZED, FOR SOLUTION INTRAVENOUS at 05:11

## 2022-11-13 RX ADMIN — ATORVASTATIN CALCIUM 20 MG: 20 TABLET, FILM COATED ORAL at 08:11

## 2022-11-13 RX ADMIN — INSULIN ASPART 2 UNITS: 100 INJECTION, SOLUTION INTRAVENOUS; SUBCUTANEOUS at 08:11

## 2022-11-13 NOTE — SUBJECTIVE & OBJECTIVE
Interval History:     Review of Systems   Constitutional:  Negative for activity change and appetite change.   HENT:  Negative for congestion and dental problem.    Eyes:  Negative for discharge and itching.   Respiratory:  Negative for shortness of breath.    Cardiovascular:  Negative for chest pain.   Gastrointestinal:  Negative for abdominal distention and abdominal pain.   Endocrine: Negative for cold intolerance.   Genitourinary:  Negative for difficulty urinating and dysuria.   Musculoskeletal:  Negative for arthralgias and back pain.   Skin:  Positive for color change and wound.   Neurological:  Negative for dizziness and facial asymmetry.   Hematological:  Negative for adenopathy.   Psychiatric/Behavioral:  Negative for agitation and behavioral problems.    Objective:     Vital Signs (Most Recent):  Temp: 97.7 °F (36.5 °C) (11/13/22 1201)  Pulse: 91 (11/13/22 1201)  Resp: 16 (11/13/22 1339)  BP: (!) 143/77 (11/13/22 1201)  SpO2: (!) 94 % (11/13/22 1201)   Vital Signs (24h Range):  Temp:  [97.7 °F (36.5 °C)-99.7 °F (37.6 °C)] 97.7 °F (36.5 °C)  Pulse:  [] 91  Resp:  [16-18] 16  SpO2:  [92 %-97 %] 94 %  BP: (122-163)/(73-83) 143/77     Weight: 94.4 kg (208 lb 1.8 oz)  Body mass index is 31.64 kg/m².    Intake/Output Summary (Last 24 hours) at 11/13/2022 1350  Last data filed at 11/13/2022 0557  Gross per 24 hour   Intake 200 ml   Output 450 ml   Net -250 ml      Physical Exam  Vitals and nursing note reviewed.   Constitutional:       Appearance: He is well-developed.   HENT:      Head: Atraumatic.      Right Ear: External ear normal.      Left Ear: External ear normal.      Nose: Nose normal.   Cardiovascular:      Rate and Rhythm: Normal rate.   Pulmonary:      Effort: Pulmonary effort is normal.   Musculoskeletal:         General: Normal range of motion.      Cervical back: Full passive range of motion without pain and normal range of motion.   Skin:     General: Skin is warm.      Comments: Redness on  L chest wall area    Neurological:      Mental Status: He is alert and oriented to person, place, and time.   Psychiatric:         Behavior: Behavior normal.       Significant Labs: All pertinent labs within the past 24 hours have been reviewed.  CBC:   Recent Labs   Lab 11/11/22 2015 11/12/22 0606 11/13/22  0530   WBC 6.46  6.46 5.99 3.43*   HGB 11.2*  11.2* 11.4* 10.9*   HCT 35.1*  35.1* 36.1* 35.1*     198 156 145*     CMP:   Recent Labs   Lab 11/11/22 2015 11/12/22 0606 11/13/22  0530   *  134* 136 132*   K 3.7  3.7 3.7 3.9     102 105 100   CO2 26  26 27 28   *  301* 236* 231*   BUN 25*  25* 17 16   CREATININE 1.0  1.0 1.0 1.0   CALCIUM 8.7  8.7 8.3* 8.6*   PROT 7.1  7.1  --   --    ALBUMIN 3.4*  3.4*  --   --    BILITOT 0.4  0.4  --   --    ALKPHOS 105  105  --   --    AST 34  34  --   --    ALT 26  26  --   --    ANIONGAP 6*  6* 4* 4*       Significant Imaging: I have reviewed all pertinent imaging results/findings within the past 24 hours.

## 2022-11-13 NOTE — PROGRESS NOTES
[facility] Medicine  Progress Note    Patient Name: Sivakumar Thacker  MRN: [id number]  Patient Class: IP- Inpatient   Admission Date: 11/11/2022  Length of Stay: 0 days  Attending Physician: Maxime Arredondo MD  Primary Care Provider: Barry Mcmahon MD        Subjective:     Principal Problem:Cellulitis        HPI:  Kirstie is a 67 y.o. male with past medical history of hypertension, diabetes with lower extremity neuropathy, and low-grade non-Hodgkin's lymphoma on recently on rituximab maintenance therapy (last 9/15 - now on surveillance) and monthly IVIG (last 11/11), recent admission for chest wall cellulitis and abscesses culturing pansensitive serratia who presents today with complaints of left axillary pain. It is severe. It is associated with fever T max 100.7, nausea, headache, and a small area of erythema to right of lowest chest wall wound near the left axilla. He denies chest pain, SOB, V/D, dizziness, or LOC. He had a complicated stay on his last admission requiring ICU care. He was discharged home with penrose drains and 2 weeks of cipro, and f/u with Dr. Venegas on 10/20 to have them removed and packing was placed. He is getting wound care with packing exchange every 72 hours. The packing was changed today, and while the nurses were there he became febrile and reported the pain to the axilla and was directed to the ED. In the ED, he was noted to be febrile 100.7, tachycardic 111, normotensive 148/74. WBC 6K. Procal 2.56. Lactate 1.8. Negative for flu and covid.       Overview/Hospital Course:  11/12  CT showed cellulitis not abscess  Pt has excess pain on L chest wall and back area     11/13  More redness on Chest wall area  Afebrile  Had shower and sitting on chair       Interval History:     Review of Systems   Constitutional:  Negative for activity change and appetite change.   HENT:  Negative for congestion and dental problem.    Eyes:  Negative for discharge and itching.    Respiratory:  Negative for shortness of breath.    Cardiovascular:  Negative for chest pain.   Gastrointestinal:  Negative for abdominal distention and abdominal pain.   Endocrine: Negative for cold intolerance.   Genitourinary:  Negative for difficulty urinating and dysuria.   Musculoskeletal:  Negative for arthralgias and back pain.   Skin:  Positive for color change and wound.   Neurological:  Negative for dizziness and facial asymmetry.   Hematological:  Negative for adenopathy.   Psychiatric/Behavioral:  Negative for agitation and behavioral problems.    Objective:     Vital Signs (Most Recent):  Temp: 97.7 °F (36.5 °C) (11/13/22 1201)  Pulse: 91 (11/13/22 1201)  Resp: 16 (11/13/22 1339)  BP: (!) 143/77 (11/13/22 1201)  SpO2: (!) 94 % (11/13/22 1201)   Vital Signs (24h Range):  Temp:  [97.7 °F (36.5 °C)-99.7 °F (37.6 °C)] 97.7 °F (36.5 °C)  Pulse:  [] 91  Resp:  [16-18] 16  SpO2:  [92 %-97 %] 94 %  BP: (122-163)/(73-83) 143/77     Weight: 94.4 kg (208 lb 1.8 oz)  Body mass index is 31.64 kg/m².    Intake/Output Summary (Last 24 hours) at 11/13/2022 1350  Last data filed at 11/13/2022 0557  Gross per 24 hour   Intake 200 ml   Output 450 ml   Net -250 ml      Physical Exam  Vitals and nursing note reviewed.   Constitutional:       Appearance: He is well-developed.   HENT:      Head: Atraumatic.      Right Ear: External ear normal.      Left Ear: External ear normal.      Nose: Nose normal.   Cardiovascular:      Rate and Rhythm: Normal rate.   Pulmonary:      Effort: Pulmonary effort is normal.   Musculoskeletal:         General: Normal range of motion.      Cervical back: Full passive range of motion without pain and normal range of motion.   Skin:     General: Skin is warm.      Comments: Redness on L chest wall area    Neurological:      Mental Status: He is alert and oriented to person, place, and time.   Psychiatric:         Behavior: Behavior normal.       Significant Labs: All pertinent labs within  the past 24 hours have been reviewed.  CBC:   Recent Labs   Lab 11/11/22 2015 11/12/22 0606 11/13/22  0530   WBC 6.46  6.46 5.99 3.43*   HGB 11.2*  11.2* 11.4* 10.9*   HCT 35.1*  35.1* 36.1* 35.1*     198 156 145*     CMP:   Recent Labs   Lab 11/11/22 2015 11/12/22 0606 11/13/22  0530   *  134* 136 132*   K 3.7  3.7 3.7 3.9     102 105 100   CO2 26  26 27 28   *  301* 236* 231*   BUN 25*  25* 17 16   CREATININE 1.0  1.0 1.0 1.0   CALCIUM 8.7  8.7 8.3* 8.6*   PROT 7.1  7.1  --   --    ALBUMIN 3.4*  3.4*  --   --    BILITOT 0.4  0.4  --   --    ALKPHOS 105  105  --   --    AST 34  34  --   --    ALT 26  26  --   --    ANIONGAP 6*  6* 4* 4*       Significant Imaging: I have reviewed all pertinent imaging results/findings within the past 24 hours.      Assessment/Plan:      * Cellulitis Left Axilla  Per CT : Mild subcutaneous edema within the left chest wall anterior to the pectoralis muscle compatible with cellulitis. There is no abscess  History of extensive chest wall cellulitis with axillary abscess, growing Serratia  Continue iv abx      Cellulitis of chest wall  As above       Non-Hodgkin's lymphoma  Aware       Chest wall abscess  H/o Chest wall abscess  No evident abscess this time  Continue iv abx       Hypomagnesemia  Monitor and replete       Chronic obstructive pulmonary disease, unspecified COPD type  Continue PRN nebs      Type 2 diabetes mellitus without complication, without long-term current use of insulin  Maintain present regime     Essential hypertension  Continue home antihypertensives      Hypogammaglobulinemia  IVIG treatment       Follicular lymphoma grade IIIa  Aware         VTE Risk Mitigation (From admission, onward)         Ordered     enoxaparin injection 40 mg  Daily         11/12/22 0008     IP VTE HIGH RISK PATIENT  Once         11/12/22 0008     Place sequential compression device  Until discontinued         11/12/22 0008                 Discharge Planning   GREGORY:      Code Status: Full Code   Is the patient medically ready for discharge?:     Reason for patient still in hospital (select all that apply): Treatment  Discharge Plan A: Home                  Maxime Arredondo MD  Department of Hospital Medicine   [facility]

## 2022-11-13 NOTE — PROGRESS NOTES
Consult Note  Infectious Disease    Reason for Consult:  chest wall cellulitis    HPI: Sivakumar Thacker is a 67 y.o. male Known to our service from consultation and follow-up, 10/2 until 10/7 for extensive left chest wall and axillary cellulitis with abscess formation, requiring extensive incision and drainage, Port-A-Cath removal and antibiotics.  Cultures from the abscess unexpectedly grew Serratia.  At the time of discharge he was given 2 additional weeks of oral ciprofloxacin.  He was seen by Dr. Venegas on October 20th and the drains were removed from his chest and he has been having wet-to-dry dressing changes since then.  He has not yet followed up with Dr. Venegas again. He noted a low grade temp on 11/10 pm with increased discomfort in left pectoral/axillary area.   He was seen by Dr. Ibarra yesterday(but forgot to mention this) and later in the evening he presented to the emergency room with complaints of fever, pain and chest wall redness.  He was diagnosed with cellulitis of the chest wall and admitted to Hospital Medicine and placed on vancomycin and cefepime.  He has a history of non-Hodgkin's lymphoma (Rituxan is on hold) and hypogammaglobulinemia for which he is receiving immunoglobulin replacement, last received yesterday am.  He was previously on suppressive/preventative antibiotics with Augmentin and azithromycin (from pulmonary). Procalcitonin 2.56, WBC WNL, lactic acid normal. His chronic sinusitis and bronchitis are much improved after the recent antibiotic course.     11/13: interim reviewed. Afebrile now(receiving celebrex). Blood and axillary wound cultures are negative. CT chest did not demonstrate any abscess.  There is some asymmetry of the musculature, left greater than right but the left arm is down in the right arm is up.  He does have evolution of more redness near the left axilla and over the right pectoral muscle.  He does have evolution of swelling over the left scapula and  posterior arm which I believe is in large part dependent.  There is still no purulence from any of the prior incisions.  He does still have pain in the axilla and induration superior to the nipple.    EXAM & DIAGNOSTICS REVIEWED:   Vitals:     Temp:  [98 °F (36.7 °C)-99.7 °F (37.6 °C)]   Temp: 98.4 °F (36.9 °C) (11/13/22 0733)  Pulse: 87 (11/13/22 0733)  Resp: 18 (11/13/22 1014)  BP: (!) 147/80 (11/13/22 0838)  SpO2: 97 % (11/13/22 0733)    Intake/Output Summary (Last 24 hours) at 11/13/2022 1113  Last data filed at 11/13/2022 0557  Gross per 24 hour   Intake 200 ml   Output 450 ml   Net -250 ml       General:  In NAD. Alert and attentive, cooperative, comfortable  Eyes:  Anicteric,   EOMI  ENT:  No ulcers, exudates, thrush, nares patent,    Neck:  supple, no masses or adenopathy appreciated  Lungs: Clear, no consolidation, rales, wheezes, rub  Heart:  RRR, no gallop/murmur/rub noted  Abd:  Soft, NT, ND, normal BS, no masses or organomegaly appreciated.  :  Voids/  no flank tenderness  Musc:  Joints without effusion, swelling, erythema, synovitis, muscle wasting.  He has pain in axilla when shoulder is moved.   Skin:  No rashes.    Neuro:             Alert, attentive, speech fluent, face symmetric, moves all extremities, no focal weakness. Ambulatory  Psych: Calm, cooperative  Lymphatic:     No cervical, supraclavicular, axillary,  nodes  Extrem: No edema, erythema, but may have some early left arm IV related phlebitis,  warm and well perfused  VAD:       Isolation:    Wound: 11/11 11/12  The erythema has decreased . There is no purulence from any of the wounds. The axilla itself is  non tender, but pain in axilla is present when shoulder/arm is moved. There is induration superior to nipple, almost to clavicle. No crepitance or ischemia.  11/13:He does have evolution of more redness near the left axilla and over the right pectoral muscle.  He does have evolution of swelling over the left scapula and posterior  arm which I believe is in large part dependent.  There is still no purulence from any of the prior incisions.  He does still have pain in the axilla and induration superior to the nipple.  There is no redness of the scapular area.    Lines/Tubes/Drains:    General Labs reviewed:  Recent Labs   Lab 11/11/22 2015 11/12/22 0606 11/13/22  0530   WBC 6.46  6.46 5.99 3.43*   HGB 11.2*  11.2* 11.4* 10.9*   HCT 35.1*  35.1* 36.1* 35.1*     198 156 145*       Recent Labs   Lab 11/11/22 2015 11/12/22  0606 11/13/22  0530   *  134* 136 132*   K 3.7  3.7 3.7 3.9     102 105 100   CO2 26  26 27 28   BUN 25*  25* 17 16   CREATININE 1.0  1.0 1.0 1.0   CALCIUM 8.7  8.7 8.3* 8.6*   PROT 7.1  7.1  --   --    BILITOT 0.4  0.4  --   --    ALKPHOS 105  105  --   --    ALT 26  26  --   --    AST 34  34  --   --      No results for input(s): CRP in the last 168 hours.      Micro:  Microbiology Results (last 7 days)       Procedure Component Value Units Date/Time    Aerobic culture [[id number]] Collected: 11/11/22 2322    Order Status: Completed Specimen: Incision site from Chest, Left Updated: 11/13/22 1023     Aerobic Bacterial Culture Skin dank,  no predominant organism    Blood culture x two cultures. Draw prior to antibiotics. [[id number]] Collected: 11/11/22 2010    Order Status: Completed Specimen: Blood from Peripheral, Antecubital, Right Updated: 11/12/22 2232     Blood Culture, Routine No Growth to date      No Growth to date    Narrative:      Aerobic and anaerobic    Blood culture x two cultures. Draw prior to antibiotics. [[id number]] Collected: 11/11/22 1955    Order Status: Completed Specimen: Blood from Peripheral, Forearm, Right Updated: 11/12/22 2232     Blood Culture, Routine No Growth to date      No Growth to date    Narrative:      Aerobic and anaerobic            Imaging Reviewed:    CT chest  Evaluation of the base of the neck is unremarkable.   There is mild subcutaneous edema in  the left chest wall without evidence of abscess. There is no axillary abscess or adenopathy.   The heart and great vessels are normal in size. There is no hilar or mediastinal adenopathy.  There is atelectasis in the lung bases. The upper lobes are clear.  The esophagus is normal. The visualized portion of the upper abdomen demonstrates fatty infiltration of the liver. The adrenal glands are normal. There are no acute osseous abnormalities.     IMPRESSION:  1. Mild subcutaneous edema within the left chest wall anterior to the pectoralis muscle compatible with cellulitis. There is no abscess.   No axillary abscess   Atelectasis in the lung bases   Fatty infiltration of the liver    Cardiology:    IMPRESSION & PLAN   1.  chest wall cellulitis, no abscess palpable or visible on exam or by CT   Prior surgery and disruption of lymphatic drainage contribute to mild progression    2. History of extensive chest wall cellulitis with axillary abscess, growing Serratia (after long term antibiotic prophylaxis), 10/3/22    3. NHL, not on treatment currently  4. Hypogammaglobulinemia, received IVIG 11/11  5. Diabetes with fair control, but hyperglycemia now      Recommendations:  Continue vanc and cefepime     NSAID  Discussed at length with patient and family member how interrupting the lymphatic drainage contributes to the swelling and redness.  Encouraged to be out of bed and to use his left arm as much as he can  I do not think any of the wounds need to be packed any further, only cleaned with hibiclens and covered    D/w patient, family nursing,      Medical Decision Making during this encounter was  [_] Low Complexity  [_] Moderate Complexity  [ xx ] High Complexity

## 2022-11-13 NOTE — PROGRESS NOTES
Pharmacokinetic Assessment Follow Up: IV Vancomycin    Vancomycin serum concentration assessment(s):    The trough level was drawn incorrectly and cannot be used to guide therapy at this time. Random level at 13:00 on 11/13 shows level within desired range.    Vancomycin Regimen Plan:    Change regimen to Vancomycin 1500 mg IV every 18 hours with next serum trough concentration measured at 21:00 prior to 4th dose on 11/15    Drug levels (last 3 results):  Recent Labs   Lab Result Units 11/13/22  0521 11/13/22  1353   Vancomycin, Random ug/mL  --  11.2   Vancomycin-Trough ug/mL 21.3*  --        Pharmacy will continue to follow and monitor vancomycin.    Please contact pharmacy at extension [phone] for questions regarding this assessment.    Thank you for the consult,   Lydia Leary       Patient brief summary:  Sivakumar Thacker is a 67 y.o. male initiated on antimicrobial therapy with IV Vancomycin for treatment of skin & soft tissue infection    The patient's current regimen is Vancomycin 1500mg Q18H    Drug Allergies:   Review of patient's allergies indicates:  No Known Allergies    Actual Body Weight:   94.4kg    Renal Function:   Estimated Creatinine Clearance: 81 mL/min (based on SCr of 1 mg/dL).,     Dialysis Method (if applicable):  N/A    CBC (last 72 hours):  Recent Labs   Lab Result Units 11/11/22 2015 11/12/22  0606 11/13/22  0530   WBC K/uL 6.46  6.46 5.99 3.43*   Hemoglobin g/dL 11.2*  11.2* 11.4* 10.9*   Hematocrit % 35.1*  35.1* 36.1* 35.1*   Platelets K/uL 198  198 156 145*   Gran % % 55.0  55.0 73.0 42.0   Lymph % % 11.0*  11.0* 7.0* 16.0*   Mono % % 12.0  12.0 10.0 16.0*   Eosinophil % % 3.0  3.0 5.0 20.0*   Basophil % % 0.0  0.0 0.0 1.0   Differential Method  Manual  Manual Manual Manual       Metabolic Panel (last 72 hours):  Recent Labs   Lab Result Units 11/11/22 2015 11/11/22 2050 11/12/22  0606 11/13/22  0530   Sodium mmol/L 134*  134*  --  136 132*   Potassium mmol/L 3.7  3.7   --  3.7 3.9   Chloride mmol/L 102  102  --  105 100   CO2 mmol/L 26  26  --  27 28   Glucose mg/dL 301*  301*  --  236* 231*   Glucose, UA   --  4+*  --   --    BUN mg/dL 25*  25*  --  17 16   Creatinine mg/dL 1.0  1.0  --  1.0 1.0   Albumin g/dL 3.4*  3.4*  --   --   --    Total Bilirubin mg/dL 0.4  0.4  --   --   --    Alkaline Phosphatase U/L 105  105  --   --   --    AST U/L 34  34  --   --   --    ALT U/L 26  26  --   --   --    Magnesium mg/dL 1.4*  1.4*  --  1.7 1.7   Phosphorus mg/dL 2.9  --   --   --        Vancomycin Administrations:  vancomycin given in the last 96 hours                     vancomycin 1,500 mg in dextrose 5 % 500 mL IVPB (mg) 1,500 mg New Bag 11/12/22 2238     1,500 mg New Bag  1227    vancomycin in dextrose 5 % 1 gram/250 mL IVPB 1,000 mg (mg) 1,000 mg New Bag 11/12/22 0038    vancomycin 500 mg in dextrose 5 % 100 mL IVPB (ready to mix system) ()  Restarted 11/11/22 2248     500 mg New Bag  2157                    Microbiologic Results:  Microbiology Results (last 7 days)       Procedure Component Value Units Date/Time    Aerobic culture [[id number]] Collected: 11/11/22 2322    Order Status: Completed Specimen: Incision site from Chest, Left Updated: 11/13/22 1023     Aerobic Bacterial Culture Skin dank,  no predominant organism    Blood culture x two cultures. Draw prior to antibiotics. [[id number]] Collected: 11/11/22 2010    Order Status: Completed Specimen: Blood from Peripheral, Antecubital, Right Updated: 11/12/22 2232     Blood Culture, Routine No Growth to date      No Growth to date    Narrative:      Aerobic and anaerobic    Blood culture x two cultures. Draw prior to antibiotics. [[id number]] Collected: 11/11/22 1955    Order Status: Completed Specimen: Blood from Peripheral, Forearm, Right Updated: 11/12/22 2232     Blood Culture, Routine No Growth to date      No Growth to date    Narrative:      Aerobic and anaerobic

## 2022-11-13 NOTE — PLAN OF CARE
[facility]  Initial Discharge Assessment       Primary Care Provider: Barry Mcmahon MD    Admission Diagnosis: Sepsis [A41.9]    Admission Date: 11/11/2022  Expected Discharge Date:     Discharge Barriers Identified: None    Payor: MEDICARE / Plan: MEDICARE PART A & B / Product Type: Government /     Extended Emergency Contact Information  Primary Emergency Contact: GAVIN DECKER  Mobile Phone: [phone]  Relation: Daughter  Preferred language: English   needed? No  Secondary Emergency Contact: Daniel Kernie  Mobile Phone: [phone]  Relation: Daughter  Preferred language: English   needed? No    Discharge Plan A: Home  Discharge Plan B: Home      [id number] - [place], [place] - [address]  [address]  [place] [address]  Phone: [phone] Fax: [phone]    [facility] - [place], [place] - [address]  [address]  [place] [address]  Phone: [phone] Fax: [phone]    [facility] - [place], [place] - [facility]  [address] [facility] [address]  Phone: [phone] Fax: [phone]    Met with patient at [facility].  States he currently has [facility].  Pt lives alone with has daughters that assist with any needs.  Family will provide transportation at discharge.  Initial Assessment (most recent)       Adult Discharge Assessment - 11/13/22 0949          Discharge Assessment    Assessment Type Discharge Planning Assessment     Confirmed/corrected address, phone number and insurance Yes     Confirmed Demographics Correct on Facesheet     Source of Information patient     When was your last doctors appointment? --   Pt reports Oct 5 or 6    Does patient/caregiver understand observation status Yes     Communicated GREGORY with patient/caregiver Date not available/Unable to determine     Lives With alone     Do you expect to return to  your current living situation? Yes     Do you have help at home or someone to help you manage your care at home? Yes     Who are your caregiver(s) and their phone number(s)? Daughters can provide assistance     Prior to hospitilization cognitive status: Alert/Oriented     Current cognitive status: Alert/Oriented     Walking or Climbing Stairs Difficulty none     Dressing/Bathing Difficulty none     Home Layout Able to live on 1st floor     Equipment Currently Used at Home none     Patient currently being followed by outpatient case management? No     Do you currently have service(s) that help you manage your care at home? Yes     Name and Contact number of [facility]     Do you take prescription medications? Yes     Do you have prescription coverage? Yes     Coverage Medicare     Do you have any problems affording any of your prescribed medications? No     Is the patient taking medications as prescribed? yes     Who is going to help you get home at discharge? Family     How do you get to doctors appointments? car, drives self;family or friend will provide     Are you on dialysis? No     Do you take coumadin? No     Discharge Plan A Home     Discharge Plan B Home     DME Needed Upon Discharge  none     Discharge Plan discussed with: Patient     Discharge Barriers Identified None

## 2022-11-13 NOTE — PLAN OF CARE
Problem: Adult Inpatient Plan of Care  Goal: Plan of Care Review  Outcome: Ongoing, Progressing  Goal: Absence of Hospital-Acquired Illness or Injury  Outcome: Ongoing, Progressing  Goal: Optimal Comfort and Wellbeing  Outcome: Ongoing, Progressing     Problem: Diabetes Comorbidity  Goal: Blood Glucose Level Within Targeted Range  Outcome: Ongoing, Progressing     Problem: Impaired Wound Healing  Goal: Optimal Wound Healing  Outcome: Ongoing, Progressing

## 2022-11-14 LAB
ANION GAP SERPL CALC-SCNC: 8 MMOL/L (ref 8–16)
ANISOCYTOSIS BLD QL SMEAR: SLIGHT
BACTERIA SPEC AEROBE CULT: NORMAL
BASOPHILS NFR BLD: 0 % (ref 0–1.9)
BUN SERPL-MCNC: 17 MG/DL (ref 8–23)
CALCIUM SERPL-MCNC: 8.8 MG/DL (ref 8.7–10.5)
CHLORIDE SERPL-SCNC: 99 MMOL/L (ref 95–110)
CO2 SERPL-SCNC: 28 MMOL/L (ref 23–29)
CREAT SERPL-MCNC: 1.1 MG/DL (ref 0.5–1.4)
DIFFERENTIAL METHOD: ABNORMAL
EOSINOPHIL NFR BLD: 30 % (ref 0–8)
ERYTHROCYTE [DISTWIDTH] IN BLOOD BY AUTOMATED COUNT: 13.5 % (ref 11.5–14.5)
EST. GFR  (NO RACE VARIABLE): >60 ML/MIN/1.73 M^2
GLUCOSE SERPL-MCNC: 203 MG/DL (ref 70–110)
GLUCOSE SERPL-MCNC: 228 MG/DL (ref 70–110)
GLUCOSE SERPL-MCNC: 237 MG/DL (ref 70–110)
GLUCOSE SERPL-MCNC: 271 MG/DL (ref 70–110)
GLUCOSE SERPL-MCNC: 283 MG/DL (ref 70–110)
HCT VFR BLD AUTO: 34.6 % (ref 40–54)
HGB BLD-MCNC: 11 G/DL (ref 14–18)
IMM GRANULOCYTES # BLD AUTO: ABNORMAL K/UL (ref 0–0.04)
IMM GRANULOCYTES NFR BLD AUTO: ABNORMAL % (ref 0–0.5)
LYMPHOCYTES NFR BLD: 17 % (ref 18–48)
MAGNESIUM SERPL-MCNC: 1.7 MG/DL (ref 1.6–2.6)
MCH RBC QN AUTO: 28.7 PG (ref 27–31)
MCHC RBC AUTO-ENTMCNC: 31.8 G/DL (ref 32–36)
MCV RBC AUTO: 90 FL (ref 82–98)
MONOCYTES NFR BLD: 6 % (ref 4–15)
NEUTROPHILS NFR BLD: 47 % (ref 38–73)
NRBC BLD-RTO: 0 /100 WBC
PLATELET # BLD AUTO: 145 K/UL (ref 150–450)
PMV BLD AUTO: 9.5 FL (ref 9.2–12.9)
POTASSIUM SERPL-SCNC: 3.8 MMOL/L (ref 3.5–5.1)
RBC # BLD AUTO: 3.83 M/UL (ref 4.6–6.2)
SODIUM SERPL-SCNC: 135 MMOL/L (ref 136–145)
WBC # BLD AUTO: 3.42 K/UL (ref 3.9–12.7)

## 2022-11-14 PROCEDURE — 83735 ASSAY OF MAGNESIUM: CPT | Performed by: NURSE PRACTITIONER

## 2022-11-14 PROCEDURE — 12000002 HC ACUTE/MED SURGE SEMI-PRIVATE ROOM

## 2022-11-14 PROCEDURE — 25000003 PHARM REV CODE 250: Performed by: INTERNAL MEDICINE

## 2022-11-14 PROCEDURE — 82962 GLUCOSE BLOOD TEST: CPT

## 2022-11-14 PROCEDURE — 85027 COMPLETE CBC AUTOMATED: CPT | Performed by: NURSE PRACTITIONER

## 2022-11-14 PROCEDURE — 85007 BL SMEAR W/DIFF WBC COUNT: CPT | Performed by: NURSE PRACTITIONER

## 2022-11-14 PROCEDURE — 63600175 PHARM REV CODE 636 W HCPCS: Performed by: NURSE PRACTITIONER

## 2022-11-14 PROCEDURE — 99231 PR SUBSEQUENT HOSPITAL CARE,LEVL I: ICD-10-PCS | Mod: ,,, | Performed by: INTERNAL MEDICINE

## 2022-11-14 PROCEDURE — 80048 BASIC METABOLIC PNL TOTAL CA: CPT | Performed by: NURSE PRACTITIONER

## 2022-11-14 PROCEDURE — 25000003 PHARM REV CODE 250: Performed by: NURSE PRACTITIONER

## 2022-11-14 PROCEDURE — 36415 COLL VENOUS BLD VENIPUNCTURE: CPT | Performed by: NURSE PRACTITIONER

## 2022-11-14 PROCEDURE — 63600175 PHARM REV CODE 636 W HCPCS: Performed by: INTERNAL MEDICINE

## 2022-11-14 PROCEDURE — 99231 SBSQ HOSP IP/OBS SF/LOW 25: CPT | Mod: ,,, | Performed by: INTERNAL MEDICINE

## 2022-11-14 RX ORDER — CIPROFLOXACIN 500 MG/1
500 TABLET ORAL EVERY 12 HOURS
Status: DISCONTINUED | OUTPATIENT
Start: 2022-11-14 | End: 2022-11-15 | Stop reason: HOSPADM

## 2022-11-14 RX ORDER — CLINDAMYCIN HYDROCHLORIDE 150 MG/1
300 CAPSULE ORAL EVERY 6 HOURS
Status: DISCONTINUED | OUTPATIENT
Start: 2022-11-14 | End: 2022-11-15 | Stop reason: HOSPADM

## 2022-11-14 RX ADMIN — FLUTICASONE PROPIONATE 50 MCG: 50 SPRAY, METERED NASAL at 09:11

## 2022-11-14 RX ADMIN — CEFEPIME HYDROCHLORIDE 2 G: 2 INJECTION, SOLUTION INTRAVENOUS at 09:11

## 2022-11-14 RX ADMIN — OXYCODONE HYDROCHLORIDE AND ACETAMINOPHEN 1 TABLET: 10; 325 TABLET ORAL at 11:11

## 2022-11-14 RX ADMIN — AMLODIPINE BESYLATE 5 MG: 5 TABLET ORAL at 09:11

## 2022-11-14 RX ADMIN — CLINDAMYCIN HYDROCHLORIDE 300 MG: 150 CAPSULE ORAL at 11:11

## 2022-11-14 RX ADMIN — CELECOXIB 200 MG: 100 CAPSULE ORAL at 08:11

## 2022-11-14 RX ADMIN — INSULIN ASPART 2 UNITS: 100 INJECTION, SOLUTION INTRAVENOUS; SUBCUTANEOUS at 09:11

## 2022-11-14 RX ADMIN — INSULIN ASPART 3 UNITS: 100 INJECTION, SOLUTION INTRAVENOUS; SUBCUTANEOUS at 01:11

## 2022-11-14 RX ADMIN — GABAPENTIN 300 MG: 300 CAPSULE ORAL at 09:11

## 2022-11-14 RX ADMIN — LOSARTAN POTASSIUM 50 MG: 50 TABLET, FILM COATED ORAL at 09:11

## 2022-11-14 RX ADMIN — CLINDAMYCIN HYDROCHLORIDE 300 MG: 150 CAPSULE ORAL at 08:11

## 2022-11-14 RX ADMIN — SENNOSIDES AND DOCUSATE SODIUM 1 TABLET: 50; 8.6 TABLET ORAL at 08:11

## 2022-11-14 RX ADMIN — INSULIN ASPART 1 UNITS: 100 INJECTION, SOLUTION INTRAVENOUS; SUBCUTANEOUS at 08:11

## 2022-11-14 RX ADMIN — ATORVASTATIN CALCIUM 20 MG: 20 TABLET, FILM COATED ORAL at 09:11

## 2022-11-14 RX ADMIN — GABAPENTIN 300 MG: 300 CAPSULE ORAL at 08:11

## 2022-11-14 RX ADMIN — ASPIRIN 81 MG: 81 TABLET, COATED ORAL at 09:11

## 2022-11-14 RX ADMIN — PANTOPRAZOLE SODIUM 40 MG: 40 TABLET, DELAYED RELEASE ORAL at 05:11

## 2022-11-14 RX ADMIN — ENOXAPARIN SODIUM 40 MG: 100 INJECTION SUBCUTANEOUS at 05:11

## 2022-11-14 RX ADMIN — THERA TABS 1 TABLET: TAB at 09:11

## 2022-11-14 RX ADMIN — INSULIN ASPART 2 UNITS: 100 INJECTION, SOLUTION INTRAVENOUS; SUBCUTANEOUS at 05:11

## 2022-11-14 RX ADMIN — OXYCODONE HYDROCHLORIDE AND ACETAMINOPHEN 1 TABLET: 10; 325 TABLET ORAL at 08:11

## 2022-11-14 RX ADMIN — SENNOSIDES AND DOCUSATE SODIUM 1 TABLET: 50; 8.6 TABLET ORAL at 09:11

## 2022-11-14 RX ADMIN — VANCOMYCIN HYDROCHLORIDE 1500 MG: 1.5 INJECTION, POWDER, LYOPHILIZED, FOR SOLUTION INTRAVENOUS at 10:11

## 2022-11-14 RX ADMIN — CIPROFLOXACIN 500 MG: 500 TABLET, FILM COATED ORAL at 08:11

## 2022-11-14 RX ADMIN — CELECOXIB 200 MG: 100 CAPSULE ORAL at 09:11

## 2022-11-14 RX ADMIN — CEFEPIME HYDROCHLORIDE 2 G: 2 INJECTION, SOLUTION INTRAVENOUS at 02:11

## 2022-11-14 RX ADMIN — OXYCODONE HYDROCHLORIDE AND ACETAMINOPHEN 1 TABLET: 10; 325 TABLET ORAL at 03:11

## 2022-11-14 RX ADMIN — POTASSIUM BICARBONATE 50 MEQ: 977.5 TABLET, EFFERVESCENT ORAL at 10:11

## 2022-11-14 RX ADMIN — MONTELUKAST 10 MG: 10 TABLET, FILM COATED ORAL at 08:11

## 2022-11-14 NOTE — NURSING
0700: report received, in bed with no complaints or distress, family at bedside.    1600: patient thinks he might discharge, saline lock bothering him and removed by Kristian TROTTER, he declined a midline at this time and wants to see if Dr Washington approves his discharge, will have to start a new saline lock if she isn't here by 1800 when he  has a diabetic due.  1830: per Dr. Washington, do not start an IV, will try po antibiotics.

## 2022-11-14 NOTE — CONSULTS
Incisions x3 on left, non draining.  Inflammation redness upper axilla/chest incision. Inner arm redness, left lower side and back is red and firm, very tender to touch.  Cleaned with normal saline and chlorhexdine, dried well applied a mepilex.

## 2022-11-14 NOTE — PLAN OF CARE
Problem: Adult Inpatient Plan of Care  Goal: Plan of Care Review  Outcome: Ongoing, Progressing  Goal: Absence of Hospital-Acquired Illness or Injury  Outcome: Ongoing, Progressing  Goal: Optimal Comfort and Wellbeing  Outcome: Ongoing, Progressing  Goal: Readiness for Transition of Care  Outcome: Ongoing, Progressing     Problem: Diabetes Comorbidity  Goal: Blood Glucose Level Within Targeted Range  Outcome: Ongoing, Progressing     Problem: Glycemic Control Impaired (Sepsis/Septic Shock)  Goal: Blood Glucose Level Within Desired Range  Outcome: Ongoing, Progressing     Problem: Impaired Wound Healing  Goal: Optimal Wound Healing  Outcome: Ongoing, Progressing

## 2022-11-14 NOTE — PLAN OF CARE
Met with Patient/Caregiver to provide MOON. MOON signed by Patient/Caregiver, copy of PARKER provided to Patient/Caregiver, and PARKER will be scanned to chart.

## 2022-11-14 NOTE — CARE UPDATE
11/13/22 2138   Patient Assessment/Suction   Level of Consciousness (AVPU) alert   Respiratory Effort Unlabored   Expansion/Accessory Muscles/Retractions no use of accessory muscles   All Lung Fields Breath Sounds clear;diminished   Rhythm/Pattern, Respiratory no shortness of breath reported   Cough Frequency no cough   PRE-TX-O2   O2 Device (Oxygen Therapy) room air   SpO2 95 %   Pulse Oximetry Type Continuous   $ Pulse Oximetry - Multiple Charge Pulse Oximetry - Multiple   Pulse 88   Resp 15   Positioning   Head of Bed (HOB) Positioning HOB elevated;HOB at 30 degrees   Respiratory Evaluation   $ Care Plan Tech Time 15 min

## 2022-11-15 ENCOUNTER — TELEPHONE (OUTPATIENT)
Dept: SURGERY | Facility: CLINIC | Age: 67
End: 2022-11-15

## 2022-11-15 VITALS
HEART RATE: 93 BPM | OXYGEN SATURATION: 98 % | DIASTOLIC BLOOD PRESSURE: 87 MMHG | HEIGHT: 68 IN | SYSTOLIC BLOOD PRESSURE: 151 MMHG | BODY MASS INDEX: 31.54 KG/M2 | TEMPERATURE: 98 F | WEIGHT: 208.13 LBS | RESPIRATION RATE: 16 BRPM

## 2022-11-15 PROBLEM — A41.9 SEPSIS: Status: RESOLVED | Noted: 2022-11-13 | Resolved: 2022-11-15

## 2022-11-15 PROBLEM — E83.42 HYPOMAGNESEMIA: Status: RESOLVED | Noted: 2022-11-11 | Resolved: 2022-11-15

## 2022-11-15 PROBLEM — L03.313 CELLULITIS OF CHEST WALL: Status: RESOLVED | Noted: 2022-11-12 | Resolved: 2022-11-15

## 2022-11-15 PROBLEM — L03.90 CELLULITIS: Status: RESOLVED | Noted: 2022-09-30 | Resolved: 2022-11-15

## 2022-11-15 PROBLEM — L02.213 CHEST WALL ABSCESS: Status: RESOLVED | Noted: 2022-09-30 | Resolved: 2022-11-15

## 2022-11-15 LAB
ANION GAP SERPL CALC-SCNC: 7 MMOL/L (ref 8–16)
BASOPHILS NFR BLD: 0 % (ref 0–1.9)
BUN SERPL-MCNC: 20 MG/DL (ref 8–23)
CALCIUM SERPL-MCNC: 8.8 MG/DL (ref 8.7–10.5)
CHLORIDE SERPL-SCNC: 101 MMOL/L (ref 95–110)
CO2 SERPL-SCNC: 29 MMOL/L (ref 23–29)
CREAT SERPL-MCNC: 1.1 MG/DL (ref 0.5–1.4)
DIFFERENTIAL METHOD: ABNORMAL
EOSINOPHIL NFR BLD: 20 % (ref 0–8)
ERYTHROCYTE [DISTWIDTH] IN BLOOD BY AUTOMATED COUNT: 13.7 % (ref 11.5–14.5)
EST. GFR  (NO RACE VARIABLE): >60 ML/MIN/1.73 M^2
GLUCOSE SERPL-MCNC: 195 MG/DL (ref 70–110)
GLUCOSE SERPL-MCNC: 196 MG/DL (ref 70–110)
GLUCOSE SERPL-MCNC: 202 MG/DL (ref 70–110)
HCT VFR BLD AUTO: 33.3 % (ref 40–54)
HGB BLD-MCNC: 10.7 G/DL (ref 14–18)
IMM GRANULOCYTES # BLD AUTO: ABNORMAL K/UL (ref 0–0.04)
IMM GRANULOCYTES NFR BLD AUTO: ABNORMAL % (ref 0–0.5)
LYMPHOCYTES NFR BLD: 20 % (ref 18–48)
MAGNESIUM SERPL-MCNC: 1.7 MG/DL (ref 1.6–2.6)
MCH RBC QN AUTO: 28.8 PG (ref 27–31)
MCHC RBC AUTO-ENTMCNC: 32.1 G/DL (ref 32–36)
MCV RBC AUTO: 90 FL (ref 82–98)
MONOCYTES NFR BLD: 6 % (ref 4–15)
NEUTROPHILS NFR BLD: 54 % (ref 38–73)
NRBC BLD-RTO: 0 /100 WBC
PLATELET # BLD AUTO: 163 K/UL (ref 150–450)
PMV BLD AUTO: 9.8 FL (ref 9.2–12.9)
POTASSIUM SERPL-SCNC: 3.9 MMOL/L (ref 3.5–5.1)
RBC # BLD AUTO: 3.72 M/UL (ref 4.6–6.2)
SODIUM SERPL-SCNC: 137 MMOL/L (ref 136–145)
WBC # BLD AUTO: 3.58 K/UL (ref 3.9–12.7)

## 2022-11-15 PROCEDURE — 25000003 PHARM REV CODE 250: Performed by: INTERNAL MEDICINE

## 2022-11-15 PROCEDURE — 85007 BL SMEAR W/DIFF WBC COUNT: CPT | Performed by: NURSE PRACTITIONER

## 2022-11-15 PROCEDURE — 25000003 PHARM REV CODE 250: Performed by: NURSE PRACTITIONER

## 2022-11-15 PROCEDURE — 83735 ASSAY OF MAGNESIUM: CPT | Performed by: NURSE PRACTITIONER

## 2022-11-15 PROCEDURE — 36415 COLL VENOUS BLD VENIPUNCTURE: CPT | Performed by: NURSE PRACTITIONER

## 2022-11-15 PROCEDURE — 99231 PR SUBSEQUENT HOSPITAL CARE,LEVL I: ICD-10-PCS | Mod: ,,, | Performed by: INTERNAL MEDICINE

## 2022-11-15 PROCEDURE — 85027 COMPLETE CBC AUTOMATED: CPT | Performed by: NURSE PRACTITIONER

## 2022-11-15 PROCEDURE — 80048 BASIC METABOLIC PNL TOTAL CA: CPT | Performed by: NURSE PRACTITIONER

## 2022-11-15 PROCEDURE — 99231 SBSQ HOSP IP/OBS SF/LOW 25: CPT | Mod: ,,, | Performed by: INTERNAL MEDICINE

## 2022-11-15 RX ORDER — CLINDAMYCIN HYDROCHLORIDE 300 MG/1
300 CAPSULE ORAL EVERY 6 HOURS
Qty: 28 CAPSULE | Refills: 0 | Status: SHIPPED | OUTPATIENT
Start: 2022-11-15 | End: 2022-11-22

## 2022-11-15 RX ORDER — CELECOXIB 200 MG/1
200 CAPSULE ORAL 2 TIMES DAILY
Qty: 14 CAPSULE | Refills: 0 | Status: SHIPPED | OUTPATIENT
Start: 2022-11-15 | End: 2022-11-22

## 2022-11-15 RX ORDER — CIPROFLOXACIN 500 MG/1
500 TABLET ORAL EVERY 12 HOURS
Qty: 14 TABLET | Refills: 0 | Status: SHIPPED | OUTPATIENT
Start: 2022-11-15 | End: 2022-11-22

## 2022-11-15 RX ORDER — OXYCODONE AND ACETAMINOPHEN 10; 325 MG/1; MG/1
1 TABLET ORAL EVERY 8 HOURS PRN
Qty: 14 TABLET | Refills: 0 | Status: SHIPPED | OUTPATIENT
Start: 2022-11-15 | End: 2023-01-06

## 2022-11-15 RX ADMIN — CLINDAMYCIN HYDROCHLORIDE 300 MG: 150 CAPSULE ORAL at 05:11

## 2022-11-15 RX ADMIN — CLINDAMYCIN HYDROCHLORIDE 300 MG: 150 CAPSULE ORAL at 12:11

## 2022-11-15 RX ADMIN — THERA TABS 1 TABLET: TAB at 09:11

## 2022-11-15 RX ADMIN — FLUTICASONE PROPIONATE 50 MCG: 50 SPRAY, METERED NASAL at 09:11

## 2022-11-15 RX ADMIN — AMLODIPINE BESYLATE 5 MG: 5 TABLET ORAL at 09:11

## 2022-11-15 RX ADMIN — CELECOXIB 200 MG: 100 CAPSULE ORAL at 09:11

## 2022-11-15 RX ADMIN — PANTOPRAZOLE SODIUM 40 MG: 40 TABLET, DELAYED RELEASE ORAL at 05:11

## 2022-11-15 RX ADMIN — ATORVASTATIN CALCIUM 20 MG: 20 TABLET, FILM COATED ORAL at 09:11

## 2022-11-15 RX ADMIN — LOSARTAN POTASSIUM 50 MG: 50 TABLET, FILM COATED ORAL at 09:11

## 2022-11-15 RX ADMIN — GABAPENTIN 300 MG: 300 CAPSULE ORAL at 09:11

## 2022-11-15 RX ADMIN — ASPIRIN 81 MG: 81 TABLET, COATED ORAL at 09:11

## 2022-11-15 RX ADMIN — CIPROFLOXACIN 500 MG: 500 TABLET, FILM COATED ORAL at 09:11

## 2022-11-15 RX ADMIN — SENNOSIDES AND DOCUSATE SODIUM 1 TABLET: 50; 8.6 TABLET ORAL at 09:11

## 2022-11-15 NOTE — PROGRESS NOTES
[facility] Medicine  Progress Note    Patient Name: Sivakumar Thacker  MRN: [id number]  Patient Class: IP- Inpatient   Admission Date: 11/11/2022  Length of Stay: 1 days  Attending Physician: Maxime Arredondo MD  Primary Care Provider: Barry Mcmahon MD        Subjective:     Principal Problem:Cellulitis        HPI:  Kirstie is a 67 y.o. male with past medical history of hypertension, diabetes with lower extremity neuropathy, and low-grade non-Hodgkin's lymphoma on recently on rituximab maintenance therapy (last 9/15 - now on surveillance) and monthly IVIG (last 11/11), recent admission for chest wall cellulitis and abscesses culturing pansensitive serratia who presents today with complaints of left axillary pain. It is severe. It is associated with fever T max 100.7, nausea, headache, and a small area of erythema to right of lowest chest wall wound near the left axilla. He denies chest pain, SOB, V/D, dizziness, or LOC. He had a complicated stay on his last admission requiring ICU care. He was discharged home with penrose drains and 2 weeks of cipro, and f/u with Dr. Venegas on 10/20 to have them removed and packing was placed. He is getting wound care with packing exchange every 72 hours. The packing was changed today, and while the nurses were there he became febrile and reported the pain to the axilla and was directed to the ED. In the ED, he was noted to be febrile 100.7, tachycardic 111, normotensive 148/74. WBC 6K. Procal 2.56. Lactate 1.8. Negative for flu and covid.       Overview/Hospital Course:  11/12  CT showed cellulitis not abscess  Pt has excess pain on L chest wall and back area     11/13  More redness on Chest wall area  Afebrile  Had shower and sitting on chair     11/14  Redness now on L flank area  Afebrile  Pt sitting on chair       Interval History:     Review of Systems   Constitutional:  Negative for activity change and appetite change.   HENT:  Negative for congestion and  dental problem.    Eyes:  Negative for discharge and itching.   Respiratory:  Negative for shortness of breath.    Cardiovascular:  Negative for chest pain.   Gastrointestinal:  Negative for abdominal distention and abdominal pain.   Endocrine: Negative for cold intolerance.   Genitourinary:  Negative for difficulty urinating and dysuria.   Musculoskeletal:  Negative for arthralgias and back pain.   Skin:  Positive for color change and rash.   Neurological:  Negative for dizziness and facial asymmetry.   Hematological:  Negative for adenopathy.   Psychiatric/Behavioral:  Negative for agitation and behavioral problems.    Objective:     Vital Signs (Most Recent):  Temp: 98.7 °F (37.1 °C) (11/14/22 1917)  Pulse: 91 (11/14/22 1917)  Resp: 18 (11/14/22 1917)  BP: (!) 157/84 (11/14/22 1917)  SpO2: 96 % (11/14/22 1917)   Vital Signs (24h Range):  Temp:  [98.1 °F (36.7 °C)-98.7 °F (37.1 °C)] 98.7 °F (37.1 °C)  Pulse:  [80-91] 91  Resp:  [15-20] 18  SpO2:  [93 %-98 %] 96 %  BP: (135-157)/(74-88) 157/84     Weight: 94.4 kg (208 lb 1.8 oz)  Body mass index is 31.64 kg/m².    Intake/Output Summary (Last 24 hours) at 11/14/2022 1918  Last data filed at 11/14/2022 1743  Gross per 24 hour   Intake 1790 ml   Output 2100 ml   Net -310 ml      Physical Exam  Vitals and nursing note reviewed.   Constitutional:       Appearance: He is well-developed.   HENT:      Head: Atraumatic.      Right Ear: External ear normal.      Left Ear: External ear normal.      Nose: Nose normal.      Mouth/Throat:      Mouth: Mucous membranes are moist.   Cardiovascular:      Rate and Rhythm: Normal rate.   Pulmonary:      Effort: Pulmonary effort is normal.   Musculoskeletal:         General: Normal range of motion.      Cervical back: Full passive range of motion without pain and normal range of motion.   Skin:     General: Skin is warm.      Findings: Rash present.   Neurological:      Mental Status: He is alert and oriented to person, place, and  time.   Psychiatric:         Behavior: Behavior normal.       Significant Labs: All pertinent labs within the past 24 hours have been reviewed.  CBC:   Recent Labs   Lab 11/13/22 0530 11/14/22 0429   WBC 3.43* 3.42*   HGB 10.9* 11.0*   HCT 35.1* 34.6*   * 145*     CMP:   Recent Labs   Lab 11/13/22 0530 11/14/22 0429   * 135*   K 3.9 3.8    99   CO2 28 28   * 271*   BUN 16 17   CREATININE 1.0 1.1   CALCIUM 8.6* 8.8   ANIONGAP 4* 8       Significant Imaging: I have reviewed all pertinent imaging results/findings within the past 24 hours.      Assessment/Plan:      * Cellulitis Left Axilla  Per CT : Mild subcutaneous edema within the left chest wall anterior to the pectoralis muscle compatible with cellulitis. There is no abscess  History of extensive chest wall cellulitis with axillary abscess, growing Serratia  Continue iv abx      Cellulitis of chest wall  As above       Non-Hodgkin's lymphoma  Aware       Chest wall abscess  H/o Chest wall abscess  No evident abscess this time  Continue iv abx       Hypomagnesemia  Monitor and replete       Chronic obstructive pulmonary disease, unspecified COPD type  Continue PRN nebs      Type 2 diabetes mellitus without complication, without long-term current use of insulin  Maintain present regime     Essential hypertension  Continue home antihypertensives      Hypogammaglobulinemia  IVIG treatment       Follicular lymphoma grade IIIa  Aware         VTE Risk Mitigation (From admission, onward)         Ordered     enoxaparin injection 40 mg  Daily         11/12/22 0008     IP VTE HIGH RISK PATIENT  Once         11/12/22 0008     Place sequential compression device  Until discontinued         11/12/22 0008                Discharge Planning   GREGORY: 11/18/2022     Code Status: Full Code   Is the patient medically ready for discharge?:     Reason for patient still in hospital (select all that apply): Treatment  Discharge Plan A: Home                   Maxime Arredondo MD  Department of Hospital Medicine   [facility]

## 2022-11-15 NOTE — PLAN OF CARE
[facility]  Discharge Final Note    Primary Care Provider: Barry Mcmahon MD    Expected Discharge Date: 11/15/2022    Social work intern met with Pt at bedside to confirm Pt discharge plans. Pt discharged before clearance from case management. Social work intern observed Pt has previously scheduled appointments. Appointment added to AVS.  sent resumptino of care t [facility]. Per Sara, Pt accepted. Anticipated start of care date is 11/16. Pt has no other needs to be addressed by case management. Pt cleared to discharge by case management.    Final Discharge Note (most recent)       Final Note - 11/15/22 1541          Final Note    Assessment Type Final Discharge Note     Anticipated Discharge Disposition Home-Health Care [facility] [place]     What phone number can be called within the next 1-3 days to see how you are doing after discharge? [phone]     Hospital Resources/Appts/Education Provided Provided patient/caregiver with written discharge plan information;Appointments scheduled and added to AVS        Post-Acute Status    Post-Acute Authorization Home Health     Home Health Status Referrals Sent     Coverage Medicare Part A & B     Discharge Delays None known at this time                     Important Message from Medicare             Contact Info       Franco Venegas III, MD   Specialty: General Surgery, Surgery    [address]  [address]  [place] [address]   Phone: [phone]       Next Steps: Follow up in 1 week(s)    Heaven Washington MD   Specialty: Infectious Diseases    [address]  [address]  [place] [address]   Phone: [phone]       Next Steps: Follow up in 1 week(s)

## 2022-11-15 NOTE — PROGRESS NOTES
Consult Note  Infectious Disease    Reason for Consult:  chest wall cellulitis    HPI: Sivakumar Thacker is a 67 y.o. male Known to our service from consultation and follow-up, 10/2 until 10/7 for extensive left chest wall and axillary cellulitis with abscess formation, requiring extensive incision and drainage, Port-A-Cath removal and antibiotics.  Cultures from the abscess unexpectedly grew Serratia.  At the time of discharge he was given 2 additional weeks of oral ciprofloxacin.  He was seen by Dr. Venegas on October 20th and the drains were removed from his chest and he has been having wet-to-dry dressing changes since then.  He has not yet followed up with Dr. Venegas again. He noted a low grade temp on 11/10 pm with increased discomfort in left pectoral/axillary area.   He was seen by Dr. Ibarra yesterday(but forgot to mention this) and later in the evening he presented to the emergency room with complaints of fever, pain and chest wall redness.  He was diagnosed with cellulitis of the chest wall and admitted to Hospital Medicine and placed on vancomycin and cefepime.  He has a history of non-Hodgkin's lymphoma (Rituxan is on hold) and hypogammaglobulinemia for which he is receiving immunoglobulin replacement, last received yesterday am.  He was previously on suppressive/preventative antibiotics with Augmentin and azithromycin (from pulmonary). Procalcitonin 2.56, WBC WNL, lactic acid normal. His chronic sinusitis and bronchitis are much improved after the recent antibiotic course.     11/13: interim reviewed. Afebrile now(receiving celebrex). Blood and axillary wound cultures are negative. CT chest did not demonstrate any abscess.  There is some asymmetry of the musculature, left greater than right but the left arm is down in the right arm is up.  He does have evolution of more redness near the left axilla and over the right pectoral muscle.  He does have evolution of swelling over the left scapula and  posterior arm which I believe is in large part dependent.  There is still no purulence from any of the prior incisions.  He does still have pain in the axilla and induration superior to the nipple.  11/14: interim reviewed. Temps less than 100. The right chest wall is improved, the left axillary swelling and redness is improved. The edema and congestion have moved with gravity to left flank. WBC are a little low. He is ambulatory. Left axillary tenderness is persistent. Culture of the wound from admission is negative.     EXAM & DIAGNOSTICS REVIEWED:   Vitals:     Temp:  [98.1 °F (36.7 °C)-98.7 °F (37.1 °C)]   Temp: 98.7 °F (37.1 °C) (11/14/22 1521)  Pulse: 80 (11/14/22 1521)  Resp: 18 (11/14/22 1521)  BP: (!) 146/87 (11/14/22 1521)  SpO2: 98 % (11/14/22 1521)    Intake/Output Summary (Last 24 hours) at 11/14/2022 1814  Last data filed at 11/14/2022 1743  Gross per 24 hour   Intake 2040.05 ml   Output 2100 ml   Net -59.95 ml       General:  In NAD. Alert and attentive, cooperative, comfortable  Eyes:  Anicteric,   EOMI  ENT:  No ulcers, exudates, thrush, nares patent,    Neck:  supple,    Lungs:    Heart:     Abd:     :  Voids/  no flank tenderness  Musc:  Joints without effusion, swelling, erythema, synovitis, muscle wasting.  He has pain in axilla when shoulder is moved.   Skin:  No rashes.    Neuro:             Alert, attentive, speech fluent, face symmetric, moves all extremities, no focal weakness. Ambulatory  Psych: Calm, cooperative   Extrem: No edema, erythema, but may have some early left arm IV related phlebitis,  warm and well perfused  VAD:       Isolation:    Wound: 11/11 11/12  The erythema has decreased . There is no purulence from any of the wounds. The axilla itself is  non tender, but pain in axilla is present when shoulder/arm is moved. There is induration superior to nipple, almost to clavicle. No crepitance or ischemia.  11/13:He does have evolution of more redness near the left axilla  and over the right pectoral muscle.  He does have evolution of swelling over the left scapula and posterior arm which I believe is in large part dependent.  There is still no purulence from any of the prior incisions.  He does still have pain in the axilla and induration superior to the nipple.  There is no redness of the scapular area.  11/14: The right chest wall is improved, the left axillary swelling and redness is improved. The edema and congestion have moved with gravity to left flank.    Lines/Tubes/Drains:    General Labs reviewed:  Recent Labs   Lab 11/12/22 0606 11/13/22 0530 11/14/22 0429   WBC 5.99 3.43* 3.42*   HGB 11.4* 10.9* 11.0*   HCT 36.1* 35.1* 34.6*    145* 145*       Recent Labs   Lab 11/11/22 2015 11/12/22 0606 11/13/22 0530 11/14/22 0429   *  134* 136 132* 135*   K 3.7  3.7 3.7 3.9 3.8     102 105 100 99   CO2 26  26 27 28 28   BUN 25*  25* 17 16 17   CREATININE 1.0  1.0 1.0 1.0 1.1   CALCIUM 8.7  8.7 8.3* 8.6* 8.8   PROT 7.1  7.1  --   --   --    BILITOT 0.4  0.4  --   --   --    ALKPHOS 105  105  --   --   --    ALT 26  26  --   --   --    AST 34  34  --   --   --      No results for input(s): CRP in the last 168 hours.      Micro:  Microbiology Results (last 7 days)       Procedure Component Value Units Date/Time    Aerobic culture [[id number]] Collected: 11/11/22 2322    Order Status: Completed Specimen: Incision site from Chest, Left Updated: 11/14/22 0751     Aerobic Bacterial Culture Skin dank,  no predominant organism    Blood culture x two cultures. Draw prior to antibiotics. [[id number]] Collected: 11/11/22 2010    Order Status: Completed Specimen: Blood from Peripheral, Antecubital, Right Updated: 11/13/22 2232     Blood Culture, Routine No Growth to date      No Growth to date      No Growth to date    Narrative:      Aerobic and anaerobic    Blood culture x two cultures. Draw prior to antibiotics. [[id number]] Collected: 11/11/22 1955    Order  Status: Completed Specimen: Blood from Peripheral, Forearm, Right Updated: 11/13/22 2232     Blood Culture, Routine No Growth to date      No Growth to date      No Growth to date    Narrative:      Aerobic and anaerobic            Imaging Reviewed:    CT chest  Evaluation of the base of the neck is unremarkable.   There is mild subcutaneous edema in the left chest wall without evidence of abscess. There is no axillary abscess or adenopathy.   The heart and great vessels are normal in size. There is no hilar or mediastinal adenopathy.  There is atelectasis in the lung bases. The upper lobes are clear.  The esophagus is normal. The visualized portion of the upper abdomen demonstrates fatty infiltration of the liver. The adrenal glands are normal. There are no acute osseous abnormalities.     IMPRESSION:  1. Mild subcutaneous edema within the left chest wall anterior to the pectoralis muscle compatible with cellulitis. There is no abscess.   No axillary abscess   Atelectasis in the lung bases   Fatty infiltration of the liver    Cardiology:    IMPRESSION & PLAN   1.  chest wall cellulitis, no abscess palpable or visible on exam or by CT   Prior surgery and disruption of lymphatic drainage contribute to mild progression    2. History of extensive chest wall cellulitis with axillary abscess, growing Serratia (after long term antibiotic prophylaxis), 10/3/22    3. NHL, not on treatment currently  4. Hypogammaglobulinemia, received IVIG 11/11  5. Diabetes with fair control, but hyperglycemia now      Recommendations:  Switching to po antibiotics(he does not have an IV now), clindamycin and cipro  If no worsening tomorrow, home     Medical Decision Making during this encounter was  [_] Low Complexity  [_] Moderate Complexity  [ xx ] High Complexity

## 2022-11-15 NOTE — PLAN OF CARE
Problem: Adult Inpatient Plan of Care  Goal: Plan of Care Review  Outcome: Met  Goal: Patient-Specific Goal (Individualized)  Outcome: Met  Goal: Absence of Hospital-Acquired Illness or Injury  Outcome: Met  Goal: Optimal Comfort and Wellbeing  Outcome: Met  Goal: Readiness for Transition of Care  Outcome: Met     Problem: Diabetes Comorbidity  Goal: Blood Glucose Level Within Targeted Range  Outcome: Met     Problem: Adjustment to Illness (Sepsis/Septic Shock)  Goal: Optimal Coping  Outcome: Met     Problem: Bleeding (Sepsis/Septic Shock)  Goal: Absence of Bleeding  Outcome: Met     Problem: Glycemic Control Impaired (Sepsis/Septic Shock)  Goal: Blood Glucose Level Within Desired Range  Outcome: Met     Problem: Infection Progression (Sepsis/Septic Shock)  Goal: Absence of Infection Signs and Symptoms  Outcome: Met     Problem: Nutrition Impaired (Sepsis/Septic Shock)  Goal: Optimal Nutrition Intake  Outcome: Met     Problem: Impaired Wound Healing  Goal: Optimal Wound Healing  Outcome: Met     Problem: Fall Injury Risk  Goal: Absence of Fall and Fall-Related Injury  Outcome: Met

## 2022-11-15 NOTE — PROGRESS NOTES
Pharmacy Consult Discontinuation: Vancomycin    Sivakumar Thacker [id number] is a 67 y.o. male was consulted for vancomycin pharmacotherapy management by pharmacy.    Pharmacy consult for vancomycin dosing is no longer required.  Vancomycin was discontinued 11/14/2022.    Thank you for allowing us to participate in this patient's care. Should you have any questions or concerns please feel free to contact the pharmacy department at [phone].    Carlos Huang, AimeeD

## 2022-11-15 NOTE — PROGRESS NOTES
Consult Note  Infectious Disease    Reason for Consult:  chest wall cellulitis    HPI: Sivakumar Thacker is a 67 y.o. male Known to our service from consultation and follow-up, 10/2 until 10/7 for extensive left chest wall and axillary cellulitis with abscess formation, requiring extensive incision and drainage, Port-A-Cath removal and antibiotics.  Cultures from the abscess unexpectedly grew Serratia.  At the time of discharge he was given 2 additional weeks of oral ciprofloxacin.  He was seen by Dr. Venegas on October 20th and the drains were removed from his chest and he has been having wet-to-dry dressing changes since then.  He has not yet followed up with Dr. Venegas again. He noted a low grade temp on 11/10 pm with increased discomfort in left pectoral/axillary area.   He was seen by Dr. Ibarra yesterday(but forgot to mention this) and later in the evening he presented to the emergency room with complaints of fever, pain and chest wall redness.  He was diagnosed with cellulitis of the chest wall and admitted to Hospital Medicine and placed on vancomycin and cefepime.  He has a history of non-Hodgkin's lymphoma (Rituxan is on hold) and hypogammaglobulinemia for which he is receiving immunoglobulin replacement, last received yesterday am.  He was previously on suppressive/preventative antibiotics with Augmentin and azithromycin (from pulmonary). Procalcitonin 2.56, WBC WNL, lactic acid normal. His chronic sinusitis and bronchitis are much improved after the recent antibiotic course.     11/13: interim reviewed. Afebrile now(receiving celebrex). Blood and axillary wound cultures are negative. CT chest did not demonstrate any abscess.  There is some asymmetry of the musculature, left greater than right but the left arm is down in the right arm is up.  He does have evolution of more redness near the left axilla and over the right pectoral muscle.  He does have evolution of swelling over the left scapula and  posterior arm which I believe is in large part dependent.  There is still no purulence from any of the prior incisions.  He does still have pain in the axilla and induration superior to the nipple.  11/14: interim reviewed. Temps less than 100. The right chest wall is improved, the left axillary swelling and redness is improved. The edema and congestion have moved with gravity to left flank. WBC are a little low. He is ambulatory. Left axillary tenderness is persistent. Culture of the wound from admission is negative.   11/15:  Interim reviewed.  According to his family member he had some chills earlier but there is no worsening of the cellulitis on his chest axilla or flank in fact the chest and axilla are better.  The flank erythema is dependent edema and congestion.  He has some right forearm IV phlebitis from prior IV.  He is afebrile.  White blood cells remain normal at baseline, with 20% eosinophils and no sign of a rash.  With 20% eosinophils and no sign of a rash.  He did not have the eosinophils on admission.    EXAM & DIAGNOSTICS REVIEWED:   Vitals:     Temp:  [98 °F (36.7 °C)-98.7 °F (37.1 °C)]   Temp: 98 °F (36.7 °C) (11/15/22 1224)  Pulse: 93 (11/15/22 1224)  Resp: 16 (11/15/22 1224)  BP: (!) 151/87 (notify nurse) (11/15/22 1224)  SpO2: 98 % (11/15/22 1224)    Intake/Output Summary (Last 24 hours) at 11/15/2022 1322  Last data filed at 11/14/2022 1743  Gross per 24 hour   Intake 450 ml   Output --   Net 450 ml       General:  In NAD. Alert and attentive, cooperative, comfortable  Eyes:  Anicteric,   EOMI  ENT:  No ulcers, exudates, thrush, nares patent,    Neck:  supple,    Lungs:    Heart:     Abd:     :  Voids/  no flank tenderness  Musc:  Joints without effusion, swelling, erythema, synovitis, muscle wasting.  He has pain in axilla when shoulder is moved.   Skin:  No rashes.    Neuro:             Alert, attentive, speech fluent, face symmetric, moves all extremities, no focal weakness.  Ambulatory  Psych:   Anxious about how this is going to progress.  Extrem: Right arm peripheral IV phlebitis  VAD:       Isolation:    Wound: 11/11 11/12  The erythema has decreased . There is no purulence from any of the wounds. The axilla itself is  non tender, but pain in axilla is present when shoulder/arm is moved. There is induration superior to nipple, almost to clavicle. No crepitance or ischemia.  11/13:He does have evolution of more redness near the left axilla and over the right pectoral muscle.  He does have evolution of swelling over the left scapula and posterior arm which I believe is in large part dependent.  There is still no purulence from any of the prior incisions.  He does still have pain in the axilla and induration superior to the nipple.  There is no redness of the scapular area.  11/14: The right chest wall is improved, the left axillary swelling and redness is improved. The edema and congestion have moved with gravity to left flank.  11/15:  The chest wall redness has diminished further as has the axillary redness compared to yesterday and the day before.  He still has some dependent flank edema and erythema and pain when he moves his arm.    Lines/Tubes/Drains:    General Labs reviewed:  Recent Labs   Lab 11/13/22  0530 11/14/22  0429 11/15/22  0512   WBC 3.43* 3.42* 3.58*   HGB 10.9* 11.0* 10.7*   HCT 35.1* 34.6* 33.3*   * 145* 163       Recent Labs   Lab 11/11/22 2015 11/12/22  0606 11/13/22 0530 11/14/22  0429 11/15/22  0512   *  134*   < > 132* 135* 137   K 3.7  3.7   < > 3.9 3.8 3.9     102   < > 100 99 101   CO2 26  26   < > 28 28 29   BUN 25*  25*   < > 16 17 20   CREATININE 1.0  1.0   < > 1.0 1.1 1.1   CALCIUM 8.7  8.7   < > 8.6* 8.8 8.8   PROT 7.1  7.1  --   --   --   --    BILITOT 0.4  0.4  --   --   --   --    ALKPHOS 105  105  --   --   --   --    ALT 26  26  --   --   --   --    AST 34  34  --   --   --   --     < > = values in this  interval not displayed.     No results for input(s): CRP in the last 168 hours.      Micro:  Microbiology Results (last 7 days)       Procedure Component Value Units Date/Time    Blood culture x two cultures. Draw prior to antibiotics. [[id number]] Collected: 11/11/22 1955    Order Status: Completed Specimen: Blood from Peripheral, Forearm, Right Updated: 11/14/22 2232     Blood Culture, Routine No Growth to date      No Growth to date      No Growth to date      No Growth to date    Narrative:      Aerobic and anaerobic    Blood culture x two cultures. Draw prior to antibiotics. [[id number]] Collected: 11/11/22 2010    Order Status: Completed Specimen: Blood from Peripheral, Antecubital, Right Updated: 11/14/22 2232     Blood Culture, Routine No Growth to date      No Growth to date      No Growth to date      No Growth to date    Narrative:      Aerobic and anaerobic    Aerobic culture [[id number]] Collected: 11/11/22 2322    Order Status: Completed Specimen: Incision site from Chest, Left Updated: 11/14/22 0751     Aerobic Bacterial Culture Skin dank,  no predominant organism            Imaging Reviewed:    CT chest  Evaluation of the base of the neck is unremarkable.   There is mild subcutaneous edema in the left chest wall without evidence of abscess. There is no axillary abscess or adenopathy.   The heart and great vessels are normal in size. There is no hilar or mediastinal adenopathy.  There is atelectasis in the lung bases. The upper lobes are clear.  The esophagus is normal. The visualized portion of the upper abdomen demonstrates fatty infiltration of the liver. The adrenal glands are normal. There are no acute osseous abnormalities.     IMPRESSION:  1. Mild subcutaneous edema within the left chest wall anterior to the pectoralis muscle compatible with cellulitis. There is no abscess.   No axillary abscess   Atelectasis in the lung bases   Fatty infiltration of the liver    Cardiology:    IMPRESSION & PLAN    1.  chest wall cellulitis, no abscess palpable or visible on exam or by CT   Prior surgery and disruption of lymphatic drainage contribute to mild progression    2. History of extensive chest wall cellulitis with axillary abscess, growing Serratia (after long term antibiotic prophylaxis), 10/3/22    3. NHL, not on treatment currently  4. Hypogammaglobulinemia, received IVIG 11/11  5. Diabetes with fair control, but hyperglycemia now  6.  Phlebitis right arm  7.  Eosinophilia, developing during hospitalization    Recommendations:  Okay with me for discharge on 1 week of clindamycin and cipro   with Celebrex and an oral opiate for pain  Would also benefit from warm compresses to the right forearm  Will likely repeat CBC to look at eosinophils when he follows up on 11/22    Medical Decision Making during this encounter was  [_] Low Complexity  [_] Moderate Complexity  [ xx ] High Complexity

## 2022-11-15 NOTE — SUBJECTIVE & OBJECTIVE
Interval History:     Review of Systems   Constitutional:  Negative for activity change and appetite change.   HENT:  Negative for congestion and dental problem.    Eyes:  Negative for discharge and itching.   Respiratory:  Negative for shortness of breath.    Cardiovascular:  Negative for chest pain.   Gastrointestinal:  Negative for abdominal distention and abdominal pain.   Endocrine: Negative for cold intolerance.   Genitourinary:  Negative for difficulty urinating and dysuria.   Musculoskeletal:  Negative for arthralgias and back pain.   Skin:  Positive for color change and rash.   Neurological:  Negative for dizziness and facial asymmetry.   Hematological:  Negative for adenopathy.   Psychiatric/Behavioral:  Negative for agitation and behavioral problems.    Objective:     Vital Signs (Most Recent):  Temp: 98.7 °F (37.1 °C) (11/14/22 1917)  Pulse: 91 (11/14/22 1917)  Resp: 18 (11/14/22 1917)  BP: (!) 157/84 (11/14/22 1917)  SpO2: 96 % (11/14/22 1917)   Vital Signs (24h Range):  Temp:  [98.1 °F (36.7 °C)-98.7 °F (37.1 °C)] 98.7 °F (37.1 °C)  Pulse:  [80-91] 91  Resp:  [15-20] 18  SpO2:  [93 %-98 %] 96 %  BP: (135-157)/(74-88) 157/84     Weight: 94.4 kg (208 lb 1.8 oz)  Body mass index is 31.64 kg/m².    Intake/Output Summary (Last 24 hours) at 11/14/2022 1918  Last data filed at 11/14/2022 1743  Gross per 24 hour   Intake 1790 ml   Output 2100 ml   Net -310 ml      Physical Exam  Vitals and nursing note reviewed.   Constitutional:       Appearance: He is well-developed.   HENT:      Head: Atraumatic.      Right Ear: External ear normal.      Left Ear: External ear normal.      Nose: Nose normal.      Mouth/Throat:      Mouth: Mucous membranes are moist.   Cardiovascular:      Rate and Rhythm: Normal rate.   Pulmonary:      Effort: Pulmonary effort is normal.   Musculoskeletal:         General: Normal range of motion.      Cervical back: Full passive range of motion without pain and normal range of motion.    Skin:     General: Skin is warm.      Findings: Rash present.   Neurological:      Mental Status: He is alert and oriented to person, place, and time.   Psychiatric:         Behavior: Behavior normal.       Significant Labs: All pertinent labs within the past 24 hours have been reviewed.  CBC:   Recent Labs   Lab 11/13/22 0530 11/14/22 0429   WBC 3.43* 3.42*   HGB 10.9* 11.0*   HCT 35.1* 34.6*   * 145*     CMP:   Recent Labs   Lab 11/13/22 0530 11/14/22 0429   * 135*   K 3.9 3.8    99   CO2 28 28   * 271*   BUN 16 17   CREATININE 1.0 1.1   CALCIUM 8.6* 8.8   ANIONGAP 4* 8       Significant Imaging: I have reviewed all pertinent imaging results/findings within the past 24 hours.

## 2022-11-16 LAB
BACTERIA BLD CULT: NORMAL
BACTERIA BLD CULT: NORMAL

## 2022-11-16 NOTE — DISCHARGE SUMMARY
[facility]  Discharge Summary      Patient Name: Sivakumar Thacker  MRN: [id number]  ANGELICA: [id number]  Patient Class: IP- Inpatient  Admission Date: 11/11/2022  Hospital Length of Stay: 2 days  Discharge Date and Time:  11/15/2022 8:31 PM  Attending Physician: No att. providers found   Discharging Provider: Maxime Arredondo MD  Primary Care Provider: Barry Mcmahon MD    Primary Care Team: Networked reference to record PCT     HPI:   Kirstie is a 67 y.o. male with past medical history of hypertension, diabetes with lower extremity neuropathy, and low-grade non-Hodgkin's lymphoma on recently on rituximab maintenance therapy (last 9/15 - now on surveillance) and monthly IVIG (last 11/11), recent admission for chest wall cellulitis and abscesses culturing pansensitive serratia who presents today with complaints of left axillary pain. It is severe. It is associated with fever T max 100.7, nausea, headache, and a small area of erythema to right of lowest chest wall wound near the left axilla. He denies chest pain, SOB, V/D, dizziness, or LOC. He had a complicated stay on his last admission requiring ICU care. He was discharged home with penrose drains and 2 weeks of cipro, and f/u with Dr. Venegas on 10/20 to have them removed and packing was placed. He is getting wound care with packing exchange every 72 hours. The packing was changed today, and while the nurses were there he became febrile and reported the pain to the axilla and was directed to the ED. In the ED, he was noted to be febrile 100.7, tachycardic 111, normotensive 148/74. WBC 6K. Procal 2.56. Lactate 1.8. Negative for flu and covid.       * No surgery found *      Hospital Course:   Patient with H/o Hypogammaglobulinemia , Follicular lymphoma grade IIIa , with H/o extensive chest wall cellulitis with axillary abscess(serratia) s/p surgery in the past , got admitted with L Axilla cellulitis.  CT showed Mild subcutaneous edema within  the left chest wall anterior to the pectoralis muscle compatible with cellulitis. There was  no abscess  Pt was evaluated by ID MD/Surgeon  Pt was started on iv abx and later changed to PO anbx  Also noticed to have redness/pain/swelling on L Flank area   In view with H/o surgery on L axilla area , ?might have disrupted the lymphatic drainage  Pt was educated about this and was later discharged to home with PO abx       Goals of Care Treatment Preferences:  Code Status: Full Code      Consults:   Consults (From admission, onward)        Status Ordering Provider     Inpatient consult to General Surgery  Once        Provider:  Franco Venegas III, MD    Completed JEROMY MORRIS     Inpatient consult to Infectious Diseases  Once        Provider:  Heaven Washington MD    Completed JEROMY MORRIS          No new Assessment & Plan notes have been filed under this hospital service since the last note was generated.  Service: Hospital Medicine    Final Active Diagnoses:    Diagnosis Date Noted POA    Non-Hodgkin's lymphoma [C85.90] 11/12/2022 Yes    Chronic obstructive pulmonary disease, unspecified COPD type [J44.9] 03/28/2022 Yes    Type 2 diabetes mellitus without complication, without long-term current use of insulin [E11.9] 06/23/2021 Yes    Essential hypertension [I10] 12/30/2019 Yes    Hypogammaglobulinemia [D80.1] 12/13/2019 Yes    Follicular lymphoma grade IIIa [C82.30] 12/04/2018 Yes      Problems Resolved During this Admission:    Diagnosis Date Noted Date Resolved POA    PRINCIPAL PROBLEM:  Cellulitis Left Axilla [L03.90] 09/30/2022 11/15/2022 Yes    Cellulitis of chest wall [L03.313] 11/12/2022 11/15/2022 Unknown    Sepsis [A41.9] 11/13/2022 11/15/2022 Yes    Hypomagnesemia [E83.42] 11/11/2022 11/15/2022 Yes    Chest wall abscess [L02.213] 09/30/2022 11/15/2022 Yes       Discharged Condition: good    Disposition: Home-Health Care c    Follow Up:   Follow-up Information     Franco KRUEGER  Rubi FAJARDO MD Follow up in 1 week(s).    Specialties: General Surgery, Surgery  Contact information:  [address]  [address]  [place] [address]  [phone]             Heaven Washington MD. Go on 11/22/2022.    Specialty: Infectious Diseases  Contact information:  [address] [facility] [address]  [address]  [place] [address]  [phone]                       Patient Instructions:      Ambulatory referral/consult to Home Health   Standing Status: Future   Referral Priority: Routine Referral Type: Home Health Care   Referral Reason: Specialty Services Required   Requested Specialty: Home Health Services   Number of Visits Requested: 1       Significant Diagnostic Studies: Labs:   CMP   Recent Labs   Lab 11/14/22  0429 11/15/22  0512   * 137   K 3.8 3.9   CL 99 101   CO2 28 29   * 196*   BUN 17 20   CREATININE 1.1 1.1   CALCIUM 8.8 8.8   ANIONGAP 8 7*    and CBC   Recent Labs   Lab 11/14/22  0429 11/15/22  0512   WBC 3.42* 3.58*   HGB 11.0* 10.7*   HCT 34.6* 33.3*   * 163       Pending Diagnostic Studies:     None         Medications:  Reconciled Home Medications:      Medication List      START taking these medications    celecoxib 200 MG capsule  Commonly known as: CeleBREX  Take 1 capsule (200 mg total) by mouth 2 (two) times daily. for 7 days     ciprofloxacin HCl 500 MG tablet  Commonly known as: CIPRO  Take 1 tablet (500 mg total) by mouth every 12 (twelve) hours. for 7 days     clindamycin 300 MG capsule  Commonly known as: CLEOCIN  Take 1 capsule (300 mg total) by mouth every 6 (six) hours. for 7 days     oxyCODONE-acetaminophen  mg per tablet  Commonly known as: PERCOCET  Take 1 tablet by mouth every 8 (eight) hours as needed for Pain.        CHANGE how you take these medications    * blood sugar diagnostic Strp  What changed: Another medication with the same name was removed. Continue taking this medication, and follow the directions you see here.     * FREESTYLE LITE STRIPS MISC  FreeStyle  Lite Strips  What changed: Another medication with the same name was removed. Continue taking this medication, and follow the directions you see here.     losartan 100 MG tablet  Commonly known as: COZAAR  Take 0.5 tablets (50 mg total) by mouth once daily.  What changed: how much to take     metFORMIN 500 MG tablet  Commonly known as: GLUCOPHAGE  metformin 500 mg tablet  Take 2 tablets twice a day by oral route with meals for 90 days.  What changed:   · how much to take  · how to take this  · when to take this         * This list has 2 medication(s) that are the same as other medications prescribed for you. Read the directions carefully, and ask your doctor or other care provider to review them with you.            CONTINUE taking these medications    albuterol-ipratropium 2.5 mg-0.5 mg/3 mL nebulizer solution  Commonly known as: DUO-NEB  Take 3 mLs by nebulization every 6 (six) hours as needed for Wheezing. Rescue     amLODIPine 5 MG tablet  Commonly known as: NORVASC  Take 1 tablet (5 mg total) by mouth once daily.     aspirin 81 MG EC tablet  Commonly known as: ECOTRIN  Take 81 mg by mouth once daily.     atorvastatin 20 MG tablet  Commonly known as: LIPITOR  Take 1 tablet (20 mg total) by mouth once daily.     azelastine 137 mcg (0.1 %) nasal spray  Commonly known as: ASTELIN  2 sprays (274 mcg total) by Nasal route 2 (two) times daily as needed for Rhinitis.     fluticasone propionate 50 mcg/actuation nasal spray  Commonly known as: FLONASE  1 spray (50 mcg total) by Each Nostril route 2 (two) times daily.     gabapentin 300 MG capsule  Commonly known as: NEURONTIN  Take 1 capsule (300 mg total) by mouth 2 (two) times daily.     montelukast 10 mg tablet  Commonly known as: SINGULAIR  Take 1 tablet (10 mg total) by mouth nightly as needed.     mucus clearing device  Commonly known as: ACAPELLA, FLUTTER  1 Device by Misc.(Non-Drug; Combo Route) route 2 (two) times daily.     MULTIVITAMIN ORAL  Take 1 tablet by  mouth once daily.     omeprazole 40 MG capsule  Commonly known as: PRILOSEC  Take 1 capsule (40 mg total) by mouth once daily.        STOP taking these medications    naproxen 500 MG tablet  Commonly known as: NAPROSYN            Indwelling Lines/Drains at time of discharge:   Lines/Drains/Airways     None               Physical Exam   Constitutional: He is oriented to person, place, and time.   Cardiovascular: Normal rate.   Neurological: He is alert and oriented to person, place, and time.     Time spent on the discharge of patient: 45 minutes         Maxime Arredondo MD  Department of Hospital Medicine  [facility]

## 2022-11-17 ENCOUNTER — DOCUMENT SCAN (OUTPATIENT)
Dept: HOME HEALTH SERVICES | Facility: HOSPITAL | Age: 67
End: 2022-11-17
Payer: MEDICARE

## 2022-11-17 NOTE — TELEPHONE ENCOUNTER
noted   Azathioprine Counseling:  I discussed with the patient the risks of azathioprine including but not limited to myelosuppression, immunosuppression, hepatotoxicity, lymphoma, and infections.  The patient understands that monitoring is required including baseline LFTs, Creatinine, possible TPMP genotyping and weekly CBCs for the first month and then every 2 weeks thereafter.  The patient verbalized understanding of the proper use and possible adverse effects of azathioprine.  All of the patient's questions and concerns were addressed.

## 2022-11-22 ENCOUNTER — OFFICE VISIT (OUTPATIENT)
Dept: INFECTIOUS DISEASES | Facility: CLINIC | Age: 67
End: 2022-11-22
Payer: MEDICARE

## 2022-11-22 ENCOUNTER — LAB VISIT (OUTPATIENT)
Dept: LAB | Facility: HOSPITAL | Age: 67
End: 2022-11-22
Attending: INTERNAL MEDICINE
Payer: MEDICARE

## 2022-11-22 VITALS
SYSTOLIC BLOOD PRESSURE: 126 MMHG | HEART RATE: 80 BPM | OXYGEN SATURATION: 98 % | TEMPERATURE: 99 F | HEIGHT: 68 IN | WEIGHT: 201 LBS | BODY MASS INDEX: 30.46 KG/M2 | DIASTOLIC BLOOD PRESSURE: 68 MMHG

## 2022-11-22 DIAGNOSIS — L03.313 CELLULITIS OF CHEST WALL: Primary | ICD-10-CM

## 2022-11-22 DIAGNOSIS — C82.30 FOLLICULAR LYMPHOMA GRADE IIIA, UNSPECIFIED BODY REGION: ICD-10-CM

## 2022-11-22 DIAGNOSIS — R89.8 EOSINOPHILS INCREASED: ICD-10-CM

## 2022-11-22 DIAGNOSIS — D80.1 HYPOGAMMAGLOBULINEMIA: ICD-10-CM

## 2022-11-22 DIAGNOSIS — D72.10 EOSINOPHILIA, UNSPECIFIED TYPE: ICD-10-CM

## 2022-11-22 DIAGNOSIS — S21.102S OPEN WOUND OF LEFT CHEST WALL, SEQUELA: ICD-10-CM

## 2022-11-22 LAB
BASOPHILS # BLD AUTO: 0.06 K/UL (ref 0–0.2)
BASOPHILS NFR BLD: 1.4 % (ref 0–1.9)
DIFFERENTIAL METHOD: ABNORMAL
EOSINOPHIL # BLD AUTO: 0.6 K/UL (ref 0–0.5)
EOSINOPHIL NFR BLD: 14.9 % (ref 0–8)
ERYTHROCYTE [DISTWIDTH] IN BLOOD BY AUTOMATED COUNT: 13.5 % (ref 11.5–14.5)
HCT VFR BLD AUTO: 42.3 % (ref 40–54)
HGB BLD-MCNC: 13.1 G/DL (ref 14–18)
IMM GRANULOCYTES # BLD AUTO: 0.06 K/UL (ref 0–0.04)
IMM GRANULOCYTES NFR BLD AUTO: 1.4 % (ref 0–0.5)
LYMPHOCYTES # BLD AUTO: 1 K/UL (ref 1–4.8)
LYMPHOCYTES NFR BLD: 24.5 % (ref 18–48)
MCH RBC QN AUTO: 27.9 PG (ref 27–31)
MCHC RBC AUTO-ENTMCNC: 31 G/DL (ref 32–36)
MCV RBC AUTO: 90 FL (ref 82–98)
MONOCYTES # BLD AUTO: 0.4 K/UL (ref 0.3–1)
MONOCYTES NFR BLD: 9.4 % (ref 4–15)
NEUTROPHILS # BLD AUTO: 2 K/UL (ref 1.8–7.7)
NEUTROPHILS NFR BLD: 48.4 % (ref 38–73)
NRBC BLD-RTO: 0 /100 WBC
PLATELET # BLD AUTO: 484 K/UL (ref 150–450)
PMV BLD AUTO: 9 FL (ref 9.2–12.9)
RBC # BLD AUTO: 4.7 M/UL (ref 4.6–6.2)
WBC # BLD AUTO: 4.16 K/UL (ref 3.9–12.7)

## 2022-11-22 PROCEDURE — 85025 COMPLETE CBC W/AUTO DIFF WBC: CPT | Performed by: INTERNAL MEDICINE

## 2022-11-22 PROCEDURE — 99214 PR OFFICE/OUTPT VISIT, EST, LEVL IV, 30-39 MIN: ICD-10-PCS | Mod: S$GLB,,, | Performed by: INTERNAL MEDICINE

## 2022-11-22 PROCEDURE — 30000890 LABCORP MISCELLANEOUS TEST: Performed by: INTERNAL MEDICINE

## 2022-11-22 PROCEDURE — 86682 HELMINTH ANTIBODY: CPT | Performed by: INTERNAL MEDICINE

## 2022-11-22 PROCEDURE — 36415 COLL VENOUS BLD VENIPUNCTURE: CPT | Performed by: INTERNAL MEDICINE

## 2022-11-22 PROCEDURE — 99214 OFFICE O/P EST MOD 30 MIN: CPT | Mod: S$GLB,,, | Performed by: INTERNAL MEDICINE

## 2022-11-22 NOTE — PATIENT INSTRUCTIONS
Finish the antibiotics that you have  I have cancelled home health  Use naprosyn sparingly for chest wall discomfort (always with food)    2 lab tests waiting on you downstairs.     If you have to get intravenous antibiotics again, please have your providers be on the lookout for eosinophilia.

## 2022-11-22 NOTE — PROGRESS NOTES
Subjective:       Patient ID: Sivakumar Thacker is a 67 y.o. male.    Chief Complaint:: Follow-up  11/22/22  HPI  :Sivakumar Thacker is a 67 y.o. male Known to our service from consultation and follow-up, 10/2 until 10/7 for extensive left chest wall and axillary cellulitis with abscess formation, requiring extensive incision and drainage, Port-A-Cath removal and antibiotics.  Cultures from the abscess unexpectedly grew Serratia.  At the time of discharge he was given 2 additional weeks of oral ciprofloxacin.  He was seen by Dr. Venegas on October 20th and the drains were removed from his chest and he has been having wet-to-dry dressing changes since then.  He has not yet followed up with Dr. Venegas again. He noted a low grade temp on 11/10 pm with increased discomfort in left pectoral/axillary area.   He was seen by Dr. Ibarra yesterday(but forgot to mention this) and later in the evening he presented to the emergency room with complaints of fever, pain and chest wall redness.  He was diagnosed with cellulitis of the chest wall and admitted to Hospital Medicine and placed on vancomycin and cefepime.  He has a history of non-Hodgkin's lymphoma (Rituxan is on hold) and hypogammaglobulinemia for which he is receiving immunoglobulin replacement, last received yesterday am.  He was previously on suppressive/preventative antibiotics with Augmentin and azithromycin (from pulmonary). Procalcitonin 2.56, WBC WNL, lactic acid normal. His chronic sinusitis and bronchitis are much improved after the recent antibiotic course.      11/13: interim reviewed. Afebrile now(receiving celebrex). Blood and axillary wound cultures are negative. CT chest did not demonstrate any abscess.  There is some asymmetry of the musculature, left greater than right but the left arm is down in the right arm is up.  He does have evolution of more redness near the left axilla and over the right pectoral muscle.  He does have evolution of swelling  "over the left scapula and posterior arm which I believe is in large part dependent.  There is still no purulence from any of the prior incisions.  He does still have pain in the axilla and induration superior to the nipple.  11/14: interim reviewed. Temps less than 100. The right chest wall is improved, the left axillary swelling and redness is improved. The edema and congestion have moved with gravity to left flank. WBC are a little low. He is ambulatory. Left axillary tenderness is persistent. Culture of the wound from admission is negative.   11/15:  Interim reviewed.  According to his family member he had some chills earlier but there is no worsening of the cellulitis on his chest axilla or flank in fact the chest and axilla are better.  The flank erythema is dependent edema and congestion.  He has some right forearm IV phlebitis from prior IV.  He is afebrile.  White blood cells remain normal at baseline, with 20% eosinophils and no sign of a rash.  With 20% eosinophils and no sign of a rash.  He did not have the eosinophils on admission."      11/22/22:  seen in at [facility] for cellulitis left chest. While there on vanc and cefepime he developed eosinophilia. He also had eosinophilia during the last admit when he had severe cellulitis and chest wall exploration. Both episodes were after IVIG as well. Culture from one of the drain sites was negative. The chest wall is still indurated but all of the erythema has gone away including the dependent edema and erythema of the flank. He has had no antibiotic side effects. He is anticipating monthly IVIG to go on and ultimately wants a new port. Reviewed with him about impaired lymphatic drainage of the chest wall and importance of skin care of left UE and chest. Also advised I would delay the port for as long as possible. He still has pain with ROM of the left arm(results in movement of pectoral muscle). He has not been taking any opiates and did tolerate the celebrex and " he can continue prn with his own naproxen at home.     Review of patient's allergies indicates:  No Known Allergies  Past Medical History:   Diagnosis Date    Chest wall abscess 9/30/2022    Chronic obstructive pulmonary disease, unspecified COPD type 3/28/2022    Diabetes mellitus, type 2     Encounter for antineoplastic chemotherapy 04/01/2020    Hypertension     Hypogammaglobulinemia 12/13/2019    Neuropathy     Non Hodgkin's lymphoma     Reflux esophagitis     Ringing in ear, bilateral      Past Surgical History:   Procedure Laterality Date    COLONOSCOPY  2018    EYE SURGERY  Approximately 3 years ago    Cataract    HAND SURGERY      amputation left index finger    INCISION AND DRAINAGE OF ABSCESS Left 10/3/2022    Procedure: INCISION AND DRAINAGE, ABSCESS;  Surgeon: Franco Venegas III, MD;  Location: [facility] OR;  Service: General;  Laterality: Left;  axilla    MEDIPORT REMOVAL Right 10/3/2022    Procedure: REMOVAL, CATHETER, CENTRAL VENOUS, TUNNELED, WITH PORT;  Surgeon: Franco Venegas III, MD;  Location: [facility] OR;  Service: General;  Laterality: Right;    PORTACATH PLACEMENT      SKIN CANCER EXCISION  09/30/2020    [facility]    SPINE SURGERY      VASECTOMY  1985 ?     Social History     Tobacco Use    Smoking status: Former     Packs/day: 0.50     Years: 2.00     Pack years: 1.00     Types: Cigarettes    Smokeless tobacco: Never   Substance Use Topics    Alcohol use: Not Currently     Alcohol/week: 0.0 standard drinks     Comment: rarely        Family History   Problem Relation Age of Onset    Diabetes Father          Review of Systems    Constitutional: No fever, chills, sweats, fatigue, weakness, weight loss    Eyes: No change in vision,     ENT: No sore throat, mouth pains, or lesions    Cardiovascular: No chest pain, VASQUEZ, or pedal edema    Respiratory: No shortness of breath, VASQUEZ, minimal cough,      Gastrointestinal: No abdominal pain, nausea, vomiting, diarrhea,     Genitourinary:   "    Musculoskeletal: No new pain, joint swelling, or injuries    Integumentary: No new rashes, skin lesions, or wounds    Neurological: No dizziness, vertigo, unusual headaches  loss of vision, falls    Psychiatric: does have anxiety    Endocrine: Blood sugars are well controlled but he does not check very often    Lymphatic: No lymphadenopathy, blood loss,      VAD:     Objective:      Blood pressure 126/68, pulse 80, temperature 98.5 °F (36.9 °C), height 5' 8" (1.727 m), weight 91.2 kg (201 lb), SpO2 98 %. Body mass index is 30.56 kg/m².  Physical Exam      General: Alert and attentive, cooperative and in no distress    Eyes: Pupils equal, round, reactive to light, anicteric, EOMI    Neck: Supple, non-tender, no thyromegaly or masses    ENT: EAC patent, TM normal, nares patent, no oral lesions, teeth in good condition, no thrush    Cardiovascular: Regular rate and rhythm, no murmurs, rubs, or gallop    Respiratory: Lungs clear without wheezes, rales, rubs or rhonci    Gastrointestinal:    no  distention    Genitourinary:  no flank tenderness    Integumentary: Skin without rashes,      Vascular: No peripheral edema or phlebitis, warm and well perfused    Musculoskeletal: Ambulates without difficulty, no acute arthritis, synovitis or myositis. Normal muscle bulk and strength    Lymphatic: No cervical, axillary LAD    Neurological: Normal LOC, cranial nerves, speech,  , gait    Psychiatric: Normal mood, speech,  demeanor with a baseline of anxiety     Wound: all chest prior drain wounds are closed. There is no chest wall erythema. There is still some induration superior and slightly medial to left nipple. ROM of left shoulder and arm are improved. The dependent erythema and edema of left flank is resolved.     VAD:        Recent Diagnostics:      Latest Reference Range & Units 11/14/22 04:29 11/15/22 05:12 11/22/22 10:06   WBC 3.90 - 12.70 K/uL 3.42 (L) 3.58 (L) 4.16   RBC 4.60 - 6.20 M/uL 3.83 (L) 3.72 (L) 4.70 "   Hemoglobin 14.0 - 18.0 g/dL 11.0 (L) 10.7 (L) 13.1 (L)   Hematocrit 40.0 - 54.0 % 34.6 (L) 33.3 (L) 42.3   MCV 82 - 98 fL 90 90 90   MCH 27.0 - 31.0 pg 28.7 28.8 27.9   MCHC 32.0 - 36.0 g/dL 31.8 (L) 32.1 31.0 (L)   RDW 11.5 - 14.5 % 13.5 13.7 13.5   Platelets 150 - 450 K/uL 145 (L) 163 484 (H)   MPV 9.2 - 12.9 fL 9.5 9.8 9.0 (L)   Gran % 38.0 - 73.0 % 47.0 54.0 48.4   Lymph % 18.0 - 48.0 % 17.0 (L) 20.0 24.5   Mono % 4.0 - 15.0 % 6.0 6.0 9.4   Eosinophil % 0.0 - 8.0 % 30.0 (H) 20.0 (H) 14.9 (H)           Assessment and Plan:           Cellulitis of chest wall    Open wound of left chest wall, sequela  -     SUBSEQUENT HOME HEALTH ORDERS    Eosinophils increased  -     CBC Auto Differential; Future; Expected date: 11/22/2022    Follicular lymphoma grade IIIa, unspecified body region    Hypogammaglobulinemia    Other orders  -     Cancel: Misc Sendout Test, Blood strongyloides IgG; Future; Expected date: 11/22/2022    Finish the antibiotics that you have  I have cancelled home health  Use naprosyn sparingly for chest wall discomfort (always with food)    2 lab tests waiting on you downstairs.     If you have to get intravenous antibiotics again, please have your providers be on the lookout for eosinophilia.    I would delay implanting another port for as long as possible    This note was created using Dragon voice recognition software that occasionally misinterpreted phrases or words.

## 2022-11-26 LAB
LABCORP MISC TEST CODE: NORMAL
LABCORP MISC TEST NAME: NORMAL
LABCORP MISCELLANEOUS TEST: NORMAL

## 2022-11-30 ENCOUNTER — DOCUMENT SCAN (OUTPATIENT)
Dept: HOME HEALTH SERVICES | Facility: HOSPITAL | Age: 67
End: 2022-11-30
Payer: MEDICARE

## 2022-12-06 ENCOUNTER — LAB VISIT (OUTPATIENT)
Dept: LAB | Facility: HOSPITAL | Age: 67
End: 2022-12-06
Attending: FAMILY MEDICINE
Payer: MEDICARE

## 2022-12-06 DIAGNOSIS — E11.9 TYPE 2 DIABETES MELLITUS WITHOUT COMPLICATION, WITHOUT LONG-TERM CURRENT USE OF INSULIN: ICD-10-CM

## 2022-12-06 DIAGNOSIS — I10 ESSENTIAL HYPERTENSION: ICD-10-CM

## 2022-12-06 DIAGNOSIS — Z12.5 PROSTATE CANCER SCREENING: ICD-10-CM

## 2022-12-06 DIAGNOSIS — J32.9 SINUSITIS, UNSPECIFIED CHRONICITY, UNSPECIFIED LOCATION: ICD-10-CM

## 2022-12-06 DIAGNOSIS — J30.89 NON-SEASONAL ALLERGIC RHINITIS DUE TO OTHER ALLERGIC TRIGGER: ICD-10-CM

## 2022-12-06 LAB
ALBUMIN SERPL BCP-MCNC: 3.7 G/DL (ref 3.5–5.2)
ALP SERPL-CCNC: 111 U/L (ref 55–135)
ALT SERPL W/O P-5'-P-CCNC: 20 U/L (ref 10–44)
ANION GAP SERPL CALC-SCNC: 12 MMOL/L (ref 8–16)
AST SERPL-CCNC: 19 U/L (ref 10–40)
BASOPHILS # BLD AUTO: 0.06 K/UL (ref 0–0.2)
BASOPHILS NFR BLD: 1.2 % (ref 0–1.9)
BILIRUB SERPL-MCNC: 0.3 MG/DL (ref 0.1–1)
BUN SERPL-MCNC: 16 MG/DL (ref 8–23)
CALCIUM SERPL-MCNC: 9.2 MG/DL (ref 8.7–10.5)
CHLORIDE SERPL-SCNC: 104 MMOL/L (ref 95–110)
CHOLEST SERPL-MCNC: 124 MG/DL (ref 120–199)
CHOLEST/HDLC SERPL: 4.3 {RATIO} (ref 2–5)
CO2 SERPL-SCNC: 27 MMOL/L (ref 23–29)
COMPLEXED PSA SERPL-MCNC: 0.33 NG/ML (ref 0–4)
CREAT SERPL-MCNC: 0.9 MG/DL (ref 0.5–1.4)
DIFFERENTIAL METHOD: ABNORMAL
EOSINOPHIL # BLD AUTO: 0.7 K/UL (ref 0–0.5)
EOSINOPHIL NFR BLD: 13.3 % (ref 0–8)
ERYTHROCYTE [DISTWIDTH] IN BLOOD BY AUTOMATED COUNT: 13.3 % (ref 11.5–14.5)
EST. GFR  (NO RACE VARIABLE): >60 ML/MIN/1.73 M^2
ESTIMATED AVG GLUCOSE: 154 MG/DL (ref 68–131)
ESTIMATED AVG GLUCOSE: 154 MG/DL (ref 68–131)
GLUCOSE SERPL-MCNC: 146 MG/DL (ref 70–110)
HBA1C MFR BLD: 7 % (ref 4–5.6)
HBA1C MFR BLD: 7 % (ref 4–5.6)
HCT VFR BLD AUTO: 41.6 % (ref 40–54)
HDLC SERPL-MCNC: 29 MG/DL (ref 40–75)
HDLC SERPL: 23.4 % (ref 20–50)
HGB BLD-MCNC: 13.2 G/DL (ref 14–18)
IMM GRANULOCYTES # BLD AUTO: 0.06 K/UL (ref 0–0.04)
IMM GRANULOCYTES NFR BLD AUTO: 1.2 % (ref 0–0.5)
LDLC SERPL CALC-MCNC: 55 MG/DL (ref 63–159)
LYMPHOCYTES # BLD AUTO: 1.4 K/UL (ref 1–4.8)
LYMPHOCYTES NFR BLD: 27.8 % (ref 18–48)
MCH RBC QN AUTO: 27.6 PG (ref 27–31)
MCHC RBC AUTO-ENTMCNC: 31.7 G/DL (ref 32–36)
MCV RBC AUTO: 87 FL (ref 82–98)
MONOCYTES # BLD AUTO: 0.7 K/UL (ref 0.3–1)
MONOCYTES NFR BLD: 13.3 % (ref 4–15)
NEUTROPHILS # BLD AUTO: 2.2 K/UL (ref 1.8–7.7)
NEUTROPHILS NFR BLD: 43.2 % (ref 38–73)
NONHDLC SERPL-MCNC: 95 MG/DL
NRBC BLD-RTO: 0 /100 WBC
PLATELET # BLD AUTO: 239 K/UL (ref 150–450)
PMV BLD AUTO: 8.6 FL (ref 9.2–12.9)
POTASSIUM SERPL-SCNC: 4.3 MMOL/L (ref 3.5–5.1)
PROT SERPL-MCNC: 6.5 G/DL (ref 6–8.4)
RBC # BLD AUTO: 4.78 M/UL (ref 4.6–6.2)
SODIUM SERPL-SCNC: 143 MMOL/L (ref 136–145)
T4 FREE SERPL-MCNC: 0.95 NG/DL (ref 0.71–1.51)
TRIGL SERPL-MCNC: 200 MG/DL (ref 30–150)
TSH SERPL DL<=0.005 MIU/L-ACNC: 3.23 UIU/ML (ref 0.4–4)
WBC # BLD AUTO: 5.1 K/UL (ref 3.9–12.7)

## 2022-12-06 PROCEDURE — 84443 ASSAY THYROID STIM HORMONE: CPT | Performed by: FAMILY MEDICINE

## 2022-12-06 PROCEDURE — 84153 ASSAY OF PSA TOTAL: CPT | Performed by: FAMILY MEDICINE

## 2022-12-06 PROCEDURE — 36415 COLL VENOUS BLD VENIPUNCTURE: CPT | Performed by: FAMILY MEDICINE

## 2022-12-06 PROCEDURE — 80061 LIPID PANEL: CPT | Performed by: FAMILY MEDICINE

## 2022-12-06 PROCEDURE — 80053 COMPREHEN METABOLIC PANEL: CPT | Performed by: FAMILY MEDICINE

## 2022-12-06 PROCEDURE — 85025 COMPLETE CBC W/AUTO DIFF WBC: CPT | Performed by: FAMILY MEDICINE

## 2022-12-06 PROCEDURE — 84439 ASSAY OF FREE THYROXINE: CPT | Performed by: FAMILY MEDICINE

## 2022-12-06 PROCEDURE — 83036 HEMOGLOBIN GLYCOSYLATED A1C: CPT | Performed by: FAMILY MEDICINE

## 2022-12-09 ENCOUNTER — INFUSION (OUTPATIENT)
Dept: INFUSION THERAPY | Facility: HOSPITAL | Age: 67
End: 2022-12-09
Attending: INTERNAL MEDICINE
Payer: MEDICARE

## 2022-12-09 VITALS
SYSTOLIC BLOOD PRESSURE: 154 MMHG | HEART RATE: 65 BPM | BODY MASS INDEX: 31.72 KG/M2 | HEIGHT: 68 IN | TEMPERATURE: 98 F | RESPIRATION RATE: 17 BRPM | DIASTOLIC BLOOD PRESSURE: 86 MMHG | WEIGHT: 209.31 LBS

## 2022-12-09 DIAGNOSIS — D80.1 NONFAMILIAL HYPOGAMMAGLOBULINEMIA: ICD-10-CM

## 2022-12-09 DIAGNOSIS — C82.30 FOLLICULAR LYMPHOMA GRADE IIIA, UNSPECIFIED BODY REGION: ICD-10-CM

## 2022-12-09 DIAGNOSIS — D80.1 HYPOGAMMAGLOBULINEMIA: Primary | ICD-10-CM

## 2022-12-09 PROCEDURE — 25000003 PHARM REV CODE 250: Performed by: INTERNAL MEDICINE

## 2022-12-09 PROCEDURE — 96365 THER/PROPH/DIAG IV INF INIT: CPT

## 2022-12-09 PROCEDURE — 96367 TX/PROPH/DG ADDL SEQ IV INF: CPT

## 2022-12-09 PROCEDURE — 96415 CHEMO IV INFUSION ADDL HR: CPT

## 2022-12-09 PROCEDURE — 96366 THER/PROPH/DIAG IV INF ADDON: CPT

## 2022-12-09 PROCEDURE — 63600175 PHARM REV CODE 636 W HCPCS: Mod: JG | Performed by: NURSE PRACTITIONER

## 2022-12-09 PROCEDURE — 63600175 PHARM REV CODE 636 W HCPCS: Performed by: INTERNAL MEDICINE

## 2022-12-09 PROCEDURE — 96413 CHEMO IV INFUSION 1 HR: CPT

## 2022-12-09 RX ORDER — SODIUM CHLORIDE 0.9 % (FLUSH) 0.9 %
10 SYRINGE (ML) INJECTION
Status: DISCONTINUED | OUTPATIENT
Start: 2022-12-09 | End: 2022-12-09 | Stop reason: HOSPADM

## 2022-12-09 RX ORDER — HEPARIN 100 UNIT/ML
500 SYRINGE INTRAVENOUS
Status: DISCONTINUED | OUTPATIENT
Start: 2022-12-09 | End: 2022-12-09 | Stop reason: HOSPADM

## 2022-12-09 RX ORDER — HEPARIN 100 UNIT/ML
500 SYRINGE INTRAVENOUS
Status: CANCELLED | OUTPATIENT
Start: 2023-01-06

## 2022-12-09 RX ORDER — SODIUM CHLORIDE 0.9 % (FLUSH) 0.9 %
10 SYRINGE (ML) INJECTION
Status: CANCELLED | OUTPATIENT
Start: 2023-01-06

## 2022-12-09 RX ADMIN — IMMUNE GLOBULIN (HUMAN) 50 G: 10 INJECTION INTRAVENOUS; SUBCUTANEOUS at 08:12

## 2022-12-09 RX ADMIN — DIPHENHYDRAMINE HYDROCHLORIDE 25 MG: 50 INJECTION INTRAMUSCULAR; INTRAVENOUS at 08:12

## 2022-12-09 NOTE — PLAN OF CARE
Problem: Fatigue  Goal: Improved Activity Tolerance  Intervention: Promote Improved Energy  Flowsheets (Taken 12/9/2022 0802)  Fatigue Management: fatigue-related activity identified  Activity Management: Ambulated -L4

## 2022-12-15 ENCOUNTER — PATIENT MESSAGE (OUTPATIENT)
Dept: HEMATOLOGY/ONCOLOGY | Facility: CLINIC | Age: 67
End: 2022-12-15

## 2022-12-16 ENCOUNTER — TELEPHONE (OUTPATIENT)
Dept: HEMATOLOGY/ONCOLOGY | Facility: CLINIC | Age: 67
End: 2022-12-16

## 2022-12-16 DIAGNOSIS — C82.30 FOLLICULAR LYMPHOMA GRADE IIIA, UNSPECIFIED BODY REGION: Primary | ICD-10-CM

## 2023-01-02 ENCOUNTER — LAB VISIT (OUTPATIENT)
Dept: LAB | Facility: HOSPITAL | Age: 68
End: 2023-01-02
Attending: TRANSPLANT SURGERY
Payer: MEDICARE

## 2023-01-02 DIAGNOSIS — C82.30 FOLLICULAR LYMPHOMA GRADE IIIA, UNSPECIFIED BODY REGION: ICD-10-CM

## 2023-01-02 LAB
ALBUMIN SERPL BCP-MCNC: 3.2 G/DL (ref 3.5–5.2)
ALP SERPL-CCNC: 103 U/L (ref 55–135)
ALT SERPL W/O P-5'-P-CCNC: 14 U/L (ref 10–44)
ANION GAP SERPL CALC-SCNC: 16 MMOL/L (ref 8–16)
AST SERPL-CCNC: 22 U/L (ref 10–40)
BASOPHILS NFR BLD: 2 % (ref 0–1.9)
BILIRUB SERPL-MCNC: 0.5 MG/DL (ref 0.1–1)
BUN SERPL-MCNC: 23 MG/DL (ref 8–23)
BURR CELLS BLD QL SMEAR: ABNORMAL
CALCIUM SERPL-MCNC: 9 MG/DL (ref 8.7–10.5)
CHLORIDE SERPL-SCNC: 101 MMOL/L (ref 95–110)
CO2 SERPL-SCNC: 25 MMOL/L (ref 23–29)
CREAT SERPL-MCNC: 1.4 MG/DL (ref 0.5–1.4)
DIFFERENTIAL METHOD: ABNORMAL
EOSINOPHIL NFR BLD: 7 % (ref 0–8)
ERYTHROCYTE [DISTWIDTH] IN BLOOD BY AUTOMATED COUNT: 13.2 % (ref 11.5–14.5)
EST. GFR  (NO RACE VARIABLE): 55.4 ML/MIN/1.73 M^2
GLUCOSE SERPL-MCNC: 185 MG/DL (ref 70–110)
HCT VFR BLD AUTO: 39.9 % (ref 40–54)
HGB BLD-MCNC: 12.6 G/DL (ref 14–18)
IMM GRANULOCYTES # BLD AUTO: ABNORMAL K/UL (ref 0–0.04)
IMM GRANULOCYTES NFR BLD AUTO: ABNORMAL % (ref 0–0.5)
LYMPHOCYTES NFR BLD: 53 % (ref 18–48)
MCH RBC QN AUTO: 26.8 PG (ref 27–31)
MCHC RBC AUTO-ENTMCNC: 31.6 G/DL (ref 32–36)
MCV RBC AUTO: 85 FL (ref 82–98)
MONOCYTES NFR BLD: 5 % (ref 4–15)
NEUTROPHILS NFR BLD: 22 % (ref 38–73)
NEUTS BAND NFR BLD MANUAL: 11 %
NRBC BLD-RTO: 0 /100 WBC
PLATELET # BLD AUTO: 342 K/UL (ref 150–450)
PLATELET BLD QL SMEAR: ABNORMAL
PMV BLD AUTO: 8.9 FL (ref 9.2–12.9)
POTASSIUM SERPL-SCNC: 3.5 MMOL/L (ref 3.5–5.1)
PROT SERPL-MCNC: 6.9 G/DL (ref 6–8.4)
RBC # BLD AUTO: 4.71 M/UL (ref 4.6–6.2)
SCHISTOCYTES BLD QL SMEAR: PRESENT
SODIUM SERPL-SCNC: 142 MMOL/L (ref 136–145)
WBC # BLD AUTO: 3.55 K/UL (ref 3.9–12.7)

## 2023-01-02 PROCEDURE — 85027 COMPLETE CBC AUTOMATED: CPT | Performed by: INTERNAL MEDICINE

## 2023-01-02 PROCEDURE — 36415 COLL VENOUS BLD VENIPUNCTURE: CPT | Performed by: INTERNAL MEDICINE

## 2023-01-02 PROCEDURE — 85007 BL SMEAR W/DIFF WBC COUNT: CPT | Performed by: INTERNAL MEDICINE

## 2023-01-02 PROCEDURE — 80053 COMPREHEN METABOLIC PANEL: CPT | Performed by: INTERNAL MEDICINE

## 2023-01-03 NOTE — PROGRESS NOTES
[facility] Hematology/Oncology  PROGRESS NOTE -  Follow-up Visit      Subjective:       Patient ID:   NAME: Sivakumar Thacker : 1955     67 y.o. male    Referring Doc: Barry Mcmahon (PCP in [place])  Other Physicians: Vinod Chen/José Manuel      Chief Complaint:  NHL f/u    History of Present Illness:     Patient returns today for a regularly scheduled follow-up visit.  The patient is here today to go over the results of the recently ordered labs, tests and studies. He is here by himself.       Patient was previously seen by Dr Abarca at [facility] since last visit and his recommendation was to hold off on further rituximab therapy and proceed with just surveillance scanning. He has telemed visit with him in 2023        He denies any CP, SOB, HA's or N/V.     He is getting PET tomorrow and seeing pulmonary again in 2023    \he has been on IV IgG    He saw Dr Washington last in 2022 and is seeing ID at [facility] in 2023      He is continued on Gabapentin for the neuropathy issues in his feet.     He last saw Dr Barry Mcmahon on 10/18/2022      I discussed continued Covid19 precautions        ROS:   GEN: normal without any fever, night sweats or weight loss  HEENT: normal with no HA's, sore throat, stiff neck, changes in vision; no current sinus issues    CV: normal with no CP, SOB, PND, VASQUEZ or orthopnea  PULM: normal with no SOB,  hemoptysis, sputum or pleuritic pain; chronic cough    GI: normal with no abdominal pain, nausea, vomiting, constipation, diarrhea, melanotic stools, BRBPR, or hematemesis  : normal with no hematuria, dysuria  BREAST: normal with no mass, discharge, pain  SKIN: normal with no rash, erythema, bruising, or swelling; no current wound issues    Allergies:  Review of patient's allergies indicates:  No Known Allergies    Medications:    Current Outpatient Medications:     albuterol-ipratropium (DUO-NEB) 2.5 mg-0.5 mg/3 mL nebulizer solution, Take 3 mLs by nebulization every 6 (six) hours  as needed for Wheezing. Rescue, Disp: 120 each, Rfl: 11    amLODIPine (NORVASC) 5 MG tablet, Take 1 tablet (5 mg total) by mouth once daily., Disp: 90 tablet, Rfl: 0    aspirin (ECOTRIN) 81 MG EC tablet, Take 81 mg by mouth once daily., Disp: , Rfl:     atorvastatin (LIPITOR) 20 MG tablet, Take 1 tablet (20 mg total) by mouth once daily., Disp: 90 tablet, Rfl: 3    azelastine (ASTELIN) 137 mcg (0.1 %) nasal spray, 2 sprays (274 mcg total) by Nasal route 2 (two) times daily as needed for Rhinitis., Disp: 90 mL, Rfl: 1    blood sugar diagnostic (FREESTYLE LITE STRIPS MISC), FreeStyle Lite Strips, Disp: , Rfl:     blood sugar diagnostic Strp, , Disp: , Rfl:     fluticasone propionate (FLONASE) 50 mcg/actuation nasal spray, 1 spray (50 mcg total) by Each Nostril route 2 (two) times daily., Disp: 48 g, Rfl: 11    gabapentin (NEURONTIN) 300 MG capsule, Take 1 capsule (300 mg total) by mouth 2 (two) times daily., Disp: 180 capsule, Rfl: 3    losartan (COZAAR) 100 MG tablet, Take 0.5 tablets (50 mg total) by mouth once daily., Disp: 90 tablet, Rfl: 3    metFORMIN (GLUCOPHAGE) 500 MG tablet, metformin 500 mg tablet  Take 2 tablets twice a day by oral route with meals for 90 days. (Patient taking differently: Take 1,000 mg by mouth 2 (two) times daily with meals. metformin 500 mg tablet  Take 2 tablets twice a day by oral route with meals for 90 days.), Disp: 360 tablet, Rfl: 3    montelukast (SINGULAIR) 10 mg tablet, Take 1 tablet (10 mg total) by mouth nightly as needed., Disp: 90 tablet, Rfl: 3    mucus clearing device (ACAPELLA, FLUTTER), 1 Device by Misc.(Non-Drug; Combo Route) route 2 (two) times daily., Disp: 1 Device, Rfl: 0    MULTIVITAMIN ORAL, Take 1 tablet by mouth once daily., Disp: , Rfl:     omeprazole (PRILOSEC) 40 MG capsule, Take 1 capsule (40 mg total) by mouth once daily., Disp: 90 capsule, Rfl: 3    oxyCODONE-acetaminophen (PERCOCET)  mg per tablet, Take 1 tablet by mouth every 8 (eight) hours as  "needed for Pain., Disp: 14 tablet, Rfl: 0    PMHx/PSHx Updates:  See patient's last visit with me on 11/11/2022  See H&P on 1/8/2019        Pathology:  Cancer Staging  No matching staging information was found for the patient.          Objective:     Vitals:  Blood pressure (!) 149/86, pulse 87, temperature 98.6 °F (37 °C), resp. rate 16, height 5' 8" (1.727 m), weight 90.5 kg (199 lb 9.6 oz).    Physical Examination:   GEN: no apparent distress, comfortable; AAOx3  HEAD: atraumatic and normocephalic  EYES: no pallor, no icterus, PERRLA  ENT: OMM, no pharyngeal erythema, external ears WNL; no nasal discharge; no thrush;    NECK: no masses, thyroid normal, trachea midline, no LAD/LN's, supple  CV: RRR with no murmur; normal pulse; normal S1 and S2; no pedal edema; portacath since removed  CHEST: Normal respiratory effort; chronic coarseness, wheeze bilaterally but only mild wheeze  ABDOM: nontender and nondistended; soft; normal bowel sounds; no rebound/guarding  MUSC/Skeletal: ROM normal; no crepitus; joints normal; no deformities or arthropathy  EXTREM: no clubbing, cyanosis, inflammation or swelling  SKIN: no rashes, lesions, ulcers, petechiae or subcutaneous nodules; no current wound issues  : no gibbs  NEURO: grossly intact; motor/sensory WNL; AAOx3; no tremors  PSYCH: normal mood, affect and behavior  LYMPH: normal cervical, supraclavicular, axillary and groin LN's            Labs:              Lab Results   Component Value Date    WBC 3.55 (L) 01/02/2023    HGB 12.6 (L) 01/02/2023    HCT 39.9 (L) 01/02/2023    MCV 85 01/02/2023     01/02/2023     CMP  Sodium   Date Value Ref Range Status   01/02/2023 142 136 - 145 mmol/L Final   04/25/2019 144 134 - 144 mmol/L      Potassium   Date Value Ref Range Status   01/02/2023 3.5 3.5 - 5.1 mmol/L Final     Chloride   Date Value Ref Range Status   01/02/2023 101 95 - 110 mmol/L Final   04/25/2019 107 98 - 110 mmol/L      CO2   Date Value Ref Range Status "   01/02/2023 25 23 - 29 mmol/L Final     Glucose   Date Value Ref Range Status   01/02/2023 185 (H) 70 - 110 mg/dL Final   04/25/2019 177 (H) 70 - 99 mg/dL      BUN   Date Value Ref Range Status   01/02/2023 23 8 - 23 mg/dL Final     Creatinine   Date Value Ref Range Status   01/02/2023 1.4 0.5 - 1.4 mg/dL Final   04/25/2019 0.83 0.60 - 1.40 mg/dL      Calcium   Date Value Ref Range Status   01/02/2023 9.0 8.7 - 10.5 mg/dL Final     Total Protein   Date Value Ref Range Status   01/02/2023 6.9 6.0 - 8.4 g/dL Final     Albumin   Date Value Ref Range Status   01/02/2023 3.2 (L) 3.5 - 5.2 g/dL Final   04/25/2019 4.4 3.1 - 4.7 g/dL      Total Bilirubin   Date Value Ref Range Status   01/02/2023 0.5 0.1 - 1.0 mg/dL Final     Comment:     For infants and newborns, interpretation of results should be based  on gestational age, weight and in agreement with clinical  observations.    Premature Infant recommended reference ranges:  Up to 24 hours.............<8.0 mg/dL  Up to 48 hours............<12.0 mg/dL  3-5 days..................<15.0 mg/dL  6-29 days.................<15.0 mg/dL       Alkaline Phosphatase   Date Value Ref Range Status   01/02/2023 103 55 - 135 U/L Final     AST   Date Value Ref Range Status   01/02/2023 22 10 - 40 U/L Final     ALT   Date Value Ref Range Status   01/02/2023 14 10 - 44 U/L Final     Anion Gap   Date Value Ref Range Status   01/02/2023 16 8 - 16 mmol/L Final     eGFR if    Date Value Ref Range Status   07/21/2022 >60.0 >60 mL/min/1.73 m^2 Final     eGFR if non    Date Value Ref Range Status   07/21/2022 >60.0 >60 mL/min/1.73 m^2 Final     Comment:     Calculation used to obtain the estimated glomerular filtration  rate (eGFR) is the CKD-EPI equation.            Radiology/Diagnostic Studies:    PET  4/5/2022:    IMPRESSION: No evidence of recurrent or active lymphoproliferative disease        PET 10/4/2021:  Impression:     1. Negative for FDG avid recurrent  lymphoproliferative disease.  2. New left maxillary sinusitis.  3. New bilateral reticulonodular pulmonary opacities suggesting infectious or inflammatory pneumonitis.  Clinical and laboratory correlation is requested.  4. Coronary artery calcification.         CXR  3/25/2021:    Impression:     Mild interstitial prominence, similar to previous exams.  No focal consolidation      PET 3/19/2021:  IMPRESSION:  1. Mild patchy airspace opacity in the superior segment of the left  lower lobe with increased FDG activity from background suggestive of a  small focal pneumonia (possibly viral in etiology). Consider  correlation with the symptoms, history and CT follow-up in 3 months to  document resolution.  2. No FDG avid lymphadenopathy or additional sites of disease.         PET 9/2/2020:      Impression:     Negative for active lymphoproliferative disease.         Chest CT  3/19/2020:      Impression       1. Mild bronchiectasis in the left lower lobe and tree-in-bud micro nodules at the left lung base suggesting underlying bronchiolitis, possibly due to a non tuberculous mycobacterial infection or viral bronchiolitis.  2. Fatty infiltration of the liver  3. Small hiatal hernia.           CXR  1/31/2020    Impression       1. No acute chest disease.  2. Left subclavian Port-A-Cath.               PET 9/11/2019   Impression       No evidence of FDG avid residual or recurrent lymphoma       I have reviewed all available lab results and radiology reports.    Assessment/Plan:   (1) 67 y.o. male with diagnosis of NHL Follicular Stage IIIA who has been referred by Dr Gary Chen with [facility] for continuation of care by medical hematology/oncology.   - He originally was diagnosed in 2012 and had parotid excision at [facility]. He subsequently went to [facility] and was treated with bendamustine-rituximab. He has been on rituximab maintenance every 8 weeks and IV IgG monthly. Dr Chen's plan was to keep him on maintenace therapy  for as long as he could tolerate the treatments  - s/p 6 cycles of Bendamustine and Retuximab with subsequent complete remission  - 1st maintenance cycle rituximab was in March 2016  - he has been on maintenance rituximab and IV IgG - last rituximab 11/15/2018; last IV IgG on Dec 14th 2018  - last PET was on 9/26/2018 with no evidence of recurrence  - originally diagnosed in Dec 2012 with left parotid and left periparotid LN excision on 12/11/2012  - PET scan done on  9/11/2019 was good    7/23/2020:  - new nodule on auricle of left ear suspicious for a skin cancer - will refer him to Dr Avilez with ENT  - he is due for repeat PET    9/17/2020:  - PET scan on 9/2/2020 is adequate  - [facility]     11/12/2020:  - continued on the current regimen  - doing well with no new issues    1/7/2021:  - he is having some persistent cough issues for which he has been obtaining sputum for José Manuel  - last PET was Sept 2020    3/4/2021:  - doing ok  - chronic cough issues - followed by José Manuel  - will set up f/u PET    4/29/2021:  - he had PET scan on 3/19/2021  - He has been seeing pulmonary about his chronic cough  Lucia Georges with pulmonary has seen the recent PET and reported to him that the CXR on 3/25 was stable and he is on some oral antibiotics with improvement of the symptoms  - He is also on Gabapentin for the neuropathy issues in his feet.   - He saw Dr Barry Mcmahon on 3/24/2021 with family [facility]    8/19/2021:  - He has been seeing pulmonary about his chronic cough - Lucia Georges with pulmonary and he has been on periodic treatments of antibiotics. He saw her last just yesterday on 8/17/2021 and is currently on antibiotics.  - Pulmonary is planning to refer him to an ID specialist  - he is on the rituximab every 8 weeks; I am not convinced that this is contributing to the infection issues as it is not reducing his WBC; however,if pulmonary and/or ID feel it is a  contributing factor then we can discontinue. The risk of his lymphoma recurring or progressing would be higher if he were to discontinue the therapy      10/14/2021:  - continued on oral antibiotics per pulmonary and plans to see Pulmonary ID specialist in near future  - continued on current therapy with rituximab  - recent PEt on 10/4/2021    12/9/2021:  - continued on rituximab  - on new antibiotics per pulmonary and he sees them again in Feb 2022  - repeat scans in Feb 2022 or sooner if pulmonary says otherwise    2/2/2022:  - he is seeing pulmonary again in two weeks  - he does not think that the new triple antibiotic regimen is working  - we can get PET in Feb/mar 2022 if pulmonary is inclined, otherwise 6 month surveillance scan in April/May 2022    3/31/2022:  - He has been seeing pulmonary about his chronic cough and TONY issues; he is on antibiotics 3x/week and he reports that pulmonary feels that he is stable  - PET scan scheduled for 4/14/2022  - He last saw Dr Barry Mcmahon on 3/28/2021 and José Manuel on 2/17/2022  - pulmonary is fine with him continuing the ritxuimab per his report    5/26/2022:  - He has been seeing pulmonary about his chronic cough and TONY issues; he saw Rose on 5/17/2022 and is now on a new antibiotic and he reports that pulmonary said the PEt scan was better than the last one; he is planning to see Dr Veliz in the near future   - PET scan was done on 4/5/2022 with no evidence of recurrence  - continued on rituximab maintenance therapy    7/21/2022:  - continued under the care of pulmonary  - he is seeing Dr Veliz in Aug 2022  - continued on monthly IV IgG and rituximab every other month  - repeat PET in Oct 2022 expected    9/15/2022:  - He has been seeing pulmonary about his chronic cough and TONY issues; he has been seeing Rose and saw Dr Veliz on 8/15/2022;   - Dr Veliz is looking at increasing his Ig dose or changing it to SQ weekly  - Last PET scan was done on 4/5/2022 and repeat  has been ordered and is due this Oct 2022  - discussed with patient about referral to Dr Nasreen Abarca at [facility] for not only evaluation for 2nd opinion for his chronic lymphoma but also the options of IV IgG infusion therapy, patient is agreeable    11/10/2022:  - Patient was seen by Dr Abarca at [facility] since last visit and his recommendation was to hold off on further rituximab therapy and proceed with just surveillance scanning.  - He was recently hospitalized at [facility] in Oct 2022 with upper torso cellulitis with Serratia marcescens septicemia  - He was recently hospitalized at [facility] in Oct 2022 with upper torso cellulitis with Serratia marcescens septicemia. He is still followed by wound care and is getting the prior drain site packed, etc. He has not followed up with ID as of yet    1/4/2023:  - Patient was previously seen by Dr Abarca at [facility] since last visit and his recommendation was to hold off on further rituximab therapy and proceed with just surveillance scanning. He has telemed visit with him in Feb 2023  -  He is getting PET tomorrow and seeing pulmonary again in Feb 2023  - he has been on IV IgG  - He saw Dr Washington last in Nov 2022 and is seeing ID at [facility] in Jan 2023      (2) HTN     (3) Neuropathy     (4) GERD     (5) DM - on metformin per Dr Nunez     (6) Hypogammaglobulinemia - on Iv IgG monthly     (7) Steatosis of liver     (8) Degenerative disc disease of back     (9) Diverticular disease    (10) Mild bronchiectasis in the left lower lobe and tree-in-bud micro nodules at the left lung base suggesting underlying bronchiolitis  - seeing Lucia Georges           VISIT DIAGNOSES:      Non-Hodgkin's lymphoma, unspecified body region, unspecified non-Hodgkin lymphoma type    Follicular lymphoma grade IIIa, unspecified body region          PLAN:  1. continue to hold further rituximab therapy and proceed with surveillance scans   - getting PET tomorrow  2. continue IV IgG     3. F/u with Dr Nasreen bAarca at  [facility] as directed (telemed in near future)  4. Check labs every 8 weeks  5. Repeat PET every 6 months  6. F/u with PCP, Pulm etc    7. Proceed with [facility] evaluation as scheduled        RTC in 4 weeks with Whit and 8 weeks with myself   Fax note to Barry Mcmahon; José Manuel Veliz; Ran Washington         Discussion:       I spent over 25 mins of time with the patient. Reviewed results of the recently ordered labs, tests and studies; made directives with regards to the results. Over half of this time was spent couseling and coordinating care.      COVID-19 Discussion:    I had long discussion with patient and any applicable family about the COVID-19 coronavirus epidemic and the recommended precautions with regard to cancer and/or hematology patients. I have re-iterated the CDC recommendations for adequate hand washing, use of hand -like products, and coughing into elbow, etc. In addition, especially for our patients who are on chemotherapy and/or our otherwise immunocompromised patients, I have recommended avoidance of crowds, including movie theaters, restaurants, churches, etc. I have recommended avoidance of any sick or symptomatic family members and/or friends. I have also recommended avoidance of any raw and unwashed food products, and general avoidance of food items that have not been prepared by themselves. The patient has been asked to call us immediately with any symptom developments, issues, questions or other general concerns.     I have explained all of the above in detail and the patient understands all of the current recommendation(s). I have answered all of their questions to the best of my ability and to their complete satisfaction.   The patient is to continue with the current management plan.            Electronically signed by Jefferson Ibarra MD                       Answers submitted by the patient for this visit:  Review of Systems Questionnaire (Submitted on 12/28/2022)  appetite change  : No  unexpected weight change: No  mouth sores: No  visual disturbance: No  cough: Yes  shortness of breath: No  chest pain: No  abdominal pain: No  diarrhea: Yes  frequency: No  back pain: No  rash: No  headaches: No  adenopathy: No  nervous/ anxious: No

## 2023-01-04 ENCOUNTER — OFFICE VISIT (OUTPATIENT)
Dept: HEMATOLOGY/ONCOLOGY | Facility: CLINIC | Age: 68
End: 2023-01-04
Payer: MEDICARE

## 2023-01-04 VITALS
TEMPERATURE: 99 F | HEIGHT: 68 IN | BODY MASS INDEX: 30.25 KG/M2 | DIASTOLIC BLOOD PRESSURE: 86 MMHG | RESPIRATION RATE: 16 BRPM | WEIGHT: 199.63 LBS | SYSTOLIC BLOOD PRESSURE: 149 MMHG | HEART RATE: 87 BPM

## 2023-01-04 DIAGNOSIS — C82.30 FOLLICULAR LYMPHOMA GRADE IIIA, UNSPECIFIED BODY REGION: ICD-10-CM

## 2023-01-04 DIAGNOSIS — C85.90 NON-HODGKIN'S LYMPHOMA, UNSPECIFIED BODY REGION, UNSPECIFIED NON-HODGKIN LYMPHOMA TYPE: Primary | ICD-10-CM

## 2023-01-04 PROCEDURE — 99214 PR OFFICE/OUTPT VISIT, EST, LEVL IV, 30-39 MIN: ICD-10-PCS | Mod: S$GLB,,, | Performed by: INTERNAL MEDICINE

## 2023-01-04 PROCEDURE — 99214 OFFICE O/P EST MOD 30 MIN: CPT | Mod: S$GLB,,, | Performed by: INTERNAL MEDICINE

## 2023-01-05 ENCOUNTER — HOSPITAL ENCOUNTER (OUTPATIENT)
Dept: RADIOLOGY | Facility: HOSPITAL | Age: 68
Discharge: HOME | DRG: 193 | End: 2023-01-05
Attending: INTERNAL MEDICINE
Payer: MEDICARE

## 2023-01-05 VITALS — BODY MASS INDEX: 30.16 KG/M2 | WEIGHT: 199 LBS | HEIGHT: 68 IN

## 2023-01-05 DIAGNOSIS — C82.30 FOLLICULAR LYMPHOMA GRADE IIIA, UNSPECIFIED BODY REGION: ICD-10-CM

## 2023-01-05 DIAGNOSIS — C83.38 DIFFUSE LARGE B-CELL LYMPHOMA, LYMPH NODES OF MULTIPLE SITES: ICD-10-CM

## 2023-01-05 LAB — GLUCOSE SERPL-MCNC: 190 MG/DL (ref 70–110)

## 2023-01-05 PROCEDURE — A9552 F18 FDG: HCPCS | Mod: PO

## 2023-01-06 ENCOUNTER — INFUSION (OUTPATIENT)
Dept: INFUSION THERAPY | Facility: HOSPITAL | Age: 68
End: 2023-01-06
Attending: INTERNAL MEDICINE
Payer: MEDICARE

## 2023-01-06 ENCOUNTER — HOSPITAL ENCOUNTER (INPATIENT)
Facility: HOSPITAL | Age: 68
LOS: 6 days | Discharge: HOME | DRG: 193 | End: 2023-01-12
Attending: EMERGENCY MEDICINE | Admitting: INTERNAL MEDICINE
Payer: MEDICARE

## 2023-01-06 DIAGNOSIS — R07.9 CHEST PAIN: ICD-10-CM

## 2023-01-06 DIAGNOSIS — A09 DIARRHEA OF INFECTIOUS ORIGIN: ICD-10-CM

## 2023-01-06 DIAGNOSIS — B34.1 ENTEROVIRUS INFECTION: ICD-10-CM

## 2023-01-06 DIAGNOSIS — J18.9 PNEUMONIA OF BOTH LUNGS DUE TO INFECTIOUS ORGANISM, UNSPECIFIED PART OF LUNG: Primary | ICD-10-CM

## 2023-01-06 DIAGNOSIS — D80.1 NONFAMILIAL HYPOGAMMAGLOBULINEMIA: ICD-10-CM

## 2023-01-06 DIAGNOSIS — J44.9 CHRONIC OBSTRUCTIVE PULMONARY DISEASE, UNSPECIFIED COPD TYPE: ICD-10-CM

## 2023-01-06 DIAGNOSIS — R06.02 SHORTNESS OF BREATH: ICD-10-CM

## 2023-01-06 DIAGNOSIS — C82.30 FOLLICULAR LYMPHOMA GRADE IIIA, UNSPECIFIED BODY REGION: ICD-10-CM

## 2023-01-06 DIAGNOSIS — R91.8 PULMONARY INFILTRATES: ICD-10-CM

## 2023-01-06 DIAGNOSIS — J47.0 BRONCHIECTASIS WITH ACUTE LOWER RESPIRATORY INFECTION: ICD-10-CM

## 2023-01-06 LAB
ALBUMIN SERPL BCP-MCNC: 3.2 G/DL (ref 3.5–5.2)
ALP SERPL-CCNC: 110 U/L (ref 55–135)
ALT SERPL W/O P-5'-P-CCNC: 21 U/L (ref 10–44)
ANION GAP SERPL CALC-SCNC: 10 MMOL/L (ref 8–16)
AST SERPL-CCNC: 30 U/L (ref 10–40)
BASOPHILS NFR BLD: 0 % (ref 0–1.9)
BILIRUB SERPL-MCNC: 0.8 MG/DL (ref 0.1–1)
BNP SERPL-MCNC: 42 PG/ML (ref 0–99)
BUN SERPL-MCNC: 13 MG/DL (ref 8–23)
CALCIUM SERPL-MCNC: 8.9 MG/DL (ref 8.7–10.5)
CHLORIDE SERPL-SCNC: 98 MMOL/L (ref 95–110)
CO2 SERPL-SCNC: 27 MMOL/L (ref 23–29)
CREAT SERPL-MCNC: 1 MG/DL (ref 0.5–1.4)
DIFFERENTIAL METHOD: ABNORMAL
EOSINOPHIL NFR BLD: 0 % (ref 0–8)
ERYTHROCYTE [DISTWIDTH] IN BLOOD BY AUTOMATED COUNT: 13.3 % (ref 11.5–14.5)
EST. GFR  (NO RACE VARIABLE): >60 ML/MIN/1.73 M^2
GLUCOSE SERPL-MCNC: 190 MG/DL (ref 70–110)
HCT VFR BLD AUTO: 36.9 % (ref 40–54)
HGB BLD-MCNC: 11.8 G/DL (ref 14–18)
IMM GRANULOCYTES # BLD AUTO: ABNORMAL K/UL (ref 0–0.04)
IMM GRANULOCYTES NFR BLD AUTO: ABNORMAL % (ref 0–0.5)
INFLUENZA A, MOLECULAR: NEGATIVE
INFLUENZA B, MOLECULAR: NEGATIVE
LACTATE SERPL-SCNC: 1.3 MMOL/L (ref 0.5–1.9)
LYMPHOCYTES NFR BLD: 32 % (ref 18–48)
MCH RBC QN AUTO: 26.6 PG (ref 27–31)
MCHC RBC AUTO-ENTMCNC: 32 G/DL (ref 32–36)
MCV RBC AUTO: 83 FL (ref 82–98)
METAMYELOCYTES NFR BLD MANUAL: 3 %
MONOCYTES NFR BLD: 16 % (ref 4–15)
MRSA SCREEN BY PCR: NEGATIVE
NEUTROPHILS NFR BLD: 39 % (ref 38–73)
NEUTS BAND NFR BLD MANUAL: 10 %
NRBC BLD-RTO: 0 /100 WBC
PLATELET # BLD AUTO: 329 K/UL (ref 150–450)
PLATELET BLD QL SMEAR: ABNORMAL
PMV BLD AUTO: 9.1 FL (ref 9.2–12.9)
POTASSIUM SERPL-SCNC: 3.7 MMOL/L (ref 3.5–5.1)
PROCALCITONIN SERPL IA-MCNC: 4.12 NG/ML (ref 0–0.5)
PROT SERPL-MCNC: 7 G/DL (ref 6–8.4)
RBC # BLD AUTO: 4.43 M/UL (ref 4.6–6.2)
SARS-COV-2 RDRP RESP QL NAA+PROBE: NEGATIVE
SODIUM SERPL-SCNC: 135 MMOL/L (ref 136–145)
SPECIMEN SOURCE: NORMAL
TROPONIN I SERPL HS-MCNC: 7.1 PG/ML (ref 0–14.9)
WBC # BLD AUTO: 3 K/UL (ref 3.9–12.7)

## 2023-01-06 PROCEDURE — 87070 CULTURE OTHR SPECIMN AEROBIC: CPT | Performed by: INTERNAL MEDICINE

## 2023-01-06 PROCEDURE — 99223 1ST HOSP IP/OBS HIGH 75: CPT | Mod: ,,, | Performed by: INTERNAL MEDICINE

## 2023-01-06 PROCEDURE — 96365 THER/PROPH/DIAG IV INF INIT: CPT

## 2023-01-06 PROCEDURE — U0002 COVID-19 LAB TEST NON-CDC: HCPCS | Performed by: EMERGENCY MEDICINE

## 2023-01-06 PROCEDURE — 93010 EKG 12-LEAD: ICD-10-PCS | Mod: ,,, | Performed by: INTERNAL MEDICINE

## 2023-01-06 PROCEDURE — 63600175 PHARM REV CODE 636 W HCPCS: Performed by: INTERNAL MEDICINE

## 2023-01-06 PROCEDURE — 25000003 PHARM REV CODE 250: Performed by: EMERGENCY MEDICINE

## 2023-01-06 PROCEDURE — 25000003 PHARM REV CODE 250: Performed by: INTERNAL MEDICINE

## 2023-01-06 PROCEDURE — 99223 PR INITIAL HOSPITAL CARE,LEVL III: ICD-10-PCS | Mod: ,,, | Performed by: INTERNAL MEDICINE

## 2023-01-06 PROCEDURE — 84145 PROCALCITONIN (PCT): CPT | Performed by: EMERGENCY MEDICINE

## 2023-01-06 PROCEDURE — 80053 COMPREHEN METABOLIC PANEL: CPT | Performed by: EMERGENCY MEDICINE

## 2023-01-06 PROCEDURE — 87449 NOS EACH ORGANISM AG IA: CPT | Performed by: INTERNAL MEDICINE

## 2023-01-06 PROCEDURE — 25000242 PHARM REV CODE 250 ALT 637 W/ HCPCS: Performed by: INTERNAL MEDICINE

## 2023-01-06 PROCEDURE — 99900031 HC PATIENT EDUCATION (STAT)

## 2023-01-06 PROCEDURE — 27000221 HC OXYGEN, UP TO 24 HOURS

## 2023-01-06 PROCEDURE — 87205 SMEAR GRAM STAIN: CPT | Performed by: INTERNAL MEDICINE

## 2023-01-06 PROCEDURE — 25500020 PHARM REV CODE 255: Performed by: EMERGENCY MEDICINE

## 2023-01-06 PROCEDURE — 93005 ELECTROCARDIOGRAM TRACING: CPT | Performed by: INTERNAL MEDICINE

## 2023-01-06 PROCEDURE — 83880 ASSAY OF NATRIURETIC PEPTIDE: CPT | Performed by: EMERGENCY MEDICINE

## 2023-01-06 PROCEDURE — 21400001 HC TELEMETRY ROOM

## 2023-01-06 PROCEDURE — 96375 TX/PRO/DX INJ NEW DRUG ADDON: CPT

## 2023-01-06 PROCEDURE — 84484 ASSAY OF TROPONIN QUANT: CPT | Performed by: EMERGENCY MEDICINE

## 2023-01-06 PROCEDURE — 85007 BL SMEAR W/DIFF WBC COUNT: CPT | Performed by: EMERGENCY MEDICINE

## 2023-01-06 PROCEDURE — 93010 ELECTROCARDIOGRAM REPORT: CPT | Mod: ,,, | Performed by: INTERNAL MEDICINE

## 2023-01-06 PROCEDURE — 87040 BLOOD CULTURE FOR BACTERIA: CPT | Mod: 59 | Performed by: EMERGENCY MEDICINE

## 2023-01-06 PROCEDURE — 87502 INFLUENZA DNA AMP PROBE: CPT | Performed by: EMERGENCY MEDICINE

## 2023-01-06 PROCEDURE — 63600175 PHARM REV CODE 636 W HCPCS: Performed by: EMERGENCY MEDICINE

## 2023-01-06 PROCEDURE — 99291 CRITICAL CARE FIRST HOUR: CPT

## 2023-01-06 PROCEDURE — 85027 COMPLETE CBC AUTOMATED: CPT | Performed by: EMERGENCY MEDICINE

## 2023-01-06 PROCEDURE — 83605 ASSAY OF LACTIC ACID: CPT | Performed by: EMERGENCY MEDICINE

## 2023-01-06 PROCEDURE — 94640 AIRWAY INHALATION TREATMENT: CPT

## 2023-01-06 PROCEDURE — 96361 HYDRATE IV INFUSION ADD-ON: CPT

## 2023-01-06 PROCEDURE — 87641 MR-STAPH DNA AMP PROBE: CPT | Performed by: INTERNAL MEDICINE

## 2023-01-06 PROCEDURE — 96367 TX/PROPH/DG ADDL SEQ IV INF: CPT

## 2023-01-06 PROCEDURE — 94761 N-INVAS EAR/PLS OXIMETRY MLT: CPT

## 2023-01-06 RX ORDER — AMLODIPINE BESYLATE 10 MG/1
10 TABLET ORAL DAILY
COMMUNITY
Start: 2022-07-01 | End: 2023-01-17 | Stop reason: SDUPTHER

## 2023-01-06 RX ORDER — SODIUM,POTASSIUM PHOSPHATES 280-250MG
2 POWDER IN PACKET (EA) ORAL
Status: DISCONTINUED | OUTPATIENT
Start: 2023-01-06 | End: 2023-01-12 | Stop reason: HOSPADM

## 2023-01-06 RX ORDER — MORPHINE SULFATE 4 MG/ML
4 INJECTION, SOLUTION INTRAMUSCULAR; INTRAVENOUS EVERY 4 HOURS PRN
Status: DISCONTINUED | OUTPATIENT
Start: 2023-01-06 | End: 2023-01-12 | Stop reason: HOSPADM

## 2023-01-06 RX ORDER — ATORVASTATIN CALCIUM 20 MG/1
20 TABLET, FILM COATED ORAL DAILY
Status: DISCONTINUED | OUTPATIENT
Start: 2023-01-07 | End: 2023-01-12 | Stop reason: HOSPADM

## 2023-01-06 RX ORDER — LANOLIN ALCOHOL/MO/W.PET/CERES
800 CREAM (GRAM) TOPICAL
Status: DISCONTINUED | OUTPATIENT
Start: 2023-01-06 | End: 2023-01-12 | Stop reason: HOSPADM

## 2023-01-06 RX ORDER — SODIUM CHLORIDE, SODIUM LACTATE, POTASSIUM CHLORIDE, CALCIUM CHLORIDE 600; 310; 30; 20 MG/100ML; MG/100ML; MG/100ML; MG/100ML
1000 INJECTION, SOLUTION INTRAVENOUS
Status: COMPLETED | OUTPATIENT
Start: 2023-01-06 | End: 2023-01-06

## 2023-01-06 RX ORDER — SODIUM CHLORIDE 9 MG/ML
1000 INJECTION, SOLUTION INTRAVENOUS
Status: COMPLETED | OUTPATIENT
Start: 2023-01-06 | End: 2023-01-06

## 2023-01-06 RX ORDER — ASPIRIN 81 MG/1
81 TABLET ORAL DAILY
Status: DISCONTINUED | OUTPATIENT
Start: 2023-01-07 | End: 2023-01-12 | Stop reason: HOSPADM

## 2023-01-06 RX ORDER — BENZONATATE 100 MG/1
200 CAPSULE ORAL 3 TIMES DAILY PRN
Status: DISCONTINUED | OUTPATIENT
Start: 2023-01-06 | End: 2023-01-12 | Stop reason: HOSPADM

## 2023-01-06 RX ORDER — MEROPENEM AND SODIUM CHLORIDE 1 G/50ML
1 INJECTION, SOLUTION INTRAVENOUS
Status: DISCONTINUED | OUTPATIENT
Start: 2023-01-06 | End: 2023-01-12 | Stop reason: HOSPADM

## 2023-01-06 RX ORDER — DOXYCYCLINE 100 MG/1
100 CAPSULE ORAL EVERY 12 HOURS
Status: DISCONTINUED | OUTPATIENT
Start: 2023-01-06 | End: 2023-01-10

## 2023-01-06 RX ORDER — GABAPENTIN 300 MG/1
300 CAPSULE ORAL 2 TIMES DAILY
Status: DISCONTINUED | OUTPATIENT
Start: 2023-01-06 | End: 2023-01-12 | Stop reason: HOSPADM

## 2023-01-06 RX ORDER — ACETAMINOPHEN 325 MG/1
650 TABLET ORAL EVERY 4 HOURS PRN
Status: DISCONTINUED | OUTPATIENT
Start: 2023-01-06 | End: 2023-01-12 | Stop reason: HOSPADM

## 2023-01-06 RX ORDER — IBUPROFEN 200 MG
16 TABLET ORAL
Status: DISCONTINUED | OUTPATIENT
Start: 2023-01-06 | End: 2023-01-12 | Stop reason: HOSPADM

## 2023-01-06 RX ORDER — INSULIN ASPART 100 [IU]/ML
1-10 INJECTION, SOLUTION INTRAVENOUS; SUBCUTANEOUS
Status: DISCONTINUED | OUTPATIENT
Start: 2023-01-06 | End: 2023-01-12 | Stop reason: HOSPADM

## 2023-01-06 RX ORDER — MONTELUKAST SODIUM 10 MG/1
10 TABLET ORAL DAILY
Status: DISCONTINUED | OUTPATIENT
Start: 2023-01-07 | End: 2023-01-12 | Stop reason: HOSPADM

## 2023-01-06 RX ORDER — ONDANSETRON 2 MG/ML
4 INJECTION INTRAMUSCULAR; INTRAVENOUS
Status: COMPLETED | OUTPATIENT
Start: 2023-01-06 | End: 2023-01-06

## 2023-01-06 RX ORDER — PANTOPRAZOLE SODIUM 40 MG/1
40 TABLET, DELAYED RELEASE ORAL
Status: DISCONTINUED | OUTPATIENT
Start: 2023-01-07 | End: 2023-01-12 | Stop reason: HOSPADM

## 2023-01-06 RX ORDER — IBUPROFEN 200 MG
24 TABLET ORAL
Status: DISCONTINUED | OUTPATIENT
Start: 2023-01-06 | End: 2023-01-12 | Stop reason: HOSPADM

## 2023-01-06 RX ORDER — GUAIFENESIN 600 MG/1
600 TABLET, EXTENDED RELEASE ORAL 2 TIMES DAILY
Status: DISCONTINUED | OUTPATIENT
Start: 2023-01-06 | End: 2023-01-07

## 2023-01-06 RX ORDER — ENOXAPARIN SODIUM 100 MG/ML
40 INJECTION SUBCUTANEOUS EVERY 24 HOURS
Status: DISCONTINUED | OUTPATIENT
Start: 2023-01-06 | End: 2023-01-12 | Stop reason: HOSPADM

## 2023-01-06 RX ORDER — SODIUM CHLORIDE 0.9 % (FLUSH) 0.9 %
10 SYRINGE (ML) INJECTION EVERY 12 HOURS PRN
Status: DISCONTINUED | OUTPATIENT
Start: 2023-01-06 | End: 2023-01-12 | Stop reason: HOSPADM

## 2023-01-06 RX ORDER — CEFEPIME HYDROCHLORIDE 1 G/50ML
1 INJECTION, SOLUTION INTRAVENOUS
Status: DISCONTINUED | OUTPATIENT
Start: 2023-01-06 | End: 2023-01-06

## 2023-01-06 RX ORDER — PROCHLORPERAZINE EDISYLATE 5 MG/ML
5 INJECTION INTRAMUSCULAR; INTRAVENOUS EVERY 6 HOURS PRN
Status: DISCONTINUED | OUTPATIENT
Start: 2023-01-06 | End: 2023-01-12 | Stop reason: HOSPADM

## 2023-01-06 RX ORDER — LINEZOLID 600 MG/1
600 TABLET, FILM COATED ORAL EVERY 12 HOURS
Status: DISCONTINUED | OUTPATIENT
Start: 2023-01-06 | End: 2023-01-09

## 2023-01-06 RX ORDER — IPRATROPIUM BROMIDE AND ALBUTEROL SULFATE 2.5; .5 MG/3ML; MG/3ML
3 SOLUTION RESPIRATORY (INHALATION) EVERY 6 HOURS PRN
Status: DISCONTINUED | OUTPATIENT
Start: 2023-01-06 | End: 2023-01-12 | Stop reason: HOSPADM

## 2023-01-06 RX ORDER — FLUTICASONE PROPIONATE 50 MCG
1 SPRAY, SUSPENSION (ML) NASAL 2 TIMES DAILY
Status: DISCONTINUED | OUTPATIENT
Start: 2023-01-06 | End: 2023-01-12 | Stop reason: HOSPADM

## 2023-01-06 RX ORDER — MORPHINE SULFATE 2 MG/ML
2 INJECTION, SOLUTION INTRAMUSCULAR; INTRAVENOUS
Status: COMPLETED | OUTPATIENT
Start: 2023-01-06 | End: 2023-01-06

## 2023-01-06 RX ORDER — LOSARTAN POTASSIUM 100 MG/1
1 TABLET ORAL DAILY
COMMUNITY
Start: 2022-07-01 | End: 2023-01-06

## 2023-01-06 RX ORDER — LOSARTAN POTASSIUM 50 MG/1
100 TABLET ORAL DAILY
Status: DISCONTINUED | OUTPATIENT
Start: 2023-01-07 | End: 2023-01-12 | Stop reason: HOSPADM

## 2023-01-06 RX ORDER — GLUCAGON 1 MG
1 KIT INJECTION
Status: DISCONTINUED | OUTPATIENT
Start: 2023-01-06 | End: 2023-01-12 | Stop reason: HOSPADM

## 2023-01-06 RX ORDER — AMLODIPINE BESYLATE 5 MG/1
10 TABLET ORAL DAILY
Status: DISCONTINUED | OUTPATIENT
Start: 2023-01-07 | End: 2023-01-12 | Stop reason: HOSPADM

## 2023-01-06 RX ORDER — ONDANSETRON 4 MG/1
8 TABLET, ORALLY DISINTEGRATING ORAL EVERY 8 HOURS PRN
Status: DISCONTINUED | OUTPATIENT
Start: 2023-01-06 | End: 2023-01-12 | Stop reason: HOSPADM

## 2023-01-06 RX ORDER — IPRATROPIUM BROMIDE AND ALBUTEROL SULFATE 2.5; .5 MG/3ML; MG/3ML
3 SOLUTION RESPIRATORY (INHALATION) EVERY 6 HOURS
Status: DISCONTINUED | OUTPATIENT
Start: 2023-01-06 | End: 2023-01-08

## 2023-01-06 RX ADMIN — VANCOMYCIN HYDROCHLORIDE 1500 MG: 1.5 INJECTION, POWDER, LYOPHILIZED, FOR SOLUTION INTRAVENOUS at 10:01

## 2023-01-06 RX ADMIN — DOXYCYCLINE 100 MG: 100 INJECTION, POWDER, LYOPHILIZED, FOR SOLUTION INTRAVENOUS at 12:01

## 2023-01-06 RX ADMIN — FLUTICASONE PROPIONATE 50 MCG: 50 SPRAY, METERED NASAL at 10:01

## 2023-01-06 RX ADMIN — SODIUM CHLORIDE, SODIUM LACTATE, POTASSIUM CHLORIDE, AND CALCIUM CHLORIDE 1000 ML: .6; .31; .03; .02 INJECTION, SOLUTION INTRAVENOUS at 12:01

## 2023-01-06 RX ADMIN — DOXYCYCLINE HYCLATE 100 MG: 100 CAPSULE ORAL at 10:01

## 2023-01-06 RX ADMIN — MORPHINE SULFATE 2 MG: 2 INJECTION, SOLUTION INTRAMUSCULAR; INTRAVENOUS at 01:01

## 2023-01-06 RX ADMIN — IPRATROPIUM BROMIDE AND ALBUTEROL SULFATE 3 ML: .5; 3 SOLUTION RESPIRATORY (INHALATION) at 07:01

## 2023-01-06 RX ADMIN — LINEZOLID 600 MG: 600 TABLET, FILM COATED ORAL at 10:01

## 2023-01-06 RX ADMIN — MORPHINE SULFATE 4 MG: 4 INJECTION, SOLUTION INTRAMUSCULAR; INTRAVENOUS at 08:01

## 2023-01-06 RX ADMIN — IOHEXOL 100 ML: 350 INJECTION, SOLUTION INTRAVENOUS at 10:01

## 2023-01-06 RX ADMIN — CEFEPIME HYDROCHLORIDE 1 G: 1 INJECTION, SOLUTION INTRAVENOUS at 01:01

## 2023-01-06 RX ADMIN — ONDANSETRON 4 MG: 2 INJECTION INTRAMUSCULAR; INTRAVENOUS at 11:01

## 2023-01-06 RX ADMIN — GUAIFENESIN 600 MG: 600 TABLET, EXTENDED RELEASE ORAL at 10:01

## 2023-01-06 RX ADMIN — SODIUM CHLORIDE 1000 ML: 0.9 INJECTION, SOLUTION INTRAVENOUS at 11:01

## 2023-01-06 RX ADMIN — ENOXAPARIN SODIUM 40 MG: 100 INJECTION SUBCUTANEOUS at 10:01

## 2023-01-06 RX ADMIN — BENZONATATE 200 MG: 100 CAPSULE ORAL at 10:01

## 2023-01-06 RX ADMIN — GABAPENTIN 300 MG: 300 CAPSULE ORAL at 10:01

## 2023-01-06 NOTE — NURSING
Pt came in for IVIG infusion and stated that he was not feeling well. He complained of shortness of breath, 8/10 pain to left lung with inhalation, weakness, fatigue, and diarrhea x7 days. Dr Ibarra was made aware of the patient's symptoms and instructed the patient to go to the ED to get further evaluated. Pt left the clinic and checked in at [facility] ED where he was admitted.    Lori Razo RN

## 2023-01-06 NOTE — PLAN OF CARE
Problem: Fall Injury Risk  Goal: Absence of Fall and Fall-Related Injury  Outcome: Ongoing, Progressing  Intervention: Identify and Manage Contributors  Flowsheets (Taken 1/6/2023 0801)  Self-Care Promotion: independence encouraged  Medication Review/Management: medications reviewed  Intervention: Promote Injury-Free Environment  Flowsheets (Taken 1/6/2023 0801)  Safety Promotion/Fall Prevention: medications reviewed

## 2023-01-06 NOTE — ED PROVIDER NOTES
"Encounter Date: 1/6/2023       History     Chief Complaint   Patient presents with    Abdominal Pain     W/ diarrhea x1 week.    Cough     W/ back pain on "my lungs" x1 day     67-year-old male presents emergency department past medical history includes diabetes, hypertension, currently receiving infusion treatment for non-Hodgkin's lymphoma reports for the last week he is had diarrhea and feels generally weak.  States yesterday he developed a cough, associated shortness of breath, and pain when he breathes.  The patient reports that he went to the infusion center today however he was sent here by Dr. Ibarra for further evaluation in the emergency department.  Patient has been taking over-the-counter Pepto-Bismol for relief of diarrhea symptoms    Review of patient's allergies indicates:  No Known Allergies  Past Medical History:   Diagnosis Date    Chest wall abscess 9/30/2022    Chronic obstructive pulmonary disease, unspecified COPD type 3/28/2022    Diabetes mellitus, type 2     Encounter for antineoplastic chemotherapy 04/01/2020    Hypertension     Hypogammaglobulinemia 12/13/2019    Neuropathy     Non Hodgkin's lymphoma     Reflux esophagitis     Ringing in ear, bilateral      Past Surgical History:   Procedure Laterality Date    COLONOSCOPY  2018    EYE SURGERY  Approximately 3 years ago    Cataract    HAND SURGERY      amputation left index finger    INCISION AND DRAINAGE OF ABSCESS Left 10/3/2022    Procedure: INCISION AND DRAINAGE, ABSCESS;  Surgeon: Franco Venegas III, MD;  Location: [facility] OR;  Service: General;  Laterality: Left;  axilla    MEDIPORT REMOVAL Right 10/3/2022    Procedure: REMOVAL, CATHETER, CENTRAL VENOUS, TUNNELED, WITH PORT;  Surgeon: Franco Venegas III, MD;  Location: [facility] OR;  Service: General;  Laterality: Right;    PORTACATH PLACEMENT      [facility]  [facility]30/[facility]    SPINE SURGERY      VASECTOMY  1985 ?     Family History   Problem Relation Age of Onset "    Diabetes Father      Social History     Tobacco Use    Smoking status: Former     Packs/day: 0.50     Years: 2.00     Pack years: 1.00     Types: Cigarettes    Smokeless tobacco: Never   Substance Use Topics    Alcohol use: Not Currently     Alcohol/week: 0.0 standard drinks     Comment: rarely    Drug use: No     Review of Systems   Constitutional:  Negative for fever.   HENT: Negative.     Respiratory:  Positive for cough and shortness of breath.    Cardiovascular: Negative.    Gastrointestinal:  Positive for diarrhea. Negative for nausea and vomiting.   Genitourinary: Negative.    Musculoskeletal: Negative.    Skin: Negative.    Hematological: Negative.    Psychiatric/Behavioral: Negative.     All other systems reviewed and are negative.    Physical Exam     Initial Vitals [01/06/23 0828]   BP Pulse Resp Temp SpO2   (!) 163/85 101 19 98.7 °F (37.1 °C) (!) 94 %      MAP       --         Physical Exam    Nursing note and vitals reviewed.  Constitutional: He appears well-developed and well-nourished.   HENT:   Head: Normocephalic and atraumatic.   Eyes: EOM are normal. Pupils are equal, round, and reactive to light.   Neck: Neck supple.   Normal range of motion.  Cardiovascular:  Normal rate, regular rhythm, normal heart sounds and intact distal pulses.           Pulmonary/Chest: Breath sounds normal. No respiratory distress. He exhibits tenderness.   Abdominal: Abdomen is soft. Bowel sounds are normal. He exhibits no distension. There is no abdominal tenderness.   Musculoskeletal:         General: No tenderness or edema.      Cervical back: Normal range of motion and neck supple.     Neurological: He is alert and oriented to person, place, and time. He has normal strength. GCS score is 15. GCS eye subscore is 4. GCS verbal subscore is 5. GCS motor subscore is 6.   Skin: Skin is warm. No rash noted.   Psychiatric: He has a normal mood and affect.       ED Course   Procedures  Labs Reviewed   CBC W/ AUTO  DIFFERENTIAL - Abnormal; Notable for the following components:       Result Value    WBC 3.00 (*)     RBC 4.43 (*)     Hemoglobin 11.8 (*)     Hematocrit 36.9 (*)     MCH 26.6 (*)     MPV 9.1 (*)     Mono % 16.0 (*)     All other components within normal limits   COMPREHENSIVE METABOLIC PANEL - Abnormal; Notable for the following components:    Sodium 135 (*)     Glucose 190 (*)     Albumin 3.2 (*)     All other components within normal limits   PROCALCITONIN - Abnormal; Notable for the following components:    Procalcitonin 4.12 (*)     All other components within normal limits   CULTURE, STOOL   CULTURE, BLOOD   CULTURE, BLOOD   CULTURE, RESPIRATORY   MRSA SCREEN BY PCR   TROPONIN I HIGH SENSITIVITY   B-TYPE NATRIURETIC PEPTIDE   LACTIC ACID, PLASMA   SARS-COV-2 RNA AMPLIFICATION, QUAL   INFLUENZA A AND B ANTIGEN    Narrative:     Specimen Source->Nasopharyngeal Swab   STOOL EXAM-OVA,CYSTS,PARASITES   WBC, STOOL   OCCULT BLOOD X 1, STOOL   INFLUENZA A AND B ANTIGEN   SARS-COV-2 RNA AMPLIFICATION, QUAL        ECG Results              EKG 12-lead (In process)  Result time 01/06/23 09:32:39      In process by Interface, Lab In [facility] (01/06/23 09:32:39)                   Narrative:    Test Reason : R06.02,    Vent. Rate : 094 BPM     Atrial Rate : 094 BPM     P-R Int : 166 ms          QRS Dur : 092 ms      QT Int : 346 ms       P-R-T Axes : 058 -39 027 degrees     QTc Int : 432 ms    Normal sinus rhythm  Left axis deviation  Inferior infarct (cited on or before 30-SEP-2022)  Abnormal ECG  When compared with ECG of 11-NOV-2022 20:44,  No significant change was found    Referred By: [email or web address]   SELF           Confirmed By:                                   Imaging Results              CT Abdomen Pelvis With Contrast (Final result)  Result time 01/06/23 11:24:40      Final result by Lavelle Bangura MD (01/06/23 11:24:40)                   Narrative:    CMS MANDATED QUALITY DATA - CT RADIATION - 436    All CT scans at  this facility utilize dose modulation, iterative reconstruction, and/or weight based dosing when appropriate to reduce radiation dose to as low as reasonably achievable.        Reason: Nausea/vomiting    TECHNIQUE: CT abdomen and pelvis with 100 mL Omnipaque 350.    COMPARISON: None    CT ABDOMEN:  Subpleural consolidative opacities affecting the posterior basilar left lower lobe, with minimal patchy alveolar opacities affecting medial basilar right lower lobe.    Liver, gallbladder, pancreas spleen, and adrenals are normal. Kidneys are normal.    Aortoiliac calcifications are moderate.    Diverticula arise from the colon. Intestine show no additional abnormality. Appendix not identified but no secondary signs of appendicitis are present. No free intraperitoneal gas.    Degenerative changes affect the spine.    CT PELVIS:  Bladder is normal. Heterogeneous contrast opacification of bilateral iliac veins is felt physiologic in nature given symmetry and phase of contrast enhancement. No free pelvic fluid. Tiny fat-containing left inguinal hernia incidentally noted. No acute osseous abnormality.    IMPRESSION:    1. Left greater than right lower lobe alveolar opacities including left lower lobe consolidation, characteristic of infectious or inflammatory pneumonia. Correlation for aspiration is requested.  2. Diverticulosis.  3. No acute findings throughout the abdomen or pelvis.    Electronically signed by:  Lavelle Bangura MD  1/6/2023 11:24 AM [facility] Workstation: [id number]                                     CTA Chest Non-Coronary (PE Studies) (Final result)  Result time 01/06/23 11:23:34      Final result by José Antonio Diaz MD (01/06/23 11:23:34)                   Narrative:    CMS MANDATED QUALITY DATA-CT RADIATION DOSE-436  All CT scans at this facility use dose modulation, iterative reconstruction, and or weight-based dosing when appropriate to reduce radiation dose to as low as reasonably achievable.    HISTORY:  Pulmonary embolism (PE) suspected, high probability,  cough, back pain, COPD.    FINDINGS: Thin axial imaging through the chest was performed with 100 mL Omnipaque 350 IV contrast, with sagittal and coronal reformatted images and MIP reconstructions performed, and images stored in the patient's permanent electronic medical record.    Comparison to multiple prior exams including 11/12/2022. There are no pulmonary arterial filling defects to suggest pulmonary thromboembolism. The central pulmonary arteries are normal in caliber.    Diffuse calcified plaque involves normal caliber thoracic aorta, with no aortic dissection or evidence of aortic intramural hematoma. The heart is normal in size, with no pericardial effusion. There are extensive coronary arterial calcifications, with no enlarged mediastinal or hilar lymph nodes.    There are scattered centrilobular nodular opacities with surrounding groundglass densities in both lungs, including in the right upper lobe, right middle lobe, right lower lobe, and to a lesser degree in the left upper lobe. Additionally, there are small airspace component in the right lower lobe, with consolidation in the left lower lobe consistent with pneumonia.    The central airways are patent, with no pleural effusion or evidence of interstitial pulmonary edema. No pneumothorax.    Images of the upper abdomen show a few splenic granulomatous calcifications, and are otherwise unremarkable. There are no acute fractures or destructive osseous lesions.    IMPRESSION:  1. Negative for pulmonary thromboembolism.  2. Bilateral multifocal bronchiolitis and bronchopneumonia, including consolidated pneumonia in the left lower lobe. Follow-up PA and lateral chest radiograph in 4-6 weeks after appropriate therapy is recommended to document complete resolution.  3. Aortic and coronary arterial calcifications.    Electronically signed by:  José Antonio Diaz MD  1/6/2023 11:23 AM [facility] Workstation: [id number]                                      X-Ray Chest AP Portable (Final result)  Result time 01/06/23 09:55:44      Final result by Don Cameron MD (01/06/23 09:55:44)                   Narrative:    Reason: CHF    FINDINGS:    Portable chest with comparison chest x-ray November 11, 2022 show normal cardiomediastinal silhouette.  Lungs are clear. Pulmonary vasculature is normal. No acute osseous abnormality.    IMPRESSION:    No acute cardiopulmonary abnormality.    Electronically signed by:  Don Cameron DO  1/6/2023 9:55 AM [facility] Workstation: [id number]                                     Medications   cefepime in dextrose 5 % 1 gram/50 mL IVPB 1 g (0 g Intravenous Stopped 1/6/23 1354)   potassium bicarbonate disintegrating tablet 50 mEq (has no administration in time range)   potassium bicarbonate disintegrating tablet 35 mEq (has no administration in time range)   potassium bicarbonate disintegrating tablet 60 mEq (has no administration in time range)   magnesium oxide tablet 800 mg (has no administration in time range)   magnesium oxide tablet 800 mg (has no administration in time range)   potassium, sodium phosphates 280-160-250 mg packet 2 packet (has no administration in time range)   potassium, sodium phosphates 280-160-250 mg packet 2 packet (has no administration in time range)   potassium, sodium phosphates 280-160-250 mg packet 2 packet (has no administration in time range)   iohexoL (OMNIPAQUE 350) injection 100 mL (100 mLs Intravenous Given 1/6/23 1026)   0.9%  NaCl infusion (0 mLs Intravenous Stopped 1/6/23 1203)   ondansetron injection 4 mg (4 mg Intravenous Given 1/6/23 1142)   doxycycline (VIBRAMYCIN) 100mg in dextrose 5% 250 mL IVPB (ready to mix) (0 mg Intravenous Stopped 1/6/23 1303)   lactated ringers infusion (0 mLs Intravenous Stopped 1/6/23 1516)   morphine injection 2 mg (2 mg Intravenous Given 1/6/23 1324)     Medical Decision Making:   History:   Old Records Summarized: records from  previous admission(s) and records from clinic visits.  Initial Assessment:   67-year-old male presents emergency department past medical history includes diabetes, hypertension, currently receiving infusion treatment for non-Hodgkin's lymphoma reports for the last week he is had diarrhea and feels generally weak.  States yesterday he developed a cough, associated shortness of breath, and pain when he breathes.  The patient reports that he went to the infusion center today however he was sent here by Dr. Ibarra for further evaluation in the emergency department.  Patient has been taking over-the-counter Pepto-Bismol for relief of diarrhea symptoms    Differential Diagnosis:   Considerations include electrolyte abnormalities, ACS, PE, pneumonia, colitis, sepsis, infectious diarrhea  Clinical Tests:   Lab Tests: Ordered and Reviewed  Radiological Study: Ordered and Reviewed  ED Management:  67-year-old male presents emergency department for emergent evaluation of diarrhea, shortness of breath, and cough.  [facility]    Patient presents for emergent evaluation of acute diarrhea, shortness of breath, cough that poses a possible threat to life and/or bodily function.    In the ED patient found to have acute bilateral pneumonia, neutropenia.   I ordered labs and personally reviewed them.  Labs significant for neutropenia, elevated procalcitonin consistent with pneumonia.    I ordered X-rays and personally reviewed them and reviewed the radiologist interpretation.  Xray significant for no acute abnormalities noted on plain films of chest x-ray.      I ordered CT scan and personally reviewed it and reviewed the radiologist interpretation.  CT significant for bilateral pneumonia with consolidated pneumonia to the left lower lobe, CT imaging of the abdomen reveals no acute to abdominal abnormalities    Admission [facility]  I discussed the patient presentation labs, ekg, X-rays, CT findings with the consultant(s) hospital medicine team      Patient was managed in the ED with IV fluids, Zofran, cefepime, doxycycline IV morphine.    The response to treatment was decreased nausea, decreased pain.    Patient required emergent consultation to Hospital Medicine for admission.     Attending Critical Care:   Critical Care Times:   Direct Patient Care (initial evaluation, reassessments, and time considering the case)................................................................10 minutes.   Additional History from reviewing old medical records or taking additional history from the family, EMS, PCP, etc.......................10 minutes.   Ordering, Reviewing, and Interpreting Diagnostic Studies...............................................................................................................10 minutes.   Documentation..................................................................................................................................................................................5 minutes.   ==============================================================  · Total Critical Care Time - exclusive of procedural time: 35 minutes.  ==============================================================  Critical care was necessary to treat or prevent imminent or life-threatening deterioration of the following conditions: SOB Chest pain, abdominal pain .   Critical care was time spent personally by me on the following activities: obtaining history from patient or relative, examination of patient, review of x-rays / CT sent with the patient, ordering lab, x-rays, and/or EKG, development of treatment plan with patient or relative, ordering and performing treatments and interventions, interpretation of cardiac measurements and re-evaluation of patient's conition.   Critical Care Condition: potentially life-threatening                         Clinical Impression:   Final diagnoses:  [R06.02] Shortness of breath  [J18.9] Pneumonia of both lungs  due to infectious organism, unspecified part of lung (Primary)        ED Disposition Condition    Admit Stable                SHAD Hall  01/06/23 1608       SHAD Hall  01/06/23 1602

## 2023-01-06 NOTE — HPI
ED note  67-year-old male presents emergency department past medical history includes diabetes, hypertension, currently receiving infusion treatment for non-Hodgkin's lymphoma reports for the last week he is had diarrhea and feels generally weak.  States yesterday he developed a cough, associated shortness of breath, and pain when he breathes.  The patient reports that he went to the infusion center today however he was sent here by Dr. Ibarra for further evaluation in the emergency department.  Patient has been taking over-the-counter Pepto-Bismol for relief of diarrhea symptoms    1/6/2022  Mr Thacker has noted increasing SOB and VASQUEZ with fever X 1 today. Hew has had limited exposure to the public. The diarrhea is not bloody and he was exposed to antibiotics 11/2022.

## 2023-01-06 NOTE — SUBJECTIVE & OBJECTIVE
Past Medical History:   Diagnosis Date    Chest wall abscess 9/30/2022    Chronic obstructive pulmonary disease, unspecified COPD type 3/28/2022    Diabetes mellitus, type 2     Encounter for antineoplastic chemotherapy 04/01/2020    Hypertension     Hypogammaglobulinemia 12/13/2019    Neuropathy     Non Hodgkin's lymphoma     Reflux esophagitis     Ringing in ear, bilateral        Past Surgical History:   Procedure Laterality Date    COLONOSCOPY  2018    EYE SURGERY  Approximately 3 years ago    Cataract    HAND SURGERY      amputation left index finger    INCISION AND DRAINAGE OF ABSCESS Left 10/3/2022    Procedure: INCISION AND DRAINAGE, ABSCESS;  Surgeon: Franco Venegas III, MD;  Location: [facility] OR;  Service: General;  Laterality: Left;  axilla    MEDIPORT REMOVAL Right 10/3/2022    Procedure: REMOVAL, CATHETER, CENTRAL VENOUS, TUNNELED, WITH PORT;  Surgeon: Franco Venegas III, MD;  Location: [facility] OR;  Service: General;  Laterality: Right;    PORTACATH PLACEMENT      SKIN CANCER EXCISION  09/30/2020    [facility]    SPINE SURGERY      VASECTOMY  1985 ?       Review of patient's allergies indicates:  No Known Allergies    No current facility-administered medications on file prior to encounter.     Current Outpatient Medications on File Prior to Encounter   Medication Sig    albuterol-ipratropium (DUO-NEB) 2.5 mg-0.5 mg/3 mL nebulizer solution Take 3 mLs by nebulization every 6 (six) hours as needed for Wheezing. Rescue    amLODIPine (NORVASC) 10 MG tablet Take 10 mg by mouth once daily.    aspirin (ECOTRIN) 81 MG EC tablet Take 81 mg by mouth once daily.    atorvastatin (LIPITOR) 20 MG tablet Take 1 tablet (20 mg total) by mouth once daily.    fluticasone propionate (FLONASE) 50 mcg/actuation nasal spray 1 spray (50 mcg total) by Each Nostril route 2 (two) times daily.    gabapentin (NEURONTIN) 300 MG capsule Take 1 capsule (300 mg total) by mouth 2 (two) times daily.    losartan (COZAAR) 100 MG tablet Take  0.5 tablets (50 mg total) by mouth once daily. (Patient taking differently: Take 100 mg by mouth once daily.)    metFORMIN (GLUCOPHAGE) 500 MG tablet metformin 500 mg tablet  Take 2 tablets twice a day by oral route with meals for 90 days. (Patient taking differently: Take 1,000 mg by mouth 2 (two) times daily with meals. metformin 500 mg tablet  Take 2 tablets twice a day by oral route with meals for 90 days.)    montelukast (SINGULAIR) 10 mg tablet Take 1 tablet (10 mg total) by mouth nightly as needed.    MULTIVITAMIN ORAL Take 1 tablet by mouth once daily.    omeprazole (PRILOSEC) 40 MG capsule Take 1 capsule (40 mg total) by mouth once daily.    [DISCONTINUED] losartan (COZAAR) 100 MG tablet Take 1 tablet by mouth once daily.    blood sugar diagnostic (FREESTYLE LITE STRIPS MISC) FreeStyle Lite Strips    blood sugar diagnostic Strp     mucus clearing device (ACAPELLA, FLUTTER) 1 Device by Misc.(Non-Drug; Combo Route) route 2 (two) times daily.    [DISCONTINUED] amLODIPine (NORVASC) 5 MG tablet Take 1 tablet (5 mg total) by mouth once daily.    [DISCONTINUED] azelastine (ASTELIN) 137 mcg (0.1 %) nasal spray 2 sprays (274 mcg total) by Nasal route 2 (two) times daily as needed for Rhinitis.    [DISCONTINUED] oxyCODONE-acetaminophen (PERCOCET)  mg per tablet Take 1 tablet by mouth every 8 (eight) hours as needed for Pain.     Family History       Problem Relation (Age of Onset)    Diabetes Father          Tobacco Use    Smoking status: Former     Packs/day: 0.50     Years: 2.00     Pack years: 1.00     Types: Cigarettes    Smokeless tobacco: Never   Substance and Sexual Activity    Alcohol use: Not Currently     Alcohol/week: 0.0 standard drinks     Comment: rarely    Drug use: No    Sexual activity: Not Currently     Review of Systems   Constitutional:  Positive for activity change, appetite change, diaphoresis, fatigue and fever. Negative for chills.   HENT: Negative.     Eyes: Negative.    Respiratory:   Positive for shortness of breath.    Cardiovascular: Negative.    Gastrointestinal:  Positive for abdominal distention, abdominal pain and diarrhea. Negative for blood in stool, nausea and vomiting.   Endocrine: Positive for cold intolerance.   Genitourinary:  Positive for decreased urine volume.   Musculoskeletal:  Positive for arthralgias, gait problem and myalgias.   Skin: Negative.    Allergic/Immunologic: Positive for immunocompromised state.   Neurological:  Positive for weakness.   Hematological:  Bruises/bleeds easily.   Psychiatric/Behavioral:  The patient is nervous/anxious.    Objective:     Vital Signs (Most Recent):  Temp: 98.7 °F (37.1 °C) (01/06/23 0828)  Pulse: 101 (01/06/23 0828)  Resp: 18 (01/06/23 1324)  BP: (!) 163/85 (01/06/23 0828)  SpO2: (!) 94 % (01/06/23 0828)   Vital Signs (24h Range):  Temp:  [98.7 °F (37.1 °C)] 98.7 °F (37.1 °C)  Pulse:  [101] 101  Resp:  [18-19] 18  SpO2:  [94 %] 94 %  BP: (163)/(85) 163/85     Weight: 91.6 kg (202 lb)  Body mass index is 30.71 kg/m².    Physical Exam  Vitals and nursing note reviewed.   Constitutional:       General: He is not in acute distress.     Appearance: He is ill-appearing. He is not diaphoretic.   HENT:      Head: Normocephalic and atraumatic.      Nose: Nose normal.      Mouth/Throat:      Mouth: Mucous membranes are moist.   Eyes:      Extraocular Movements: Extraocular movements intact.      Pupils: Pupils are equal, round, and reactive to light.   Neck:      Comments: Use of accessory muscles with speaking  Cardiovascular:      Rate and Rhythm: Normal rate and regular rhythm.      Heart sounds:     Gallop present.   Pulmonary:      Breath sounds: Rhonchi and rales present.      Comments: Post tussive rales left upper lobe  Abdominal:      General: There is distension.      Tenderness: There is no abdominal tenderness. There is no guarding or rebound.   Musculoskeletal:         General: Normal range of motion.      Cervical back: Normal  range of motion and neck supple.      Comments: 4-5/5 generalized muscle strength   Skin:     General: Skin is warm.   Neurological:      Mental Status: He is alert and oriented to person, place, and time.   Psychiatric:      Comments: Somewhat dulled affect  anxious         CRANIAL NERVES     CN III, IV, VI   Pupils are equal, round, and reactive to light.     Significant Labs: All pertinent labs within the past 24 hours have been reviewed.  Recent Lab Results         01/06/23  1338   01/06/23  1157   01/06/23  0937        Influenza A, Molecular Negative           Influenza B, Molecular Negative           Procalcitonin   4.12  Comment: Procalcitonin Result Interpretation:    <=0.50 ng/mL  Systemic infection (sepsis) is not likely. Local bacterial infection   is   possible.    >0.50 and <= 2.00 ng/mL  Systemic infection (sepsis) is possible, but other conditions are   also known   to elevate procalcitonin.     >2.00 ng/mL  Systemic infection (sepsis) is likely unless other causes are known.    >=10.00 ng/mL  Important systemic inflammatory response, almost exclusively due to   severe   bacterial sepsis or septic shock.           Albumin     3.2       Alkaline Phosphatase     110       ALT     21       Anion Gap     10       AST     30       Bands     10.0       Basophil %     0.0       BILIRUBIN TOTAL     0.8  Comment: For infants and newborns, interpretation of results should be based  on gestational age, weight and in agreement with clinical  observations.    Premature Infant recommended reference ranges:  Up to 24 hours.............<8.0 mg/dL  Up to 48 hours............<12.0 mg/dL  3-5 days..................<15.0 mg/dL  6-29 days.................<15.0 mg/dL         BNP     42  Comment: Values of less than 100 pg/ml are consistent with non-CHF populations.       BUN     13       Calcium     8.9       Chloride     98       CO2     27       Creatinine     1.0       Differential Method     Manual       eGFR      >60.0       Eosinophil %     0.0       Flu A & B Source Nasal swab           Glucose     190       Gran %     39.0       Hematocrit     36.9       Hemoglobin     11.8       Immature Grans (Abs)     CANCELED  Comment: Mild elevation in immature granulocytes is non specific and   can be seen in a variety of conditions including stress response,   acute inflammation, trauma and pregnancy. Correlation with other   laboratory and clinical findings is essential.    Result canceled by the ancillary.         Immature Granulocytes     CANCELED  Comment: Result canceled by the ancillary.       Lactate, Karl   1.3  Comment: Falsely low lactic acid results can be found in samples   containing >=13.0 mg/dL total bilirubin and/or >=3.5 mg/dL   direct bilirubin.           Lymph %     32.0       MCH     26.6       MCHC     32.0       MCV     83       Metamyelocytes     3.0       Mono %     16.0       MPV     9.1       nRBC     0       Platelet Estimate     Appears normal       Platelets     329       Potassium     3.7       PROTEIN TOTAL     7.0       RBC     4.43       RDW     13.3       SARS-CoV-2 RNA, Amplification, Qual Negative  Comment: This test utilizes isothermal nucleic acid amplification technology   to   detect the SARS-CoV-2 RdRp nucleic acid segment. The analytical   sensitivity   (limit of detection) is 500 copies/swab.     A POSITIVE result is indicative of the presence of SARS-CoV-2 RNA;   clinical   correlation with patient history and other diagnostic information is   necessary to determine patient infection status.    A NEGATIVE result means that SARS-CoV-2 nucleic acids are not present   above   the limit of detection. A NEGATIVE result should be treated as   presumptive.   It does not rule out the possibility of COVID-19 and should not be   the sole   basis for treatment decisions. If COVID-19 is strongly suspected   based on   clinical and exposure history, re-testing using an alternate   molecular assay    should be considered.     This test is only for use under the Food and Drug Administration s   Emergency   Use Authorization (EUA).     Commercial kits are provided by [employer]. Performance   characteristics of the EUA have been independently verified by   [facility] Department of Pathology and Laboratory Medicine.   _________________________________________________________________   The authorized Fact Sheet for Healthcare Providers and the authorized   Fact   Sheet for Patients of the ID NOW COVID-19 are available on the FDA   website:   https://www.fda.gov/media/812319/download   https://www.fda.gov/media/337374/download             Sodium     135       Troponin I High Sensitivity     7.1  Comment: Troponin results differ between methods. Do not use   results between Troponin methods interchangeably.    Alkaline Phospatase levels above 400 U/L may   cause false positive results.    Access hsTnI should not be used for patients taking   Asfotase michel (Strensiq).         WBC     3.00               Significant Imaging: I have reviewed all pertinent imaging results/findings within the past 24 hours.

## 2023-01-06 NOTE — CONSULTS
Consult Note  Infectious Disease    Reason for Consult:  pneumonia    HPI: Sivakumar Thacker is a 67 y.o. male known to our service, seen in october for left axillary abscess, and again in mid November for cellulitis of the left upper chest(after small abrasion left hand) with office follow up on 11/22. he receives IVIG(50 g Gamunex) for hypogammaglobulinemia and is currently on hiatus from lymphoma treatment. he has a history of bronchiectasis and chronic sinusitis(resolved) from 10/2022. He is follow ed by Dr. Ibarra and hem/onc at [facility] underwent a PET/CT on 1/5 which demonstrated R>L ground glass infiltrate and small LLL pneumonia and increased activity throughout the skeleton suggestive of bone marrow hyperstimulation.   He was sent to the ED today from the cancer center with worsening of complaints of nausea/diarrhea x 1 week(no suspicious meals or exposure to persons with GI illness), pleuritic pain left lower posterior chest, with a cough(4 days duration) productive of small amounts of brown sputum,with associated SOB. He did not seek medical attention until a regularly scheduled visit wed 1/4. He did not receive his regularly scheduled IVIG this am.CT abd benign.  CTA chest has some lower lobe ground glass opacities(better than yesterday) and a LLL consolidation(worse than yesterday). He was given vanc, cefepime and doxy and IVF in the ED.    Review of patient's allergies indicates:  No Known Allergies  Past Medical History:   Diagnosis Date    Chest wall abscess 9/30/2022    Chronic obstructive pulmonary disease, unspecified COPD type 3/28/2022    Diabetes mellitus, type 2     Encounter for antineoplastic chemotherapy 04/01/2020    Hypertension     Hypogammaglobulinemia 12/13/2019    Neuropathy     Non Hodgkin's lymphoma     Reflux esophagitis     Ringing in ear, bilateral      Past Surgical History:   Procedure Laterality Date    COLONOSCOPY  2018    EYE SURGERY  Approximately 3 years ago     Cataract    HAND SURGERY      amputation left index finger    INCISION AND DRAINAGE OF ABSCESS Left 10/3/2022    Procedure: INCISION AND DRAINAGE, ABSCESS;  Surgeon: Franco Venegas III, MD;  Location: [facility] OR;  Service: General;  Laterality: Left;  axilla    MEDIPORT REMOVAL Right 10/3/2022    Procedure: REMOVAL, CATHETER, CENTRAL VENOUS, TUNNELED, WITH PORT;  Surgeon: Franco Venegas III, MD;  Location: [facility] OR;  Service: General;  Laterality: Right;    PORTACATH PLACEMENT      SKIN CANCER EXCISION  09/30/2020    [facility]    SPINE SURGERY      VASECTOMY  1985 ?     Social History     Socioeconomic History    Marital status:    Tobacco Use    Smoking status: Former     Packs/day: 0.50     Years: 2.00     Pack years: 1.00     Types: Cigarettes    Smokeless tobacco: Never   Substance and Sexual Activity    Alcohol use: Not Currently     Alcohol/week: 0.0 standard drinks     Comment: rarely    Drug use: No    Sexual activity: Not Currently     Social Determinants of Health     Financial Resource Strain: Low Risk     Difficulty of Paying Living Expenses: Not hard at all   Food Insecurity: No Food Insecurity    Worried About Running Out of Food in the Last Year: Never true    Ran Out of Food in the Last Year: Never true   Transportation Needs: No Transportation Needs    Lack of Transportation (Medical): No    Lack of Transportation (Non-Medical): No   Physical Activity: Unknown    Days of Exercise per Week: 1 day   Stress: No Stress Concern Present    Feeling of Stress : Not at all   Social Connections: Unknown    Frequency of Communication with Friends and Family: Three times a week    Frequency of Social Gatherings with Friends and Family: More than three times a week    Active Member of Clubs or Organizations: Yes    Attends Club or Organization Meetings: More than 4 times per year    Marital Status:    Housing Stability: Unknown    Unable to Pay for Housing in the Last Year: Patient refused     Unstable Housing in the Last Year: No     Family History   Problem Relation Age of Onset    Diabetes Father          Review of Systems:     chills, fever 99.9, poor oral intake  No change in vision, loss of vision or diplopia  Clear sinus congestion,no  facial pain  No pain in mouth or throat. No problems with teeth, gums.  No angina, palpitations, syncope    cough, small amount of brown sputum production, shortness of breath, dyspnea on exertion, pleurisy, without hemoptysis   nausea, vomiting, diarrhea, without focal abd pain, only cramping prior to stools,  No dysphagia, odynophagia  No dysuria, hematuria, strangury,    No swelling of joints, redness of joints, injuries, or new focal pain  Mild headaches, no dizziness, vertigo, numbness, paresthesias, neuropathy, falls  No anxiety, depression, substance abuse,    No diabetes, thyroid, hypogonadal conditions  No bleeding, lymphadenopathy, unusual bruising  No new rashes, lesions, or wounds     No recent/prior steroids  Outdoor activities: staying home alone most of the time  Travel: n/a  Implants: none  Antibiotic History: see HPI    EXAM & DIAGNOSTICS REVIEWED:   Vitals:     Temp:  [98.7 °F (37.1 °C)]   Temp: 98.7 °F (37.1 °C) (01/06/23 0828)  Pulse: 101 (01/06/23 0828)  Resp: 18 (01/06/23 1324)  BP: (!) 163/85 (01/06/23 0828)  SpO2: (!) 94 % (01/06/23 0828)  No intake or output data in the 24 hours ending 01/06/23 1718    General:  In NAD. Alert and attentive, cooperative, comfortable. Able to give a good history. Looks weak.   Eyes:  Anicteric, PERRL, EOMI, no scleral or conjunctival injection  ENT:  No ulcers, exudates, thrush, nares patent, dentition is good  Neck:  supple, no masses or adenopathy appreciated  Lungs: Very faint RLL crackles with consolidation LLL  Heart:  RRR, no gallop/murmur/rub noted  Abd:  Soft, NT, ND, normal BS, no masses or organomegaly appreciated.  :  Voids/ , no flank tenderness  Musc:  Joints without effusion, swelling, erythema,  synovitis, muscle wasting.   Skin:  No rashes. No palmar or plantar lesions. No subungual petechiae  Neuro:             Alert, attentive, speech fluent, face symmetric, moves all extremities, no focal weakness. Ambulatory  Psych: Calm, cooperative  Lymphatic:     No cervical, supraclavicular, axillary  nodes  Extrem: No edema, erythema, phlebitis, cellulitis, warm and well perfused  VAD:   peripheral    Isolation:  none  Wound: No recurrence of prior chest wall infection           General Labs reviewed:  Recent Labs   Lab 01/02/23  0822 01/06/23  0937   WBC 3.55* 3.00*   HGB 12.6* 11.8*   HCT 39.9* 36.9*    329       Recent Labs   Lab 01/02/23  0822 01/06/23  0937    135*   K 3.5 3.7    98   CO2 25 27   BUN 23 13   CREATININE 1.4 1.0   CALCIUM 9.0 8.9   PROT 6.9 7.0   BILITOT 0.5 0.8   ALKPHOS 103 110   ALT 14 21   AST 22 30     No results for input(s): CRP in the last 168 hours.      Micro:  Microbiology Results (last 7 days)       Procedure Component Value Units Date/Time    MRSA Screen by PCR [[id number]] Collected: 01/06/23 1644    Order Status: Sent Specimen: Nasopharyngeal Swab from Nasal Updated: 01/06/23 1700    Respiratory Infection Panel (PCR), Nasopharyngeal [[id number]]     Order Status: No result Specimen: Nasopharyngeal Swab     Culture, Respiratory with Gram Stain [[id number]]     Order Status: No result Specimen: Respiratory     Blood culture #1 **CANNOT BE ORDERED STAT** [[id number]] Collected: 01/06/23 1202    Order Status: Sent Specimen: Blood from Peripheral, Forearm, Left Updated: 01/06/23 1209    Blood culture #2 **CANNOT BE ORDERED STAT** [[id number]] Collected: 01/06/23 1140    Order Status: Sent Specimen: Blood from Peripheral, Antecubital, Left Updated: 01/06/23 1203    Stool culture **cannot be ordered stat** [[id number]]     Order Status: No result Specimen: Stool             Imaging Reviewed:   CXR   PET CT 1/5/23  : Diffuse reticular nodular and groundglass opacities the  left lower lobe, posterior right upper lobe and posterior medial right lower lobe compatible with multifocal pneumonia. Follow-up chest CT in three months is recommended   Diffuse FDG activity throughout the axial skeleton suggestive of bone marrow hyperstimulation   No evidence of recurrent or metastatic disease      CTA chest 1/6  IMPRESSION:  1. Negative for pulmonary thromboembolism.  2. Bilateral multifocal bronchiolitis and bronchopneumonia, including consolidated pneumonia in the left lower lobe. Follow-up PA and lateral chest radiograph in 4-6 weeks after appropriate therapy is recommended to document complete resolution.  3. Aortic and coronary arterial calcifications    CT abd/pelvis 1/6  1. Left greater than right lower lobe alveolar opacities including left lower lobe consolidation, characteristic of infectious or inflammatory pneumonia. Correlation for aspiration is requested.  2. Diverticulosis.  3. No acute findings throughout the abdomen or pelvis  Cardiology:    IMPRESSION & PLAN   1. Community acquired pneumonia   Diarrheal prodrome.     2. Immunocompromised by diagnosis of lymphoma, prior treatment and hypogammaglobulinemia  3. History of bronchiectasis  4. Volume depletion  5. Eosinophilia(resolved) but present after last admits and antibiotics. Did not escalate after last IVIG, given 12/9      Recommendations:  Linezolid po and meropenem (had eosinophilia while on vanc and cefepime last admit) and doxy  Respiratory viral panel  MRSA nasal screen  GI pcr panel  Stool culture  Legionella urine antigen  Will check total IgG to see his paulo, and make sure he is receiving enough IVIG  Sputum culture  Duonechamp, pulmonary toilet, mucinex, tessalon perles  Will give IVIG tomorrow    D/w Dr. Huang    Medical Decision Making during this encounter was  [_] Low Complexity  [_] Moderate Complexity  [ xxx ] High Complexity

## 2023-01-06 NOTE — CONSULTS
Pulmonary/Critical Care Consult      PATIENT NAME: Sivakumar Thacker  MRN: [id number]  TODAY'S DATE: 2023  2:56 PM  ADMIT DATE: 2023  AGE: 67 y.o. : 1955    CONSULT REQUESTED BY: Gary Huang MD    REASON FOR CONSULT:   Pneumonia in immunocompromised host    HISTORY OF PRESENT ILLNESS   Sivakumar Thacker is a 67 y.o. male with a PMH of hypogammaglobulinemia, follicular lymphoma grade IIIa, DM2, HTN, and recent admission for chest wall cellulitis and abscess with Serratia on whom we have been consulted for pneumonia.      SMOKING HISTORY  Minimal smoking history from when in high school      REVIEW OF SYSTEMS  GENERAL: Feeling unwell. Low grade temp to 99.6 F at home. Chills.  EYES: Vision is good.  ENT: No sinusitis or pharyngitis.   HEART: No chest pain or palpitations.  LUNGS: Productive cough; brown sputum. Pleuritic chest and back pain.  GI: Diarrhea x 7 days.  : No dysuria, urgency, or frequency.  SKIN: No lesions or rashes.  MUSCULOSKELETAL: No joint pain or myalgias.  NEURO: No headaches or neuropathy.  LYMPH: No edema or adenopathy.  PSYCH: No anxiety or depression.  ENDO: No unexpected weight change.    ALLERGIES  Review of patient's allergies indicates:  No Known Allergies    INPATIENT SCHEDULED MEDICATIONS   ceFEPime (MAXIPIME) IVPB  1 g Intravenous Q8H         MEDICAL AND SURGICAL HISTORY  Past Medical History:   Diagnosis Date    Chest wall abscess 2022    Chronic obstructive pulmonary disease, unspecified COPD type 3/28/2022    Diabetes mellitus, type 2     Encounter for antineoplastic chemotherapy 2020    Hypertension     Hypogammaglobulinemia 2019    Neuropathy     Non Hodgkin's lymphoma     Reflux esophagitis     Ringing in ear, bilateral      Past Surgical History:   Procedure Laterality Date    COLONOSCOPY  2018    EYE SURGERY  Approximately 3 years ago    Cataract    HAND SURGERY      amputation left index finger    INCISION AND DRAINAGE OF ABSCESS Left  10/3/2022    Procedure: INCISION AND DRAINAGE, ABSCESS;  Surgeon: Franco Venegas III, MD;  Location: [facility];  Service: General;  Laterality: Left;  axilla    MEDIPORT REMOVAL Right 10/3/2022    Procedure: REMOVAL, CATHETER, CENTRAL VENOUS, TUNNELED, WITH PORT;  Surgeon: Franco Venegas III, MD;  Location: [facility] OR;  Service: General;  Laterality: Right;    PORTACATH PLACEMENT      SKIN CANCER EXCISION  09/30/2020    [facility]    SPINE SURGERY      VASECTOMY  1985 ?       ALCOHOL, TOBACCO AND DRUG USE  Social History     Tobacco Use   Smoking Status Former    Packs/day: 0.50    Years: 2.00    Pack years: 1.00    Types: Cigarettes   Smokeless Tobacco Never     Social History     Substance and Sexual Activity   Alcohol Use Not Currently    Alcohol/week: 0.0 standard drinks    Comment: rarely     Social History     Substance and Sexual Activity   Drug Use No       FAMILY HISTORY  Family History   Problem Relation Age of Onset    Diabetes Father        VITAL SIGNS (MOST RECENT)  Temp: 98.7 °F (37.1 °C) (01/06/23 0828)  Pulse: 101 (01/06/23 0828)  Resp: 18 (01/06/23 1324)  BP: (!) 163/85 (01/06/23 0828)  SpO2: (!) 94 % (01/06/23 0828)    INTAKE AND OUTPUT (LAST 24 HOURS):No intake or output data in the 24 hours ending 01/06/23 1456    WEIGHT  Wt Readings from Last 1 Encounters:   01/06/23 91.6 kg (202 lb)       PHYSICAL EXAM  GENERAL: A&O. NAD. Tired.  HEENT: Extraocular movements intact. Pharynx moist.  NECK: Supple. No JVD or hepatojugular reflux.  HEART: Regular rate and normal rhythm. No murmur or gallop auscultated.  LUNGS: Crackles in left lower lung field.  ABDOMEN: Soft, non-tender, non-distended, no masses palpated.  EXTREMITIES: Normal muscle tone and joint movement, no cyanosis or clubbing.   LYMPHATICS: No adenopathy palpated, no edema.  SKIN: Dry, intact, no lesions.   NEURO: No gross deficit.  PSYCH: Appropriate affect    ACUTE PHASE REACTANT (LAST 24 HOURS)  No results for input(s): FERRITIN, CRP, LDH,  DDIMER in the last 24 hours.    CBC LAST (LAST 24 HOURS)  Recent Labs   Lab 01/06/23  0937   WBC 3.00*   RBC 4.43*   HGB 11.8*   HCT 36.9*   MCV 83   MCH 26.6*   MCHC 32.0   RDW 13.3      MPV 9.1*   GRAN 39.0   LYMPH 32.0   MONO 16.0*   NRBC 0       CHEMISTRY LAST (LAST 24 HOURS)  Recent Labs   Lab 01/06/23  0937   *   K 3.7   CL 98   CO2 27   ANIONGAP 10   BUN 13   CREATININE 1.0   *   CALCIUM 8.9   ALBUMIN 3.2*   PROT 7.0   ALKPHOS 110   ALT 21   AST 30   BILITOT 0.8       COAGULATION LAST (LAST 24 HOURS)  No results for input(s): LABPT, INR, APTT in the last 24 hours.    CARDIAC PROFILE (LAST 24 HOURS)  Recent Labs   Lab 01/06/23  0937   BNP 42       LAST 7 DAYS MICROBIOLOGY   Microbiology Results (last 7 days)       Procedure Component Value Units Date/Time    MRSA Screen by PCR [[id number]]     Order Status: No result Specimen: Nasopharyngeal Swab from Nasal     Culture, Respiratory with Gram Stain [[id number]]     Order Status: No result Specimen: Respiratory     Blood culture #1 **CANNOT BE ORDERED STAT** [[id number]] Collected: 01/06/23 1202    Order Status: Sent Specimen: Blood from Peripheral, Forearm, Left Updated: 01/06/23 1209    Blood culture #2 **CANNOT BE ORDERED STAT** [[id number]] Collected: 01/06/23 1140    Order Status: Sent Specimen: Blood from Peripheral, Antecubital, Left Updated: 01/06/23 1203    Stool culture **cannot be ordered stat** [[id number]]     Order Status: No result Specimen: Stool             MOST RECENT IMAGING  CT Abdomen Pelvis With Contrast  CMS MANDATED QUALITY DATA - CT RADIATION - 436    All CT scans at this facility utilize dose modulation, iterative reconstruction, and/or weight based dosing when appropriate to reduce radiation dose to as low as reasonably achievable.    Reason: Nausea/vomiting    TECHNIQUE: CT abdomen and pelvis with 100 mL Omnipaque 350.    COMPARISON: None    CT ABDOMEN:  Subpleural consolidative opacities affecting the posterior basilar  left lower lobe, with minimal patchy alveolar opacities affecting medial basilar right lower lobe.    Liver, gallbladder, pancreas spleen, and adrenals are normal. Kidneys are normal.    Aortoiliac calcifications are moderate.    Diverticula arise from the colon. Intestine show no additional abnormality. Appendix not identified but no secondary signs of appendicitis are present. No free intraperitoneal gas.    Degenerative changes affect the spine.    CT PELVIS:  Bladder is normal. Heterogeneous contrast opacification of bilateral iliac veins is felt physiologic in nature given symmetry and phase of contrast enhancement. No free pelvic fluid. Tiny fat-containing left inguinal hernia incidentally noted. No acute osseous abnormality.    IMPRESSION:    1. Left greater than right lower lobe alveolar opacities including left lower lobe consolidation, characteristic of infectious or inflammatory pneumonia. Correlation for aspiration is requested.  2. Diverticulosis.  3. No acute findings throughout the abdomen or pelvis.    Electronically signed by:  Lavelle Bangura MD  1/6/2023 11:24 AM CST Workstation: [id number]  CTA Chest Non-Coronary (PE Studies)  CMS MANDATED QUALITY DATA-CT RADIATION DOSE-436  All CT scans at this facility use dose modulation, iterative reconstruction, and or weight-based dosing when appropriate to reduce radiation dose to as low as reasonably achievable.    HISTORY: Pulmonary embolism (PE) suspected, high probability,  cough, back pain, COPD.    FINDINGS: Thin axial imaging through the chest was performed with 100 mL Omnipaque 350 IV contrast, with sagittal and coronal reformatted images and MIP reconstructions performed, and images stored in the patient's permanent electronic medical record.    Comparison to multiple prior exams including 11/12/2022. There are no pulmonary arterial filling defects to suggest pulmonary thromboembolism. The central pulmonary arteries are normal in caliber.    Diffuse  calcified plaque involves normal caliber thoracic aorta, with no aortic dissection or evidence of aortic intramural hematoma. The heart is normal in size, with no pericardial effusion. There are extensive coronary arterial calcifications, with no enlarged mediastinal or hilar lymph nodes.    There are scattered centrilobular nodular opacities with surrounding groundglass densities in both lungs, including in the right upper lobe, right middle lobe, right lower lobe, and to a lesser degree in the left upper lobe. Additionally, there are small airspace component in the right lower lobe, with consolidation in the left lower lobe consistent with pneumonia.    The central airways are patent, with no pleural effusion or evidence of interstitial pulmonary edema. No pneumothorax.    Images of the upper abdomen show a few splenic granulomatous calcifications, and are otherwise unremarkable. There are no acute fractures or destructive osseous lesions.    IMPRESSION:  1. Negative for pulmonary thromboembolism.  2. Bilateral multifocal bronchiolitis and bronchopneumonia, including consolidated pneumonia in the left lower lobe. Follow-up PA and lateral chest radiograph in 4-6 weeks after appropriate therapy is recommended to document complete resolution.  3. Aortic and coronary arterial calcifications.    Electronically signed by:  José Antonio Diaz MD  1/6/2023 11:23 AM CST Workstation: [id number]  X-Ray Chest AP Portable  Reason: CHF    FINDINGS:    Portable chest with comparison chest x-ray November 11, 2022 show normal cardiomediastinal silhouette.  Lungs are clear. Pulmonary vasculature is normal. No acute osseous abnormality.    IMPRESSION:    No acute cardiopulmonary abnormality.    Electronically signed by:  Don Cameron DO  1/6/2023 9:55 AM CST Workstation: [id number]      CURRENT VISIT EKG  Results for orders placed or performed during the hospital encounter of 01/06/23   EKG 12-lead    Narrative    Test Reason :  R06.02,    Vent. Rate : 094 BPM     Atrial Rate : 094 BPM     P-R Int : 166 ms          QRS Dur : 092 ms      QT Int : 346 ms       P-R-T Axes : 058 -39 027 degrees     QTc Int : 432 ms    Normal sinus rhythm  Left axis deviation  Inferior infarct (cited on or before 30-SEP-2022)  Abnormal ECG  When compared with ECG of 11-NOV-2022 20:44,  No significant change was found    Referred By: [email or web address]   SELF           Confirmed By:        ECHOCARDIOGRAM RESULTS  Results for orders placed during the hospital encounter of 09/30/22    Echo    Interpretation Summary  · The estimated PA systolic pressure is 32 mmHg.  · The left ventricle is normal in size with normal systolic function.  · Normal left ventricular diastolic function.  · Mild right ventricular enlargement with normal right ventricular systolic function.  · Normal central venous pressure (3 mmHg).  · The estimated ejection fraction is 55%.  · Mild to moderate tricuspid regurgitation.  · Mild-to-moderate mitral regurgitation.  · There are segmental left ventricular wall motion abnormalities.        RESPIRATORY SUPPORT          LAST ARTERIAL BLOOD GAS  ABG  No results for input(s): PH, PO2, PCO2, HCO3, BE in the last 168 hours.    IMPRESSION AND PLAN  Sivakumar Thacker is a 67 y.o. male with a PMH of hypogammaglobulinemia, follicular lymphoma grade IIIa, DM2, HTN, and recent admission for chest wall cellulitis and abscess with Serratia on whom we have been consulted for pneumonia.    #Left lower lobe pneumonia with MDR risk factors  #Non-Hodgkin's lymphoma  #Immunodeficiency (hypogammaglobulinemia)  #Sepsis  - f/u respiratory and sputum cultures  - f/u MRSA PCR  - continue broad-spectrum Abx for now  - f/u ID  - no indication for bronchoscopy at this time    #Pleuritic chest pain  - pain control as needed  - incentive spirometry    Pulmonary & Critical Care Medicine will sign off at this time.   Please call with any further questions or concerns.    Boni Amato  MD [place]  Pending sale to [facility]  Department of Pulmonology  Date of Service: 01/06/2023  2:56 PM

## 2023-01-06 NOTE — ASSESSMENT & PLAN NOTE
Bronchiectasis DAVE per Hx  Post tussive rales noted  New bilateral infiltrates  MRSA screen influenza antigen Covid 19 screen sputum culture  Vancomycin Cefepime and doxycycline as antibiotics  Hx of S marcescens cellulitis and sepsis  Dr Washington consulted  Pt may benefit from bronchoscopy

## 2023-01-06 NOTE — H&P
"[facility] - Emergency Dept  Hospital Medicine  History & Physical    Patient Name: Sivakumar Thacker  MRN: [id number]  Patient Class: IP- Inpatient  Admission Date: 1/6/2023  Attending Physician: Gary Huang MD  Primary Care Provider: Barry Mcmahon MD         Patient information was obtained from patient, past medical records and ER records.     Subjective:     Principal Problem:Pulmonary infiltrates    Chief Complaint:   Chief Complaint   Patient presents with    Abdominal Pain     W/ diarrhea x1 week.    Cough     W/ back pain on "my lungs" x1 day        HPI: ED note  67-year-old male presents emergency department past medical history includes diabetes, hypertension, currently receiving infusion treatment for non-Hodgkin's lymphoma reports for the last week he is had diarrhea and feels generally weak.  States yesterday he developed a cough, associated shortness of breath, and pain when he breathes.  The patient reports that he went to the infusion center today however he was sent here by Dr. Ibarra for further evaluation in the emergency department.  Patient has been taking over-the-counter Pepto-Bismol for relief of diarrhea symptoms    1/6/2022  Mr Thacker has noted increasing SOB and VASQUEZ with fever X 1 today. Hew has had limited exposure to the public. The diarrhea is not bloody and he was exposed to antibiotics 11/2022.          Past Medical History:   Diagnosis Date    Chest wall abscess 9/30/2022    Chronic obstructive pulmonary disease, unspecified COPD type 3/28/2022    Diabetes mellitus, type 2     Encounter for antineoplastic chemotherapy 04/01/2020    Hypertension     Hypogammaglobulinemia 12/13/2019    Neuropathy     Non Hodgkin's lymphoma     Reflux esophagitis     Ringing in ear, bilateral        Past Surgical History:   Procedure Laterality Date    COLONOSCOPY  2018    EYE SURGERY  Approximately 3 years ago    Cataract    HAND SURGERY      amputation left index " finger    INCISION AND DRAINAGE OF ABSCESS Left 10/3/2022    Procedure: INCISION AND DRAINAGE, ABSCESS;  Surgeon: Franco Venegas III, MD;  Location: [facility] OR;  Service: General;  Laterality: Left;  axilla    MEDIPORT REMOVAL Right 10/3/2022    Procedure: REMOVAL, CATHETER, CENTRAL VENOUS, TUNNELED, WITH PORT;  Surgeon: Franco Venegas III, MD;  Location: [facility] OR;  Service: General;  Laterality: Right;    PORTACATH PLACEMENT      SKIN CANCER EXCISION  09/30/2020    [facility]    SPINE SURGERY      VASECTOMY  1985 ?       Review of patient's allergies indicates:  No Known Allergies    No current facility-administered medications on file prior to encounter.     Current Outpatient Medications on File Prior to Encounter   Medication Sig    albuterol-ipratropium (DUO-NEB) 2.5 mg-0.5 mg/3 mL nebulizer solution Take 3 mLs by nebulization every 6 (six) hours as needed for Wheezing. Rescue    amLODIPine (NORVASC) 10 MG tablet Take 10 mg by mouth once daily.    aspirin (ECOTRIN) 81 MG EC tablet Take 81 mg by mouth once daily.    atorvastatin (LIPITOR) 20 MG tablet Take 1 tablet (20 mg total) by mouth once daily.    fluticasone propionate (FLONASE) 50 mcg/actuation nasal spray 1 spray (50 mcg total) by Each Nostril route 2 (two) times daily.    gabapentin (NEURONTIN) 300 MG capsule Take 1 capsule (300 mg total) by mouth 2 (two) times daily.    losartan (COZAAR) 100 MG tablet Take 0.5 tablets (50 mg total) by mouth once daily. (Patient taking differently: Take 100 mg by mouth once daily.)    metFORMIN (GLUCOPHAGE) 500 MG tablet metformin 500 mg tablet  Take 2 tablets twice a day by oral route with meals for 90 days. (Patient taking differently: Take 1,000 mg by mouth 2 (two) times daily with meals. metformin 500 mg tablet  Take 2 tablets twice a day by oral route with meals for 90 days.)    montelukast (SINGULAIR) 10 mg tablet Take 1 tablet (10 mg total) by mouth nightly as needed.    MULTIVITAMIN ORAL Take 1  tablet by mouth once daily.    omeprazole (PRILOSEC) 40 MG capsule Take 1 capsule (40 mg total) by mouth once daily.    [DISCONTINUED] losartan (COZAAR) 100 MG tablet Take 1 tablet by mouth once daily.    blood sugar diagnostic (FREESTYLE LITE STRIPS MISC) FreeStyle Lite Strips    blood sugar diagnostic Strp     mucus clearing device (ACAPELLA, FLUTTER) 1 Device by Misc.(Non-Drug; Combo Route) route 2 (two) times daily.    [DISCONTINUED] amLODIPine (NORVASC) 5 MG tablet Take 1 tablet (5 mg total) by mouth once daily.    [DISCONTINUED] azelastine (ASTELIN) 137 mcg (0.1 %) nasal spray 2 sprays (274 mcg total) by Nasal route 2 (two) times daily as needed for Rhinitis.    [DISCONTINUED] oxyCODONE-acetaminophen (PERCOCET)  mg per tablet Take 1 tablet by mouth every 8 (eight) hours as needed for Pain.     Family History       Problem Relation (Age of Onset)    Diabetes Father          Tobacco Use    Smoking status: Former     Packs/day: 0.50     Years: 2.00     Pack years: 1.00     Types: Cigarettes    Smokeless tobacco: Never   Substance and Sexual Activity    Alcohol use: Not Currently     Alcohol/week: 0.0 standard drinks     Comment: rarely    Drug use: No    Sexual activity: Not Currently     Review of Systems   Constitutional:  Positive for activity change, appetite change, diaphoresis, fatigue and fever. Negative for chills.   HENT: Negative.     Eyes: Negative.    Respiratory:  Positive for shortness of breath.    Cardiovascular: Negative.    Gastrointestinal:  Positive for abdominal distention, abdominal pain and diarrhea. Negative for blood in stool, nausea and vomiting.   Endocrine: Positive for cold intolerance.   Genitourinary:  Positive for decreased urine volume.   Musculoskeletal:  Positive for arthralgias, gait problem and myalgias.   Skin: Negative.    Allergic/Immunologic: Positive for immunocompromised state.   Neurological:  Positive for weakness.   Hematological:  Bruises/bleeds  easily.   Psychiatric/Behavioral:  The patient is nervous/anxious.    Objective:     Vital Signs (Most Recent):  Temp: 98.7 °F (37.1 °C) (01/06/23 0828)  Pulse: 101 (01/06/23 0828)  Resp: 18 (01/06/23 1324)  BP: (!) 163/85 (01/06/23 0828)  SpO2: (!) 94 % (01/06/23 0828)   Vital Signs (24h Range):  Temp:  [98.7 °F (37.1 °C)] 98.7 °F (37.1 °C)  Pulse:  [101] 101  Resp:  [18-19] 18  SpO2:  [94 %] 94 %  BP: (163)/(85) 163/85     Weight: 91.6 kg (202 lb)  Body mass index is 30.71 kg/m².    Physical Exam  Vitals and nursing note reviewed.   Constitutional:       General: He is not in acute distress.     Appearance: He is ill-appearing. He is not diaphoretic.   HENT:      Head: Normocephalic and atraumatic.      Nose: Nose normal.      Mouth/Throat:      Mouth: Mucous membranes are moist.   Eyes:      Extraocular Movements: Extraocular movements intact.      Pupils: Pupils are equal, round, and reactive to light.   Neck:      Comments: Use of accessory muscles with speaking  Cardiovascular:      Rate and Rhythm: Normal rate and regular rhythm.      Heart sounds:     Gallop present.   Pulmonary:      Breath sounds: Rhonchi and rales present.      Comments: Post tussive rales left upper lobe  Abdominal:      General: There is distension.      Tenderness: There is no abdominal tenderness. There is no guarding or rebound.   Musculoskeletal:         General: Normal range of motion.      Cervical back: Normal range of motion and neck supple.      Comments: 4-5/5 generalized muscle strength   Skin:     General: Skin is warm.   Neurological:      Mental Status: He is alert and oriented to person, place, and time.   Psychiatric:      Comments: Somewhat dulled affect  anxious         CRANIAL NERVES     CN III, IV, VI   Pupils are equal, round, and reactive to light.     Significant Labs: All pertinent labs within the past 24 hours have been reviewed.  Recent Lab Results         01/06/23  1338   01/06/23  1157   01/06/23  0937         Influenza A, Molecular Negative           Influenza B, Molecular Negative           Procalcitonin   4.12  Comment: Procalcitonin Result Interpretation:    <=0.50 ng/mL  Systemic infection (sepsis) is not likely. Local bacterial infection   is   possible.    >0.50 and <= 2.00 ng/mL  Systemic infection (sepsis) is possible, but other conditions are   also known   to elevate procalcitonin.     >2.00 ng/mL  Systemic infection (sepsis) is likely unless other causes are known.    >=10.00 ng/mL  Important systemic inflammatory response, almost exclusively due to   severe   bacterial sepsis or septic shock.           Albumin     3.2       Alkaline Phosphatase     110       ALT     21       Anion Gap     10       AST     30       Bands     10.0       Basophil %     0.0       BILIRUBIN TOTAL     0.8  Comment: For infants and newborns, interpretation of results should be based  on gestational age, weight and in agreement with clinical  observations.    Premature Infant recommended reference ranges:  Up to 24 hours.............<8.0 mg/dL  Up to 48 hours............<12.0 mg/dL  3-5 days..................<15.0 mg/dL  6-29 days.................<15.0 mg/dL         BNP     42  Comment: Values of less than 100 pg/ml are consistent with non-CHF populations.       BUN     13       Calcium     8.9       Chloride     98       CO2     27       Creatinine     1.0       Differential Method     Manual       eGFR     >60.0       Eosinophil %     0.0       Flu A & B Source Nasal swab           Glucose     190       Gran %     39.0       Hematocrit     36.9       Hemoglobin     11.8       Immature Grans (Abs)     CANCELED  Comment: Mild elevation in immature granulocytes is non specific and   can be seen in a variety of conditions including stress response,   acute inflammation, trauma and pregnancy. Correlation with other   laboratory and clinical findings is essential.    Result canceled by the ancillary.         Immature Granulocytes      CANCELED  Comment: Result canceled by the ancillary.       Lactate, Karl   1.3  Comment: Falsely low lactic acid results can be found in samples   containing >=13.0 mg/dL total bilirubin and/or >=3.5 mg/dL   direct bilirubin.           Lymph %     32.0       MCH     26.6       MCHC     32.0       MCV     83       Metamyelocytes     3.0       Mono %     16.0       MPV     9.1       nRBC     0       Platelet Estimate     Appears normal       Platelets     329       Potassium     3.7       PROTEIN TOTAL     7.0       RBC     4.43       RDW     13.3       SARS-CoV-2 RNA, Amplification, Qual Negative  Comment: This test utilizes isothermal nucleic acid amplification technology   to   detect the SARS-CoV-2 RdRp nucleic acid segment. The analytical   sensitivity   (limit of detection) is 500 copies/swab.     A POSITIVE result is indicative of the presence of SARS-CoV-2 RNA;   clinical   correlation with patient history and other diagnostic information is   necessary to determine patient infection status.    A NEGATIVE result means that SARS-CoV-2 nucleic acids are not present   above   the limit of detection. A NEGATIVE result should be treated as   presumptive.   It does not rule out the possibility of COVID-19 and should not be   the sole   basis for treatment decisions. If COVID-19 is strongly suspected   based on   clinical and exposure history, re-testing using an alternate   molecular assay   should be considered.     This test is only for use under the Food and Drug Administration s   Emergency   Use Authorization (EUA).     Commercial kits are provided by [employer]. Performance   characteristics of the EUA have been independently verified by   [facility] Department of Pathology and Laboratory Medicine.   _________________________________________________________________   The authorized Fact Sheet for Healthcare Providers and the authorized   Fact   Sheet for Patients of the ID NOW COVID-19 are  available on the FDA   website:   https://www.fda.gov/media/497657/download   https://www.fda.gov/media/888120/download             Sodium     135       Troponin I High Sensitivity     7.1  Comment: Troponin results differ between methods. Do not use   results between Troponin methods interchangeably.    Alkaline Phospatase levels above 400 U/L may   cause false positive results.    Access hsTnI should not be used for patients taking   Asfotase michel (Strensiq).         WBC     3.00               Significant Imaging: I have reviewed all pertinent imaging results/findings within the past 24 hours.    Assessment/Plan:     * Pulmonary infiltrates  Bronchiectasis DAVE per Hx  Post tussive rales noted  New bilateral infiltrates  MRSA screen influenza antigen Covid 19 screen sputum culture  Vancomycin Cefepime and doxycycline as antibiotics  Hx of S marcescens cellulitis and sepsis  Dr Washington consulted  Pt may benefit from bronchoscopy      Chronic obstructive pulmonary disease, unspecified COPD type  Treat as exacerbation without steroids at present  Pulmonology consulted      Follicular lymphoma grade IIIa  Oncology consulted  Would this patient benefit from IVIG        VTE Risk Mitigation (From admission, onward)    None             Gary Huang MD  Department of Hospital Medicine   Formerly [facility] - Emergency Dept

## 2023-01-07 LAB
ADENOVIRUS: NOT DETECTED
ANION GAP SERPL CALC-SCNC: 9 MMOL/L (ref 8–16)
ANISOCYTOSIS BLD QL SMEAR: SLIGHT
BASOPHILS NFR BLD: 1 % (ref 0–1.9)
BORDETELLA PARAPERTUSSIS (IS1001): NOT DETECTED
BORDETELLA PERTUSSIS (PTXP): NOT DETECTED
BUN SERPL-MCNC: 10 MG/DL (ref 8–23)
CALCIUM SERPL-MCNC: 8.4 MG/DL (ref 8.7–10.5)
CHLAMYDIA PNEUMONIAE: NOT DETECTED
CHLORIDE SERPL-SCNC: 101 MMOL/L (ref 95–110)
CO2 SERPL-SCNC: 28 MMOL/L (ref 23–29)
CORONAVIRUS 229E, COMMON COLD VIRUS: NOT DETECTED
CORONAVIRUS HKU1, COMMON COLD VIRUS: NOT DETECTED
CORONAVIRUS NL63, COMMON COLD VIRUS: NOT DETECTED
CORONAVIRUS OC43, COMMON COLD VIRUS: NOT DETECTED
CREAT SERPL-MCNC: 0.9 MG/DL (ref 0.5–1.4)
DIFFERENTIAL METHOD: ABNORMAL
EOSINOPHIL NFR BLD: 3 % (ref 0–8)
ERYTHROCYTE [DISTWIDTH] IN BLOOD BY AUTOMATED COUNT: 13.7 % (ref 11.5–14.5)
EST. GFR  (NO RACE VARIABLE): >60 ML/MIN/1.73 M^2
FLUBV RNA NPH QL NAA+NON-PROBE: NOT DETECTED
GLUCOSE SERPL-MCNC: 117 MG/DL (ref 70–110)
GLUCOSE SERPL-MCNC: 117 MG/DL (ref 70–110)
GLUCOSE SERPL-MCNC: 118 MG/DL (ref 70–110)
GLUCOSE SERPL-MCNC: 132 MG/DL (ref 70–110)
GLUCOSE SERPL-MCNC: 133 MG/DL (ref 70–110)
HCT VFR BLD AUTO: 35.5 % (ref 40–54)
HGB BLD-MCNC: 11.2 G/DL (ref 14–18)
HPIV1 RNA NPH QL NAA+NON-PROBE: NOT DETECTED
HPIV2 RNA NPH QL NAA+NON-PROBE: NOT DETECTED
HPIV3 RNA NPH QL NAA+NON-PROBE: NOT DETECTED
HPIV4 RNA NPH QL NAA+NON-PROBE: NOT DETECTED
HUMAN METAPNEUMOVIRUS: NOT DETECTED
IMM GRANULOCYTES # BLD AUTO: ABNORMAL K/UL (ref 0–0.04)
IMM GRANULOCYTES NFR BLD AUTO: ABNORMAL % (ref 0–0.5)
INFLUENZA A (SUBTYPES H1,H1-2009,H3): NOT DETECTED
LYMPHOCYTES NFR BLD: 29 % (ref 18–48)
MAGNESIUM SERPL-MCNC: 1.2 MG/DL (ref 1.6–2.6)
MCH RBC QN AUTO: 26.5 PG (ref 27–31)
MCHC RBC AUTO-ENTMCNC: 31.5 G/DL (ref 32–36)
MCV RBC AUTO: 84 FL (ref 82–98)
MONOCYTES NFR BLD: 26 % (ref 4–15)
MYCOPLASMA PNEUMONIAE: NOT DETECTED
NEUTROPHILS NFR BLD: 41 % (ref 38–73)
NRBC BLD-RTO: 0 /100 WBC
OB PNL STL: NEGATIVE
OVALOCYTES BLD QL SMEAR: ABNORMAL
PHOSPHATE SERPL-MCNC: 5.3 MG/DL (ref 2.7–4.5)
PLATELET # BLD AUTO: 274 K/UL (ref 150–450)
PMV BLD AUTO: 9.2 FL (ref 9.2–12.9)
POIKILOCYTOSIS BLD QL SMEAR: SLIGHT
POLYCHROMASIA BLD QL SMEAR: ABNORMAL
POTASSIUM SERPL-SCNC: 3.4 MMOL/L (ref 3.5–5.1)
RBC # BLD AUTO: 4.22 M/UL (ref 4.6–6.2)
RESPIRATORY INFECTION PANEL SOURCE: ABNORMAL
RSV RNA NPH QL NAA+NON-PROBE: NOT DETECTED
RV+EV RNA NPH QL NAA+NON-PROBE: DETECTED
SARS-COV-2 RNA RESP QL NAA+PROBE: NOT DETECTED
SCHISTOCYTES BLD QL SMEAR: PRESENT
SODIUM SERPL-SCNC: 138 MMOL/L (ref 136–145)
WBC # BLD AUTO: 3.62 K/UL (ref 3.9–12.7)
WBC #/AREA STL HPF: ABNORMAL /[HPF]

## 2023-01-07 PROCEDURE — 94799 UNLISTED PULMONARY SVC/PX: CPT

## 2023-01-07 PROCEDURE — 85027 COMPLETE CBC AUTOMATED: CPT | Performed by: INTERNAL MEDICINE

## 2023-01-07 PROCEDURE — 87507 IADNA-DNA/RNA PROBE TQ 12-25: CPT | Performed by: INTERNAL MEDICINE

## 2023-01-07 PROCEDURE — 82962 GLUCOSE BLOOD TEST: CPT

## 2023-01-07 PROCEDURE — 27000221 HC OXYGEN, UP TO 24 HOURS

## 2023-01-07 PROCEDURE — 36415 COLL VENOUS BLD VENIPUNCTURE: CPT | Performed by: INTERNAL MEDICINE

## 2023-01-07 PROCEDURE — 80048 BASIC METABOLIC PNL TOTAL CA: CPT | Performed by: INTERNAL MEDICINE

## 2023-01-07 PROCEDURE — 89055 LEUKOCYTE ASSESSMENT FECAL: CPT | Performed by: INTERNAL MEDICINE

## 2023-01-07 PROCEDURE — 85007 BL SMEAR W/DIFF WBC COUNT: CPT | Performed by: INTERNAL MEDICINE

## 2023-01-07 PROCEDURE — 87046 STOOL CULTR AEROBIC BACT EA: CPT | Performed by: INTERNAL MEDICINE

## 2023-01-07 PROCEDURE — 25000003 PHARM REV CODE 250: Performed by: INTERNAL MEDICINE

## 2023-01-07 PROCEDURE — 99232 PR SUBSEQUENT HOSPITAL CARE,LEVL II: ICD-10-PCS | Mod: ,,, | Performed by: INTERNAL MEDICINE

## 2023-01-07 PROCEDURE — 99232 SBSQ HOSP IP/OBS MODERATE 35: CPT | Mod: ,,, | Performed by: INTERNAL MEDICINE

## 2023-01-07 PROCEDURE — 99233 PR SUBSEQUENT HOSPITAL CARE,LEVL III: ICD-10-PCS | Mod: ,,, | Performed by: INTERNAL MEDICINE

## 2023-01-07 PROCEDURE — 84100 ASSAY OF PHOSPHORUS: CPT | Performed by: INTERNAL MEDICINE

## 2023-01-07 PROCEDURE — 87633 RESP VIRUS 12-25 TARGETS: CPT | Performed by: INTERNAL MEDICINE

## 2023-01-07 PROCEDURE — 99233 SBSQ HOSP IP/OBS HIGH 50: CPT | Mod: ,,, | Performed by: INTERNAL MEDICINE

## 2023-01-07 PROCEDURE — 83735 ASSAY OF MAGNESIUM: CPT | Performed by: INTERNAL MEDICINE

## 2023-01-07 PROCEDURE — 63600175 PHARM REV CODE 636 W HCPCS: Performed by: INTERNAL MEDICINE

## 2023-01-07 PROCEDURE — 25000242 PHARM REV CODE 250 ALT 637 W/ HCPCS: Performed by: INTERNAL MEDICINE

## 2023-01-07 PROCEDURE — 94640 AIRWAY INHALATION TREATMENT: CPT

## 2023-01-07 PROCEDURE — 82272 OCCULT BLD FECES 1-3 TESTS: CPT | Performed by: INTERNAL MEDICINE

## 2023-01-07 PROCEDURE — 87045 FECES CULTURE AEROBIC BACT: CPT | Performed by: INTERNAL MEDICINE

## 2023-01-07 PROCEDURE — 21400001 HC TELEMETRY ROOM

## 2023-01-07 PROCEDURE — 87209 SMEAR COMPLEX STAIN: CPT | Performed by: INTERNAL MEDICINE

## 2023-01-07 PROCEDURE — 94761 N-INVAS EAR/PLS OXIMETRY MLT: CPT

## 2023-01-07 PROCEDURE — 82784 ASSAY IGA/IGD/IGG/IGM EACH: CPT | Performed by: INTERNAL MEDICINE

## 2023-01-07 RX ORDER — GUAIFENESIN 600 MG/1
1200 TABLET, EXTENDED RELEASE ORAL DAILY
Status: DISCONTINUED | OUTPATIENT
Start: 2023-01-08 | End: 2023-01-08

## 2023-01-07 RX ORDER — GUAIFENESIN 600 MG/1
600 TABLET, EXTENDED RELEASE ORAL NIGHTLY
Status: DISCONTINUED | OUTPATIENT
Start: 2023-01-07 | End: 2023-01-08

## 2023-01-07 RX ORDER — DIPHENHYDRAMINE HYDROCHLORIDE 50 MG/ML
25 INJECTION INTRAMUSCULAR; INTRAVENOUS ONCE
Status: COMPLETED | OUTPATIENT
Start: 2023-01-07 | End: 2023-01-07

## 2023-01-07 RX ORDER — CODEINE PHOSPHATE AND GUAIFENESIN 10; 100 MG/5ML; MG/5ML
10 SOLUTION ORAL EVERY 4 HOURS PRN
Status: DISCONTINUED | OUTPATIENT
Start: 2023-01-07 | End: 2023-01-08

## 2023-01-07 RX ORDER — CELECOXIB 100 MG/1
100 CAPSULE ORAL 2 TIMES DAILY
Status: DISCONTINUED | OUTPATIENT
Start: 2023-01-07 | End: 2023-01-10

## 2023-01-07 RX ORDER — GUAIFENESIN 600 MG/1
1200 TABLET, EXTENDED RELEASE ORAL 2 TIMES DAILY
Status: DISCONTINUED | OUTPATIENT
Start: 2023-01-07 | End: 2023-01-07

## 2023-01-07 RX ADMIN — HUMAN IMMUNOGLOBULIN G 50 G: 10 LIQUID INTRAVENOUS at 02:01

## 2023-01-07 RX ADMIN — MORPHINE SULFATE 4 MG: 4 INJECTION, SOLUTION INTRAMUSCULAR; INTRAVENOUS at 08:01

## 2023-01-07 RX ADMIN — LINEZOLID 600 MG: 600 TABLET, FILM COATED ORAL at 08:01

## 2023-01-07 RX ADMIN — MEROPENEM AND SODIUM CHLORIDE 1 G: 1 INJECTION, SOLUTION INTRAVENOUS at 07:01

## 2023-01-07 RX ADMIN — IPRATROPIUM BROMIDE AND ALBUTEROL SULFATE 3 ML: .5; 3 SOLUTION RESPIRATORY (INHALATION) at 08:01

## 2023-01-07 RX ADMIN — Medication 800 MG: at 12:01

## 2023-01-07 RX ADMIN — MONTELUKAST 10 MG: 10 TABLET, FILM COATED ORAL at 08:01

## 2023-01-07 RX ADMIN — AMLODIPINE BESYLATE 10 MG: 5 TABLET ORAL at 08:01

## 2023-01-07 RX ADMIN — ENOXAPARIN SODIUM 40 MG: 100 INJECTION SUBCUTANEOUS at 04:01

## 2023-01-07 RX ADMIN — MEROPENEM AND SODIUM CHLORIDE 1 G: 1 INJECTION, SOLUTION INTRAVENOUS at 01:01

## 2023-01-07 RX ADMIN — GABAPENTIN 300 MG: 300 CAPSULE ORAL at 08:01

## 2023-01-07 RX ADMIN — Medication 800 MG: at 05:01

## 2023-01-07 RX ADMIN — LOSARTAN POTASSIUM 100 MG: 50 TABLET, FILM COATED ORAL at 08:01

## 2023-01-07 RX ADMIN — MEROPENEM AND SODIUM CHLORIDE 1 G: 1 INJECTION, SOLUTION INTRAVENOUS at 12:01

## 2023-01-07 RX ADMIN — ASPIRIN 81 MG: 81 TABLET, COATED ORAL at 09:01

## 2023-01-07 RX ADMIN — PANTOPRAZOLE SODIUM 40 MG: 40 TABLET, DELAYED RELEASE ORAL at 05:01

## 2023-01-07 RX ADMIN — DIPHENHYDRAMINE HYDROCHLORIDE 25 MG: 50 INJECTION, SOLUTION INTRAMUSCULAR; INTRAVENOUS at 02:01

## 2023-01-07 RX ADMIN — GUAIFENESIN AND CODEINE PHOSPHATE 10 ML: 100; 10 SOLUTION ORAL at 08:01

## 2023-01-07 RX ADMIN — POTASSIUM BICARBONATE 35 MEQ: 391 TABLET, EFFERVESCENT ORAL at 07:01

## 2023-01-07 RX ADMIN — MORPHINE SULFATE 4 MG: 4 INJECTION, SOLUTION INTRAMUSCULAR; INTRAVENOUS at 04:01

## 2023-01-07 RX ADMIN — BENZONATATE 200 MG: 100 CAPSULE ORAL at 08:01

## 2023-01-07 RX ADMIN — Medication 800 MG: at 08:01

## 2023-01-07 RX ADMIN — GUAIFENESIN 600 MG: 600 TABLET, EXTENDED RELEASE ORAL at 08:01

## 2023-01-07 RX ADMIN — FLUTICASONE PROPIONATE 50 MCG: 50 SPRAY, METERED NASAL at 08:01

## 2023-01-07 RX ADMIN — ATORVASTATIN CALCIUM 20 MG: 20 TABLET, FILM COATED ORAL at 08:01

## 2023-01-07 RX ADMIN — DOXYCYCLINE HYCLATE 100 MG: 100 CAPSULE ORAL at 08:01

## 2023-01-07 RX ADMIN — CELECOXIB 100 MG: 100 CAPSULE ORAL at 12:01

## 2023-01-07 RX ADMIN — IPRATROPIUM BROMIDE AND ALBUTEROL SULFATE 3 ML: .5; 3 SOLUTION RESPIRATORY (INHALATION) at 01:01

## 2023-01-07 RX ADMIN — POTASSIUM BICARBONATE 35 MEQ: 391 TABLET, EFFERVESCENT ORAL at 05:01

## 2023-01-07 RX ADMIN — CELECOXIB 100 MG: 100 CAPSULE ORAL at 08:01

## 2023-01-07 NOTE — PLAN OF CARE
[facility]  Initial Discharge Assessment       Primary Care Provider: Barry Mcmahon MD    Admission Diagnosis: Pneumonia of both lungs due to infectious organism, unspecified part of lung [J18.9]    Admission Date: 1/6/2023  Expected Discharge Date: TBD    Assessment completed with patient at bedside in room 2514. Daughter / Nohelia present. Patient lives alone, adult daughters provide assistance if necessary. Patient drives himself and his vehicle is with him at the hospital. If home health is ordered, patient prefers [facility] as he has used them in the past for wound care. Case management to continue to follow.    Discharge Barriers Identified: None    Payor: MEDICARE / Plan: MEDICARE PART A & B / Product Type: Government /     Extended Emergency Contact Information  Primary Emergency Contact: DECKERGAVIN  Mobile Phone: [phone]  Relation: Daughter  Preferred language: English   needed? No  Secondary Emergency Contact: Patience Kern  Mobile Phone: [phone]  Relation: Daughter  Preferred language: English   needed? No    Discharge Plan A: Home  Discharge Plan B: Home Health      [facility] - [place], [place] - [address]  [address]  [place] [address]  Phone: [phone] Fax: [phone]      Initial Assessment (most recent)       Adult Discharge Assessment - 01/07/23 1041          Discharge Assessment    Assessment Type Discharge Planning Assessment     Confirmed/corrected address, phone number and insurance Yes     Confirmed Demographics Correct on Facesheet     Source of Information family;patient     Communicated GREGORY with patient/caregiver Date not available/Unable to determine     Reason For Admission Pulmonary infiltrates     People in Home alone     Facility Arrived From: home     Do you expect to return to your current living situation? Yes     Do you have help at home or someone to help you manage  your care at home? Yes     Who are your caregiver(s) and their phone number(s)? Daughters provide assistance     Prior to hospitilization cognitive status: Alert/Oriented     Current cognitive status: Alert/Oriented     Equipment Currently Used at Home nebulizer     Readmission within 30 days? No     Patient currently being followed by outpatient case management? No     Do you currently have service(s) that help you manage your care at home? No     Do you take prescription medications? Yes     Do you have prescription coverage? Yes     Do you have any problems affording any of your prescribed medications? No     Is the patient taking medications as prescribed? yes     Who is going to help you get home at discharge? adult daughter     How do you get to doctors appointments? car, drives self     Are you on dialysis? No     Do you take coumadin? No     Discharge Plan A Home     Discharge Plan B Home Health     DME Needed Upon Discharge  none     Discharge Plan discussed with: Patient     Discharge Barriers Identified None

## 2023-01-07 NOTE — CONSULTS
"Hematology/Oncology  Inpatient Consult Note  Patient Name: Sivakumar Thacker  MRN: [id number]  Admission Date: 1/6/2023  Hospital Length of Stay: 0 days  Code Status: Full Code   Attending Provider: Gary Huang MD  Referring Provider: Dr. Huang  Consulting Provider: DAVE Banks MD  Primary Care Physician: Barry Mcmahon MD  Principal Problem:Pulmonary infiltrates    Inpatient consult to Hematology/Oncology  Consult performed by: DAVE Banks MD  Consult ordered by: Gary Huang MD    Consult Jose Alfredo Banks MD 1/6/23  Subjective:     Chief Complaint: Abdominal Pain (W/ diarrhea x1 week.) and Cough (W/ back pain on "my lungs" x1 day)        History Present Illness:  67 y.o. male with several day hx of productive cough, SOB, diarrhea.  Multilobar pneumonia.  Cultures pending.  Started on imperical antibiotics.    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    On maintenance IVIG infusion.    Coverage for Dr. Ibarra.      Past Medical/Surgical History:  Past Medical History:   Diagnosis Date    Chest wall abscess 9/30/2022    Chronic obstructive pulmonary disease, unspecified COPD type 3/28/2022    Diabetes mellitus, type 2     Encounter for antineoplastic chemotherapy 04/01/2020    Hypertension     Hypogammaglobulinemia 12/13/2019    Neuropathy     Non Hodgkin's lymphoma     Reflux esophagitis     Ringing in ear, bilateral      Past Surgical History:   Procedure Laterality Date    COLONOSCOPY  2018    EYE SURGERY  Approximately 3 years ago    Cataract    HAND SURGERY      amputation left index finger    INCISION AND DRAINAGE OF ABSCESS Left 10/3/2022    Procedure: INCISION AND DRAINAGE, ABSCESS;  Surgeon: Franco Venegas III, MD;  Location: [facility] OR;  Service: General;  Laterality: Left;  axilla    MEDIPORT REMOVAL Right 10/3/2022    Procedure: REMOVAL, CATHETER, CENTRAL VENOUS, TUNNELED, WITH PORT;  Surgeon: Franco Venegas III, MD;  Location: [facility] OR;  Service: General;  " Laterality: Right;    PORTACATH PLACEMENT      SKIN CANCER EXCISION  09/30/2020    [facility]    SPINE SURGERY      VASECTOMY  1985 ?       Allergies:  Review of patient's allergies indicates:  No Known Allergies    Social/Family History:  Social History     Socioeconomic History    Marital status:    Tobacco Use    Smoking status: Former     Packs/day: 0.50     Years: 2.00     Pack years: 1.00     Types: Cigarettes    Smokeless tobacco: Never   Substance and Sexual Activity    Alcohol use: Not Currently     Alcohol/week: 0.0 standard drinks     Comment: rarely    Drug use: No    Sexual activity: Not Currently     Social Determinants of Health     Financial Resource Strain: Low Risk     Difficulty of Paying Living Expenses: Not hard at all   Food Insecurity: No Food Insecurity    Worried About Running Out of Food in the Last Year: Never true    Ran Out of Food in the Last Year: Never true   Transportation Needs: No Transportation Needs    Lack of Transportation (Medical): No    Lack of Transportation (Non-Medical): No   Physical Activity: Unknown    Days of Exercise per Week: 1 day   Stress: No Stress Concern Present    Feeling of Stress : Not at all   Social Connections: Unknown    Frequency of Communication with Friends and Family: Three times a week    Frequency of Social Gatherings with Friends and Family: More than three times a week    Active Member of Clubs or Organizations: Yes    Attends Club or Organization Meetings: More than 4 times per year    Marital Status:    Housing Stability: Unknown    Unable to Pay for Housing in the Last Year: Patient refused    Unstable Housing in the Last Year: No     Family History   Problem Relation Age of Onset    Diabetes Father        ROS:    GEN: normal without any fever, night sweats or weight loss  HEENT: normal with no HA's, sore throat, stiff neck, changes in vision  CV: normal with no CP, SOB, PND, VASQUEZ or orthopnea  PULM: See HPI  GI:See HPI  : normal  "with no hematuria, dysuria  BREAST: normal with no mass, discharge, pain  SKIN: normal with no rash, erythema, bruising, or swelling        Medications:  Continuous Infusions:  Scheduled Meds:   albuterol-ipratropium  3 mL Nebulization Q6H    [START ON 1/7/2023] amLODIPine  10 mg Oral Daily    [START ON 1/7/2023] aspirin  81 mg Oral Daily    [START ON 1/7/2023] atorvastatin  20 mg Oral Daily    doxycycline  100 mg Oral Q12H    enoxaparin  40 mg Subcutaneous Daily    fluticasone propionate  1 spray Each Nostril BID    gabapentin  300 mg Oral BID    guaiFENesin  600 mg Oral BID    linezolid  600 mg Oral Q12H    [START ON 1/7/2023] losartan  100 mg Oral Daily    meropenem (MERREM) IVPB  1 g Intravenous Q8H    [START ON 1/7/2023] montelukast  10 mg Oral Daily    [START ON 1/7/2023] pantoprazole  40 mg Oral Before breakfast    vancomycin (VANCOCIN) IVPB  1,500 mg Intravenous Q18H     PRN Meds:acetaminophen, albuterol-ipratropium, benzonatate, dextrose 10%, dextrose 10%, glucagon (human recombinant), glucose, glucose, insulin aspart U-100, magnesium oxide, magnesium oxide, morphine, ondansetron, potassium bicarbonate, potassium bicarbonate, potassium bicarbonate, potassium, sodium phosphates, potassium, sodium phosphates, potassium, sodium phosphates, prochlorperazine, sodium chloride 0.9%, Pharmacy to dose Vancomycin consult **AND** vancomycin - pharmacy to dose     Objective:     Vitals:  Blood pressure (!) 143/75, pulse 110, temperature 98.6 °F (37 °C), temperature source Oral, resp. rate 18, height 5' 8" (1.727 m), weight 91.6 kg (202 lb), SpO2 96 %.    Physical Exam:  GEN: no apparent distress, comfortable  HEAD: atraumatic and normocephalic  EYES: no pallor, no icterus  ENT: no pharyngeal erythema, external ears WNL; no nasal discharge  NECK: no masses, thyroid normal, trachea midline, no LAD/LN's, supple  CV: RRR with no murmur; normal pulse  CHEST: Normal respiratory effort; CTAB; distant  breath sounds; R " rales  ABDOM: nontender and nondistended; soft;  no rebound/guarding, L/S NP  MUSC/Skeletal: ROM normal; no crepitus; joints normal  EXTREM: no clubbing, cyanosis, inflammation or swelling  SKIN: no rashes, lesions, ulcers, petechiae   : no gibbs  NEURO: grossly intact; motor/sensory WNL;  no tremors  PSYCH: normal mood, affect and behavior  LYMPH: normal cervical, supraclavicular LN's    H/H 11.8/38.9  WBC 3000, 40% segs,   Creat 1.0,  Blood C/S NG    CXR clear    CT no PE, multi lobar pneumonia.      Assessment/Plan:     (1) 67 y.o. male with multi lobar pneumonia,  C/S pending, on empiric antibiotics.  ID consulted.    (2)Hypogammaglobulinemia by hx,   on IVIG maintenance.    (3)Follicular NHL, in CR.    Observe for now.    (4)Dr. Ibarra returns 1/9/23 am.       Active Diagnoses:    Diagnosis Date Noted POA    PRINCIPAL PROBLEM:  Pulmonary infiltrates [R91.8] 01/06/2023 Yes    Non-Hodgkin's lymphoma [C85.90] 11/12/2022 Yes    Chronic obstructive pulmonary disease, unspecified COPD type [J44.9] 03/28/2022 Yes    Skin lesion [L98.9] 06/23/2021 Yes    Type II diabetes mellitus with neurological manifestations [E11.49] 04/26/2021 Yes    Essential hypertension [I10] 12/30/2019 Yes    Follicular lymphoma grade IIIa [C82.30] 12/04/2018 Yes    Nonfamilial hypogammaglobulinemia [D80.1] 12/04/2018 Yes      Problems Resolved During this Admission:         DAVE Banks MD  Hematology/Oncology  Former[facility]  1/6/23.

## 2023-01-07 NOTE — PROGRESS NOTES
Consult Note  Infectious Disease    Reason for Consult:  pneumonia    HPI: Sivakumar Thacker is a 67 y.o. male known to our service, seen in october for left axillary abscess, and again in mid November for cellulitis of the left upper chest(after small abrasion left hand) with office follow up on 11/22. he receives IVIG(50 g Gamunex) for hypogammaglobulinemia and is currently on hiatus from lymphoma treatment. he has a history of bronchiectasis and chronic sinusitis(resolved) from 10/2022. He is follow ed by Dr. Ibarra and hem/onc at [facility] underwent a PET/CT on 1/5 which demonstrated R>L ground glass infiltrate and small LLL pneumonia and increased activity throughout the skeleton suggestive of bone marrow hyperstimulation.   He was sent to the ED today from the cancer center with worsening of complaints of nausea/diarrhea x 1 week(no suspicious meals or exposure to persons with GI illness), pleuritic pain left lower posterior chest, with a cough(4 days duration) productive of small amounts of brown sputum,with associated SOB. He did not seek medical attention until a regularly scheduled visit wed 1/4. He did not receive his regularly scheduled IVIG this am.CT abd benign.  CTA chest has some lower lobe ground glass opacities(better than yesterday) and a LLL consolidation(worse than yesterday). He was given vanc, cefepime and doxy and IVF in the ED.     1/7:  Temperature is under 99, white blood cells 3.62,with 26% monos today.  O2 saturation 95 on 2 L.  Intake/output not completely recorded.  MRSA screen negative, respiratory PCR panel not yet collected.  Sputum culture only normal dank so far/. He still has some pleurisy. Stool WBC positive. Culture in progress. Less liquid stool. Eating more.   EXAM & DIAGNOSTICS REVIEWED:   Vitals:     Temp:  [98 °F (36.7 °C)-98.6 °F (37 °C)]   Temp: 98 °F (36.7 °C) (01/07/23 1122)  Pulse: 94 (01/07/23 1122)  Resp: 18 (01/07/23 1122)  BP: (!) 147/81 (01/07/23 1122)  SpO2:  95 % (01/07/23 1122)    Intake/Output Summary (Last 24 hours) at 1/7/2023 1207  Last data filed at 1/7/2023 0830  Gross per 24 hour   Intake 1270.05 ml   Output 1200 ml   Net 70.05 ml       General:  In NAD. Alert and attentive, cooperative, comfortable. Able to give a good history.   Eyes:  Anicteric, P  EOMI, no scleral or conjunctival injection  ENT:  No ulcers, exudates, thrush, nares patent, dentition is good  Neck:  supple, no masses or adenopathy appreciated  Lungs:  RLL crackles with improved consolidation LLL  Heart:  RRR, no gallop/murmur/rub noted  Abd:  Soft, NT, ND, normal BS, no masses or organomegaly appreciated.  :  Voids/ , no flank tenderness  Musc:  Joints without effusion, swelling, erythema, synovitis, muscle wasting.   Skin:  No rashes   Neuro:             Alert, attentive, speech fluent, face symmetric, moves all extremities, no focal weakness. Ambulatory  Psych: Calm, cooperative  Lymphatic:      Extrem: No edema, erythema, phlebitis, cellulitis, warm and well perfused  VAD:   Peripheral and left arm midline    Isolation:  none  Wound: No recurrence of prior chest wall infection           General Labs reviewed:  Recent Labs   Lab 01/02/23  0822 01/06/23  0937 01/07/23  0421   WBC 3.55* 3.00* 3.62*   HGB 12.6* 11.8* 11.2*   HCT 39.9* 36.9* 35.5*    329 274       Recent Labs   Lab 01/02/23  0822 01/06/23  0937 01/07/23  0421    135* 138   K 3.5 3.7 3.4*    98 101   CO2 25 27 28   BUN 23 13 10   CREATININE 1.4 1.0 0.9   CALCIUM 9.0 8.9 8.4*   PROT 6.9 7.0  --    BILITOT 0.5 0.8  --    ALKPHOS 103 110  --    ALT 14 21  --    AST 22 30  --      No results for input(s): CRP in the last 168 hours.      Micro:  Microbiology Results (last 7 days)       Procedure Component Value Units Date/Time    Stool culture **cannot be ordered stat** [[id number]] Collected: 01/07/23 0834    Order Status: Sent Specimen: Stool Updated: 01/07/23 0859    Culture, Respiratory with Gram Stain  [[id number]] Collected: 01/06/23 1720    Order Status: Completed Specimen: Respiratory from Sputum Updated: 01/07/23 0802     Respiratory Culture Normal respiratory dank    MRSA Screen by PCR [[id number]] Collected: 01/06/23 1644    Order Status: Completed Specimen: Nasopharyngeal Swab from Nasal Updated: 01/06/23 2347     MRSA SCREEN BY PCR Negative    Blood culture #1 **CANNOT BE ORDERED STAT** [[id number]] Collected: 01/06/23 1202    Order Status: Completed Specimen: Blood from Peripheral, Forearm, Left Updated: 01/06/23 1917     Blood Culture, Routine No Growth to date    Blood culture #2 **CANNOT BE ORDERED STAT** [[id number]] Collected: 01/06/23 1140    Order Status: Completed Specimen: Blood from Peripheral, Antecubital, Left Updated: 01/06/23 1917     Blood Culture, Routine No Growth to date    Respiratory Infection Panel (PCR), Nasopharyngeal [[id number]]     Order Status: No result Specimen: Nasopharyngeal Swab             Imaging Reviewed:   CXR   PET CT 1/5/23  : Diffuse reticular nodular and groundglass opacities the left lower lobe, posterior right upper lobe and posterior medial right lower lobe compatible with multifocal pneumonia. Follow-up chest CT in three months is recommended   Diffuse FDG activity throughout the axial skeleton suggestive of bone marrow hyperstimulation   No evidence of recurrent or metastatic disease      CTA chest 1/6  IMPRESSION:  1. Negative for pulmonary thromboembolism.  2. Bilateral multifocal bronchiolitis and bronchopneumonia, including consolidated pneumonia in the left lower lobe. Follow-up PA and lateral chest radiograph in 4-6 weeks after appropriate therapy is recommended to document complete resolution.  3. Aortic and coronary arterial calcifications    CT abd/pelvis 1/6  1. Left greater than right lower lobe alveolar opacities including left lower lobe consolidation, characteristic of infectious or inflammatory pneumonia. Correlation for aspiration is requested.  2.  Diverticulosis.  3. No acute findings throughout the abdomen or pelvis  Cardiology:    IMPRESSION & PLAN   1. Community acquired pneumonia   Diarrheal prodrome.     2. Immunocompromised by diagnosis of lymphoma, prior treatment and hypogammaglobulinemia  3. History of bronchiectasis  4. Volume depletion  5. Eosinophilia(resolved) but present after last admits and antibiotics. Did not escalate after last IVIG, given 12/9      Recommendations:  Linezolid po and meropenem (had eosinophilia while on vanc and cefepime last admit) and doxy  Respiratory viral panel     GI pcr panel imn progress  Stool culture in progress  Legionella urine antigen pending  Will check total IgG to see his paulo, and make sure he is receiving enough IVIG  Sputum culture  Duonechamp, pulmonary toilet, mucinex, tessalon perles, IS  Will give IVIG today  50 g    D/w  patient and family member    Medical Decision Making during this encounter was  [_] Low Complexity  [_] Moderate Complexity  [ xxx ] High Complexity

## 2023-01-08 PROBLEM — B34.1 ENTEROVIRUS INFECTION: Status: ACTIVE | Noted: 2023-01-08

## 2023-01-08 PROBLEM — A09 DIARRHEA OF INFECTIOUS ORIGIN: Status: ACTIVE | Noted: 2023-01-08

## 2023-01-08 LAB
ANION GAP SERPL CALC-SCNC: 8 MMOL/L (ref 8–16)
BACTERIA SPEC AEROBE CULT: NORMAL
BASOPHILS # BLD AUTO: ABNORMAL K/UL (ref 0–0.2)
BASOPHILS NFR BLD: 0 % (ref 0–1.9)
BUN SERPL-MCNC: 14 MG/DL (ref 8–23)
CALCIUM SERPL-MCNC: 8.8 MG/DL (ref 8.7–10.5)
CHLORIDE SERPL-SCNC: 99 MMOL/L (ref 95–110)
CO2 SERPL-SCNC: 31 MMOL/L (ref 23–29)
CREAT SERPL-MCNC: 0.9 MG/DL (ref 0.5–1.4)
DIFFERENTIAL METHOD: ABNORMAL
EOSINOPHIL # BLD AUTO: ABNORMAL K/UL (ref 0–0.5)
EOSINOPHIL NFR BLD: 5 % (ref 0–8)
ERYTHROCYTE [DISTWIDTH] IN BLOOD BY AUTOMATED COUNT: 13.6 % (ref 11.5–14.5)
EST. GFR  (NO RACE VARIABLE): >60 ML/MIN/1.73 M^2
GLUCOSE SERPL-MCNC: 119 MG/DL (ref 70–110)
GLUCOSE SERPL-MCNC: 120 MG/DL (ref 70–110)
GLUCOSE SERPL-MCNC: 126 MG/DL (ref 70–110)
GLUCOSE SERPL-MCNC: 148 MG/DL (ref 70–110)
GLUCOSE SERPL-MCNC: 152 MG/DL (ref 70–110)
GRAM STN SPEC: NORMAL
HCT VFR BLD AUTO: 34.2 % (ref 40–54)
HGB BLD-MCNC: 10.5 G/DL (ref 14–18)
IMM GRANULOCYTES # BLD AUTO: ABNORMAL K/UL (ref 0–0.04)
IMM GRANULOCYTES NFR BLD AUTO: ABNORMAL % (ref 0–0.5)
LYMPHOCYTES # BLD AUTO: ABNORMAL K/UL (ref 1–4.8)
LYMPHOCYTES NFR BLD: 30 % (ref 18–48)
MAGNESIUM SERPL-MCNC: 1.5 MG/DL (ref 1.6–2.6)
MCH RBC QN AUTO: 26.4 PG (ref 27–31)
MCHC RBC AUTO-ENTMCNC: 30.7 G/DL (ref 32–36)
MCV RBC AUTO: 86 FL (ref 82–98)
METAMYELOCYTES NFR BLD MANUAL: 2 %
MONOCYTES # BLD AUTO: ABNORMAL K/UL (ref 0.3–1)
MONOCYTES NFR BLD: 20 % (ref 4–15)
NEUTROPHILS NFR BLD: 40 % (ref 38–73)
NEUTS BAND NFR BLD MANUAL: 3 %
NRBC BLD-RTO: 0 /100 WBC
OVALOCYTES BLD QL SMEAR: ABNORMAL
PHOSPHATE SERPL-MCNC: 4.7 MG/DL (ref 2.7–4.5)
PLATELET # BLD AUTO: 281 K/UL (ref 150–450)
PMV BLD AUTO: 9.1 FL (ref 9.2–12.9)
POIKILOCYTOSIS BLD QL SMEAR: SLIGHT
POLYCHROMASIA BLD QL SMEAR: ABNORMAL
POTASSIUM SERPL-SCNC: 3.5 MMOL/L (ref 3.5–5.1)
RBC # BLD AUTO: 3.98 M/UL (ref 4.6–6.2)
SCHISTOCYTES BLD QL SMEAR: PRESENT
SODIUM SERPL-SCNC: 138 MMOL/L (ref 136–145)
WBC # BLD AUTO: 3.15 K/UL (ref 3.9–12.7)

## 2023-01-08 PROCEDURE — 80048 BASIC METABOLIC PNL TOTAL CA: CPT | Performed by: INTERNAL MEDICINE

## 2023-01-08 PROCEDURE — 85007 BL SMEAR W/DIFF WBC COUNT: CPT | Performed by: INTERNAL MEDICINE

## 2023-01-08 PROCEDURE — 21400001 HC TELEMETRY ROOM

## 2023-01-08 PROCEDURE — 25000003 PHARM REV CODE 250: Performed by: INTERNAL MEDICINE

## 2023-01-08 PROCEDURE — 83735 ASSAY OF MAGNESIUM: CPT | Performed by: INTERNAL MEDICINE

## 2023-01-08 PROCEDURE — 99900035 HC TECH TIME PER 15 MIN (STAT)

## 2023-01-08 PROCEDURE — 85027 COMPLETE CBC AUTOMATED: CPT | Performed by: INTERNAL MEDICINE

## 2023-01-08 PROCEDURE — 63600175 PHARM REV CODE 636 W HCPCS: Performed by: INTERNAL MEDICINE

## 2023-01-08 PROCEDURE — 25000242 PHARM REV CODE 250 ALT 637 W/ HCPCS: Performed by: INTERNAL MEDICINE

## 2023-01-08 PROCEDURE — 99232 SBSQ HOSP IP/OBS MODERATE 35: CPT | Mod: ,,, | Performed by: INTERNAL MEDICINE

## 2023-01-08 PROCEDURE — 99232 PR SUBSEQUENT HOSPITAL CARE,LEVL II: ICD-10-PCS | Mod: ,,, | Performed by: INTERNAL MEDICINE

## 2023-01-08 PROCEDURE — 94761 N-INVAS EAR/PLS OXIMETRY MLT: CPT

## 2023-01-08 PROCEDURE — 27000221 HC OXYGEN, UP TO 24 HOURS

## 2023-01-08 PROCEDURE — 84100 ASSAY OF PHOSPHORUS: CPT | Performed by: INTERNAL MEDICINE

## 2023-01-08 PROCEDURE — 94664 DEMO&/EVAL PT USE INHALER: CPT

## 2023-01-08 PROCEDURE — 82962 GLUCOSE BLOOD TEST: CPT

## 2023-01-08 PROCEDURE — 94799 UNLISTED PULMONARY SVC/PX: CPT

## 2023-01-08 PROCEDURE — 94640 AIRWAY INHALATION TREATMENT: CPT

## 2023-01-08 PROCEDURE — 36415 COLL VENOUS BLD VENIPUNCTURE: CPT | Performed by: INTERNAL MEDICINE

## 2023-01-08 PROCEDURE — 99900031 HC PATIENT EDUCATION (STAT)

## 2023-01-08 RX ORDER — LOPERAMIDE HYDROCHLORIDE 2 MG/1
2 CAPSULE ORAL 4 TIMES DAILY PRN
Status: DISCONTINUED | OUTPATIENT
Start: 2023-01-08 | End: 2023-01-12 | Stop reason: HOSPADM

## 2023-01-08 RX ORDER — GUAIFENESIN 600 MG/1
1200 TABLET, EXTENDED RELEASE ORAL 2 TIMES DAILY
Status: DISCONTINUED | OUTPATIENT
Start: 2023-01-08 | End: 2023-01-10

## 2023-01-08 RX ORDER — IPRATROPIUM BROMIDE AND ALBUTEROL SULFATE 2.5; .5 MG/3ML; MG/3ML
3 SOLUTION RESPIRATORY (INHALATION)
Status: DISCONTINUED | OUTPATIENT
Start: 2023-01-08 | End: 2023-01-12 | Stop reason: HOSPADM

## 2023-01-08 RX ORDER — HYDROCODONE POLISTIREX AND CHLORPHENIRAMINE POLISTIREX 10; 8 MG/5ML; MG/5ML
5 SUSPENSION, EXTENDED RELEASE ORAL EVERY 12 HOURS PRN
Status: DISCONTINUED | OUTPATIENT
Start: 2023-01-08 | End: 2023-01-12 | Stop reason: HOSPADM

## 2023-01-08 RX ADMIN — GABAPENTIN 300 MG: 300 CAPSULE ORAL at 08:01

## 2023-01-08 RX ADMIN — PANTOPRAZOLE SODIUM 40 MG: 40 TABLET, DELAYED RELEASE ORAL at 05:01

## 2023-01-08 RX ADMIN — DOXYCYCLINE HYCLATE 100 MG: 100 CAPSULE ORAL at 08:01

## 2023-01-08 RX ADMIN — GUAIFENESIN AND CODEINE PHOSPHATE 10 ML: 100; 10 SOLUTION ORAL at 12:01

## 2023-01-08 RX ADMIN — IPRATROPIUM BROMIDE AND ALBUTEROL SULFATE 3 ML: .5; 3 SOLUTION RESPIRATORY (INHALATION) at 12:01

## 2023-01-08 RX ADMIN — GUAIFENESIN 1200 MG: 600 TABLET, EXTENDED RELEASE ORAL at 08:01

## 2023-01-08 RX ADMIN — MEROPENEM AND SODIUM CHLORIDE 1 G: 1 INJECTION, SOLUTION INTRAVENOUS at 07:01

## 2023-01-08 RX ADMIN — MONTELUKAST 10 MG: 10 TABLET, FILM COATED ORAL at 08:01

## 2023-01-08 RX ADMIN — IPRATROPIUM BROMIDE AND ALBUTEROL SULFATE 3 ML: .5; 3 SOLUTION RESPIRATORY (INHALATION) at 07:01

## 2023-01-08 RX ADMIN — LOPERAMIDE HYDROCHLORIDE 2 MG: 2 CAPSULE ORAL at 08:01

## 2023-01-08 RX ADMIN — IPRATROPIUM BROMIDE AND ALBUTEROL SULFATE 3 ML: 2.5; .5 SOLUTION RESPIRATORY (INHALATION) at 12:01

## 2023-01-08 RX ADMIN — POTASSIUM BICARBONATE 35 MEQ: 391 TABLET, EFFERVESCENT ORAL at 08:01

## 2023-01-08 RX ADMIN — IPRATROPIUM BROMIDE AND ALBUTEROL SULFATE 3 ML: 2.5; .5 SOLUTION RESPIRATORY (INHALATION) at 03:01

## 2023-01-08 RX ADMIN — MEROPENEM AND SODIUM CHLORIDE 1 G: 1 INJECTION, SOLUTION INTRAVENOUS at 11:01

## 2023-01-08 RX ADMIN — ATORVASTATIN CALCIUM 20 MG: 20 TABLET, FILM COATED ORAL at 08:01

## 2023-01-08 RX ADMIN — AMLODIPINE BESYLATE 10 MG: 5 TABLET ORAL at 08:01

## 2023-01-08 RX ADMIN — CELECOXIB 100 MG: 100 CAPSULE ORAL at 08:01

## 2023-01-08 RX ADMIN — Medication 800 MG: at 09:01

## 2023-01-08 RX ADMIN — Medication 800 MG: at 08:01

## 2023-01-08 RX ADMIN — LINEZOLID 600 MG: 600 TABLET, FILM COATED ORAL at 08:01

## 2023-01-08 RX ADMIN — ENOXAPARIN SODIUM 40 MG: 100 INJECTION SUBCUTANEOUS at 04:01

## 2023-01-08 RX ADMIN — MEROPENEM AND SODIUM CHLORIDE 1 G: 1 INJECTION, SOLUTION INTRAVENOUS at 04:01

## 2023-01-08 RX ADMIN — FLUTICASONE PROPIONATE 50 MCG: 50 SPRAY, METERED NASAL at 09:01

## 2023-01-08 RX ADMIN — LOSARTAN POTASSIUM 100 MG: 50 TABLET, FILM COATED ORAL at 08:01

## 2023-01-08 RX ADMIN — ASPIRIN 81 MG: 81 TABLET, COATED ORAL at 08:01

## 2023-01-08 RX ADMIN — POTASSIUM BICARBONATE 50 MEQ: 977.5 TABLET, EFFERVESCENT ORAL at 05:01

## 2023-01-08 RX ADMIN — IPRATROPIUM BROMIDE AND ALBUTEROL SULFATE 3 ML: 2.5; .5 SOLUTION RESPIRATORY (INHALATION) at 08:01

## 2023-01-08 RX ADMIN — Medication 800 MG: at 04:01

## 2023-01-08 NOTE — PROGRESS NOTES
"[facility] Medicine  Progress Note    Patient Name: Sivakumar Thacker  MRN: [id number]  Patient Class: IP- Inpatient  Admission Date: 1/6/2023  Attending Physician: Don Nicholas MD  Primary Care Provider: Barry Mcmahon MD  DOS: 01/07/2023    Subjective:     Principal Problem:Pulmonary infiltrates    Chief Complaint:   Chief Complaint   Patient presents with    Abdominal Pain     W/ diarrhea x1 week.    Cough     W/ back pain on "my lungs" x1 day        HPI: ED note  67-year-old male presents emergency department past medical history includes diabetes, hypertension, currently receiving infusion treatment for non-Hodgkin's lymphoma reports for the last week he is had diarrhea and feels generally weak.  States yesterday he developed a cough, associated shortness of breath, and pain when he breathes.  The patient reports that he went to the infusion center today however he was sent here by Dr. Ibarra for further evaluation in the emergency department.  Patient has been taking over-the-counter Pepto-Bismol for relief of diarrhea symptoms    1/6/2022  Mr Thacker has noted increasing SOB and VASQUEZ with fever X 1 today. Hew has had limited exposure to the public. The diarrhea is not bloody and he was exposed to antibiotics 11/2022.        Interval history:  1/7: Patient seen and examined.  The patient reports persistent shortness of breath, moderate intensity, worse with exertion, associated with severe coughing.  Minimal improvement in coughing thus far with sputum production, no hemoptysis.  Poor oral intake and minimal nausea but no vomiting.  Some generalized weakness.  No headache or syncope.  Family at bedside.  Diarrhea improving, no abdominal pain.    ROS:  3 point review of systems reviewed and negative except as per HPI above        Vitals:    01/07/23 1622 01/07/23 1641 01/07/23 1700 01/07/23 1920   BP: 125/66 137/77 (!) 142/77 138/70   BP Location: Right arm Right arm  Right arm "   Patient Position: Lying Lying  Lying   Pulse:  86 87 91   Resp: 18 18 18 19   Temp:  97.9 °F (36.6 °C)  98.1 °F (36.7 °C)   TempSrc:  Oral  Oral   SpO2: 96% 98% 96% 96%   Weight:       Height:         General:  Appears mildly uncomfortable today, nontoxic nondiaphoretic  Head and eyes:  Anicteric sclera, no conjunctival discharge   ENT:  Moist mucous membranes, no thrush   Pulmonary:  Frequent cough, comfortable work of breathing on nasal cannula oxygen, scattered wheezing and rhonchi bilaterally in lower zones  Cardiovascular:  2+ radial pulses, regular rate and rhythm   GI:  Abdomen soft nontender, nondistended, no guarding or rebound   Skin:  Dry and warm with no jaundice   Psych:  Mood is calm, affect restricted, insight fair   Neuro:  Nonfocal motor exam, alert and oriented, fluent speech    Labs:  Potassium 3.4   Magnesium 1.2   Hemoglobin 11    Assessment/Plan:     * Pulmonary infiltrates  Bronchiectasis DAVE per Hx  Post tussive rales noted  New bilateral infiltrates  MRSA screen influenza antigen Covid 19 screen sputum culture  Vancomycin Cefepime and doxycycline as antibiotics  Hx of S marcescens cellulitis and sepsis  Dr Washington consulted  Pt may benefit from bronchoscopy    Continue empiric broad-spectrum IV antibiotics with meropenem, linezolid, and vancomycin  Appreciate Infectious Disease, follow-up culture data  IVIG administered, appreciate oncology  Continue bronchodilators  Continue antitussive, increase dose of Mucinex and start guaifenesin/codeine  High risk secondary to administration of medication requiring close monitoring of levels to prevent toxicity-vancomycin;, severe exacerbation of chronic illness; acute illness with risk to life      Chronic obstructive pulmonary disease, unspecified COPD type  Treat as exacerbation without steroids at present      Follicular lymphoma grade IIIa  Oncology consulted  Would this patient benefit from IVIG    Hypokalemia and hypomagnesemia       Likely  secondary to GI losses from diarrhea, gastroenteritis       Replace electrolytes, serial labs, may need ongoing replacement    VTE Risk Mitigation (From admission, onward)           Ordered     enoxaparin injection 40 mg  Daily         01/06/23 2120     IP VTE HIGH RISK PATIENT  Once         01/06/23 2120     Place sequential compression device  Until discontinued         01/06/23 2120                       Don Nicholas MD  Department of Hospital Medicine   [facility] - Emergency Dept

## 2023-01-08 NOTE — PROGRESS NOTES
Consult Note  Infectious Disease    Reason for Consult:  pneumonia    HPI: Sivakumar Thacker is a 67 y.o. male known to our service, seen in october for left axillary abscess, and again in mid November for cellulitis of the left upper chest(after small abrasion left hand) with office follow up on 11/22. he receives IVIG(50 g Gamunex) for hypogammaglobulinemia and is currently on hiatus from lymphoma treatment. he has a history of bronchiectasis and chronic sinusitis(resolved) from 10/2022. He is follow ed by Dr. Ibarra and hem[facility]onc at [facility] underwent a PET/CT on 1/5 which demonstrated R>L ground glass infiltrate and small LLL pneumonia and increased activity throughout the skeleton suggestive of bone marrow hyperstimulation.   He was sent to the ED today from the cancer center with worsening of complaints of nausea/diarrhea x 1 week(no suspicious meals or exposure to persons with GI illness), pleuritic pain left lower posterior chest, with a cough(4 days duration) productive of small amounts of brown sputum,with associated SOB. He did not seek medical attention until a regularly scheduled visit wed 1/4. He did not receive his regularly scheduled IVIG this am.CT abd benign.  CTA chest has some lower lobe ground glass opacities(better than yesterday) and a LLL consolidation(worse than yesterday). He was given vanc, cefepime and doxy and IVF in the ED.     1/7:  Temperature is under 99, white blood cells 3.62,with 26% monos today.  O2 saturation 95 on 2 L.  Intake/output not completely recorded.  MRSA screen negative, respiratory PCR panel not yet collected.  Sputum culture only normal dank so far/. He still has some pleurisy. Stool WBC positive. Culture in progress. Less liquid stool. Eating more.   1/8: Interim reviewed.  Temperatures around 99.  Respiratory viral panel was positive for human rhino virus/enterovirus.  Received IVIG 50 g yesterday without difficulty.  Blood cultures remain negative.  Sputum  culture is normal dank.  He is still having loose stool.  He is eating more.  He is feeling better from respiratory standpoint.  He has not been walking around very much but this was encouraged.     EXAM & DIAGNOSTICS REVIEWED:   Vitals:     Temp:  [97.6 °F (36.4 °C)-99.4 °F (37.4 °C)]   Temp: 98.8 °F (37.1 °C) (01/08/23 1116)  Pulse: 91 (01/08/23 1116)  Resp: 18 (01/08/23 1116)  BP: (!) 140/73 (01/08/23 1116)  SpO2: 96 % (01/08/23 1116)    Intake/Output Summary (Last 24 hours) at 1/8/2023 1130  Last data filed at 1/8/2023 1018  Gross per 24 hour   Intake 920.2 ml   Output 1700 ml   Net -779.8 ml       General:  In NAD. Alert and attentive, cooperative, comfortable. Able to give a good history.   Eyes:  Anicteric,  EOMI, no scleral or conjunctival injection  ENT:  No ulcers, exudates, thrush, nares patent, dentition is good  Neck:  supple,   Lungs:  Almost perfectly clear, much improved from yesterday  Heart:  RRR, no gallop/murmur/rub noted  Abd:  Soft, NT, ND, normal BS, no masses or organomegaly appreciated.  :  Voids/ , no flank tenderness  Musc:  Joints without effusion, swelling, erythema, synovitis, muscle wasting.   Skin:  No rashes   Neuro:             Alert, attentive, speech fluent, face symmetric, moves all extremities, no focal weakness. Ambulatory  Psych: Calm, cooperative  Lymphatic:      Extrem: No edema, erythema, phlebitis, cellulitis, warm and well perfused  VAD:   Peripheral and left arm midline    Isolation:  none  Wound: No recurrence of prior chest wall infection           General Labs reviewed:  Recent Labs   Lab 01/06/23  0937 01/07/23 0421 01/08/23 0418   WBC 3.00* 3.62* 3.15*   HGB 11.8* 11.2* 10.5*   HCT 36.9* 35.5* 34.2*    274 281       Recent Labs   Lab 01/02/23  0822 01/06/23  0937 01/07/23  0421 01/08/23  0418    135* 138 138   K 3.5 3.7 3.4* 3.5    98 101 99   CO2 25 27 28 31*   BUN 23 13 10 14   CREATININE 1.4 1.0 0.9 0.9   CALCIUM 9.0 8.9 8.4* 8.8   PROT  6.9 7.0  --   --    BILITOT 0.5 0.8  --   --    ALKPHOS 103 110  --   --    ALT 14 21  --   --    AST 22 30  --   --      No results for input(s): CRP in the last 168 hours.      Micro:  Microbiology Results (last 7 days)       Procedure Component Value Units Date/Time    Stool culture **cannot be ordered stat** [[id number]] Collected: 01/07/23 0834    Order Status: Completed Specimen: Stool Updated: 01/08/23 0900     Stool Culture Nothing significant to date    Culture, Respiratory with Gram Stain [[id number]] Collected: 01/06/23 1720    Order Status: Completed Specimen: Respiratory from Sputum Updated: 01/08/23 0822     Respiratory Culture Normal respiratory dank    Respiratory Infection Panel (PCR), Nasopharyngeal [[id number]]  (Abnormal) Collected: 01/07/23 1319    Order Status: Completed Specimen: Nasopharyngeal Swab Updated: 01/07/23 2147     Respiratory Infection Panel Source NP swab     Adenovirus Not Detected     Coronavirus 229E, Common Cold Virus Not Detected     Coronavirus HKU1, Common Cold Virus Not Detected     Coronavirus NL63, Common Cold Virus Not Detected     Coronavirus OC43, Common Cold Virus Not Detected     Comment: The Coronavirus strains detected in this test cause the common cold.  These strains are not the COVID-19 (novel Coronavirus)strain   associated with the respiratory disease outbreak.          SARS-CoV2 (COVID-19) Qualitative PCR Not Detected     Human Metapneumovirus Not Detected     Human Rhinovirus/Enterovirus Detected     Influenza A (subtypes H1, H1-2009,H3) Not Detected     Influenza B Not Detected     Parainfluenza Virus 1 Not Detected     Parainfluenza Virus 2 Not Detected     Parainfluenza Virus 3 Not Detected     Parainfluenza Virus 4 Not Detected     Respiratory Syncytial Virus Not Detected     Bordetella Parapertussis (IK5999) Not Detected     Bordetella pertussis (ptxP) Not Detected     Chlamydia pneumoniae Not Detected     Mycoplasma pneumoniae Not Detected     Comment:  Respiratory Infection Panel testing performed by Multiplex PCR.       Narrative:      Respiratory Infection Panel source->NP Swab    Blood culture #2 **CANNOT BE ORDERED STAT** [[id number]] Collected: 01/06/23 1140    Order Status: Completed Specimen: Blood from Peripheral, Antecubital, Left Updated: 01/07/23 1232     Blood Culture, Routine No Growth to date      No Growth to date    Blood culture #1 **CANNOT BE ORDERED STAT** [[id number]] Collected: 01/06/23 1202    Order Status: Completed Specimen: Blood from Peripheral, Forearm, Left Updated: 01/07/23 1232     Blood Culture, Routine No Growth to date      No Growth to date    MRSA Screen by PCR [[id number]] Collected: 01/06/23 1644    Order Status: Completed Specimen: Nasopharyngeal Swab from Nasal Updated: 01/06/23 2347     MRSA SCREEN BY PCR Negative            Imaging Reviewed:   CXR   PET CT 1/5/23  : Diffuse reticular nodular and groundglass opacities the left lower lobe, posterior right upper lobe and posterior medial right lower lobe compatible with multifocal pneumonia. Follow-up chest CT in three months is recommended   Diffuse FDG activity throughout the axial skeleton suggestive of bone marrow hyperstimulation   No evidence of recurrent or metastatic disease      CTA chest 1/6  IMPRESSION:  1. Negative for pulmonary thromboembolism.  2. Bilateral multifocal bronchiolitis and bronchopneumonia, including consolidated pneumonia in the left lower lobe. Follow-up PA and lateral chest radiograph in 4-6 weeks after appropriate therapy is recommended to document complete resolution.  3. Aortic and coronary arterial calcifications    CT abd/pelvis 1/6  1. Left greater than right lower lobe alveolar opacities including left lower lobe consolidation, characteristic of infectious or inflammatory pneumonia. Correlation for aspiration is requested.  2. Diverticulosis.  3. No acute findings throughout the abdomen or pelvis  Cardiology:    IMPRESSION & PLAN    1. Community acquired pneumonia   Diarrheal prodrome.   Respiratory viral panel positive for human rhino virus/enterovirus    2. Immunocompromised by diagnosis of lymphoma, prior treatment and hypogammaglobulinemia status post IVIG 50 g 1/7  3. History of bronchiectasis  4. Volume depletion  5. Eosinophilia(resolved) but present after last admits and antibiotics. Did not escalate after last IVIG, given 12/9      Recommendations:  Linezolid po and meropenem (had eosinophilia while on vanc and cefepime last admit) and doxy  Encouraged to walk around, measure O2 sat while walking     GI pcr panel in progress  Stool culture in progress  Legionella urine antigen pending  Awaiting total IgG to see his paulo, and make sure he is receiving enough IVIG     Duonechamp, pulmonary toilet, mucinex, tessalon perles, IS     Potentially home tomorrow  D/w  patient and family member    Medical Decision Making during this encounter was  [_] Low Complexity  [_] Moderate Complexity  [ xxx ] High Complexity

## 2023-01-08 NOTE — PROGRESS NOTES
"Hematology/Oncology  Inpatient Progress Note  Patient Name: Sivakumar Thacker  MRN: [id number]  Admission Date: 1/6/2023  Hospital Length of Stay: 1 days  Code Status: Full Code   Attending Provider: Don Nicholas MD  Referring Provider: Dr. Huang  Consulting Provider: DAVE Banks MD  Primary Care Physician: Barry Mcmahon MD  Principal Problem:Pulmonary infiltrates    Progress Note Jose Alfredo Banks MD 1/7/23  Subjective:     Chief Complaint: Abdominal Pain (W/ diarrhea x1 week.) and Cough (W/ back pain on "my lungs" x1 day)        History Present Illness:  67 y.o. male with several day hx of productive cough, SOB, diarrhea.  Multilobar pneumonia.  Cultures pending.  Started on imperical antibiotics.    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    On maintenance IVIG infusion.    Coverage for Dr. Ibarra.    Today 1/7/23, feeling better.  Still with yellow green phlegm.      Past Medical/Surgical History:  Past Medical History:   Diagnosis Date    Chest wall abscess 9/30/2022    Chronic obstructive pulmonary disease, unspecified COPD type 3/28/2022    Diabetes mellitus, type 2     Encounter for antineoplastic chemotherapy 04/01/2020    Hypertension     Hypogammaglobulinemia 12/13/2019    Neuropathy     Non Hodgkin's lymphoma     Reflux esophagitis     Ringing in ear, bilateral      Past Surgical History:   Procedure Laterality Date    COLONOSCOPY  2018    EYE SURGERY  Approximately 3 years ago    Cataract    HAND SURGERY      amputation left index finger    INCISION AND DRAINAGE OF ABSCESS Left 10/3/2022    Procedure: INCISION AND DRAINAGE, ABSCESS;  Surgeon: Franco Venegas III, MD;  Location: [facility] OR;  Service: General;  Laterality: Left;  axilla    MEDIPORT REMOVAL Right 10/3/2022    Procedure: REMOVAL, CATHETER, CENTRAL VENOUS, TUNNELED, WITH PORT;  Surgeon: Franco Venegas III, MD;  Location: [facility] OR;  Service: General;  Laterality: Right;    PORTACATH PLACEMENT      SKIN CANCER " EXCISION  09/30/2020    [facility]    SPINE SURGERY      VASECTOMY  1985 ?       Allergies:  Review of patient's allergies indicates:  No Known Allergies    Social/Family History:  Social History     Socioeconomic History    Marital status:    Tobacco Use    Smoking status: Former     Packs/day: 0.50     Years: 2.00     Pack years: 1.00     Types: Cigarettes    Smokeless tobacco: Never   Substance and Sexual Activity    Alcohol use: Not Currently     Alcohol/week: 0.0 standard drinks     Comment: rarely    Drug use: No    Sexual activity: Not Currently     Social Determinants of Health     Financial Resource Strain: Low Risk     Difficulty of Paying Living Expenses: Not hard at all   Food Insecurity: No Food Insecurity    Worried About Running Out of Food in the Last Year: Never true    Ran Out of Food in the Last Year: Never true   Transportation Needs: No Transportation Needs    Lack of Transportation (Medical): No    Lack of Transportation (Non-Medical): No   Physical Activity: Unknown    Days of Exercise per Week: 1 day   Stress: No Stress Concern Present    Feeling of Stress : Not at all   Social Connections: Unknown    Frequency of Communication with Friends and Family: Three times a week    Frequency of Social Gatherings with Friends and Family: More than three times a week    Active Member of Clubs or Organizations: Yes    Attends Club or Organization Meetings: More than 4 times per year    Marital Status:    Housing Stability: Unknown    Unable to Pay for Housing in the Last Year: Patient refused    Unstable Housing in the Last Year: No     Family History   Problem Relation Age of Onset    Diabetes Father        ROS:    GEN: normal without any fever, night sweats or weight loss  HEENT: normal with no HA's, sore throat, stiff neck, changes in vision  CV: normal with no CP, SOB, PND, VASQUEZ or orthopnea  PULM: See HPI  GI:See HPI  : normal with no hematuria, dysuria  BREAST: normal with no mass,  "discharge, pain  SKIN: normal with no rash, erythema, bruising, or swelling        Medications:  Continuous Infusions:  Scheduled Meds:   albuterol-ipratropium  3 mL Nebulization Q6H    amLODIPine  10 mg Oral Daily    aspirin  81 mg Oral Daily    atorvastatin  20 mg Oral Daily    celecoxib  100 mg Oral BID    doxycycline  100 mg Oral Q12H    enoxaparin  40 mg Subcutaneous Daily    fluticasone propionate  1 spray Each Nostril BID    gabapentin  300 mg Oral BID    [START ON 1/8/2023] guaiFENesin  1,200 mg Oral Daily    guaiFENesin  600 mg Oral QHS    linezolid  600 mg Oral Q12H    losartan  100 mg Oral Daily    meropenem (MERREM) IVPB  1 g Intravenous Q8H    montelukast  10 mg Oral Daily    pantoprazole  40 mg Oral Before breakfast     PRN Meds:acetaminophen, albuterol-ipratropium, benzonatate, dextrose 10%, dextrose 10%, glucagon (human recombinant), glucose, glucose, guaiFENesin-codeine 100-10 mg/5 ml, insulin aspart U-100, magnesium oxide, magnesium oxide, morphine, ondansetron, potassium bicarbonate, potassium bicarbonate, potassium bicarbonate, potassium, sodium phosphates, potassium, sodium phosphates, potassium, sodium phosphates, prochlorperazine, sodium chloride 0.9%     Objective:     Vitals:  Blood pressure 138/70, pulse 91, temperature 98.1 °F (36.7 °C), temperature source Oral, resp. rate 19, height 5' 8" (1.727 m), weight 91.3 kg (201 lb 4.8 oz), SpO2 96 %.    Physical Exam:  GEN: no apparent distress, comfortable  HEAD: atraumatic and normocephalic  EYES: no pallor, no icterus  ENT: no pharyngeal erythema, external ears WNL; no nasal discharge  NECK: no masses, thyroid normal, trachea midline, no LAD/LN's, supple  CV: RRR with no murmur; normal pulse  CHEST: Normal respiratory effort; CTAB; distant  breath sounds; R rales  ABDOM: nontender and nondistended; soft;  no rebound/guarding, L/S NP  MUSC/Skeletal: ROM normal; no crepitus; joints normal  EXTREM: no clubbing, cyanosis, inflammation or " swelling  SKIN: no rashes, lesions, ulcers, petechiae   : no gibbs  NEURO: grossly intact; motor/sensory WNL;  no tremors  PSYCH: normal mood, affect and behavior  LYMPH: normal cervical, supraclavicular LN's    H/H 11.8/38.9  WBC 3000, 40% segs,   Creat 1.0,  Blood C/S NG    CXR clear    CT no PE, multi lobar pneumonia.      Assessment/Plan:     (1) 67 y.o. male with multi lobar pneumonia,  C/S pending, on empiric antibiotics.  ID consulted.    (2)Hypogammaglobulinemia by hx,   on IVIG maintenance.    (3)Follicular NHL, in CR.    Observe for now.    (4)Dr. Ibarra returns 1/9/23 am.       Active Diagnoses:    Diagnosis Date Noted POA    PRINCIPAL PROBLEM:  Pulmonary infiltrates [R91.8] 01/06/2023 Yes    Pneumonia of both lungs due to infectious organism [J18.9] 01/06/2023 Unknown    Non-Hodgkin's lymphoma [C85.90] 11/12/2022 Yes    Chronic obstructive pulmonary disease, unspecified COPD type [J44.9] 03/28/2022 Yes    Skin lesion [L98.9] 06/23/2021 Yes    Type II diabetes mellitus with neurological manifestations [E11.49] 04/26/2021 Yes    Essential hypertension [I10] 12/30/2019 Yes    Follicular lymphoma grade IIIa [C82.30] 12/04/2018 Yes    Nonfamilial hypogammaglobulinemia [D80.1] 12/04/2018 Yes      Problems Resolved During this Admission:         DAVE Banks MD  Hematology/Oncology  [facility]  1/6/23.

## 2023-01-08 NOTE — PROGRESS NOTES
"[facility]  Progress Note    Patient Name: Sivakumar Thacker  MRN: [id number]  Patient Class: IP- Inpatient  Admission Date: 1/6/2023  Attending Physician: Don Nicholas MD  Primary Care Provider: Barry Mcmahon MD  DOS: 01/08/2023    Subjective:     Principal Problem:Pulmonary infiltrates    Chief Complaint:   Chief Complaint   Patient presents with    Abdominal Pain     W/ diarrhea x1 week.    Cough     W/ back pain on "my lungs" x1 day        HPI: ED note  67-year-old male presents emergency department past medical history includes diabetes, hypertension, currently receiving infusion treatment for non-Hodgkin's lymphoma reports for the last week he is had diarrhea and feels generally weak.  States yesterday he developed a cough, associated shortness of breath, and pain when he breathes.  The patient reports that he went to the infusion center today however he was sent here by Dr. Ibarra for further evaluation in the emergency department.  Patient has been taking over-the-counter Pepto-Bismol for relief of diarrhea symptoms    1/6/2022  Mr Thacker has noted increasing SOB and VASQUEZ with fever X 1 today. Hew has had limited exposure to the public. The diarrhea is not bloody and he was exposed to antibiotics 11/2022.        Interval history:  1/8: Patient seen and examined.  Persistent cough and chest congestion.  Shortness of breath slightly improved.  Currently tolerating 2 L nasal cannula.  Not eating much, poor appetite.  No nausea or vomiting.  No abdominal pain.  Diarrhea improving.  No headache or syncope.  Family at bedside      ROS:  3 point review of systems reviewed and negative except as per HPI above        Vitals:    01/08/23 0310 01/08/23 0722 01/08/23 0742 01/08/23 1116   BP: 134/73 (!) 140/74  (!) 140/73   BP Location: Right arm Right arm  Right arm   Patient Position: Lying Lying  Lying   Pulse: 89 92 90 91   Resp: 18 18 18 18   Temp: 97.8 °F (36.6 °C) 99.4 °F " (37.4 °C)  98.8 °F (37.1 °C)   TempSrc: Oral Oral  Oral   SpO2: 95% 97% 98% 96%   Weight: 88.8 kg (195 lb 12.8 oz)      Height:         General:  Appears more comfortable today, nontoxic nondiaphoretic  Head and eyes:  Anicteric sclera, no conjunctival discharge   ENT:  Moist mucous membranes, no thrush   Pulmonary:  Frequent cough, comfortable work of breathing on 2L nasal cannula oxygen, rhonchi lower lung zones improving  Cardiovascular:  2+ radial pulses, regular rate and rhythm   GI:  Abdomen soft nontender, nondistended, no guarding or rebound   Skin:  Dry and warm with no jaundice   Psych:  Mood is calm, affect restricted, insight fair   Neuro:  Nonfocal motor exam, alert and oriented, fluent speech    Labs:  Sodium 138   Potassium 3.5   Hemoglobin 10   Magnesium 1.5    Viral panel positive rhino virus/enterovirus    Assessment/Plan:     * Pulmonary infiltrates due to pneumonia  Acute hypoxemic respiratory failure  Bronchiectasis DAVE per Hx  Post tussive rales noted  New bilateral infiltrates  MRSA screen influenza antigen Covid 19 screen sputum culture  Vancomycin Cefepime and doxycycline as antibiotics  Hx of S marcescens cellulitis and sepsis  Dr Washington consulted  Pt may benefit from bronchoscopy    Will increase Mucinex dosing.    Increase frequency of bronchodilators.    Switch from Cheratussin to Tussionex for better cough control  Start chocolate ensure with meals.  Appreciate multiple consultants.  Continue course.   Continue empiric broad-spectrum IV antibiotics with meropenem, linezolid, and doxycycline  Appreciate Infectious Disease, follow-up culture data and tailor antibiotic regimen as able  IVIG administered, appreciate oncology, follow-up IgG levels  Mobilize as able  High risk secondary to administration of medication requiring close monitoring of levels to prevent toxicity-vancomycin;, severe exacerbation of chronic illness; acute illness with risk to life      Chronic obstructive pulmonary  disease, unspecified COPD type  Treat as exacerbation without steroids at present      Follicular lymphoma grade IIIa  Oncology consulted  Would this patient benefit from IVIG    Hypokalemia and hypomagnesemia       Likely secondary to GI losses from diarrhea, gastroenteritis       Replace electrolytes, serial labs, may need ongoing replacement      VTE Risk Mitigation (From admission, onward)           Ordered     enoxaparin injection 40 mg  Daily         01/06/23 2120     IP VTE HIGH RISK PATIENT  Once         01/06/23 2120     Place sequential compression device  Until discontinued         01/06/23 2120                       Don Nicholas MD  Department of Hospital Medicine   Critical access hospital - Emergency Dept

## 2023-01-09 LAB
ANION GAP SERPL CALC-SCNC: 9 MMOL/L (ref 8–16)
BASOPHILS # BLD AUTO: 0.02 K/UL (ref 0–0.2)
BASOPHILS NFR BLD: 0.9 % (ref 0–1.9)
BUN SERPL-MCNC: 13 MG/DL (ref 8–23)
CALCIUM SERPL-MCNC: 8.9 MG/DL (ref 8.7–10.5)
CHLORIDE SERPL-SCNC: 98 MMOL/L (ref 95–110)
CO2 SERPL-SCNC: 30 MMOL/L (ref 23–29)
CREAT SERPL-MCNC: 0.8 MG/DL (ref 0.5–1.4)
DIFFERENTIAL METHOD: ABNORMAL
EOSINOPHIL # BLD AUTO: 0.1 K/UL (ref 0–0.5)
EOSINOPHIL NFR BLD: 5.8 % (ref 0–8)
ERYTHROCYTE [DISTWIDTH] IN BLOOD BY AUTOMATED COUNT: 13.8 % (ref 11.5–14.5)
EST. GFR  (NO RACE VARIABLE): >60 ML/MIN/1.73 M^2
GLUCOSE SERPL-MCNC: 114 MG/DL (ref 70–110)
GLUCOSE SERPL-MCNC: 119 MG/DL (ref 70–110)
GLUCOSE SERPL-MCNC: 131 MG/DL (ref 70–110)
GLUCOSE SERPL-MCNC: 133 MG/DL (ref 70–110)
GLUCOSE SERPL-MCNC: 148 MG/DL (ref 70–110)
HCT VFR BLD AUTO: 34.3 % (ref 40–54)
HGB BLD-MCNC: 10.9 G/DL (ref 14–18)
IGG SERPL-MCNC: 526 MG/DL (ref 603–1613)
IMM GRANULOCYTES # BLD AUTO: 0.11 K/UL (ref 0–0.04)
IMM GRANULOCYTES NFR BLD AUTO: 4.9 % (ref 0–0.5)
LYMPHOCYTES # BLD AUTO: 0.7 K/UL (ref 1–4.8)
LYMPHOCYTES NFR BLD: 29.3 % (ref 18–48)
MAGNESIUM SERPL-MCNC: 1.7 MG/DL (ref 1.6–2.6)
MCH RBC QN AUTO: 26.7 PG (ref 27–31)
MCHC RBC AUTO-ENTMCNC: 31.8 G/DL (ref 32–36)
MCV RBC AUTO: 84 FL (ref 82–98)
MONOCYTES # BLD AUTO: 0.5 K/UL (ref 0.3–1)
MONOCYTES NFR BLD: 22.7 % (ref 4–15)
NEUTROPHILS # BLD AUTO: 0.8 K/UL (ref 1.8–7.7)
NEUTROPHILS NFR BLD: 36.4 % (ref 38–73)
NRBC BLD-RTO: 0 /100 WBC
PHOSPHATE SERPL-MCNC: 5.4 MG/DL (ref 2.7–4.5)
PLATELET # BLD AUTO: 296 K/UL (ref 150–450)
PLATELET BLD QL SMEAR: ABNORMAL
PMV BLD AUTO: 8.9 FL (ref 9.2–12.9)
POTASSIUM SERPL-SCNC: 3.9 MMOL/L (ref 3.5–5.1)
PROCALCITONIN SERPL IA-MCNC: 1.35 NG/ML (ref 0–0.5)
RBC # BLD AUTO: 4.09 M/UL (ref 4.6–6.2)
SODIUM SERPL-SCNC: 137 MMOL/L (ref 136–145)
STOOL CULTURE: NORMAL
STOOL CULTURE: NORMAL
WBC # BLD AUTO: 2.25 K/UL (ref 3.9–12.7)

## 2023-01-09 PROCEDURE — 85025 COMPLETE CBC W/AUTO DIFF WBC: CPT | Performed by: INTERNAL MEDICINE

## 2023-01-09 PROCEDURE — 80048 BASIC METABOLIC PNL TOTAL CA: CPT | Performed by: INTERNAL MEDICINE

## 2023-01-09 PROCEDURE — 94761 N-INVAS EAR/PLS OXIMETRY MLT: CPT

## 2023-01-09 PROCEDURE — 25000003 PHARM REV CODE 250: Performed by: INTERNAL MEDICINE

## 2023-01-09 PROCEDURE — 25000242 PHARM REV CODE 250 ALT 637 W/ HCPCS: Performed by: INTERNAL MEDICINE

## 2023-01-09 PROCEDURE — 94664 DEMO&/EVAL PT USE INHALER: CPT

## 2023-01-09 PROCEDURE — 84145 PROCALCITONIN (PCT): CPT | Performed by: INTERNAL MEDICINE

## 2023-01-09 PROCEDURE — 83735 ASSAY OF MAGNESIUM: CPT | Performed by: INTERNAL MEDICINE

## 2023-01-09 PROCEDURE — 99232 SBSQ HOSP IP/OBS MODERATE 35: CPT | Mod: ,,, | Performed by: INTERNAL MEDICINE

## 2023-01-09 PROCEDURE — 94640 AIRWAY INHALATION TREATMENT: CPT

## 2023-01-09 PROCEDURE — 36415 COLL VENOUS BLD VENIPUNCTURE: CPT | Performed by: INTERNAL MEDICINE

## 2023-01-09 PROCEDURE — 99900031 HC PATIENT EDUCATION (STAT)

## 2023-01-09 PROCEDURE — 21400001 HC TELEMETRY ROOM

## 2023-01-09 PROCEDURE — 99900035 HC TECH TIME PER 15 MIN (STAT)

## 2023-01-09 PROCEDURE — 94799 UNLISTED PULMONARY SVC/PX: CPT

## 2023-01-09 PROCEDURE — 84100 ASSAY OF PHOSPHORUS: CPT | Performed by: INTERNAL MEDICINE

## 2023-01-09 PROCEDURE — 63600175 PHARM REV CODE 636 W HCPCS: Performed by: INTERNAL MEDICINE

## 2023-01-09 PROCEDURE — 99232 PR SUBSEQUENT HOSPITAL CARE,LEVL II: ICD-10-PCS | Mod: ,,, | Performed by: INTERNAL MEDICINE

## 2023-01-09 RX ADMIN — BENZONATATE 200 MG: 100 CAPSULE ORAL at 09:01

## 2023-01-09 RX ADMIN — GABAPENTIN 300 MG: 300 CAPSULE ORAL at 09:01

## 2023-01-09 RX ADMIN — LOPERAMIDE HYDROCHLORIDE 2 MG: 2 CAPSULE ORAL at 09:01

## 2023-01-09 RX ADMIN — ASPIRIN 81 MG: 81 TABLET, COATED ORAL at 09:01

## 2023-01-09 RX ADMIN — HYDROCODONE POLISTIREX AND CHLORPHENIRAMINE POLISTIREX 5 ML: 10; 8 SUSPENSION, EXTENDED RELEASE ORAL at 01:01

## 2023-01-09 RX ADMIN — IPRATROPIUM BROMIDE AND ALBUTEROL SULFATE 3 ML: 2.5; .5 SOLUTION RESPIRATORY (INHALATION) at 08:01

## 2023-01-09 RX ADMIN — MEROPENEM AND SODIUM CHLORIDE 1 G: 1 INJECTION, SOLUTION INTRAVENOUS at 12:01

## 2023-01-09 RX ADMIN — CELECOXIB 100 MG: 100 CAPSULE ORAL at 09:01

## 2023-01-09 RX ADMIN — DOXYCYCLINE HYCLATE 100 MG: 100 CAPSULE ORAL at 09:01

## 2023-01-09 RX ADMIN — HYDROCODONE POLISTIREX AND CHLORPHENIRAMINE POLISTIREX 5 ML: 10; 8 SUSPENSION, EXTENDED RELEASE ORAL at 12:01

## 2023-01-09 RX ADMIN — AMLODIPINE BESYLATE 10 MG: 5 TABLET ORAL at 09:01

## 2023-01-09 RX ADMIN — MEROPENEM AND SODIUM CHLORIDE 1 G: 1 INJECTION, SOLUTION INTRAVENOUS at 04:01

## 2023-01-09 RX ADMIN — IPRATROPIUM BROMIDE AND ALBUTEROL SULFATE 3 ML: 2.5; .5 SOLUTION RESPIRATORY (INHALATION) at 01:01

## 2023-01-09 RX ADMIN — ATORVASTATIN CALCIUM 20 MG: 20 TABLET, FILM COATED ORAL at 09:01

## 2023-01-09 RX ADMIN — PANTOPRAZOLE SODIUM 40 MG: 40 TABLET, DELAYED RELEASE ORAL at 05:01

## 2023-01-09 RX ADMIN — FLUTICASONE PROPIONATE 50 MCG: 50 SPRAY, METERED NASAL at 09:01

## 2023-01-09 RX ADMIN — MEROPENEM AND SODIUM CHLORIDE 1 G: 1 INJECTION, SOLUTION INTRAVENOUS at 09:01

## 2023-01-09 RX ADMIN — GUAIFENESIN 1200 MG: 600 TABLET, EXTENDED RELEASE ORAL at 09:01

## 2023-01-09 RX ADMIN — LINEZOLID 600 MG: 600 TABLET, FILM COATED ORAL at 09:01

## 2023-01-09 RX ADMIN — ENOXAPARIN SODIUM 40 MG: 100 INJECTION SUBCUTANEOUS at 05:01

## 2023-01-09 RX ADMIN — LOSARTAN POTASSIUM 100 MG: 50 TABLET, FILM COATED ORAL at 09:01

## 2023-01-09 RX ADMIN — MONTELUKAST 10 MG: 10 TABLET, FILM COATED ORAL at 09:01

## 2023-01-09 NOTE — PROGRESS NOTES
"[facility]   Hematology/Oncology  Inpatient Progress Note          Patient Name: Sivakumar Thacker  MRN: [id number]  Admission Date: 1/6/2023  Hospital Length of Stay: 3 days  Code Status: Full Code   Attending Provider: Don Nicholas MD  Consulting Provider: Jefferson Ibarra MD  Primary Care Physician: Barry Mcmahon MD  Principal Problem:Pulmonary infiltrates      Subjective:       Patient ID: Sivakumar Thacker is a 67 y.o. male.    Chief Complaint: Abdominal Pain (W/ diarrhea x1 week.) and Cough (W/ back pain on "my lungs" x1 day)        History Present Illness:    Patient was seen by my associate Dr DAVE harmon over the weekend; he is sitting up in bed; he has been followed by Dr Washington with ID this admit; s/p IV IgG on Saturday; he is on antibiotics and feeling a little better; some residual cough with yellowish sputum; two female family members are at bedside      Review of Systems:  GEN: no current fever, night sweats or weight loss; general malaise  HEENT: normal with no HA's, sore throat, stiff neck, changes in vision  CV: normal with no CP, , PND, VASQUEZ or orthopnea  PULM: chronic SOB; cough with yellow sputum  GI: normal with no abdominal pain, nausea, vomiting, constipation, diarrhea, melanotic stools, BRBPR, or hematemesis  : normal with no hematuria, dysuria  BREAST: normal with no mass, discharge, pain  SKIN: normal with no rash, erythema, bruising, or swelling      Objective:     Vitals:  Blood pressure (!) 144/82, pulse 76, temperature 97.9 °F (36.6 °C), temperature source Oral, resp. rate 20, height 5' 8" (1.727 m), weight 90.2 kg (198 lb 14.4 oz), SpO2 (!) 91 %.    Physical Exam:  GEN: no apparent distress, comfortable; AAOx3  HEAD: atraumatic and normocephalic  EYES: no pallor, no icterus, PERRLA  ENT: OMM, no pharyngeal erythema, external ears WNL; no nasal discharge; no thrush;    NECK: no masses, thyroid normal, trachea midline, no LAD/LN's, supple  CV: RRR with no " murmur; normal pulse; normal S1 and S2; no pedal edema; prior portacath since removed  CHEST: Normal respiratory effort; chronic coarseness, mild wheeze bilaterally    ABDOM: nontender and nondistended; soft; normal bowel sounds; no rebound/guarding  MUSC/Skeletal: ROM normal; no crepitus; joints normal; no deformities or arthropathy  EXTREM: no clubbing, cyanosis, inflammation or swelling; mid-line on LUE  SKIN: no rashes, lesions, ulcers, petechiae or subcutaneous nodules ;    : no gibbs  NEURO: grossly intact; motor/sensory WNL; AAOx3; no tremors  PSYCH: normal mood, affect and behavior  LYMPH: normal cervical, supraclavicular, axillary and groin LN's            Lab Review:        Lab Results   Component Value Date    WBC 2.25 (L) 01/09/2023    HGB 10.9 (L) 01/09/2023    HCT 34.3 (L) 01/09/2023    MCV 84 01/09/2023     01/09/2023       CMP  Sodium   Date Value Ref Range Status   01/09/2023 137 136 - 145 mmol/L Final   04/25/2019 144 134 - 144 mmol/L      Potassium   Date Value Ref Range Status   01/09/2023 3.9 3.5 - 5.1 mmol/L Final     Chloride   Date Value Ref Range Status   01/09/2023 98 95 - 110 mmol/L Final   04/25/2019 107 98 - 110 mmol/L      CO2   Date Value Ref Range Status   01/09/2023 30 (H) 23 - 29 mmol/L Final     Glucose   Date Value Ref Range Status   01/09/2023 131 (H) 70 - 110 mg/dL Final   04/25/2019 177 (H) 70 - 99 mg/dL      BUN   Date Value Ref Range Status   01/09/2023 13 8 - 23 mg/dL Final     Creatinine   Date Value Ref Range Status   01/09/2023 0.8 0.5 - 1.4 mg/dL Final   04/25/2019 0.83 0.60 - 1.40 mg/dL      Calcium   Date Value Ref Range Status   01/09/2023 8.9 8.7 - 10.5 mg/dL Final     Total Protein   Date Value Ref Range Status   01/06/2023 7.0 6.0 - 8.4 g/dL Final     Albumin   Date Value Ref Range Status   01/06/2023 3.2 (L) 3.5 - 5.2 g/dL Final   04/25/2019 4.4 3.1 - 4.7 g/dL      Total Bilirubin   Date Value Ref Range Status   01/06/2023 0.8 0.1 - 1.0 mg/dL Final      Comment:     For infants and newborns, interpretation of results should be based  on gestational age, weight and in agreement with clinical  observations.    Premature Infant recommended reference ranges:  Up to 24 hours.............<8.0 mg/dL  Up to 48 hours............<12.0 mg/dL  3-5 days..................<15.0 mg/dL  6-29 days.................<15.0 mg/dL       Alkaline Phosphatase   Date Value Ref Range Status   01/06/2023 110 55 - 135 U/L Final     AST   Date Value Ref Range Status   01/06/2023 30 10 - 40 U/L Final     ALT   Date Value Ref Range Status   01/06/2023 21 10 - 44 U/L Final     Anion Gap   Date Value Ref Range Status   01/09/2023 9 8 - 16 mmol/L Final     eGFR   Date Value Ref Range Status   01/09/2023 >60.0 >60 mL/min/1.73 m^2 Final           Radiology Diagnostic Studies:           Assessment:     IMPRESSION:    (1) 67 y.o. male  known to my oncology service with diagnosis of low grade NHL for which he had been on rituximab maintenance therapy in past and recently discontinued couple of months ago. He presented to the ED with SOB and working diagnosis of pneumonia  - patient was seen by my associate Dr DAVE Fitzpatrick over the weekend    1/9/2023:  - wbc 2.25; hgb at 10.9  - Dr Washington with ID following  - on abx    (2) Low grade follicular NHL - originally diagnosed in 2012  - s/p 6 cycles of bendamustine-rituximab through [facility]  - s/p rituximab maintenance every 8 weeks discontinued couple of months ago; seen by Dr Nasreen Abarca Lymphoma specialist at [facility]        (3) Hypogammaglobulinemia   - on IV IgG monthly     (4)  Chronic bronchiectasis/bronchiolitis with TONY - followed by Dr Veliz and Lucia Georges      (5) Chronic Neuropathy     (6) GERD; Steatosis of liver; Diverticular disease     (7) DM - on metformin per Dr Nunez     (8) HTN     (9) Degenerative disc disease of back                   1. Pneumonia of both lungs due to infectious organism, unspecified part of lung    2. Shortness of  breath    3. Pulmonary infiltrates    4. Follicular lymphoma grade IIIa, unspecified body region [C82.30 (ICD-10-CM)]    5. Nonfamilial hypogammaglobulinemia [D80.1 (ICD-10-CM)]    6. Diarrhea of infectious origin [A09 (ICD-10-CM)]    7. Enterovirus infection [B34.1 (ICD-10-CM)]           Plan:     PLAN:          Monitor labs   IV antibiotics as per ID directives  Given his underlying chronic lung issues, recommend consideration for pulmonary consultation  Will follow with you               Jefferson Ibarra MD  Hematology/Oncology  [facility]

## 2023-01-09 NOTE — CARE UPDATE
01/09/23 1427   Home Oxygen Qualification   Room Air SpO2 At Rest 92 %   Room Air SpO2 During Ambulation 90 %   SpO2 Post Ambulation 92 %   Post Ambulation Heart Rate 108 bpm   Home O2 Eval Comments Patients sats while ambulating were 90%, dipped to 89% twice for a couple of seconds, no distress noted. Patient does not require home O2.

## 2023-01-09 NOTE — CARE UPDATE
01/08/23 2014   Patient Assessment/Suction   Level of Consciousness (AVPU) alert   Respiratory Effort Normal;Unlabored   Expansion/Accessory Muscles/Retractions no use of accessory muscles   All Lung Fields Breath Sounds diminished   Rhythm/Pattern, Respiratory unlabored   Cough Frequency frequent   Cough Type productive;good   PRE-TX-O2   Device (Oxygen Therapy) nasal cannula   $ Is the patient on Low Flow Oxygen? Yes   Flow (L/min) 1   SpO2 (!) 94 %   Pulse Oximetry Type Intermittent   $ Pulse Oximetry - Multiple Charge Pulse Oximetry - Multiple   Pulse 98   Resp 19   Aerosol Therapy   $ Aerosol Therapy Charges Aerosol Treatment   Daily Review of Necessity (SVN) completed   Respiratory Treatment Status (SVN) given   Treatment Route (SVN) mask;oxygen   Patient Position (SVN) HOB elevated   Post Treatment Assessment (SVN) breath sounds unchanged   Signs of Intolerance (SVN) none   Education   $ Education Bronchodilator;15 min   Respiratory Evaluation   $ Care Plan Tech Time 15 min   $ Eval/Re-eval Charges Evaluation

## 2023-01-09 NOTE — PROGRESS NOTES
Consult Note  Infectious Disease    Reason for Consult:  pneumonia    HPI: Sivakumar Thacker is a 67 y.o. male known to our service, seen in october for left axillary abscess, and again in mid November for cellulitis of the left upper chest(after small abrasion left hand) with office follow up on 11/22. he receives IVIG(50 g Gamunex) for hypogammaglobulinemia and is currently on hiatus from lymphoma treatment. he has a history of bronchiectasis and chronic sinusitis(resolved) from 10/2022. He is follow ed by Dr. Ibarra and hem/onc at [facility] underwent a PET/CT on 1/5 which demonstrated R>L ground glass infiltrate and small LLL pneumonia and increased activity throughout the skeleton suggestive of bone marrow hyperstimulation.   He was sent to the ED today from the cancer center with worsening of complaints of nausea/diarrhea x 1 week(no suspicious meals or exposure to persons with GI illness), pleuritic pain left lower posterior chest, with a cough(4 days duration) productive of small amounts of brown sputum,with associated SOB. He did not seek medical attention until a regularly scheduled visit wed 1/4. He did not receive his regularly scheduled IVIG this am.CT abd benign.  CTA chest has some lower lobe ground glass opacities(better than yesterday) and a LLL consolidation(worse than yesterday). He was given vanc, cefepime and doxy and IVF in the ED.     1/7:  Temperature is under 99, white blood cells 3.62,with 26% monos today.  O2 saturation 95 on 2 L.  Intake/output not completely recorded.  MRSA screen negative, respiratory PCR panel not yet collected.  Sputum culture only normal dank so far/. He still has some pleurisy. Stool WBC positive. Culture in progress. Less liquid stool. Eating more.   1/8: Interim reviewed.  Temperatures around 99.  Respiratory viral panel was positive for human rhino virus/enterovirus.  Received IVIG 50 g yesterday without difficulty.  Blood cultures remain negative.  Sputum  culture is normal dank.  He is still having loose stool.  He is eating more.  He is feeling better from respiratory standpoint.  He has not been walking around very much but this was encouraged.   1/9: Interim reviewed.  Low-grade temperature only 98-99.  White blood cells are little lower today, platelets are preserved.  No new positives on cultures.  Stool culture is negative, GI PCR panel is pending.  Mj IgG level was 526.  He did not qualify for home oxygen per 6 minute walk. The diarrhea may also be exacerbated by the zyvox.will d/c. He feelsthat he can go home tomorrow.     EXAM & DIAGNOSTICS REVIEWED:   Vitals:     Temp:  [97.8 °F (36.6 °C)-99.5 °F (37.5 °C)]   Temp: 98.9 °F (37.2 °C) (01/09/23 1523)  Pulse: 94 (01/09/23 1523)  Resp: 20 (01/09/23 1523)  BP: (!) 145/81 (01/09/23 1523)  SpO2: (!) 92 % (01/09/23 1523)    Intake/Output Summary (Last 24 hours) at 1/9/2023 1626  Last data filed at 1/9/2023 1526  Gross per 24 hour   Intake 440 ml   Output --   Net 440 ml       General:  In NAD. Alert and attentive, cooperative, comfortable. Able to give a good history.   Eyes:  Anicteric,  EOMI, no scleral or conjunctival injection  ENT:  No ulcers, exudates, thrush, nares patent, dentition is good  Neck:  supple,   Lungs:  Faintcracklesleft paraspinous area. He can pull >2000 on IS  Heart:  RRR, no gallop/murmur/rub noted  Abd:  Soft, NT, ND, normal BS, no masses or organomegaly appreciated.  :  Voids/ , no flank tenderness  Musc:  Joints without effusion, swelling, erythema, synovitis, muscle wasting.   Skin:  No rashes   Neuro:             Alert, attentive, speech fluent, face symmetric, moves all extremities, no focal weakness. Ambulatory  Psych: Calm, cooperative  Lymphatic:      Extrem: No edema, erythema, phlebitis, cellulitis, warm and well perfused  VAD:   Peripheral and left arm midline    Isolation:  none  Wound: No recurrence of prior chest wall infection           General Labs reviewed:  Recent  Labs   Lab 01/07/23  0421 01/08/23 0418 01/09/23  0454   WBC 3.62* 3.15* 2.25*   HGB 11.2* 10.5* 10.9*   HCT 35.5* 34.2* 34.3*    281 296       Recent Labs   Lab 01/06/23  0937 01/07/23  0421 01/08/23 0418 01/09/23  0454   * 138 138 137   K 3.7 3.4* 3.5 3.9   CL 98 101 99 98   CO2 27 28 31* 30*   BUN 13 10 14 13   CREATININE 1.0 0.9 0.9 0.8   CALCIUM 8.9 8.4* 8.8 8.9   PROT 7.0  --   --   --    BILITOT 0.8  --   --   --    ALKPHOS 110  --   --   --    ALT 21  --   --   --    AST 30  --   --   --      No results for input(s): CRP in the last 168 hours.      Micro:  Microbiology Results (last 7 days)       Procedure Component Value Units Date/Time    Blood culture #2 **CANNOT BE ORDERED STAT** [[id number]] Collected: 01/06/23 1140    Order Status: Completed Specimen: Blood from Peripheral, Antecubital, Left Updated: 01/09/23 1232     Blood Culture, Routine No Growth to date      No Growth to date      No Growth to date      No Growth to date    Blood culture #1 **CANNOT BE ORDERED STAT** [[id number]] Collected: 01/06/23 1202    Order Status: Completed Specimen: Blood from Peripheral, Forearm, Left Updated: 01/09/23 1232     Blood Culture, Routine No Growth to date      No Growth to date      No Growth to date      No Growth to date    Stool culture **cannot be ordered stat** [[id number]] Collected: 01/07/23 0834    Order Status: Completed Specimen: Stool Updated: 01/09/23 0757     Stool Culture No Salmonella,Shigella,Vibrio,Campylobacter.      No E coli 0157:H7 isolated.    Culture, Respiratory with Gram Stain [[id number]] Collected: 01/06/23 1720    Order Status: Completed Specimen: Respiratory from Sputum Updated: 01/08/23 1444     Respiratory Culture Normal respiratory dank     Gram Stain (Respiratory) Many WBC's     Gram Stain (Respiratory) <10 epithelial cells per low power field.     Gram Stain (Respiratory) Many Gram positive cocci    Respiratory Infection Panel (PCR), Nasopharyngeal [[id number]]   (Abnormal) Collected: 01/07/23 1319    Order Status: Completed Specimen: Nasopharyngeal Swab Updated: 01/07/23 2147     Respiratory Infection Panel Source NP swab     Adenovirus Not Detected     Coronavirus 229E, Common Cold Virus Not Detected     Coronavirus HKU1, Common Cold Virus Not Detected     Coronavirus NL63, Common Cold Virus Not Detected     Coronavirus OC43, Common Cold Virus Not Detected     Comment: The Coronavirus strains detected in this test cause the common cold.  These strains are not the COVID-19 (novel Coronavirus)strain   associated with the respiratory disease outbreak.          SARS-CoV2 (COVID-19) Qualitative PCR Not Detected     Human Metapneumovirus Not Detected     Human Rhinovirus/Enterovirus Detected     Influenza A (subtypes H1, H1-2009,H3) Not Detected     Influenza B Not Detected     Parainfluenza Virus 1 Not Detected     Parainfluenza Virus 2 Not Detected     Parainfluenza Virus 3 Not Detected     Parainfluenza Virus 4 Not Detected     Respiratory Syncytial Virus Not Detected     Bordetella Parapertussis (PE4179) Not Detected     Bordetella pertussis (ptxP) Not Detected     Chlamydia pneumoniae Not Detected     Mycoplasma pneumoniae Not Detected     Comment: Respiratory Infection Panel testing performed by Multiplex PCR.       Narrative:      Respiratory Infection Panel source->NP Swab    MRSA Screen by PCR [[id number]] Collected: 01/06/23 1644    Order Status: Completed Specimen: Nasopharyngeal Swab from Nasal Updated: 01/06/23 2347     MRSA SCREEN BY PCR Negative            Imaging Reviewed:   CXR   PET CT 1/5/23  : Diffuse reticular nodular and groundglass opacities the left lower lobe, posterior right upper lobe and posterior medial right lower lobe compatible with multifocal pneumonia. Follow-up chest CT in three months is recommended   Diffuse FDG activity throughout the axial skeleton suggestive of bone marrow hyperstimulation   No evidence of recurrent or metastatic  disease      CTA chest 1/6  IMPRESSION:  1. Negative for pulmonary thromboembolism.  2. Bilateral multifocal bronchiolitis and bronchopneumonia, including consolidated pneumonia in the left lower lobe. Follow-up PA and lateral chest radiograph in 4-6 weeks after appropriate therapy is recommended to document complete resolution.  3. Aortic and coronary arterial calcifications    CT abd/pelvis 1/6  1. Left greater than right lower lobe alveolar opacities including left lower lobe consolidation, characteristic of infectious or inflammatory pneumonia. Correlation for aspiration is requested.  2. Diverticulosis.  3. No acute findings throughout the abdomen or pelvis  Cardiology:    IMPRESSION & PLAN   1. Community acquired pneumonia   Diarrheal element.  Stool culture negative, PCR panel pending  Respiratory viral panel positive for human rhino virus/enterovirus  Oxygenation is borderline without oxygen supplemented.  But he does not meet criteria for home oxygen.  2. Immunocompromised by diagnosis of lymphoma, prior treatment and hypogammaglobulinemia status post IVIG 50 g 1/7.  Mj prior to the infusion was 526  3. History of bronchiectasis  4. Volume depletion  5. Eosinophilia(resolved) but present after last admits and antibiotics. Did not escalate after last IVIG, given 12/9      Recommendations:  Stop zyvox   Home tomorrow on po levaquin for a week  GI pcr panel in progress  Ensure clear  Continue ambulation  May need IVIG dose increased.  Legionella urine antigen pending    D/w  patient and family member    Medical Decision Making during this encounter was  [_] Low Complexity  [_] Moderate Complexity  [ xxx ] High Complexity

## 2023-01-10 LAB
ADV 40+41 DNA STL QL NAA+NON-PROBE: NOT DETECTED
ANION GAP SERPL CALC-SCNC: 7 MMOL/L (ref 8–16)
ASTRO TYP 1-8 RNA STL QL NAA+NON-PROBE: NOT DETECTED
BASOPHILS # BLD AUTO: 0.03 K/UL (ref 0–0.2)
BASOPHILS NFR BLD: 1.1 % (ref 0–1.9)
BUN SERPL-MCNC: 14 MG/DL (ref 8–23)
C CAYETANENSIS DNA STL QL NAA+NON-PROBE: NOT DETECTED
C COLI+JEJ+UPSA DNA STL QL NAA+NON-PROBE: DETECTED
C DIF TOX TCDA+TCDB STL QL NAA+NON-PROBE: NOT DETECTED
CALCIUM SERPL-MCNC: 9 MG/DL (ref 8.7–10.5)
CHLORIDE SERPL-SCNC: 100 MMOL/L (ref 95–110)
CO2 SERPL-SCNC: 30 MMOL/L (ref 23–29)
CREAT SERPL-MCNC: 0.8 MG/DL (ref 0.5–1.4)
CRYPTOSP DNA STL QL NAA+NON-PROBE: NOT DETECTED
DIFFERENTIAL METHOD: ABNORMAL
E COLI O157 DNA STL QL NAA+NON-PROBE: NOT DETECTED
E HISTOLYT DNA STL QL NAA+NON-PROBE: NOT DETECTED
EC STX1+STX2 GENES STL QL NAA+NON-PROBE: NOT DETECTED
ENTEROAGGREGATIVE E COLI: NOT DETECTED
ENTEROPATHOGENIC E COLI: NOT DETECTED
EOSINOPHIL # BLD AUTO: 0.2 K/UL (ref 0–0.5)
EOSINOPHIL NFR BLD: 6.8 % (ref 0–8)
ERYTHROCYTE [DISTWIDTH] IN BLOOD BY AUTOMATED COUNT: 13.8 % (ref 11.5–14.5)
EST. GFR  (NO RACE VARIABLE): >60 ML/MIN/1.73 M^2
ETEC LTA+ST1A+ST1B TOX ST NAA+NON-PROBE: NOT DETECTED
G LAMBLIA DNA STL QL NAA+NON-PROBE: NOT DETECTED
GLUCOSE SERPL-MCNC: 116 MG/DL (ref 70–110)
GLUCOSE SERPL-MCNC: 124 MG/DL (ref 70–110)
GLUCOSE SERPL-MCNC: 130 MG/DL (ref 70–110)
GLUCOSE SERPL-MCNC: 131 MG/DL (ref 70–110)
GLUCOSE SERPL-MCNC: 149 MG/DL (ref 70–110)
GPP - SALMONELLA: NOT DETECTED
GPP - VIBRIO CHOLERA: NOT DETECTED
GPP - YERSINIA ENTEROCOLITICA: NOT DETECTED
HCT VFR BLD AUTO: 37.1 % (ref 40–54)
HGB BLD-MCNC: 11.6 G/DL (ref 14–18)
IMM GRANULOCYTES # BLD AUTO: 0.05 K/UL (ref 0–0.04)
IMM GRANULOCYTES NFR BLD AUTO: 1.9 % (ref 0–0.5)
L PNEUMO1 AG UR QL IA: NEGATIVE
LEGIONELLA SPECIMEN SOURCE: NORMAL
LYMPHOCYTES # BLD AUTO: 0.8 K/UL (ref 1–4.8)
LYMPHOCYTES NFR BLD: 29.2 % (ref 18–48)
MAGNESIUM SERPL-MCNC: 1.7 MG/DL (ref 1.6–2.6)
MCH RBC QN AUTO: 26.3 PG (ref 27–31)
MCHC RBC AUTO-ENTMCNC: 31.3 G/DL (ref 32–36)
MCV RBC AUTO: 84 FL (ref 82–98)
MONOCYTES # BLD AUTO: 0.5 K/UL (ref 0.3–1)
MONOCYTES NFR BLD: 18.9 % (ref 4–15)
NEUTROPHILS # BLD AUTO: 1.1 K/UL (ref 1.8–7.7)
NEUTROPHILS NFR BLD: 42.1 % (ref 38–73)
NOROVIRUS GI+II RNA STL QL NAA+NON-PROBE: NOT DETECTED
NRBC BLD-RTO: 0 /100 WBC
PHOSPHATE SERPL-MCNC: 5.1 MG/DL (ref 2.7–4.5)
PLATELET # BLD AUTO: 325 K/UL (ref 150–450)
PLESIOMONAS SHIGELLOIDES: NOT DETECTED
PMV BLD AUTO: 9 FL (ref 9.2–12.9)
POTASSIUM SERPL-SCNC: 4.2 MMOL/L (ref 3.5–5.1)
RBC # BLD AUTO: 4.41 M/UL (ref 4.6–6.2)
RVA RNA STL QL NAA+NON-PROBE: NOT DETECTED
SAPO I+II+IV+V RNA STL QL NAA+NON-PROBE: NOT DETECTED
SHIGELLA SP+EIEC IPAH ST NAA+NON-PROBE: NOT DETECTED
SODIUM SERPL-SCNC: 137 MMOL/L (ref 136–145)
TROPONIN I SERPL HS-MCNC: 6 PG/ML (ref 0–14.9)
VIBRIO: NOT DETECTED
WBC # BLD AUTO: 2.64 K/UL (ref 3.9–12.7)

## 2023-01-10 PROCEDURE — 93010 ELECTROCARDIOGRAM REPORT: CPT | Mod: ,,, | Performed by: INTERNAL MEDICINE

## 2023-01-10 PROCEDURE — 99900031 HC PATIENT EDUCATION (STAT)

## 2023-01-10 PROCEDURE — 36415 COLL VENOUS BLD VENIPUNCTURE: CPT | Performed by: INTERNAL MEDICINE

## 2023-01-10 PROCEDURE — 99232 SBSQ HOSP IP/OBS MODERATE 35: CPT | Mod: ,,, | Performed by: INTERNAL MEDICINE

## 2023-01-10 PROCEDURE — 25000003 PHARM REV CODE 250: Performed by: FAMILY MEDICINE

## 2023-01-10 PROCEDURE — 83735 ASSAY OF MAGNESIUM: CPT | Performed by: INTERNAL MEDICINE

## 2023-01-10 PROCEDURE — 25000003 PHARM REV CODE 250: Performed by: INTERNAL MEDICINE

## 2023-01-10 PROCEDURE — 85025 COMPLETE CBC W/AUTO DIFF WBC: CPT | Performed by: INTERNAL MEDICINE

## 2023-01-10 PROCEDURE — 63600175 PHARM REV CODE 636 W HCPCS: Performed by: INTERNAL MEDICINE

## 2023-01-10 PROCEDURE — 94640 AIRWAY INHALATION TREATMENT: CPT

## 2023-01-10 PROCEDURE — 25500020 PHARM REV CODE 255: Performed by: FAMILY MEDICINE

## 2023-01-10 PROCEDURE — 36415 COLL VENOUS BLD VENIPUNCTURE: CPT | Performed by: FAMILY MEDICINE

## 2023-01-10 PROCEDURE — 21400001 HC TELEMETRY ROOM

## 2023-01-10 PROCEDURE — 99900035 HC TECH TIME PER 15 MIN (STAT)

## 2023-01-10 PROCEDURE — 99232 PR SUBSEQUENT HOSPITAL CARE,LEVL II: ICD-10-PCS | Mod: ,,, | Performed by: INTERNAL MEDICINE

## 2023-01-10 PROCEDURE — 84484 ASSAY OF TROPONIN QUANT: CPT | Performed by: FAMILY MEDICINE

## 2023-01-10 PROCEDURE — 93005 ELECTROCARDIOGRAM TRACING: CPT | Performed by: INTERNAL MEDICINE

## 2023-01-10 PROCEDURE — 80048 BASIC METABOLIC PNL TOTAL CA: CPT | Performed by: INTERNAL MEDICINE

## 2023-01-10 PROCEDURE — 93010 EKG 12-LEAD: ICD-10-PCS | Mod: ,,, | Performed by: INTERNAL MEDICINE

## 2023-01-10 PROCEDURE — 84100 ASSAY OF PHOSPHORUS: CPT | Performed by: INTERNAL MEDICINE

## 2023-01-10 PROCEDURE — 25000242 PHARM REV CODE 250 ALT 637 W/ HCPCS: Performed by: INTERNAL MEDICINE

## 2023-01-10 PROCEDURE — 94761 N-INVAS EAR/PLS OXIMETRY MLT: CPT

## 2023-01-10 PROCEDURE — 27000221 HC OXYGEN, UP TO 24 HOURS

## 2023-01-10 PROCEDURE — 94664 DEMO&/EVAL PT USE INHALER: CPT

## 2023-01-10 RX ORDER — HYDROCODONE BITARTRATE AND ACETAMINOPHEN 5; 325 MG/1; MG/1
1 TABLET ORAL EVERY 4 HOURS
Status: DISCONTINUED | OUTPATIENT
Start: 2023-01-10 | End: 2023-01-11

## 2023-01-10 RX ORDER — HYDROCODONE BITARTRATE AND ACETAMINOPHEN 5; 325 MG/1; MG/1
1 TABLET ORAL EVERY 4 HOURS PRN
Status: DISCONTINUED | OUTPATIENT
Start: 2023-01-10 | End: 2023-01-12 | Stop reason: HOSPADM

## 2023-01-10 RX ORDER — MAG HYDROX/ALUMINUM HYD/SIMETH 200-200-20
30 SUSPENSION, ORAL (FINAL DOSE FORM) ORAL ONCE
Status: COMPLETED | OUTPATIENT
Start: 2023-01-10 | End: 2023-01-10

## 2023-01-10 RX ORDER — GUAIFENESIN 600 MG/1
1200 TABLET, EXTENDED RELEASE ORAL 2 TIMES DAILY
Status: DISCONTINUED | OUTPATIENT
Start: 2023-01-10 | End: 2023-01-12 | Stop reason: HOSPADM

## 2023-01-10 RX ORDER — LIDOCAINE HYDROCHLORIDE 20 MG/ML
15 SOLUTION OROPHARYNGEAL ONCE
Status: DISCONTINUED | OUTPATIENT
Start: 2023-01-10 | End: 2023-01-11

## 2023-01-10 RX ORDER — SUCRALFATE 1 G/10ML
1 SUSPENSION ORAL
Status: DISCONTINUED | OUTPATIENT
Start: 2023-01-10 | End: 2023-01-12 | Stop reason: HOSPADM

## 2023-01-10 RX ORDER — CYCLOBENZAPRINE HCL 10 MG
10 TABLET ORAL 3 TIMES DAILY PRN
Status: DISCONTINUED | OUTPATIENT
Start: 2023-01-10 | End: 2023-01-12 | Stop reason: HOSPADM

## 2023-01-10 RX ORDER — CELECOXIB 100 MG/1
200 CAPSULE ORAL 2 TIMES DAILY
Status: DISCONTINUED | OUTPATIENT
Start: 2023-01-11 | End: 2023-01-12 | Stop reason: HOSPADM

## 2023-01-10 RX ADMIN — IPRATROPIUM BROMIDE AND ALBUTEROL SULFATE 3 ML: 2.5; .5 SOLUTION RESPIRATORY (INHALATION) at 03:01

## 2023-01-10 RX ADMIN — PANTOPRAZOLE SODIUM 40 MG: 40 TABLET, DELAYED RELEASE ORAL at 06:01

## 2023-01-10 RX ADMIN — ATORVASTATIN CALCIUM 20 MG: 20 TABLET, FILM COATED ORAL at 09:01

## 2023-01-10 RX ADMIN — CYCLOBENZAPRINE 10 MG: 10 TABLET, FILM COATED ORAL at 09:01

## 2023-01-10 RX ADMIN — AMLODIPINE BESYLATE 10 MG: 5 TABLET ORAL at 09:01

## 2023-01-10 RX ADMIN — ONDANSETRON 8 MG: 4 TABLET, ORALLY DISINTEGRATING ORAL at 08:01

## 2023-01-10 RX ADMIN — LOPERAMIDE HYDROCHLORIDE 2 MG: 2 CAPSULE ORAL at 10:01

## 2023-01-10 RX ADMIN — FLUTICASONE PROPIONATE 50 MCG: 50 SPRAY, METERED NASAL at 08:01

## 2023-01-10 RX ADMIN — GUAIFENESIN 1200 MG: 600 TABLET, EXTENDED RELEASE ORAL at 08:01

## 2023-01-10 RX ADMIN — ASPIRIN 81 MG: 81 TABLET, COATED ORAL at 09:01

## 2023-01-10 RX ADMIN — MEROPENEM AND SODIUM CHLORIDE 1 G: 1 INJECTION, SOLUTION INTRAVENOUS at 08:01

## 2023-01-10 RX ADMIN — SUCRALFATE 1 G: 1 SUSPENSION ORAL at 04:01

## 2023-01-10 RX ADMIN — IPRATROPIUM BROMIDE AND ALBUTEROL SULFATE 3 ML: 2.5; .5 SOLUTION RESPIRATORY (INHALATION) at 10:01

## 2023-01-10 RX ADMIN — MONTELUKAST 10 MG: 10 TABLET, FILM COATED ORAL at 09:01

## 2023-01-10 RX ADMIN — CELECOXIB 100 MG: 100 CAPSULE ORAL at 08:01

## 2023-01-10 RX ADMIN — SUCRALFATE 1 G: 1 SUSPENSION ORAL at 02:01

## 2023-01-10 RX ADMIN — HYDROCODONE POLISTIREX AND CHLORPHENIRAMINE POLISTIREX 5 ML: 10; 8 SUSPENSION, EXTENDED RELEASE ORAL at 10:01

## 2023-01-10 RX ADMIN — IOHEXOL 100 ML: 350 INJECTION, SOLUTION INTRAVENOUS at 06:01

## 2023-01-10 RX ADMIN — GABAPENTIN 300 MG: 300 CAPSULE ORAL at 09:01

## 2023-01-10 RX ADMIN — MEROPENEM AND SODIUM CHLORIDE 1 G: 1 INJECTION, SOLUTION INTRAVENOUS at 04:01

## 2023-01-10 RX ADMIN — DOXYCYCLINE HYCLATE 100 MG: 100 CAPSULE ORAL at 09:01

## 2023-01-10 RX ADMIN — GUAIFENESIN 1200 MG: 600 TABLET, EXTENDED RELEASE ORAL at 09:01

## 2023-01-10 RX ADMIN — MEROPENEM AND SODIUM CHLORIDE 1 G: 1 INJECTION, SOLUTION INTRAVENOUS at 01:01

## 2023-01-10 RX ADMIN — IPRATROPIUM BROMIDE AND ALBUTEROL SULFATE 3 ML: 2.5; .5 SOLUTION RESPIRATORY (INHALATION) at 11:01

## 2023-01-10 RX ADMIN — MORPHINE SULFATE 4 MG: 4 INJECTION, SOLUTION INTRAMUSCULAR; INTRAVENOUS at 04:01

## 2023-01-10 RX ADMIN — CELECOXIB 100 MG: 100 CAPSULE ORAL at 09:01

## 2023-01-10 RX ADMIN — HYDROCODONE POLISTIREX AND CHLORPHENIRAMINE POLISTIREX 5 ML: 10; 8 SUSPENSION, EXTENDED RELEASE ORAL at 09:01

## 2023-01-10 RX ADMIN — SUCRALFATE 1 G: 1 SUSPENSION ORAL at 08:01

## 2023-01-10 RX ADMIN — IPRATROPIUM BROMIDE AND ALBUTEROL SULFATE 3 ML: 2.5; .5 SOLUTION RESPIRATORY (INHALATION) at 08:01

## 2023-01-10 RX ADMIN — HYDROCODONE BITARTRATE AND ACETAMINOPHEN 1 TABLET: 5; 325 TABLET ORAL at 07:01

## 2023-01-10 RX ADMIN — FLUTICASONE PROPIONATE 50 MCG: 50 SPRAY, METERED NASAL at 10:01

## 2023-01-10 RX ADMIN — ENOXAPARIN SODIUM 40 MG: 100 INJECTION SUBCUTANEOUS at 04:01

## 2023-01-10 RX ADMIN — GABAPENTIN 300 MG: 300 CAPSULE ORAL at 08:01

## 2023-01-10 RX ADMIN — LOSARTAN POTASSIUM 100 MG: 50 TABLET, FILM COATED ORAL at 09:01

## 2023-01-10 RX ADMIN — ALUMINUM HYDROXIDE, MAGNESIUM HYDROXIDE, AND SIMETHICONE 30 ML: 200; 200; 20 SUSPENSION ORAL at 02:01

## 2023-01-10 NOTE — PROGRESS NOTES
"[facility]   Hematology/Oncology  Inpatient Progress Note          Patient Name: Sivakumar Thacker  MRN: [id number]  Admission Date: 1/6/2023  Hospital Length of Stay: 4 days  Code Status: Full Code   Attending Provider: Don Nicholas MD  Consulting Provider: Jefferson Ibarra MD  Primary Care Physician: Barry Mcmahon MD  Principal Problem:Pulmonary infiltrates      Subjective:       Patient ID: Sivakumar Thacker is a 67 y.o. male.    Chief Complaint: Abdominal Pain (W/ diarrhea x1 week.) and Cough (W/ back pain on "my lungs" x1 day)        History Present Illness:    Patient  sitting up in chair; he has been having residual malaise and discomfort; some new onset back pain below scapula; CT ordered as a precaution; breathing seems stable; he did not eat well today due to some indigestion and upset stomach; female family member at bedside; I communicated with Dr Washington via epic       Review of Systems:  GEN: no current fever, night sweats or weight loss; general malaise  HEENT: normal with no HA's, sore throat, stiff neck, changes in vision  CV: normal with no CP, , PND, VASQUEZ or orthopnea  PULM: chronic SOB; cough with yellowish/white sputum  GI: some indigestion and upset stomach today  : normal with no hematuria, dysuria  BREAST: normal with no mass, discharge, pain  SKIN: normal with no rash, erythema, bruising, or swelling      Objective:     Vitals:  Blood pressure (!) 148/84, pulse 80, temperature 97.5 °F (36.4 °C), temperature source Oral, resp. rate 18, height 5' 8" (1.727 m), weight 89 kg (196 lb 4 oz), SpO2 95 %.    Physical Exam:  GEN: no apparent distress, comfortable; AAOx3  HEAD: atraumatic and normocephalic  EYES: no pallor, no icterus, PERRLA  ENT: OMM, no pharyngeal erythema, external ears WNL; no nasal discharge; no thrush;    NECK: no masses, thyroid normal, trachea midline, no LAD/LN's, supple  CV: RRR with no murmur; normal pulse; normal S1 and S2; no pedal edema; prior " portacath since removed  CHEST: Normal respiratory effort; chronic coarseness, mild wheeze bilaterally    ABDOM: nontender and nondistended; soft; normal bowel sounds; no rebound/guarding  MUSC/Skeletal: ROM normal; no crepitus; joints normal; no deformities or arthropathy  EXTREM: no clubbing, cyanosis, inflammation or swelling; mid-line on LUE  SKIN: no rashes, lesions, ulcers, petechiae or subcutaneous nodules ;    : no gibbs  NEURO: grossly intact; motor/sensory WNL; AAOx3; no tremors  PSYCH: normal mood, affect and behavior  LYMPH: normal cervical, supraclavicular, axillary and groin LN's            Lab Review:        Lab Results   Component Value Date    WBC 2.64 (L) 01/10/2023    HGB 11.6 (L) 01/10/2023    HCT 37.1 (L) 01/10/2023    MCV 84 01/10/2023     01/10/2023       CMP  Sodium   Date Value Ref Range Status   01/10/2023 137 136 - 145 mmol/L Final   04/25/2019 144 134 - 144 mmol/L      Potassium   Date Value Ref Range Status   01/10/2023 4.2 3.5 - 5.1 mmol/L Final     Chloride   Date Value Ref Range Status   01/10/2023 100 95 - 110 mmol/L Final   04/25/2019 107 98 - 110 mmol/L      CO2   Date Value Ref Range Status   01/10/2023 30 (H) 23 - 29 mmol/L Final     Glucose   Date Value Ref Range Status   01/10/2023 131 (H) 70 - 110 mg/dL Final   04/25/2019 177 (H) 70 - 99 mg/dL      BUN   Date Value Ref Range Status   01/10/2023 14 8 - 23 mg/dL Final     Creatinine   Date Value Ref Range Status   01/10/2023 0.8 0.5 - 1.4 mg/dL Final   04/25/2019 0.83 0.60 - 1.40 mg/dL      Calcium   Date Value Ref Range Status   01/10/2023 9.0 8.7 - 10.5 mg/dL Final     Total Protein   Date Value Ref Range Status   01/06/2023 7.0 6.0 - 8.4 g/dL Final     Albumin   Date Value Ref Range Status   01/06/2023 3.2 (L) 3.5 - 5.2 g/dL Final   04/25/2019 4.4 3.1 - 4.7 g/dL      Total Bilirubin   Date Value Ref Range Status   01/06/2023 0.8 0.1 - 1.0 mg/dL Final     Comment:     For infants and newborns, interpretation of  results should be based  on gestational age, weight and in agreement with clinical  observations.    Premature Infant recommended reference ranges:  Up to 24 hours.............<8.0 mg/dL  Up to 48 hours............<12.0 mg/dL  3-5 days..................<15.0 mg/dL  6-29 days.................<15.0 mg/dL       Alkaline Phosphatase   Date Value Ref Range Status   01/06/2023 110 55 - 135 U/L Final     AST   Date Value Ref Range Status   01/06/2023 30 10 - 40 U/L Final     ALT   Date Value Ref Range Status   01/06/2023 21 10 - 44 U/L Final     Anion Gap   Date Value Ref Range Status   01/10/2023 7 (L) 8 - 16 mmol/L Final     eGFR   Date Value Ref Range Status   01/10/2023 >60.0 >60 mL/min/1.73 m^2 Final           Radiology Diagnostic Studies:           Assessment:     IMPRESSION:    (1) 67 y.o. male  known to my oncology service with diagnosis of low grade NHL for which he had been on rituximab maintenance therapy in past and recently discontinued couple of months ago. 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 Pneumonia of both lungs due to infectious organism, unspecified part  of lung    2. Shortness of breath    3. Pulmonary infiltrates    4. Follicular lymphoma grade IIIa, unspecified body region [C82.30 (ICD-10-CM)]    5. Nonfamilial hypogammaglobulinemia [D80.1 (ICD-10-CM)]    6. Diarrhea of infectious origin [A09 (ICD-10-CM)]    7. Enterovirus infection [B34.1 (ICD-10-CM)]           Plan:     PLAN:          Monitor labs   IV antibiotics as per ID directives  Given his underlying chronic lung issues, recommended consideration for pulmonary consultation  Will follow with you               Jefferson Ibarra MD  Hematology/Oncology  Count includes the [facility]

## 2023-01-10 NOTE — PROGRESS NOTES
Consult Note  Infectious Disease    Reason for Consult:  pneumonia    HPI: Sivakumar Thacker is a 67 y.o. male known to our service, seen in october for left axillary abscess, and again in mid November for cellulitis of the left upper chest(after small abrasion left hand) with office follow up on 11/22. he receives IVIG(50 g Gamunex) for hypogammaglobulinemia and is currently on hiatus from lymphoma treatment. he has a history of bronchiectasis and chronic sinusitis(resolved) from 10/2022. He is follow ed by Dr. Ibarra and hem/onc at [facility] underwent a PET/CT on 1/5 which demonstrated R>L ground glass infiltrate and small LLL pneumonia and increased activity throughout the skeleton suggestive of bone marrow hyperstimulation.   He was sent to the ED today from the cancer center with worsening of complaints of nausea/diarrhea x 1 week(no suspicious meals or exposure to persons with GI illness), pleuritic pain left lower posterior chest, with a cough(4 days duration) productive of small amounts of brown sputum,with associated SOB. He did not seek medical attention until a regularly scheduled visit wed 1/4. He did not receive his regularly scheduled IVIG this am.CT abd benign.  CTA chest has some lower lobe ground glass opacities(better than yesterday) and a LLL consolidation(worse than yesterday). He was given vanc, cefepime and doxy and IVF in the ED.     1/7:  Temperature is under 99, white blood cells 3.62,with 26% monos today.  O2 saturation 95 on 2 L.  Intake/output not completely recorded.  MRSA screen negative, respiratory PCR panel not yet collected.  Sputum culture only normal dank so far/. He still has some pleurisy. Stool WBC positive. Culture in progress. Less liquid stool. Eating more.   1/8: Interim reviewed.  Temperatures around 99.  Respiratory viral panel was positive for human rhino virus/enterovirus.  Received IVIG 50 g yesterday without difficulty.  Blood cultures remain negative.  Sputum  culture is normal dank.  He is still having loose stool.  He is eating more.  He is feeling better from respiratory standpoint.  He has not been walking around very much but this was encouraged.   1/9: Interim reviewed.  Low-grade temperature only 98-99.  White blood cells are little lower today, platelets are preserved.  No new positives on cultures.  Stool culture is negative, GI PCR panel is pending.  Mj IgG level was 526.  He did not qualify for home oxygen per 6 minute walk. The diarrhea may also be exacerbated by the zyvox.will d/c. He feelsthat he can go home tomorrow.   1/10: interim reviewed. Afebrile. Has new pleuritic(I believe it is truly chest wall pain) which is knifelike left subscapular. He also had heartburn and EKG was no change, nausea and did not eat lunch. He was given anti-emetic, antacids and carafate. Has spent very little time in the chair. Sats are better today. No pleural rub. Stools still loose.     EXAM & DIAGNOSTICS REVIEWED:   Vitals:     Temp:  [97.5 °F (36.4 °C)-98.9 °F (37.2 °C)]   Temp: 98.7 °F (37.1 °C) (01/10/23 1100)  Pulse: 89 (01/10/23 1115)  Resp: 18 (01/10/23 1115)  BP: (!) 156/84 (01/10/23 1100)  SpO2: 95 % (01/10/23 1115)    Intake/Output Summary (Last 24 hours) at 1/10/2023 1511  Last data filed at 1/10/2023 0830  Gross per 24 hour   Intake 720 ml   Output --   Net 720 ml       General:  In NAD. Alert and attentive, cooperative, uncomfortable with movement. Able to give a fair  history.   Eyes:  Anicteric,  EOMI, no scleral or conjunctival injection  ENT:  No ulcers, exudates, thrush, nares patent, dentition is good  Neck:  supple,   Lungs: Clear, but splints. No pleural rub. No wheezing.  Heart:  RRR, no gallop/murmur/rub noted  Abd:  Soft, NT, ND, normal BS, no masses or organomegaly appreciated.  :  Voids/ , no flank tenderness  Musc:  Joints without effusion, swelling, erythema, synovitis, muscle wasting. Possible chest wall tenderness.   Skin:  No rashes    Neuro:             Alert, attentive, speech fluent, face symmetric, moves all extremities, no focal weakness. Ambulatory  Psych:  ?little anxious, cooperative  Lymphatic:      Extrem: No edema, erythema, phlebitis, cellulitis, warm and well perfused  VAD:   Peripheral and left arm midline    Isolation:  none  Wound: No recurrence of prior chest wall infection           General Labs reviewed:  Recent Labs   Lab 01/08/23  0418 01/09/23  0454 01/10/23  0439   WBC 3.15* 2.25* 2.64*   HGB 10.5* 10.9* 11.6*   HCT 34.2* 34.3* 37.1*    296 325       Recent Labs   Lab 01/06/23  0937 01/07/23  0421 01/08/23  0418 01/09/23  0454 01/10/23  0439   *   < > 138 137 137   K 3.7   < > 3.5 3.9 4.2   CL 98   < > 99 98 100   CO2 27   < > 31* 30* 30*   BUN 13   < > 14 13 14   CREATININE 1.0   < > 0.9 0.8 0.8   CALCIUM 8.9   < > 8.8 8.9 9.0   PROT 7.0  --   --   --   --    BILITOT 0.8  --   --   --   --    ALKPHOS 110  --   --   --   --    ALT 21  --   --   --   --    AST 30  --   --   --   --     < > = values in this interval not displayed.     No results for input(s): CRP in the last 168 hours.      Micro:  Microbiology Results (last 7 days)       Procedure Component Value Units Date/Time    Blood culture #2 **CANNOT BE ORDERED STAT** [[id number]] Collected: 01/06/23 1140    Order Status: Completed Specimen: Blood from Peripheral, Antecubital, Left Updated: 01/10/23 1232     Blood Culture, Routine No Growth to date      No Growth to date      No Growth to date      No Growth to date      No Growth to date    Blood culture #1 **CANNOT BE ORDERED STAT** [[id number]] Collected: 01/06/23 1202    Order Status: Completed Specimen: Blood from Peripheral, Forearm, Left Updated: 01/10/23 1232     Blood Culture, Routine No Growth to date      No Growth to date      No Growth to date      No Growth to date      No Growth to date    Stool culture **cannot be ordered stat** [[id number]] Collected: 01/07/23 0834    Order Status:  Completed Specimen: Stool Updated: 01/09/23 0757     Stool Culture No Salmonella,Shigella,Vibrio,Campylobacter.      No E coli 0157:H7 isolated.    Culture, Respiratory with Gram Stain [[id number]] Collected: 01/06/23 1720    Order Status: Completed Specimen: Respiratory from Sputum Updated: 01/08/23 1444     Respiratory Culture Normal respiratory dank     Gram Stain (Respiratory) Many WBC's     Gram Stain (Respiratory) <10 epithelial cells per low power field.     Gram Stain (Respiratory) Many Gram positive cocci    Respiratory Infection Panel (PCR), Nasopharyngeal [[id number]]  (Abnormal) Collected: 01/07/23 1319    Order Status: Completed Specimen: Nasopharyngeal Swab Updated: 01/07/23 2147     Respiratory Infection Panel Source NP swab     Adenovirus Not Detected     Coronavirus 229E, Common Cold Virus Not Detected     Coronavirus HKU1, Common Cold Virus Not Detected     Coronavirus NL63, Common Cold Virus Not Detected     Coronavirus OC43, Common Cold Virus Not Detected     Comment: The Coronavirus strains detected in this test cause the common cold.  These strains are not the COVID-19 (novel Coronavirus)strain   associated with the respiratory disease outbreak.          SARS-CoV2 (COVID-19) Qualitative PCR Not Detected     Human Metapneumovirus Not Detected     Human Rhinovirus/Enterovirus Detected     Influenza A (subtypes H1, H1-2009,H3) Not Detected     Influenza B Not Detected     Parainfluenza Virus 1 Not Detected     Parainfluenza Virus 2 Not Detected     Parainfluenza Virus 3 Not Detected     Parainfluenza Virus 4 Not Detected     Respiratory Syncytial Virus Not Detected     Bordetella Parapertussis (AU9040) Not Detected     Bordetella pertussis (ptxP) Not Detected     Chlamydia pneumoniae Not Detected     Mycoplasma pneumoniae Not Detected     Comment: Respiratory Infection Panel testing performed by Multiplex PCR.       Narrative:      Respiratory Infection Panel source->NP Swab    MRSA Screen by PCR  [[id number]] Collected: 01/06/23 3324    Order Status: Completed Specimen: Nasopharyngeal Swab from Nasal Updated: 01/06/23 6191     MRSA SCREEN BY PCR Negative            Imaging Reviewed:   CXR   PET CT 1/5/23  : Diffuse reticular nodular and groundglass opacities the left lower lobe, posterior right upper lobe and posterior medial right lower lobe compatible with multifocal pneumonia. Follow-up chest CT in three months is recommended   Diffuse FDG activity throughout the axial skeleton suggestive of bone marrow hyperstimulation   No evidence of recurrent or metastatic disease      CTA chest 1/6  IMPRESSION:  1. Negative for pulmonary thromboembolism.  2. Bilateral multifocal bronchiolitis and bronchopneumonia, including consolidated pneumonia in the left lower lobe. Follow-up PA and lateral chest radiograph in 4-6 weeks after appropriate therapy is recommended to document complete resolution.  3. Aortic and coronary arterial calcifications    CT abd/pelvis 1/6  1. Left greater than right lower lobe alveolar opacities including left lower lobe consolidation, characteristic of infectious or inflammatory pneumonia. Correlation for aspiration is requested.  2. Diverticulosis.  3. No acute findings throughout the abdomen or pelvis  Cardiology:    IMPRESSION & PLAN   1. Community acquired pneumonia   Diarrheal element.  Stool culture negative, PCR panel pending  Respiratory viral panel positive for human rhino virus/enterovirus  Oxygenation is borderline without oxygen supplemented.  But he does not meet criteria for home oxygen.  2. Immunocompromised by diagnosis of lymphoma, prior treatment and hypogammaglobulinemia status post IVIG 50 g 1/7.  Mj prior to the infusion was 526  3. History of bronchiectasis  4. Volume depletion  5. Eosinophilia(resolved) but present after last admits and antibiotics. Did not escalate after last IVIG, given 12/9      Recommendations:  CTA chest  Stop doxy in case this is causing  dyspepsia  Out of bed  Continue celebrex(he takes naproxen at home)  GI pcr panel in progress  Ensure clear  Continue ambulation  May need IVIG dose increased, message sent to Dr. Ibarra  Legionella urine antigen pending    D/w  patient and family member    Medical Decision Making during this encounter was  [_] Low Complexity  [_] Moderate Complexity  [ xxx ] High Complexity

## 2023-01-10 NOTE — PLAN OF CARE
Problem: Adult Inpatient Plan of Care  Goal: Plan of Care Review  Outcome: Ongoing, Progressing  Goal: Patient-Specific Goal (Individualized)  Outcome: Ongoing, Progressing  Goal: Absence of Hospital-Acquired Illness or Injury  Outcome: Ongoing, Progressing  Goal: Optimal Comfort and Wellbeing  Outcome: Ongoing, Progressing  Goal: Readiness for Transition of Care  Outcome: Ongoing, Progressing     Problem: Diabetes Comorbidity  Goal: Blood Glucose Level Within Targeted Range  Outcome: Ongoing, Progressing     Problem: Fluid Imbalance (Pneumonia)  Goal: Fluid Balance  Outcome: Ongoing, Progressing     Problem: Respiratory Compromise (Pneumonia)  Goal: Effective Oxygenation and Ventilation  Outcome: Ongoing, Progressing     Problem: Infection  Goal: Absence of Infection Signs and Symptoms  Outcome: Ongoing, Progressing

## 2023-01-10 NOTE — PROGRESS NOTES
"[facility] Medicine  Progress Note    Patient Name: Sivakumar Thacker  MRN: [id number]  Patient Class: IP- Inpatient  Admission Date: 1/6/2023  Attending Physician: Don Nicholas MD  Primary Care Provider: Barry Mcmahon MD  DOS: 01/09/2023    Subjective:     Principal Problem:Pulmonary infiltrates    Chief Complaint:   Chief Complaint   Patient presents with    Abdominal Pain     W/ diarrhea x1 week.    Cough     W/ back pain on "my lungs" x1 day        HPI: ED note  67-year-old male presents emergency department past medical history includes diabetes, hypertension, currently receiving infusion treatment for non-Hodgkin's lymphoma reports for the last week he is had diarrhea and feels generally weak.  States yesterday he developed a cough, associated shortness of breath, and pain when he breathes.  The patient reports that he went to the infusion center today however he was sent here by Dr. Ibarra for further evaluation in the emergency department.  Patient has been taking over-the-counter Pepto-Bismol for relief of diarrhea symptoms    1/6/2022  Mr Thacker has noted increasing SOB and VASQUEZ with fever X 1 today. Hew has had limited exposure to the public. The diarrhea is not bloody and he was exposed to antibiotics 11/2022.        1/9: Patient seen and examined.  Persistent cough and chest congestion, slightly improved thus far with treatment, mild to moderate intensity.  He had an episode of posttussive emesis overnight.  Shortness of breath slightly improved.  Currently tolerating room air, had home oxygen assessment with ambulation and did not desaturate enough to qualify for home oxygen.  Eating a little bit better today, he reports not getting Ensure as ordered.  No nausea or vomiting.  No abdominal pain.  No headache or syncope.  Family at bedside      ROS:  3 point review of systems reviewed and negative except as per HPI above        Vitals:    01/09/23 1124 01/09/23 1247 " 01/09/23 1340 01/09/23 1523   BP: (!) 149/86   (!) 145/81   BP Location: Right arm   Right arm   Patient Position: Lying   Lying   Pulse: 86  85 94   Resp: 20 17 17 20   Temp: 98.2 °F (36.8 °C)   98.9 °F (37.2 °C)   TempSrc: Oral   Oral   SpO2: (!) 91%  (!) 93% (!) 92%   Weight:       Height:         General:  Comfortable appearing today, nontoxic nondiaphoretic, sitting up in bed   Head and eyes:  Anicteric sclera, no conjunctival discharge   ENT:  Moist mucous membranes, no thrush   Pulmonary:  Comfortable work of breathing on room air, scant expiratory wheezes much improved, minimal basilar rhonchi  Cardiovascular:  2+ radial pulses, regular rate and rhythm   GI:  Abdomen soft nontender, nondistended, no guarding or rebound   Skin:  Dry and warm with no jaundice   Psych:  Mood is calm, affect restricted, insight fair   Neuro:  Nonfocal motor exam, alert and oriented, fluent speech    Labs:  Sodium 137   Potassium 3.9   Procalcitonin coming down at 1.35    Assessment/Plan:     Acute bacterial pneumonia   Bronchiectasis DAVE per Hx  Post tussive rales noted  New bilateral infiltrates  MRSA screen influenza antigen Covid 19 screen sputum culture  Vancomycin Cefepime and doxycycline as antibiotics  Hx of S marcescens cellulitis and sepsis  Dr Washington consulted  Pt may benefit from bronchoscopy    The patient is slowly improving.  Continue current antimicrobial course, follow-up Infectious Disease recs.  Possible transition to oral antibiotics near future  Increased Mucinex dosing.  Increased frequency of bronchodilators.  Switched from Cheratussin to Tussionex.  Start chocolate ensure with meals.  Appreciate multiple consultants.  Continue course.  Follow-up IgG levels, mildly decreased, may need dose adjustment as outpatient  Patient passed home oxygen assessment and will not need home oxygen, mobilizing better   Nebulizer ordered for home use at discharge   Patient is slowly improving likely needs another 24 hours  for discharge    Viral acute gastroenteritis rhino virus/enterovirus  The patient had symptoms of viral gastroenteritis preceding pneumonia   Stool studies done and unremarkable  GI symptoms are improving      Chronic obstructive pulmonary disease, unspecified COPD type  Bronchiectasis  Treat as exacerbation without steroids at present      Follicular lymphoma grade IIIa  Oncology consulted  Would this patient benefit from IVIG - dose received        VTE Risk Mitigation (From admission, onward)           Ordered     enoxaparin injection 40 mg  Daily         01/06/23 2120     IP VTE HIGH RISK PATIENT  Once         01/06/23 2120     Place sequential compression device  Until discontinued         01/06/23 2120                       Don Nicholas MD  Department of Hospital Medicine   [facility]

## 2023-01-10 NOTE — PLAN OF CARE
Problem: Adult Inpatient Plan of Care  Goal: Plan of Care Review  Outcome: Ongoing, Progressing  Goal: Patient-Specific Goal (Individualized)  Outcome: Ongoing, Progressing  Goal: Absence of Hospital-Acquired Illness or Injury  Outcome: Ongoing, Progressing  Goal: Optimal Comfort and Wellbeing  Outcome: Ongoing, Progressing  Goal: Readiness for Transition of Care  Outcome: Ongoing, Progressing     Problem: Diabetes Comorbidity  Goal: Blood Glucose Level Within Targeted Range  Outcome: Ongoing, Progressing     Problem: Fluid Imbalance (Pneumonia)  Goal: Fluid Balance  Outcome: Ongoing, Progressing     Problem: Infection (Pneumonia)  Goal: Resolution of Infection Signs and Symptoms  Outcome: Ongoing, Progressing     Problem: Respiratory Compromise (Pneumonia)  Goal: Effective Oxygenation and Ventilation  Outcome: Ongoing, Progressing     Problem: Infection  Goal: Absence of Infection Signs and Symptoms  Outcome: Ongoing, Progressing

## 2023-01-10 NOTE — CARE UPDATE
01/09/23 2012   Patient Assessment/Suction   Level of Consciousness (AVPU) alert   Respiratory Effort Normal   Expansion/Accessory Muscles/Retractions no use of accessory muscles   All Lung Fields Breath Sounds diminished   Rhythm/Pattern, Respiratory unlabored   Cough Frequency no cough   PRE-TX-O2   Device (Oxygen Therapy) room air   SpO2 (!) 93 %   Pulse Oximetry Type Intermittent   $ Pulse Oximetry - Multiple Charge Pulse Oximetry - Multiple   Pulse 83   Resp 19   Aerosol Therapy   $ Aerosol Therapy Charges Aerosol Treatment   Daily Review of Necessity (SVN) completed   Respiratory Treatment Status (SVN) given   Treatment Route (SVN) mask   Patient Position (SVN) HOB elevated   Post Treatment Assessment (SVN) breath sounds unchanged;vital signs unchanged   Signs of Intolerance (SVN) none   Education   $ Education 15 min   Respiratory Evaluation   $ Care Plan Tech Time 15 min

## 2023-01-10 NOTE — CARE UPDATE
01/10/23 0805   Patient Assessment/Suction   Level of Consciousness (AVPU) alert   Respiratory Effort Normal;Unlabored   Expansion/Accessory Muscles/Retractions no use of accessory muscles;no retractions;expansion symmetric   All Lung Fields Breath Sounds diminished   Rhythm/Pattern, Respiratory unlabored;pattern regular;depth regular;chest wiggle adequate;no shortness of breath reported   Cough Frequency infrequent   Cough Type good   PRE-TX-O2   Device (Oxygen Therapy) room air   $ Is the patient on Low Flow Oxygen? Yes   SpO2 95 %   Pulse Oximetry Type Intermittent   $ Pulse Oximetry - Multiple Charge Pulse Oximetry - Multiple   Pulse 80   Resp 18   Aerosol Therapy   $ Aerosol Therapy Charges Aerosol Treatment   Daily Review of Necessity (SVN) completed   Respiratory Treatment Status (SVN) given   Treatment Route (SVN) oxygen;mask   Patient Position (SVN) HOB elevated   Post Treatment Assessment (SVN) increased aeration   Signs of Intolerance (SVN) none   Breath Sounds Post-Respiratory Treatment   Throughout All Fields Post-Treatment All Fields   Throughout All Fields Post-Treatment aeration increased   Post-treatment Heart Rate (beats/min) 95   Post-treatment Resp Rate (breaths/min) 18   Vibratory PEP Therapy   $ Vibratory PEP Charges Acapella Therapy   $ Vibratory PEP Tech Time Charges 15 min   Education   $ Education 15 min;Bronchodilator   Respiratory Evaluation   $ Care Plan Tech Time 15 min

## 2023-01-11 LAB
25(OH)D3+25(OH)D2 SERPL-MCNC: 24 NG/ML (ref 30–96)
ANION GAP SERPL CALC-SCNC: 12 MMOL/L (ref 8–16)
BACTERIA BLD CULT: NORMAL
BACTERIA BLD CULT: NORMAL
BASOPHILS # BLD AUTO: 0.03 K/UL (ref 0–0.2)
BASOPHILS NFR BLD: 1 % (ref 0–1.9)
BUN SERPL-MCNC: 19 MG/DL (ref 8–23)
CALCIUM SERPL-MCNC: 9.4 MG/DL (ref 8.7–10.5)
CHLORIDE SERPL-SCNC: 97 MMOL/L (ref 95–110)
CO2 SERPL-SCNC: 30 MMOL/L (ref 23–29)
CREAT SERPL-MCNC: 0.8 MG/DL (ref 0.5–1.4)
DIFFERENTIAL METHOD: ABNORMAL
EOSINOPHIL # BLD AUTO: 0.1 K/UL (ref 0–0.5)
EOSINOPHIL NFR BLD: 2.8 % (ref 0–8)
ERYTHROCYTE [DISTWIDTH] IN BLOOD BY AUTOMATED COUNT: 13.8 % (ref 11.5–14.5)
EST. GFR  (NO RACE VARIABLE): >60 ML/MIN/1.73 M^2
GLUCOSE SERPL-MCNC: 101 MG/DL (ref 70–110)
GLUCOSE SERPL-MCNC: 105 MG/DL (ref 70–110)
GLUCOSE SERPL-MCNC: 135 MG/DL (ref 70–110)
GLUCOSE SERPL-MCNC: 143 MG/DL (ref 70–110)
GLUCOSE SERPL-MCNC: 144 MG/DL (ref 70–110)
HCT VFR BLD AUTO: 38 % (ref 40–54)
HGB BLD-MCNC: 11.7 G/DL (ref 14–18)
IMM GRANULOCYTES # BLD AUTO: 0.07 K/UL (ref 0–0.04)
IMM GRANULOCYTES NFR BLD AUTO: 2.4 % (ref 0–0.5)
LYMPHOCYTES # BLD AUTO: 0.8 K/UL (ref 1–4.8)
LYMPHOCYTES NFR BLD: 27 % (ref 18–48)
MAGNESIUM SERPL-MCNC: 1.9 MG/DL (ref 1.6–2.6)
MCH RBC QN AUTO: 26.4 PG (ref 27–31)
MCHC RBC AUTO-ENTMCNC: 30.8 G/DL (ref 32–36)
MCV RBC AUTO: 86 FL (ref 82–98)
MONOCYTES # BLD AUTO: 0.5 K/UL (ref 0.3–1)
MONOCYTES NFR BLD: 18 % (ref 4–15)
NEUTROPHILS # BLD AUTO: 1.4 K/UL (ref 1.8–7.7)
NEUTROPHILS NFR BLD: 48.8 % (ref 38–73)
NRBC BLD-RTO: 0 /100 WBC
PHOSPHATE SERPL-MCNC: 6 MG/DL (ref 2.7–4.5)
PLATELET # BLD AUTO: 338 K/UL (ref 150–450)
PMV BLD AUTO: 9 FL (ref 9.2–12.9)
POTASSIUM SERPL-SCNC: 4.1 MMOL/L (ref 3.5–5.1)
PROCALCITONIN SERPL IA-MCNC: 0.31 NG/ML (ref 0–0.5)
RBC # BLD AUTO: 4.44 M/UL (ref 4.6–6.2)
SODIUM SERPL-SCNC: 139 MMOL/L (ref 136–145)
WBC # BLD AUTO: 2.89 K/UL (ref 3.9–12.7)

## 2023-01-11 PROCEDURE — 83735 ASSAY OF MAGNESIUM: CPT | Performed by: INTERNAL MEDICINE

## 2023-01-11 PROCEDURE — 63600175 PHARM REV CODE 636 W HCPCS: Performed by: INTERNAL MEDICINE

## 2023-01-11 PROCEDURE — 99222 PR INITIAL HOSPITAL CARE,LEVL II: ICD-10-PCS | Mod: ,,, | Performed by: INTERNAL MEDICINE

## 2023-01-11 PROCEDURE — 99232 PR SUBSEQUENT HOSPITAL CARE,LEVL II: ICD-10-PCS | Mod: ,,, | Performed by: INTERNAL MEDICINE

## 2023-01-11 PROCEDURE — 94799 UNLISTED PULMONARY SVC/PX: CPT

## 2023-01-11 PROCEDURE — 99222 1ST HOSP IP/OBS MODERATE 55: CPT | Mod: ,,, | Performed by: INTERNAL MEDICINE

## 2023-01-11 PROCEDURE — 84100 ASSAY OF PHOSPHORUS: CPT | Performed by: INTERNAL MEDICINE

## 2023-01-11 PROCEDURE — 82306 VITAMIN D 25 HYDROXY: CPT | Performed by: INTERNAL MEDICINE

## 2023-01-11 PROCEDURE — 84145 PROCALCITONIN (PCT): CPT | Performed by: INTERNAL MEDICINE

## 2023-01-11 PROCEDURE — 80048 BASIC METABOLIC PNL TOTAL CA: CPT | Performed by: INTERNAL MEDICINE

## 2023-01-11 PROCEDURE — 99900031 HC PATIENT EDUCATION (STAT)

## 2023-01-11 PROCEDURE — 36415 COLL VENOUS BLD VENIPUNCTURE: CPT | Performed by: INTERNAL MEDICINE

## 2023-01-11 PROCEDURE — 94667 MNPJ CHEST WALL 1ST: CPT

## 2023-01-11 PROCEDURE — 99900035 HC TECH TIME PER 15 MIN (STAT)

## 2023-01-11 PROCEDURE — 25000003 PHARM REV CODE 250: Performed by: INTERNAL MEDICINE

## 2023-01-11 PROCEDURE — 94668 MNPJ CHEST WALL SBSQ: CPT

## 2023-01-11 PROCEDURE — 21400001 HC TELEMETRY ROOM

## 2023-01-11 PROCEDURE — 94761 N-INVAS EAR/PLS OXIMETRY MLT: CPT

## 2023-01-11 PROCEDURE — 94664 DEMO&/EVAL PT USE INHALER: CPT

## 2023-01-11 PROCEDURE — 85025 COMPLETE CBC W/AUTO DIFF WBC: CPT | Performed by: INTERNAL MEDICINE

## 2023-01-11 PROCEDURE — 94640 AIRWAY INHALATION TREATMENT: CPT

## 2023-01-11 PROCEDURE — 99232 SBSQ HOSP IP/OBS MODERATE 35: CPT | Mod: ,,, | Performed by: INTERNAL MEDICINE

## 2023-01-11 PROCEDURE — 25000242 PHARM REV CODE 250 ALT 637 W/ HCPCS: Performed by: INTERNAL MEDICINE

## 2023-01-11 PROCEDURE — 25000003 PHARM REV CODE 250: Performed by: FAMILY MEDICINE

## 2023-01-11 RX ADMIN — HYDROCODONE BITARTRATE AND ACETAMINOPHEN 1 TABLET: 5; 325 TABLET ORAL at 09:01

## 2023-01-11 RX ADMIN — HYDROCODONE BITARTRATE AND ACETAMINOPHEN 1 TABLET: 5; 325 TABLET ORAL at 04:01

## 2023-01-11 RX ADMIN — GUAIFENESIN 1200 MG: 600 TABLET, EXTENDED RELEASE ORAL at 09:01

## 2023-01-11 RX ADMIN — ATORVASTATIN CALCIUM 20 MG: 20 TABLET, FILM COATED ORAL at 11:01

## 2023-01-11 RX ADMIN — ONDANSETRON 8 MG: 4 TABLET, ORALLY DISINTEGRATING ORAL at 12:01

## 2023-01-11 RX ADMIN — METHYLPREDNISOLONE SODIUM SUCCINATE 80 MG: 40 INJECTION, POWDER, FOR SOLUTION INTRAMUSCULAR; INTRAVENOUS at 06:01

## 2023-01-11 RX ADMIN — GABAPENTIN 300 MG: 300 CAPSULE ORAL at 11:01

## 2023-01-11 RX ADMIN — SUCRALFATE 1 G: 1 SUSPENSION ORAL at 05:01

## 2023-01-11 RX ADMIN — LOSARTAN POTASSIUM 100 MG: 50 TABLET, FILM COATED ORAL at 11:01

## 2023-01-11 RX ADMIN — HYDROCODONE BITARTRATE AND ACETAMINOPHEN 1 TABLET: 5; 325 TABLET ORAL at 12:01

## 2023-01-11 RX ADMIN — PANTOPRAZOLE SODIUM 40 MG: 40 TABLET, DELAYED RELEASE ORAL at 05:01

## 2023-01-11 RX ADMIN — HYDROCODONE BITARTRATE AND ACETAMINOPHEN 1 TABLET: 5; 325 TABLET ORAL at 11:01

## 2023-01-11 RX ADMIN — ENOXAPARIN SODIUM 40 MG: 100 INJECTION SUBCUTANEOUS at 05:01

## 2023-01-11 RX ADMIN — IPRATROPIUM BROMIDE AND ALBUTEROL SULFATE 3 ML: 2.5; .5 SOLUTION RESPIRATORY (INHALATION) at 08:01

## 2023-01-11 RX ADMIN — CELECOXIB 200 MG: 100 CAPSULE ORAL at 09:01

## 2023-01-11 RX ADMIN — GABAPENTIN 300 MG: 300 CAPSULE ORAL at 09:01

## 2023-01-11 RX ADMIN — IPRATROPIUM BROMIDE AND ALBUTEROL SULFATE 3 ML: 2.5; .5 SOLUTION RESPIRATORY (INHALATION) at 11:01

## 2023-01-11 RX ADMIN — ASPIRIN 81 MG: 81 TABLET, COATED ORAL at 11:01

## 2023-01-11 RX ADMIN — MEROPENEM AND SODIUM CHLORIDE 1 G: 1 INJECTION, SOLUTION INTRAVENOUS at 04:01

## 2023-01-11 RX ADMIN — IPRATROPIUM BROMIDE AND ALBUTEROL SULFATE 3 ML: 2.5; .5 SOLUTION RESPIRATORY (INHALATION) at 04:01

## 2023-01-11 RX ADMIN — SUCRALFATE 1 G: 1 SUSPENSION ORAL at 11:01

## 2023-01-11 RX ADMIN — CELECOXIB 200 MG: 100 CAPSULE ORAL at 11:01

## 2023-01-11 RX ADMIN — SUCRALFATE 1 G: 1 SUSPENSION ORAL at 09:01

## 2023-01-11 RX ADMIN — AMLODIPINE BESYLATE 10 MG: 5 TABLET ORAL at 11:01

## 2023-01-11 RX ADMIN — MEROPENEM AND SODIUM CHLORIDE 1 G: 1 INJECTION, SOLUTION INTRAVENOUS at 12:01

## 2023-01-11 RX ADMIN — GUAIFENESIN 1200 MG: 600 TABLET, EXTENDED RELEASE ORAL at 11:01

## 2023-01-11 RX ADMIN — MEROPENEM AND SODIUM CHLORIDE 1 G: 1 INJECTION, SOLUTION INTRAVENOUS at 09:01

## 2023-01-11 RX ADMIN — FLUTICASONE PROPIONATE 50 MCG: 50 SPRAY, METERED NASAL at 09:01

## 2023-01-11 RX ADMIN — HYDROCODONE POLISTIREX AND CHLORPHENIRAMINE POLISTIREX 5 ML: 10; 8 SUSPENSION, EXTENDED RELEASE ORAL at 10:01

## 2023-01-11 RX ADMIN — MONTELUKAST 10 MG: 10 TABLET, FILM COATED ORAL at 11:01

## 2023-01-11 RX ADMIN — CYCLOBENZAPRINE 10 MG: 10 TABLET, FILM COATED ORAL at 11:01

## 2023-01-11 RX ADMIN — HYDROCODONE POLISTIREX AND CHLORPHENIRAMINE POLISTIREX 5 ML: 10; 8 SUSPENSION, EXTENDED RELEASE ORAL at 05:01

## 2023-01-11 RX ADMIN — AZITHROMYCIN 500 MG: 500 INJECTION, POWDER, LYOPHILIZED, FOR SOLUTION INTRAVENOUS at 05:01

## 2023-01-11 RX ADMIN — FLUTICASONE PROPIONATE 50 MCG: 50 SPRAY, METERED NASAL at 10:01

## 2023-01-11 NOTE — PROGRESS NOTES
Consult Note  Infectious Disease    Reason for Consult:  pneumonia    HPI: Sivakumar Thacker is a 67 y.o. male known to our service, seen in october for left axillary abscess, and again in mid November for cellulitis of the left upper chest(after small abrasion left hand) with office follow up on 11/22. he receives IVIG(50 g Gamunex) for hypogammaglobulinemia and is currently on hiatus from lymphoma treatment. he has a history of bronchiectasis and chronic sinusitis(resolved) from 10/2022. He is follow ed by Dr. Ibarra and hem/onc at [facility] underwent a PET/CT on 1/5 which demonstrated R>L ground glass infiltrate and small LLL pneumonia and increased activity throughout the skeleton suggestive of bone marrow hyperstimulation.   He was sent to the ED today from the cancer center with worsening of complaints of nausea/diarrhea x 1 week(no suspicious meals or exposure to persons with GI illness), pleuritic pain left lower posterior chest, with a cough(4 days duration) productive of small amounts of brown sputum,with associated SOB. He did not seek medical attention until a regularly scheduled visit wed 1/4. He did not receive his regularly scheduled IVIG this am.CT abd benign.  CTA chest has some lower lobe ground glass opacities(better than yesterday) and a LLL consolidation(worse than yesterday). He was given vanc, cefepime and doxy and IVF in the ED.     1/7:  Temperature is under 99, white blood cells 3.62,with 26% monos today.  O2 saturation 95 on 2 L.  Intake/output not completely recorded.  MRSA screen negative, respiratory PCR panel not yet collected.  Sputum culture only normal dank so far/. He still has some pleurisy. Stool WBC positive. Culture in progress. Less liquid stool. Eating more.   1/8: Interim reviewed.  Temperatures around 99.  Respiratory viral panel was positive for human rhino virus/enterovirus.  Received IVIG 50 g yesterday without difficulty.  Blood cultures remain negative.  Sputum  culture is normal dank.  He is still having loose stool.  He is eating more.  He is feeling better from respiratory standpoint.  He has not been walking around very much but this was encouraged.   1/9: Interim reviewed.  Low-grade temperature only 98-99.  White blood cells are little lower today, platelets are preserved.  No new positives on cultures.  Stool culture is negative, GI PCR panel is pending.  Mj IgG level was 526.  He did not qualify for home oxygen per 6 minute walk. The diarrhea may also be exacerbated by the zyvox.will d/c. He feelsthat he can go home tomorrow.   1/10: interim reviewed. Afebrile. Has new pleuritic(I believe it is truly chest wall pain) which is knifelike left subscapular. He also had heartburn and EKG was no change, nausea and did not eat lunch. He was given anti-emetic, antacids and carafate. Has spent very little time in the chair. Sats are better today. No pleural rub. Stools still loose.   1/11: interim reviewed. His diarrhea is better. Still not eating great but drinks 2 ensures with each meal. He has not been out of the room, but has been in the chair all day. He still splints with cough or deep breath, which I believe is musculoskeletal. He is bringing up a little sputum. Discussed that the GI pcr showed campylobacter.  He does have chickens at home. Discussed infection control practices to reduce exposure.     EXAM & DIAGNOSTICS REVIEWED:   Vitals:     Temp:  [97.3 °F (36.3 °C)-98.4 °F (36.9 °C)]   Temp: 98.4 °F (36.9 °C) (01/11/23 1115)  Pulse: 103 (01/11/23 1146)  Resp: 18 (01/11/23 1146)  BP: 133/83 (01/11/23 1115)  SpO2: (!) 90 % (01/11/23 1146)    Intake/Output Summary (Last 24 hours) at 1/11/2023 1541  Last data filed at 1/11/2023 0830  Gross per 24 hour   Intake 360 ml   Output --   Net 360 ml       General:  In NAD. Alert and attentive, cooperative, uncomfortable with movement  Eyes:  Anicteric,  EOMI, no scleral or conjunctival injection  ENT:  No ulcers,  exudates, thrush, nares patent, dentition is good  Neck:  supple,   Lungs: Clear, but splints. No pleural rub. No wheezing.  Heart:  RRR, no gallop/murmur/rub noted  Abd:  Soft, NT, ND, normal BS, no masses or organomegaly appreciated.  :  Voids/ , no flank tenderness  Musc:  Joints without effusion, swelling, erythema, synovitis, muscle wasting. Possible chest wall tenderness.   Skin:  No rashes   Neuro:             Alert, attentive, speech fluent, face symmetric, moves all extremities, no focal weakness. Ambulatory. No allodynia  Psych:    cooperative  Lymphatic:      Extrem: No edema, erythema, phlebitis, cellulitis, warm and well perfused  VAD:   Peripheral and left arm midline    Isolation:  none  Wound: No recurrence of prior chest wall infection           General Labs reviewed:  Recent Labs   Lab 01/09/23  0454 01/10/23  0439 01/11/23  0535   WBC 2.25* 2.64* 2.89*   HGB 10.9* 11.6* 11.7*   HCT 34.3* 37.1* 38.0*    325 338       Recent Labs   Lab 01/06/23  0937 01/07/23  0421 01/09/23 0454 01/10/23  0439 01/11/23  0535   *   < > 137 137 139   K 3.7   < > 3.9 4.2 4.1   CL 98   < > 98 100 97   CO2 27   < > 30* 30* 30*   BUN 13   < > 13 14 19   CREATININE 1.0   < > 0.8 0.8 0.8   CALCIUM 8.9   < > 8.9 9.0 9.4   PROT 7.0  --   --   --   --    BILITOT 0.8  --   --   --   --    ALKPHOS 110  --   --   --   --    ALT 21  --   --   --   --    AST 30  --   --   --   --     < > = values in this interval not displayed.     No results for input(s): CRP in the last 168 hours.      Micro:  Microbiology Results (last 7 days)       Procedure Component Value Units Date/Time    Blood culture #2 **CANNOT BE ORDERED STAT** [[id number]] Collected: 01/06/23 1140    Order Status: Completed Specimen: Blood from Peripheral, Antecubital, Left Updated: 01/11/23 1232     Blood Culture, Routine No growth after 5 days.    Blood culture #1 **CANNOT BE ORDERED STAT** [[id number]] Collected: 01/06/23 1202    Order Status:  Completed Specimen: Blood from Peripheral, Forearm, Left Updated: 01/11/23 1232     Blood Culture, Routine No growth after 5 days.    Stool culture **cannot be ordered stat** [[id number]] Collected: 01/07/23 0834    Order Status: Completed Specimen: Stool Updated: 01/09/23 0757     Stool Culture No Salmonella,Shigella,Vibrio,Campylobacter.      No E coli 0157:H7 isolated.    Culture, Respiratory with Gram Stain [[id number]] Collected: 01/06/23 1720    Order Status: Completed Specimen: Respiratory from Sputum Updated: 01/08/23 1444     Respiratory Culture Normal respiratory dank     Gram Stain (Respiratory) Many WBC's     Gram Stain (Respiratory) <10 epithelial cells per low power field.     Gram Stain (Respiratory) Many Gram positive cocci    Respiratory Infection Panel (PCR), Nasopharyngeal [[id number]]  (Abnormal) Collected: 01/07/23 1319    Order Status: Completed Specimen: Nasopharyngeal Swab Updated: 01/07/23 2147     Respiratory Infection Panel Source NP swab     Adenovirus Not Detected     Coronavirus 229E, Common Cold Virus Not Detected     Coronavirus HKU1, Common Cold Virus Not Detected     Coronavirus NL63, Common Cold Virus Not Detected     Coronavirus OC43, Common Cold Virus Not Detected     Comment: The Coronavirus strains detected in this test cause the common cold.  These strains are not the COVID-19 (novel Coronavirus)strain   associated with the respiratory disease outbreak.          SARS-CoV2 (COVID-19) Qualitative PCR Not Detected     Human Metapneumovirus Not Detected     Human Rhinovirus/Enterovirus Detected     Influenza A (subtypes H1, H1-2009,H3) Not Detected     Influenza B Not Detected     Parainfluenza Virus 1 Not Detected     Parainfluenza Virus 2 Not Detected     Parainfluenza Virus 3 Not Detected     Parainfluenza Virus 4 Not Detected     Respiratory Syncytial Virus Not Detected     Bordetella Parapertussis (PB1612) Not Detected     Bordetella pertussis (ptxP) Not Detected     Chlamydia  pneumoniae Not Detected     Mycoplasma pneumoniae Not Detected     Comment: Respiratory Infection Panel testing performed by Multiplex PCR.       Narrative:      Respiratory Infection Panel source->NP Swab    MRSA Screen by PCR [[id number]] Collected: 01/06/23 1644    Order Status: Completed Specimen: Nasopharyngeal Swab from Nasal Updated: 01/06/23 8186     MRSA SCREEN BY PCR Negative            Imaging Reviewed:   CXR   PET CT 1/5/23  : Diffuse reticular nodular and groundglass opacities the left lower lobe, posterior right upper lobe and posterior medial right lower lobe compatible with multifocal pneumonia. Follow-up chest CT in three months is recommended   Diffuse FDG activity throughout the axial skeleton suggestive of bone marrow hyperstimulation   No evidence of recurrent or metastatic disease      CTA chest 1/6  IMPRESSION:  1. Negative for pulmonary thromboembolism.  2. Bilateral multifocal bronchiolitis and bronchopneumonia, including consolidated pneumonia in the left lower lobe. Follow-up PA and lateral chest radiograph in 4-6 weeks after appropriate therapy is recommended to document complete resolution.  3. Aortic and coronary arterial calcifications    CT abd/pelvis 1/6  1. Left greater than right lower lobe alveolar opacities including left lower lobe consolidation, characteristic of infectious or inflammatory pneumonia. Correlation for aspiration is requested.  2. Diverticulosis.  3. No acute findings throughout the abdomen or pelvis  Cardiology:    IMPRESSION & PLAN   1. Community acquired pneumonia   Diarrheal element.  Stool culture negative, PCR panel shows campylobacter  Respiratory viral panel positive for human rhino virus/enterovirus  Oxygenation is borderline without oxygen supplemented.  But he does not meet criteria for home oxygen. Legionella urine antigen negative . Mucous plugging on re-CTA   Procalcitonin has normalized    2. Immunocompromised by diagnosis of lymphoma, prior  treatment and hypogammaglobulinemia status post IVIG 50 g 1/7.  Mj prior to the infusion was 526  3. History of bronchiectasis  4. Volume depletion  5. Eosinophilia(resolved) but present after last admits and antibiotics. Did not escalate after last IVIG, given 12/9      Recommendations:   Continue meropenem, 1 more day  Adding zithromax for campylobacter  Continue pulmonary toilet  Steroids per Dr. Esparza  Out of bed  Continue celebrex(he takes naproxen at home)  GI pcr panel in progress  Ensure clear  Continue ambulation  May need IVIG dose increased or increased to every 3 weeks instead of 4.      D/w  patient and family members and Dr. Zavala    Medical Decision Making during this encounter was  [_] Low Complexity  [_] Moderate Complexity  [ xxx ] High Complexity

## 2023-01-11 NOTE — PROGRESS NOTES
"[facility] Medicine  Progress Note    Patient Name: Sivakumar Thacker  MRN: [id number]  Patient Class: IP- Inpatient  Admission Date: 1/6/2023  Attending Physician: Rea Zavala MD  Primary Care Provider: Barry Mcmahon MD  DOS: 01/11/2023    Assessment/Plan:     GI viral panel:  Campylobacter Gastroenteritis   Acute bacterial pneumonia   Respiratory viral panel:  +Human rhinovirus/Enterovirus   Leukopenia, chronic 2/2 NHL  Acute hypoxemic respiratory failure, resolved  Complicated by COPD with bronchiectasis   With mucous plugging + loculated L pleural effusion  Complicated by NHL on rituximab previously discontinued   With hypogammoglobulinemia on monthly IVIG  - azithromycin, merrem, agree with ID  - steroids started today, monitoring closely   - continue nebs, singulair, guaifenesin, aerobikia   - encourage IS  - following cultures  - pt does NOT qualify for home oxygen   - pulmonology following, thank you: they report no bronch necessary at this time  - IVIG, dosing was discussed between ID and oncology  - ID and oncology following, thank you  - I personally discussed pt's case with both ID physician and pulmonologist      DM2  - SSI    Chronic conditions as noted above/below; home medications reviewed personally by me and restarted as appropriate  Electrolyte derangement:  Trending BMP; Mg; replacement prn  DVT ppx: lovenox   Dispo:  HOME TOMORROW WITH HOME HEALTH PT/OT/RESPIRATORY  FULL CODE    Subjective:     Principal Problem:Pulmonary infiltrates    Chief Complaint:   Chief Complaint   Patient presents with    Abdominal Pain     W/ diarrhea x1 week.    Cough     W/ back pain on "my lungs" x1 day        HPI: ED note  67-year-old male presents emergency department past medical history includes diabetes, hypertension, currently receiving infusion treatment for non-Hodgkin's lymphoma reports for the last week he is had diarrhea and feels generally weak.  States yesterday he developed " "a cough, associated shortness of breath, and pain when he breathes.  The patient reports that he went to the infusion center today however he was sent here by Dr. Ibarra for further evaluation in the emergency department.  Patient has been taking over-the-counter Pepto-Bismol for relief of diarrhea symptoms    1/6/2022  Mr Thacker has noted increasing SOB and VASQUEZ with fever X 1 today. Hew has had limited exposure to the public. The diarrhea is not bloody and he was exposed to antibiotics 11/2022.        1/9: Patient seen and examined.  Persistent cough and chest congestion, slightly improved thus far with treatment, mild to moderate intensity.  He had an episode of posttussive emesis overnight.  Shortness of breath slightly improved.  Currently tolerating room air, had home oxygen assessment with ambulation and did not desaturate enough to qualify for home oxygen.  Eating a little bit better today, he reports not getting Ensure as ordered.  No nausea or vomiting.  No abdominal pain.  No headache or syncope.  Family at bedside    1/10:  pt reporting pleuritic L chest pain.  No chest wall TTP.  No SOB.  Leukopenia continues.      1/11:  Pt reports back pain primarily.  It occurs with deep breathing, sharp.  Anterior chest pain much improved today.  No SOB.  No palpitations.  Episode of nausea before lunch today, alleviated with zofran.  1 episode of loose stool today and yesterday.  No abdominal pain.  He initially refused CPT but now is participating; he reports he "coughed up a lot of mucus" and has been breathing more easily as a result.      ROS:  All systems reviewed and are negative except as noted per above.    Vitals:    01/11/23 0823 01/11/23 1059 01/11/23 1115 01/11/23 1146   BP:   133/83    BP Location:   Right arm    Patient Position:   Sitting    Pulse: 93  94 103   Resp: 16 19 20 18   Temp:   98.4 °F (36.9 °C)    TempSrc:   Oral    SpO2: (!) 91%  (!) 94% (!) 90%   Weight:       Height:         Gen: " alert, responsive  HEENT:  Eyes - no pallor  External ears with no lesions  Nares patent  Mouth, Throat:  trachea midline   CV: RRR  Lungs: DECREASED BS AT BASES B/L, no chest wall TTP AGAIN TODAY  Abd: +BS, soft, NT, ND  Ext: no atrophy or edema  Skin: warm, dry  Neuro: grossly intact  Psych: pleasant    Rea Zavala MD  Department of Hospital Medicine   [facility]

## 2023-01-11 NOTE — PROGRESS NOTES
"[facility]   Hematology/Oncology  Inpatient Progress Note          Patient Name: Sivakumar Thacker  MRN: [id number]  Admission Date: 1/6/2023  Hospital Length of Stay: 5 days  Code Status: Full Code   Attending Provider: Rea Zavala MD  Consulting Provider: Jefferson Ibarra MD  Primary Care Physician: Barry Mcmahon MD  Principal Problem:Pulmonary infiltrates      Subjective:       Patient ID: Sivakumar Thacker is a 67 y.o. male.    Chief Complaint: Abdominal Pain (W/ diarrhea x1 week.) and Cough (W/ back pain on "my lungs" x1 day)        History Present Illness:    Patient sitting up in chair; seems to be feeling and breathing a little better; some residual hacking cough with sputum; discussed the CT findings; his daughters are at bedside      Review of Systems:  GEN: no current fever, night sweats or weight loss; general malaise  HEENT: normal with no HA's, sore throat, stiff neck, changes in vision  CV: normal with no CP, , PND, VASQUEZ or orthopnea  PULM: chronic SOB; cough with yellowish/white sputum  GI: no current N/V  : normal with no hematuria, dysuria  BREAST: normal with no mass, discharge, pain  SKIN: normal with no rash, erythema, bruising, or swelling      Objective:     Vitals:  Blood pressure 134/81, pulse 93, temperature 97.3 °F (36.3 °C), temperature source Oral, resp. rate 16, height 5' 8" (1.727 m), weight 88.5 kg (195 lb 3.2 oz), SpO2 (!) 91 %.    Physical Exam:  GEN: no apparent distress, comfortable; AAOx3  HEAD: atraumatic and normocephalic  EYES: no pallor, no icterus, PERRLA  ENT: OMM, no pharyngeal erythema, external ears WNL; no nasal discharge; no thrush;    NECK: no masses, thyroid normal, trachea midline, no LAD/LN's, supple  CV: RRR with no murmur; normal pulse; normal S1 and S2; no pedal edema; prior portacath since removed  CHEST: Normal respiratory effort; chronic coarseness, mild wheeze bilaterally    ABDOM: nontender and nondistended; soft; normal bowel sounds; no " rebound/guarding  MUSC/Skeletal: ROM normal; no crepitus; joints normal; no deformities or arthropathy  EXTREM: no clubbing, cyanosis, inflammation or swelling; mid-line on LUE  SKIN: no rashes, lesions, ulcers, petechiae or subcutaneous nodules ;    : no gibbs  NEURO: grossly intact; motor/sensory WNL; AAOx3; no tremors  PSYCH: normal mood, affect and behavior  LYMPH: normal cervical, supraclavicular, axillary and groin LN's            Lab Review:        Lab Results   Component Value Date    WBC 2.89 (L) 01/11/2023    HGB 11.7 (L) 01/11/2023    HCT 38.0 (L) 01/11/2023    MCV 86 01/11/2023     01/11/2023       CMP  Sodium   Date Value Ref Range Status   01/11/2023 139 136 - 145 mmol/L Final   04/25/2019 144 134 - 144 mmol/L      Potassium   Date Value Ref Range Status   01/11/2023 4.1 3.5 - 5.1 mmol/L Final     Chloride   Date Value Ref Range Status   01/11/2023 97 95 - 110 mmol/L Final   04/25/2019 107 98 - 110 mmol/L      CO2   Date Value Ref Range Status   01/11/2023 30 (H) 23 - 29 mmol/L Final     Glucose   Date Value Ref Range Status   01/11/2023 105 70 - 110 mg/dL Final   04/25/2019 177 (H) 70 - 99 mg/dL      BUN   Date Value Ref Range Status   01/11/2023 19 8 - 23 mg/dL Final     Creatinine   Date Value Ref Range Status   01/11/2023 0.8 0.5 - 1.4 mg/dL Final   04/25/2019 0.83 0.60 - 1.40 mg/dL      Calcium   Date Value Ref Range Status   01/11/2023 9.4 8.7 - 10.5 mg/dL Final     Total Protein   Date Value Ref Range Status   01/06/2023 7.0 6.0 - 8.4 g/dL Final     Albumin   Date Value Ref Range Status   01/06/2023 3.2 (L) 3.5 - 5.2 g/dL Final   04/25/2019 4.4 3.1 - 4.7 g/dL      Total Bilirubin   Date Value Ref Range Status   01/06/2023 0.8 0.1 - 1.0 mg/dL Final     Comment:     For infants and newborns, interpretation of results should be based  on gestational age, weight and in agreement with clinical  observations.    Premature Infant recommended reference ranges:  Up to 24 hours.............<8.0  mg/dL  Up to 48 hours............<12.0 mg/dL  3-5 days..................<15.0 mg/dL  6-29 days.................<15.0 mg/dL       Alkaline Phosphatase   Date Value Ref Range Status   01/06/2023 110 55 - 135 U/L Final     AST   Date Value Ref Range Status   01/06/2023 30 10 - 40 U/L Final     ALT   Date Value Ref Range Status   01/06/2023 21 10 - 44 U/L Final     Anion Gap   Date Value Ref Range Status   01/11/2023 12 8 - 16 mmol/L Final     eGFR   Date Value Ref Range Status   01/11/2023 >60.0 >60 mL/min/1.73 m^2 Final           Radiology Diagnostic Studies:     CTA Chest Non-Coronary (PE Studies) [[id number]] Collected: 01/10/23 1828   Order Status: Completed Updated: 01/10/23 1916   Narrative:     CMS MANDATED QUALITY DATA-CT RADIATION DOSE-[id number]   All CT scans at this facility use dose modulation, iterative reconstruction, and or weight-based dosing when appropriate to reduce radiation dose to as low as reasonably achievable.     HISTORY: New pleural chest pain, exclude PE     FINDINGS: Thin axial imaging through the chest was performed with 100 mL Omnipaque 350 IV contrast, with sagittal and coronal reformatted images and MIP reconstructions performed, and images stored in the patient's permanent electronic medical record.     Comparison to prior exams including recent CTA of 01/06/2023. There are no pulmonary arterial filling defects to suggest pulmonary thromboembolism. The central pulmonary arteries are normal in caliber.     Scattered calcified and soft plaque involves normal caliber thoracic aorta, with no aortic dissection or evidence of aortic intramural hematoma. The heart is normal in size, with extensive coronary arterial calcifications, and no pericardial effusion. No enlarged mediastinal or hilar lymph nodes.     Previous bilateral lower lung airspace opacities consistent with multifocal pneumonia have improved, with consolidation and volume loss in the left lower lobe suggesting combination of  atelectasis and resolving pneumonia. There is development of a small low-density loculated appearing left pleural effusion. The left lower lobe main bronchus and segmental bronchi are newly occluded, presumably on the basis of mucous plugging. No right pleural effusion or pneumothorax.     Images of the upper abdomen show no new abnormality. There are no acute fractures or destructive osseous lesions.     IMPRESSION:   1. New occlusion of the left lower lobe main bronchus and segmental bronchi, presumably on the basis of mucous plugging, with development of left lower lobe atelectasis, volume loss, and small loculated appearing left pleural effusion.   2. Interval improvement in bilateral lower lung pneumonia.   3. Negative for pulmonary thromboembolism.          Assessment:     IMPRESSION:    (1) 67 y.o. male  known to my oncology service with diagnosis of low grade NHL for which he had been on rituximab maintenance therapy in past and recently discontinued couple of months ago. 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 Pneumonia of both lungs due to infectious organism, unspecified part of lung    2. Shortness of breath    3. Pulmonary infiltrates    4. Follicular lymphoma grade IIIa, unspecified body region [C82.30 (ICD-10-CM)]    5. Nonfamilial hypogammaglobulinemia [D80.1 (ICD-10-CM)]    6. Diarrhea of infectious origin [A09 (ICD-10-CM)]    7. Enterovirus infection [B34.1 (ICD-10-CM)]    8. Chest pain           Plan:     PLAN:          Monitor labs   IV antibiotics as per ID directives  Given his underlying chronic lung issues and LLL bronchial blockage -  Recommended consideration for pulmonary consultation and he was seen by Dr Esparza earlier today  Will follow with you               Jefferson Ibarra MD  Hematology/Oncology  [facility]

## 2023-01-11 NOTE — RESPIRATORY THERAPY
Patient receiving aerosol tx via duoneb now and qid/  Hr 93 and 02 saturation 91% on room air.  Patient averaging only 750ml on IS.  Patient receiving Aerobikia qid.  Patient stated cpt to painful.

## 2023-01-11 NOTE — PROGRESS NOTES
"[facility] Medicine  Progress Note    Patient Name: Sivakumar Thacker  MRN: [id number]  Patient Class: IP- Inpatient  Admission Date: 1/6/2023  Attending Physician: Rea Zavala MD  Primary Care Provider: Barry Mcmahon MD  DOS: 01/10/2023    Subjective:     Principal Problem:Pulmonary infiltrates    Chief Complaint:   Chief Complaint   Patient presents with    Abdominal Pain     W/ diarrhea x1 week.    Cough     W/ back pain on "my lungs" x1 day        HPI: ED note  67-year-old male presents emergency department past medical history includes diabetes, hypertension, currently receiving infusion treatment for non-Hodgkin's lymphoma reports for the last week he is had diarrhea and feels generally weak.  States yesterday he developed a cough, associated shortness of breath, and pain when he breathes.  The patient reports that he went to the infusion center today however he was sent here by Dr. Ibarra for further evaluation in the emergency department.  Patient has been taking over-the-counter Pepto-Bismol for relief of diarrhea symptoms    1/6/2022  Mr Thacker has noted increasing SOB and VASQUEZ with fever X 1 today. Hew has had limited exposure to the public. The diarrhea is not bloody and he was exposed to antibiotics 11/2022.        1/9: Patient seen and examined.  Persistent cough and chest congestion, slightly improved thus far with treatment, mild to moderate intensity.  He had an episode of posttussive emesis overnight.  Shortness of breath slightly improved.  Currently tolerating room air, had home oxygen assessment with ambulation and did not desaturate enough to qualify for home oxygen.  Eating a little bit better today, he reports not getting Ensure as ordered.  No nausea or vomiting.  No abdominal pain.  No headache or syncope.  Family at bedside    1/10:  pt reporting pleuritic L chest pain.  No chest wall TTP.  No SOB.  Leukopenia continues.        ROS:  All systems reviewed and " are negative except as noted per above.    Vitals:    01/10/23 1544 01/10/23 1627 01/10/23 1933 01/10/23 1939   BP:    (!) 140/92   BP Location:    Right arm   Patient Position:    Lying   Pulse: 90   101   Resp: 18 20 17 19   Temp:    98.1 °F (36.7 °C)   TempSrc:    Oral   SpO2: 95%   (!) 93%   Weight:       Height:         Gen: alert, responsive  HEENT:  Eyes - no pallor  External ears with no lesions  Nares patent  Mouth, Throat:  trachea midline   CV: RRR  Lungs: CTA B/L, no chest wall TTP on my exam  Abd: +BS, soft, NT, ND  Ext: no atrophy or edema  Skin: warm, dry  Neuro: grossly intact  Psych: pleasant    Assessment/Plan:     Acute bacterial pneumonia     Pt may benefit from bronchoscopy    The patient is slowly improving.  Continue current antimicrobial course, follow-up Infectious Disease recs.  Possible transition to oral antibiotics near future  Increased Mucinex dosing.  Increased frequency of bronchodilators.  Switched from Cheratussin to Tussionex.  Start chocolate ensure with meals.  Appreciate multiple consultants.  Continue course.  Follow-up IgG levels, mildly decreased, may need dose adjustment as outpatient  Patient passed home oxygen assessment and will not need home oxygen, mobilizing better   Nebulizer ordered for home use at discharge   Patient is slowly improving likely needs another 24 hours for discharge    Viral acute gastroenteritis rhino virus/enterovirus  The patient had symptoms of viral gastroenteritis preceding pneumonia   Stool studies done and unremarkable  GI symptoms are improving    Chronic obstructive pulmonary disease, unspecified COPD type  Bronchiectasis  Treat as exacerbation without steroids at present      Follicular lymphoma grade IIIa  Oncology consulted  Would this patient benefit from IVIG - dose received      VTE Risk Mitigation (From admission, onward)           Ordered     enoxaparin injection 40 mg  Daily         01/06/23 2120     IP VTE HIGH RISK PATIENT  Once          01/06/23 2120     Place sequential compression device  Until discontinued         01/06/23 2120                       Rae Zavala MD  Department of Hospital Medicine   [facility]

## 2023-01-11 NOTE — CARE UPDATE
01/10/23 2254   Patient Assessment/Suction   Level of Consciousness (AVPU) alert   Respiratory Effort Normal   Expansion/Accessory Muscles/Retractions no use of accessory muscles   All Lung Fields Breath Sounds diminished   PRE-TX-O2   Device (Oxygen Therapy) room air   SpO2 (!) 93 %   Pulse Oximetry Type Intermittent   $ Pulse Oximetry - Multiple Charge Pulse Oximetry - Multiple   Pulse 101   Resp 19   Aerosol Therapy   $ Aerosol Therapy Charges Aerosol Treatment   Daily Review of Necessity (SVN) completed   Respiratory Treatment Status (SVN) given   Treatment Route (SVN) oxygen   Patient Position (SVN) semi-Byrne's   Post Treatment Assessment (SVN) breath sounds unchanged;vital signs unchanged   Signs of Intolerance (SVN) none   Vibratory PEP Therapy   $ Vibratory PEP Charges Acapella Therapy   $ Vibratory PEP Equipment Aerobika Equipment   $ Vibratory PEP Tech Time Charges 15 min   Daily Review of Necessity (PEP Therapy) completed   Type (PEP Therapy) vibratory/oscillatory   Device (PEP Therapy) flutter   Route (PEP Therapy) mouthpiece   Breaths per Cycle (PEP Therapy) 10   Cycles (PEP Therapy) 1   Education   $ Education 15 min   Respiratory Evaluation   $ Care Plan Tech Time 15 min

## 2023-01-11 NOTE — CONSULTS
Pulmonary/Critical Care Consult      Patient name: Sivakumar Thacker  MRN: [id number]  Date: 01/11/2023    Admit Date: 1/6/2023  Consult Requested By: Rea Zavala MD    Reason for Consult: Atelectasis, mucus plugging    HPI:    1/11/2023 - 68 yo h/o DM, HTN. NHL (treated for about 10 years, last chemo 9/22)admitted with pneumonia and being treated.  Since admission he has had left sided pleuritic chest pain. He has had cough but has not been bringing up much in the way of secretions  Had CT chest yesterday showing some mucus plugging of LLL airways (these have been small on past CT).  He has been started on respiratory treatments, aerobika and is doing IS (only getting about 750 ml).    Review of Systems    Review of Systems   Constitutional:  Positive for malaise/fatigue. Negative for chills, diaphoresis, fever and weight loss.   HENT:  Negative for congestion.    Eyes:  Negative for pain.   Respiratory:  Positive for cough. Negative for hemoptysis, sputum production, shortness of breath, wheezing and stridor.    Cardiovascular:  Positive for chest pain (left pleuritic). Negative for palpitations, orthopnea, claudication, leg swelling and PND.   Gastrointestinal:  Negative for abdominal pain, constipation, diarrhea, heartburn, nausea and vomiting.   Genitourinary:  Negative for dysuria, frequency and urgency.   Musculoskeletal:  Negative for falls and myalgias.   Neurological:  Positive for weakness. Negative for sensory change and focal weakness.   Psychiatric/Behavioral:  Negative for depression, substance abuse and suicidal ideas. The patient is not nervous/anxious.      Past Medical History    Past Medical History:   Diagnosis Date    Chest wall abscess 9/30/2022    Chronic obstructive pulmonary disease, unspecified COPD type 3/28/2022    Diabetes mellitus, type 2     Encounter for antineoplastic chemotherapy 04/01/2020    Hypertension     Hypogammaglobulinemia 12/13/2019    Neuropathy     Non Hodgkin's  lymphoma     Reflux esophagitis     Ringing in ear, bilateral        Past Surgical History    Past Surgical History:   Procedure Laterality Date    COLONOSCOPY  2018    EYE SURGERY  Approximately 3 years ago    Cataract    HAND SURGERY      amputation left index finger    INCISION AND DRAINAGE OF ABSCESS Left 10/3/2022    Procedure: INCISION AND DRAINAGE, ABSCESS;  Surgeon: Franco Venegas III, MD;  Location: [facility] OR;  Service: General;  Laterality: Left;  axilla    MEDIPORT REMOVAL Right 10/3/2022    Procedure: REMOVAL, CATHETER, CENTRAL VENOUS, TUNNELED, WITH PORT;  Surgeon: Franco Venegas III, MD;  Location: [facility] OR;  Service: General;  Laterality: Right;    PORTACATH PLACEMENT      SKIN CANCER EXCISION  09/30/2020    [facility]    SPINE SURGERY      VASECTOMY  1985 ?       Medications (scheduled):      albuterol-ipratropium  3 mL Nebulization Q4H WAKE    amLODIPine  10 mg Oral Daily    aspirin  81 mg Oral Daily    atorvastatin  20 mg Oral Daily    celecoxib  200 mg Oral BID    enoxaparin  40 mg Subcutaneous Daily    fluticasone propionate  1 spray Each Nostril BID    gabapentin  300 mg Oral BID    guaiFENesin  1,200 mg Oral BID    losartan  100 mg Oral Daily    meropenem (MERREM) IVPB  1 g Intravenous Q8H    montelukast  10 mg Oral Daily    pantoprazole  40 mg Oral Before breakfast    sucralfate  1 g Oral QID (AC & HS)       Medications (infusions):         Medications (prn):     acetaminophen, albuterol-ipratropium, benzonatate, cyclobenzaprine, dextrose 10%, dextrose 10%, glucagon (human recombinant), glucose, glucose, HYDROcodone-acetaminophen, hydrocodone-chlorpheniramine, insulin aspart U-100, loperamide, magnesium oxide, magnesium oxide, morphine, ondansetron, potassium bicarbonate, potassium bicarbonate, potassium bicarbonate, potassium, sodium phosphates, potassium, sodium phosphates, potassium, sodium phosphates, prochlorperazine, sodium chloride 0.9%    Family History:   Family History   Problem  "Relation Age of Onset    Diabetes Father        Social History: Tobacco:   Social History     Tobacco Use   Smoking Status Former    Packs/day: 0.50    Years: 2.00    Pack years: 1.00    Types: Cigarettes   Smokeless Tobacco Never                                EtOH:   Social History     Substance and Sexual Activity   Alcohol Use Not Currently    Alcohol/week: 0.0 standard drinks    Comment: rarely                                Drugs:   Social History     Substance and Sexual Activity   Drug Use No       Physical Exam    Vital signs:  Temp:  [97.3 °F (36.3 °C)-98.4 °F (36.9 °C)]   Pulse:  []   Resp:  [16-20]   BP: (133-140)/(74-92)   SpO2:  [90 %-96 %]     Intake/Output:   Intake/Output Summary (Last 24 hours) at 1/11/2023 1450  Last data filed at 1/11/2023 0830  Gross per 24 hour   Intake 360 ml   Output --   Net 360 ml        BMI: Estimated body mass index is 29.68 kg/m² as calculated from the following:    Height as of this encounter: 5' 8" (1.727 m).    Weight as of this encounter: 88.5 kg (195 lb 3.2 oz).    Physical Exam  Vitals and nursing note reviewed.   Constitutional:       General: He is not in acute distress.     Appearance: Normal appearance. He is obese. He is not ill-appearing, toxic-appearing or diaphoretic.   HENT:      Head: Normocephalic and atraumatic.      Right Ear: External ear normal.      Left Ear: External ear normal.      Nose: Nose normal.      Mouth/Throat:      Mouth: Mucous membranes are moist.      Pharynx: Oropharynx is clear. No oropharyngeal exudate.   Eyes:      General: No scleral icterus.        Right eye: No discharge.         Left eye: No discharge.      Extraocular Movements: Extraocular movements intact.      Conjunctiva/sclera: Conjunctivae normal.      Pupils: Pupils are equal, round, and reactive to light.   Neck:      Vascular: No carotid bruit.   Cardiovascular:      Rate and Rhythm: Normal rate and regular rhythm.      Pulses: Normal pulses.      Heart sounds: " Normal heart sounds. No murmur heard.    No friction rub. No gallop.   Pulmonary:      Effort: Pulmonary effort is normal. No respiratory distress.      Breath sounds: Normal breath sounds. No stridor. No wheezing, rhonchi or rales.      Comments: Decreased BS left base  Chest:      Chest wall: No tenderness.   Abdominal:      General: Bowel sounds are normal. There is no distension.      Tenderness: There is no abdominal tenderness. There is no guarding.   Musculoskeletal:         General: No swelling. Normal range of motion.      Cervical back: Normal range of motion and neck supple. No rigidity or tenderness.      Right lower leg: No edema.      Left lower leg: No edema.   Lymphadenopathy:      Cervical: No cervical adenopathy.   Skin:     General: Skin is warm and dry.      Capillary Refill: Capillary refill takes less than 2 seconds.      Coloration: Skin is not jaundiced.      Findings: No bruising.   Neurological:      General: No focal deficit present.      Mental Status: He is alert and oriented to person, place, and time. Mental status is at baseline.      Cranial Nerves: No cranial nerve deficit.      Sensory: No sensory deficit.      Motor: No weakness.   Psychiatric:         Mood and Affect: Mood normal.         Behavior: Behavior normal.         Thought Content: Thought content normal.         Judgment: Judgment normal.       Laboratory    Recent Labs   Lab 01/11/23  0535   WBC 2.89*   RBC 4.44*   HGB 11.7*   HCT 38.0*      MCV 86   MCH 26.4*   MCHC 30.8*       Recent Labs   Lab 01/11/23  0535   CALCIUM 9.4      K 4.1   CO2 30*   CL 97   BUN 19   CREATININE 0.8       No results for input(s): PT, INR, APTT in the last 24 hours.    No results for input(s): CPK, CPKMB, TROPONINI, MB in the last 24 hours.    Additional labs: reviewed    Microbiology:       Microbiology Results (last 7 days)       Procedure Component Value Units Date/Time    Blood culture #2 **CANNOT BE ORDERED STAT**  [[id number]] Collected: 01/06/23 1140    Order Status: Completed Specimen: Blood from Peripheral, Antecubital, Left Updated: 01/11/23 1232     Blood Culture, Routine No growth after 5 days.    Blood culture #1 **CANNOT BE ORDERED STAT** [[id number]] Collected: 01/06/23 1202    Order Status: Completed Specimen: Blood from Peripheral, Forearm, Left Updated: 01/11/23 1232     Blood Culture, Routine No growth after 5 days.    Stool culture **cannot be ordered stat** [[id number]] Collected: 01/07/23 0834    Order Status: Completed Specimen: Stool Updated: 01/09/23 0757     Stool Culture No Salmonella,Shigella,Vibrio,Campylobacter.      No E coli 0157:H7 isolated.    Culture, Respiratory with Gram Stain [[id number]] Collected: 01/06/23 1720    Order Status: Completed Specimen: Respiratory from Sputum Updated: 01/08/23 1444     Respiratory Culture Normal respiratory dank     Gram Stain (Respiratory) Many WBC's     Gram Stain (Respiratory) <10 epithelial cells per low power field.     Gram Stain (Respiratory) Many Gram positive cocci    Respiratory Infection Panel (PCR), Nasopharyngeal [[id number]]  (Abnormal) Collected: 01/07/23 1319    Order Status: Completed Specimen: Nasopharyngeal Swab Updated: 01/07/23 2147     Respiratory Infection Panel Source NP swab     Adenovirus Not Detected     Coronavirus 229E, Common Cold Virus Not Detected     Coronavirus HKU1, Common Cold Virus Not Detected     Coronavirus NL63, Common Cold Virus Not Detected     Coronavirus OC43, Common Cold Virus Not Detected     Comment: The Coronavirus strains detected in this test cause the common cold.  These strains are not the COVID-19 (novel Coronavirus)strain   associated with the respiratory disease outbreak.          SARS-CoV2 (COVID-19) Qualitative PCR Not Detected     Human Metapneumovirus Not Detected     Human Rhinovirus/Enterovirus Detected     Influenza A (subtypes H1, H1-2009,H3) Not Detected     Influenza B Not Detected     Parainfluenza  Virus 1 Not Detected     Parainfluenza Virus 2 Not Detected     Parainfluenza Virus 3 Not Detected     Parainfluenza Virus 4 Not Detected     Respiratory Syncytial Virus Not Detected     Bordetella Parapertussis (MW7138) Not Detected     Bordetella pertussis (ptxP) Not Detected     Chlamydia pneumoniae Not Detected     Mycoplasma pneumoniae Not Detected     Comment: Respiratory Infection Panel testing performed by Multiplex PCR.       Narrative:      Respiratory Infection Panel source->NP Swab    MRSA Screen by PCR [[id number]] Collected: 01/06/23 1644    Order Status: Completed Specimen: Nasopharyngeal Swab from Nasal Updated: 01/06/23 2812     MRSA SCREEN BY PCR Negative            Radiology    CTA Chest Non-Coronary (PE Studies)  CMS MANDATED QUALITY DATA-CT RADIATION DOSE-436  All CT scans at this facility use dose modulation, iterative reconstruction, and or weight-based dosing when appropriate to reduce radiation dose to as low as reasonably achievable.    HISTORY: New pleural chest pain, exclude PE    FINDINGS: Thin axial imaging through the chest was performed with 100 mL Omnipaque 350 IV contrast, with sagittal and coronal reformatted images and MIP reconstructions performed, and images stored in the patient's permanent electronic medical record.    Comparison to prior exams including recent CTA of 01/06/2023. There are no pulmonary arterial filling defects to suggest pulmonary thromboembolism. The central pulmonary arteries are normal in caliber.    Scattered calcified and soft plaque involves normal caliber thoracic aorta, with no aortic dissection or evidence of aortic intramural hematoma. The heart is normal in size, with extensive coronary arterial calcifications, and no pericardial effusion. No enlarged mediastinal or hilar lymph nodes.    Previous bilateral lower lung airspace opacities consistent with multifocal pneumonia have improved, with consolidation and volume loss in the left lower lobe  suggesting combination of atelectasis and resolving pneumonia. There is development of a small low-density loculated appearing left pleural effusion. The left lower lobe main bronchus and segmental bronchi are newly occluded, presumably on the basis of mucous plugging. No right pleural effusion or pneumothorax.    Images of the upper abdomen show no new abnormality. There are no acute fractures or destructive osseous lesions.    IMPRESSION:  1. New occlusion of the left lower lobe main bronchus and segmental bronchi, presumably on the basis of mucous plugging, with development of left lower lobe atelectasis, volume loss, and small loculated appearing left pleural effusion.  2. Interval improvement in bilateral lower lung pneumonia.  3. Negative for pulmonary thromboembolism.    Electronically signed by:  José Antonio Diaz MD  1/10/2023 7:14 PM CST Workstation: [id number]        Additional Studies    reviewed    Ventilator Information              No results for input(s): PH, PCO2, PO2, HCO3, POCSATURATED, BE in the last 72 hours.      Impression    Active Hospital Problems    Diagnosis  POA    *Pulmonary infiltrates [R91.8]  Yes    Diarrhea of infectious origin [A09]  Yes    Enterovirus infection [B34.1]  Yes    Pneumonia of both lungs due to infectious organism [J18.9]  Unknown    Non-Hodgkin's lymphoma [C85.90]  Yes    Chronic obstructive pulmonary disease, unspecified COPD type [J44.9]  Yes    Skin lesion [L98.9]  Yes    Type II diabetes mellitus with neurological manifestations [E11.49]  Yes    Essential hypertension [I10]  Yes    Follicular lymphoma grade IIIa [C82.30]  Yes    Nonfamilial hypogammaglobulinemia [D80.1]  Yes      Resolved Hospital Problems   No resolved problems to display.       Plan    Continued respiratory treatments and aerobika  Continue guaifenesin  Needs to work more with IS  Will give a few doses of steroids to see if it helps with pleurisy  Needs to be OOB in chair  Antibiotics per  ID    Thank you for this consult.  I will follow with you while the patient is hospitalized.        Garry Esparza MD  [facility] Pulmonary/Critical Care  01/11/2023

## 2023-01-12 VITALS
WEIGHT: 194.5 LBS | DIASTOLIC BLOOD PRESSURE: 72 MMHG | HEIGHT: 68 IN | RESPIRATION RATE: 16 BRPM | HEART RATE: 100 BPM | TEMPERATURE: 99 F | OXYGEN SATURATION: 95 % | SYSTOLIC BLOOD PRESSURE: 126 MMHG | BODY MASS INDEX: 29.48 KG/M2

## 2023-01-12 PROBLEM — B34.1 ENTEROVIRUS INFECTION: Status: RESOLVED | Noted: 2023-01-08 | Resolved: 2023-01-12

## 2023-01-12 PROBLEM — L98.9 SKIN LESION: Status: RESOLVED | Noted: 2021-06-23 | Resolved: 2023-01-12

## 2023-01-12 PROBLEM — R91.8 PULMONARY INFILTRATES: Status: RESOLVED | Noted: 2023-01-06 | Resolved: 2023-01-12

## 2023-01-12 PROBLEM — J18.9 PNEUMONIA OF BOTH LUNGS DUE TO INFECTIOUS ORGANISM: Status: RESOLVED | Noted: 2023-01-06 | Resolved: 2023-01-12

## 2023-01-12 PROBLEM — A09 DIARRHEA OF INFECTIOUS ORIGIN: Status: RESOLVED | Noted: 2023-01-08 | Resolved: 2023-01-12

## 2023-01-12 LAB
ANISOCYTOSIS BLD QL SMEAR: SLIGHT
BASOPHILS NFR BLD: 0 % (ref 0–1.9)
DIFFERENTIAL METHOD: ABNORMAL
EOSINOPHIL NFR BLD: 0 % (ref 0–8)
ERYTHROCYTE [DISTWIDTH] IN BLOOD BY AUTOMATED COUNT: 13.7 % (ref 11.5–14.5)
GLUCOSE SERPL-MCNC: 265 MG/DL (ref 70–110)
GLUCOSE SERPL-MCNC: 268 MG/DL (ref 70–110)
GLUCOSE SERPL-MCNC: 365 MG/DL (ref 70–110)
HCT VFR BLD AUTO: 40.5 % (ref 40–54)
HGB BLD-MCNC: 12.4 G/DL (ref 14–18)
IMM GRANULOCYTES # BLD AUTO: ABNORMAL K/UL (ref 0–0.04)
IMM GRANULOCYTES NFR BLD AUTO: ABNORMAL % (ref 0–0.5)
LYMPHOCYTES NFR BLD: 15 % (ref 18–48)
MCH RBC QN AUTO: 26.1 PG (ref 27–31)
MCHC RBC AUTO-ENTMCNC: 30.6 G/DL (ref 32–36)
MCV RBC AUTO: 85 FL (ref 82–98)
MONOCYTES NFR BLD: 1 % (ref 4–15)
NEUTROPHILS NFR BLD: 78 % (ref 38–73)
NEUTS BAND NFR BLD MANUAL: 6 %
NRBC BLD-RTO: 0 /100 WBC
PLATELET # BLD AUTO: 427 K/UL (ref 150–450)
PMV BLD AUTO: 8.9 FL (ref 9.2–12.9)
RBC # BLD AUTO: 4.75 M/UL (ref 4.6–6.2)
WBC # BLD AUTO: 2.83 K/UL (ref 3.9–12.7)

## 2023-01-12 PROCEDURE — 25000242 PHARM REV CODE 250 ALT 637 W/ HCPCS: Performed by: INTERNAL MEDICINE

## 2023-01-12 PROCEDURE — 25000003 PHARM REV CODE 250: Performed by: FAMILY MEDICINE

## 2023-01-12 PROCEDURE — 94668 MNPJ CHEST WALL SBSQ: CPT

## 2023-01-12 PROCEDURE — 99232 PR SUBSEQUENT HOSPITAL CARE,LEVL II: ICD-10-PCS | Mod: ,,, | Performed by: INTERNAL MEDICINE

## 2023-01-12 PROCEDURE — 85027 COMPLETE CBC AUTOMATED: CPT | Performed by: INTERNAL MEDICINE

## 2023-01-12 PROCEDURE — 25000003 PHARM REV CODE 250: Performed by: INTERNAL MEDICINE

## 2023-01-12 PROCEDURE — 99232 SBSQ HOSP IP/OBS MODERATE 35: CPT | Mod: ,,, | Performed by: INTERNAL MEDICINE

## 2023-01-12 PROCEDURE — 94664 DEMO&/EVAL PT USE INHALER: CPT

## 2023-01-12 PROCEDURE — 99900031 HC PATIENT EDUCATION (STAT)

## 2023-01-12 PROCEDURE — 63600175 PHARM REV CODE 636 W HCPCS: Performed by: INTERNAL MEDICINE

## 2023-01-12 PROCEDURE — 94761 N-INVAS EAR/PLS OXIMETRY MLT: CPT

## 2023-01-12 PROCEDURE — 99900035 HC TECH TIME PER 15 MIN (STAT)

## 2023-01-12 PROCEDURE — 36415 COLL VENOUS BLD VENIPUNCTURE: CPT | Performed by: INTERNAL MEDICINE

## 2023-01-12 PROCEDURE — 85007 BL SMEAR W/DIFF WBC COUNT: CPT | Performed by: INTERNAL MEDICINE

## 2023-01-12 PROCEDURE — 94640 AIRWAY INHALATION TREATMENT: CPT

## 2023-01-12 RX ORDER — CELECOXIB 200 MG/1
200 CAPSULE ORAL 2 TIMES DAILY
Qty: 28 CAPSULE | Refills: 0 | Status: SHIPPED | OUTPATIENT
Start: 2023-01-12 | End: 2023-01-17

## 2023-01-12 RX ORDER — HYDROCODONE POLISTIREX AND CHLORPHENIRAMINE POLISTIREX 10; 8 MG/5ML; MG/5ML
5 SUSPENSION, EXTENDED RELEASE ORAL EVERY 12 HOURS PRN
Qty: 115 ML | Refills: 0 | Status: SHIPPED | OUTPATIENT
Start: 2023-01-12 | End: 2023-08-15

## 2023-01-12 RX ORDER — HYDROCODONE BITARTRATE AND ACETAMINOPHEN 5; 325 MG/1; MG/1
1 TABLET ORAL EVERY 8 HOURS PRN
Qty: 10 TABLET | Refills: 0 | Status: SHIPPED | OUTPATIENT
Start: 2023-01-12 | End: 2023-01-17

## 2023-01-12 RX ORDER — SUCRALFATE 1 G/10ML
1 SUSPENSION ORAL
Qty: 414 ML | Refills: 0 | Status: SHIPPED | OUTPATIENT
Start: 2023-01-12 | End: 2023-01-17

## 2023-01-12 RX ORDER — LOSARTAN POTASSIUM 100 MG/1
100 TABLET ORAL DAILY
Qty: 90 TABLET | Refills: 3 | Status: SHIPPED | OUTPATIENT
Start: 2023-01-13 | End: 2023-01-17 | Stop reason: SDUPTHER

## 2023-01-12 RX ORDER — CYCLOBENZAPRINE HCL 10 MG
10 TABLET ORAL 3 TIMES DAILY PRN
Qty: 30 TABLET | Refills: 0 | Status: SHIPPED | OUTPATIENT
Start: 2023-01-12 | End: 2023-01-17

## 2023-01-12 RX ORDER — AZITHROMYCIN 500 MG/1
500 TABLET, FILM COATED ORAL DAILY
Qty: 3 TABLET | Refills: 0 | Status: SHIPPED | OUTPATIENT
Start: 2023-01-13 | End: 2023-01-17

## 2023-01-12 RX ORDER — AZITHROMYCIN 250 MG/1
500 TABLET, FILM COATED ORAL DAILY
Status: DISCONTINUED | OUTPATIENT
Start: 2023-01-13 | End: 2023-01-12 | Stop reason: HOSPADM

## 2023-01-12 RX ORDER — ONDANSETRON 4 MG/1
4 TABLET, ORALLY DISINTEGRATING ORAL EVERY 6 HOURS PRN
Qty: 30 TABLET | Refills: 0 | Status: SHIPPED | OUTPATIENT
Start: 2023-01-12 | End: 2023-01-17

## 2023-01-12 RX ORDER — GUAIFENESIN 600 MG/1
1200 TABLET, EXTENDED RELEASE ORAL 2 TIMES DAILY
Qty: 60 TABLET | Refills: 0 | Status: SHIPPED | OUTPATIENT
Start: 2023-01-12 | End: 2023-08-15

## 2023-01-12 RX ADMIN — GABAPENTIN 300 MG: 300 CAPSULE ORAL at 08:01

## 2023-01-12 RX ADMIN — LOSARTAN POTASSIUM 100 MG: 50 TABLET, FILM COATED ORAL at 08:01

## 2023-01-12 RX ADMIN — IPRATROPIUM BROMIDE AND ALBUTEROL SULFATE 3 ML: 2.5; .5 SOLUTION RESPIRATORY (INHALATION) at 04:01

## 2023-01-12 RX ADMIN — AZITHROMYCIN 500 MG: 500 INJECTION, POWDER, LYOPHILIZED, FOR SOLUTION INTRAVENOUS at 10:01

## 2023-01-12 RX ADMIN — PANTOPRAZOLE SODIUM 40 MG: 40 TABLET, DELAYED RELEASE ORAL at 06:01

## 2023-01-12 RX ADMIN — METHYLPREDNISOLONE SODIUM SUCCINATE 80 MG: 40 INJECTION, POWDER, FOR SOLUTION INTRAMUSCULAR; INTRAVENOUS at 12:01

## 2023-01-12 RX ADMIN — METHYLPREDNISOLONE SODIUM SUCCINATE 80 MG: 40 INJECTION, POWDER, FOR SOLUTION INTRAMUSCULAR; INTRAVENOUS at 06:01

## 2023-01-12 RX ADMIN — MEROPENEM AND SODIUM CHLORIDE 1 G: 1 INJECTION, SOLUTION INTRAVENOUS at 11:01

## 2023-01-12 RX ADMIN — MEROPENEM AND SODIUM CHLORIDE 1 G: 1 INJECTION, SOLUTION INTRAVENOUS at 04:01

## 2023-01-12 RX ADMIN — SUCRALFATE 1 G: 1 SUSPENSION ORAL at 04:01

## 2023-01-12 RX ADMIN — IPRATROPIUM BROMIDE AND ALBUTEROL SULFATE 3 ML: 2.5; .5 SOLUTION RESPIRATORY (INHALATION) at 08:01

## 2023-01-12 RX ADMIN — INSULIN ASPART 6 UNITS: 100 INJECTION, SOLUTION INTRAVENOUS; SUBCUTANEOUS at 05:01

## 2023-01-12 RX ADMIN — SUCRALFATE 1 G: 1 SUSPENSION ORAL at 06:01

## 2023-01-12 RX ADMIN — ENOXAPARIN SODIUM 40 MG: 100 INJECTION SUBCUTANEOUS at 04:01

## 2023-01-12 RX ADMIN — INSULIN ASPART 10 UNITS: 100 INJECTION, SOLUTION INTRAVENOUS; SUBCUTANEOUS at 11:01

## 2023-01-12 RX ADMIN — SUCRALFATE 1 G: 1 SUSPENSION ORAL at 10:01

## 2023-01-12 RX ADMIN — ASPIRIN 81 MG: 81 TABLET, COATED ORAL at 08:01

## 2023-01-12 RX ADMIN — CELECOXIB 200 MG: 100 CAPSULE ORAL at 08:01

## 2023-01-12 RX ADMIN — GUAIFENESIN 1200 MG: 600 TABLET, EXTENDED RELEASE ORAL at 08:01

## 2023-01-12 RX ADMIN — IPRATROPIUM BROMIDE AND ALBUTEROL SULFATE 3 ML: 2.5; .5 SOLUTION RESPIRATORY (INHALATION) at 01:01

## 2023-01-12 RX ADMIN — MONTELUKAST 10 MG: 10 TABLET, FILM COATED ORAL at 08:01

## 2023-01-12 RX ADMIN — FLUTICASONE PROPIONATE 50 MCG: 50 SPRAY, METERED NASAL at 08:01

## 2023-01-12 RX ADMIN — INSULIN ASPART 6 UNITS: 100 INJECTION, SOLUTION INTRAVENOUS; SUBCUTANEOUS at 08:01

## 2023-01-12 RX ADMIN — AMLODIPINE BESYLATE 10 MG: 5 TABLET ORAL at 08:01

## 2023-01-12 RX ADMIN — ATORVASTATIN CALCIUM 20 MG: 20 TABLET, FILM COATED ORAL at 08:01

## 2023-01-12 NOTE — PLAN OF CARE
01/12/23 0815   Patient Assessment/Suction   Level of Consciousness (AVPU) alert   Respiratory Effort Normal;Unlabored   All Lung Fields Breath Sounds diminished   PRE-TX-O2   Device (Oxygen Therapy) room air   SpO2 (!) 92 %   Pulse Oximetry Type Intermittent   $ Pulse Oximetry - Multiple Charge Pulse Oximetry - Multiple   Pulse 89   Resp 18   Aerosol Therapy   $ Aerosol Therapy Charges Aerosol Treatment   Daily Review of Necessity (SVN) completed   Respiratory Treatment Status (SVN) given   Treatment Route (SVN) mask;oxygen   Patient Position (SVN) sitting on edge of bed   Post Treatment Assessment (SVN) increased aeration   Signs of Intolerance (SVN) none   Vibratory PEP Therapy   $ Vibratory PEP Charges Aerobika Therapy   $ Vibratory PEP Tech Time Charges 15 min   Daily Review of Necessity (PEP Therapy) completed   Type (PEP Therapy) vibratory/oscillatory   Device (PEP Therapy) flutter   Route (PEP Therapy) mouthpiece   Breaths per Cycle (PEP Therapy) 10   Cycles (PEP Therapy) 1   Settings (PEP Therapy) PEP 5   Patient Position (PEP Therapy) sitting on edge of bed   Chest Physiotherapy   $ Chest Physiotherapy Charges Subsequent   Daily Review of Necessity (CPT) completed   Type (CPT) percussion/postural drainage   Method (CPT) mechanical percussor   Patient Position (CPT) sitting on edge of bed   Lung Still (CPT) LLL (left lower lobe);DAVE (left upper lobe);RUL (right upper lobe);RML (right middle lobe);RLL (right lower lobe)   Duration Left Side (CPT) 5 mins   CPT Total Time (Min) 10   Post Treatment Assessment (CPT) increased aeration   Signs of Intolerance (CPT) none   Education   $ Education Bronchodilator;15 min   Respiratory Evaluation   $ Care Plan Tech Time 15 min

## 2023-01-12 NOTE — CONSULTS
"[facility]  Adult Nutrition   Consult Note (Nutrition Education)     SUMMARY     Recommendations  1) RD attempted to educate pt on diet for diabetes and weight loss verbally and given handouts along with my contact information.   2) Continue current 2000 kcal ADA diet as tolerated and exchange Glucerna for Ensure HP.    Goals:   1) Pt to understand the above diet information and be able to lower his weight and A1C to WNL's. 2) Pt to meet 75 to   100% of hsi EEN/EPN.    Nutrition Goal Status: progressing towards goal    Communication of RD Recs: other (comment)    Dietitian Rounds Brief  Consult for Diet Education: Attempted to educate pt on diet for diabetes and weight loss verbally and give handouts along with my contact information. Pt accepted the handouts and preferred to read them and call for questions. Pt is eating a little better with 75% intake at lunch today. His LBM was 1/10/2023 and will continue to follow prn.    Diet order:   Current Diet Order: 2000 kcal ADA   Oral Nutrition Supplement: Ensure High Protein with meals     Evaluation of Received Nutrient/Fluid Intake  Energy Calories Required: meeting needs  Protein Required: meeting needs  Fluid Required: meeting needs  Tolerance: tolerating     % Intake of Estimated Energy Needs: 50 - 75 %  % Meal Intake: 50 - 75 %      Intake/Output Summary (Last 24 hours) at 1/12/2023 1120  Last data filed at 1/11/2023 2135  Gross per 24 hour   Intake 720 ml   Output --   Net 720 ml        Anthropometrics  Temp: 97.9 °F (36.6 °C)  Height Method: Stated  Height: 5' 8" (172.7 cm)  Height (inches): 68 in  Weight Method: Standard Scale  Weight: 88.2 kg (194 lb 8 oz)  Weight (lb): 194.5 lb  Ideal Body Weight (IBW), Male: 154 lb  % Ideal Body Weight, Male (lb): 130.71 %  BMI (Calculated): 29.6  BMI Grade: 25 - 29.9 - overweight       Estimated/Assessed Needs  Weight Used For Calorie Calculations: 88.2 kg (194 lb 7.1 oz)  Energy Calorie Requirements (kcal): " 1338-6797 kcals/day  Energy Need Method: Kcal/kg  Protein Requirements: 105-140 g/day  Weight Used For Protein Calculations: 70 kg (154 lb 5.2 oz)     Estimated Fluid Requirement Method: RDA Method  RDA Method (mL): 1764       Reason for Assessment  Reason For Assessment: consult, length of stay  Diagnosis: other (see comments), infection/sepsis (Pneumonia of both lungs due to infectious organism)  Relevant Medical History: Neuropathy, Reflux esophagitis, Hypertension, Diabetes mellitus, type 2, Encounter for antineoplastic chemotherapy, Non-Hodgkin's lymphoma, Ringing in ear, bilateral, Chronic obstructive pulmonary disease, unspecified COPD type, Hypogammaglobulinemia, Chest wall abscess    Nutrition/Diet History  Food Allergies: NKFA  Factors Affecting Nutritional Intake: None identified at this time    Nutrition Risk Screen  Nutrition Risk Screen: no indicators present     MST Score: 0  Have you recently lost weight without trying?: No  Weight loss score: 0  Have you been eating poorly because of a decreased appetite?: No  Appetite score: 0       Weight History:  Wt Readings from Last 5 Encounters:   01/12/23 88.2 kg (194 lb 8 oz)   01/05/23 90.3 kg (199 lb)   01/04/23 90.5 kg (199 lb 9.6 oz)   12/09/22 94.9 kg (209 lb 4.8 oz)   11/22/22 91.2 kg (201 lb)        Lab/Procedures/Meds: Pertinent Labs/Meds Reviewed    Medications:Pertinent Medications Reviewed  Scheduled Meds:   albuterol-ipratropium  3 mL Nebulization Q4H WAKE    amLODIPine  10 mg Oral Daily    aspirin  81 mg Oral Daily    atorvastatin  20 mg Oral Daily    azithromycin  500 mg Intravenous Daily    celecoxib  200 mg Oral BID    enoxaparin  40 mg Subcutaneous Daily    fluticasone propionate  1 spray Each Nostril BID    gabapentin  300 mg Oral BID    guaiFENesin  1,200 mg Oral BID    losartan  100 mg Oral Daily    meropenem (MERREM) IVPB  1 g Intravenous Q8H    montelukast  10 mg Oral Daily    pantoprazole  40 mg Oral Before breakfast    sucralfate  1  g Oral QID (AC & HS)     Continuous Infusions:  PRN Meds:.acetaminophen, albuterol-ipratropium, benzonatate, cyclobenzaprine, dextrose 10%, dextrose 10%, glucagon (human recombinant), glucose, glucose, HYDROcodone-acetaminophen, hydrocodone-chlorpheniramine, insulin aspart U-100, loperamide, magnesium oxide, magnesium oxide, morphine, ondansetron, potassium bicarbonate, potassium bicarbonate, potassium bicarbonate, potassium, sodium phosphates, potassium, sodium phosphates, potassium, sodium phosphates, prochlorperazine, sodium chloride 0.9%    Labs: Pertinent Labs Reviewed  Clinical Chemistry:  Recent Labs   Lab 01/06/23  0937 01/07/23  0421 01/09/23  0454 01/10/23  0439 01/11/23  0535   *   < > 137 137 139   K 3.7   < > 3.9 4.2 4.1   CL 98   < > 98 100 97   CO2 27   < > 30* 30* 30*   *   < > 131* 131* 105   BUN 13   < > 13 14 19   CREATININE 1.0   < > 0.8 0.8 0.8   CALCIUM 8.9   < > 8.9 9.0 9.4   PROT 7.0  --   --   --   --    ALBUMIN 3.2*  --   --   --   --    BILITOT 0.8  --   --   --   --    ALKPHOS 110  --   --   --   --    AST 30  --   --   --   --    ALT 21  --   --   --   --    ANIONGAP 10   < > 9 7* 12   MG  --    < > 1.7 1.7 1.9   PHOS  --    < > 5.4* 5.1* 6.0*    < > = values in this interval not displayed.     CBC:   Recent Labs   Lab 01/12/23  0456   WBC 2.83*   RBC 4.75   HGB 12.4*   HCT 40.5      MCV 85   MCH 26.1*   MCHC 30.6*     Cardiac Profile:  Recent Labs   Lab 01/06/23  0937   BNP 42       Monitor and Evaluation  Food and Nutrient Intake: food and beverage intake, energy intake  Food and Nutrient Adminstration: diet order  Knowledge/Beliefs/Attitudes: food and nutrition knowledge/skill, beliefs and attitudes  Physical Activity and Function: nutrition-related ADLs and IADLs, factors affecting access to physical activity  Anthropometric Measurements: weight, weight change, body mass index  Biochemical Data, Medical Tests and Procedures: inflammatory profile, lipid profile,  glucose/endocrine profile, gastrointestinal profile, electrolyte and renal panel  Nutrition-Focused Physical Findings: overall appearance     Nutrition Risk  Level of Risk/Frequency of Follow-up: low     Nutrition Follow-Up  RD Follow-up?: Yes      Dora Kaur RD 01/12/2023 11:20 AM

## 2023-01-12 NOTE — PROGRESS NOTES
"[facility]   Hematology/Oncology  Inpatient Progress Note          Patient Name: Sivakumar Thacker  MRN: [id number]  Admission Date: 1/6/2023  Hospital Length of Stay: 6 days  Code Status: Full Code   Attending Provider: Rea Zavala MD  Consulting Provider: Jefferson Ibarra MD  Primary Care Physician: Barry Mcmahon MD  Principal Problem:Pneumonia of both lungs due to infectious organism      Subjective:       Patient ID: Sivakumar Thacker is a 67 y.o. male.    Chief Complaint: Abdominal Pain (W/ diarrhea x1 week.) and Cough (W/ back pain on "my lungs" x1 day)        History Present Illness:    Patient sitting up in chair; he is feeling better overall and breathing easier; less cough; some residual sputum; no CP, HAs or N/V; daughter is at bedside; I discussed with nursing staff; expected d/c to home in near future        Review of Systems:  GEN: no current fever, night sweats or weight loss; general malaise  HEENT: normal with no HA's, sore throat, stiff neck, changes in vision  CV: normal with no CP, , PND, VASQUEZ or orthopnea  PULM: chronic SOB; cough with yellowish/white sputum  GI: no current N/V  : normal with no hematuria, dysuria  BREAST: normal with no mass, discharge, pain  SKIN: normal with no rash, erythema, bruising, or swelling      Objective:     Vitals:  Blood pressure 130/74, pulse 87, temperature 98.2 °F (36.8 °C), temperature source Oral, resp. rate 17, height 5' 8" (1.727 m), weight 88.2 kg (194 lb 8 oz), SpO2 96 %.    Physical Exam:  GEN: no apparent distress, comfortable; AAOx3  HEAD: atraumatic and normocephalic  EYES: no pallor, no icterus, PERRLA  ENT: OMM, no pharyngeal erythema, external ears WNL; no nasal discharge; no thrush;    NECK: no masses, thyroid normal, trachea midline, no LAD/LN's, supple  CV: RRR with no murmur; normal pulse; normal S1 and S2; no pedal edema; prior portacath since removed  CHEST: Normal respiratory effort; chronic coarseness, mild wheeze " bilaterally    ABDOM: nontender and nondistended; soft; normal bowel sounds; no rebound/guarding  MUSC/Skeletal: ROM normal; no crepitus; joints normal; no deformities or arthropathy  EXTREM: no clubbing, cyanosis, inflammation or swelling; mid-line on LUE  SKIN: no rashes, lesions, ulcers, petechiae or subcutaneous nodules ;    : no gibbs  NEURO: grossly intact; motor/sensory WNL; AAOx3; no tremors  PSYCH: normal mood, affect and behavior  LYMPH: normal cervical, supraclavicular, axillary and groin LN's            Lab Review:        Lab Results   Component Value Date    WBC 2.83 (L) 01/12/2023    HGB 12.4 (L) 01/12/2023    HCT 40.5 01/12/2023    MCV 85 01/12/2023     01/12/2023       CMP  Sodium   Date Value Ref Range Status   01/11/2023 139 136 - 145 mmol/L Final   04/25/2019 144 134 - 144 mmol/L      Potassium   Date Value Ref Range Status   01/11/2023 4.1 3.5 - 5.1 mmol/L Final     Chloride   Date Value Ref Range Status   01/11/2023 97 95 - 110 mmol/L Final   04/25/2019 107 98 - 110 mmol/L      CO2   Date Value Ref Range Status   01/11/2023 30 (H) 23 - 29 mmol/L Final     Glucose   Date Value Ref Range Status   01/11/2023 105 70 - 110 mg/dL Final   04/25/2019 177 (H) 70 - 99 mg/dL      BUN   Date Value Ref Range Status   01/11/2023 19 8 - 23 mg/dL Final     Creatinine   Date Value Ref Range Status   01/11/2023 0.8 0.5 - 1.4 mg/dL Final   04/25/2019 0.83 0.60 - 1.40 mg/dL      Calcium   Date Value Ref Range Status   01/11/2023 9.4 8.7 - 10.5 mg/dL Final     Total Protein   Date Value Ref Range Status   01/06/2023 7.0 6.0 - 8.4 g/dL Final     Albumin   Date Value Ref Range Status   01/06/2023 3.2 (L) 3.5 - 5.2 g/dL Final   04/25/2019 4.4 3.1 - 4.7 g/dL      Total Bilirubin   Date Value Ref Range Status   01/06/2023 0.8 0.1 - 1.0 mg/dL Final     Comment:     For infants and newborns, interpretation of results should be based  on gestational age, weight and in agreement with  clinical  observations.    Premature Infant recommended reference ranges:  Up to 24 hours.............<8.0 mg/dL  Up to 48 hours............<12.0 mg/dL  3-5 days..................<15.0 mg/dL  6-29 days.................<15.0 mg/dL       Alkaline Phosphatase   Date Value Ref Range Status   01/06/2023 110 55 - 135 U/L Final     AST   Date Value Ref Range Status   01/06/2023 30 10 - 40 U/L Final     ALT   Date Value Ref Range Status   01/06/2023 21 10 - 44 U/L Final     Anion Gap   Date Value Ref Range Status   01/11/2023 12 8 - 16 mmol/L Final     eGFR   Date Value Ref Range Status   01/11/2023 >60.0 >60 mL/min/1.73 m^2 Final           Radiology Diagnostic Studies:     CTA Chest Non-Coronary (PE Studies) [[id number]] Collected: 01/10/23 1828   Order Status: Completed Updated: 01/10/23 1916   Narrative:     CMS MANDATED QUALITY DATA-CT RADIATION DOSE-436   All CT scans at this facility use dose modulation, iterative reconstruction, and or weight-based dosing when appropriate to reduce radiation dose to as low as reasonably achievable.     HISTORY: New pleural chest pain, exclude PE     FINDINGS: Thin axial imaging through the chest was performed with 100 mL Omnipaque 350 IV contrast, with sagittal and coronal reformatted images and MIP reconstructions performed, and images stored in the patient's permanent electronic medical record.     Comparison to prior exams including recent CTA of 01/06/2023. There are no pulmonary arterial filling defects to suggest pulmonary thromboembolism. The central pulmonary arteries are normal in caliber.     Scattered calcified and soft plaque involves normal caliber thoracic aorta, with no aortic dissection or evidence of aortic intramural hematoma. The heart is normal in size, with extensive coronary arterial calcifications, and no pericardial effusion. No enlarged mediastinal or hilar lymph nodes.     Previous bilateral lower lung airspace opacities consistent with multifocal pneumonia  have improved, with consolidation and volume loss in the left lower lobe suggesting combination of atelectasis and resolving pneumonia. There is development of a small low-density loculated appearing left pleural effusion. The left lower lobe main bronchus and segmental bronchi are newly occluded, presumably on the basis of mucous plugging. No right pleural effusion or pneumothorax.     Images of the upper abdomen show no new abnormality. There are no acute fractures or destructive osseous lesions.     IMPRESSION:   1. New occlusion of the left lower lobe main bronchus and segmental bronchi, presumably on the basis of mucous plugging, with development of left lower lobe atelectasis, volume loss, and small loculated appearing left pleural effusion.   2. Interval improvement in bilateral lower lung pneumonia.   3. Negative for pulmonary thromboembolism.          Assessment:     IMPRESSION:    (1) 67 y.o. male  known to my oncology service with diagnosis of low grade NHL for which he had been on rituximab maintenance therapy in past and recently discontinued couple of months ago. He presented to the ED with SOB and working diagnosis of pneumonia  - patient was seen by my associate Dr DAVE Fitzpatrick over the weekend    1/9/2023:  - wbc 2.25; hgb at 10.9  - Dr Washington with ID following  - on abx    1/10/2023:  - wbc 2.64 and hgb 11.6  - communicated with Dr Washington via epic      1/11/2023:  - wbc at 2.89 and hgb 11.7  - CTA showed that he has a LLL bronchial blockage with mucus plug  - recommended pulmonary evaluation - he was seen by Dr Esparza earlier today    1/12/2023:  - wbc 2.83; hgb 12.4  - ID and pulmonary following  - expected d/c to home in near future         (2) Low grade follicular NHL - originally diagnosed in 2012  - s/p 6 cycles of bendamustine-rituximab through [facility]  - s/p rituximab maintenance every 8 weeks discontinued couple of months ago; seen by Dr Nasreen Abarca Lymphoma specialist at [facility]         (3) Hypogammaglobulinemia   - on IV IgG monthly     (4)  Chronic bronchiectasis/bronchiolitis with TONY - followed by Dr Veliz and Lucia Georges      (5) Chronic Neuropathy     (6) GERD; Steatosis of liver; Diverticular disease     (7) DM - on metformin per Dr Nunez     (8) HTN     (9) Degenerative disc disease of back                   1. Pneumonia of both lungs due to infectious organism, unspecified part of lung    2. Shortness of breath    3. Pulmonary infiltrates    4. Follicular lymphoma grade IIIa, unspecified body region [C82.30 (ICD-10-CM)]    5. Nonfamilial hypogammaglobulinemia [D80.1 (ICD-10-CM)]    6. Diarrhea of infectious origin [A09 (ICD-10-CM)]    7. Enterovirus infection [B34.1 (ICD-10-CM)]    8. Chest pain           Plan:     PLAN:          Monitor labs   IV/po antibiotics as per ID directives  Respiratory as per pulmonary directives with Dr Esparza    Will sign off - expected d/c to home in near future - f/u in hem/onc clinic in 3-4 weeks                 Jefferson Ibarra MD  Hematology/Oncology  [facility]

## 2023-01-12 NOTE — PROGRESS NOTES
Pulmonary/Critical Care  Progress Note      Patient name: Sivakumar Thacker  MRN: [id number]  Date: 01/12/2023    Admit Date: 1/6/2023  Consult Requested By: Rea Zavala MD    Reason for Consult: Atelectasis, mucus plugging    HPI:    1/11/2023 - 66 yo h/o DM, HTN. NHL (treated for about 10 years, last chemo 9/22)admitted with pneumonia and being treated.  Since admission he has had left sided pleuritic chest pain. He has had cough but has not been bringing up much in the way of secretions  Had CT chest yesterday showing some mucus plugging of LLL airways (these have been small on past CT).  He has been started on respiratory treatments, aerobika and is doing IS (only getting about 750 ml).    1/12/2023 - Feels better this AM, p[leurisy is better with steroids.  He has improved IS to about 1100 ml.  He has been up and around.    Review of Systems    Review of Systems   Constitutional:  Positive for malaise/fatigue. Negative for chills, diaphoresis, fever and weight loss.   HENT:  Negative for congestion.    Eyes:  Negative for pain.   Respiratory:  Positive for cough. Negative for hemoptysis, sputum production, shortness of breath, wheezing and stridor.    Cardiovascular:  Positive for chest pain (left pleuritic). Negative for palpitations, orthopnea, claudication, leg swelling and PND.   Gastrointestinal:  Negative for abdominal pain, constipation, diarrhea, heartburn, nausea and vomiting.   Genitourinary:  Negative for dysuria, frequency and urgency.   Musculoskeletal:  Negative for falls and myalgias.   Neurological:  Positive for weakness. Negative for sensory change and focal weakness.   Psychiatric/Behavioral:  Negative for depression, substance abuse and suicidal ideas. The patient is not nervous/anxious.      Past Medical History    Past Medical History:   Diagnosis Date    Chest wall abscess 9/30/2022    Chronic obstructive pulmonary disease, unspecified COPD type 3/28/2022    Diabetes mellitus, type 2      Encounter for antineoplastic chemotherapy 04/01/2020    Hypertension     Hypogammaglobulinemia 12/13/2019    Neuropathy     Non Hodgkin's lymphoma     Reflux esophagitis     Ringing in ear, bilateral        Past Surgical History    Past Surgical History:   Procedure Laterality Date    COLONOSCOPY  2018    EYE SURGERY  Approximately 3 years ago    Cataract    HAND SURGERY      amputation left index finger    INCISION AND DRAINAGE OF ABSCESS Left 10/3/2022    Procedure: INCISION AND DRAINAGE, ABSCESS;  Surgeon: Franco Venegas III, MD;  Location: [facility] OR;  Service: General;  Laterality: Left;  axilla    MEDIPORT REMOVAL Right 10/3/2022    Procedure: REMOVAL, CATHETER, CENTRAL VENOUS, TUNNELED, WITH PORT;  Surgeon: Franco Venegas III, MD;  Location: [facility] OR;  Service: General;  Laterality: Right;    PORTACATH PLACEMENT      SKIN CANCER EXCISION  09/30/2020    [facility]    SPINE SURGERY      VASECTOMY  1985 ?       Medications (scheduled):      albuterol-ipratropium  3 mL Nebulization Q4H WAKE    amLODIPine  10 mg Oral Daily    aspirin  81 mg Oral Daily    atorvastatin  20 mg Oral Daily    azithromycin  500 mg Intravenous Daily    celecoxib  200 mg Oral BID    enoxaparin  40 mg Subcutaneous Daily    fluticasone propionate  1 spray Each Nostril BID    gabapentin  300 mg Oral BID    guaiFENesin  1,200 mg Oral BID    losartan  100 mg Oral Daily    meropenem (MERREM) IVPB  1 g Intravenous Q8H    montelukast  10 mg Oral Daily    pantoprazole  40 mg Oral Before breakfast    sucralfate  1 g Oral QID (AC & HS)       Medications (infusions):         Medications (prn):     acetaminophen, albuterol-ipratropium, benzonatate, cyclobenzaprine, dextrose 10%, dextrose 10%, glucagon (human recombinant), glucose, glucose, HYDROcodone-acetaminophen, hydrocodone-chlorpheniramine, insulin aspart U-100, loperamide, magnesium oxide, magnesium oxide, morphine, ondansetron, potassium bicarbonate, potassium bicarbonate, potassium  "bicarbonate, potassium, sodium phosphates, potassium, sodium phosphates, potassium, sodium phosphates, prochlorperazine, sodium chloride 0.9%    Family History:   Family History   Problem Relation Age of Onset    Diabetes Father        Social History: Tobacco:   Social History     Tobacco Use   Smoking Status Former    Packs/day: 0.50    Years: 2.00    Pack years: 1.00    Types: Cigarettes   Smokeless Tobacco Never                                EtOH:   Social History     Substance and Sexual Activity   Alcohol Use Not Currently    Alcohol/week: 0.0 standard drinks    Comment: rarely                                Drugs:   Social History     Substance and Sexual Activity   Drug Use No       Physical Exam    Vital signs:  Temp:  [97.6 °F (36.4 °C)-98.4 °F (36.9 °C)]   Pulse:  []   Resp:  [16-20]   BP: (118-149)/(70-83)   SpO2:  [85 %-96 %]     Intake/Output:   Intake/Output Summary (Last 24 hours) at 1/12/2023 0931  Last data filed at 1/11/2023 2135  Gross per 24 hour   Intake 720 ml   Output --   Net 720 ml          BMI: Estimated body mass index is 29.57 kg/m² as calculated from the following:    Height as of this encounter: 5' 8" (1.727 m).    Weight as of this encounter: 88.2 kg (194 lb 8 oz).    Physical Exam  Vitals and nursing note reviewed.   Constitutional:       General: He is not in acute distress.     Appearance: Normal appearance. He is obese. He is not ill-appearing, toxic-appearing or diaphoretic.   HENT:      Head: Normocephalic and atraumatic.      Right Ear: External ear normal.      Left Ear: External ear normal.      Nose: Nose normal.      Mouth/Throat:      Mouth: Mucous membranes are moist.      Pharynx: Oropharynx is clear. No oropharyngeal exudate.   Eyes:      General: No scleral icterus.        Right eye: No discharge.         Left eye: No discharge.      Extraocular Movements: Extraocular movements intact.      Conjunctiva/sclera: Conjunctivae normal.      Pupils: Pupils are equal, " round, and reactive to light.   Neck:      Vascular: No carotid bruit.   Cardiovascular:      Rate and Rhythm: Normal rate and regular rhythm.      Pulses: Normal pulses.      Heart sounds: Normal heart sounds. No murmur heard.    No friction rub. No gallop.   Pulmonary:      Effort: Pulmonary effort is normal. No respiratory distress.      Breath sounds: Normal breath sounds. No stridor. No wheezing, rhonchi or rales.      Comments: Decreased BS left base  Chest:      Chest wall: No tenderness.   Abdominal:      General: Bowel sounds are normal. There is no distension.      Tenderness: There is no abdominal tenderness. There is no guarding.   Musculoskeletal:         General: No swelling. Normal range of motion.      Cervical back: Normal range of motion and neck supple. No rigidity or tenderness.      Right lower leg: No edema.      Left lower leg: No edema.   Lymphadenopathy:      Cervical: No cervical adenopathy.   Skin:     General: Skin is warm and dry.      Capillary Refill: Capillary refill takes less than 2 seconds.      Coloration: Skin is not jaundiced.      Findings: No bruising.   Neurological:      General: No focal deficit present.      Mental Status: He is alert and oriented to person, place, and time. Mental status is at baseline.      Cranial Nerves: No cranial nerve deficit.      Sensory: No sensory deficit.      Motor: No weakness.   Psychiatric:         Mood and Affect: Mood normal.         Behavior: Behavior normal.         Thought Content: Thought content normal.         Judgment: Judgment normal.       Laboratory    Recent Labs   Lab 01/12/23  0456   WBC 2.83*   RBC 4.75   HGB 12.4*   HCT 40.5      MCV 85   MCH 26.1*   MCHC 30.6*         No results for input(s): GLUCOSE, CALCIUM, PROT, NA, K, CO2, CL, BUN, CREATININE, ALKPHOS, ALT, AST, BILITOT in the last 24 hours.    Invalid input(s):  ALBUMIN      No results for input(s): PT, INR, APTT in the last 24 hours.    No results for  input(s): CPK, CPKMB, TROPONINI, MB in the last 24 hours.    Additional labs: reviewed    Microbiology:       Microbiology Results (last 7 days)       Procedure Component Value Units Date/Time    Blood culture #2 **CANNOT BE ORDERED STAT** [[id number]] Collected: 01/06/23 1140    Order Status: Completed Specimen: Blood from Peripheral, Antecubital, Left Updated: 01/11/23 1232     Blood Culture, Routine No growth after 5 days.    Blood culture #1 **CANNOT BE ORDERED STAT** [[id number]] Collected: 01/06/23 1202    Order Status: Completed Specimen: Blood from Peripheral, Forearm, Left Updated: 01/11/23 1232     Blood Culture, Routine No growth after 5 days.    Stool culture **cannot be ordered stat** [[id number]] Collected: 01/07/23 0834    Order Status: Completed Specimen: Stool Updated: 01/09/23 0757     Stool Culture No Salmonella,Shigella,Vibrio,Campylobacter.      No E coli 0157:H7 isolated.    Culture, Respiratory with Gram Stain [[id number]] Collected: 01/06/23 1720    Order Status: Completed Specimen: Respiratory from Sputum Updated: 01/08/23 1444     Respiratory Culture Normal respiratory dank     Gram Stain (Respiratory) Many WBC's     Gram Stain (Respiratory) <10 epithelial cells per low power field.     Gram Stain (Respiratory) Many Gram positive cocci    Respiratory Infection Panel (PCR), Nasopharyngeal [[id number]]  (Abnormal) Collected: 01/07/23 1319    Order Status: Completed Specimen: Nasopharyngeal Swab Updated: 01/07/23 2147     Respiratory Infection Panel Source NP swab     Adenovirus Not Detected     Coronavirus 229E, Common Cold Virus Not Detected     Coronavirus HKU1, Common Cold Virus Not Detected     Coronavirus NL63, Common Cold Virus Not Detected     Coronavirus OC43, Common Cold Virus Not Detected     Comment: The Coronavirus strains detected in this test cause the common cold.  These strains are not the COVID-19 (novel Coronavirus)strain   associated with the respiratory disease outbreak.           SARS-CoV2 (COVID-19) Qualitative PCR Not Detected     Human Metapneumovirus Not Detected     Human Rhinovirus/Enterovirus Detected     Influenza A (subtypes H1, H1-2009,H3) Not Detected     Influenza B Not Detected     Parainfluenza Virus 1 Not Detected     Parainfluenza Virus 2 Not Detected     Parainfluenza Virus 3 Not Detected     Parainfluenza Virus 4 Not Detected     Respiratory Syncytial Virus Not Detected     Bordetella Parapertussis (GC5039) Not Detected     Bordetella pertussis (ptxP) Not Detected     Chlamydia pneumoniae Not Detected     Mycoplasma pneumoniae Not Detected     Comment: Respiratory Infection Panel testing performed by Multiplex PCR.       Narrative:      Respiratory Infection Panel source->NP Swab    MRSA Screen by PCR [[id number]] Collected: 01/06/23 1644    Order Status: Completed Specimen: Nasopharyngeal Swab from Nasal Updated: 01/06/23 7050     MRSA SCREEN BY PCR Negative            Radiology    CTA Chest Non-Coronary (PE Studies)  CMS MANDATED QUALITY DATA-CT RADIATION DOSE-436  All CT scans at this facility use dose modulation, iterative reconstruction, and or weight-based dosing when appropriate to reduce radiation dose to as low as reasonably achievable.    HISTORY: New pleural chest pain, exclude PE    FINDINGS: Thin axial imaging through the chest was performed with 100 mL Omnipaque 350 IV contrast, with sagittal and coronal reformatted images and MIP reconstructions performed, and images stored in the patient's permanent electronic medical record.    Comparison to prior exams including recent CTA of 01/06/2023. There are no pulmonary arterial filling defects to suggest pulmonary thromboembolism. The central pulmonary arteries are normal in caliber.    Scattered calcified and soft plaque involves normal caliber thoracic aorta, with no aortic dissection or evidence of aortic intramural hematoma. The heart is normal in size, with extensive coronary arterial calcifications, and no  pericardial effusion. No enlarged mediastinal or hilar lymph nodes.    Previous bilateral lower lung airspace opacities consistent with multifocal pneumonia have improved, with consolidation and volume loss in the left lower lobe suggesting combination of atelectasis and resolving pneumonia. There is development of a small low-density loculated appearing left pleural effusion. The left lower lobe main bronchus and segmental bronchi are newly occluded, presumably on the basis of mucous plugging. No right pleural effusion or pneumothorax.    Images of the upper abdomen show no new abnormality. There are no acute fractures or destructive osseous lesions.    IMPRESSION:  1. New occlusion of the left lower lobe main bronchus and segmental bronchi, presumably on the basis of mucous plugging, with development of left lower lobe atelectasis, volume loss, and small loculated appearing left pleural effusion.  2. Interval improvement in bilateral lower lung pneumonia.  3. Negative for pulmonary thromboembolism.    Electronically signed by:  José Antonio Diaz MD  1/10/2023 7:14 PM CST Workstation: [id number]        Additional Studies    reviewed    Ventilator Information              No results for input(s): PH, PCO2, PO2, HCO3, POCSATURATED, BE in the last 72 hours.      Impression    Active Hospital Problems    Diagnosis  POA    *Pneumonia of both lungs due to infectious organism [J18.9]  Yes    Diarrhea of infectious origin [A09]  Yes    Enterovirus infection [B34.1]  Yes    Pulmonary infiltrates [R91.8]  Yes    Non-Hodgkin's lymphoma [C85.90]  Yes    Chronic obstructive pulmonary disease, unspecified COPD type [J44.9]  Yes    Skin lesion [L98.9]  Yes    Type II diabetes mellitus with neurological manifestations [E11.49]  Yes    Essential hypertension [I10]  Yes    Hypogammaglobulinemia [D80.1]  Yes    Follicular lymphoma grade IIIa [C82.30]  Yes    Nonfamilial hypogammaglobulinemia [D80.1]  Yes      Resolved Hospital Problems    No resolved problems to display.       Plan    Continued respiratory treatments and aerobika  Continue guaifenesin  Needs to work more with IS  Will give a few doses of steroids to see if it helps with pleurisy - better this AM  Continue to increase activity  Antibiotics per ID  There is a small effusion and I would not pursue trying to tap/drain (risks outweigh benefit at this time) but should be followed  From a pulmonary standpoint he could be DC when OK with ID on respiratory treatments, mucinex, aerobika with close follow up with Dr Veliz and repeat imaging as outpt    Thank you for this consult.  I will follow with you while the patient is hospitalized.        Garry Esparza MD  [facility] Pulmonary/Critical Care  01/12/2023

## 2023-01-12 NOTE — PROGRESS NOTES
Consult Note  Infectious Disease    Reason for Consult:  pneumonia    HPI: Sivakumar Thacker is a 67 y.o. male known to our service, seen in october for left axillary abscess, and again in mid November for cellulitis of the left upper chest(after small abrasion left hand) with office follow up on 11/22. he receives IVIG(50 g Gamunex) for hypogammaglobulinemia and is currently on hiatus from lymphoma treatment. he has a history of bronchiectasis and chronic sinusitis(resolved) from 10/2022. He is follow ed by Dr. Ibarra and hem/onc at [facility] underwent a PET/CT on 1/5 which demonstrated R>L ground glass infiltrate and small LLL pneumonia and increased activity throughout the skeleton suggestive of bone marrow hyperstimulation.   He was sent to the ED today from the cancer center with worsening of complaints of nausea/diarrhea x 1 week(no suspicious meals or exposure to persons with GI illness), pleuritic pain left lower posterior chest, with a cough(4 days duration) productive of small amounts of brown sputum,with associated SOB. He did not seek medical attention until a regularly scheduled visit wed 1/4. He did not receive his regularly scheduled IVIG this am.CT abd benign.  CTA chest has some lower lobe ground glass opacities(better than yesterday) and a LLL consolidation(worse than yesterday). He was given vanc, cefepime and doxy and IVF in the ED.     1/7:  Temperature is under 99, white blood cells 3.62,with 26% monos today.  O2 saturation 95 on 2 L.  Intake/output not completely recorded.  MRSA screen negative, respiratory PCR panel not yet collected.  Sputum culture only normal dank so far/. He still has some pleurisy. Stool WBC positive. Culture in progress. Less liquid stool. Eating more.   1/8: Interim reviewed.  Temperatures around 99.  Respiratory viral panel was positive for human rhino virus/enterovirus.  Received IVIG 50 g yesterday without difficulty.  Blood cultures remain negative.  Sputum  culture is normal dank.  He is still having loose stool.  He is eating more.  He is feeling better from respiratory standpoint.  He has not been walking around very much but this was encouraged.   1/9: Interim reviewed.  Low-grade temperature only 98-99.  White blood cells are little lower today, platelets are preserved.  No new positives on cultures.  Stool culture is negative, GI PCR panel is pending.  Mj IgG level was 526.  He did not qualify for home oxygen per 6 minute walk. The diarrhea may also be exacerbated by the zyvox.will d/c. He feelsthat he can go home tomorrow.   1/10: interim reviewed. Afebrile. Has new pleuritic(I believe it is truly chest wall pain) which is knifelike left subscapular. He also had heartburn and EKG was no change, nausea and did not eat lunch. He was given anti-emetic, antacids and carafate. Has spent very little time in the chair. Sats are better today. No pleural rub. Stools still loose.   1/11: interim reviewed. His diarrhea is better. Still not eating great but drinks 2 ensures with each meal. He has not been out of the room, but has been in the chair all day. He still splints with cough or deep breath, which I believe is musculoskeletal. He is bringing up a little sputum. Discussed that the GI pcr showed campylobacter.  He does have chickens at home. Discussed infection control practices to reduce exposure.   1/12: Interim reviewed afebrile.  Pain is much better after the 3 steroid doses.  His cough is wet and he is producing nonpurulent sputum.  He had oxygen applied today for an 89 90% sat.  He is not walking in the zimmerman in his reluctant to walk with his IV pole.  He had 1 soft stool this morning and 1 loose stool this afternoon chest x-ray was reviewed    EXAM & DIAGNOSTICS REVIEWED:   Vitals:     Temp:  [97.6 °F (36.4 °C)-98.2 °F (36.8 °C)]   Temp: 98.1 °F (36.7 °C) (01/12/23 1500)  Pulse: 102 (01/12/23 1500)  Resp: 18 (01/12/23 1500)  BP: (!) 155/82 (01/12/23  1500)  SpO2: (!) 92 % (01/12/23 1500)    Intake/Output Summary (Last 24 hours) at 1/12/2023 1643  Last data filed at 1/12/2023 1256  Gross per 24 hour   Intake 480 ml   Output --   Net 480 ml       General:  In NAD. Alert and attentive, cooperative, uncomfortable with movement  Eyes:  Anicteric,  EOMI,    ENT:  No ulcers, exudates, thrush, nares patent, dentition is good  Neck:  supple,   Lungs: Clear, no longer splinting, wet cough. No pleural rub. No wheezing.  Heart:  RRR, no gallop/murmur/rub noted  Abd:  Soft, NT, ND, normal BS, no masses or organomegaly appreciated.  :  Voids/ , no flank tenderness  Musc:  Joints without effusion, swelling, erythema, synovitis, muscle wasting. Possible chest wall tenderness.   Skin:  No rashes   Neuro:             Alert, attentive, speech fluent, face symmetric, moves all extremities, no focal weakness. Ambulatory. No allodynia  Psych:    cooperative  Lymphatic:      Extrem: No edema, erythema, phlebitis, cellulitis, warm and well perfused  VAD:   Peripheral and left arm midline    Isolation:  none  Wound: No recurrence of prior chest wall infection           General Labs reviewed:  Recent Labs   Lab 01/10/23  0439 01/11/23  0535 01/12/23  0456   WBC 2.64* 2.89* 2.83*   HGB 11.6* 11.7* 12.4*   HCT 37.1* 38.0* 40.5    338 427       Recent Labs   Lab 01/06/23  0937 01/07/23  0421 01/09/23  0454 01/10/23  0439 01/11/23  0535   *   < > 137 137 139   K 3.7   < > 3.9 4.2 4.1   CL 98   < > 98 100 97   CO2 27   < > 30* 30* 30*   BUN 13   < > 13 14 19   CREATININE 1.0   < > 0.8 0.8 0.8   CALCIUM 8.9   < > 8.9 9.0 9.4   PROT 7.0  --   --   --   --    BILITOT 0.8  --   --   --   --    ALKPHOS 110  --   --   --   --    ALT 21  --   --   --   --    AST 30  --   --   --   --     < > = values in this interval not displayed.     No results for input(s): CRP in the last 168 hours.      Micro:  Microbiology Results (last 7 days)       Procedure Component Value Units Date/Time     Blood culture #2 **CANNOT BE ORDERED STAT** [[id number]] Collected: 01/06/23 1140    Order Status: Completed Specimen: Blood from Peripheral, Antecubital, Left Updated: 01/11/23 1232     Blood Culture, Routine No growth after 5 days.    Blood culture #1 **CANNOT BE ORDERED STAT** [[id number]] Collected: 01/06/23 1202    Order Status: Completed Specimen: Blood from Peripheral, Forearm, Left Updated: 01/11/23 1232     Blood Culture, Routine No growth after 5 days.    Stool culture **cannot be ordered stat** [[id number]] Collected: 01/07/23 0834    Order Status: Completed Specimen: Stool Updated: 01/09/23 0757     Stool Culture No Salmonella,Shigella,Vibrio,Campylobacter.      No E coli 0157:H7 isolated.    Culture, Respiratory with Gram Stain [[id number]] Collected: 01/06/23 1720    Order Status: Completed Specimen: Respiratory from Sputum Updated: 01/08/23 1444     Respiratory Culture Normal respiratory dank     Gram Stain (Respiratory) Many WBC's     Gram Stain (Respiratory) <10 epithelial cells per low power field.     Gram Stain (Respiratory) Many Gram positive cocci    Respiratory Infection Panel (PCR), Nasopharyngeal [[id number]]  (Abnormal) Collected: 01/07/23 1319    Order Status: Completed Specimen: Nasopharyngeal Swab Updated: 01/07/23 2147     Respiratory Infection Panel Source NP swab     Adenovirus Not Detected     Coronavirus 229E, Common Cold Virus Not Detected     Coronavirus HKU1, Common Cold Virus Not Detected     Coronavirus NL63, Common Cold Virus Not Detected     Coronavirus OC43, Common Cold Virus Not Detected     Comment: The Coronavirus strains detected in this test cause the common cold.  These strains are not the COVID-19 (novel Coronavirus)strain   associated with the respiratory disease outbreak.          SARS-CoV2 (COVID-19) Qualitative PCR Not Detected     Human Metapneumovirus Not Detected     Human Rhinovirus/Enterovirus Detected     Influenza A (subtypes H1, H1-2009,H3) Not Detected      Influenza B Not Detected     Parainfluenza Virus 1 Not Detected     Parainfluenza Virus 2 Not Detected     Parainfluenza Virus 3 Not Detected     Parainfluenza Virus 4 Not Detected     Respiratory Syncytial Virus Not Detected     Bordetella Parapertussis (KO0673) Not Detected     Bordetella pertussis (ptxP) Not Detected     Chlamydia pneumoniae Not Detected     Mycoplasma pneumoniae Not Detected     Comment: Respiratory Infection Panel testing performed by Multiplex PCR.       Narrative:      Respiratory Infection Panel source->NP Swab    MRSA Screen by PCR [[id number]] Collected: 01/06/23 1644    Order Status: Completed Specimen: Nasopharyngeal Swab from Nasal Updated: 01/06/23 0720     MRSA SCREEN BY PCR Negative            Imaging Reviewed:   CXRs   PET CT 1/5/23  : Diffuse reticular nodular and groundglass opacities the left lower lobe, posterior right upper lobe and posterior medial right lower lobe compatible with multifocal pneumonia. Follow-up chest CT in three months is recommended   Diffuse FDG activity throughout the axial skeleton suggestive of bone marrow hyperstimulation   No evidence of recurrent or metastatic disease      CTA chest 1/6  IMPRESSION:  1. Negative for pulmonary thromboembolism.  2. Bilateral multifocal bronchiolitis and bronchopneumonia, including consolidated pneumonia in the left lower lobe. Follow-up PA and lateral chest radiograph in 4-6 weeks after appropriate therapy is recommended to document complete resolution.  3. Aortic and coronary arterial calcifications    CT abd/pelvis 1/6  1. Left greater than right lower lobe alveolar opacities including left lower lobe consolidation, characteristic of infectious or inflammatory pneumonia. Correlation for aspiration is requested.  2. Diverticulosis.  3. No acute findings throughout the abdomen or pelvis  Cardiology:    IMPRESSION & PLAN   1. Community acquired pneumonia   Diarrheal element.  Stool culture negative, PCR panel shows  campylobacter  Respiratory viral panel positive for human rhino virus/enterovirus  Oxygenation is borderline without oxygen supplemented.  But he does not meet criteria for home oxygen. Legionella urine antigen negative . Mucous plugging on re-CTA   Procalcitonin has normalized    2. Immunocompromised by diagnosis of lymphoma, prior treatment and hypogammaglobulinemia status post IVIG 50 g 1/7.  Mj prior to the infusion was 526  3. History of bronchiectasis  4. Volume depletion  5. Eosinophilia(resolved) but present after last admits and antibiotics. Did not escalate after last IVIG, given 12/9      Recommendations:   Continue meropenem, until discharge, then stop   changing Zithromax to p.o. for 3 more days  Continue pulmonary toilet  Steroids per Dr. Esparza   Continue celebrex(he takes naproxen at home)      Continue ambulation, with a pulse ox  May need IVIG dose increased or increased to every 3 weeks instead of 4.      D/w  patient and family members and Dr. Zavala    Medical Decision Making during this encounter was  [_] Low Complexity  [_] Moderate Complexity  [ xxx ] High Complexity

## 2023-01-12 NOTE — CARE UPDATE
01/11/23 2057   Patient Assessment/Suction   Respiratory Effort Unlabored   DAVE Breath Sounds clear   LLL Breath Sounds diminished   RUL Breath Sounds clear   RML Breath Sounds diminished   RLL Breath Sounds diminished   Rhythm/Pattern, Respiratory pattern regular   PRE-TX-O2   Device (Oxygen Therapy) room air   SpO2 (!) 85 %  (PUT PT. ON NC 2L)   Pulse 94   Resp 16   Aerosol Therapy   $ Aerosol Therapy Charges Aerosol Treatment   Respiratory Treatment Status (SVN) given   Treatment Route (SVN) mask   Patient Position (SVN) HOB elevated   Post Treatment Assessment (SVN) increased aeration   Signs of Intolerance (SVN) none   Breath Sounds Post-Respiratory Treatment   Throughout All Fields Post-Treatment All Fields   Throughout All Fields Post-Treatment aeration increased   Post-treatment Heart Rate (beats/min) 94   Post-treatment Resp Rate (breaths/min) 16   Chest Physiotherapy   $ Chest Physiotherapy Charges Subsequent   Type (CPT) percussion/postural drainage   Method (CPT) mechanical percussor   Patient Position (CPT) side lying;HOB flat   Lung Still (CPT) LLL (left lower lobe)   Duration per Field (CPT) 5 mins   CPT Total Time (Min) 5   Post Treatment Assessment (CPT) increased aeration   Signs of Intolerance (CPT) none   Education   $ Education 15 min;Bronchodilator   Respiratory Evaluation   $ Care Plan Tech Time 15 min   $ Eval/Re-eval Charges Re-evaluation   Evaluation For Re-Eval 5+ day

## 2023-01-12 NOTE — PROGRESS NOTES
"[facility] Medicine  Progress Note    Patient Name: Sivakumar Thacker  MRN: [id number]  Patient Class: IP- Inpatient  Admission Date: 1/6/2023  Attending Physician: Rea Zavala MD  Primary Care Provider: Barry Mcmahon MD  DOS: 01/12/2023    Assessment/Plan:     GI viral panel:  Campylobacter Gastroenteritis, improving  Acute bacterial pneumonia, improving  Respiratory viral panel:  +Human rhinovirus/Enterovirus   Leukopenia, chronic 2/2 NHL  Acute hypoxemic respiratory failure, resolved  Complicated by COPD with bronchiectasis   With mucous plugging + loculated L pleural effusion  Complicated by NHL on rituximab previously discontinued   With hypogammoglobulinemia on monthly IVIG  - azithromycin, merrem, agree with ID  - steroids started 1/11/22, monitoring closely   - continue nebs, singulair, guaifenesin, aerobikia   - encourage IS  - following cultures  - pt does NOT qualify for home oxygen   - pulmonology following, thank you: they report no bronch necessary at this time, continuing to monitor effusion  - IVIG, dosing was discussed between ID and oncology  - repeat CXR  - ID and oncology following, thank you  - discharge today    DM2 with hyperglycemia   Complicated by steroids  - SSI    Chronic conditions as noted above/below; home medications reviewed personally by me and restarted as appropriate  Electrolyte derangement:  Trending BMP; Mg; replacement prn  DVT ppx: lovenox   Dispo:  home with outpt pulmonology, ID, and oncology follow-up.  FULL CODE    Subjective:     Principal Problem:Pneumonia of both lungs due to infectious organism    Chief Complaint:   Chief Complaint   Patient presents with    Abdominal Pain     W/ diarrhea x1 week.    Cough     W/ back pain on "my lungs" x1 day        HPI: ED note  67-year-old male presents emergency department past medical history includes diabetes, hypertension, currently receiving infusion treatment for non-Hodgkin's lymphoma reports for " "the last week he is had diarrhea and feels generally weak.  States yesterday he developed a cough, associated shortness of breath, and pain when he breathes.  The patient reports that he went to the infusion center today however he was sent here by Dr. Ibarra for further evaluation in the emergency department.  Patient has been taking over-the-counter Pepto-Bismol for relief of diarrhea symptoms    1/6/2022  Mr Thacker has noted increasing SOB and VASQUEZ with fever X 1 today. Hew has had limited exposure to the public. The diarrhea is not bloody and he was exposed to antibiotics 11/2022.        1/9: Patient seen and examined.  Persistent cough and chest congestion, slightly improved thus far with treatment, mild to moderate intensity.  He had an episode of posttussive emesis overnight.  Shortness of breath slightly improved.  Currently tolerating room air, had home oxygen assessment with ambulation and did not desaturate enough to qualify for home oxygen.  Eating a little bit better today, he reports not getting Ensure as ordered.  No nausea or vomiting.  No abdominal pain.  No headache or syncope.  Family at bedside    1/10:  pt reporting pleuritic L chest pain.  No chest wall TTP.  No SOB.  Leukopenia continues.      1/11:  Pt reports back pain primarily.  It occurs with deep breathing, sharp.  Anterior chest pain much improved today.  No SOB.  No palpitations.  Episode of nausea before lunch today, alleviated with zofran.  1 episode of loose stool today and yesterday.  No abdominal pain.  He initially refused CPT but now is participating; he reports he "coughed up a lot of mucus" and has been breathing more easily as a result.      1/12:  pt with no SOB.  No CP since yesterday.  Stable for discharge; discussed with ID physician Dr Washington.  Instructed pt to follow-up with Dr Veliz (his pulmonologist) for follow-up of above conditions AND for a sleep study for possible MARKUS.  Pt agrees to use his cough vest, " aerobikia, and IS consistently.    ROS:  All systems reviewed and are negative except as noted per above.    Vitals:    01/12/23 0444 01/12/23 0727 01/12/23 0815 01/12/23 1100   BP: 130/74 121/72  129/68   BP Location: Right arm Right arm  Right arm   Patient Position: Lying Lying  Sitting   Pulse: 87 83 89 97   Resp: 17 18 18 18   Temp: 98.2 °F (36.8 °C) 97.7 °F (36.5 °C)  97.9 °F (36.6 °C)   TempSrc: Oral Oral  Oral   SpO2: 96% 95% (!) 92% (!) 94%   Weight: 88.2 kg (194 lb 8 oz)      Height:         Gen: alert, responsive  HEENT:  Eyes - no pallor  External ears with no lesions  Nares patent  Mouth, Throat:  trachea midline   CV: RRR  Lungs: DECREASED BS AT BASES B/L--much improved, no chest wall TTP.  Abd: +BS, soft, NT, ND  Ext: no atrophy or edema  Skin: warm, dry  Neuro: grossly intact  Psych: pleasant    Rea Zavala MD  Department of Hospital Medicine   [facility]

## 2023-01-12 NOTE — CARE UPDATE
01/12/23 1650   Home Oxygen Qualification   $ Home O2 Qualification Tech time 15 minutes   Room Air SpO2 At Rest 93 %   Room Air SpO2 During Ambulation 92 %   Home O2 Eval Comments pt does not require o2

## 2023-01-13 LAB
O+P SPEC MICRO: NORMAL
O+P STL CONC: NORMAL

## 2023-01-13 NOTE — PLAN OF CARE
01/12/23 1745   Final Note   Assessment Type Final Discharge Note   Anticipated Discharge Disposition Home   What phone number can be called within the next 1-3 days to see how you are doing after discharge? [phone]   Hospital Resources/Appts/Education Provided Appointments scheduled and added to AVS  (pt to schedule using My Chart)   Post-Acute Status   Discharge Delays None known at this time     Patient cleared for discharge from case management standpoint.      Chart and discharge orders reviewed.  Patient discharged home with no further case management needs.

## 2023-01-13 NOTE — NURSING
D/C instructions reviewed with pt. All questions answered. D/C home with belongings. Daughter at side.

## 2023-01-13 NOTE — DISCHARGE SUMMARY
[facility]  Discharge Summary      Patient Name: Sivakumar Thacker  MRN: [id number]  [facility]: [id number]  Patient Class: IP- Inpatient  Admission Date: 1/6/2023  Discharge Date and Time: 1/12/2023  7:55 PM  Discharging Provider: Rea Zavala MD  Primary Care Provider: Barry Mcmahon MD    Primary Care Team: Networked reference to record PCT     HPI:   ED note  67-year-old male presents emergency department past medical history includes diabetes, hypertension, currently receiving infusion treatment for non-Hodgkin's lymphoma reports for the last week he is had diarrhea and feels generally weak.  States yesterday he developed a cough, associated shortness of breath, and pain when he breathes.  The patient reports that he went to the infusion center today however he was sent here by Dr. Ibarra for further evaluation in the emergency department.  Patient has been taking over-the-counter Pepto-Bismol for relief of diarrhea symptoms    1/6/2022  Mr Thacker has noted increasing SOB and VASQUEZ with fever X 1 today. Hew has had limited exposure to the public. The diarrhea is not bloody and he was exposed to antibiotics 11/2022.          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  - azithromycin, merrem, agree with ID; transitioned to po meds as noted below  - steroids started 1/11/22, monitoring closely   - continue nebs, singulair, guaifenesin, aerobikia   - encourage IS  - following cultures  - pt does NOT qualify for home oxygen   - pulmonology following, thank you:  "they report no bronch necessary at this time, continuing to monitor effusion  - IVIG, dosing was discussed between ID and oncology  - repeat CXR  - ID and oncology following, thank you  - discharge today with outpt follow-up     DM2 with hyperglycemia   Complicated by steroids  - SSI     Chronic conditions as noted above/below; home medications reviewed personally by me and restarted as appropriate  Electrolyte derangement:  Trending BMP; Mg; replacement prn  DVT ppx: lovenox   Dispo:  home with outpt pulmonology, ID, and oncology follow-up.  FULL CODE    Pt improved during inpt stay.  Pt received maximum benefit to inpt stay.  Patient was seen and examined on the date of discharge and determined to be suitable for discharge.    /72 (BP Location: Right arm, Patient Position: Sitting)   Pulse 100   Temp 98.5 °F (36.9 °C) (Oral)   Resp 16   Ht 5' 8" (1.727 m)   Wt 88.2 kg (194 lb 8 oz)   SpO2 95%   BMI 29.57 kg/m²   Gen: alert, responsive  HEENT:  Eyes - no pallor  External ears with no lesions  Nares patent  Mouth, Throat:  trachea midline   CV: RRR  Lungs: DECREASED BS AT BASES B/L--much improved, no chest wall TTP.  Abd: +BS, soft, NT, ND  Ext: no atrophy or edema  Skin: warm, dry  Neuro: grossly intact  Psych: pleasant    Consults:   Consults (From admission, onward)          Status Ordering Provider     Inpatient consult to Pulmonology  Once        Provider:  Garry Esparza MD    Completed EZEQUIEL URRUTIA     Inpatient consult to Registered Dietitian/Nutritionist  Once        Provider:  (Not yet assigned)    Completed KEAGAN PETE     Inpatient consult to Hematology/Oncology  Once        Provider:  Jefferson Ibarra MD    Completed ANGELA KEARNEY     Inpatient consult to Pulmonology  Once        Provider:  Boni Leyva MD    Completed ANGELA KEARNEY     Inpatient consult to Infectious Diseases  Once        Provider:  Heaven Washington MD    Completed ANGELA KEARNEY.      "         Final Active Diagnoses:    Diagnosis Date Noted POA    Non-Hodgkin's lymphoma [C85.90] 11/12/2022 Yes    Chronic obstructive pulmonary disease, unspecified COPD type [J44.9] 03/28/2022 Yes    Type II diabetes mellitus with neurological manifestations [E11.49] 04/26/2021 Yes    Essential hypertension [I10] 12/30/2019 Yes    Hypogammaglobulinemia [D80.1] 12/13/2019 Yes    Follicular lymphoma grade IIIa [C82.30] 12/04/2018 Yes    Nonfamilial hypogammaglobulinemia [D80.1] 12/04/2018 Yes      Problems Resolved During this Admission:    Diagnosis Date Noted Date Resolved POA    PRINCIPAL PROBLEM:  Pneumonia of both lungs due to infectious organism [J18.9] 01/06/2023 01/12/2023 Yes    Diarrhea of infectious origin [A09] 01/08/2023 01/12/2023 Yes    Enterovirus infection [B34.1] 01/08/2023 01/12/2023 Yes    Pulmonary infiltrates [R91.8] 01/06/2023 01/12/2023 Yes    Skin lesion [L98.9] 06/23/2021 01/12/2023 Yes       Discharged Condition: stable    Disposition: Home or Self Care    Follow Up:   Follow-up Information       Heaven Washington MD. Schedule an appointment as soon as possible for a visit.    Specialty: Infectious Diseases  Why: FOR FOLLOW-UP OF LUNG AND STOMACH INFECTIONS  Contact information:  [address]  [address]  [place] [id number] [phone]               Raffaele Veliz MD. Schedule an appointment as soon as possible for a visit.    Specialty: Pulmonary Disease  Why: FOR FOLLOW UP OF LUNG DISEASE.  YOU NEED A REPEAT CT SCAN TO CHECK YOUR PLEURAL EFFUSION.  YOU ALSO NEED A SLEEP STUDY FOR MARKUS.  Contact information:  [address]  [address]  [place] [address] [phone]               Jefferson Ibarra MD. Schedule an appointment as soon as possible for a visit.    Specialties: Hematology, Hematology and Oncology, Oncology  Why: FOR FOLLOW-UP OF YOUR IVIG INFUSIONS  Contact information:  [address]  [address]  [place] [address]  [phone]                           Patient Instructions:  "     NEBULIZER FOR HOME USE     Order Specific Question Answer Comments   Height: 5' 8" (1.727 m)    Weight: 90.2 kg (198 lb 14.4 oz)    Does patient have medical equipment at home? nebulizer    Length of need (1-99 months): 99      NEBULIZER KIT (SUPPLIES) FOR HOME USE     Order Specific Question Answer Comments   Height: 5' 8" (1.727 m)    Weight: 90.2 kg (198 lb 14.4 oz)    Does patient have medical equipment at home? nebulizer    Length of need (1-99 months): 99    Mask or Mouthpiece? Mask      Pending Diagnostic Studies:       None           Medications:  Reconciled Home Medications:      Medication List        START taking these medications    Azithromycin (ZITHROMAX) 500mg tablet  Take 1 tablet by mouth once daily x 3 days.    guaiFENesin 600 mg 12 hr tablet  Commonly known as: MUCINEX  Take 2 tablets (1,200 mg total) by mouth 2 (two) times daily.     hydrocodone-chlorpheniramine 10-8 mg/5 mL suspension  Commonly known as: TUSSIONEX  Take 5 mLs by mouth every 12 (twelve) hours as needed for Cough ((DO NOT TAKE WITHIN 4 HOURS OF NORCO/HYDROCODONE-ACETAMINOPHEN TABLETS)).            CONTINUE taking these medications      albuterol-ipratropium 2.5 mg-0.5 mg/3 mL nebulizer solution  Commonly known as: DUO-NEB  Take 3 mLs by nebulization every 6 (six) hours as needed for Wheezing. Rescue     aspirin 81 MG EC tablet  Commonly known as: ECOTRIN  Take 81 mg by mouth once daily.     * blood sugar diagnostic Strp     * FREESTYLE LITE STRIPS MISC  FreeStyle Lite Strips     mucus clearing device  Commonly known as: ACAPELLA FLUTTER  1 Device by Misc.(Non-Drug; Combo Route) route 2 (two) times daily.     MULTIVITAMIN ORAL  Take 1 tablet by mouth once daily.           * This list has 2 medication(s) that are the same as other medications prescribed for you. Read the directions carefully, and ask your doctor or other care provider to review them with you.                STOP taking these medications      losartan 100 MG " tablet  Commonly known as: COZAAR              Indwelling Lines/Drains at time of discharge:   Lines/Drains/Airways       None                   Time spent on the discharge of patient: 34 minutes    Rea Zavala MD  Department of Hospital Medicine  [facility]

## 2023-01-17 ENCOUNTER — PATIENT MESSAGE (OUTPATIENT)
Dept: HEMATOLOGY/ONCOLOGY | Facility: CLINIC | Age: 68
End: 2023-01-17

## 2023-01-17 ENCOUNTER — OFFICE VISIT (OUTPATIENT)
Dept: FAMILY MEDICINE | Facility: CLINIC | Age: 68
End: 2023-01-17
Payer: MEDICARE

## 2023-01-17 VITALS
DIASTOLIC BLOOD PRESSURE: 68 MMHG | HEIGHT: 68 IN | WEIGHT: 194.88 LBS | OXYGEN SATURATION: 97 % | HEART RATE: 90 BPM | BODY MASS INDEX: 29.54 KG/M2 | RESPIRATION RATE: 14 BRPM | SYSTOLIC BLOOD PRESSURE: 129 MMHG

## 2023-01-17 DIAGNOSIS — Z09 HOSPITAL DISCHARGE FOLLOW-UP: Primary | ICD-10-CM

## 2023-01-17 DIAGNOSIS — E11.9 TYPE 2 DIABETES MELLITUS WITHOUT COMPLICATION, WITHOUT LONG-TERM CURRENT USE OF INSULIN: ICD-10-CM

## 2023-01-17 DIAGNOSIS — J32.9 SINUSITIS, UNSPECIFIED CHRONICITY, UNSPECIFIED LOCATION: ICD-10-CM

## 2023-01-17 DIAGNOSIS — G47.33 OSA (OBSTRUCTIVE SLEEP APNEA): ICD-10-CM

## 2023-01-17 DIAGNOSIS — I70.0 AORTIC ATHEROSCLEROSIS: ICD-10-CM

## 2023-01-17 DIAGNOSIS — J30.89 NON-SEASONAL ALLERGIC RHINITIS DUE TO OTHER ALLERGIC TRIGGER: ICD-10-CM

## 2023-01-17 DIAGNOSIS — I10 ESSENTIAL HYPERTENSION: ICD-10-CM

## 2023-01-17 PROCEDURE — 99214 OFFICE O/P EST MOD 30 MIN: CPT | Mod: S$PBB,,, | Performed by: FAMILY MEDICINE

## 2023-01-17 PROCEDURE — 99999 PR PBB SHADOW E&M-EST. PATIENT-LVL III: ICD-10-PCS | Mod: PBBFAC,,, | Performed by: FAMILY MEDICINE

## 2023-01-17 PROCEDURE — 99999 PR PBB SHADOW E&M-EST. PATIENT-LVL III: CPT | Mod: PBBFAC,,, | Performed by: FAMILY MEDICINE

## 2023-01-17 PROCEDURE — 99214 PR OFFICE/OUTPT VISIT, EST, LEVL IV, 30-39 MIN: ICD-10-PCS | Mod: S$PBB,,, | Performed by: FAMILY MEDICINE

## 2023-01-17 PROCEDURE — 99213 OFFICE O/P EST LOW 20 MIN: CPT | Mod: PBBFAC,PN | Performed by: FAMILY MEDICINE

## 2023-01-17 RX ORDER — FLUTICASONE PROPIONATE 50 MCG
1 SPRAY, SUSPENSION (ML) NASAL 2 TIMES DAILY
Qty: 48 G | Refills: 11 | Status: SHIPPED | OUTPATIENT
Start: 2023-01-17

## 2023-01-17 RX ORDER — TIZANIDINE 4 MG/1
4 TABLET ORAL DAILY PRN
Qty: 90 TABLET | Refills: 3 | Status: SHIPPED | OUTPATIENT
Start: 2023-01-17 | End: 2024-01-03 | Stop reason: SDUPTHER

## 2023-01-17 RX ORDER — NAPROXEN 500 MG/1
500 TABLET ORAL 2 TIMES DAILY PRN
Qty: 180 TABLET | Refills: 3 | Status: SHIPPED | OUTPATIENT
Start: 2023-01-17 | End: 2023-08-31 | Stop reason: SDUPTHER

## 2023-01-17 RX ORDER — OMEPRAZOLE 40 MG/1
40 CAPSULE, DELAYED RELEASE ORAL DAILY
Qty: 90 CAPSULE | Refills: 3 | Status: SHIPPED | OUTPATIENT
Start: 2023-01-17 | End: 2024-01-03 | Stop reason: SDUPTHER

## 2023-01-17 RX ORDER — AMLODIPINE BESYLATE 10 MG/1
10 TABLET ORAL DAILY
Qty: 90 TABLET | Refills: 3 | Status: SHIPPED | OUTPATIENT
Start: 2023-01-17 | End: 2023-08-31 | Stop reason: SDUPTHER

## 2023-01-17 RX ORDER — METFORMIN HYDROCHLORIDE 500 MG/1
TABLET ORAL
Qty: 180 TABLET | Refills: 3 | Status: SHIPPED | OUTPATIENT
Start: 2023-01-17 | End: 2024-01-03 | Stop reason: SDUPTHER

## 2023-01-17 RX ORDER — LOSARTAN POTASSIUM 100 MG/1
100 TABLET ORAL DAILY
Qty: 90 TABLET | Refills: 3 | Status: SHIPPED | OUTPATIENT
Start: 2023-01-17 | End: 2024-01-03 | Stop reason: SDUPTHER

## 2023-01-17 RX ORDER — GABAPENTIN 300 MG/1
300 CAPSULE ORAL 2 TIMES DAILY
Qty: 180 CAPSULE | Refills: 3 | Status: SHIPPED | OUTPATIENT
Start: 2023-01-17 | End: 2024-01-03

## 2023-01-17 RX ORDER — MONTELUKAST SODIUM 10 MG/1
10 TABLET ORAL NIGHTLY PRN
Qty: 90 TABLET | Refills: 3 | Status: SHIPPED | OUTPATIENT
Start: 2023-01-17 | End: 2023-11-22 | Stop reason: SDUPTHER

## 2023-01-17 RX ORDER — ATORVASTATIN CALCIUM 20 MG/1
20 TABLET, FILM COATED ORAL DAILY
Qty: 90 TABLET | Refills: 3 | Status: SHIPPED | OUTPATIENT
Start: 2023-01-17 | End: 2024-01-03 | Stop reason: SDUPTHER

## 2023-01-17 NOTE — PROGRESS NOTES
" Patient ID: Sivakumar Thacker is a 67 y.o. male.    Chief Complaint: Hospital Follow Up and Medication Refill      Reviewed family, medical, surgical, and social history.    No cp/sob  No change in mental status  No fever  No asymmetrical limb swelling    Objective:      /68 (BP Location: Left arm, Patient Position: Sitting, BP Method: Medium (Manual))   Pulse 90   Resp 14   Ht 5' 8" (1.727 m)   Wt 88.4 kg (194 lb 14.4 oz)   SpO2 97%   BMI 29.63 kg/m²   RRR, CTAB, s/nt/nd, no c/c/e, non-toxic appearing, no focal weakness  Assessment:       1. Hospital discharge follow-up    2. Sinusitis, unspecified chronicity, unspecified location    3. Essential hypertension    4. Type 2 diabetes mellitus without complication, without long-term current use of insulin    5. Non-seasonal allergic rhinitis due to other allergic trigger    6. MARKUS (obstructive sleep apnea)    7. Aortic atherosclerosis          Plan:       Non-seasonal allergic rhinitis due to other allergic trigger  -     omeprazole (PRILOSEC) 40 MG capsule; Take 1 capsule (40 mg total) by mouth once daily.  Dispense: 90 capsule; Refill: 3  -     fluticasone propionate (FLONASE) 50 mcg/actuation nasal spray; 1 spray (50 mcg total) by Each Nostril route 2 (two) times daily.  Dispense: 48 g; Refill: 11  -     gabapentin (NEURONTIN) 300 MG capsule; Take 1 capsule (300 mg total) by mouth 2 (two) times daily.  Dispense: 180 capsule; Refill: 3  -     metFORMIN (GLUCOPHAGE) 500 MG tablet; Take 2 tablets twice a day by oral route with meals for 90 days.  Dispense: 180 tablet; Refill: 3  -     montelukast (SINGULAIR) 10 mg tablet; Take 1 tablet (10 mg total) by mouth nightly as needed.  Dispense: 90 tablet; Refill: 3  -     naproxen (NAPROSYN) 500 MG tablet; Take 1 tablet (500 mg total) by mouth 2 (two) times daily as needed.  Dispense: 180 tablet; Refill: 3  -     tiZANidine (ZANAFLEX) 4 MG tablet; Take 1 tablet (4 mg total) by mouth daily as needed.  Dispense: 90 " tablet; Refill: 3    Sinusitis, unspecified chronicity, unspecified location  -     omeprazole (PRILOSEC) 40 MG capsule; Take 1 capsule (40 mg total) by mouth once daily.  Dispense: 90 capsule; Refill: 3  -     fluticasone propionate (FLONASE) 50 mcg/actuation nasal spray; 1 spray (50 mcg total) by Each Nostril route 2 (two) times daily.  Dispense: 48 g; Refill: 11  -     gabapentin (NEURONTIN) 300 MG capsule; Take 1 capsule (300 mg total) by mouth 2 (two) times daily.  Dispense: 180 capsule; Refill: 3  -     metFORMIN (GLUCOPHAGE) 500 MG tablet; Take 2 tablets twice a day by oral route with meals for 90 days.  Dispense: 180 tablet; Refill: 3  -     montelukast (SINGULAIR) 10 mg tablet; Take 1 tablet (10 mg total) by mouth nightly as needed.  Dispense: 90 tablet; Refill: 3  -     naproxen (NAPROSYN) 500 MG tablet; Take 1 tablet (500 mg total) by mouth 2 (two) times daily as needed.  Dispense: 180 tablet; Refill: 3  -     tiZANidine (ZANAFLEX) 4 MG tablet; Take 1 tablet (4 mg total) by mouth daily as needed.  Dispense: 90 tablet; Refill: 3    Essential hypertension  -     omeprazole (PRILOSEC) 40 MG capsule; Take 1 capsule (40 mg total) by mouth once daily.  Dispense: 90 capsule; Refill: 3  -     losartan (COZAAR) 100 MG tablet; Take 1 tablet (100 mg total) by mouth once daily.  Dispense: 90 tablet; Refill: 3  -     amLODIPine (NORVASC) 10 MG tablet; Take 1 tablet (10 mg total) by mouth once daily.  Dispense: 90 tablet; Refill: 3  -     fluticasone propionate (FLONASE) 50 mcg/actuation nasal spray; 1 spray (50 mcg total) by Each Nostril route 2 (two) times daily.  Dispense: 48 g; Refill: 11  -     gabapentin (NEURONTIN) 300 MG capsule; Take 1 capsule (300 mg total) by mouth 2 (two) times daily.  Dispense: 180 capsule; Refill: 3  -     metFORMIN (GLUCOPHAGE) 500 MG tablet; Take 2 tablets twice a day by oral route with meals for 90 days.  Dispense: 180 tablet; Refill: 3  -     montelukast (SINGULAIR) 10 mg tablet; Take  1 tablet (10 mg total) by mouth nightly as needed.  Dispense: 90 tablet; Refill: 3  -     naproxen (NAPROSYN) 500 MG tablet; Take 1 tablet (500 mg total) by mouth 2 (two) times daily as needed.  Dispense: 180 tablet; Refill: 3  -     tiZANidine (ZANAFLEX) 4 MG tablet; Take 1 tablet (4 mg total) by mouth daily as needed.  Dispense: 90 tablet; Refill: 3    Type 2 diabetes mellitus without complication, without long-term current use of insulin  -     omeprazole (PRILOSEC) 40 MG capsule; Take 1 capsule (40 mg total) by mouth once daily.  Dispense: 90 capsule; Refill: 3  -     fluticasone propionate (FLONASE) 50 mcg/actuation nasal spray; 1 spray (50 mcg total) by Each Nostril route 2 (two) times daily.  Dispense: 48 g; Refill: 11  -     gabapentin (NEURONTIN) 300 MG capsule; Take 1 capsule (300 mg total) by mouth 2 (two) times daily.  Dispense: 180 capsule; Refill: 3  -     metFORMIN (GLUCOPHAGE) 500 MG tablet; Take 2 tablets twice a day by oral route with meals for 90 days.  Dispense: 180 tablet; Refill: 3  -     montelukast (SINGULAIR) 10 mg tablet; Take 1 tablet (10 mg total) by mouth nightly as needed.  Dispense: 90 tablet; Refill: 3  -     atorvastatin (LIPITOR) 20 MG tablet; Take 1 tablet (20 mg total) by mouth once daily.  Dispense: 90 tablet; Refill: 3  -     naproxen (NAPROSYN) 500 MG tablet; Take 1 tablet (500 mg total) by mouth 2 (two) times daily as needed.  Dispense: 180 tablet; Refill: 3  -     tiZANidine (ZANAFLEX) 4 MG tablet; Take 1 tablet (4 mg total) by mouth daily as needed.  Dispense: 90 tablet; Refill: 3  - Controlled, stable    MARKUS (obstructive sleep apnea)  -     Polysomnogram (CPAP will be added if patient meets diagnostic criteria.); Future    Aortic atherosclerosis  - stable, seen on most recent CTA              Continue current medicines, any changes noted above  Patient complains of poor sleep with snoring, witnessed apnea, fatigue, morning headaches with dry mouth, frequent naps, and  daytime fatigue.  Recovered from pneumonia, needs a sleep study, has appropriate help at home  Labs, radiology studies, and procedures/notes from the last 3 months were reviewed.    Risks, benefits, and side effects were discussed with the patient. All questions were answered to the fullest satisfaction of the patient, and pt verbalized understanding and agreement to treatment plan. Pt was to call with any new or worsening symptoms, or present to the ER.

## 2023-01-18 ENCOUNTER — TELEPHONE (OUTPATIENT)
Dept: HEMATOLOGY/ONCOLOGY | Facility: CLINIC | Age: 68
End: 2023-01-18

## 2023-01-18 DIAGNOSIS — C82.30 FOLLICULAR LYMPHOMA GRADE IIIA, UNSPECIFIED BODY REGION: ICD-10-CM

## 2023-01-18 DIAGNOSIS — C85.90 NON-HODGKIN'S LYMPHOMA, UNSPECIFIED BODY REGION, UNSPECIFIED NON-HODGKIN LYMPHOMA TYPE: Primary | ICD-10-CM

## 2023-01-19 ENCOUNTER — TELEPHONE (OUTPATIENT)
Dept: HEMATOLOGY/ONCOLOGY | Facility: CLINIC | Age: 68
End: 2023-01-19
Payer: MEDICARE

## 2023-01-24 ENCOUNTER — OFFICE VISIT (OUTPATIENT)
Dept: SURGERY | Facility: CLINIC | Age: 68
End: 2023-01-24
Payer: MEDICARE

## 2023-01-24 VITALS
HEART RATE: 85 BPM | DIASTOLIC BLOOD PRESSURE: 82 MMHG | RESPIRATION RATE: 16 BRPM | SYSTOLIC BLOOD PRESSURE: 134 MMHG | TEMPERATURE: 98 F

## 2023-01-24 DIAGNOSIS — I87.2 VENOUS INSUFFICIENCY: Primary | ICD-10-CM

## 2023-01-24 DIAGNOSIS — C85.90 NON-HODGKIN'S LYMPHOMA, UNSPECIFIED BODY REGION, UNSPECIFIED NON-HODGKIN LYMPHOMA TYPE: ICD-10-CM

## 2023-01-24 DIAGNOSIS — C82.30 FOLLICULAR LYMPHOMA GRADE IIIA, UNSPECIFIED BODY REGION: ICD-10-CM

## 2023-01-24 PROCEDURE — 99214 OFFICE O/P EST MOD 30 MIN: CPT | Mod: S$GLB,,, | Performed by: SURGERY

## 2023-01-24 PROCEDURE — 99214 PR OFFICE/OUTPT VISIT, EST, LEVL IV, 30-39 MIN: ICD-10-PCS | Mod: S$GLB,,, | Performed by: SURGERY

## 2023-01-24 NOTE — H&P (VIEW-ONLY)
Subjective:       Patient ID: Sivakumar Thacker is a 67 y.o. male.    Chief Complaint:  Port-A-Cath replacement    HPI:  Patient with history of non-Hodgkin's lymphoma.  Currently on on an infusional treatment regimen.  His Port-A-Cath had to be removed due to infection couple months ago.  He was referred back to the office for replacement of the Port-A-Cath.  He reports no infectious symptoms today.  He was recently discharged due to pneumonia about 3 weeks ago.  He is had 2 previous Port-A-Cath in the past.  Most recently he had a left subclavian port that was placed around 12 years ago.    Past Medical History:   Diagnosis Date    Chest wall abscess 9/30/2022    Chronic obstructive pulmonary disease, unspecified COPD type 3/28/2022    Diabetes mellitus, type 2     Encounter for antineoplastic chemotherapy 04/01/2020    Hypertension     Hypogammaglobulinemia 12/13/2019    Neuropathy     Non Hodgkin's lymphoma     Reflux esophagitis     Ringing in ear, bilateral      Past Surgical History:   Procedure Laterality Date    COLONOSCOPY  2018    EYE SURGERY  Approximately 3 years ago    Cataract    HAND SURGERY      amputation left index finger    INCISION AND DRAINAGE OF ABSCESS Left 10/3/2022    Procedure: INCISION AND DRAINAGE, ABSCESS;  Surgeon: Franco Venegas III, MD;  Location: [facility] OR;  Service: General;  Laterality: Left;  axilla    MEDIPORT REMOVAL Right 10/3/2022    Procedure: REMOVAL, CATHETER, CENTRAL VENOUS, TUNNELED, WITH PORT;  Surgeon: Franco Venegas III, MD;  Location: [facility] OR;  Service: General;  Laterality: Right;    PORTACATH PLACEMENT      SKIN CANCER EXCISION  09/30/2020    [facility]    SPINE SURGERY      VASECTOMY  1985 ?     Review of patient's allergies indicates:  No Known Allergies  Medication List with Changes/Refills   Current Medications    ALBUTEROL-IPRATROPIUM (DUO-NEB) 2.5 MG-0.5 MG/3 ML NEBULIZER SOLUTION    Take 3 mLs by nebulization every 6 (six) hours as needed for Wheezing.  Rescue    AMLODIPINE (NORVASC) 10 MG TABLET    Take 1 tablet (10 mg total) by mouth once daily.    ASPIRIN (ECOTRIN) 81 MG EC TABLET    Take 81 mg by mouth once daily.    ATORVASTATIN (LIPITOR) 20 MG TABLET    Take 1 tablet (20 mg total) by mouth once daily.    BLOOD SUGAR DIAGNOSTIC (FREESTYLE LITE STRIPS MISC)    FreeStyle Lite Strips    BLOOD SUGAR DIAGNOSTIC STRP        FLUTICASONE PROPIONATE (FLONASE) 50 MCG/ACTUATION NASAL SPRAY    1 spray (50 mcg total) by Each Nostril route 2 (two) times daily.    GABAPENTIN (NEURONTIN) 300 MG CAPSULE    Take 1 capsule (300 mg total) by mouth 2 (two) times daily.    GUAIFENESIN (MUCINEX) 600 MG 12 HR TABLET    Take 2 tablets (1,200 mg total) by mouth 2 (two) times daily.    HYDROCODONE-CHLORPHENIRAMINE (TUSSIONEX) 10-8 MG/5 ML SUSPENSION    Take 5 mLs by mouth every 12 (twelve) hours as needed for Cough ((DO NOT TAKE WITHIN 4 HOURS OF NORCO/HYDROCODONE-ACETAMINOPHEN TABLETS)).    LOSARTAN (COZAAR) 100 MG TABLET    Take 1 tablet (100 mg total) by mouth once daily.    METFORMIN (GLUCOPHAGE) 500 MG TABLET    Take 2 tablets twice a day by oral route with meals for 90 days.    MONTELUKAST (SINGULAIR) 10 MG TABLET    Take 1 tablet (10 mg total) by mouth nightly as needed.    MUCUS CLEARING DEVICE (ACAPELLA, FLUTTER)    1 Device by Misc.(Non-Drug; Combo Route) route 2 (two) times daily.    MULTIVITAMIN ORAL    Take 1 tablet by mouth once daily.    NAPROXEN (NAPROSYN) 500 MG TABLET    Take 1 tablet (500 mg total) by mouth 2 (two) times daily as needed.    OMEPRAZOLE (PRILOSEC) 40 MG CAPSULE    Take 1 capsule (40 mg total) by mouth once daily.    TIZANIDINE (ZANAFLEX) 4 MG TABLET    Take 1 tablet (4 mg total) by mouth daily as needed.     Family History   Problem Relation Age of Onset    Diabetes Father      Social History     Socioeconomic History    Marital status:    Tobacco Use    Smoking status: Former     Packs/day: 0.50     Years: 2.00     Pack years: 1.00     Types:  Cigarettes    Smokeless tobacco: Never   Substance and Sexual Activity    Alcohol use: Not Currently     Comment: rarely    Drug use: No    Sexual activity: Not Currently     Social Determinants of Health     Financial Resource Strain: Low Risk     Difficulty of Paying Living Expenses: Not hard at all   Food Insecurity: Unknown    Worried About Running Out of Food in the Last Year: Patient refused    Ran Out of Food in the Last Year: Patient refused   Transportation Needs: Unknown    Lack of Transportation (Medical): Patient refused    Lack of Transportation (Non-Medical): Patient refused   Physical Activity: Unknown    Days of Exercise per Week: 1 day   Stress: No Stress Concern Present    Feeling of Stress : Not at all   Social Connections: Unknown    Frequency of Communication with Friends and Family: More than three times a week    Frequency of Social Gatherings with Friends and Family: More than three times a week    Active Member of Clubs or Organizations: Yes    Attends Club or Organization Meetings: More than 4 times per year    Marital Status:    Housing Stability: Unknown    Unable to Pay for Housing in the Last Year: Patient refused    Unstable Housing in the Last Year: Patient refused         Review of Systems   Constitutional:  Negative for appetite change, chills, fever and unexpected weight change.   HENT:  Negative for hearing loss, rhinorrhea, sore throat and voice change.    Eyes:  Negative for photophobia and visual disturbance.   Respiratory:  Negative for cough, choking and shortness of breath.    Cardiovascular:  Negative for chest pain, palpitations and leg swelling.   Gastrointestinal:  Negative for abdominal pain, blood in stool, constipation, diarrhea, nausea and vomiting.   Endocrine: Negative for cold intolerance, heat intolerance, polydipsia and polyuria.   Musculoskeletal:  Negative for arthralgias, back pain, joint swelling and neck stiffness.   Skin:  Negative for color change,  pallor and rash.   Neurological:  Negative for dizziness, seizures, syncope and headaches.   Hematological:  Negative for adenopathy. Does not bruise/bleed easily.   Psychiatric/Behavioral:  Negative for agitation, behavioral problems and confusion.      Objective:      Physical Exam  Constitutional:       General: He is not in acute distress.     Appearance: Normal appearance. He is well-developed. He is not toxic-appearing.   HENT:      Head: Normocephalic and atraumatic. No abrasion or laceration.      Right Ear: External ear normal.      Left Ear: External ear normal.      Nose: Nose normal.   Eyes:      Pupils: Pupils are equal, round, and reactive to light.   Neck:      Trachea: Trachea and phonation normal. No tracheal deviation.   Cardiovascular:      Rate and Rhythm: Normal rate and regular rhythm.   Pulmonary:      Effort: Pulmonary effort is normal. No tachypnea, accessory muscle usage or respiratory distress.   Chest:       Abdominal:      General: There is no distension.      Palpations: Abdomen is soft.      Tenderness: There is no abdominal tenderness.   Musculoskeletal:      Cervical back: Neck supple. Normal range of motion.   Lymphadenopathy:      Cervical: No cervical adenopathy.   Skin:     General: Skin is warm.   Neurological:      Mental Status: He is alert and oriented to person, place, and time.      Coordination: Coordination normal.      Gait: Gait normal.   Psychiatric:         Speech: Speech normal.         Behavior: Behavior normal.       Assessment/Plan:   Venous insufficiency  -     Case Request Operating Room: [id number]-CATH  -     EKG 12-lead; Future  -     X-Ray Chest PA And Lateral; Future; Expected date: 01/24/2023    Non-Hodgkin's lymphoma, unspecified body region, unspecified non-Hodgkin lymphoma type  -     Ambulatory referral/consult to General Surgery    Follicular lymphoma grade IIIa, unspecified body region  -     Ambulatory referral/consult to General  Surgery    Other orders  -     Full code; Standing  -     Vital signs; Standing  -     Insert peripheral IV; Standing  -     Diet NPO; Standing  -     ceFAZolin (ANCEF) 2 g in dextrose 5 % (D5W) 50 mL IVPB  -     Pulse Oximetry Q4H; Standing  -     Place in Outpatient; Standing  -     Place sequential compression device; Standing    Plan on Port-A-Cath placement next Wednesday.  Risks and benefits of surgery discussed with the patient.      I discussed the proposed procedures with the patient including risks, benefits, indications, alternatives and special concerns.  The patient appears to understand and agrees to go ahead with surgery.  I have made no promises, warranties or verbal agreements beyond what was discussed above.    No follow-ups on file.

## 2023-01-24 NOTE — PROGRESS NOTES
Subjective:       Patient ID: Sivakumar Thacker is a 67 y.o. male.    Chief Complaint:  Port-A-Cath replacement    HPI:  Patient with history of non-Hodgkin's lymphoma.  Currently on on an infusional treatment regimen.  His Port-A-Cath had to be removed due to infection couple months ago.  He was referred back to the office for replacement of the Port-A-Cath.  He reports no infectious symptoms today.  He was recently discharged due to pneumonia about 3 weeks ago.  He is had 2 previous Port-A-Cath in the past.  Most recently he had a left subclavian port that was placed around 12 years ago.    Past Medical History:   Diagnosis Date    Chest wall abscess 9/30/2022    Chronic obstructive pulmonary disease, unspecified COPD type 3/28/2022    Diabetes mellitus, type 2     Encounter for antineoplastic chemotherapy 04/01/2020    Hypertension     Hypogammaglobulinemia 12/13/2019    Neuropathy     Non Hodgkin's lymphoma     Reflux esophagitis     Ringing in ear, bilateral      Past Surgical History:   Procedure Laterality Date    COLONOSCOPY  2018    EYE SURGERY  Approximately 3 years ago    Cataract    HAND SURGERY      amputation left index finger    INCISION AND DRAINAGE OF ABSCESS Left 10/3/2022    Procedure: INCISION AND DRAINAGE, ABSCESS;  Surgeon: Franco Venegas III, MD;  Location: [facility] OR;  Service: General;  Laterality: Left;  axilla    MEDIPORT REMOVAL Right 10/3/2022    Procedure: REMOVAL, CATHETER, CENTRAL VENOUS, TUNNELED, WITH PORT;  Surgeon: Franco Venegas III, MD;  Location: [facility] OR;  Service: General;  Laterality: Right;    PORTACATH PLACEMENT      SKIN CANCER EXCISION  09/30/2020    [facility]    SPINE SURGERY      VASECTOMY  1985 ?     Review of patient's allergies indicates:  No Known Allergies  Medication List with Changes/Refills   Current Medications    ALBUTEROL-IPRATROPIUM (DUO-NEB) 2.5 MG-0.5 MG/3 ML NEBULIZER SOLUTION    Take 3 mLs by nebulization every 6 (six) hours as needed for Wheezing.  Rescue    AMLODIPINE (NORVASC) 10 MG TABLET    Take 1 tablet (10 mg total) by mouth once daily.    ASPIRIN (ECOTRIN) 81 MG EC TABLET    Take 81 mg by mouth once daily.    ATORVASTATIN (LIPITOR) 20 MG TABLET    Take 1 tablet (20 mg total) by mouth once daily.    BLOOD SUGAR DIAGNOSTIC (FREESTYLE LITE STRIPS MISC)    FreeStyle Lite Strips    BLOOD SUGAR DIAGNOSTIC STRP        FLUTICASONE PROPIONATE (FLONASE) 50 MCG/ACTUATION NASAL SPRAY    1 spray (50 mcg total) by Each Nostril route 2 (two) times daily.    GABAPENTIN (NEURONTIN) 300 MG CAPSULE    Take 1 capsule (300 mg total) by mouth 2 (two) times daily.    GUAIFENESIN (MUCINEX) 600 MG 12 HR TABLET    Take 2 tablets (1,200 mg total) by mouth 2 (two) times daily.    HYDROCODONE-CHLORPHENIRAMINE (TUSSIONEX) 10-8 MG/5 ML SUSPENSION    Take 5 mLs by mouth every 12 (twelve) hours as needed for Cough ((DO NOT TAKE WITHIN 4 HOURS OF NORCO/HYDROCODONE-ACETAMINOPHEN TABLETS)).    LOSARTAN (COZAAR) 100 MG TABLET    Take 1 tablet (100 mg total) by mouth once daily.    METFORMIN (GLUCOPHAGE) 500 MG TABLET    Take 2 tablets twice a day by oral route with meals for 90 days.    MONTELUKAST (SINGULAIR) 10 MG TABLET    Take 1 tablet (10 mg total) by mouth nightly as needed.    MUCUS CLEARING DEVICE (ACAPELLA, FLUTTER)    1 Device by Misc.(Non-Drug; Combo Route) route 2 (two) times daily.    MULTIVITAMIN ORAL    Take 1 tablet by mouth once daily.    NAPROXEN (NAPROSYN) 500 MG TABLET    Take 1 tablet (500 mg total) by mouth 2 (two) times daily as needed.    OMEPRAZOLE (PRILOSEC) 40 MG CAPSULE    Take 1 capsule (40 mg total) by mouth once daily.    TIZANIDINE (ZANAFLEX) 4 MG TABLET    Take 1 tablet (4 mg total) by mouth daily as needed.     Family History   Problem Relation Age of Onset    Diabetes Father      Social History     Socioeconomic History    Marital status:    Tobacco Use    Smoking status: Former     Packs/day: 0.50     Years: 2.00     Pack years: 1.00     Types:  Cigarettes    Smokeless tobacco: Never   Substance and Sexual Activity    Alcohol use: Not Currently     Comment: rarely    Drug use: No    Sexual activity: Not Currently     Social Determinants of Health     Financial Resource Strain: Low Risk     Difficulty of Paying Living Expenses: Not hard at all   Food Insecurity: Unknown    Worried About Running Out of Food in the Last Year: Patient refused    Ran Out of Food in the Last Year: Patient refused   Transportation Needs: Unknown    Lack of Transportation (Medical): Patient refused    Lack of Transportation (Non-Medical): Patient refused   Physical Activity: Unknown    Days of Exercise per Week: 1 day   Stress: No Stress Concern Present    Feeling of Stress : Not at all   Social Connections: Unknown    Frequency of Communication with Friends and Family: More than three times a week    Frequency of Social Gatherings with Friends and Family: More than three times a week    Active Member of Clubs or Organizations: Yes    Attends Club or Organization Meetings: More than 4 times per year    Marital Status:    Housing Stability: Unknown    Unable to Pay for Housing in the Last Year: Patient refused    Unstable Housing in the Last Year: Patient refused         Review of Systems   Constitutional:  Negative for appetite change, chills, fever and unexpected weight change.   HENT:  Negative for hearing loss, rhinorrhea, sore throat and voice change.    Eyes:  Negative for photophobia and visual disturbance.   Respiratory:  Negative for cough, choking and shortness of breath.    Cardiovascular:  Negative for chest pain, palpitations and leg swelling.   Gastrointestinal:  Negative for abdominal pain, blood in stool, constipation, diarrhea, nausea and vomiting.   Endocrine: Negative for cold intolerance, heat intolerance, polydipsia and polyuria.   Musculoskeletal:  Negative for arthralgias, back pain, joint swelling and neck stiffness.   Skin:  Negative for color change,  pallor and rash.   Neurological:  Negative for dizziness, seizures, syncope and headaches.   Hematological:  Negative for adenopathy. Does not bruise/bleed easily.   Psychiatric/Behavioral:  Negative for agitation, behavioral problems and confusion.      Objective:      Physical Exam  Constitutional:       General: He is not in acute distress.     Appearance: Normal appearance. He is well-developed. He is not toxic-appearing.   HENT:      Head: Normocephalic and atraumatic. No abrasion or laceration.      Right Ear: External ear normal.      Left Ear: External ear normal.      Nose: Nose normal.   Eyes:      Pupils: Pupils are equal, round, and reactive to light.   Neck:      Trachea: Trachea and phonation normal. No tracheal deviation.   Cardiovascular:      Rate and Rhythm: Normal rate and regular rhythm.   Pulmonary:      Effort: Pulmonary effort is normal. No tachypnea, accessory muscle usage or respiratory distress.   Chest:       Abdominal:      General: There is no distension.      Palpations: Abdomen is soft.      Tenderness: There is no abdominal tenderness.   Musculoskeletal:      Cervical back: Neck supple. Normal range of motion.   Lymphadenopathy:      Cervical: No cervical adenopathy.   Skin:     General: Skin is warm.   Neurological:      Mental Status: He is alert and oriented to person, place, and time.      Coordination: Coordination normal.      Gait: Gait normal.   Psychiatric:         Speech: Speech normal.         Behavior: Behavior normal.       Assessment/Plan:   Venous insufficiency  -     Case Request Operating Room: [id number]  -     EKG 12-lead; Future  -     X-Ray Chest PA And Lateral; Future; Expected date: 01/24/2023    Non-Hodgkin's lymphoma, unspecified body region, unspecified non-Hodgkin lymphoma type  -     Ambulatory referral/consult to General Surgery    Follicular lymphoma grade IIIa, unspecified body region  -     Ambulatory referral/consult to General  Surgery    Other orders  -     Full code; Standing  -     Vital signs; Standing  -     Insert peripheral IV; Standing  -     Diet NPO; Standing  -     ceFAZolin (ANCEF) 2 g in dextrose 5 % (D5W) 50 mL IVPB  -     Pulse Oximetry Q4H; Standing  -     Place in Outpatient; Standing  -     Place sequential compression device; Standing    Plan on Port-A-Cath placement next Wednesday.  Risks and benefits of surgery discussed with the patient.      I discussed the proposed procedures with the patient including risks, benefits, indications, alternatives and special concerns.  The patient appears to understand and agrees to go ahead with surgery.  I have made no promises, warranties or verbal agreements beyond what was discussed above.    No follow-ups on file.

## 2023-02-01 ENCOUNTER — HOSPITAL ENCOUNTER (OUTPATIENT)
Facility: HOSPITAL | Age: 68
Discharge: HOME | End: 2023-02-01
Attending: SURGERY | Admitting: SURGERY
Payer: MEDICARE

## 2023-02-01 ENCOUNTER — ANESTHESIA EVENT (OUTPATIENT)
Dept: SURGERY | Facility: HOSPITAL | Age: 68
End: 2023-02-01
Payer: MEDICARE

## 2023-02-01 ENCOUNTER — ANESTHESIA (OUTPATIENT)
Dept: SURGERY | Facility: HOSPITAL | Age: 68
End: 2023-02-01
Payer: MEDICARE

## 2023-02-01 VITALS
OXYGEN SATURATION: 95 % | SYSTOLIC BLOOD PRESSURE: 125 MMHG | HEART RATE: 70 BPM | BODY MASS INDEX: 29.63 KG/M2 | RESPIRATION RATE: 18 BRPM | WEIGHT: 194.88 LBS | TEMPERATURE: 98 F | DIASTOLIC BLOOD PRESSURE: 89 MMHG

## 2023-02-01 DIAGNOSIS — Z01.818 PRE-OP TESTING: ICD-10-CM

## 2023-02-01 DIAGNOSIS — I87.2 VENOUS INSUFFICIENCY: ICD-10-CM

## 2023-02-01 LAB
ALBUMIN SERPL BCP-MCNC: 3.9 G/DL (ref 3.5–5.2)
ALP SERPL-CCNC: 89 U/L (ref 55–135)
ALT SERPL W/O P-5'-P-CCNC: 27 U/L (ref 10–44)
ANION GAP SERPL CALC-SCNC: 10 MMOL/L (ref 8–16)
AST SERPL-CCNC: 25 U/L (ref 10–40)
BILIRUB SERPL-MCNC: 0.6 MG/DL (ref 0.1–1)
BUN SERPL-MCNC: 15 MG/DL (ref 8–23)
CALCIUM SERPL-MCNC: 8.7 MG/DL (ref 8.7–10.5)
CHLORIDE SERPL-SCNC: 105 MMOL/L (ref 95–110)
CO2 SERPL-SCNC: 26 MMOL/L (ref 23–29)
CREAT SERPL-MCNC: 0.8 MG/DL (ref 0.5–1.4)
EST. GFR  (NO RACE VARIABLE): >60 ML/MIN/1.73 M^2
GLUCOSE SERPL-MCNC: 126 MG/DL (ref 70–110)
GLUCOSE SERPL-MCNC: 143 MG/DL (ref 70–110)
GLUCOSE SERPL-MCNC: 157 MG/DL (ref 70–110)
POTASSIUM SERPL-SCNC: 3.7 MMOL/L (ref 3.5–5.1)
PROT SERPL-MCNC: 6.4 G/DL (ref 6–8.4)
SODIUM SERPL-SCNC: 141 MMOL/L (ref 136–145)

## 2023-02-01 PROCEDURE — D9220A PRA ANESTHESIA: Mod: CRNA,,, | Performed by: NURSE ANESTHETIST, CERTIFIED REGISTERED

## 2023-02-01 PROCEDURE — 36000707: Performed by: SURGERY

## 2023-02-01 PROCEDURE — 63600175 PHARM REV CODE 636 W HCPCS: Performed by: SURGERY

## 2023-02-01 PROCEDURE — 71000015 HC POSTOP RECOV 1ST HR: Performed by: SURGERY

## 2023-02-01 PROCEDURE — 63600175 PHARM REV CODE 636 W HCPCS: Performed by: NURSE ANESTHETIST, CERTIFIED REGISTERED

## 2023-02-01 PROCEDURE — 37000008 HC ANESTHESIA 1ST 15 MINUTES: Performed by: SURGERY

## 2023-02-01 PROCEDURE — 36000706: Performed by: SURGERY

## 2023-02-01 PROCEDURE — 25000003 PHARM REV CODE 250: Performed by: NURSE ANESTHETIST, CERTIFIED REGISTERED

## 2023-02-01 PROCEDURE — 36561 INSERT TUNNELED CV CATH: CPT | Mod: LT,,, | Performed by: SURGERY

## 2023-02-01 PROCEDURE — 77001 FLUOROGUIDE FOR VEIN DEVICE: CPT | Mod: 26,,, | Performed by: SURGERY

## 2023-02-01 PROCEDURE — 77001 CHG FLUOROGUIDE CNTRL VEN ACCESS,PLACE,REPLACE,REMOVE: ICD-10-PCS | Mod: 26,,, | Performed by: SURGERY

## 2023-02-01 PROCEDURE — D9220A PRA ANESTHESIA: ICD-10-PCS | Mod: CRNA,,, | Performed by: NURSE ANESTHETIST, CERTIFIED REGISTERED

## 2023-02-01 PROCEDURE — 27200651 HC AIRWAY, LMA: Performed by: ANESTHESIOLOGY

## 2023-02-01 PROCEDURE — 27202103: Performed by: ANESTHESIOLOGY

## 2023-02-01 PROCEDURE — C9290 INJ, BUPIVACAINE LIPOSOME: HCPCS | Performed by: SURGERY

## 2023-02-01 PROCEDURE — 25000003 PHARM REV CODE 250: Performed by: SURGERY

## 2023-02-01 PROCEDURE — 36561 PR INSERT TUNNELED CV CATH WITH PORT: ICD-10-PCS | Mod: LT,,, | Performed by: SURGERY

## 2023-02-01 PROCEDURE — D9220A PRA ANESTHESIA: Mod: ANES,,, | Performed by: ANESTHESIOLOGY

## 2023-02-01 PROCEDURE — 71000033 HC RECOVERY, INTIAL HOUR: Performed by: SURGERY

## 2023-02-01 PROCEDURE — C1788 PORT, INDWELLING, IMP: HCPCS | Performed by: SURGERY

## 2023-02-01 PROCEDURE — 37000009 HC ANESTHESIA EA ADD 15 MINS: Performed by: SURGERY

## 2023-02-01 PROCEDURE — 27201423 OPTIME MED/SURG SUP & DEVICES STERILE SUPPLY: Performed by: SURGERY

## 2023-02-01 PROCEDURE — 82962 GLUCOSE BLOOD TEST: CPT | Performed by: SURGERY

## 2023-02-01 PROCEDURE — 80053 COMPREHEN METABOLIC PANEL: CPT | Performed by: STUDENT IN AN ORGANIZED HEALTH CARE EDUCATION/TRAINING PROGRAM

## 2023-02-01 PROCEDURE — D9220A PRA ANESTHESIA: ICD-10-PCS | Mod: ANES,,, | Performed by: ANESTHESIOLOGY

## 2023-02-01 DEVICE — PORT POWER MRI 8FR 1808000: Type: IMPLANTABLE DEVICE | Site: CHEST  WALL | Status: FUNCTIONAL

## 2023-02-01 RX ORDER — MIDAZOLAM HYDROCHLORIDE 1 MG/ML
INJECTION INTRAMUSCULAR; INTRAVENOUS
Status: DISCONTINUED | OUTPATIENT
Start: 2023-02-01 | End: 2023-02-01

## 2023-02-01 RX ORDER — LIDOCAINE HCL/PF 100 MG/5ML
SYRINGE (ML) INTRAVENOUS
Status: DISCONTINUED | OUTPATIENT
Start: 2023-02-01 | End: 2023-02-01

## 2023-02-01 RX ORDER — DIPHENHYDRAMINE HYDROCHLORIDE 50 MG/ML
12.5 INJECTION INTRAMUSCULAR; INTRAVENOUS
Status: DISCONTINUED | OUTPATIENT
Start: 2023-02-01 | End: 2023-02-01 | Stop reason: HOSPADM

## 2023-02-01 RX ORDER — HEPARIN SODIUM 1000 [USP'U]/ML
INJECTION, SOLUTION INTRAVENOUS; SUBCUTANEOUS
Status: DISCONTINUED | OUTPATIENT
Start: 2023-02-01 | End: 2023-02-01 | Stop reason: HOSPADM

## 2023-02-01 RX ORDER — ACETAMINOPHEN 10 MG/ML
INJECTION, SOLUTION INTRAVENOUS
Status: DISCONTINUED | OUTPATIENT
Start: 2023-02-01 | End: 2023-02-01

## 2023-02-01 RX ORDER — SODIUM CHLORIDE, SODIUM LACTATE, POTASSIUM CHLORIDE, CALCIUM CHLORIDE 600; 310; 30; 20 MG/100ML; MG/100ML; MG/100ML; MG/100ML
INJECTION, SOLUTION INTRAVENOUS CONTINUOUS PRN
Status: DISCONTINUED | OUTPATIENT
Start: 2023-02-01 | End: 2023-02-01

## 2023-02-01 RX ORDER — CEFAZOLIN SODIUM 2 G/50ML
2 SOLUTION INTRAVENOUS
Status: COMPLETED | OUTPATIENT
Start: 2023-02-01 | End: 2023-02-01

## 2023-02-01 RX ORDER — OXYCODONE HYDROCHLORIDE 5 MG/1
5 TABLET ORAL
Status: DISCONTINUED | OUTPATIENT
Start: 2023-02-01 | End: 2023-02-01 | Stop reason: HOSPADM

## 2023-02-01 RX ORDER — PROPOFOL 10 MG/ML
INJECTION, EMULSION INTRAVENOUS
Status: DISCONTINUED | OUTPATIENT
Start: 2023-02-01 | End: 2023-02-01

## 2023-02-01 RX ORDER — ONDANSETRON 2 MG/ML
4 INJECTION INTRAMUSCULAR; INTRAVENOUS DAILY PRN
Status: DISCONTINUED | OUTPATIENT
Start: 2023-02-01 | End: 2023-02-01 | Stop reason: HOSPADM

## 2023-02-01 RX ORDER — BUPIVACAINE HYDROCHLORIDE AND EPINEPHRINE 5; 5 MG/ML; UG/ML
INJECTION, SOLUTION EPIDURAL; INTRACAUDAL; PERINEURAL
Status: DISCONTINUED | OUTPATIENT
Start: 2023-02-01 | End: 2023-02-01 | Stop reason: HOSPADM

## 2023-02-01 RX ORDER — ONDANSETRON 2 MG/ML
INJECTION INTRAMUSCULAR; INTRAVENOUS
Status: DISCONTINUED | OUTPATIENT
Start: 2023-02-01 | End: 2023-02-01

## 2023-02-01 RX ORDER — HYDROMORPHONE HYDROCHLORIDE 1 MG/ML
0.2 INJECTION, SOLUTION INTRAMUSCULAR; INTRAVENOUS; SUBCUTANEOUS EVERY 5 MIN PRN
Status: DISCONTINUED | OUTPATIENT
Start: 2023-02-01 | End: 2023-02-01 | Stop reason: HOSPADM

## 2023-02-01 RX ORDER — FENTANYL CITRATE 50 UG/ML
INJECTION, SOLUTION INTRAMUSCULAR; INTRAVENOUS
Status: DISCONTINUED | OUTPATIENT
Start: 2023-02-01 | End: 2023-02-01

## 2023-02-01 RX ADMIN — CEFAZOLIN SODIUM 2 G: 2 SOLUTION INTRAVENOUS at 07:02

## 2023-02-01 RX ADMIN — LIDOCAINE HYDROCHLORIDE 100 MG: 20 INJECTION, SOLUTION INTRAVENOUS at 07:02

## 2023-02-01 RX ADMIN — ONDANSETRON 4 MG: 2 INJECTION INTRAMUSCULAR; INTRAVENOUS at 07:02

## 2023-02-01 RX ADMIN — FENTANYL CITRATE 25 MCG: 50 INJECTION INTRAMUSCULAR; INTRAVENOUS at 07:02

## 2023-02-01 RX ADMIN — FENTANYL CITRATE 75 MCG: 50 INJECTION INTRAMUSCULAR; INTRAVENOUS at 08:02

## 2023-02-01 RX ADMIN — SODIUM CHLORIDE, SODIUM LACTATE, POTASSIUM CHLORIDE, AND CALCIUM CHLORIDE: .6; .31; .03; .02 INJECTION, SOLUTION INTRAVENOUS at 07:02

## 2023-02-01 RX ADMIN — ACETAMINOPHEN 1000 MG: 10 INJECTION, SOLUTION INTRAVENOUS at 08:02

## 2023-02-01 RX ADMIN — PROPOFOL 120 MG: 10 INJECTION, EMULSION INTRAVENOUS at 07:02

## 2023-02-01 RX ADMIN — MIDAZOLAM HYDROCHLORIDE 2 MG: 1 INJECTION, SOLUTION INTRAMUSCULAR; INTRAVENOUS at 07:02

## 2023-02-01 NOTE — BRIEF OP NOTE
[facility]  Brief Operative Note    SUMMARY     Surgery Date: 2/1/2023     Surgeon(s) and Role:     * Franco Venegas III, MD - Primary    Assisting Surgeon: None    Pre-op Diagnosis:  Venous insufficiency [I87.2]    Post-op Diagnosis:  Post-Op Diagnosis Codes:     * Venous insufficiency [I87.2]    Procedure(s) (LRB):  [id number] (Left) left subclavian percutaneous placement and using intraoperative fluoroscopy to confirm venous placement and position the catheter tip into the superior vena cava.    Anesthesia: General    Operative Findings: The patient was taken to operating room and transferred to the operating room table in the supine position.  Patient was given general anesthesia and intubated.  Bilateral neck and chest were sterilely prepped and draped.  The patient was put in Trendelenburg position.  Large gauge needle was used to percutaneously access the left  subclavian vein.   A guide wire was placed through the needle and into the venous system without any issue.  Under fluoroscopy, the guide wire was seen in the venous system.  The patient was put back in the flat position.   An incision was made at the percutaneous stick site and a subcutaneous pocket was made inferiorly.  Under Seldinger technique a venous sheath was placed in into the venous system over the guide wire.  The catheter was then placed into the venous system through the sheath.   The sheath was then peeled away and discarded.   Under fluoroscopy, the catheter tip was positioned into the superior vena cava.  The catheter was cut to size.   The port was attached to the catheter.  The port was accessed and it flushed and aspirated freely.  The port was then placed into the subcutaneous pocket and sutured to the chest wall.  The incision was then closed in 2 layers, first with a deep dermal layer of Vicryl followed by a subcuticular 4-0 Monocryl to close the skin.  After that, procedure was finished.  Patient was  extubated and transferred to the recovery room in stable condition.  Chest x-ray will be performed in the recovery room.     Estimated Blood Loss:   Estimated Blood Loss has been documented.         Specimens:   Specimen (24h ago, onward)      None            [id number]

## 2023-02-01 NOTE — ANESTHESIA PREPROCEDURE EVALUATION
02/01/2023  Sivakumar Thacker is a 67 y.o., male.      Patient Active Problem List   Diagnosis    Allergic rhinitis    Follicular lymphoma grade IIIa    Nonfamilial hypogammaglobulinemia    Hypogammaglobulinemia    Essential hypertension    Elevated lipoprotein(a)    Prediabetes    Encounter for antineoplastic chemotherapy    Type II diabetes mellitus with neurological manifestations    Type 2 diabetes mellitus without complication, without long-term current use of insulin    Chronic obstructive pulmonary disease, unspecified COPD type    Pancytopenia    Non-Hodgkin's lymphoma    Aortic atherosclerosis    Hospital discharge follow-up    Sinusitis       Past Surgical History:   Procedure Laterality Date    COLONOSCOPY  2018    EYE SURGERY  Approximately 3 years ago    Cataract    HAND SURGERY      amputation left index finger    INCISION AND DRAINAGE OF ABSCESS Left 10/3/2022    Procedure: INCISION AND DRAINAGE, ABSCESS;  Surgeon: Franco Venegas III, MD;  Location: [facility] OR;  Service: General;  Laterality: Left;  axilla    MEDIPORT REMOVAL Right 10/3/2022    Procedure: REMOVAL, CATHETER, CENTRAL VENOUS, TUNNELED, WITH PORT;  Surgeon: Franco Venegas III, MD;  Location: [facility] OR;  Service: General;  Laterality: Right;    PORTACATH PLACEMENT      SK[facility]  09/30/2020    [facility]    SPINE SURGERY      VASECTOMY  1985 ?        Tobacco Use:  The patient  reports that he quit smoking about 49 years ago. His smoking use included cigarettes. He has a 1.00 pack-year smoking history. He has never used smokeless tobacco.     Results for orders placed or performed during the hospital encounter of 01/06/23   EKG 12-lead    Collection Time: 01/10/23 12:56 PM    Narrative    Test Reason : R07.9,    Vent. Rate : 102 BPM     Atrial Rate : 102 BPM     P-R Int : 160 ms          QRS Dur :  082 ms      QT Int : 354 ms       P-R-T Axes : 047 -36 024 degrees     QTc Int : 461 ms    Sinus tachycardia  Left axis deviation  Inferior infarct (cited on or before 30-SEP-2022)  Abnormal ECG  When compared with ECG of 06-JAN-2023 09:28,  No significant change was found  Confirmed by Nico Garcia MD ([id number]) on 1/13/2023 5:30:13 PM    Referred By: [email or web address]   SELF           Confirmed By:Nico Garcia MD             Lab Results   Component Value Date    WBC 2.83 (L) 01/12/2023    HGB 12.4 (L) 01/12/2023    HCT 40.5 01/12/2023    MCV 85 01/12/2023     01/12/2023     BMP  Lab Results   Component Value Date     01/11/2023    K 4.1 01/11/2023    CL 97 01/11/2023    CO2 30 (H) 01/11/2023    BUN 19 01/11/2023    CREATININE 0.8 01/11/2023    CALCIUM 9.4 01/11/2023    ANIONGAP 12 01/11/2023     01/11/2023     (H) 01/10/2023     (H) 01/09/2023       Results for orders placed during the hospital encounter of 09/30/22    Echo    Interpretation Summary  · The estimated PA systolic pressure is 32 mmHg.  · The left ventricle is normal in size with normal systolic function.  · Normal left ventricular diastolic function.  · Mild right ventricular enlargement with normal right ventricular systolic function.  · Normal central venous pressure (3 mmHg).  · The estimated ejection fraction is 55%.  · Mild to moderate tricuspid regurgitation.  · Mild-to-moderate mitral regurgitation.  · There are segmental left ventricular wall motion abnormalities.              Pre-op Assessment    I have reviewed the Patient Summary Reports.     I have reviewed the Nursing Notes. I have reviewed the NPO Status.   I have reviewed the Medications.     Review of Systems  Anesthesia Hx:  No problems with previous Anesthesia  Denies Family Hx of Anesthesia complications.   Denies Personal Hx of Anesthesia complications.   Social:  Former Smoker    Hematology/Oncology:  Hematology Normal      Hematology Comments: Hx  pancytopenia Current/Recent Cancer. (hodgkin's lymphoma) chemotherapy    Cardiovascular:   Hypertension, well controlled    Pulmonary:   COPD, mild    Hepatic/GI:   GERD, well controlled    Musculoskeletal:  Musculoskeletal Normal    Neurological:   Peripheral Neuropathy (feet bilat)    Endocrine:   Diabetes, well controlled, type 2        Physical Exam  General: Well nourished, Cooperative, Alert and Oriented    Airway:  Mallampati: III / II  Mouth Opening: Normal  TM Distance: Normal  Tongue: Normal  Neck ROM: Normal ROM    Chest/Lungs:  Clear to auscultation    Heart:  Rate: Normal  Rhythm: Regular Rhythm  Sounds: Normal    Abdomen:  Normal, Soft, Nontender        Anesthesia Plan  Type of Anesthesia, risks & benefits discussed:    Anesthesia Type: Gen ETT  Intra-op Monitoring Plan: Standard ASA Monitors  Post Op Pain Control Plan: multimodal analgesia and IV/PO Opioids PRN  Induction:  IV  Airway Plan: Video, Post-Induction  Informed Consent: Informed consent signed with the Patient and all parties understand the risks and agree with anesthesia plan.  All questions answered.   ASA Score: 3  Anesthesia Plan Notes:   General LMA OK  IV tylenol  Zofran Decadron    Ready For Surgery From Anesthesia Perspective.     .

## 2023-02-01 NOTE — TRANSFER OF CARE
Anesthesia Transfer of Care Note    Patient: Sivakumar Thacker    Procedure(s) Performed: Procedure(s) (LRB):  [id number] (Right)    Patient location: PACU    Anesthesia Type: general    Transport from OR: Transported from OR on room air with adequate spontaneous ventilation    Post pain: adequate analgesia    Post assessment: no apparent anesthetic complications    Post vital signs: stable    Level of consciousness: responds to stimulation    Nausea/Vomiting: no nausea/vomiting    Complications: none    Transfer of care protocol was followed      Last vitals:   Visit Vitals  BP (!) 156/89   Pulse 75   Temp 36.7 °C (98.1 °F) (Oral)   Resp 16   Wt 88.4 kg (194 lb 14.2 oz)   SpO2 97%   BMI 29.63 kg/m²

## 2023-02-01 NOTE — DISCHARGE SUMMARY
Discharge Summary  General Surgery      Admit Date: 2/1/2023    Discharge Date :2/1/2023    Attending Physician: Franco Venegas III     Discharge Physician: Franco Venegas III    Discharged Condition: good    Discharge Diagnosis: Venous insufficiency [I87.2]    Treatments/Procedures: Procedure(s) (LRB):  [id number] (Left)    Hospital Course: Uneventful; Discharged home from Recovery    Significant Diagnostic Studies: none    Disposition: Home or Self Care    Diet: Regular    Follow up: Office 10-14 days    Activity: No heavy lifting till seen in office.    Patient Instructions:   Discharge Medication List as of 2/1/2023 10:00 AM        CONTINUE these medications which have NOT CHANGED    Details   albuterol-ipratropium (DUO-NEB) 2.5 mg-0.5 mg/3 mL nebulizer solution Take 3 mLs by nebulization every 6 (six) hours as needed for Wheezing. Rescue, Starting Mon 10/17/2022, Until Tue 10/17/2023 at 2359, Normal      amLODIPine (NORVASC) 10 MG tablet Take 1 tablet (10 mg total) by mouth once daily., Starting Tue 1/17/2023, Until Wed 1/17/2024, Normal      aspirin (ECOTRIN) 81 MG EC tablet Take 81 mg by mouth once daily., Historical Med      atorvastatin (LIPITOR) 20 MG tablet Take 1 tablet (20 mg total) by mouth once daily., Starting Tue 1/17/2023, Normal      !! blood sugar diagnostic (FREESTYLE LITE STRIPS MISC) FreeStyle Lite Strips, Historical Med      !! blood sugar diagnostic Strp Starting Wed 6/23/2021, Historical Med      fluticasone propionate (FLONASE) 50 mcg/actuation nasal spray 1 spray (50 mcg total) by Each Nostril route 2 (two) times daily., Starting Tue 1/17/2023, Normal      gabapentin (NEURONTIN) 300 MG capsule Take 1 capsule (300 mg total) by mouth 2 (two) times daily., Starting Tue 1/17/2023, Normal      guaiFENesin (MUCINEX) 600 mg 12 hr tablet Take 2 tablets (1,200 mg total) by mouth 2 (two) times daily., Starting Thu 1/12/2023, Normal      hydrocodone-chlorpheniramine (TUSSIONEX) 10-8  mg/5 mL suspension Take 5 mLs by mouth every 12 (twelve) hours as needed for Cough ((DO NOT TAKE WITHIN 4 HOURS OF NORCO/HYDROCODONE-ACETAMINOPHEN TABLETS))., Starting Thu 1/12/2023, Normal      losartan (COZAAR) 100 MG tablet Take 1 tablet (100 mg total) by mouth once daily., Starting Tue 1/17/2023, Until Wed 1/17/2024, Normal      metFORMIN (GLUCOPHAGE) 500 MG tablet Take 2 tablets twice a day by oral route with meals for 90 days., Normal      montelukast (SINGULAIR) 10 mg tablet Take 1 tablet (10 mg total) by mouth nightly as needed., Starting Tue 1/17/2023, Normal      mucus clearing device (ACAPELLA, FLUTTER) 1 Device by Misc.(Non-Drug; Combo Route) route 2 (two) times daily., Starting Tue 8/17/2021, Print      MULTIVITAMIN ORAL Take 1 tablet by mouth once daily., Historical Med      naproxen (NAPROSYN) 500 MG tablet Take 1 tablet (500 mg total) by mouth 2 (two) times daily as needed., Starting Tue 1/17/2023, Normal      omeprazole (PRILOSEC) 40 MG capsule Take 1 capsule (40 mg total) by mouth once daily., Starting Tue 1/17/2023, Normal      tiZANidine (ZANAFLEX) 4 MG tablet Take 1 tablet (4 mg total) by mouth daily as needed., Starting Tue 1/17/2023, Normal       !! - Potential duplicate medications found. Please discuss with provider.          Discharge Procedure Orders   Diet Adult Regular     Lifting restrictions   Order Comments: No lifting over twenty pounds for six weeks     Remove dressing in 48 hours

## 2023-02-01 NOTE — OP NOTE
Surgery Date: 2/1/2023     Surgeon(s) and Role:     * Franco Venegas III, MD - Primary    Assisting Surgeon: None    Pre-op Diagnosis:  Venous insufficiency [I87.2]    Post-op Diagnosis:  Post-Op Diagnosis Codes:     * Venous insufficiency [I87.2]    Procedure(s) (LRB):  [id number] (Left) left subclavian percutaneous placement and using intraoperative fluoroscopy to confirm venous placement and position the catheter tip into the superior vena cava.    Anesthesia: General    Operative Findings: The patient was taken to operating room and transferred to the operating room table in the supine position.  Patient was given general anesthesia and intubated.  Bilateral neck and chest were sterilely prepped and draped.  The patient was put in Trendelenburg position.  Large gauge needle was used to percutaneously access the left  subclavian vein.   A guide wire was placed through the needle and into the venous system without any issue.  Under fluoroscopy, the guide wire was seen in the venous system.  The patient was put back in the flat position.   An incision was made at the percutaneous stick site and a subcutaneous pocket was made inferiorly.  Under Seldinger technique a venous sheath was placed in into the venous system over the guide wire.  The catheter was then placed into the venous system through the sheath.   The sheath was then peeled away and discarded.   Under fluoroscopy, the catheter tip was positioned into the superior vena cava.  The catheter was cut to size.   The port was attached to the catheter.  The port was accessed and it flushed and aspirated freely.  The port was then placed into the subcutaneous pocket and sutured to the chest wall.  The incision was then closed in 2 layers, first with a deep dermal layer of Vicryl followed by a subcuticular 4-0 Monocryl to close the skin.  After that, procedure was finished.  Patient was extubated and transferred to the recovery room in stable  condition.  Chest x-ray will be performed in the recovery room.     Estimated Blood Loss:   Estimated Blood Loss has been documented.         Specimens:   Specimen (24h ago, onward)      None            [id number]

## 2023-02-01 NOTE — ANESTHESIA POSTPROCEDURE EVALUATION
Anesthesia Post Evaluation    Patient: Sivakumar Thacker    Procedure(s) Performed: Procedure(s) (LRB):  [id number] (Left)    Final Anesthesia Type: general      Patient location during evaluation: PACU  Patient participation: Yes- Able to Participate  Level of consciousness: awake and alert and oriented  Post-procedure vital signs: reviewed and stable  Pain management: adequate  Airway patency: patent    PONV status at discharge: No PONV  Anesthetic complications: no      Cardiovascular status: blood pressure returned to baseline, hemodynamically stable and stable  Respiratory status: unassisted, spontaneous ventilation and room air  Hydration status: euvolemic  Follow-up not needed.          Vitals Value Taken Time   /80 02/01/23 0915   Temp 36.7 °C (98.1 °F) 02/01/23 0848   Pulse 67 02/01/23 0927   Resp 15 02/01/23 0927   SpO2 97 % 02/01/23 0927   Vitals shown include unvalidated device data.      No case tracking events are documented in the log.      Pain/Gelacio Score: Gelacio Score: 10 (2/1/2023  9:15 AM)

## 2023-02-03 ENCOUNTER — OFFICE VISIT (OUTPATIENT)
Dept: HEMATOLOGY/ONCOLOGY | Facility: CLINIC | Age: 68
End: 2023-02-03
Payer: MEDICARE

## 2023-02-03 ENCOUNTER — INFUSION (OUTPATIENT)
Dept: INFUSION THERAPY | Facility: HOSPITAL | Age: 68
End: 2023-02-03
Attending: INTERNAL MEDICINE
Payer: MEDICARE

## 2023-02-03 VITALS
TEMPERATURE: 99 F | OXYGEN SATURATION: 97 % | RESPIRATION RATE: 18 BRPM | BODY MASS INDEX: 30.72 KG/M2 | SYSTOLIC BLOOD PRESSURE: 138 MMHG | DIASTOLIC BLOOD PRESSURE: 75 MMHG | WEIGHT: 202.69 LBS | HEART RATE: 77 BPM | HEIGHT: 68 IN

## 2023-02-03 DIAGNOSIS — C82.30 FOLLICULAR LYMPHOMA GRADE IIIA, UNSPECIFIED BODY REGION: ICD-10-CM

## 2023-02-03 DIAGNOSIS — C85.90 NON-HODGKIN'S LYMPHOMA, UNSPECIFIED BODY REGION, UNSPECIFIED NON-HODGKIN LYMPHOMA TYPE: Primary | ICD-10-CM

## 2023-02-03 DIAGNOSIS — D80.1 HYPOGAMMAGLOBULINEMIA: Primary | ICD-10-CM

## 2023-02-03 DIAGNOSIS — D80.1 NONFAMILIAL HYPOGAMMAGLOBULINEMIA: ICD-10-CM

## 2023-02-03 DIAGNOSIS — D80.1 HYPOGAMMAGLOBULINEMIA: ICD-10-CM

## 2023-02-03 PROCEDURE — 99214 OFFICE O/P EST MOD 30 MIN: CPT | Mod: S$GLB,,, | Performed by: NURSE PRACTITIONER

## 2023-02-03 PROCEDURE — 25000003 PHARM REV CODE 250: Performed by: INTERNAL MEDICINE

## 2023-02-03 PROCEDURE — 96367 TX/PROPH/DG ADDL SEQ IV INF: CPT

## 2023-02-03 PROCEDURE — 96413 CHEMO IV INFUSION 1 HR: CPT

## 2023-02-03 PROCEDURE — 63600175 PHARM REV CODE 636 W HCPCS: Performed by: INTERNAL MEDICINE

## 2023-02-03 PROCEDURE — A4216 STERILE WATER/SALINE, 10 ML: HCPCS | Performed by: INTERNAL MEDICINE

## 2023-02-03 PROCEDURE — 96415 CHEMO IV INFUSION ADDL HR: CPT

## 2023-02-03 PROCEDURE — 99214 PR OFFICE/OUTPT VISIT, EST, LEVL IV, 30-39 MIN: ICD-10-PCS | Mod: S$GLB,,, | Performed by: NURSE PRACTITIONER

## 2023-02-03 PROCEDURE — 63600175 PHARM REV CODE 636 W HCPCS: Mod: JG | Performed by: NURSE PRACTITIONER

## 2023-02-03 RX ORDER — HEPARIN 100 UNIT/ML
500 SYRINGE INTRAVENOUS
Status: DISCONTINUED | OUTPATIENT
Start: 2023-02-03 | End: 2023-02-03 | Stop reason: HOSPADM

## 2023-02-03 RX ORDER — HEPARIN 100 UNIT/ML
500 SYRINGE INTRAVENOUS
Status: CANCELLED | OUTPATIENT
Start: 2023-03-03

## 2023-02-03 RX ORDER — SODIUM CHLORIDE 0.9 % (FLUSH) 0.9 %
10 SYRINGE (ML) INJECTION
Status: CANCELLED | OUTPATIENT
Start: 2023-03-03

## 2023-02-03 RX ORDER — SODIUM CHLORIDE 0.9 % (FLUSH) 0.9 %
10 SYRINGE (ML) INJECTION
Status: DISCONTINUED | OUTPATIENT
Start: 2023-02-03 | End: 2023-02-03 | Stop reason: HOSPADM

## 2023-02-03 RX ADMIN — HEPARIN 500 UNITS: 100 SYRINGE at 10:02

## 2023-02-03 RX ADMIN — DIPHENHYDRAMINE HYDROCHLORIDE 25 MG: 50 INJECTION INTRAMUSCULAR; INTRAVENOUS at 07:02

## 2023-02-03 RX ADMIN — SODIUM CHLORIDE, PRESERVATIVE FREE 10 ML: 5 INJECTION INTRAVENOUS at 10:02

## 2023-02-03 RX ADMIN — SODIUM CHLORIDE: 0.9 INJECTION, SOLUTION INTRAVENOUS at 07:02

## 2023-02-03 RX ADMIN — IMMUNE GLOBULIN (HUMAN) 50 G: 10 INJECTION INTRAVENOUS; SUBCUTANEOUS at 08:02

## 2023-02-03 NOTE — PLAN OF CARE
Problem: Fatigue  Goal: Improved Activity Tolerance  Outcome: Ongoing, Progressing  Intervention: Promote Improved Energy  Flowsheets (Taken 2/3/2023 3755)  Fatigue Management:   frequent rest breaks encouraged   fatigue-related activity identified  Sleep/Rest Enhancement: regular sleep/rest pattern promoted  Activity Management: Ambulated -L4

## 2023-02-03 NOTE — PROGRESS NOTES
[facility] Hematology/Oncology  PROGRESS NOTE -  Follow-up Visit      Subjective:       Patient ID:   NAME: Sivakumar Thacker : 1955     67 y.o. male    Referring Doc: Barry Mcmahon (PCP in [place])  Other Physicians: Vinod Chen/José Manuel      Chief Complaint:  NHL f/u    History of Present Illness:     Patient returns today for a regularly scheduled follow-up visit.  The patient is here today to go over the results of the recently ordered labs, tests and studies. He is here by himself.     Patient was previously seen by Dr Abarca at [facility] since last visit and his recommendation was to hold off on further rituximab therapy and proceed with just surveillance scanning. He has telemed visit with him in 2023     He denies any CP, SOB, HA's or N/V.    PET scan in January showed no signs of cancer.     He was cleared to have his port replaced and had this replacement done this week.    He is continued on Gabapentin for the neuropathy issues in his feet.     He is feeling much better.   Patient states per Dr. Washington, if the patient has to be on antibiotics for any reason to clear with Dr. Washington.       ROS:   GEN: normal without any fever, night sweats or weight loss  HEENT: normal with no HA's, sore throat, stiff neck, changes in vision;   CV: normal with no CP, SOB, PND, VASQUEZ or orthopnea + left chest wall PAC   PULM: normal with no SOB,  hemoptysis, sputum or pleuritic pain; chronic cough  - much improved  GI: normal with no abdominal pain, nausea, vomiting, constipation, diarrhea, melanotic stools, BRBPR, or hematemesis  : normal with no hematuria, dysuria  BREAST: normal with no mass, discharge, pain  SKIN: normal with no rash, erythema, bruising, or swelling; no current wound issues    Allergies:  Review of patient's allergies indicates:  No Known Allergies    Medications:    Current Outpatient Medications:     albuterol-ipratropium (DUO-NEB) 2.5 mg-0.5 mg/3 mL nebulizer solution, Take 3 mLs by  nebulization every 6 (six) hours as needed for Wheezing. Rescue, Disp: 120 each, Rfl: 11    amLODIPine (NORVASC) 10 MG tablet, Take 1 tablet (10 mg total) by mouth once daily., Disp: 90 tablet, Rfl: 3    aspirin (ECOTRIN) 81 MG EC tablet, Take 81 mg by mouth once daily., Disp: , Rfl:     atorvastatin (LIPITOR) 20 MG tablet, Take 1 tablet (20 mg total) by mouth once daily., Disp: 90 tablet, Rfl: 3    blood sugar diagnostic (FREESTYLE LITE STRIPS MISC), FreeStyle Lite Strips, Disp: , Rfl:     blood sugar diagnostic Strp, , Disp: , Rfl:     fluticasone propionate (FLONASE) 50 mcg/actuation nasal spray, 1 spray (50 mcg total) by Each Nostril route 2 (two) times daily., Disp: 48 g, Rfl: 11    gabapentin (NEURONTIN) 300 MG capsule, Take 1 capsule (300 mg total) by mouth 2 (two) times daily., Disp: 180 capsule, Rfl: 3    guaiFENesin (MUCINEX) 600 mg 12 hr tablet, Take 2 tablets (1,200 mg total) by mouth 2 (two) times daily., Disp: 60 tablet, Rfl: 0    hydrocodone-chlorpheniramine (TUSSIONEX) 10-8 mg/5 mL suspension, Take 5 mLs by mouth every 12 (twelve) hours as needed for Cough ((DO NOT TAKE WITHIN 4 HOURS OF NORCO/HYDROCODONE-ACETAMINOPHEN TABLETS))., Disp: 115 mL, Rfl: 0    losartan (COZAAR) 100 MG tablet, Take 1 tablet (100 mg total) by mouth once daily., Disp: 90 tablet, Rfl: 3    metFORMIN (GLUCOPHAGE) 500 MG tablet, Take 2 tablets twice a day by oral route with meals for 90 days., Disp: 180 tablet, Rfl: 3    montelukast (SINGULAIR) 10 mg tablet, Take 1 tablet (10 mg total) by mouth nightly as needed., Disp: 90 tablet, Rfl: 3    mucus clearing device (ACAPELLA, FLUTTER), 1 Device by Misc.(Non-Drug; Combo Route) route 2 (two) times daily., Disp: 1 Device, Rfl: 0    MULTIVITAMIN ORAL, Take 1 tablet by mouth once daily., Disp: , Rfl:     naproxen (NAPROSYN) 500 MG tablet, Take 1 tablet (500 mg total) by mouth 2 (two) times daily as needed., Disp: 180 tablet, Rfl: 3    omeprazole (PRILOSEC) 40 MG capsule, Take 1 capsule  "(40 mg total) by mouth once daily., Disp: 90 capsule, Rfl: 3    tiZANidine (ZANAFLEX) 4 MG tablet, Take 1 tablet (4 mg total) by mouth daily as needed., Disp: 90 tablet, Rfl: 3  No current facility-administered medications for this visit.    Facility-Administered Medications Ordered in Other Visits:     heparin, porcine (PF) 100 unit/mL injection flush 500 Units, 500 Units, Intravenous, PRN, Jefferson Ibarra MD, 500 Units at 02/03/23 1043    sodium chloride 0.9% flush 10 mL, 10 mL, Intravenous, PRN, Jefferson Ibarra MD, 10 mL at 02/03/23 1043    PMHx/PSHx Updates:  See patient's last visit with Dr. Ibarra on 1/4/2023  See H&P on 1/8/2019        Pathology:  Cancer Staging  No matching staging information was found for the patient.          Objective:     Vitals:  Vital Signs - Last Recorded  Most recent update: 2/3/2023 10:30 AM  BP   139/83       Pulse   78       Temp   98.8 °F (37.1 °C)       Resp   18       Ht   5' 8" (1.727 m)         Wt   91.9 kg (202 lb 11.2 oz)    SpO2   95%    BMI   30.82 kg/m²        Physical Examination:   GEN: no apparent distress, comfortable; AAOx3  HEAD: atraumatic and normocephalic  EYES: no pallor, no icterus, PERRLA  ENT: OMM, no pharyngeal erythema, external ears WNL; no nasal discharge; no thrush;    NECK: no masses, thyroid normal, trachea midline, no LAD/LN's, supple  CV: RRR with no murmur; normal pulse; normal S1 and S2; no pedal edema; portacath since removed  CHEST: Normal respiratory effort; chronic coarseness  ABDOM: nontender and nondistended; soft; normal bowel sounds; no rebound/guarding  MUSC/Skeletal: ROM normal; no crepitus; joints normal; no deformities or arthropathy  EXTREM: no clubbing, cyanosis, inflammation or swelling  SKIN: no rashes, lesions, ulcers, petechiae or subcutaneous nodules; no current wound issues  : no gibbs  NEURO: grossly intact; motor/sensory WNL; AAOx3; no tremors  PSYCH: normal mood, affect and behavior  LYMPH: normal cervical, " supraclavicular, axillary and groin LN's    Labs:     Lab Results   Component Value Date    WBC 2.83 (L) 01/12/2023    HGB 12.4 (L) 01/12/2023    HCT 40.5 01/12/2023    MCV 85 01/12/2023     01/12/2023     CMP  Sodium   Date Value Ref Range Status   02/01/2023 141 136 - 145 mmol/L Final   04/25/2019 144 134 - 144 mmol/L      Potassium   Date Value Ref Range Status   02/01/2023 3.7 3.5 - 5.1 mmol/L Final     Chloride   Date Value Ref Range Status   02/01/2023 105 95 - 110 mmol/L Final   04/25/2019 107 98 - 110 mmol/L      CO2   Date Value Ref Range Status   02/01/2023 26 23 - 29 mmol/L Final     Glucose   Date Value Ref Range Status   02/01/2023 157 (H) 70 - 110 mg/dL Final   04/25/2019 177 (H) 70 - 99 mg/dL      BUN   Date Value Ref Range Status   02/01/2023 15 8 - 23 mg/dL Final     Creatinine   Date Value Ref Range Status   02/01/2023 0.8 0.5 - 1.4 mg/dL Final   04/25/2019 0.83 0.60 - 1.40 mg/dL      Calcium   Date Value Ref Range Status   02/01/2023 8.7 8.7 - 10.5 mg/dL Final     Total Protein   Date Value Ref Range Status   02/01/2023 6.4 6.0 - 8.4 g/dL Final     Albumin   Date Value Ref Range Status   02/01/2023 3.9 3.5 - 5.2 g/dL Final   04/25/2019 4.4 3.1 - 4.7 g/dL      Total Bilirubin   Date Value Ref Range Status   02/01/2023 0.6 0.1 - 1.0 mg/dL Final     Comment:     For infants and newborns, interpretation of results should be based  on gestational age, weight and in agreement with clinical  observations.    Premature Infant recommended reference ranges:  Up to 24 hours.............<8.0 mg/dL  Up to 48 hours............<12.0 mg/dL  3-5 days..................<15.0 mg/dL  6-29 days.................<15.0 mg/dL       Alkaline Phosphatase   Date Value Ref Range Status   02/01/2023 89 55 - 135 U/L Final     AST   Date Value Ref Range Status   02/01/2023 25 10 - 40 U/L Final     ALT   Date Value Ref Range Status   02/01/2023 27 10 - 44 U/L Final     Anion Gap   Date Value Ref Range Status   02/01/2023 10  8 - 16 mmol/L Final     eGFR if    Date Value Ref Range Status   07/21/2022 >60.0 >60 mL/min/1.73 m^2 Final     eGFR if non    Date Value Ref Range Status   07/21/2022 >60.0 >60 mL/min/1.73 m^2 Final     Comment:     Calculation used to obtain the estimated glomerular filtration  rate (eGFR) is the CKD-EPI equation.            Radiology/Diagnostic Studies:    PET 1/5/2023:     IMPRESSION: Diffuse reticular nodular and groundglass opacities the left lower lobe, posterior right upper lobe and posterior medial right lower lobe compatible with multifocal pneumonia. Follow-up chest CT in three months is recommended     Diffuse FDG activity throughout the axial skeleton suggestive of bone marrow hyperstimulation     No evidence of recurrent or metastatic disease     Electronically signed by:  Eula Gill MD  1/5/2023 9:29 AM CST Workstation: [id number]  PET  4/5/2022:    IMPRESSION: No evidence of recurrent or active lymphoproliferative disease        PET 10/4/2021:  Impression:     1. Negative for FDG avid recurrent lymphoproliferative disease.  2. New left maxillary sinusitis.  3. New bilateral reticulonodular pulmonary opacities suggesting infectious or inflammatory pneumonitis.  Clinical and laboratory correlation is requested.  4. Coronary artery calcification.         CXR  3/25/2021:    Impression:     Mild interstitial prominence, similar to previous exams.  No focal consolidation      PET 3/19/2021:  IMPRESSION:  1. Mild patchy airspace opacity in the superior segment of the left  lower lobe with increased FDG activity from background suggestive of a  small focal pneumonia (possibly viral in etiology). Consider  correlation with the symptoms, history and CT follow-up in 3 months to  document resolution.  2. No FDG avid lymphadenopathy or additional sites of disease.         PET 9/2/2020:      Impression:     Negative for active lymphoproliferative disease.         Chest CT   3/19/2020:      Impression       1. Mild bronchiectasis in the left lower lobe and tree-in-bud micro nodules at the left lung base suggesting underlying bronchiolitis, possibly due to a non tuberculous mycobacterial infection or viral bronchiolitis.  2. Fatty infiltration of the liver  3. Small hiatal hernia.           CXR  1/31/2020    Impression       1. No acute chest disease.  2. Left subclavian Port-A-Cath.       PET 9/11/2019   Impression       No evidence of FDG avid residual or recurrent lymphoma       I have reviewed all available lab results and radiology reports.    Assessment/Plan:   (1) 67 y.o. male with diagnosis of NHL Follicular Stage IIIA who has been referred by Dr Gary Chen with [facility] for continuation of care by medical hematology/oncology.   - He originally was diagnosed in 2012 and had parotid excision at [facility]. He subsequently went to [facility] and was treated with bendamustine-rituximab. He has been on rituximab maintenance every 8 weeks and IV IgG monthly. Dr Chen's plan was to keep him on maintenace therapy for as long as he could tolerate the treatments  - s/p 6 cycles of Bendamustine and Retuximab with subsequent complete remission  - 1st maintenance cycle rituximab was in March 2016  - he has been on maintenance rituximab and IV IgG - last rituximab 11/15/2018; last IV IgG on Dec 14th 2018  - last PET was on 9/26/2018 with no evidence of recurrence  - originally diagnosed in Dec 2012 with left parotid and left periparotid LN excision on 12/11/2012  - PET scan done on  9/11/2019 was good    7/23/2020:  - new nodule on auricle of left ear suspicious for a skin cancer - will refer him to Dr Avilez with ENT  - he is due for repeat PET    9/17/2020:  - PET scan on 9/2/2020 is adequate  - [facility]     11/12/2020:  - continued on the current regimen  - doing well with no new issues    1/7/2021:  - he is having some persistent  cough issues for which he has been obtaining sputum for José Manuel  - last PET was Sept 2020    3/4/2021:  - doing ok  - chronic cough issues - followed by José Manuel  - will set up f/u PET    4/29/2021:  - he had PET scan on 3/19/2021  - He has been seeing pulmonary about his chronic cough  Lucia Georges with pulmonary has seen the recent PET and reported to him that the CXR on 3/25 was stable and he is on some oral antibiotics with improvement of the symptoms  - He is also on Gabapentin for the neuropathy issues in his feet.   - He saw Dr Barry Mcmahon on 3/24/2021 with family med    8/19/2021:  - He has been seeing pulmonary about his chronic cough - Lucia José Manuel with pulmonary and he has been on periodic treatments of antibiotics. He saw her last just yesterday on 8/17/2021 and is currently on antibiotics.  - Pulmonary is planning to refer him to an ID specialist  - he is on the rituximab every 8 weeks; I am not convinced that this is contributing to the infection issues as it is not reducing his WBC; however,if pulmonary and/or ID feel it is a contributing factor then we can discontinue. The risk of his lymphoma recurring or progressing would be higher if he were to discontinue the therapy      10/14/2021:  - continued on oral antibiotics per pulmonary and plans to see Pulmonary ID specialist in near future  - continued on current therapy with rituximab  - recent PEt on 10/4/2021    12/9/2021:  - continued on rituximab  - on new antibiotics per pulmonary and he sees them again in Feb 2022  - repeat scans in Feb 2022 or sooner if pulmonary says otherwise    2/2/2022:  - he is seeing pulmonary again in two weeks  - he does not think that the new triple antibiotic regimen is working  - we can get PET in Feb/mar 2022 if pulmonary is inclined, otherwise 6 month surveillance scan in April/May 2022    3/31/2022:  - He has been seeing pulmonary about his chronic cough and TONY issues; he is on antibiotics 3x/week and he reports  that pulmonary feels that he is stable  - PET scan scheduled for 4/14/2022  - He last saw Dr Barry Mcmahon on 3/28/2021 and José Manuel on 2/17/2022  - pulmonary is fine with him continuing the ritxuimab per his report    5/26/2022:  - He has been seeing pulmonary about his chronic cough and TONY issues; he saw Rose on 5/17/2022 and is now on a new antibiotic and he reports that pulmonary said the PEt scan was better than the last one; he is planning to see Dr Veliz in the near future   - PET scan was done on 4/5/2022 with no evidence of recurrence  - continued on rituximab maintenance therapy    7/21/2022:  - continued under the care of pulmonary  - he is seeing Dr Veliz in Aug 2022  - continued on monthly IV IgG and rituximab every other month  - repeat PET in Oct 2022 expected    9/15/2022:  - He has been seeing pulmonary about his chronic cough and TONY issues; he has been seeing Rose and saw Dr Veliz on 8/15/2022;   - Dr Veliz is looking at increasing his Ig dose or changing it to SQ weekly  - Last PET scan was done on 4/5/2022 and repeat has been ordered and is due this Oct 2022  - discussed with patient about referral to Dr Nasreen Abarca at [facility] for not only evaluation for 2nd opinion for his chronic lymphoma but also the options of IV IgG infusion therapy, patient is agreeable    11/10/2022:  - Patient was seen by Dr Abarca at [facility] since last visit and his recommendation was to hold off on further rituximab therapy and proceed with just surveillance scanning.  - He was recently hospitalized at [facility] in Oct 2022 with upper torso cellulitis with Serratia marcescens septicemia  - He was recently hospitalized at [facility] in Oct 2022 with upper torso cellulitis with Serratia marcescens septicemia. He is still followed by wound care and is getting the prior drain site packed, etc. He has not followed up with ID as of yet    1/4/2023:  - Patient was previously seen by Dr Abarca at [facility] since last visit and his recommendation  was to hold off on further rituximab therapy and proceed with just surveillance scanning. He has telemed visit with him in Feb 2023  -  He is getting PET tomorrow and seeing pulmonary again in Feb 2023  - he has been on IV IgG  - He saw Dr Washington last in Nov 2022 and is seeing ID at [facility] in Jan 2023    2/3/2023:   - PET scan was clear of cancer , repeat in 6 months   - keep follow ups with [facility]   - Patient had port replaced on 2/1/2023   - Getting IVIG today - recent increase in dose   - follows up Pulmonary soon     (2) HTN     (3) Neuropathy     (4) GERD     (5) DM - on metformin per Dr Nunez     (6) Hypogammaglobulinemia - on Iv IgG monthly     (7) Steatosis of liver     (8) Degenerative disc disease of back     (9) Diverticular disease    (10) Mild bronchiectasis in the left lower lobe and tree-in-bud micro nodules at the left lung base suggesting underlying bronchiolitis  - seeing Lucia Georges       VISIT DIAGNOSES:      Non-Hodgkin's lymphoma, unspecified body region, unspecified non-Hodgkin lymphoma type    Hypogammaglobulinemia        PLAN:  1. continue to hold further rituximab therapy and proceed with surveillance scans every 6 months, last PET clear and reviewed   2. continue IV IgG   - getting dose this week   3. F/u with Dr Nasreen Abarca at [facility] as directed (telemed in near future)  4. Check labs every 8 weeks  5. Repeat PET every 6 months  6. F/u with PCP, Pulm etc    7. Per ID, before patient gets abx they will need to clear through [facility].        RTC in 4 weeks with Stacey and 8 weeks with me     Discussion:       I spent over 25 mins of time with the patient. Reviewed results of the recently ordered labs, tests and studies; made directives with regards to the results. Over half of this time was spent couseling and coordinating care.      COVID-19 Discussion:    I had long discussion with patient and any applicable family about the COVID-19 coronavirus epidemic and the recommended  precautions with regard to cancer and/or hematology patients. I have re-iterated the CDC recommendations for adequate hand washing, use of hand -like products, and coughing into elbow, etc. In addition, especially for our patients who are on chemotherapy and/or our otherwise immunocompromised patients, I have recommended avoidance of crowds, including movie theaters, restaurants, churches, etc. I have recommended avoidance of any sick or symptomatic family members and/or friends. I have also recommended avoidance of any raw and unwashed food products, and general avoidance of food items that have not been prepared by themselves. The patient has been asked to call us immediately with any symptom developments, issues, questions or other general concerns.     I have explained all of the above in detail and the patient understands all of the current recommendation(s). I have answered all of their questions to the best of my ability and to their complete satisfaction.   The patient is to continue with the current management plan.            Electronically signed by .Kaylee Sullivan, MSN,APRN,AGNP-C                           Answers submitted by the patient for this visit:  Review of Systems Questionnaire (Submitted on 2/1/2023)  appetite change : No  unexpected weight change: No  mouth sores: No  visual disturbance: No  cough: No  shortness of breath: No  chest pain: No  abdominal pain: No  diarrhea: No  frequency: No  back pain: No  rash: No  headaches: No  adenopathy: No  nervous/ anxious: No

## 2023-02-20 ENCOUNTER — PATIENT MESSAGE (OUTPATIENT)
Dept: FAMILY MEDICINE | Facility: CLINIC | Age: 68
End: 2023-02-20
Payer: MEDICARE

## 2023-02-20 ENCOUNTER — OFFICE VISIT (OUTPATIENT)
Dept: PULMONOLOGY | Facility: CLINIC | Age: 68
End: 2023-02-20
Payer: MEDICARE

## 2023-02-20 VITALS
DIASTOLIC BLOOD PRESSURE: 83 MMHG | WEIGHT: 201.5 LBS | OXYGEN SATURATION: 97 % | SYSTOLIC BLOOD PRESSURE: 148 MMHG | HEART RATE: 77 BPM | BODY MASS INDEX: 30.54 KG/M2 | HEIGHT: 68 IN

## 2023-02-20 DIAGNOSIS — G47.33 OBSTRUCTIVE SLEEP APNEA: ICD-10-CM

## 2023-02-20 DIAGNOSIS — J47.9 BRONCHIECTASIS WITHOUT COMPLICATION: ICD-10-CM

## 2023-02-20 DIAGNOSIS — J44.9 CHRONIC OBSTRUCTIVE PULMONARY DISEASE, UNSPECIFIED COPD TYPE: Primary | ICD-10-CM

## 2023-02-20 DIAGNOSIS — D80.1 HYPOGAMMAGLOBULINEMIA: ICD-10-CM

## 2023-02-20 DIAGNOSIS — R93.89 ABNORMAL CXR: ICD-10-CM

## 2023-02-20 PROCEDURE — 99214 OFFICE O/P EST MOD 30 MIN: CPT | Mod: S$PBB,,, | Performed by: INTERNAL MEDICINE

## 2023-02-20 PROCEDURE — 99999 PR PBB SHADOW E&M-EST. PATIENT-LVL V: CPT | Mod: PBBFAC,,, | Performed by: INTERNAL MEDICINE

## 2023-02-20 PROCEDURE — 99215 OFFICE O/P EST HI 40 MIN: CPT | Mod: PBBFAC,PO | Performed by: INTERNAL MEDICINE

## 2023-02-20 PROCEDURE — 99214 PR OFFICE/OUTPT VISIT, EST, LEVL IV, 30-39 MIN: ICD-10-PCS | Mod: S$PBB,,, | Performed by: INTERNAL MEDICINE

## 2023-02-20 PROCEDURE — 99999 PR PBB SHADOW E&M-EST. PATIENT-LVL V: ICD-10-PCS | Mod: PBBFAC,,, | Performed by: INTERNAL MEDICINE

## 2023-02-20 RX ORDER — ALBUTEROL SULFATE 90 UG/1
AEROSOL, METERED RESPIRATORY (INHALATION)
Qty: 18 G | Refills: 11 | Status: SHIPPED | OUTPATIENT
Start: 2023-02-20 | End: 2023-08-31

## 2023-02-20 NOTE — PROGRESS NOTES
2/20/2023    Sivakumar Thacker  Office note    Chief Complaint   Patient presents with    Follow-up     4 month f/u         HPI:      10/17/2022 pt had chest wall infection ppt admit- had chill acutely wk before and then developed chest pain with red swollen chest.     Pt grew out serratia-- ended up surg but no pus.  Pt was leukopenic.  Lungs had been doing well.  Pt was on suppressive abx but stopped-- ivig dose increased.  Rituxan stopped for now.  No clear source found.      Pt now off morphine and steroids.  Mucous now clear. On cipro and to have drain removed in near future. No port.      Lungs doing well last 3-4 wks.  Uses aerobika and albuterol prn -- sounds minimal.        Ct chest 9/30/2022 mucous plugging and some cellulits suggested ant left chest near port runs from right to left subclavian.  Patient Instructions   Would do sputum culture if yellow mucous returns.     Use nebulizer -- would encourage daily at least with aerobika to clear lungs.   Would be most important to use with colored mucous.    May use prednisone for only 3 days-- if lungs short breath or severe bronchitis.    Will order igg level check for December with copy to Dr Ibarra.    Astelin nasal may help drip. Continue flonase.   8/15/2022 -- pt on rituxan and igg replacement for nhl.  Has green mucous.   Had freq sinus infections -- took augmentin last month for sinuses with good results.  On azithromycin 3/wk- last yr with improved chest rattle.   No asthma-- pft worsened with fev 108 % in 2020 to 76% 8/10/2022.   Patient Instructions   Immune system weakness would cause sinus/lung infections --  would check igg level prior to next infusion on Friday-- need to have good level to have protection all month long.  Use augmentin daily to suppress infections.  Hold off azithromycin  You may have infections from bronchiectasis (pseudomonas germ)-- special antibiotic would be needed. Occasionally iv antibiotics are needed.  Do sputum  culture monthly if not doing well to assure antibiotic appropriate.  Pulmonary toilet needed-- breathing medication with flutter device at least 2/d.  Steroids help mucous clearing-- flovent is inhaled steroid-- trelegy has inhaled steroids and 24/7 bronchial medication-- use trelegy if available, otherwise use flovent..    Try to note which antibiotic effective.   Use tessalon as needed  Use prednisone for cough/wheezes.  Use once daily for 3 days.        Below from DAMI Georges  5/17/2022- Cough is persistent complaint, on Vest and nebulizer therapy daily, antibiotic azithromycin 500 mg three days weekly,   Sinus complaints persistent.   Cough- coarse cough, productive yellow/green mucous. Associated with shortness of breath.   Patient Instructions   Will continue current Bronchiectasis therapy with daily vest and nebulizer treatments 1-2 times daily    Doxy antibiotic for 10 days then resume three day weekly azithromycin    Have next appointment with MD for further evaluation and treatment.   2/17/2022-  Noticed improvement after starting vest device and Azithromcyin 3x weekly, using nebulizer with Aerobika 2x daily. Prescription for azithromycin ran out 2 weeks prior.   Complaint of sinus drainage onset 4 weeks, clear drainage, tried flonase with benefit.      On IV IG for 4 years followed by Dr. Ibarra.     11/17/2021- Cough- persistent complaint, daily, through out, nocturnal arousals nightly, using albuterol nebulizer and vest 2x daily. Productive green mucous, dime size amount of mucous. tx with antibiotics for H.Flu with no benefit. Associated with chest tightness and wheeze. Tried Tesselon perles with no benefit. No benefit with codeine cough syrup.     8/17/2021- Cough- recurrent complaint, onset 3 days, worse in morning and during day, improve with albuterol nebulizer, able to clear yellow/green mucous. Started antibiotics 2 weeks ago due to green mucous, took Cipro, cough returned after finishing Cipro  prescription. Nocturnal arousals 3x weekly.   Has aerobika from when wife was hospitalized, using.   Using nebulizer daily with Musinex.   SOB- worsened since finishing antibiotic, worse with exertion, no chest tightness or wheeze, improve with coughing up mucous. Associated with fatigue      5/17/2021- had persistent cough with occasional thick, yellow mucous onset January lasted 4 weeks, Felt rattle in chest., improved with antibiotics Augmentin.   has nebulizer but not currently using.   Current cough- mild, 2x weekly, feels mucous in chest but unable to cough up.    No shortness of breath, chest tightness, or wheeze.    11/17/2020- states doing well, cough has decreased to occasional to 2 times weekly that is mild, productive clear mucous.   States no fever, night sweats, shortness of breath,   Complaint of sinus drainage: daily, clear, singulair most days,      8/17/20- Cough- improved following antibiotic therapy, returned after 8 weeks, productive, light green color, dime size, worse in early morning, no chest tightness or wheeze.  No SOB. Using nebulizer 2x daily for 2 weeks, undergoing chemo and IVIG for Lymphoma and skin cancer.     6/16/2020- Cough unchanged with coughing fits when trying to sleep. States no improvement with Trelegy inhaler. States Levaquin antibiotic help a little bit and cough returned after finishing. Has been on IgG replacement therapy every 4 weeks since 2012.  Brought in 5 sputum sample, all still processing.       4/20/2020- Chronic cough onset 2013, followed by pulmonologist Dr. Case in [place]. Put Advair with no benefit. Had negative bronchoscopy and negative AFB, had appointment with Dr. Brock in 2014. Hx of non Hodgkins lymphoma followed by Dr. Ibarra on Rotuxin and immune therapy.  Cough Worsened in Jan 2020, associated with recurrent fever improves with tylenol, states cough improves with antibiotics but returns when finished medications. Productive  occasionally, can feel something rattling in chest, thick white mucous. 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 2 pack year  Family Hx: no Lung Cancer, no COPD, no Asthma  Medical Hx: no previous pneumonia ; no previous shoulder/chest surgery        The chief compliant  problem is stable  PFSH:  Past Medical History:   Diagnosis Date    Chest wall abscess 9/30/2022    Chronic obstructive pulmonary disease, unspecified COPD type 3/28/2022    Diabetes mellitus, type 2     Encounter for antineoplastic chemotherapy 04/01/2020    Hypertension     Hypogammaglobulinemia 12/13/2019    Neuropathy     Non Hodgkin's lymphoma     Reflux esophagitis     Ringing in ear, bilateral          Past Surgical History:   Procedure Laterality Date    COLONOSCOPY  2018    EYE SURGERY  Approximately 3 years ago    Cataract    HAND SURGERY      amputation left index finger    INCISION AND DRAINAGE OF ABSCESS Left 10/3/2022    Procedure: INCISION AND DRAINAGE, ABSCESS;  Surgeon: Franco Venegas III, MD;  Location: [facility];  Service: General;  Laterality: Left;  axilla    INSERTION OF TUNNELED CENTRAL VENOUS CATHETER (CVC) WITH SUBCUTANEOUS PORT Left 2/1/2023    Procedure: [id number]-CATH;  Surgeon: Franco Venegas III, MD;  Location: [facility] OR;  Service: General;  Laterality: Left;    MEDIPORT REMOVAL Right 10/3/2022    Procedure: REMOVAL, CATHETER, CENTRAL VENOUS, TUNNELED, WITH PORT;  Surgeon: Franco Venegas III, MD;  Location: [facility] OR;  Service: General;  Laterality: Right;    PORTACATH PLACEMENT      SKIN CANCER EXCISION  09/30/2020    [facility]    SPINE SURGERY      VASECTOMY  1985 ?     Social History     Tobacco Use    Smoking status: Former      "Packs/day: 0.50     Years: 2.00     Pack years: 1.00     Types: Cigarettes     Quit date:      Years since quittin.1    Smokeless tobacco: Never   Substance Use Topics    Alcohol use: Not Currently     Comment: rarely    Drug use: No     Family History   Problem Relation Age of Onset    Diabetes Father      Review of patient's allergies indicates:  No Known Allergies  I have reviewed past medical, family, and social history. I have reviewed previous nurse notes.    Performance Status:The patient's activity level is no limits with regular activity.      Review of Systems:  a review of eleven systems covering constitutional, Eye, HEENT, Psych, Respiratory, Cardiac, GI, , Musculoskeletal, Endocrine, Dermatologic was negative except for pertinent findings as listed ABOVE and below: pertinent positive as above, rest is good  Cough, shortness of breath, sinus drainage         Exam:Comprehensive exam done. BP (!) 148/83 (BP Location: Left arm, Patient Position: Sitting, BP Method: Medium (Automatic))   Pulse 77   Ht 5' 8" (1.727 m)   Wt 91.4 kg (201 lb 8 oz)   SpO2 97% Comment: on room air at rest  BMI 30.64 kg/m²   Exam included Vitals as listed, and patient's appearance and affect and alertness and mood, oral exam for yeast and hygiene and pharynx lesions and Mallapatti (M) score, neck with inspection for jvd and masses and thyroid abnormalities and lymph nodes (supraclavicular and infraclavicular nodes and axillary also examined and noted if abn), chest exam included symmetry and effort and fremitus and percussion and auscultation, cardiac exam included rhythm and gallops and murmur and rubs and jvd and edema, abdominal exam for mass and hepatosplenomegaly and tenderness and hernias and bowel sounds, Musculoskeletal exam with muscle tone and posture and mobility/gait and  strength, and skin for rashes and cyanosis and pallor and turgor, extremity for clubbing.  Findings were normal except for " pertinent findings listed below: M2,lungs clear, No clubbing.      Radiographs (ct chest and cxr) reviewed: view by direct vision   Ct chest 9/30/2022 mucous plugging and some cellulits suggested ant left chest near port runs from right to left subclavian.    NM PET CT Routine Skull to Mid Thigh 4/5/2022 visualized improvement in bilateral lower lobe mucous plugging when compared to 10/4/21 scan  CT images of the chest show no suspicious pulmonary nodules or masses, with no pleural or pericardial effusion. There is scattered linear subsegmental atelectasis in both lungs, with aortic and coronary arterial calcifications.     NM PET CT Routine Skull to Mid Thigh 10/04/2021   New bilateral reticulonodular pulmonary opacities suggesting infectious or inflammatory pneumonitis. Clinical and laboratory correlation is requested.     CT CHEST WITHOUT CONTRAST  06/21/2021   Granulomatous change.  2. Multiple small pulmonary nodules.  Follow-up per Fleischner guidelines.  For multiple solid nodules all <6 mm, Fleischner Society 2017 guidelines recommend no routine follow up for a low risk patient, or follow up with non-contrast chest CT at 12 months after discovery in a high risk patient.  3. Minimal bronchiectasis of the bilateral lower lobes.  4. No significant lymphadenopathy.      NM PET CT Routine Skull to Mid Thigh 03/19/21   1. Mild patchy airspace opacity in the superior segment of the left  lower lobe with increased FDG activity from background suggestive of a  small focal pneumonia (possibly viral in etiology). Consider  correlation with the symptoms, history and CT follow-up in 3 months to  document resolution.    X-Ray Chest PA And Lateral  03/25/2021   Mild interstitial prominence, similar to previous exams.  No focal consolidation.     CT Chest Without 03/19/2020   1. Mild bronchiectasis in the left lower lobe and tree-in-bud micro nodules at the left lung base suggesting underlying bronchiolitis, possibly due to  a non tuberculous mycobacterial infection or viral bronchiolitis.  2. Fatty infiltration of the liver  3. Small hiatal hernia.     CT Chest Without Contrast 09/01/2020   Unchanged left upper lobe 3 mm nodule.  Mild old granulomatous disease.  Minimal bronchiectasis which is nonspecific.  Adjacent micro nodules and tree-in-bud opacities are also unchanged.  Findings favor sequelae of an old atypical infectious process.  Atherosclerosis.  Small hiatal hernia.      Labs reviewed       Lab Results   Component Value Date    WBC 2.83 (L) 01/12/2023    RBC 4.75 01/12/2023    HGB 12.4 (L) 01/12/2023    HCT 40.5 01/12/2023    MCV 85 01/12/2023    MCH 26.1 (L) 01/12/2023    MCHC 30.6 (L) 01/12/2023    RDW 13.7 01/12/2023     01/12/2023    MPV 8.9 (L) 01/12/2023    GRAN 78.0 (H) 01/12/2023    LYMPH 15.0 (L) 01/12/2023    MONO 1.0 (L) 01/12/2023    EOS 0.1 01/11/2023    BASO 0.03 01/11/2023    EOSINOPHIL 0.0 01/12/2023    BASOPHIL 0.0 01/12/2023       AFB x 3 all negative following 8 week incubation, Respiratory cultures show normal dank only    Culture, Respiratory with Gram Stain 09/18/20 HAEMOPHILUS INFLUENZAE   Culture, Respiratory with Gram Stain 1/21/2021 HAEMOPHILUS INFLUENZAE   Respiratory with Gram Stain 7/22/21, PSEUDOMONAS AERUGINOSA and HAEMOPHILUS INFLUENZAE       PFT  reviewed  Pulmonary Functions Testing Results:  Spirometry bronchodilator, lung volume by gas dilution, diffusion capacity measured June 23, 2020.  The FEV1 FVC ratio was 80% indicating no airflow obstruction by spirometry technique.  The FEV1 was 108% of predicted or 3.5 L.  There was no   significant improvement following bronchodilator.  Total lung capacity on lung volume by gas dilution was 69% and a little reduced.  Diffusion was normal.       Spirometry was normal and was no significant bronchodilator response.  Total lung capacity was reduced consistent with restriction.  Diffusion was normal.  Clinical correlation recommended.      7/17/2013 PFT Data: Complete pulmonary function studies were obtained today and are independently reviewed. Spirometry shows no evidence of airflow obstruction today. Lung was performed by plethysmography and are consistent with air trapping without hyperinflation. Residual volume is 122% predicted, and the total capacity is 98% predicted. Diffusion capacity for carbon monoxide is in the normal range at 102% predicted. Overall, these are fairly normal pulmonary function studies and do not suggest any obstructive lung disease.    Spirometry bronchodilator, lung volume by gas dilution, diffusion capacity measured June 23, 2020.  The FEV1 FVC ratio was 80% indicating no airflow obstruction by spirometry technique.  The FEV1 was 108% of predicted or 3.5 L.  There was no   significant improvement following bronchodilator.  Total lung capacity on lung volume by gas dilution was 69% and a little reduced.  Diffusion was normal.       Spirometry was normal and was no significant bronchodilator response.  Total lung capacity was reduced consistent with restriction.  Diffusion was normal.  Clinical correlation recommended.        I spent over 30 mins of time with the patient. Reviewed results of the recently ordered labs, tests and studies; made directives with regards to the results. Over half of this time was spent couseling and coordinating care.     I have explained all of the above in detail and the patient understands all of the current recommendation(s). I have answered all of their questions to the best of my ability and to their complete satisfaction.   The patient is to continue with the current management plan.      Plan:  Clinical impression is apparently straight forward and impression with management as below.    There are no diagnoses linked to this encounter.        No follow-ups on file.    Discussed with patient above for education the following:      There are no Patient Instructions on file for this  visit.        Eval took 30 min

## 2023-02-20 NOTE — PROGRESS NOTES
2023    Sivakumar Thacker  Office note    Chief Complaint   Patient presents with    Follow-up     4 month f/u         HPI:    2023 had pneumonia with hosp stay around new years,  -  admit  with ct and cxr worsening left lower lung -- cxr 2023 had improved left lower lung.  Ct chest 2021 with min bronchiectasis left lower lung.    No mucous from lungs. has aerobika but not regimental use.    Pt noted to have apnea during last stay [facility].  Pt's wife  august 3 yrs ago-- wife fx hip and blood clotts and prolonged recovery -- had massive clot.  Pt snores with snoring       Ct chest viewed from 2023 with lll worsening after admit, Nov and 2022 had density lll with suggested bronchiectasis      10/17/2022 pt had chest wall infection ppt admit- had chill acutely wk before and then developed chest pain with red swollen chest.     Pt grew out serratia-- ended up surg but no pus.  Pt was leukopenic.  Lungs had been doing well.  Pt was on suppressive abx but stopped-- ivig dose increased.  Rituxan stopped for now.  No clear source found.      Pt now off morphine and steroids.  Mucous now clear. On cipro and to have drain removed in near future. No port.      Lungs doing well last 3-4 wks.  Uses aerobika and albuterol prn -- sounds minimal.        Ct chest 2022 mucous plugging and some cellulits suggested ant left chest near port runs from right to left subclavian.  Patient Instructions   Would do sputum culture if yellow mucous returns.     Use nebulizer -- would encourage daily at least with aerobika to clear lungs.   Would be most important to use with colored mucous.    May use prednisone for only 3 days-- if lungs short breath or severe bronchitis.    Will order igg level check for December with copy to Dr Ibarra.    Astelin nasal may help drip. Continue flonase.   8/15/2022 -- pt on rituxan and igg replacement for nhl.  Has green mucous.   Had freq sinus infections -- took  augmentin last month for sinuses with good results.  On azithromycin 3/wk- last yr with improved chest rattle.   No asthma-- pft worsened with fev 108 % in 2020 to 76% 8/10/2022.   Patient Instructions   Immune system weakness would cause sinus/lung infections --  would check igg level prior to next infusion on Friday-- need to have good level to have protection all month long.  Use augmentin daily to suppress infections.  Hold off azithromycin  You may have infections from bronchiectasis (pseudomonas germ)-- special antibiotic would be needed. Occasionally iv antibiotics are needed.  Do sputum culture monthly if not doing well to assure antibiotic appropriate.  Pulmonary toilet needed-- breathing medication with flutter device at least 2/d.  Steroids help mucous clearing-- flovent is inhaled steroid-- trelegy has inhaled steroids and 24/7 bronchial medication-- use trelegy if available, otherwise use flovent..    Try to note which antibiotic effective.   Use tessalon as needed  Use prednisone for cough/wheezes.  Use once daily for 3 days.        Below from DAMI Georges  5/17/2022- Cough is persistent complaint, on Vest and nebulizer therapy daily, antibiotic azithromycin 500 mg three days weekly,   Sinus complaints persistent.   Cough- coarse cough, productive yellow/green mucous. Associated with shortness of breath.   Patient Instructions   Will continue current Bronchiectasis therapy with daily vest and nebulizer treatments 1-2 times daily    Doxy antibiotic for 10 days then resume three day weekly azithromycin    Have next appointment with MD for further evaluation and treatment.   2/17/2022-  Noticed improvement after starting vest device and Azithromcyin 3x weekly, using nebulizer with Aerobika 2x daily. Prescription for azithromycin ran out 2 weeks prior.   Complaint of sinus drainage onset 4 weeks, clear drainage, tried flonase with benefit.      On IV IG for 4 years followed by Dr. Ibarra.     11/17/2021-  Cough- persistent complaint, daily, through out, nocturnal arousals nightly, using albuterol nebulizer and vest 2x daily. Productive green mucous, dime size amount of mucous. tx with antibiotics for H.Flu with no benefit. Associated with chest tightness and wheeze. Tried Tesselon perles with no benefit. No benefit with codeine cough syrup.     8/17/2021- Cough- recurrent complaint, onset 3 days, worse in morning and during day, improve with albuterol nebulizer, able to clear yellow/green mucous. Started antibiotics 2 weeks ago due to green mucous, took Cipro, cough returned after finishing Cipro prescription. Nocturnal arousals 3x weekly.   Has aerobika from when wife was hospitalized, using.   Using nebulizer daily with Musinex.   SOB- worsened since finishing antibiotic, worse with exertion, no chest tightness or wheeze, improve with coughing up mucous. Associated with fatigue      5/17/2021- had persistent cough with occasional thick, yellow mucous onset January lasted 4 weeks, Felt rattle in chest., improved with antibiotics Augmentin.   has nebulizer but not currently using.   Current cough- mild, 2x weekly, feels mucous in chest but unable to cough up.    No shortness of breath, chest tightness, or wheeze.    11/17/2020- states doing well, cough has decreased to occasional to 2 times weekly that is mild, productive clear mucous.   States no fever, night sweats, shortness of breath,   Complaint of sinus drainage: daily, clear, singulair most days,      8/17/20- Cough- improved following antibiotic therapy, returned after 8 weeks, productive, light green color, dime size, worse in early morning, no chest tightness or wheeze.  No SOB. Using nebulizer 2x daily for 2 weeks, undergoing chemo and IVIG for Lymphoma and skin cancer.     6/16/2020- Cough unchanged with coughing fits when trying to sleep. States no improvement with Trelegy inhaler. States Levaquin antibiotic help a little bit and cough returned after  finishing. Has been on IgG replacement therapy every 4 weeks since 2012.  Brought in 5 sputum sample, all still processing.       4/20/2020- Chronic cough onset 2013, followed by pulmonologist Dr. Case in [place]. Put Advair with no benefit. Had negative bronchoscopy and negative AFB, had appointment with Dr. Brock in 2014. Hx of non Hodgkins lymphoma followed by Dr. Ibarra on Rotuxin and immune therapy.  Cough Worsened in Jan 2020, associated with recurrent fever improves with tylenol, states cough improves with antibiotics but returns when finished medications. Productive occasionally, can feel something rattling in chest, thick white mucous. 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 2 pack year  Family Hx: no Lung Cancer, no COPD, no Asthma  Medical Hx: no previous pneumonia ; no previous shoulder/chest surgery        The chief compliant  problem is stable  PFSH:  Past Medical History:   Diagnosis Date    Chest wall abscess 9/30/2022    Chronic obstructive pulmonary disease, unspecified COPD type 3/28/2022    Diabetes mellitus, type 2     Encounter for antineoplastic chemotherapy 04/01/2020    Hypertension     Hypogammaglobulinemia 12/13/2019    Neuropathy     Non Hodgkin's lymphoma     Reflux esophagitis     Ringing in ear, bilateral          Past Surgical History:   Procedure Laterality Date    COLONOSCOPY  2018    EYE SURGERY  Approximately 3 years ago    Cataract    HAND SURGERY      amputation left index finger    INCISION AND DRAINAGE OF ABSCESS Left 10/3/2022    Procedure: INCISION AND DRAINAGE, ABSCESS;  Surgeon: Franco Venegas III, MD;  Location: [facility] OR;  Service: General;  Laterality: Left;  axilla    INSERTION OF  "TUNNELED CENTRAL VENOUS CATHETER (CVC) WITH SUBCUTANEOUS PORT Left 2023    Procedure: [id number];  Surgeon: Franco Venegas III, MD;  Location: [facility] OR;  Service: General;  Laterality: Left;    MEDIPORT REMOVAL Right 10/3/2022    Procedure: REMOVAL, CATHETER, CENTRAL VENOUS, TUNNELED, WITH PORT;  Surgeon: Franco Venegas III, MD;  Location: [facility] OR;  Service: General;  Laterality: Right;    PORTACATH PLACEMENT      SKIN CANCER EXCISION  2020    [facility]    SPINE SURGERY      VASECTOMY   ?     Social History     Tobacco Use    Smoking status: Former     Packs/day: 0.50     Years: 2.00     Pack years: 1.00     Types: Cigarettes     Quit date:      Years since quittin.1    Smokeless tobacco: Never   Substance Use Topics    Alcohol use: Not Currently     Comment: rarely    Drug use: No     Family History   Problem Relation Age of Onset    Diabetes Father      Review of patient's allergies indicates:  No Known Allergies  I have reviewed past medical, family, and social history. I have reviewed previous nurse notes.    Performance Status:The patient's activity level is no limits with regular activity.      Review of Systems:  a review of eleven systems covering constitutional, Eye, HEENT, Psych, Respiratory, Cardiac, GI, , Musculoskeletal, Endocrine, Dermatologic was negative except for pertinent findings as listed ABOVE and below: pertinent positive as above, rest is good  Cough, shortness of breath, sinus drainage         Exam:Comprehensive exam done. BP (!) 148/83 (BP Location: Left arm, Patient Position: Sitting, BP Method: Medium (Automatic))   Pulse 77   Ht 5' 8" (1.727 m)   Wt 91.4 kg (201 lb 8 oz)   SpO2 97% Comment: on room air at rest  BMI 30.64 kg/m²   Exam included Vitals as listed, and patient's appearance and affect and alertness and mood, oral exam for yeast and hygiene and pharynx lesions and Mallapatti (M) score, neck with inspection for jvd and masses and " thyroid abnormalities and lymph nodes (supraclavicular and infraclavicular nodes and axillary also examined and noted if abn), chest exam included symmetry and effort and fremitus and percussion and auscultation, cardiac exam included rhythm and gallops and murmur and rubs and jvd and edema, abdominal exam for mass and hepatosplenomegaly and tenderness and hernias and bowel sounds, Musculoskeletal exam with muscle tone and posture and mobility/gait and  strength, and skin for rashes and cyanosis and pallor and turgor, extremity for clubbing.  Findings were normal except for pertinent findings listed below: M2,lungs clear, No clubbing.      Radiographs (ct chest and cxr) reviewed: view by direct vision   Ct chest 9/30/2022 mucous plugging and some cellulits suggested ant left chest near port runs from right to left subclavian.    NM PET CT Routine Skull to Mid Thigh 4/5/2022 visualized improvement in bilateral lower lobe mucous plugging when compared to 10/4/21 scan  CT images of the chest show no suspicious pulmonary nodules or masses, with no pleural or pericardial effusion. There is scattered linear subsegmental atelectasis in both lungs, with aortic and coronary arterial calcifications.     NM PET CT Routine Skull to Mid Thigh 10/04/2021   New bilateral reticulonodular pulmonary opacities suggesting infectious or inflammatory pneumonitis. Clinical and laboratory correlation is requested.     CT CHEST WITHOUT CONTRAST  06/21/2021   Granulomatous change.  2. Multiple small pulmonary nodules.  Follow-up per Fleischner guidelines.  For multiple solid nodules all <6 mm, Fleischner Society 2017 guidelines recommend no routine follow up for a low risk patient, or follow up with non-contrast chest CT at 12 months after discovery in a high risk patient.  3. Minimal bronchiectasis of the bilateral lower lobes.  4. No significant lymphadenopathy.      NM PET CT Routine Skull to Mid Thigh 03/19/21   1. Mild patchy  airspace opacity in the superior segment of the left  lower lobe with increased FDG activity from background suggestive of a  small focal pneumonia (possibly viral in etiology). Consider  correlation with the symptoms, history and CT follow-up in 3 months to  document resolution.    X-Ray Chest PA And Lateral  03/25/2021   Mild interstitial prominence, similar to previous exams.  No focal consolidation.     CT Chest Without 03/19/2020   1. Mild bronchiectasis in the left lower lobe and tree-in-bud micro nodules at the left lung base suggesting underlying bronchiolitis, possibly due to a non tuberculous mycobacterial infection or viral bronchiolitis.  2. Fatty infiltration of the liver  3. Small hiatal hernia.     CT Chest Without Contrast 09/01/2020   Unchanged left upper lobe 3 mm nodule.  Mild old granulomatous disease.  Minimal bronchiectasis which is nonspecific.  Adjacent micro nodules and tree-in-bud opacities are also unchanged.  Findings favor sequelae of an old atypical infectious process.  Atherosclerosis.  Small hiatal hernia.      Labs reviewed       Lab Results   Component Value Date    WBC 2.83 (L) 01/12/2023    RBC 4.75 01/12/2023    HGB 12.4 (L) 01/12/2023    HCT 40.5 01/12/2023    MCV 85 01/12/2023    MCH 26.1 (L) 01/12/2023    MCHC 30.6 (L) 01/12/2023    RDW 13.7 01/12/2023     01/12/2023    MPV 8.9 (L) 01/12/2023    GRAN 78.0 (H) 01/12/2023    LYMPH 15.0 (L) 01/12/2023    MONO 1.0 (L) 01/12/2023    EOS 0.1 01/11/2023    BASO 0.03 01/11/2023    EOSINOPHIL 0.0 01/12/2023    BASOPHIL 0.0 01/12/2023       AFB x 3 all negative following 8 week incubation, Respiratory cultures show normal dank only    Culture, Respiratory with Gram Stain 09/18/20 HAEMOPHILUS INFLUENZAE   Culture, Respiratory with Gram Stain 1/21/2021 HAEMOPHILUS INFLUENZAE   Respiratory with Gram Stain 7/22/21, PSEUDOMONAS AERUGINOSA and HAEMOPHILUS INFLUENZAE       PFT  reviewed  Pulmonary Functions Testing Results:  Spirometry  bronchodilator, lung volume by gas dilution, diffusion capacity measured June 23, 2020.  The FEV1 FVC ratio was 80% indicating no airflow obstruction by spirometry technique.  The FEV1 was 108% of predicted or 3.5 L.  There was no   significant improvement following bronchodilator.  Total lung capacity on lung volume by gas dilution was 69% and a little reduced.  Diffusion was normal.       Spirometry was normal and was no significant bronchodilator response.  Total lung capacity was reduced consistent with restriction.  Diffusion was normal.  Clinical correlation recommended.     7/17/2013 PFT Data: Complete pulmonary function studies were obtained today and are independently reviewed. Spirometry shows no evidence of airflow obstruction today. Lung was performed by plethysmography and are consistent with air trapping without hyperinflation. Residual volume is 122% predicted, and the total capacity is 98% predicted. Diffusion capacity for carbon monoxide is in the normal range at 102% predicted. Overall, these are fairly normal pulmonary function studies and do not suggest any obstructive lung disease.    Spirometry bronchodilator, lung volume by gas dilution, diffusion capacity measured June 23, 2020.  The FEV1 FVC ratio was 80% indicating no airflow obstruction by spirometry technique.  The FEV1 was 108% of predicted or 3.5 L.  There was no   significant improvement following bronchodilator.  Total lung capacity on lung volume by gas dilution was 69% and a little reduced.  Diffusion was normal.       Spirometry was normal and was no significant bronchodilator response.  Total lung capacity was reduced consistent with restriction.  Diffusion was normal.  Clinical correlation recommended.        I spent over 30 mins of time with the patient. Reviewed results of the recently ordered labs, tests and studies; made directives with regards to the results. Over half of this time was spent couseling and coordinating  care.     I have explained all of the above in detail and the patient understands all of the current recommendation(s). I have answered all of their questions to the best of my ability and to their complete satisfaction.   The patient is to continue with the current management plan.      Plan:  Clinical impression is apparently straight forward and impression with management as below.    Sivakumar was seen today for follow-up.    Diagnoses and all orders for this visit:    Chronic obstructive pulmonary disease, unspecified COPD type  -     albuterol (PROVENTIL/VENTOLIN HFA) 90 mcg/actuation inhaler; 2 puffs every 4 hours as needed for cough, wheeze, or shortness of breath    Hypogammaglobulinemia  -     X-Ray Chest PA And Lateral; Future    Bronchiectasis without complication  -     albuterol (PROVENTIL/VENTOLIN HFA) 90 mcg/actuation inhaler; 2 puffs every 4 hours as needed for cough, wheeze, or shortness of breath    Abnormal CXR  -     X-Ray Chest PA And Lateral; Future    Obstructive sleep apnea            Follow up in about 3 months (around 5/20/2023).    Discussed with patient above for education the following:      Patient Instructions   Bronchiectasis left lower lung may have been reason for left lower pneumonia last month.    You are doing well now    Add aerobika after albuterol inhaler or nebulizer 2-3 times daily to help clear lung mucous.    Follow up cxr in 2 months.      use vest for bronchiectasis at least daily.    Immune system may be better but bronchiectasis may cause recurrent lung problems.      May need antibiotic promptly for pneumonia.    Will follow up sleep study request-- normals may stop breathing up to 5 times an hour.              Ramos took 38 min

## 2023-02-20 NOTE — PATIENT INSTRUCTIONS
Bronchiectasis left lower lung may have been reason for left lower pneumonia last month.    You are doing well now    Add aerobika after albuterol inhaler or nebulizer 2-3 times daily to help clear lung mucous.    Follow up cxr in 2 months.      use vest for bronchiectasis at least daily.    Immune system may be better but bronchiectasis may cause recurrent lung problems.      May need antibiotic promptly for pneumonia.    Will follow up sleep study request-- normals may stop breathing up to 5 times an hour.

## 2023-02-24 ENCOUNTER — LAB VISIT (OUTPATIENT)
Dept: LAB | Facility: HOSPITAL | Age: 68
End: 2023-02-24
Attending: INTERNAL MEDICINE
Payer: MEDICARE

## 2023-02-24 DIAGNOSIS — C82.30 FOLLICULAR LYMPHOMA GRADE IIIA, UNSPECIFIED BODY REGION: ICD-10-CM

## 2023-02-24 LAB
ALBUMIN SERPL BCP-MCNC: 3.9 G/DL (ref 3.5–5.2)
ALP SERPL-CCNC: 125 U/L (ref 55–135)
ALT SERPL W/O P-5'-P-CCNC: 22 U/L (ref 10–44)
ANION GAP SERPL CALC-SCNC: 12 MMOL/L (ref 8–16)
AST SERPL-CCNC: 21 U/L (ref 10–40)
BASOPHILS # BLD AUTO: 0.04 K/UL (ref 0–0.2)
BASOPHILS NFR BLD: 1 % (ref 0–1.9)
BILIRUB SERPL-MCNC: 0.3 MG/DL (ref 0.1–1)
BUN SERPL-MCNC: 14 MG/DL (ref 8–23)
CALCIUM SERPL-MCNC: 9.7 MG/DL (ref 8.7–10.5)
CHLORIDE SERPL-SCNC: 109 MMOL/L (ref 95–110)
CO2 SERPL-SCNC: 23 MMOL/L (ref 23–29)
CREAT SERPL-MCNC: 0.8 MG/DL (ref 0.5–1.4)
DIFFERENTIAL METHOD: ABNORMAL
EOSINOPHIL # BLD AUTO: 0.2 K/UL (ref 0–0.5)
EOSINOPHIL NFR BLD: 5.9 % (ref 0–8)
ERYTHROCYTE [DISTWIDTH] IN BLOOD BY AUTOMATED COUNT: 15.1 % (ref 11.5–14.5)
EST. GFR  (NO RACE VARIABLE): >60 ML/MIN/1.73 M^2
GLUCOSE SERPL-MCNC: 154 MG/DL (ref 70–110)
HCT VFR BLD AUTO: 42 % (ref 40–54)
HGB BLD-MCNC: 13.6 G/DL (ref 14–18)
IMM GRANULOCYTES # BLD AUTO: 0.04 K/UL (ref 0–0.04)
IMM GRANULOCYTES NFR BLD AUTO: 1 % (ref 0–0.5)
LYMPHOCYTES # BLD AUTO: 1.5 K/UL (ref 1–4.8)
LYMPHOCYTES NFR BLD: 35.6 % (ref 18–48)
MCH RBC QN AUTO: 26.9 PG (ref 27–31)
MCHC RBC AUTO-ENTMCNC: 32.4 G/DL (ref 32–36)
MCV RBC AUTO: 83 FL (ref 82–98)
MONOCYTES # BLD AUTO: 0.4 K/UL (ref 0.3–1)
MONOCYTES NFR BLD: 10.7 % (ref 4–15)
NEUTROPHILS # BLD AUTO: 1.9 K/UL (ref 1.8–7.7)
NEUTROPHILS NFR BLD: 45.8 % (ref 38–73)
NRBC BLD-RTO: 0 /100 WBC
PLATELET # BLD AUTO: 264 K/UL (ref 150–450)
PMV BLD AUTO: 9.4 FL (ref 9.2–12.9)
POTASSIUM SERPL-SCNC: 4.1 MMOL/L (ref 3.5–5.1)
PROT SERPL-MCNC: 7.2 G/DL (ref 6–8.4)
RBC # BLD AUTO: 5.06 M/UL (ref 4.6–6.2)
SODIUM SERPL-SCNC: 144 MMOL/L (ref 136–145)
WBC # BLD AUTO: 4.1 K/UL (ref 3.9–12.7)

## 2023-02-24 PROCEDURE — 36415 COLL VENOUS BLD VENIPUNCTURE: CPT | Performed by: INTERNAL MEDICINE

## 2023-02-24 PROCEDURE — 85025 COMPLETE CBC W/AUTO DIFF WBC: CPT | Performed by: INTERNAL MEDICINE

## 2023-02-24 PROCEDURE — 80053 COMPREHEN METABOLIC PANEL: CPT | Performed by: INTERNAL MEDICINE

## 2023-02-27 NOTE — PROGRESS NOTES
"[facility] Hematology/Oncology  PROGRESS NOTE -  Follow-up Visit      Subjective:       Patient ID:   NAME: Sivakumar Tahcker : 1955     67 y.o. male    Referring Doc: Barry Mcmahon (PCP in [place])  Other Physicians: Vinod Chen/José Manuel      Chief Complaint:  NHL f/u    History of Present Illness:     Patient returns today for a regularly scheduled follow-up visit.  The patient is here today to go over the results of the recently ordered labs, tests and studies. He is here with his daughters.    He is feeling "great" and breathing better; he saw Dr Veliz on  and he instructed him to use a nebulizer and he plans to see him again in 2 months with repeat CXR    He saw Dr Mcmahon on 2023    He had telemed with Dr Abarca and he wants him off the rituximab for another 3 months; patient is concerned about holding off on the rituximab maintenance. He last had rituximab in 2022     He denies any CP, SOB, HA's or N/V.    He has been on IV IgG    He is continued on Gabapentin for the neuropathy issues in his feet.     Recent PET on 2023 with no evidence of lymphoma    I discussed continued Covid19 precautions        ROS:   GEN: normal without any fever, night sweats or weight loss  HEENT: normal with no HA's, sore throat, stiff neck, changes in vision; no current sinus issues    CV: normal with no CP, SOB, PND, VASQUEZ or orthopnea  PULM: normal with no SOB,  hemoptysis, sputum or pleuritic pain; chronic cough but better    GI: normal with no abdominal pain, nausea, vomiting, constipation, diarrhea, melanotic stools, BRBPR, or hematemesis  : normal with no hematuria, dysuria  BREAST: normal with no mass, discharge, pain  SKIN: normal with no rash, erythema, bruising, or swelling; no current wound issues    Allergies:  Review of patient's allergies indicates:  No Known Allergies    Medications:    Current Outpatient Medications:     albuterol (PROVENTIL/VENTOLIN HFA) 90 mcg/actuation inhaler, 2 puffs every 4 " hours as needed for cough, wheeze, or shortness of breath, Disp: 18 g, Rfl: 11    albuterol-ipratropium (DUO-NEB) 2.5 mg-0.5 mg/3 mL nebulizer solution, Take 3 mLs by nebulization every 6 (six) hours as needed for Wheezing. Rescue, Disp: 120 each, Rfl: 11    amLODIPine (NORVASC) 10 MG tablet, Take 1 tablet (10 mg total) by mouth once daily., Disp: 90 tablet, Rfl: 3    aspirin (ECOTRIN) 81 MG EC tablet, Take 81 mg by mouth once daily., Disp: , Rfl:     atorvastatin (LIPITOR) 20 MG tablet, Take 1 tablet (20 mg total) by mouth once daily., Disp: 90 tablet, Rfl: 3    blood sugar diagnostic (FREESTYLE LITE STRIPS MISC), FreeStyle Lite Strips, Disp: , Rfl:     blood sugar diagnostic Strp, , Disp: , Rfl:     fluticasone propionate (FLONASE) 50 mcg/actuation nasal spray, 1 spray (50 mcg total) by Each Nostril route 2 (two) times daily., Disp: 48 g, Rfl: 11    gabapentin (NEURONTIN) 300 MG capsule, Take 1 capsule (300 mg total) by mouth 2 (two) times daily., Disp: 180 capsule, Rfl: 3    guaiFENesin (MUCINEX) 600 mg 12 hr tablet, Take 2 tablets (1,200 mg total) by mouth 2 (two) times daily., Disp: 60 tablet, Rfl: 0    hydrocodone-chlorpheniramine (TUSSIONEX) 10-8 mg/5 mL suspension, Take 5 mLs by mouth every 12 (twelve) hours as needed for Cough ((DO NOT TAKE WITHIN 4 HOURS OF NORCO/HYDROCODONE-ACETAMINOPHEN TABLETS))., Disp: 115 mL, Rfl: 0    losartan (COZAAR) 100 MG tablet, Take 1 tablet (100 mg total) by mouth once daily., Disp: 90 tablet, Rfl: 3    metFORMIN (GLUCOPHAGE) 500 MG tablet, Take 2 tablets twice a day by oral route with meals for 90 days., Disp: 180 tablet, Rfl: 3    montelukast (SINGULAIR) 10 mg tablet, Take 1 tablet (10 mg total) by mouth nightly as needed., Disp: 90 tablet, Rfl: 3    mucus clearing device (ACAPELLA, FLUTTER), 1 Device by Misc.(Non-Drug; Combo Route) route 2 (two) times daily., Disp: 1 Device, Rfl: 0    MULTIVITAMIN ORAL, Take 1 tablet by mouth once daily., Disp: , Rfl:     naproxen  "(NAPROSYN) 500 MG tablet, Take 1 tablet (500 mg total) by mouth 2 (two) times daily as needed., Disp: 180 tablet, Rfl: 3    omeprazole (PRILOSEC) 40 MG capsule, Take 1 capsule (40 mg total) by mouth once daily., Disp: 90 capsule, Rfl: 3    tiZANidine (ZANAFLEX) 4 MG tablet, Take 1 tablet (4 mg total) by mouth daily as needed., Disp: 90 tablet, Rfl: 3    PMHx/PSHx Updates:  See patient's last visit with me on 1/12/2023  See H&P on 1/8/2019        Pathology:  Cancer Staging  No matching staging information was found for the patient.          Objective:     Vitals:  Blood pressure (!) 179/84, pulse 83, temperature 98.5 °F (36.9 °C), resp. rate 16, height 5' 8" (1.727 m), weight 92.1 kg (203 lb 1.6 oz).    Physical Examination:   GEN: no apparent distress, comfortable; AAOx3  HEAD: atraumatic and normocephalic  EYES: no pallor, no icterus, PERRLA  ENT: OMM, no pharyngeal erythema, external ears WNL; no nasal discharge; no thrush;    NECK: no masses, thyroid normal, trachea midline, no LAD/LN's, supple  CV: RRR with no murmur; normal pulse; normal S1 and S2; no pedal edema; portacath since removed  CHEST: Normal respiratory effort; chronic coarseness, wheeze bilaterally but only mild wheeze  ABDOM: nontender and nondistended; soft; normal bowel sounds; no rebound/guarding  MUSC/Skeletal: ROM normal; no crepitus; joints normal; no deformities or arthropathy  EXTREM: no clubbing, cyanosis, inflammation or swelling  SKIN: no rashes, lesions, ulcers, petechiae or subcutaneous nodules; no current wound issues  : no gibbs  NEURO: grossly intact; motor/sensory WNL; AAOx3; no tremors  PSYCH: normal mood, affect and behavior  LYMPH: normal cervical, supraclavicular, axillary and groin LN's            Labs:              Lab Results   Component Value Date    WBC 4.10 02/24/2023    HGB 13.6 (L) 02/24/2023    HCT 42.0 02/24/2023    MCV 83 02/24/2023     02/24/2023     CMP  Sodium   Date Value Ref Range Status   02/24/2023 " 144 136 - 145 mmol/L Final   04/25/2019 144 134 - 144 mmol/L      Potassium   Date Value Ref Range Status   02/24/2023 4.1 3.5 - 5.1 mmol/L Final     Chloride   Date Value Ref Range Status   02/24/2023 109 95 - 110 mmol/L Final   04/25/2019 107 98 - 110 mmol/L      CO2   Date Value Ref Range Status   02/24/2023 23 23 - 29 mmol/L Final     Glucose   Date Value Ref Range Status   02/24/2023 154 (H) 70 - 110 mg/dL Final   04/25/2019 177 (H) 70 - 99 mg/dL      BUN   Date Value Ref Range Status   02/24/2023 14 8 - 23 mg/dL Final     Creatinine   Date Value Ref Range Status   02/24/2023 0.8 0.5 - 1.4 mg/dL Final   04/25/2019 0.83 0.60 - 1.40 mg/dL      Calcium   Date Value Ref Range Status   02/24/2023 9.7 8.7 - 10.5 mg/dL Final     Total Protein   Date Value Ref Range Status   02/24/2023 7.2 6.0 - 8.4 g/dL Final     Albumin   Date Value Ref Range Status   02/24/2023 3.9 3.5 - 5.2 g/dL Final   04/25/2019 4.4 3.1 - 4.7 g/dL      Total Bilirubin   Date Value Ref Range Status   02/24/2023 0.3 0.1 - 1.0 mg/dL Final     Comment:     For infants and newborns, interpretation of results should be based  on gestational age, weight and in agreement with clinical  observations.    Premature Infant recommended reference ranges:  Up to 24 hours.............<8.0 mg/dL  Up to 48 hours............<12.0 mg/dL  3-5 days..................<15.0 mg/dL  6-29 days.................<15.0 mg/dL       Alkaline Phosphatase   Date Value Ref Range Status   02/24/2023 125 55 - 135 U/L Final     AST   Date Value Ref Range Status   02/24/2023 21 10 - 40 U/L Final     ALT   Date Value Ref Range Status   02/24/2023 22 10 - 44 U/L Final     Anion Gap   Date Value Ref Range Status   02/24/2023 12 8 - 16 mmol/L Final     eGFR if    Date Value Ref Range Status   07/21/2022 >60.0 >60 mL/min/1.73 m^2 Final     eGFR if non    Date Value Ref Range Status   07/21/2022 >60.0 >60 mL/min/1.73 m^2 Final     Comment:     Calculation used  to obtain the estimated glomerular filtration  rate (eGFR) is the CKD-EPI equation.            Radiology/Diagnostic Studies:    PET  4/5/2022:    IMPRESSION: No evidence of recurrent or active lymphoproliferative disease        PET 10/4/2021:  Impression:     1. Negative for FDG avid recurrent lymphoproliferative disease.  2. New left maxillary sinusitis.  3. New bilateral reticulonodular pulmonary opacities suggesting infectious or inflammatory pneumonitis.  Clinical and laboratory correlation is requested.  4. Coronary artery calcification.         CXR  3/25/2021:    Impression:     Mild interstitial prominence, similar to previous exams.  No focal consolidation      PET 3/19/2021:  IMPRESSION:  1. Mild patchy airspace opacity in the superior segment of the left  lower lobe with increased FDG activity from background suggestive of a  small focal pneumonia (possibly viral in etiology). Consider  correlation with the symptoms, history and CT follow-up in 3 months to  document resolution.  2. No FDG avid lymphadenopathy or additional sites of disease.         PET 9/2/2020:      Impression:     Negative for active lymphoproliferative disease.         Chest CT  3/19/2020:      Impression       1. Mild bronchiectasis in the left lower lobe and tree-in-bud micro nodules at the left lung base suggesting underlying bronchiolitis, possibly due to a non tuberculous mycobacterial infection or viral bronchiolitis.  2. Fatty infiltration of the liver  3. Small hiatal hernia.           CXR  1/31/2020    Impression       1. No acute chest disease.  2. Left subclavian Port-A-Cath.               PET 9/11/2019   Impression       No evidence of FDG avid residual or recurrent lymphoma       I have reviewed all available lab results and radiology reports.    Assessment/Plan:   (1) 67 y.o. male with diagnosis of NHL Follicular Stage IIIA who has been referred by Dr Gary Chen with [facility] for continuation of care by [facility]  hematology/oncology.   - He originally was diagnosed in 2012 and had parotid excision at [facility]. He subsequently went to [facility] and was treated with bendamustine-rituximab. He has been on rituximab maintenance every 8 weeks and IV IgG monthly. Dr Chen's plan was to keep him on maintenace therapy for as long as he could tolerate the treatments  - s/p 6 cycles of Bendamustine and Retuximab with subsequent complete remission  - 1st maintenance cycle rituximab was in March 2016  - he has been on maintenance rituximab and IV IgG - last rituximab 11/15/2018; last IV IgG on Dec 14th 2018  - last PET was on 9/26/2018 with no evidence of recurrence  - originally diagnosed in Dec 2012 with left parotid and left periparotid LN excision on 12/11/2012  - PET scan done on  9/11/2019 was good    7/23/2020:  - new nodule on auricle of left ear suspicious for a skin cancer - will refer him to Dr Avilez with ENT  - he is due for repeat PET    9/17/2020:  - PET scan on 9/2/2020 is adequate  - [facility]     11/12/2020:  - continued on the current regimen  - doing well with no new issues    1/7/2021:  - he is having some persistent cough issues for which he has been obtaining sputum for José Manuel  - last PET was Sept 2020    3/4/2021:  - doing ok  - chronic cough issues - followed by José Manuel  - will set up f/u PET    4/29/2021:  - he had PET scan on 3/19/2021  - He has been seeing pulmonary about his chronic cough  Lucia Georges with pulmonary has seen the recent PET and reported to him that the CXR on 3/25 was stable and he is on some oral antibiotics with improvement of the symptoms  - He is also on Gabapentin for the neuropathy issues in his feet.   - He saw Dr Barry Mcmahon on 3/24/2021 with family [facility]    8/19/2021:  - He has been seeing pulmonary about his chronic cough - Lucia Georges with pulmonary and he has been on periodic treatments of antibiotics. He saw her last  just yesterday on 8/17/2021 and is currently on antibiotics.  - Pulmonary is planning to refer him to an ID specialist  - he is on the rituximab every 8 weeks; I am not convinced that this is contributing to the infection issues as it is not reducing his WBC; however,if pulmonary and/or ID feel it is a contributing factor then we can discontinue. The risk of his lymphoma recurring or progressing would be higher if he were to discontinue the therapy      10/14/2021:  - continued on oral antibiotics per pulmonary and plans to see Pulmonary ID specialist in near future  - continued on current therapy with rituximab  - recent PEt on 10/4/2021    12/9/2021:  - continued on rituximab  - on new antibiotics per pulmonary and he sees them again in Feb 2022  - repeat scans in Feb 2022 or sooner if pulmonary says otherwise    2/2/2022:  - he is seeing pulmonary again in two weeks  - he does not think that the new triple antibiotic regimen is working  - we can get PET in Feb/mar 2022 if pulmonary is inclined, otherwise 6 month surveillance scan in April/May 2022    3/31/2022:  - He has been seeing pulmonary about his chronic cough and TONY issues; he is on antibiotics 3x/week and he reports that pulmonary feels that he is stable  - PET scan scheduled for 4/14/2022  - He last saw Dr Barry Mcmahon on 3/28/2021 and José Manuel on 2/17/2022  - pulmonary is fine with him continuing the ritxuimab per his report    5/26/2022:  - He has been seeing pulmonary about his chronic cough and TONY issues; he saw Rose on 5/17/2022 and is now on a new antibiotic and he reports that pulmonary said the PEt scan was better than the last one; he is planning to see Dr Veliz in the near future   - PET scan was done on 4/5/2022 with no evidence of recurrence  - continued on rituximab maintenance therapy    7/21/2022:  - continued under the care of pulmonary  - he is seeing Dr Veliz in Aug 2022  - continued on monthly IV IgG and rituximab every other month  -  repeat PET in Oct 2022 expected    9/15/2022:  - He has been seeing pulmonary about his chronic cough and TONY issues; he has been seeing Rose and saw Dr Veliz on 8/15/2022;   - Dr Veliz is looking at increasing his Ig dose or changing it to SQ weekly  - Last PET scan was done on 4/5/2022 and repeat has been ordered and is due this Oct 2022  - discussed with patient about referral to Dr Nasreen Abarca at [facility] for not only evaluation for 2nd opinion for his chronic lymphoma but also the options of IV IgG infusion therapy, patient is agreeable    11/10/2022:  - Patient was seen by Dr Abarca at [facility] since last visit and his recommendation was to hold off on further rituximab therapy and proceed with just surveillance scanning.  - He was recently hospitalized at [facility] in Oct 2022 with upper torso cellulitis with Serratia marcescens septicemia  - He was recently hospitalized at [facility] in Oct 2022 with upper torso cellulitis with Serratia marcescens septicemia. He is still followed by wound care and is getting the prior drain site packed, etc. He has not followed up with ID as of yet    1/4/2023:  - Patient was previously seen by Dr Abarca at [facility] since last visit and his recommendation was to hold off on further rituximab therapy and proceed with just surveillance scanning. He has telemed visit with him in Feb 2023  -  He is getting PET tomorrow and seeing pulmonary again in Feb 2023  - he has been on IV IgG  - He saw Dr Washington last in Nov 2022 and is seeing ID at [facility] in Jan 2023    3/1/2023:  - He had telemed with Dr Abarca and he wants him off the rituximab for another 3 months; patient is concerned about holding off on the rituximab maintenance.   - He last had rituximab in Sept 2022  - He has been on IV IgG  - Recent PET on 1/5/2023 with no evidence of lymphoma  - will discuss with Dr Abarca, but I think it is reasonable to resume rituximab in near future if ok with Pulm and ID, but will spread out to therapy every 3  months instead of every 2 months      (2) HTN     (3) Neuropathy     (4) GERD     (5) DM - on metformin per Dr Nunez     (6) Hypogammaglobulinemia - on Iv IgG monthly     (7) Steatosis of liver     (8) Degenerative disc disease of back     (9) Diverticular disease    (10) Mild bronchiectasis in the left lower lobe and tree-in-bud micro nodules at the left lung base suggesting underlying bronchiolitis  - seeing Lucia Georges           VISIT DIAGNOSES:      Follicular lymphoma grade IIIa, unspecified body region    Non-Hodgkin's lymphoma, unspecified body region, unspecified non-Hodgkin lymphoma type    Pancytopenia          PLAN:  1. continue to hold further rituximab therapy for now, but will discuss with Dr Abarca about resuming therapy in near future but instead of every 8 weeks, spread it out to every 12 weeks  (if ok with Pulm and ID)  2. continue IV IgG     3. F/u with Dr Nasreen Abarca at [facility] as directed   4. Check labs every 8 weeks  5. Repeat PET every 6 months  6. F/u with PCP, Pulm etc             RTC in 4 weeks with Whit and 8 weeks with myself   Fax note to Barry Mcmahon; José Manuel Veliz; Ran Washington         Discussion:       I spent over 25 mins of time with the patient. Reviewed results of the recently ordered labs, tests and studies; made directives with regards to the results. Over half of this time was spent couseling and coordinating care.      COVID-19 Discussion:    I had long discussion with patient and any applicable family about the COVID-19 coronavirus epidemic and the recommended precautions with regard to cancer and/or hematology patients. I have re-iterated the CDC recommendations for adequate hand washing, use of hand -like products, and coughing into elbow, etc. In addition, especially for our patients who are on chemotherapy and/or our otherwise immunocompromised patients, I have recommended avoidance of crowds, including movie theaters, restaurants, churches, etc. I have  recommended avoidance of any sick or symptomatic family members and/or friends. I have also recommended avoidance of any raw and unwashed food products, and general avoidance of food items that have not been prepared by themselves. The patient has been asked to call us immediately with any symptom developments, issues, questions or other general concerns.     I have explained all of the above in detail and the patient understands all of the current recommendation(s). I have answered all of their questions to the best of my ability and to their complete satisfaction.   The patient is to continue with the current management plan.            Electronically signed by Jefferson Ibarra MD

## 2023-03-01 ENCOUNTER — OFFICE VISIT (OUTPATIENT)
Dept: HEMATOLOGY/ONCOLOGY | Facility: CLINIC | Age: 68
End: 2023-03-01
Payer: MEDICARE

## 2023-03-01 VITALS
HEIGHT: 68 IN | TEMPERATURE: 99 F | HEART RATE: 83 BPM | SYSTOLIC BLOOD PRESSURE: 179 MMHG | RESPIRATION RATE: 16 BRPM | DIASTOLIC BLOOD PRESSURE: 84 MMHG | BODY MASS INDEX: 30.79 KG/M2 | WEIGHT: 203.13 LBS

## 2023-03-01 DIAGNOSIS — C85.90 NON-HODGKIN'S LYMPHOMA, UNSPECIFIED BODY REGION, UNSPECIFIED NON-HODGKIN LYMPHOMA TYPE: ICD-10-CM

## 2023-03-01 DIAGNOSIS — C82.30 FOLLICULAR LYMPHOMA GRADE IIIA, UNSPECIFIED BODY REGION: Primary | ICD-10-CM

## 2023-03-01 DIAGNOSIS — D61.818 PANCYTOPENIA: ICD-10-CM

## 2023-03-01 PROCEDURE — 99214 PR OFFICE/OUTPT VISIT, EST, LEVL IV, 30-39 MIN: ICD-10-PCS | Mod: S$GLB,,, | Performed by: INTERNAL MEDICINE

## 2023-03-01 PROCEDURE — 99214 OFFICE O/P EST MOD 30 MIN: CPT | Mod: S$GLB,,, | Performed by: INTERNAL MEDICINE

## 2023-03-03 ENCOUNTER — INFUSION (OUTPATIENT)
Dept: INFUSION THERAPY | Facility: HOSPITAL | Age: 68
End: 2023-03-03
Attending: INTERNAL MEDICINE
Payer: MEDICARE

## 2023-03-03 VITALS
TEMPERATURE: 98 F | HEIGHT: 68 IN | RESPIRATION RATE: 18 BRPM | HEART RATE: 82 BPM | DIASTOLIC BLOOD PRESSURE: 74 MMHG | SYSTOLIC BLOOD PRESSURE: 148 MMHG | BODY MASS INDEX: 30.67 KG/M2 | WEIGHT: 202.38 LBS | OXYGEN SATURATION: 97 %

## 2023-03-03 DIAGNOSIS — D80.1 NONFAMILIAL HYPOGAMMAGLOBULINEMIA: ICD-10-CM

## 2023-03-03 DIAGNOSIS — D80.1 HYPOGAMMAGLOBULINEMIA: Primary | ICD-10-CM

## 2023-03-03 DIAGNOSIS — C82.30 FOLLICULAR LYMPHOMA GRADE IIIA, UNSPECIFIED BODY REGION: ICD-10-CM

## 2023-03-03 PROCEDURE — 25000003 PHARM REV CODE 250: Performed by: INTERNAL MEDICINE

## 2023-03-03 PROCEDURE — 96413 CHEMO IV INFUSION 1 HR: CPT

## 2023-03-03 PROCEDURE — 96367 TX/PROPH/DG ADDL SEQ IV INF: CPT

## 2023-03-03 PROCEDURE — 63600175 PHARM REV CODE 636 W HCPCS: Mod: JZ,JG,JB | Performed by: NURSE PRACTITIONER

## 2023-03-03 PROCEDURE — 63600175 PHARM REV CODE 636 W HCPCS: Performed by: INTERNAL MEDICINE

## 2023-03-03 PROCEDURE — A4216 STERILE WATER/SALINE, 10 ML: HCPCS | Performed by: INTERNAL MEDICINE

## 2023-03-03 PROCEDURE — 96415 CHEMO IV INFUSION ADDL HR: CPT

## 2023-03-03 RX ORDER — SODIUM CHLORIDE 0.9 % (FLUSH) 0.9 %
10 SYRINGE (ML) INJECTION
Status: CANCELLED | OUTPATIENT
Start: 2023-03-31

## 2023-03-03 RX ORDER — HEPARIN 100 UNIT/ML
500 SYRINGE INTRAVENOUS
Status: DISCONTINUED | OUTPATIENT
Start: 2023-03-03 | End: 2023-03-03 | Stop reason: HOSPADM

## 2023-03-03 RX ORDER — HEPARIN 100 UNIT/ML
500 SYRINGE INTRAVENOUS
Status: CANCELLED | OUTPATIENT
Start: 2023-03-31

## 2023-03-03 RX ORDER — SODIUM CHLORIDE 0.9 % (FLUSH) 0.9 %
10 SYRINGE (ML) INJECTION
Status: DISCONTINUED | OUTPATIENT
Start: 2023-03-03 | End: 2023-03-03 | Stop reason: HOSPADM

## 2023-03-03 RX ADMIN — HEPARIN 500 UNITS: 100 SYRINGE at 11:03

## 2023-03-03 RX ADMIN — DIPHENHYDRAMINE HYDROCHLORIDE 25 MG: 50 INJECTION INTRAMUSCULAR; INTRAVENOUS at 08:03

## 2023-03-03 RX ADMIN — IMMUNE GLOBULIN (HUMAN) 50 G: 10 INJECTION INTRAVENOUS; SUBCUTANEOUS at 08:03

## 2023-03-03 RX ADMIN — SODIUM CHLORIDE, PRESERVATIVE FREE 10 ML: 5 INJECTION INTRAVENOUS at 11:03

## 2023-03-03 NOTE — PLAN OF CARE
Problem: Fatigue  Goal: Improved Activity Tolerance  3/3/2023 0817 by Nicky Veliz RN  Outcome: Met  3/3/2023 0817 by Nicky Veliz RN  Outcome: Ongoing, Progressing

## 2023-03-31 ENCOUNTER — INFUSION (OUTPATIENT)
Dept: INFUSION THERAPY | Facility: HOSPITAL | Age: 68
End: 2023-03-31
Attending: INTERNAL MEDICINE
Payer: MEDICARE

## 2023-03-31 VITALS
RESPIRATION RATE: 18 BRPM | HEIGHT: 68 IN | HEART RATE: 76 BPM | WEIGHT: 208.19 LBS | SYSTOLIC BLOOD PRESSURE: 154 MMHG | OXYGEN SATURATION: 98 % | BODY MASS INDEX: 31.55 KG/M2 | TEMPERATURE: 98 F | DIASTOLIC BLOOD PRESSURE: 84 MMHG

## 2023-03-31 DIAGNOSIS — D80.1 HYPOGAMMAGLOBULINEMIA: Primary | ICD-10-CM

## 2023-03-31 DIAGNOSIS — D80.1 NONFAMILIAL HYPOGAMMAGLOBULINEMIA: ICD-10-CM

## 2023-03-31 DIAGNOSIS — C82.30 FOLLICULAR LYMPHOMA GRADE IIIA, UNSPECIFIED BODY REGION: ICD-10-CM

## 2023-03-31 PROCEDURE — 63600175 PHARM REV CODE 636 W HCPCS: Performed by: INTERNAL MEDICINE

## 2023-03-31 PROCEDURE — 96415 CHEMO IV INFUSION ADDL HR: CPT

## 2023-03-31 PROCEDURE — 96367 TX/PROPH/DG ADDL SEQ IV INF: CPT

## 2023-03-31 PROCEDURE — 96413 CHEMO IV INFUSION 1 HR: CPT

## 2023-03-31 PROCEDURE — 63600175 PHARM REV CODE 636 W HCPCS: Mod: JZ,JG | Performed by: NURSE PRACTITIONER

## 2023-03-31 PROCEDURE — 25000003 PHARM REV CODE 250: Performed by: INTERNAL MEDICINE

## 2023-03-31 RX ORDER — HEPARIN 100 UNIT/ML
500 SYRINGE INTRAVENOUS
Status: CANCELLED | OUTPATIENT
Start: 2023-04-28

## 2023-03-31 RX ORDER — SODIUM CHLORIDE 0.9 % (FLUSH) 0.9 %
10 SYRINGE (ML) INJECTION
Status: DISCONTINUED | OUTPATIENT
Start: 2023-03-31 | End: 2023-03-31 | Stop reason: HOSPADM

## 2023-03-31 RX ORDER — SODIUM CHLORIDE 0.9 % (FLUSH) 0.9 %
10 SYRINGE (ML) INJECTION
Status: CANCELLED | OUTPATIENT
Start: 2023-04-28

## 2023-03-31 RX ORDER — HEPARIN 100 UNIT/ML
500 SYRINGE INTRAVENOUS
Status: DISCONTINUED | OUTPATIENT
Start: 2023-03-31 | End: 2023-03-31 | Stop reason: HOSPADM

## 2023-03-31 RX ADMIN — SODIUM CHLORIDE: 0.9 INJECTION, SOLUTION INTRAVENOUS at 07:03

## 2023-03-31 RX ADMIN — DIPHENHYDRAMINE HYDROCHLORIDE 25 MG: 50 INJECTION, SOLUTION INTRAMUSCULAR; INTRAVENOUS at 07:03

## 2023-03-31 RX ADMIN — IMMUNE GLOBULIN (HUMAN) 50 G: 10 INJECTION INTRAVENOUS; SUBCUTANEOUS at 07:03

## 2023-03-31 RX ADMIN — HEPARIN 500 UNITS: 100 SYRINGE at 10:03

## 2023-04-11 ENCOUNTER — PATIENT MESSAGE (OUTPATIENT)
Dept: ADMINISTRATIVE | Facility: HOSPITAL | Age: 68
End: 2023-04-11
Payer: MEDICARE

## 2023-04-13 ENCOUNTER — PATIENT MESSAGE (OUTPATIENT)
Dept: FAMILY MEDICINE | Facility: CLINIC | Age: 68
End: 2023-04-13
Payer: MEDICARE

## 2023-04-14 ENCOUNTER — LAB VISIT (OUTPATIENT)
Dept: LAB | Facility: HOSPITAL | Age: 68
End: 2023-04-14
Attending: FAMILY MEDICINE
Payer: MEDICARE

## 2023-04-14 DIAGNOSIS — Z12.5 PROSTATE CANCER SCREENING: ICD-10-CM

## 2023-04-14 DIAGNOSIS — Z00.00 ANNUAL PHYSICAL EXAM: ICD-10-CM

## 2023-04-14 DIAGNOSIS — E11.9 TYPE 2 DIABETES MELLITUS WITHOUT COMPLICATION, WITHOUT LONG-TERM CURRENT USE OF INSULIN: ICD-10-CM

## 2023-04-14 LAB
ALBUMIN SERPL BCP-MCNC: 3.8 G/DL (ref 3.5–5.2)
ALP SERPL-CCNC: 97 U/L (ref 55–135)
ALT SERPL W/O P-5'-P-CCNC: 22 U/L (ref 10–44)
ANION GAP SERPL CALC-SCNC: 11 MMOL/L (ref 8–16)
AST SERPL-CCNC: 16 U/L (ref 10–40)
BASOPHILS # BLD AUTO: 0.03 K/UL (ref 0–0.2)
BASOPHILS NFR BLD: 0.4 % (ref 0–1.9)
BILIRUB SERPL-MCNC: 0.3 MG/DL (ref 0.1–1)
BUN SERPL-MCNC: 14 MG/DL (ref 8–23)
CALCIUM SERPL-MCNC: 9.5 MG/DL (ref 8.7–10.5)
CHLORIDE SERPL-SCNC: 106 MMOL/L (ref 95–110)
CHOLEST SERPL-MCNC: 143 MG/DL (ref 120–199)
CHOLEST/HDLC SERPL: 4.9 {RATIO} (ref 2–5)
CO2 SERPL-SCNC: 25 MMOL/L (ref 23–29)
COMPLEXED PSA SERPL-MCNC: 0.53 NG/ML (ref 0–4)
CREAT SERPL-MCNC: 0.8 MG/DL (ref 0.5–1.4)
DIFFERENTIAL METHOD: ABNORMAL
EOSINOPHIL # BLD AUTO: 0.2 K/UL (ref 0–0.5)
EOSINOPHIL NFR BLD: 2.8 % (ref 0–8)
ERYTHROCYTE [DISTWIDTH] IN BLOOD BY AUTOMATED COUNT: 14.7 % (ref 11.5–14.5)
EST. GFR  (NO RACE VARIABLE): >60 ML/MIN/1.73 M^2
ESTIMATED AVG GLUCOSE: 148 MG/DL (ref 68–131)
GLUCOSE SERPL-MCNC: 124 MG/DL (ref 70–110)
HBA1C MFR BLD: 6.8 % (ref 4–5.6)
HCT VFR BLD AUTO: 42.3 % (ref 40–54)
HDLC SERPL-MCNC: 29 MG/DL (ref 40–75)
HDLC SERPL: 20.3 % (ref 20–50)
HGB BLD-MCNC: 13.8 G/DL (ref 14–18)
IMM GRANULOCYTES # BLD AUTO: 0.02 K/UL (ref 0–0.04)
IMM GRANULOCYTES NFR BLD AUTO: 0.2 % (ref 0–0.5)
LDLC SERPL CALC-MCNC: 69.8 MG/DL (ref 63–159)
LYMPHOCYTES # BLD AUTO: 1.7 K/UL (ref 1–4.8)
LYMPHOCYTES NFR BLD: 20.3 % (ref 18–48)
MCH RBC QN AUTO: 27.1 PG (ref 27–31)
MCHC RBC AUTO-ENTMCNC: 32.6 G/DL (ref 32–36)
MCV RBC AUTO: 83 FL (ref 82–98)
MONOCYTES # BLD AUTO: 0.6 K/UL (ref 0.3–1)
MONOCYTES NFR BLD: 6.6 % (ref 4–15)
NEUTROPHILS # BLD AUTO: 5.9 K/UL (ref 1.8–7.7)
NEUTROPHILS NFR BLD: 69.7 % (ref 38–73)
NONHDLC SERPL-MCNC: 114 MG/DL
NRBC BLD-RTO: 0 /100 WBC
PLATELET # BLD AUTO: 264 K/UL (ref 150–450)
PMV BLD AUTO: 9 FL (ref 9.2–12.9)
POTASSIUM SERPL-SCNC: 4.1 MMOL/L (ref 3.5–5.1)
PROT SERPL-MCNC: 7.2 G/DL (ref 6–8.4)
RBC # BLD AUTO: 5.09 M/UL (ref 4.6–6.2)
SODIUM SERPL-SCNC: 142 MMOL/L (ref 136–145)
TRIGL SERPL-MCNC: 221 MG/DL (ref 30–150)
TSH SERPL DL<=0.005 MIU/L-ACNC: 3.72 UIU/ML (ref 0.4–4)
WBC # BLD AUTO: 8.48 K/UL (ref 3.9–12.7)

## 2023-04-14 PROCEDURE — 80053 COMPREHEN METABOLIC PANEL: CPT | Performed by: FAMILY MEDICINE

## 2023-04-14 PROCEDURE — 83036 HEMOGLOBIN GLYCOSYLATED A1C: CPT | Performed by: FAMILY MEDICINE

## 2023-04-14 PROCEDURE — 84443 ASSAY THYROID STIM HORMONE: CPT | Performed by: FAMILY MEDICINE

## 2023-04-14 PROCEDURE — 36415 COLL VENOUS BLD VENIPUNCTURE: CPT | Performed by: FAMILY MEDICINE

## 2023-04-14 PROCEDURE — 80061 LIPID PANEL: CPT | Performed by: FAMILY MEDICINE

## 2023-04-14 PROCEDURE — 85025 COMPLETE CBC W/AUTO DIFF WBC: CPT | Performed by: FAMILY MEDICINE

## 2023-04-14 PROCEDURE — 84153 ASSAY OF PSA TOTAL: CPT | Performed by: FAMILY MEDICINE

## 2023-04-24 PROBLEM — Z09 HOSPITAL DISCHARGE FOLLOW-UP: Status: RESOLVED | Noted: 2023-01-17 | Resolved: 2023-04-24

## 2023-04-25 NOTE — PROGRESS NOTES
"[facility] Hematology/Oncology  PROGRESS NOTE -  Follow-up Visit      Subjective:       Patient ID:   NAME: Sivakumar Thacker : 1955     68 y.o. male    Referring Doc: Barry Mcmahon (PCP in [place])  Other Physicians: Vinod Chen/José Manuel      Chief Complaint:  NHL f/u    History of Present Illness:     Patient returns today for a regularly scheduled follow-up visit.  The patient is here today to go over the results of the recently ordered labs, tests and studies. He is here  by himself today    He is feeling "great" and breathing better; he sees Dr Veliz again next week    He sees Dr Mcmahon again the first week of May 2023    He previously had telemed with Dr Abarca and he wants him off the rituximab for another 3 months; patient is concerned about holding off on the rituximab maintenance. He last had rituximab in 2022     He denies any CP, SOB, HA's or N/V.    He has been on IV IgG    He is continued on Gabapentin for the neuropathy issues in his feet.     Recent PET on 2023 with no evidence of lymphoma    I discussed continued Covid19 precautions        ROS:   GEN: normal without any fever, night sweats or weight loss  HEENT: normal with no HA's, sore throat, stiff neck, changes in vision; no current sinus issues    CV: normal with no CP, SOB, PND, VASQUEZ or orthopnea  PULM: normal with no SOB,  hemoptysis, sputum or pleuritic pain; chronic cough but better    GI: normal with no abdominal pain, nausea, vomiting, constipation, diarrhea, melanotic stools, BRBPR, or hematemesis  : normal with no hematuria, dysuria  BREAST: normal with no mass, discharge, pain  SKIN: normal with no rash, erythema, bruising, or swelling; no current wound issues    Allergies:  Review of patient's allergies indicates:  No Known Allergies    Medications:    Current Outpatient Medications:     albuterol (PROVENTIL/VENTOLIN HFA) 90 mcg/actuation inhaler, 2 puffs every 4 hours as needed for cough, wheeze, or shortness of breath, " Disp: 18 g, Rfl: 11    albuterol-ipratropium (DUO-NEB) 2.5 mg-0.5 mg/3 mL nebulizer solution, Take 3 mLs by nebulization every 6 (six) hours as needed for Wheezing. Rescue, Disp: 120 each, Rfl: 11    amLODIPine (NORVASC) 10 MG tablet, Take 1 tablet (10 mg total) by mouth once daily., Disp: 90 tablet, Rfl: 3    aspirin (ECOTRIN) 81 MG EC tablet, Take 81 mg by mouth once daily., Disp: , Rfl:     atorvastatin (LIPITOR) 20 MG tablet, Take 1 tablet (20 mg total) by mouth once daily., Disp: 90 tablet, Rfl: 3    blood sugar diagnostic (FREESTYLE LITE STRIPS MISC), FreeStyle Lite Strips, Disp: , Rfl:     blood sugar diagnostic Strp, , Disp: , Rfl:     fluticasone propionate (FLONASE) 50 mcg/actuation nasal spray, 1 spray (50 mcg total) by Each Nostril route 2 (two) times daily., Disp: 48 g, Rfl: 11    gabapentin (NEURONTIN) 300 MG capsule, Take 1 capsule (300 mg total) by mouth 2 (two) times daily., Disp: 180 capsule, Rfl: 3    guaiFENesin (MUCINEX) 600 mg 12 hr tablet, Take 2 tablets (1,200 mg total) by mouth 2 (two) times daily., Disp: 60 tablet, Rfl: 0    hydrocodone-chlorpheniramine (TUSSIONEX) 10-8 mg/5 mL suspension, Take 5 mLs by mouth every 12 (twelve) hours as needed for Cough ((DO NOT TAKE WITHIN 4 HOURS OF NORCO/HYDROCODONE-ACETAMINOPHEN TABLETS))., Disp: 115 mL, Rfl: 0    losartan (COZAAR) 100 MG tablet, Take 1 tablet (100 mg total) by mouth once daily., Disp: 90 tablet, Rfl: 3    metFORMIN (GLUCOPHAGE) 500 MG tablet, Take 2 tablets twice a day by oral route with meals for 90 days., Disp: 180 tablet, Rfl: 3    montelukast (SINGULAIR) 10 mg tablet, Take 1 tablet (10 mg total) by mouth nightly as needed., Disp: 90 tablet, Rfl: 3    mucus clearing device (ACAPELLA, FLUTTER), 1 Device by Misc.(Non-Drug; Combo Route) route 2 (two) times daily., Disp: 1 Device, Rfl: 0    MULTIVITAMIN ORAL, Take 1 tablet by mouth once daily., Disp: , Rfl:     naproxen (NAPROSYN) 500 MG tablet, Take 1 tablet (500 mg total) by mouth 2  "(two) times daily as needed., Disp: 180 tablet, Rfl: 3    omeprazole (PRILOSEC) 40 MG capsule, Take 1 capsule (40 mg total) by mouth once daily., Disp: 90 capsule, Rfl: 3    tiZANidine (ZANAFLEX) 4 MG tablet, Take 1 tablet (4 mg total) by mouth daily as needed., Disp: 90 tablet, Rfl: 3    PMHx/PSHx Updates:  See patient's last visit with me on  3/1/2023  See H&P on 1/8/2019        Pathology:  Cancer Staging  No matching staging information was found for the patient.          Objective:     Vitals:  Blood pressure (!) 159/90, pulse 73, temperature 97.7 °F (36.5 °C), resp. rate 18, height 5' 8" (1.727 m), weight 94.6 kg (208 lb 8 oz).    Physical Examination:   GEN: no apparent distress, comfortable; AAOx3  HEAD: atraumatic and normocephalic  EYES: no pallor, no icterus, PERRLA  ENT: OMM, no pharyngeal erythema, external ears WNL; no nasal discharge; no thrush;    NECK: no masses, thyroid normal, trachea midline, no LAD/LN's, supple  CV: RRR with no murmur; normal pulse; normal S1 and S2; no pedal edema; portacath since removed  CHEST: Normal respiratory effort; chronic coarseness, wheeze bilaterally but only mild wheeze  ABDOM: nontender and nondistended; soft; normal bowel sounds; no rebound/guarding  MUSC/Skeletal: ROM normal; no crepitus; joints normal; no deformities or arthropathy  EXTREM: no clubbing, cyanosis, inflammation or swelling  SKIN: no rashes, lesions, ulcers, petechiae or subcutaneous nodules; no current wound issues  : no gibbs  NEURO: grossly intact; motor/sensory WNL; AAOx3; no tremors  PSYCH: normal mood, affect and behavior  LYMPH: normal cervical, supraclavicular, axillary and groin LN's            Labs:              Lab Results   Component Value Date    WBC 8.48 04/14/2023    HGB 13.8 (L) 04/14/2023    HCT 42.3 04/14/2023    MCV 83 04/14/2023     04/14/2023     CMP  Sodium   Date Value Ref Range Status   04/14/2023 142 136 - 145 mmol/L Final   04/25/2019 144 134 - 144 mmol/L  "     Potassium   Date Value Ref Range Status   04/14/2023 4.1 3.5 - 5.1 mmol/L Final     Chloride   Date Value Ref Range Status   04/14/2023 106 95 - 110 mmol/L Final   04/25/2019 107 98 - 110 mmol/L      CO2   Date Value Ref Range Status   04/14/2023 25 23 - 29 mmol/L Final     Glucose   Date Value Ref Range Status   04/14/2023 124 (H) 70 - 110 mg/dL Final   04/25/2019 177 (H) 70 - 99 mg/dL      BUN   Date Value Ref Range Status   04/14/2023 14 8 - 23 mg/dL Final     Creatinine   Date Value Ref Range Status   04/14/2023 0.8 0.5 - 1.4 mg/dL Final   04/25/2019 0.83 0.60 - 1.40 mg/dL      Calcium   Date Value Ref Range Status   04/14/2023 9.5 8.7 - 10.5 mg/dL Final     Total Protein   Date Value Ref Range Status   04/14/2023 7.2 6.0 - 8.4 g/dL Final     Albumin   Date Value Ref Range Status   04/14/2023 3.8 3.5 - 5.2 g/dL Final   04/25/2019 4.4 3.1 - 4.7 g/dL      Total Bilirubin   Date Value Ref Range Status   04/14/2023 0.3 0.1 - 1.0 mg/dL Final     Comment:     For infants and newborns, interpretation of results should be based  on gestational age, weight and in agreement with clinical  observations.    Premature Infant recommended reference ranges:  Up to 24 hours.............<8.0 mg/dL  Up to 48 hours............<12.0 mg/dL  3-5 days..................<15.0 mg/dL  6-29 days.................<15.0 mg/dL       Alkaline Phosphatase   Date Value Ref Range Status   04/14/2023 97 55 - 135 U/L Final     AST   Date Value Ref Range Status   04/14/2023 16 10 - 40 U/L Final     ALT   Date Value Ref Range Status   04/14/2023 22 10 - 44 U/L Final     Anion Gap   Date Value Ref Range Status   04/14/2023 11 8 - 16 mmol/L Final     eGFR if    Date Value Ref Range Status   07/21/2022 >60.0 >60 mL/min/1.73 m^2 Final     eGFR if non    Date Value Ref Range Status   07/21/2022 >60.0 >60 mL/min/1.73 m^2 Final     Comment:     Calculation used to obtain the estimated glomerular filtration  rate (eGFR) is  the CKD-EPI equation.            Radiology/Diagnostic Studies:    PET 1/5/2023:    IMPRESSION: Diffuse reticular nodular and groundglass opacities the left lower lobe, posterior right upper lobe and posterior medial right lower lobe compatible with multifocal pneumonia. Follow-up chest CT in three months is recommended     Diffuse FDG activity throughout the axial skeleton suggestive of bone marrow hyperstimulation     No evidence of recurrent or metastatic disease        PET  4/5/2022:    IMPRESSION: No evidence of recurrent or active lymphoproliferative disease        PET 10/4/2021:  Impression:     1. Negative for FDG avid recurrent lymphoproliferative disease.  2. New left maxillary sinusitis.  3. New bilateral reticulonodular pulmonary opacities suggesting infectious or inflammatory pneumonitis.  Clinical and laboratory correlation is requested.  4. Coronary artery calcification.         CXR  3/25/2021:    Impression:     Mild interstitial prominence, similar to previous exams.  No focal consolidation      PET 3/19/2021:  IMPRESSION:  1. Mild patchy airspace opacity in the superior segment of the left  lower lobe with increased FDG activity from background suggestive of a  small focal pneumonia (possibly viral in etiology). Consider  correlation with the symptoms, history and CT follow-up in 3 months to  document resolution.  2. No FDG avid lymphadenopathy or additional sites of disease.         PET 9/2/2020:      Impression:     Negative for active lymphoproliferative disease.         Chest CT  3/19/2020:      Impression       1. Mild bronchiectasis in the left lower lobe and tree-in-bud micro nodules at the left lung base suggesting underlying bronchiolitis, possibly due to a non tuberculous mycobacterial infection or viral bronchiolitis.  2. Fatty infiltration of the liver  3. Small hiatal hernia.           CXR  1/31/2020    Impression       1. No acute chest disease.  2. Left subclavian Port-A-Cath.                PET 9/11/2019   Impression       No evidence of FDG avid residual or recurrent lymphoma       I have reviewed all available lab results and radiology reports.    Assessment/Plan:   (1) 68 y.o. male with diagnosis of NHL Follicular Stage IIIA who has been referred by Dr Gary Chen with [facility] for continuation of care by medical hematology/oncology.   - He originally was diagnosed in 2012 and had parotid excision at [facility] [place]. He subsequently went to [facility] and was treated with bendamustine-rituximab. He has been on rituximab maintenance every 8 weeks and IV IgG monthly. Dr Chen's plan was to keep him on maintenace therapy for as long as he could tolerate the treatments  - s/p 6 cycles of Bendamustine and Retuximab with subsequent complete remission  - 1st maintenance cycle rituximab was in March 2016  - he has been on maintenance rituximab and IV IgG - last rituximab 11/15/2018; last IV IgG on Dec 14th 2018  - last PET was on 9/26/2018 with no evidence of recurrence  - originally diagnosed in Dec 2012 with left parotid and left periparotid LN excision on 12/11/2012  - PET scan done on  9/11/2019 was good    7/23/2020:  - new nodule on auricle of left ear suspicious for a skin cancer - will refer him to Dr Avilez with ENT  - he is due for repeat PET    9/17/2020:  - PET scan on 9/2/2020 is adequate  - he is [facility] in [facility] near future     11/12/2020:  - continued on the current regimen  - doing well with no new issues    1/7/2021:  - he is having some persistent cough issues for which he has been obtaining sputum for José Manuel  - last PET was Sept 2020    3/4/2021:  - doing ok  - chronic cough issues - followed by José Manuel  - will set up f/u PET    4/29/2021:  - he had PET scan on 3/19/2021  - He has been seeing pulmonary about his chronic cough  Lucia Georges with pulmonary has seen the recent PET and reported to him that the CXR on 3/25 was stable and he is on some  oral antibiotics with improvement of the symptoms  - He is also on Gabapentin for the neuropathy issues in his feet.   - He saw Dr Barry Mcmahon on 3/24/2021 with family med    8/19/2021:  - He has been seeing pulmonary about his chronic cough - Lucia Georges with pulmonary and he has been on periodic treatments of antibiotics. He saw her last just yesterday on 8/17/2021 and is currently on antibiotics.  - Pulmonary is planning to refer him to an ID specialist  - he is on the rituximab every 8 weeks; I am not convinced that this is contributing to the infection issues as it is not reducing his WBC; however,if pulmonary and/or ID feel it is a contributing factor then we can discontinue. The risk of his lymphoma recurring or progressing would be higher if he were to discontinue the therapy      10/14/2021:  - continued on oral antibiotics per pulmonary and plans to see Pulmonary ID specialist in near future  - continued on current therapy with rituximab  - recent PEt on 10/4/2021    12/9/2021:  - continued on rituximab  - on new antibiotics per pulmonary and he sees them again in Feb 2022  - repeat scans in Feb 2022 or sooner if pulmonary says otherwise    2/2/2022:  - he is seeing pulmonary again in two weeks  - he does not think that the new triple antibiotic regimen is working  - we can get PET in Feb/mar 2022 if pulmonary is inclined, otherwise 6 month surveillance scan in April/May 2022    3/31/2022:  - He has been seeing pulmonary about his chronic cough and TONY issues; he is on antibiotics 3x/week and he reports that pulmonary feels that he is stable  - PET scan scheduled for 4/14/2022  - He last saw Dr Barry Mcmahon on 3/28/2021 and José Manuel on 2/17/2022  - pulmonary is fine with him continuing the ritxuimab per his report    5/26/2022:  - He has been seeing pulmonary about his chronic cough and TONY issues; he saw Rose on 5/17/2022 and is now on a new antibiotic and he reports that pulmonary said the PEt scan  was better than the last one; he is planning to see Dr Veliz in the near future   - PET scan was done on 4/5/2022 with no evidence of recurrence  - continued on rituximab maintenance therapy    7/21/2022:  - continued under the care of pulmonary  - he is seeing Dr Veliz in Aug 2022  - continued on monthly IV IgG and rituximab every other month  - repeat PET in Oct 2022 expected    9/15/2022:  - He has been seeing pulmonary about his chronic cough and TONY issues; he has been seeing Rose and saw Dr Veliz on 8/15/2022;   - Dr Veliz is looking at increasing his Ig dose or changing it to SQ weekly  - Last PET scan was done on 4/5/2022 and repeat has been ordered and is due this Oct 2022  - discussed with patient about referral to Dr Nasreen Abarca at [facility] for not only evaluation for 2nd opinion for his chronic lymphoma but also the options of IV IgG infusion therapy, patient is agreeable    11/10/2022:  - Patient was seen by Dr Abarca at [facility] since last visit and his recommendation was to hold off on further rituximab therapy and proceed with just surveillance scanning.  - He was recently hospitalized at [facility] in Oct 2022 with upper torso cellulitis with Serratia marcescens septicemia  - He was recently hospitalized at 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 He is still followed by wound care and is getting the prior drain site packed, etc. He has not followed up with ID as of yet    1/4/2023:  - Patient was previously seen by Dr Abarca at [facility] since last visit and his recommendation was to hold off on further rituximab therapy and proceed with just surveillance scanning. He has telemed visit with him in Feb 2023  -  He is getting PET tomorrow and seeing pulmonary again in Feb 2023  - he has been on IV IgG  - He saw Dr Washington last in Nov 2022 and is seeing ID at [facility] in Jan 2023    3/1/2023:  - He had telemed with Dr Abarca and he wants him off the rituximab for another 3 months;  patient is concerned about holding off on the rituximab maintenance.   - He last had rituximab in Sept 2022  - He has been on IV IgG  - Recent PET on 1/5/2023 with no evidence of lymphoma  - will discuss with Dr Abarca, but I think it is reasonable to resume rituximab in near future if ok with Pulm and ID, but will spread out to therapy every 3 months instead of every 2 months    4/26/2023:  - discussed with patient about resuming the Rituximab and he is anxious to do so  - will give every 12 weeks      (2) HTN     (3) Neuropathy     (4) GERD     (5) DM - on metformin per Dr Nunez     (6) Hypogammaglobulinemia - on Iv IgG monthly     (7) Steatosis of liver     (8) Degenerative disc disease of back     (9) Diverticular disease    (10) Mild bronchiectasis in the left lower lobe and tree-in-bud micro nodules at the left lung base suggesting underlying bronchiolitis  - seeing Lucia Georges           VISIT DIAGNOSES:      Follicular lymphoma grade IIIa, unspecified body region    Non-Hodgkin's lymphoma, unspecified body region, unspecified non-Hodgkin lymphoma type    Pancytopenia          PLAN:  1. resume rituximab therapy but will give every 12 weeks instead of 8 weeks (if ok with Pulm and ID)  2. continue IV IgG     3. F/u with Dr Nasreen Abarca at [facility] as directed   4. Check labs every 8 weeks  5. Repeat PET every 6 months (due in July 2023)  6. F/u with PCP, Pulm etc             RTC in 4 weeks with Whit and 8 weeks with myself   Fax note to Barry Mcmahon; José Manuel Veliz; Ran Washington         Discussion:       I spent over 25 mins of time with the patient. Reviewed results of the recently ordered labs, tests and studies; made directives with regards to the results. Over half of this time was spent couseling and coordinating care.      COVID-19 Discussion:    I had long discussion with patient and any applicable family about the COVID-19 coronavirus epidemic and the recommended precautions with regard to cancer and/or  hematology patients. I have re-iterated the CDC recommendations for adequate hand washing, use of hand -like products, and coughing into elbow, etc. In addition, especially for our patients who are on chemotherapy and/or our otherwise immunocompromised patients, I have recommended avoidance of crowds, including movie theaters, restaurants, churches, etc. I have recommended avoidance of any sick or symptomatic family members and/or friends. I have also recommended avoidance of any raw and unwashed food products, and general avoidance of food items that have not been prepared by themselves. The patient has been asked to call us immediately with any symptom developments, issues, questions or other general concerns.     I have explained all of the above in detail and the patient understands all of the current recommendation(s). I have answered all of their questions to the best of my ability and to their complete satisfaction.   The patient is to continue with the current management plan.            Electronically signed by Jefferson Ibarra MD                      Answers submitted by the patient for this visit:  Review of Systems Questionnaire (Submitted on 4/25/2023)  appetite change : No  unexpected weight change: No  mouth sores: No  visual disturbance: No  cough: No  shortness of breath: No  chest pain: No  abdominal pain: No  diarrhea: No  frequency: No  back pain: No  rash: No  headaches: No  adenopathy: No  nervous/ anxious: No

## 2023-04-26 ENCOUNTER — TELEPHONE (OUTPATIENT)
Dept: HEMATOLOGY/ONCOLOGY | Facility: CLINIC | Age: 68
End: 2023-04-26

## 2023-04-26 ENCOUNTER — OFFICE VISIT (OUTPATIENT)
Dept: HEMATOLOGY/ONCOLOGY | Facility: CLINIC | Age: 68
End: 2023-04-26
Payer: MEDICARE

## 2023-04-26 VITALS
RESPIRATION RATE: 18 BRPM | WEIGHT: 208.5 LBS | HEART RATE: 73 BPM | BODY MASS INDEX: 31.6 KG/M2 | DIASTOLIC BLOOD PRESSURE: 90 MMHG | HEIGHT: 68 IN | SYSTOLIC BLOOD PRESSURE: 159 MMHG | TEMPERATURE: 98 F

## 2023-04-26 DIAGNOSIS — D61.818 PANCYTOPENIA: ICD-10-CM

## 2023-04-26 DIAGNOSIS — C82.30 FOLLICULAR LYMPHOMA GRADE IIIA, UNSPECIFIED BODY REGION: Primary | ICD-10-CM

## 2023-04-26 DIAGNOSIS — C85.90 NON-HODGKIN'S LYMPHOMA, UNSPECIFIED BODY REGION, UNSPECIFIED NON-HODGKIN LYMPHOMA TYPE: ICD-10-CM

## 2023-04-26 PROCEDURE — 99214 OFFICE O/P EST MOD 30 MIN: CPT | Mod: S$GLB,,, | Performed by: INTERNAL MEDICINE

## 2023-04-26 PROCEDURE — 99214 PR OFFICE/OUTPT VISIT, EST, LEVL IV, 30-39 MIN: ICD-10-PCS | Mod: S$GLB,,, | Performed by: INTERNAL MEDICINE

## 2023-04-26 NOTE — TELEPHONE ENCOUNTER
Dedrick and Arlette made aware that Dr. Ibarra would lie to resume Rituximab on 5/25, and every 12 weeks.

## 2023-04-28 ENCOUNTER — INFUSION (OUTPATIENT)
Dept: INFUSION THERAPY | Facility: HOSPITAL | Age: 68
End: 2023-04-28
Attending: INTERNAL MEDICINE
Payer: MEDICARE

## 2023-04-28 VITALS
WEIGHT: 210.31 LBS | DIASTOLIC BLOOD PRESSURE: 81 MMHG | HEART RATE: 71 BPM | BODY MASS INDEX: 31.87 KG/M2 | OXYGEN SATURATION: 97 % | RESPIRATION RATE: 18 BRPM | TEMPERATURE: 98 F | SYSTOLIC BLOOD PRESSURE: 142 MMHG | HEIGHT: 68 IN

## 2023-04-28 DIAGNOSIS — C82.30 FOLLICULAR LYMPHOMA GRADE IIIA, UNSPECIFIED BODY REGION: ICD-10-CM

## 2023-04-28 DIAGNOSIS — D80.1 NONFAMILIAL HYPOGAMMAGLOBULINEMIA: ICD-10-CM

## 2023-04-28 DIAGNOSIS — D80.1 HYPOGAMMAGLOBULINEMIA: Primary | ICD-10-CM

## 2023-04-28 PROCEDURE — A4216 STERILE WATER/SALINE, 10 ML: HCPCS | Performed by: INTERNAL MEDICINE

## 2023-04-28 PROCEDURE — 96415 CHEMO IV INFUSION ADDL HR: CPT

## 2023-04-28 PROCEDURE — 25000003 PHARM REV CODE 250: Performed by: INTERNAL MEDICINE

## 2023-04-28 PROCEDURE — 63600175 PHARM REV CODE 636 W HCPCS: Performed by: INTERNAL MEDICINE

## 2023-04-28 PROCEDURE — 63600175 PHARM REV CODE 636 W HCPCS: Mod: JZ | Performed by: NURSE PRACTITIONER

## 2023-04-28 PROCEDURE — 96413 CHEMO IV INFUSION 1 HR: CPT

## 2023-04-28 PROCEDURE — J1561JA PHARM REV CODE 636 W HCPCS: Mod: JZ | Performed by: NURSE PRACTITIONER

## 2023-04-28 PROCEDURE — 96367 TX/PROPH/DG ADDL SEQ IV INF: CPT

## 2023-04-28 RX ORDER — SODIUM CHLORIDE 0.9 % (FLUSH) 0.9 %
10 SYRINGE (ML) INJECTION
Status: CANCELLED | OUTPATIENT
Start: 2023-05-26

## 2023-04-28 RX ORDER — SODIUM CHLORIDE 0.9 % (FLUSH) 0.9 %
10 SYRINGE (ML) INJECTION
Status: DISCONTINUED | OUTPATIENT
Start: 2023-04-28 | End: 2023-04-28 | Stop reason: HOSPADM

## 2023-04-28 RX ORDER — HEPARIN 100 UNIT/ML
500 SYRINGE INTRAVENOUS
Status: DISCONTINUED | OUTPATIENT
Start: 2023-04-28 | End: 2023-04-28 | Stop reason: HOSPADM

## 2023-04-28 RX ORDER — HEPARIN 100 UNIT/ML
500 SYRINGE INTRAVENOUS
Status: CANCELLED | OUTPATIENT
Start: 2023-05-26

## 2023-04-28 RX ADMIN — IMMUNE GLOBULIN (HUMAN) 50 G: 10 INJECTION INTRAVENOUS; SUBCUTANEOUS at 08:04

## 2023-04-28 RX ADMIN — HEPARIN 500 UNITS: 100 SYRINGE at 10:04

## 2023-04-28 RX ADMIN — SODIUM CHLORIDE: 0.9 INJECTION, SOLUTION INTRAVENOUS at 07:04

## 2023-04-28 RX ADMIN — DIPHENHYDRAMINE HYDROCHLORIDE 25 MG: 50 INJECTION INTRAMUSCULAR; INTRAVENOUS at 07:04

## 2023-04-28 RX ADMIN — SODIUM CHLORIDE, PRESERVATIVE FREE 10 ML: 5 INJECTION INTRAVENOUS at 10:04

## 2023-04-28 NOTE — PLAN OF CARE
Problem: Fatigue  Goal: Improved Activity Tolerance  4/28/2023 0802 by Nicky Veliz RN  Outcome: Met  4/28/2023 0802 by Nicky Veliz RN  Outcome: Ongoing, Progressing

## 2023-05-02 ENCOUNTER — OFFICE VISIT (OUTPATIENT)
Dept: FAMILY MEDICINE | Facility: CLINIC | Age: 68
End: 2023-05-02
Payer: MEDICARE

## 2023-05-02 VITALS
RESPIRATION RATE: 14 BRPM | OXYGEN SATURATION: 96 % | BODY MASS INDEX: 31.58 KG/M2 | DIASTOLIC BLOOD PRESSURE: 72 MMHG | HEART RATE: 65 BPM | WEIGHT: 208.38 LBS | HEIGHT: 68 IN | SYSTOLIC BLOOD PRESSURE: 135 MMHG

## 2023-05-02 DIAGNOSIS — G47.33 OSA (OBSTRUCTIVE SLEEP APNEA): ICD-10-CM

## 2023-05-02 DIAGNOSIS — L98.9 SKIN LESION: ICD-10-CM

## 2023-05-02 DIAGNOSIS — E11.9 TYPE 2 DIABETES MELLITUS WITHOUT COMPLICATION, WITHOUT LONG-TERM CURRENT USE OF INSULIN: Primary | ICD-10-CM

## 2023-05-02 DIAGNOSIS — I10 ESSENTIAL HYPERTENSION: ICD-10-CM

## 2023-05-02 PROCEDURE — 99999 PR PBB SHADOW E&M-EST. PATIENT-LVL IV: CPT | Mod: PBBFAC,,, | Performed by: FAMILY MEDICINE

## 2023-05-02 PROCEDURE — 99214 OFFICE O/P EST MOD 30 MIN: CPT | Mod: PBBFAC,PN | Performed by: FAMILY MEDICINE

## 2023-05-02 PROCEDURE — 99999 PR PBB SHADOW E&M-EST. PATIENT-LVL IV: ICD-10-PCS | Mod: PBBFAC,,, | Performed by: FAMILY MEDICINE

## 2023-05-02 PROCEDURE — 99214 PR OFFICE/OUTPT VISIT, EST, LEVL IV, 30-39 MIN: ICD-10-PCS | Mod: S$PBB,,, | Performed by: FAMILY MEDICINE

## 2023-05-02 PROCEDURE — 99214 OFFICE O/P EST MOD 30 MIN: CPT | Mod: S$PBB,,, | Performed by: FAMILY MEDICINE

## 2023-05-02 NOTE — PROGRESS NOTES
" Patient ID: Sivakumar Thacker is a 68 y.o. male.    Chief Complaint: Follow-up (BW review)      Reviewed family, medical, surgical, and social history.    No cp/sob  No change in mental status  No fever  No asymmetrical limb swelling    Objective:      /72 (BP Location: Right arm, Patient Position: Sitting, BP Method: Medium (Manual))   Pulse 65   Resp 14   Ht 5' 8" (1.727 m)   Wt 94.5 kg (208 lb 6.4 oz)   SpO2 96%   BMI 31.69 kg/m²   RRR, CTAB, s/nt/nd, no c/c/e, non-toxic appearing, no focal weakness  Assessment:       1. Type 2 diabetes mellitus without complication, without long-term current use of insulin    2. Essential hypertension    3. MARKUS (obstructive sleep apnea)    4. Skin lesion        Plan:       Type 2 diabetes mellitus without complication, without long-term current use of insulin    Essential hypertension    MARKUS (obstructive sleep apnea)  -     Polysomnogram (CPAP will be added if patient meets diagnostic criteria.); Future    Skin lesion  -     Ambulatory referral/consult to Dermatology; Future; Expected date: 05/09/2023              Continue current medicines, any changes noted above  DM well controlled  Patient complains of poor sleep with snoring, witnessed apnea, fatigue, morning headaches with dry mouth, frequent naps, and daytime fatigue.    Labs, radiology studies, and procedures/notes from the last 3 months were reviewed.    Risks, benefits, and side effects were discussed with the patient. All questions were answered to the fullest satisfaction of the patient, and pt verbalized understanding and agreement to treatment plan. Pt was to call with any new or worsening symptoms, or present to the ER.    "

## 2023-05-03 ENCOUNTER — TELEPHONE (OUTPATIENT)
Dept: FAMILY MEDICINE | Facility: CLINIC | Age: 68
End: 2023-05-03
Payer: MEDICARE

## 2023-05-04 ENCOUNTER — TELEPHONE (OUTPATIENT)
Dept: DERMATOLOGY | Facility: CLINIC | Age: 68
End: 2023-05-04
Payer: MEDICARE

## 2023-05-22 ENCOUNTER — TELEPHONE (OUTPATIENT)
Dept: PULMONOLOGY | Facility: CLINIC | Age: 68
End: 2023-05-22

## 2023-05-22 ENCOUNTER — PATIENT MESSAGE (OUTPATIENT)
Dept: HEMATOLOGY/ONCOLOGY | Facility: CLINIC | Age: 68
End: 2023-05-22

## 2023-05-22 ENCOUNTER — TELEPHONE (OUTPATIENT)
Dept: HEMATOLOGY/ONCOLOGY | Facility: CLINIC | Age: 68
End: 2023-05-22

## 2023-05-22 ENCOUNTER — OFFICE VISIT (OUTPATIENT)
Dept: PULMONOLOGY | Facility: CLINIC | Age: 68
End: 2023-05-22
Payer: MEDICARE

## 2023-05-22 ENCOUNTER — LAB VISIT (OUTPATIENT)
Dept: LAB | Facility: HOSPITAL | Age: 68
End: 2023-05-22
Attending: INTERNAL MEDICINE
Payer: MEDICARE

## 2023-05-22 VITALS
DIASTOLIC BLOOD PRESSURE: 97 MMHG | HEART RATE: 76 BPM | SYSTOLIC BLOOD PRESSURE: 159 MMHG | HEIGHT: 68 IN | BODY MASS INDEX: 31.99 KG/M2 | OXYGEN SATURATION: 96 % | WEIGHT: 211.06 LBS

## 2023-05-22 DIAGNOSIS — G47.33 OBSTRUCTIVE SLEEP APNEA: ICD-10-CM

## 2023-05-22 DIAGNOSIS — C82.30 FOLLICULAR LYMPHOMA GRADE IIIA, UNSPECIFIED BODY REGION: ICD-10-CM

## 2023-05-22 DIAGNOSIS — D84.9 IMMUNE DEFICIENCY DISORDER: ICD-10-CM

## 2023-05-22 DIAGNOSIS — C82.30 FOLLICULAR LYMPHOMA GRADE IIIA, UNSPECIFIED BODY REGION: Primary | ICD-10-CM

## 2023-05-22 DIAGNOSIS — J47.9 BRONCHIECTASIS WITHOUT COMPLICATION: Primary | ICD-10-CM

## 2023-05-22 DIAGNOSIS — J44.9 CHRONIC OBSTRUCTIVE PULMONARY DISEASE, UNSPECIFIED COPD TYPE: ICD-10-CM

## 2023-05-22 DIAGNOSIS — J30.1 SEASONAL ALLERGIC RHINITIS DUE TO POLLEN: ICD-10-CM

## 2023-05-22 DIAGNOSIS — J47.0 BRONCHIECTASIS WITH ACUTE LOWER RESPIRATORY INFECTION: ICD-10-CM

## 2023-05-22 LAB
ALBUMIN SERPL BCP-MCNC: 4 G/DL (ref 3.5–5.2)
ALP SERPL-CCNC: 92 U/L (ref 55–135)
ALT SERPL W/O P-5'-P-CCNC: 25 U/L (ref 10–44)
ANION GAP SERPL CALC-SCNC: 12 MMOL/L (ref 8–16)
AST SERPL-CCNC: 20 U/L (ref 10–40)
BASOPHILS # BLD AUTO: 0.02 K/UL (ref 0–0.2)
BASOPHILS NFR BLD: 0.3 % (ref 0–1.9)
BILIRUB SERPL-MCNC: 0.6 MG/DL (ref 0.1–1)
BUN SERPL-MCNC: 15 MG/DL (ref 8–23)
CALCIUM SERPL-MCNC: 9.2 MG/DL (ref 8.7–10.5)
CHLORIDE SERPL-SCNC: 108 MMOL/L (ref 95–110)
CO2 SERPL-SCNC: 21 MMOL/L (ref 23–29)
CREAT SERPL-MCNC: 0.9 MG/DL (ref 0.5–1.4)
DIFFERENTIAL METHOD: ABNORMAL
EOSINOPHIL # BLD AUTO: 0.1 K/UL (ref 0–0.5)
EOSINOPHIL NFR BLD: 2 % (ref 0–8)
ERYTHROCYTE [DISTWIDTH] IN BLOOD BY AUTOMATED COUNT: 14 % (ref 11.5–14.5)
EST. GFR  (NO RACE VARIABLE): >60 ML/MIN/1.73 M^2
GLUCOSE SERPL-MCNC: 229 MG/DL (ref 70–110)
HCT VFR BLD AUTO: 41.9 % (ref 40–54)
HGB BLD-MCNC: 13.9 G/DL (ref 14–18)
IMM GRANULOCYTES # BLD AUTO: 0.01 K/UL (ref 0–0.04)
IMM GRANULOCYTES NFR BLD AUTO: 0.2 % (ref 0–0.5)
LYMPHOCYTES # BLD AUTO: 1.5 K/UL (ref 1–4.8)
LYMPHOCYTES NFR BLD: 25.8 % (ref 18–48)
MCH RBC QN AUTO: 27.5 PG (ref 27–31)
MCHC RBC AUTO-ENTMCNC: 33.2 G/DL (ref 32–36)
MCV RBC AUTO: 83 FL (ref 82–98)
MONOCYTES # BLD AUTO: 0.4 K/UL (ref 0.3–1)
MONOCYTES NFR BLD: 7.3 % (ref 4–15)
NEUTROPHILS # BLD AUTO: 3.8 K/UL (ref 1.8–7.7)
NEUTROPHILS NFR BLD: 64.4 % (ref 38–73)
NRBC BLD-RTO: 0 /100 WBC
PLATELET # BLD AUTO: 265 K/UL (ref 150–450)
PMV BLD AUTO: 8.8 FL (ref 9.2–12.9)
POTASSIUM SERPL-SCNC: 3.9 MMOL/L (ref 3.5–5.1)
PROT SERPL-MCNC: 7 G/DL (ref 6–8.4)
RBC # BLD AUTO: 5.05 M/UL (ref 4.6–6.2)
SODIUM SERPL-SCNC: 141 MMOL/L (ref 136–145)
WBC # BLD AUTO: 5.93 K/UL (ref 3.9–12.7)

## 2023-05-22 PROCEDURE — 99999 PR PBB SHADOW E&M-EST. PATIENT-LVL IV: ICD-10-PCS | Mod: PBBFAC,,, | Performed by: INTERNAL MEDICINE

## 2023-05-22 PROCEDURE — 99214 OFFICE O/P EST MOD 30 MIN: CPT | Mod: S$PBB,,, | Performed by: INTERNAL MEDICINE

## 2023-05-22 PROCEDURE — 36415 COLL VENOUS BLD VENIPUNCTURE: CPT | Performed by: INTERNAL MEDICINE

## 2023-05-22 PROCEDURE — 80053 COMPREHEN METABOLIC PANEL: CPT | Performed by: INTERNAL MEDICINE

## 2023-05-22 PROCEDURE — 99999 PR PBB SHADOW E&M-EST. PATIENT-LVL IV: CPT | Mod: PBBFAC,,, | Performed by: INTERNAL MEDICINE

## 2023-05-22 PROCEDURE — 99214 PR OFFICE/OUTPT VISIT, EST, LEVL IV, 30-39 MIN: ICD-10-PCS | Mod: S$PBB,,, | Performed by: INTERNAL MEDICINE

## 2023-05-22 PROCEDURE — 85025 COMPLETE CBC W/AUTO DIFF WBC: CPT | Performed by: INTERNAL MEDICINE

## 2023-05-22 PROCEDURE — 99214 OFFICE O/P EST MOD 30 MIN: CPT | Mod: PBBFAC,PO | Performed by: INTERNAL MEDICINE

## 2023-05-22 RX ORDER — AZITHROMYCIN 500 MG/1
500 TABLET, FILM COATED ORAL DAILY
Qty: 3 TABLET | Refills: 3 | Status: SHIPPED | OUTPATIENT
Start: 2023-05-22 | End: 2023-08-15

## 2023-05-22 RX ORDER — PREDNISONE 20 MG/1
TABLET ORAL
Qty: 12 TABLET | Refills: 0 | Status: SHIPPED | OUTPATIENT
Start: 2023-05-22 | End: 2023-08-31 | Stop reason: SDUPTHER

## 2023-05-22 NOTE — PROGRESS NOTES
2023    Sivakumar Thacker  Office note    Chief Complaint   Patient presents with    Follow-up     3 month f/u        HPI:      2023 took abx for 3 days for chest congestion with clear mucous.  Did will.  No suppressive abx, to resume chemo -rituxan--at q 8 RATHER than q4, on  ivig q 4 wks.  Not using needing nor using vest.    Uses resp meds only as needed. No regular use. Ox satt 95+.    2023 had pneumonia with hosp stay around new years,  -  admit  with ct and cxr worsening left lower lung -- cxr 2023 had improved left lower lung.  Ct chest 2021 with min bronchiectasis left lower lung.    No mucous from lungs. has aerobika but not regimental use.    Pt noted to have apnea during last stay [facility].  Pt's wife  august 3 yrs ago-- wife fx hip and blood clotts and prolonged recovery -- had massive clot.  Pt snores with snoring       Ct chest viewed from 2023 with lll worsening after admit, Nov and 2022 had density lll with suggested bronchiectasis  Patient Instructions   Bronchiectasis left lower lung may have been reason for left lower pneumonia last month.    You are doing well now    Add aerobika after albuterol inhaler or nebulizer 2-3 times daily to help clear lung mucous.    Follow up cxr in 2 months.      use vest for bronchiectasis at least daily.    Immune system may be better but bronchiectasis may cause recurrent lung problems.      May need antibiotic promptly for pneumonia.    Will follow up sleep study request-- normals may stop breathing up to 5 times an hour.      10/17/2022 pt had chest wall infection ppt admit- had chill acutely wk before and then developed chest pain with red swollen chest.     Pt grew out serratia-- ended up surg but no pus.  Pt was leukopenic.  Lungs had been doing well.  Pt was on suppressive abx but stopped-- ivig dose increased.  Rituxan stopped for now.  No clear source found.      Pt now off morphine and steroids.  Mucous now clear. On  cipro and to have drain removed in near future. No port.      Lungs doing well last 3-4 wks.  Uses aerobika and albuterol prn -- sounds minimal.        Ct chest 9/30/2022 mucous plugging and some cellulits suggested ant left chest near port runs from right to left subclavian.  Patient Instructions   Would do sputum culture if yellow mucous returns.     Use nebulizer -- would encourage daily at least with aerobika to clear lungs.   Would be most important to use with colored mucous.    May use prednisone for only 3 days-- if lungs short breath or severe bronchitis.    Will order igg level check for December with copy to Dr Ibarra.    Astelin nasal may help drip. Continue flonase.   8/15/2022 -- pt on rituxan and igg replacement for nhl.  Has green mucous.   Had freq sinus infections -- took augmentin last month for sinuses with good results.  On azithromycin 3/wk- last yr with improved chest rattle.   No asthma-- pft worsened with fev 108 % in 2020 to 76% 8/10/2022.   Patient Instructions   Immune system weakness would cause sinus/lung infections --  would check igg level prior to next infusion on Friday-- need to have good level to have protection all month long.  Use augmentin daily to suppress infections.  Hold off azithromycin  You may have infections from bronchiectasis (pseudomonas germ)-- special antibiotic would be needed. Occasionally iv antibiotics are needed.  Do sputum culture monthly if not doing well to assure antibiotic appropriate.  Pulmonary toilet needed-- breathing medication with flutter device at least 2/d.  Steroids help mucous clearing-- flovent is inhaled steroid-- trelegy has inhaled steroids and 24/7 bronchial medication-- use trelegy if available, otherwise use flovent..    Try to note which antibiotic effective.   Use tessalon as needed  Use prednisone for cough/wheezes.  Use once daily for 3 days.        Below from DAMI Georges  5/17/2022- Cough is persistent complaint, on Vest and  nebulizer therapy daily, antibiotic azithromycin 500 mg three days weekly,   Sinus complaints persistent.   Cough- coarse cough, productive yellow/green mucous. Associated with shortness of breath.   Patient Instructions   Will continue current Bronchiectasis therapy with daily vest and nebulizer treatments 1-2 times daily    Doxy antibiotic for 10 days then resume three day weekly azithromycin    Have next appointment with MD for further evaluation and treatment.   2/17/2022-  Noticed improvement after starting vest device and Azithromcyin 3x weekly, using nebulizer with Aerobika 2x daily. Prescription for azithromycin ran out 2 weeks prior.   Complaint of sinus drainage onset 4 weeks, clear drainage, tried flonase with benefit.      On IV IG for 4 years followed by Dr. Ibarra.     11/17/2021- Cough- persistent complaint, daily, through out, nocturnal arousals nightly, using albuterol nebulizer and vest 2x daily. Productive green mucous, dime size amount of mucous. tx with antibiotics for H.Flu with no benefit. Associated with chest tightness and wheeze. Tried Tesselon perles with no benefit. No benefit with codeine cough syrup.     8/17/2021- Cough- recurrent complaint, onset 3 days, worse in morning and during day, improve with albuterol nebulizer, able to clear yellow/green mucous. Started antibiotics 2 weeks ago due to green mucous, took Cipro, cough returned after finishing Cipro prescription. Nocturnal arousals 3x weekly.   Has aerobika from when wife was hospitalized, using.   Using nebulizer daily with Musinex.   SOB- worsened since finishing antibiotic, worse with exertion, no chest tightness or wheeze, improve with coughing up mucous. Associated with fatigue      5/17/2021- had persistent cough with occasional thick, yellow mucous onset January lasted 4 weeks, Felt rattle in chest., improved with antibiotics Augmentin.   has nebulizer but not currently using.   Current cough- mild, 2x weekly, feels  mucous in chest but unable to cough up.    No shortness of breath, chest tightness, or wheeze.    11/17/2020- states doing well, cough has decreased to occasional to 2 times weekly that is mild, productive clear mucous.   States no fever, night sweats, shortness of breath,   Complaint of sinus drainage: daily, clear, singulair most days,      8/17/20- Cough- improved following antibiotic therapy, returned after 8 weeks, productive, light green color, dime size, worse in early morning, no chest tightness or wheeze.  No SOB. Using nebulizer 2x daily for 2 weeks, undergoing chemo and IVIG for Lymphoma and skin cancer.     6/16/2020- Cough unchanged with coughing fits when trying to sleep. States no improvement with Trelegy inhaler. States Levaquin antibiotic help a little bit and cough returned after finishing. Has been on IgG replacement therapy every 4 weeks since 2012.  Brought in 5 sputum sample, all still processing.       4/20/2020- Chronic cough onset 2013, followed by pulmonologist Dr. Case in [place]. Put Advair with no benefit. Had negative bronchoscopy and negative AFB, had appointment with Dr. Brock in 2014. Hx of non Hodgkins lymphoma followed by Dr. Ibarra on Rotuxin and immune therapy.  Cough Worsened in Jan 2020, associated with recurrent fever improves with tylenol, states cough improves with antibiotics but returns when finished medications. Productive occasionally, can feel something rattling in chest, thick white mucous. 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 2 pack year  Family Hx: no Lung Cancer, no COPD, no Asthma  Medical Hx: no previous pneumonia ; no previous shoulder/chest surgery        The chief compliant   problem is stable  PFSH:  Past Medical History:   Diagnosis Date    Chest wall abscess 2022    Chronic obstructive pulmonary disease, unspecified COPD type 3/28/2022    Diabetes mellitus, type 2     Encounter for antineoplastic chemotherapy 2020    Hypertension     Hypogammaglobulinemia 2019    Neuropathy     Non Hodgkin's lymphoma     Reflux esophagitis     Ringing in ear, bilateral          Past Surgical History:   Procedure Laterality Date    COLONOSCOPY  2018    EYE SURGERY  Approximately 3 years ago    Cataract    HAND SURGERY      amputation left index finger    INCISION AND DRAINAGE OF ABSCESS Left 10/3/2022    Procedure: INCISION AND DRAINAGE, ABSCESS;  Surgeon: Franco Venegas III, MD;  Location: [facility];  Service: General;  Laterality: Left;  axilla    INSERTION OF TUNNELED CENTRAL VENOUS CATHETER (CVC) WITH SUBCUTANEOUS PORT Left 2023    Procedure: [id number];  Surgeon: Franco Venegas III, MD;  Location: [facility] OR;  Service: General;  Laterality: Left;    MEDIPORT REMOVAL Right 10/3/2022    Procedure: REMOVAL, CATHETER, CENTRAL VENOUS, TUNNELED, WITH PORT;  Surgeon: Franco Venegas III, MD;  Location: [facility];  Service: General;  Laterality: Right;    PORTACATH PLACEMENT      SKIN CANCER EXCISION  2020    [facility]    SPINE SURGERY      VASECTOMY  1985 ?     Social History     Tobacco Use    Smoking status: Former     Packs/day: 0.50     Years: 2.00     Pack years: 1.00     Types: Cigarettes     Quit date:      Years since quittin.4    Smokeless tobacco: Never   Substance Use Topics    Alcohol use: Not Currently     Comment: rarely    Drug use: No     Family History   Problem Relation Age of Onset    Diabetes Father      Review of patient's allergies indicates:  No Known Allergies  I have reviewed past medical, family, and social history. I have reviewed previous nurse notes.    Performance Status:The patient's activity level is no limits with regular  "activity.      Review of Systems:  a review of eleven systems covering constitutional, Eye, HEENT, Psych, Respiratory, Cardiac, GI, , Musculoskeletal, Endocrine, Dermatologic was negative except for pertinent findings as listed ABOVE and below: pertinent positive as above, rest is good  Cough, shortness of breath, sinus drainage         Exam:Comprehensive exam done. BP (!) 159/97 (BP Location: Left arm, Patient Position: Sitting, BP Method: Medium (Automatic))   Pulse 76   Ht 5' 8" (1.727 m)   Wt 95.8 kg (211 lb 1.5 oz)   SpO2 96% Comment: on room air at rest  BMI 32.10 kg/m²   Exam included Vitals as listed, and patient's appearance and affect and alertness and mood, oral exam for yeast and hygiene and pharynx lesions and Mallapatti (M) score, neck with inspection for jvd and masses and thyroid abnormalities and lymph nodes (supraclavicular and infraclavicular nodes and axillary also examined and noted if abn), chest exam included symmetry and effort and fremitus and percussion and auscultation, cardiac exam included rhythm and gallops and murmur and rubs and jvd and edema, abdominal exam for mass and hepatosplenomegaly and tenderness and hernias and bowel sounds, Musculoskeletal exam with muscle tone and posture and mobility/gait and  strength, and skin for rashes and cyanosis and pallor and turgor, extremity for clubbing.  Findings were normal except for pertinent findings listed below: M2,lungs clear, No clubbing.      Radiographs (ct chest and cxr) reviewed: view by direct vision   Ct chest 9/30/2022 mucous plugging and some cellulits suggested ant left chest near port runs from right to left subclavian.    NM PET CT Routine Skull to Mid Thigh 4/5/2022 visualized improvement in bilateral lower lobe mucous plugging when compared to 10/4/21 scan  CT images of the chest show no suspicious pulmonary nodules or masses, with no pleural or pericardial effusion. There is scattered linear subsegmental " atelectasis in both lungs, with aortic and coronary arterial calcifications.     NM PET CT Routine Skull to Mid Thigh 10/04/2021   New bilateral reticulonodular pulmonary opacities suggesting infectious or inflammatory pneumonitis. Clinical and laboratory correlation is requested.     CT CHEST WITHOUT CONTRAST  06/21/2021   Granulomatous change.  2. Multiple small pulmonary nodules.  Follow-up per Fleischner guidelines.  For multiple solid nodules all <6 mm, Fleischner Society 2017 guidelines recommend no routine follow up for a low risk patient, or follow up with non-contrast chest CT at 12 months after discovery in a high risk patient.  3. Minimal bronchiectasis of the bilateral lower lobes.  4. No significant lymphadenopathy.      NM PET CT Routine Skull to Mid Thigh 03/19/21   1. Mild patchy airspace opacity in the superior segment of the left  lower lobe with increased FDG activity from background suggestive of a  small focal pneumonia (possibly viral in etiology). Consider  correlation with the symptoms, history and CT follow-up in 3 months to  document resolution.    X-Ray Chest PA And Lateral  03/25/2021   Mild interstitial prominence, similar to previous exams.  No focal consolidation.     CT Chest Without 03/19/2020   1. Mild bronchiectasis in the left lower lobe and tree-in-bud micro nodules at the left lung base suggesting underlying bronchiolitis, possibly due to a non tuberculous mycobacterial infection or viral bronchiolitis.  2. Fatty infiltration of the liver  3. Small hiatal hernia.     CT Chest Without Contrast 09/01/2020   Unchanged left upper lobe 3 mm nodule.  Mild old granulomatous disease.  Minimal bronchiectasis which is nonspecific.  Adjacent micro nodules and tree-in-bud opacities are also unchanged.  Findings favor sequelae of an old atypical infectious process.  Atherosclerosis.  Small hiatal hernia.      Labs reviewed       Lab Results   Component Value Date    WBC 8.48 04/14/2023     RBC 5.09 04/14/2023    HGB 13.8 (L) 04/14/2023    HCT 42.3 04/14/2023    MCV 83 04/14/2023    MCH 27.1 04/14/2023    MCHC 32.6 04/14/2023    RDW 14.7 (H) 04/14/2023     04/14/2023    MPV 9.0 (L) 04/14/2023    GRAN 5.9 04/14/2023    GRAN 69.7 04/14/2023    LYMPH 1.7 04/14/2023    LYMPH 20.3 04/14/2023    MONO 0.6 04/14/2023    MONO 6.6 04/14/2023    EOS 0.2 04/14/2023    BASO 0.03 04/14/2023    EOSINOPHIL 2.8 04/14/2023    BASOPHIL 0.4 04/14/2023       AFB x 3 all negative following 8 week incubation, Respiratory cultures show normal dank only    Culture, Respiratory with Gram Stain 09/18/20 HAEMOPHILUS INFLUENZAE   Culture, Respiratory with Gram Stain 1/21/2021 HAEMOPHILUS INFLUENZAE   Respiratory with Gram Stain 7/22/21, PSEUDOMONAS AERUGINOSA and HAEMOPHILUS INFLUENZAE       PFT  reviewed  Pulmonary Functions Testing Results:  Spirometry bronchodilator, lung volume by gas dilution, diffusion capacity measured June 23, 2020.  The FEV1 FVC ratio was 80% indicating no airflow obstruction by spirometry technique.  The FEV1 was 108% of predicted or 3.5 L.  There was no   significant improvement following bronchodilator.  Total lung capacity on lung volume by gas dilution was 69% and a little reduced.  Diffusion was normal.       Spirometry was normal and was no significant bronchodilator response.  Total lung capacity was reduced consistent with restriction.  Diffusion was normal.  Clinical correlation recommended.     7/17/2013 PFT Data: Complete pulmonary function studies were obtained today and are independently reviewed. Spirometry shows no evidence of airflow obstruction today. Lung was performed by plethysmography and are consistent with air trapping without hyperinflation. Residual volume is 122% predicted, and the total capacity is 98% predicted. Diffusion capacity for carbon monoxide is in the normal range at 102% predicted. Overall, these are fairly normal pulmonary function studies and do not suggest  any obstructive lung disease.    Spirometry bronchodilator, lung volume by gas dilution, diffusion capacity measured June 23, 2020.  The FEV1 FVC ratio was 80% indicating no airflow obstruction by spirometry technique.  The FEV1 was 108% of predicted or 3.5 L.  There was no   significant improvement following bronchodilator.  Total lung capacity on lung volume by gas dilution was 69% and a little reduced.  Diffusion was normal.       Spirometry was normal and was no significant bronchodilator response.  Total lung capacity was reduced consistent with restriction.  Diffusion was normal.  Clinical correlation recommended.        I spent over 30 mins of time with the patient. Reviewed results of the recently ordered labs, tests and studies; made directives with regards to the results. Over half of this time was spent couseling and coordinating care.     I have explained all of the above in detail and the patient understands all of the current recommendation(s). I have answered all of their questions to the best of my ability and to their complete satisfaction.   The patient is to continue with the current management plan.      Plan:  Clinical impression is apparently straight forward and impression with management as below.    Sivakumar was seen today for follow-up.    Diagnoses and all orders for this visit:    Bronchiectasis without complication  -     azithromycin (ZITHROMAX) 500 MG tablet; Take 1 tablet (500 mg total) by mouth once daily. Repeat for yellow mucous  -     predniSONE (DELTASONE) 20 MG tablet; Take one daily for 3 days and may repeat for shortness of breath.    Bronchiectasis with acute lower respiratory infection  -     predniSONE (DELTASONE) 20 MG tablet; Take one daily for 3 days and may repeat for shortness of breath.    Seasonal allergic rhinitis due to pollen    Chronic obstructive pulmonary disease, unspecified COPD type  -     predniSONE (DELTASONE) 20 MG tablet; Take one daily for 3 days and may  repeat for shortness of breath.    Immune deficiency disorder  -     azithromycin (ZITHROMAX) 500 MG tablet; Take 1 tablet (500 mg total) by mouth once daily. Repeat for yellow mucous    Obstructive sleep apnea              Follow up in about 6 months (around 11/22/2023), or if symptoms worsen or fail to improve.    Discussed with patient above for education the following:      Patient Instructions   Chest x ray 2/2023 cleared from ct chest 1/2023 left lung density-- films viewed.     Use prompt antibiotic and prednisone for exacerbations-- immune system is stronger but Rituxan to be resumed.      Do chest xray if lungs not doing well.    Call if not doing well.          Sleep study was to be done-- not done yet.   Will ask staff to arrange sleep study at home.  You are mouth breathing-- if over 5 episodes hourly (less than 5 would be considered normal) would treat with auto titrate cpap.      We discuss sleep apnea.

## 2023-05-22 NOTE — PATIENT INSTRUCTIONS
Chest x ray 2/2023 cleared from ct chest 1/2023 left lung density-- films viewed.     Use prompt antibiotic and prednisone for exacerbations-- immune system is stronger but Rituxan to be resumed.      Do chest xray if lungs not doing well.    Call if not doing well.          Sleep study was to be done-- not done yet.   Will ask staff to arrange sleep study at home.  You are mouth breathing-- if over 5 episodes hourly (less than 5 would be considered normal) would treat with auto titrate cpap.      We discuss sleep apnea.

## 2023-05-24 RX ORDER — HEPARIN 100 UNIT/ML
500 SYRINGE INTRAVENOUS
Status: CANCELLED | OUTPATIENT
Start: 2023-05-25

## 2023-05-24 RX ORDER — ACETAMINOPHEN 325 MG/1
650 TABLET ORAL
Status: CANCELLED | OUTPATIENT
Start: 2023-05-25

## 2023-05-24 RX ORDER — SODIUM CHLORIDE 0.9 % (FLUSH) 0.9 %
10 SYRINGE (ML) INJECTION
Status: CANCELLED | OUTPATIENT
Start: 2023-05-25

## 2023-05-24 RX ORDER — FAMOTIDINE 10 MG/ML
20 INJECTION INTRAVENOUS
Status: CANCELLED | OUTPATIENT
Start: 2023-05-25

## 2023-05-24 RX ORDER — MEPERIDINE HYDROCHLORIDE 25 MG/ML
25 INJECTION INTRAMUSCULAR; INTRAVENOUS; SUBCUTANEOUS
Status: CANCELLED | OUTPATIENT
Start: 2023-05-25 | End: 2023-05-25

## 2023-05-25 ENCOUNTER — INFUSION (OUTPATIENT)
Dept: INFUSION THERAPY | Facility: HOSPITAL | Age: 68
End: 2023-05-25
Attending: INTERNAL MEDICINE
Payer: MEDICARE

## 2023-05-25 VITALS
DIASTOLIC BLOOD PRESSURE: 77 MMHG | HEART RATE: 80 BPM | SYSTOLIC BLOOD PRESSURE: 149 MMHG | BODY MASS INDEX: 32.43 KG/M2 | OXYGEN SATURATION: 97 % | TEMPERATURE: 98 F | WEIGHT: 214 LBS | HEIGHT: 68 IN | RESPIRATION RATE: 18 BRPM

## 2023-05-25 DIAGNOSIS — D80.1 HYPOGAMMAGLOBULINEMIA: Primary | ICD-10-CM

## 2023-05-25 DIAGNOSIS — C82.30 FOLLICULAR LYMPHOMA GRADE IIIA, UNSPECIFIED BODY REGION: ICD-10-CM

## 2023-05-25 PROCEDURE — 96413 CHEMO IV INFUSION 1 HR: CPT

## 2023-05-25 PROCEDURE — 96415 CHEMO IV INFUSION ADDL HR: CPT

## 2023-05-25 PROCEDURE — 63600175 PHARM REV CODE 636 W HCPCS: Performed by: INTERNAL MEDICINE

## 2023-05-25 PROCEDURE — 96375 TX/PRO/DX INJ NEW DRUG ADDON: CPT

## 2023-05-25 PROCEDURE — 25000003 PHARM REV CODE 250: Performed by: INTERNAL MEDICINE

## 2023-05-25 PROCEDURE — 96367 TX/PROPH/DG ADDL SEQ IV INF: CPT

## 2023-05-25 RX ORDER — MEPERIDINE HYDROCHLORIDE 25 MG/ML
25 INJECTION INTRAMUSCULAR; INTRAVENOUS; SUBCUTANEOUS
Status: DISCONTINUED | OUTPATIENT
Start: 2023-05-25 | End: 2023-05-25 | Stop reason: HOSPADM

## 2023-05-25 RX ORDER — HEPARIN 100 UNIT/ML
500 SYRINGE INTRAVENOUS
Status: DISCONTINUED | OUTPATIENT
Start: 2023-05-25 | End: 2023-05-25 | Stop reason: HOSPADM

## 2023-05-25 RX ORDER — ACETAMINOPHEN 325 MG/1
650 TABLET ORAL
Status: COMPLETED | OUTPATIENT
Start: 2023-05-25 | End: 2023-05-25

## 2023-05-25 RX ORDER — SODIUM CHLORIDE 0.9 % (FLUSH) 0.9 %
10 SYRINGE (ML) INJECTION
Status: DISCONTINUED | OUTPATIENT
Start: 2023-05-25 | End: 2023-05-25 | Stop reason: HOSPADM

## 2023-05-25 RX ORDER — FAMOTIDINE 10 MG/ML
20 INJECTION INTRAVENOUS
Status: COMPLETED | OUTPATIENT
Start: 2023-05-25 | End: 2023-05-25

## 2023-05-25 RX ADMIN — SODIUM CHLORIDE: 0.9 INJECTION, SOLUTION INTRAVENOUS at 07:05

## 2023-05-25 RX ADMIN — DIPHENHYDRAMINE HYDROCHLORIDE 50 MG: 50 INJECTION INTRAMUSCULAR; INTRAVENOUS at 07:05

## 2023-05-25 RX ADMIN — FAMOTIDINE 20 MG: 10 INJECTION INTRAVENOUS at 07:05

## 2023-05-25 RX ADMIN — ACETAMINOPHEN 650 MG: 325 TABLET ORAL at 07:05

## 2023-05-25 RX ADMIN — HEPARIN 500 UNITS: 100 SYRINGE at 11:05

## 2023-05-25 RX ADMIN — RITUXIMAB 800 MG: 10 INJECTION, SOLUTION INTRAVENOUS at 08:05

## 2023-05-25 NOTE — PLAN OF CARE
Problem: Fatigue  Goal: Improved Activity Tolerance  Outcome: Ongoing, Progressing  Intervention: Promote Improved Energy  Flowsheets (Taken 5/25/2023 2261)  Fatigue Management:   fatigue-related activity identified   frequent rest breaks encouraged   paced activity encouraged  Sleep/Rest Enhancement:   regular sleep/rest pattern promoted   reading promoted   relaxation techniques promoted

## 2023-05-26 ENCOUNTER — OFFICE VISIT (OUTPATIENT)
Dept: HEMATOLOGY/ONCOLOGY | Facility: CLINIC | Age: 68
End: 2023-05-26
Payer: MEDICARE

## 2023-05-26 ENCOUNTER — INFUSION (OUTPATIENT)
Dept: INFUSION THERAPY | Facility: HOSPITAL | Age: 68
End: 2023-05-26
Attending: INTERNAL MEDICINE
Payer: MEDICARE

## 2023-05-26 VITALS
SYSTOLIC BLOOD PRESSURE: 175 MMHG | OXYGEN SATURATION: 98 % | RESPIRATION RATE: 18 BRPM | DIASTOLIC BLOOD PRESSURE: 78 MMHG | HEIGHT: 68 IN | TEMPERATURE: 98 F | HEART RATE: 76 BPM | WEIGHT: 212 LBS | BODY MASS INDEX: 32.13 KG/M2

## 2023-05-26 VITALS
TEMPERATURE: 98 F | WEIGHT: 212 LBS | SYSTOLIC BLOOD PRESSURE: 170 MMHG | DIASTOLIC BLOOD PRESSURE: 82 MMHG | HEART RATE: 68 BPM | RESPIRATION RATE: 16 BRPM | HEIGHT: 68 IN | OXYGEN SATURATION: 98 % | BODY MASS INDEX: 32.13 KG/M2

## 2023-05-26 DIAGNOSIS — D80.1 HYPOGAMMAGLOBULINEMIA: Primary | ICD-10-CM

## 2023-05-26 DIAGNOSIS — D80.1 NONFAMILIAL HYPOGAMMAGLOBULINEMIA: ICD-10-CM

## 2023-05-26 DIAGNOSIS — C82.30 FOLLICULAR LYMPHOMA GRADE IIIA, UNSPECIFIED BODY REGION: Primary | ICD-10-CM

## 2023-05-26 DIAGNOSIS — C82.30 FOLLICULAR LYMPHOMA GRADE IIIA, UNSPECIFIED BODY REGION: ICD-10-CM

## 2023-05-26 PROCEDURE — 63600175 PHARM REV CODE 636 W HCPCS: Mod: JZ,JA,JG | Performed by: NURSE PRACTITIONER

## 2023-05-26 PROCEDURE — 96367 TX/PROPH/DG ADDL SEQ IV INF: CPT

## 2023-05-26 PROCEDURE — A4216 STERILE WATER/SALINE, 10 ML: HCPCS | Performed by: INTERNAL MEDICINE

## 2023-05-26 PROCEDURE — 96413 CHEMO IV INFUSION 1 HR: CPT

## 2023-05-26 PROCEDURE — 63600175 PHARM REV CODE 636 W HCPCS: Performed by: INTERNAL MEDICINE

## 2023-05-26 PROCEDURE — 99214 OFFICE O/P EST MOD 30 MIN: CPT | Mod: S$GLB,,, | Performed by: NURSE PRACTITIONER

## 2023-05-26 PROCEDURE — 25000003 PHARM REV CODE 250: Performed by: INTERNAL MEDICINE

## 2023-05-26 PROCEDURE — 99214 PR OFFICE/OUTPT VISIT, EST, LEVL IV, 30-39 MIN: ICD-10-PCS | Mod: S$GLB,,, | Performed by: NURSE PRACTITIONER

## 2023-05-26 PROCEDURE — 96415 CHEMO IV INFUSION ADDL HR: CPT

## 2023-05-26 RX ORDER — HEPARIN 100 UNIT/ML
500 SYRINGE INTRAVENOUS
Status: CANCELLED | OUTPATIENT
Start: 2023-06-23

## 2023-05-26 RX ORDER — SODIUM CHLORIDE 0.9 % (FLUSH) 0.9 %
10 SYRINGE (ML) INJECTION
Status: DISCONTINUED | OUTPATIENT
Start: 2023-05-26 | End: 2023-05-26 | Stop reason: HOSPADM

## 2023-05-26 RX ORDER — HEPARIN 100 UNIT/ML
500 SYRINGE INTRAVENOUS
Status: DISCONTINUED | OUTPATIENT
Start: 2023-05-26 | End: 2023-05-26 | Stop reason: HOSPADM

## 2023-05-26 RX ORDER — SODIUM CHLORIDE 0.9 % (FLUSH) 0.9 %
10 SYRINGE (ML) INJECTION
Status: CANCELLED | OUTPATIENT
Start: 2023-06-23

## 2023-05-26 RX ADMIN — DIPHENHYDRAMINE HYDROCHLORIDE 25 MG: 50 INJECTION INTRAMUSCULAR; INTRAVENOUS at 08:05

## 2023-05-26 RX ADMIN — HEPARIN 500 UNITS: 100 SYRINGE at 10:05

## 2023-05-26 RX ADMIN — SODIUM CHLORIDE, PRESERVATIVE FREE 10 ML: 5 INJECTION INTRAVENOUS at 10:05

## 2023-05-26 RX ADMIN — IMMUNE GLOBULIN (HUMAN) 50 G: 10 INJECTION INTRAVENOUS; SUBCUTANEOUS at 08:05

## 2023-05-26 NOTE — PROGRESS NOTES
"  [facility] Hematology/Oncology  PROGRESS NOTE -  Follow-up Visit      Subjective:       Patient ID:   NAME: Sivakumar Thacker : 1955     68 y.o. male    Referring Doc: Barry Mcmahon (PCP in [place])  Other Physicians: Vinod Chen/José Manuel      Chief Complaint:  NHL f/u    History of Present Illness:     Patient returns today for a regularly scheduled follow-up visit.  The patient is here today to go over the results of the recently ordered labs, tests and studies. He is here by himself today    He is feeling "great" and breathing better;     He previously had telemed with Dr Abarca and he wants him off the rituximab, however the patient is not in agreement with this and wants to continue every 12 weeks.     He denies any CP, SOB, HA's or N/V.    He has been on IV IgG and is doing well, no new infections.       Recent PET on 2023 with no evidence of lymphoma will repeat PET in July     I discussed continued Covid19 precautions        ROS:   Answers submitted by the patient for this visit:  Review of Systems Questionnaire (Submitted on 2023)  appetite change : No  unexpected weight change: No  mouth sores: No  visual disturbance: No  cough: No  shortness of breath: No  chest pain: No  abdominal pain: No  diarrhea: No  frequency: No  back pain: No  rash: No  headaches: No  adenopathy: No  nervous/ anxious: No    Allergies:  Review of patient's allergies indicates:  No Known Allergies    Medications:    Current Outpatient Medications:     albuterol (PROVENTIL/VENTOLIN HFA) 90 mcg/actuation inhaler, 2 puffs every 4 hours as needed for cough, wheeze, or shortness of breath, Disp: 18 g, Rfl: 11    albuterol-ipratropium (DUO-NEB) 2.5 mg-0.5 mg/3 mL nebulizer solution, Take 3 mLs by nebulization every 6 (six) hours as needed for Wheezing. Rescue, Disp: 120 each, Rfl: 11    amLODIPine (NORVASC) 10 MG tablet, Take 1 tablet (10 mg total) by mouth once daily., Disp: 90 tablet, Rfl: 3    aspirin (ECOTRIN) 81 MG EC " tablet, Take 81 mg by mouth once daily., Disp: , Rfl:     atorvastatin (LIPITOR) 20 MG tablet, Take 1 tablet (20 mg total) by mouth once daily., Disp: 90 tablet, Rfl: 3    azithromycin (ZITHROMAX) 500 MG tablet, Take 1 tablet (500 mg total) by mouth once daily. Repeat for yellow mucous, Disp: 3 tablet, Rfl: 3    blood sugar diagnostic (FREESTYLE LITE STRIPS MISC), FreeStyle Lite Strips, Disp: , Rfl:     blood sugar diagnostic Strp, , Disp: , Rfl:     fluticasone propionate (FLONASE) 50 mcg/actuation nasal spray, 1 spray (50 mcg total) by Each Nostril route 2 (two) times daily., Disp: 48 g, Rfl: 11    gabapentin (NEURONTIN) 300 MG capsule, Take 1 capsule (300 mg total) by mouth 2 (two) times daily., Disp: 180 capsule, Rfl: 3    guaiFENesin (MUCINEX) 600 mg 12 hr tablet, Take 2 tablets (1,200 mg total) by mouth 2 (two) times daily., Disp: 60 tablet, Rfl: 0    hydrocodone-chlorpheniramine (TUSSIONEX) 10-8 mg/5 mL suspension, Take 5 mLs by mouth every 12 (twelve) hours as needed for Cough ((DO NOT TAKE WITHIN 4 HOURS OF NORCO/HYDROCODONE-ACETAMINOPHEN TABLETS))., Disp: 115 mL, Rfl: 0    losartan (COZAAR) 100 MG tablet, Take 1 tablet (100 mg total) by mouth once daily., Disp: 90 tablet, Rfl: 3    metFORMIN (GLUCOPHAGE) 500 MG tablet, Take 2 tablets twice a day by oral route with meals for 90 days., Disp: 180 tablet, Rfl: 3    montelukast (SINGULAIR) 10 mg tablet, Take 1 tablet (10 mg total) by mouth nightly as needed., Disp: 90 tablet, Rfl: 3    mucus clearing device (ACAPELLA, FLUTTER), 1 Device by Misc.(Non-Drug; Combo Route) route 2 (two) times daily., Disp: 1 Device, Rfl: 0    MULTIVITAMIN ORAL, Take 1 tablet by mouth once daily., Disp: , Rfl:     naproxen (NAPROSYN) 500 MG tablet, Take 1 tablet (500 mg total) by mouth 2 (two) times daily as needed., Disp: 180 tablet, Rfl: 3    omeprazole (PRILOSEC) 40 MG capsule, Take 1 capsule (40 mg total) by mouth once daily., Disp: 90 capsule, Rfl: 3    predniSONE (DELTASONE) 20  "MG tablet, Take one daily for 3 days and may repeat for shortness of breath., Disp: 12 tablet, Rfl: 0    tiZANidine (ZANAFLEX) 4 MG tablet, Take 1 tablet (4 mg total) by mouth daily as needed., Disp: 90 tablet, Rfl: 3  No current facility-administered medications for this visit.    Facility-Administered Medications Ordered in Other Visits:     heparin, porcine (PF) 100 unit/mL injection flush 500 Units, 500 Units, Intravenous, PRN, Jefferson Ibarra MD    immune globulin (human) (IGG) injection 10% (GAMUNEX), 50 g, Intravenous, 1 time in Clinic/HOD, Kaylee Stevens, NP, 50 g at 05/26/23 0830    sodium chloride 0.9% 100 mL flush bag, , Intravenous, 1 time in Clinic/HOD, Jefferson Ibarra MD    sodium chloride 0.9% flush 10 mL, 10 mL, Intravenous, PRN, Jefferson Ibarra MD    PMHx/PSHx Updates:  See patient's last visit with  on 4/26/2023  See H&P on 1/8/2019        Pathology:  Cancer Staging  No matching staging information was found for the patient.          Objective:     Vitals:  Blood pressure (!) 175/78, pulse 76, temperature 98.2 °F (36.8 °C), resp. rate 18, height 5' 8" (1.727 m), weight 96.2 kg (212 lb), SpO2 98 %.    Physical Examination:   GEN: no apparent distress, comfortable; AAOx3  HEAD: atraumatic and normocephalic  EYES: no pallor, no icterus, PERRLA  ENT: OMM, no pharyngeal erythema, external ears WNL; no nasal discharge; no thrush;    NECK: no masses, thyroid normal, trachea midline, no LAD/LN's, supple  CV: RRR with no murmur; normal pulse; normal S1 and S2; no pedal edema; portacath replaced on left chest wall  CHEST: Normal respiratory effort; clear lung sounds  ABDOM: nontender and nondistended; soft; normal bowel sounds; no rebound/guarding  MUSC/Skeletal: ROM normal; no crepitus; joints normal; no deformities or arthropathy  EXTREM: no clubbing, cyanosis, inflammation or swelling  SKIN: no rashes, lesions, ulcers, petechiae or subcutaneous nodules; no current wound " issues  : no gibbs  NEURO: grossly intact; motor/sensory WNL; AAOx3; no tremors  PSYCH: normal mood, affect and behavior  LYMPH: normal cervical, supraclavicular, axillary and groin LN's            Labs:              Lab Results   Component Value Date    WBC 5.93 05/22/2023    HGB 13.9 (L) 05/22/2023    HCT 41.9 05/22/2023    MCV 83 05/22/2023     05/22/2023     CMP  Sodium   Date Value Ref Range Status   05/22/2023 141 136 - 145 mmol/L Final   04/25/2019 144 134 - 144 mmol/L      Potassium   Date Value Ref Range Status   05/22/2023 3.9 3.5 - 5.1 mmol/L Final     Chloride   Date Value Ref Range Status   05/22/2023 108 95 - 110 mmol/L Final   04/25/2019 107 98 - 110 mmol/L      CO2   Date Value Ref Range Status   05/22/2023 21 (L) 23 - 29 mmol/L Final     Glucose   Date Value Ref Range Status   05/22/2023 229 (H) 70 - 110 mg/dL Final   04/25/2019 177 (H) 70 - 99 mg/dL      BUN   Date Value Ref Range Status   05/22/2023 15 8 - 23 mg/dL Final     Creatinine   Date Value Ref Range Status   05/22/2023 0.9 0.5 - 1.4 mg/dL Final   04/25/2019 0.83 0.60 - 1.40 mg/dL      Calcium   Date Value Ref Range Status   05/22/2023 9.2 8.7 - 10.5 mg/dL Final     Total Protein   Date Value Ref Range Status   05/22/2023 7.0 6.0 - 8.4 g/dL Final     Albumin   Date Value Ref Range Status   05/22/2023 4.0 3.5 - 5.2 g/dL Final   04/25/2019 4.4 3.1 - 4.7 g/dL      Total Bilirubin   Date Value Ref Range Status   05/22/2023 0.6 0.1 - 1.0 mg/dL Final     Comment:     For infants and newborns, interpretation of results should be based  on gestational age, weight and in agreement with clinical  observations.    Premature Infant recommended reference ranges:  Up to 24 hours.............<8.0 mg/dL  Up to 48 hours............<12.0 mg/dL  3-5 days..................<15.0 mg/dL  6-29 days.................<15.0 mg/dL       Alkaline Phosphatase   Date Value Ref Range Status   05/22/2023 92 55 - 135 U/L Final     AST   Date Value Ref Range Status    05/22/2023 20 10 - 40 U/L Final     ALT   Date Value Ref Range Status   05/22/2023 25 10 - 44 U/L Final     Anion Gap   Date Value Ref Range Status   05/22/2023 12 8 - 16 mmol/L Final     eGFR if    Date Value Ref Range Status   07/21/2022 >60.0 >60 mL/min/1.73 m^2 Final     eGFR if non    Date Value Ref Range Status   07/21/2022 >60.0 >60 mL/min/1.73 m^2 Final     Comment:     Calculation used to obtain the estimated glomerular filtration  rate (eGFR) is the CKD-EPI equation.            Radiology/Diagnostic Studies:    PET 1/5/2023:    IMPRESSION: Diffuse reticular nodular and groundglass opacities the left lower lobe, posterior right upper lobe and posterior medial right lower lobe compatible with multifocal pneumonia. Follow-up chest CT in three months is recommended     Diffuse FDG activity throughout the axial skeleton suggestive of bone marrow hyperstimulation     No evidence of recurrent or metastatic disease        PET  4/5/2022:    IMPRESSION: No evidence of recurrent or active lymphoproliferative disease        PET 10/4/2021:  Impression:     1. Negative for FDG avid recurrent lymphoproliferative disease.  2. New left maxillary sinusitis.  3. New bilateral reticulonodular pulmonary opacities suggesting infectious or inflammatory pneumonitis.  Clinical and laboratory correlation is requested.  4. Coronary artery calcification.         CXR  3/25/2021:    Impression:     Mild interstitial prominence, similar to previous exams.  No focal consolidation      PET 3/19/2021:  IMPRESSION:  1. Mild patchy airspace opacity in the superior segment of the left  lower lobe with increased FDG activity from background suggestive of a  small focal pneumonia (possibly viral in etiology). Consider  correlation with the symptoms, history and CT follow-up in 3 months to  document resolution.  2. No FDG avid lymphadenopathy or additional sites of disease.         PET  9/2/2020:      Impression:     Negative for active lymphoproliferative disease.         Chest CT  3/19/2020:      Impression       1. Mild bronchiectasis in the left lower lobe and tree-in-bud micro nodules at the left lung base suggesting underlying bronchiolitis, possibly due to a non tuberculous mycobacterial infection or viral bronchiolitis.  2. Fatty infiltration of the liver  3. Small hiatal hernia.           CXR  1/31/2020    Impression       1. No acute chest disease.  2. Left subclavian Port-A-Cath.               PET 9/11/2019   Impression       No evidence of FDG avid residual or recurrent lymphoma       I have reviewed all available lab results and radiology reports.    Assessment/Plan:   (1) 68 y.o. male with diagnosis of NHL Follicular Stage IIIA who has been referred by Dr Gary Chen with [facility] for continuation of care by medical hematology/oncology.   - He originally was diagnosed in 2012 and had parotid excision at [facility]. He subsequently went to [facility] and was treated with bendamustine-rituximab. He has been on rituximab maintenance every 8 weeks and IV IgG monthly. Dr Chen's plan was to keep him on maintenace therapy for as long as he could tolerate the treatments  - s/p 6 cycles of Bendamustine and Retuximab with subsequent complete remission  - 1st maintenance cycle rituximab was in March 2016  - he has been on maintenance rituximab and IV IgG - last rituximab 11/15/2018; last IV IgG on Dec 14th 2018  - last PET was on 9/26/2018 with no evidence of recurrence  - originally diagnosed in Dec 2012 with left parotid and left periparotid LN excision on 12/11/2012  - PET scan done on  9/11/2019 was good    7/23/2020:  - new nodule on auricle of left ear suspicious for a skin cancer - will refer him to Dr Avilez with ENT  - he is due for repeat PET    9/17/2020:  - PET scan on 9/2/2020 is adequate  - [facility]     11/12/2020:  -  continued on the current regimen  - doing well with no new issues    1/7/2021:  - he is having some persistent cough issues for which he has been obtaining sputum for José Manuel  - last PET was Sept 2020    3/4/2021:  - doing ok  - chronic cough issues - followed by José Manuel  - will set up f/u PET    4/29/2021:  - he had PET scan on 3/19/2021  - He has been seeing pulmonary about his chronic cough  Lucia Georges with pulmonary has seen the recent PET and reported to him that the CXR on 3/25 was stable and he is on some oral antibiotics with improvement of the symptoms  - He is also on Gabapentin for the neuropathy issues in his feet.   - He saw Dr Barry Mcmahon on 3/24/2021 with family [facility]    8/19/2021:  - He has been seeing pulmonary about his chronic cough - Lucia Georges with pulmonary and he has been on periodic treatments of antibiotics. He saw her last just yesterday on 8/17/2021 and is currently on antibiotics.  - Pulmonary is planning to refer him to an ID specialist  - he is on the rituximab every 8 weeks; I am not convinced that this is contributing to the infection issues as it is not reducing his WBC; however,if pulmonary and/or ID feel it is a contributing factor then we can discontinue. The risk of his lymphoma recurring or progressing would be higher if he were to discontinue the therapy      10/14/2021:  - continued on oral antibiotics per pulmonary and plans to see Pulmonary ID specialist in near future  - continued on current therapy with rituximab  - recent PEt on 10/4/2021    12/9/2021:  - continued on rituximab  - on new antibiotics per pulmonary and he sees them again in Feb 2022  - repeat scans in Feb 2022 or sooner if pulmonary says otherwise    2/2/2022:  - he is seeing pulmonary again in two weeks  - he does not think that the new triple antibiotic regimen is working  - we can get PET in Feb/mar 2022 if pulmonary is inclined, otherwise 6 month surveillance scan in April/May 2022    3/31/2022:  - He  has been seeing pulmonary about his chronic cough and TONY issues; he is on antibiotics 3x/week and he reports that pulmonary feels that he is stable  - PET scan scheduled for 4/14/2022  - He last saw Dr Barry Mcmahon on 3/28/2021 and José Manuel on 2/17/2022  - pulmonary is fine with him continuing the ritxuimab per his report    5/26/2022:  - He has been seeing pulmonary about his chronic cough and TONY issues; he saw Rose on 5/17/2022 and is now on a new antibiotic and he reports that pulmonary said the PEt scan was better than the last one; he is planning to see Dr Veliz in the near future   - PET scan was done on 4/5/2022 with no evidence of recurrence  - continued on rituximab maintenance therapy    7/21/2022:  - continued under the care of pulmonary  - he is seeing Dr Veliz in Aug 2022  - continued on monthly IV IgG and rituximab every other month  - repeat PET in Oct 2022 expected    9/15/2022:  - He has been seeing pulmonary about his chronic cough and TONY issues; he has been seeing Rose and saw Dr Veliz on 8/15/2022;   - Dr Veliz is looking at increasing his Ig dose or changing it to SQ weekly  - Last PET scan was done on 4/5/2022 and repeat has been ordered and is due this Oct 2022  - discussed with patient about referral to Dr Nasreen Abarca at [facility] for not only evaluation for 2nd opinion for his chronic lymphoma but also the options of IV IgG infusion therapy, patient is agreeable    11/10/2022:  - Patient was seen by Dr Abarca at [facility] since last visit and his recommendation was to hold off on further rituximab therapy and proceed with just surveillance scanning.  - He was recently hospitalized at [facility] in Oct 2022 with upper torso cellulitis with Serratia marcescens septicemia  - He was recently hospitalized at [facility] in Oct 2022 with upper torso cellulitis with Serratia marcescens septicemia. He is still followed by wound care and is getting the prior drain site packed, etc. He has not followed up with ID as of  yet    1/4/2023:  - Patient was previously seen by Dr Abarca at [facility] since last visit and his recommendation was to hold off on further rituximab therapy and proceed with just surveillance scanning. He has telemed visit with him in Feb 2023  -  He is getting PET tomorrow and seeing pulmonary again in Feb 2023  - he has been on IV IgG  - He saw Dr Washington last in Nov 2022 and is seeing ID at [facility] in Jan 2023    3/1/2023:  - He had telemed with Dr Abarca and he wants him off the rituximab for another 3 months; patient is concerned about holding off on the rituximab maintenance.   - He last had rituximab in Sept 2022  - He has been on IV IgG  - Recent PET on 1/5/2023 with no evidence of lymphoma  - will discuss with Dr Abarca, but I think it is reasonable to resume rituximab in near future if ok with Pulm and ID, but will spread out to therapy every 3 months instead of every 2 months    4/26/2023:  - discussed with patient about resuming the Rituximab and he is anxious to do so  - will give every 12 weeks    5/26/2023:   - patient does not want to stop Rituxan despite Dr. Workman recs to do so after 2 years.   - he is now on Rituxan every 12 weeks   - continues on iVIG and is doing well with no new infections.       (2) HTN     (3) Neuropathy     (4) GERD     (5) DM - on metformin per Dr Nunez     (6) Hypogammaglobulinemia - on Iv IgG monthly     (7) Steatosis of liver     (8) Degenerative disc disease of back     (9) Diverticular disease    (10) Mild bronchiectasis in the left lower lobe and tree-in-bud micro nodules at the left lung base suggesting underlying bronchiolitis  - seeing Lucia Georges           VISIT DIAGNOSES:      There are no diagnoses linked to this encounter.        PLAN:  1. resume rituximab therapy but will give every 12 weeks instead of 8 weeks per patient request  2. continue IV IgG     3. F/u with Dr Nasreen Abarca at [facility] as directed   4. Check labs every 8 weeks  5. Repeat PET every 6 months (due  in July 2023)  6. F/u with PCP, Pulm etc          RTC 4 weeks with Dr. Ibarra       Discussion:       I spent over 25 mins of time with the patient. Reviewed results of the recently ordered labs, tests and studies; made directives with regards to the results. Over half of this time was spent couseling and coordinating care.      COVID-19 Discussion:    I had long discussion with patient and any applicable family about the COVID-19 coronavirus epidemic and the recommended precautions with regard to cancer and/or hematology patients. I have re-iterated the CDC recommendations for adequate hand washing, use of hand -like products, and coughing into elbow, etc. In addition, especially for our patients who are on chemotherapy and/or our otherwise immunocompromised patients, I have recommended avoidance of crowds, including movie theaters, restaurants, churches, etc. I have recommended avoidance of any sick or symptomatic family members and/or friends. I have also recommended avoidance of any raw and unwashed food products, and general avoidance of food items that have not been prepared by themselves. The patient has been asked to call us immediately with any symptom developments, issues, questions or other general concerns.     I have explained all of the above in detail and the patient understands all of the current recommendation(s). I have answered all of their questions to the best of my ability and to their complete satisfaction.   The patient is to continue with the current management plan.            Electronically signed by Kaylee Sullivan, MSN,APRN,AGNP-C

## 2023-05-26 NOTE — PLAN OF CARE
Problem: Fatigue  Goal: Improved Activity Tolerance  Outcome: Ongoing, Progressing  Intervention: Promote Improved Energy  Flowsheets (Taken 5/26/2023 0806)  Fatigue Management:   frequent rest breaks encouraged   fatigue-related activity identified  Sleep/Rest Enhancement:   reading promoted   regular sleep/rest pattern promoted   relaxation techniques promoted

## 2023-06-20 NOTE — PROGRESS NOTES
"[facility] Hematology/Oncology  PROGRESS NOTE -  Follow-up Visit      Subjective:       Patient ID:   NAME: Sivakumar Thacker : 1955     68 y.o. male    Referring Doc: Barry Mcmahon (PCP in [place])  Other Physicians: Vinod Chen/José Manuel      Chief Complaint:  NHL f/u    History of Present Illness:     Patient returns today for a regularly scheduled follow-up visit.  The patient is here today to go over the results of the recently ordered labs, tests and studies. He is here  by himself today    He is feeling "great" and breathing better; he saw Dr beasley on 2023 and he sees him again in Aug 2023    He saw Dr Mcmahon on 2023    He has since resumed Rituximab but it is now at every 12 weeks    He has PEt scheduled for tomorrow and had IV IGG this coming Friday     He denies any CP, SOB, HA's or N/V.      He is continued on Gabapentin for the neuropathy issues in his feet.          I discussed continued Covid19 precautions        ROS:   GEN: normal without any fever, night sweats or weight loss  HEENT: normal with no HA's, sore throat, stiff neck, changes in vision; no current sinus issues    CV: normal with no CP, SOB, PND, VASQUEZ or orthopnea  PULM: normal with no SOB,  hemoptysis, sputum or pleuritic pain; chronic cough but better; breathing better    GI: normal with no abdominal pain, nausea, vomiting, constipation, diarrhea, melanotic stools, BRBPR, or hematemesis  : normal with no hematuria, dysuria  BREAST: normal with no mass, discharge, pain  SKIN: normal with no rash, erythema, bruising, or swelling; no current wound issues    Allergies:  Review of patient's allergies indicates:  No Known Allergies    Medications:    Current Outpatient Medications:     albuterol (PROVENTIL/VENTOLIN HFA) 90 mcg/actuation inhaler, 2 puffs every 4 hours as needed for cough, wheeze, or shortness of breath, Disp: 18 g, Rfl: 11    albuterol-ipratropium (DUO-NEB) 2.5 mg-0.5 mg/3 mL nebulizer solution, Take 3 mLs by " nebulization every 6 (six) hours as needed for Wheezing. Rescue, Disp: 120 each, Rfl: 11    amLODIPine (NORVASC) 10 MG tablet, Take 1 tablet (10 mg total) by mouth once daily., Disp: 90 tablet, Rfl: 3    aspirin (ECOTRIN) 81 MG EC tablet, Take 81 mg by mouth once daily., Disp: , Rfl:     atorvastatin (LIPITOR) 20 MG tablet, Take 1 tablet (20 mg total) by mouth once daily., Disp: 90 tablet, Rfl: 3    azithromycin (ZITHROMAX) 500 MG tablet, Take 1 tablet (500 mg total) by mouth once daily. Repeat for yellow mucous, Disp: 3 tablet, Rfl: 3    blood sugar diagnostic (FREESTYLE LITE STRIPS MISC), FreeStyle Lite Strips, Disp: , Rfl:     blood sugar diagnostic Strp, , Disp: , Rfl:     fluticasone propionate (FLONASE) 50 mcg/actuation nasal spray, 1 spray (50 mcg total) by Each Nostril route 2 (two) times daily., Disp: 48 g, Rfl: 11    gabapentin (NEURONTIN) 300 MG capsule, Take 1 capsule (300 mg total) by mouth 2 (two) times daily., Disp: 180 capsule, Rfl: 3    guaiFENesin (MUCINEX) 600 mg 12 hr tablet, Take 2 tablets (1,200 mg total) by mouth 2 (two) times daily., Disp: 60 tablet, Rfl: 0    hydrocodone-chlorpheniramine (TUSSIONEX) 10-8 mg/5 mL suspension, Take 5 mLs by mouth every 12 (twelve) hours as needed for Cough ((DO NOT TAKE WITHIN 4 HOURS OF NORCO/HYDROCODONE-ACETAMINOPHEN TABLETS))., Disp: 115 mL, Rfl: 0    losartan (COZAAR) 100 MG tablet, Take 1 tablet (100 mg total) by mouth once daily., Disp: 90 tablet, Rfl: 3    metFORMIN (GLUCOPHAGE) 500 MG tablet, Take 2 tablets twice a day by oral route with meals for 90 days., Disp: 180 tablet, Rfl: 3    montelukast (SINGULAIR) 10 mg tablet, Take 1 tablet (10 mg total) by mouth nightly as needed., Disp: 90 tablet, Rfl: 3    mucus clearing device (ACAPELLA, FLUTTER), 1 Device by Misc.(Non-Drug; Combo Route) route 2 (two) times daily., Disp: 1 Device, Rfl: 0    MULTIVITAMIN ORAL, Take 1 tablet by mouth once daily., Disp: , Rfl:     naproxen (NAPROSYN) 500 MG tablet, Take 1  "tablet (500 mg total) by mouth 2 (two) times daily as needed., Disp: 180 tablet, Rfl: 3    omeprazole (PRILOSEC) 40 MG capsule, Take 1 capsule (40 mg total) by mouth once daily., Disp: 90 capsule, Rfl: 3    predniSONE (DELTASONE) 20 MG tablet, Take one daily for 3 days and may repeat for shortness of breath., Disp: 12 tablet, Rfl: 0    tiZANidine (ZANAFLEX) 4 MG tablet, Take 1 tablet (4 mg total) by mouth daily as needed., Disp: 90 tablet, Rfl: 3    PMHx/PSHx Updates:  See patient's last visit with me on  4/26/2023  See H&P on 1/8/2019        Pathology:  Cancer Staging  No matching staging information was found for the patient.          Objective:     Vitals:  Blood pressure (!) 149/80, pulse 78, temperature 98.1 °F (36.7 °C), resp. rate 18, height 5' 8" (1.727 m), weight 94.3 kg (208 lb).    Physical Examination:   GEN: no apparent distress, comfortable; AAOx3  HEAD: atraumatic and normocephalic  EYES: no pallor, no icterus, PERRLA  ENT: OMM, no pharyngeal erythema, external ears WNL; no nasal discharge; no thrush;    NECK: no masses, thyroid normal, trachea midline, no LAD/LN's, supple  CV: RRR with no murmur; normal pulse; normal S1 and S2; no pedal edema; portacath since removed  CHEST: Normal respiratory effort; chronic coarseness, wheeze bilaterally but only mild wheeze  ABDOM: nontender and nondistended; soft; normal bowel sounds; no rebound/guarding  MUSC/Skeletal: ROM normal; no crepitus; joints normal; no deformities or arthropathy  EXTREM: no clubbing, cyanosis, inflammation or swelling  SKIN: no rashes, lesions, ulcers, petechiae or subcutaneous nodules; no current wound issues  : no gibbs  NEURO: grossly intact; motor/sensory WNL; AAOx3; no tremors  PSYCH: normal mood, affect and behavior  LYMPH: normal cervical, supraclavicular, axillary and groin LN's            Labs:              Lab Results   Component Value Date    WBC 5.93 05/22/2023    HGB 13.9 (L) 05/22/2023    HCT 41.9 05/22/2023    MCV 83 " 05/22/2023     05/22/2023     CMP  Sodium   Date Value Ref Range Status   05/22/2023 141 136 - 145 mmol/L Final   04/25/2019 144 134 - 144 mmol/L      Potassium   Date Value Ref Range Status   05/22/2023 3.9 3.5 - 5.1 mmol/L Final     Chloride   Date Value Ref Range Status   05/22/2023 108 95 - 110 mmol/L Final   04/25/2019 107 98 - 110 mmol/L      CO2   Date Value Ref Range Status   05/22/2023 21 (L) 23 - 29 mmol/L Final     Glucose   Date Value Ref Range Status   05/22/2023 229 (H) 70 - 110 mg/dL Final   04/25/2019 177 (H) 70 - 99 mg/dL      BUN   Date Value Ref Range Status   05/22/2023 15 8 - 23 mg/dL Final     Creatinine   Date Value Ref Range Status   05/22/2023 0.9 0.5 - 1.4 mg/dL Final   04/25/2019 0.83 0.60 - 1.40 mg/dL      Calcium   Date Value Ref Range Status   05/22/2023 9.2 8.7 - 10.5 mg/dL Final     Total Protein   Date Value Ref Range Status   05/22/2023 7.0 6.0 - 8.4 g/dL Final     Albumin   Date Value Ref Range Status   05/22/2023 4.0 3.5 - 5.2 g/dL Final   04/25/2019 4.4 3.1 - 4.7 g/dL      Total Bilirubin   Date Value Ref Range Status   05/22/2023 0.6 0.1 - 1.0 mg/dL Final     Comment:     For infants and newborns, interpretation of results should be based  on gestational age, weight and in agreement with clinical  observations.    Premature Infant recommended reference ranges:  Up to 24 hours.............<8.0 mg/dL  Up to 48 hours............<12.0 mg/dL  3-5 days..................<15.0 mg/dL  6-29 days.................<15.0 mg/dL       Alkaline Phosphatase   Date Value Ref Range Status   05/22/2023 92 55 - 135 U/L Final     AST   Date Value Ref Range Status   05/22/2023 20 10 - 40 U/L Final     ALT   Date Value Ref Range Status   05/22/2023 25 10 - 44 U/L Final     Anion Gap   Date Value Ref Range Status   05/22/2023 12 8 - 16 mmol/L Final     eGFR if    Date Value Ref Range Status   07/21/2022 >60.0 >60 mL/min/1.73 m^2 Final     eGFR if non    Date Value  Ref Range Status   07/21/2022 >60.0 >60 mL/min/1.73 m^2 Final     Comment:     Calculation used to obtain the estimated glomerular filtration  rate (eGFR) is the CKD-EPI equation.            Radiology/Diagnostic Studies:    PET 1/5/2023:    IMPRESSION: Diffuse reticular nodular and groundglass opacities the left lower lobe, posterior right upper lobe and posterior medial right lower lobe compatible with multifocal pneumonia. Follow-up chest CT in three months is recommended     Diffuse FDG activity throughout the axial skeleton suggestive of bone marrow hyperstimulation     No evidence of recurrent or metastatic disease        PET  4/5/2022:    IMPRESSION: No evidence of recurrent or active lymphoproliferative disease        PET 10/4/2021:  Impression:     1. Negative for FDG avid recurrent lymphoproliferative disease.  2. New left maxillary sinusitis.  3. New bilateral reticulonodular pulmonary opacities suggesting infectious or inflammatory pneumonitis.  Clinical and laboratory correlation is requested.  4. Coronary artery calcification.         CXR  3/25/2021:    Impression:     Mild interstitial prominence, similar to previous exams.  No focal consolidation      PET 3/19/2021:  IMPRESSION:  1. Mild patchy airspace opacity in the superior segment of the left  lower lobe with increased FDG activity from background suggestive of a  small focal pneumonia (possibly viral in etiology). Consider  correlation with the symptoms, history and CT follow-up in 3 months to  document resolution.  2. No FDG avid lymphadenopathy or additional sites of disease.         PET 9/2/2020:      Impression:     Negative for active lymphoproliferative disease.         Chest CT  3/19/2020:      Impression       1. Mild bronchiectasis in the left lower lobe and tree-in-bud micro nodules at the left lung base suggesting underlying bronchiolitis, possibly due to a non tuberculous mycobacterial infection or viral bronchiolitis.  2. Fatty  infiltration of the liver  3. Small hiatal hernia.           CXR  1/31/2020    Impression       1. No acute chest disease.  2. Left subclavian Port-A-Cath.               PET 9/11/2019   Impression       No evidence of FDG avid residual or recurrent lymphoma       I have reviewed all available lab results and radiology reports.    Assessment/Plan:   (1) 68 y.o. male with diagnosis of NHL Follicular Stage IIIA who has been referred by Dr Gary Chen with [facility] for continuation of care by medical hematology/oncology.   - He originally was diagnosed in 2012 and had parotid excision at [facility]. He subsequently went to [facility] and was treated with bendamustine-rituximab. He has been on rituximab maintenance every 8 weeks and IV IgG monthly. Dr Chen's plan was to keep him on maintenace therapy for as long as he could tolerate the treatments  - s/p 6 cycles of Bendamustine and Retuximab with subsequent complete remission  - 1st maintenance cycle rituximab was in March 2016  - he has been on maintenance rituximab and IV IgG - last rituximab 11/15/2018; last IV IgG on Dec 14th 2018  - last PET was on 9/26/2018 with no evidence of recurrence  - originally diagnosed in Dec 2012 with left parotid and left periparotid LN excision on 12/11/2012  - PET scan done on  9/11/2019 was good    7/23/2020:  - new nodule on auricle of left ear suspicious for a skin cancer - will refer him to Dr Avilez with ENT  - he is due for repeat PET    9/17/2020:  - PET scan on 9/2/2020 is adequate  - [facility]     11/12/2020:  - continued on the current regimen  - doing well with no new issues    1/7/2021:  - he is having some persistent cough issues for which he has been obtaining sputum for José Manuel  - last PET was Sept 2020    3/4/2021:  - doing ok  - chronic cough issues - followed by José Manuel  - will set up f/u PET    4/29/2021:  - he had PET scan on 3/19/2021  - He has been seeing  pulmonary about his chronic cough  Lucia Georges with pulmonary has seen the recent PET and reported to him that the CXR on 3/25 was stable and he is on some oral antibiotics with improvement of the symptoms  - He is also on Gabapentin for the neuropathy issues in his feet.   - He saw Dr Barry Mcmahon on 3/24/2021 with family [facility]    8/19/2021:  - He has been seeing pulmonary about his chronic cough - Lucia Georges with pulmonary and he has been on periodic treatments of antibiotics. He saw her last just yesterday on 8/17/2021 and is currently on antibiotics.  - Pulmonary is planning to refer him to an ID specialist  - he is on the rituximab every 8 weeks; I am not convinced that this is contributing to the infection issues as it is not reducing his WBC; however,if pulmonary and/or ID feel it is a contributing factor then we can discontinue. The risk of his lymphoma recurring or progressing would be higher if he were to discontinue the therapy      10/14/2021:  - continued on oral antibiotics per pulmonary and plans to see Pulmonary ID specialist in near future  - continued on current therapy with rituximab  - recent PEt on 10/4/2021    12/9/2021:  - continued on rituximab  - on new antibiotics per pulmonary and he sees them again in Feb 2022  - repeat scans in Feb 2022 or sooner if pulmonary says otherwise    2/2/2022:  - he is seeing pulmonary again in two weeks  - he does not think that the new triple antibiotic regimen is working  - we can get PET in Feb/mar 2022 if pulmonary is inclined, otherwise 6 month surveillance scan in April/May 2022    3/31/2022:  - He has been seeing pulmonary about his chronic cough and TONY issues; he is on antibiotics 3x/week and he reports that pulmonary feels that he is stable  - PET scan scheduled for 4/14/2022  - He last saw Dr Barry Mcmahon on 3/28/2021 and José Manuel on 2/17/2022  - pulmonary is fine with him continuing the ritxuimab per his report    5/26/2022:  - He has been seeing  pulmonary about his chronic cough and TONY issues; he saw Rose on 5/17/2022 and is now on a new antibiotic and he reports that pulmonary said the PEt scan was better than the last one; he is planning to see Dr Veliz in the near future   - PET scan was done on 4/5/2022 with no evidence of recurrence  - continued on rituximab maintenance therapy    7/21/2022:  - continued under the care of pulmonary  - he is seeing Dr Veliz in Aug 2022  - continued on monthly IV IgG and rituximab every other month  - repeat PET in Oct 2022 expected    9/15/2022:  - He has been seeing pulmonary about his chronic cough and TONY issues; he has been seeing Rose and saw Dr Veliz on 8/15/2022;   - Dr Veliz is looking at increasing his Ig dose or changing it to SQ weekly  - Last PET scan was done on 4/5/2022 and repeat has been ordered and is due this Oct 2022  - discussed with patient about referral to Dr Nasreen Abarca at [facility] for not only evaluation for 2nd opinion for his chronic lymphoma but also the options of IV IgG infusion therapy, patient is agreeable    11/10/2022:  - Patient was seen by Dr Abarca at [facility] since last visit and his recommendation was to hold off on further rituximab therapy and proceed with just surveillance scanning.  - He was recently hospitalized at [facility] in Oct 2022 with upper torso cellulitis with Serratia marcescens septicemia  - He was recently hospitalized at 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 He is still followed by wound care and is getting the prior drain site packed, etc. He has not followed up with ID as of yet    1/4/2023:  - Patient was previously seen by Dr Abarca at [facility] since last visit and his recommendation was to hold off on further rituximab therapy and proceed with just surveillance scanning. He has telemed visit with him in Feb 2023  -  He is getting PET tomorrow and seeing pulmonary again in Feb 2023  - he has been on IV IgG  - He saw Dr Washington  last in Nov 2022 and is seeing ID at [facility] in Jan 2023    3/1/2023:  - He had telemed with Dr Abarca and he wants him off the rituximab for another 3 months; patient is concerned about holding off on the rituximab maintenance.   - He last had rituximab in Sept 2022  - He has been on IV IgG  - Recent PET on 1/5/2023 with no evidence of lymphoma  - will discuss with Dr Abarca, but I think it is reasonable to resume rituximab in near future if ok with Pulm and ID, but will spread out to therapy every 3 months instead of every 2 months    4/26/2023:  - discussed with patient about resuming the Rituximab and he is anxious to do so  - will give every 12 weeks    6/21/2023:  - PET due tomorrow  - IV IgG on Friday  - rituximab since resumed but to be given every 12 weeks now  - f/\u with Dr Veliz in Aug 2023      (2) HTN     (3) Neuropathy     (4) GERD     (5) DM - on metformin per Dr Nunez     (6) Hypogammaglobulinemia - on Iv IgG monthly     (7) Steatosis of liver     (8) Degenerative disc disease of back     (9) Diverticular disease    (10) Mild bronchiectasis in the left lower lobe and tree-in-bud micro nodules at the left lung base suggesting underlying bronchiolitis  - seeing Lucia Georges           VISIT DIAGNOSES:      Non-Hodgkin's lymphoma, unspecified body region, unspecified non-Hodgkin lymphoma type    Follicular lymphoma grade IIIa, unspecified body region    Hypogammaglobulinemia          PLAN:  1. resumed rituximab therapy but will give every 12 weeks instead of 8 weeks 2. PET due tomorrow  2. continue IV IgG   - due friday  3. F/u with Dr Nasreen Abarca at [facility] as directed   4. Check labs every 8 weeks  5. Repeat PET every 6 months   6. F/u with PCP, Pulm etc             RTC in 12 weeks  with myself   Fax note to Barry Mcmahon; Joés Manuel Veliz; Ran Washington         Discussion:       I spent over 25 mins of time with the patient. Reviewed results of the recently ordered labs, tests and studies; made directives with  regards to the results. Over half of this time was spent couseling and coordinating care.      COVID-19 Discussion:    I had long discussion with patient and any applicable family about the COVID-19 coronavirus epidemic and the recommended precautions with regard to cancer and/or hematology patients. I have re-iterated the CDC recommendations for adequate hand washing, use of hand -like products, and coughing into elbow, etc. In addition, especially for our patients who are on chemotherapy and/or our otherwise immunocompromised patients, I have recommended avoidance of crowds, including movie theaters, restaurants, churches, etc. I have recommended avoidance of any sick or symptomatic family members and/or friends. I have also recommended avoidance of any raw and unwashed food products, and general avoidance of food items that have not been prepared by themselves. The patient has been asked to call us immediately with any symptom developments, issues, questions or other general concerns.     I have explained all of the above in detail and the patient understands all of the current recommendation(s). I have answered all of their questions to the best of my ability and to their complete satisfaction.   The patient is to continue with the current management plan.            Electronically signed by Jefferson Ibarra MD                       Answers submitted by the patient for this visit:  Review of Systems Questionnaire (Submitted on 6/20/2023)  appetite change : No  unexpected weight change: No  mouth sores: No  visual disturbance: No  cough: No  shortness of breath: No  chest pain: No  abdominal pain: No  diarrhea: No  frequency: No  back pain: No  rash: No  headaches: No  adenopathy: No  nervous/ anxious: No

## 2023-06-21 ENCOUNTER — OFFICE VISIT (OUTPATIENT)
Dept: HEMATOLOGY/ONCOLOGY | Facility: CLINIC | Age: 68
End: 2023-06-21
Payer: MEDICARE

## 2023-06-21 VITALS
HEIGHT: 68 IN | SYSTOLIC BLOOD PRESSURE: 149 MMHG | TEMPERATURE: 98 F | RESPIRATION RATE: 18 BRPM | WEIGHT: 208 LBS | DIASTOLIC BLOOD PRESSURE: 80 MMHG | BODY MASS INDEX: 31.52 KG/M2 | HEART RATE: 78 BPM

## 2023-06-21 DIAGNOSIS — D80.1 HYPOGAMMAGLOBULINEMIA: ICD-10-CM

## 2023-06-21 DIAGNOSIS — C85.90 NON-HODGKIN'S LYMPHOMA, UNSPECIFIED BODY REGION, UNSPECIFIED NON-HODGKIN LYMPHOMA TYPE: Primary | ICD-10-CM

## 2023-06-21 DIAGNOSIS — C82.30 FOLLICULAR LYMPHOMA GRADE IIIA, UNSPECIFIED BODY REGION: ICD-10-CM

## 2023-06-21 PROCEDURE — 99214 PR OFFICE/OUTPT VISIT, EST, LEVL IV, 30-39 MIN: ICD-10-PCS | Mod: S$GLB,,, | Performed by: INTERNAL MEDICINE

## 2023-06-21 PROCEDURE — 99214 OFFICE O/P EST MOD 30 MIN: CPT | Mod: S$GLB,,, | Performed by: INTERNAL MEDICINE

## 2023-06-22 ENCOUNTER — TELEPHONE (OUTPATIENT)
Dept: PULMONOLOGY | Facility: CLINIC | Age: 68
End: 2023-06-22
Payer: MEDICARE

## 2023-06-22 ENCOUNTER — HOSPITAL ENCOUNTER (OUTPATIENT)
Dept: RADIOLOGY | Facility: HOSPITAL | Age: 68
Discharge: HOME | End: 2023-06-22
Attending: NURSE PRACTITIONER
Payer: MEDICARE

## 2023-06-22 VITALS — BODY MASS INDEX: 31.52 KG/M2 | HEIGHT: 68 IN | WEIGHT: 208 LBS

## 2023-06-22 DIAGNOSIS — G47.33 OBSTRUCTIVE SLEEP APNEA: Primary | ICD-10-CM

## 2023-06-22 DIAGNOSIS — C82.30 FOLLICULAR LYMPHOMA GRADE IIIA, UNSPECIFIED BODY REGION: ICD-10-CM

## 2023-06-22 LAB — GLUCOSE SERPL-MCNC: 147 MG/DL (ref 70–110)

## 2023-06-22 PROCEDURE — A9552 F18 FDG: HCPCS | Mod: PO

## 2023-06-22 NOTE — TELEPHONE ENCOUNTER
----- Message from Ruba Collazo sent at 6/19/2023 11:49 AM CDT -----  Regarding: AutoPap  Contact: [phone]  Good morning! I received a copy of patient's sleep study but there are no signed Rx/order to process for a Cpap machine. Thanks! Daniel

## 2023-06-23 ENCOUNTER — TELEPHONE (OUTPATIENT)
Dept: HEMATOLOGY/ONCOLOGY | Facility: CLINIC | Age: 68
End: 2023-06-23

## 2023-06-23 ENCOUNTER — INFUSION (OUTPATIENT)
Dept: INFUSION THERAPY | Facility: HOSPITAL | Age: 68
End: 2023-06-23
Attending: INTERNAL MEDICINE
Payer: MEDICARE

## 2023-06-23 VITALS
DIASTOLIC BLOOD PRESSURE: 76 MMHG | RESPIRATION RATE: 16 BRPM | HEIGHT: 68 IN | SYSTOLIC BLOOD PRESSURE: 129 MMHG | WEIGHT: 212.31 LBS | HEART RATE: 77 BPM | BODY MASS INDEX: 32.18 KG/M2 | OXYGEN SATURATION: 98 % | TEMPERATURE: 98 F

## 2023-06-23 DIAGNOSIS — D80.1 NONFAMILIAL HYPOGAMMAGLOBULINEMIA: ICD-10-CM

## 2023-06-23 DIAGNOSIS — D80.1 HYPOGAMMAGLOBULINEMIA: Primary | ICD-10-CM

## 2023-06-23 DIAGNOSIS — C82.30 FOLLICULAR LYMPHOMA GRADE IIIA, UNSPECIFIED BODY REGION: ICD-10-CM

## 2023-06-23 PROCEDURE — A4216 STERILE WATER/SALINE, 10 ML: HCPCS | Performed by: INTERNAL MEDICINE

## 2023-06-23 PROCEDURE — 63600175 PHARM REV CODE 636 W HCPCS: Performed by: INTERNAL MEDICINE

## 2023-06-23 PROCEDURE — 96413 CHEMO IV INFUSION 1 HR: CPT

## 2023-06-23 PROCEDURE — 63600175 PHARM REV CODE 636 W HCPCS: Mod: JZ,JA,JG | Performed by: NURSE PRACTITIONER

## 2023-06-23 PROCEDURE — 96415 CHEMO IV INFUSION ADDL HR: CPT

## 2023-06-23 PROCEDURE — 25000003 PHARM REV CODE 250: Performed by: INTERNAL MEDICINE

## 2023-06-23 PROCEDURE — 96367 TX/PROPH/DG ADDL SEQ IV INF: CPT

## 2023-06-23 RX ORDER — HEPARIN 100 UNIT/ML
500 SYRINGE INTRAVENOUS
Status: DISCONTINUED | OUTPATIENT
Start: 2023-06-23 | End: 2023-06-23 | Stop reason: HOSPADM

## 2023-06-23 RX ORDER — HEPARIN 100 UNIT/ML
500 SYRINGE INTRAVENOUS
Status: CANCELLED | OUTPATIENT
Start: 2023-07-21

## 2023-06-23 RX ORDER — SODIUM CHLORIDE 0.9 % (FLUSH) 0.9 %
10 SYRINGE (ML) INJECTION
Status: DISCONTINUED | OUTPATIENT
Start: 2023-06-23 | End: 2023-06-23 | Stop reason: HOSPADM

## 2023-06-23 RX ORDER — SODIUM CHLORIDE 0.9 % (FLUSH) 0.9 %
10 SYRINGE (ML) INJECTION
Status: CANCELLED | OUTPATIENT
Start: 2023-07-21

## 2023-06-23 RX ADMIN — IMMUNE GLOBULIN (HUMAN) 50 G: 10 INJECTION INTRAVENOUS; SUBCUTANEOUS at 08:06

## 2023-06-23 RX ADMIN — SODIUM CHLORIDE, PRESERVATIVE FREE 10 ML: 5 INJECTION INTRAVENOUS at 10:06

## 2023-06-23 RX ADMIN — DIPHENHYDRAMINE HYDROCHLORIDE 25 MG: 50 INJECTION INTRAMUSCULAR; INTRAVENOUS at 08:06

## 2023-06-23 RX ADMIN — HEPARIN 500 UNITS: 100 SYRINGE at 10:06

## 2023-06-23 RX ADMIN — SODIUM CHLORIDE: 0.9 INJECTION, SOLUTION INTRAVENOUS at 08:06

## 2023-06-23 NOTE — TELEPHONE ENCOUNTER
Patient made aware of clear PET, instructed to continue with any f/u and labs as previously directed. Verbalized understanding.

## 2023-06-23 NOTE — TELEPHONE ENCOUNTER
----- Message from Kaylee Sullivan NP sent at 6/22/2023 10:18 AM CDT -----  Clear pet    ----- Message -----  From: Interface, Rad Results In  Sent: 6/22/2023   9:26 AM CDT  To: Kaylee Sullivan NP

## 2023-06-23 NOTE — PLAN OF CARE
Problem: Fall Injury Risk  Goal: Absence of Fall and Fall-Related Injury  Outcome: Ongoing, Progressing  Intervention: Identify and Manage Contributors  Flowsheets (Taken 6/23/2023 0740)  Self-Care Promotion: independence encouraged  Medication Review/Management: medications reviewed  Intervention: Promote Injury-Free Environment  Flowsheets (Taken 6/23/2023 0740)  Safety Promotion/Fall Prevention: medications reviewed

## 2023-07-09 ENCOUNTER — PATIENT MESSAGE (OUTPATIENT)
Dept: PULMONOLOGY | Facility: CLINIC | Age: 68
End: 2023-07-09
Payer: MEDICARE

## 2023-07-14 ENCOUNTER — OFFICE VISIT (OUTPATIENT)
Dept: URGENT CARE | Facility: CLINIC | Age: 68
End: 2023-07-14
Payer: MEDICARE

## 2023-07-14 VITALS
HEART RATE: 94 BPM | HEIGHT: 68 IN | SYSTOLIC BLOOD PRESSURE: 140 MMHG | WEIGHT: 212 LBS | BODY MASS INDEX: 32.13 KG/M2 | DIASTOLIC BLOOD PRESSURE: 84 MMHG | OXYGEN SATURATION: 97 % | TEMPERATURE: 98 F

## 2023-07-14 DIAGNOSIS — T14.8XXA ABRASION: Primary | ICD-10-CM

## 2023-07-14 DIAGNOSIS — S81.811A LACERATION OF RIGHT LOWER EXTREMITY, INITIAL ENCOUNTER: ICD-10-CM

## 2023-07-14 PROCEDURE — 90471 PR IMMUNIZ ADMIN,1 SINGLE/COMB VAC/TOXOID: ICD-10-PCS | Mod: AT,S$GLB,,

## 2023-07-14 PROCEDURE — 90471 IMMUNIZATION ADMIN: CPT | Mod: AT,S$GLB,,

## 2023-07-14 PROCEDURE — 99213 OFFICE O/P EST LOW 20 MIN: CPT | Mod: 25,S$GLB,,

## 2023-07-14 PROCEDURE — 99213 PR OFFICE/OUTPT VISIT, EST, LEVL III, 20-29 MIN: ICD-10-PCS | Mod: 25,S$GLB,,

## 2023-07-14 PROCEDURE — 90715 TDAP VACCINE GREATER THAN OR EQUAL TO 7YO IM: ICD-10-PCS | Mod: AT,S$GLB,,

## 2023-07-14 PROCEDURE — 90715 TDAP VACCINE 7 YRS/> IM: CPT | Mod: AT,S$GLB,,

## 2023-07-14 RX ORDER — MUPIROCIN 20 MG/G
OINTMENT TOPICAL 3 TIMES DAILY
Qty: 1 G | Refills: 0 | Status: SHIPPED | OUTPATIENT
Start: 2023-07-14 | End: 2023-08-15

## 2023-07-14 RX ORDER — MUPIROCIN 20 MG/G
OINTMENT TOPICAL
Status: COMPLETED | OUTPATIENT
Start: 2023-07-14 | End: 2023-07-14

## 2023-07-14 RX ORDER — MUPIROCIN 20 MG/G
OINTMENT TOPICAL 3 TIMES DAILY
Qty: 1 G | Refills: 0 | Status: SHIPPED | OUTPATIENT
Start: 2023-07-14 | End: 2023-07-14

## 2023-07-14 RX ADMIN — MUPIROCIN: 20 OINTMENT TOPICAL at 12:07

## 2023-07-14 NOTE — PROGRESS NOTES
"Subjective:      Patient ID: Sivakumar Thacker is a 68 y.o. male.    Vitals:  height is 5' 8" (1.727 m) and weight is 96.2 kg (212 lb). His oral temperature is 98.1 °F (36.7 °C). His blood pressure is 140/84 (abnormal) and his pulse is 94. His oxygen saturation is 97%.     Chief Complaint: Laceration (Laceration right lower leg a 1 hour)    This is a 68 y.o. male who presents today with a chief complaint of  Patient presents with Laceration right lower leg a 1 hour        Laceration   The incident occurred 1 to 3 hours ago. The laceration is located on the Right leg. The laceration mechanism was a metal edge. The pain is at a severity of 2/10. The pain is mild. He reports no foreign bodies present. His tetanus status is out of date.     Constitution: Negative.   HENT: Negative.     Neck: neck negative.   Cardiovascular: Negative.    Eyes: Negative.    Respiratory: Negative.     Endocrine: negative.   Genitourinary: Negative.    Musculoskeletal: Negative.    Skin:  Positive for laceration.   Allergic/Immunologic: Negative.    Neurological: Negative.    Hematologic/Lymphatic: Negative.    Psychiatric/Behavioral: Negative.      Objective:     Physical Exam   Constitutional: He is oriented to person, place, and time.   HENT:   Head: Normocephalic and atraumatic.   Eyes: Conjunctivae are normal. Pupils are equal, round, and reactive to light. Extraocular movement intact   Cardiovascular: Normal rate and normal pulses.   Pulmonary/Chest: Effort normal.   Musculoskeletal: Normal range of motion.         General: Normal range of motion.   Neurological: no focal deficit. He is alert, oriented to person, place, and time and at baseline.   Skin: Abrasions - lower ext.:  lower leg (right)        Comments: Positive abrasion   Psychiatric: His behavior is normal. Mood, judgment and thought content normal.   Nursing note and vitals reviewed.    Assessment:     1. Abrasion    2. Laceration of right lower extremity, initial encounter "        Plan:   Cleaned abrasion with chlorhexidine then applied mupirocin ointment non adhesive guaze juliette and wrap with coban      Abrasion  -     (In Office Administered) Tdap Vaccine  -     Discontinue: mupirocin (BACTROBAN) 2 % ointment; Apply topically 3 (three) times daily.  Dispense: 1 g; Refill: 0  -     mupirocin (BACTROBAN) 2 % ointment; Apply topically 3 (three) times daily.  Dispense: 1 g; Refill: 0  -     mupirocin 2 % ointment    Laceration of right lower extremity, initial encounter  -     (In Office Administered) Tdap Vaccine  -     mupirocin 2 % ointment

## 2023-07-21 ENCOUNTER — INFUSION (OUTPATIENT)
Dept: INFUSION THERAPY | Facility: HOSPITAL | Age: 68
End: 2023-07-21
Attending: INTERNAL MEDICINE
Payer: MEDICARE

## 2023-07-21 VITALS
OXYGEN SATURATION: 100 % | TEMPERATURE: 98 F | SYSTOLIC BLOOD PRESSURE: 145 MMHG | HEIGHT: 68 IN | DIASTOLIC BLOOD PRESSURE: 80 MMHG | RESPIRATION RATE: 16 BRPM | WEIGHT: 211 LBS | HEART RATE: 63 BPM | BODY MASS INDEX: 31.98 KG/M2

## 2023-07-21 DIAGNOSIS — D80.1 HYPOGAMMAGLOBULINEMIA: Primary | ICD-10-CM

## 2023-07-21 DIAGNOSIS — C82.30 FOLLICULAR LYMPHOMA GRADE IIIA, UNSPECIFIED BODY REGION: ICD-10-CM

## 2023-07-21 DIAGNOSIS — D80.1 NONFAMILIAL HYPOGAMMAGLOBULINEMIA: ICD-10-CM

## 2023-07-21 PROCEDURE — A4216 STERILE WATER/SALINE, 10 ML: HCPCS | Performed by: INTERNAL MEDICINE

## 2023-07-21 PROCEDURE — 96415 CHEMO IV INFUSION ADDL HR: CPT

## 2023-07-21 PROCEDURE — 96365 THER/PROPH/DIAG IV INF INIT: CPT

## 2023-07-21 PROCEDURE — 63600175 PHARM REV CODE 636 W HCPCS: Performed by: INTERNAL MEDICINE

## 2023-07-21 PROCEDURE — 25000003 PHARM REV CODE 250: Performed by: INTERNAL MEDICINE

## 2023-07-21 PROCEDURE — 63600175 PHARM REV CODE 636 W HCPCS: Mod: JZ,JA,JG | Performed by: NURSE PRACTITIONER

## 2023-07-21 PROCEDURE — 96367 TX/PROPH/DG ADDL SEQ IV INF: CPT

## 2023-07-21 PROCEDURE — 96413 CHEMO IV INFUSION 1 HR: CPT

## 2023-07-21 PROCEDURE — 96366 THER/PROPH/DIAG IV INF ADDON: CPT

## 2023-07-21 RX ORDER — HEPARIN 100 UNIT/ML
500 SYRINGE INTRAVENOUS
Status: DISCONTINUED | OUTPATIENT
Start: 2023-07-21 | End: 2023-07-21 | Stop reason: HOSPADM

## 2023-07-21 RX ORDER — SODIUM CHLORIDE 0.9 % (FLUSH) 0.9 %
10 SYRINGE (ML) INJECTION
Status: CANCELLED | OUTPATIENT
Start: 2023-08-18

## 2023-07-21 RX ORDER — SODIUM CHLORIDE 0.9 % (FLUSH) 0.9 %
10 SYRINGE (ML) INJECTION
Status: DISCONTINUED | OUTPATIENT
Start: 2023-07-21 | End: 2023-07-21 | Stop reason: HOSPADM

## 2023-07-21 RX ORDER — HEPARIN 100 UNIT/ML
500 SYRINGE INTRAVENOUS
Status: CANCELLED | OUTPATIENT
Start: 2023-08-18

## 2023-07-21 RX ADMIN — IMMUNE GLOBULIN (HUMAN) 50 G: 10 INJECTION INTRAVENOUS; SUBCUTANEOUS at 08:07

## 2023-07-21 RX ADMIN — HEPARIN 500 UNITS: 100 SYRINGE at 10:07

## 2023-07-21 RX ADMIN — SODIUM CHLORIDE, PRESERVATIVE FREE 10 ML: 5 INJECTION INTRAVENOUS at 10:07

## 2023-07-21 RX ADMIN — DIPHENHYDRAMINE HYDROCHLORIDE 25 MG: 50 INJECTION INTRAMUSCULAR; INTRAVENOUS at 08:07

## 2023-07-21 RX ADMIN — SODIUM CHLORIDE: 0.9 INJECTION, SOLUTION INTRAVENOUS at 08:07

## 2023-07-21 NOTE — PLAN OF CARE
Problem: Fall Injury Risk  Goal: Absence of Fall and Fall-Related Injury  Reactivated  Intervention: Identify and Manage Contributors  Flowsheets (Taken 7/21/2023 0753)  Self-Care Promotion: safe use of adaptive equipment encouraged  Medication Review/Management: medications reviewed  Intervention: Promote Injury-Free Environment  Flowsheets (Taken 7/21/2023 0753)  Safety Promotion/Fall Prevention: assistive device/personal item within reach

## 2023-08-14 ENCOUNTER — PATIENT MESSAGE (OUTPATIENT)
Dept: ADMINISTRATIVE | Facility: HOSPITAL | Age: 68
End: 2023-08-14
Payer: MEDICARE

## 2023-08-15 ENCOUNTER — LAB VISIT (OUTPATIENT)
Dept: LAB | Facility: HOSPITAL | Age: 68
End: 2023-08-15
Attending: INTERNAL MEDICINE
Payer: MEDICARE

## 2023-08-15 ENCOUNTER — CLINICAL SUPPORT (OUTPATIENT)
Dept: FAMILY MEDICINE | Facility: CLINIC | Age: 68
End: 2023-08-15
Attending: FAMILY MEDICINE
Payer: MEDICARE

## 2023-08-15 ENCOUNTER — OFFICE VISIT (OUTPATIENT)
Dept: FAMILY MEDICINE | Facility: CLINIC | Age: 68
End: 2023-08-15
Payer: MEDICARE

## 2023-08-15 ENCOUNTER — PATIENT MESSAGE (OUTPATIENT)
Dept: ADMINISTRATIVE | Facility: OTHER | Age: 68
End: 2023-08-15
Payer: MEDICARE

## 2023-08-15 VITALS
SYSTOLIC BLOOD PRESSURE: 126 MMHG | DIASTOLIC BLOOD PRESSURE: 72 MMHG | TEMPERATURE: 98 F | BODY MASS INDEX: 32.74 KG/M2 | WEIGHT: 216 LBS | RESPIRATION RATE: 16 BRPM | HEART RATE: 70 BPM | HEIGHT: 68 IN | OXYGEN SATURATION: 97 %

## 2023-08-15 DIAGNOSIS — N52.9 ERECTILE DYSFUNCTION, UNSPECIFIED ERECTILE DYSFUNCTION TYPE: ICD-10-CM

## 2023-08-15 DIAGNOSIS — E11.9 TYPE 2 DIABETES MELLITUS WITHOUT COMPLICATION, WITHOUT LONG-TERM CURRENT USE OF INSULIN: Primary | ICD-10-CM

## 2023-08-15 DIAGNOSIS — E11.9 TYPE 2 DIABETES MELLITUS WITHOUT COMPLICATION, WITHOUT LONG-TERM CURRENT USE OF INSULIN: ICD-10-CM

## 2023-08-15 DIAGNOSIS — C82.30 FOLLICULAR LYMPHOMA GRADE IIIA, UNSPECIFIED BODY REGION: ICD-10-CM

## 2023-08-15 LAB
ALBUMIN SERPL BCP-MCNC: 3.9 G/DL (ref 3.5–5.2)
ALP SERPL-CCNC: 118 U/L (ref 55–135)
ALT SERPL W/O P-5'-P-CCNC: 27 U/L (ref 10–44)
ANION GAP SERPL CALC-SCNC: 12 MMOL/L (ref 8–16)
AST SERPL-CCNC: 18 U/L (ref 10–40)
BASOPHILS # BLD AUTO: 0.04 K/UL (ref 0–0.2)
BASOPHILS NFR BLD: 0.7 % (ref 0–1.9)
BILIRUB SERPL-MCNC: 0.4 MG/DL (ref 0.1–1)
BUN SERPL-MCNC: 14 MG/DL (ref 8–23)
CALCIUM SERPL-MCNC: 9.5 MG/DL (ref 8.7–10.5)
CHLORIDE SERPL-SCNC: 105 MMOL/L (ref 95–110)
CO2 SERPL-SCNC: 22 MMOL/L (ref 23–29)
CREAT SERPL-MCNC: 0.9 MG/DL (ref 0.5–1.4)
DIFFERENTIAL METHOD: ABNORMAL
EOSINOPHIL # BLD AUTO: 0.2 K/UL (ref 0–0.5)
EOSINOPHIL NFR BLD: 3.1 % (ref 0–8)
ERYTHROCYTE [DISTWIDTH] IN BLOOD BY AUTOMATED COUNT: 14.1 % (ref 11.5–14.5)
EST. GFR  (NO RACE VARIABLE): >60 ML/MIN/1.73 M^2
ESTIMATED AVG GLUCOSE: 148 MG/DL (ref 68–131)
GLUCOSE SERPL-MCNC: 194 MG/DL (ref 70–110)
HBA1C MFR BLD: 6.8 % (ref 4–5.6)
HCT VFR BLD AUTO: 42.8 % (ref 40–54)
HGB BLD-MCNC: 14.4 G/DL (ref 14–18)
IMM GRANULOCYTES # BLD AUTO: 0.02 K/UL (ref 0–0.04)
IMM GRANULOCYTES NFR BLD AUTO: 0.3 % (ref 0–0.5)
LYMPHOCYTES # BLD AUTO: 1.6 K/UL (ref 1–4.8)
LYMPHOCYTES NFR BLD: 27.6 % (ref 18–48)
MCH RBC QN AUTO: 28.4 PG (ref 27–31)
MCHC RBC AUTO-ENTMCNC: 33.6 G/DL (ref 32–36)
MCV RBC AUTO: 84 FL (ref 82–98)
MONOCYTES # BLD AUTO: 0.6 K/UL (ref 0.3–1)
MONOCYTES NFR BLD: 10.5 % (ref 4–15)
NEUTROPHILS # BLD AUTO: 3.3 K/UL (ref 1.8–7.7)
NEUTROPHILS NFR BLD: 57.8 % (ref 38–73)
NRBC BLD-RTO: 0 /100 WBC
PLATELET # BLD AUTO: 256 K/UL (ref 150–450)
PMV BLD AUTO: 9 FL (ref 9.2–12.9)
POTASSIUM SERPL-SCNC: 4.2 MMOL/L (ref 3.5–5.1)
PROT SERPL-MCNC: 7.2 G/DL (ref 6–8.4)
RBC # BLD AUTO: 5.07 M/UL (ref 4.6–6.2)
SODIUM SERPL-SCNC: 139 MMOL/L (ref 136–145)
WBC # BLD AUTO: 5.73 K/UL (ref 3.9–12.7)

## 2023-08-15 PROCEDURE — 99214 PR OFFICE/OUTPT VISIT, EST, LEVL IV, 30-39 MIN: ICD-10-PCS | Mod: S$PBB,,, | Performed by: FAMILY MEDICINE

## 2023-08-15 PROCEDURE — 85025 COMPLETE CBC W/AUTO DIFF WBC: CPT | Performed by: INTERNAL MEDICINE

## 2023-08-15 PROCEDURE — 92228 IMG RTA DETC/MNTR DS PHY/QHP: CPT | Mod: PBBFAC

## 2023-08-15 PROCEDURE — 92228 IMG RTA DETC/MNTR DS PHY/QHP: CPT | Mod: 26,S$PBB,, | Performed by: OPTOMETRIST

## 2023-08-15 PROCEDURE — 36415 COLL VENOUS BLD VENIPUNCTURE: CPT | Performed by: INTERNAL MEDICINE

## 2023-08-15 PROCEDURE — 92228 DIABETIC EYE SCREENING PHOTO: ICD-10-PCS | Mod: 26,S$PBB,, | Performed by: OPTOMETRIST

## 2023-08-15 PROCEDURE — 83036 HEMOGLOBIN GLYCOSYLATED A1C: CPT | Performed by: FAMILY MEDICINE

## 2023-08-15 PROCEDURE — 99214 OFFICE O/P EST MOD 30 MIN: CPT | Mod: S$PBB,,, | Performed by: FAMILY MEDICINE

## 2023-08-15 PROCEDURE — 99999 PR PBB SHADOW E&M-EST. PATIENT-LVL IV: CPT | Mod: PBBFAC,,, | Performed by: FAMILY MEDICINE

## 2023-08-15 PROCEDURE — 99214 OFFICE O/P EST MOD 30 MIN: CPT | Mod: PBBFAC | Performed by: FAMILY MEDICINE

## 2023-08-15 PROCEDURE — 80053 COMPREHEN METABOLIC PANEL: CPT | Performed by: INTERNAL MEDICINE

## 2023-08-15 PROCEDURE — 99999 PR PBB SHADOW E&M-EST. PATIENT-LVL IV: ICD-10-PCS | Mod: PBBFAC,,, | Performed by: FAMILY MEDICINE

## 2023-08-15 RX ORDER — TADALAFIL 10 MG/1
10 TABLET ORAL DAILY PRN
Qty: 30 TABLET | Refills: 0 | Status: SHIPPED | OUTPATIENT
Start: 2023-08-15 | End: 2023-09-12 | Stop reason: SDUPTHER

## 2023-08-15 NOTE — PROGRESS NOTES
Sivakumar Thacker, a 68 y.o. male is here for a diabetic eye screening with non-dilated fundus photos per provider.     Patient cooperative?: Yes  Small pupils?: Yes  Last eye exam:    Glasses / Contacts? Patient wears glasses but is not wearing today  Surgery / Procedures?   Procedure Explained to patient  Patient Voiced understanding? Yes  Complaints from patient? No If yes, explain     Fernanda Sen MA

## 2023-08-15 NOTE — PROGRESS NOTES
[facility] - Clinic Note    Subjective      Mr. Thacker is a 68 y.o. male who presents to clinic for Establish Care    History non-Hodgkin's lymphoma.  Currently on maintenance therapy.    [facility] Sivakumar has a past medical history of Chest wall abscess (9/30/2022), Chronic obstructive pulmonary disease, unspecified COPD type (3/28/2022), Diabetes mellitus, type 2, Encounter for antineoplastic chemotherapy (04/01/2020), Hypertension, Hypogammaglobulinemia (12/13/2019), Neuropathy, Non Hodgkin's lymphoma, Reflux esophagitis, and Ringing in ear, bilateral.   [facility] Sivakumar has a past surgical history that includes Portacath placement; Hand surgery; Spine surgery; Colonoscopy (2018); Eye surgery (Approximately 3 years ago); Vasectomy (1985 ?); Skin cancer excision (09/30/2020); Mediport removal (Right, 10/3/2022); Incision and drainage of abscess (Left, 10/3/2022); and Insertion of tunneled central venous catheter (CVC) with subcutaneous port (Left, 2/1/2023).    Sivakumar's family history includes Diabetes in his father.    Sivakumar reports that he quit smoking about 49 years ago. His smoking use included cigarettes. He started smoking about 51 years ago. He has a 1.0 pack-year smoking history. He has never used smokeless tobacco. He reports that he does not currently use alcohol. He reports that he does not use drugs.   HECTOR Shaver has No Known Allergies.   JESSIE Shaver has a current medication list which includes the following prescription(s): albuterol, albuterol-ipratropium, amlodipine, aspirin, atorvastatin, blood sugar diagnostic, blood sugar diagnostic, fluticasone propionate, gabapentin, losartan, metformin, montelukast, multivitamin, naproxen, omeprazole, tizanidine, mucus clearing device, prednisone, and tadalafil.     Review of Systems   Constitutional:  Negative for chills and fever.   Respiratory:  Negative for shortness of breath.    Cardiovascular:  Negative for chest pain.     Objective     /72 (BP  "Location: Left arm, Patient Position: Sitting, BP Method: Large (Manual))   Pulse 70   Temp 98.4 °F (36.9 °C) (Temporal)   Resp 16   Ht 5' 8" (1.727 m)   Wt 98 kg (216 lb)   SpO2 97%   BMI 32.84 kg/m²     Physical Exam  Vitals and nursing note reviewed.   Constitutional:       General: He is not in acute distress.     Appearance: Normal appearance. He is well-developed. He is not diaphoretic.   HENT:      Head: Normocephalic and atraumatic.      Right Ear: External ear normal.      Left Ear: External ear normal.   Eyes:      General:         Right eye: No discharge.         Left eye: No discharge.   Cardiovascular:      Rate and Rhythm: Normal rate and regular rhythm.      Heart sounds: Normal heart sounds.   Pulmonary:      Effort: Pulmonary effort is normal.      Breath sounds: Normal breath sounds. No wheezing or rales.   Skin:     General: Skin is warm and dry.   Neurological:      Mental Status: He is alert and oriented to person, place, and time. Mental status is at baseline.   Psychiatric:         Mood and Affect: Mood normal.         Behavior: Behavior normal.         Thought Content: Thought content normal.         Judgment: Judgment normal.        Assessment/Plan     1. Type 2 diabetes mellitus without complication, without long-term current use of insulin  Hemoglobin A1C    Diabetic Eye Screening Photo      2. Erectile dysfunction, unspecified erectile dysfunction type  tadalafiL (CIALIS) 10 MG tablet        Due for labs as above.  Also due for diabetic eye camera.  Keep follow-up with specialist.  Trial Cialis for erectile dysfunction.    Future Appointments   Date Time Provider Department Center   8/17/2023  7:00 AM CHAIR 11 [facility] CHEMO [facility]   8/18/2023  8:00 AM CHAIR 03 [facility] CHEMO [facility]   8/24/2023  9:00 AM Micheline Oleary MD [facility] DERM [place]   9/13/2023  8:30 AM Jefferson Ibarra MD [facility] HEM ONC [facility]   9/15/2023  8:00 AM CHAIR 10 [facility] CHEMO " [facility] [place]   10/13/2023  8:00 AM CHAIR 10 [facility] CHEMO [facility] [place]   11/9/2023  7:00 AM CHAIR 10 [facility] CHEMO [facility] [place]   11/22/2023  8:20 AM Raffaele Veliz MD [facility] PULM [place] KATHRYN Azar MD  Family Medicine  [facility]

## 2023-08-16 RX ORDER — ACETAMINOPHEN 325 MG/1
650 TABLET ORAL
Status: CANCELLED | OUTPATIENT
Start: 2023-08-17

## 2023-08-16 RX ORDER — FAMOTIDINE 10 MG/ML
20 INJECTION INTRAVENOUS
Status: CANCELLED | OUTPATIENT
Start: 2023-08-17

## 2023-08-16 RX ORDER — SODIUM CHLORIDE 0.9 % (FLUSH) 0.9 %
10 SYRINGE (ML) INJECTION
Status: CANCELLED | OUTPATIENT
Start: 2023-08-17

## 2023-08-16 RX ORDER — MEPERIDINE HYDROCHLORIDE 25 MG/ML
25 INJECTION INTRAMUSCULAR; INTRAVENOUS; SUBCUTANEOUS
Status: CANCELLED | OUTPATIENT
Start: 2023-08-17 | End: 2023-08-17

## 2023-08-16 RX ORDER — HEPARIN 100 UNIT/ML
500 SYRINGE INTRAVENOUS
Status: CANCELLED | OUTPATIENT
Start: 2023-08-17

## 2023-08-17 ENCOUNTER — INFUSION (OUTPATIENT)
Dept: INFUSION THERAPY | Facility: HOSPITAL | Age: 68
End: 2023-08-17
Attending: INTERNAL MEDICINE
Payer: MEDICARE

## 2023-08-17 VITALS
SYSTOLIC BLOOD PRESSURE: 149 MMHG | TEMPERATURE: 98 F | HEART RATE: 78 BPM | RESPIRATION RATE: 18 BRPM | HEIGHT: 68 IN | OXYGEN SATURATION: 96 % | DIASTOLIC BLOOD PRESSURE: 86 MMHG | WEIGHT: 214 LBS | BODY MASS INDEX: 32.43 KG/M2

## 2023-08-17 DIAGNOSIS — C82.30 FOLLICULAR LYMPHOMA GRADE IIIA, UNSPECIFIED BODY REGION: ICD-10-CM

## 2023-08-17 DIAGNOSIS — D80.1 HYPOGAMMAGLOBULINEMIA: Primary | ICD-10-CM

## 2023-08-17 PROCEDURE — 96375 TX/PRO/DX INJ NEW DRUG ADDON: CPT

## 2023-08-17 PROCEDURE — 96413 CHEMO IV INFUSION 1 HR: CPT

## 2023-08-17 PROCEDURE — 96415 CHEMO IV INFUSION ADDL HR: CPT

## 2023-08-17 PROCEDURE — 63600175 PHARM REV CODE 636 W HCPCS: Mod: JZ,JG | Performed by: INTERNAL MEDICINE

## 2023-08-17 PROCEDURE — 96367 TX/PROPH/DG ADDL SEQ IV INF: CPT

## 2023-08-17 PROCEDURE — 25000003 PHARM REV CODE 250: Performed by: INTERNAL MEDICINE

## 2023-08-17 RX ORDER — ACETAMINOPHEN 325 MG/1
650 TABLET ORAL
Status: COMPLETED | OUTPATIENT
Start: 2023-08-17 | End: 2023-08-17

## 2023-08-17 RX ORDER — HEPARIN 100 UNIT/ML
500 SYRINGE INTRAVENOUS
Status: DISCONTINUED | OUTPATIENT
Start: 2023-08-17 | End: 2023-08-17 | Stop reason: HOSPADM

## 2023-08-17 RX ORDER — SODIUM CHLORIDE 0.9 % (FLUSH) 0.9 %
10 SYRINGE (ML) INJECTION
Status: DISCONTINUED | OUTPATIENT
Start: 2023-08-17 | End: 2023-08-17 | Stop reason: HOSPADM

## 2023-08-17 RX ORDER — FAMOTIDINE 10 MG/ML
20 INJECTION INTRAVENOUS
Status: COMPLETED | OUTPATIENT
Start: 2023-08-17 | End: 2023-08-17

## 2023-08-17 RX ORDER — MEPERIDINE HYDROCHLORIDE 25 MG/ML
25 INJECTION INTRAMUSCULAR; INTRAVENOUS; SUBCUTANEOUS
Status: DISCONTINUED | OUTPATIENT
Start: 2023-08-17 | End: 2023-08-17 | Stop reason: HOSPADM

## 2023-08-17 RX ADMIN — SODIUM CHLORIDE: 0.9 INJECTION, SOLUTION INTRAVENOUS at 07:08

## 2023-08-17 RX ADMIN — RITUXIMAB 800 MG: 10 INJECTION, SOLUTION INTRAVENOUS at 07:08

## 2023-08-17 RX ADMIN — DIPHENHYDRAMINE HYDROCHLORIDE 50 MG: 50 INJECTION INTRAMUSCULAR; INTRAVENOUS at 07:08

## 2023-08-17 RX ADMIN — HEPARIN 500 UNITS: 100 SYRINGE at 10:08

## 2023-08-17 RX ADMIN — FAMOTIDINE 20 MG: 10 INJECTION INTRAVENOUS at 07:08

## 2023-08-17 RX ADMIN — ACETAMINOPHEN 650 MG: 325 TABLET ORAL at 07:08

## 2023-08-17 NOTE — PLAN OF CARE
Problem: Fatigue  Goal: Improved Activity Tolerance  Outcome: Ongoing, Progressing  Intervention: Promote Improved Energy  Flowsheets (Taken 8/17/2023 5407)  Fatigue Management:   frequent rest breaks encouraged   fatigue-related activity identified  Sleep/Rest Enhancement:   relaxation techniques promoted   reading promoted   regular sleep/rest pattern promoted

## 2023-08-18 ENCOUNTER — INFUSION (OUTPATIENT)
Dept: INFUSION THERAPY | Facility: HOSPITAL | Age: 68
End: 2023-08-18
Attending: INTERNAL MEDICINE
Payer: MEDICARE

## 2023-08-18 VITALS
DIASTOLIC BLOOD PRESSURE: 77 MMHG | RESPIRATION RATE: 18 BRPM | SYSTOLIC BLOOD PRESSURE: 137 MMHG | OXYGEN SATURATION: 97 % | HEART RATE: 72 BPM | WEIGHT: 215.19 LBS | TEMPERATURE: 98 F | BODY MASS INDEX: 32.61 KG/M2 | HEIGHT: 68 IN

## 2023-08-18 DIAGNOSIS — D80.1 NONFAMILIAL HYPOGAMMAGLOBULINEMIA: ICD-10-CM

## 2023-08-18 DIAGNOSIS — D80.1 HYPOGAMMAGLOBULINEMIA: Primary | ICD-10-CM

## 2023-08-18 DIAGNOSIS — C82.30 FOLLICULAR LYMPHOMA GRADE IIIA, UNSPECIFIED BODY REGION: ICD-10-CM

## 2023-08-18 PROCEDURE — 96375 TX/PRO/DX INJ NEW DRUG ADDON: CPT

## 2023-08-18 PROCEDURE — 96365 THER/PROPH/DIAG IV INF INIT: CPT

## 2023-08-18 PROCEDURE — 96366 THER/PROPH/DIAG IV INF ADDON: CPT

## 2023-08-18 PROCEDURE — 96367 TX/PROPH/DG ADDL SEQ IV INF: CPT

## 2023-08-18 PROCEDURE — 63600175 PHARM REV CODE 636 W HCPCS: Performed by: INTERNAL MEDICINE

## 2023-08-18 PROCEDURE — 25000003 PHARM REV CODE 250: Performed by: INTERNAL MEDICINE

## 2023-08-18 PROCEDURE — 63600175 PHARM REV CODE 636 W HCPCS: Mod: JZ,JA,JG | Performed by: NURSE PRACTITIONER

## 2023-08-18 RX ORDER — SODIUM CHLORIDE 0.9 % (FLUSH) 0.9 %
10 SYRINGE (ML) INJECTION
Status: DISCONTINUED | OUTPATIENT
Start: 2023-08-18 | End: 2023-08-18 | Stop reason: HOSPADM

## 2023-08-18 RX ORDER — HEPARIN 100 UNIT/ML
500 SYRINGE INTRAVENOUS
Status: DISCONTINUED | OUTPATIENT
Start: 2023-08-18 | End: 2023-08-18 | Stop reason: HOSPADM

## 2023-08-18 RX ORDER — SODIUM CHLORIDE 0.9 % (FLUSH) 0.9 %
10 SYRINGE (ML) INJECTION
Status: CANCELLED | OUTPATIENT
Start: 2023-09-15

## 2023-08-18 RX ORDER — HEPARIN 100 UNIT/ML
500 SYRINGE INTRAVENOUS
Status: CANCELLED | OUTPATIENT
Start: 2023-09-15

## 2023-08-18 RX ADMIN — SODIUM CHLORIDE: 0.9 INJECTION, SOLUTION INTRAVENOUS at 07:08

## 2023-08-18 RX ADMIN — DIPHENHYDRAMINE HYDROCHLORIDE 25 MG: 50 INJECTION INTRAMUSCULAR; INTRAVENOUS at 07:08

## 2023-08-18 RX ADMIN — IMMUNE GLOBULIN (HUMAN) 50 G: 10 INJECTION INTRAVENOUS; SUBCUTANEOUS at 08:08

## 2023-08-18 RX ADMIN — HEPARIN 500 UNITS: 100 SYRINGE at 10:08

## 2023-08-18 NOTE — PLAN OF CARE
Problem: Fatigue  Goal: Improved Activity Tolerance  Outcome: Ongoing, Progressing  Intervention: Promote Improved Energy  Flowsheets (Taken 8/18/2023 0810)  Fatigue Management:   frequent rest breaks encouraged   paced activity encouraged   fatigue-related activity identified  Sleep/Rest Enhancement:   relaxation techniques promoted   regular sleep/rest pattern promoted

## 2023-08-24 ENCOUNTER — OFFICE VISIT (OUTPATIENT)
Dept: DERMATOLOGY | Facility: CLINIC | Age: 68
End: 2023-08-24
Payer: MEDICARE

## 2023-08-24 DIAGNOSIS — D22.9 BENIGN NEVUS: ICD-10-CM

## 2023-08-24 DIAGNOSIS — L90.5 SCAR: ICD-10-CM

## 2023-08-24 DIAGNOSIS — L57.0 ACTINIC KERATOSIS: Primary | ICD-10-CM

## 2023-08-24 DIAGNOSIS — D18.01 CHERRY ANGIOMA: ICD-10-CM

## 2023-08-24 DIAGNOSIS — Z85.828 HISTORY OF NONMELANOMA SKIN CANCER: ICD-10-CM

## 2023-08-24 DIAGNOSIS — D48.5 NEOPLASM OF UNCERTAIN BEHAVIOR OF SKIN: ICD-10-CM

## 2023-08-24 DIAGNOSIS — L82.1 SEBORRHEIC KERATOSES: ICD-10-CM

## 2023-08-24 PROCEDURE — 11102 TANGNTL BX SKIN SINGLE LES: CPT | Mod: S$GLB,,, | Performed by: STUDENT IN AN ORGANIZED HEALTH CARE EDUCATION/TRAINING PROGRAM

## 2023-08-24 PROCEDURE — 88305 TISSUE EXAM BY PATHOLOGIST: ICD-10-PCS | Mod: 26,,, | Performed by: STUDENT IN AN ORGANIZED HEALTH CARE EDUCATION/TRAINING PROGRAM

## 2023-08-24 PROCEDURE — 99213 OFFICE O/P EST LOW 20 MIN: CPT | Mod: 25,S$GLB,, | Performed by: STUDENT IN AN ORGANIZED HEALTH CARE EDUCATION/TRAINING PROGRAM

## 2023-08-24 PROCEDURE — 17000 DESTRUCT PREMALG LESION: CPT | Mod: XS,S$GLB,, | Performed by: STUDENT IN AN ORGANIZED HEALTH CARE EDUCATION/TRAINING PROGRAM

## 2023-08-24 PROCEDURE — 17003 DESTRUCT PREMALG LES 2-14: CPT | Mod: S$GLB,,, | Performed by: STUDENT IN AN ORGANIZED HEALTH CARE EDUCATION/TRAINING PROGRAM

## 2023-08-24 PROCEDURE — 17000 PR DESTRUCTION(LASER SURGERY,CRYOSURGERY,CHEMOSURGERY),PREMALIGNANT LESIONS,FIRST LESION: ICD-10-PCS | Mod: XS,S$GLB,, | Performed by: STUDENT IN AN ORGANIZED HEALTH CARE EDUCATION/TRAINING PROGRAM

## 2023-08-24 PROCEDURE — 11102 PR TANGENTIAL BIOPSY, SKIN, SINGLE LESION: ICD-10-PCS | Mod: S$GLB,,, | Performed by: STUDENT IN AN ORGANIZED HEALTH CARE EDUCATION/TRAINING PROGRAM

## 2023-08-24 PROCEDURE — 88305 TISSUE EXAM BY PATHOLOGIST: CPT | Performed by: STUDENT IN AN ORGANIZED HEALTH CARE EDUCATION/TRAINING PROGRAM

## 2023-08-24 PROCEDURE — 17003 DESTRUCTION, PREMALIGNANT LESIONS; SECOND THROUGH 14 LESIONS: ICD-10-PCS | Mod: S$GLB,,, | Performed by: STUDENT IN AN ORGANIZED HEALTH CARE EDUCATION/TRAINING PROGRAM

## 2023-08-24 PROCEDURE — 99213 PR OFFICE/OUTPT VISIT, EST, LEVL III, 20-29 MIN: ICD-10-PCS | Mod: 25,S$GLB,, | Performed by: STUDENT IN AN ORGANIZED HEALTH CARE EDUCATION/TRAINING PROGRAM

## 2023-08-24 PROCEDURE — 88305 TISSUE EXAM BY PATHOLOGIST: CPT | Mod: 26,,, | Performed by: STUDENT IN AN ORGANIZED HEALTH CARE EDUCATION/TRAINING PROGRAM

## 2023-08-24 NOTE — PROGRESS NOTES
Subjective:      Patient ID:  Sivakumar Thacker is a 68 y.o. male who presents for   Chief Complaint   Patient presents with    Spot     Spot on right helix, spot on right jawline     LOV 07/11/22    Patient here today for UBSE; states spot on right helix for x mos.  states is scaly and painful occassionally when putting helmet. No Tx    Patient has spot on right jawline  For x mos. Grown in size, No Tx     Chemo every 12 weeks   IVIG every 4 weeks for Lymphoma    Hx of NMSC on right cheek and left ear- removed at [facility] within the last year.                 Review of Systems   Constitutional:  Negative for fever, chills and fatigue.   Respiratory:  Negative for cough and shortness of breath.    Gastrointestinal:  Negative for nausea and vomiting.   Skin:  Positive for activity-related sunscreen use and wears hat. Negative for daily sunscreen use.   Hematologic/Lymphatic: Does not bruise/bleed easily (asa).       Objective:   Physical Exam   Constitutional: He appears well-developed and well-nourished. No distress.   Neurological: He is alert and oriented to person, place, and time. He is not disoriented.   Psychiatric: He has a normal mood and affect.   Skin:   Areas Examined (abnormalities noted in diagram):   Scalp / Hair Palpated and Inspected  Head / Face Inspection Performed  Neck Inspection Performed  Chest / Axilla Inspection Performed  Abdomen Inspection Performed  Back Inspection Performed  RUE Inspected  LUE Inspection Performed  Nails and Digits Inspection Performed                     Diagram Legend     Erythematous scaling macule/papule c/w actinic keratosis       Vascular papule c/w angioma      Pigmented verrucoid papule/plaque c/w seborrheic keratosis      Yellow umbilicated papule c/w sebaceous hyperplasia      Irregularly shaped tan macule c/w lentigo     1-2 mm smooth white papules consistent with Milia      Movable subcutaneous cyst with punctum c/w epidermal inclusion cyst       Subcutaneous movable cyst c/w pilar cyst      Firm pink to brown papule c/w dermatofibroma      Pedunculated fleshy papule(s) c/w skin tag(s)      Evenly pigmented macule c/w junctional nevus     Mildly variegated pigmented, slightly irregular-bordered macule c/w mildly atypical nevus      Flesh colored to evenly pigmented papule c/w intradermal nevus       Pink pearly papule/plaque c/w basal cell carcinoma      Erythematous hyperkeratotic cursted plaque c/w SCC      Surgical scar with no sign of skin cancer recurrence      Open and closed comedones      Inflammatory papules and pustules      Verrucoid papule consistent consistent with wart     Erythematous eczematous patches and plaques     Dystrophic onycholytic nail with subungual debris c/w onychomycosis     Umbilicated papule    Erythematous-base heme-crusted tan verrucoid plaque consistent with inflamed seborrheic keratosis     Erythematous Silvery Scaling Plaque c/w Psoriasis     See annotation            Assessment / Plan:      Pathology Orders:       Normal Orders This Visit    Specimen to Pathology, Dermatology     Questions:    Procedure Type: Dermatology and skin neoplasms    Number of Specimens: 1    ------------------------: -------------------------    Spec 1 Procedure: Biopsy    Spec 1 Clinical Impression: lentigo vs. pigmented macular SK r/o LM    Spec 1 Source: left earlobe    Release to patient:           Actinic keratosis  Cryosurgery Procedure Note    Verbal consent from the patient is obtained and the patient is aware of the precancerous quality and need for treatment of these lesions. Liquid nitrogen cryosurgery is applied to the 4 actinic keratoses, as detailed in the physical exam, to produce a freeze injury. The patient is aware that blisters may form and is instructed on wound care with gentle cleansing and use of vaseline ointment to keep moist until healed. The patient is supplied a handout on cryosurgery and is instructed to call if  lesions do not completely resolve.    Neoplasm of uncertain behavior of skin  -     Ambulatory referral/consult to Dermatology  -     Specimen to Pathology, Dermatology  Shave biopsy procedure note:    Shave biopsy performed after verbal consent including risk of infection, scar, recurrence, need for additional treatment of site. Area prepped with alcohol, anesthetized with approximately 1.0cc of 1% lidocaine with epinephrine. Lesional tissue shaved with razor blade. Hemostasis achieved with application of aluminum chloride followed by hyfrecation. No complications. Dressing applied. Wound care explained.    History of nonmelanoma skin cancer  Scar  Area(s) of previous NMSC evaluated with no signs of recurrence.  Upper body skin examination performed today including at least 9 points as noted in physical examination. Suspicious lesions noted.  Patient instructed in importance in daily broad spectrum sun protection of at least spf 30. Mineral sunscreen ingredients preferred (Zinc +/- Titanium) and can be found OTC.   Recommend Elta MD for daily use on face and neck.  Patient encouraged to wear hat for all outdoor exposure.   Also discussed sun avoidance and use of protective clothing.      Seborrheic keratoses  These are benign inherited growths without a malignant potential. Reassurance given to patient. No treatment is necessary.     Cherry angioma  This is a benign vascular lesion. Reassurance given. No treatment required.     Benign nevus  Careful dermoscopy evaluation of nevi performed with none identified as needing biopsy today  Monitor for new mole or moles that are becoming bigger, darker, irritated, or developing irregular borders.          1 year or sooner pending biopsy    No follow-ups on file.

## 2023-08-24 NOTE — PATIENT INSTRUCTIONS
Shave Biopsy Wound Care    Your doctor has performed a shave biopsy today.  A band aid and vaseline ointment has been placed over the site.  This should remain in place for 24 hours.  It is recommended that you keep the area dry for the first 24 hours.  After 24 hours, you may remove the band aid and wash the area with warm soap and water and apply Vaseline jelly.  Many patients prefer to use Neosporin or Bacitracin ointment.  This is acceptable; however, know that you can develop an allergy to this medication even if you have used it safely for years.  It is important to keep the area moist.  Letting it dry out and get air slows healing time, and will worsen the scar.  Band aid is optional after first 24 hours.      If you notice increasing redness, tenderness, pain, or yellow drainage at the biopsy site, please notify your doctor.  These are signs of an infection.    If your biopsy site is bleeding, apply firm pressure for 15 minutes straight.  Repeat for another 15 minutes, if it is still bleeding.   If the surgical site continues to bleed, then please contact your doctor.      [facility] [place] [address]/ [phone] FAX/ [email or web address]

## 2023-08-30 LAB
FINAL PATHOLOGIC DIAGNOSIS: NORMAL
Lab: NORMAL

## 2023-08-31 ENCOUNTER — OFFICE VISIT (OUTPATIENT)
Dept: PODIATRY | Facility: CLINIC | Age: 68
End: 2023-08-31
Payer: MEDICARE

## 2023-08-31 VITALS
HEART RATE: 80 BPM | WEIGHT: 215 LBS | SYSTOLIC BLOOD PRESSURE: 159 MMHG | BODY MASS INDEX: 32.58 KG/M2 | HEIGHT: 68 IN | DIASTOLIC BLOOD PRESSURE: 89 MMHG | RESPIRATION RATE: 16 BRPM

## 2023-08-31 DIAGNOSIS — G57.93 NEUROPATHIC PAIN OF BOTH FEET: ICD-10-CM

## 2023-08-31 DIAGNOSIS — R60.0 PEDAL EDEMA: ICD-10-CM

## 2023-08-31 DIAGNOSIS — E11.9 COMPREHENSIVE DIABETIC FOOT EXAMINATION, TYPE 2 DM, ENCOUNTER FOR: Primary | ICD-10-CM

## 2023-08-31 DIAGNOSIS — E11.49 TYPE II DIABETES MELLITUS WITH NEUROLOGICAL MANIFESTATIONS: ICD-10-CM

## 2023-08-31 PROCEDURE — 99215 OFFICE O/P EST HI 40 MIN: CPT | Mod: PBBFAC | Performed by: PODIATRIST

## 2023-08-31 PROCEDURE — 99999 PR PBB SHADOW E&M-EST. PATIENT-LVL V: CPT | Mod: PBBFAC,,, | Performed by: PODIATRIST

## 2023-08-31 PROCEDURE — 99214 PR OFFICE/OUTPT VISIT, EST, LEVL IV, 30-39 MIN: ICD-10-PCS | Mod: S$PBB,,, | Performed by: PODIATRIST

## 2023-08-31 PROCEDURE — 99999 PR PBB SHADOW E&M-EST. PATIENT-LVL V: ICD-10-PCS | Mod: PBBFAC,,, | Performed by: PODIATRIST

## 2023-08-31 PROCEDURE — 99214 OFFICE O/P EST MOD 30 MIN: CPT | Mod: S$PBB,,, | Performed by: PODIATRIST

## 2023-08-31 RX ORDER — TIZANIDINE 4 MG/1
4 TABLET ORAL
COMMUNITY
Start: 2023-01-17 | End: 2023-08-31 | Stop reason: SDUPTHER

## 2023-08-31 RX ORDER — AMOXICILLIN AND CLAVULANATE POTASSIUM 875; 125 MG/1; MG/1
1 TABLET, FILM COATED ORAL 2 TIMES DAILY
COMMUNITY
End: 2023-08-31

## 2023-08-31 RX ORDER — BENZONATATE 100 MG/1
CAPSULE ORAL
COMMUNITY
End: 2024-01-03

## 2023-08-31 RX ORDER — GABAPENTIN 300 MG/1
CAPSULE ORAL
COMMUNITY
Start: 2023-01-17 | End: 2023-08-31 | Stop reason: SDUPTHER

## 2023-08-31 RX ORDER — FLUTICASONE PROPIONATE 220 UG/1
1 AEROSOL, METERED RESPIRATORY (INHALATION) 2 TIMES DAILY
COMMUNITY
End: 2023-11-22

## 2023-08-31 RX ORDER — AZITHROMYCIN 250 MG/1
TABLET, FILM COATED ORAL
COMMUNITY
End: 2023-08-31

## 2023-08-31 RX ORDER — MUPIROCIN 20 MG/G
OINTMENT TOPICAL
COMMUNITY
Start: 2023-07-14 | End: 2024-01-03

## 2023-08-31 RX ORDER — ATORVASTATIN CALCIUM 20 MG/1
TABLET, FILM COATED ORAL NIGHTLY
COMMUNITY
End: 2023-08-31 | Stop reason: SDUPTHER

## 2023-08-31 RX ORDER — DICLOFENAC SODIUM 10 MG/G
2 GEL TOPICAL 3 TIMES DAILY
Qty: 200 G | Refills: 2 | Status: SHIPPED | OUTPATIENT
Start: 2023-08-31

## 2023-08-31 RX ORDER — AZELASTINE 1 MG/ML
SPRAY, METERED NASAL
COMMUNITY
Start: 2022-10-17 | End: 2023-10-16

## 2023-08-31 RX ORDER — LEVOFLOXACIN 500 MG/1
TABLET, FILM COATED ORAL
COMMUNITY
End: 2023-09-14 | Stop reason: ALTCHOICE

## 2023-08-31 RX ORDER — ATORVASTATIN CALCIUM 20 MG/1
20 TABLET, FILM COATED ORAL
COMMUNITY
Start: 2023-01-17 | End: 2023-08-31 | Stop reason: SDUPTHER

## 2023-08-31 RX ORDER — MONTELUKAST SODIUM 10 MG/1
TABLET ORAL
COMMUNITY
Start: 2023-01-17 | End: 2023-08-31 | Stop reason: SDUPTHER

## 2023-08-31 RX ORDER — GABAPENTIN 100 MG/1
CAPSULE ORAL
COMMUNITY
End: 2023-08-31

## 2023-08-31 RX ORDER — PROMETHAZINE HYDROCHLORIDE AND DEXTROMETHORPHAN HYDROBROMIDE 6.25; 15 MG/5ML; MG/5ML
5 SYRUP ORAL EVERY 6 HOURS PRN
COMMUNITY
End: 2024-01-03

## 2023-08-31 RX ORDER — BENZONATATE 200 MG/1
CAPSULE ORAL
COMMUNITY
End: 2024-01-03

## 2023-08-31 RX ORDER — AMLODIPINE BESYLATE 10 MG/1
TABLET ORAL
COMMUNITY
Start: 2023-01-17 | End: 2024-01-03 | Stop reason: SDUPTHER

## 2023-08-31 RX ORDER — NAPROXEN 500 MG/1
500 TABLET ORAL
COMMUNITY
Start: 2023-01-17 | End: 2024-01-03 | Stop reason: SDUPTHER

## 2023-08-31 RX ORDER — PREDNISONE 20 MG/1
TABLET ORAL
COMMUNITY
Start: 2023-05-22 | End: 2023-08-31

## 2023-08-31 RX ORDER — MUPIROCIN 20 MG/G
OINTMENT TOPICAL
COMMUNITY
Start: 2023-08-15 | End: 2023-08-31 | Stop reason: SDUPTHER

## 2023-08-31 RX ORDER — TADALAFIL 10 MG/1
TABLET ORAL
COMMUNITY
Start: 2023-08-15 | End: 2023-08-31 | Stop reason: SDUPTHER

## 2023-08-31 RX ORDER — AMLODIPINE BESYLATE 5 MG/1
TABLET ORAL
COMMUNITY
End: 2023-08-31

## 2023-09-04 NOTE — PROGRESS NOTES
Subjective:       Patient ID: Sivakumar Thacker is a 68 y.o. male.    Chief Complaint: Follow-up, Foot Pain, Diabetes Mellitus, and Foot Swelling  Patient for annual diabetic foot exam, complaint of increased nerve pain in both feet.  Patient relates he remains active, rides bike daily even though he is had increased pain, numbness and tingling in his feet.  This started about 3 weeks ago, improved and then started back up again.  He did start to take 2 gabapentin twice daily/600 mg twice daily which he relates has been helping and inquires if this medication can be increased. Pain level feet 5/10.  Type 2 diabetic x 4 years.  States it is well controlled with A1c between 6 range, does not check glucose daily.    Has a history of cervical fusion following an injury June 2000.  He is still on chemotherapy infusion every 8 weeks, IgG every 4 weeks for follicular lymphoma.      Past Medical History:   Diagnosis Date    Chest wall abscess 9/30/2022    Chronic obstructive pulmonary disease, unspecified COPD type 3/28/2022    Diabetes mellitus, type 2     Encounter for antineoplastic chemotherapy 04/01/2020    Hypertension     Hypogammaglobulinemia 12/13/2019    Neuropathy     Non Hodgkin's lymphoma     Reflux esophagitis     Ringing in ear, bilateral      Past Surgical History:   Procedure Laterality Date    COLONOSCOPY  2018    EYE SURGERY  Approximately 3 years ago    Cataract    HAND SURGERY      amputation left index finger    INCISION AND DRAINAGE OF ABSCESS Left 10/3/2022    Procedure: INCISION AND DRAINAGE, ABSCESS;  Surgeon: Franco Venegas III, MD;  Location: [facility] [place] OR;  Service: General;  Laterality: Left;  axilla    INSERTION OF TUNNELED CENTRAL VENOUS CATHETER (CVC) WITH SUBCUTANEOUS PORT Left 2/1/2023    Procedure: [id number];  Surgeon: Franco Venegas III, MD;  Location: [facility] [place] OR;  Service: General;  Laterality: Left;    MEDIPORT REMOVAL Right 10/3/2022    Procedure: REMOVAL, CATHETER, CENTRAL  VENOUS, TUNNELED, WITH PORT;  Surgeon: Franco Venegas III, MD;  Location: [facility] OR;  Service: General;  Laterality: Right;    PORTACATH PLACEMENT      SKIN CANCER EXCISION  2020    [facility]    SPINE SURGERY      VASECTOMY   ?     Family History   Problem Relation Age of Onset    Diabetes Father      Social History     Socioeconomic History    Marital status:    Tobacco Use    Smoking status: Former     Current packs/day: 0.00     Average packs/day: 0.5 packs/day for 2.0 years (1.0 ttl pk-yrs)     Types: Cigarettes     Start date:      Quit date:      Years since quittin.7    Smokeless tobacco: Never   Substance and Sexual Activity    Alcohol use: Not Currently     Comment: rarely    Drug use: No    Sexual activity: Not Currently     Social Determinants of Health     Financial Resource Strain: Unknown (2023)    Overall Financial Resource Strain (CARDIA)     Difficulty of Paying Living Expenses: Patient refused   Food Insecurity: Unknown (2023)    Hunger Vital Sign     Worried About Running Out of Food in the Last Year: Patient refused     Ran Out of Food in the Last Year: Patient refused   Transportation Needs: Unknown (2023)    PRAPARE - Transportation     Lack of Transportation (Medical): Patient refused     Lack of Transportation (Non-Medical): Patient refused   Physical Activity: Unknown (2023)    Exercise Vital Sign     Days of Exercise per Week: Patient refused   Stress: Unknown (2023)    [employer] [place] [employer] - Occupational Stress Questionnaire     Feeling of Stress : Patient refused   Social Connections: Unknown (2023)    Social Connection and Isolation Panel [NHANES]     Frequency of Communication with Friends and Family: Patient refused     Frequency of Social Gatherings with Friends and Family: Patient refused     Active Member of Clubs or Organizations: Patient refused     Attends Club or Organization Meetings: Patient  refused     Marital Status: Patient refused   Housing Stability: Unknown (8/18/2023)    Housing Stability Vital Sign     Unable to Pay for Housing in the Last Year: Patient refused     Unstable Housing in the Last Year: Patient refused       Current Outpatient Medications   Medication Sig Dispense Refill    amLODIPine (NORVASC) 10 MG tablet   See Rx Instructions, # 90 EA, 3 total refill(s), Hard Stop      aspirin (ECOTRIN) 81 MG EC tablet Take 81 mg by mouth once daily.      atorvastatin (LIPITOR) 20 MG tablet Take 1 tablet (20 mg total) by mouth once daily. 90 tablet 3    azelastine (ASTELIN) 137 mcg (0.1 %) nasal spray   See Rx Instructions, 0, # 90 mL, 1 total refill(s), Hard Stop      fluticasone propionate (FLONASE) 50 mcg/actuation nasal spray 1 spray (50 mcg total) by Each Nostril route 2 (two) times daily. 48 g 11    gabapentin (NEURONTIN) 300 MG capsule Take 1 capsule (300 mg total) by mouth 2 (two) times daily. 180 capsule 3    levoFLOXacin (LEVAQUIN) 500 MG tablet Take 1 tablet every 24 hours by oral route for 10 days.      losartan (COZAAR) 100 MG tablet Take 1 tablet (100 mg total) by mouth once daily. 90 tablet 3    metFORMIN (GLUCOPHAGE) 500 MG tablet Take 2 tablets twice a day by oral route with meals for 90 days. 180 tablet 3    montelukast (SINGULAIR) 10 mg tablet Take 1 tablet (10 mg total) by mouth nightly as needed. 90 tablet 3    MULTIVITAMIN ORAL Take 1 tablet by mouth once daily.      mupirocin (BACTROBAN) 2 % ointment Apply topically.      omeprazole (PRILOSEC) 40 MG capsule Take 1 capsule (40 mg total) by mouth once daily. 90 capsule 3    tiZANidine (ZANAFLEX) 4 MG tablet Take 1 tablet (4 mg total) by mouth daily as needed. 90 tablet 3    benzonatate (TESSALON) 100 MG capsule Take 1 capsule every day by oral route at bedtime for 10 days.      benzonatate (TESSALON) 200 MG capsule       blood sugar diagnostic (FREESTYLE LITE STRIPS MISC) FreeStyle Lite Strips      blood sugar diagnostic Strp  "      diclofenac sodium (VOLTAREN) 1 % Gel Apply 2 g topically 3 (three) times daily. 200 g 2    fluticasone propionate (FLOVENT HFA) 220 mcg/actuation inhaler Take 1 puff by mouth 2 (two) times daily.      naproxen (NAPROSYN) 500 MG tablet Take 500 mg by mouth.      promethazine-dextromethorphan (PROMETHAZINE-DM) 6.25-15 mg/5 mL Syrp Take 5 mLs by mouth every 6 (six) hours as needed.      tadalafiL (CIALIS) 10 MG tablet Take 1 tablet (10 mg total) by mouth daily as needed for Erectile Dysfunction. 30 tablet 0     No current facility-administered medications for this visit.     Review of patient's allergies indicates:  No Known Allergies    Review of Systems   Cardiovascular:  Negative for leg swelling.        Mild pedal edema   Musculoskeletal:  Negative for gait problem.   Neurological:         Numbness, tingling feet   All other systems reviewed and are negative.      Objective:      Vitals:    08/31/23 1400   BP: (!) 159/89   Pulse: 80   Resp: 16   Weight: 97.5 kg (215 lb)   Height: 5' 8" (1.727 m)     Physical Exam  Vitals and nursing note reviewed.   Constitutional:       General: He is not in acute distress.     Appearance: Normal appearance.   Cardiovascular:      Pulses:           Dorsalis pedis pulses are 2+ on the right side and 2+ on the left side.        Posterior tibial pulses are 1+ on the right side and 1+ on the left side.   Pulmonary:      Effort: Pulmonary effort is normal.   Musculoskeletal:      Right foot: Decreased range of motion. No deformity.      Left foot: Decreased range of motion. No deformity.   Feet:      Right foot:      Protective Sensation:   5 sites sensed.      Skin integrity: Skin integrity normal. No skin breakdown.      Toenail Condition: Fungal disease present.     Left foot:      Protective Sensation: 5 sites tested.  5 sites sensed.      Skin integrity: Skin integrity normal. No skin breakdown.      Toenail Condition: Fungal disease present.  Skin:     Capillary Refill: " Capillary refill takes 2 to 3 seconds.   Neurological:      General: No focal deficit present.      Mental Status: He is alert.      Comments: Sensation intact bilateral feet, increased pain, paresthesias neuropathic pain    Psychiatric:         Behavior: Behavior normal.         Thought Content: Thought content normal.               Hemoglobin A1C          Component Ref Range & Units 2 wk ago  (8/15/23) 4 mo ago  (4/14/23) 9 mo ago  (12/6/22) 9 mo ago  (12/6/22)   Hemoglobin A1C 4.0 - 5.6 % 6.8 High   6.8 High   7.0 High  7.0 High       Estimated Avg Glucose 68 - 131 mg/dL 148 High   148 High   154 High   154             Assessment:       1. Comprehensive diabetic foot examination, type 2 DM, encounter for    2. Type II diabetes mellitus with neurological manifestations    3. Neuropathic pain of both feet    4. Pedal edema          Plan:         GABAPENTIN 600 MG TWICE DAILY    Comprehensive diabetic pedal exam performed  Reviewed good sensation in feet  Reviewed diabetic education, signs of neuropathy  Reviewed A1c  Reviewed benefit of controled glucose/diabetes regarding potential foot problems especially neuropathy.    Had a lengthy discussion regarding neuropathy.  Advised patient it is also possible that he has periods of neuritis, inflammation of the nerves especially since he is so active and rides his bike daily.  We discussed the difference between neuritis and neuropathy and the treatment for neuritis including ice/cool therapy in frequency this should be performed as long as well tolerated.  Reviewed soaking warm water and Epson salt if the ice is uncomfortable.  Advised soaking can be very soothing for nerve pain of any kind.  Discussed use of over-the-counter Voltaren gel as directed   Reviewed stretching, massage and had a lengthy discussion regarding appropriate shoes.  We discussed shoes with thicker sole for shock absorption, wide, light, can versus.  Reviewed appropriate shoes indoors, absolutely  no flat shoes or walking barefoot  Increase gabapentin 600 mg twice daily, reviewed medication, potential side effects  We discussed mild swelling in feet in the same treatments supply, ice/cool/over-the-counter Voltaren gel as directed  Discussed utilizing a light weight sports sock knee-high to help with swelling and circulation when riding his bike    Reviewed appropriate shoes,  especially indoors to protect feet, no flat shoes, slippers or walking in sock or bare feet.    Discussed maintenance of skin and nails and potential complications.    Reviewed need for daily foot checks and instructed patient to contact the office with any area of redness or swelling which has not improved within 3 days.  Patient was in understanding and agreement with treatment plan.  Counseled the patient on their conditions, implications and medical management.  Instructed patient to contact the office with any changes, questions, concerns, worsening of symptoms.   Total face to face time 30 minutes, exam, assessment, treatment, discussion, additional time for review of chart prior to and following appointment and visit documentation, consultation and coordination of care.    Follow up as needed, if increasing gabapentin has not resolved majority of his pain in 4 weeks    This note was created using M*Modal voice recognition software that occasionally misinterpreted phrases or words.

## 2023-09-05 ENCOUNTER — TELEPHONE (OUTPATIENT)
Dept: DERMATOLOGY | Facility: CLINIC | Age: 68
End: 2023-09-05
Payer: MEDICARE

## 2023-09-05 NOTE — TELEPHONE ENCOUNTER
----- Message from Micheline Oleary MD sent at 8/31/2023  8:00 AM CDT -----  RELIAPATH DIAGNOSIS:   SKIN, LEFT EARLOBE, SHAVE BIOPSY:   -Solar lentigo.     ##Please notify patient that this is a benign lesion and no further intervention is warranted.

## 2023-09-07 ENCOUNTER — PATIENT MESSAGE (OUTPATIENT)
Dept: FAMILY MEDICINE | Facility: CLINIC | Age: 68
End: 2023-09-07
Payer: MEDICARE

## 2023-09-07 DIAGNOSIS — N52.9 ERECTILE DYSFUNCTION, UNSPECIFIED ERECTILE DYSFUNCTION TYPE: ICD-10-CM

## 2023-09-12 RX ORDER — TADALAFIL 20 MG/1
20 TABLET ORAL DAILY PRN
Qty: 30 TABLET | Refills: 0 | Status: SHIPPED | OUTPATIENT
Start: 2023-09-12 | End: 2023-10-05

## 2023-09-12 NOTE — PROGRESS NOTES
"[facility] Hematology/Oncology  PROGRESS NOTE -  Follow-up Visit      Subjective:       Patient ID:   NAME: Sivakumar Thacker : 1955     68 y.o. male    Referring Doc: Doe (new PCP)  Other Physicians: Vinod Chen/José Manuel      Chief Complaint:  NHL f/u    History of Present Illness:     Patient returns today for a regularly scheduled follow-up visit.  The patient is here today to go over the results of the recently ordered labs, tests and studies. He is here  by himself today    He is feeling "real good" and breathing good; He sees pulmonary again in the near future    He saw his new PCP on 8/15    He has since resumed Rituximab but it is now at every 12 weeks; getting IV IGg this Friday and rituimab due again in Oct 2023    He had PEt on 2023     He denies any CP, SOB, HA's or N/V.      He is continued on Gabapentin for the neuropathy issues in his feet.          I discussed continued Covid19 precautions        ROS:   GEN: normal without any fever, night sweats or weight loss  HEENT: normal with no HA's, sore throat, stiff neck, changes in vision; no current sinus issues    CV: normal with no CP, SOB, PND, VASQUEZ or orthopnea  PULM: normal with no SOB,  hemoptysis, sputum or pleuritic pain; chronic cough but better; breathing better    GI: normal with no abdominal pain, nausea, vomiting, constipation, diarrhea, melanotic stools, BRBPR, or hematemesis  : normal with no hematuria, dysuria  BREAST: normal with no mass, discharge, pain  SKIN: normal with no rash, erythema, bruising, or swelling; no current wound issues    Allergies:  Review of patient's allergies indicates:  No Known Allergies    Medications:    Current Outpatient Medications:     amLODIPine (NORVASC) 10 MG tablet,  See Rx Instructions, # 90 EA, 3 total refill(s), Hard Stop, Disp: , Rfl:     aspirin (ECOTRIN) 81 MG EC tablet, Take 81 mg by mouth once daily., Disp: , Rfl:     atorvastatin (LIPITOR) 20 MG tablet, Take 1 tablet (20 mg total) " by mouth once daily., Disp: 90 tablet, Rfl: 3    azelastine (ASTELIN) 137 mcg (0.1 %) nasal spray,  See Rx Instructions, 0, # 90 mL, 1 total refill(s), Hard Stop, Disp: , Rfl:     benzonatate (TESSALON) 100 MG capsule, Take 1 capsule every day by oral route at bedtime for 10 days., Disp: , Rfl:     benzonatate (TESSALON) 200 MG capsule, , Disp: , Rfl:     blood sugar diagnostic (FREESTYLE LITE STRIPS MISC), FreeStyle Lite Strips, Disp: , Rfl:     blood sugar diagnostic Strp, , Disp: , Rfl:     diclofenac sodium (VOLTAREN) 1 % Gel, Apply 2 g topically 3 (three) times daily., Disp: 200 g, Rfl: 2    fluticasone propionate (FLONASE) 50 mcg/actuation nasal spray, 1 spray (50 mcg total) by Each Nostril route 2 (two) times daily., Disp: 48 g, Rfl: 11    fluticasone propionate (FLOVENT HFA) 220 mcg/actuation inhaler, Take 1 puff by mouth 2 (two) times daily., Disp: , Rfl:     gabapentin (NEURONTIN) 300 MG capsule, Take 1 capsule (300 mg total) by mouth 2 (two) times daily., Disp: 180 capsule, Rfl: 3    levoFLOXacin (LEVAQUIN) 500 MG tablet, Take 1 tablet every 24 hours by oral route for 10 days., Disp: , Rfl:     losartan (COZAAR) 100 MG tablet, Take 1 tablet (100 mg total) by mouth once daily., Disp: 90 tablet, Rfl: 3    metFORMIN (GLUCOPHAGE) 500 MG tablet, Take 2 tablets twice a day by oral route with meals for 90 days., Disp: 180 tablet, Rfl: 3    montelukast (SINGULAIR) 10 mg tablet, Take 1 tablet (10 mg total) by mouth nightly as needed., Disp: 90 tablet, Rfl: 3    MULTIVITAMIN ORAL, Take 1 tablet by mouth once daily., Disp: , Rfl:     mupirocin (BACTROBAN) 2 % ointment, Apply topically., Disp: , Rfl:     naproxen (NAPROSYN) 500 MG tablet, Take 500 mg by mouth., Disp: , Rfl:     omeprazole (PRILOSEC) 40 MG capsule, Take 1 capsule (40 mg total) by mouth once daily., Disp: 90 capsule, Rfl: 3    promethazine-dextromethorphan (PROMETHAZINE-DM) 6.25-15 mg/5 mL Syrp, Take 5 mLs by mouth every 6 (six) hours as needed., Disp:  ", Rfl:     tadalafiL (CIALIS) 20 MG Tab, Take 1 tablet (20 mg total) by mouth daily as needed for Erectile Dysfunction., Disp: 30 tablet, Rfl: 0    tiZANidine (ZANAFLEX) 4 MG tablet, Take 1 tablet (4 mg total) by mouth daily as needed., Disp: 90 tablet, Rfl: 3    PMHx/PSHx Updates:  See patient's last visit with me on  6/21/2023  See H&P on 1/8/2019        Pathology:  Cancer Staging  No matching staging information was found for the patient.          Objective:     Vitals:  Blood pressure (!) 173/72, pulse 73, temperature 97.8 °F (36.6 °C), resp. rate 18, height 5' 8" (1.727 m), weight 96 kg (211 lb 11.2 oz).    Physical Examination:   GEN: no apparent distress, comfortable; AAOx3  HEAD: atraumatic and normocephalic  EYES: no pallor, no icterus, PERRLA  ENT: OMM, no pharyngeal erythema, external ears WNL; no nasal discharge; no thrush;    NECK: no masses, thyroid normal, trachea midline, no LAD/LN's, supple  CV: RRR with no murmur; normal pulse; normal S1 and S2; no pedal edema; portacath since removed  CHEST: Normal respiratory effort; CTAB  ABDOM: nontender and nondistended; soft; normal bowel sounds; no rebound/guarding  MUSC/Skeletal: ROM normal; no crepitus; joints normal; no deformities or arthropathy  EXTREM: no clubbing, cyanosis, inflammation or swelling  SKIN: no rashes, lesions, ulcers, petechiae or subcutaneous nodules; no current wound issues  : no gibbs  NEURO: grossly intact; motor/sensory WNL; AAOx3; no tremors  PSYCH: normal mood, affect and behavior  LYMPH: normal cervical, supraclavicular, axillary and groin LN's            Labs:              Lab Results   Component Value Date    WBC 5.73 08/15/2023    HGB 14.4 08/15/2023    HCT 42.8 08/15/2023    MCV 84 08/15/2023     08/15/2023     CMP  Sodium   Date Value Ref Range Status   08/15/2023 139 136 - 145 mmol/L Final   04/25/2019 144 134 - 144 mmol/L      Potassium   Date Value Ref Range Status   08/15/2023 4.2 3.5 - 5.1 mmol/L Final "     Chloride   Date Value Ref Range Status   08/15/2023 105 95 - 110 mmol/L Final   04/25/2019 107 98 - 110 mmol/L      CO2   Date Value Ref Range Status   08/15/2023 22 (L) 23 - 29 mmol/L Final     Glucose   Date Value Ref Range Status   08/15/2023 194 (H) 70 - 110 mg/dL Final   04/25/2019 177 (H) 70 - 99 mg/dL      BUN   Date Value Ref Range Status   08/15/2023 14 8 - 23 mg/dL Final     Creatinine   Date Value Ref Range Status   08/15/2023 0.9 0.5 - 1.4 mg/dL Final   04/25/2019 0.83 0.60 - 1.40 mg/dL      Calcium   Date Value Ref Range Status   08/15/2023 9.5 8.7 - 10.5 mg/dL Final     Total Protein   Date Value Ref Range Status   08/15/2023 7.2 6.0 - 8.4 g/dL Final     Albumin   Date Value Ref Range Status   08/15/2023 3.9 3.5 - 5.2 g/dL Final   04/25/2019 4.4 3.1 - 4.7 g/dL      Total Bilirubin   Date Value Ref Range Status   08/15/2023 0.4 0.1 - 1.0 mg/dL Final     Comment:     For infants and newborns, interpretation of results should be based  on gestational age, weight and in agreement with clinical  observations.    Premature Infant recommended reference ranges:  Up to 24 hours.............<8.0 mg/dL  Up to 48 hours............<12.0 mg/dL  3-5 days..................<15.0 mg/dL  6-29 days.................<15.0 mg/dL       Alkaline Phosphatase   Date Value Ref Range Status   08/15/2023 118 55 - 135 U/L Final     AST   Date Value Ref Range Status   08/15/2023 18 10 - 40 U/L Final     ALT   Date Value Ref Range Status   08/15/2023 27 10 - 44 U/L Final     Anion Gap   Date Value Ref Range Status   08/15/2023 12 8 - 16 mmol/L Final     eGFR if    Date Value Ref Range Status   07/21/2022 >60.0 >60 mL/min/1.73 m^2 Final     eGFR if non    Date Value Ref Range Status   07/21/2022 >60.0 >60 mL/min/1.73 m^2 Final     Comment:     Calculation used to obtain the estimated glomerular filtration  rate (eGFR) is the CKD-EPI equation.            Radiology/Diagnostic Studies:    PET  6/22/2023:    IMPRESSION:     1. Negative for FDG avid lymphoproliferative disease.  2. Resolution of prior bilateral pulmonary opacities.        PET 1/5/2023:    IMPRESSION: Diffuse reticular nodular and groundglass opacities the left lower lobe, posterior right upper lobe and posterior medial right lower lobe compatible with multifocal pneumonia. Follow-up chest CT in three months is recommended     Diffuse FDG activity throughout the axial skeleton suggestive of bone marrow hyperstimulation     No evidence of recurrent or metastatic disease        PET  4/5/2022:    IMPRESSION: No evidence of recurrent or active lymphoproliferative disease        PET 10/4/2021:  Impression:     1. Negative for FDG avid recurrent lymphoproliferative disease.  2. New left maxillary sinusitis.  3. New bilateral reticulonodular pulmonary opacities suggesting infectious or inflammatory pneumonitis.  Clinical and laboratory correlation is requested.  4. Coronary artery calcification.         CXR  3/25/2021:    Impression:     Mild interstitial prominence, similar to previous exams.  No focal consolidation      PET 3/19/2021:  IMPRESSION:  1. Mild patchy airspace opacity in the superior segment of the left  lower lobe with increased FDG activity from background suggestive of a  small focal pneumonia (possibly viral in etiology). Consider  correlation with the symptoms, history and CT follow-up in 3 months to  document resolution.  2. No FDG avid lymphadenopathy or additional sites of disease.         PET 9/2/2020:      Impression:     Negative for active lymphoproliferative disease.         Chest CT  3/19/2020:      Impression       1. Mild bronchiectasis in the left lower lobe and tree-in-bud micro nodules at the left lung base suggesting underlying bronchiolitis, possibly due to a non tuberculous mycobacterial infection or viral bronchiolitis.  2. Fatty infiltration of the liver  3. Small hiatal hernia.           CXR   1/31/2020    Impression       1. No acute chest disease.  2. Left subclavian Port-A-Cath.               PET 9/11/2019   Impression       No evidence of FDG avid residual or recurrent lymphoma       I have reviewed all available lab results and radiology reports.    Assessment/Plan:   (1) 68 y.o. male with diagnosis of NHL Follicular Stage IIIA who has been referred by Dr Gary Chen with [facility] for continuation of care by medical hematology/oncology.   - He originally was diagnosed in 2012 and had parotid excision at [facility]. He subsequently went to [facility] and was treated with bendamustine-rituximab. He has been on rituximab maintenance every 8 weeks and IV IgG monthly. Dr Chen's plan was to keep him on maintenace therapy for as long as he could tolerate the treatments  - s/p 6 cycles of Bendamustine and Retuximab with subsequent complete remission  - 1st maintenance cycle rituximab was in March 2016  - he has been on maintenance rituximab and IV IgG - last rituximab 11/15/2018; last IV IgG on Dec 14th 2018  - last PET was on 9/26/2018 with no evidence of recurrence  - originally diagnosed in Dec 2012 with left parotid and left periparotid LN excision on 12/11/2012  - PET scan done on  9/11/2019 was good    7/23/2020:  - new nodule on auricle of left ear suspicious for a skin cancer - will refer him to Dr Avilez with ENT  - he is due for repeat PET    9/17/2020:  - PET scan on 9/2/2020 is adequate  - [facility]     11/12/2020:  - continued on the current regimen  - doing well with no new issues    1/7/2021:  - he is having some persistent cough issues for which he has been obtaining sputum for José Manuel  - last PET was Sept 2020    3/4/2021:  - doing ok  - chronic cough issues - followed by José Manuel  - will set up f/u PET    4/29/2021:  - he had PET scan on 3/19/2021  - He has been seeing pulmonary about his chronic cough  Lucia Georges with pulmonary has  seen the recent PET and reported to him that the CXR on 3/25 was stable and he is on some oral antibiotics with improvement of the symptoms  - He is also on Gabapentin for the neuropathy issues in his feet.   - He saw Dr Barry Mcmahon on 3/24/2021 with family [facility]    8/19/2021:  - He has been seeing pulmonary about his chronic cough - Lucia Georges with pulmonary and he has been on periodic treatments of antibiotics. He saw her last just yesterday on 8/17/2021 and is currently on antibiotics.  - Pulmonary is planning to refer him to an ID specialist  - he is on the rituximab every 8 weeks; I am not convinced that this is contributing to the infection issues as it is not reducing his WBC; however,if pulmonary and/or ID feel it is a contributing factor then we can discontinue. The risk of his lymphoma recurring or progressing would be higher if he were to discontinue the therapy      10/14/2021:  - continued on oral antibiotics per pulmonary and plans to see Pulmonary ID specialist in near future  - continued on current therapy with rituximab  - recent PEt on 10/4/2021    12/9/2021:  - continued on rituximab  - on new antibiotics per pulmonary and he sees them again in Feb 2022  - repeat scans in Feb 2022 or sooner if pulmonary says otherwise    2/2/2022:  - he is seeing pulmonary again in two weeks  - he does not think that the new triple antibiotic regimen is working  - we can get PET in Feb/mar 2022 if pulmonary is inclined, otherwise 6 month surveillance scan in April/May 2022    3/31/2022:  - He has been seeing pulmonary about his chronic cough and TONY issues; he is on antibiotics 3x/week and he reports that pulmonary feels that he is stable  - PET scan scheduled for 4/14/2022  - He last saw Dr Barry Mcmahon on 3/28/2021 and José Manuel on 2/17/2022  - pulmonary is fine with him continuing the ritxuimab per his report    5/26/2022:  - He has been seeing pulmonary about his chronic cough and TONY issues; he saw Rose on  5/17/2022 and is now on a new antibiotic and he reports that pulmonary said the PEt scan was better than the last one; he is planning to see Dr Veliz in the near future   - PET scan was done on 4/5/2022 with no evidence of recurrence  - continued on rituximab maintenance therapy    7/21/2022:  - continued under the care of pulmonary  - he is seeing Dr Veliz in Aug 2022  - continued on monthly IV IgG and rituximab every other month  - repeat PET in Oct 2022 expected    9/15/2022:  - He has been seeing pulmonary about his chronic cough and TONY issues; he has been seeing Rose and saw Dr Veliz on 8/15/2022;   - Dr Veliz is looking at increasing his Ig dose or changing it to SQ weekly  - Last PET scan was done on 4/5/2022 and repeat has been ordered and is due this Oct 2022  - discussed with patient about referral to Dr Nasreen Abarca at [facility] for not only evaluation for 2nd opinion for his chronic lymphoma but also the options of IV IgG infusion therapy, patient is agreeable    11/10/2022:  - Patient was seen by Dr Abarca at [facility] since last visit and his recommendation was to hold off on further rituximab therapy and proceed with just surveillance scanning.  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 He is still followed by wound care and is getting the prior drain site packed, etc. He has not followed up with ID as of yet    1/4/2023:  - Patient was previously seen by Dr Abarca at [facility] since last visit and his recommendation was to hold off on further rituximab therapy and proceed with just surveillance scanning. He has telemed visit with him in Feb 2023  -  He is getting PET tomorrow and seeing pulmonary again in Feb 2023  - he has been on IV IgG  - He saw Dr Washington last in Nov 2022 and is seeing ID at [facility] in Jan  2023    3/1/2023:  - He had telemed with Dr Abarca and he wants him off the rituximab for another 3 months; patient is concerned about holding off on the rituximab maintenance.   - He last had rituximab in Sept 2022  - He has been on IV IgG  - Recent PET on 1/5/2023 with no evidence of lymphoma  - will discuss with Dr Abarca, but I think it is reasonable to resume rituximab in near future if ok with Pulm and ID, but will spread out to therapy every 3 months instead of every 2 months    4/26/2023:  - discussed with patient about resuming the Rituximab and he is anxious to do so  - will give every 12 weeks    6/21/2023:  - PET due tomorrow  - IV IgG on Friday  - rituximab since resumed but to be given every 12 weeks now  - f/\u with Dr Veliz in Aug 2023    9/13/2023:  - He has since resumed Rituximab but it is now at every 12 weeks; getting IV IGg this Friday and rituimab due again in Oct 2023  - He had PEt on 6/22/2023      (2) HTN     (3) Neuropathy     (4) GERD     (5) DM - on metformin per Dr Nunez     (6) Hypogammaglobulinemia - on Iv IgG monthly     (7) Steatosis of liver     (8) Degenerative disc disease of back     (9) Diverticular disease    (10) Mild bronchiectasis in the left lower lobe and tree-in-bud micro nodules at the left lung base suggesting underlying bronchiolitis  - seeing Lucia Georges           VISIT DIAGNOSES:      Follicular lymphoma grade IIIa, unspecified body region    Non-Hodgkin's lymphoma, unspecified body region, unspecified non-Hodgkin lymphoma type    Pancytopenia    Hypogammaglobulinemia          PLAN:  1. continue rituximab maintenance  therapy every 12 weeks (due again in Oct 2023)  2. continue IV IgG   - due friday  3. F/u with Dr Nasreen Abarca at [facility] as directed   4. Check labs every 8 weeks  5. Repeat PET every 6 months (Dec 2023)  6. F/u with PCP, Pulm etc             RTC in 12 weeks  with myself   Fax note to Doe;; José Manuel Veliz; Rima; Felix         Discussion:       I  spent over 25 mins of time with the patient. Reviewed results of the recently ordered labs, tests and studies; made directives with regards to the results. Over half of this time was spent couseling and coordinating care.      COVID-19 Discussion:    I had long discussion with patient and any applicable family about the COVID-19 coronavirus epidemic and the recommended precautions with regard to cancer and/or hematology patients. I have re-iterated the CDC recommendations for adequate hand washing, use of hand -like products, and coughing into elbow, etc. In addition, especially for our patients who are on chemotherapy and/or our otherwise immunocompromised patients, I have recommended avoidance of crowds, including movie theaters, restaurants, churches, etc. I have recommended avoidance of any sick or symptomatic family members and/or friends. I have also recommended avoidance of any raw and unwashed food products, and general avoidance of food items that have not been prepared by themselves. The patient has been asked to call us immediately with any symptom developments, issues, questions or other general concerns.     I have explained all of the above in detail and the patient understands all of the current recommendation(s). I have answered all of their questions to the best of my ability and to their complete satisfaction.   The patient is to continue with the current management plan.            Electronically signed by Jefferson Ibarra MD                            Answers submitted by the patient for this visit:  Review of Systems Questionnaire (Submitted on 9/7/2023)  appetite change : No  unexpected weight change: No  mouth sores: No  visual disturbance: No  cough: No  shortness of breath: No  chest pain: No  abdominal pain: No  diarrhea: No  frequency: No  back pain: No  rash: No  headaches: No  adenopathy: No  nervous/ anxious: No

## 2023-09-13 ENCOUNTER — OFFICE VISIT (OUTPATIENT)
Dept: HEMATOLOGY/ONCOLOGY | Facility: CLINIC | Age: 68
End: 2023-09-13
Payer: MEDICARE

## 2023-09-13 VITALS
TEMPERATURE: 98 F | SYSTOLIC BLOOD PRESSURE: 173 MMHG | WEIGHT: 211.69 LBS | DIASTOLIC BLOOD PRESSURE: 72 MMHG | HEART RATE: 73 BPM | HEIGHT: 68 IN | BODY MASS INDEX: 32.08 KG/M2 | RESPIRATION RATE: 18 BRPM

## 2023-09-13 DIAGNOSIS — C82.30 FOLLICULAR LYMPHOMA GRADE IIIA, UNSPECIFIED BODY REGION: Primary | ICD-10-CM

## 2023-09-13 DIAGNOSIS — C83.38 DIFFUSE LARGE B-CELL LYMPHOMA, LYMPH NODES OF MULTIPLE SITES: ICD-10-CM

## 2023-09-13 DIAGNOSIS — D80.1 HYPOGAMMAGLOBULINEMIA: ICD-10-CM

## 2023-09-13 DIAGNOSIS — D61.818 PANCYTOPENIA: ICD-10-CM

## 2023-09-13 DIAGNOSIS — C85.90 NON-HODGKIN'S LYMPHOMA, UNSPECIFIED BODY REGION, UNSPECIFIED NON-HODGKIN LYMPHOMA TYPE: ICD-10-CM

## 2023-09-13 PROCEDURE — 99214 OFFICE O/P EST MOD 30 MIN: CPT | Mod: S$GLB,,, | Performed by: INTERNAL MEDICINE

## 2023-09-13 PROCEDURE — 99214 PR OFFICE/OUTPT VISIT, EST, LEVL IV, 30-39 MIN: ICD-10-PCS | Mod: S$GLB,,, | Performed by: INTERNAL MEDICINE

## 2023-09-14 ENCOUNTER — OFFICE VISIT (OUTPATIENT)
Dept: PODIATRY | Facility: CLINIC | Age: 68
End: 2023-09-14
Payer: MEDICARE

## 2023-09-14 VITALS
DIASTOLIC BLOOD PRESSURE: 74 MMHG | BODY MASS INDEX: 32.08 KG/M2 | SYSTOLIC BLOOD PRESSURE: 146 MMHG | HEIGHT: 68 IN | HEART RATE: 70 BPM | WEIGHT: 211.69 LBS

## 2023-09-14 DIAGNOSIS — E11.49 TYPE II DIABETES MELLITUS WITH NEUROLOGICAL MANIFESTATIONS: ICD-10-CM

## 2023-09-14 DIAGNOSIS — G57.93 NEUROPATHIC PAIN OF BOTH FEET: Primary | ICD-10-CM

## 2023-09-14 PROCEDURE — 99999 PR PBB SHADOW E&M-EST. PATIENT-LVL IV: ICD-10-PCS | Mod: PBBFAC,,, | Performed by: PODIATRIST

## 2023-09-14 PROCEDURE — 99213 OFFICE O/P EST LOW 20 MIN: CPT | Mod: S$PBB,,, | Performed by: PODIATRIST

## 2023-09-14 PROCEDURE — 99214 OFFICE O/P EST MOD 30 MIN: CPT | Mod: PBBFAC | Performed by: PODIATRIST

## 2023-09-14 PROCEDURE — 99999 PR PBB SHADOW E&M-EST. PATIENT-LVL IV: CPT | Mod: PBBFAC,,, | Performed by: PODIATRIST

## 2023-09-14 PROCEDURE — 99213 PR OFFICE/OUTPT VISIT, EST, LEVL III, 20-29 MIN: ICD-10-PCS | Mod: S$PBB,,, | Performed by: PODIATRIST

## 2023-09-14 RX ORDER — TADALAFIL 20 MG/1
TABLET ORAL
COMMUNITY
Start: 2023-09-12 | End: 2023-10-05

## 2023-09-14 NOTE — PROGRESS NOTES
Subjective:       Patient ID: Sivakumar Thacker is a 68 y.o. male.    Chief Complaint: No chief complaint on file.  Patient for follow up diabetic neuropathy. Taking gabapentin 2-300 mg morning and evening, tries to remember to take at noon, but does not remember very often. Denies side effects  Applying ice or voltaren gel at night. Relates foot swelling has resolved.  Has noted improvement. He used to take a much higher dose for his back pain, trying to avoid that  Type 2 diabetic x 4 years.   Cervical fusion following an injury June 2000.  Chemotherapy infusion every 8 weeks, IgG every 4 weeks for follicular lymphoma.      Past Medical History:   Diagnosis Date    Chest wall abscess 9/30/2022    Chronic obstructive pulmonary disease, unspecified COPD type 3/28/2022    Diabetes mellitus, type 2     Encounter for antineoplastic chemotherapy 04/01/2020    Hypertension     Hypogammaglobulinemia 12/13/2019    Neuropathy     Non Hodgkin's lymphoma     Reflux esophagitis     Ringing in ear, bilateral      Past Surgical History:   Procedure Laterality Date    COLONOSCOPY  2018    EYE SURGERY  Approximately 3 years ago    Cataract    HAND SURGERY      amputation left index finger    INCISION AND DRAINAGE OF ABSCESS Left 10/3/2022    Procedure: INCISION AND DRAINAGE, ABSCESS;  Surgeon: Franco Venegas III, MD;  Location: [facility] OR;  Service: General;  Laterality: Left;  axilla    INSERTION OF TUNNELED CENTRAL VENOUS CATHETER (CVC) WITH SUBCUTANEOUS PORT Left 2/1/2023    Procedure: [id number]-[id number];  Surgeon: Franco Venegas III, MD;  Location: [facility] OR;  Service: General;  Laterality: Left;    MEDIPORT REMOVAL Right 10/3/2022    Procedure: REMOVAL, CATHETER, CENTRAL VENOUS, TUNNELED, WITH PORT;  Surgeon: Franco Venegas III, MD;  Location: [facility] OR;  Service: General;  Laterality: Right;    PORTACATH PLACEMENT      SKIN CANCER EXCISION  09/30/2020    [facility]    SPINE SURGERY      VASECTOMY  1985 ?     Family  History   Problem Relation Age of Onset    Diabetes Father      Social History     Socioeconomic History    Marital status:    Tobacco Use    Smoking status: Former     Current packs/day: 0.00     Average packs/day: 0.5 packs/day for 2.0 years (1.0 ttl pk-yrs)     Types: Cigarettes     Start date:      Quit date:      Years since quittin.7    Smokeless tobacco: Never   Substance and Sexual Activity    Alcohol use: Not Currently     Comment: rarely    Drug use: No    Sexual activity: Not Currently     Social Determinants of Health     Financial Resource Strain: Unknown (2023)    Overall Financial Resource Strain (CARDIA)     Difficulty of Paying Living Expenses: Patient refused   Food Insecurity: Unknown (2023)    Hunger Vital Sign     Worried About Running Out of Food in the Last Year: Patient refused     Ran Out of Food in the Last Year: Patient refused   Transportation Needs: Unknown (2023)    PRAPARE - Transportation     Lack of Transportation (Medical): Patient refused     Lack of Transportation (Non-Medical): Patient refused   Physical Activity: Unknown (2023)    Exercise Vital Sign     Days of Exercise per Week: Patient refused   Stress: Unknown (2023)    Citizen of the [place] [employer] [employer] - Occupational Stress Questionnaire     Feeling of Stress : Patient refused   Social Connections: Unknown (2023)    Social Connection and Isolation Panel [NHANES]     Frequency of Communication with Friends and Family: More than three times a week     Frequency of Social Gatherings with Friends and Family: More than three times a week     Active Member of Clubs or Organizations: Yes     Attends Club or Organization Meetings: More than 4 times per year     Marital Status:    Housing Stability: Unknown (2023)    Housing Stability Vital Sign     Unable to Pay for Housing in the Last Year: Patient refused     Unstable Housing in the Last Year: Patient refused        Current Outpatient Medications   Medication Sig Dispense Refill    amLODIPine (NORVASC) 10 MG tablet   See Rx Instructions, # 90 EA, 3 total refill(s), Hard Stop      aspirin (ECOTRIN) 81 MG EC tablet Take 81 mg by mouth once daily.      atorvastatin (LIPITOR) 20 MG tablet Take 1 tablet (20 mg total) by mouth once daily. 90 tablet 3    azelastine (ASTELIN) 137 mcg (0.1 %) nasal spray   See Rx Instructions, 0, # 90 mL, 1 total refill(s), Hard Stop      benzonatate (TESSALON) 200 MG capsule       blood sugar diagnostic (FREESTYLE LITE STRIPS MISC) FreeStyle Lite Strips      blood sugar diagnostic Strp       diclofenac sodium (VOLTAREN) 1 % Gel Apply 2 g topically 3 (three) times daily. 200 g 2    fluticasone propionate (FLONASE) 50 mcg/actuation nasal spray 1 spray (50 mcg total) by Each Nostril route 2 (two) times daily. 48 g 11    fluticasone propionate (FLOVENT HFA) 220 mcg/actuation inhaler Take 1 puff by mouth 2 (two) times daily.      gabapentin (NEURONTIN) 300 MG capsule Take 1 capsule (300 mg total) by mouth 2 (two) times daily. 180 capsule 3    losartan (COZAAR) 100 MG tablet Take 1 tablet (100 mg total) by mouth once daily. 90 tablet 3    metFORMIN (GLUCOPHAGE) 500 MG tablet Take 2 tablets twice a day by oral route with meals for 90 days. 180 tablet 3    montelukast (SINGULAIR) 10 mg tablet Take 1 tablet (10 mg total) by mouth nightly as needed. 90 tablet 3    MULTIVITAMIN ORAL Take 1 tablet by mouth once daily.      naproxen (NAPROSYN) 500 MG tablet Take 500 mg by mouth.      omeprazole (PRILOSEC) 40 MG capsule Take 1 capsule (40 mg total) by mouth once daily. 90 capsule 3    tadalafiL (CIALIS) 20 MG Tab Take 1 tablet (20 mg total) by mouth daily as needed for Erectile Dysfunction. 30 tablet 0    tiZANidine (ZANAFLEX) 4 MG tablet Take 1 tablet (4 mg total) by mouth daily as needed. 90 tablet 3    benzonatate (TESSALON) 100 MG capsule Take 1 capsule every day by oral route at bedtime for 10 days.       "levoFLOXacin (LEVAQUIN) 500 MG tablet Take 1 tablet every 24 hours by oral route for 10 days.      mupirocin (BACTROBAN) 2 % ointment Apply topically.      promethazine-dextromethorphan (PROMETHAZINE-DM) 6.25-15 mg/5 mL Syrp Take 5 mLs by mouth every 6 (six) hours as needed.      tadalafiL (CIALIS) 20 MG Tab   See Instructions, # 30 EA, 0 total refill(s), Hard Stop       No current facility-administered medications for this visit.     Review of patient's allergies indicates:  No Known Allergies    Review of Systems   Cardiovascular:  Negative for leg swelling.   Musculoskeletal:  Negative for gait problem.   Neurological:         Numbness, tingling feet   All other systems reviewed and are negative.      Objective:      Vitals:    09/14/23 1120   BP: (!) 146/74   Pulse: 70   Weight: 96 kg (211 lb 11.2 oz)   Height: 5' 8" (1.727 m)     Physical Exam  Vitals and nursing note reviewed.   Constitutional:       Appearance: Normal appearance.   Cardiovascular:      Pulses:           Dorsalis pedis pulses are 2+ on the right side and 2+ on the left side.        Posterior tibial pulses are 1+ on the right side and 1+ on the left side.   Pulmonary:      Effort: Pulmonary effort is normal.   Musculoskeletal:      Right foot: Decreased range of motion.      Left foot: Decreased range of motion.   Feet:      Right foot:      Skin integrity: Skin integrity normal.   Skin:     Capillary Refill: Capillary refill takes 2 to 3 seconds.   Neurological:      General: No focal deficit present.      Mental Status: He is alert.      Comments: Sensation intact bilateral feet, increased pain, paresthesias neuropathic pain              Assessment:       1. Neuropathic pain of both feet    2. Type II diabetes mellitus with neurological manifestations            Plan:           Reviewed neuropathy at length, multiple sources this could be coming from   Encouraged to apply topical medication twice each evening  Reviewed stretching, massage, " topical and soaking therapies  Reviewed tennis shoes with thick sole for shock absorption  Reviewed appropriate shoes indoors, absolutely no flat shoes or walking barefoot  Continue gabapentin 2-300 mg/600 mg twice daily, can take 300 or 600 mg if needed noon. Explained medication most likely would be most effective if he took noon time dose as long as no side effects  Swelling resolved in feet, utilize ice/cool therapy if it recurs  Reviewed diabetic education  Reviewed need for daily foot checks and instructed patient to contact the office with any area of redness or swelling which has not improved within 3 days.  Patient was in understanding and agreement with treatment plan.  Counseled the patient on their conditions, implications and medical management.  Instructed patient to contact the office with any changes, questions, concerns, worsening of symptoms.   Total face to face time 20 minutes, exam, assessment, treatment, discussion, additional time for review of chart prior to and following appointment and visit documentation, consultation and coordination of care.    Follow up as needed    This note was created using M*Modal voice recognition software that occasionally misinterpreted phrases or words.

## 2023-09-15 ENCOUNTER — INFUSION (OUTPATIENT)
Dept: INFUSION THERAPY | Facility: HOSPITAL | Age: 68
End: 2023-09-15
Attending: INTERNAL MEDICINE
Payer: MEDICARE

## 2023-09-15 VITALS
SYSTOLIC BLOOD PRESSURE: 136 MMHG | TEMPERATURE: 98 F | WEIGHT: 213.75 LBS | HEIGHT: 68 IN | BODY MASS INDEX: 32.4 KG/M2 | OXYGEN SATURATION: 99 % | RESPIRATION RATE: 18 BRPM | HEART RATE: 62 BPM | DIASTOLIC BLOOD PRESSURE: 79 MMHG

## 2023-09-15 DIAGNOSIS — D80.1 HYPOGAMMAGLOBULINEMIA: Primary | ICD-10-CM

## 2023-09-15 DIAGNOSIS — D80.1 NONFAMILIAL HYPOGAMMAGLOBULINEMIA: ICD-10-CM

## 2023-09-15 DIAGNOSIS — C82.30 FOLLICULAR LYMPHOMA GRADE IIIA, UNSPECIFIED BODY REGION: ICD-10-CM

## 2023-09-15 PROCEDURE — 96366 THER/PROPH/DIAG IV INF ADDON: CPT

## 2023-09-15 PROCEDURE — 63600175 PHARM REV CODE 636 W HCPCS: Performed by: INTERNAL MEDICINE

## 2023-09-15 PROCEDURE — 96367 TX/PROPH/DG ADDL SEQ IV INF: CPT

## 2023-09-15 PROCEDURE — 25000003 PHARM REV CODE 250: Performed by: INTERNAL MEDICINE

## 2023-09-15 PROCEDURE — A4216 STERILE WATER/SALINE, 10 ML: HCPCS | Performed by: INTERNAL MEDICINE

## 2023-09-15 PROCEDURE — 96365 THER/PROPH/DIAG IV INF INIT: CPT

## 2023-09-15 PROCEDURE — 63600175 PHARM REV CODE 636 W HCPCS: Mod: JZ,JA,JG | Performed by: NURSE PRACTITIONER

## 2023-09-15 RX ORDER — SODIUM CHLORIDE 0.9 % (FLUSH) 0.9 %
10 SYRINGE (ML) INJECTION
Status: DISCONTINUED | OUTPATIENT
Start: 2023-09-15 | End: 2023-09-15 | Stop reason: HOSPADM

## 2023-09-15 RX ORDER — SODIUM CHLORIDE 0.9 % (FLUSH) 0.9 %
10 SYRINGE (ML) INJECTION
Status: CANCELLED | OUTPATIENT
Start: 2023-10-13

## 2023-09-15 RX ORDER — HEPARIN 100 UNIT/ML
500 SYRINGE INTRAVENOUS
Status: CANCELLED | OUTPATIENT
Start: 2023-10-13

## 2023-09-15 RX ORDER — HEPARIN 100 UNIT/ML
500 SYRINGE INTRAVENOUS
Status: DISCONTINUED | OUTPATIENT
Start: 2023-09-15 | End: 2023-09-15 | Stop reason: HOSPADM

## 2023-09-15 RX ADMIN — DIPHENHYDRAMINE HYDROCHLORIDE 25 MG: 50 INJECTION INTRAMUSCULAR; INTRAVENOUS at 08:09

## 2023-09-15 RX ADMIN — SODIUM CHLORIDE, PRESERVATIVE FREE 10 ML: 5 INJECTION INTRAVENOUS at 11:09

## 2023-09-15 RX ADMIN — IMMUNE GLOBULIN (HUMAN) 50 G: 10 INJECTION INTRAVENOUS; SUBCUTANEOUS at 08:09

## 2023-09-15 RX ADMIN — HEPARIN 500 UNITS: 100 SYRINGE at 11:09

## 2023-09-15 RX ADMIN — SODIUM CHLORIDE: 0.9 INJECTION, SOLUTION INTRAVENOUS at 08:09

## 2023-09-15 NOTE — PLAN OF CARE
Problem: Infection  Goal: Absence of Infection Signs and Symptoms  Outcome: Ongoing, Progressing  Intervention: Prevent or Manage Infection  Flowsheets (Taken 9/15/2023 5532)  Infection Management: aseptic technique maintained

## 2023-09-15 NOTE — PLAN OF CARE
Problem: Fatigue  Goal: Improved Activity Tolerance  Outcome: Ongoing, Progressing  Intervention: Promote Improved Energy  Flowsheets (Taken 9/15/2023 1015)  Fatigue Management:   fatigue-related activity identified   frequent rest breaks encouraged   paced activity encouraged  Sleep/Rest Enhancement:   awakenings minimized   family presence promoted   natural light exposure provided   noise level reduced   reading promoted   regular sleep/rest pattern promoted   relaxation techniques promoted  Activity Management:   Ambulated -L4   Up in chair - L3

## 2023-09-28 ENCOUNTER — TELEPHONE (OUTPATIENT)
Dept: FAMILY MEDICINE | Facility: CLINIC | Age: 68
End: 2023-09-28
Payer: MEDICARE

## 2023-10-03 ENCOUNTER — PATIENT MESSAGE (OUTPATIENT)
Dept: FAMILY MEDICINE | Facility: CLINIC | Age: 68
End: 2023-10-03
Payer: MEDICARE

## 2023-10-05 RX ORDER — SILDENAFIL 100 MG/1
100 TABLET, FILM COATED ORAL DAILY PRN
Qty: 30 TABLET | Refills: 3 | Status: SHIPPED | OUTPATIENT
Start: 2023-10-05 | End: 2024-01-09

## 2023-10-13 ENCOUNTER — INFUSION (OUTPATIENT)
Dept: INFUSION THERAPY | Facility: HOSPITAL | Age: 68
End: 2023-10-13
Attending: INTERNAL MEDICINE
Payer: MEDICARE

## 2023-10-13 VITALS
WEIGHT: 209.5 LBS | DIASTOLIC BLOOD PRESSURE: 76 MMHG | OXYGEN SATURATION: 96 % | RESPIRATION RATE: 18 BRPM | BODY MASS INDEX: 31.75 KG/M2 | SYSTOLIC BLOOD PRESSURE: 143 MMHG | HEIGHT: 68 IN | HEART RATE: 73 BPM | TEMPERATURE: 64 F

## 2023-10-13 DIAGNOSIS — D80.1 HYPOGAMMAGLOBULINEMIA: Primary | ICD-10-CM

## 2023-10-13 DIAGNOSIS — D80.1 NONFAMILIAL HYPOGAMMAGLOBULINEMIA: ICD-10-CM

## 2023-10-13 DIAGNOSIS — C82.30 FOLLICULAR LYMPHOMA GRADE IIIA, UNSPECIFIED BODY REGION: ICD-10-CM

## 2023-10-13 PROCEDURE — 25000003 PHARM REV CODE 250: Performed by: INTERNAL MEDICINE

## 2023-10-13 PROCEDURE — 96366 THER/PROPH/DIAG IV INF ADDON: CPT

## 2023-10-13 PROCEDURE — 96365 THER/PROPH/DIAG IV INF INIT: CPT

## 2023-10-13 PROCEDURE — 96367 TX/PROPH/DG ADDL SEQ IV INF: CPT

## 2023-10-13 PROCEDURE — 63600175 PHARM REV CODE 636 W HCPCS: Performed by: INTERNAL MEDICINE

## 2023-10-13 PROCEDURE — A4216 STERILE WATER/SALINE, 10 ML: HCPCS | Performed by: INTERNAL MEDICINE

## 2023-10-13 RX ORDER — SODIUM CHLORIDE 0.9 % (FLUSH) 0.9 %
10 SYRINGE (ML) INJECTION
Status: CANCELLED | OUTPATIENT
Start: 2023-11-10

## 2023-10-13 RX ORDER — SODIUM CHLORIDE 0.9 % (FLUSH) 0.9 %
10 SYRINGE (ML) INJECTION
Status: CANCELLED | OUTPATIENT
Start: 2023-10-13

## 2023-10-13 RX ORDER — SODIUM CHLORIDE 0.9 % (FLUSH) 0.9 %
10 SYRINGE (ML) INJECTION
Status: DISCONTINUED | OUTPATIENT
Start: 2023-10-13 | End: 2023-10-13 | Stop reason: HOSPADM

## 2023-10-13 RX ORDER — HEPARIN 100 UNIT/ML
500 SYRINGE INTRAVENOUS
Status: CANCELLED | OUTPATIENT
Start: 2023-10-13

## 2023-10-13 RX ORDER — HEPARIN 100 UNIT/ML
500 SYRINGE INTRAVENOUS
Status: CANCELLED | OUTPATIENT
Start: 2023-11-10

## 2023-10-13 RX ORDER — HEPARIN 100 UNIT/ML
500 SYRINGE INTRAVENOUS
Status: DISCONTINUED | OUTPATIENT
Start: 2023-10-13 | End: 2023-10-13 | Stop reason: HOSPADM

## 2023-10-13 RX ADMIN — SODIUM CHLORIDE: 0.9 INJECTION, SOLUTION INTRAVENOUS at 08:10

## 2023-10-13 RX ADMIN — HEPARIN 500 UNITS: 100 SYRINGE at 11:10

## 2023-10-13 RX ADMIN — IMMUNE GLOBULIN (HUMAN) 50 G: 10 INJECTION INTRAVENOUS; SUBCUTANEOUS at 09:10

## 2023-10-13 RX ADMIN — DIPHENHYDRAMINE HYDROCHLORIDE 25 MG: 50 INJECTION, SOLUTION INTRAMUSCULAR; INTRAVENOUS at 08:10

## 2023-10-13 RX ADMIN — SODIUM CHLORIDE, PRESERVATIVE FREE 10 ML: 5 INJECTION INTRAVENOUS at 08:10

## 2023-10-13 NOTE — PLAN OF CARE
Problem: Fatigue  Goal: Improved Activity Tolerance  Outcome: Ongoing, Progressing  Intervention: Promote Improved Energy  Flowsheets (Taken 10/13/2023 0806)  Fatigue Management: fatigue-related activity identified  Sleep/Rest Enhancement: noise level reduced  Activity Management: Up in chair - L3

## 2023-10-25 NOTE — CARE UPDATE
10/06/22 1935   Patient Assessment/Suction   Level of Consciousness (AVPU) alert   Respiratory Effort Normal;Unlabored   Expansion/Accessory Muscles/Retractions no use of accessory muscles   All Lung Fields Breath Sounds clear   Rhythm/Pattern, Respiratory unlabored;pattern regular   Cough Frequency infrequent   Cough Type good   PRE-TX-O2   O2 Device (Oxygen Therapy) room air   SpO2 98 %   Pulse Oximetry Type Intermittent   $ Pulse Oximetry - Multiple Charge Pulse Oximetry - Multiple   Pulse 87   Resp 18   Aerosol Therapy   $ Aerosol Therapy Charges Aerosol Treatment   Daily Review of Necessity (SVN) completed   Respiratory Treatment Status (SVN) given   Treatment Route (SVN) mask;oxygen   Patient Position (SVN) HOB elevated   Post Treatment Assessment (SVN) increased aeration   Signs of Intolerance (SVN) none   Breath Sounds Post-Respiratory Treatment   Post-treatment Heart Rate (beats/min) 84   Post-treatment Resp Rate (breaths/min) 18   Preset CPAP/BiPAP Settings   Mode Of Delivery BiPAP;Standby   $ CPAP/BiPAP Daily Charge BiPAP/CPAP Daily   Education   $ Education Bronchodilator;15 min   Respiratory Evaluation   $ Care Plan Tech Time 15 min   $ Eval/Re-eval Charges Re-evaluation      full weight-bearing

## 2023-11-08 RX ORDER — FAMOTIDINE 10 MG/ML
20 INJECTION INTRAVENOUS
Status: CANCELLED | OUTPATIENT
Start: 2023-11-09

## 2023-11-08 RX ORDER — SODIUM CHLORIDE 0.9 % (FLUSH) 0.9 %
10 SYRINGE (ML) INJECTION
Status: CANCELLED | OUTPATIENT
Start: 2023-11-09

## 2023-11-08 RX ORDER — HEPARIN 100 UNIT/ML
500 SYRINGE INTRAVENOUS
Status: CANCELLED | OUTPATIENT
Start: 2023-11-09

## 2023-11-08 RX ORDER — ACETAMINOPHEN 325 MG/1
650 TABLET ORAL
Status: CANCELLED | OUTPATIENT
Start: 2023-11-09

## 2023-11-08 RX ORDER — MEPERIDINE HYDROCHLORIDE 25 MG/ML
25 INJECTION INTRAMUSCULAR; INTRAVENOUS; SUBCUTANEOUS
Status: CANCELLED | OUTPATIENT
Start: 2023-11-09 | End: 2023-11-09

## 2023-11-09 ENCOUNTER — INFUSION (OUTPATIENT)
Dept: INFUSION THERAPY | Facility: HOSPITAL | Age: 68
End: 2023-11-09
Attending: INTERNAL MEDICINE
Payer: MEDICARE

## 2023-11-09 VITALS
DIASTOLIC BLOOD PRESSURE: 76 MMHG | HEART RATE: 68 BPM | BODY MASS INDEX: 31.89 KG/M2 | TEMPERATURE: 98 F | RESPIRATION RATE: 18 BRPM | OXYGEN SATURATION: 98 % | SYSTOLIC BLOOD PRESSURE: 128 MMHG | WEIGHT: 210.38 LBS | HEIGHT: 68 IN

## 2023-11-09 DIAGNOSIS — C82.30 FOLLICULAR LYMPHOMA GRADE IIIA, UNSPECIFIED BODY REGION: ICD-10-CM

## 2023-11-09 DIAGNOSIS — D80.1 HYPOGAMMAGLOBULINEMIA: Primary | ICD-10-CM

## 2023-11-09 PROCEDURE — 25000003 PHARM REV CODE 250: Performed by: INTERNAL MEDICINE

## 2023-11-09 PROCEDURE — 96413 CHEMO IV INFUSION 1 HR: CPT

## 2023-11-09 PROCEDURE — A4216 STERILE WATER/SALINE, 10 ML: HCPCS | Performed by: INTERNAL MEDICINE

## 2023-11-09 PROCEDURE — 96375 TX/PRO/DX INJ NEW DRUG ADDON: CPT

## 2023-11-09 PROCEDURE — 96367 TX/PROPH/DG ADDL SEQ IV INF: CPT

## 2023-11-09 PROCEDURE — 96415 CHEMO IV INFUSION ADDL HR: CPT

## 2023-11-09 PROCEDURE — 63600175 PHARM REV CODE 636 W HCPCS: Performed by: INTERNAL MEDICINE

## 2023-11-09 RX ORDER — HEPARIN 100 UNIT/ML
500 SYRINGE INTRAVENOUS
Status: DISCONTINUED | OUTPATIENT
Start: 2023-11-09 | End: 2023-11-09 | Stop reason: HOSPADM

## 2023-11-09 RX ORDER — MEPERIDINE HYDROCHLORIDE 25 MG/ML
25 INJECTION INTRAMUSCULAR; INTRAVENOUS; SUBCUTANEOUS
Status: DISCONTINUED | OUTPATIENT
Start: 2023-11-09 | End: 2023-11-09 | Stop reason: HOSPADM

## 2023-11-09 RX ORDER — FAMOTIDINE 10 MG/ML
20 INJECTION INTRAVENOUS
Status: COMPLETED | OUTPATIENT
Start: 2023-11-09 | End: 2023-11-09

## 2023-11-09 RX ORDER — SODIUM CHLORIDE 0.9 % (FLUSH) 0.9 %
10 SYRINGE (ML) INJECTION
Status: DISCONTINUED | OUTPATIENT
Start: 2023-11-09 | End: 2023-11-09 | Stop reason: HOSPADM

## 2023-11-09 RX ORDER — ACETAMINOPHEN 325 MG/1
650 TABLET ORAL
Status: COMPLETED | OUTPATIENT
Start: 2023-11-09 | End: 2023-11-09

## 2023-11-09 RX ADMIN — HEPARIN 500 UNITS: 100 SYRINGE at 11:11

## 2023-11-09 RX ADMIN — DIPHENHYDRAMINE HYDROCHLORIDE 50 MG: 50 INJECTION, SOLUTION INTRAMUSCULAR; INTRAVENOUS at 07:11

## 2023-11-09 RX ADMIN — SODIUM CHLORIDE: 0.9 INJECTION, SOLUTION INTRAVENOUS at 07:11

## 2023-11-09 RX ADMIN — ACETAMINOPHEN 650 MG: 325 TABLET ORAL at 07:11

## 2023-11-09 RX ADMIN — RITUXIMAB 800 MG: 10 INJECTION, SOLUTION INTRAVENOUS at 08:11

## 2023-11-09 RX ADMIN — SODIUM CHLORIDE, PRESERVATIVE FREE 10 ML: 5 INJECTION INTRAVENOUS at 11:11

## 2023-11-09 RX ADMIN — FAMOTIDINE 20 MG: 10 INJECTION INTRAVENOUS at 07:11

## 2023-11-09 NOTE — PLAN OF CARE
Problem: Fatigue  Goal: Improved Activity Tolerance  Outcome: Ongoing, Progressing  Intervention: Promote Improved Energy  Flowsheets (Taken 11/9/2023 6909)  Fatigue Management: frequent rest breaks encouraged  Sleep/Rest Enhancement: regular sleep/rest pattern promoted  Activity Management:   Ambulated -L4   Up in chair - L3

## 2023-11-10 ENCOUNTER — INFUSION (OUTPATIENT)
Dept: INFUSION THERAPY | Facility: HOSPITAL | Age: 68
End: 2023-11-10
Attending: INTERNAL MEDICINE
Payer: MEDICARE

## 2023-11-10 VITALS
OXYGEN SATURATION: 98 % | TEMPERATURE: 98 F | HEIGHT: 68 IN | DIASTOLIC BLOOD PRESSURE: 81 MMHG | BODY MASS INDEX: 31.91 KG/M2 | WEIGHT: 210.56 LBS | RESPIRATION RATE: 16 BRPM | SYSTOLIC BLOOD PRESSURE: 138 MMHG | HEART RATE: 65 BPM

## 2023-11-10 DIAGNOSIS — D80.1 NONFAMILIAL HYPOGAMMAGLOBULINEMIA: ICD-10-CM

## 2023-11-10 DIAGNOSIS — D80.1 HYPOGAMMAGLOBULINEMIA: Primary | ICD-10-CM

## 2023-11-10 DIAGNOSIS — C82.30 FOLLICULAR LYMPHOMA GRADE IIIA, UNSPECIFIED BODY REGION: ICD-10-CM

## 2023-11-10 PROCEDURE — 25000003 PHARM REV CODE 250: Performed by: INTERNAL MEDICINE

## 2023-11-10 PROCEDURE — 63600175 PHARM REV CODE 636 W HCPCS: Performed by: INTERNAL MEDICINE

## 2023-11-10 PROCEDURE — A4216 STERILE WATER/SALINE, 10 ML: HCPCS | Performed by: INTERNAL MEDICINE

## 2023-11-10 PROCEDURE — 96367 TX/PROPH/DG ADDL SEQ IV INF: CPT

## 2023-11-10 PROCEDURE — 96365 THER/PROPH/DIAG IV INF INIT: CPT

## 2023-11-10 PROCEDURE — 96366 THER/PROPH/DIAG IV INF ADDON: CPT

## 2023-11-10 RX ORDER — SODIUM CHLORIDE 0.9 % (FLUSH) 0.9 %
10 SYRINGE (ML) INJECTION
Status: CANCELLED | OUTPATIENT
Start: 2023-12-08

## 2023-11-10 RX ORDER — HEPARIN 100 UNIT/ML
500 SYRINGE INTRAVENOUS
Status: CANCELLED | OUTPATIENT
Start: 2023-12-08

## 2023-11-10 RX ORDER — SODIUM CHLORIDE 0.9 % (FLUSH) 0.9 %
10 SYRINGE (ML) INJECTION
Status: DISCONTINUED | OUTPATIENT
Start: 2023-11-10 | End: 2023-11-10 | Stop reason: HOSPADM

## 2023-11-10 RX ORDER — HEPARIN 100 UNIT/ML
500 SYRINGE INTRAVENOUS
Status: DISCONTINUED | OUTPATIENT
Start: 2023-11-10 | End: 2023-11-10 | Stop reason: HOSPADM

## 2023-11-10 RX ADMIN — SODIUM CHLORIDE, PRESERVATIVE FREE 10 ML: 5 INJECTION INTRAVENOUS at 09:11

## 2023-11-10 RX ADMIN — IMMUNE GLOBULIN (HUMAN) 50 G: 10 INJECTION INTRAVENOUS; SUBCUTANEOUS at 07:11

## 2023-11-10 RX ADMIN — HEPARIN 500 UNITS: 100 SYRINGE at 09:11

## 2023-11-10 RX ADMIN — DIPHENHYDRAMINE HYDROCHLORIDE 25 MG: 50 INJECTION, SOLUTION INTRAMUSCULAR; INTRAVENOUS at 07:11

## 2023-11-10 NOTE — PLAN OF CARE
Problem: Fatigue  Goal: Improved Activity Tolerance  Outcome: Ongoing, Progressing  Intervention: Promote Improved Energy  Flowsheets (Taken 11/10/2023 7172)  Fatigue Management:   frequent rest breaks encouraged   fatigue-related activity identified  Sleep/Rest Enhancement:   regular sleep/rest pattern promoted   reading promoted

## 2023-11-10 NOTE — PLAN OF CARE
Problem: Fatigue  Goal: Improved Activity Tolerance  11/10/2023 0744 by Felipa Arellano, RN  Outcome: Ongoing, Progressing  11/10/2023 0743 by Felipa Arellano, RN  Outcome: Ongoing, Progressing  Intervention: Promote Improved Energy  11/10/2023 0744 by Felipa Arellano, RN  Flowsheets (Taken 11/10/2023 0744)  Fatigue Management:   frequent rest breaks encouraged   fatigue-related activity identified  Sleep/Rest Enhancement:   regular sleep/rest pattern promoted   reading promoted  11/10/2023 0743 by Felipa Arellano, RN  Flowsheets (Taken 11/10/2023 0743)  Fatigue Management:   frequent rest breaks encouraged   fatigue-related activity identified  Sleep/Rest Enhancement:   regular sleep/rest pattern promoted   reading promoted

## 2023-11-20 ENCOUNTER — PATIENT MESSAGE (OUTPATIENT)
Dept: ADMINISTRATIVE | Facility: OTHER | Age: 68
End: 2023-11-20
Payer: MEDICARE

## 2023-11-22 ENCOUNTER — OFFICE VISIT (OUTPATIENT)
Dept: PULMONOLOGY | Facility: CLINIC | Age: 68
End: 2023-11-22
Payer: MEDICARE

## 2023-11-22 VITALS
BODY MASS INDEX: 32.79 KG/M2 | HEART RATE: 75 BPM | HEIGHT: 68 IN | SYSTOLIC BLOOD PRESSURE: 169 MMHG | WEIGHT: 216.38 LBS | OXYGEN SATURATION: 97 % | DIASTOLIC BLOOD PRESSURE: 84 MMHG

## 2023-11-22 DIAGNOSIS — I10 ESSENTIAL HYPERTENSION: ICD-10-CM

## 2023-11-22 DIAGNOSIS — D84.9 IMMUNE DEFICIENCY DISORDER: ICD-10-CM

## 2023-11-22 DIAGNOSIS — J32.9 SINUSITIS, UNSPECIFIED CHRONICITY, UNSPECIFIED LOCATION: ICD-10-CM

## 2023-11-22 DIAGNOSIS — J44.9 CHRONIC OBSTRUCTIVE PULMONARY DISEASE, UNSPECIFIED COPD TYPE: ICD-10-CM

## 2023-11-22 DIAGNOSIS — E11.9 TYPE 2 DIABETES MELLITUS WITHOUT COMPLICATION, WITHOUT LONG-TERM CURRENT USE OF INSULIN: ICD-10-CM

## 2023-11-22 DIAGNOSIS — J47.9 BRONCHIECTASIS WITHOUT COMPLICATION: Primary | ICD-10-CM

## 2023-11-22 DIAGNOSIS — J30.89 NON-SEASONAL ALLERGIC RHINITIS DUE TO OTHER ALLERGIC TRIGGER: ICD-10-CM

## 2023-11-22 PROCEDURE — 99214 OFFICE O/P EST MOD 30 MIN: CPT | Mod: PBBFAC,PO | Performed by: INTERNAL MEDICINE

## 2023-11-22 PROCEDURE — 99999 PR PBB SHADOW E&M-EST. PATIENT-LVL IV: ICD-10-PCS | Mod: PBBFAC,,, | Performed by: INTERNAL MEDICINE

## 2023-11-22 PROCEDURE — 99213 PR OFFICE/OUTPT VISIT, EST, LEVL III, 20-29 MIN: ICD-10-PCS | Mod: S$PBB,,, | Performed by: INTERNAL MEDICINE

## 2023-11-22 PROCEDURE — 99213 OFFICE O/P EST LOW 20 MIN: CPT | Mod: S$PBB,,, | Performed by: INTERNAL MEDICINE

## 2023-11-22 PROCEDURE — 99999 PR PBB SHADOW E&M-EST. PATIENT-LVL IV: CPT | Mod: PBBFAC,,, | Performed by: INTERNAL MEDICINE

## 2023-11-22 RX ORDER — PREDNISONE 20 MG/1
20 TABLET ORAL DAILY
Qty: 30 TABLET | Refills: 11 | Status: SHIPPED | OUTPATIENT
Start: 2023-11-22 | End: 2024-01-03

## 2023-11-22 RX ORDER — MONTELUKAST SODIUM 10 MG/1
10 TABLET ORAL NIGHTLY PRN
Qty: 90 TABLET | Refills: 3 | Status: SHIPPED | OUTPATIENT
Start: 2023-11-22 | End: 2024-01-03 | Stop reason: SDUPTHER

## 2023-11-22 RX ORDER — AZITHROMYCIN 500 MG/1
500 TABLET, FILM COATED ORAL DAILY
Qty: 3 TABLET | Refills: 3 | Status: SHIPPED | OUTPATIENT
Start: 2023-11-22 | End: 2024-01-03

## 2023-11-22 NOTE — PROGRESS NOTES
2023    Sivakumar Thacker  Office note    Chief Complaint   Patient presents with    6m F/U       HPI:    2023 pt has occ cough related sinus drainage.  Uses cpap 7 hrs nightly with no problems.    Not having to use vest. No recent abx.     Still igg infusion monthly -- chemo dosed q 12 wks.      Uses singulair prn sinus flare with  good result preventing bronchitis from progress.      2023 took abx for 3 days for chest congestion with clear mucous.  Did will.  No suppressive abx, to resume chemo -rituxan--at q 8 RATHER than q4, on  ivig q 4 wks.  Not using needing nor using vest.    Uses resp meds only as needed. No regular use. Ox satt 95+.  Patient Instructions   Chest x ray 2023 cleared from ct chest 2023 left lung density-- films viewed.     Use prompt antibiotic and prednisone for exacerbations-- immune system is stronger but Rituxan to be resumed.      Do chest xray if lungs not doing well.    Call if not doing well.          Sleep study was to be done-- not done yet.   Will ask staff to arrange sleep study at home.  You are mouth breathing-- if over 5 episodes hourly (less than 5 would be considered normal) would treat with auto titrate cpap.      We discuss sleep apnea.    2023 had pneumonia with hosp stay around new years,  -  admit  with ct and cxr worsening left lower lung -- cxr 2023 had improved left lower lung.  Ct chest 2021 with min bronchiectasis left lower lung.    No mucous from lungs. has aerobika but not regimental use.    Pt noted to have apnea during last stay [facility].  Pt's wife  august 3 yrs ago-- wife fx hip and blood clotts and prolonged recovery -- had massive clot.  Pt snores with snoring       Ct chest viewed from 2023 with lll worsening after admit, Nov and 2022 had density lll with suggested bronchiectasis  Patient Instructions   Bronchiectasis left lower lung may have been reason for left lower pneumonia last month.    You are doing  well now    Add aerobika after albuterol inhaler or nebulizer 2-3 times daily to help clear lung mucous.    Follow up cxr in 2 months.      use vest for bronchiectasis at least daily.    Immune system may be better but bronchiectasis may cause recurrent lung problems.      May need antibiotic promptly for pneumonia.    Will follow up sleep study request-- normals may stop breathing up to 5 times an hour.      10/17/2022 pt had chest wall infection ppt admit- had chill acutely wk before and then developed chest pain with red swollen chest.     Pt grew out serratia-- ended up surg but no pus.  Pt was leukopenic.  Lungs had been doing well.  Pt was on suppressive abx but stopped-- ivig dose increased.  Rituxan stopped for now.  No clear source found.      Pt now off morphine and steroids.  Mucous now clear. On cipro and to have drain removed in near future. No port.      Lungs doing well last 3-4 wks.  Uses aerobika and albuterol prn -- sounds minimal.        Ct chest 9/30/2022 mucous plugging and some cellulits suggested ant left chest near port runs from right to left subclavian.  Patient Instructions   Would do sputum culture if yellow mucous returns.     Use nebulizer -- would encourage daily at least with aerobika to clear lungs.   Would be most important to use with colored mucous.    May use prednisone for only 3 days-- if lungs short breath or severe bronchitis.    Will order igg level check for December with copy to Dr Ibarra.    Astelin nasal may help drip. Continue flonase.   8/15/2022 -- pt on rituxan and igg replacement for nhl.  Has green mucous.   Had freq sinus infections -- took augmentin last month for sinuses with good results.  On azithromycin 3/wk- last yr with improved chest rattle.   No asthma-- pft worsened with fev 108 % in 2020 to 76% 8/10/2022.   Patient Instructions   Immune system weakness would cause sinus/lung infections --  would check igg level prior to next infusion on Friday-- need  to have good level to have protection all month long.  Use augmentin daily to suppress infections.  Hold off azithromycin  You may have infections from bronchiectasis (pseudomonas germ)-- special antibiotic would be needed. Occasionally iv antibiotics are needed.  Do sputum culture monthly if not doing well to assure antibiotic appropriate.  Pulmonary toilet needed-- breathing medication with flutter device at least 2/d.  Steroids help mucous clearing-- flovent is inhaled steroid-- trelegy has inhaled steroids and 24/7 bronchial medication-- use trelegy if available, otherwise use flovent..    Try to note which antibiotic effective.   Use tessalon as needed  Use prednisone for cough/wheezes.  Use once daily for 3 days.        Below from DAMI Georges  5/17/2022- Cough is persistent complaint, on Vest and nebulizer therapy daily, antibiotic azithromycin 500 mg three days weekly,   Sinus complaints persistent.   Cough- coarse cough, productive yellow/green mucous. Associated with shortness of breath.   Patient Instructions   Will continue current Bronchiectasis therapy with daily vest and nebulizer treatments 1-2 times daily    Doxy antibiotic for 10 days then resume three day weekly azithromycin    Have next appointment with MD for further evaluation and treatment.   2/17/2022-  Noticed improvement after starting vest device and Azithromcyin 3x weekly, using nebulizer with Aerobika 2x daily. Prescription for azithromycin ran out 2 weeks prior.   Complaint of sinus drainage onset 4 weeks, clear drainage, tried flonase with benefit.      On IV IG for 4 years followed by Dr. Ibarra.     11/17/2021- Cough- persistent complaint, daily, through out, nocturnal arousals nightly, using albuterol nebulizer and vest 2x daily. Productive green mucous, dime size amount of mucous. tx with antibiotics for H.Flu with no benefit. Associated with chest tightness and wheeze. Tried Tesselon perles with no benefit. No benefit with codeine  cough syrup.     8/17/2021- Cough- recurrent complaint, onset 3 days, worse in morning and during day, improve with albuterol nebulizer, able to clear yellow/green mucous. Started antibiotics 2 weeks ago due to green mucous, took Cipro, cough returned after finishing Cipro prescription. Nocturnal arousals 3x weekly.   Has aerobika from when wife was hospitalized, using.   Using nebulizer daily with Musinex.   SOB- worsened since finishing antibiotic, worse with exertion, no chest tightness or wheeze, improve with coughing up mucous. Associated with fatigue      5/17/2021- had persistent cough with occasional thick, yellow mucous onset January lasted 4 weeks, Felt rattle in chest., improved with antibiotics Augmentin.   has nebulizer but not currently using.   Current cough- mild, 2x weekly, feels mucous in chest but unable to cough up.    No shortness of breath, chest tightness, or wheeze.    11/17/2020- states doing well, cough has decreased to occasional to 2 times weekly that is mild, productive clear mucous.   States no fever, night sweats, shortness of breath,   Complaint of sinus drainage: daily, clear, singulair most days,      8/17/20- Cough- improved following antibiotic therapy, returned after 8 weeks, productive, light green color, dime size, worse in early morning, no chest tightness or wheeze.  No SOB. Using nebulizer 2x daily for 2 weeks, undergoing chemo and IVIG for Lymphoma and skin cancer.     6/16/2020- Cough unchanged with coughing fits when trying to sleep. States no improvement with Trelegy inhaler. States Levaquin antibiotic help a little bit and cough returned after finishing. Has been on IgG replacement therapy every 4 weeks since 2012.  Brought in 5 sputum sample, all still processing.       4/20/2020- Chronic cough onset 2013, followed by pulmonologist Dr. Case in [place]. Put Advair with no benefit. Had negative bronchoscopy and negative AFB, had appointment with Dr. Brock in  2014. Hx of non Hodgkins lymphoma followed by Dr. Ibarra on Rotuxin and immune therapy.  Cough Worsened in Jan 2020, associated with recurrent fever improves with tylenol, states cough improves with antibiotics but returns when finished medications. Productive occasionally, can feel something rattling in chest, thick white mucous. 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 2 pack year  Family Hx: no Lung Cancer, no COPD, no Asthma  Medical Hx: no previous pneumonia ; no previous shoulder/chest surgery        The chief compliant  problem is stable  PFSH:  Past Medical History:   Diagnosis Date    Chest wall abscess 9/30/2022    Chronic obstructive pulmonary disease, unspecified COPD type 3/28/2022    Diabetes mellitus, type 2     Encounter for antineoplastic chemotherapy 04/01/2020    Hypertension     Hypogammaglobulinemia 12/13/2019    Neuropathy     Non Hodgkin's lymphoma     Reflux esophagitis     Ringing in ear, bilateral          Past Surgical History:   Procedure Laterality Date    COLONOSCOPY 2018    EYE SURGERY  Approximately 3 years ago    Cataract    HAND SURGERY      amputation left index finger    INCISION AND DRAINAGE OF ABSCESS Left 10/3/2022    Procedure: INCISION AND DRAINAGE, ABSCESS;  Surgeon: Franco Vneegas III, MD;  Location: [facility] OR;  Service: General;  Laterality: Left;  axilla    INSERTION OF TUNNELED CENTRAL VENOUS CATHETER (CVC) WITH SUBCUTANEOUS PORT Left 2/1/2023    Procedure: [id number];  Surgeon: Franco Venegas III, MD;  Location: [facility] OR;  Service: General;  Laterality: Left;    MEDIPORT REMOVAL Right 10/3/2022    Procedure: REMOVAL, CATHETER, CENTRAL VENOUS, TUNNELED, WITH PORT;  Surgeon:  "Franco Venegas III, MD;  Location: [facility];  Service: General;  Laterality: Right;    PORTACATH PLACEMENT      SKIN CANCER EXCISION  2020    [facility]    SPINE SURGERY      VASECTOMY   ?     Social History     Tobacco Use    Smoking status: Former     Current packs/day: 0.00     Average packs/day: 0.5 packs/day for 2.0 years (1.0 ttl pk-yrs)     Types: Cigarettes     Start date:      Quit date:      Years since quittin.9    Smokeless tobacco: Never   Substance Use Topics    Alcohol use: Not Currently     Comment: rarely    Drug use: No     Family History   Problem Relation Age of Onset    Diabetes Father      Review of patient's allergies indicates:  No Known Allergies  I have reviewed past medical, family, and social history. I have reviewed previous nurse notes.    Performance Status:The patient's activity level is no limits with regular activity.      Review of Systems:  a review of eleven systems covering constitutional, Eye, HEENT, Psych, Respiratory, Cardiac, GI, , Musculoskeletal, Endocrine, Dermatologic was negative except for pertinent findings as listed ABOVE and below: pertinent positive as above, rest is good  Cough, shortness of breath, sinus drainage         Exam:Comprehensive exam done. BP (!) 169/84 (BP Location: Left arm, Patient Position: Sitting, BP Method: Medium (Automatic))   Pulse 75   Ht 5' 8" (1.727 m)   Wt 98.1 kg (216 lb 6.1 oz)   SpO2 97% Comment: on room air at rest  BMI 32.90 kg/m²   Exam included Vitals as listed, and patient's appearance and affect and alertness and mood, oral exam for yeast and hygiene and pharynx lesions and Mallapatti (M) score, neck with inspection for jvd and masses and thyroid abnormalities and lymph nodes (supraclavicular and infraclavicular nodes and axillary also examined and noted if abn), chest exam included symmetry and effort and fremitus and percussion and auscultation, cardiac exam included rhythm and gallops and murmur " and rubs and jvd and edema, abdominal exam for mass and hepatosplenomegaly and tenderness and hernias and bowel sounds, Musculoskeletal exam with muscle tone and posture and mobility/gait and  strength, and skin for rashes and cyanosis and pallor and turgor, extremity for clubbing.  Findings were normal except for pertinent findings listed below: M2,lungs clear, No clubbing.      Radiographs (ct chest and cxr) reviewed: view by direct vision   Ct chest 9/30/2022 mucous plugging and some cellulits suggested ant left chest near port runs from right to left subclavian.    NM PET CT Routine Skull to Mid Thigh 4/5/2022 visualized improvement in bilateral lower lobe mucous plugging when compared to 10/4/21 scan  CT images of the chest show no suspicious pulmonary nodules or masses, with no pleural or pericardial effusion. There is scattered linear subsegmental atelectasis in both lungs, with aortic and coronary arterial calcifications.     NM PET CT Routine Skull to Mid Thigh 10/04/2021   New bilateral reticulonodular pulmonary opacities suggesting infectious or inflammatory pneumonitis. Clinical and laboratory correlation is requested.     CT CHEST WITHOUT CONTRAST  06/21/2021   Granulomatous change.  2. Multiple small pulmonary nodules.  Follow-up per Fleischner guidelines.  For multiple solid nodules all <6 mm, Fleischner Society 2017 guidelines recommend no routine follow up for a low risk patient, or follow up with non-contrast chest CT at 12 months after discovery in a high risk patient.  3. Minimal bronchiectasis of the bilateral lower lobes.  4. No significant lymphadenopathy.      NM PET CT Routine Skull to Mid Thigh 03/19/21   1. Mild patchy airspace opacity in the superior segment of the left  lower lobe with increased FDG activity from background suggestive of a  small focal pneumonia (possibly viral in etiology). Consider  correlation with the symptoms, history and CT follow-up in 3 months to  document  resolution.    X-Ray Chest PA And Lateral  03/25/2021   Mild interstitial prominence, similar to previous exams.  No focal consolidation.     CT Chest Without 03/19/2020   1. Mild bronchiectasis in the left lower lobe and tree-in-bud micro nodules at the left lung base suggesting underlying bronchiolitis, possibly due to a non tuberculous mycobacterial infection or viral bronchiolitis.  2. Fatty infiltration of the liver  3. Small hiatal hernia.     CT Chest Without Contrast 09/01/2020   Unchanged left upper lobe 3 mm nodule.  Mild old granulomatous disease.  Minimal bronchiectasis which is nonspecific.  Adjacent micro nodules and tree-in-bud opacities are also unchanged.  Findings favor sequelae of an old atypical infectious process.  Atherosclerosis.  Small hiatal hernia.      Labs reviewed       Lab Results   Component Value Date    WBC 5.73 08/15/2023    RBC 5.07 08/15/2023    HGB 14.4 08/15/2023    HCT 42.8 08/15/2023    MCV 84 08/15/2023    MCH 28.4 08/15/2023    MCHC 33.6 08/15/2023    RDW 14.1 08/15/2023     08/15/2023    MPV 9.0 (L) 08/15/2023    GRAN 3.3 08/15/2023    GRAN 57.8 08/15/2023    LYMPH 1.6 08/15/2023    LYMPH 27.6 08/15/2023    MONO 0.6 08/15/2023    MONO 10.5 08/15/2023    EOS 0.2 08/15/2023    BASO 0.04 08/15/2023    EOSINOPHIL 3.1 08/15/2023    BASOPHIL 0.7 08/15/2023       AFB x 3 all negative following 8 week incubation, Respiratory cultures show normal dank only    Culture, Respiratory with Gram Stain 09/18/20 HAEMOPHILUS INFLUENZAE   Culture, Respiratory with Gram Stain 1/21/2021 HAEMOPHILUS INFLUENZAE   Respiratory with Gram Stain 7/22/21, PSEUDOMONAS AERUGINOSA and HAEMOPHILUS INFLUENZAE       PFT  reviewed  Pulmonary Functions Testing Results:  Spirometry bronchodilator, lung volume by gas dilution, diffusion capacity measured June 23, 2020.  The FEV1 FVC ratio was 80% indicating no airflow obstruction by spirometry technique.  The FEV1 was 108% of predicted or 3.5 L.  There  was no   significant improvement following bronchodilator.  Total lung capacity on lung volume by gas dilution was 69% and a little reduced.  Diffusion was normal.       Spirometry was normal and was no significant bronchodilator response.  Total lung capacity was reduced consistent with restriction.  Diffusion was normal.  Clinical correlation recommended.     7/17/2013 PFT Data: Complete pulmonary function studies were obtained today and are independently reviewed. Spirometry shows no evidence of airflow obstruction today. Lung was performed by plethysmography and are consistent with air trapping without hyperinflation. Residual volume is 122% predicted, and the total capacity is 98% predicted. Diffusion capacity for carbon monoxide is in the normal range at 102% predicted. Overall, these are fairly normal pulmonary function studies and do not suggest any obstructive lung disease.    Spirometry bronchodilator, lung volume by gas dilution, diffusion capacity measured June 23, 2020.  The FEV1 FVC ratio was 80% indicating no airflow obstruction by spirometry technique.  The FEV1 was 108% of predicted or 3.5 L.  There was no   significant improvement following bronchodilator.  Total lung capacity on lung volume by gas dilution was 69% and a little reduced.  Diffusion was normal.       Spirometry was normal and was no significant bronchodilator response.  Total lung capacity was reduced consistent with restriction.  Diffusion was normal.  Clinical correlation recommended.        I spent over 30 mins of time with the patient. Reviewed results of the recently ordered labs, tests and studies; made directives with regards to the results. Over half of this time was spent couseling and coordinating care.     I have explained all of the above in detail and the patient understands all of the current recommendation(s). I have answered all of their questions to the best of my ability and to their complete satisfaction.   The  patient is to continue with the current management plan.      Plan:  Clinical impression is apparently straight forward and impression with management as below.    Sivakumar was seen today for 6m f/u.    Diagnoses and all orders for this visit:    Bronchiectasis without complication  -     azithromycin (ZITHROMAX) 500 MG tablet; Take 1 tablet (500 mg total) by mouth once daily. Repeat for yellow mucous    Chronic obstructive pulmonary disease, unspecified COPD type  -     fluticasone-umeclidin-vilanter (TRELEGY ELLIPTA) 100-62.5-25 mcg DsDv; Inhale 1 puff into the lungs once daily.  -     montelukast (SINGULAIR) 10 mg tablet; Take 1 tablet (10 mg total) by mouth nightly as needed.  -     azithromycin (ZITHROMAX) 500 MG tablet; Take 1 tablet (500 mg total) by mouth once daily. Repeat for yellow mucous  -     predniSONE (DELTASONE) 20 MG tablet; Take 1 tablet (20 mg total) by mouth once daily. Take one daily for 3 days and repeat for bronchitis.    Sinusitis, unspecified chronicity, unspecified location  -     montelukast (SINGULAIR) 10 mg tablet; Take 1 tablet (10 mg total) by mouth nightly as needed.    Essential hypertension  -     montelukast (SINGULAIR) 10 mg tablet; Take 1 tablet (10 mg total) by mouth nightly as needed.    Type 2 diabetes mellitus without complication, without long-term current use of insulin  -     montelukast (SINGULAIR) 10 mg tablet; Take 1 tablet (10 mg total) by mouth nightly as needed.    Non-seasonal allergic rhinitis due to other allergic trigger  -     montelukast (SINGULAIR) 10 mg tablet; Take 1 tablet (10 mg total) by mouth nightly as needed.    Immune deficiency disorder  -     azithromycin (ZITHROMAX) 500 MG tablet; Take 1 tablet (500 mg total) by mouth once daily. Repeat for yellow mucous                Follow up in about 1 year (around 11/22/2024), or if symptoms worsen or fail to improve.    Discussed with patient above for education the following:      Patient Instructions   Use  "trelegy daily for next "season".     Use Singulair daily  also.    Use albuterol as needed....    May use azithromycin and prednisone for 3 days if lungs exacerbate.    Use vest if any chest mucous    Ct chest from last 2 yrs viewed and bronchiectasis is mild.    Continue cpap..                         "

## 2023-11-22 NOTE — PATIENT INSTRUCTIONS
"Use trelegy daily for next "season".     Use Singulair daily  also.    Use albuterol as needed....    May use azithromycin and prednisone for 3 days if lungs exacerbate.    Use vest if any chest mucous    Ct chest from last 2 yrs viewed and bronchiectasis is mild.    Continue cpap..    "

## 2023-12-08 ENCOUNTER — INFUSION (OUTPATIENT)
Dept: INFUSION THERAPY | Facility: HOSPITAL | Age: 68
End: 2023-12-08
Attending: INTERNAL MEDICINE
Payer: MEDICARE

## 2023-12-08 ENCOUNTER — TELEPHONE (OUTPATIENT)
Dept: HEMATOLOGY/ONCOLOGY | Facility: CLINIC | Age: 68
End: 2023-12-08

## 2023-12-08 VITALS
BODY MASS INDEX: 32.38 KG/M2 | TEMPERATURE: 98 F | HEIGHT: 68 IN | OXYGEN SATURATION: 98 % | DIASTOLIC BLOOD PRESSURE: 72 MMHG | RESPIRATION RATE: 18 BRPM | SYSTOLIC BLOOD PRESSURE: 127 MMHG | HEART RATE: 67 BPM | WEIGHT: 213.63 LBS

## 2023-12-08 DIAGNOSIS — D80.1 HYPOGAMMAGLOBULINEMIA: Primary | ICD-10-CM

## 2023-12-08 DIAGNOSIS — C82.30 FOLLICULAR LYMPHOMA GRADE IIIA, UNSPECIFIED BODY REGION: ICD-10-CM

## 2023-12-08 DIAGNOSIS — D80.1 NONFAMILIAL HYPOGAMMAGLOBULINEMIA: ICD-10-CM

## 2023-12-08 PROCEDURE — 96366 THER/PROPH/DIAG IV INF ADDON: CPT

## 2023-12-08 PROCEDURE — 25000003 PHARM REV CODE 250: Performed by: INTERNAL MEDICINE

## 2023-12-08 PROCEDURE — 96365 THER/PROPH/DIAG IV INF INIT: CPT

## 2023-12-08 PROCEDURE — A4216 STERILE WATER/SALINE, 10 ML: HCPCS | Performed by: INTERNAL MEDICINE

## 2023-12-08 PROCEDURE — 96367 TX/PROPH/DG ADDL SEQ IV INF: CPT

## 2023-12-08 PROCEDURE — 63600175 PHARM REV CODE 636 W HCPCS: Performed by: INTERNAL MEDICINE

## 2023-12-08 RX ORDER — SODIUM CHLORIDE 0.9 % (FLUSH) 0.9 %
10 SYRINGE (ML) INJECTION
Status: DISCONTINUED | OUTPATIENT
Start: 2023-12-08 | End: 2023-12-08 | Stop reason: HOSPADM

## 2023-12-08 RX ORDER — SODIUM CHLORIDE 0.9 % (FLUSH) 0.9 %
10 SYRINGE (ML) INJECTION
Status: CANCELLED | OUTPATIENT
Start: 2024-01-05

## 2023-12-08 RX ORDER — HEPARIN 100 UNIT/ML
500 SYRINGE INTRAVENOUS
Status: CANCELLED | OUTPATIENT
Start: 2024-01-05

## 2023-12-08 RX ORDER — HEPARIN 100 UNIT/ML
500 SYRINGE INTRAVENOUS
Status: DISCONTINUED | OUTPATIENT
Start: 2023-12-08 | End: 2023-12-08 | Stop reason: HOSPADM

## 2023-12-08 RX ADMIN — HEPARIN 500 UNITS: 100 SYRINGE at 10:12

## 2023-12-08 RX ADMIN — SODIUM CHLORIDE: 0.9 INJECTION, SOLUTION INTRAVENOUS at 08:12

## 2023-12-08 RX ADMIN — SODIUM CHLORIDE, PRESERVATIVE FREE 10 ML: 5 INJECTION INTRAVENOUS at 10:12

## 2023-12-08 RX ADMIN — DIPHENHYDRAMINE HYDROCHLORIDE 25 MG: 50 INJECTION, SOLUTION INTRAMUSCULAR; INTRAVENOUS at 08:12

## 2023-12-08 RX ADMIN — IMMUNE GLOBULIN (HUMAN) 50 G: 10 INJECTION INTRAVENOUS; SUBCUTANEOUS at 08:12

## 2023-12-08 NOTE — PLAN OF CARE
Problem: Infection  Goal: Absence of Infection Signs and Symptoms  Outcome: Ongoing, Progressing  Intervention: Prevent or Manage Infection  Flowsheets (Taken 12/8/2023 0804)  Infection Management: aseptic technique maintained

## 2023-12-10 ENCOUNTER — LAB VISIT (OUTPATIENT)
Dept: LAB | Facility: HOSPITAL | Age: 68
End: 2023-12-10
Attending: INTERNAL MEDICINE
Payer: MEDICARE

## 2023-12-10 DIAGNOSIS — C82.30 FOLLICULAR LYMPHOMA GRADE IIIA, UNSPECIFIED BODY REGION: ICD-10-CM

## 2023-12-10 LAB
ALBUMIN SERPL BCP-MCNC: 3.9 G/DL (ref 3.5–5.2)
ALP SERPL-CCNC: 99 U/L (ref 55–135)
ALT SERPL W/O P-5'-P-CCNC: 24 U/L (ref 10–44)
ANION GAP SERPL CALC-SCNC: 12 MMOL/L (ref 8–16)
AST SERPL-CCNC: 20 U/L (ref 10–40)
BASOPHILS # BLD AUTO: 0.02 K/UL (ref 0–0.2)
BASOPHILS NFR BLD: 0.7 % (ref 0–1.9)
BILIRUB SERPL-MCNC: 0.3 MG/DL (ref 0.1–1)
BUN SERPL-MCNC: 14 MG/DL (ref 8–23)
CALCIUM SERPL-MCNC: 9.2 MG/DL (ref 8.7–10.5)
CHLORIDE SERPL-SCNC: 108 MMOL/L (ref 95–110)
CO2 SERPL-SCNC: 21 MMOL/L (ref 23–29)
CREAT SERPL-MCNC: 0.9 MG/DL (ref 0.5–1.4)
DIFFERENTIAL METHOD: ABNORMAL
EOSINOPHIL # BLD AUTO: 0.1 K/UL (ref 0–0.5)
EOSINOPHIL NFR BLD: 4.1 % (ref 0–8)
ERYTHROCYTE [DISTWIDTH] IN BLOOD BY AUTOMATED COUNT: 13.2 % (ref 11.5–14.5)
EST. GFR  (NO RACE VARIABLE): >60 ML/MIN/1.73 M^2
GLUCOSE SERPL-MCNC: 145 MG/DL (ref 70–110)
HCT VFR BLD AUTO: 42.4 % (ref 40–54)
HGB BLD-MCNC: 14 G/DL (ref 14–18)
IMM GRANULOCYTES # BLD AUTO: 0.01 K/UL (ref 0–0.04)
IMM GRANULOCYTES NFR BLD AUTO: 0.3 % (ref 0–0.5)
LYMPHOCYTES # BLD AUTO: 1.4 K/UL (ref 1–4.8)
LYMPHOCYTES NFR BLD: 47 % (ref 18–48)
MCH RBC QN AUTO: 28.6 PG (ref 27–31)
MCHC RBC AUTO-ENTMCNC: 33 G/DL (ref 32–36)
MCV RBC AUTO: 87 FL (ref 82–98)
MONOCYTES # BLD AUTO: 0.6 K/UL (ref 0.3–1)
MONOCYTES NFR BLD: 19.9 % (ref 4–15)
NEUTROPHILS # BLD AUTO: 0.8 K/UL (ref 1.8–7.7)
NEUTROPHILS NFR BLD: 28 % (ref 38–73)
NRBC BLD-RTO: 0 /100 WBC
PLATELET # BLD AUTO: 227 K/UL (ref 150–450)
PLATELET BLD QL SMEAR: ABNORMAL
PMV BLD AUTO: 9.5 FL (ref 9.2–12.9)
POTASSIUM SERPL-SCNC: 4.3 MMOL/L (ref 3.5–5.1)
PROT SERPL-MCNC: 7.8 G/DL (ref 6–8.4)
RBC # BLD AUTO: 4.9 M/UL (ref 4.6–6.2)
SODIUM SERPL-SCNC: 141 MMOL/L (ref 136–145)
WBC # BLD AUTO: 2.96 K/UL (ref 3.9–12.7)

## 2023-12-10 PROCEDURE — 36415 COLL VENOUS BLD VENIPUNCTURE: CPT | Performed by: INTERNAL MEDICINE

## 2023-12-10 PROCEDURE — 80053 COMPREHEN METABOLIC PANEL: CPT | Performed by: INTERNAL MEDICINE

## 2023-12-10 PROCEDURE — 85025 COMPLETE CBC W/AUTO DIFF WBC: CPT | Performed by: INTERNAL MEDICINE

## 2023-12-12 NOTE — PROGRESS NOTES
"[facility] Hematology/Oncology  PROGRESS NOTE -  Follow-up Visit      Subjective:       Patient ID:   NAME: Sivakumar Thacker : 1955     68 y.o. male    Referring Doc: Doe (new PCP)  Other Physicians: Vinod Chen/José Manuel      Chief Complaint:  NHL f/u    History of Present Illness:     Patient returns today for a regularly scheduled follow-up visit.  The patient is here today to go over the results of the recently ordered labs, tests and studies. He is here  by himself today    He is feeling "real good" and breathing good;  He saw Dr beasley on . He has PET planned for ; he is getting  in 2024    He previously had since resumed Rituximab but it is now at every 12 weeks; getting IV IGg  as well    He had PEt on 2023     He denies any CP, SOB, HA's or N/V. Some mild sinus issues    He is planning to see new PCP, Dr Nails in near future    He is continued on Gabapentin for the neuropathy issues in his feet.          I discussed continued Covid19 precautions        ROS:   GEN: normal without any fever, night sweats or weight loss  HEENT: normal with no HA's, sore throat, stiff neck, changes in vision; mild sinus issues    CV: normal with no CP, SOB, PND, VASQUEZ or orthopnea  PULM: normal with no SOB,  hemoptysis, sputum or pleuritic pain; chronic cough but better; breathing better    GI: normal with no abdominal pain, nausea, vomiting, constipation, diarrhea, melanotic stools, BRBPR, or hematemesis  : normal with no hematuria, dysuria  BREAST: normal with no mass, discharge, pain  SKIN: normal with no rash, erythema, bruising, or swelling; no current wound issues    Allergies:  Review of patient's allergies indicates:  No Known Allergies    Medications:    Current Outpatient Medications:     amLODIPine (NORVASC) 10 MG tablet,  See Rx Instructions, # 90 EA, 3 total refill(s), Hard Stop, Disp: , Rfl:     aspirin (ECOTRIN) 81 MG EC tablet, Take 81 mg by mouth once daily., Disp: , Rfl:     " atorvastatin (LIPITOR) 20 MG tablet, Take 1 tablet (20 mg total) by mouth once daily., Disp: 90 tablet, Rfl: 3    azithromycin (ZITHROMAX) 500 MG tablet, Take 1 tablet (500 mg total) by mouth once daily. Repeat for yellow mucous, Disp: 3 tablet, Rfl: 3    benzonatate (TESSALON) 100 MG capsule, Take 1 capsule every day by oral route at bedtime for 10 days., Disp: , Rfl:     benzonatate (TESSALON) 200 MG capsule, , Disp: , Rfl:     blood sugar diagnostic (FREESTYLE LITE STRIPS MISC), FreeStyle Lite Strips, Disp: , Rfl:     blood sugar diagnostic Strp, , Disp: , Rfl:     diclofenac sodium (VOLTAREN) 1 % Gel, Apply 2 g topically 3 (three) times daily., Disp: 200 g, Rfl: 2    fluticasone propionate (FLONASE) 50 mcg/actuation nasal spray, 1 spray (50 mcg total) by Each Nostril route 2 (two) times daily., Disp: 48 g, Rfl: 11    fluticasone-umeclidin-vilanter (TRELEGY ELLIPTA) 100-62.5-25 mcg DsDv, Inhale 1 puff into the lungs once daily., Disp: 3 each, Rfl: 3    gabapentin (NEURONTIN) 300 MG capsule, Take 1 capsule (300 mg total) by mouth 2 (two) times daily., Disp: 180 capsule, Rfl: 3    losartan (COZAAR) 100 MG tablet, Take 1 tablet (100 mg total) by mouth once daily., Disp: 90 tablet, Rfl: 3    metFORMIN (GLUCOPHAGE) 500 MG tablet, Take 2 tablets twice a day by oral route with meals for 90 days., Disp: 180 tablet, Rfl: 3    montelukast (SINGULAIR) 10 mg tablet, Take 1 tablet (10 mg total) by mouth nightly as needed., Disp: 90 tablet, Rfl: 3    MULTIVITAMIN ORAL, Take 1 tablet by mouth once daily., Disp: , Rfl:     mupirocin (BACTROBAN) 2 % ointment, Apply topically., Disp: , Rfl:     naproxen (NAPROSYN) 500 MG tablet, Take 500 mg by mouth., Disp: , Rfl:     omeprazole (PRILOSEC) 40 MG capsule, Take 1 capsule (40 mg total) by mouth once daily., Disp: 90 capsule, Rfl: 3    predniSONE (DELTASONE) 20 MG tablet, Take 1 tablet (20 mg total) by mouth once daily. Take one daily for 3 days and repeat for bronchitis., Disp: 30  "tablet, Rfl: 11    promethazine-dextromethorphan (PROMETHAZINE-DM) 6.25-15 mg/5 mL Syrp, Take 5 mLs by mouth every 6 (six) hours as needed., Disp: , Rfl:     sildenafiL (VIAGRA) 100 MG tablet, Take 1 tablet (100 mg total) by mouth daily as needed for Erectile Dysfunction., Disp: 30 tablet, Rfl: 3    tiZANidine (ZANAFLEX) 4 MG tablet, Take 1 tablet (4 mg total) by mouth daily as needed., Disp: 90 tablet, Rfl: 3    PMHx/PSHx Updates:  See patient's last visit with me on  9/13/2023  See H&P on 1/8/2019        Pathology:  Cancer Staging  No matching staging information was found for the patient.          Objective:     Vitals:  Blood pressure (!) 157/79, pulse 71, temperature 98.2 °F (36.8 °C), resp. rate 16, height 5' 8" (1.727 m), weight 97.5 kg (215 lb).    Physical Examination:   GEN: no apparent distress, comfortable; AAOx3  HEAD: atraumatic and normocephalic  EYES: no pallor, no icterus, PERRLA  ENT: OMM, no pharyngeal erythema, external ears WNL; no nasal discharge; no thrush;    NECK: no masses, thyroid normal, trachea midline, no LAD/LN's, supple  CV: RRR with no murmur; normal pulse; normal S1 and S2; no pedal edema; portacath since removed  CHEST: Normal respiratory effort; CTAB  ABDOM: nontender and nondistended; soft; normal bowel sounds; no rebound/guarding  MUSC/Skeletal: ROM normal; no crepitus; joints normal; no deformities or arthropathy  EXTREM: no clubbing, cyanosis, inflammation or swelling  SKIN: no rashes, lesions, ulcers, petechiae or subcutaneous nodules; no current wound issues  : no gibbs  NEURO: grossly intact; motor/sensory WNL; AAOx3; no tremors  PSYCH: normal mood, affect and behavior  LYMPH: normal cervical, supraclavicular, axillary and groin LN's            Labs:              Lab Results   Component Value Date    WBC 2.96 (L) 12/10/2023    HGB 14.0 12/10/2023    HCT 42.4 12/10/2023    MCV 87 12/10/2023     12/10/2023     CMP  Sodium   Date Value Ref Range Status   12/10/2023 " 141 136 - 145 mmol/L Final   04/25/2019 144 134 - 144 mmol/L      Potassium   Date Value Ref Range Status   12/10/2023 4.3 3.5 - 5.1 mmol/L Final     Chloride   Date Value Ref Range Status   12/10/2023 108 95 - 110 mmol/L Final   04/25/2019 107 98 - 110 mmol/L      CO2   Date Value Ref Range Status   12/10/2023 21 (L) 23 - 29 mmol/L Final     Glucose   Date Value Ref Range Status   12/10/2023 145 (H) 70 - 110 mg/dL Final   04/25/2019 177 (H) 70 - 99 mg/dL      BUN   Date Value Ref Range Status   12/10/2023 14 8 - 23 mg/dL Final     Creatinine   Date Value Ref Range Status   12/10/2023 0.9 0.5 - 1.4 mg/dL Final   04/25/2019 0.83 0.60 - 1.40 mg/dL      Calcium   Date Value Ref Range Status   12/10/2023 9.2 8.7 - 10.5 mg/dL Final     Total Protein   Date Value Ref Range Status   12/10/2023 7.8 6.0 - 8.4 g/dL Final     Albumin   Date Value Ref Range Status   12/10/2023 3.9 3.5 - 5.2 g/dL Final   04/25/2019 4.4 3.1 - 4.7 g/dL      Total Bilirubin   Date Value Ref Range Status   12/10/2023 0.3 0.1 - 1.0 mg/dL Final     Comment:     For infants and newborns, interpretation of results should be based  on gestational age, weight and in agreement with clinical  observations.    Premature Infant recommended reference ranges:  Up to 24 hours.............<8.0 mg/dL  Up to 48 hours............<12.0 mg/dL  3-5 days..................<15.0 mg/dL  6-29 days.................<15.0 mg/dL       Alkaline Phosphatase   Date Value Ref Range Status   12/10/2023 99 55 - 135 U/L Final     AST   Date Value Ref Range Status   12/10/2023 20 10 - 40 U/L Final     ALT   Date Value Ref Range Status   12/10/2023 24 10 - 44 U/L Final     Anion Gap   Date Value Ref Range Status   12/10/2023 12 8 - 16 mmol/L Final     eGFR if    Date Value Ref Range Status   07/21/2022 >60.0 >60 mL/min/1.73 m^2 Final     eGFR if non    Date Value Ref Range Status   07/21/2022 >60.0 >60 mL/min/1.73 m^2 Final     Comment:     Calculation  used to obtain the estimated glomerular filtration  rate (eGFR) is the CKD-EPI equation.            Radiology/Diagnostic Studies:    PET 6/22/2023:    IMPRESSION:     1. Negative for FDG avid lymphoproliferative disease.  2. Resolution of prior bilateral pulmonary opacities.        PET 1/5/2023:    IMPRESSION: Diffuse reticular nodular and groundglass opacities the left lower lobe, posterior right upper lobe and posterior medial right lower lobe compatible with multifocal pneumonia. Follow-up chest CT in three months is recommended     Diffuse FDG activity throughout the axial skeleton suggestive of bone marrow hyperstimulation     No evidence of recurrent or metastatic disease        PET  4/5/2022:    IMPRESSION: No evidence of recurrent or active lymphoproliferative disease        PET 10/4/2021:  Impression:     1. Negative for FDG avid recurrent lymphoproliferative disease.  2. New left maxillary sinusitis.  3. New bilateral reticulonodular pulmonary opacities suggesting infectious or inflammatory pneumonitis.  Clinical and laboratory correlation is requested.  4. Coronary artery calcification.         CXR  3/25/2021:    Impression:     Mild interstitial prominence, similar to previous exams.  No focal consolidation      PET 3/19/2021:  IMPRESSION:  1. Mild patchy airspace opacity in the superior segment of the left  lower lobe with increased FDG activity from background suggestive of a  small focal pneumonia (possibly viral in etiology). Consider  correlation with the symptoms, history and CT follow-up in 3 months to  document resolution.  2. No FDG avid lymphadenopathy or additional sites of disease.         PET 9/2/2020:      Impression:     Negative for active lymphoproliferative disease.         Chest CT  3/19/2020:      Impression       1. Mild bronchiectasis in the left lower lobe and tree-in-bud micro nodules at the left lung base suggesting underlying bronchiolitis, possibly due to a non tuberculous  mycobacterial infection or viral bronchiolitis.  2. Fatty infiltration of the liver  3. Small hiatal hernia.           CXR  1/31/2020    Impression       1. No acute chest disease.  2. Left subclavian Port-A-Cath.               PET 9/11/2019   Impression       No evidence of FDG avid residual or recurrent lymphoma       I have reviewed all available lab results and radiology reports.    Assessment/Plan:   (1) 68 y.o. male with diagnosis of NHL Follicular Stage IIIA who has been referred by Dr Gary Chen with [facility] for continuation of care by medical hematology/oncology.   - He originally was diagnosed in 2012 and had parotid excision at [facility]. He subsequently went to [facility] and was treated with bendamustine-rituximab. He has been on rituximab maintenance every 8 weeks and IV IgG monthly. Dr Chen's plan was to keep him on maintenace therapy for as long as he could tolerate the treatments  - s/p 6 cycles of Bendamustine and Retuximab with subsequent complete remission  - 1st maintenance cycle rituximab was in March 2016  - he has been on maintenance rituximab and IV IgG - last rituximab 11/15/2018; last IV IgG on Dec 14th 2018  - last PET was on 9/26/2018 with no evidence of recurrence  - originally diagnosed in Dec 2012 with left parotid and left periparotid LN excision on 12/11/2012  - PET scan done on  9/11/2019 was good    7/23/2020:  - new nodule on auricle of left ear suspicious for a skin cancer - will refer him to Dr Avilez with ENT  - he is due for repeat PET    9/17/2020:  - PET scan on 9/2/2020 is adequate  - [facility]     11/12/2020:  - continued on the current regimen  - doing well with no new issues    1/7/2021:  - he is having some persistent cough issues for which he has been obtaining sputum for José Manuel  - last PET was Sept 2020    3/4/2021:  - doing ok  - chronic cough issues - followed by José Manuel  - will set up f/u  PET    4/29/2021:  - he had PET scan on 3/19/2021  - He has been seeing pulmonary about his chronic cough  Lucia Georges with pulmonary has seen the recent PET and reported to him that the CXR on 3/25 was stable and he is on some oral antibiotics with improvement of the symptoms  - He is also on Gabapentin for the neuropathy issues in his feet.   - He saw Dr Barry Mcmahon on 3/24/2021 with family med    8/19/2021:  - He has been seeing pulmonary about his chronic cough - Lucia José Manuel with pulmonary and he has been on periodic treatments of antibiotics. He saw her last just yesterday on 8/17/2021 and is currently on antibiotics.  - Pulmonary is planning to refer him to an ID specialist  - he is on the rituximab every 8 weeks; I am not convinced that this is contributing to the infection issues as it is not reducing his WBC; however,if pulmonary and/or ID feel it is a contributing factor then we can discontinue. The risk of his lymphoma recurring or progressing would be higher if he were to discontinue the therapy      10/14/2021:  - continued on oral antibiotics per pulmonary and plans to see Pulmonary ID specialist in near future  - continued on current therapy with rituximab  - recent PEt on 10/4/2021    12/9/2021:  - continued on rituximab  - on new antibiotics per pulmonary and he sees them again in Feb 2022  - repeat scans in Feb 2022 or sooner if pulmonary says otherwise    2/2/2022:  - he is seeing pulmonary again in two weeks  - he does not think that the new triple antibiotic regimen is working  - we can get PET in Feb/mar 2022 if pulmonary is inclined, otherwise 6 month surveillance scan in April/May 2022    3/31/2022:  - He has been seeing pulmonary about his chronic cough and TONY issues; he is on antibiotics 3x/week and he reports that pulmonary feels that he is stable  - PET scan scheduled for 4/14/2022  - He last saw Dr Barry Mcmahon on 3/28/2021 and José Manuel on 2/17/2022  - pulmonary is fine with him  continuing the ritxuimab per his report    5/26/2022:  - He has been seeing pulmonary about his chronic cough and TONY issues; he saw Rose on 5/17/2022 and is now on a new antibiotic and he reports that pulmonary said the PEt scan was better than the last one; he is planning to see Dr Veliz in the near future   - PET scan was done on 4/5/2022 with no evidence of recurrence  - continued on rituximab maintenance therapy    7/21/2022:  - continued under the care of pulmonary  - he is seeing Dr Veliz in Aug 2022  - continued on monthly IV IgG and rituximab every other month  - repeat PET in Oct 2022 expected    9/15/2022:  - He has been seeing pulmonary about his chronic cough and TONY issues; he has been seeing Rose and saw Dr Veliz on 8/15/2022;   - Dr Veliz is looking at increasing his Ig dose or changing it to SQ weekly  - Last PET scan was done on 4/5/2022 and repeat has been ordered and is due this Oct 2022  - discussed with patient about referral to Dr Nasreen Abarca at [facility] for not only evaluation for 2nd opinion for his chronic lymphoma but also the options of IV IgG infusion therapy, patient is agreeable    11/10/2022:  - Patient was seen by Dr Abarca at [facility] since last visit and his recommendation was to hold off on further rituximab therapy and proceed with just surveillance scanning.  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 He is still followed by wound care and is getting the prior drain site packed, etc. He has not followed up with ID as of yet    1/4/2023:  - Patient was previously seen by Dr Abarca at [facility] since last visit and his recommendation was to hold off on further rituximab therapy and proceed with just surveillance scanning. He has telemed visit with him in Feb 2023  -  He is getting PET tomorrow and seeing  pulmonary again in Feb 2023  - he has been on IV IgG  - He saw Dr Washington last in Nov 2022 and is seeing ID at [facility] in Jan 2023    3/1/2023:  - He had telemed with Dr Abarca and he wants him off the rituximab for another 3 months; patient is concerned about holding off on the rituximab maintenance.   - He last had rituximab in Sept 2022  - He has been on IV IgG  - Recent PET on 1/5/2023 with no evidence of lymphoma  - will discuss with Dr Abarca, but I think it is reasonable to resume rituximab in near future if ok with Pulm and ID, but will spread out to therapy every 3 months instead of every 2 months    4/26/2023:  - discussed with patient about resuming the Rituximab and he is anxious to do so  - will give every 12 weeks    6/21/2023:  - PET due tomorrow  - IV IgG on Friday  - rituximab since resumed but to be given every 12 weeks now  - f/\u with Dr Veliz in Aug 2023    9/13/2023:  - He has since resumed Rituximab but it is now at every 12 weeks; getting IV IGg this Friday and rituimab due again in Oct 2023  - He had PEt on 6/22/2023 12/13/2023:  - he is continued on IV IgG with next one on Jan 5th  - he gets Rituximab every 3 months now - latest one was 11/10  - he has PET this coming Dec 18th      (2) HTN     (3) Neuropathy     (4) GERD     (5) DM - on metformin per Dr Nunez     (6) Hypogammaglobulinemia - on Iv IgG monthly     (7) Steatosis of liver     (8) Degenerative disc disease of back     (9) Diverticular disease    (10) Mild bronchiectasis in the left lower lobe and tree-in-bud micro nodules at the left lung base suggesting underlying bronchiolitis  - seeing Lucia Georges           VISIT DIAGNOSES:      Follicular lymphoma grade IIIa, unspecified body region    Hypogammaglobulinemia    Nonfamilial hypogammaglobulinemia    Non-Hodgkin's lymphoma, unspecified body region, unspecified non-Hodgkin lymphoma type    Pancytopenia          PLAN:  1. continue rituximab maintenance  therapy every 12 weeks (due  again in Feb 2024)  2. continue IV IgG monthly  - due again on Jan 5th3. F/u with Dr Nasreen Abarca at [facility] as directed   4. Check labs every 8 weeks  5. Repeat PET every 6 months (scheduled Dec 18th 2023)  6. F/u with PCP, Pulm etc             RTC in 12 weeks  with myself   Fax note to Jesu (mica);; José Manuel Veliz; Rima; Felix         Discussion:       I spent over 25 mins of time with the patient. Reviewed results of the recently ordered labs, tests and studies; made directives with regards to the results. Over half of this time was spent couseling and coordinating care.      COVID-19 Discussion:    I had long discussion with patient and any applicable family about the COVID-19 coronavirus epidemic and the recommended precautions with regard to cancer and/or hematology patients. I have re-iterated the CDC recommendations for adequate hand washing, use of hand -like products, and coughing into elbow, etc. In addition, especially for our patients who are on chemotherapy and/or our otherwise immunocompromised patients, I have recommended avoidance of crowds, including movie theaters, restaurants, churches, etc. I have recommended avoidance of any sick or symptomatic family members and/or friends. I have also recommended avoidance of any raw and unwashed food products, and general avoidance of food items that have not been prepared by themselves. The patient has been asked to call us immediately with any symptom developments, issues, questions or other general concerns.     I have explained all of the above in detail and the patient understands all of the current recommendation(s). I have answered all of their questions to the best of my ability and to their complete satisfaction.   The patient is to continue with the current management plan.            Electronically signed by Jefferson Ibarra MD              Answers submitted by the patient for this visit:  Review of Systems Questionnaire (Submitted on  12/10/2023)  appetite change : No  unexpected weight change: No  mouth sores: No  visual disturbance: No  cough: No  shortness of breath: No  chest pain: No  abdominal pain: No  diarrhea: No  frequency: No  back pain: No  rash: No  headaches: No  adenopathy: No  nervous/ anxious: No

## 2023-12-13 ENCOUNTER — OFFICE VISIT (OUTPATIENT)
Dept: HEMATOLOGY/ONCOLOGY | Facility: CLINIC | Age: 68
End: 2023-12-13
Payer: MEDICARE

## 2023-12-13 VITALS
WEIGHT: 215 LBS | SYSTOLIC BLOOD PRESSURE: 157 MMHG | DIASTOLIC BLOOD PRESSURE: 79 MMHG | RESPIRATION RATE: 16 BRPM | TEMPERATURE: 98 F | HEART RATE: 71 BPM | HEIGHT: 68 IN | BODY MASS INDEX: 32.58 KG/M2

## 2023-12-13 DIAGNOSIS — D61.818 PANCYTOPENIA: ICD-10-CM

## 2023-12-13 DIAGNOSIS — C85.90 NON-HODGKIN'S LYMPHOMA, UNSPECIFIED BODY REGION, UNSPECIFIED NON-HODGKIN LYMPHOMA TYPE: ICD-10-CM

## 2023-12-13 DIAGNOSIS — C82.30 FOLLICULAR LYMPHOMA GRADE IIIA, UNSPECIFIED BODY REGION: Primary | ICD-10-CM

## 2023-12-13 DIAGNOSIS — D80.1 NONFAMILIAL HYPOGAMMAGLOBULINEMIA: ICD-10-CM

## 2023-12-13 DIAGNOSIS — D80.1 HYPOGAMMAGLOBULINEMIA: ICD-10-CM

## 2023-12-13 PROCEDURE — 99214 PR OFFICE/OUTPT VISIT, EST, LEVL IV, 30-39 MIN: ICD-10-PCS | Mod: S$GLB,,, | Performed by: INTERNAL MEDICINE

## 2023-12-13 PROCEDURE — 99214 OFFICE O/P EST MOD 30 MIN: CPT | Mod: S$GLB,,, | Performed by: INTERNAL MEDICINE

## 2023-12-18 ENCOUNTER — HOSPITAL ENCOUNTER (OUTPATIENT)
Dept: RADIOLOGY | Facility: HOSPITAL | Age: 68
Discharge: HOME | End: 2023-12-18
Attending: INTERNAL MEDICINE
Payer: MEDICARE

## 2023-12-18 ENCOUNTER — TELEPHONE (OUTPATIENT)
Dept: HEMATOLOGY/ONCOLOGY | Facility: CLINIC | Age: 68
End: 2023-12-18

## 2023-12-18 VITALS — BODY MASS INDEX: 32.13 KG/M2 | HEIGHT: 68 IN | WEIGHT: 212 LBS

## 2023-12-18 DIAGNOSIS — C82.30 FOLLICULAR LYMPHOMA GRADE IIIA, UNSPECIFIED BODY REGION: Primary | ICD-10-CM

## 2023-12-18 DIAGNOSIS — R94.8 ABNORMAL POSITRON EMISSION TOMOGRAPHY (PET) SCAN: ICD-10-CM

## 2023-12-18 DIAGNOSIS — C83.38 DIFFUSE LARGE B-CELL LYMPHOMA, LYMPH NODES OF MULTIPLE SITES: ICD-10-CM

## 2023-12-18 LAB — GLUCOSE SERPL-MCNC: 198 MG/DL (ref 70–110)

## 2023-12-18 PROCEDURE — A9552 F18 FDG: HCPCS | Mod: PO

## 2023-12-18 NOTE — TELEPHONE ENCOUNTER
Dr Ibarra reviewed patient's PET scan and per his verbal order, patient to be referred to GI for evaluation. Patient made aware of above and verbalized understanding.

## 2023-12-20 ENCOUNTER — PATIENT MESSAGE (OUTPATIENT)
Dept: HEMATOLOGY/ONCOLOGY | Facility: CLINIC | Age: 68
End: 2023-12-20

## 2024-01-02 ENCOUNTER — PATIENT MESSAGE (OUTPATIENT)
Dept: ADMINISTRATIVE | Facility: OTHER | Age: 69
End: 2024-01-02
Payer: MEDICARE

## 2024-01-03 ENCOUNTER — PATIENT MESSAGE (OUTPATIENT)
Dept: FAMILY MEDICINE | Facility: CLINIC | Age: 69
End: 2024-01-03

## 2024-01-03 ENCOUNTER — OFFICE VISIT (OUTPATIENT)
Dept: FAMILY MEDICINE | Facility: CLINIC | Age: 69
End: 2024-01-03
Payer: MEDICARE

## 2024-01-03 VITALS
HEART RATE: 74 BPM | OXYGEN SATURATION: 98 % | DIASTOLIC BLOOD PRESSURE: 78 MMHG | BODY MASS INDEX: 32.96 KG/M2 | SYSTOLIC BLOOD PRESSURE: 134 MMHG | WEIGHT: 217.5 LBS | HEIGHT: 68 IN

## 2024-01-03 DIAGNOSIS — C83.38 DIFFUSE LARGE B-CELL LYMPHOMA, LYMPH NODES OF MULTIPLE SITES: Primary | ICD-10-CM

## 2024-01-03 DIAGNOSIS — E11.49 TYPE II DIABETES MELLITUS WITH NEUROLOGICAL MANIFESTATIONS: ICD-10-CM

## 2024-01-03 DIAGNOSIS — E11.9 TYPE 2 DIABETES MELLITUS WITHOUT COMPLICATION, WITHOUT LONG-TERM CURRENT USE OF INSULIN: ICD-10-CM

## 2024-01-03 DIAGNOSIS — N52.9 ERECTILE DYSFUNCTION, UNSPECIFIED ERECTILE DYSFUNCTION TYPE: Primary | ICD-10-CM

## 2024-01-03 DIAGNOSIS — C85.90 NON-HODGKIN'S LYMPHOMA, UNSPECIFIED BODY REGION, UNSPECIFIED NON-HODGKIN LYMPHOMA TYPE: ICD-10-CM

## 2024-01-03 DIAGNOSIS — J30.89 NON-SEASONAL ALLERGIC RHINITIS DUE TO OTHER ALLERGIC TRIGGER: ICD-10-CM

## 2024-01-03 DIAGNOSIS — D61.818 PANCYTOPENIA: ICD-10-CM

## 2024-01-03 DIAGNOSIS — G89.29 CHRONIC BACK PAIN, UNSPECIFIED BACK LOCATION, UNSPECIFIED BACK PAIN LATERALITY: ICD-10-CM

## 2024-01-03 DIAGNOSIS — J32.9 SINUSITIS, UNSPECIFIED CHRONICITY, UNSPECIFIED LOCATION: ICD-10-CM

## 2024-01-03 DIAGNOSIS — N52.9 ERECTILE DYSFUNCTION, UNSPECIFIED ERECTILE DYSFUNCTION TYPE: ICD-10-CM

## 2024-01-03 DIAGNOSIS — D80.1 HYPOGAMMAGLOBULINEMIA: ICD-10-CM

## 2024-01-03 DIAGNOSIS — I70.0 AORTIC ATHEROSCLEROSIS: ICD-10-CM

## 2024-01-03 DIAGNOSIS — M54.9 CHRONIC BACK PAIN, UNSPECIFIED BACK LOCATION, UNSPECIFIED BACK PAIN LATERALITY: ICD-10-CM

## 2024-01-03 DIAGNOSIS — I10 ESSENTIAL HYPERTENSION: ICD-10-CM

## 2024-01-03 DIAGNOSIS — J44.9 CHRONIC OBSTRUCTIVE PULMONARY DISEASE, UNSPECIFIED COPD TYPE: ICD-10-CM

## 2024-01-03 PROCEDURE — 99214 OFFICE O/P EST MOD 30 MIN: CPT | Mod: S$PBB,,, | Performed by: FAMILY MEDICINE

## 2024-01-03 PROCEDURE — 99999 PR PBB SHADOW E&M-EST. PATIENT-LVL III: CPT | Mod: PBBFAC,,, | Performed by: FAMILY MEDICINE

## 2024-01-03 PROCEDURE — 99213 OFFICE O/P EST LOW 20 MIN: CPT | Mod: PBBFAC,PN | Performed by: FAMILY MEDICINE

## 2024-01-03 RX ORDER — TIZANIDINE 4 MG/1
4 TABLET ORAL DAILY PRN
Qty: 90 TABLET | Refills: 3 | Status: SHIPPED | OUTPATIENT
Start: 2024-01-03

## 2024-01-03 RX ORDER — LOSARTAN POTASSIUM 100 MG/1
100 TABLET ORAL DAILY
Qty: 90 TABLET | Refills: 3 | Status: SHIPPED | OUTPATIENT
Start: 2024-01-03 | End: 2025-01-02

## 2024-01-03 RX ORDER — AMLODIPINE BESYLATE 10 MG/1
10 TABLET ORAL DAILY
Qty: 90 TABLET | Refills: 3 | Status: SHIPPED | OUTPATIENT
Start: 2024-01-03 | End: 2025-01-01

## 2024-01-03 RX ORDER — GABAPENTIN 300 MG/1
CAPSULE ORAL
Qty: 450 CAPSULE | Refills: 3 | Status: SHIPPED | OUTPATIENT
Start: 2024-01-03

## 2024-01-03 RX ORDER — ATORVASTATIN CALCIUM 20 MG/1
20 TABLET, FILM COATED ORAL DAILY
Qty: 90 TABLET | Refills: 3 | Status: SHIPPED | OUTPATIENT
Start: 2024-01-03

## 2024-01-03 RX ORDER — METFORMIN HYDROCHLORIDE 500 MG/1
TABLET ORAL
Qty: 360 TABLET | Refills: 3 | Status: SHIPPED | OUTPATIENT
Start: 2024-01-03

## 2024-01-03 RX ORDER — NAPROXEN 500 MG/1
500 TABLET ORAL 2 TIMES DAILY WITH MEALS
Qty: 180 TABLET | Refills: 3 | Status: SHIPPED | OUTPATIENT
Start: 2024-01-03 | End: 2025-01-01

## 2024-01-03 RX ORDER — OMEPRAZOLE 40 MG/1
40 CAPSULE, DELAYED RELEASE ORAL DAILY
Qty: 90 CAPSULE | Refills: 3 | Status: SHIPPED | OUTPATIENT
Start: 2024-01-03

## 2024-01-03 RX ORDER — MONTELUKAST SODIUM 10 MG/1
10 TABLET ORAL NIGHTLY
Qty: 90 TABLET | Refills: 3 | Status: SHIPPED | OUTPATIENT
Start: 2024-01-03

## 2024-01-03 NOTE — ASSESSMENT & PLAN NOTE
Lab Results   Component Value Date    CHOL 143 04/14/2023    CHOL 124 12/06/2022    CHOL 118 (L) 12/13/2021     Lab Results   Component Value Date    HDL 29 (L) 04/14/2023    HDL 29 (L) 12/06/2022    HDL 29 (L) 12/13/2021     Lab Results   Component Value Date    LDLCALC 69.8 04/14/2023    LDLCALC 55.0 (L) 12/06/2022    LDLCALC 31.4 (L) 12/13/2021     Lab Results   Component Value Date    TRIG 221 (H) 04/14/2023    TRIG 200 (H) 12/06/2022    TRIG 288 (H) 12/13/2021       Lab Results   Component Value Date    CHOLHDL 20.3 04/14/2023    CHOLHDL 23.4 12/06/2022    CHOLHDL 24.6 12/13/2021     Hyperlipidemia Medications               atorvastatin (LIPITOR) 20 MG tablet Take 1 tablet (20 mg total) by mouth once daily.          Continue lipitor

## 2024-01-03 NOTE — ASSESSMENT & PLAN NOTE
Has had some recurrent bronchitis. Currently doing well. For the last 4-5 years Dr. Veliz has been following him. October a year ago he had an infection and had to have his port removed due to sepsis.

## 2024-01-03 NOTE — PROGRESS NOTES
Subjective:       Patient ID: Sivakumar Thacker is a 68 y.o. male.    Chief Complaint: Establish Care    Sivakumar Thacker presents today to establish care.   They are new to me but established with [facility].  Former patient of Dr. Azar    Patient Active Problem List:     Allergic rhinitis     Follicular lymphoma grade IIIa     Nonfamilial hypogammaglobulinemia     Hypogammaglobulinemia     Essential hypertension     Elevated lipoprotein(a)     Prediabetes     Encounter for antineoplastic chemotherapy     Type II diabetes mellitus with neurological manifestations     Type 2 diabetes mellitus without complication, without long-term current use of insulin     Chronic obstructive pulmonary disease, unspecified COPD type     Pancytopenia     Non-Hodgkin's lymphoma     Aortic atherosclerosis     Sinusitis     Bronchiectasis without complication     Abnormal CXR     Obstructive sleep apnea    Current Outpatient Medications:  amLODIPine (NORVASC) 10 MG tablet  aspirin (ECOTRIN) 81 MG EC tablet, Take 81 mg by mouth once daily.  atorvastatin (LIPITOR) 20 MG tablet, Take 1 tablet (20 mg total) by mouth once daily.  diclofenac sodium (VOLTAREN) 1 % Gel, Apply 2 g topically 3 (three) times daily.  fluticasone propionate (FLONASE) 50 mcg/actuation nasal spray, 1 spray (50 mcg total) by Each Nostril route 2 (two) times daily.  fluticasone-umeclidin-vilanter (TRELEGY ELLIPTA) 100-62.5-25 mcg DsDv, Inhale 1 puff into the lungs once daily.  gabapentin (NEURONTIN) 300 MG capsule, Take 1 capsule (300 mg total) by mouth 2 (two) times daily.  losartan (COZAAR) 100 MG tablet, Take 1 tablet (100 mg total) by mouth once daily.  metFORMIN (GLUCOPHAGE) 500 MG tablet, Take 2 tablets twice a day by oral route with meals for 90 days.  montelukast (SINGULAIR) 10 mg tablet, Take 1 tablet (10 mg total) by mouth nightly as needed.  MULTIVITAMIN ORAL, Take 1 tablet by mouth once daily.  naproxen (NAPROSYN) 500 MG tablet, Take 500  mg by mouth.  omeprazole (PRILOSEC) 40 MG capsule, Take 1 capsule (40 mg total) by mouth once daily.  sildenafiL (VIAGRA) 100 MG tablet, Take 1 tablet (100 mg total) by mouth daily as needed for Erectile Dysfunction.  tiZANidine (ZANAFLEX) 4 MG tablet, Take 1 tablet (4 mg total) by mouth daily as needed.  azithromycin (ZITHROMAX) 500 MG tablet, Take 1 tablet (500 mg total) by mouth once daily. Repeat for yellow mucous (Patient not taking: Reported on 1/3/2024)  benzonatate (TESSALON) 100 MG capsule, Take 1 capsule every day by oral route at bedtime for 10 days.  benzonatate (TESSALON) 200 MG capsule,   blood sugar diagnostic (FREESTYLE LITE STRIPS MISC), FreeStyle Lite Strips  blood sugar diagnostic Strp,   mupirocin (BACTROBAN) 2 % ointment, Apply topically.  predniSONE (DELTASONE) 20 MG tablet, Take 1 tablet (20 mg total) by mouth once daily. Take one daily for 3 days and repeat for bronchitis. (Patient not taking: Reported on 1/3/2024)  promethazine-dextromethorphan (PROMETHAZINE-DM) 6.25-15 mg/5 mL Syrp, Take 5 mLs by mouth every 6 (six) hours as needed.    Recently started on [facility].     Seeing Dr. Campbell for GI related to some stranding seen on recent PET    Following with Dr. Ibarra and in maintenance phase for NHL.     Diabetes has been well controlled.         Review of Systems   Constitutional:  Negative for activity change, appetite change, fatigue and fever.   Respiratory:  Negative for shortness of breath.    Gastrointestinal:  Negative for abdominal pain.   Integumentary:  Negative for rash.         Objective:      Physical Exam  Vitals and nursing note reviewed.   Constitutional:       General: He is not in acute distress.     Appearance: He is not ill-appearing.   Cardiovascular:      Rate and Rhythm: Normal rate and regular rhythm.      Heart sounds: No murmur heard.  Pulmonary:      Effort: Pulmonary effort is normal.      Breath sounds: Normal breath sounds. No wheezing.   Skin:      General: Skin is warm and dry.      Findings: No rash.   Neurological:      Mental Status: He is alert.   Psychiatric:         Mood and Affect: Mood normal.         Behavior: Behavior normal.         Assessment:       1. Diffuse large b-cell lymphoma, lymph nodes of multiple sites    2. Chronic obstructive pulmonary disease, unspecified COPD type    3. Type II diabetes mellitus with neurological manifestations    4. Pancytopenia    5. Non-Hodgkin's lymphoma, unspecified body region, unspecified non-Hodgkin lymphoma type    6. Hypogammaglobulinemia    7. Aortic atherosclerosis    8. Sinusitis, unspecified chronicity, unspecified location    9. Essential hypertension    10. Type 2 diabetes mellitus without complication, without long-term current use of insulin    11. Non-seasonal allergic rhinitis due to other allergic trigger    12. Chronic back pain, unspecified back location, unspecified back pain laterality    13. Erectile dysfunction, unspecified erectile dysfunction type        Plan:       Problem List Items Addressed This Visit          ENT    Allergic rhinitis    Relevant Medications    tiZANidine (ZANAFLEX) 4 MG tablet    metFORMIN (GLUCOPHAGE) 500 MG tablet    montelukast (SINGULAIR) 10 mg tablet    gabapentin (NEURONTIN) 300 MG capsule    omeprazole (PRILOSEC) 40 MG capsule    Sinusitis    Relevant Medications    tiZANidine (ZANAFLEX) 4 MG tablet    metFORMIN (GLUCOPHAGE) 500 MG tablet    montelukast (SINGULAIR) 10 mg tablet    gabapentin (NEURONTIN) 300 MG capsule    omeprazole (PRILOSEC) 40 MG capsule       Pulmonary    Chronic obstructive pulmonary disease, unspecified COPD type     Has had some recurrent bronchitis. Currently doing well. For the last 4-5 years Dr. Veliz has been following him. October a year ago he had an infection and had to have his port removed due to sepsis.          Relevant Medications    montelukast (SINGULAIR) 10 mg tablet       Cardiac/Vascular    Essential hypertension     Relevant Medications    tiZANidine (ZANAFLEX) 4 MG tablet    metFORMIN (GLUCOPHAGE) 500 MG tablet    montelukast (SINGULAIR) 10 mg tablet    gabapentin (NEURONTIN) 300 MG capsule    losartan (COZAAR) 100 MG tablet    amLODIPine (NORVASC) 10 MG tablet    omeprazole (PRILOSEC) 40 MG capsule    Aortic atherosclerosis     Lab Results   Component Value Date    CHOL 143 04/14/2023    CHOL 124 12/06/2022    CHOL 118 (L) 12/13/2021     Lab Results   Component Value Date    HDL 29 (L) 04/14/2023    HDL 29 (L) 12/06/2022    HDL 29 (L) 12/13/2021     Lab Results   Component Value Date    LDLCALC 69.8 04/14/2023    LDLCALC 55.0 (L) 12/06/2022    LDLCALC 31.4 (L) 12/13/2021     Lab Results   Component Value Date    TRIG 221 (H) 04/14/2023    TRIG 200 (H) 12/06/2022    TRIG 288 (H) 12/13/2021     Lab Results   Component Value Date    CHOLHDL 20.3 04/14/2023    CHOLHDL 23.4 12/06/2022    CHOLHDL 24.6 12/13/2021     Hyperlipidemia Medications               atorvastatin (LIPITOR) 20 MG tablet Take 1 tablet (20 mg total) by mouth once daily.        Continue lipitor            Immunology/Multi System    Hypogammaglobulinemia     Currently on maintenance therapy with Oncology.             Oncology    Pancytopenia     Continues to follow with oncology. Stable. No changes.          Non-Hodgkin's lymphoma     Continues to follow with Dr. Ibarra. Going to see GI for stranding.          Diffuse large b-cell lymphoma, lymph nodes of multiple sites - Primary     Following with Dr. Richardson. On maintenance therapy. Has upcoming follow up.             Endocrine    Type II diabetes mellitus with neurological manifestations     Diabetes with polyneuropathy. Following with Dr. Quintanilla. Sees her Tuesday.   Due for A1C.          Relevant Medications    metFORMIN (GLUCOPHAGE) 500 MG tablet    Type 2 diabetes mellitus without complication, without long-term current use of insulin    Relevant Medications    tiZANidine (ZANAFLEX) 4 MG tablet     metFORMIN (GLUCOPHAGE) 500 MG tablet    montelukast (SINGULAIR) 10 mg tablet    gabapentin (NEURONTIN) 300 MG capsule    atorvastatin (LIPITOR) 20 MG tablet    omeprazole (PRILOSEC) 40 MG capsule     Other Visit Diagnoses       Chronic back pain, unspecified back location, unspecified back pain laterality        Relevant Medications    naproxen (NAPROSYN) 500 MG tablet    tiZANidine (ZANAFLEX) 4 MG tablet    Erectile dysfunction, unspecified erectile dysfunction type

## 2024-01-04 RX ORDER — TADALAFIL 20 MG/1
20 TABLET ORAL DAILY
Qty: 90 TABLET | Refills: 3 | Status: SHIPPED | OUTPATIENT
Start: 2024-01-04 | End: 2025-01-03

## 2024-01-05 ENCOUNTER — INFUSION (OUTPATIENT)
Dept: INFUSION THERAPY | Facility: HOSPITAL | Age: 69
End: 2024-01-05
Attending: INTERNAL MEDICINE
Payer: MEDICARE

## 2024-01-05 VITALS
WEIGHT: 216.5 LBS | RESPIRATION RATE: 18 BRPM | DIASTOLIC BLOOD PRESSURE: 72 MMHG | HEART RATE: 79 BPM | HEIGHT: 68 IN | SYSTOLIC BLOOD PRESSURE: 135 MMHG | TEMPERATURE: 98 F | OXYGEN SATURATION: 99 % | BODY MASS INDEX: 32.81 KG/M2

## 2024-01-05 DIAGNOSIS — D80.1 HYPOGAMMAGLOBULINEMIA: Primary | ICD-10-CM

## 2024-01-05 DIAGNOSIS — D80.1 NONFAMILIAL HYPOGAMMAGLOBULINEMIA: ICD-10-CM

## 2024-01-05 DIAGNOSIS — C82.30 FOLLICULAR LYMPHOMA GRADE IIIA, UNSPECIFIED BODY REGION: ICD-10-CM

## 2024-01-05 PROCEDURE — 96367 TX/PROPH/DG ADDL SEQ IV INF: CPT

## 2024-01-05 PROCEDURE — 63600175 PHARM REV CODE 636 W HCPCS: Mod: JZ,JA,JG | Performed by: INTERNAL MEDICINE

## 2024-01-05 PROCEDURE — A4216 STERILE WATER/SALINE, 10 ML: HCPCS | Performed by: INTERNAL MEDICINE

## 2024-01-05 PROCEDURE — 96365 THER/PROPH/DIAG IV INF INIT: CPT

## 2024-01-05 PROCEDURE — 25000003 PHARM REV CODE 250: Performed by: INTERNAL MEDICINE

## 2024-01-05 PROCEDURE — 96366 THER/PROPH/DIAG IV INF ADDON: CPT

## 2024-01-05 RX ORDER — SODIUM CHLORIDE 0.9 % (FLUSH) 0.9 %
10 SYRINGE (ML) INJECTION
Status: DISCONTINUED | OUTPATIENT
Start: 2024-01-05 | End: 2024-01-05 | Stop reason: HOSPADM

## 2024-01-05 RX ORDER — HEPARIN 100 UNIT/ML
500 SYRINGE INTRAVENOUS
Status: CANCELLED | OUTPATIENT
Start: 2024-02-02

## 2024-01-05 RX ORDER — HEPARIN 100 UNIT/ML
500 SYRINGE INTRAVENOUS
Status: DISCONTINUED | OUTPATIENT
Start: 2024-01-05 | End: 2024-01-05 | Stop reason: HOSPADM

## 2024-01-05 RX ORDER — SODIUM CHLORIDE 0.9 % (FLUSH) 0.9 %
10 SYRINGE (ML) INJECTION
Status: CANCELLED | OUTPATIENT
Start: 2024-02-02

## 2024-01-05 RX ADMIN — DIPHENHYDRAMINE HYDROCHLORIDE 25 MG: 50 INJECTION, SOLUTION INTRAMUSCULAR; INTRAVENOUS at 07:01

## 2024-01-05 RX ADMIN — IMMUNE GLOBULIN (HUMAN) 50 G: 10 INJECTION INTRAVENOUS; SUBCUTANEOUS at 08:01

## 2024-01-05 RX ADMIN — HEPARIN 500 UNITS: 100 SYRINGE at 10:01

## 2024-01-05 RX ADMIN — SODIUM CHLORIDE, PRESERVATIVE FREE 10 ML: 5 INJECTION INTRAVENOUS at 10:01

## 2024-01-05 RX ADMIN — SODIUM CHLORIDE: 0.9 INJECTION, SOLUTION INTRAVENOUS at 07:01

## 2024-01-05 NOTE — PLAN OF CARE
Problem: Fatigue  Goal: Improved Activity Tolerance  1/5/2024 1035 by Jorge Alvarado, RN  Outcome: Ongoing, Progressing  1/5/2024 0817 by Jorge Alvarado, RN  Outcome: Ongoing, Progressing  Intervention: Promote Improved Energy  Flowsheets (Taken 1/5/2024 1035)  Fatigue Management:   activity schedule adjusted   fatigue-related activity identified   frequent rest breaks encouraged   activity assistance provided  Activity Management: Ambulated -L4

## 2024-01-09 ENCOUNTER — OFFICE VISIT (OUTPATIENT)
Dept: PODIATRY | Facility: CLINIC | Age: 69
End: 2024-01-09
Payer: MEDICARE

## 2024-01-09 ENCOUNTER — TELEPHONE (OUTPATIENT)
Dept: HEMATOLOGY/ONCOLOGY | Facility: CLINIC | Age: 69
End: 2024-01-09

## 2024-01-09 VITALS
HEART RATE: 75 BPM | HEIGHT: 68 IN | DIASTOLIC BLOOD PRESSURE: 89 MMHG | BODY MASS INDEX: 32.69 KG/M2 | SYSTOLIC BLOOD PRESSURE: 157 MMHG | RESPIRATION RATE: 16 BRPM | WEIGHT: 215.69 LBS

## 2024-01-09 DIAGNOSIS — E11.49 TYPE II DIABETES MELLITUS WITH NEUROLOGICAL MANIFESTATIONS: ICD-10-CM

## 2024-01-09 DIAGNOSIS — C83.38 DIFFUSE LARGE B-CELL LYMPHOMA, LYMPH NODES OF MULTIPLE SITES: ICD-10-CM

## 2024-01-09 DIAGNOSIS — C85.90 NON-HODGKIN'S LYMPHOMA, UNSPECIFIED BODY REGION, UNSPECIFIED NON-HODGKIN LYMPHOMA TYPE: ICD-10-CM

## 2024-01-09 DIAGNOSIS — G57.93 NEUROPATHIC PAIN OF BOTH FEET: ICD-10-CM

## 2024-01-09 DIAGNOSIS — B35.1 ONYCHOMYCOSIS OF TOENAIL: ICD-10-CM

## 2024-01-09 DIAGNOSIS — L84 FOOT CALLUS: ICD-10-CM

## 2024-01-09 DIAGNOSIS — C82.30 FOLLICULAR LYMPHOMA GRADE IIIA, UNSPECIFIED BODY REGION: Primary | ICD-10-CM

## 2024-01-09 DIAGNOSIS — E11.9 COMPREHENSIVE DIABETIC FOOT EXAMINATION, TYPE 2 DM, ENCOUNTER FOR: Primary | ICD-10-CM

## 2024-01-09 PROCEDURE — 99214 OFFICE O/P EST MOD 30 MIN: CPT | Mod: PBBFAC | Performed by: PODIATRIST

## 2024-01-09 PROCEDURE — 99214 OFFICE O/P EST MOD 30 MIN: CPT | Mod: S$PBB,,, | Performed by: PODIATRIST

## 2024-01-09 PROCEDURE — 99999 PR PBB SHADOW E&M-EST. PATIENT-LVL IV: CPT | Mod: PBBFAC,,, | Performed by: PODIATRIST

## 2024-01-09 RX ORDER — ALBUTEROL SULFATE 90 UG/1
AEROSOL, METERED RESPIRATORY (INHALATION)
COMMUNITY
Start: 2023-11-15

## 2024-01-09 RX ORDER — SILDENAFIL 100 MG/1
100 TABLET, FILM COATED ORAL
COMMUNITY
Start: 2023-10-05 | End: 2024-01-09

## 2024-01-10 ENCOUNTER — TELEPHONE (OUTPATIENT)
Dept: HEMATOLOGY/ONCOLOGY | Facility: CLINIC | Age: 69
End: 2024-01-10

## 2024-01-10 NOTE — PROGRESS NOTES
Subjective:       Patient ID: Sivakumar Thacker is a 68 y.o. male.    Chief Complaint: Diabetic Foot Exam, Foot Problem, and Foot Pain  Patient for for annual diabetic foot exam.  Relates a lot of to numbness and tingling in his feet, this has been worse in the right great toe which he states is been hypersensitive.  Patient relates tingling pain in his feet is present all day.  He did recently trim his toenails and this has made all of the tips of his toes more sensitive.  Taking 900 mg gabapentin in the morning and 600 mg at night.  Relates a 3-4 year history of diabetes taking metformin.  Has not had a recent A1c, last time it was done patient reports it was 6.7.  Relates glucose was 146 this morning      Past Medical History:   Diagnosis Date    Chest wall abscess 9/30/2022    Chronic obstructive pulmonary disease, unspecified COPD type 3/28/2022    Diabetes mellitus, type 2     Encounter for antineoplastic chemotherapy 04/01/2020    Hypertension     Hypogammaglobulinemia 12/13/2019    Neuropathy     Non Hodgkin's lymphoma     Reflux esophagitis     Ringing in ear, bilateral      Past Surgical History:   Procedure Laterality Date    COLONOSCOPY  2018    EYE SURGERY  Approximately 3 years ago    Cataract    HAND SURGERY      amputation left index finger    INCISION AND DRAINAGE OF ABSCESS Left 10/3/2022    Procedure: INCISION AND DRAINAGE, ABSCESS;  Surgeon: Franco Venegas III, MD;  Location: [facility] OR;  Service: General;  Laterality: Left;  axilla    INSERTION OF TUNNELED CENTRAL VENOUS CATHETER (CVC) WITH SUBCUTANEOUS PORT Left 2/1/2023    Procedure: [id number];  Surgeon: Franco Venegas III, MD;  Location: [facility] OR;  Service: General;  Laterality: Left;    MEDIPORT REMOVAL Right 10/3/2022    Procedure: REMOVAL, CATHETER, CENTRAL VENOUS, TUNNELED, WITH PORT;  Surgeon: Franco Venegas III, MD;  Location: [facility] OR;  Service: General;  Laterality: Right;    PORTACATH PLACEMENT      SKIN CANCER  EXCISION  2020    [facility]    SPINE SURGERY      VASECTOMY  1985 ?     Family History   Problem Relation Age of Onset    Diabetes Father      Social History     Socioeconomic History    Marital status:    Tobacco Use    Smoking status: Former     Current packs/day: 0.00     Average packs/day: 0.5 packs/day for 2.0 years (1.0 ttl pk-yrs)     Types: Cigarettes     Start date:      Quit date:      Years since quittin.0    Smokeless tobacco: Never   Substance and Sexual Activity    Alcohol use: Not Currently     Comment: rarely    Drug use: No    Sexual activity: Not Currently     Social Determinants of Health     Financial Resource Strain: Low Risk  (2024)    Overall Financial Resource Strain (CARDIA)     Difficulty of Paying Living Expenses: Not hard at all   Food Insecurity: No Food Insecurity (2024)    Hunger Vital Sign     Worried About Running Out of Food in the Last Year: Never true     Ran Out of Food in the Last Year: Never true   Transportation Needs: No Transportation Needs (2024)    PRAPARE - Transportation     Lack of Transportation (Medical): No     Lack of Transportation (Non-Medical): No   Physical Activity: Insufficiently Active (2024)    Exercise Vital Sign     Days of Exercise per Week: 1 day     Minutes of Exercise per Session: 30 min   Stress: No Stress Concern Present (2024)    [employer] [place] [employer] - Occupational Stress Questionnaire     Feeling of Stress : Not at all   Social Connections: Unknown (2024)    Social Connection and Isolation Panel [NHANES]     Frequency of Communication with Friends and Family: More than three times a week     Frequency of Social Gatherings with Friends and Family: More than three times a week     Active Member of Clubs or Organizations: Yes     Attends Club or Organization Meetings: More than 4 times per year     Marital Status:    Housing Stability: Unknown (2024)    Housing Stability  Vital Sign     Unable to Pay for Housing in the Last Year: No     Unstable Housing in the Last Year: No       Current Outpatient Medications   Medication Sig Dispense Refill    albuterol (PROVENTIL/VENTOLIN HFA) 90 mcg/actuation inhaler       albuterol sulfate 90 mcg/actuation aebs See Rx Instructions, 0, # 8.5 g, 11 total refill(s), Hard Stop      amLODIPine (NORVASC) 10 MG tablet Take 1 tablet (10 mg total) by mouth once daily. 90 tablet 3    aspirin (ECOTRIN) 81 MG EC tablet Take 81 mg by mouth once daily.      atorvastatin (LIPITOR) 20 MG tablet Take 1 tablet (20 mg total) by mouth once daily. 90 tablet 3    diclofenac sodium (VOLTAREN) 1 % Gel Apply 2 g topically 3 (three) times daily. 200 g 2    fluticasone propionate (FLONASE) 50 mcg/actuation nasal spray 1 spray (50 mcg total) by Each Nostril route 2 (two) times daily. 48 g 11    fluticasone-umeclidin-vilanter (TRELEGY ELLIPTA) 100-62.5-25 mcg DsDv Inhale 1 puff into the lungs once daily. 3 each 3    gabapentin (NEURONTIN) 300 MG capsule Take 3 capsules (900 mg total) by mouth once daily AND 2 capsules (600 mg total) every evening. 450 capsule 3    losartan (COZAAR) 100 MG tablet Take 1 tablet (100 mg total) by mouth once daily. 90 tablet 3    metFORMIN (GLUCOPHAGE) 500 MG tablet Take 2 tablets twice a day by oral route with meals for 90 days. 360 tablet 3    montelukast (SINGULAIR) 10 mg tablet Take 1 tablet (10 mg total) by mouth nightly. 90 tablet 3    MULTIVITAMIN ORAL Take 1 tablet by mouth once daily.      naproxen (NAPROSYN) 500 MG tablet Take 1 tablet (500 mg total) by mouth 2 (two) times daily with meals. 180 tablet 3    omeprazole (PRILOSEC) 40 MG capsule Take 1 capsule (40 mg total) by mouth once daily. 90 capsule 3    tadalafiL (CIALIS) 20 MG Tab Take 1 tablet (20 mg total) by mouth once daily. 90 tablet 3    tiZANidine (ZANAFLEX) 4 MG tablet Take 1 tablet (4 mg total) by mouth daily as needed (back pain). 90 tablet 3    sildenafiL (VIAGRA) 100  "MG tablet Take 1 tablet (100 mg total) by mouth daily as needed for Erectile Dysfunction. (Patient not taking: Reported on 1/9/2024) 30 tablet 3    sildenafiL (VIAGRA) 100 MG tablet Take 100 mg by mouth.       No current facility-administered medications for this visit.     Review of patient's allergies indicates:  No Known Allergies    Review of Systems   Cardiovascular:  Negative for leg swelling.   Musculoskeletal:  Negative for gait problem.   Neurological:         Numbness, tingling feet   All other systems reviewed and are negative.      Objective:      Vitals:    01/09/24 0827   BP: (!) 157/89   Pulse: 75   Resp: 16   Weight: 97.8 kg (215 lb 11.2 oz)   Height: 5' 8" (1.727 m)     Physical Exam  Vitals and nursing note reviewed.   Constitutional:       Appearance: Normal appearance.   Cardiovascular:      Pulses:           Dorsalis pedis pulses are 2+ on the right side and 2+ on the left side.        Posterior tibial pulses are 1+ on the right side and 1+ on the left side.   Pulmonary:      Effort: Pulmonary effort is normal.   Musculoskeletal:      Right foot: Decreased range of motion.      Left foot: Decreased range of motion.   Feet:      Right foot:      Protective Sensation: 5 sites tested.  5 sites sensed.      Skin integrity: Callus (Mild callus medial right hallux.  Superficial onychomycosis most of the nails.  Fungal involvement bilateral hallux nails) present.      Toenail Condition: Fungal disease present.     Left foot:      Protective Sensation: 5 sites tested.  5 sites sensed.      Skin integrity: No callus.      Toenail Condition: Fungal disease present.  Skin:     Capillary Refill: Capillary refill takes 2 to 3 seconds.   Neurological:      General: No focal deficit present.      Mental Status: He is alert.      Comments: Sensation intact bilateral feet, pain, paresthesias neuropathic pain    Psychiatric:         Behavior: Behavior normal.         Thought Content: Thought content normal. "               Contains abnormal data Hemoglobin A1C         Component Ref Range & Units 4 mo ago  (8/15/23) 9 mo ago  (4/14/23) 1 yr ago  (12/6/22)   Hemoglobin A1C 4.0 - 5.6 % 6.8 High  6.8 High 7.0 High      Estimated Avg Glucose 68 - 131 mg/dL 148 High  148 High  154 High           Assessment:       1. Comprehensive diabetic foot examination, type 2 DM, encounter for    2. Type II diabetes mellitus with neurological manifestations    3. Neuropathic pain of both feet    4. Foot callus    5. Onychomycosis of toenail            Plan:           Comprehensive diabetic pedal exam performed  Reviewed diabetic education  Reviewed benefit of controled glucose/diabetes regarding potential foot complications    Had a lengthy discussion regarding neuropathic pain in feet, again discuss multiples contributing factors  Advised patient it is unusual to take more gabapentin in the morning than evening.  Advised typically nerve pain is worse at night and larger doses are taken in the evening to help rest nerves and sleep.  Advised patient gabapentin does not typically last 12 hours.  If he is having pain, burning, tingling all day this medication needs to be taken 3 times daily.  Patient denies any side effects from the medication, no sleepiness during the day.  Advised patient it is recommended he take 600 mg in the morning, 600 mg at night and 900 mg at night as long as well tolerated with no side effects.  If this is more effective he will run out of medications sooner and advised to contact prescribing provider along with change in dose  We also reviewed multiple conservative treatments which can be done in the evening to help calm nerve endings including multiple topical treatments.  Advised patient these treatments if beneficial need to be done consistently, every night.  Reviewed stretching  Reviewed tennis shoes with thick sole for shock absorption  Reviewed appropriate shoes indoors, absolutely no flat shoes or walking  barefoot  Reviewed fungal changes, most nails have a superficial white fungus on the surface of the nail.  This is more extensive on both big toenails.  Reviewed care and maintenance and recommended Vicks vapor rub twice daily to all nails, lesser digits should be clear in about 1 month, continue on the big toe nails  Reviewed need for daily foot checks and instructed patient to contact the office with any area of redness or swelling which has not improved within 3 days.  Patient was in understanding and agreement with treatment plan.  Counseled the patient on their conditions, implications and medical management.  Instructed patient to contact the office with any changes, questions, concerns, worsening of symptoms.   Total face to face time 30 minutes, exam, assessment, treatment, discussion, additional time for review of chart prior to and following appointment and visit documentation, consultation and coordination of care.    Follow up as needed or if neuropathy pain has not improved significantly in 1 month, recommended yearly diabetic foot exam      This note was created using [employer] voice recognition software that occasionally misinterpreted phrases or words.

## 2024-01-10 NOTE — TELEPHONE ENCOUNTER
----- Message from Jefferson Ibarra MD sent at 1/10/2024  7:44 AM CST -----  I believe he is aware but just let him know I talked to Dr Campbell and we will set up repeat scan for Feb 2024  ----- Message -----  From: Meghan Grant RN  Sent: 1/9/2024   3:01 PM CST  To: Jefferson bIarra MD    Orders placed for PET scan per your orders per Dr. Campbell's request. Do you know if patient is aware of need and if not can you tell me what I can tell him concerning need for PET

## 2024-01-10 NOTE — TELEPHONE ENCOUNTER
Patient made aware that after speaking with Dr. Campbell that Dr. Ibarra has placed orders for PET to be done in Feb and that once auth obtained scheduling will call him with appt. I instructed that if he does not hear from scheduling in the next week or so to please call and let me know so that I can check into this and get update. Verbalized understanding.

## 2024-01-31 RX ORDER — SODIUM CHLORIDE 0.9 % (FLUSH) 0.9 %
10 SYRINGE (ML) INJECTION
Status: CANCELLED | OUTPATIENT
Start: 2024-02-01

## 2024-01-31 RX ORDER — ACETAMINOPHEN 325 MG/1
650 TABLET ORAL
Status: CANCELLED | OUTPATIENT
Start: 2024-02-01

## 2024-01-31 RX ORDER — HEPARIN 100 UNIT/ML
500 SYRINGE INTRAVENOUS
Status: CANCELLED | OUTPATIENT
Start: 2024-02-01

## 2024-01-31 RX ORDER — FAMOTIDINE 10 MG/ML
20 INJECTION INTRAVENOUS
Status: CANCELLED | OUTPATIENT
Start: 2024-02-01

## 2024-01-31 RX ORDER — MEPERIDINE HYDROCHLORIDE 25 MG/ML
25 INJECTION INTRAMUSCULAR; INTRAVENOUS; SUBCUTANEOUS
Status: CANCELLED | OUTPATIENT
Start: 2024-02-01 | End: 2024-02-01

## 2024-02-01 ENCOUNTER — INFUSION (OUTPATIENT)
Dept: INFUSION THERAPY | Facility: HOSPITAL | Age: 69
End: 2024-02-01
Attending: INTERNAL MEDICINE
Payer: MEDICARE

## 2024-02-01 VITALS
RESPIRATION RATE: 16 BRPM | HEIGHT: 68 IN | DIASTOLIC BLOOD PRESSURE: 77 MMHG | TEMPERATURE: 98 F | BODY MASS INDEX: 33.36 KG/M2 | OXYGEN SATURATION: 97 % | HEART RATE: 77 BPM | WEIGHT: 220.13 LBS | SYSTOLIC BLOOD PRESSURE: 146 MMHG

## 2024-02-01 DIAGNOSIS — C82.30 FOLLICULAR LYMPHOMA GRADE IIIA, UNSPECIFIED BODY REGION: ICD-10-CM

## 2024-02-01 DIAGNOSIS — D80.1 NONFAMILIAL HYPOGAMMAGLOBULINEMIA: ICD-10-CM

## 2024-02-01 DIAGNOSIS — D80.1 HYPOGAMMAGLOBULINEMIA: Primary | ICD-10-CM

## 2024-02-01 PROCEDURE — 96367 TX/PROPH/DG ADDL SEQ IV INF: CPT

## 2024-02-01 PROCEDURE — 63600175 PHARM REV CODE 636 W HCPCS: Performed by: INTERNAL MEDICINE

## 2024-02-01 PROCEDURE — 25000003 PHARM REV CODE 250: Performed by: INTERNAL MEDICINE

## 2024-02-01 PROCEDURE — 96415 CHEMO IV INFUSION ADDL HR: CPT

## 2024-02-01 PROCEDURE — 96413 CHEMO IV INFUSION 1 HR: CPT

## 2024-02-01 PROCEDURE — A4216 STERILE WATER/SALINE, 10 ML: HCPCS | Performed by: INTERNAL MEDICINE

## 2024-02-01 PROCEDURE — 96375 TX/PRO/DX INJ NEW DRUG ADDON: CPT

## 2024-02-01 RX ORDER — MEPERIDINE HYDROCHLORIDE 25 MG/ML
25 INJECTION INTRAMUSCULAR; INTRAVENOUS; SUBCUTANEOUS
Status: DISCONTINUED | OUTPATIENT
Start: 2024-02-01 | End: 2024-02-01 | Stop reason: HOSPADM

## 2024-02-01 RX ORDER — HEPARIN 100 UNIT/ML
500 SYRINGE INTRAVENOUS
Status: DISCONTINUED | OUTPATIENT
Start: 2024-02-01 | End: 2024-02-01 | Stop reason: HOSPADM

## 2024-02-01 RX ORDER — SODIUM CHLORIDE 0.9 % (FLUSH) 0.9 %
10 SYRINGE (ML) INJECTION
Status: DISCONTINUED | OUTPATIENT
Start: 2024-02-01 | End: 2024-02-01 | Stop reason: HOSPADM

## 2024-02-01 RX ORDER — HEPARIN 100 UNIT/ML
500 SYRINGE INTRAVENOUS
Status: DISCONTINUED | OUTPATIENT
Start: 2024-02-01 | End: 2024-02-01

## 2024-02-01 RX ORDER — HEPARIN 100 UNIT/ML
500 SYRINGE INTRAVENOUS
Status: CANCELLED | OUTPATIENT
Start: 2024-02-29

## 2024-02-01 RX ORDER — SODIUM CHLORIDE 0.9 % (FLUSH) 0.9 %
10 SYRINGE (ML) INJECTION
Status: DISCONTINUED | OUTPATIENT
Start: 2024-02-01 | End: 2024-02-01

## 2024-02-01 RX ORDER — FAMOTIDINE 10 MG/ML
20 INJECTION INTRAVENOUS
Status: COMPLETED | OUTPATIENT
Start: 2024-02-01 | End: 2024-02-01

## 2024-02-01 RX ORDER — SODIUM CHLORIDE 0.9 % (FLUSH) 0.9 %
10 SYRINGE (ML) INJECTION
Status: CANCELLED | OUTPATIENT
Start: 2024-02-29

## 2024-02-01 RX ORDER — ACETAMINOPHEN 325 MG/1
650 TABLET ORAL
Status: COMPLETED | OUTPATIENT
Start: 2024-02-01 | End: 2024-02-01

## 2024-02-01 RX ADMIN — SODIUM CHLORIDE: 900 INJECTION, SOLUTION INTRAVENOUS at 07:02

## 2024-02-01 RX ADMIN — ACETAMINOPHEN 650 MG: 325 TABLET ORAL at 07:02

## 2024-02-01 RX ADMIN — HEPARIN 500 UNITS: 100 SYRINGE at 10:02

## 2024-02-01 RX ADMIN — DIPHENHYDRAMINE HYDROCHLORIDE 50 MG: 50 INJECTION, SOLUTION INTRAMUSCULAR; INTRAVENOUS at 07:02

## 2024-02-01 RX ADMIN — SODIUM CHLORIDE, PRESERVATIVE FREE 10 ML: 5 INJECTION INTRAVENOUS at 10:02

## 2024-02-01 RX ADMIN — RITUXIMAB 800 MG: 10 INJECTION, SOLUTION INTRAVENOUS at 08:02

## 2024-02-01 RX ADMIN — FAMOTIDINE 20 MG: 10 INJECTION INTRAVENOUS at 07:02

## 2024-02-01 NOTE — PLAN OF CARE
Problem: Fall Injury Risk  Goal: Absence of Fall and Fall-Related Injury  Outcome: Ongoing, Progressing  Intervention: Identify and Manage Contributors  Flowsheets (Taken 2/1/2024 0727)  Self-Care Promotion: safe use of adaptive equipment encouraged  Medication Review/Management: medications reviewed  Intervention: Promote Injury-Free Environment  Flowsheets (Taken 2/1/2024 0727)  Safety Promotion/Fall Prevention: assistive device/personal item within reach

## 2024-02-02 ENCOUNTER — INFUSION (OUTPATIENT)
Dept: INFUSION THERAPY | Facility: HOSPITAL | Age: 69
End: 2024-02-02
Attending: INTERNAL MEDICINE
Payer: MEDICARE

## 2024-02-02 VITALS
WEIGHT: 220 LBS | HEIGHT: 68 IN | TEMPERATURE: 98 F | OXYGEN SATURATION: 97 % | RESPIRATION RATE: 16 BRPM | DIASTOLIC BLOOD PRESSURE: 76 MMHG | HEART RATE: 75 BPM | BODY MASS INDEX: 33.34 KG/M2 | SYSTOLIC BLOOD PRESSURE: 153 MMHG

## 2024-02-02 DIAGNOSIS — D80.1 HYPOGAMMAGLOBULINEMIA: Primary | ICD-10-CM

## 2024-02-02 DIAGNOSIS — C82.30 FOLLICULAR LYMPHOMA GRADE IIIA, UNSPECIFIED BODY REGION: ICD-10-CM

## 2024-02-02 DIAGNOSIS — D80.1 NONFAMILIAL HYPOGAMMAGLOBULINEMIA: ICD-10-CM

## 2024-02-02 PROCEDURE — 96366 THER/PROPH/DIAG IV INF ADDON: CPT

## 2024-02-02 PROCEDURE — 63600175 PHARM REV CODE 636 W HCPCS: Performed by: INTERNAL MEDICINE

## 2024-02-02 PROCEDURE — 25000003 PHARM REV CODE 250: Performed by: INTERNAL MEDICINE

## 2024-02-02 PROCEDURE — 96365 THER/PROPH/DIAG IV INF INIT: CPT

## 2024-02-02 PROCEDURE — 96367 TX/PROPH/DG ADDL SEQ IV INF: CPT

## 2024-02-02 RX ORDER — HEPARIN 100 UNIT/ML
500 SYRINGE INTRAVENOUS
Status: CANCELLED | OUTPATIENT
Start: 2024-03-01

## 2024-02-02 RX ORDER — SODIUM CHLORIDE 0.9 % (FLUSH) 0.9 %
10 SYRINGE (ML) INJECTION
Status: CANCELLED | OUTPATIENT
Start: 2024-03-01

## 2024-02-02 RX ORDER — SODIUM CHLORIDE 0.9 % (FLUSH) 0.9 %
10 SYRINGE (ML) INJECTION
Status: DISCONTINUED | OUTPATIENT
Start: 2024-02-02 | End: 2024-02-02 | Stop reason: HOSPADM

## 2024-02-02 RX ORDER — HEPARIN 100 UNIT/ML
500 SYRINGE INTRAVENOUS
Status: DISCONTINUED | OUTPATIENT
Start: 2024-02-02 | End: 2024-02-02 | Stop reason: HOSPADM

## 2024-02-02 RX ADMIN — DIPHENHYDRAMINE HYDROCHLORIDE 25 MG: 50 INJECTION, SOLUTION INTRAMUSCULAR; INTRAVENOUS at 07:02

## 2024-02-02 RX ADMIN — IMMUNE GLOBULIN (HUMAN) 50 G: 10 INJECTION INTRAVENOUS; SUBCUTANEOUS at 07:02

## 2024-02-02 RX ADMIN — HEPARIN 500 UNITS: 100 SYRINGE at 09:02

## 2024-02-02 RX ADMIN — SODIUM CHLORIDE: 9 INJECTION, SOLUTION INTRAVENOUS at 07:02

## 2024-02-02 NOTE — PLAN OF CARE
Problem: Fall Injury Risk  Goal: Absence of Fall and Fall-Related Injury  Outcome: Ongoing, Progressing  Intervention: Identify and Manage Contributors  Flowsheets (Taken 2/2/2024 0718)  Self-Care Promotion: safe use of adaptive equipment encouraged  Medication Review/Management: medications reviewed  Intervention: Promote Injury-Free Environment  Flowsheets (Taken 2/2/2024 0718)  Safety Promotion/Fall Prevention: assistive device/personal item within reach

## 2024-02-05 ENCOUNTER — PATIENT MESSAGE (OUTPATIENT)
Dept: OTHER | Facility: OTHER | Age: 69
End: 2024-02-05
Payer: MEDICARE

## 2024-02-13 ENCOUNTER — PATIENT MESSAGE (OUTPATIENT)
Dept: OTHER | Facility: OTHER | Age: 69
End: 2024-02-13
Payer: MEDICARE

## 2024-02-27 ENCOUNTER — HOSPITAL ENCOUNTER (OUTPATIENT)
Dept: RADIOLOGY | Facility: HOSPITAL | Age: 69
Discharge: HOME | End: 2024-02-27
Payer: MEDICARE

## 2024-02-27 ENCOUNTER — HOSPITAL ENCOUNTER (OUTPATIENT)
Dept: PREADMISSION TESTING | Facility: HOSPITAL | Age: 69
Discharge: HOME | End: 2024-02-27
Attending: INTERNAL MEDICINE
Payer: MEDICARE

## 2024-02-27 VITALS
WEIGHT: 215 LBS | BODY MASS INDEX: 32.58 KG/M2 | RESPIRATION RATE: 20 BRPM | DIASTOLIC BLOOD PRESSURE: 74 MMHG | OXYGEN SATURATION: 96 % | SYSTOLIC BLOOD PRESSURE: 121 MMHG | HEIGHT: 68 IN | TEMPERATURE: 98 F | HEART RATE: 74 BPM

## 2024-02-27 DIAGNOSIS — Z01.818 PREOP TESTING: Primary | ICD-10-CM

## 2024-02-27 DIAGNOSIS — Z01.818 PREOP TESTING: ICD-10-CM

## 2024-02-27 PROCEDURE — 93005 ELECTROCARDIOGRAM TRACING: CPT | Performed by: GENERAL PRACTICE

## 2024-02-27 PROCEDURE — 71046 X-RAY EXAM CHEST 2 VIEWS: CPT | Mod: TC

## 2024-02-27 PROCEDURE — 93010 ELECTROCARDIOGRAM REPORT: CPT | Mod: ,,, | Performed by: GENERAL PRACTICE

## 2024-02-27 NOTE — DISCHARGE INSTRUCTIONS
INSTRUCTIONS  To confirm your doctor has scheduled your surgery for:2/29/24    Morning of surgery please check in with registration near [facility] Entrance then proceed to Registration then to endoscopy department    ENDOSCOPY NURSES will call the afternoon prior to procedure with your final arrival time.    TAKE ONLY THESE MEDICATIONS WITH A SMALL SIP OF WATER THE MORNING OF SURGERY:    DO NOT TAKE ANY INSULIN OR ORAL DIABETIC MEDICATIONS THE MORNING OF SURGERY UNLESS DIRECTED BY YOUR PHYSICIAN OR PRE ADMIT NURSE.    DO NOT TAKE THESE MEDICATIONS 5-7 DAYS PRIOR to your procedure per your surgeon's request: ASPIRIN, ALEVE, BC powder, SHEY SELTZER, IBUPROFEN, FISH OIL, VITAMIN E, OR HERBALS   (May take Tylenol)    If you are prescribed any types of blood thinners (Aspirin, Coumadin, Plavix, Pradaxa, Xarelto, Aggrenox, Effient, Eliquis, Savasya, Brilinta or any other), please ask your surgeon how many days before scheduled procedure should you stop taking them. You may also need to verify with prescribing physician if it is OK to stop your blood thinners.      INSTRUCTIONS IMPORTANT!!  Do not eat or drink anything between midnight and the time of your procedure- this includes gum, mints, and candy. You may brush your teeth but do not swallow.  ONLY if you are diabetic, check your sugar in the morning before your procedure.  Do not smoke, vape or drink alcoholic beverages 24 hours prior to your procedure.  If you wear contact lenses, dentures, hearing aids or glasses, bring a container to put them in during surgery and give to a family member for safe keeping.    Please leave all jewelry, piercing's and valuables at home.   Wear comfortable loose clothing and rubber soled shoes.  If your condition changes such as fever, cough, etc, please notify your surgeon.   ONLY if you have been diagnosed with sleep apnea please bring your C-PAP machine.  ONLY if you wear home oxygen please bring your portable oxygen tank the  day of your procedure.   ONLY for patients requiring bowel prep, written instructions will be given by your doctor's office.  ONLY if you have a neuro stimulator, please bring the controller with you the morning of surgery  Make arrangements in advance for transportation home by a responsible adult.  You must make arrangements for transportation, TAXI'S, UBER'S OR LYFTS ARE NOT ALLOWED.        If you have any questions about these instructions, call Endoscopy Nurse at [phone]

## 2024-02-29 ENCOUNTER — ANESTHESIA EVENT (OUTPATIENT)
Dept: SURGERY | Facility: HOSPITAL | Age: 69
End: 2024-02-29
Payer: MEDICARE

## 2024-02-29 ENCOUNTER — ANESTHESIA (OUTPATIENT)
Dept: SURGERY | Facility: HOSPITAL | Age: 69
End: 2024-02-29
Payer: MEDICARE

## 2024-02-29 ENCOUNTER — HOSPITAL ENCOUNTER (OUTPATIENT)
Facility: HOSPITAL | Age: 69
Discharge: HOME | End: 2024-02-29
Attending: INTERNAL MEDICINE | Admitting: INTERNAL MEDICINE
Payer: MEDICARE

## 2024-02-29 VITALS
TEMPERATURE: 98 F | HEART RATE: 59 BPM | RESPIRATION RATE: 16 BRPM | SYSTOLIC BLOOD PRESSURE: 142 MMHG | OXYGEN SATURATION: 100 % | DIASTOLIC BLOOD PRESSURE: 88 MMHG

## 2024-02-29 VITALS — HEART RATE: 68 BPM | SYSTOLIC BLOOD PRESSURE: 103 MMHG | OXYGEN SATURATION: 98 % | DIASTOLIC BLOOD PRESSURE: 61 MMHG

## 2024-02-29 DIAGNOSIS — R63.4 WEIGHT LOSS: ICD-10-CM

## 2024-02-29 PROCEDURE — 37000009 HC ANESTHESIA EA ADD 15 MINS: Performed by: INTERNAL MEDICINE

## 2024-02-29 PROCEDURE — 27200043 HC FORCEPS, BIOPSY: Performed by: INTERNAL MEDICINE

## 2024-02-29 PROCEDURE — 43239 EGD BIOPSY SINGLE/MULTIPLE: CPT | Performed by: INTERNAL MEDICINE

## 2024-02-29 PROCEDURE — D9220A PRA ANESTHESIA: Mod: ANES,,, | Performed by: ANESTHESIOLOGY

## 2024-02-29 PROCEDURE — D9220A PRA ANESTHESIA: Mod: CRNA,,, | Performed by: NURSE ANESTHETIST, CERTIFIED REGISTERED

## 2024-02-29 PROCEDURE — 88305 TISSUE EXAM BY PATHOLOGIST: CPT | Mod: TC,59 | Performed by: PATHOLOGY

## 2024-02-29 PROCEDURE — 37000008 HC ANESTHESIA 1ST 15 MINUTES: Performed by: INTERNAL MEDICINE

## 2024-02-29 PROCEDURE — 63600175 PHARM REV CODE 636 W HCPCS: Performed by: NURSE ANESTHETIST, CERTIFIED REGISTERED

## 2024-02-29 PROCEDURE — 45378 DIAGNOSTIC COLONOSCOPY: CPT | Performed by: INTERNAL MEDICINE

## 2024-02-29 RX ORDER — PROPOFOL 10 MG/ML
VIAL (ML) INTRAVENOUS
Status: DISCONTINUED | OUTPATIENT
Start: 2024-02-29 | End: 2024-02-29

## 2024-02-29 RX ADMIN — PROPOFOL 20 MG: 10 INJECTION, EMULSION INTRAVENOUS at 09:02

## 2024-02-29 RX ADMIN — PROPOFOL 20 MG: 10 INJECTION, EMULSION INTRAVENOUS at 08:02

## 2024-02-29 RX ADMIN — PROPOFOL 30 MG: 10 INJECTION, EMULSION INTRAVENOUS at 08:02

## 2024-02-29 RX ADMIN — PROPOFOL 100 MG: 10 INJECTION, EMULSION INTRAVENOUS at 08:02

## 2024-02-29 RX ADMIN — SODIUM CHLORIDE, SODIUM LACTATE, POTASSIUM CHLORIDE, AND CALCIUM CHLORIDE: .6; .31; .03; .02 INJECTION, SOLUTION INTRAVENOUS at 08:02

## 2024-02-29 NOTE — ANESTHESIA PREPROCEDURE EVALUATION
02/29/2024  Sivakumar Thacker is a 68 y.o., male.      Patient Active Problem List   Diagnosis    Allergic rhinitis    Follicular lymphoma grade IIIa    Nonfamilial hypogammaglobulinemia    Hypogammaglobulinemia    Essential hypertension    Elevated lipoprotein(a)    Prediabetes    Encounter for antineoplastic chemotherapy    Type II diabetes mellitus with neurological manifestations    Type 2 diabetes mellitus without complication, without long-term current use of insulin    Chronic obstructive pulmonary disease, unspecified COPD type    Pancytopenia    Non-Hodgkin's lymphoma    Aortic atherosclerosis    Sinusitis    Bronchiectasis without complication    Abnormal CXR    Obstructive sleep apnea    Diffuse large b-cell lymphoma, lymph nodes of multiple sites       Past Surgical History:   Procedure Laterality Date    COLONOSCOPY  2018    EYE SURGERY  Approximately 3 years ago    Cataract    HAND SURGERY      amputation left index finger    INCISION AND DRAINAGE OF ABSCESS Left 10/3/2022    Procedure: INCISION AND DRAINAGE, ABSCESS;  Surgeon: Franco Venegas III, MD;  Location: [facility];  Service: General;  Laterality: Left;  axilla    INSERTION OF TUNNELED CENTRAL VENOUS CATHETER (CVC) WITH SUBCUTANEOUS PORT Left 2/1/2023    Procedure: [id number]-CATH;  Surgeon: Franco Venegas III, MD;  Location: [facility] OR;  Service: General;  Laterality: Left;    MEDIPORT REMOVAL Right 10/3/2022    Procedure: REMOVAL, CATHETER, CENTRAL VENOUS, TUNNELED, WITH PORT;  Surgeon: Franco Venegas III, MD;  Location: [facility];  Service: General;  Laterality: Right;    PORTACATH PLACEMENT      SKIN CANCER EXCISION  09/30/2020    [facility]    SPINE SURGERY      VASECTOMY  1985 ?        Tobacco Use:  The patient  reports that he quit smoking about 50 years ago. His smoking use included cigarettes. He started smoking about 52  years ago. He has a 1.0 pack-year smoking history. He has never used smokeless tobacco.     Results for orders placed or performed during the hospital encounter of 02/27/24   EKG 12-lead    Collection Time: 02/27/24  7:16 AM   Result Value Ref Range    QRS Duration 90 ms    OHS QTC Calculation 429 ms    Narrative    Test Reason : Z01.818,    Vent. Rate : 072 BPM     Atrial Rate : 072 BPM     P-R Int : 194 ms          QRS Dur : 090 ms      QT Int : 392 ms       P-R-T Axes : 059 -27 005 degrees     QTc Int : 429 ms    Normal sinus rhythm  Inferior infarct (cited on or before 30-SEP-2022)  Abnormal ECG  When compared with ECG of 10-VERONA-2023 12:56,  No significant change was found    Referred By:             Confirmed By:              Lab Results   Component Value Date    WBC 5.52 02/27/2024    HGB 13.7 (L) 02/27/2024    HCT 41.2 02/27/2024    MCV 87 02/27/2024     02/27/2024     BMP  Lab Results   Component Value Date     02/27/2024    K 4.2 02/27/2024     02/27/2024    CO2 25 02/27/2024    BUN 23 02/27/2024    CREATININE 0.9 02/27/2024    CALCIUM 9.6 02/27/2024    ANIONGAP 9 02/27/2024     (H) 02/27/2024     (H) 12/10/2023     (H) 08/15/2023       Results for orders placed during the hospital encounter of 09/30/22    Echo    Interpretation Summary  · The estimated PA systolic pressure is 32 mmHg.  · The left ventricle is normal in size with normal systolic function.  · Normal left ventricular diastolic function.  · Mild right ventricular enlargement with normal right ventricular systolic function.  · Normal central venous pressure (3 mmHg).  · The estimated ejection fraction is 55%.  · Mild to moderate tricuspid regurgitation.  · Mild-to-moderate mitral regurgitation.  · There are segmental left ventricular wall motion abnormalities.              Pre-op Assessment    I have reviewed the Patient Summary Reports.     I have reviewed the Nursing Notes. I have reviewed the NPO  Status.   I have reviewed the Medications.     Review of Systems  Anesthesia Hx:  No problems with previous Anesthesia             Denies Family Hx of Anesthesia complications.    Denies Personal Hx of Anesthesia complications.                    Social:  Former Smoker       Hematology/Oncology:  Hematology Normal                       --  Cancer in past history (hx of non-hodgkin's lymphoma):              chemotherapy       Cardiovascular:     Hypertension, well controlled                                        Pulmonary:   COPD, mild     Sleep Apnea, CPAP           Education provided regarding risk of obstructive sleep apnea            Hepatic/GI:  Hepatic/GI Normal                 Musculoskeletal:  Musculoskeletal Normal                Neurological:        Peripheral Neuropathy                          Endocrine:  Diabetes, well controlled, type 2               Physical Exam  General: Well nourished, Cooperative, Alert and Oriented    Airway:  Mallampati: III / II  Mouth Opening: Normal  TM Distance: Normal  Tongue: Normal  Neck ROM: Normal ROM    Dental:  Intact    Chest/Lungs:  Clear to auscultation    Heart:  Rate: Normal  Rhythm: Regular Rhythm  Sounds: Normal    Abdomen:  Normal, Soft, Nontender        Anesthesia Plan  Type of Anesthesia, risks & benefits discussed:    Anesthesia Type: Gen Natural Airway  Intra-op Monitoring Plan: Standard ASA Monitors  Post Op Pain Control Plan:   (medical reason for not using multimodal pain management)  Induction:  IV  Informed Consent: Informed consent signed with the Patient and all parties understand the risks and agree with anesthesia plan.  All questions answered. Patient consented to blood products? No  ASA Score: 3  Anesthesia Plan Notes:   General Natural Airway  POM  Propofol  Zofran    Ready For Surgery From Anesthesia Perspective.     .

## 2024-02-29 NOTE — TRANSFER OF CARE
Anesthesia Transfer of Care Note    Patient: Sivakumar Thacker    Procedure(s) Performed: Procedure(s) (LRB):  COLONOSCOPY (N/A)  EGD (ESOPHAGOGASTRODUODENOSCOPY) (N/A)    Patient location: GI    Anesthesia Type: general    Transport from OR: Transported from OR on room air with adequate spontaneous ventilation    Post pain: adequate analgesia    Post assessment: no apparent anesthetic complications    Post vital signs: stable    Level of consciousness: awake and alert    Nausea/Vomiting: no nausea/vomiting    Complications: none    Transfer of care protocol was followedComments: AAXO3 SV, exchanging well.  To PACU, VSS upon arrival. Report to RN       Last vitals: Visit Vitals  BP (!) 143/70 (BP Location: Left arm, Patient Position: Lying)   Pulse 73   Temp 36.7 °C (98 °F) (Oral)   Resp 16   SpO2 95%

## 2024-02-29 NOTE — H&P
GASTROENTEROLOGY PRE-PROCEDURE H&P NOTE  Patient Name: Sivakumar Thacker  Patient MRN: [id number]  Patient : 1955    Service date: 2024    PCP: Trudi Nails MD    No chief complaint on file.      HPI: Patient is a 68 y.o. male with PMHx as below here for evaluation of       Sivakumar Thacker  is a 68 year old   male  who is seen today for a follow-up visit. Pt reports 15 lb weight loss over past few months.  States fluctuation in bowel habits. Has upcoming PET in march.  Last colon in [facility] a few years ago.  Told no more needed.   .     Past Medical History:  Past Medical History:   Diagnosis Date    Chest wall abscess 2022    Chronic obstructive pulmonary disease, unspecified COPD type 3/28/2022    Diabetes mellitus, type 2     Encounter for antineoplastic chemotherapy 2020    Hypertension     Hypogammaglobulinemia 2019    Neuropathy     Non Hodgkin's lymphoma     Reflux esophagitis     Ringing in ear, bilateral         Past Surgical History:  Past Surgical History:   Procedure Laterality Date    COLONOSCOPY  2018    EYE SURGERY  Approximately 3 years ago    Cataract    HAND SURGERY      amputation left index finger    INCISION AND DRAINAGE OF ABSCESS Left 10/3/2022    Procedure: INCISION AND DRAINAGE, ABSCESS;  Surgeon: Franco Venegas III, MD;  Location: [facility] OR;  Service: General;  Laterality: Left;  axilla    INSERTION OF TUNNELED CENTRAL VENOUS CATHETER (CVC) WITH SUBCUTANEOUS PORT Left 2023    Procedure: [id number];  Surgeon: Franoc Venegas III, MD;  Location: [facility] OR;  Service: General;  Laterality: Left;    MEDIPORT REMOVAL Right 10/3/2022    Procedure: REMOVAL, CATHETER, CENTRAL VENOUS, TUNNELED, WITH PORT;  Surgeon: Franco Venegas III, MD;  Location: [facility] OR;  Service: General;  Laterality: Right;    PORTACATH PLACEMENT      SKIN CANCER EXCISION  2020    [facility]    SPINE SURGERY      VASECTOMY   ?        Home Medications:  Medications Prior  to Admission   Medication Sig Dispense Refill Last Dose    amLODIPine (NORVASC) 10 MG tablet Take 1 tablet (10 mg total) by mouth once daily. 90 tablet 3 2/28/2024    gabapentin (NEURONTIN) 300 MG capsule Take 3 capsules (900 mg total) by mouth once daily AND 2 capsules (600 mg total) every evening. 450 capsule 3 2/29/2024    losartan (COZAAR) 100 MG tablet Take 1 tablet (100 mg total) by mouth once daily. 90 tablet 3 2/29/2024    metFORMIN (GLUCOPHAGE) 500 MG tablet Take 2 tablets twice a day by oral route with meals for 90 days. 360 tablet 3 2/28/2024    omeprazole (PRILOSEC) 40 MG capsule Take 1 capsule (40 mg total) by mouth once daily. 90 capsule 3 2/28/2024    albuterol (PROVENTIL/VENTOLIN HFA) 90 mcg/actuation inhaler        aspirin (ECOTRIN) 81 MG EC tablet Take 81 mg by mouth once daily.   2/24/2024    atorvastatin (LIPITOR) 20 MG tablet Take 1 tablet (20 mg total) by mouth once daily. 90 tablet 3     diclofenac sodium (VOLTAREN) 1 % Gel Apply 2 g topically 3 (three) times daily. 200 g 2     fluticasone propionate (FLONASE) 50 mcg/actuation nasal spray 1 spray (50 mcg total) by Each Nostril route 2 (two) times daily. 48 g 11 More than a month    fluticasone-umeclidin-vilanter (TRELEGY ELLIPTA) 100-62.5-25 mcg DsDv Inhale 1 puff into the lungs once daily. 3 each 3     montelukast (SINGULAIR) 10 mg tablet Take 1 tablet (10 mg total) by mouth nightly. 90 tablet 3     MULTIVITAMIN ORAL Take 1 tablet by mouth once daily.       naproxen (NAPROSYN) 500 MG tablet Take 1 tablet (500 mg total) by mouth 2 (two) times daily with meals. 180 tablet 3 2/24/2024    tadalafiL (CIALIS) 20 MG Tab Take 1 tablet (20 mg total) by mouth once daily. 90 tablet 3     tiZANidine (ZANAFLEX) 4 MG tablet Take 1 tablet (4 mg total) by mouth daily as needed (back pain). 90 tablet 3 2/24/2024               Review of patient's allergies indicates:  No Known Allergies    Social History:   Social History     Occupational History    Not on  "file   Tobacco Use    Smoking status: Former     Current packs/day: 0.00     Average packs/day: 0.5 packs/day for 2.0 years (1.0 ttl pk-yrs)     Types: Cigarettes     Start date:      Quit date:      Years since quittin.1    Smokeless tobacco: Never   Substance and Sexual Activity    Alcohol use: Not Currently     Comment: rarely    Drug use: No    Sexual activity: Not Currently       Family History:   Family History   Problem Relation Age of Onset    Diabetes Father        Review of Systems:  A 10 point review of systems was performed and was normal, except as mentioned in the HPI, including constitutional, HEENT, heme, lymph, cardiovascular, respiratory, gastrointestinal, genitourinary, neurologic, endocrine, psychiatric and musculoskeletal.      OBJECTIVE:    Physical Exam:  24 Hour Vital Sign Ranges: Temp:  [98 °F (36.7 °C)] 98 °F (36.7 °C)  Pulse:  [73] 73  Resp:  [16] 16  SpO2:  [95 %] 95 %  BP: (143)/(70) 143/70  Most recent vitals: BP (!) 143/70 (BP Location: Left arm, Patient Position: Lying)   Pulse 73   Temp 98 °F (36.7 °C) (Oral)   Resp 16   SpO2 95% Comment: room air   GEN: well-developed, well-nourished, awake and alert, non-toxic appearing adult  HEENT: PERRL, sclera anicteric, oral mucosa pink and moist without lesion  NECK: trachea midline; Good ROM  CV: regular rate and rhythm, no murmurs or gallops  RESP: clear to auscultation bilaterally, no wheezes, rhonci or rales  ABD: soft, non-tender, non-distended, normal bowel sounds  EXT: no swelling or edema, 2+ pulses distally  SKIN: no rashes or jaundice  PSYCH: normal affect    Labs:   Recent Labs     24  0831   WBC 5.52   MCV 87        Recent Labs     24  0831      K 4.2      CO2 25   BUN 23   *     No results for input(s): "ALB" in the last 72 hours.    Invalid input(s): "ALKP", "SGOT", "SGPT", "TBIL", "DBIL", "TPRO"  No results for input(s): "PT", "INR", "PTT" in the last 72 " hours.      IMPRESSION / RECOMMENDATIONS:  Pt w/ h/o lymphoma w/ weight loss and change in bowel habits  -EGD/Colon   -PET in march    Proceed  with interventions as warranted.   RIsks, benefits, alternatives discussed in detail regarding upcoming procedures and sedation. Some of the more common endoscopic complications include but not limited to immediate or delayed perforation, bleeding, infections, pain, inadvertent injury to surrounding tissue / organs and possible need for surgical evaluation. Patient expressed understanding, all questions answered and will proceed with procedure as planned.     Nitesh Campbell  2/29/2024  8:35 AM

## 2024-02-29 NOTE — PROVATION PATIENT INSTRUCTIONS
Discharge Summary/Instructions after an Endoscopic Procedure  Patient Name: Sivakumar Thacker  Patient MRN: [id number]  Patient YOB: 1955  Thursday, February 29, 2024  Nitesh Campbell MD  RESTRICTIONS:  During your procedure today, you received medications for sedation.  These   medications may affect your judgment, balance and coordination.  Therefore,   for 24 hours, you have the following restrictions:   - DO NOT drive a car, operate machinery, make legal/financial decisions,   sign important papers or drink alcohol.    ACTIVITY:  Today: no heavy lifting, straining or running due to procedural   sedation/anesthesia.  The following day: return to full activity including work.  DIET:  Eat and drink normally unless instructed otherwise.     TREATMENT FOR COMMON SIDE EFFECTS:  - Mild abdominal pain, nausea, belching, bloating or excessive gas:  rest,   eat lightly and use a heating pad.  - Sore Throat: treat with throat lozenges and/or gargle with warm salt   water.  - Because air was used during the procedure, expelling large amounts of air   from your rectum or belching is normal.  - If a bowel prep was taken, you may not have a bowel movement for 1-3 days.    This is normal.  SYMPTOMS TO WATCH FOR AND REPORT TO YOUR PHYSICIAN:  1. Abdominal pain or bloating, other than gas cramps.  2. Chest pain.  3. Back pain.  4. Signs of infection such as: chills or fever occurring within 24 hours   after the procedure.  5. Rectal bleeding, which would show as bright red, maroon, or black stools.   (A tablespoon of blood from the rectum is not serious, especially if   hemorrhoids are present.)  6. Vomiting.  7. Weakness or dizziness.  GO DIRECTLY TO THE NEAREST EMERGENCY ROOM IF YOU HAVE ANY OF THE FOLLOWING:      Difficulty breathing              Chills and/or fever over 101 F   Persistent vomiting and/or vomiting blood   Severe abdominal pain   Severe chest pain   Black, tarry stools   Bleeding- more than one  tablespoon   Any other symptom or condition that you feel may need urgent attention  Your doctor recommends these additional instructions:  If any biopsies were taken, your doctors clinic will contact you in 1 to 2   weeks with any results.  - Patient has a contact number available for emergencies.  The signs and   symptoms of potential delayed complications were discussed with the   patient.  Return to normal activities tomorrow.  Written discharge   instructions were provided to the patient.   - Resume previous diet.   - Continue present medications.   - Patient has a contact number available for emergencies.  The signs and   symptoms of potential delayed complications were discussed with the   patient.  Return to normal activities tomorrow.  Written discharge   instructions were provided to the patient.   - Resume previous diet.   - Continue present medications.   - Repeat colonoscopy is not recommended for surveillance.   - Discharge patient to home (with escort).  For questions, problems or results please call your physician - Nitesh Campbell MD at Work:  [phone].  [facility], EMERGENCY ROOM PHONE NUMBER: [phone]  IF A COMPLICATION OR EMERGENCY SITUATION ARISES AND YOU ARE UNABLE TO REACH   YOUR PHYSICIAN - GO DIRECTLY TO THE EMERGENCY ROOM.  MD Nitesh Jiang MD  2/29/2024 9:27:20 AM  This report has been verified and signed electronically.  Dear patient,  As a result of recent federal legislation (The Federal Cures Act), you may   receive lab or pathology results from your procedure in your [facility]   account before your physician is able to contact you. Your physician or   their representative will relay the results to you with their   recommendations at their soonest availability.  Thank you,  PROVATION

## 2024-02-29 NOTE — PROVATION PATIENT INSTRUCTIONS
Discharge Summary/Instructions after an Endoscopic Procedure  Patient Name: Sivakumar Thacker  Patient MRN: [id number]  Patient YOB: 1955  Thursday, February 29, 2024  Nitesh Campbell MD  RESTRICTIONS:  During your procedure today, you received medications for sedation.  These   medications may affect your judgment, balance and coordination.  Therefore,   for 24 hours, you have the following restrictions:   - DO NOT drive a car, operate machinery, make legal/financial decisions,   sign important papers or drink alcohol.    ACTIVITY:  Today: no heavy lifting, straining or running due to procedural   sedation/anesthesia.  The following day: return to full activity including work.  DIET:  Eat and drink normally unless instructed otherwise.     TREATMENT FOR COMMON SIDE EFFECTS:  - Mild abdominal pain, nausea, belching, bloating or excessive gas:  rest,   eat lightly and use a heating pad.  - Sore Throat: treat with throat lozenges and/or gargle with warm salt   water.  - Because air was used during the procedure, expelling large amounts of air   from your rectum or belching is normal.  - If a bowel prep was taken, you may not have a bowel movement for 1-3 days.    This is normal.  SYMPTOMS TO WATCH FOR AND REPORT TO YOUR PHYSICIAN:  1. Abdominal pain or bloating, other than gas cramps.  2. Chest pain.  3. Back pain.  4. Signs of infection such as: chills or fever occurring within 24 hours   after the procedure.  5. Rectal bleeding, which would show as bright red, maroon, or black stools.   (A tablespoon of blood from the rectum is not serious, especially if   hemorrhoids are present.)  6. Vomiting.  7. Weakness or dizziness.  GO DIRECTLY TO THE NEAREST EMERGENCY ROOM IF YOU HAVE ANY OF THE FOLLOWING:      Difficulty breathing              Chills and/or fever over 101 F   Persistent vomiting and/or vomiting blood   Severe abdominal pain   Severe chest pain   Black, tarry stools   Bleeding- more than one  tablespoon   Any other symptom or condition that you feel may need urgent attention  Your doctor recommends these additional instructions:  If any biopsies were taken, your doctors clinic will contact you in 1 to 2   weeks with any results.  - Patient has a contact number available for emergencies.  The signs and   symptoms of potential delayed complications were discussed with the   patient.  Return to normal activities tomorrow.  Written discharge   instructions were provided to the patient.   - Resume previous diet.   - Continue present medications.   - Await pathology results.   - Discharge patient to home (with escort).  For questions, problems or results please call your physician - Nitesh Campbell MD at Work:  [phone].  [facility], EMERGENCY ROOM PHONE NUMBER: [phone]  IF A COMPLICATION OR EMERGENCY SITUATION ARISES AND YOU ARE UNABLE TO REACH   YOUR PHYSICIAN - GO DIRECTLY TO THE EMERGENCY ROOM.  MD Nitesh Jiang MD  2/29/2024 9:30:36 AM  This report has been verified and signed electronically.  Dear patient,  As a result of recent federal legislation (The Federal Cures Act), you may   receive lab or pathology results from your procedure in your [facility]   account before your physician is able to contact you. Your physician or   their representative will relay the results to you with their   recommendations at their soonest availability.  Thank you,  PROVATION

## 2024-02-29 NOTE — ANESTHESIA POSTPROCEDURE EVALUATION
Anesthesia Post Evaluation    Patient: Sivakumar Thacker    Procedure(s) Performed: Procedure(s) (LRB):  COLONOSCOPY (N/A)  EGD (ESOPHAGOGASTRODUODENOSCOPY) (N/A)    Final Anesthesia Type: general      Patient location during evaluation: GI PACU  Patient participation: Yes- Able to Participate  Level of consciousness: awake and alert and oriented  Post-procedure vital signs: reviewed and stable  Pain management: adequate  Airway patency: patent    PONV status at discharge: No PONV  Anesthetic complications: no      Cardiovascular status: blood pressure returned to baseline  Respiratory status: unassisted, spontaneous ventilation and room air  Hydration status: euvolemic  Follow-up not needed.              Vitals Value Taken Time   /88 02/29/24 1000   Temp 36.6 °C (97.8 °F) 02/29/24 0945   Pulse 59 02/29/24 1010   Resp 14 02/29/24 1012   SpO2 100 % 02/29/24 1010   Vitals shown include unvalidated device data.      Event Time   Out of Recovery 10:10:02         Pain/Gelacio Score: No data recorded

## 2024-03-01 ENCOUNTER — INFUSION (OUTPATIENT)
Dept: INFUSION THERAPY | Facility: HOSPITAL | Age: 69
End: 2024-03-01
Attending: INTERNAL MEDICINE
Payer: MEDICARE

## 2024-03-01 VITALS
RESPIRATION RATE: 16 BRPM | WEIGHT: 218.13 LBS | TEMPERATURE: 98 F | SYSTOLIC BLOOD PRESSURE: 136 MMHG | HEIGHT: 68 IN | OXYGEN SATURATION: 95 % | BODY MASS INDEX: 33.06 KG/M2 | HEART RATE: 83 BPM | DIASTOLIC BLOOD PRESSURE: 73 MMHG

## 2024-03-01 DIAGNOSIS — D80.1 NONFAMILIAL HYPOGAMMAGLOBULINEMIA: ICD-10-CM

## 2024-03-01 DIAGNOSIS — C82.30 FOLLICULAR LYMPHOMA GRADE IIIA, UNSPECIFIED BODY REGION: ICD-10-CM

## 2024-03-01 DIAGNOSIS — D80.1 HYPOGAMMAGLOBULINEMIA: Primary | ICD-10-CM

## 2024-03-01 PROCEDURE — 96365 THER/PROPH/DIAG IV INF INIT: CPT

## 2024-03-01 PROCEDURE — 96367 TX/PROPH/DG ADDL SEQ IV INF: CPT

## 2024-03-01 PROCEDURE — 25000003 PHARM REV CODE 250: Performed by: INTERNAL MEDICINE

## 2024-03-01 PROCEDURE — A4216 STERILE WATER/SALINE, 10 ML: HCPCS | Performed by: INTERNAL MEDICINE

## 2024-03-01 PROCEDURE — 96366 THER/PROPH/DIAG IV INF ADDON: CPT

## 2024-03-01 PROCEDURE — 63600175 PHARM REV CODE 636 W HCPCS: Performed by: INTERNAL MEDICINE

## 2024-03-01 RX ORDER — SODIUM CHLORIDE 0.9 % (FLUSH) 0.9 %
10 SYRINGE (ML) INJECTION
Status: CANCELLED | OUTPATIENT
Start: 2024-03-29

## 2024-03-01 RX ORDER — SODIUM CHLORIDE 0.9 % (FLUSH) 0.9 %
10 SYRINGE (ML) INJECTION
Status: DISCONTINUED | OUTPATIENT
Start: 2024-03-01 | End: 2024-03-01 | Stop reason: HOSPADM

## 2024-03-01 RX ORDER — HEPARIN 100 UNIT/ML
500 SYRINGE INTRAVENOUS
Status: DISCONTINUED | OUTPATIENT
Start: 2024-03-01 | End: 2024-03-01 | Stop reason: HOSPADM

## 2024-03-01 RX ORDER — HEPARIN 100 UNIT/ML
500 SYRINGE INTRAVENOUS
Status: CANCELLED | OUTPATIENT
Start: 2024-03-29

## 2024-03-01 RX ADMIN — HEPARIN 500 UNITS: 100 SYRINGE at 09:03

## 2024-03-01 RX ADMIN — SODIUM CHLORIDE, PRESERVATIVE FREE 10 ML: 5 INJECTION INTRAVENOUS at 09:03

## 2024-03-01 RX ADMIN — DIPHENHYDRAMINE HYDROCHLORIDE 25 MG: 50 INJECTION, SOLUTION INTRAMUSCULAR; INTRAVENOUS at 07:03

## 2024-03-01 RX ADMIN — SODIUM CHLORIDE: 9 INJECTION, SOLUTION INTRAVENOUS at 07:03

## 2024-03-01 RX ADMIN — IMMUNE GLOBULIN (HUMAN) 50 G: 10 INJECTION INTRAVENOUS; SUBCUTANEOUS at 07:03

## 2024-03-01 NOTE — PLAN OF CARE
Problem: Fall Injury Risk  Goal: Absence of Fall and Fall-Related Injury  Outcome: Ongoing, Progressing  Intervention: Identify and Manage Contributors  Flowsheets (Taken 3/1/2024 0721)  Self-Care Promotion: safe use of adaptive equipment encouraged  Medication Review/Management: medications reviewed  Intervention: Promote Injury-Free Environment  Flowsheets (Taken 3/1/2024 0721)  Safety Promotion/Fall Prevention: assistive device/personal item within reach

## 2024-03-11 ENCOUNTER — TELEPHONE (OUTPATIENT)
Dept: HEMATOLOGY/ONCOLOGY | Facility: CLINIC | Age: 69
End: 2024-03-11

## 2024-03-11 ENCOUNTER — HOSPITAL ENCOUNTER (OUTPATIENT)
Dept: RADIOLOGY | Facility: HOSPITAL | Age: 69
Discharge: HOME | End: 2024-03-11
Attending: INTERNAL MEDICINE
Payer: MEDICARE

## 2024-03-11 VITALS — BODY MASS INDEX: 32.58 KG/M2 | HEIGHT: 68 IN | WEIGHT: 215 LBS

## 2024-03-11 DIAGNOSIS — C83.38 DIFFUSE LARGE B-CELL LYMPHOMA, LYMPH NODES OF MULTIPLE SITES: ICD-10-CM

## 2024-03-11 LAB — GLUCOSE SERPL-MCNC: 164 MG/DL (ref 70–110)

## 2024-03-11 PROCEDURE — 78815 PET IMAGE W/CT SKULL-THIGH: CPT | Mod: TC,PO

## 2024-03-11 PROCEDURE — A9552 F18 FDG: HCPCS | Mod: PO | Performed by: INTERNAL MEDICINE

## 2024-03-11 PROCEDURE — 82962 GLUCOSE BLOOD TEST: CPT | Mod: PO

## 2024-03-11 RX ORDER — FLUDEOXYGLUCOSE F18 500 MCI/ML
12.1 INJECTION INTRAVENOUS
Status: COMPLETED | OUTPATIENT
Start: 2024-03-11 | End: 2024-03-11

## 2024-03-11 RX ADMIN — FLUDEOXYGLUCOSE F-18 12.1 MILLICURIE: 500 INJECTION INTRAVENOUS at 06:03

## 2024-03-11 NOTE — TELEPHONE ENCOUNTER
LVM asking pt to return my call in regards to PET scan results.   Spoke with pt. He verbalized understanding.

## 2024-03-18 ENCOUNTER — OFFICE VISIT (OUTPATIENT)
Dept: HEMATOLOGY/ONCOLOGY | Facility: CLINIC | Age: 69
End: 2024-03-18
Payer: MEDICARE

## 2024-03-18 VITALS
RESPIRATION RATE: 18 BRPM | SYSTOLIC BLOOD PRESSURE: 141 MMHG | DIASTOLIC BLOOD PRESSURE: 75 MMHG | TEMPERATURE: 98 F | BODY MASS INDEX: 33.34 KG/M2 | HEART RATE: 73 BPM | WEIGHT: 220 LBS | HEIGHT: 68 IN

## 2024-03-18 DIAGNOSIS — C83.38 DIFFUSE LARGE B-CELL LYMPHOMA, LYMPH NODES OF MULTIPLE SITES: ICD-10-CM

## 2024-03-18 DIAGNOSIS — D61.818 PANCYTOPENIA: ICD-10-CM

## 2024-03-18 DIAGNOSIS — C82.30 FOLLICULAR LYMPHOMA GRADE IIIA, UNSPECIFIED BODY REGION: ICD-10-CM

## 2024-03-18 DIAGNOSIS — C85.90 NON-HODGKIN'S LYMPHOMA, UNSPECIFIED BODY REGION, UNSPECIFIED NON-HODGKIN LYMPHOMA TYPE: Primary | ICD-10-CM

## 2024-03-18 DIAGNOSIS — D80.1 HYPOGAMMAGLOBULINEMIA: ICD-10-CM

## 2024-03-18 PROCEDURE — 99214 OFFICE O/P EST MOD 30 MIN: CPT | Mod: S$GLB,,, | Performed by: INTERNAL MEDICINE

## 2024-03-18 NOTE — PROGRESS NOTES
[facility] Hematology/Oncology  PROGRESS NOTE -  Follow-up Visit      Subjective:       Patient ID:   NAME: Sivakumar Thacker : 1955     69 y.o. male    Referring Doc: Doe (new PCP)  Other Physicians: Vinod Chen/José Manuel      Chief Complaint:  NHL f/u    History of Present Illness:     Patient returns today for a regularly scheduled follow-up visit.  The patient is here today to go over the results of the recently ordered labs, tests and studies. He is here with his wife      He has been having some upper GI pressure and discomfort issues since 2024 and had upper and lower endoscopies with Dr Campbell; he has f/u visit with GI to go over the results in near future; he has diverticulosis    He had recent repeat PET on 3/11/2024 is negative    He saw Dr Barton in 2024 with [facility]    He previously had since resumed Rituximab but it is now at every 12 weeks; getting IV IGg  as well    He had PEt on 2023     He denies any CP, SOB, HA's or N/V       He is continued on Gabapentin for the neuropathy issues in his feet.          I discussed continued Covid19 precautions        ROS:   GEN: normal without any fever, night sweats or weight loss  HEENT: normal with no HA's, sore throat, stiff neck, changes in vision;    CV: normal with no CP, SOB, PND, VASQUEZ or orthopnea  PULM: normal with no SOB,  hemoptysis, sputum or pleuritic pain;  breathing better    GI: vague upper abdominal discomfort/bloating with no nausea, vomiting, constipation, diarrhea, melanotic stools, BRBPR, or hematemesis  : normal with no hematuria, dysuria  BREAST: normal with no mass, discharge, pain  SKIN: normal with no rash, erythema, bruising, or swelling; no current wound issues    Allergies:  Review of patient's allergies indicates:  No Known Allergies    Medications:    Current Outpatient Medications:     albuterol (PROVENTIL/VENTOLIN HFA) 90 mcg/actuation inhaler, , Disp: , Rfl:     amLODIPine (NORVASC) 10 MG tablet, Take 1  tablet (10 mg total) by mouth once daily., Disp: 90 tablet, Rfl: 3    aspirin (ECOTRIN) 81 MG EC tablet, Take 81 mg by mouth once daily., Disp: , Rfl:     atorvastatin (LIPITOR) 20 MG tablet, Take 1 tablet (20 mg total) by mouth once daily., Disp: 90 tablet, Rfl: 3    diclofenac sodium (VOLTAREN) 1 % Gel, Apply 2 g topically 3 (three) times daily., Disp: 200 g, Rfl: 2    fluticasone propionate (FLONASE) 50 mcg/actuation nasal spray, 1 spray (50 mcg total) by Each Nostril route 2 (two) times daily., Disp: 48 g, Rfl: 11    fluticasone-umeclidin-vilanter (TRELEGY ELLIPTA) 100-62.5-25 mcg DsDv, Inhale 1 puff into the lungs once daily., Disp: 3 each, Rfl: 3    gabapentin (NEURONTIN) 300 MG capsule, Take 3 capsules (900 mg total) by mouth once daily AND 2 capsules (600 mg total) every evening., Disp: 450 capsule, Rfl: 3    losartan (COZAAR) 100 MG tablet, Take 1 tablet (100 mg total) by mouth once daily., Disp: 90 tablet, Rfl: 3    metFORMIN (GLUCOPHAGE) 500 MG tablet, Take 2 tablets twice a day by oral route with meals for 90 days., Disp: 360 tablet, Rfl: 3    montelukast (SINGULAIR) 10 mg tablet, Take 1 tablet (10 mg total) by mouth nightly., Disp: 90 tablet, Rfl: 3    MULTIVITAMIN ORAL, Take 1 tablet by mouth once daily., Disp: , Rfl:     naproxen (NAPROSYN) 500 MG tablet, Take 1 tablet (500 mg total) by mouth 2 (two) times daily with meals., Disp: 180 tablet, Rfl: 3    omeprazole (PRILOSEC) 40 MG capsule, Take 1 capsule (40 mg total) by mouth once daily., Disp: 90 capsule, Rfl: 3    tadalafiL (CIALIS) 20 MG Tab, Take 1 tablet (20 mg total) by mouth once daily., Disp: 90 tablet, Rfl: 3    tiZANidine (ZANAFLEX) 4 MG tablet, Take 1 tablet (4 mg total) by mouth daily as needed (back pain)., Disp: 90 tablet, Rfl: 3    PMHx/PSHx Updates:  See patient's last visit with me on  12/13/2023  See H&P on 1/8/2019        Pathology:  Cancer Staging  No matching staging information was found for the patient.    EGD 2/29/2024:  1.  LIPOMA, BIOPSY:   - GASTRIC ANTRAL MUCOSA WITH REACTIVE GASTROPATHY.   - NEGATIVE FOR SUBMUCOSA TO EVALUATE FOR A DEEPER SEATED LESION.   - NEGATIVE FOR HELICOBACTER PYLORI TYPE ORGANISMS.     2. STOMACH, ANTRUM, BIOPSY:   - GASTRIC ANTRAL MUCOSA WITH REACTIVE GASTROPATHY.   - GASTRIC OXYNTIC MUCOSA WITH NO SIGNIFICANT HISTOPATHOLOGIC FINDINGS.   - NEGATIVE FOR HELICOBACTER PYLORI TYPE ORGANISMS.     3. GASTROESOPHAGEAL JUNCTION, BIOPSY:   - SQUAMOUS MUCOSA WITH REFLUX ESOPHAGITIS.   - COLUMNAR MUCOSA WITH NO SIGNIFICANT HISTOPATHOLOGIC FINDINGS.   - NEGATIVE FOR INTESTINAL METAPLASIA.       Objective:     Vitals:  There were no vitals taken for this visit.    Physical Examination:   GEN: no apparent distress, comfortable; AAOx3  HEAD: atraumatic and normocephalic  EYES: no pallor, no icterus, PERRLA  ENT: OMM, no pharyngeal erythema, external ears WNL; no nasal discharge; no thrush;    NECK: no masses, thyroid normal, trachea midline, no LAD/LN's, supple  CV: RRR with no murmur; normal pulse; normal S1 and S2; no pedal edema; portacath since removed  CHEST: Normal respiratory effort; CTAB  ABDOM: nontender and nondistended; soft; normal bowel sounds; no rebound/guarding  MUSC/Skeletal: ROM normal; no crepitus; joints normal; no deformities or arthropathy  EXTREM: no clubbing, cyanosis, inflammation or swelling  SKIN: no rashes, lesions, ulcers, petechiae or subcutaneous nodules; no current wound issues  : no gibbs  NEURO: grossly intact; motor/sensory WNL; AAOx3; no tremors  PSYCH: normal mood, affect and behavior  LYMPH: normal cervical, supraclavicular, axillary and groin LN's            Labs:              Lab Results   Component Value Date    WBC 5.52 02/27/2024    HGB 13.7 (L) 02/27/2024    HCT 41.2 02/27/2024    MCV 87 02/27/2024     02/27/2024     CMP  Sodium   Date Value Ref Range Status   02/27/2024 140 136 - 145 mmol/L Final   04/25/2019 144 134 - 144 mmol/L      Potassium   Date Value Ref Range  Status   02/27/2024 4.2 3.5 - 5.1 mmol/L Final     Chloride   Date Value Ref Range Status   02/27/2024 106 95 - 110 mmol/L Final   04/25/2019 107 98 - 110 mmol/L      CO2   Date Value Ref Range Status   02/27/2024 25 23 - 29 mmol/L Final     Glucose   Date Value Ref Range Status   02/27/2024 167 (H) 70 - 110 mg/dL Final   04/25/2019 177 (H) 70 - 99 mg/dL      BUN   Date Value Ref Range Status   02/27/2024 23 8 - 23 mg/dL Final     Creatinine   Date Value Ref Range Status   02/27/2024 0.9 0.5 - 1.4 mg/dL Final   04/25/2019 0.83 0.60 - 1.40 mg/dL      Calcium   Date Value Ref Range Status   02/27/2024 9.6 8.7 - 10.5 mg/dL Final     Total Protein   Date Value Ref Range Status   02/27/2024 6.8 6.0 - 8.4 g/dL Final     Albumin   Date Value Ref Range Status   02/27/2024 4.3 3.5 - 5.2 g/dL Final   04/25/2019 4.4 3.1 - 4.7 g/dL      Total Bilirubin   Date Value Ref Range Status   02/27/2024 0.3 0.1 - 1.0 mg/dL Final     Comment:     For infants and newborns, interpretation of results should be based  on gestational age, weight and in agreement with clinical  observations.    Premature Infant recommended reference ranges:  Up to 24 hours.............<8.0 mg/dL  Up to 48 hours............<12.0 mg/dL  3-5 days..................<15.0 mg/dL  6-29 days.................<15.0 mg/dL       Alkaline Phosphatase   Date Value Ref Range Status   02/27/2024 100 55 - 135 U/L Final     AST   Date Value Ref Range Status   02/27/2024 21 10 - 40 U/L Final     ALT   Date Value Ref Range Status   02/27/2024 30 10 - 44 U/L Final     Anion Gap   Date Value Ref Range Status   02/27/2024 9 8 - 16 mmol/L Final     eGFR if    Date Value Ref Range Status   07/21/2022 >60.0 >60 mL/min/1.73 m^2 Final     eGFR if non    Date Value Ref Range Status   07/21/2022 >60.0 >60 mL/min/1.73 m^2 Final     Comment:     Calculation used to obtain the estimated glomerular filtration  rate (eGFR) is the CKD-EPI equation.             Radiology/Diagnostic Studies:    PET 3/11/2024:    IMPRESSION:  1. Interval resolution of the previously described nonspecific small bowel mesenteric fat stranding.  2. No concerning FDG avid mass or lymphadenopathy.        PET 12/18/2023:  IMPRESSION:  1. Mild faint nonspecific fat stranding in the small bowel mesentery, possibly from low-grade/subclinical enteritis or edema, noting an early lymphoproliferative disorder is not entirely excluded and attention on follow-up imaging may be warranted.     2. No FDG avid mass or lymphadenopathy.      PET 6/22/2023:    IMPRESSION:     1. Negative for FDG avid lymphoproliferative disease.  2. Resolution of prior bilateral pulmonary opacities.        PET 1/5/2023:    IMPRESSION: Diffuse reticular nodular and groundglass opacities the left lower lobe, posterior right upper lobe and posterior medial right lower lobe compatible with multifocal pneumonia. Follow-up chest CT in three months is recommended     Diffuse FDG activity throughout the axial skeleton suggestive of bone marrow hyperstimulation     No evidence of recurrent or metastatic disease        PET  4/5/2022:    IMPRESSION: No evidence of recurrent or active lymphoproliferative disease        PET 10/4/2021:  Impression:     1. Negative for FDG avid recurrent lymphoproliferative disease.  2. New left maxillary sinusitis.  3. New bilateral reticulonodular pulmonary opacities suggesting infectious or inflammatory pneumonitis.  Clinical and laboratory correlation is requested.  4. Coronary artery calcification.         CXR  3/25/2021:    Impression:     Mild interstitial prominence, similar to previous exams.  No focal consolidation      PET 3/19/2021:  IMPRESSION:  1. Mild patchy airspace opacity in the superior segment of the left  lower lobe with increased FDG activity from background suggestive of a  small focal pneumonia (possibly viral in etiology). Consider  correlation with the symptoms, history and CT  follow-up in 3 months to  document resolution.  2. No FDG avid lymphadenopathy or additional sites of disease.         PET 9/2/2020:      Impression:     Negative for active lymphoproliferative disease.         Chest CT  3/19/2020:      Impression       1. Mild bronchiectasis in the left lower lobe and tree-in-bud micro nodules at the left lung base suggesting underlying bronchiolitis, possibly due to a non tuberculous mycobacterial infection or viral bronchiolitis.  2. Fatty infiltration of the liver  3. Small hiatal hernia.           CXR  1/31/2020    Impression       1. No acute chest disease.  2. Left subclavian Port-A-Cath.               PET 9/11/2019   Impression       No evidence of FDG avid residual or recurrent lymphoma       I have reviewed all available lab results and radiology reports.    Assessment/Plan:   (1) 69 y.o. male with diagnosis of NHL Follicular Stage IIIA who has been referred by Dr Gary Chen with [facility] for continuation of care by medical hematology/oncology.   - He originally was diagnosed in 2012 and had parotid excision at [facility]. He subsequently went to [facility] and was treated with bendamustine-rituximab. He has been on rituximab maintenance every 8 weeks and IV IgG monthly. Dr Chen's plan was to keep him on maintenace therapy for as long as he could tolerate the treatments  - s/p 6 cycles of Bendamustine and Retuximab with subsequent complete remission  - 1st maintenance cycle rituximab was in March 2016  - he has been on maintenance rituximab and IV IgG - last rituximab 11/15/2018; last IV IgG on Dec 14th 2018  - last PET was on 9/26/2018 with no evidence of recurrence  - originally diagnosed in Dec 2012 with left parotid and left periparotid LN excision on 12/11/2012  - PET scan done on  9/11/2019 was good    7/23/2020:  - new nodule on auricle of left ear suspicious for a skin cancer - will refer him to Dr Avilez with ENT  - he is due for repeat PET    9/17/2020:  - PET  scan on 9/2/2020 is adequate  - [facility]     11/12/2020:  - continued on the current regimen  - doing well with no new issues    1/7/2021:  - he is having some persistent cough issues for which he has been obtaining sputum for José Manuel  - last PET was Sept 2020    3/4/2021:  - doing ok  - chronic cough issues - followed by José Manuel  - will set up f/u PET    4/29/2021:  - he had PET scan on 3/19/2021  - He has been seeing pulmonary about his chronic cough  Lucia Georges with pulmonary has seen the recent PET and reported to him that the CXR on 3/25 was stable and he is on some oral antibiotics with improvement of the symptoms  - He is also on Gabapentin for the neuropathy issues in his feet.   - He saw Dr Barry Mcmahon on 3/24/2021 with [facility]    8/19/2021:  - He has been seeing pulmonary about his chronic cough - Lucia Georges with pulmonary and he has been on periodic treatments of antibiotics. He saw her last just yesterday on 8/17/2021 and is currently on antibiotics.  - Pulmonary is planning to refer him to an ID specialist  - he is on the rituximab every 8 weeks; I am not convinced that this is contributing to the infection issues as it is not reducing his WBC; however,if pulmonary and/or ID feel it is a contributing factor then we can discontinue. The risk of his lymphoma recurring or progressing would be higher if he were to discontinue the therapy      10/14/2021:  - continued on oral antibiotics per pulmonary and plans to see Pulmonary ID specialist in near future  - continued on current therapy with rituximab  - recent PEt on 10/4/2021    12/9/2021:  - continued on rituximab  - on new antibiotics per pulmonary and he sees them again in Feb 2022  - repeat scans in Feb 2022 or sooner if pulmonary says otherwise    2/2/2022:  - he is seeing pulmonary again in two weeks  - he does not think that the new triple antibiotic regimen is working  - we can get  PET in Feb/mar 2022 if pulmonary is inclined, otherwise 6 month surveillance scan in April/May 2022    3/31/2022:  - He has been seeing pulmonary about his chronic cough and TONY issues; he is on antibiotics 3x/week and he reports that pulmonary feels that he is stable  - PET scan scheduled for 4/14/2022  - He last saw Dr Barry Mcmahon on 3/28/2021 and José Manuel on 2/17/2022  - pulmonary is fine with him continuing the ritxuimab per his report    5/26/2022:  - He has been seeing pulmonary about his chronic cough and TONY issues; he saw Rose on 5/17/2022 and is now on a new antibiotic and he reports that pulmonary said the PEt scan was better than the last one; he is planning to see Dr Veliz in the near future   - PET scan was done on 4/5/2022 with no evidence of recurrence  - continued on rituximab maintenance therapy    7/21/2022:  - continued under the care of pulmonary  - he is seeing Dr Veliz in Aug 2022  - continued on monthly IV IgG and rituximab every other month  - repeat PET in Oct 2022 expected    9/15/2022:  - He has been seeing pulmonary about his chronic cough and TONY issues; he has been seeing Rose and saw Dr Veliz on 8/15/2022;   - Dr Veliz is looking at increasing his Ig dose or changing it to SQ weekly  - Last PET scan was done on 4/5/2022 and repeat has been ordered and is due this Oct 2022  - discussed with patient about referral to Dr Nasreen Abarca at [facility] for not only evaluation for 2nd opinion for his chronic lymphoma but also the options of IV IgG infusion therapy, patient is agreeable    11/10/2022:  - Patient was seen by Dr Abarca at [facility] since last visit and his recommendation was to hold off on further rituximab therapy and proceed with just surveillance scanning.  - He was recently hospitalized at [facility] in Oct 2022 with upper torso cellulitis with Serratia marcescens septicemia  - He was recently hospitalized at [facility] in Oct 2022 with upper torso cellulitis with Serratia marcescens septicemia.  He is still followed by wound care and is getting the prior drain site packed, etc. He has not followed up with ID as of yet    1/4/2023:  - Patient was previously seen by Dr Abarca at [facility] since last visit and his recommendation was to hold off on further rituximab therapy and proceed with just surveillance scanning. He has telemed visit with him in Feb 2023  -  He is getting PET tomorrow and seeing pulmonary again in Feb 2023  - he has been on IV IgG  - He saw Dr Washington last in Nov 2022 and is seeing ID at [facility] in Jan 2023    3/1/2023:  - He had telemed with Dr Abarca and he wants him off the rituximab for another 3 months; patient is concerned about holding off on the rituximab maintenance.   - He last had rituximab in Sept 2022  - He has been on IV IgG  - Recent PET on 1/5/2023 with no evidence of lymphoma  - will discuss with Dr Abarca, but I think it is reasonable to resume rituximab in near future if ok with Pulm and ID, but will spread out to therapy every 3 months instead of every 2 months    4/26/2023:  - discussed with patient about resuming the Rituximab and he is anxious to do so  - will give every 12 weeks    6/21/2023:  - PET due tomorrow  - IV IgG on Friday  - rituximab since resumed but to be given every 12 weeks now  - f/\u with Dr Veliz in Aug 2023    9/13/2023:  - He has since resumed Rituximab but it is now at every 12 weeks; getting IV IGg this Friday and rituimab due again in Oct 2023  - He had PEt on 6/22/2023 12/13/2023:  - he is continued on IV IgG with next one on Jan 5th  - he gets Rituximab every 3 months now - latest one was 11/10  - he has PET this coming Dec 18th    3/18/2024:  - He has been having some upper GI pressure and discomfort issues since Jan 2024 and had upper and lower endoscopies with Dr Campbell; he has f/u visit with GI to go over the results in near future; he has diverticulosis  - He had recent repeat PET on 3/11/2024 is negative  - he is on rituximab every 3 months  (due Apr 25th 2024)  - He saw Dr Barton in Jan 2024 with [facility]    (2) HTN     (3) Neuropathy     (4) GERD     (5) DM - on metformin per Dr Nunez     (6) Hypogammaglobulinemia - on Iv IgG monthly     (7) Steatosis of liver     (8) Degenerative disc disease of back     (9) Diverticular disease    (10) Mild bronchiectasis in the left lower lobe and tree-in-bud micro nodules at the left lung base suggesting underlying bronchiolitis  - seeing Lucia Georges           VISIT DIAGNOSES:      Non-Hodgkin's lymphoma, unspecified body region, unspecified non-Hodgkin lymphoma type    Diffuse large b-cell lymphoma, lymph nodes of multiple sites    Follicular lymphoma grade IIIa, unspecified body region    Pancytopenia    Hypogammaglobulinemia          PLAN:  1. continue rituximab maintenance  therapy every 12 weeks (due again in Apr 2024)  2. continue IV IgG monthly    3. F/u with Dr Nasreen Abarca at [facility] as directed   4. Check labs every 8 weeks  5. Repeat PET every 6 months (due Sept 2024)   6. F/u with PCP, Pulm etc    7. Proceed with f/u visit with Dr Campbell           RTC in 12 weeks  with myself   Fax note to Jesu (new);; José Manuel Veliz; Rima; Adrian         Discussion:       I spent over 25 mins of time with the patient. Reviewed results of the recently ordered labs, tests and studies; made directives with regards to the results. Over half of this time was spent couseling and coordinating care.      COVID-19 Discussion:    I had long discussion with patient and any applicable family about the COVID-19 coronavirus epidemic and the recommended precautions with regard to cancer and/or hematology patients. I have re-iterated the CDC recommendations for adequate hand washing, use of hand -like products, and coughing into elbow, etc. In addition, especially for our patients who are on chemotherapy and/or our otherwise immunocompromised patients, I have recommended avoidance of crowds, including movie theaters,  restaurants, churches, etc. I have recommended avoidance of any sick or symptomatic family members and/or friends. I have also recommended avoidance of any raw and unwashed food products, and general avoidance of food items that have not been prepared by themselves. The patient has been asked to call us immediately with any symptom developments, issues, questions or other general concerns.     I have explained all of the above in detail and the patient understands all of the current recommendation(s). I have answered all of their questions to the best of my ability and to their complete satisfaction.   The patient is to continue with the current management plan.            Electronically signed by Jefferson Ibarra MD                 Answers submitted by the patient for this visit:  Review of Systems Questionnaire (Submitted on 3/11/2024)  appetite change : No  unexpected weight change: No  mouth sores: No  visual disturbance: No  cough: No  shortness of breath: No  chest pain: No  abdominal pain: Yes  diarrhea: No  frequency: No  back pain: No  rash: No  headaches: No  adenopathy: No  nervous/ anxious: No

## 2024-03-22 LAB
OHS QRS DURATION: 90 MS
OHS QTC CALCULATION: 429 MS

## 2024-03-28 ENCOUNTER — INFUSION (OUTPATIENT)
Dept: INFUSION THERAPY | Facility: HOSPITAL | Age: 69
End: 2024-03-28
Attending: INTERNAL MEDICINE
Payer: MEDICARE

## 2024-03-28 VITALS
WEIGHT: 225.19 LBS | SYSTOLIC BLOOD PRESSURE: 142 MMHG | HEART RATE: 69 BPM | OXYGEN SATURATION: 97 % | BODY MASS INDEX: 34.13 KG/M2 | HEIGHT: 68 IN | DIASTOLIC BLOOD PRESSURE: 80 MMHG | RESPIRATION RATE: 16 BRPM | TEMPERATURE: 98 F

## 2024-03-28 DIAGNOSIS — C82.30 FOLLICULAR LYMPHOMA GRADE IIIA, UNSPECIFIED BODY REGION: ICD-10-CM

## 2024-03-28 DIAGNOSIS — D80.1 HYPOGAMMAGLOBULINEMIA: Primary | ICD-10-CM

## 2024-03-28 DIAGNOSIS — D80.1 NONFAMILIAL HYPOGAMMAGLOBULINEMIA: ICD-10-CM

## 2024-03-28 PROCEDURE — 96366 THER/PROPH/DIAG IV INF ADDON: CPT

## 2024-03-28 PROCEDURE — 63600175 PHARM REV CODE 636 W HCPCS: Performed by: INTERNAL MEDICINE

## 2024-03-28 PROCEDURE — 25000003 PHARM REV CODE 250: Performed by: INTERNAL MEDICINE

## 2024-03-28 PROCEDURE — 96367 TX/PROPH/DG ADDL SEQ IV INF: CPT

## 2024-03-28 PROCEDURE — 96365 THER/PROPH/DIAG IV INF INIT: CPT

## 2024-03-28 RX ORDER — SODIUM CHLORIDE 0.9 % (FLUSH) 0.9 %
10 SYRINGE (ML) INJECTION
Status: DISCONTINUED | OUTPATIENT
Start: 2024-03-28 | End: 2024-03-28 | Stop reason: HOSPADM

## 2024-03-28 RX ORDER — HEPARIN 100 UNIT/ML
500 SYRINGE INTRAVENOUS
Status: DISCONTINUED | OUTPATIENT
Start: 2024-03-28 | End: 2024-03-28 | Stop reason: HOSPADM

## 2024-03-28 RX ORDER — HEPARIN 100 UNIT/ML
500 SYRINGE INTRAVENOUS
Status: CANCELLED | OUTPATIENT
Start: 2024-04-25

## 2024-03-28 RX ORDER — SODIUM CHLORIDE 0.9 % (FLUSH) 0.9 %
10 SYRINGE (ML) INJECTION
Status: CANCELLED | OUTPATIENT
Start: 2024-04-25

## 2024-03-28 RX ADMIN — SODIUM CHLORIDE: 9 INJECTION, SOLUTION INTRAVENOUS at 07:03

## 2024-03-28 RX ADMIN — HEPARIN 500 UNITS: 100 SYRINGE at 09:03

## 2024-03-28 RX ADMIN — IMMUNE GLOBULIN (HUMAN) 50 G: 10 INJECTION INTRAVENOUS; SUBCUTANEOUS at 07:03

## 2024-03-28 RX ADMIN — DIPHENHYDRAMINE HYDROCHLORIDE 25 MG: 50 INJECTION, SOLUTION INTRAMUSCULAR; INTRAVENOUS at 07:03

## 2024-03-28 NOTE — PLAN OF CARE
Problem: Fatigue  Goal: Improved Activity Tolerance  Outcome: Ongoing, Progressing  Intervention: Promote Improved Energy  Flowsheets (Taken 3/28/2024 2362)  Fatigue Management:   activity schedule adjusted   fatigue-related activity identified   frequent rest breaks encouraged   paced activity encouraged  Activity Management: Ambulated -L4

## 2024-04-18 ENCOUNTER — LAB VISIT (OUTPATIENT)
Dept: LAB | Facility: HOSPITAL | Age: 69
End: 2024-04-18
Attending: INTERNAL MEDICINE
Payer: MEDICARE

## 2024-04-18 DIAGNOSIS — K31.9 GASTRIC LESION: Primary | ICD-10-CM

## 2024-04-18 PROCEDURE — 82653 EL-1 FECAL QUANTITATIVE: CPT | Performed by: INTERNAL MEDICINE

## 2024-04-19 ENCOUNTER — TELEPHONE (OUTPATIENT)
Dept: HEMATOLOGY/ONCOLOGY | Facility: CLINIC | Age: 69
End: 2024-04-19

## 2024-04-19 ENCOUNTER — TELEPHONE (OUTPATIENT)
Dept: FAMILY MEDICINE | Facility: CLINIC | Age: 69
End: 2024-04-19
Payer: MEDICARE

## 2024-04-19 DIAGNOSIS — C83.38 DIFFUSE LARGE B-CELL LYMPHOMA, LYMPH NODES OF MULTIPLE SITES: ICD-10-CM

## 2024-04-19 DIAGNOSIS — C85.90 NON-HODGKIN'S LYMPHOMA, UNSPECIFIED BODY REGION, UNSPECIFIED NON-HODGKIN LYMPHOMA TYPE: Primary | ICD-10-CM

## 2024-04-19 DIAGNOSIS — Z13.6 ENCOUNTER FOR LIPID SCREENING FOR CARDIOVASCULAR DISEASE: ICD-10-CM

## 2024-04-19 DIAGNOSIS — I70.0 AORTIC ATHEROSCLEROSIS: Primary | ICD-10-CM

## 2024-04-19 DIAGNOSIS — Z13.220 ENCOUNTER FOR LIPID SCREENING FOR CARDIOVASCULAR DISEASE: ICD-10-CM

## 2024-04-19 NOTE — TELEPHONE ENCOUNTER
----- Message from Laine Henderson sent at 4/19/2024  7:35 AM CDT -----  Type: Needs Medical Advice  Who Called:  pt  Symptoms (please be specific):  pt said he need orders for his labs--he got a msg in the portal that he was overdue-- please call and advise  Best Call Back Number: [phone] (home)     Additional Information: thank you

## 2024-04-19 NOTE — TELEPHONE ENCOUNTER
----- Message from Laura Granger sent at 4/19/2024  8:27 AM CDT -----  Regarding: blood work  Contact: pt  Type: Needs Medical Advice  Who Called:  patient   Symptoms (please be specific):    How long has patient had these symptoms:    Pharmacy name and phone #:    Best Call Back Number: [phone]    Additional Information: needs blood work

## 2024-04-19 NOTE — PROGRESS NOTES
"Preventive Care Visit  [facility], Pediatric[facility]  Apr 19, 2024      Assessment & Plan     Routine general medical examination at a health care facility  - PRIMARY CARE FOLLOW-UP SCHEDULING; Future  - CBC with platelets and differential; Future  - NEISSERIA GONORRHOEA PCR; Future  - CHLAMYDIA TRACHOMATIS PCR; Future  - Hemoglobin A1c; Future    Hypercholesteremia  BMI 36.0-36.9,adult  - Adult Comprehensive Weight Management  Referral; Future  - Lipid panel reflex to direct LDL Fasting; Future  -TSH and free T4    Breakthrough bleeding on Nexplanon  Nexplanon in place  - Ob/Gyn  Referral; Future        Constipation, unspecified constipation type  - docusate sodium (COLACE) 100 MG capsule; Take 1 capsule (100 mg) by mouth daily    Patient has been advised of split billing requirements and indicates understanding: Yes          BMI  Estimated body mass index is 36.03 kg/m  as calculated from the following:    Height as of this encounter: 5' 3.58\" (1.615 m).    Weight as of this encounter: 207 lb 3.2 oz (94 kg).       Counseling  Appropriate preventive services were discussed with this patient, including applicable screening as appropriate for fall prevention, nutrition, physical activity, Tobacco-use cessation, weight loss and cognition.  Checklist reviewing preventive services available has been given to the patient.  Reviewed patient's diet, addressing concerns and/or questions.   The patient was instructed to see the dentist every 6 months.   She is at risk for psychosocial distress and has been provided with information to reduce risk.           Víctor Yusuf is a 21 year old, presenting for the following:  Physical        4/19/2024     1:18 PM   Additional Questions   Roomed by Rylee   Accompanied by Self            4/19/2024   General Health   How would you rate your overall physical health? (!) FAIR   Feel stress (tense, anxious, or unable to sleep) Only " Pharmacokinetic Initial Assessment: IV Vancomycin    Assessment/Plan:    Initiate intravenous vancomycin with loading dose of 1500 mg once followed by a maintenance dose of vancomycin 1500 mg IV every 18 hours  Desired empiric serum trough concentration is 10 to 15 mcg/mL  Draw vancomycin trough level 60 min prior to fourth dose on 01/09 at approximately 0400  Pharmacy will continue to follow and monitor vancomycin.      Please contact pharmacy at extension [phone] with any questions regarding this assessment.     Thank you for the consult,   Elan Nelson       Patient brief summary:  Sivakumar Thacker is a 67 y.o. male initiated on antimicrobial therapy with IV Vancomycin for treatment of suspected lower respiratory infection    Drug Allergies:   Review of patient's allergies indicates:  No Known Allergies    Actual Body Weight:   91.6 kg    Renal Function:   Estimated Creatinine Clearance: 79.9 mL/min (based on SCr of 1 mg/dL).,     CBC (last 72 hours):  Recent Labs   Lab Result Units 01/06/23  0937   WBC K/uL 3.00*   Hemoglobin g/dL 11.8*   Hematocrit % 36.9*   Platelets K/uL 329   Gran % % 39.0   Lymph % % 32.0   Mono % % 16.0*   Eosinophil % % 0.0   Basophil % % 0.0   Differential Method  Manual       Metabolic Panel (last 72 hours):  Recent Labs   Lab Result Units 01/06/23  0937   Sodium mmol/L 135*   Potassium mmol/L 3.7   Chloride mmol/L 98   CO2 mmol/L 27   Glucose mg/dL 190*   BUN mg/dL 13   Creatinine mg/dL 1.0   Albumin g/dL 3.2*   Total Bilirubin mg/dL 0.8   Alkaline Phosphatase U/L 110   AST U/L 30   ALT U/L 21       Drug levels (last 3 results):  No results for input(s): VANCOMYCINRA, VANCORANDOM, VANCOMYCINPE, VANCOPEAK, VANCOMYCINTR, VANCOTROUGH in the last 72 hours.    Microbiologic Results:  Microbiology Results (last 7 days)       Procedure Component Value Units Date/Time    Blood culture #1 **CANNOT BE ORDERED STAT** [[id number]] Collected: 01/06/23 1202    Order Status: Completed Specimen: Blood from  Peripheral, Forearm, Left Updated: 01/06/23 1917     Blood Culture, Routine No Growth to date    Blood culture #2 **CANNOT BE ORDERED STAT** [[id number]] Collected: 01/06/23 1140    Order Status: Completed Specimen: Blood from Peripheral, Antecubital, Left Updated: 01/06/23 1917     Blood Culture, Routine No Growth to date    Culture, Respiratory with Gram Stain [[id number]] Collected: 01/06/23 1720    Order Status: Sent Specimen: Respiratory from Sputum Updated: 01/06/23 1741    MRSA Screen by PCR [[id number]] Collected: 01/06/23 1644    Order Status: Sent Specimen: Nasopharyngeal Swab from Nasal Updated: 01/06/23 1700    Respiratory Infection Panel (PCR), Nasopharyngeal [[id number]]     Order Status: No result Specimen: Nasopharyngeal Swab     Stool culture **cannot be ordered stat** [[id number]]     Order Status: No result Specimen: Stool              a little   (!) STRESS CONCERN      4/19/2024   Nutrition   Three or more servings of calcium each day? Yes   Diet: Low fat/cholesterol   How many servings of fruit and vegetables per day? (!) 2-3   How many sweetened beverages each day? 0-1         4/19/2024   Exercise   Days per week of moderate/strenous exercise 6 days   Average minutes spent exercising at this level 30 min         4/19/2024   Social Factors   Frequency of gathering with friends or relatives Once a week   Worry food won't last until get money to buy more No   Food not last or not have enough money for food? No   Do you have housing?  Yes   Are you worried about losing your housing? No   Lack of transportation? No   Unable to get utilities (heat,electricity)? No         4/19/2024   Dental   Dentist two times every year? (!) NO         4/19/2024   TB Screening   Were you born outside of the [place]? No         Today's PHQ-2 Score:       4/19/2024    11:56 AM   PHQ-2 ( 1999 [employer])   Q1: Little interest or pleasure in doing things 2   Q2: Feeling down, depressed or hopeless 0   PHQ-2 Score 2   Q1: Little interest or pleasure in doing things More than half the days   Q2: Feeling down, depressed or hopeless Not at all   PHQ-2 Score 2           4/19/2024   Substance Use   Alcohol more than 3/day or more than 7/wk Not Applicable   Do you use any other substances recreationally? No     Social History     Tobacco Use    Smoking status: Never     Passive exposure: Yes    Smokeless tobacco: Never    Tobacco comments:     dad   Vaping Use    Vaping status: Never Used   Substance Use Topics    Alcohol use: No    Drug use: No           4/19/2024   STI Screening   New sexual partner(s) since last STI/HIV test? (!) YES - would like urine testing     History of abnormal Pap smear: NO - under age 21, PAP not appropriate for age             4/19/2024   Contraception/Family Planning   Questions about contraception or family planning (!) YES - has breakthrough bleeding on  "Nexplanon - menses every 2-3 months but spotting can last a month        Reviewed and updated as needed this visit by Provider       Med Hx  Surg Hx           Madelin   Has several concerns.  1) she currently has her second Nexplanon, it has been in place for over a year.  Her periods are quite irregular on it and she does not like the spotting.  She is also reluctant to get an IUD.  She is using it for pregnancy prevention.    2) she is sexually active with 1 male partner and has been in this relationship for 5 months.  No STI concerns but she would like routine testing.    3) Madelin has had some significant weight gain in the last year and she is not sure why.  She feels that she eats okay and she exercises.  She is also very active at her job, never stops moving.      Review of Systems  Constitutional, HEENT, cardiovascular, pulmonary, gi and gu systems are negative, except as otherwise noted.     Objective    Exam  /67   Pulse 75   Temp 97.8  F (36.6  C) (Tympanic)   Resp 16   Ht 5' 3.58\" (1.615 m)   Wt 207 lb 3.2 oz (94 kg)   LMP  (LMP Unknown)   SpO2 100%   BMI 36.03 kg/m     Estimated body mass index is 36.03 kg/m  as calculated from the following:    Height as of this encounter: 5' 3.58\" (1.615 m).    Weight as of this encounter: 207 lb 3.2 oz (94 kg).  Wt Readings from Last 4 Encounters:   04/19/24 207 lb 3.2 oz (94 kg)   03/07/23 159 lb 1.6 oz (72.2 kg) (87%, Z= 1.12)*   01/30/23 163 lb 3.2 oz (74 kg) (89%, Z= 1.23)*   01/18/23 157 lb 12.8 oz (71.6 kg) (86%, Z= 1.09)*     * Growth percentiles are based on CDC (Girls, 2-20 Years) data.       Physical Exam  GENERAL: alert and no distress  EYES: Eyes grossly normal to inspection, PERRL and conjunctivae and sclerae normal  HENT: ear canals and TM's normal, nose and mouth without ulcers or lesions  NECK: no adenopathy, no asymmetry, masses, or scars  RESP: lungs clear to auscultation - no rales, rhonchi or wheezes  BREAST: normal without masses, " tenderness or nipple discharge and no palpable axillary masses or adenopathy  CV: regular rate and rhythm, normal S1 S2, no S3 or S4, no murmur, click or rub, no peripheral edema  ABDOMEN: soft, nontender, no hepatosplenomegaly, no masses and bowel sounds normal  MS: no gross musculoskeletal defects noted, no edema  SKIN: no suspicious lesions or rashes  NEURO: Normal strength and tone, mentation intact and speech normal  PSYCH: mentation appears normal, affect normal/bright  : Exam deferred because patient is referred for Pap smear, and will schedule with OB/GYN within the next 2 months.      Vision Screen  Vision Screen Details  Reason Vision Screen Not Completed: Other  Does the patient have corrective lenses (glasses/contacts)?: No    Hearing Screen           Signed Electronically by: Alem Nagel MD

## 2024-04-19 NOTE — TELEPHONE ENCOUNTER
----- Message from Laura Granger sent at 4/19/2024  8:27 AM CDT -----  Regarding: blood work  Contact: pt  Type: Needs Medical Advice  Who Called:  patient   Symptoms (please be specific):    How long has patient had these symptoms:    Pharmacy name and phone #:    Best Call Back Number: [phone]    Additional Information: needs blood work

## 2024-04-22 LAB — ELASTASE 1, FECAL: 247 MCG/G

## 2024-04-24 RX ORDER — SODIUM CHLORIDE 0.9 % (FLUSH) 0.9 %
10 SYRINGE (ML) INJECTION
Status: CANCELLED | OUTPATIENT
Start: 2024-04-25

## 2024-04-24 RX ORDER — FAMOTIDINE 10 MG/ML
20 INJECTION INTRAVENOUS
Status: CANCELLED | OUTPATIENT
Start: 2024-04-25

## 2024-04-24 RX ORDER — MEPERIDINE HYDROCHLORIDE 25 MG/ML
25 INJECTION INTRAMUSCULAR; INTRAVENOUS; SUBCUTANEOUS
Status: CANCELLED | OUTPATIENT
Start: 2024-04-25 | End: 2024-04-25

## 2024-04-24 RX ORDER — HEPARIN 100 UNIT/ML
500 SYRINGE INTRAVENOUS
Status: CANCELLED | OUTPATIENT
Start: 2024-04-25

## 2024-04-24 RX ORDER — ACETAMINOPHEN 325 MG/1
650 TABLET ORAL
Status: CANCELLED | OUTPATIENT
Start: 2024-04-25

## 2024-04-25 ENCOUNTER — INFUSION (OUTPATIENT)
Dept: INFUSION THERAPY | Facility: HOSPITAL | Age: 69
End: 2024-04-25
Attending: INTERNAL MEDICINE
Payer: MEDICARE

## 2024-04-25 ENCOUNTER — PATIENT MESSAGE (OUTPATIENT)
Dept: FAMILY MEDICINE | Facility: CLINIC | Age: 69
End: 2024-04-25
Payer: MEDICARE

## 2024-04-25 VITALS
DIASTOLIC BLOOD PRESSURE: 72 MMHG | HEART RATE: 81 BPM | SYSTOLIC BLOOD PRESSURE: 145 MMHG | HEIGHT: 68 IN | BODY MASS INDEX: 34.27 KG/M2 | RESPIRATION RATE: 16 BRPM | WEIGHT: 226.13 LBS | OXYGEN SATURATION: 97 % | TEMPERATURE: 98 F

## 2024-04-25 DIAGNOSIS — D80.1 HYPOGAMMAGLOBULINEMIA: ICD-10-CM

## 2024-04-25 DIAGNOSIS — C82.30 FOLLICULAR LYMPHOMA GRADE IIIA, UNSPECIFIED BODY REGION: Primary | ICD-10-CM

## 2024-04-25 LAB
ALBUMIN SERPL BCP-MCNC: 3.9 G/DL (ref 3.5–5.2)
ALP SERPL-CCNC: 86 U/L (ref 55–135)
ALT SERPL W/O P-5'-P-CCNC: 27 U/L (ref 10–44)
ANION GAP SERPL CALC-SCNC: 8 MMOL/L (ref 8–16)
AST SERPL-CCNC: 19 U/L (ref 10–40)
BASOPHILS # BLD AUTO: 0.03 K/UL (ref 0–0.2)
BASOPHILS NFR BLD: 0.6 % (ref 0–1.9)
BILIRUB SERPL-MCNC: 0.4 MG/DL (ref 0.1–1)
BUN SERPL-MCNC: 16 MG/DL (ref 8–23)
CALCIUM SERPL-MCNC: 8.8 MG/DL (ref 8.7–10.5)
CHLORIDE SERPL-SCNC: 104 MMOL/L (ref 95–110)
CO2 SERPL-SCNC: 26 MMOL/L (ref 23–29)
CREAT SERPL-MCNC: 0.9 MG/DL (ref 0.5–1.4)
DIFFERENTIAL METHOD BLD: ABNORMAL
EOSINOPHIL # BLD AUTO: 0.2 K/UL (ref 0–0.5)
EOSINOPHIL NFR BLD: 3.1 % (ref 0–8)
ERYTHROCYTE [DISTWIDTH] IN BLOOD BY AUTOMATED COUNT: 13.7 % (ref 11.5–14.5)
EST. GFR  (NO RACE VARIABLE): >60 ML/MIN/1.73 M^2
GLUCOSE SERPL-MCNC: 315 MG/DL (ref 70–110)
HCT VFR BLD AUTO: 40.9 % (ref 40–54)
HGB BLD-MCNC: 13.7 G/DL (ref 14–18)
IMM GRANULOCYTES # BLD AUTO: 0.01 K/UL (ref 0–0.04)
IMM GRANULOCYTES NFR BLD AUTO: 0.2 % (ref 0–0.5)
LYMPHOCYTES # BLD AUTO: 1.1 K/UL (ref 1–4.8)
LYMPHOCYTES NFR BLD: 20.2 % (ref 18–48)
MCH RBC QN AUTO: 29.4 PG (ref 27–31)
MCHC RBC AUTO-ENTMCNC: 33.5 G/DL (ref 32–36)
MCV RBC AUTO: 88 FL (ref 82–98)
MONOCYTES # BLD AUTO: 0.5 K/UL (ref 0.3–1)
MONOCYTES NFR BLD: 9 % (ref 4–15)
NEUTROPHILS # BLD AUTO: 3.5 K/UL (ref 1.8–7.7)
NEUTROPHILS NFR BLD: 66.9 % (ref 38–73)
NRBC BLD-RTO: 0 /100 WBC
PLATELET # BLD AUTO: 205 K/UL (ref 150–450)
PMV BLD AUTO: 9.6 FL (ref 9.2–12.9)
POTASSIUM SERPL-SCNC: 4.1 MMOL/L (ref 3.5–5.1)
PROT SERPL-MCNC: 6.2 G/DL (ref 6–8.4)
RBC # BLD AUTO: 4.66 M/UL (ref 4.6–6.2)
SODIUM SERPL-SCNC: 138 MMOL/L (ref 136–145)
WBC # BLD AUTO: 5.21 K/UL (ref 3.9–12.7)

## 2024-04-25 PROCEDURE — 80053 COMPREHEN METABOLIC PANEL: CPT | Performed by: INTERNAL MEDICINE

## 2024-04-25 PROCEDURE — 25000003 PHARM REV CODE 250: Performed by: INTERNAL MEDICINE

## 2024-04-25 PROCEDURE — 96413 CHEMO IV INFUSION 1 HR: CPT

## 2024-04-25 PROCEDURE — 96367 TX/PROPH/DG ADDL SEQ IV INF: CPT

## 2024-04-25 PROCEDURE — 85025 COMPLETE CBC W/AUTO DIFF WBC: CPT | Performed by: INTERNAL MEDICINE

## 2024-04-25 PROCEDURE — 96375 TX/PRO/DX INJ NEW DRUG ADDON: CPT

## 2024-04-25 PROCEDURE — A4216 STERILE WATER/SALINE, 10 ML: HCPCS | Performed by: INTERNAL MEDICINE

## 2024-04-25 PROCEDURE — 96415 CHEMO IV INFUSION ADDL HR: CPT

## 2024-04-25 PROCEDURE — 63600175 PHARM REV CODE 636 W HCPCS: Mod: JZ,JG | Performed by: INTERNAL MEDICINE

## 2024-04-25 RX ORDER — FAMOTIDINE 10 MG/ML
20 INJECTION INTRAVENOUS
Status: COMPLETED | OUTPATIENT
Start: 2024-04-25 | End: 2024-04-25

## 2024-04-25 RX ORDER — HEPARIN 100 UNIT/ML
500 SYRINGE INTRAVENOUS
Status: DISCONTINUED | OUTPATIENT
Start: 2024-04-25 | End: 2024-04-25 | Stop reason: HOSPADM

## 2024-04-25 RX ORDER — SODIUM CHLORIDE 0.9 % (FLUSH) 0.9 %
10 SYRINGE (ML) INJECTION
Status: DISCONTINUED | OUTPATIENT
Start: 2024-04-25 | End: 2024-04-25 | Stop reason: HOSPADM

## 2024-04-25 RX ORDER — MEPERIDINE HYDROCHLORIDE 25 MG/ML
25 INJECTION INTRAMUSCULAR; INTRAVENOUS; SUBCUTANEOUS
Status: DISCONTINUED | OUTPATIENT
Start: 2024-04-25 | End: 2024-04-25 | Stop reason: HOSPADM

## 2024-04-25 RX ORDER — ACETAMINOPHEN 325 MG/1
650 TABLET ORAL
Status: COMPLETED | OUTPATIENT
Start: 2024-04-25 | End: 2024-04-25

## 2024-04-25 RX ADMIN — HEPARIN 500 UNITS: 100 SYRINGE at 11:04

## 2024-04-25 RX ADMIN — RITUXIMAB 800 MG: 10 INJECTION, SOLUTION INTRAVENOUS at 08:04

## 2024-04-25 RX ADMIN — ACETAMINOPHEN 650 MG: 325 TABLET ORAL at 07:04

## 2024-04-25 RX ADMIN — FAMOTIDINE 20 MG: 10 INJECTION INTRAVENOUS at 07:04

## 2024-04-25 RX ADMIN — DIPHENHYDRAMINE HYDROCHLORIDE 50 MG: 50 INJECTION, SOLUTION INTRAMUSCULAR; INTRAVENOUS at 07:04

## 2024-04-25 RX ADMIN — SODIUM CHLORIDE, PRESERVATIVE FREE 10 ML: 5 INJECTION INTRAVENOUS at 11:04

## 2024-04-25 NOTE — PLAN OF CARE
Problem: Fatigue  Goal: Improved Activity Tolerance  Outcome: Progressing  Intervention: Promote Improved Energy  Flowsheets (Taken 4/25/2024 7766)  Sleep/Rest Enhancement:   regular sleep/rest pattern promoted   reading promoted  Environmental Support:   environmental consistency promoted   rest periods encouraged

## 2024-04-26 ENCOUNTER — INFUSION (OUTPATIENT)
Dept: INFUSION THERAPY | Facility: HOSPITAL | Age: 69
End: 2024-04-26
Attending: INTERNAL MEDICINE
Payer: MEDICARE

## 2024-04-26 VITALS
OXYGEN SATURATION: 97 % | BODY MASS INDEX: 34.56 KG/M2 | DIASTOLIC BLOOD PRESSURE: 77 MMHG | WEIGHT: 228 LBS | TEMPERATURE: 98 F | SYSTOLIC BLOOD PRESSURE: 150 MMHG | HEART RATE: 66 BPM | HEIGHT: 68 IN | RESPIRATION RATE: 18 BRPM

## 2024-04-26 DIAGNOSIS — D80.1 NONFAMILIAL HYPOGAMMAGLOBULINEMIA: ICD-10-CM

## 2024-04-26 DIAGNOSIS — C82.30 FOLLICULAR LYMPHOMA GRADE IIIA, UNSPECIFIED BODY REGION: ICD-10-CM

## 2024-04-26 DIAGNOSIS — D80.1 HYPOGAMMAGLOBULINEMIA: Primary | ICD-10-CM

## 2024-04-26 PROCEDURE — 25000003 PHARM REV CODE 250: Performed by: INTERNAL MEDICINE

## 2024-04-26 PROCEDURE — 96366 THER/PROPH/DIAG IV INF ADDON: CPT

## 2024-04-26 PROCEDURE — A4216 STERILE WATER/SALINE, 10 ML: HCPCS | Performed by: INTERNAL MEDICINE

## 2024-04-26 PROCEDURE — 96365 THER/PROPH/DIAG IV INF INIT: CPT

## 2024-04-26 PROCEDURE — 96367 TX/PROPH/DG ADDL SEQ IV INF: CPT

## 2024-04-26 PROCEDURE — 63600175 PHARM REV CODE 636 W HCPCS: Performed by: INTERNAL MEDICINE

## 2024-04-26 RX ORDER — HEPARIN 100 UNIT/ML
500 SYRINGE INTRAVENOUS
Status: DISCONTINUED | OUTPATIENT
Start: 2024-04-26 | End: 2024-04-26 | Stop reason: HOSPADM

## 2024-04-26 RX ORDER — SODIUM CHLORIDE 0.9 % (FLUSH) 0.9 %
10 SYRINGE (ML) INJECTION
Status: DISCONTINUED | OUTPATIENT
Start: 2024-04-26 | End: 2024-04-26 | Stop reason: HOSPADM

## 2024-04-26 RX ORDER — SODIUM CHLORIDE 0.9 % (FLUSH) 0.9 %
10 SYRINGE (ML) INJECTION
Status: CANCELLED | OUTPATIENT
Start: 2024-05-24

## 2024-04-26 RX ORDER — HEPARIN 100 UNIT/ML
500 SYRINGE INTRAVENOUS
Status: CANCELLED | OUTPATIENT
Start: 2024-05-24

## 2024-04-26 RX ADMIN — IMMUNE GLOBULIN (HUMAN) 50 G: 10 INJECTION INTRAVENOUS; SUBCUTANEOUS at 07:04

## 2024-04-26 RX ADMIN — DIPHENHYDRAMINE HYDROCHLORIDE 25 MG: 50 INJECTION, SOLUTION INTRAMUSCULAR; INTRAVENOUS at 07:04

## 2024-04-26 RX ADMIN — HEPARIN 500 UNITS: 100 SYRINGE at 10:04

## 2024-04-26 RX ADMIN — SODIUM CHLORIDE, PRESERVATIVE FREE 10 ML: 5 INJECTION INTRAVENOUS at 10:04

## 2024-04-26 NOTE — PLAN OF CARE
Problem: Fatigue  Goal: Improved Activity Tolerance  Outcome: Progressing  Intervention: Promote Improved Energy  Flowsheets (Taken 4/26/2024 2084)  Fatigue Management:   frequent rest breaks encouraged   fatigue-related activity identified  Sleep/Rest Enhancement: regular sleep/rest pattern promoted  Environmental Support:   environmental consistency promoted   rest periods encouraged

## 2024-05-07 ENCOUNTER — ANESTHESIA EVENT (OUTPATIENT)
Dept: SURGERY | Facility: HOSPITAL | Age: 69
End: 2024-05-07
Payer: MEDICARE

## 2024-05-07 ENCOUNTER — HOSPITAL ENCOUNTER (OUTPATIENT)
Facility: HOSPITAL | Age: 69
Discharge: HOME | End: 2024-05-07
Attending: INTERNAL MEDICINE | Admitting: INTERNAL MEDICINE
Payer: MEDICARE

## 2024-05-07 ENCOUNTER — ANESTHESIA (OUTPATIENT)
Dept: SURGERY | Facility: HOSPITAL | Age: 69
End: 2024-05-07
Payer: MEDICARE

## 2024-05-07 VITALS
DIASTOLIC BLOOD PRESSURE: 73 MMHG | TEMPERATURE: 98 F | OXYGEN SATURATION: 92 % | RESPIRATION RATE: 18 BRPM | SYSTOLIC BLOOD PRESSURE: 116 MMHG | HEART RATE: 66 BPM

## 2024-05-07 DIAGNOSIS — K31.9 GASTRIC LESION: ICD-10-CM

## 2024-05-07 LAB — GLUCOSE SERPL-MCNC: 198 MG/DL (ref 70–110)

## 2024-05-07 PROCEDURE — 37000008 HC ANESTHESIA 1ST 15 MINUTES: Performed by: INTERNAL MEDICINE

## 2024-05-07 PROCEDURE — 43235 EGD DIAGNOSTIC BRUSH WASH: CPT | Mod: 74 | Performed by: INTERNAL MEDICINE

## 2024-05-07 PROCEDURE — 63600175 PHARM REV CODE 636 W HCPCS: Performed by: NURSE ANESTHETIST, CERTIFIED REGISTERED

## 2024-05-07 PROCEDURE — D9220A PRA ANESTHESIA: Mod: ANES,,, | Performed by: STUDENT IN AN ORGANIZED HEALTH CARE EDUCATION/TRAINING PROGRAM

## 2024-05-07 PROCEDURE — D9220A PRA ANESTHESIA: Mod: CRNA,,, | Performed by: NURSE ANESTHETIST, CERTIFIED REGISTERED

## 2024-05-07 RX ORDER — SODIUM CHLORIDE 0.9 % (FLUSH) 0.9 %
2 SYRINGE (ML) INJECTION
Status: CANCELLED | OUTPATIENT
Start: 2024-05-07

## 2024-05-07 RX ORDER — SODIUM CHLORIDE 9 MG/ML
INJECTION, SOLUTION INTRAVENOUS CONTINUOUS
Status: CANCELLED | OUTPATIENT
Start: 2024-05-07

## 2024-05-07 RX ORDER — PROPOFOL 10 MG/ML
VIAL (ML) INTRAVENOUS
Status: DISCONTINUED | OUTPATIENT
Start: 2024-05-07 | End: 2024-05-07

## 2024-05-07 RX ORDER — DIPHENHYDRAMINE HYDROCHLORIDE 50 MG/ML
50 INJECTION INTRAMUSCULAR; INTRAVENOUS ONCE AS NEEDED
Status: CANCELLED | OUTPATIENT
Start: 2024-05-07 | End: 2035-10-03

## 2024-05-07 RX ADMIN — SODIUM CHLORIDE, SODIUM LACTATE, POTASSIUM CHLORIDE, AND CALCIUM CHLORIDE: .6; .31; .03; .02 INJECTION, SOLUTION INTRAVENOUS at 07:05

## 2024-05-07 RX ADMIN — PROPOFOL 100 MG: 10 INJECTION, EMULSION INTRAVENOUS at 07:05

## 2024-05-07 NOTE — [employer] PATIENT INSTRUCTIONS
Discharge Summary/Instructions after an Endoscopic Procedure  Patient Name: Sivakumar Thacker  Patient MRN: [id number]  Patient YOB: 1955  Tuesday, May 7, 2024  Jose De Jesus Tran III, MD  RESTRICTIONS:  During your procedure today, you received medications for sedation.  These   medications may affect your judgment, balance and coordination.  Therefore,   for 24 hours, you have the following restrictions:   - DO NOT drive a car, operate machinery, make legal/financial decisions,   sign important papers or drink alcohol.    ACTIVITY:  Today: no heavy lifting, straining or running due to procedural   sedation/anesthesia.  The following day: return to full activity including work.  DIET:  Eat and drink normally unless instructed otherwise.     TREATMENT FOR COMMON SIDE EFFECTS:  - Mild abdominal pain, nausea, belching, bloating or excessive gas:  rest,   eat lightly and use a heating pad.  - Sore Throat: treat with throat lozenges and/or gargle with warm salt   water.  - Because air was used during the procedure, expelling large amounts of air   from your rectum or belching is normal.  - If a bowel prep was taken, you may not have a bowel movement for 1-3 days.    This is normal.  SYMPTOMS TO WATCH FOR AND REPORT TO YOUR PHYSICIAN:  1. Abdominal pain or bloating, other than gas cramps.  2. Chest pain.  3. Back pain.  4. Signs of infection such as: chills or fever occurring within 24 hours   after the procedure.  5. Rectal bleeding, which would show as bright red, maroon, or black stools.   (A tablespoon of blood from the rectum is not serious, especially if   hemorrhoids are present.)  6. Vomiting.  7. Weakness or dizziness.  GO DIRECTLY TO THE NEAREST EMERGENCY ROOM IF YOU HAVE ANY OF THE FOLLOWING:      Difficulty breathing              Chills and/or fever over 101 F   Persistent vomiting and/or vomiting blood   Severe abdominal pain   Severe chest pain   Black, tarry stools   Bleeding- more than one  tablespoon   Any other symptom or condition that you feel may need urgent attention  Your doctor recommends these additional instructions:  If any biopsies were taken, your doctors clinic will contact you in 1 to 2   weeks with any results.  - Patient has a contact number available for emergencies.  The signs and   symptoms of potential delayed complications were discussed with the   patient.  Return to normal activities tomorrow.  Written discharge   instructions were provided to the patient.   - Discharge patient to home (with escort).   - Reschedule EUS w/ clear liquids the day prior.  For questions, problems or results please call your physician - Jose De Jesus Tran III, MD at Work:  [phone].  [facility], EMERGENCY ROOM PHONE NUMBER: [phone]  IF A COMPLICATION OR EMERGENCY SITUATION ARISES AND YOU ARE UNABLE TO REACH   YOUR PHYSICIAN - GO DIRECTLY TO THE EMERGENCY ROOM.  Jose De Jesus Tran III, MD  5/7/2024 7:28:49 AM  This report has been verified and signed electronically.  Dear patient,  As a result of recent federal legislation (The Federal Cures Act), you may   receive lab or pathology results from your procedure in your [facility]   account before your physician is able to contact you. Your physician or   their representative will relay the results to you with their   recommendations at their soonest availability.  Thank you,  [employer]ATION

## 2024-05-07 NOTE — H&P
GASTROENTEROLOGY PRE-PROCEDURE H&P NOTE  Patient Name: Sivakumar Thacker  Patient MRN: [id number]  Patient : 1955    Service date: 2024    PCP: Trudi Nails MD    No chief complaint on file.      HPI: Patient is a 69 y.o. male with PMHx as below here for evaluation of submucosal gastric lesion.     Past Medical History:  Past Medical History:   Diagnosis Date    Chest wall abscess 2022    Chronic obstructive pulmonary disease, unspecified COPD type 2022    Diabetes mellitus, type 2     Encounter for antineoplastic chemotherapy 2020    Hypertension     Hypogammaglobulinemia 2019    Neuropathy     Non Hodgkin's lymphoma     Reflux esophagitis     Ringing in ear, bilateral         Past Surgical History:  Past Surgical History:   Procedure Laterality Date    COLONOSCOPY  2018    COLONOSCOPY N/A 2024    Procedure: COLONOSCOPY;  Surgeon: Nitesh Campbell MD;  Location: [facility];  Service: Endoscopy;  Laterality: N/A;    ESOPHAGOGASTRODUODENOSCOPY N/A 2024    Procedure: EGD (ESOPHAGOGASTRODUODENOSCOPY);  Surgeon: Nitesh Campbell MD;  Location: [facility];  Service: Endoscopy;  Laterality: N/A;    EYE SURGERY  Approximately 3 years ago    Cataract    HAND SURGERY Left     amputation left index finger    INCISION AND DRAINAGE OF ABSCESS Left 10/03/2022    Procedure: INCISION AND DRAINAGE, ABSCESS;  Surgeon: Franco Venegas III, MD;  Location: [facility];  Service: General;  Laterality: Left;  axilla    INSERTION OF TUNNELED CENTRAL VENOUS CATHETER (CVC) WITH SUBCUTANEOUS PORT Left 2023    Procedure: [id number];  Surgeon: Franco Venegas III, MD;  Location: [facility];  Service: General;  Laterality: Left;    MEDIPORT REMOVAL Right 10/03/2022    Procedure: REMOVAL, CATHETER, CENTRAL VENOUS, TUNNELED, WITH PORT;  Surgeon: Franco Venegas III, MD;  Location: [facility];  Service: General;  Laterality: Right;    PORTACATH PLACEMENT      SKIN CANCER  EXCISION  09/30/2020    [facility]    SPINE SURGERY      VASECTOMY  1985 ?        Home Medications:  Medications Prior to Admission   Medication Sig Dispense Refill Last Dose    aspirin (ECOTRIN) 81 MG EC tablet Take 81 mg by mouth nightly.   5/6/2024    albuterol (PROVENTIL/VENTOLIN HFA) 90 mcg/actuation inhaler Inhale 2 puffs into the lungs every 6 (six) hours as needed.       amLODIPine (NORVASC) 10 MG tablet Take 1 tablet (10 mg total) by mouth once daily. (Patient taking differently: Take 10 mg by mouth nightly.) 90 tablet 3     atorvastatin (LIPITOR) 20 MG tablet Take 1 tablet (20 mg total) by mouth once daily. (Patient taking differently: Take 20 mg by mouth every evening.) 90 tablet 3     diclofenac sodium (VOLTAREN) 1 % Gel Apply 2 g topically 3 (three) times daily. 200 g 2     fluticasone propionate (FLONASE) 50 mcg/actuation nasal spray 1 spray (50 mcg total) by Each Nostril route 2 (two) times daily. 48 g 11     fluticasone-umeclidin-vilanter (TRELEGY ELLIPTA) 100-62.5-25 mcg DsDv Inhale 1 puff into the lungs once daily. 3 each 3     gabapentin (NEURONTIN) 300 MG capsule Take 3 capsules (900 mg total) by mouth once daily AND 2 capsules (600 mg total) every evening. 450 capsule 3     losartan (COZAAR) 100 MG tablet Take 1 tablet (100 mg total) by mouth once daily. 90 tablet 3     metFORMIN (GLUCOPHAGE) 500 MG tablet Take 2 tablets twice a day by oral route with meals for 90 days. (Patient taking differently: Take 1,000 mg by mouth 2 (two) times daily with meals. Take 2 tablets twice a day by oral route with meals for 90 days.) 360 tablet 3     montelukast (SINGULAIR) 10 mg tablet Take 1 tablet (10 mg total) by mouth nightly. 90 tablet 3     MULTIVITAMIN ORAL Take 1 tablet by mouth once daily.       naproxen (NAPROSYN) 500 MG tablet Take 1 tablet (500 mg total) by mouth 2 (two) times daily with meals. 180 tablet 3     omeprazole (PRILOSEC) 40 MG capsule Take 1 capsule (40 mg total) by mouth once daily. 90  capsule 3     tadalafiL (CIALIS) 20 MG Tab Take 1 tablet (20 mg total) by mouth once daily. 90 tablet 3     tiZANidine (ZANAFLEX) 4 MG tablet Take 1 tablet (4 mg total) by mouth daily as needed (back pain). 90 tablet 3                Review of patient's allergies indicates:  No Known Allergies    Social History:   Social History     Occupational History    Not on file   Tobacco Use    Smoking status: Former     Current packs/day: 0.00     Average packs/day: 0.5 packs/day for 2.0 years (1.0 ttl pk-yrs)     Types: Cigarettes     Start date:      Quit date:      Years since quittin.3    Smokeless tobacco: Never   Substance and Sexual Activity    Alcohol use: Not Currently     Comment: rarely    Drug use: No    Sexual activity: Not Currently       Family History:   Family History   Problem Relation Name Age of Onset    Diabetes Father Tae        Review of Systems:  A 10 point review of systems was performed and was normal, except as mentioned in the HPI, including constitutional, HEENT, heme, lymph, cardiovascular, respiratory, gastrointestinal, genitourinary, neurologic, endocrine, psychiatric and musculoskeletal.      OBJECTIVE:    Physical Exam:  24 Hour Vital Sign Ranges: Temp:  [97.8 °F (36.6 °C)] 97.8 °F (36.6 °C)  Pulse:  [74] 74  Resp:  [18] 18  SpO2:  [95 %] 95 %  BP: (142)/(82) 142/82  Most recent vitals: BP (!) 142/82 (BP Location: Right arm, Patient Position: Lying)   Pulse 74   Temp 97.8 °F (36.6 °C) (Tympanic)   Resp 18   SpO2 95%    GEN: well-developed, well-nourished, awake and alert, non-toxic appearing adult  HEENT: PERRL, sclera anicteric, oral mucosa pink and moist without lesion  NECK: trachea midline; Good ROM  CV: regular rate and rhythm, no murmurs or gallops  RESP: clear to auscultation bilaterally, no wheezes, rhonci or rales  ABD: soft, non-tender, non-distended, normal bowel sounds  EXT: no swelling or edema, 2+ pulses distally  SKIN: no rashes or jaundice  PSYCH: normal  "affect    Labs:   No results for input(s): "WBC", "MCV", "PLT" in the last 72 hours.    Invalid input(s): "HGBAU"  No results for input(s): "NA", "K", "CL", "CO2", "BUN", "GLU" in the last 72 hours.    Invalid input(s): "CREA"  No results for input(s): "ALB" in the last 72 hours.    Invalid input(s): "ALKP", "SGOT", "SGPT", "TBIL", "DBIL", "TPRO"  No results for input(s): "PT", "INR", "PTT" in the last 72 hours.      IMPRESSION / RECOMMENDATIONS:  EGD/EUS  with interventions as warranted.   RIsks, benefits, alternatives discussed in detail regarding upcoming procedures and sedation. Some of the more common endoscopic complications include but not limited to immediate or delayed perforation, bleeding, infections, pain, inadvertent injury to surrounding tissue / organs and possible need for surgical evaluation. Patient expressed understanding, all questions answered and will proceed with procedure as planned.     Jose De Jesus Tran III  5/7/2024  7:16 AM      "

## 2024-05-07 NOTE — ANESTHESIA PREPROCEDURE EVALUATION
05/07/2024  Sivakumar Thacker is a 69 y.o., male.      Patient Active Problem List   Diagnosis    Allergic rhinitis    Follicular lymphoma grade IIIa    Nonfamilial hypogammaglobulinemia    Hypogammaglobulinemia    Essential hypertension    Elevated lipoprotein(a)    Prediabetes    Encounter for antineoplastic chemotherapy    Type II diabetes mellitus with neurological manifestations    Type 2 diabetes mellitus without complication, without long-term current use of insulin    Chronic obstructive pulmonary disease, unspecified COPD type    Pancytopenia    Non-Hodgkin's lymphoma    Aortic atherosclerosis    Sinusitis    Bronchiectasis without complication    Abnormal CXR    Obstructive sleep apnea    Diffuse large b-cell lymphoma, lymph nodes of multiple sites       Past Surgical History:   Procedure Laterality Date    COLONOSCOPY  2018    COLONOSCOPY N/A 02/29/2024    Procedure: COLONOSCOPY;  Surgeon: Nitesh Campbell MD;  Location: [facility];  Service: Endoscopy;  Laterality: N/A;    ESOPHAGOGASTRODUODENOSCOPY N/A 02/29/2024    Procedure: EGD (ESOPHAGOGASTRODUODENOSCOPY);  Surgeon: Nitesh Campbell MD;  Location: [facility];  Service: Endoscopy;  Laterality: N/A;    EYE SURGERY  Approximately 3 years ago    Cataract    HAND SURGERY Left     amputation left index finger    INCISION AND DRAINAGE OF ABSCESS Left 10/03/2022    Procedure: INCISION AND DRAINAGE, ABSCESS;  Surgeon: Franco Venegas III, MD;  Location: [facility];  Service: General;  Laterality: Left;  axilla    INSERTION OF TUNNELED CENTRAL VENOUS CATHETER (CVC) WITH SUBCUTANEOUS PORT Left 02/01/2023    Procedure: [id number];  Surgeon: Franco Venegas III, MD;  Location: [facility] OR;  Service: General;  Laterality: Left;    MEDIPORT REMOVAL Right 10/03/2022    Procedure: REMOVAL, CATHETER, CENTRAL VENOUS, TUNNELED, WITH PORT;  Surgeon:  Franco Venegas III, MD;  Location: [facility] OR;  Service: General;  Laterality: Right;    PORTACATH PLACEMENT      SKIN CANCER EXCISION  09/30/2020    [facility]    SPINE SURGERY      VASECTOMY  1985 ?        Tobacco Use:  The patient  reports that he quit smoking about 50 years ago. His smoking use included cigarettes. He started smoking about 52 years ago. He has a 1 pack-year smoking history. He has never used smokeless tobacco.     Results for orders placed or performed during the hospital encounter of 02/27/24   EKG 12-lead    Collection Time: 02/27/24  7:16 AM   Result Value Ref Range    QRS Duration 90 ms    [facility] QTC Calculation 429 ms    Narrative    Test Reason : Z01.818,    Vent. Rate : 072 BPM     Atrial Rate : 072 BPM     P-R Int : 194 ms          QRS Dur : 090 ms      QT Int : 392 ms       P-R-T Axes : 059 -27 005 degrees     QTc Int : 429 ms    Normal sinus rhythm  Inferior infarct (cited on or before 30-SEP-2022)  Abnormal ECG  When compared with ECG of 10-VERONA-2023 12:56,  No significant change was found  Confirmed by Mendez JACOBS, Marvin CARNES ([id number]) on 3/22/2024 5:25:13 PM    Referred By:             Confirmed By:Marvin Simms MD             Lab Results   Component Value Date    WBC 5.21 04/25/2024    HGB 13.7 (L) 04/25/2024    HCT 40.9 04/25/2024    MCV 88 04/25/2024     04/25/2024     BMP  Lab Results   Component Value Date     04/25/2024    K 4.1 04/25/2024     04/25/2024    CO2 26 04/25/2024    BUN 16 04/25/2024    CREATININE 0.9 04/25/2024    CALCIUM 8.8 04/25/2024    ANIONGAP 8 04/25/2024     (H) 04/25/2024     (H) 02/27/2024     (H) 12/10/2023       Results for orders placed during the hospital encounter of 09/30/22    Echo    Interpretation Summary  · The estimated PA systolic pressure is 32 mmHg.  · The left ventricle is normal in size with normal systolic function.  · Normal left ventricular diastolic function.  · Mild right ventricular enlargement with  normal right ventricular systolic function.  · Normal central venous pressure (3 mmHg).  · The estimated ejection fraction is 55%.  · Mild to moderate tricuspid regurgitation.  · Mild-to-moderate mitral regurgitation.  · There are segmental left ventricular wall motion abnormalities.              Pre-op Assessment    I have reviewed the Patient Summary Reports.     I have reviewed the Nursing Notes. I have reviewed the NPO Status.   I have reviewed the Medications.     Review of Systems  Anesthesia Hx:  No problems with previous Anesthesia             Denies Family Hx of Anesthesia complications.    Denies Personal Hx of Anesthesia complications.                    Social:  Former Smoker       Hematology/Oncology:  Hematology Normal                       --  Cancer in past history (hx of non-hodgkin's lymphoma):              chemotherapy       Cardiovascular:     Hypertension, well controlled              ECG has been reviewed.                          Pulmonary:   COPD, mild     Sleep Apnea, CPAP           Education provided regarding risk of obstructive sleep apnea            Hepatic/GI:  Hepatic/GI Normal                 Musculoskeletal:  Musculoskeletal Normal                Neurological:        Peripheral Neuropathy                          Endocrine:  Diabetes, well controlled, type 2         Obesity / BMI > 30      Physical Exam  General: Well nourished, Cooperative, Alert and Oriented    Airway:  Mallampati: III / II  Mouth Opening: Normal  TM Distance: Normal  Tongue: Normal  Neck ROM: Normal ROM    Dental:  Intact    Chest/Lungs:  Clear to auscultation    Heart:  Rate: Normal  Rhythm: Regular Rhythm  Sounds: Normal    Abdomen:  Normal, Soft, Nontender        Anesthesia Plan  Type of Anesthesia, risks & benefits discussed:    Anesthesia Type: Gen Natural Airway  Intra-op Monitoring Plan: Standard ASA Monitors  Post Op Pain Control Plan:   (medical reason for not using multimodal pain management)  Induction:   IV  Informed Consent: Informed consent signed with the Patient and all parties understand the risks and agree with anesthesia plan.  All questions answered. Patient consented to blood products? No  ASA Score: 3  Anesthesia Plan Notes:   General Natural Airway  POM  Propofol  Zofran    Ready For Surgery From Anesthesia Perspective.     .

## 2024-05-07 NOTE — TRANSFER OF CARE
Anesthesia Transfer of Care Note    Patient: Sivakumar Thacker    Procedure(s) Performed: Procedure(s) (LRB):  ULTRASOUND, UPPER GI TRACT, ENDOSCOPIC (N/A)    Patient location: GI    Anesthesia Type: general    Transport from OR: Transported from OR on room air with adequate spontaneous ventilation    Post pain: adequate analgesia    Post assessment: no apparent anesthetic complications    Post vital signs: stable    Level of consciousness: awake    Nausea/Vomiting: no nausea/vomiting    Complications: none    Transfer of care protocol was followed    Last vitals: Visit Vitals  BP (!) 142/82 (BP Location: Right arm, Patient Position: Lying)   Pulse 74   Temp 36.6 °C (97.8 °F) (Tympanic)   Resp 18   SpO2 95%

## 2024-05-07 NOTE — ANESTHESIA POSTPROCEDURE EVALUATION
Anesthesia Post Evaluation    Patient: Sivakumar Thacker    Procedure(s) Performed: Procedure(s) (LRB):  ULTRASOUND, UPPER GI TRACT, ENDOSCOPIC (N/A)    Final Anesthesia Type: general      Patient location during evaluation: GI PACU  Patient participation: Yes- Able to Participate  Level of consciousness: awake and alert  Post-procedure vital signs: reviewed and stable  Pain management: adequate  Airway patency: patent    PONV status at discharge: No PONV  Anesthetic complications: no      Cardiovascular status: hemodynamically stable  Respiratory status: unassisted, spontaneous ventilation and room air  Hydration status: euvolemic  Follow-up not needed.              Vitals Value Taken Time   /71 05/07/24 0742   Temp 36.4 °C (97.5 °F) 05/07/24 0726   Pulse 66 05/07/24 0743   Resp 18 05/07/24 0726   SpO2 94 % 05/07/24 0743   Vitals shown include unfiled device data.      No case tracking events are documented in the log.      Pain/Gelacio Score: Gelacio Score: 10 (5/7/2024  7:27 AM)

## 2024-05-14 ENCOUNTER — ANESTHESIA EVENT (OUTPATIENT)
Dept: SURGERY | Facility: HOSPITAL | Age: 69
End: 2024-05-14
Payer: MEDICARE

## 2024-05-14 ENCOUNTER — HOSPITAL ENCOUNTER (OUTPATIENT)
Facility: HOSPITAL | Age: 69
Discharge: HOME | End: 2024-05-14
Attending: INTERNAL MEDICINE | Admitting: INTERNAL MEDICINE
Payer: MEDICARE

## 2024-05-14 ENCOUNTER — ANESTHESIA (OUTPATIENT)
Dept: SURGERY | Facility: HOSPITAL | Age: 69
End: 2024-05-14
Payer: MEDICARE

## 2024-05-14 VITALS
OXYGEN SATURATION: 96 % | SYSTOLIC BLOOD PRESSURE: 136 MMHG | DIASTOLIC BLOOD PRESSURE: 80 MMHG | HEART RATE: 80 BPM | RESPIRATION RATE: 18 BRPM | TEMPERATURE: 99 F

## 2024-05-14 VITALS
OXYGEN SATURATION: 98 % | DIASTOLIC BLOOD PRESSURE: 82 MMHG | HEART RATE: 72 BPM | SYSTOLIC BLOOD PRESSURE: 135 MMHG | RESPIRATION RATE: 13 BRPM

## 2024-05-14 DIAGNOSIS — K31.9 GASTRIC LESION: ICD-10-CM

## 2024-05-14 PROCEDURE — 37000008 HC ANESTHESIA 1ST 15 MINUTES: Performed by: INTERNAL MEDICINE

## 2024-05-14 PROCEDURE — D9220A PRA ANESTHESIA: Mod: CRNA,,, | Performed by: NURSE ANESTHETIST, CERTIFIED REGISTERED

## 2024-05-14 PROCEDURE — 63600175 PHARM REV CODE 636 W HCPCS: Performed by: NURSE ANESTHETIST, CERTIFIED REGISTERED

## 2024-05-14 PROCEDURE — D9220A PRA ANESTHESIA: Mod: ANES,,, | Performed by: ANESTHESIOLOGY

## 2024-05-14 PROCEDURE — 43259 EGD US EXAM DUODENUM/JEJUNUM: CPT | Performed by: INTERNAL MEDICINE

## 2024-05-14 PROCEDURE — 25000003 PHARM REV CODE 250: Performed by: NURSE ANESTHETIST, CERTIFIED REGISTERED

## 2024-05-14 RX ORDER — PROPOFOL 10 MG/ML
VIAL (ML) INTRAVENOUS
Status: DISCONTINUED | OUTPATIENT
Start: 2024-05-14 | End: 2024-05-14

## 2024-05-14 RX ADMIN — PROPOFOL 100 MG: 10 INJECTION, EMULSION INTRAVENOUS at 09:05

## 2024-05-14 RX ADMIN — PROPOFOL 50 MG: 10 INJECTION, EMULSION INTRAVENOUS at 09:05

## 2024-05-14 RX ADMIN — SODIUM CHLORIDE: 900 INJECTION, SOLUTION INTRAVENOUS at 09:05

## 2024-05-14 NOTE — H&P
GASTROENTEROLOGY PRE-PROCEDURE H&P NOTE  Patient Name: Sivakumar Thacker  Patient MRN: [id number]  Patient : 1955    Service date: 2024    PCP: Trudi Nails MD    No chief complaint on file.      HPI: Patient is a 69 y.o. male with PMHx as below here for evaluation of submucosal antral lesions.     Past Medical History:  Past Medical History:   Diagnosis Date    Chest wall abscess 2022    Chronic obstructive pulmonary disease, unspecified COPD type 2022    Diabetes mellitus, type 2     Encounter for antineoplastic chemotherapy 2020    Hypertension     Hypogammaglobulinemia 2019    Neuropathy     Non Hodgkin's lymphoma     Reflux esophagitis     Ringing in ear, bilateral         Past Surgical History:  Past Surgical History:   Procedure Laterality Date    COLONOSCOPY  2018    COLONOSCOPY N/A 2024    Procedure: COLONOSCOPY;  Surgeon: Nitesh Campbell MD;  Location: [facility];  Service: Endoscopy;  Laterality: N/A;    ENDOSCOPIC ULTRASOUND OF UPPER GASTROINTESTINAL TRACT N/A 2024    Procedure: ULTRASOUND, UPPER GI TRACT, ENDOSCOPIC;  Surgeon: Jose De Jesus Tran III, MD;  Location: [facility];  Service: Endoscopy;  Laterality: N/A;    ESOPHAGOGASTRODUODENOSCOPY N/A 2024    Procedure: EGD (ESOPHAGOGASTRODUODENOSCOPY);  Surgeon: Nitesh Campbell MD;  Location: [facility];  Service: Endoscopy;  Laterality: N/A;    EYE SURGERY  Approximately 3 years ago    Cataract    HAND SURGERY Left     amputation left index finger    INCISION AND DRAINAGE OF ABSCESS Left 10/03/2022    Procedure: INCISION AND DRAINAGE, ABSCESS;  Surgeon: Franco Venegas III, MD;  Location: [facility];  Service: General;  Laterality: Left;  axilla    INSERTION OF TUNNELED CENTRAL VENOUS CATHETER (CVC) WITH SUBCUTANEOUS PORT Left 2023    Procedure: [id number];  Surgeon: Franco Venegas III, MD;  Location: [facility];  Service: General;  Laterality: Left;    MEDIPORT REMOVAL  Right 10/03/2022    Procedure: REMOVAL, CATHETER, CENTRAL VENOUS, TUNNELED, WITH PORT;  Surgeon: Franco Venegas III, MD;  Location: [facility];  Service: General;  Laterality: Right;    PORTACATH PLACEMENT      SKIN CANCER EXCISION  09/30/2020    [place]    SPINE SURGERY      VASECTOMY  1985 ?        Home Medications:  Medications Prior to Admission   Medication Sig Dispense Refill Last Dose    albuterol (PROVENTIL/VENTOLIN HFA) 90 mcg/actuation inhaler Inhale 2 puffs into the lungs every 6 (six) hours as needed.       amLODIPine (NORVASC) 10 MG tablet Take 1 tablet (10 mg total) by mouth once daily. (Patient taking differently: Take 10 mg by mouth nightly.) 90 tablet 3     aspirin (ECOTRIN) 81 MG EC tablet Take 81 mg by mouth nightly.       atorvastatin (LIPITOR) 20 MG tablet Take 1 tablet (20 mg total) by mouth once daily. (Patient taking differently: Take 20 mg by mouth every evening.) 90 tablet 3     diclofenac sodium (VOLTAREN) 1 % Gel Apply 2 g topically 3 (three) times daily. 200 g 2     fluticasone propionate (FLONASE) 50 mcg/actuation nasal spray 1 spray (50 mcg total) by Each Nostril route 2 (two) times daily. 48 g 11     fluticasone-umeclidin-vilanter (TRELEGY ELLIPTA) 100-62.5-25 mcg DsDv Inhale 1 puff into the lungs once daily. 3 each 3     gabapentin (NEURONTIN) 300 MG capsule Take 3 capsules (900 mg total) by mouth once daily AND 2 capsules (600 mg total) every evening. 450 capsule 3     losartan (COZAAR) 100 MG tablet Take 1 tablet (100 mg total) by mouth once daily. 90 tablet 3     metFORMIN (GLUCOPHAGE) 500 MG tablet Take 2 tablets twice a day by oral route with meals for 90 days. (Patient taking differently: Take 1,000 mg by mouth 2 (two) times daily with meals. Take 2 tablets twice a day by oral route with meals for 90 days.) 360 tablet 3     montelukast (SINGULAIR) 10 mg tablet Take 1 tablet (10 mg total) by mouth nightly. 90 tablet 3     MULTIVITAMIN ORAL Take 1 tablet by mouth once daily.        naproxen (NAPROSYN) 500 MG tablet Take 1 tablet (500 mg total) by mouth 2 (two) times daily with meals. 180 tablet 3     omeprazole (PRILOSEC) 40 MG capsule Take 1 capsule (40 mg total) by mouth once daily. 90 capsule 3     tadalafiL (CIALIS) 20 MG Tab Take 1 tablet (20 mg total) by mouth once daily. 90 tablet 3     tiZANidine (ZANAFLEX) 4 MG tablet Take 1 tablet (4 mg total) by mouth daily as needed (back pain). 90 tablet 3                Review of patient's allergies indicates:  No Known Allergies    Social History:   Social History     Occupational History    Not on file   Tobacco Use    Smoking status: Former     Current packs/day: 0.00     Average packs/day: 0.5 packs/day for 2.0 years (1.0 ttl pk-yrs)     Types: Cigarettes     Start date:      Quit date:      Years since quittin.4    Smokeless tobacco: Never   Substance and Sexual Activity    Alcohol use: Not Currently     Comment: rarely    Drug use: No    Sexual activity: Not Currently       Family History:   Family History   Problem Relation Name Age of Onset    Diabetes Father Tae        Review of Systems:  A 10 point review of systems was performed and was normal, except as mentioned in the HPI, including constitutional, HEENT, heme, lymph, cardiovascular, respiratory, gastrointestinal, genitourinary, neurologic, endocrine, psychiatric and musculoskeletal.      OBJECTIVE:    Physical Exam:  24 Hour Vital Sign Ranges: Temp:  [98.7 °F (37.1 °C)] 98.7 °F (37.1 °C)  Pulse:  [80] 80  Resp:  [18] 18  SpO2:  [96 %] 96 %  BP: (136)/(80) 136/80  Most recent vitals: /80 (BP Location: Left arm, Patient Position: Lying)   Pulse 80   Temp 98.7 °F (37.1 °C) (Tympanic)   Resp 18   SpO2 96%    GEN: well-developed, well-nourished, awake and alert, non-toxic appearing adult  HEENT: PERRL, sclera anicteric, oral mucosa pink and moist without lesion  NECK: trachea midline; Good ROM  CV: regular rate and rhythm, no murmurs or gallops  RESP: clear  "to auscultation bilaterally, no wheezes, rhonci or rales  ABD: soft, non-tender, non-distended, normal bowel sounds  EXT: no swelling or edema, 2+ pulses distally  SKIN: no rashes or jaundice  PSYCH: normal affect    Labs:   No results for input(s): "WBC", "MCV", "PLT" in the last 72 hours.    Invalid input(s): "HGBAU"  No results for input(s): "NA", "K", "CL", "CO2", "BUN", "GLU" in the last 72 hours.    Invalid input(s): "CREA"  No results for input(s): "ALB" in the last 72 hours.    Invalid input(s): "ALKP", "SGOT", "SGPT", "TBIL", "DBIL", "TPRO"  No results for input(s): "PT", "INR", "PTT" in the last 72 hours.      IMPRESSION / RECOMMENDATIONS:  EGD/EUS  with interventions as warranted.   RIsks, benefits, alternatives discussed in detail regarding upcoming procedures and sedation. Some of the more common endoscopic complications include but not limited to immediate or delayed perforation, bleeding, infections, pain, inadvertent injury to surrounding tissue / organs and possible need for surgical evaluation. Patient expressed understanding, all questions answered and will proceed with procedure as planned.     Jose De Jesus Tran III  5/14/2024  9:11 AM      "

## 2024-05-14 NOTE — [employer] PATIENT INSTRUCTIONS
Discharge Summary/Instructions after an Endoscopic Procedure  Patient Name: Sivakumar Thacker  Patient MRN: [id number]  Patient YOB: 1955  Tuesday, May 14, 2024  Jose De Jesus Tran III, MD  RESTRICTIONS:  During your procedure today, you received medications for sedation.  These   medications may affect your judgment, balance and coordination.  Therefore,   for 24 hours, you have the following restrictions:   - DO NOT drive a car, operate machinery, make legal/financial decisions,   sign important papers or drink alcohol.    ACTIVITY:  Today: no heavy lifting, straining or running due to procedural   sedation/anesthesia.  The following day: return to full activity including work.  DIET:  Eat and drink normally unless instructed otherwise.     TREATMENT FOR COMMON SIDE EFFECTS:  - Mild abdominal pain, nausea, belching, bloating or excessive gas:  rest,   eat lightly and use a heating pad.  - Sore Throat: treat with throat lozenges and/or gargle with warm salt   water.  - Because air was used during the procedure, expelling large amounts of air   from your rectum or belching is normal.  - If a bowel prep was taken, you may not have a bowel movement for 1-3 days.    This is normal.  SYMPTOMS TO WATCH FOR AND REPORT TO YOUR PHYSICIAN:  1. Abdominal pain or bloating, other than gas cramps.  2. Chest pain.  3. Back pain.  4. Signs of infection such as: chills or fever occurring within 24 hours   after the procedure.  5. Rectal bleeding, which would show as bright red, maroon, or black stools.   (A tablespoon of blood from the rectum is not serious, especially if   hemorrhoids are present.)  6. Vomiting.  7. Weakness or dizziness.  GO DIRECTLY TO THE NEAREST EMERGENCY ROOM IF YOU HAVE ANY OF THE FOLLOWING:      Difficulty breathing              Chills and/or fever over 101 F   Persistent vomiting and/or vomiting blood   Severe abdominal pain   Severe chest pain   Black, tarry stools   Bleeding- more than one  tablespoon   Any other symptom or condition that you feel may need urgent attention  Your doctor recommends these additional instructions:  If any biopsies were taken, your doctors clinic will contact you in 1 to 2   weeks with any results.  - Discharge patient to home.   - Patient has a contact number available for emergencies.  The signs and   symptoms of potential delayed complications were discussed with the   patient.  Return to normal activities tomorrow.  Written discharge   instructions were provided to the patient.   - Resume regular diet.   - Continue present medications.   - No f/u needed for benign gastric lipoma  For questions, problems or results please call your physician - Jose De Jesus Tran III, MD at Work:  [phone].  [facility], EMERGENCY ROOM PHONE NUMBER: [phone]  IF A COMPLICATION OR EMERGENCY SITUATION ARISES AND YOU ARE UNABLE TO REACH   YOUR PHYSICIAN - GO DIRECTLY TO THE EMERGENCY ROOM.  Jose De Jesus Tran III, MD  5/14/2024 9:29:23 AM  This report has been verified and signed electronically.  Dear patient,  As a result of recent federal legislation (The Federal Cures Act), you may   receive lab or pathology results from your procedure in your [facility]   account before your physician is able to contact you. Your physician or   their representative will relay the results to you with their   recommendations at their soonest availability.  Thank you,  [employer]

## 2024-05-14 NOTE — ANESTHESIA PREPROCEDURE EVALUATION
05/14/2024  Sivakumar Thacker is a 69 y.o., male.      Patient Active Problem List   Diagnosis    Allergic rhinitis    Follicular lymphoma grade IIIa    Nonfamilial hypogammaglobulinemia    Hypogammaglobulinemia    Essential hypertension    Elevated lipoprotein(a)    Prediabetes    Encounter for antineoplastic chemotherapy    Type II diabetes mellitus with neurological manifestations    Type 2 diabetes mellitus without complication, without long-term current use of insulin    Chronic obstructive pulmonary disease, unspecified COPD type    Pancytopenia    Non-Hodgkin's lymphoma    Aortic atherosclerosis    Sinusitis    Bronchiectasis without complication    Abnormal CXR    Obstructive sleep apnea    Diffuse large b-cell lymphoma, lymph nodes of multiple sites       Past Surgical History:   Procedure Laterality Date    COLONOSCOPY  2018    COLONOSCOPY N/A 02/29/2024    Procedure: COLONOSCOPY;  Surgeon: Nitesh Campbell MD;  Location: [facility];  Service: Endoscopy;  Laterality: N/A;    ENDOSCOPIC ULTRASOUND OF UPPER GASTROINTESTINAL TRACT N/A 5/7/2024    Procedure: ULTRASOUND, UPPER GI TRACT, ENDOSCOPIC;  Surgeon: Jose De Jesus Tran III, MD;  Location: [facility];  Service: Endoscopy;  Laterality: N/A;    ESOPHAGOGASTRODUODENOSCOPY N/A 02/29/2024    Procedure: EGD (ESOPHAGOGASTRODUODENOSCOPY);  Surgeon: Nitesh Campbell MD;  Location: [facility] END[facility];  Service: Endoscopy;  Laterality: N/A;    EYE SURGERY  Approximately 3 years ago    Cataract    HAND SURGERY Left     amputation left index finger    INCISION AND DRAINAGE OF ABSCESS Left 10/03/2022    Procedure: INCISION AND DRAINAGE, ABSCESS;  Surgeon: Franco Venegas III, MD;  Location: [facility] OR;  Service: General;  Laterality: Left;  axilla    INSERTION OF TUNNELED CENTRAL VENOUS CATHETER (CVC) WITH SUBCUTANEOUS PORT Left 02/01/2023    Procedure:  [id number];  Surgeon: Franco Venegas III, MD;  Location: [facility] OR;  Service: General;  Laterality: Left;    MEDIPORT REMOVAL Right 10/03/2022    Procedure: REMOVAL, CATHETER, CENTRAL VENOUS, TUNNELED, WITH PORT;  Surgeon: Franco Venegas III, MD;  Location: [facility] OR;  Service: General;  Laterality: Right;    PORTACATH PLACEMENT      SKIN CANCER EXCISION  09/30/2020    [facility]    SPINE SURGERY      VASECTOMY  1985 ?        Tobacco Use:  The patient  reports that he quit smoking about 50 years ago. His smoking use included cigarettes. He started smoking about 52 years ago. He has a 1 pack-year smoking history. He has never used smokeless tobacco.     Results for orders placed or performed during the hospital encounter of 02/27/24   EKG 12-lead    Collection Time: 02/27/24  7:16 AM   Result Value Ref Range    QRS Duration 90 ms    [facility] QTC Calculation 429 ms    Narrative    Test Reason : Z01.818,    Vent. Rate : 072 BPM     Atrial Rate : 072 BPM     P-R Int : 194 ms          QRS Dur : 090 ms      QT Int : 392 ms       P-R-T Axes : 059 -27 005 degrees     QTc Int : 429 ms    Normal sinus rhythm  Inferior infarct (cited on or before 30-SEP-2022)  Abnormal ECG  When compared with ECG of 10-VERONA-2023 12:56,  No significant change was found  Confirmed by Mendez JACOBS, Marvin CARNES ([id number]) on 3/22/2024 5:25:13 PM    Referred By:             Confirmed By:Marvin Simms MD             Lab Results   Component Value Date    WBC 5.21 04/25/2024    HGB 13.7 (L) 04/25/2024    HCT 40.9 04/25/2024    MCV 88 04/25/2024     04/25/2024     BMP  Lab Results   Component Value Date     04/25/2024    K 4.1 04/25/2024     04/25/2024    CO2 26 04/25/2024    BUN 16 04/25/2024    CREATININE 0.9 04/25/2024    CALCIUM 8.8 04/25/2024    ANIONGAP 8 04/25/2024     (H) 04/25/2024     (H) 02/27/2024     (H) 12/10/2023       Results for orders placed during the hospital encounter of  09/30/22    Echo    Interpretation Summary  · The estimated PA systolic pressure is 32 mmHg.  · The left ventricle is normal in size with normal systolic function.  · Normal left ventricular diastolic function.  · Mild right ventricular enlargement with normal right ventricular systolic function.  · Normal central venous pressure (3 mmHg).  · The estimated ejection fraction is 55%.  · Mild to moderate tricuspid regurgitation.  · Mild-to-moderate mitral regurgitation.  · There are segmental left ventricular wall motion abnormalities.              Pre-op Assessment    I have reviewed the Patient Summary Reports.     I have reviewed the Nursing Notes. I have reviewed the NPO Status.   I have reviewed the Medications.     Review of Systems  Anesthesia Hx:  No problems with previous Anesthesia   History of prior surgery of interest to airway management or planning: cervical fusion.         Denies Family Hx of Anesthesia complications.    Denies Personal Hx of Anesthesia complications.                    Social:  Former Smoker       Hematology/Oncology:  Hematology Normal                       --  Cancer in past history (hx of non-hodgkin's lymphoma, in remission):              chemotherapy       Cardiovascular:     Hypertension, well controlled              ECG has been reviewed.                          Pulmonary:   COPD, mild     Sleep Apnea, CPAP           Education provided regarding risk of obstructive sleep apnea            Hepatic/GI:        Occasional diarrhea and abdominal distention, none this morning.  Patient had clear liquids only yesterday since he had food in his stomach with EUS last week.          Musculoskeletal:         Spine Disorders: (occasional Naprosyn) lumbar Chronic Pain           Neurological:        Peripheral Neuropathy (feet)                          Endocrine:  Diabetes (glucose 148 this morning at home), type 2         Obesity / BMI > 30      Physical Exam  General: Well nourished,  Cooperative, Alert and Oriented    Airway:  Mallampati: III / II  Mouth Opening: Normal  TM Distance: Normal  Tongue: Large  Neck ROM: Normal ROM    Dental:  Intact    Chest/Lungs:  Clear to auscultation, Normal Respiratory Rate    Heart:  Rate: Normal  Rhythm: Regular Rhythm  Sounds: Normal        Anesthesia Plan  Type of Anesthesia, risks & benefits discussed:    Anesthesia Type: Gen Natural Airway  Intra-op Monitoring Plan: Standard ASA Monitors  Post Op Pain Control Plan:   (medical reason for not using multimodal pain management)  Induction:  IV  Informed Consent: Informed consent signed with the Patient and all parties understand the risks and agree with anesthesia plan.  All questions answered. Patient consented to blood products? No  ASA Score: 3  Anesthesia Plan Notes:   General Natural Airway  POM  Propofol      Ready For Surgery From Anesthesia Perspective.     .

## 2024-05-14 NOTE — ANESTHESIA POSTPROCEDURE EVALUATION
Anesthesia Post Evaluation    Patient: Sivakumar Thacker    Procedure(s) Performed: Procedure(s) (LRB):  ULTRASOUND, UPPER GI TRACT, ENDOSCOPIC (N/A)    Final Anesthesia Type: general      Patient location during evaluation: GI PACU  Patient participation: Yes- Able to Participate  Level of consciousness: awake and alert  Post-procedure vital signs: reviewed and stable  Pain management: adequate  Airway patency: patent    PONV status at discharge: No PONV  Anesthetic complications: no      Cardiovascular status: stable  Respiratory status: unassisted  Hydration status: euvolemic  Follow-up not needed.              Vitals Value Taken Time   /72 05/14/24 0934   Temp  05/14/24 0946   Pulse 74 05/14/24 0944   Resp 15 05/14/24 0946   SpO2 94 % 05/14/24 0944   Vitals shown include unfiled device data.      No case tracking events are documented in the log.      Pain/Gelacio Score: Gelacio Score: 10 (5/14/2024  9:30 AM)

## 2024-05-14 NOTE — TRANSFER OF CARE
Anesthesia Transfer of Care Note    Patient: Sivakumar Thacker    Procedure(s) Performed: Procedure(s) (LRB):  ULTRASOUND, UPPER GI TRACT, ENDOSCOPIC (N/A)    Patient location: GI    Anesthesia Type: general    Transport from OR: Transported from OR on room air with adequate spontaneous ventilation    Post pain: adequate analgesia    Post assessment: no apparent anesthetic complications and tolerated procedure well    Post vital signs: stable    Level of consciousness: awake    Nausea/Vomiting: no nausea/vomiting    Complications: none    Transfer of care protocol was followed      Last vitals: Visit Vitals  /80 (BP Location: Left arm, Patient Position: Lying)   Pulse 80   Temp 37.1 °C (98.7 °F) (Tympanic)   Resp 18   SpO2 96%

## 2024-05-17 DIAGNOSIS — R68.81 EARLY SATIETY: ICD-10-CM

## 2024-05-17 DIAGNOSIS — R63.4 WEIGHT LOSS: Primary | ICD-10-CM

## 2024-05-17 DIAGNOSIS — E11.9 DM (DIABETES MELLITUS): ICD-10-CM

## 2024-05-24 ENCOUNTER — INFUSION (OUTPATIENT)
Dept: INFUSION THERAPY | Facility: HOSPITAL | Age: 69
End: 2024-05-24
Attending: INTERNAL MEDICINE
Payer: MEDICARE

## 2024-05-24 VITALS
SYSTOLIC BLOOD PRESSURE: 146 MMHG | OXYGEN SATURATION: 96 % | HEART RATE: 80 BPM | DIASTOLIC BLOOD PRESSURE: 81 MMHG | RESPIRATION RATE: 16 BRPM | HEIGHT: 68 IN | WEIGHT: 222.88 LBS | BODY MASS INDEX: 33.78 KG/M2 | TEMPERATURE: 98 F

## 2024-05-24 DIAGNOSIS — D80.1 NONFAMILIAL HYPOGAMMAGLOBULINEMIA: ICD-10-CM

## 2024-05-24 DIAGNOSIS — D80.1 HYPOGAMMAGLOBULINEMIA: Primary | ICD-10-CM

## 2024-05-24 DIAGNOSIS — C82.30 FOLLICULAR LYMPHOMA GRADE IIIA, UNSPECIFIED BODY REGION: ICD-10-CM

## 2024-05-24 PROCEDURE — 96367 TX/PROPH/DG ADDL SEQ IV INF: CPT

## 2024-05-24 PROCEDURE — 96365 THER/PROPH/DIAG IV INF INIT: CPT

## 2024-05-24 PROCEDURE — 96366 THER/PROPH/DIAG IV INF ADDON: CPT

## 2024-05-24 PROCEDURE — 25000003 PHARM REV CODE 250: Performed by: INTERNAL MEDICINE

## 2024-05-24 PROCEDURE — 63600175 PHARM REV CODE 636 W HCPCS: Performed by: INTERNAL MEDICINE

## 2024-05-24 RX ORDER — SODIUM CHLORIDE 0.9 % (FLUSH) 0.9 %
10 SYRINGE (ML) INJECTION
OUTPATIENT
Start: 2024-06-21

## 2024-05-24 RX ORDER — HEPARIN 100 UNIT/ML
500 SYRINGE INTRAVENOUS
Status: DISCONTINUED | OUTPATIENT
Start: 2024-05-24 | End: 2024-05-24 | Stop reason: HOSPADM

## 2024-05-24 RX ORDER — HEPARIN 100 UNIT/ML
500 SYRINGE INTRAVENOUS
OUTPATIENT
Start: 2024-06-21

## 2024-05-24 RX ORDER — SODIUM CHLORIDE 0.9 % (FLUSH) 0.9 %
10 SYRINGE (ML) INJECTION
Status: DISCONTINUED | OUTPATIENT
Start: 2024-05-24 | End: 2024-05-24 | Stop reason: HOSPADM

## 2024-05-24 RX ADMIN — DIPHENHYDRAMINE HYDROCHLORIDE 25 MG: 50 INJECTION, SOLUTION INTRAMUSCULAR; INTRAVENOUS at 07:05

## 2024-05-24 RX ADMIN — HEPARIN 500 UNITS: 100 SYRINGE at 09:05

## 2024-05-24 RX ADMIN — SODIUM CHLORIDE: 9 INJECTION, SOLUTION INTRAVENOUS at 07:05

## 2024-05-24 RX ADMIN — IMMUNE GLOBULIN (HUMAN) 50 G: 10 INJECTION INTRAVENOUS; SUBCUTANEOUS at 07:05

## 2024-05-24 NOTE — PLAN OF CARE
Problem: Activity Intolerance  Goal: Enhanced Capacity and Energy  Outcome: Progressing  Intervention: Optimize Activity Tolerance  Flowsheets (Taken 5/24/2024 6251)  Self-Care Promotion: independence encouraged  Activity Management: Ambulated -L4  Environmental Support: calm environment promoted

## 2024-05-30 ENCOUNTER — HOSPITAL ENCOUNTER (OUTPATIENT)
Dept: RADIOLOGY | Facility: HOSPITAL | Age: 69
Discharge: HOME | End: 2024-05-30
Attending: INTERNAL MEDICINE
Payer: MEDICARE

## 2024-05-30 DIAGNOSIS — E11.9 DM (DIABETES MELLITUS): ICD-10-CM

## 2024-05-30 DIAGNOSIS — R68.81 EARLY SATIETY: ICD-10-CM

## 2024-05-30 DIAGNOSIS — R63.4 WEIGHT LOSS: ICD-10-CM

## 2024-05-30 PROCEDURE — 78264 GASTRIC EMPTYING IMG STUDY: CPT | Mod: TC

## 2024-05-30 PROCEDURE — A9541 TC99M SULFUR COLLOID: HCPCS | Performed by: INTERNAL MEDICINE

## 2024-05-30 PROCEDURE — 78264 GASTRIC EMPTYING IMG STUDY: CPT | Mod: 26,,, | Performed by: RADIOLOGY

## 2024-05-30 RX ORDER — TECHNETIUM TC 99M SULFUR COLLOID 2 MG
1 KIT MISCELLANEOUS
Status: COMPLETED | OUTPATIENT
Start: 2024-05-30 | End: 2024-05-30

## 2024-05-30 RX ADMIN — TECHNETIUM TC 99M SULFUR COLLOID KIT 1 MILLICURIE: KIT at 09:05

## 2024-06-17 NOTE — PROGRESS NOTES
[facility] Hematology/Oncology  PROGRESS NOTE -  Follow-up Visit      Subjective:       Patient ID:   NAME: Sivakumar Thacker : 1955     69 y.o. male    Referring Doc: Doe (new PCP)  Other Physicians: Vinod Chen/José Manuel      Chief Complaint:  NHL f/u    History of Present Illness:     Patient returns today for a regularly scheduled follow-up visit.  The patient is here today to go over the results of the recently ordered labs, tests and studies. He is here with his wife    He has been doing well overall.  No new respiratory issues, just some sinus issues.        He previously had upper and lower endoscopies with Dr Tran on 2024    He had recent repeat PET on 3/11/2024 is negative    He saw Dr Nails in 2024 with [facility]    He previously had since resumed Rituximab but it is now at every 12 weeks; next one is on 2024; he has also been getting IV IGg  as well     He denies any CP, SOB, HA's or N/V       He is continued on Gabapentin for the neuropathy issues in his feet.          I discussed continued Covid19 precautions        ROS:   GEN: normal without any fever, night sweats or weight loss  HEENT: normal with no HA's, sore throat, stiff neck, changes in vision;    CV: normal with no CP, SOB, PND, VASQUEZ or orthopnea  PULM: normal with no SOB,  hemoptysis, sputum or pleuritic pain;  breathing better    GI: vague upper abdominal discomfort/bloating but stable with no nausea, vomiting, constipation, diarrhea, melanotic stools, BRBPR, or hematemesis  : normal with no hematuria, dysuria  BREAST: normal with no mass, discharge, pain  SKIN: normal with no rash, erythema, bruising, or swelling; no current wound issues    Allergies:  Review of patient's allergies indicates:  No Known Allergies    Medications:    Current Outpatient Medications:     albuterol (PROVENTIL/VENTOLIN HFA) 90 mcg/actuation inhaler, Inhale 2 puffs into the lungs every 6 (six) hours as needed., Disp: , Rfl:      amLODIPine (NORVASC) 10 MG tablet, Take 1 tablet (10 mg total) by mouth once daily. (Patient taking differently: Take 10 mg by mouth nightly.), Disp: 90 tablet, Rfl: 3    aspirin (ECOTRIN) 81 MG EC tablet, Take 81 mg by mouth nightly., Disp: , Rfl:     atorvastatin (LIPITOR) 20 MG tablet, Take 1 tablet (20 mg total) by mouth once daily. (Patient taking differently: Take 20 mg by mouth every evening.), Disp: 90 tablet, Rfl: 3    diclofenac sodium (VOLTAREN) 1 % Gel, Apply 2 g topically 3 (three) times daily., Disp: 200 g, Rfl: 2    fluticasone propionate (FLONASE) 50 mcg/actuation nasal spray, 1 spray (50 mcg total) by Each Nostril route 2 (two) times daily., Disp: 48 g, Rfl: 11    fluticasone-umeclidin-vilanter (TRELEGY ELLIPTA) 100-62.5-25 mcg DsDv, Inhale 1 puff into the lungs once daily., Disp: 3 each, Rfl: 3    gabapentin (NEURONTIN) 300 MG capsule, Take 3 capsules (900 mg total) by mouth once daily AND 2 capsules (600 mg total) every evening., Disp: 450 capsule, Rfl: 3    losartan (COZAAR) 100 MG tablet, Take 1 tablet (100 mg total) by mouth once daily., Disp: 90 tablet, Rfl: 3    metFORMIN (GLUCOPHAGE) 500 MG tablet, Take 2 tablets twice a day by oral route with meals for 90 days. (Patient taking differently: Take 1,000 mg by mouth 2 (two) times daily with meals. Take 2 tablets twice a day by oral route with meals for 90 days.), Disp: 360 tablet, Rfl: 3    montelukast (SINGULAIR) 10 mg tablet, Take 1 tablet (10 mg total) by mouth nightly., Disp: 90 tablet, Rfl: 3    MULTIVITAMIN ORAL, Take 1 tablet by mouth once daily., Disp: , Rfl:     naproxen (NAPROSYN) 500 MG tablet, Take 1 tablet (500 mg total) by mouth 2 (two) times daily with meals., Disp: 180 tablet, Rfl: 3    omeprazole (PRILOSEC) 40 MG capsule, Take 1 capsule (40 mg total) by mouth once daily., Disp: 90 capsule, Rfl: 3    tadalafiL (CIALIS) 20 MG Tab, Take 1 tablet (20 mg total) by mouth once daily., Disp: 90 tablet, Rfl: 3    tiZANidine (ZANAFLEX) 4  "MG tablet, Take 1 tablet (4 mg total) by mouth daily as needed (back pain)., Disp: 90 tablet, Rfl: 3    PMHx/PSHx Updates:  See patient's last visit with me on  3/18/2024  See H&P on 1/8/2019        Pathology:  Cancer Staging  No matching staging information was found for the patient.    EGD 2/29/2024:  1. LIPOMA, BIOPSY:   - GASTRIC ANTRAL MUCOSA WITH REACTIVE GASTROPATHY.   - NEGATIVE FOR SUBMUCOSA TO EVALUATE FOR A DEEPER SEATED LESION.   - NEGATIVE FOR HELICOBACTER PYLORI TYPE ORGANISMS.     2. STOMACH, ANTRUM, BIOPSY:   - GASTRIC ANTRAL MUCOSA WITH REACTIVE GASTROPATHY.   - GASTRIC OXYNTIC MUCOSA WITH NO SIGNIFICANT HISTOPATHOLOGIC FINDINGS.   - NEGATIVE FOR HELICOBACTER PYLORI TYPE ORGANISMS.     3. GASTROESOPHAGEAL JUNCTION, BIOPSY:   - SQUAMOUS MUCOSA WITH REFLUX ESOPHAGITIS.   - COLUMNAR MUCOSA WITH NO SIGNIFICANT HISTOPATHOLOGIC FINDINGS.   - NEGATIVE FOR INTESTINAL METAPLASIA.       Objective:     Vitals:  Blood pressure 134/81, pulse 70, temperature 97.9 °F (36.6 °C), resp. rate 16, height 5' 8" (1.727 m), weight 101.6 kg (224 lb).    Physical Examination:   GEN: no apparent distress, comfortable; AAOx3  HEAD: atraumatic and normocephalic  EYES: no pallor, no icterus, PERRLA  ENT: OMM, no pharyngeal erythema, external ears WNL; no nasal discharge; no thrush;    NECK: no masses, thyroid normal, trachea midline, no LAD/LN's, supple  CV: RRR with no murmur; normal pulse; normal S1 and S2; no pedal edema; portacath since removed  CHEST: Normal respiratory effort; CTAB  ABDOM: nontender and nondistended; soft; normal bowel sounds; no rebound/guarding  MUSC/Skeletal: ROM normal; no crepitus; joints normal; no deformities or arthropathy  EXTREM: no clubbing, cyanosis, inflammation or swelling  SKIN: no rashes, lesions, ulcers, petechiae or subcutaneous nodules; no current wound issues  : no gibbs  NEURO: grossly intact; motor/sensory WNL; AAOx3; no tremors  PSYCH: normal mood, affect and behavior  LYMPH: " normal cervical, supraclavicular, axillary and groin LN's            Labs:              Lab Results   Component Value Date    WBC 5.21 04/25/2024    HGB 13.7 (L) 04/25/2024    HCT 40.9 04/25/2024    MCV 88 04/25/2024     04/25/2024     CMP  Sodium   Date Value Ref Range Status   04/25/2024 138 136 - 145 mmol/L Final   04/25/2019 144 134 - 144 mmol/L      Potassium   Date Value Ref Range Status   04/25/2024 4.1 3.5 - 5.1 mmol/L Final     Chloride   Date Value Ref Range Status   04/25/2024 104 95 - 110 mmol/L Final   04/25/2019 107 98 - 110 mmol/L      CO2   Date Value Ref Range Status   04/25/2024 26 23 - 29 mmol/L Final     Glucose   Date Value Ref Range Status   04/25/2024 315 (H) 70 - 110 mg/dL Final   04/25/2019 177 (H) 70 - 99 mg/dL      BUN   Date Value Ref Range Status   04/25/2024 16 8 - 23 mg/dL Final     Creatinine   Date Value Ref Range Status   04/25/2024 0.9 0.5 - 1.4 mg/dL Final   04/25/2019 0.83 0.60 - 1.40 mg/dL      Calcium   Date Value Ref Range Status   04/25/2024 8.8 8.7 - 10.5 mg/dL Final     Total Protein   Date Value Ref Range Status   04/25/2024 6.2 6.0 - 8.4 g/dL Final     Albumin   Date Value Ref Range Status   04/25/2024 3.9 3.5 - 5.2 g/dL Final   04/25/2019 4.4 3.1 - 4.7 g/dL      Total Bilirubin   Date Value Ref Range Status   04/25/2024 0.4 0.1 - 1.0 mg/dL Final     Comment:     For infants and newborns, interpretation of results should be based  on gestational age, weight and in agreement with clinical  observations.    Premature Infant recommended reference ranges:  Up to 24 hours.............<8.0 mg/dL  Up to 48 hours............<12.0 mg/dL  3-5 days..................<15.0 mg/dL  6-29 days.................<15.0 mg/dL       Alkaline Phosphatase   Date Value Ref Range Status   04/25/2024 86 55 - 135 U/L Final     AST   Date Value Ref Range Status   04/25/2024 19 10 - 40 U/L Final     ALT   Date Value Ref Range Status   04/25/2024 27 10 - 44 U/L Final     Anion Gap   Date Value  Ref Range Status   04/25/2024 8 8 - 16 mmol/L Final     eGFR if    Date Value Ref Range Status   07/21/2022 >60.0 >60 mL/min/1.73 m^2 Final     eGFR if non    Date Value Ref Range Status   07/21/2022 >60.0 >60 mL/min/1.73 m^2 Final     Comment:     Calculation used to obtain the estimated glomerular filtration  rate (eGFR) is the CKD-EPI equation.            Radiology/Diagnostic Studies:    PET 3/11/2024:    IMPRESSION:  1. Interval resolution of the previously described nonspecific small bowel mesenteric fat stranding.  2. No concerning FDG avid mass or lymphadenopathy.        PET 12/18/2023:  IMPRESSION:  1. Mild faint nonspecific fat stranding in the small bowel mesentery, possibly from low-grade/subclinical enteritis or edema, noting an early lymphoproliferative disorder is not entirely excluded and attention on follow-up imaging may be warranted.     2. No FDG avid mass or lymphadenopathy.      PET 6/22/2023:    IMPRESSION:     1. Negative for FDG avid lymphoproliferative disease.  2. Resolution of prior bilateral pulmonary opacities.        PET 1/5/2023:    IMPRESSION: Diffuse reticular nodular and groundglass opacities the left lower lobe, posterior right upper lobe and posterior medial right lower lobe compatible with multifocal pneumonia. Follow-up chest CT in three months is recommended     Diffuse FDG activity throughout the axial skeleton suggestive of bone marrow hyperstimulation     No evidence of recurrent or metastatic disease        PET  4/5/2022:    IMPRESSION: No evidence of recurrent or active lymphoproliferative disease        PET 10/4/2021:  Impression:     1. Negative for FDG avid recurrent lymphoproliferative disease.  2. New left maxillary sinusitis.  3. New bilateral reticulonodular pulmonary opacities suggesting infectious or inflammatory pneumonitis.  Clinical and laboratory correlation is requested.  4. Coronary artery calcification.         CXR   3/25/2021:    Impression:     Mild interstitial prominence, similar to previous exams.  No focal consolidation      PET 3/19/2021:  IMPRESSION:  1. Mild patchy airspace opacity in the superior segment of the left  lower lobe with increased FDG activity from background suggestive of a  small focal pneumonia (possibly viral in etiology). Consider  correlation with the symptoms, history and CT follow-up in 3 months to  document resolution.  2. No FDG avid lymphadenopathy or additional sites of disease.         PET 9/2/2020:      Impression:     Negative for active lymphoproliferative disease.         Chest CT  3/19/2020:      Impression       1. Mild bronchiectasis in the left lower lobe and tree-in-bud micro nodules at the left lung base suggesting underlying bronchiolitis, possibly due to a non tuberculous mycobacterial infection or viral bronchiolitis.  2. Fatty infiltration of the liver  3. Small hiatal hernia.           CXR  1/31/2020    Impression       1. No acute chest disease.  2. Left subclavian Port-A-Cath.               PET 9/11/2019   Impression       No evidence of FDG avid residual or recurrent lymphoma       I have reviewed all available lab results and radiology reports.    Assessment/Plan:   (1) 69 y.o. male with diagnosis of NHL Follicular Stage IIIA who has been referred by Dr Gary Chen with [facility] for continuation of care by medical hematology/oncology.   - He originally was diagnosed in 2012 and had parotid excision at [facility]. He subsequently went to [facility] and was treated with bendamustine-rituximab. He has been on rituximab maintenance every 8 weeks and IV IgG monthly. Dr Chen's plan was to keep him on maintenace therapy for as long as he could tolerate the treatments  - s/p 6 cycles of Bendamustine and Retuximab with subsequent complete remission  - 1st maintenance cycle rituximab was in March 2016  - he has been on maintenance rituximab and IV IgG - last rituximab 11/15/2018; last  IV IgG on Dec 14th 2018  - last PET was on 9/26/2018 with no evidence of recurrence  - originally diagnosed in Dec 2012 with left parotid and left periparotid LN excision on 12/11/2012  - PET scan done on  9/11/2019 was good    7/23/2020:  - new nodule on auricle of left ear suspicious for a skin cancer - will refer him to Dr Avilez with ENT  - he is due for repeat PET    9/17/2020:  - PET scan on 9/2/2020 is adequate  - [facility]     11/12/2020:  - continued on the current regimen  - doing well with no new issues    1/7/2021:  - he is having some persistent cough issues for which he has been obtaining sputum for José Manuel  - last PET was Sept 2020    3/4/2021:  - doing ok  - chronic cough issues - followed by José Manuel  - will set up f/u PET    4/29/2021:  - he had PET scan on 3/19/2021  - He has been seeing pulmonary about his chronic cough  Lucia Georges with pulmonary has seen the recent PET and reported to him that the CXR on 3/25 was stable and he is on some oral antibiotics with improvement of the symptoms  - He is also on Gabapentin for the neuropathy issues in his feet.   - He saw Dr Barry Mcmahon on 3/24/2021 with [facility]    8/19/2021:  - He has been seeing pulmonary about his chronic cough - Lucia Georges with pulmonary and he has been on periodic treatments of antibiotics. He saw her last just yesterday on 8/17/2021 and is currently on antibiotics.  - Pulmonary is planning to refer him to an ID specialist  - he is on the rituximab every 8 weeks; I am not convinced that this is contributing to the infection issues as it is not reducing his WBC; however,if pulmonary and/or ID feel it is a contributing factor then we can discontinue. The risk of his lymphoma recurring or progressing would be higher if he were to discontinue the therapy      10/14/2021:  - continued on oral antibiotics per pulmonary and plans to see Pulmonary ID specialist in near future  -  continued on current therapy with rituximab  - recent PEt on 10/4/2021    12/9/2021:  - continued on rituximab  - on new antibiotics per pulmonary and he sees them again in Feb 2022  - repeat scans in Feb 2022 or sooner if pulmonary says otherwise    2/2/2022:  - he is seeing pulmonary again in two weeks  - he does not think that the new triple antibiotic regimen is working  - we can get PET in Feb/mar 2022 if pulmonary is inclined, otherwise 6 month surveillance scan in April/May 2022    3/31/2022:  - He has been seeing pulmonary about his chronic cough and TONY issues; he is on antibiotics 3x/week and he reports that pulmonary feels that he is stable  - PET scan scheduled for 4/14/2022  - He last saw Dr Barry Mcmahon on 3/28/2021 and José Manuel on 2/17/2022  - pulmonary is fine with him continuing the ritxuimab per his report    5/26/2022:  - He has been seeing pulmonary about his chronic cough and TONY issues; he saw Rose on 5/17/2022 and is now on a new antibiotic and he reports that pulmonary said the PEt scan was better than the last one; he is planning to see Dr Veliz in the near future   - PET scan was done on 4/5/2022 with no evidence of recurrence  - continued on rituximab maintenance therapy    7/21/2022:  - continued under the care of pulmonary  - he is seeing Dr Veliz in Aug 2022  - continued on monthly IV IgG and rituximab every other month  - repeat PET in Oct 2022 expected    9/15/2022:  - He has been seeing pulmonary about his chronic cough and TONY issues; he has been seeing Rose and saw Dr Veliz on 8/15/2022;   - Dr Veliz is looking at increasing his Ig dose or changing it to SQ weekly  - Last PET scan was done on 4/5/2022 and repeat has been ordered and is due this Oct 2022  - discussed with patient about referral to Dr Nasreen Abarca at [facility] for not only evaluation for 2nd opinion for his chronic lymphoma but also the options of IV IgG infusion therapy, patient is agreeable    11/10/2022:  - Patient  was seen by Dr Abarca at [facility] since last visit and his recommendation was to hold off on further rituximab therapy and proceed with just surveillance scanning.  - He was recently hospitalized at [facility] in Oct 2022 with upper torso cellulitis with Serratia marcescens septicemia  - He was recently hospitalized at [facility] in Oct 2022 with upper torso cellulitis with Serratia marcescens septicemia. He is still followed by wound care and is getting the prior drain site packed, etc. He has not followed up with ID as of yet    1/4/2023:  - Patient was previously seen by Dr Abarca at [facility] since last visit and his recommendation was to hold off on further rituximab therapy and proceed with just surveillance scanning. He has telemed visit with him in Feb 2023  -  He is getting PET tomorrow and seeing pulmonary again in Feb 2023  - he has been on IV IgG  - He saw Dr Washington last in Nov 2022 and is seeing ID at [facility] in Jan 2023    3/1/2023:  - He had telemed with Dr Abarca and he wants him off the rituximab for another 3 months; patient is concerned about holding off on the rituximab maintenance.   - He last had rituximab in Sept 2022  - He has been on IV IgG  - Recent PET on 1/5/2023 with no evidence of lymphoma  - will discuss with Dr Abarca, but I think it is reasonable to resume rituximab in near future if ok with Pulm and ID, but will spread out to therapy every 3 months instead of every 2 months    4/26/2023:  - discussed with patient about resuming the Rituximab and he is anxious to do so  - will give every 12 weeks    6/21/2023:  - PET due tomorrow  - IV IgG on Friday  - rituximab since resumed but to be given every 12 weeks now  - f/\u with Dr Velzi in Aug 2023    9/13/2023:  - He has since resumed Rituximab but it is now at every 12 weeks; getting IV IGg this Friday and rituimab due again in Oct 2023  - He had PEt on 6/22/2023 12/13/2023:  - he is continued on IV IgG with next one on Jan 5th  - he gets Rituximab every 3  months now - latest one was 11/10  - he has PET this coming Dec 18th    3/18/2024:  - He has been having some upper GI pressure and discomfort issues since Jan 2024 and had upper and lower endoscopies with Dr Campbell; he has f/u visit with GI to go over the results in near future; he has diverticulosis  - He had recent repeat PET on 3/11/2024 is negative  - he is on rituximab every 3 months (due Apr 25th 2024)  - He saw Dr Nails in Jan 2024 with [facility]    6/18/2024:  - schedule next PET in Sept 2024  - continued on rituximab every 12 weeks with next one due in mid-July 2024  - saw Dr Nails in Apr 2024  - had scopes with Dr griffith in May 2024    (2) HTN     (3) Neuropathy     (4) GERD     (5) DM - on metformin per Dr Nunez     (6) Hypogammaglobulinemia - on Iv IgG monthly     (7) Steatosis of liver     (8) Degenerative disc disease of back     (9) Diverticular disease    (10) Mild bronchiectasis in the left lower lobe and tree-in-bud micro nodules at the left lung base suggesting underlying bronchiolitis  - seeing Lucia Georges           VISIT DIAGNOSES:      Follicular lymphoma grade IIIa, unspecified body region    Non-Hodgkin's lymphoma, unspecified body region, unspecified non-Hodgkin lymphoma type    Pancytopenia    Diffuse large b-cell lymphoma, lymph nodes of multiple sites          PLAN:  1. continue rituximab maintenance  therapy every 12 weeks (due again in July 2024)  2. continue IV IgG monthly    3. F/u with Dr Nasreen Abarca at [facility] as directed   4. Check labs every 8-12 weeks  5. Repeat PET every 6 months (due Sept 2024)   6. F/u with PCP, Pulm etc    7. F/u with GI as directed           RTC in 12 weeks  with myself   Fax note to Jesu (new);; José Manuel Veliz; Rima; Adrian/Chelsea         Discussion:       I spent over 25 mins of time with the patient. Reviewed results of the recently ordered labs, tests and studies; made directives with regards to the results. Over half of this time was spent  couseling and coordinating care.      COVID-19 Discussion:    I had long discussion with patient and any applicable family about the COVID-19 coronavirus epidemic and the recommended precautions with regard to cancer and/or hematology patients. I have re-iterated the CDC recommendations for adequate hand washing, use of hand -like products, and coughing into elbow, etc. In addition, especially for our patients who are on chemotherapy and/or our otherwise immunocompromised patients, I have recommended avoidance of crowds, including movie theaters, restaurants, churches, etc. I have recommended avoidance of any sick or symptomatic family members and/or friends. I have also recommended avoidance of any raw and unwashed food products, and general avoidance of food items that have not been prepared by themselves. The patient has been asked to call us immediately with any symptom developments, issues, questions or other general concerns.     I have explained all of the above in detail and the patient understands all of the current recommendation(s). I have answered all of their questions to the best of my ability and to their complete satisfaction.   The patient is to continue with the current management plan.            Electronically signed by Jefferson Ibarra MD                   Answers submitted by the patient for this visit:  Review of Systems Questionnaire (Submitted on 6/11/2024)  appetite change : No  unexpected weight change: No  mouth sores: Yes  visual disturbance: No  cough: No  shortness of breath: No  chest pain: No  abdominal pain: No  diarrhea: Yes  frequency: No  back pain: No  rash: No  headaches: No  adenopathy: No  nervous/ anxious: No

## 2024-06-18 ENCOUNTER — OFFICE VISIT (OUTPATIENT)
Facility: CLINIC | Age: 69
End: 2024-06-18
Payer: MEDICARE

## 2024-06-18 ENCOUNTER — LAB VISIT (OUTPATIENT)
Dept: LAB | Facility: HOSPITAL | Age: 69
End: 2024-06-18
Attending: INTERNAL MEDICINE
Payer: MEDICARE

## 2024-06-18 VITALS
TEMPERATURE: 98 F | RESPIRATION RATE: 16 BRPM | HEART RATE: 70 BPM | BODY MASS INDEX: 33.95 KG/M2 | DIASTOLIC BLOOD PRESSURE: 81 MMHG | HEIGHT: 68 IN | SYSTOLIC BLOOD PRESSURE: 134 MMHG | WEIGHT: 224 LBS

## 2024-06-18 DIAGNOSIS — C85.90 NON-HODGKIN'S LYMPHOMA, UNSPECIFIED BODY REGION, UNSPECIFIED NON-HODGKIN LYMPHOMA TYPE: ICD-10-CM

## 2024-06-18 DIAGNOSIS — C83.38 DIFFUSE LARGE B-CELL LYMPHOMA, LYMPH NODES OF MULTIPLE SITES: ICD-10-CM

## 2024-06-18 DIAGNOSIS — C82.30 FOLLICULAR LYMPHOMA GRADE IIIA, UNSPECIFIED BODY REGION: Primary | ICD-10-CM

## 2024-06-18 DIAGNOSIS — D61.818 PANCYTOPENIA: ICD-10-CM

## 2024-06-18 LAB
ALBUMIN SERPL BCP-MCNC: 3.8 G/DL (ref 3.5–5.2)
ALP SERPL-CCNC: 106 U/L (ref 55–135)
ALT SERPL W/O P-5'-P-CCNC: 40 U/L (ref 10–44)
ANION GAP SERPL CALC-SCNC: 11 MMOL/L (ref 8–16)
AST SERPL-CCNC: 25 U/L (ref 10–40)
BASOPHILS # BLD AUTO: 0.03 K/UL (ref 0–0.2)
BASOPHILS NFR BLD: 0.6 % (ref 0–1.9)
BILIRUB SERPL-MCNC: 0.5 MG/DL (ref 0.1–1)
BUN SERPL-MCNC: 14 MG/DL (ref 8–23)
CALCIUM SERPL-MCNC: 9.6 MG/DL (ref 8.7–10.5)
CHLORIDE SERPL-SCNC: 107 MMOL/L (ref 95–110)
CO2 SERPL-SCNC: 22 MMOL/L (ref 23–29)
CREAT SERPL-MCNC: 0.9 MG/DL (ref 0.5–1.4)
DIFFERENTIAL METHOD BLD: ABNORMAL
EOSINOPHIL # BLD AUTO: 0.1 K/UL (ref 0–0.5)
EOSINOPHIL NFR BLD: 2.4 % (ref 0–8)
ERYTHROCYTE [DISTWIDTH] IN BLOOD BY AUTOMATED COUNT: 12.9 % (ref 11.5–14.5)
EST. GFR  (NO RACE VARIABLE): >60 ML/MIN/1.73 M^2
GLUCOSE SERPL-MCNC: 183 MG/DL (ref 70–110)
HCT VFR BLD AUTO: 42.7 % (ref 40–54)
HGB BLD-MCNC: 14.4 G/DL (ref 14–18)
IMM GRANULOCYTES # BLD AUTO: 0.02 K/UL (ref 0–0.04)
IMM GRANULOCYTES NFR BLD AUTO: 0.4 % (ref 0–0.5)
LYMPHOCYTES # BLD AUTO: 1.5 K/UL (ref 1–4.8)
LYMPHOCYTES NFR BLD: 29.8 % (ref 18–48)
MCH RBC QN AUTO: 29.8 PG (ref 27–31)
MCHC RBC AUTO-ENTMCNC: 33.7 G/DL (ref 32–36)
MCV RBC AUTO: 88 FL (ref 82–98)
MONOCYTES # BLD AUTO: 0.5 K/UL (ref 0.3–1)
MONOCYTES NFR BLD: 10.1 % (ref 4–15)
NEUTROPHILS # BLD AUTO: 2.9 K/UL (ref 1.8–7.7)
NEUTROPHILS NFR BLD: 56.7 % (ref 38–73)
NRBC BLD-RTO: 0 /100 WBC
PLATELET # BLD AUTO: 204 K/UL (ref 150–450)
PMV BLD AUTO: 9 FL (ref 9.2–12.9)
POTASSIUM SERPL-SCNC: 4.5 MMOL/L (ref 3.5–5.1)
PROT SERPL-MCNC: 6.6 G/DL (ref 6–8.4)
RBC # BLD AUTO: 4.83 M/UL (ref 4.6–6.2)
SODIUM SERPL-SCNC: 140 MMOL/L (ref 136–145)
WBC # BLD AUTO: 5.04 K/UL (ref 3.9–12.7)

## 2024-06-18 PROCEDURE — 36415 COLL VENOUS BLD VENIPUNCTURE: CPT | Performed by: INTERNAL MEDICINE

## 2024-06-18 PROCEDURE — 99215 OFFICE O/P EST HI 40 MIN: CPT | Mod: S$PBB,,, | Performed by: INTERNAL MEDICINE

## 2024-06-18 PROCEDURE — 99999 PR PBB SHADOW E&M-EST. PATIENT-LVL IV: CPT | Mod: PBBFAC,,, | Performed by: INTERNAL MEDICINE

## 2024-06-18 PROCEDURE — 80053 COMPREHEN METABOLIC PANEL: CPT | Performed by: INTERNAL MEDICINE

## 2024-06-18 PROCEDURE — 99214 OFFICE O/P EST MOD 30 MIN: CPT | Mod: PBBFAC,PN | Performed by: INTERNAL MEDICINE

## 2024-06-18 PROCEDURE — G2211 COMPLEX E/M VISIT ADD ON: HCPCS | Mod: S$PBB,,, | Performed by: INTERNAL MEDICINE

## 2024-06-18 PROCEDURE — 85025 COMPLETE CBC W/AUTO DIFF WBC: CPT | Performed by: INTERNAL MEDICINE

## 2024-06-21 ENCOUNTER — INFUSION (OUTPATIENT)
Dept: INFUSION THERAPY | Facility: HOSPITAL | Age: 69
End: 2024-06-21
Attending: INTERNAL MEDICINE
Payer: MEDICARE

## 2024-06-21 VITALS
HEART RATE: 69 BPM | OXYGEN SATURATION: 95 % | RESPIRATION RATE: 16 BRPM | TEMPERATURE: 97 F | BODY MASS INDEX: 33.87 KG/M2 | SYSTOLIC BLOOD PRESSURE: 132 MMHG | DIASTOLIC BLOOD PRESSURE: 78 MMHG | WEIGHT: 223.5 LBS | HEIGHT: 68 IN

## 2024-06-21 DIAGNOSIS — D80.1 HYPOGAMMAGLOBULINEMIA: Primary | ICD-10-CM

## 2024-06-21 DIAGNOSIS — D80.1 NONFAMILIAL HYPOGAMMAGLOBULINEMIA: ICD-10-CM

## 2024-06-21 DIAGNOSIS — C82.30 FOLLICULAR LYMPHOMA GRADE IIIA, UNSPECIFIED BODY REGION: ICD-10-CM

## 2024-06-21 PROCEDURE — A4216 STERILE WATER/SALINE, 10 ML: HCPCS | Performed by: INTERNAL MEDICINE

## 2024-06-21 PROCEDURE — 96365 THER/PROPH/DIAG IV INF INIT: CPT

## 2024-06-21 PROCEDURE — 25000003 PHARM REV CODE 250: Performed by: INTERNAL MEDICINE

## 2024-06-21 PROCEDURE — 96366 THER/PROPH/DIAG IV INF ADDON: CPT

## 2024-06-21 PROCEDURE — 63600175 PHARM REV CODE 636 W HCPCS: Mod: JZ,JA,JG | Performed by: INTERNAL MEDICINE

## 2024-06-21 RX ORDER — HEPARIN 100 UNIT/ML
500 SYRINGE INTRAVENOUS
Status: DISCONTINUED | OUTPATIENT
Start: 2024-06-21 | End: 2024-06-21 | Stop reason: HOSPADM

## 2024-06-21 RX ORDER — SODIUM CHLORIDE 0.9 % (FLUSH) 0.9 %
10 SYRINGE (ML) INJECTION
OUTPATIENT
Start: 2024-07-19

## 2024-06-21 RX ORDER — SODIUM CHLORIDE 0.9 % (FLUSH) 0.9 %
10 SYRINGE (ML) INJECTION
Status: DISCONTINUED | OUTPATIENT
Start: 2024-06-21 | End: 2024-06-21 | Stop reason: HOSPADM

## 2024-06-21 RX ORDER — HEPARIN 100 UNIT/ML
500 SYRINGE INTRAVENOUS
OUTPATIENT
Start: 2024-07-19

## 2024-06-21 RX ADMIN — IMMUNE GLOBULIN (HUMAN) 50 G: 10 INJECTION INTRAVENOUS; SUBCUTANEOUS at 07:06

## 2024-06-21 RX ADMIN — SODIUM CHLORIDE, PRESERVATIVE FREE 10 ML: 5 INJECTION INTRAVENOUS at 09:06

## 2024-06-21 RX ADMIN — DIPHENHYDRAMINE HYDROCHLORIDE 25 MG: 50 INJECTION, SOLUTION INTRAMUSCULAR; INTRAVENOUS at 07:06

## 2024-06-21 RX ADMIN — SODIUM CHLORIDE: 9 INJECTION, SOLUTION INTRAVENOUS at 07:06

## 2024-06-21 RX ADMIN — HEPARIN 500 UNITS: 100 SYRINGE at 09:06

## 2024-06-21 NOTE — PLAN OF CARE
Problem: Fatigue  Goal: Improved Activity Tolerance  Outcome: Progressing  Intervention: Promote Improved Energy  Flowsheets (Taken 6/21/2024 3674)  Fatigue Management: activity schedule adjusted  Sleep/Rest Enhancement: relaxation techniques promoted  Activity Management: Ambulated -L4  Environmental Support: calm environment promoted

## 2024-06-30 ENCOUNTER — PATIENT MESSAGE (OUTPATIENT)
Dept: ADMINISTRATIVE | Facility: OTHER | Age: 69
End: 2024-06-30
Payer: MEDICARE

## 2024-07-10 ENCOUNTER — TELEPHONE (OUTPATIENT)
Dept: PULMONOLOGY | Facility: CLINIC | Age: 69
End: 2024-07-10
Payer: MEDICARE

## 2024-07-15 ENCOUNTER — PATIENT MESSAGE (OUTPATIENT)
Dept: ADMINISTRATIVE | Facility: HOSPITAL | Age: 69
End: 2024-07-15
Payer: MEDICARE

## 2024-07-17 RX ORDER — HEPARIN 100 UNIT/ML
500 SYRINGE INTRAVENOUS
Status: CANCELLED | OUTPATIENT
Start: 2024-07-18

## 2024-07-17 RX ORDER — FAMOTIDINE 10 MG/ML
20 INJECTION INTRAVENOUS
Status: CANCELLED | OUTPATIENT
Start: 2024-07-18

## 2024-07-17 RX ORDER — ACETAMINOPHEN 325 MG/1
650 TABLET ORAL
Status: CANCELLED | OUTPATIENT
Start: 2024-07-18

## 2024-07-17 RX ORDER — SODIUM CHLORIDE 0.9 % (FLUSH) 0.9 %
10 SYRINGE (ML) INJECTION
Status: CANCELLED | OUTPATIENT
Start: 2024-07-18

## 2024-07-17 RX ORDER — MEPERIDINE HYDROCHLORIDE 25 MG/ML
25 INJECTION INTRAMUSCULAR; INTRAVENOUS; SUBCUTANEOUS
Status: CANCELLED | OUTPATIENT
Start: 2024-07-18 | End: 2024-07-18

## 2024-07-18 ENCOUNTER — INFUSION (OUTPATIENT)
Dept: INFUSION THERAPY | Facility: HOSPITAL | Age: 69
End: 2024-07-18
Attending: INTERNAL MEDICINE
Payer: MEDICARE

## 2024-07-18 VITALS
DIASTOLIC BLOOD PRESSURE: 79 MMHG | WEIGHT: 221.63 LBS | OXYGEN SATURATION: 96 % | HEIGHT: 68 IN | HEART RATE: 58 BPM | TEMPERATURE: 97 F | BODY MASS INDEX: 33.59 KG/M2 | RESPIRATION RATE: 16 BRPM | SYSTOLIC BLOOD PRESSURE: 125 MMHG

## 2024-07-18 DIAGNOSIS — D80.1 HYPOGAMMAGLOBULINEMIA: ICD-10-CM

## 2024-07-18 DIAGNOSIS — C82.30 FOLLICULAR LYMPHOMA GRADE IIIA, UNSPECIFIED BODY REGION: Primary | ICD-10-CM

## 2024-07-18 PROCEDURE — A4216 STERILE WATER/SALINE, 10 ML: HCPCS | Performed by: INTERNAL MEDICINE

## 2024-07-18 PROCEDURE — 96367 TX/PROPH/DG ADDL SEQ IV INF: CPT

## 2024-07-18 PROCEDURE — 96415 CHEMO IV INFUSION ADDL HR: CPT

## 2024-07-18 PROCEDURE — 63600175 PHARM REV CODE 636 W HCPCS: Performed by: INTERNAL MEDICINE

## 2024-07-18 PROCEDURE — 96413 CHEMO IV INFUSION 1 HR: CPT

## 2024-07-18 PROCEDURE — 96375 TX/PRO/DX INJ NEW DRUG ADDON: CPT

## 2024-07-18 PROCEDURE — 25000003 PHARM REV CODE 250: Performed by: INTERNAL MEDICINE

## 2024-07-18 RX ORDER — HEPARIN 100 UNIT/ML
500 SYRINGE INTRAVENOUS
Status: DISCONTINUED | OUTPATIENT
Start: 2024-07-18 | End: 2024-07-18 | Stop reason: HOSPADM

## 2024-07-18 RX ORDER — MEPERIDINE HYDROCHLORIDE 25 MG/ML
25 INJECTION INTRAMUSCULAR; INTRAVENOUS; SUBCUTANEOUS
Status: DISCONTINUED | OUTPATIENT
Start: 2024-07-18 | End: 2024-07-18 | Stop reason: HOSPADM

## 2024-07-18 RX ORDER — SODIUM CHLORIDE 0.9 % (FLUSH) 0.9 %
10 SYRINGE (ML) INJECTION
Status: DISCONTINUED | OUTPATIENT
Start: 2024-07-18 | End: 2024-07-18 | Stop reason: HOSPADM

## 2024-07-18 RX ORDER — FAMOTIDINE 10 MG/ML
20 INJECTION INTRAVENOUS
Status: COMPLETED | OUTPATIENT
Start: 2024-07-18 | End: 2024-07-18

## 2024-07-18 RX ORDER — ACETAMINOPHEN 325 MG/1
650 TABLET ORAL
Status: COMPLETED | OUTPATIENT
Start: 2024-07-18 | End: 2024-07-18

## 2024-07-18 RX ADMIN — FAMOTIDINE 20 MG: 10 INJECTION INTRAVENOUS at 07:07

## 2024-07-18 RX ADMIN — SODIUM CHLORIDE, PRESERVATIVE FREE 10 ML: 5 INJECTION INTRAVENOUS at 11:07

## 2024-07-18 RX ADMIN — SODIUM CHLORIDE: 9 INJECTION, SOLUTION INTRAVENOUS at 07:07

## 2024-07-18 RX ADMIN — ACETAMINOPHEN 650 MG: 325 TABLET ORAL at 07:07

## 2024-07-18 RX ADMIN — HEPARIN 500 UNITS: 100 SYRINGE at 11:07

## 2024-07-18 RX ADMIN — RITUXIMAB 800 MG: 10 INJECTION, SOLUTION INTRAVENOUS at 08:07

## 2024-07-18 RX ADMIN — DIPHENHYDRAMINE HYDROCHLORIDE 50 MG: 50 INJECTION, SOLUTION INTRAMUSCULAR; INTRAVENOUS at 07:07

## 2024-07-18 NOTE — PLAN OF CARE
Problem: Fatigue  Goal: Improved Activity Tolerance  Outcome: Progressing  Intervention: Promote Improved Energy  Flowsheets (Taken 7/18/2024 0066)  Fatigue Management: frequent rest breaks encouraged  Sleep/Rest Enhancement: relaxation techniques promoted  Activity Management: Ambulated -L4  Environmental Support: rest periods encouraged

## 2024-07-19 ENCOUNTER — INFUSION (OUTPATIENT)
Dept: INFUSION THERAPY | Facility: HOSPITAL | Age: 69
End: 2024-07-19
Attending: INTERNAL MEDICINE
Payer: MEDICARE

## 2024-07-19 VITALS
WEIGHT: 226 LBS | BODY MASS INDEX: 34.25 KG/M2 | OXYGEN SATURATION: 95 % | RESPIRATION RATE: 18 BRPM | HEART RATE: 68 BPM | TEMPERATURE: 98 F | HEIGHT: 68 IN | DIASTOLIC BLOOD PRESSURE: 77 MMHG | SYSTOLIC BLOOD PRESSURE: 130 MMHG

## 2024-07-19 DIAGNOSIS — C82.30 FOLLICULAR LYMPHOMA GRADE IIIA, UNSPECIFIED BODY REGION: ICD-10-CM

## 2024-07-19 DIAGNOSIS — D80.1 HYPOGAMMAGLOBULINEMIA: Primary | ICD-10-CM

## 2024-07-19 DIAGNOSIS — D80.1 NONFAMILIAL HYPOGAMMAGLOBULINEMIA: ICD-10-CM

## 2024-07-19 PROCEDURE — 96365 THER/PROPH/DIAG IV INF INIT: CPT

## 2024-07-19 PROCEDURE — 63600175 PHARM REV CODE 636 W HCPCS: Mod: JZ,JA,JG | Performed by: INTERNAL MEDICINE

## 2024-07-19 PROCEDURE — 25000003 PHARM REV CODE 250: Performed by: INTERNAL MEDICINE

## 2024-07-19 PROCEDURE — 96366 THER/PROPH/DIAG IV INF ADDON: CPT

## 2024-07-19 PROCEDURE — 96367 TX/PROPH/DG ADDL SEQ IV INF: CPT

## 2024-07-19 RX ORDER — SODIUM CHLORIDE 0.9 % (FLUSH) 0.9 %
10 SYRINGE (ML) INJECTION
OUTPATIENT
Start: 2024-08-16

## 2024-07-19 RX ORDER — HEPARIN 100 UNIT/ML
500 SYRINGE INTRAVENOUS
OUTPATIENT
Start: 2024-08-16

## 2024-07-19 RX ORDER — SODIUM CHLORIDE 0.9 % (FLUSH) 0.9 %
10 SYRINGE (ML) INJECTION
Status: DISCONTINUED | OUTPATIENT
Start: 2024-07-19 | End: 2024-07-19 | Stop reason: HOSPADM

## 2024-07-19 RX ORDER — HEPARIN 100 UNIT/ML
500 SYRINGE INTRAVENOUS
Status: DISCONTINUED | OUTPATIENT
Start: 2024-07-19 | End: 2024-07-19 | Stop reason: HOSPADM

## 2024-07-19 RX ADMIN — IMMUNE GLOBULIN (HUMAN) 50 G: 10 INJECTION INTRAVENOUS; SUBCUTANEOUS at 07:07

## 2024-07-19 RX ADMIN — DIPHENHYDRAMINE HYDROCHLORIDE 25 MG: 50 INJECTION, SOLUTION INTRAMUSCULAR; INTRAVENOUS at 07:07

## 2024-07-19 RX ADMIN — HEPARIN 500 UNITS: 100 SYRINGE at 09:07

## 2024-07-29 DIAGNOSIS — E11.9 TYPE 2 DIABETES MELLITUS, CONTROLLED: Primary | ICD-10-CM

## 2024-07-30 DIAGNOSIS — Z00.00 ENCOUNTER FOR MEDICARE ANNUAL WELLNESS EXAM: ICD-10-CM

## 2024-08-06 ENCOUNTER — CLINICAL SUPPORT (OUTPATIENT)
Dept: DIABETES | Facility: CLINIC | Age: 69
End: 2024-08-06
Payer: MEDICARE

## 2024-08-06 VITALS — BODY MASS INDEX: 33.74 KG/M2 | HEIGHT: 68 IN | WEIGHT: 222.63 LBS

## 2024-08-06 DIAGNOSIS — E11.9 TYPE 2 DIABETES MELLITUS, CONTROLLED: ICD-10-CM

## 2024-08-06 DIAGNOSIS — E11.40 CONTROLLED TYPE 2 DIABETES MELLITUS WITH DIABETIC NEUROPATHY, WITHOUT LONG-TERM CURRENT USE OF INSULIN: Primary | ICD-10-CM

## 2024-08-06 PROCEDURE — G0108 DIAB MANAGE TRN  PER INDIV: HCPCS | Mod: ,,, | Performed by: DIETITIAN, REGISTERED

## 2024-08-11 ENCOUNTER — PATIENT MESSAGE (OUTPATIENT)
Dept: FAMILY MEDICINE | Facility: CLINIC | Age: 69
End: 2024-08-11
Payer: MEDICARE

## 2024-08-11 DIAGNOSIS — E11.49 TYPE II DIABETES MELLITUS WITH NEUROLOGICAL MANIFESTATIONS: Primary | ICD-10-CM

## 2024-08-13 ENCOUNTER — PATIENT MESSAGE (OUTPATIENT)
Dept: FAMILY MEDICINE | Facility: CLINIC | Age: 69
End: 2024-08-13
Payer: MEDICARE

## 2024-08-13 ENCOUNTER — LAB VISIT (OUTPATIENT)
Dept: LAB | Facility: HOSPITAL | Age: 69
End: 2024-08-13
Attending: FAMILY MEDICINE
Payer: MEDICARE

## 2024-08-13 DIAGNOSIS — E11.49 TYPE II DIABETES MELLITUS WITH NEUROLOGICAL MANIFESTATIONS: ICD-10-CM

## 2024-08-13 DIAGNOSIS — Z13.220 ENCOUNTER FOR LIPID SCREENING FOR CARDIOVASCULAR DISEASE: ICD-10-CM

## 2024-08-13 DIAGNOSIS — I70.0 AORTIC ATHEROSCLEROSIS: ICD-10-CM

## 2024-08-13 DIAGNOSIS — Z13.6 ENCOUNTER FOR LIPID SCREENING FOR CARDIOVASCULAR DISEASE: ICD-10-CM

## 2024-08-13 LAB
CHOLEST SERPL-MCNC: 126 MG/DL (ref 120–199)
CHOLEST/HDLC SERPL: 4.8 {RATIO} (ref 2–5)
ESTIMATED AVG GLUCOSE: 177 MG/DL (ref 68–131)
HBA1C MFR BLD: 7.8 % (ref 4–5.6)
HDLC SERPL-MCNC: 26 MG/DL (ref 40–75)
HDLC SERPL: 20.6 % (ref 20–50)
LDLC SERPL CALC-MCNC: ABNORMAL MG/DL (ref 63–159)
NONHDLC SERPL-MCNC: 100 MG/DL
TRIGL SERPL-MCNC: 422 MG/DL (ref 30–150)

## 2024-08-13 PROCEDURE — 80061 LIPID PANEL: CPT | Performed by: FAMILY MEDICINE

## 2024-08-13 PROCEDURE — 36415 COLL VENOUS BLD VENIPUNCTURE: CPT | Performed by: FAMILY MEDICINE

## 2024-08-13 PROCEDURE — 83036 HEMOGLOBIN GLYCOSYLATED A1C: CPT | Performed by: FAMILY MEDICINE

## 2024-08-15 ENCOUNTER — OFFICE VISIT (OUTPATIENT)
Dept: FAMILY MEDICINE | Facility: CLINIC | Age: 69
End: 2024-08-15
Payer: MEDICARE

## 2024-08-15 VITALS
HEART RATE: 80 BPM | OXYGEN SATURATION: 95 % | DIASTOLIC BLOOD PRESSURE: 84 MMHG | BODY MASS INDEX: 33.47 KG/M2 | RESPIRATION RATE: 12 BRPM | WEIGHT: 220.81 LBS | SYSTOLIC BLOOD PRESSURE: 132 MMHG | HEIGHT: 68 IN

## 2024-08-15 DIAGNOSIS — C83.38 DIFFUSE LARGE B-CELL LYMPHOMA, LYMPH NODES OF MULTIPLE SITES: ICD-10-CM

## 2024-08-15 DIAGNOSIS — L08.9 BACTERIAL SKIN INFECTION OF TRUNK: Primary | ICD-10-CM

## 2024-08-15 DIAGNOSIS — B96.89 BACTERIAL SKIN INFECTION OF TRUNK: Primary | ICD-10-CM

## 2024-08-15 DIAGNOSIS — E11.9 TYPE 2 DIABETES MELLITUS WITHOUT COMPLICATION, WITHOUT LONG-TERM CURRENT USE OF INSULIN: ICD-10-CM

## 2024-08-15 PROCEDURE — 99999 PR PBB SHADOW E&M-EST. PATIENT-LVL IV: CPT | Mod: PBBFAC,,, | Performed by: NURSE PRACTITIONER

## 2024-08-15 PROCEDURE — 99214 OFFICE O/P EST MOD 30 MIN: CPT | Mod: S$PBB,,, | Performed by: NURSE PRACTITIONER

## 2024-08-15 PROCEDURE — 99214 OFFICE O/P EST MOD 30 MIN: CPT | Mod: PBBFAC,PN | Performed by: NURSE PRACTITIONER

## 2024-08-15 RX ORDER — SULFAMETHOXAZOLE AND TRIMETHOPRIM 800; 160 MG/1; MG/1
1 TABLET ORAL 2 TIMES DAILY
Qty: 14 TABLET | Refills: 0 | Status: SHIPPED | OUTPATIENT
Start: 2024-08-15 | End: 2024-08-22

## 2024-08-15 RX ORDER — MUPIROCIN 20 MG/G
OINTMENT TOPICAL 3 TIMES DAILY
Qty: 22 G | Refills: 0 | Status: SHIPPED | OUTPATIENT
Start: 2024-08-15

## 2024-08-15 NOTE — PROGRESS NOTES
Subjective:       Patient ID: Sivakumar Thacker is a 69 y.o. male.    Chief Complaint: Insect Bite (10 days, left side of chest)    Sivakumar Thacker presents to the clinic today with significant other for skin evaluation  Established patient within the clinic, new patient to myself    Under the care of the following:  PCP: Dr. Nails  Hematology/Oncology: Dr. Ibarra  Podiatry: Dr. Quintanilla  Dermatology: Dr. LOS Oleary  Gastroenterology: Dr. Tran   Pulmonology; Dr. Veliz     Patient Active Problem List  Diagnosis  · Allergic rhinitis  · Follicular lymphoma grade IIIa  · Nonfamilial hypogammaglobulinemia  · Hypogammaglobulinemia  · Essential hypertension  · Elevated lipoprotein(a)  · Prediabetes  · Encounter for antineoplastic chemotherapy  · Type II diabetes mellitus with neurological manifestations  · Type 2 diabetes mellitus without complication, without long-term current use of insulin  · Chronic obstructive pulmonary disease, unspecified COPD type  · Pancytopenia  · Non-Hodgkin's lymphoma  · Aortic atherosclerosis  · Sinusitis  · Bronchiectasis without complication  · Abnormal CXR  · Obstructive sleep apnea  · Diffuse large b-cell lymphoma, lymph nodes of multiple sites    Reports was cutting down his dad's [place] about 10 days ago   Few days following wife noticed an area to left upper chest/axillary region  Has been using polysporin to the area and covering with band aid   Area sensitive, denies itching      Review of Systems    Patient Active Problem List   Diagnosis    Allergic rhinitis    Follicular lymphoma grade IIIa    Nonfamilial hypogammaglobulinemia    Hypogammaglobulinemia    Essential hypertension    Elevated lipoprotein(a)    Prediabetes    Encounter for antineoplastic chemotherapy    Type II diabetes mellitus with neurological manifestations    Type 2 diabetes mellitus without complication, without long-term current use of insulin    Chronic obstructive pulmonary disease, unspecified COPD type     "Pancytopenia    Non-Hodgkin's lymphoma    Aortic atherosclerosis    Sinusitis    Bronchiectasis without complication    Abnormal CXR    Obstructive sleep apnea    Diffuse large b-cell lymphoma, lymph nodes of multiple sites       Objective:      Physical Exam  Constitutional:       General: He is not in acute distress.     Appearance: Normal appearance.   Cardiovascular:      Rate and Rhythm: Normal rate and regular rhythm.   Pulmonary:      Effort: Pulmonary effort is normal. No respiratory distress.   Skin:     General: Skin is warm and dry.      Comments: See images    Neurological:      Mental Status: He is alert and oriented to person, place, and time.   Psychiatric:         Mood and Affect: Mood normal.         Behavior: Behavior normal.               Lab Results   Component Value Date    WBC 5.04 06/18/2024    HGB 14.4 06/18/2024    HCT 42.7 06/18/2024     06/18/2024    CHOL 126 08/13/2024    TRIG 422 (H) 08/13/2024    HDL 26 (L) 08/13/2024    ALT 40 06/18/2024    AST 25 06/18/2024     06/18/2024    K 4.5 06/18/2024     06/18/2024    CREATININE 0.9 06/18/2024    BUN 14 06/18/2024    CO2 22 (L) 06/18/2024    TSH 3.716 04/14/2023    PSA 0.53 04/14/2023    INR 1.2 11/11/2022    HGBA1C 7.8 (H) 08/13/2024     The ASCVD Risk score (Keo DK, et al., 2019) failed to calculate for the following reasons:    The valid total cholesterol range is 130 to 320 mg/dL  Visit Vitals  /84 (BP Location: Right arm, Patient Position: Sitting, BP Method: Medium (Manual))   Pulse 80   Resp 12   Ht 5' 8" (1.727 m)   Wt 100.2 kg (220 lb 12.8 oz)   SpO2 95%   BMI 33.57 kg/m²      Assessment:       1. Bacterial skin infection of trunk    2. Type 2 diabetes mellitus without complication, without long-term current use of insulin    3. Diffuse large b-cell lymphoma, lymph nodes of multiple sites        Plan:       1. Bacterial skin infection of trunk  -     sulfamethoxazole-trimethoprim 800-160mg (BACTRIM DS) " 800-160 mg Tab; Take 1 tablet by mouth 2 (two) times daily. for 7 days  Dispense: 14 tablet; Refill: 0  -     mupirocin (BACTROBAN) 2 % ointment; Apply topically 3 (three) times daily.  Dispense: 22 g; Refill: 0  Cleanse area with luke warm water/antibacterial soap- pat dry  Apply topical as prescribed, keep open to air due to localized irritation r/t band aid  Warm compresses to area.   Monitor for worsening s/sx   2. Type 2 diabetes mellitus without complication, without long-term current use of insulin  Monitor glucose closely   3. Diffuse large b-cell lymphoma, lymph nodes of multiple sites  Followed by hematology/oncology- keep all scheduled follow up appointments      No follow-ups on file.      Future Appointments       Date Provider Specialty Appt Notes    9/10/2024 Jefferson Ibarra MD Hematology and Oncology 12 WEEK F/U    9/19/2024 Trudi Nalis MD Family Medicine Yearly f/u    11/7/2024 Tamie Jerez RD Diabetes 3 month f/u    12/2/2024 Micheline Oleary MD Dermatology Skin Check    1/7/2025 Chanelle Quintanilla DPM Podiatry annual visit

## 2024-08-16 ENCOUNTER — INFUSION (OUTPATIENT)
Dept: INFUSION THERAPY | Facility: HOSPITAL | Age: 69
End: 2024-08-16
Attending: INTERNAL MEDICINE
Payer: MEDICARE

## 2024-08-16 VITALS
RESPIRATION RATE: 16 BRPM | SYSTOLIC BLOOD PRESSURE: 113 MMHG | HEART RATE: 81 BPM | HEIGHT: 68 IN | BODY MASS INDEX: 33.54 KG/M2 | TEMPERATURE: 98 F | WEIGHT: 221.31 LBS | OXYGEN SATURATION: 97 % | DIASTOLIC BLOOD PRESSURE: 61 MMHG

## 2024-08-16 DIAGNOSIS — D80.1 NONFAMILIAL HYPOGAMMAGLOBULINEMIA: ICD-10-CM

## 2024-08-16 DIAGNOSIS — D80.1 HYPOGAMMAGLOBULINEMIA: Primary | ICD-10-CM

## 2024-08-16 DIAGNOSIS — C82.30 FOLLICULAR LYMPHOMA GRADE IIIA, UNSPECIFIED BODY REGION: ICD-10-CM

## 2024-08-16 PROCEDURE — A4216 STERILE WATER/SALINE, 10 ML: HCPCS | Performed by: INTERNAL MEDICINE

## 2024-08-16 PROCEDURE — 96367 TX/PROPH/DG ADDL SEQ IV INF: CPT

## 2024-08-16 PROCEDURE — 96366 THER/PROPH/DIAG IV INF ADDON: CPT

## 2024-08-16 PROCEDURE — 25000003 PHARM REV CODE 250: Performed by: INTERNAL MEDICINE

## 2024-08-16 PROCEDURE — 96365 THER/PROPH/DIAG IV INF INIT: CPT

## 2024-08-16 PROCEDURE — 63600175 PHARM REV CODE 636 W HCPCS: Performed by: INTERNAL MEDICINE

## 2024-08-16 RX ORDER — HEPARIN 100 UNIT/ML
500 SYRINGE INTRAVENOUS
Status: DISCONTINUED | OUTPATIENT
Start: 2024-08-16 | End: 2024-08-16 | Stop reason: HOSPADM

## 2024-08-16 RX ORDER — SODIUM CHLORIDE 0.9 % (FLUSH) 0.9 %
10 SYRINGE (ML) INJECTION
OUTPATIENT
Start: 2024-09-13

## 2024-08-16 RX ORDER — SODIUM CHLORIDE 0.9 % (FLUSH) 0.9 %
10 SYRINGE (ML) INJECTION
Status: DISCONTINUED | OUTPATIENT
Start: 2024-08-16 | End: 2024-08-16 | Stop reason: HOSPADM

## 2024-08-16 RX ORDER — HEPARIN 100 UNIT/ML
500 SYRINGE INTRAVENOUS
OUTPATIENT
Start: 2024-09-13

## 2024-08-16 RX ADMIN — IMMUNE GLOBULIN (HUMAN) 50 G: 10 INJECTION INTRAVENOUS; SUBCUTANEOUS at 07:08

## 2024-08-16 RX ADMIN — SODIUM CHLORIDE: 9 INJECTION, SOLUTION INTRAVENOUS at 07:08

## 2024-08-16 RX ADMIN — HEPARIN 500 UNITS: 100 SYRINGE at 09:08

## 2024-08-16 RX ADMIN — DIPHENHYDRAMINE HYDROCHLORIDE 25 MG: 50 INJECTION, SOLUTION INTRAMUSCULAR; INTRAVENOUS at 07:08

## 2024-08-16 RX ADMIN — SODIUM CHLORIDE, PRESERVATIVE FREE 10 ML: 5 INJECTION INTRAVENOUS at 09:08

## 2024-08-16 NOTE — PLAN OF CARE
Problem: Fall Injury Risk  Goal: Absence of Fall and Fall-Related Injury  Outcome: Progressing  Intervention: Identify and Manage Contributors  Flowsheets (Taken 8/16/2024 0708)  Self-Care Promotion: independence encouraged  Medication Review/Management: medications reviewed  Intervention: Promote Injury-Free Environment  Flowsheets (Taken 8/16/2024 0708)  Safety Promotion/Fall Prevention: medications reviewed

## 2024-08-20 ENCOUNTER — TELEPHONE (OUTPATIENT)
Dept: FAMILY MEDICINE | Facility: CLINIC | Age: 69
End: 2024-08-20
Payer: MEDICARE

## 2024-08-20 ENCOUNTER — HOSPITAL ENCOUNTER (EMERGENCY)
Facility: HOSPITAL | Age: 69
Discharge: HOME | End: 2024-08-20
Attending: EMERGENCY MEDICINE
Payer: MEDICARE

## 2024-08-20 VITALS
DIASTOLIC BLOOD PRESSURE: 67 MMHG | SYSTOLIC BLOOD PRESSURE: 141 MMHG | BODY MASS INDEX: 33.04 KG/M2 | HEART RATE: 88 BPM | HEIGHT: 68 IN | WEIGHT: 218 LBS | RESPIRATION RATE: 17 BRPM | TEMPERATURE: 99 F | OXYGEN SATURATION: 95 %

## 2024-08-20 DIAGNOSIS — J06.9 UPPER RESPIRATORY TRACT INFECTION, UNSPECIFIED TYPE: Primary | ICD-10-CM

## 2024-08-20 DIAGNOSIS — I82.C11 JUGULAR VEIN OCCLUSION, RIGHT: ICD-10-CM

## 2024-08-20 LAB
ADENOVIRUS: NOT DETECTED
ALBUMIN SERPL BCP-MCNC: 4 G/DL (ref 3.5–5.2)
ALP SERPL-CCNC: 94 U/L (ref 55–135)
ALT SERPL W/O P-5'-P-CCNC: 33 U/L (ref 10–44)
AMPHET+METHAMPHET UR QL: NEGATIVE
ANION GAP SERPL CALC-SCNC: 11 MMOL/L (ref 8–16)
AST SERPL-CCNC: 35 U/L (ref 10–40)
BARBITURATES UR QL SCN>200 NG/ML: NEGATIVE
BASOPHILS # BLD AUTO: 0.03 K/UL (ref 0–0.2)
BASOPHILS NFR BLD: 0.6 % (ref 0–1.9)
BENZODIAZ UR QL SCN>200 NG/ML: NEGATIVE
BILIRUB SERPL-MCNC: 0.4 MG/DL (ref 0.1–1)
BILIRUB UR QL STRIP: NEGATIVE
BORDETELLA PARAPERTUSSIS (IS1001): NOT DETECTED
BORDETELLA PERTUSSIS (PTXP): NOT DETECTED
BUN SERPL-MCNC: 17 MG/DL (ref 8–23)
BZE UR QL SCN: NEGATIVE
CALCIUM SERPL-MCNC: 9.3 MG/DL (ref 8.7–10.5)
CANNABINOIDS UR QL SCN: NEGATIVE
CHLAMYDIA PNEUMONIAE: NOT DETECTED
CHLORIDE SERPL-SCNC: 101 MMOL/L (ref 95–110)
CK SERPL-CCNC: 80 U/L (ref 20–200)
CLARITY UR: CLEAR
CO2 SERPL-SCNC: 23 MMOL/L (ref 23–29)
COLOR UR: YELLOW
CORONAVIRUS 229E, COMMON COLD VIRUS: NOT DETECTED
CORONAVIRUS HKU1, COMMON COLD VIRUS: NOT DETECTED
CORONAVIRUS NL63, COMMON COLD VIRUS: NOT DETECTED
CORONAVIRUS OC43, COMMON COLD VIRUS: NOT DETECTED
CREAT SERPL-MCNC: 1.4 MG/DL (ref 0.5–1.4)
CREAT UR-MCNC: 128.4 MG/DL (ref 23–375)
DIFFERENTIAL METHOD BLD: ABNORMAL
EOSINOPHIL # BLD AUTO: 0.1 K/UL (ref 0–0.5)
EOSINOPHIL NFR BLD: 1.9 % (ref 0–8)
ERYTHROCYTE [DISTWIDTH] IN BLOOD BY AUTOMATED COUNT: 12.8 % (ref 11.5–14.5)
EST. GFR  (NO RACE VARIABLE): 54.7 ML/MIN/1.73 M^2
FLUBV RNA NPH QL NAA+NON-PROBE: NOT DETECTED
GLUCOSE SERPL-MCNC: 180 MG/DL (ref 70–110)
GLUCOSE UR QL STRIP: NEGATIVE
GROUP A STREP, MOLECULAR: NEGATIVE
HCT VFR BLD AUTO: 42.8 % (ref 40–54)
HGB BLD-MCNC: 14.3 G/DL (ref 14–18)
HGB UR QL STRIP: ABNORMAL
HPIV1 RNA NPH QL NAA+NON-PROBE: NOT DETECTED
HPIV2 RNA NPH QL NAA+NON-PROBE: NOT DETECTED
HPIV3 RNA NPH QL NAA+NON-PROBE: NOT DETECTED
HPIV4 RNA NPH QL NAA+NON-PROBE: NOT DETECTED
HUMAN METAPNEUMOVIRUS: NOT DETECTED
HYALINE CASTS #/AREA URNS LPF: 1 /LPF
IMM GRANULOCYTES # BLD AUTO: 0.01 K/UL (ref 0–0.04)
IMM GRANULOCYTES NFR BLD AUTO: 0.2 % (ref 0–0.5)
INFLUENZA A (SUBTYPES H1,H1-2009,H3): NOT DETECTED
INFLUENZA A, MOLECULAR: NEGATIVE
INFLUENZA B, MOLECULAR: NEGATIVE
KETONES UR QL STRIP: NEGATIVE
LDH SERPL L TO P-CCNC: 1.26 MMOL/L (ref 0.5–2.2)
LEUKOCYTE ESTERASE UR QL STRIP: NEGATIVE
LIPASE SERPL-CCNC: 17 U/L (ref 4–60)
LYMPHOCYTES # BLD AUTO: 1.4 K/UL (ref 1–4.8)
LYMPHOCYTES NFR BLD: 29.5 % (ref 18–48)
MAGNESIUM SERPL-MCNC: 1.6 MG/DL (ref 1.6–2.6)
MCH RBC QN AUTO: 30 PG (ref 27–31)
MCHC RBC AUTO-ENTMCNC: 33.4 G/DL (ref 32–36)
MCV RBC AUTO: 90 FL (ref 82–98)
MICROSCOPIC COMMENT: NORMAL
MONOCYTES # BLD AUTO: 0.5 K/UL (ref 0.3–1)
MONOCYTES NFR BLD: 11 % (ref 4–15)
MYCOPLASMA PNEUMONIAE: NOT DETECTED
NEUTROPHILS # BLD AUTO: 2.7 K/UL (ref 1.8–7.7)
NEUTROPHILS NFR BLD: 56.8 % (ref 38–73)
NITRITE UR QL STRIP: NEGATIVE
NRBC BLD-RTO: 0 /100 WBC
OPIATES UR QL SCN: NEGATIVE
PCP UR QL SCN>25 NG/ML: NEGATIVE
PH UR STRIP: 6 [PH] (ref 5–8)
PHOSPHATE SERPL-MCNC: 3.1 MG/DL (ref 2.7–4.5)
PLATELET # BLD AUTO: 147 K/UL (ref 150–450)
PMV BLD AUTO: 9.3 FL (ref 9.2–12.9)
POTASSIUM SERPL-SCNC: 4.6 MMOL/L (ref 3.5–5.1)
PROT SERPL-MCNC: 7.7 G/DL (ref 6–8.4)
PROT UR QL STRIP: NEGATIVE
RBC # BLD AUTO: 4.77 M/UL (ref 4.6–6.2)
RBC #/AREA URNS HPF: 2 /HPF (ref 0–4)
RESPIRATORY INFECTION PANEL SOURCE: NORMAL
RSV RNA NPH QL NAA+NON-PROBE: NOT DETECTED
RV+EV RNA NPH QL NAA+NON-PROBE: NOT DETECTED
SAMPLE: NORMAL
SARS-COV-2 RDRP RESP QL NAA+PROBE: NEGATIVE
SARS-COV-2 RNA RESP QL NAA+PROBE: NOT DETECTED
SODIUM SERPL-SCNC: 135 MMOL/L (ref 136–145)
SP GR UR STRIP: 1.01 (ref 1–1.03)
SPECIMEN SOURCE: NORMAL
TOXICOLOGY INFORMATION: NORMAL
TSH SERPL DL<=0.005 MIU/L-ACNC: 3.02 UIU/ML (ref 0.34–5.6)
URN SPEC COLLECT METH UR: ABNORMAL
UROBILINOGEN UR STRIP-ACNC: NEGATIVE EU/DL
WBC # BLD AUTO: 4.71 K/UL (ref 3.9–12.7)
WBC #/AREA URNS HPF: 1 /HPF (ref 0–5)

## 2024-08-20 PROCEDURE — 80307 DRUG TEST PRSMV CHEM ANLYZR: CPT

## 2024-08-20 PROCEDURE — 80053 COMPREHEN METABOLIC PANEL: CPT

## 2024-08-20 PROCEDURE — 25000003 PHARM REV CODE 250: Performed by: EMERGENCY MEDICINE

## 2024-08-20 PROCEDURE — 96366 THER/PROPH/DIAG IV INF ADDON: CPT

## 2024-08-20 PROCEDURE — U0002 COVID-19 LAB TEST NON-CDC: HCPCS

## 2024-08-20 PROCEDURE — 87651 STREP A DNA AMP PROBE: CPT | Performed by: EMERGENCY MEDICINE

## 2024-08-20 PROCEDURE — 63600175 PHARM REV CODE 636 W HCPCS: Performed by: EMERGENCY MEDICINE

## 2024-08-20 PROCEDURE — 87040 BLOOD CULTURE FOR BACTERIA: CPT | Mod: 59

## 2024-08-20 PROCEDURE — 96365 THER/PROPH/DIAG IV INF INIT: CPT

## 2024-08-20 PROCEDURE — 85025 COMPLETE CBC W/AUTO DIFF WBC: CPT

## 2024-08-20 PROCEDURE — 87633 RESP VIRUS 12-25 TARGETS: CPT | Performed by: EMERGENCY MEDICINE

## 2024-08-20 PROCEDURE — 82550 ASSAY OF CK (CPK): CPT

## 2024-08-20 PROCEDURE — 96361 HYDRATE IV INFUSION ADD-ON: CPT

## 2024-08-20 PROCEDURE — 83735 ASSAY OF MAGNESIUM: CPT

## 2024-08-20 PROCEDURE — 84100 ASSAY OF PHOSPHORUS: CPT

## 2024-08-20 PROCEDURE — 87581 M.PNEUMON DNA AMP PROBE: CPT | Performed by: EMERGENCY MEDICINE

## 2024-08-20 PROCEDURE — 84443 ASSAY THYROID STIM HORMONE: CPT

## 2024-08-20 PROCEDURE — 99285 EMERGENCY DEPT VISIT HI MDM: CPT | Mod: 25

## 2024-08-20 PROCEDURE — 81001 URINALYSIS AUTO W/SCOPE: CPT

## 2024-08-20 PROCEDURE — 83690 ASSAY OF LIPASE: CPT

## 2024-08-20 PROCEDURE — 36415 COLL VENOUS BLD VENIPUNCTURE: CPT

## 2024-08-20 PROCEDURE — 25500020 PHARM REV CODE 255: Performed by: EMERGENCY MEDICINE

## 2024-08-20 PROCEDURE — 87502 INFLUENZA DNA AMP PROBE: CPT

## 2024-08-20 RX ORDER — FAMOTIDINE 20 MG/1
20 TABLET, FILM COATED ORAL
Status: COMPLETED | OUTPATIENT
Start: 2024-08-20 | End: 2024-08-20

## 2024-08-20 RX ORDER — DIPHENHYDRAMINE HCL 25 MG
25 CAPSULE ORAL
Status: COMPLETED | OUTPATIENT
Start: 2024-08-20 | End: 2024-08-20

## 2024-08-20 RX ORDER — FAMOTIDINE 20 MG/1
20 TABLET, FILM COATED ORAL 2 TIMES DAILY
Qty: 10 TABLET | Refills: 0 | Status: SHIPPED | OUTPATIENT
Start: 2024-08-20 | End: 2024-08-25

## 2024-08-20 RX ORDER — AZITHROMYCIN 500 MG/1
500 TABLET, FILM COATED ORAL DAILY
Qty: 5 TABLET | Refills: 0 | Status: SHIPPED | OUTPATIENT
Start: 2024-08-20 | End: 2024-08-25

## 2024-08-20 RX ORDER — DIPHENHYDRAMINE HCL 25 MG
25 CAPSULE ORAL EVERY 6 HOURS PRN
Qty: 20 CAPSULE | Refills: 0 | Status: SHIPPED | OUTPATIENT
Start: 2024-08-20 | End: 2024-09-04

## 2024-08-20 RX ORDER — AZITHROMYCIN 500 MG/1
500 TABLET, FILM COATED ORAL DAILY
Qty: 5 TABLET | Refills: 0 | Status: SHIPPED | OUTPATIENT
Start: 2024-08-20 | End: 2024-08-20

## 2024-08-20 RX ORDER — ACETAMINOPHEN 500 MG
1000 TABLET ORAL
Status: COMPLETED | OUTPATIENT
Start: 2024-08-20 | End: 2024-08-20

## 2024-08-20 RX ADMIN — FAMOTIDINE 20 MG: 20 TABLET ORAL at 10:08

## 2024-08-20 RX ADMIN — SODIUM CHLORIDE 1000 ML: 9 INJECTION, SOLUTION INTRAVENOUS at 05:08

## 2024-08-20 RX ADMIN — DIPHENHYDRAMINE HYDROCHLORIDE 25 MG: 25 CAPSULE ORAL at 10:08

## 2024-08-20 RX ADMIN — ACETAMINOPHEN 1000 MG: 500 TABLET, FILM COATED ORAL at 08:08

## 2024-08-20 RX ADMIN — IOHEXOL 100 ML: 350 INJECTION, SOLUTION INTRAVENOUS at 05:08

## 2024-08-20 RX ADMIN — CEFTRIAXONE SODIUM 1 G: 1 INJECTION, POWDER, FOR SOLUTION INTRAMUSCULAR; INTRAVENOUS at 08:08

## 2024-08-20 NOTE — ED PROVIDER NOTES
Encounter Date: 8/20/2024       History     Chief Complaint   Patient presents with    Fever     Fever off and on for 6 days, body aches, had abscess to left chest wall for couple weeks put on antibotics     69-year-old male with a history of diabetes, hypertension, non-Hodgkin's lymphoma who presents with complaints of subjective chills and possible fever over the past 5 days.  Patient states that it seems to happen more in the evening time.  He did recently receive IVIG therapy but states he has not had any symptoms like this in the past.  He has had some diffuse myalgias and arthralgias.  He has had some postnasal drip and a mildly sore throat although he is able to eat drink and swallow.  He has an occasional dry cough.  He also has occasional crampy left lower quadrant abdominal pain and had 1 or 2 episodes of loose stool.  Denies dysuria frequency urgency or any urinary problems or changes.  The patient has been on Bactrim over the past week for an area of inflammation to the left lateral chest wall.  He thinks an insect may have bit him.  The area has progressively become less erythematous and less swollen after the Bactrim therapy.  He did develop a patchy macular rash yesterday with no associated itching.  Denies any headache neck pain or stiffness.  He has had sinus congestion.  Denies chest pain or shortness of breath.  Denies vomiting.  Denies any focal weakness numbness tingling or paresthesias.  He was concerned because he had a chest wall infection in the past that was severe/covered his entire chest wall and had associated sepsis at that time.  He states that he realizes that this skin lesion is no where near as bad as what he had previously but was just concerned about the possibility of sepsis.  He does report that this area of skin irritation has progressively been improving not worsening.  He denies any other problems or complaints.        Review of patient's allergies indicates:   Allergen  Reactions    Bactrim [sulfamethoxazole-trimethoprim] Rash     Past Medical History:   Diagnosis Date    Chest wall abscess 09/30/2022    Chronic obstructive pulmonary disease, unspecified COPD type 03/28/2022    Diabetes mellitus, type 2 2019    Encounter for antineoplastic chemotherapy 04/01/2020    Hypertension     Hypogammaglobulinemia 12/13/2019    Neuropathy     Non Hodgkin's lymphoma 2011    Reflux esophagitis     Ringing in ear, bilateral      Past Surgical History:   Procedure Laterality Date    COLONOSCOPY  2018    COLONOSCOPY N/A 02/29/2024    Procedure: COLONOSCOPY;  Surgeon: Nitesh Campbell MD;  Location: [facility];  Service: Endoscopy;  Laterality: N/A;    ENDOSCOPIC ULTRASOUND OF UPPER GASTROINTESTINAL TRACT N/A 5/7/2024    Procedure: ULTRASOUND, UPPER GI TRACT, ENDOSCOPIC;  Surgeon: Jose De Jesus Tran III, MD;  Location: [facility];  Service: Endoscopy;  Laterality: N/A;    ENDOSCOPIC ULTRASOUND OF UPPER GASTROINTESTINAL TRACT N/A 5/14/2024    Procedure: ULTRASOUND, UPPER GI TRACT, ENDOSCOPIC;  Surgeon: Jose De Jesus Tran III, MD;  Location: [facility];  Service: Endoscopy;  Laterality: N/A;    ESOPHAGOGASTRODUODENOSCOPY N/A 02/29/2024    Procedure: EGD (ESOPHAGOGASTRODUODENOSCOPY);  Surgeon: Nitesh Campbell MD;  Location: [facility];  Service: Endoscopy;  Laterality: N/A;    EYE SURGERY  Approximately 3 years ago    Cataract    HAND SURGERY Left     amputation left index finger    INCISION AND DRAINAGE OF ABSCESS Left 10/03/2022    Procedure: INCISION AND DRAINAGE, ABSCESS;  Surgeon: Franco Venegas III, MD;  Location: [facility];  Service: General;  Laterality: Left;  axilla    INSERTION OF TUNNELED CENTRAL VENOUS CATHETER (CVC) WITH SUBCUTANEOUS PORT Left 02/01/2023    Procedure: AVIBXIGPH-CESA-L-CATH;  Surgeon: Franco Venegas III, MD;  Location: [facility] OR;  Service: General;  Laterality: Left;    MEDIPORT REMOVAL Right 10/03/2022    Procedure: REMOVAL, CATHETER, CENTRAL VENOUS, TUNNELED, WITH  PORT;  Surgeon: Franco Venegas III, MD;  Location: [facility] OR;  Service: General;  Laterality: Right;    PORTACATH PLACEMENT      SKIN CANCER EXCISION  2020    [facility]    SPINE SURGERY      VASECTOMY   ?     Family History   Problem Relation Name Age of Onset    Diabetes Father Tae      Social History     Tobacco Use    Smoking status: Former     Current packs/day: 0.00     Average packs/day: 0.5 packs/day for 2.0 years (1.0 ttl pk-yrs)     Types: Cigarettes     Start date:      Quit date:      Years since quittin.6    Smokeless tobacco: Never   Substance Use Topics    Alcohol use: Yes     Comment: occ    Drug use: No     Review of Systems   Constitutional:  Positive for chills. Negative for activity change, appetite change, diaphoresis, fatigue and unexpected weight change.   HENT:  Positive for congestion and sore throat. Negative for dental problem, ear pain, nosebleeds, rhinorrhea, sinus pain, trouble swallowing and voice change.    Eyes: Negative.  Negative for pain and visual disturbance.   Respiratory:  Positive for cough. Negative for chest tightness, shortness of breath and wheezing.    Cardiovascular: Negative.  Negative for chest pain, palpitations and leg swelling.   Gastrointestinal:  Positive for abdominal pain. Negative for blood in stool, constipation, diarrhea, nausea and vomiting.   Endocrine: Negative.    Genitourinary: Negative.  Negative for difficulty urinating, dysuria, flank pain, frequency, penile pain, testicular pain and urgency.   Musculoskeletal:  Positive for myalgias. Negative for arthralgias, back pain, gait problem, joint swelling, neck pain and neck stiffness.   Skin:  Positive for wound. Negative for pallor.   Neurological: Negative.  Negative for dizziness, seizures, syncope, facial asymmetry, speech difficulty, weakness, light-headedness, numbness and headaches.   Hematological: Negative.  Does not bruise/bleed easily.   Psychiatric/Behavioral:  Negative.  Negative for confusion.    All other systems reviewed and are negative.      Physical Exam     Initial Vitals [08/20/24 1113]   BP Pulse Resp Temp SpO2   125/74 93 18 98.5 °F (36.9 °C) 96 %      MAP       --         Physical Exam    Nursing note and vitals reviewed.  Constitutional: He is active and cooperative. He does not have a sickly appearance. He does not appear ill. No distress.   HENT:   Head: Normocephalic and atraumatic.   Right Ear: No mastoid tenderness. Tympanic membrane is injected. A middle ear effusion is present. No hemotympanum.   Left Ear: A middle ear effusion is present. No hemotympanum.   Nose: Mucosal edema and rhinorrhea present.   Mouth/Throat: Uvula is midline and mucous membranes are normal. No oral lesions. No trismus in the jaw. No uvula swelling. Posterior oropharyngeal erythema present. No oropharyngeal exudate, posterior oropharyngeal edema or tonsillar abscesses.   Yellowish appearing postnasal drip visualized in posterior pharynx.  Mild pharyngeal erythema noted   Eyes: Conjunctivae, EOM and lids are normal. Pupils are equal, round, and reactive to light. Right eye exhibits no discharge. Left eye exhibits no discharge. No scleral icterus.   Neck: Trachea normal and phonation normal. Neck supple. No tracheal deviation present. No JVD present.   Normal range of motion.   Full passive range of motion without pain.     Cardiovascular:  Normal rate, regular rhythm, normal heart sounds, intact distal pulses and normal pulses.     Exam reveals no gallop, no distant heart sounds, no friction rub and no decreased pulses.       No murmur heard.  Pulmonary/Chest: Effort normal and breath sounds normal. No stridor. No respiratory distress. He has no decreased breath sounds. He has no wheezes. He has no rhonchi. He has no rales.   Abdominal: Abdomen is soft. Bowel sounds are normal. He exhibits no distension, no pulsatile midline mass and no mass. There is abdominal tenderness in the  left lower quadrant. No hernia.   No right CVA tenderness.  No left CVA tenderness. There is no rebound and no guarding.   Musculoskeletal:         General: No tenderness or edema. Normal range of motion.      Right hand: Normal. Normal capillary refill. Normal pulse.      Left hand: Normal. Normal capillary refill. Normal pulse.      Cervical back: Normal, full passive range of motion without pain, normal range of motion and neck supple. No erythema, tenderness or bony tenderness. No pain with movement, spinous process tenderness or muscular tenderness. Normal range of motion and normal range of motion. Normal.      Thoracic back: Normal. No swelling, tenderness or bony tenderness. Normal range of motion.      Lumbar back: Normal. No swelling, tenderness or bony tenderness. Normal range of motion.      Right lower leg: No tenderness. No edema.      Left lower leg: No tenderness. No edema.      Right foot: Normal. Normal capillary refill. No swelling. Normal pulse.      Left foot: Normal. Normal capillary refill. No swelling. Normal pulse.      Comments: Pulses are 2+ throughout, cap refill is less than 2 sec throughout, no edema noted, extremities are nontender throughout with full range of motion     Lymphadenopathy:     He has no cervical adenopathy.   Neurological: He is alert and oriented to person, place, and time. He has normal strength. No cranial nerve deficit or sensory deficit. Coordination and gait normal.   No focal deficits   Skin: Skin is warm and dry. Capillary refill takes less than 2 seconds. Rash noted. No ecchymosis, no petechiae and no purpura noted. Rash is macular. Rash is not nodular and not vesicular. No cyanosis or erythema. No pallor.   Left lateral chest wall with small area of eschar with a small amount of surrounding erythema.  Distal to this there is a well demarcated sq appearance of erythema in the formation of a Band-Aid  No surrounding cellulitis, no palpable abscess, no  induration, no increased warmth, no tenderness to palpation.  Compared with previous chart picture, area appears significantly improved.  Torso with scattered small macular rash, no petechiae, no purpura.   Psychiatric: He has a normal mood and affect. His speech is normal and behavior is normal. Cognition and memory are normal.         ED Course   Procedures  Labs Reviewed   CBC W/ AUTO DIFFERENTIAL - Abnormal       Result Value    WBC 4.71      RBC 4.77      Hemoglobin 14.3      Hematocrit 42.8      MCV 90      MCH 30.0      MCHC 33.4      RDW 12.8      Platelets 147 (*)     MPV 9.3      Immature Granulocytes 0.2      Gran # (ANC) 2.7      Immature Grans (Abs) 0.01      Lymph # 1.4      Mono # 0.5      Eos # 0.1      Baso # 0.03      nRBC 0      Gran % 56.8      Lymph % 29.5      Mono % 11.0      Eosinophil % 1.9      Basophil % 0.6      Differential Method Automated     COMPREHENSIVE METABOLIC PANEL - Abnormal    Sodium 135 (*)     Potassium 4.6      Chloride 101      CO2 23      Glucose 180 (*)     BUN 17      Creatinine 1.4      Calcium 9.3      Total Protein 7.7      Albumin 4.0      Total Bilirubin 0.4      Alkaline Phosphatase 94      AST 35      ALT 33      eGFR 54.7 (*)     Anion Gap 11     URINALYSIS, REFLEX TO URINE CULTURE - Abnormal    Specimen UA Urine, Clean Catch      Color, UA Yellow      Appearance, UA Clear      pH, UA 6.0      Specific Gravity, UA 1.015      Protein, UA Negative      Glucose, UA Negative      Ketones, UA Negative      Bilirubin (UA) Negative      Occult Blood UA 1+ (*)     Nitrite, UA Negative      Urobilinogen, UA Negative      Leukocytes, UA Negative      Narrative:     Specimen Source->Urine   CULTURE, BLOOD    Blood Culture, Routine No growth after 5 days.      Narrative:     Aerobic and anaerobic   CULTURE, BLOOD    Blood Culture, Routine No growth after 5 days.      Narrative:     Aerobic and anaerobic   RESPIRATORY INFECTION PANEL (PCR), NASOPHARYNGEAL    Respiratory  Infection Panel Source NP swab      Adenovirus Not Detected      Coronavirus 229E, Common Cold Virus Not Detected      Coronavirus HKU1, Common Cold Virus Not Detected      Coronavirus NL63, Common Cold Virus Not Detected      Coronavirus OC43, Common Cold Virus Not Detected      SARS-CoV2 (COVID-19) Qualitative PCR Not Detected      Human Metapneumovirus Not Detected      Human Rhinovirus/Enterovirus Not Detected      Influenza A (subtypes H1, H1-2009,H3) Not Detected      Influenza B Not Detected      Parainfluenza Virus 1 Not Detected      Parainfluenza Virus 2 Not Detected      Parainfluenza Virus 3 Not Detected      Parainfluenza Virus 4 Not Detected      Respiratory Syncytial Virus Not Detected      Bordetella Parapertussis (CF6108) Not Detected      Bordetella pertussis (ptxP) Not Detected      Chlamydia pneumoniae Not Detected      Mycoplasma pneumoniae Not Detected      Narrative:     Respiratory Infection Panel source->NP Swab   GROUP A STREP, MOLECULAR    Group A Strep, Molecular Negative     MAGNESIUM    Magnesium 1.6     PHOSPHORUS    Phosphorus 3.1     SARS-COV-2 RNA AMPLIFICATION, QUAL    SARS-CoV-2 RNA, Amplification, Qual Negative     INFLUENZA A AND B ANTIGEN    Influenza A, Molecular Negative      Influenza B, Molecular Negative      Flu A & B Source Nasal swab      Narrative:     Specimen Source->Nasopharyngeal Swab   URINALYSIS MICROSCOPIC    RBC, UA 2      WBC, UA 1      Hyaline Casts, UA 1      Microscopic Comment SEE COMMENT      Narrative:     Specimen Source->Urine   LIPASE   DRUG SCREEN PANEL, URINE EMERGENCY   CK   TSH   CK    CPK 80     LIPASE    Lipase 17     TSH    TSH 3.018     DRUG SCREEN PANEL, URINE EMERGENCY    Benzodiazepines Negative      Cocaine (Metab.) Negative      Opiate Scrn, Ur Negative      Barbiturate Screen, Ur Negative      Amphetamine Screen, Ur Negative      THC Negative      Phencyclidine Negative      Creatinine, Urine 128.4      Toxicology Information SEE COMMENT      ISTAT LACTATE    POC Lactate 1.26      Sample VENOUS            Imaging Results              US Upper Extremity Veins Right (Final result)  Result time 08/20/24 19:38:06      Final result by Garry Ríos MD (08/20/24 19:38:06)                   Impression:      No convincing evidence of thrombus in central veins of the right upper extremity.      Electronically signed by: Garry Ríos MD  Date:    08/20/2024  Time:    19:38               Narrative:    EXAMINATION:  US UPPER EXTREMITY VEINS RIGHT    CLINICAL HISTORY:  abnormal CT scan;    TECHNIQUE:  Duplex and color flow Doppler evaluation and dynamic compression was performed of the right upper extremity veins.    COMPARISON:  CTA chest 08/20/2024    FINDINGS:  Central veins: The internal jugular, subclavian, and axillary veins are patent and free of thrombus.    Arm veins: The brachial, basilic, and cephalic veins are patent and compressible.    Other findings: None.                                        CTA Chest Non-Coronary (PE Studies) (Final result)  Result time 08/20/24 18:06:41      Final result by Deborah Montesinos MD (08/20/24 18:06:41)                   Impression:      No pulmonary embolus.    Central hypoattenuation within the right IJ concerning for thrombus.  Recommend confirmation right upper extremity venous ultrasound.    Anterior bladder wall thickening which could in part be due to underdistention.  However given asymmetry, recommend correlation with urinalysis and cystoscopy as clinically indicated.    Colonic diverticulosis.    This report was flagged in Epic as abnormal.      Electronically signed by: Deborah Montesinos  Date:    08/20/2024  Time:    18:06               Narrative:    EXAMINATION:  CTA CHEST NON CORONARY (PE STUDIES); CT ABDOMEN PELVIS WITH IV CONTRAST    CLINICAL HISTORY:  Pulmonary embolism (PE) suspected, high prob;; LLQ abdominal pain;    TECHNIQUE:  Low dose axial images, sagittal and coronal reformations were  obtained from the thoracic inlet through the pelvis following the IV administration of 100 mL of Omnipaque 350.  Contrast timing was optimized to evaluate the pulmonary arteries.  MIP images were performed.    COMPARISON:  CT chest 01/10/2020 and 01/06/2020, PET-CT 03/11/2024, CT abdomen pelvis 01/06/2023    FINDINGS:  Central hypoattenuation within the right internal jugular vein.  Left chest wall port catheter terminates in the SVC.    Heart size is normal.  Coronary artery calcification.  Thoracic aorta is normal in caliber with atherosclerotic calcification.  The main pulmonary is normal in caliber.  No enlarged axillary, mediastinal or hilar nodes.    The central airways are clear.  Bibasilar atelectasis.  No pleural effusion or pneumothorax.  No focal consolidation.    No pulmonary embolism through the subsegmental level.    Liver is normal in morphology smooth contour.  No focal hepatic lesion.  The portal, splenic and superior mesenteric veins are patent.    The spleen, gallbladder, adrenal glands, pancreas are unremarkable.  No intra or extrahepatic biliary ductal dilatation.    Kidneys enhance symmetrically without hydronephrosis.  Too small characterize left renal hypoattenuating lesion although most likely a cyst.  Nonobstructing 3 mm right renal stone.    Abdominal aorta is normal in caliber with atherosclerotic calcification.  Circumaortic left renal veins.  No enlarged retroperitoneal nodes.    Colonic diverticula.  No bowel obstruction or inflammatory change.  No free fluid or free air.    Anterior bladder wall thickening.  Prostate size is normal.  No enlarged pelvic lymph nodes.    Soft tissues are unremarkable.  No acute or aggressive osseous abnormality.                                        CT Abdomen Pelvis With IV Contrast NO Oral Contrast (Final result)  Result time 08/20/24 18:06:41      Final result by Deborah Montesinos MD (08/20/24 18:06:41)                   Impression:      No pulmonary  embolus.    Central hypoattenuation within the right IJ concerning for thrombus.  Recommend confirmation right upper extremity venous ultrasound.    Anterior bladder wall thickening which could in part be due to underdistention.  However given asymmetry, recommend correlation with urinalysis and cystoscopy as clinically indicated.    Colonic diverticulosis.    This report was flagged in Epic as abnormal.      Electronically signed by: Deborah Montesinos  Date:    08/20/2024  Time:    18:06               Narrative:    EXAMINATION:  CTA CHEST NON CORONARY (PE STUDIES); CT ABDOMEN PELVIS WITH IV CONTRAST    CLINICAL HISTORY:  Pulmonary embolism (PE) suspected, high prob;; LLQ abdominal pain;    TECHNIQUE:  Low dose axial images, sagittal and coronal reformations were obtained from the thoracic inlet through the pelvis following the IV administration of 100 mL of Omnipaque 350.  Contrast timing was optimized to evaluate the pulmonary arteries.  MIP images were performed.    COMPARISON:  CT chest 01/10/2020 and 01/06/2020, PET-CT 03/11/2024, CT abdomen pelvis 01/06/2023    FINDINGS:  Central hypoattenuation within the right internal jugular vein.  Left chest wall port catheter terminates in the SVC.    Heart size is normal.  Coronary artery calcification.  Thoracic aorta is normal in caliber with atherosclerotic calcification.  The main pulmonary is normal in caliber.  No enlarged axillary, mediastinal or hilar nodes.    The central airways are clear.  Bibasilar atelectasis.  No pleural effusion or pneumothorax.  No focal consolidation.    No pulmonary embolism through the subsegmental level.    Liver is normal in morphology smooth contour.  No focal hepatic lesion.  The portal, splenic and superior mesenteric veins are patent.    The spleen, gallbladder, adrenal glands, pancreas are unremarkable.  No intra or extrahepatic biliary ductal dilatation.    Kidneys enhance symmetrically without hydronephrosis.  Too small  characterize left renal hypoattenuating lesion although most likely a cyst.  Nonobstructing 3 mm right renal stone.    Abdominal aorta is normal in caliber with atherosclerotic calcification.  Circumaortic left renal veins.  No enlarged retroperitoneal nodes.    Colonic diverticula.  No bowel obstruction or inflammatory change.  No free fluid or free air.    Anterior bladder wall thickening.  Prostate size is normal.  No enlarged pelvic lymph nodes.    Soft tissues are unremarkable.  No acute or aggressive osseous abnormality.                                       X-Ray Chest 1 View (Final result)  Result time 08/20/24 12:00:03      Final result by Jimmie Negro MD (08/20/24 12:00:03)                   Impression:      No acute cardiac or pulmonary process.      Electronically signed by: Jimmie Negro  Date:    08/20/2024  Time:    12:00               Narrative:    CLINICAL HISTORY:  ([id number])70 y/o  (1955) M    Sepsis;    TECHNIQUE:  (A#[id number], exam time 8/20/2024 11:56)    XR CHEST 1 VIEW [id number]    COMPARISON:  None available.    FINDINGS:  The lungs are essentially clear noting a tiny linear bandlike area of atelectasis/scarring at the left costophrenic sulcus.  Costophrenic angles are seen without effusion. No pneumothorax is identified. The heart is normal in size.  Left-sided subclavian medical infusion port with tip at the level of the SVC.  The mediastinum is within normal limits. Osseous structures show degenerative changes in the spine. The visualized upper abdomen is unremarkable.                                       Medications   sodium chloride 0.9% bolus 1,000 mL 1,000 mL (0 mLs Intravenous Stopped 8/20/24 2003)   iohexoL (OMNIPAQUE 350) injection 100 mL (100 mLs Intravenous Given 8/20/24 1722)   acetaminophen tablet 1,000 mg (1,000 mg Oral Given 8/20/24 2001)   cefTRIAXone (ROCEPHIN) 1 g in dextrose 5 % 100 mL IVPB (ready to mix) (0 g Intravenous Stopped 8/20/24 2214)   famotidine  tablet 20 mg (20 mg Oral Given 8/20/24 2220)   diphenhydrAMINE capsule 25 mg (25 mg Oral Given 8/20/24 2220)     Medical Decision Making  Patient is well-appearing and in no distress.  Does have evidence of postnasal drip, mild pharyngeal erythema and upper respiratory symptoms.  Area of concern on the chest wall however does not appear infected and appears to be improving.  Patient has developed a macular rash.  No petechiae, no purpura.  This did occur several days into taking Bactrim.  Have also explained the possibility that fevers could be related to a Bactrim reaction.  Patient has completed his course of Bactrim for the skin infection.  He does not have any current signs or symptoms to suggest systemic infection or sepsis.  CTA of the chest was performed due to the fact with the patient is at risk for pulmonary emboli due to underlying medical problems, has been having some shortness of breath, also later reported some very minimal left-sided pleuritic discomfort at times.  None currently.  CTA of the chest is negative for pulmonary embolism or infiltrate.  Incidentally noted hypoattenuation to the right internal jugular noted.  Ultrasound has been recommended.  Clinically correlating the patient has no upper extremity edema or pain.  He has no neurological symptoms or complaints.  I did discuss this finding with vascular on-call who does not feel emergent anticoagulation or admission is indicated to further evaluate this but states she will be happy to further evaluate outpatient.  Ultrasound has been ordered.    Amount and/or Complexity of Data Reviewed  Labs: ordered. Decision-making details documented in ED Course.  Radiology: ordered.    Risk  OTC drugs.  Prescription drug management.               ED Course as of 08/28/24 2155   Tue Aug 20, 2024   1512 Blood, UA(!): 1+ [AR]   1512 Leukocyte Esterase, UA: Negative [AR]   1512 Influenza A, Molecular: Negative [AR]   1512 Influenza B, Molecular: Negative  [AR]   1512 Flu A & B Source: Nasal swab [AR]   1512 SARS-CoV-2 RNA, Amplification, Qual: Negative [AR]   1512 Phosphorus Level: 3.1 [AR]   1512 POC Lactate: 1.26 [AR]   1512 Sodium(!): 135 [AR]   1512 Potassium: 4.6 [AR]   1512 Chloride: 101 [AR]   1512 CO2: 23 [AR]   1512 Glucose(!): 180 [AR]   1512 BUN: 17 [AR]   1512 Creatinine: 1.4 [AR]   1512 Calcium: 9.3 [AR]   1512 PROTEIN TOTAL: 7.7 [AR]   1512 Albumin: 4.0 [AR]   1512 BILIRUBIN TOTAL: 0.4 [AR]   1512 ALP: 94 [AR]   1512 AST: 35 [AR]   1512 ALT: 33 [AR]   1512 WBC: 4.71 [AR]   1512 Hemoglobin: 14.3 [AR]   1512 Hematocrit: 42.8 [AR]   1512 SARS-CoV-2 RNA, Amplification, Qual: Negative [AR]   1512 Phosphorus Level: 3.1 [AR]   1512 Magnesium : 1.6 [AR]   1642 Phosphorus Level: 3.1 [AR]   1642 SARS-CoV-2 RNA, Amplification, Qual: Negative [AR]   1642 Leukocyte Esterase, UA: Negative [AR]   1642 NITRITE UA: Negative [AR]   1642 WBC: 4.71 [AR]   1642 Hemoglobin: 14.3 [AR]   1642 Hematocrit: 42.8 [AR]   1642 Platelet Count(!): 147 [AR]      ED Course User Index  [AR] Rebeka Romo MD                           Clinical Impression:  Final diagnoses:  [J06.9] Upper respiratory tract infection, unspecified type (Primary)  [I82.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 Provider    azithromycin (ZITHROMAX) 500 MG tablet  (Status: Discontinued) Take 1 tablet (500 mg total) by mouth once daily. for 5 days 5 tablet 2024 Rebeka Romo MD    azithromycin (ZITHROMAX) 500 MG tablet () Take 1 tablet (500 mg total) by mouth once daily. for 5 days 5 tablet 20244 Morgan Whitney MD    famotidine (PEPCID) 20 MG tablet () Take 1 tablet (20 mg total) by mouth 2 (two) times daily. for 5 days 10 tablet 2024 Morgan Whitney MD    diphenhydrAMINE (BENADRYL) 25 mg capsule Take 1 capsule (25 mg  total) by mouth every 6 (six) hours as needed for Itching or Allergies. 20 capsule 8/20/2024 9/4/2024 Morgan Whitney MD          Follow-up Information       Follow up With Specialties Details Why Contact Info    Trudi Nails MD Family Medicine Schedule an appointment as soon as possible for a visit in 1 day  [address] [facility]  [address]  [place] [address]  [phone]      Hilaria Interiano MD Vascular Surgery, Cardiology Schedule an appointment as soon as possible for a visit in 2 days  [address] [address]  [phone]               Rebeka Romo MD  08/28/24 4713

## 2024-08-20 NOTE — ED NOTES
Pt has generalized rash mostly concentrated on trunk of body, no hives, denies itching. Pt also has a small scabbed wound on the left side of his ribs directly under the armpit, pt unsure if the result of an insect bite. Pt also reports intermittent fever since Thursday August 15.

## 2024-08-20 NOTE — ED NOTES
Lab notified of add on labs: CK, Lipase, and TSH. Lab stated they have the needed blood work to perform add ons.

## 2024-08-21 ENCOUNTER — PATIENT OUTREACH (OUTPATIENT)
Dept: EMERGENCY MEDICINE | Facility: OTHER | Age: 69
End: 2024-08-21
Payer: MEDICARE

## 2024-08-21 NOTE — TELEPHONE ENCOUNTER
----- Message from Delilah Gabriel sent at 8/21/2024  3:39 PM CDT -----  Contact: Livia wife  Type:  Same Day Appointment Request    Caller is requesting a same day appointment.  Caller declined first available appointment listed below.      Name of Caller:  Wife Livia   When is the first available appointment?  Nothing for a good while   Symptoms:  [facility] ER 1 day f/u appt  Best Call Back Number:  [phone]  Additional Information:  Please call Livia back he is sleeping a lot right now, does only want to see the doctor and thanks

## 2024-08-23 ENCOUNTER — OFFICE VISIT (OUTPATIENT)
Dept: FAMILY MEDICINE | Facility: CLINIC | Age: 69
End: 2024-08-23
Payer: MEDICARE

## 2024-08-23 VITALS
RESPIRATION RATE: 18 BRPM | BODY MASS INDEX: 32.23 KG/M2 | HEART RATE: 62 BPM | DIASTOLIC BLOOD PRESSURE: 62 MMHG | SYSTOLIC BLOOD PRESSURE: 100 MMHG | WEIGHT: 212.69 LBS | OXYGEN SATURATION: 95 % | HEIGHT: 68 IN

## 2024-08-23 DIAGNOSIS — L08.9 BACTERIAL SKIN INFECTION OF TRUNK: Primary | ICD-10-CM

## 2024-08-23 DIAGNOSIS — J32.9 SINUSITIS, UNSPECIFIED CHRONICITY, UNSPECIFIED LOCATION: ICD-10-CM

## 2024-08-23 DIAGNOSIS — C83.38 DIFFUSE LARGE B-CELL LYMPHOMA, LYMPH NODES OF MULTIPLE SITES: ICD-10-CM

## 2024-08-23 DIAGNOSIS — B96.89 BACTERIAL SKIN INFECTION OF TRUNK: Primary | ICD-10-CM

## 2024-08-23 PROCEDURE — 99213 OFFICE O/P EST LOW 20 MIN: CPT | Mod: PBBFAC,PN | Performed by: FAMILY MEDICINE

## 2024-08-23 PROCEDURE — 99999 PR PBB SHADOW E&M-EST. PATIENT-LVL III: CPT | Mod: PBBFAC,,, | Performed by: FAMILY MEDICINE

## 2024-08-23 NOTE — PROGRESS NOTES
Subjective:       Patient ID: Sivakumar Thacker is a 69 y.o. male.    Chief Complaint: ER follow up (Feeling drained/Change/review meds.)    ER follow up.     Was seen in the ED and worked up for multiple things.     69-year-old male with a history of diabetes, hypertension, non-Hodgkin's lymphoma who presents with complaints of subjective chills and possible fever over the past 5 days.  Patient states that it seems to happen more in the evening time.  He did recently receive IVIG therapy but states he has not had any symptoms like this in the past.  He has had some diffuse myalgias and arthralgias.  He has had some postnasal drip and a mildly sore throat although he is able to eat drink and swallow.  He has an occasional dry cough.  He also has occasional crampy left lower quadrant abdominal pain and had 1 or 2 episodes of loose stool.  Denies dysuria frequency urgency or any urinary problems or changes.  The patient has been on Bactrim over the past week for an area of inflammation to the left lateral chest wall.  He thinks an insect may have bit him.  The area has progressively become less erythematous and less swollen after the Bactrim therapy.  He did develop a patchy macular rash yesterday with no associated itching.  Denies any headache neck pain or stiffness.  He has had sinus congestion.  Denies chest pain or shortness of breath.  Denies vomiting.  Denies any focal weakness numbness tingling or paresthesias.  He was concerned because he had a chest wall infection in the past that was severe/covered his entire chest wall and had associated sepsis at that time.  He states that he realizes that this skin lesion is no where near as bad as what he had previously but was just concerned about the possibility of sepsis.  He does report that this area of skin irritation has progressively been improving not worsening.  He denies any other problems or complaints.            .  Patient Active Problem List   Diagnosis     Allergic rhinitis    Follicular lymphoma grade IIIa    Nonfamilial hypogammaglobulinemia    Hypogammaglobulinemia    Essential hypertension    Elevated lipoprotein(a)    Prediabetes    Encounter for antineoplastic chemotherapy    Type II diabetes mellitus with neurological manifestations    Type 2 diabetes mellitus without complication, without long-term current use of insulin    Chronic obstructive pulmonary disease, unspecified COPD type    Pancytopenia    Non-Hodgkin's lymphoma    Aortic atherosclerosis    Sinusitis    Bronchiectasis without complication    Abnormal CXR    Obstructive sleep apnea    Diffuse large b-cell lymphoma, lymph nodes of multiple sites     Sivakumar has a current medication list which includes the following prescription(s): amlodipine, aspirin, atorvastatin, azithromycin, diphenhydramine, famotidine, gabapentin, montelukast, multivitamin, mupirocin, naproxen, omeprazole, sulfamethoxazole-trimethoprim 800-160mg, tadalafil, tizanidine, valsartan, albuterol, diclofenac sodium, fluticasone propionate, and metformin.    Review of Systems   Constitutional:  Positive for fatigue.   Integumentary:  Positive for rash and wound (improving).         Health Maintenance Due   Topic Date Due    Shingles Vaccine (1 of 2) Never done    RSV Vaccine (Age 60+ and Pregnant patients) (1 - 1-dose 60+ series) Never done    COVID-19 Vaccine (2 - Mixed Product risk series) 07/23/2021    Pneumococcal Vaccines (Age 65+) (4 of 4 - PPSV23 or PCV20) 11/11/2021    PROSTATE-SPECIFIC ANTIGEN  04/14/2024    Eye Exam  08/16/2024      Health Maintenance reviewed- not discussed today- will discuss at upcoming appt in September  Objective:      Physical Exam  Vitals and nursing note reviewed.   Constitutional:       General: He is not in acute distress.     Appearance: He is not ill-appearing.   Cardiovascular:      Rate and Rhythm: Normal rate and regular rhythm.      Heart sounds: No murmur heard.  Pulmonary:      Effort:  Pulmonary effort is normal.      Breath sounds: Normal breath sounds. No wheezing.   Skin:     General: Skin is warm and dry.      Findings: Lesion (improving left flank abscess/insect bite) and rash (diffuse blanching rash over trunk and extremities) present.   Neurological:      Mental Status: He is alert.   Psychiatric:         Mood and Affect: Mood normal.         Behavior: Behavior normal.         Assessment:       1. Bacterial skin infection of trunk    2. Diffuse large b-cell lymphoma, lymph nodes of multiple sites    3. Sinusitis, unspecified chronicity, unspecified location        Plan:       1. Bacterial skin infection of trunk    2. Diffuse large b-cell lymphoma, lymph nodes of multiple sites    3. Sinusitis, unspecified chronicity, unspecified location     Reviewed all labs. Discussed supportive care. Confirmed bactrim added to allergy list. Okay to start azithro for sinuses if feels that he needs it. Close follow up.

## 2024-08-25 LAB
BACTERIA BLD CULT: NORMAL
BACTERIA BLD CULT: NORMAL

## 2024-09-04 ENCOUNTER — TELEPHONE (OUTPATIENT)
Facility: CLINIC | Age: 69
End: 2024-09-04
Payer: MEDICARE

## 2024-09-04 NOTE — TELEPHONE ENCOUNTER
I spoke with pt and reminded him to have labs done prior to appt on 9/10/24 pt verbalized understanding.

## 2024-09-05 ENCOUNTER — LAB VISIT (OUTPATIENT)
Dept: LAB | Facility: HOSPITAL | Age: 69
End: 2024-09-05
Attending: INTERNAL MEDICINE
Payer: MEDICARE

## 2024-09-05 DIAGNOSIS — C83.38 DIFFUSE LARGE B-CELL LYMPHOMA, LYMPH NODES OF MULTIPLE SITES: ICD-10-CM

## 2024-09-05 DIAGNOSIS — C85.90 NON-HODGKIN'S LYMPHOMA, UNSPECIFIED BODY REGION, UNSPECIFIED NON-HODGKIN LYMPHOMA TYPE: ICD-10-CM

## 2024-09-05 LAB
ALBUMIN SERPL BCP-MCNC: 3.7 G/DL (ref 3.5–5.2)
ALP SERPL-CCNC: 125 U/L (ref 55–135)
ALT SERPL W/O P-5'-P-CCNC: 41 U/L (ref 10–44)
ANION GAP SERPL CALC-SCNC: 12 MMOL/L (ref 8–16)
AST SERPL-CCNC: 24 U/L (ref 10–40)
BASOPHILS # BLD AUTO: 0.04 K/UL (ref 0–0.2)
BASOPHILS NFR BLD: 0.8 % (ref 0–1.9)
BILIRUB SERPL-MCNC: 0.2 MG/DL (ref 0.1–1)
BUN SERPL-MCNC: 21 MG/DL (ref 8–23)
CALCIUM SERPL-MCNC: 9.1 MG/DL (ref 8.7–10.5)
CHLORIDE SERPL-SCNC: 108 MMOL/L (ref 95–110)
CO2 SERPL-SCNC: 22 MMOL/L (ref 23–29)
CREAT SERPL-MCNC: 1.2 MG/DL (ref 0.5–1.4)
DIFFERENTIAL METHOD BLD: ABNORMAL
EOSINOPHIL # BLD AUTO: 0.1 K/UL (ref 0–0.5)
EOSINOPHIL NFR BLD: 1.7 % (ref 0–8)
ERYTHROCYTE [DISTWIDTH] IN BLOOD BY AUTOMATED COUNT: 13.1 % (ref 11.5–14.5)
EST. GFR  (NO RACE VARIABLE): >60 ML/MIN/1.73 M^2
GLUCOSE SERPL-MCNC: 277 MG/DL (ref 70–110)
HCT VFR BLD AUTO: 41.2 % (ref 40–54)
HGB BLD-MCNC: 13.5 G/DL (ref 14–18)
IMM GRANULOCYTES # BLD AUTO: 0.05 K/UL (ref 0–0.04)
IMM GRANULOCYTES NFR BLD AUTO: 0.9 % (ref 0–0.5)
LYMPHOCYTES # BLD AUTO: 2.1 K/UL (ref 1–4.8)
LYMPHOCYTES NFR BLD: 38.7 % (ref 18–48)
MCH RBC QN AUTO: 29.6 PG (ref 27–31)
MCHC RBC AUTO-ENTMCNC: 32.8 G/DL (ref 32–36)
MCV RBC AUTO: 90 FL (ref 82–98)
MONOCYTES # BLD AUTO: 0.5 K/UL (ref 0.3–1)
MONOCYTES NFR BLD: 9.8 % (ref 4–15)
NEUTROPHILS # BLD AUTO: 2.6 K/UL (ref 1.8–7.7)
NEUTROPHILS NFR BLD: 48.1 % (ref 38–73)
NRBC BLD-RTO: 0 /100 WBC
PLATELET # BLD AUTO: 241 K/UL (ref 150–450)
PMV BLD AUTO: 9.1 FL (ref 9.2–12.9)
POTASSIUM SERPL-SCNC: 4.3 MMOL/L (ref 3.5–5.1)
PROT SERPL-MCNC: 6.5 G/DL (ref 6–8.4)
RBC # BLD AUTO: 4.56 M/UL (ref 4.6–6.2)
SODIUM SERPL-SCNC: 142 MMOL/L (ref 136–145)
WBC # BLD AUTO: 5.3 K/UL (ref 3.9–12.7)

## 2024-09-05 PROCEDURE — 80053 COMPREHEN METABOLIC PANEL: CPT | Performed by: INTERNAL MEDICINE

## 2024-09-05 PROCEDURE — 85025 COMPLETE CBC W/AUTO DIFF WBC: CPT | Performed by: INTERNAL MEDICINE

## 2024-09-05 PROCEDURE — 36415 COLL VENOUS BLD VENIPUNCTURE: CPT | Performed by: INTERNAL MEDICINE

## 2024-09-10 ENCOUNTER — OFFICE VISIT (OUTPATIENT)
Facility: CLINIC | Age: 69
End: 2024-09-10
Payer: MEDICARE

## 2024-09-10 VITALS
TEMPERATURE: 98 F | RESPIRATION RATE: 18 BRPM | HEIGHT: 68 IN | WEIGHT: 218.88 LBS | HEART RATE: 76 BPM | DIASTOLIC BLOOD PRESSURE: 78 MMHG | BODY MASS INDEX: 33.17 KG/M2 | SYSTOLIC BLOOD PRESSURE: 133 MMHG

## 2024-09-10 DIAGNOSIS — D61.818 PANCYTOPENIA: ICD-10-CM

## 2024-09-10 DIAGNOSIS — C82.30 FOLLICULAR LYMPHOMA GRADE IIIA, UNSPECIFIED BODY REGION: Primary | ICD-10-CM

## 2024-09-10 DIAGNOSIS — C85.90 NON-HODGKIN'S LYMPHOMA, UNSPECIFIED BODY REGION, UNSPECIFIED NON-HODGKIN LYMPHOMA TYPE: ICD-10-CM

## 2024-09-10 DIAGNOSIS — C83.38 DIFFUSE LARGE B-CELL LYMPHOMA, LYMPH NODES OF MULTIPLE SITES: ICD-10-CM

## 2024-09-10 PROCEDURE — 99215 OFFICE O/P EST HI 40 MIN: CPT | Mod: S$PBB,,, | Performed by: INTERNAL MEDICINE

## 2024-09-10 PROCEDURE — 99214 OFFICE O/P EST MOD 30 MIN: CPT | Mod: PBBFAC,PN | Performed by: INTERNAL MEDICINE

## 2024-09-10 PROCEDURE — 99999 PR PBB SHADOW E&M-EST. PATIENT-LVL IV: CPT | Mod: PBBFAC,,, | Performed by: INTERNAL MEDICINE

## 2024-09-10 PROCEDURE — G2211 COMPLEX E/M VISIT ADD ON: HCPCS | Mod: S$PBB,,, | Performed by: INTERNAL MEDICINE

## 2024-09-10 NOTE — PROGRESS NOTES
[facility] Hematology/Oncology  PROGRESS NOTE -  Follow-up Visit      Subjective:       Patient ID:   NAME: Sivakumar Thacker : 1955     69 y.o. male    Referring Doc: Doe (new PCP)  Other Physicians: Vinod Chen/José Manuel      Chief Complaint:  NHL f/u    History of Present Illness:     Patient returns today for a regularly scheduled follow-up visit.  The patient is here today to go over the results of the recently ordered labs, tests and studies. He is here with his wife    He has been doing well overall except recent bug bite left lateral CW about 3-4 weeks ago; he was seen by primary and started on bactrim antibiotics which he only took for 5 of the 7 days due to him having a reaction.    No new respiratory issues, just some sinus drainage issues.       PEt scheduled for Oct 2024 and has next infusion this coming Friday    He had recent repeat PET on 3/11/2024 is negative    He saw Dr Nails on ; he was seen by Dr Interiano with vascular for his right neck while in ED on     He has been on Rituximab  at every 12 weeks; next one is on is this coming Friday he has also been getting IV IGg  as well     He denies any CP, SOB, HA's or N/V       He is continued on Gabapentin for the neuropathy issues in his feet.          I discussed continued Covid19 precautions        ROS:   GEN: normal without any fever, night sweats or weight loss  HEENT: normal with no HA's, sore throat, stiff neck, changes in vision;    CV: normal with no CP, SOB, PND, VASQUEZ or orthopnea  PULM: normal with no SOB,  hemoptysis, sputum or pleuritic pain;  breathing better    GI: vague upper abdominal discomfort/bloating but stable with no nausea, vomiting, constipation, diarrhea, melanotic stools, BRBPR, or hematemesis  : normal with no hematuria, dysuria  BREAST: normal with no mass, discharge, pain  SKIN: normal with no rash, erythema, bruising, or swelling; no current wound issues    Allergies:  Review of patient's allergies  indicates:  No Known Allergies    Medications:    Current Outpatient Medications:     amLODIPine (NORVASC) 10 MG tablet, Take 1 tablet (10 mg total) by mouth once daily., Disp: 90 tablet, Rfl: 3    aspirin (ECOTRIN) 81 MG EC tablet, Take 81 mg by mouth nightly., Disp: , Rfl:     atorvastatin (LIPITOR) 20 MG tablet, Take 1 tablet (20 mg total) by mouth once daily., Disp: 90 tablet, Rfl: 3    gabapentin (NEURONTIN) 300 MG capsule, Take 3 capsules (900 mg total) by mouth once daily AND 2 capsules (600 mg total) every evening., Disp: 450 capsule, Rfl: 3    montelukast (SINGULAIR) 10 mg tablet, Take 1 tablet (10 mg total) by mouth nightly., Disp: 90 tablet, Rfl: 3    MULTIVITAMIN ORAL, Take 1 tablet by mouth once daily., Disp: , Rfl:     mupirocin (BACTROBAN) 2 % ointment, Apply topically 3 (three) times daily., Disp: 22 g, Rfl: 0    naproxen (NAPROSYN) 500 MG tablet, Take 1 tablet (500 mg total) by mouth 2 (two) times daily with meals., Disp: 180 tablet, Rfl: 3    omeprazole (PRILOSEC) 40 MG capsule, Take 1 capsule (40 mg total) by mouth once daily., Disp: 90 capsule, Rfl: 3    tadalafiL (CIALIS) 20 MG Tab, Take 1 tablet (20 mg total) by mouth once daily., Disp: 90 tablet, Rfl: 3    tiZANidine (ZANAFLEX) 4 MG tablet, Take 1 tablet (4 mg total) by mouth daily as needed (back pain)., Disp: 90 tablet, Rfl: 3    valsartan (DIOVAN) 320 MG tablet, Take 1 tablet (320 mg total) by mouth once daily., Disp: 90 tablet, Rfl: 1    albuterol (PROVENTIL/VENTOLIN HFA) 90 mcg/actuation inhaler, Inhale 2 puffs into the lungs every 6 (six) hours as needed., Disp: , Rfl:     diclofenac sodium (VOLTAREN) 1 % Gel, Apply 2 g topically 3 (three) times daily. (Patient not taking: Reported on 8/15/2024), Disp: 200 g, Rfl: 2    famotidine (PEPCID) 20 MG tablet, Take 1 tablet (20 mg total) by mouth 2 (two) times daily. for 5 days, Disp: 10 tablet, Rfl: 0    fluticasone propionate (FLONASE) 50 mcg/actuation nasal spray, 1 spray (50 mcg total) by  "Each Nostril route 2 (two) times daily. (Patient not taking: Reported on 8/15/2024), Disp: 48 g, Rfl: 11    metFORMIN (GLUCOPHAGE) 500 MG tablet, Take 2 tablets twice a day by oral route with meals for 90 days. (Patient not taking: Reported on 8/23/2024), Disp: 360 tablet, Rfl: 3    PMHx/PSHx Updates:  See patient's last visit with me on  6/18/2024  See H&P on 1/8/2019        Pathology:  Cancer Staging  No matching staging information was found for the patient.    EGD 2/29/2024:  1. LIPOMA, BIOPSY:   - GASTRIC ANTRAL MUCOSA WITH REACTIVE GASTROPATHY.   - NEGATIVE FOR SUBMUCOSA TO EVALUATE FOR A DEEPER SEATED LESION.   - NEGATIVE FOR HELICOBACTER PYLORI TYPE ORGANISMS.     2. STOMACH, ANTRUM, BIOPSY:   - GASTRIC ANTRAL MUCOSA WITH REACTIVE GASTROPATHY.   - GASTRIC OXYNTIC MUCOSA WITH NO SIGNIFICANT HISTOPATHOLOGIC FINDINGS.   - NEGATIVE FOR HELICOBACTER PYLORI TYPE ORGANISMS.     3. GASTROESOPHAGEAL JUNCTION, BIOPSY:   - SQUAMOUS MUCOSA WITH REFLUX ESOPHAGITIS.   - COLUMNAR MUCOSA WITH NO SIGNIFICANT HISTOPATHOLOGIC FINDINGS.   - NEGATIVE FOR INTESTINAL METAPLASIA.       Objective:     Vitals:  Blood pressure 133/78, pulse 76, temperature 97.7 °F (36.5 °C), resp. rate 18, height 5' 8" (1.727 m), weight 99.3 kg (218 lb 14.4 oz).    Physical Examination:   GEN: no apparent distress, comfortable; AAOx3  HEAD: atraumatic and normocephalic  EYES: no pallor, no icterus, PERRLA  ENT: OMM, no pharyngeal erythema, external ears WNL; no nasal discharge; no thrush;    NECK: no masses, thyroid normal, trachea midline, no LAD/LN's, supple  CV: RRR with no murmur; normal pulse; normal S1 and S2; no pedal edema; portacath since removed  CHEST: Normal respiratory effort; CTAB  ABDOM: nontender and nondistended; soft; normal bowel sounds; no rebound/guarding  MUSC/Skeletal: ROM normal; no crepitus; joints normal; no deformities or arthropathy  EXTREM: no clubbing, cyanosis, inflammation or swelling  SKIN: no rashes, lesions, ulcers, " petechiae or subcutaneous nodules; no current wound issues  : no gibbs  NEURO: grossly intact; motor/sensory WNL; AAOx3; no tremors  PSYCH: normal mood, affect and behavior  LYMPH: normal cervical, supraclavicular, axillary and groin LN's            Labs:              Lab Results   Component Value Date    WBC 5.30 09/05/2024    HGB 13.5 (L) 09/05/2024    HCT 41.2 09/05/2024    MCV 90 09/05/2024     09/05/2024     CMP  Sodium   Date Value Ref Range Status   09/05/2024 142 136 - 145 mmol/L Final   04/25/2019 144 134 - 144 mmol/L      Potassium   Date Value Ref Range Status   09/05/2024 4.3 3.5 - 5.1 mmol/L Final     Chloride   Date Value Ref Range Status   09/05/2024 108 95 - 110 mmol/L Final   04/25/2019 107 98 - 110 mmol/L      CO2   Date Value Ref Range Status   09/05/2024 22 (L) 23 - 29 mmol/L Final     Glucose   Date Value Ref Range Status   09/05/2024 277 (H) 70 - 110 mg/dL Final   04/25/2019 177 (H) 70 - 99 mg/dL      BUN   Date Value Ref Range Status   09/05/2024 21 8 - 23 mg/dL Final     Creatinine   Date Value Ref Range Status   09/05/2024 1.2 0.5 - 1.4 mg/dL Final   04/25/2019 0.83 0.60 - 1.40 mg/dL      Calcium   Date Value Ref Range Status   09/05/2024 9.1 8.7 - 10.5 mg/dL Final     Total Protein   Date Value Ref Range Status   09/05/2024 6.5 6.0 - 8.4 g/dL Final     Albumin   Date Value Ref Range Status   09/05/2024 3.7 3.5 - 5.2 g/dL Final   04/25/2019 4.4 3.1 - 4.7 g/dL      Total Bilirubin   Date Value Ref Range Status   09/05/2024 0.2 0.1 - 1.0 mg/dL Final     Comment:     For infants and newborns, interpretation of results should be based  on gestational age, weight and in agreement with clinical  observations.    Premature Infant recommended reference ranges:  Up to 24 hours.............<8.0 mg/dL  Up to 48 hours............<12.0 mg/dL  3-5 days..................<15.0 mg/dL  6-29 days.................<15.0 mg/dL       Alkaline Phosphatase   Date Value Ref Range Status   09/05/2024 125 55 -  135 U/L Final     AST   Date Value Ref Range Status   09/05/2024 24 10 - 40 U/L Final     ALT   Date Value Ref Range Status   09/05/2024 41 10 - 44 U/L Final     Anion Gap   Date Value Ref Range Status   09/05/2024 12 8 - 16 mmol/L Final     eGFR if    Date Value Ref Range Status   07/21/2022 >60.0 >60 mL/min/1.73 m^2 Final     eGFR if non    Date Value Ref Range Status   07/21/2022 >60.0 >60 mL/min/1.73 m^2 Final     Comment:     Calculation used to obtain the estimated glomerular filtration  rate (eGFR) is the CKD-EPI equation.            Radiology/Diagnostic Studies:    PET 3/11/2024:    IMPRESSION:  1. Interval resolution of the previously described nonspecific small bowel mesenteric fat stranding.  2. No concerning FDG avid mass or lymphadenopathy.        PET 12/18/2023:  IMPRESSION:  1. Mild faint nonspecific fat stranding in the small bowel mesentery, possibly from low-grade/subclinical enteritis or edema, noting an early lymphoproliferative disorder is not entirely excluded and attention on follow-up imaging may be warranted.     2. No FDG avid mass or lymphadenopathy.      PET 6/22/2023:    IMPRESSION:     1. Negative for FDG avid lymphoproliferative disease.  2. Resolution of prior bilateral pulmonary opacities.        PET 1/5/2023:    IMPRESSION: Diffuse reticular nodular and groundglass opacities the left lower lobe, posterior right upper lobe and posterior medial right lower lobe compatible with multifocal pneumonia. Follow-up chest CT in three months is recommended     Diffuse FDG activity throughout the axial skeleton suggestive of bone marrow hyperstimulation     No evidence of recurrent or metastatic disease        PET  4/5/2022:    IMPRESSION: No evidence of recurrent or active lymphoproliferative disease        PET 10/4/2021:  Impression:     1. Negative for FDG avid recurrent lymphoproliferative disease.  2. New left maxillary sinusitis.  3. New bilateral  reticulonodular pulmonary opacities suggesting infectious or inflammatory pneumonitis.  Clinical and laboratory correlation is requested.  4. Coronary artery calcification.         CXR  3/25/2021:    Impression:     Mild interstitial prominence, similar to previous exams.  No focal consolidation      PET 3/19/2021:  IMPRESSION:  1. Mild patchy airspace opacity in the superior segment of the left  lower lobe with increased FDG activity from background suggestive of a  small focal pneumonia (possibly viral in etiology). Consider  correlation with the symptoms, history and CT follow-up in 3 months to  document resolution.  2. No FDG avid lymphadenopathy or additional sites of disease.         PET 9/2/2020:      Impression:     Negative for active lymphoproliferative disease.         Chest CT  3/19/2020:      Impression       1. Mild bronchiectasis in the left lower lobe and tree-in-bud micro nodules at the left lung base suggesting underlying bronchiolitis, possibly due to a non tuberculous mycobacterial infection or viral bronchiolitis.  2. Fatty infiltration of the liver  3. Small hiatal hernia.           CXR  1/31/2020    Impression       1. No acute chest disease.  2. Left subclavian Port-A-Cath.               PET 9/11/2019   Impression       No evidence of FDG avid residual or recurrent lymphoma       I have reviewed all available lab results and radiology reports.    Assessment/Plan:   (1) 69 y.o. male with diagnosis of NHL Follicular Stage IIIA who has been referred by Dr Gary Chen with [facility] for continuation of care by medical hematology/oncology.   - He originally was diagnosed in 2012 and had parotid excision at [facility]. He subsequently went to [facility] and was treated with bendamustine-rituximab. He has been on rituximab maintenance every 8 weeks and IV IgG monthly. Dr Chen's plan was to keep him on maintenace therapy for as long as he could tolerate the treatments  - s/p 6 cycles of Bendamustine  and Retuximab with subsequent complete remission  - 1st maintenance cycle rituximab was in March 2016  - he has been on maintenance rituximab and IV IgG - last rituximab 11/15/2018; last IV IgG on Dec 14th 2018  - last PET was on 9/26/2018 with no evidence of recurrence  - originally diagnosed in Dec 2012 with left parotid and left periparotid LN excision on 12/11/2012  - PET scan done on  9/11/2019 was good    7/23/2020:  - new nodule on auricle of left ear suspicious for a skin cancer - will refer him to Dr Avilez with ENT  - he is due for repeat PET    9/17/2020:  - PET scan on 9/2/2020 is adequate  - [facility]     11/12/2020:  - continued on the current regimen  - doing well with no new issues    1/7/2021:  - he is having some persistent cough issues for which he has been obtaining sputum for José Manuel  - last PET was Sept 2020    3/4/2021:  - doing ok  - chronic cough issues - followed by José Manuel  - will set up f/u PET    4/29/2021:  - he had PET scan on 3/19/2021  - He has been seeing pulmonary about his chronic cough  Lucia Georges with pulmonary has seen the recent PET and reported to him that the CXR on 3/25 was stable and he is on some oral antibiotics with improvement of the symptoms  - He is also on Gabapentin for the neuropathy issues in his feet.   - He saw Dr Barry Mcmahon on 3/24/2021 with [facility]    8/19/2021:  - He has been seeing pulmonary about his chronic cough - Lucia Georges with pulmonary and he has been on periodic treatments of antibiotics. He saw her last just yesterday on 8/17/2021 and is currently on antibiotics.  - Pulmonary is planning to refer him to an ID specialist  - he is on the rituximab every 8 weeks; I am not convinced that this is contributing to the infection issues as it is not reducing his WBC; however,if pulmonary and/or ID feel it is a contributing factor then we can discontinue. The risk of his lymphoma recurring or  progressing would be higher if he were to discontinue the therapy      10/14/2021:  - continued on oral antibiotics per pulmonary and plans to see Pulmonary ID specialist in near future  - continued on current therapy with rituximab  - recent PEt on 10/4/2021    12/9/2021:  - continued on rituximab  - on new antibiotics per pulmonary and he sees them again in Feb 2022  - repeat scans in Feb 2022 or sooner if pulmonary says otherwise    2/2/2022:  - he is seeing pulmonary again in two weeks  - he does not think that the new triple antibiotic regimen is working  - we can get PET in Feb/mar 2022 if pulmonary is inclined, otherwise 6 month surveillance scan in April/May 2022    3/31/2022:  - He has been seeing pulmonary about his chronic cough and TONY issues; he is on antibiotics 3x/week and he reports that pulmonary feels that he is stable  - PET scan scheduled for 4/14/2022  - He last saw Dr Barry Mcmahon on 3/28/2021 and José Manuel on 2/17/2022  - pulmonary is fine with him continuing the ritxuimab per his report    5/26/2022:  - He has been seeing pulmonary about his chronic cough and TONY issues; he saw Rose on 5/17/2022 and is now on a new antibiotic and he reports that pulmonary said the PEt scan was better than the last one; he is planning to see Dr Veliz in the near future   - PET scan was done on 4/5/2022 with no evidence of recurrence  - continued on rituximab maintenance therapy    7/21/2022:  - continued under the care of pulmonary  - he is seeing Dr Veliz in Aug 2022  - continued on monthly IV IgG and rituximab every other month  - repeat PET in Oct 2022 expected    9/15/2022:  - He has been seeing pulmonary about his chronic cough and TONY issues; he has been seeing Rose and saw Dr Veliz on 8/15/2022;   - Dr Veliz is looking at increasing his Ig dose or changing it to SQ weekly  - Last PET scan was done on 4/5/2022 and repeat has been ordered and is due this Oct 2022  - discussed with patient about referral  to Dr Nasreen Abarca at [facility] for not only evaluation for 2nd opinion for his chronic lymphoma but also the options of IV IgG infusion therapy, patient is agreeable    11/10/2022:  - Patient was seen by Dr Abarca at [facility] since last visit and his recommendation was to hold off on further rituximab therapy and proceed with just surveillance scanning.  - He was recently hospitalized at [facility] in Oct 2022 with upper torso cellulitis with Serratia marcescens septicemia  - He was recently hospitalized at [facility] in Oct 2022 with upper torso cellulitis with Serratia marcescens septicemia. He is still followed by wound care and is getting the prior drain site packed, etc. He has not followed up with ID as of yet    1/4/2023:  - Patient was previously seen by Dr Abarca at [facility] since last visit and his recommendation was to hold off on further rituximab therapy and proceed with just surveillance scanning. He has telemed visit with him in Feb 2023  -  He is getting PET tomorrow and seeing pulmonary again in Feb 2023  - he has been on IV IgG  - He saw Dr Washington last in Nov 2022 and is seeing ID at [facility] in Jan 2023    3/1/2023:  - He had telemed with Dr Abarca and he wants him off the rituximab for another 3 months; patient is concerned about holding off on the rituximab maintenance.   - He last had rituximab in Sept 2022  - He has been on IV IgG  - Recent PET on 1/5/2023 with no evidence of lymphoma  - will discuss with Dr Abarca, but I think it is reasonable to resume rituximab in near future if ok with Pulm and ID, but will spread out to therapy every 3 months instead of every 2 months    4/26/2023:  - discussed with patient about resuming the Rituximab and he is anxious to do so  - will give every 12 weeks    6/21/2023:  - PET due tomorrow  - IV IgG on Friday  - rituximab since resumed but to be given every 12 weeks now  - f/\u with Dr Veliz in Aug 2023    9/13/2023:  - He has since resumed Rituximab but it is now at every 12 weeks;  getting IV IGg this Friday and rituimab due again in Oct 2023  - He had PEt on 6/22/2023 12/13/2023:  - he is continued on IV IgG with next one on Jan 5th  - he gets Rituximab every 3 months now - latest one was 11/10  - he has PET this coming Dec 18th    3/18/2024:  - He has been having some upper GI pressure and discomfort issues since Jan 2024 and had upper and lower endoscopies with Dr Campbell; he has f/u visit with GI to go over the results in near future; he has diverticulosis  - He had recent repeat PET on 3/11/2024 is negative  - he is on rituximab every 3 months (due Apr 25th 2024)  - He saw Dr Nails in Jan 2024 with [facility]    6/18/2024:  - schedule next PET in Sept 2024  - continued on rituximab every 12 weeks with next one due in mid-July 2024  - saw Dr Nails in Apr 2024  - had scopes with Dr griffith in May 2024    9/10/2024:  -  PEt scheduled for Oct 2024 and has next infusion this coming Friday  - He saw Dr Nails on 8/23; he was seen by Dr Interiano with vascular for his right neck while in ED on 8/26  - He has been on Rituximab  at every 12 weeks; next one is on is this coming Friday he has also been getting IV IGg  as well      (2) HTN     (3) Neuropathy     (4) GERD     (5) DM - on metformin per Dr Nunez     (6) Hypogammaglobulinemia - on Iv IgG monthly     (7) Steatosis of liver     (8) Degenerative disc disease of back     (9) Diverticular disease    (10) Mild bronchiectasis in the left lower lobe and tree-in-bud micro nodules at the left lung base suggesting underlying bronchiolitis  - seeing Lucia Georges           VISIT DIAGNOSES:      Follicular lymphoma grade IIIa, unspecified body region    Non-Hodgkin's lymphoma, unspecified body region, unspecified non-Hodgkin lymphoma type    Diffuse large b-cell lymphoma, lymph nodes of multiple sites    Pancytopenia          PLAN:  1. continue rituximab maintenance  therapy every 12 weeks (due again this coming Friday)  2. continue IV IgG  monthly    3. F/u with Dr Nasreen Abarca at [facility] as directed   4. Check labs every 8-12 weeks  5. Repeat PET every 6 months (due Oct 2024)   6. F/u with PCP, Pulm etc    7. F/u with GI as directed  8. PCP addressing his diabetes           RTC in 12 weeks  with myself   Fax note to Jesu ([facility]);; José Manuel [facility]iz; Rima; Adrian/Dapola         Discussion:       I spent over 25 mins of time with the patient. Reviewed results of the recently ordered labs, tests and studies; made directives with regards to the results. Over half of this time was spent couseling and coordinating care.      COVID-19 Discussion:    I had long discussion with patient and any applicable family about the COVID-19 coronavirus epidemic and the recommended precautions with regard to cancer and/or hematology patients. I have re-iterated the CDC recommendations for adequate hand washing, use of hand -like products, and coughing into elbow, etc. In addition, especially for our patients who are on chemotherapy and/or our otherwise immunocompromised patients, I have recommended avoidance of crowds, including movie theaters, restaurants, churches, etc. I have recommended avoidance of any sick or symptomatic family members and/or friends. I have also recommended avoidance of any raw and unwashed food products, and general avoidance of food items that have not been prepared by themselves. The patient has been asked to call us immediately with any symptom developments, issues, questions or other general concerns.     I have explained all of the above in detail and the patient understands all of the current recommendation(s). I have answered all of their questions to the best of my ability and to their complete satisfaction.   The patient is to continue with the current management plan.            Electronically signed by Jefferson Ibarra MD                    Answers submitted by the patient for this visit:  Review of Systems Questionnaire  (Submitted on 9/3/2024)  appetite change : No  unexpected weight change: No  mouth sores: No  visual disturbance: No  cough: No  shortness of breath: No  chest pain: No  abdominal pain: No  diarrhea: No  frequency: No  back pain: Yes  rash: No  headaches: No  adenopathy: No  nervous/ anxious: No

## 2024-09-13 ENCOUNTER — INFUSION (OUTPATIENT)
Dept: INFUSION THERAPY | Facility: HOSPITAL | Age: 69
End: 2024-09-13
Attending: INTERNAL MEDICINE
Payer: MEDICARE

## 2024-09-13 VITALS
WEIGHT: 218.25 LBS | HEIGHT: 68 IN | BODY MASS INDEX: 33.08 KG/M2 | DIASTOLIC BLOOD PRESSURE: 80 MMHG | SYSTOLIC BLOOD PRESSURE: 141 MMHG | TEMPERATURE: 96 F | RESPIRATION RATE: 98 BRPM | HEART RATE: 72 BPM | OXYGEN SATURATION: 96 %

## 2024-09-13 DIAGNOSIS — D80.1 NONFAMILIAL HYPOGAMMAGLOBULINEMIA: ICD-10-CM

## 2024-09-13 DIAGNOSIS — D80.1 HYPOGAMMAGLOBULINEMIA: Primary | ICD-10-CM

## 2024-09-13 DIAGNOSIS — C82.30 FOLLICULAR LYMPHOMA GRADE IIIA, UNSPECIFIED BODY REGION: ICD-10-CM

## 2024-09-13 PROCEDURE — 96365 THER/PROPH/DIAG IV INF INIT: CPT

## 2024-09-13 PROCEDURE — 96366 THER/PROPH/DIAG IV INF ADDON: CPT

## 2024-09-13 PROCEDURE — 25000003 PHARM REV CODE 250: Performed by: INTERNAL MEDICINE

## 2024-09-13 PROCEDURE — 96367 TX/PROPH/DG ADDL SEQ IV INF: CPT

## 2024-09-13 PROCEDURE — A4216 STERILE WATER/SALINE, 10 ML: HCPCS | Performed by: INTERNAL MEDICINE

## 2024-09-13 PROCEDURE — 63600175 PHARM REV CODE 636 W HCPCS: Performed by: INTERNAL MEDICINE

## 2024-09-13 RX ORDER — HEPARIN 100 UNIT/ML
500 SYRINGE INTRAVENOUS
Status: DISCONTINUED | OUTPATIENT
Start: 2024-09-13 | End: 2024-09-13 | Stop reason: HOSPADM

## 2024-09-13 RX ORDER — SODIUM CHLORIDE 0.9 % (FLUSH) 0.9 %
10 SYRINGE (ML) INJECTION
OUTPATIENT
Start: 2024-10-11

## 2024-09-13 RX ORDER — SODIUM CHLORIDE 0.9 % (FLUSH) 0.9 %
10 SYRINGE (ML) INJECTION
Status: DISCONTINUED | OUTPATIENT
Start: 2024-09-13 | End: 2024-09-13 | Stop reason: HOSPADM

## 2024-09-13 RX ORDER — HEPARIN 100 UNIT/ML
500 SYRINGE INTRAVENOUS
OUTPATIENT
Start: 2024-10-11

## 2024-09-13 RX ADMIN — IMMUNE GLOBULIN (HUMAN) 50 G: 10 INJECTION INTRAVENOUS; SUBCUTANEOUS at 08:09

## 2024-09-13 RX ADMIN — SODIUM CHLORIDE, PRESERVATIVE FREE 10 ML: 5 INJECTION INTRAVENOUS at 10:09

## 2024-09-13 RX ADMIN — HEPARIN 500 UNITS: 100 SYRINGE at 10:09

## 2024-09-13 RX ADMIN — DIPHENHYDRAMINE HYDROCHLORIDE 25 MG: 50 INJECTION, SOLUTION INTRAMUSCULAR; INTRAVENOUS at 07:09

## 2024-09-19 ENCOUNTER — OFFICE VISIT (OUTPATIENT)
Dept: FAMILY MEDICINE | Facility: CLINIC | Age: 69
End: 2024-09-19
Payer: MEDICARE

## 2024-09-19 ENCOUNTER — PATIENT MESSAGE (OUTPATIENT)
Dept: FAMILY MEDICINE | Facility: CLINIC | Age: 69
End: 2024-09-19

## 2024-09-19 VITALS
DIASTOLIC BLOOD PRESSURE: 84 MMHG | HEART RATE: 83 BPM | RESPIRATION RATE: 18 BRPM | WEIGHT: 221 LBS | OXYGEN SATURATION: 95 % | SYSTOLIC BLOOD PRESSURE: 132 MMHG | BODY MASS INDEX: 33.49 KG/M2 | HEIGHT: 68 IN

## 2024-09-19 DIAGNOSIS — E11.9 TYPE 2 DIABETES MELLITUS WITHOUT COMPLICATION, WITHOUT LONG-TERM CURRENT USE OF INSULIN: Primary | ICD-10-CM

## 2024-09-19 DIAGNOSIS — G89.29 CHRONIC RIGHT-SIDED LOW BACK PAIN WITHOUT SCIATICA: ICD-10-CM

## 2024-09-19 DIAGNOSIS — M54.50 CHRONIC RIGHT-SIDED LOW BACK PAIN WITHOUT SCIATICA: ICD-10-CM

## 2024-09-19 DIAGNOSIS — Z12.5 SCREENING FOR PROSTATE CANCER: ICD-10-CM

## 2024-09-19 PROCEDURE — 99214 OFFICE O/P EST MOD 30 MIN: CPT | Mod: S$PBB,,, | Performed by: FAMILY MEDICINE

## 2024-09-19 PROCEDURE — 99999 PR PBB SHADOW E&M-EST. PATIENT-LVL V: CPT | Mod: PBBFAC,,, | Performed by: FAMILY MEDICINE

## 2024-09-19 PROCEDURE — 99215 OFFICE O/P EST HI 40 MIN: CPT | Mod: PBBFAC,PN | Performed by: FAMILY MEDICINE

## 2024-09-19 RX ORDER — TIRZEPATIDE 2.5 MG/.5ML
2.5 INJECTION, SOLUTION SUBCUTANEOUS
Qty: 2 ML | Refills: 1 | Status: SHIPPED | OUTPATIENT
Start: 2024-09-19

## 2024-09-19 NOTE — PROGRESS NOTES
Subjective:       Patient ID: Sivakumar Thacker is a 69 y.o. male.    Chief Complaint: Annual Exam    Sivakumar Thacker presents today to establish care.   They are new to me but established with [facility].  Former patient of Dr. Azar    Patient Active Problem List:     Allergic rhinitis     Follicular lymphoma grade IIIa     Nonfamilial hypogammaglobulinemia     Hypogammaglobulinemia     Essential hypertension     Elevated lipoprotein(a)     Prediabetes     Encounter for antineoplastic chemotherapy     Type II diabetes mellitus with neurological manifestations     Type 2 diabetes mellitus without complication, without long-term current use of insulin     Chronic obstructive pulmonary disease, unspecified COPD type     Pancytopenia     Non-Hodgkin's lymphoma     Aortic atherosclerosis     Sinusitis     Bronchiectasis without complication     Abnormal CXR     Obstructive sleep apnea    Current Outpatient Medications:  amLODIPine (NORVASC) 10 MG tablet  aspirin (ECOTRIN) 81 MG EC tablet, Take 81 mg by mouth once daily.  atorvastatin (LIPITOR) 20 MG tablet, Take 1 tablet (20 mg total) by mouth once daily.  diclofenac sodium (VOLTAREN) 1 % Gel, Apply 2 g topically 3 (three) times daily.  fluticasone propionate (FLONASE) 50 mcg/actuation nasal spray, 1 spray (50 mcg total) by Each Nostril route 2 (two) times daily.  fluticasone-umeclidin-vilanter (TRELEGY ELLIPTA) 100-62.5-25 mcg DsDv, Inhale 1 puff into the lungs once daily.  gabapentin (NEURONTIN) 300 MG capsule, Take 1 capsule (300 mg total) by mouth 2 (two) times daily.  losartan (COZAAR) 100 MG tablet, Take 1 tablet (100 mg total) by mouth once daily.  metFORMIN (GLUCOPHAGE) 500 MG tablet, Take 2 tablets twice a day by oral route with meals for 90 days.  montelukast (SINGULAIR) 10 mg tablet, Take 1 tablet (10 mg total) by mouth nightly as needed.  MULTIVITAMIN ORAL, Take 1 tablet by mouth once daily.  naproxen (NAPROSYN) 500 MG tablet, Take 500 mg  by mouth.  omeprazole (PRILOSEC) 40 MG capsule, Take 1 capsule (40 mg total) by mouth once daily.  sildenafiL (VIAGRA) 100 MG tablet, Take 1 tablet (100 mg total) by mouth daily as needed for Erectile Dysfunction.  tiZANidine (ZANAFLEX) 4 MG tablet, Take 1 tablet (4 mg total) by mouth daily as needed.  azithromycin (ZITHROMAX) 500 MG tablet, Take 1 tablet (500 mg total) by mouth once daily. Repeat for yellow mucous (Patient not taking: Reported on 1/3/2024)  benzonatate (TESSALON) 100 MG capsule, Take 1 capsule every day by oral route at bedtime for 10 days.  benzonatate (TESSALON) 200 MG capsule,   blood sugar diagnostic (FREESTYLE LITE STRIPS MISC), FreeStyle Lite Strips  blood sugar diagnostic Strp,   mupirocin (BACTROBAN) 2 % ointment, Apply topically.  predniSONE (DELTASONE) 20 MG tablet, Take 1 tablet (20 mg total) by mouth once daily. Take one daily for 3 days and repeat for bronchitis. (Patient not taking: Reported on 1/3/2024)  promethazine-dextromethorphan (PROMETHAZINE-DM) 6.25-15 mg/5 mL Syrp, Take 5 mLs by mouth every 6 (six) hours as needed.    Recently started on [facility].     Seeing Dr. Campbell for GI related to some stranding seen on recent PET    Following with Dr. Ibarra and in maintenance phase for NHL.     Diabetes control has deteriorated.     He is having more frequent low back pain. He has a history of a injury years ago and required surgery on his neck- at the time no issues in his low back, but now he is having more and more frequent episodes of low back pain. Gets relief with naprosyn and muscle relaxers within 5 or so days, but is not helping as much.         .  Patient Active Problem List   Diagnosis    Allergic rhinitis    Follicular lymphoma grade IIIa    Nonfamilial hypogammaglobulinemia    Hypogammaglobulinemia    Essential hypertension    Elevated lipoprotein(a)    Prediabetes    Encounter for antineoplastic chemotherapy    Type II diabetes mellitus with neurological  manifestations    Type 2 diabetes mellitus without complication, without long-term current use of insulin    Chronic obstructive pulmonary disease, unspecified COPD type    Pancytopenia    Non-Hodgkin's lymphoma    Aortic atherosclerosis    Sinusitis    Bronchiectasis without complication    Abnormal CXR    Obstructive sleep apnea    Diffuse large b-cell lymphoma, lymph nodes of multiple sites     Sivakumar has a current medication list which includes the following prescription(s): amlodipine, aspirin, atorvastatin, famotidine, gabapentin, metformin, montelukast, multivitamin, mupirocin, naproxen, omeprazole, tadalafil, tizanidine, valsartan, albuterol, diclofenac sodium, fluticasone propionate, and mounjaro.    Review of Systems   Constitutional:  Negative for activity change, appetite change, fatigue and fever.   Respiratory:  Negative for shortness of breath.    Gastrointestinal:  Negative for abdominal pain.   Integumentary:  Negative for rash.         Health Maintenance Due   Topic Date Due    Shingles Vaccine (1 of 2) Never done    RSV Vaccine (Age 60+ and Pregnant patients) (1 - 1-dose 60+ series) Never done    COVID-19 Vaccine (2 - Mixed Product risk series) 07/23/2021    Pneumococcal Vaccines (Age 65+) (4 of 4 - PPSV23 or PCV20) 11/11/2021    PROSTATE-SPECIFIC ANTIGEN  04/14/2024    Eye Exam  08/16/2024    Influenza Vaccine (1) 09/01/2024      Health Maintenance reviewed and discussed- labs ordered  Objective:      Physical Exam  Vitals and nursing note reviewed.   Constitutional:       General: He is not in acute distress.     Appearance: He is not ill-appearing.   Cardiovascular:      Rate and Rhythm: Normal rate and regular rhythm.      Heart sounds: No murmur heard.  Pulmonary:      Effort: Pulmonary effort is normal.      Breath sounds: Normal breath sounds. No wheezing.   Skin:     General: Skin is warm and dry.      Findings: No rash.   Neurological:      Mental Status: He is alert.   Psychiatric:          Mood and Affect: Mood normal.         Behavior: Behavior normal.         Assessment:       1. Type 2 diabetes mellitus without complication, without long-term current use of insulin    2. Chronic right-sided low back pain without sciatica    3. Screening for prostate cancer        Plan:       1. Type 2 diabetes mellitus without complication, without long-term current use of insulin  Assessment & Plan:  A1C up- blood sugar increasing. Would like to try GLP 1. Most interested in Mounjaro. Will send. If not covered- will try preferred GLP-1.     Orders:  -     tirzepatide (MOUNJARO) 2.5 mg/0.5 mL PnIj; Inject 2.5 mg into the skin every 7 days.  Dispense: 2 mL; Refill: 1  -     Hemoglobin A1C; Future; Expected date: 09/19/2024    2. Chronic right-sided low back pain without sciatica  -     X-Ray Lumbar Complete Including Flex And Ext; Future; Expected date: 09/19/2024  -     Ambulatory referral/consult to [facility]; Future; Expected date: 09/26/2024    3. Screening for prostate cancer  -     PSA, Screening; Future; Expected date: 09/19/2024

## 2024-09-19 NOTE — ASSESSMENT & PLAN NOTE
A1C up- blood sugar increasing. Would like to try GLP 1. Most interested in Mounjaro. Will send. If not covered- will try preferred GLP-1.

## 2024-09-23 ENCOUNTER — HOSPITAL ENCOUNTER (OUTPATIENT)
Dept: RADIOLOGY | Facility: HOSPITAL | Age: 69
Discharge: HOME | End: 2024-09-23
Attending: FAMILY MEDICINE
Payer: MEDICARE

## 2024-09-23 ENCOUNTER — TELEPHONE (OUTPATIENT)
Facility: CLINIC | Age: 69
End: 2024-09-23
Payer: MEDICARE

## 2024-09-23 DIAGNOSIS — M54.50 CHRONIC RIGHT-SIDED LOW BACK PAIN WITHOUT SCIATICA: ICD-10-CM

## 2024-09-23 DIAGNOSIS — G89.29 CHRONIC RIGHT-SIDED LOW BACK PAIN WITHOUT SCIATICA: ICD-10-CM

## 2024-09-23 PROCEDURE — 72114 X-RAY EXAM L-S SPINE BENDING: CPT | Mod: 26,,, | Performed by: RADIOLOGY

## 2024-09-23 PROCEDURE — 72114 X-RAY EXAM L-S SPINE BENDING: CPT | Mod: TC

## 2024-09-23 NOTE — TELEPHONE ENCOUNTER
Reviewed most recent bone marrow biopsy with MIRELA Cormier and per her verbal order, unable to do Clonoseq testing at this time due to age of bone marrow biopsy specimen. Patient made aware of above and verbalized understanding.

## 2024-09-26 ENCOUNTER — HOSPITAL ENCOUNTER (OUTPATIENT)
Dept: RADIOLOGY | Facility: HOSPITAL | Age: 69
Discharge: HOME | End: 2024-09-26
Attending: PHYSICAL MEDICINE & REHABILITATION
Payer: MEDICARE

## 2024-09-26 ENCOUNTER — OFFICE VISIT (OUTPATIENT)
Dept: SPINE | Facility: CLINIC | Age: 69
End: 2024-09-26
Payer: MEDICARE

## 2024-09-26 VITALS — HEIGHT: 68 IN | BODY MASS INDEX: 33.48 KG/M2 | WEIGHT: 220.88 LBS

## 2024-09-26 DIAGNOSIS — M54.9 DORSALGIA, UNSPECIFIED: ICD-10-CM

## 2024-09-26 DIAGNOSIS — M54.50 CHRONIC RIGHT-SIDED LOW BACK PAIN WITHOUT SCIATICA: ICD-10-CM

## 2024-09-26 DIAGNOSIS — M54.9 DORSALGIA, UNSPECIFIED: Primary | ICD-10-CM

## 2024-09-26 DIAGNOSIS — G89.29 CHRONIC RIGHT-SIDED LOW BACK PAIN WITHOUT SCIATICA: ICD-10-CM

## 2024-09-26 PROCEDURE — 99214 OFFICE O/P EST MOD 30 MIN: CPT | Mod: PBBFAC,PN | Performed by: PHYSICAL MEDICINE & REHABILITATION

## 2024-09-26 PROCEDURE — 72148 MRI LUMBAR SPINE W/O DYE: CPT | Mod: TC

## 2024-09-26 PROCEDURE — 99999 PR PBB SHADOW E&M-EST. PATIENT-LVL IV: CPT | Mod: PBBFAC,,, | Performed by: PHYSICAL MEDICINE & REHABILITATION

## 2024-09-26 NOTE — PROGRESS NOTES
SUBJECTIVE:    Patient ID: Sivakumar Thacker is a 69 y.o. male.    Chief Complaint: Low-back Pain    This is a 69-year-old man who sees Dr. Nails for his primary care.  History of diabetes hyperlipidemia hypertension and non-Hodgkin's lymphoma followed by Dr. Ibarra with Heme-Onc.  His last PET scan was in March of this year and showed no mass or lymphadenopathy.  He has a long history of predominantly right-sided low back pain since a workplace injury in 2000.  He had have surgery on his neck as a result of the accident.  I do not have the details.  Around that time he says he had multiple epidural steroid injections and did physical therapy none of which gave him any lasting relief.  He presents to me with chronic complaints of right-sided low back pain at the lumbosacral junction which is worse with movements especially twisting movements.  Occasionally has radiation of the pain to the right leg but no persistent radicular type symptoms.  No bowel or bladder dysfunction fever chills sweats or unexpected weight loss.  Current pain level is 5/10 and interferes with his quality of life in terms of activities of daily living recreation and social activities.  I personally reviewed x-rays of the lumbar spine done 9/23/24 and a CT of the abdomen and pelvis done 08/20/2024 demonstrating overall mild expected degenerative changes.          Past Medical History:   Diagnosis Date    Chest wall abscess 09/30/2022    Chronic obstructive pulmonary disease, unspecified COPD type 03/28/2022    Diabetes mellitus, type 2 2019    Encounter for antineoplastic chemotherapy 04/01/2020    Hypertension     Hypogammaglobulinemia 12/13/2019    Neuropathy     Non Hodgkin's lymphoma 2011    Reflux esophagitis     Ringing in ear, bilateral      Social History     Socioeconomic History    Marital status:    Tobacco Use    Smoking status: Former     Current packs/day: 0.00     Average packs/day: 0.5 packs/day for 2.0 years (1.0  ttl pk-yrs)     Types: Cigarettes     Start date:      Quit date:      Years since quittin.7    Smokeless tobacco: Never   Substance and Sexual Activity    Alcohol use: Yes     Comment: occ    Drug use: No    Sexual activity: Not Currently     Social Determinants of Health     Financial Resource Strain: Low Risk  (2024)    Overall Financial Resource Strain (CARDIA)     Difficulty of Paying Living Expenses: Not hard at all   Food Insecurity: No Food Insecurity (2024)    Hunger Vital Sign     Worried About Running Out of Food in the Last Year: Never true     Ran Out of Food in the Last Year: Never true   Transportation Needs: No Transportation Needs (2024)    PRAPARE - Transportation     Lack of Transportation (Medical): No     Lack of Transportation (Non-Medical): No   Physical Activity: Insufficiently Active (2024)    Exercise Vital Sign     Days of Exercise per Week: 1 day     Minutes of Exercise per Session: 30 min   Stress: No Stress Concern Present (2024)    [employer] [place] [employer] - Occupational Stress Questionnaire     Feeling of Stress : Not at all   Housing Stability: Low Risk  (2024)    Housing Stability Vital Sign     Unable to Pay for Housing in the Last Year: No     Homeless in the Last Year: No     Past Surgical History:   Procedure Laterality Date    COLONOSCOPY  2018    COLONOSCOPY N/A 2024    Procedure: COLONOSCOPY;  Surgeon: Nitesh Campbell MD;  Location: [facility];  Service: Endoscopy;  Laterality: N/A;    ENDOSCOPIC ULTRASOUND OF UPPER GASTROINTESTINAL TRACT N/A 2024    Procedure: ULTRASOUND, UPPER GI TRACT, ENDOSCOPIC;  Surgeon: Jose De Jesus Tran III, MD;  Location: [facility];  Service: Endoscopy;  Laterality: N/A;    ENDOSCOPIC ULTRASOUND OF UPPER GASTROINTESTINAL TRACT N/A 2024    Procedure: ULTRASOUND, UPPER GI TRACT, ENDOSCOPIC;  Surgeon: Jose De Jesus Tran III, MD;  Location: [facility];  Service: Endoscopy;   "Laterality: N/A;    ESOPHAGOGASTRODUODENOSCOPY N/A 02/29/2024    Procedure: EGD (ESOPHAGOGASTRODUODENOSCOPY);  Surgeon: Nitesh Campbell MD;  Location: [facility];  Service: Endoscopy;  Laterality: N/A;    EYE SURGERY  Approximately 3 years ago    Cataract    HAND SURGERY Left     amputation left index finger    INCISION AND DRAINAGE OF ABSCESS Left 10/03/2022    Procedure: INCISION AND DRAINAGE, ABSCESS;  Surgeon: Franco Venegas III, MD;  Location: [facility] OR;  Service: General;  Laterality: Left;  axilla    INSERTION OF TUNNELED CENTRAL VENOUS CATHETER (CVC) WITH SUBCUTANEOUS PORT Left 02/01/2023    Procedure: [id number];  Surgeon: Franco Venegas III, MD;  Location: [facility] OR;  Service: General;  Laterality: Left;    MEDIPORT REMOVAL Right 10/03/2022    Procedure: REMOVAL, CATHETER, CENTRAL VENOUS, TUNNELED, WITH PORT;  Surgeon: Franco Venegas III, MD;  Location: [facility];  Service: General;  Laterality: Right;    PORTACATH PLACEMENT      SKIN CANCER EXCISION  09/30/2020    [facility]    SPINE SURGERY      VASECTOMY  1985 ?     Family History   Problem Relation Name Age of Onset    Diabetes Father Tae      Vitals:    09/26/24 1057   Weight: 100.2 kg (220 lb 14.4 oz)   Height: 5' 8" (1.727 m)       Review of Systems   Constitutional:  Negative for chills, diaphoresis, fatigue, fever and unexpected weight change.   HENT:  Negative for trouble swallowing.    Eyes:  Negative for visual disturbance.   Respiratory:  Negative for shortness of breath.    Cardiovascular:  Negative for chest pain.   Gastrointestinal:  Negative for abdominal pain, constipation, diarrhea, nausea and vomiting.   Genitourinary:  Negative for difficulty urinating.   Musculoskeletal:  Negative for arthralgias, back pain, gait problem, joint swelling, myalgias, neck pain and neck stiffness.   Neurological:  Negative for dizziness, speech difficulty, weakness, light-headedness, numbness and headaches.          Objective:    "   Physical Exam  Neurological:      Mental Status: He is alert and oriented to person, place, and time.      Comments: He is awake and in no acute distress  Mild tenderness to palpation right lumbar paraspinous musculature at the lumbosacral junction with no palpable masses  Forward flexion of the lumbar spine is to about 60° before complaint pain at the lumbosacral junction.  Extension combined with rotation to the right causes mild pain at the lumbosacral junction  He can heel and toe walk normally  Reflexes- +1-+2 reflexes at the following:   C5-Biceps   C6-Brachioradialis   C7-Triceps   L3/4-Patellar   S1-Achilles   Duy sign negative bilaterally  Strength testing- 5/5 strength in the following muscle groups:  C5-Elbow flexion  C6-Wrist extension  C7-Elbow extension  C8-Finger flexion  T1-Finger abduction  L2-Hip flexion  L3-Knee extension  L4-Ankle dorsiflexion  L5-Great toe extension  S1-Ankle plantar flexion                    Assessment:       1. Dorsalgia, unspecified    2. Chronic right-sided low back pain without sciatica           Plan:     He has a nonfocal neurological examination and no historical red flags.  I suspect he has low back pain on basis of degenerative disc disease and facet arthropathy.  Has pain with facet loading.  He is frustrated with his ongoing pain which significantly interferes with his quality of life.  I recommend an MRI of the lumbar spine in preparation for likely medial branch blocks/radiofrequency ablation right L4-5 and L5-S1.  I gave him some information about the procedure.  Follow up with me after the scan      Dorsalgia, unspecified  -     MRI Lumbar Spine Without Contrast; Future; Expected date: 09/26/2024    Chronic right-sided low back pain without sciatica  -     Ambulatory referral/consult to [facility]

## 2024-10-01 ENCOUNTER — OFFICE VISIT (OUTPATIENT)
Dept: SPINE | Facility: CLINIC | Age: 69
End: 2024-10-01
Payer: MEDICARE

## 2024-10-01 ENCOUNTER — TELEPHONE (OUTPATIENT)
Dept: PAIN MEDICINE | Facility: CLINIC | Age: 69
End: 2024-10-01
Payer: MEDICARE

## 2024-10-01 VITALS — BODY MASS INDEX: 33.48 KG/M2 | WEIGHT: 220.88 LBS | HEIGHT: 68 IN

## 2024-10-01 DIAGNOSIS — M47.816 LUMBAR SPONDYLOSIS: Primary | ICD-10-CM

## 2024-10-01 DIAGNOSIS — G89.29 CHRONIC RIGHT-SIDED LOW BACK PAIN WITHOUT SCIATICA: Primary | ICD-10-CM

## 2024-10-01 DIAGNOSIS — M54.50 CHRONIC RIGHT-SIDED LOW BACK PAIN WITHOUT SCIATICA: Primary | ICD-10-CM

## 2024-10-01 PROCEDURE — 99999 PR PBB SHADOW E&M-EST. PATIENT-LVL III: CPT | Mod: PBBFAC,,, | Performed by: PHYSICAL MEDICINE & REHABILITATION

## 2024-10-01 PROCEDURE — 99213 OFFICE O/P EST LOW 20 MIN: CPT | Mod: PBBFAC,PN | Performed by: PHYSICAL MEDICINE & REHABILITATION

## 2024-10-01 PROCEDURE — 99213 OFFICE O/P EST LOW 20 MIN: CPT | Mod: S$PBB,,, | Performed by: PHYSICAL MEDICINE & REHABILITATION

## 2024-10-01 NOTE — PROGRESS NOTES
SUBJECTIVE:    Patient ID: Sivakumar Thacker is a 69 y.o. male.    Chief Complaint: Follow-up    He is here to review his lumbar MRI done 09/26/2024 to evaluate his complaint of right-sided low back pain at the lumbosacral junction       The MRI is summarized below:      FINDINGS:  Grade 1 retrolisthesis of L5 on S1.  Lumbar vertebral bodies are normal in height and alignment.  No acute fracture or subluxation.  No abnormal marrow edema.     Visualized distal thoracic spinal cord is normal in contour and signal intensity.  Conus medullaris terminates at L2.     L1-L2: No central spinal canal or foraminal stenosis     L2-L3: Broad-based disc bulging with facet joint hypertrophy results in mild bilateral neural foraminal narrowing.  No central spinal canal stenosis.     L3-L4: Broad-based disc bulging with facet joint hypertrophy results in mild bilateral neural foraminal narrowing.  No central spinal canal stenosis.     L4-L5: Broad-based disc bulging with facet joint hypertrophy results in moderate bilateral neural foraminal narrowing.  No central spinal canal stenosis     L5-S1: Broad-based disc bulging with facet joint hypertrophy results in moderate bilateral neural foraminal narrowing.  No central spinal canal stenosis.     Impression:     1. Grade 1 retrolisthesis of L5 on S1.  2. Multilevel degenerative disc disease L2 through S1 resulting in mild to moderate neural foraminal narrowing.  3. No significant central spinal canal stenosis.      Clinically he is about the same.  His symptoms are described above.  Pain level is 4/10 and interferes with quality of life in terms of activities of daily living recreation and social activities.  Has no new or progressive problems          Past Medical History:   Diagnosis Date    Chest wall abscess 09/30/2022    Chronic obstructive pulmonary disease, unspecified COPD type 03/28/2022    Diabetes mellitus, type 2 2019    Encounter for antineoplastic chemotherapy  2020    Hypertension     Hypogammaglobulinemia 2019    Neuropathy     Non Hodgkin's lymphoma 2011    Reflux esophagitis     Ringing in ear, bilateral      Social History     Socioeconomic History    Marital status:    Tobacco Use    Smoking status: Former     Current packs/day: 0.00     Average packs/day: 0.5 packs/day for 2.0 years (1.0 ttl pk-yrs)     Types: Cigarettes     Start date:      Quit date:      Years since quittin.7    Smokeless tobacco: Never   Substance and Sexual Activity    Alcohol use: Yes     Comment: occ    Drug use: No    Sexual activity: Not Currently     Social Drivers of Health     Financial Resource Strain: Low Risk  (2024)    Overall Financial Resource Strain (CARDIA)     Difficulty of Paying Living Expenses: Not hard at all   Food Insecurity: No Food Insecurity (2024)    Hunger Vital Sign     Worried About Running Out of Food in the Last Year: Never true     Ran Out of Food in the Last Year: Never true   Transportation Needs: No Transportation Needs (2024)    PRAPARE - Transportation     Lack of Transportation (Medical): No     Lack of Transportation (Non-Medical): No   Physical Activity: Insufficiently Active (2024)    Exercise Vital Sign     Days of Exercise per Week: 1 day     Minutes of Exercise per Session: 30 min   Stress: No Stress Concern Present (2024)    [employer] [place] [employer] - Occupational Stress Questionnaire     Feeling of Stress : Not at all   Housing Stability: Low Risk  (2024)    Housing Stability Vital Sign     Unable to Pay for Housing in the Last Year: No     Homeless in the Last Year: No     Past Surgical History:   Procedure Laterality Date    COLONOSCOPY  2018    COLONOSCOPY N/A 2024    Procedure: COLONOSCOPY;  Surgeon: Nitesh Campbell MD;  Location: [facility];  Service: Endoscopy;  Laterality: N/A;    ENDOSCOPIC ULTRASOUND OF UPPER GASTROINTESTINAL TRACT N/A 2024    Procedure:  "ULTRASOUND, UPPER GI TRACT, ENDOSCOPIC;  Surgeon: Jose De Jesus Tran III, MD;  Location: [facility] ENDO;  Service: Endoscopy;  Laterality: N/A;    ENDOSCOPIC ULTRASOUND OF UPPER GASTROINTESTINAL TRACT N/A 5/14/2024    Procedure: ULTRASOUND, UPPER GI TRACT, ENDOSCOPIC;  Surgeon: Jose De Jesus Tran III, MD;  Location: [facility] ENDO;  Service: Endoscopy;  Laterality: N/A;    ESOPHAGOGASTRODUODENOSCOPY N/A 02/29/2024    Procedure: EGD (ESOPHAGOGASTRODUODENOSCOPY);  Surgeon: Nitesh Campbell MD;  Location: [facility] ENDO;  Service: Endoscopy;  Laterality: N/A;    EYE SURGERY  Approximately 3 years ago    Cataract    HAND SURGERY Left     amputation left index finger    INCISION AND DRAINAGE OF ABSCESS Left 10/03/2022    Procedure: INCISION AND DRAINAGE, ABSCESS;  Surgeon: Franco Venegas III, MD;  Location: [facility] OR;  Service: General;  Laterality: Left;  axilla    INSERTION OF TUNNELED CENTRAL VENOUS CATHETER (CVC) WITH SUBCUTANEOUS PORT Left 02/01/2023    Procedure: [id number];  Surgeon: Franco Venegas III, MD;  Location: [facility] OR;  Service: General;  Laterality: Left;    MEDIPORT REMOVAL Right 10/03/2022    Procedure: REMOVAL, CATHETER, CENTRAL VENOUS, TUNNELED, WITH PORT;  Surgeon: Franco Venegas III, MD;  Location: [facility] OR;  Service: General;  Laterality: Right;    PORTACATH PLACEMENT      SKIN CANCER EXCISION  09/30/2020    [facility]    SPINE SURGERY      VASECTOMY  1985 ?     Family History   Problem Relation Name Age of Onset    Diabetes Father Tae      Vitals:    10/01/24 1013   Weight: 100.2 kg (220 lb 14.4 oz)   Height: 5' 8" (1.727 m)       Review of Systems   Constitutional:  Negative for chills, diaphoresis, fatigue, fever and unexpected weight change.   HENT:  Negative for trouble swallowing.    Eyes:  Negative for visual disturbance.   Respiratory:  Negative for shortness of breath.    Cardiovascular:  Negative for chest pain.   Gastrointestinal:  Negative for abdominal pain, constipation, " diarrhea, nausea and vomiting.   Genitourinary:  Negative for difficulty urinating.   Musculoskeletal:  Negative for arthralgias, back pain, gait problem, joint swelling, myalgias, neck pain and neck stiffness.   Neurological:  Negative for dizziness, speech difficulty, weakness, light-headedness, numbness and headaches.          Objective:      Physical Exam  Neurological:      Mental Status: He is alert and oriented to person, place, and time.             Assessment:       1. Chronic right-sided low back pain without sciatica           Plan:     I reassured him there are no worrisome findings on his MRI.  For symptom relief as previously discussed we will arrange medial branch blocks and subsequent radiofrequency ablation as indicated of the right L4-5 and L5-S1 facet joints.  If he fails that then I would have no additional recommendations and he can consider a 2nd opinion with pain management or Neurosurgery.  Follow up me after the procedure      Chronic right-sided low back pain without sciatica

## 2024-10-01 NOTE — TELEPHONE ENCOUNTER
----- Message from Sivakumar Kohli MD sent at 10/1/2024 10:25 AM CDT -----  Please schedule for medial branch blocks and subsequent radiofrequency ablation as indicated of the right L4-5 and L5-S1 facet joints.

## 2024-10-01 NOTE — H&P (VIEW-ONLY)
SUBJECTIVE:    Patient ID: Sivakumar Thacker is a 69 y.o. male.    Chief Complaint: Follow-up    He is here to review his lumbar MRI done 09/26/2024 to evaluate his complaint of right-sided low back pain at the lumbosacral junction       The MRI is summarized below:      FINDINGS:  Grade 1 retrolisthesis of L5 on S1.  Lumbar vertebral bodies are normal in height and alignment.  No acute fracture or subluxation.  No abnormal marrow edema.     Visualized distal thoracic spinal cord is normal in contour and signal intensity.  Conus medullaris terminates at L2.     L1-L2: No central spinal canal or foraminal stenosis     L2-L3: Broad-based disc bulging with facet joint hypertrophy results in mild bilateral neural foraminal narrowing.  No central spinal canal stenosis.     L3-L4: Broad-based disc bulging with facet joint hypertrophy results in mild bilateral neural foraminal narrowing.  No central spinal canal stenosis.     L4-L5: Broad-based disc bulging with facet joint hypertrophy results in moderate bilateral neural foraminal narrowing.  No central spinal canal stenosis     L5-S1: Broad-based disc bulging with facet joint hypertrophy results in moderate bilateral neural foraminal narrowing.  No central spinal canal stenosis.     Impression:     1. Grade 1 retrolisthesis of L5 on S1.  2. Multilevel degenerative disc disease L2 through S1 resulting in mild to moderate neural foraminal narrowing.  3. No significant central spinal canal stenosis.      Clinically he is about the same.  His symptoms are described above.  Pain level is 4/10 and interferes with quality of life in terms of activities of daily living recreation and social activities.  Has no new or progressive problems          Past Medical History:   Diagnosis Date    Chest wall abscess 09/30/2022    Chronic obstructive pulmonary disease, unspecified COPD type 03/28/2022    Diabetes mellitus, type 2 2019    Encounter for antineoplastic chemotherapy  2020    Hypertension     Hypogammaglobulinemia 2019    Neuropathy     Non Hodgkin's lymphoma 2011    Reflux esophagitis     Ringing in ear, bilateral      Social History     Socioeconomic History    Marital status:    Tobacco Use    Smoking status: Former     Current packs/day: 0.00     Average packs/day: 0.5 packs/day for 2.0 years (1.0 ttl pk-yrs)     Types: Cigarettes     Start date:      Quit date:      Years since quittin.7    Smokeless tobacco: Never   Substance and Sexual Activity    Alcohol use: Yes     Comment: occ    Drug use: No    Sexual activity: Not Currently     Social Drivers of Health     Financial Resource Strain: Low Risk  (2024)    Overall Financial Resource Strain (CARDIA)     Difficulty of Paying Living Expenses: Not hard at all   Food Insecurity: No Food Insecurity (2024)    Hunger Vital Sign     Worried About Running Out of Food in the Last Year: Never true     Ran Out of Food in the Last Year: Never true   Transportation Needs: No Transportation Needs (2024)    PRAPARE - Transportation     Lack of Transportation (Medical): No     Lack of Transportation (Non-Medical): No   Physical Activity: Insufficiently Active (2024)    Exercise Vital Sign     Days of Exercise per Week: 1 day     Minutes of Exercise per Session: 30 min   Stress: No Stress Concern Present (2024)    [employer] [place] [employer] - Occupational Stress Questionnaire     Feeling of Stress : Not at all   Housing Stability: Low Risk  (2024)    Housing Stability Vital Sign     Unable to Pay for Housing in the Last Year: No     Homeless in the Last Year: No     Past Surgical History:   Procedure Laterality Date    COLONOSCOPY  2018    COLONOSCOPY N/A 2024    Procedure: COLONOSCOPY;  Surgeon: Nitesh Campbell MD;  Location: [facility];  Service: Endoscopy;  Laterality: N/A;    ENDOSCOPIC ULTRASOUND OF UPPER GASTROINTESTINAL TRACT N/A 2024    Procedure:  "ULTRASOUND, UPPER GI TRACT, ENDOSCOPIC;  Surgeon: Jose De Jesus Tran III, MD;  Location: [facility] ENDO;  Service: Endoscopy;  Laterality: N/A;    ENDOSCOPIC ULTRASOUND OF UPPER GASTROINTESTINAL TRACT N/A 5/14/2024    Procedure: ULTRASOUND, UPPER GI TRACT, ENDOSCOPIC;  Surgeon: Jose De Jesus Tran III, MD;  Location: [facility] ENDO;  Service: Endoscopy;  Laterality: N/A;    ESOPHAGOGASTRODUODENOSCOPY N/A 02/29/2024    Procedure: EGD (ESOPHAGOGASTRODUODENOSCOPY);  Surgeon: Nitesh Campbell MD;  Location: [facility] ENDO;  Service: Endoscopy;  Laterality: N/A;    EYE SURGERY  Approximately 3 years ago    Cataract    HAND SURGERY Left     amputation left index finger    INCISION AND DRAINAGE OF ABSCESS Left 10/03/2022    Procedure: INCISION AND DRAINAGE, ABSCESS;  Surgeon: Franco Venegas III, MD;  Location: [facility] OR;  Service: General;  Laterality: Left;  axilla    INSERTION OF TUNNELED CENTRAL VENOUS CATHETER (CVC) WITH SUBCUTANEOUS PORT Left 02/01/2023    Procedure: [id number];  Surgeon: Franco Venegas III, MD;  Location: [facility] OR;  Service: General;  Laterality: Left;    MEDIPORT REMOVAL Right 10/03/2022    Procedure: REMOVAL, CATHETER, CENTRAL VENOUS, TUNNELED, WITH PORT;  Surgeon: Franco Venegas III, MD;  Location: [facility] OR;  Service: General;  Laterality: Right;    PORTACATH PLACEMENT      SKIN CANCER EXCISION  09/30/2020    [facility]    SPINE SURGERY      VASECTOMY  1985 ?     Family History   Problem Relation Name Age of Onset    Diabetes Father Tae      Vitals:    10/01/24 1013   Weight: 100.2 kg (220 lb 14.4 oz)   Height: 5' 8" (1.727 m)       Review of Systems   Constitutional:  Negative for chills, diaphoresis, fatigue, fever and unexpected weight change.   HENT:  Negative for trouble swallowing.    Eyes:  Negative for visual disturbance.   Respiratory:  Negative for shortness of breath.    Cardiovascular:  Negative for chest pain.   Gastrointestinal:  Negative for abdominal pain, constipation, " diarrhea, nausea and vomiting.   Genitourinary:  Negative for difficulty urinating.   Musculoskeletal:  Negative for arthralgias, back pain, gait problem, joint swelling, myalgias, neck pain and neck stiffness.   Neurological:  Negative for dizziness, speech difficulty, weakness, light-headedness, numbness and headaches.          Objective:      Physical Exam  Neurological:      Mental Status: He is alert and oriented to person, place, and time.             Assessment:       1. Chronic right-sided low back pain without sciatica           Plan:     I reassured him there are no worrisome findings on his MRI.  For symptom relief as previously discussed we will arrange medial branch blocks and subsequent radiofrequency ablation as indicated of the right L4-5 and L5-S1 facet joints.  If he fails that then I would have no additional recommendations and he can consider a 2nd opinion with pain management or Neurosurgery.  Follow up me after the procedure      Chronic right-sided low back pain without sciatica

## 2024-10-02 ENCOUNTER — HOSPITAL ENCOUNTER (OUTPATIENT)
Dept: RADIOLOGY | Facility: HOSPITAL | Age: 69
Discharge: HOME | End: 2024-10-02
Attending: INTERNAL MEDICINE
Payer: MEDICARE

## 2024-10-02 VITALS — WEIGHT: 220 LBS | HEIGHT: 68 IN | BODY MASS INDEX: 33.34 KG/M2

## 2024-10-02 DIAGNOSIS — C82.30 FOLLICULAR LYMPHOMA GRADE IIIA, UNSPECIFIED BODY REGION: ICD-10-CM

## 2024-10-02 DIAGNOSIS — C83.38 DIFFUSE LARGE B-CELL LYMPHOMA, LYMPH NODES OF MULTIPLE SITES: ICD-10-CM

## 2024-10-02 DIAGNOSIS — C85.90 NON-HODGKIN'S LYMPHOMA, UNSPECIFIED BODY REGION, UNSPECIFIED NON-HODGKIN LYMPHOMA TYPE: ICD-10-CM

## 2024-10-02 LAB — POCT GLUCOSE: 150 MG/DL (ref 70–110)

## 2024-10-02 PROCEDURE — 78815 PET IMAGE W/CT SKULL-THIGH: CPT | Mod: TC,PO

## 2024-10-02 PROCEDURE — 78815 PET IMAGE W/CT SKULL-THIGH: CPT | Mod: 26,PS,, | Performed by: RADIOLOGY

## 2024-10-02 PROCEDURE — A9552 F18 FDG: HCPCS | Mod: PO | Performed by: INTERNAL MEDICINE

## 2024-10-02 RX ORDER — FLUDEOXYGLUCOSE F18 500 MCI/ML
11.6 INJECTION INTRAVENOUS
Status: COMPLETED | OUTPATIENT
Start: 2024-10-02 | End: 2024-10-02

## 2024-10-02 RX ADMIN — FLUDEOXYGLUCOSE F-18 11.6 MILLICURIE: 500 INJECTION INTRAVENOUS at 07:10

## 2024-10-03 ENCOUNTER — TELEPHONE (OUTPATIENT)
Facility: CLINIC | Age: 69
End: 2024-10-03
Payer: MEDICARE

## 2024-10-03 NOTE — TELEPHONE ENCOUNTER
----- Message from Jefferson Ibarra MD sent at 10/2/2024 10:58 PM CDT -----  Please call him with stable report

## 2024-10-09 RX ORDER — FAMOTIDINE 10 MG/ML
20 INJECTION INTRAVENOUS
Status: CANCELLED | OUTPATIENT
Start: 2024-10-10

## 2024-10-09 RX ORDER — SODIUM CHLORIDE 0.9 % (FLUSH) 0.9 %
10 SYRINGE (ML) INJECTION
Status: CANCELLED | OUTPATIENT
Start: 2024-10-10

## 2024-10-09 RX ORDER — MEPERIDINE HYDROCHLORIDE 25 MG/ML
25 INJECTION INTRAMUSCULAR; INTRAVENOUS; SUBCUTANEOUS
Status: CANCELLED | OUTPATIENT
Start: 2024-10-10 | End: 2024-10-10

## 2024-10-09 RX ORDER — ACETAMINOPHEN 325 MG/1
650 TABLET ORAL
Status: CANCELLED | OUTPATIENT
Start: 2024-10-10

## 2024-10-09 RX ORDER — HEPARIN 100 UNIT/ML
500 SYRINGE INTRAVENOUS
Status: CANCELLED | OUTPATIENT
Start: 2024-10-10

## 2024-10-10 ENCOUNTER — INFUSION (OUTPATIENT)
Dept: INFUSION THERAPY | Facility: HOSPITAL | Age: 69
End: 2024-10-10
Attending: INTERNAL MEDICINE
Payer: MEDICARE

## 2024-10-10 VITALS
HEART RATE: 72 BPM | OXYGEN SATURATION: 95 % | SYSTOLIC BLOOD PRESSURE: 146 MMHG | TEMPERATURE: 98 F | DIASTOLIC BLOOD PRESSURE: 79 MMHG | WEIGHT: 219.31 LBS | BODY MASS INDEX: 33.24 KG/M2 | HEIGHT: 68 IN | RESPIRATION RATE: 16 BRPM

## 2024-10-10 DIAGNOSIS — D80.1 HYPOGAMMAGLOBULINEMIA: ICD-10-CM

## 2024-10-10 DIAGNOSIS — C82.30 FOLLICULAR LYMPHOMA GRADE IIIA, UNSPECIFIED BODY REGION: Primary | ICD-10-CM

## 2024-10-10 PROCEDURE — 96413 CHEMO IV INFUSION 1 HR: CPT

## 2024-10-10 PROCEDURE — A4216 STERILE WATER/SALINE, 10 ML: HCPCS | Performed by: INTERNAL MEDICINE

## 2024-10-10 PROCEDURE — 96375 TX/PRO/DX INJ NEW DRUG ADDON: CPT

## 2024-10-10 PROCEDURE — 63600175 PHARM REV CODE 636 W HCPCS: Performed by: INTERNAL MEDICINE

## 2024-10-10 PROCEDURE — 96367 TX/PROPH/DG ADDL SEQ IV INF: CPT

## 2024-10-10 PROCEDURE — 25000003 PHARM REV CODE 250: Performed by: INTERNAL MEDICINE

## 2024-10-10 PROCEDURE — 96415 CHEMO IV INFUSION ADDL HR: CPT

## 2024-10-10 RX ORDER — HEPARIN 100 UNIT/ML
500 SYRINGE INTRAVENOUS
Status: DISCONTINUED | OUTPATIENT
Start: 2024-10-10 | End: 2024-10-10 | Stop reason: HOSPADM

## 2024-10-10 RX ORDER — FAMOTIDINE 10 MG/ML
20 INJECTION INTRAVENOUS
Status: COMPLETED | OUTPATIENT
Start: 2024-10-10 | End: 2024-10-10

## 2024-10-10 RX ORDER — ACETAMINOPHEN 325 MG/1
650 TABLET ORAL
Status: COMPLETED | OUTPATIENT
Start: 2024-10-10 | End: 2024-10-10

## 2024-10-10 RX ORDER — MEPERIDINE HYDROCHLORIDE 25 MG/ML
25 INJECTION INTRAMUSCULAR; INTRAVENOUS; SUBCUTANEOUS
Status: DISCONTINUED | OUTPATIENT
Start: 2024-10-10 | End: 2024-10-10 | Stop reason: HOSPADM

## 2024-10-10 RX ORDER — SODIUM CHLORIDE 0.9 % (FLUSH) 0.9 %
10 SYRINGE (ML) INJECTION
Status: DISCONTINUED | OUTPATIENT
Start: 2024-10-10 | End: 2024-10-10 | Stop reason: HOSPADM

## 2024-10-10 RX ADMIN — ACETAMINOPHEN 650 MG: 325 TABLET ORAL at 07:10

## 2024-10-10 RX ADMIN — SODIUM CHLORIDE, PRESERVATIVE FREE 10 ML: 5 INJECTION INTRAVENOUS at 10:10

## 2024-10-10 RX ADMIN — FAMOTIDINE 20 MG: 10 INJECTION INTRAVENOUS at 07:10

## 2024-10-10 RX ADMIN — RITUXIMAB 800 MG: 10 INJECTION, SOLUTION INTRAVENOUS at 07:10

## 2024-10-10 RX ADMIN — HEPARIN 500 UNITS: 100 SYRINGE at 10:10

## 2024-10-10 RX ADMIN — DIPHENHYDRAMINE HYDROCHLORIDE 50 MG: 50 INJECTION, SOLUTION INTRAMUSCULAR; INTRAVENOUS at 07:10

## 2024-10-10 NOTE — PLAN OF CARE
Problem: Fall Injury Risk  Goal: Absence of Fall and Fall-Related Injury  Outcome: Progressing  Intervention: Identify and Manage Contributors  Flowsheets (Taken 10/10/2024 1057)  Medication Review/Management: medications reviewed  Intervention: Promote Injury-Free Environment  Flowsheets (Taken 10/10/2024 1057)  Safety Promotion/Fall Prevention: assistive device/personal item within reach

## 2024-10-11 ENCOUNTER — INFUSION (OUTPATIENT)
Dept: INFUSION THERAPY | Facility: HOSPITAL | Age: 69
End: 2024-10-11
Attending: INTERNAL MEDICINE
Payer: MEDICARE

## 2024-10-11 VITALS
DIASTOLIC BLOOD PRESSURE: 74 MMHG | TEMPERATURE: 98 F | RESPIRATION RATE: 16 BRPM | HEART RATE: 63 BPM | WEIGHT: 221.19 LBS | HEIGHT: 68 IN | SYSTOLIC BLOOD PRESSURE: 143 MMHG | BODY MASS INDEX: 33.52 KG/M2 | OXYGEN SATURATION: 98 %

## 2024-10-11 DIAGNOSIS — D80.1 HYPOGAMMAGLOBULINEMIA: Primary | ICD-10-CM

## 2024-10-11 DIAGNOSIS — C82.30 FOLLICULAR LYMPHOMA GRADE IIIA, UNSPECIFIED BODY REGION: ICD-10-CM

## 2024-10-11 DIAGNOSIS — D80.1 NONFAMILIAL HYPOGAMMAGLOBULINEMIA: ICD-10-CM

## 2024-10-11 PROCEDURE — 63600175 PHARM REV CODE 636 W HCPCS: Performed by: INTERNAL MEDICINE

## 2024-10-11 PROCEDURE — 25000003 PHARM REV CODE 250: Performed by: INTERNAL MEDICINE

## 2024-10-11 PROCEDURE — 96365 THER/PROPH/DIAG IV INF INIT: CPT

## 2024-10-11 PROCEDURE — 96367 TX/PROPH/DG ADDL SEQ IV INF: CPT

## 2024-10-11 PROCEDURE — 96366 THER/PROPH/DIAG IV INF ADDON: CPT

## 2024-10-11 RX ORDER — SODIUM CHLORIDE 0.9 % (FLUSH) 0.9 %
10 SYRINGE (ML) INJECTION
Status: DISCONTINUED | OUTPATIENT
Start: 2024-10-11 | End: 2024-10-11 | Stop reason: HOSPADM

## 2024-10-11 RX ORDER — SODIUM CHLORIDE 0.9 % (FLUSH) 0.9 %
10 SYRINGE (ML) INJECTION
OUTPATIENT
Start: 2024-11-08

## 2024-10-11 RX ORDER — HEPARIN 100 UNIT/ML
500 SYRINGE INTRAVENOUS
Status: DISCONTINUED | OUTPATIENT
Start: 2024-10-11 | End: 2024-10-11 | Stop reason: HOSPADM

## 2024-10-11 RX ORDER — HEPARIN 100 UNIT/ML
500 SYRINGE INTRAVENOUS
OUTPATIENT
Start: 2024-11-08

## 2024-10-11 RX ADMIN — SODIUM CHLORIDE: 9 INJECTION, SOLUTION INTRAVENOUS at 07:10

## 2024-10-11 RX ADMIN — DIPHENHYDRAMINE HYDROCHLORIDE 25 MG: 50 INJECTION, SOLUTION INTRAMUSCULAR; INTRAVENOUS at 07:10

## 2024-10-11 RX ADMIN — IMMUNE GLOBULIN (HUMAN) 50 G: 10 INJECTION INTRAVENOUS; SUBCUTANEOUS at 07:10

## 2024-10-11 RX ADMIN — HEPARIN 500 UNITS: 100 SYRINGE at 09:10

## 2024-10-11 NOTE — PLAN OF CARE
Problem: Activity Intolerance  Goal: Enhanced Capacity and Energy  Outcome: Progressing  Intervention: Optimize Activity Tolerance  Flowsheets (Taken 10/11/2024 3429)  Self-Care Promotion: independence encouraged  Activity Management: Ambulated -L4  Environmental Support: calm environment promoted

## 2024-10-17 ENCOUNTER — HOSPITAL ENCOUNTER (OUTPATIENT)
Facility: HOSPITAL | Age: 69
Discharge: HOME | End: 2024-10-17
Attending: ANESTHESIOLOGY | Admitting: ANESTHESIOLOGY
Payer: MEDICARE

## 2024-10-17 DIAGNOSIS — M47.896 OTHER SPONDYLOSIS, LUMBAR REGION: ICD-10-CM

## 2024-10-17 LAB — POCT GLUCOSE: 144 MG/DL (ref 70–110)

## 2024-10-17 PROCEDURE — 71000015 HC POSTOP RECOV 1ST HR: Performed by: ANESTHESIOLOGY

## 2024-10-17 PROCEDURE — 64493 INJ PARAVERT F JNT L/S 1 LEV: CPT | Mod: KX,RT,, | Performed by: ANESTHESIOLOGY

## 2024-10-17 PROCEDURE — 36000704 HC OR TIME LEV I 1ST 15 MIN: Performed by: ANESTHESIOLOGY

## 2024-10-17 PROCEDURE — 63600175 PHARM REV CODE 636 W HCPCS: Mod: JZ,JG | Performed by: ANESTHESIOLOGY

## 2024-10-17 PROCEDURE — 64494 INJ PARAVERT F JNT L/S 2 LEV: CPT | Mod: KX,RT,, | Performed by: ANESTHESIOLOGY

## 2024-10-17 RX ORDER — SODIUM CHLORIDE, SODIUM LACTATE, POTASSIUM CHLORIDE, CALCIUM CHLORIDE 600; 310; 30; 20 MG/100ML; MG/100ML; MG/100ML; MG/100ML
INJECTION, SOLUTION INTRAVENOUS CONTINUOUS
Status: DISCONTINUED | OUTPATIENT
Start: 2024-10-17 | End: 2024-10-17 | Stop reason: HOSPADM

## 2024-10-17 RX ORDER — LIDOCAINE HYDROCHLORIDE 10 MG/ML
1 INJECTION, SOLUTION EPIDURAL; INFILTRATION; INTRACAUDAL; PERINEURAL ONCE
Status: DISCONTINUED | OUTPATIENT
Start: 2024-10-17 | End: 2024-10-17 | Stop reason: HOSPADM

## 2024-10-17 RX ORDER — BUPIVACAINE HYDROCHLORIDE 5 MG/ML
INJECTION, SOLUTION EPIDURAL; INTRACAUDAL
Status: DISCONTINUED | OUTPATIENT
Start: 2024-10-17 | End: 2024-10-17 | Stop reason: HOSPADM

## 2024-10-17 NOTE — DISCHARGE SUMMARY
[facility]  Discharge [facility]  Short Stay    Procedure(s) (LRB):  Block-nerve-medial branch-lumbar l4/5 and l5/s1 (Right)      OUTCOME: Patient tolerated treatment/procedure well without complication and is now ready for discharge.    DISPOSITION: Home or Self Care    FINAL DIAGNOSIS:  <principal problem not specified>    FOLLOWUP: In clinic    DISCHARGE INSTRUCTIONS:    Discharge Procedure Orders   Notify your health care provider if you experience any of the following:  temperature >100.4     Notify your health care provider if you experience any of the following:  severe uncontrolled pain     Notify your health care provider if you experience any of the following:  redness, tenderness, or signs of infection (pain, swelling, redness, odor or green/yellow discharge around incision site)     Activity as tolerated        TIME SPENT ON DISCHARGE: 30 minutes

## 2024-10-17 NOTE — OP NOTE
PROCEDURE DATE: 10/17/2024    PROCEDURE:  Diagnostic medial branch nerve blocks to the right L4/5 and L5/S1 facets under fluoroscopy     DIAGNOSIS:  Other spondylosis, lumbar region    Post Op diagnosis: Same    PHYSICIAN: Zander Rae MD    MEDICATIONS INJECTED: 0.5% bupivicaine, 0.5 ml at each level    SEDATION MEDICATIONS:None     LOCAL ANESTHETIC USED: None    ESTIMATED BLOOD LOSS:  None    COMPLICATIONS:  None    TECHNIQUE: A time out was taken to identify the patient, procedure and side of the procedure. The patient was placed in a prone position, then prepped and draped in the usual sterile fashion using ChloraPrep and sterile towels.  The levels were determined under fluoroscopic guidance and then marked.  A 25-gauge 3.5 inch needle was introduced to the anatomic location of the medial branch nerves that innervate the right L4/5 and L5/S1 facets. The above medication was then injected. The patient tolerated the procedure well.     The patient was monitored after the procedure. Patient was given pain diary to record pain levels at home.     If found to have greater than a 50% recovery and so will be scheduled for a radiofrequency ablation of the corresponding nerves.  Patient was given post procedure and discharge instructions to follow at home.  The patient was discharged in a stable condition.

## 2024-10-18 ENCOUNTER — TELEPHONE (OUTPATIENT)
Dept: PAIN MEDICINE | Facility: CLINIC | Age: 69
End: 2024-10-18
Payer: MEDICARE

## 2024-10-18 VITALS
HEIGHT: 68 IN | RESPIRATION RATE: 18 BRPM | BODY MASS INDEX: 33.25 KG/M2 | SYSTOLIC BLOOD PRESSURE: 145 MMHG | WEIGHT: 219.38 LBS | OXYGEN SATURATION: 96 % | HEART RATE: 75 BPM | DIASTOLIC BLOOD PRESSURE: 79 MMHG | TEMPERATURE: 98 F

## 2024-10-18 DIAGNOSIS — M47.816 LUMBAR SPONDYLOSIS: Primary | ICD-10-CM

## 2024-10-25 NOTE — TELEPHONE ENCOUNTER
Pt had 100% relief x 6 hours pain score prior to injection was 5 and after procedure 0 set up 2nd [facility] for 11/07/2024 pt is aware that it may not be authorized in time.

## 2024-10-31 ENCOUNTER — NUTRITION (OUTPATIENT)
Dept: DIABETES | Facility: CLINIC | Age: 69
End: 2024-10-31
Payer: MEDICARE

## 2024-10-31 VITALS — HEIGHT: 68 IN | WEIGHT: 214.19 LBS | BODY MASS INDEX: 32.46 KG/M2

## 2024-10-31 DIAGNOSIS — E11.40 CONTROLLED TYPE 2 DIABETES MELLITUS WITH DIABETIC NEUROPATHY, WITHOUT LONG-TERM CURRENT USE OF INSULIN: Primary | ICD-10-CM

## 2024-11-06 RX ORDER — HEPARIN 100 UNIT/ML
500 SYRINGE INTRAVENOUS
Status: CANCELLED | OUTPATIENT
Start: 2024-11-06

## 2024-11-06 RX ORDER — SODIUM CHLORIDE 0.9 % (FLUSH) 0.9 %
10 SYRINGE (ML) INJECTION
Status: CANCELLED | OUTPATIENT
Start: 2024-11-06

## 2024-11-07 ENCOUNTER — HOSPITAL ENCOUNTER (OUTPATIENT)
Facility: HOSPITAL | Age: 69
Discharge: HOME | End: 2024-11-07
Attending: ANESTHESIOLOGY | Admitting: ANESTHESIOLOGY
Payer: MEDICARE

## 2024-11-07 VITALS
SYSTOLIC BLOOD PRESSURE: 135 MMHG | TEMPERATURE: 98 F | OXYGEN SATURATION: 96 % | DIASTOLIC BLOOD PRESSURE: 68 MMHG | BODY MASS INDEX: 33.51 KG/M2 | RESPIRATION RATE: 18 BRPM | HEIGHT: 68 IN | HEART RATE: 70 BPM | WEIGHT: 221.13 LBS

## 2024-11-07 DIAGNOSIS — M47.896 OTHER SPONDYLOSIS, LUMBAR REGION: ICD-10-CM

## 2024-11-07 LAB — POCT GLUCOSE: 126 MG/DL (ref 70–110)

## 2024-11-07 PROCEDURE — 63600175 PHARM REV CODE 636 W HCPCS: Mod: JZ,JG | Performed by: ANESTHESIOLOGY

## 2024-11-07 PROCEDURE — 71000015 HC POSTOP RECOV 1ST HR: Performed by: ANESTHESIOLOGY

## 2024-11-07 PROCEDURE — 64493 INJ PARAVERT F JNT L/S 1 LEV: CPT | Mod: KX,RT,, | Performed by: ANESTHESIOLOGY

## 2024-11-07 PROCEDURE — 36000704 HC OR TIME LEV I 1ST 15 MIN: Performed by: ANESTHESIOLOGY

## 2024-11-07 PROCEDURE — 64494 INJ PARAVERT F JNT L/S 2 LEV: CPT | Mod: KX,RT,, | Performed by: ANESTHESIOLOGY

## 2024-11-07 RX ORDER — BUPIVACAINE HYDROCHLORIDE 5 MG/ML
INJECTION, SOLUTION EPIDURAL; INTRACAUDAL
Status: DISCONTINUED | OUTPATIENT
Start: 2024-11-07 | End: 2024-11-07 | Stop reason: HOSPADM

## 2024-11-07 RX ORDER — SODIUM CHLORIDE, SODIUM LACTATE, POTASSIUM CHLORIDE, CALCIUM CHLORIDE 600; 310; 30; 20 MG/100ML; MG/100ML; MG/100ML; MG/100ML
INJECTION, SOLUTION INTRAVENOUS CONTINUOUS
Status: DISCONTINUED | OUTPATIENT
Start: 2024-11-07 | End: 2024-11-07 | Stop reason: HOSPADM

## 2024-11-07 RX ORDER — LIDOCAINE HYDROCHLORIDE 10 MG/ML
1 INJECTION, SOLUTION EPIDURAL; INFILTRATION; INTRACAUDAL; PERINEURAL ONCE
Status: DISCONTINUED | OUTPATIENT
Start: 2024-11-07 | End: 2024-11-07 | Stop reason: HOSPADM

## 2024-11-07 NOTE — H&P (VIEW-ONLY)
CC: low back pain    HPI: The patient is a 69 y.o. male with a history of low back pain here for mBB. There are no major changes in history and physical from 10/1/24 by Seun.    Past Medical History:   Diagnosis Date    Chest wall abscess 09/30/2022    Chronic obstructive pulmonary disease, unspecified COPD type 03/28/2022    Diabetes mellitus, type 2 2019    Encounter for antineoplastic chemotherapy 04/01/2020    Hypertension     Hypogammaglobulinemia 12/13/2019    Neuropathy     Non Hodgkin's lymphoma 2011    Reflux esophagitis     Ringing in ear, bilateral        Past Surgical History:   Procedure Laterality Date    COLONOSCOPY  2018    COLONOSCOPY N/A 02/29/2024    Procedure: COLONOSCOPY;  Surgeon: Nitesh Campbell MD;  Location: [facility];  Service: Endoscopy;  Laterality: N/A;    ENDOSCOPIC ULTRASOUND OF UPPER GASTROINTESTINAL TRACT N/A 5/7/2024    Procedure: ULTRASOUND, UPPER GI TRACT, ENDOSCOPIC;  Surgeon: Jose De Jesus Tran III, MD;  Location: [facility];  Service: Endoscopy;  Laterality: N/A;    ENDOSCOPIC ULTRASOUND OF UPPER GASTROINTESTINAL TRACT N/A 5/14/2024    Procedure: ULTRASOUND, UPPER GI TRACT, ENDOSCOPIC;  Surgeon: Jose De Jesus Tran III, MD;  Location: [facility];  Service: Endoscopy;  Laterality: N/A;    ESOPHAGOGASTRODUODENOSCOPY N/A 02/29/2024    Procedure: EGD (ESOPHAGOGASTRODUODENOSCOPY);  Surgeon: Nitesh Campbell MD;  Location: [facility];  Service: Endoscopy;  Laterality: N/A;    EYE SURGERY  Approximately 3 years ago    Cataract    HAND SURGERY Left     amputation left index finger    INCISION AND DRAINAGE OF ABSCESS Left 10/03/2022    Procedure: INCISION AND DRAINAGE, ABSCESS;  Surgeon: Franco Venegas III, MD;  Location: [facility];  Service: General;  Laterality: Left;  axilla    INJECTION OF ANESTHETIC AGENT AROUND MEDIAL BRANCH NERVES INNERVATING LUMBAR FACET JOINT Right 10/17/2024    Procedure: Block-nerve-medial branch-lumbar;  Surgeon: Zander Rae MD;  Location: [facility] OR;   Service: Pain Management;  Laterality: Right;    INSERTION OF TUNNELED CENTRAL VENOUS CATHETER (CVC) WITH SUBCUTANEOUS PORT Left 2023    Procedure: [id number];  Surgeon: Franco Venegas III, MD;  Location: [facility] OR;  Service: General;  Laterality: Left;    MEDIPORT REMOVAL Right 10/03/2022    Procedure: REMOVAL, CATHETER, CENTRAL VENOUS, TUNNELED, WITH PORT;  Surgeon: Franco Venegas III, MD;  Location: [facility] OR;  Service: General;  Laterality: Right;    PORTACATH PLACEMENT      SKIN CANCER EXCISION  2020    [facility]    SPINE SURGERY      VASECTOMY   ?       Family History   Problem Relation Name Age of Onset    Diabetes Father Tae        Social History     Socioeconomic History    Marital status:    Tobacco Use    Smoking status: Former     Current packs/day: 0.00     Average packs/day: 0.5 packs/day for 2.0 years (1.0 ttl pk-yrs)     Types: Cigarettes     Start date:      Quit date:      Years since quittin.8    Smokeless tobacco: Never   Substance and Sexual Activity    Alcohol use: Yes     Comment: occ    Drug use: No    Sexual activity: Not Currently     Social Drivers of Health     Financial Resource Strain: Low Risk  (2024)    Overall Financial Resource Strain (CARDIA)     Difficulty of Paying Living Expenses: Not hard at all   Food Insecurity: No Food Insecurity (2024)    Hunger Vital Sign     Worried About Running Out of Food in the Last Year: Never true     Ran Out of Food in the Last Year: Never true   Transportation Needs: No Transportation Needs (2024)    PRAPARE - Transportation     Lack of Transportation (Medical): No     Lack of Transportation (Non-Medical): No   Physical Activity: Insufficiently Active (2024)    Exercise Vital Sign     Days of Exercise per Week: 1 day     Minutes of Exercise per Session: 30 min   Stress: No Stress Concern Present (2024)    [employer] [place] [employer] - Occupational Stress  "Questionnaire     Feeling of Stress : Not at all   Housing Stability: Low Risk  (8/21/2024)    Housing Stability Vital Sign     Unable to Pay for Housing in the Last Year: No     Homeless in the Last Year: No       No current facility-administered medications for this encounter.       Review of patient's allergies indicates:   Allergen Reactions    Bactrim [sulfamethoxazole-trimethoprim] Rash       Vitals:    10/31/24 0940   Weight: 100.3 kg (221 lb 1.9 oz)   Height: 5' 8" (1.727 m)       REVIEW OF SYSTEMS:     GENERAL: No weight loss, malaise or fevers.  HEENT:  No recent changes in vision or hearing  NECK: Negative for lumps, no difficulty with swallowing.  RESPIRATORY: Negative for cough, wheezing or shortness of breath, patient denies any recent URI.  CARDIOVASCULAR: Negative for chest pain, leg swelling or palpitations.  GI: Negative for abdominal discomfort, blood in stools or black stools or change in bowel habits.  MUSCULOSKELETAL: See HPI.  SKIN: Negative for lesions, rash, and itching.  PSYCH: No suicidal or homicidal ideations, no current mood disturbances.  HEMATOLOGY/LYMPHOLOGY: Negative for prolonged bleeding, bruising easily or swollen nodes. Patient is not currently taking any anti-coagulants  ENDO: No history of diabetes or thyroid dysfunction  NEURO: No history of syncope, paralysis, seizures or tremors.All other reviewed and negative other than HPI.    Physical exam:  Gen: A and O x3, pleasant, well-groomed  Skin: No rashes or obvious lesions  HEENT: PERRLA, no obvious deformities on ears or in canals. No thyroid masses, trachea midline, no palpable lymph nodes in neck, axilla.  CVS: Regular rate and rhythm, normal S1 and S2, no murmurs.  Resp: Clear to auscultation bilaterally.  Abdomen: Soft, NT/ND, normal bowel sounds present.  Musculoskeletal/Neuro: Moving all extremities    Assessment:  Other spondylosis, lumbar region    Other orders  -     Place in Outpatient; Standing  -     Vital signs; " Standing  -     LIDOcaine (PF) 10 mg/ml (1%) injection 10 mg  -     Verify informed consent; Standing  -     Notify physician ; Standing  -     Notify physician ; Standing  -     Notify physician (specify); Standing  -     Diet NPO; Standing  -     lactated ringers infusion  -     IP VTE HIGH RISK PATIENT; Standing          PLAN: MBB      This patient has been cleared for surgery in an ambulatory surgical facility    ASA 3,  Mallampatti Score 3  No history of anesthetic complications  Plan for RN IV sedation

## 2024-11-07 NOTE — H&P
CC: low back pain    HPI: The patient is a 69 y.o. male with a history of low back pain here for mBB. There are no major changes in history and physical from 10/1/24 by Seun.    Past Medical History:   Diagnosis Date    Chest wall abscess 09/30/2022    Chronic obstructive pulmonary disease, unspecified COPD type 03/28/2022    Diabetes mellitus, type 2 2019    Encounter for antineoplastic chemotherapy 04/01/2020    Hypertension     Hypogammaglobulinemia 12/13/2019    Neuropathy     Non Hodgkin's lymphoma 2011    Reflux esophagitis     Ringing in ear, bilateral        Past Surgical History:   Procedure Laterality Date    COLONOSCOPY  2018    COLONOSCOPY N/A 02/29/2024    Procedure: COLONOSCOPY;  Surgeon: Nitesh Campbell MD;  Location: [facility];  Service: Endoscopy;  Laterality: N/A;    ENDOSCOPIC ULTRASOUND OF UPPER GASTROINTESTINAL TRACT N/A 5/7/2024    Procedure: ULTRASOUND, UPPER GI TRACT, ENDOSCOPIC;  Surgeon: Jose De Jesus Tran III, MD;  Location: [facility];  Service: Endoscopy;  Laterality: N/A;    ENDOSCOPIC ULTRASOUND OF UPPER GASTROINTESTINAL TRACT N/A 5/14/2024    Procedure: ULTRASOUND, UPPER GI TRACT, ENDOSCOPIC;  Surgeon: Jose De Jesus Tran III, MD;  Location: [facility];  Service: Endoscopy;  Laterality: N/A;    ESOPHAGOGASTRODUODENOSCOPY N/A 02/29/2024    Procedure: EGD (ESOPHAGOGASTRODUODENOSCOPY);  Surgeon: Nitesh Campbell MD;  Location: [facility];  Service: Endoscopy;  Laterality: N/A;    EYE SURGERY  Approximately 3 years ago    Cataract    HAND SURGERY Left     amputation left index finger    INCISION AND DRAINAGE OF ABSCESS Left 10/03/2022    Procedure: INCISION AND DRAINAGE, ABSCESS;  Surgeon: Farnco Venegas III, MD;  Location: [facility];  Service: General;  Laterality: Left;  axilla    INJECTION OF ANESTHETIC AGENT AROUND MEDIAL BRANCH NERVES INNERVATING LUMBAR FACET JOINT Right 10/17/2024    Procedure: Block-nerve-medial branch-lumbar;  Surgeon: Zander Rae MD;  Location: [facility] OR;   Service: Pain Management;  Laterality: Right;    INSERTION OF TUNNELED CENTRAL VENOUS CATHETER (CVC) WITH SUBCUTANEOUS PORT Left 2023    Procedure: [id number];  Surgeon: Franco Venegas III, MD;  Location: [facility] OR;  Service: General;  Laterality: Left;    MEDIPORT REMOVAL Right 10/03/2022    Procedure: REMOVAL, CATHETER, CENTRAL VENOUS, TUNNELED, WITH PORT;  Surgeon: Franco Venegas III, MD;  Location: [facility] OR;  Service: General;  Laterality: Right;    PORTACATH PLACEMENT      SKIN CANCER EXCISION  2020    [facility]    SPINE SURGERY      VASECTOMY   ?       Family History   Problem Relation Name Age of Onset    Diabetes Father Tae        Social History     Socioeconomic History    Marital status:    Tobacco Use    Smoking status: Former     Current packs/day: 0.00     Average packs/day: 0.5 packs/day for 2.0 years (1.0 ttl pk-yrs)     Types: Cigarettes     Start date:      Quit date:      Years since quittin.8    Smokeless tobacco: Never   Substance and Sexual Activity    Alcohol use: Yes     Comment: occ    Drug use: No    Sexual activity: Not Currently     Social Drivers of Health     Financial Resource Strain: Low Risk  (2024)    Overall Financial Resource Strain (CARDIA)     Difficulty of Paying Living Expenses: Not hard at all   Food Insecurity: No Food Insecurity (2024)    Hunger Vital Sign     Worried About Running Out of Food in the Last Year: Never true     Ran Out of Food in the Last Year: Never true   Transportation Needs: No Transportation Needs (2024)    PRAPARE - Transportation     Lack of Transportation (Medical): No     Lack of Transportation (Non-Medical): No   Physical Activity: Insufficiently Active (2024)    Exercise Vital Sign     Days of Exercise per Week: 1 day     Minutes of Exercise per Session: 30 min   Stress: No Stress Concern Present (2024)    [place] [employer] - Occupational Stress  "Questionnaire     Feeling of Stress : Not at all   Housing Stability: Low Risk  (8/21/2024)    Housing Stability Vital Sign     Unable to Pay for Housing in the Last Year: No     Homeless in the Last Year: No       No current facility-administered medications for this encounter.       Review of patient's allergies indicates:   Allergen Reactions    Bactrim [sulfamethoxazole-trimethoprim] Rash       Vitals:    10/31/24 0940   Weight: 100.3 kg (221 lb 1.9 oz)   Height: 5' 8" (1.727 m)       REVIEW OF SYSTEMS:     GENERAL: No weight loss, malaise or fevers.  HEENT:  No recent changes in vision or hearing  NECK: Negative for lumps, no difficulty with swallowing.  RESPIRATORY: Negative for cough, wheezing or shortness of breath, patient denies any recent URI.  CARDIOVASCULAR: Negative for chest pain, leg swelling or palpitations.  GI: Negative for abdominal discomfort, blood in stools or black stools or change in bowel habits.  MUSCULOSKELETAL: See HPI.  SKIN: Negative for lesions, rash, and itching.  PSYCH: No suicidal or homicidal ideations, no current mood disturbances.  HEMATOLOGY/LYMPHOLOGY: Negative for prolonged bleeding, bruising easily or swollen nodes. Patient is not currently taking any anti-coagulants  ENDO: No history of diabetes or thyroid dysfunction  NEURO: No history of syncope, paralysis, seizures or tremors.All other reviewed and negative other than HPI.    Physical exam:  Gen: A and O x3, pleasant, well-groomed  Skin: No rashes or obvious lesions  HEENT: PERRLA, no obvious deformities on ears or in canals. No thyroid masses, trachea midline, no palpable lymph nodes in neck, axilla.  CVS: Regular rate and rhythm, normal S1 and S2, no murmurs.  Resp: Clear to auscultation bilaterally.  Abdomen: Soft, NT/ND, normal bowel sounds present.  Musculoskeletal/Neuro: Moving all extremities    Assessment:  Other spondylosis, lumbar region    Other orders  -     Place in Outpatient; Standing  -     Vital signs; " Standing  -     LIDOcaine (PF) 10 mg/ml (1%) injection 10 mg  -     Verify informed consent; Standing  -     Notify physician ; Standing  -     Notify physician ; Standing  -     Notify physician (specify); Standing  -     Diet NPO; Standing  -     lactated ringers infusion  -     IP VTE HIGH RISK PATIENT; Standing          PLAN: MBB      This patient has been cleared for surgery in an ambulatory surgical facility    ASA 3,  Mallampatti Score 3  No history of anesthetic complications  Plan for RN IV sedation

## 2024-11-07 NOTE — DISCHARGE SUMMARY
[facility]  Discharge Note  Short Stay    Procedure(s) (LRB):  Block-nerve-medial branch-lumbar right l4/5 and l5/s1 MBB #2 (Right)      OUTCOME: Patient tolerated treatment/procedure well without complication and is now ready for discharge.    DISPOSITION: Home or Self Care    FINAL DIAGNOSIS:  <principal problem not specified>    FOLLOWUP: In clinic    DISCHARGE INSTRUCTIONS:    Discharge Procedure Orders   Notify your health care provider if you experience any of the following:  temperature >100.4     Notify your health care provider if you experience any of the following:  severe uncontrolled pain     Notify your health care provider if you experience any of the following:  redness, tenderness, or signs of infection (pain, swelling, redness, odor or green/yellow discharge around incision site)     Activity as tolerated        TIME SPENT ON DISCHARGE: 30 minutes

## 2024-11-07 NOTE — OP NOTE
PROCEDURE DATE: 11/7/2024    PROCEDURE:  Diagnostic medial branch nerve blocks to the right L4/5 and L5/S1 facets under fluoroscopy     DIAGNOSIS:  Other spondylosis, lumbar region    Post Op diagnosis: Same    PHYSICIAN: Zander Rae MD    MEDICATIONS INJECTED: 0.5% bupivicaine, 0.5 ml at each level    SEDATION MEDICATIONS:None     LOCAL ANESTHETIC USED: None    ESTIMATED BLOOD LOSS:  None    COMPLICATIONS:  None    TECHNIQUE: A time out was taken to identify the patient, procedure and side of the procedure. The patient was placed in a prone position, then prepped and draped in the usual sterile fashion using ChloraPrep and sterile towels.  The levels were determined under fluoroscopic guidance and then marked.  A 25-gauge 3.5 inch needle was introduced to the anatomic location of the medial branch nerves that innervate the right L4/5 and L5/S1 facets. The above medication was then injected. The patient tolerated the procedure well.     The patient was monitored after the procedure. Patient was given pain diary to record pain levels at home.     If found to have greater than a 50% recovery and so will be scheduled for a radiofrequency ablation of the corresponding nerves.  Patient was given post procedure and discharge instructions to follow at home.  The patient was discharged in a stable condition.

## 2024-11-08 ENCOUNTER — INFUSION (OUTPATIENT)
Dept: INFUSION THERAPY | Facility: HOSPITAL | Age: 69
End: 2024-11-08
Attending: INTERNAL MEDICINE
Payer: MEDICARE

## 2024-11-08 ENCOUNTER — TELEPHONE (OUTPATIENT)
Dept: PAIN MEDICINE | Facility: CLINIC | Age: 69
End: 2024-11-08
Payer: MEDICARE

## 2024-11-08 VITALS
TEMPERATURE: 98 F | BODY MASS INDEX: 32.15 KG/M2 | OXYGEN SATURATION: 97 % | WEIGHT: 212.13 LBS | SYSTOLIC BLOOD PRESSURE: 129 MMHG | RESPIRATION RATE: 16 BRPM | HEART RATE: 74 BPM | DIASTOLIC BLOOD PRESSURE: 76 MMHG | HEIGHT: 68 IN

## 2024-11-08 DIAGNOSIS — C82.30 FOLLICULAR LYMPHOMA GRADE IIIA, UNSPECIFIED BODY REGION: ICD-10-CM

## 2024-11-08 DIAGNOSIS — D80.1 NONFAMILIAL HYPOGAMMAGLOBULINEMIA: ICD-10-CM

## 2024-11-08 DIAGNOSIS — M47.816 LUMBAR SPONDYLOSIS: Primary | ICD-10-CM

## 2024-11-08 DIAGNOSIS — D80.1 HYPOGAMMAGLOBULINEMIA: Primary | ICD-10-CM

## 2024-11-08 PROCEDURE — 25000003 PHARM REV CODE 250: Performed by: NURSE PRACTITIONER

## 2024-11-08 PROCEDURE — 96367 TX/PROPH/DG ADDL SEQ IV INF: CPT

## 2024-11-08 PROCEDURE — A4216 STERILE WATER/SALINE, 10 ML: HCPCS | Performed by: NURSE PRACTITIONER

## 2024-11-08 PROCEDURE — 63600175 PHARM REV CODE 636 W HCPCS: Performed by: NURSE PRACTITIONER

## 2024-11-08 PROCEDURE — 96366 THER/PROPH/DIAG IV INF ADDON: CPT

## 2024-11-08 PROCEDURE — 96365 THER/PROPH/DIAG IV INF INIT: CPT

## 2024-11-08 RX ORDER — SODIUM CHLORIDE 0.9 % (FLUSH) 0.9 %
10 SYRINGE (ML) INJECTION
Status: DISCONTINUED | OUTPATIENT
Start: 2024-11-08 | End: 2024-11-08 | Stop reason: HOSPADM

## 2024-11-08 RX ORDER — SODIUM CHLORIDE 0.9 % (FLUSH) 0.9 %
10 SYRINGE (ML) INJECTION
OUTPATIENT
Start: 2024-12-06

## 2024-11-08 RX ORDER — HEPARIN 100 UNIT/ML
500 SYRINGE INTRAVENOUS
Status: DISCONTINUED | OUTPATIENT
Start: 2024-11-08 | End: 2024-11-08 | Stop reason: HOSPADM

## 2024-11-08 RX ORDER — HEPARIN 100 UNIT/ML
500 SYRINGE INTRAVENOUS
OUTPATIENT
Start: 2024-12-06

## 2024-11-08 RX ADMIN — Medication 10 ML: at 09:11

## 2024-11-08 RX ADMIN — HEPARIN 500 UNITS: 100 SYRINGE at 09:11

## 2024-11-08 RX ADMIN — SODIUM CHLORIDE: 9 INJECTION, SOLUTION INTRAVENOUS at 07:11

## 2024-11-08 RX ADMIN — IMMUNE GLOBULIN (HUMAN) 50 G: 10 INJECTION INTRAVENOUS; SUBCUTANEOUS at 07:11

## 2024-11-08 RX ADMIN — DIPHENHYDRAMINE HYDROCHLORIDE 25 MG: 50 INJECTION INTRAMUSCULAR; INTRAVENOUS at 07:11

## 2024-11-08 NOTE — PLAN OF CARE
Problem: Fall Injury Risk  Goal: Absence of Fall and Fall-Related Injury  Outcome: Progressing  Intervention: Identify and Manage Contributors  Flowsheets (Taken 11/8/2024 0709)  Self-Care Promotion: independence encouraged  Medication Review/Management: medications reviewed  Intervention: Promote Injury-Free Environment  Flowsheets (Taken 11/8/2024 0709)  Safety Promotion/Fall Prevention: medications reviewed

## 2024-11-08 NOTE — TELEPHONE ENCOUNTER
about 95% relief from procedure   2- about 6 hours   3- standing, walking, some yard work,          and driving   4- about 6  before procedure   5- about 1 after procedure       Scheduled for RFA for 12/03/2024 instructions given

## 2024-12-02 ENCOUNTER — OFFICE VISIT (OUTPATIENT)
Dept: DERMATOLOGY | Facility: CLINIC | Age: 69
End: 2024-12-02
Payer: MEDICARE

## 2024-12-02 DIAGNOSIS — L82.1 SEBORRHEIC KERATOSIS: ICD-10-CM

## 2024-12-02 DIAGNOSIS — Z85.828 HISTORY OF NONMELANOMA SKIN CANCER: ICD-10-CM

## 2024-12-02 DIAGNOSIS — L90.5 SCAR: ICD-10-CM

## 2024-12-02 DIAGNOSIS — D18.01 CHERRY ANGIOMA: ICD-10-CM

## 2024-12-02 DIAGNOSIS — D22.9 MULTIPLE BENIGN NEVI: ICD-10-CM

## 2024-12-02 DIAGNOSIS — L57.0 AK (ACTINIC KERATOSIS): ICD-10-CM

## 2024-12-02 DIAGNOSIS — Z79.899 IMMUNOSUPPRESSION DUE TO DRUG THERAPY: Primary | ICD-10-CM

## 2024-12-02 DIAGNOSIS — D84.821 IMMUNOSUPPRESSION DUE TO DRUG THERAPY: Primary | ICD-10-CM

## 2024-12-02 PROCEDURE — 99213 OFFICE O/P EST LOW 20 MIN: CPT | Mod: 25,AQ,S$GLB, | Performed by: STUDENT IN AN ORGANIZED HEALTH CARE EDUCATION/TRAINING PROGRAM

## 2024-12-02 PROCEDURE — 17000 DESTRUCT PREMALG LESION: CPT | Mod: AQ,S$GLB,, | Performed by: STUDENT IN AN ORGANIZED HEALTH CARE EDUCATION/TRAINING PROGRAM

## 2024-12-02 NOTE — PROGRESS NOTES
Subjective:      Patient ID:  Siavkumar Thacker is a 69 y.o. male who presents for   Chief Complaint   Patient presents with    Spot     Left forearm      LOV 8/24/23    Patient here today for skin check UBSE  Complains of spot on left forearm    Chemo every 12 weeks   IVIG every 4 weeks for Lymphoma     Derm Hx:  Hx of NMSC on right cheek and left ear- removed at [facility] within the last year.           Review of Systems   Constitutional:  Negative for fever, chills and fatigue.   Respiratory:  Negative for cough and shortness of breath.    Gastrointestinal:  Negative for nausea and vomiting.   Skin:  Positive for activity-related sunscreen use and wears hat. Negative for daily sunscreen use.   Hematologic/Lymphatic: Bruises/bleeds easily (asa).       Objective:   Physical Exam   Constitutional: He appears well-developed and well-nourished. No distress.   Neurological: He is alert and oriented to person, place, and time. He is not disoriented.   Psychiatric: He has a normal mood and affect.   Skin:   Areas Examined (abnormalities noted in diagram):   Scalp / Hair Palpated and Inspected  Head / Face Inspection Performed  Neck Inspection Performed  Chest / Axilla Inspection Performed  Abdomen Inspection Performed  Back Inspection Performed  RUE Inspected  LUE Inspection Performed  Nails and Digits Inspection Performed                     Diagram Legend     Erythematous scaling macule/papule c/w actinic keratosis       Vascular papule c/w angioma      Pigmented verrucoid papule/plaque c/w seborrheic keratosis      Yellow umbilicated papule c/w sebaceous hyperplasia      Irregularly shaped tan macule c/w lentigo     1-2 mm smooth white papules consistent with Milia      Movable subcutaneous cyst with punctum c/w epidermal inclusion cyst      Subcutaneous movable cyst c/w pilar cyst      Firm pink to brown papule c/w dermatofibroma      Pedunculated fleshy papule(s) c/w skin tag(s)      Evenly pigmented  macule c/w junctional nevus     Mildly variegated pigmented, slightly irregular-bordered macule c/w mildly atypical nevus      Flesh colored to evenly pigmented papule c/w intradermal nevus       Pink pearly papule/plaque c/w basal cell carcinoma      Erythematous hyperkeratotic cursted plaque c/w SCC      Surgical scar with no sign of skin cancer recurrence      Open and closed comedones      Inflammatory papules and pustules      Verrucoid papule consistent consistent with wart     Erythematous eczematous patches and plaques     Dystrophic onycholytic nail with subungual debris c/w onychomycosis     Umbilicated papule    Erythematous-base heme-crusted tan verrucoid plaque consistent with inflamed seborrheic keratosis     Erythematous Silvery Scaling Plaque c/w Psoriasis     See annotation      Assessment / Plan:        Immunosuppression due to drug therapy  On rituximab and IVIG for lymphoma    AK (actinic keratosis)  Cryosurgery Procedure Note    Verbal consent from the patient is obtained and the patient is aware of the precancerous quality and need for treatment of these lesions. Liquid nitrogen cryosurgery is applied to the 1 actinic keratoses, as detailed in the physical exam, to produce a freeze injury. The patient is aware that blisters may form and is instructed on wound care with gentle cleansing and use of vaseline ointment to keep moist until healed. The patient is supplied a handout on cryosurgery and is instructed to call if lesions do not completely resolve.    History of nonmelanoma skin cancer  Scar  Area(s) of previous NMSC evaluated with no signs of recurrence.  Upper body skin examination performed today including at least 9 points as noted in physical examination. No lesions suspicious for malignancy noted.  Patient instructed in importance in daily broad spectrum sun protection of at least spf 30. Mineral sunscreen ingredients preferred (Zinc +/- Titanium) and can be found OTC.   Patient  encouraged to wear hat for all outdoor exposure.   Also discussed sun avoidance and use of protective clothing.    Seborrheic keratosis  These are benign inherited growths without a malignant potential. Reassurance given to patient. No treatment is necessary.     Cherry angioma  This is a benign vascular lesion. Reassurance given. No treatment required.     Multiple benign nevi  Careful dermoscopy evaluation of nevi performed with none identified as needing biopsy today  Monitor for new mole or moles that are becoming bigger, darker, irritated, or developing irregular borders.          1 year    No follow-ups on file.

## 2024-12-02 NOTE — PATIENT INSTRUCTIONS

## 2024-12-03 ENCOUNTER — PATIENT MESSAGE (OUTPATIENT)
Dept: ADMINISTRATIVE | Facility: HOSPITAL | Age: 69
End: 2024-12-03
Payer: MEDICARE

## 2024-12-03 ENCOUNTER — HOSPITAL ENCOUNTER (OUTPATIENT)
Facility: HOSPITAL | Age: 69
Discharge: HOME | End: 2024-12-03
Attending: ANESTHESIOLOGY | Admitting: ANESTHESIOLOGY
Payer: MEDICARE

## 2024-12-03 ENCOUNTER — PATIENT MESSAGE (OUTPATIENT)
Dept: FAMILY MEDICINE | Facility: CLINIC | Age: 69
End: 2024-12-03
Payer: MEDICARE

## 2024-12-03 DIAGNOSIS — M54.9 CHRONIC BACK PAIN, UNSPECIFIED BACK LOCATION, UNSPECIFIED BACK PAIN LATERALITY: ICD-10-CM

## 2024-12-03 DIAGNOSIS — N52.9 ERECTILE DYSFUNCTION, UNSPECIFIED ERECTILE DYSFUNCTION TYPE: ICD-10-CM

## 2024-12-03 DIAGNOSIS — I10 ESSENTIAL HYPERTENSION: ICD-10-CM

## 2024-12-03 DIAGNOSIS — M47.896 OTHER SPONDYLOSIS, LUMBAR REGION: ICD-10-CM

## 2024-12-03 DIAGNOSIS — G89.29 CHRONIC BACK PAIN, UNSPECIFIED BACK LOCATION, UNSPECIFIED BACK PAIN LATERALITY: ICD-10-CM

## 2024-12-03 DIAGNOSIS — J32.9 SINUSITIS, UNSPECIFIED CHRONICITY, UNSPECIFIED LOCATION: ICD-10-CM

## 2024-12-03 DIAGNOSIS — E11.9 TYPE 2 DIABETES MELLITUS WITHOUT COMPLICATION, WITHOUT LONG-TERM CURRENT USE OF INSULIN: ICD-10-CM

## 2024-12-03 DIAGNOSIS — J30.89 NON-SEASONAL ALLERGIC RHINITIS DUE TO OTHER ALLERGIC TRIGGER: ICD-10-CM

## 2024-12-03 DIAGNOSIS — J44.9 CHRONIC OBSTRUCTIVE PULMONARY DISEASE, UNSPECIFIED COPD TYPE: ICD-10-CM

## 2024-12-03 LAB — POCT GLUCOSE: 103 MG/DL (ref 70–110)

## 2024-12-03 PROCEDURE — 63600175 PHARM REV CODE 636 W HCPCS: Performed by: ANESTHESIOLOGY

## 2024-12-03 PROCEDURE — 64636 DESTROY L/S FACET JNT ADDL: CPT | Mod: RT,,, | Performed by: ANESTHESIOLOGY

## 2024-12-03 PROCEDURE — 64635 DESTROY LUMB/SAC FACET JNT: CPT | Mod: RT,,, | Performed by: ANESTHESIOLOGY

## 2024-12-03 PROCEDURE — 64635 DESTROY LUMB/SAC FACET JNT: CPT | Mod: RT | Performed by: ANESTHESIOLOGY

## 2024-12-03 PROCEDURE — 64636 DESTROY L/S FACET JNT ADDL: CPT | Mod: RT | Performed by: ANESTHESIOLOGY

## 2024-12-03 RX ORDER — LIDOCAINE HYDROCHLORIDE 20 MG/ML
INJECTION, SOLUTION EPIDURAL; INFILTRATION; INTRACAUDAL; PERINEURAL
Status: DISCONTINUED | OUTPATIENT
Start: 2024-12-03 | End: 2024-12-03 | Stop reason: HOSPADM

## 2024-12-03 RX ORDER — BUPIVACAINE HYDROCHLORIDE 2.5 MG/ML
INJECTION, SOLUTION EPIDURAL; INFILTRATION; INTRACAUDAL
Status: DISCONTINUED | OUTPATIENT
Start: 2024-12-03 | End: 2024-12-03 | Stop reason: HOSPADM

## 2024-12-03 RX ORDER — METHYLPREDNISOLONE ACETATE 80 MG/ML
INJECTION, SUSPENSION INTRA-ARTICULAR; INTRALESIONAL; INTRAMUSCULAR; SOFT TISSUE
Status: DISCONTINUED | OUTPATIENT
Start: 2024-12-03 | End: 2024-12-03 | Stop reason: HOSPADM

## 2024-12-03 RX ORDER — SODIUM CHLORIDE, SODIUM LACTATE, POTASSIUM CHLORIDE, CALCIUM CHLORIDE 600; 310; 30; 20 MG/100ML; MG/100ML; MG/100ML; MG/100ML
INJECTION, SOLUTION INTRAVENOUS CONTINUOUS
Status: DISCONTINUED | OUTPATIENT
Start: 2024-12-03 | End: 2024-12-03 | Stop reason: HOSPADM

## 2024-12-03 RX ORDER — FENTANYL CITRATE 50 UG/ML
INJECTION, SOLUTION INTRAMUSCULAR; INTRAVENOUS
Status: DISCONTINUED | OUTPATIENT
Start: 2024-12-03 | End: 2024-12-03 | Stop reason: HOSPADM

## 2024-12-03 RX ORDER — LIDOCAINE HYDROCHLORIDE 10 MG/ML
INJECTION, SOLUTION EPIDURAL; INFILTRATION; INTRACAUDAL; PERINEURAL
Status: DISCONTINUED | OUTPATIENT
Start: 2024-12-03 | End: 2024-12-03 | Stop reason: HOSPADM

## 2024-12-03 RX ORDER — LIDOCAINE HYDROCHLORIDE 10 MG/ML
1 INJECTION, SOLUTION EPIDURAL; INFILTRATION; INTRACAUDAL; PERINEURAL ONCE
Status: DISCONTINUED | OUTPATIENT
Start: 2024-12-03 | End: 2024-12-03 | Stop reason: HOSPADM

## 2024-12-03 NOTE — OP NOTE
PROCEDURE DATE: 12/3/2024    PROCEDURE:  Radiofrequency ablation of the medial branch nerves that innervate Right L4/5 and L5/S1 facets utilizing fluoroscopy    DIAGNOSIS:  Other spondylosis, lumbar region    Post op Diagnosis: Same    PHYSICIAN: Zander Rae MD    MEDICATIONS INJECTED:  From a mixture of 6ml of 0.25% bupivicaine and 80mg of methylprednisone,  1ml of this solution was injected at each level.    LOCAL ANESTHETIC USED: Lidocaine 1%, 2 ml given at each site.    SEDATION MEDICATIONS: RN IV sedation    ESTIMATED BLOOD LOSS:  None    COMPLICATIONS:  None    TECHNIQUE:  A time out was taken to identify patient and procedure side prior to starting the procedure. Laying in a prone position, the patient was prepped and draped in the usual sterile fashion using ChloraPrep and sterile towels.  The levels were determined under fluoroscopic guidance and then marked.  Local anesthetic was given by raising a wheal at the skin over each site and then infiltrated approximately 2cm deeper.  A 20-gauge  100 mm RF needle was introduced to the anatomic location of the medial branch nerves that innervate right L4/5 and L5/S1 facets.  Motor stimulation up to 2 Volts at each level confirmed no motor nerve involvement.  Impedance was less than 800 ohms at each level.  1ml of 2% lidocaine was instilled prior to lesioning.  Ablation was performed per level utilizing radiofrequency generator 80°C for 90 seconds.  The above noted medication was then injected slowly. The patient tolerated the procedure well.     The patient was monitored after the procedure.  Patient was given post procedure and discharge instructions to follow at home.  The patient was discharged in a stable condition

## 2024-12-03 NOTE — DISCHARGE SUMMARY
[facility] Periop Services  Discharge Note  Short Stay    Procedure(s) (LRB):  Radiofrequency Ablation, Nerve, Spinal, Lumbar, Medial Branch, 1 Level l4/5 and l5/s1 RFA (Right)      OUTCOME: Patient tolerated treatment/procedure well without complication and is now ready for discharge.    DISPOSITION: Home or Self Care    FINAL DIAGNOSIS:  <principal problem not specified>    FOLLOWUP: In clinic    DISCHARGE INSTRUCTIONS:    Discharge Procedure Orders   Notify your health care provider if you experience any of the following:  temperature >100.4     Notify your health care provider if you experience any of the following:  severe uncontrolled pain     Notify your health care provider if you experience any of the following:  redness, tenderness, or signs of infection (pain, swelling, redness, odor or green/yellow discharge around incision site)     Activity as tolerated        TIME SPENT ON DISCHARGE: 30 minutes

## 2024-12-04 VITALS
TEMPERATURE: 98 F | DIASTOLIC BLOOD PRESSURE: 77 MMHG | HEART RATE: 69 BPM | OXYGEN SATURATION: 97 % | BODY MASS INDEX: 32.14 KG/M2 | HEIGHT: 68 IN | WEIGHT: 212.06 LBS | SYSTOLIC BLOOD PRESSURE: 135 MMHG | RESPIRATION RATE: 20 BRPM

## 2024-12-04 RX ORDER — VALSARTAN 320 MG/1
320 TABLET ORAL DAILY
Qty: 90 TABLET | Refills: 3 | Status: SHIPPED | OUTPATIENT
Start: 2024-12-04 | End: 2025-12-04

## 2024-12-04 RX ORDER — OMEPRAZOLE 40 MG/1
40 CAPSULE, DELAYED RELEASE ORAL DAILY
Qty: 90 CAPSULE | Refills: 3 | Status: SHIPPED | OUTPATIENT
Start: 2024-12-04

## 2024-12-04 RX ORDER — GABAPENTIN 300 MG/1
CAPSULE ORAL
Qty: 450 CAPSULE | Refills: 3 | Status: SHIPPED | OUTPATIENT
Start: 2024-12-04

## 2024-12-04 RX ORDER — TIZANIDINE 4 MG/1
4 TABLET ORAL DAILY PRN
Qty: 90 TABLET | Refills: 3 | Status: SHIPPED | OUTPATIENT
Start: 2024-12-04

## 2024-12-04 RX ORDER — METFORMIN HYDROCHLORIDE 500 MG/1
TABLET ORAL
Qty: 360 TABLET | Refills: 0 | Status: SHIPPED | OUTPATIENT
Start: 2024-12-04

## 2024-12-04 RX ORDER — AMLODIPINE BESYLATE 10 MG/1
10 TABLET ORAL DAILY
Qty: 90 TABLET | Refills: 3 | Status: SHIPPED | OUTPATIENT
Start: 2024-12-04 | End: 2025-12-03

## 2024-12-04 RX ORDER — NAPROXEN 500 MG/1
500 TABLET ORAL 2 TIMES DAILY WITH MEALS
Qty: 180 TABLET | Refills: 3 | Status: SHIPPED | OUTPATIENT
Start: 2024-12-04 | End: 2025-12-03

## 2024-12-04 RX ORDER — FLUTICASONE PROPIONATE 50 MCG
1 SPRAY, SUSPENSION (ML) NASAL 2 TIMES DAILY
Qty: 48 G | Refills: 11 | Status: SHIPPED | OUTPATIENT
Start: 2024-12-04

## 2024-12-04 RX ORDER — TADALAFIL 20 MG/1
20 TABLET ORAL DAILY
Qty: 90 TABLET | Refills: 3 | Status: SHIPPED | OUTPATIENT
Start: 2024-12-04 | End: 2025-12-04

## 2024-12-04 RX ORDER — MONTELUKAST SODIUM 10 MG/1
10 TABLET ORAL NIGHTLY
Qty: 90 TABLET | Refills: 3 | Status: SHIPPED | OUTPATIENT
Start: 2024-12-04

## 2024-12-04 RX ORDER — TIRZEPATIDE 5 MG/.5ML
5 INJECTION, SOLUTION SUBCUTANEOUS
Qty: 6 ML | Refills: 0 | Status: SHIPPED | OUTPATIENT
Start: 2024-12-04

## 2024-12-04 RX ORDER — ATORVASTATIN CALCIUM 20 MG/1
20 TABLET, FILM COATED ORAL DAILY
Qty: 90 TABLET | Refills: 2 | Status: SHIPPED | OUTPATIENT
Start: 2024-12-04

## 2024-12-04 NOTE — TELEPHONE ENCOUNTER
Care Due:                  Date            Visit Type   Department     Provider  --------------------------------------------------------------------------------                                EP -                              [facility] FAM[facility]  Last Visit: 09-      CARE ([facility])   MEDICINE       Trudi Nails                              [facility]  Next Visit: 03-      CARE ([facility])   MEDICINE       Trudi Nails                                                            Last  Test          Frequency    Reason                     Performed    Due Date  --------------------------------------------------------------------------------    HBA1C.......  6 months...  metFORMIN, tirzepatide...  08- 02-    [facility] Embedded Care Due Messages. Reference number: [id number].   12/04/2024 9:25:02 AM CST

## 2024-12-04 NOTE — TELEPHONE ENCOUNTER
Refill Routing Note   Medication(s) are not appropriate for processing by [facility] for the following reason(s):        No active prescription written by provider  New or recently adjusted medication  Outside of protocol    [facility] action(s):  Defer  Route  Approve        Medication Therapy Plan: Flonase and valsartan not previously prescribed by provider;  Rhys new start (10/28/24)      Appointments  past 12m or future 3m with PCP    Date Provider   Last Visit   9/19/2024 Trudi Nails MD   Next Visit   3/24/2025 Trudi Nails MD   ED visits in past 90 days: 0        Note composed:1:49 PM 12/04/2024

## 2024-12-04 NOTE — TELEPHONE ENCOUNTER
Good morning. I need 90 day refills for the following 11 prescriptions. Monica was out of mounja,  so had to get it filled at [facility]. Can you send all to [facility]?     Mounjaro 5mg  Montelukast 10mg  Naproxen 500mg  Opeprazole 40mg  Tizanidine 4mg  Valsartan 320mg  Amlodipine 10mg   Atorvastatin 20mg  Fluticasone propionate 50mcp  Gabapentin 300mg  Met forming 500mg      Also this one to [facility].      Tadalafil 20mg     Thank you

## 2024-12-06 ENCOUNTER — INFUSION (OUTPATIENT)
Dept: INFUSION THERAPY | Facility: HOSPITAL | Age: 69
End: 2024-12-06
Attending: INTERNAL MEDICINE
Payer: MEDICARE

## 2024-12-06 VITALS
DIASTOLIC BLOOD PRESSURE: 80 MMHG | HEIGHT: 68 IN | WEIGHT: 205.31 LBS | SYSTOLIC BLOOD PRESSURE: 157 MMHG | BODY MASS INDEX: 31.12 KG/M2 | OXYGEN SATURATION: 96 % | HEART RATE: 65 BPM | TEMPERATURE: 98 F | RESPIRATION RATE: 18 BRPM

## 2024-12-06 DIAGNOSIS — C82.30 FOLLICULAR LYMPHOMA GRADE IIIA, UNSPECIFIED BODY REGION: ICD-10-CM

## 2024-12-06 DIAGNOSIS — D80.1 NONFAMILIAL HYPOGAMMAGLOBULINEMIA: ICD-10-CM

## 2024-12-06 DIAGNOSIS — D80.1 HYPOGAMMAGLOBULINEMIA: Primary | ICD-10-CM

## 2024-12-06 PROCEDURE — 96365 THER/PROPH/DIAG IV INF INIT: CPT

## 2024-12-06 PROCEDURE — 25000003 PHARM REV CODE 250: Performed by: NURSE PRACTITIONER

## 2024-12-06 PROCEDURE — 96366 THER/PROPH/DIAG IV INF ADDON: CPT

## 2024-12-06 PROCEDURE — 96367 TX/PROPH/DG ADDL SEQ IV INF: CPT

## 2024-12-06 PROCEDURE — A4216 STERILE WATER/SALINE, 10 ML: HCPCS | Performed by: NURSE PRACTITIONER

## 2024-12-06 PROCEDURE — 63600175 PHARM REV CODE 636 W HCPCS: Mod: JZ,JA,JG | Performed by: NURSE PRACTITIONER

## 2024-12-06 RX ORDER — SODIUM CHLORIDE 0.9 % (FLUSH) 0.9 %
10 SYRINGE (ML) INJECTION
Status: DISCONTINUED | OUTPATIENT
Start: 2024-12-06 | End: 2024-12-06 | Stop reason: HOSPADM

## 2024-12-06 RX ORDER — SODIUM CHLORIDE 0.9 % (FLUSH) 0.9 %
10 SYRINGE (ML) INJECTION
OUTPATIENT
Start: 2025-01-03

## 2024-12-06 RX ORDER — HEPARIN 100 UNIT/ML
500 SYRINGE INTRAVENOUS
Status: DISCONTINUED | OUTPATIENT
Start: 2024-12-06 | End: 2024-12-06 | Stop reason: HOSPADM

## 2024-12-06 RX ORDER — HEPARIN 100 UNIT/ML
500 SYRINGE INTRAVENOUS
OUTPATIENT
Start: 2025-01-03

## 2024-12-06 RX ADMIN — HEPARIN 500 UNITS: 100 SYRINGE at 09:12

## 2024-12-06 RX ADMIN — IMMUNE GLOBULIN (HUMAN) 50 G: 10 INJECTION INTRAVENOUS; SUBCUTANEOUS at 07:12

## 2024-12-06 RX ADMIN — SODIUM CHLORIDE 25 MG: 9 INJECTION, SOLUTION INTRAVENOUS at 07:12

## 2024-12-06 RX ADMIN — Medication 10 ML: at 09:12

## 2024-12-06 NOTE — PLAN OF CARE
Problem: Fall Injury Risk  Goal: Absence of Fall and Fall-Related Injury  Outcome: Progressing  Intervention: Identify and Manage Contributors  Flowsheets (Taken 12/6/2024 0710)  Self-Care Promotion: independence encouraged  Medication Review/Management: medications reviewed  Intervention: Promote Injury-Free Environment  Flowsheets (Taken 12/6/2024 0710)  Safety Promotion/Fall Prevention: medications reviewed

## 2024-12-09 ENCOUNTER — PATIENT MESSAGE (OUTPATIENT)
Dept: FAMILY MEDICINE | Facility: CLINIC | Age: 69
End: 2024-12-09
Payer: MEDICARE

## 2024-12-09 ENCOUNTER — LAB VISIT (OUTPATIENT)
Dept: LAB | Facility: HOSPITAL | Age: 69
End: 2024-12-09
Attending: INTERNAL MEDICINE
Payer: MEDICARE

## 2024-12-09 DIAGNOSIS — C83.38 DIFFUSE LARGE B-CELL LYMPHOMA, LYMPH NODES OF MULTIPLE SITES: ICD-10-CM

## 2024-12-09 DIAGNOSIS — C85.90 NON-HODGKIN'S LYMPHOMA, UNSPECIFIED BODY REGION, UNSPECIFIED NON-HODGKIN LYMPHOMA TYPE: ICD-10-CM

## 2024-12-09 LAB
ALBUMIN SERPL BCP-MCNC: 3.8 G/DL (ref 3.5–5.2)
ALP SERPL-CCNC: 94 U/L (ref 40–150)
ALT SERPL W/O P-5'-P-CCNC: 28 U/L (ref 10–44)
ANION GAP SERPL CALC-SCNC: 10 MMOL/L (ref 8–16)
AST SERPL-CCNC: 22 U/L (ref 10–40)
BASOPHILS # BLD AUTO: 0.03 K/UL (ref 0–0.2)
BASOPHILS NFR BLD: 0.5 % (ref 0–1.9)
BILIRUB SERPL-MCNC: 0.5 MG/DL (ref 0.1–1)
BUN SERPL-MCNC: 20 MG/DL (ref 8–23)
CALCIUM SERPL-MCNC: 9.9 MG/DL (ref 8.7–10.5)
CHLORIDE SERPL-SCNC: 104 MMOL/L (ref 95–110)
CO2 SERPL-SCNC: 28 MMOL/L (ref 23–29)
CREAT SERPL-MCNC: 1 MG/DL (ref 0.5–1.4)
DIFFERENTIAL METHOD BLD: NORMAL
EOSINOPHIL # BLD AUTO: 0 K/UL (ref 0–0.5)
EOSINOPHIL NFR BLD: 0.7 % (ref 0–8)
ERYTHROCYTE [DISTWIDTH] IN BLOOD BY AUTOMATED COUNT: 13 % (ref 11.5–14.5)
EST. GFR  (NO RACE VARIABLE): >60 ML/MIN/1.73 M^2
GLUCOSE SERPL-MCNC: 114 MG/DL (ref 70–110)
HCT VFR BLD AUTO: 46.3 % (ref 40–54)
HGB BLD-MCNC: 15.2 G/DL (ref 14–18)
IMM GRANULOCYTES # BLD AUTO: 0.01 K/UL (ref 0–0.04)
IMM GRANULOCYTES NFR BLD AUTO: 0.2 % (ref 0–0.5)
LYMPHOCYTES # BLD AUTO: 1.6 K/UL (ref 1–4.8)
LYMPHOCYTES NFR BLD: 28.1 % (ref 18–48)
MCH RBC QN AUTO: 29.9 PG (ref 27–31)
MCHC RBC AUTO-ENTMCNC: 32.8 G/DL (ref 32–36)
MCV RBC AUTO: 91 FL (ref 82–98)
MONOCYTES # BLD AUTO: 0.6 K/UL (ref 0.3–1)
MONOCYTES NFR BLD: 10.9 % (ref 4–15)
NEUTROPHILS # BLD AUTO: 3.3 K/UL (ref 1.8–7.7)
NEUTROPHILS NFR BLD: 59.6 % (ref 38–73)
NRBC BLD-RTO: 0 /100 WBC
PLATELET # BLD AUTO: 230 K/UL (ref 150–450)
PMV BLD AUTO: 9.2 FL (ref 9.2–12.9)
POTASSIUM SERPL-SCNC: 4.1 MMOL/L (ref 3.5–5.1)
PROT SERPL-MCNC: 7.5 G/DL (ref 6–8.4)
RBC # BLD AUTO: 5.09 M/UL (ref 4.6–6.2)
SODIUM SERPL-SCNC: 142 MMOL/L (ref 136–145)
WBC # BLD AUTO: 5.51 K/UL (ref 3.9–12.7)

## 2024-12-09 PROCEDURE — 80053 COMPREHEN METABOLIC PANEL: CPT | Performed by: INTERNAL MEDICINE

## 2024-12-09 PROCEDURE — 85025 COMPLETE CBC W/AUTO DIFF WBC: CPT | Performed by: INTERNAL MEDICINE

## 2024-12-09 PROCEDURE — 36415 COLL VENOUS BLD VENIPUNCTURE: CPT | Performed by: INTERNAL MEDICINE

## 2024-12-09 NOTE — PROGRESS NOTES
"[facility] Hematology/Oncology  PROGRESS NOTE -  Follow-up Visit      Subjective:       Patient ID:   NAME: Sivakumar Thacker : 1955     69 y.o. male    Referring Doc: Doe (new PCP)  Other Physicians: Vinod Chen/José Manuel      Chief Complaint:  NHL f/u    History of Present Illness:     Patient returns today for a regularly scheduled follow-up visit.  The patient is here today to go over the results of the recently ordered labs, tests and studies. He is here with his wife    He has been doing well overall ;No new respiratory issues, just some sinus drainage issues.       PEt  10/2/2024 was stable with no active FDG disease identified         He has been seeing Dr Rae and Dr Kohli for his lower back issues - he had some nreve ablations which have helped "somewhat"    He saw his PCP Dr Nails in 2024 and he is now on weekly injections for his DM    He has been on Rituximab  at every 12 weeks; next one due beginning of 2025; continued on IV IgG with latest one done this past Friday    He denies any CP, SOB, HA's or N/V       He is continued on Gabapentin for the neuropathy issues in his feet.          I discussed continued Covid19 precautions        ROS:   GEN: normal without any fever, night sweats or weight loss  HEENT: normal with no HA's, sore throat, stiff neck, changes in vision;  some siniuses  CV: normal with no CP, SOB, PND, VASQUEZ or orthopnea  PULM: normal with no SOB,  hemoptysis, sputum or pleuritic pain;  breathing better    GI: no new GI issues with no nausea, vomiting, constipation, diarrhea, melanotic stools, BRBPR, or hematemesis  : normal with no hematuria, dysuria  BREAST: normal with no mass, discharge, pain  SKIN: normal with no rash, erythema, bruising, or swelling; no current wound issues    Allergies:  Review of patient's allergies indicates:  No Known Allergies    Medications:    Current Outpatient Medications:     amLODIPine (NORVASC) 10 MG tablet, Take 1 tablet (10 mg " total) by mouth once daily., Disp: 90 tablet, Rfl: 3    aspirin (ECOTRIN) 81 MG EC tablet, Take 81 mg by mouth nightly., Disp: , Rfl:     atorvastatin (LIPITOR) 20 MG tablet, Take 1 tablet (20 mg total) by mouth once daily., Disp: 90 tablet, Rfl: 2    diclofenac sodium (VOLTAREN) 1 % Gel, Apply 2 g topically 3 (three) times daily., Disp: 200 g, Rfl: 2    fluticasone propionate (FLONASE) 50 mcg/actuation nasal spray, 1 spray (50 mcg total) by Each Nostril route 2 (two) times daily., Disp: 48 g, Rfl: 11    gabapentin (NEURONTIN) 300 MG capsule, Take 3 capsules (900 mg total) by mouth once daily AND 2 capsules (600 mg total) every evening., Disp: 450 capsule, Rfl: 3    metFORMIN (GLUCOPHAGE) 500 MG tablet, Take 2 tablets twice a day by oral route with meals for 90 days., Disp: 360 tablet, Rfl: 0    montelukast (SINGULAIR) 10 mg tablet, Take 1 tablet (10 mg total) by mouth nightly., Disp: 90 tablet, Rfl: 3    MULTIVITAMIN ORAL, Take 1 tablet by mouth once daily., Disp: , Rfl:     naproxen (NAPROSYN) 500 MG tablet, Take 1 tablet (500 mg total) by mouth 2 (two) times daily with meals., Disp: 180 tablet, Rfl: 3    omeprazole (PRILOSEC) 40 MG capsule, Take 1 capsule (40 mg total) by mouth once daily., Disp: 90 capsule, Rfl: 3    tadalafiL (CIALIS) 20 MG Tab, Take 1 tablet (20 mg total) by mouth once daily., Disp: 90 tablet, Rfl: 3    tirzepatide (MOUNJARO) 5 mg/0.5 mL PnIj, Inject 5 mg into the skin every 7 days., Disp: 6 mL, Rfl: 0    tiZANidine (ZANAFLEX) 4 MG tablet, Take 1 tablet (4 mg total) by mouth daily as needed (back pain)., Disp: 90 tablet, Rfl: 3    valsartan (DIOVAN) 320 MG tablet, Take 1 tablet (320 mg total) by mouth once daily., Disp: 90 tablet, Rfl: 3    PMHx/PSHx Updates:  See patient's last visit with me on  9/10/2024  See H&P on 1/8/2019        Pathology:  Cancer Staging  No matching staging information was found for the patient.    EGD 2/29/2024:  1. LIPOMA, BIOPSY:   - GASTRIC ANTRAL MUCOSA WITH  "REACTIVE GASTROPATHY.   - NEGATIVE FOR SUBMUCOSA TO EVALUATE FOR A DEEPER SEATED LESION.   - NEGATIVE FOR HELICOBACTER PYLORI TYPE ORGANISMS.     2. STOMACH, ANTRUM, BIOPSY:   - GASTRIC ANTRAL MUCOSA WITH REACTIVE GASTROPATHY.   - GASTRIC OXYNTIC MUCOSA WITH NO SIGNIFICANT HISTOPATHOLOGIC FINDINGS.   - NEGATIVE FOR HELICOBACTER PYLORI TYPE ORGANISMS.     3. GASTROESOPHAGEAL JUNCTION, BIOPSY:   - SQUAMOUS MUCOSA WITH REFLUX ESOPHAGITIS.   - COLUMNAR MUCOSA WITH NO SIGNIFICANT HISTOPATHOLOGIC FINDINGS.   - NEGATIVE FOR INTESTINAL METAPLASIA.       Objective:     Vitals:  Blood pressure 128/76, pulse 66, temperature 97.9 °F (36.6 °C), temperature source Temporal, resp. rate 18, height 5' 8" (1.727 m), weight 92.8 kg (204 lb 8 oz).    Physical Examination:   GEN: no apparent distress, comfortable; AAOx3  HEAD: atraumatic and normocephalic  EYES: no pallor, no icterus, PERRLA  ENT: OMM, no pharyngeal erythema, external ears WNL; no nasal discharge; no thrush;    NECK: no masses, thyroid normal, trachea midline, no LAD/LN's, supple  CV: RRR with no murmur; normal pulse; normal S1 and S2; no pedal edema; portacath since removed  CHEST: Normal respiratory effort; CTAB  ABDOM: nontender and nondistended; soft; normal bowel sounds; no rebound/guarding  MUSC/Skeletal: ROM normal; no crepitus; joints normal; no deformities or arthropathy  EXTREM: no clubbing, cyanosis, inflammation or swelling  SKIN: no rashes, lesions, ulcers, petechiae or subcutaneous nodules; no current wound issues  : no gibbs  NEURO: grossly intact; motor/sensory WNL; AAOx3; no tremors  PSYCH: normal mood, affect and behavior  LYMPH: normal cervical, supraclavicular, axillary and groin LN's            Labs:              Lab Results   Component Value Date    WBC 5.51 12/09/2024    HGB 15.2 12/09/2024    HCT 46.3 12/09/2024    MCV 91 12/09/2024     12/09/2024     CMP  Sodium   Date Value Ref Range Status   12/09/2024 142 136 - 145 mmol/L Final "   04/25/2019 144 134 - 144 mmol/L      Potassium   Date Value Ref Range Status   12/09/2024 4.1 3.5 - 5.1 mmol/L Final     Chloride   Date Value Ref Range Status   12/09/2024 104 95 - 110 mmol/L Final   04/25/2019 107 98 - 110 mmol/L      CO2   Date Value Ref Range Status   12/09/2024 28 23 - 29 mmol/L Final     Glucose   Date Value Ref Range Status   12/09/2024 114 (H) 70 - 110 mg/dL Final   04/25/2019 177 (H) 70 - 99 mg/dL      BUN   Date Value Ref Range Status   12/09/2024 20 8 - 23 mg/dL Final     Creatinine   Date Value Ref Range Status   12/09/2024 1.0 0.5 - 1.4 mg/dL Final   04/25/2019 0.83 0.60 - 1.40 mg/dL      Calcium   Date Value Ref Range Status   12/09/2024 9.9 8.7 - 10.5 mg/dL Final     Total Protein   Date Value Ref Range Status   12/09/2024 7.5 6.0 - 8.4 g/dL Final     Albumin   Date Value Ref Range Status   12/09/2024 3.8 3.5 - 5.2 g/dL Final   04/25/2019 4.4 3.1 - 4.7 g/dL      Total Bilirubin   Date Value Ref Range Status   12/09/2024 0.5 0.1 - 1.0 mg/dL Final     Comment:     For infants and newborns, interpretation of results should be based  on gestational age, weight and in agreement with clinical  observations.    Premature Infant recommended reference ranges:  Up to 24 hours.............<8.0 mg/dL  Up to 48 hours............<12.0 mg/dL  3-5 days..................<15.0 mg/dL  6-29 days.................<15.0 mg/dL       Alkaline Phosphatase   Date Value Ref Range Status   12/09/2024 94 40 - 150 U/L Final     AST   Date Value Ref Range Status   12/09/2024 22 10 - 40 U/L Final     ALT   Date Value Ref Range Status   12/09/2024 28 10 - 44 U/L Final     Anion Gap   Date Value Ref Range Status   12/09/2024 10 8 - 16 mmol/L Final     eGFR if    Date Value Ref Range Status   07/21/2022 >60.0 >60 mL/min/1.73 m^2 Final     eGFR if non    Date Value Ref Range Status   07/21/2022 >60.0 >60 mL/min/1.73 m^2 Final     Comment:     Calculation used to obtain the estimated  glomerular filtration  rate (eGFR) is the CKD-EPI equation.            Radiology/Diagnostic Studies:    PET 10/2/2024:    Impression:     No PET-CT evidence of active FDG avid malignancy.     Atherosclerosis, colonic diverticulosis, and other incidental findings as above.          PET 3/11/2024:    IMPRESSION:  1. Interval resolution of the previously described nonspecific small bowel mesenteric fat stranding.  2. No concerning FDG avid mass or lymphadenopathy.        PET 12/18/2023:  IMPRESSION:  1. Mild faint nonspecific fat stranding in the small bowel mesentery, possibly from low-grade/subclinical enteritis or edema, noting an early lymphoproliferative disorder is not entirely excluded and attention on follow-up imaging may be warranted.     2. No FDG avid mass or lymphadenopathy.      PET 6/22/2023:    IMPRESSION:     1. Negative for FDG avid lymphoproliferative disease.  2. Resolution of prior bilateral pulmonary opacities.        PET 1/5/2023:    IMPRESSION: Diffuse reticular nodular and groundglass opacities the left lower lobe, posterior right upper lobe and posterior medial right lower lobe compatible with multifocal pneumonia. Follow-up chest CT in three months is recommended     Diffuse FDG activity throughout the axial skeleton suggestive of bone marrow hyperstimulation     No evidence of recurrent or metastatic disease        PET  4/5/2022:    IMPRESSION: No evidence of recurrent or active lymphoproliferative disease        PET 10/4/2021:  Impression:     1. Negative for FDG avid recurrent lymphoproliferative disease.  2. New left maxillary sinusitis.  3. New bilateral reticulonodular pulmonary opacities suggesting infectious or inflammatory pneumonitis.  Clinical and laboratory correlation is requested.  4. Coronary artery calcification.         CXR  3/25/2021:    Impression:     Mild interstitial prominence, similar to previous exams.  No focal consolidation      PET 3/19/2021:  IMPRESSION:  1. Mild  patchy airspace opacity in the superior segment of the left  lower lobe with increased FDG activity from background suggestive of a  small focal pneumonia (possibly viral in etiology). Consider  correlation with the symptoms, history and CT follow-up in 3 months to  document resolution.  2. No FDG avid lymphadenopathy or additional sites of disease.         PET 9/2/2020:      Impression:     Negative for active lymphoproliferative disease.         Chest CT  3/19/2020:      Impression       1. Mild bronchiectasis in the left lower lobe and tree-in-bud micro nodules at the left lung base suggesting underlying bronchiolitis, possibly due to a non tuberculous mycobacterial infection or viral bronchiolitis.  2. Fatty infiltration of the liver  3. Small hiatal hernia.           CXR  1/31/2020    Impression       1. No acute chest disease.  2. Left subclavian Port-A-Cath.               PET 9/11/2019   Impression       No evidence of FDG avid residual or recurrent lymphoma       I have reviewed all available lab results and radiology reports.    Assessment/Plan:   (1) 69 y.o. male with diagnosis of NHL Follicular Stage IIIA who has been referred by Dr Gary Chen with [facility] for continuation of care by medical hematology/oncology.   - He originally was diagnosed in 2012 and had parotid excision at [facility]. He subsequently went to [facility] and was treated with bendamustine-rituximab. He has been on rituximab maintenance every 8 weeks and IV IgG monthly. Dr Chen's plan was to keep him on maintenace therapy for as long as he could tolerate the treatments  - s/p 6 cycles of Bendamustine and Retuximab with subsequent complete remission  - 1st maintenance cycle rituximab was in March 2016  - he has been on maintenance rituximab and IV IgG - last rituximab 11/15/2018; last IV IgG on Dec 14th 2018  - last PET was on 9/26/2018 with no evidence of recurrence  - originally diagnosed in Dec 2012 with left parotid and left  periparotid LN excision on 12/11/2012  - PET scan done on  9/11/2019 was good    7/23/2020:  - new nodule on auricle of left ear suspicious for a skin cancer - will refer him to Dr Avilez with ENT  - he is due for repeat PET    9/17/2020:  - PET scan on 9/2/2020 is adequate  - [facility]     11/12/2020:  - continued on the current regimen  - doing well with no new issues    1/7/2021:  - he is having some persistent cough issues for which he has been obtaining sputum for José Manuel  - last PET was Sept 2020    3/4/2021:  - doing ok  - chronic cough issues - followed by José Manuel  - will set up f/u PET    4/29/2021:  - he had PET scan on 3/19/2021  - He has been seeing pulmonary about his chronic cough  Lucia Georges with pulmonary has seen the recent PET and reported to him that the CXR on 3/25 was stable and he is on some oral antibiotics with improvement of the symptoms  - He is also on Gabapentin for the neuropathy issues in his feet.   - He saw Dr Barry Mcmahon on 3/24/2021 with family [facility]    8/19/2021:  - He has been seeing pulmonary about his chronic cough - Lucia Georges with pulmonary and he has been on periodic treatments of antibiotics. He saw her last just yesterday on 8/17/2021 and is currently on antibiotics.  - Pulmonary is planning to refer him to an ID specialist  - he is on the rituximab every 8 weeks; I am not convinced that this is contributing to the infection issues as it is not reducing his WBC; however,if pulmonary and/or ID feel it is a contributing factor then we can discontinue. The risk of his lymphoma recurring or progressing would be higher if he were to discontinue the therapy      10/14/2021:  - continued on oral antibiotics per pulmonary and plans to see Pulmonary ID specialist in near future  - continued on current therapy with rituximab  - recent PEt on 10/4/2021    12/9/2021:  - continued on rituximab  - on new antibiotics per pulmonary  and he sees them again in Feb 2022  - repeat scans in Feb 2022 or sooner if pulmonary says otherwise    2/2/2022:  - he is seeing pulmonary again in two weeks  - he does not think that the new triple antibiotic regimen is working  - we can get PET in Feb/mar 2022 if pulmonary is inclined, otherwise 6 month surveillance scan in April/May 2022    3/31/2022:  - He has been seeing pulmonary about his chronic cough and TONY issues; he is on antibiotics 3x/week and he reports that pulmonary feels that he is stable  - PET scan scheduled for 4/14/2022  - He last saw Dr Barry Mcmahon on 3/28/2021 and José Manuel on 2/17/2022  - pulmonary is fine with him continuing the ritxuimab per his report    5/26/2022:  - He has been seeing pulmonary about his chronic cough and TONY issues; he saw Rose on 5/17/2022 and is now on a new antibiotic and he reports that pulmonary said the PEt scan was better than the last one; he is planning to see Dr Veliz in the near future   - PET scan was done on 4/5/2022 with no evidence of recurrence  - continued on rituximab maintenance therapy    7/21/2022:  - continued under the care of pulmonary  - he is seeing Dr Veliz in Aug 2022  - continued on monthly IV IgG and rituximab every other month  - repeat PET in Oct 2022 expected    9/15/2022:  - He has been seeing pulmonary about his chronic cough and TONY issues; he has been seeing Rose and saw Dr Veliz on 8/15/2022;   - Dr Veliz is looking at increasing his Ig dose or changing it to SQ weekly  - Last PET scan was done on 4/5/2022 and repeat has been ordered and is due this Oct 2022  - discussed with patient about referral to Dr Nasreen Abarca at [facility] for not only evaluation for 2nd opinion for his chronic lymphoma but also the options of IV IgG infusion therapy, patient is agreeable    11/10/2022:  - Patient was seen by Dr Abarca at [facility] since last visit and his recommendation was to hold off on further rituximab therapy and proceed with just  surveillance scanning.  - He was recently hospitalized at [facility] in Oct 2022 with upper torso cellulitis with Serratia marcescens septicemia  - He was recently hospitalized at [facility] in Oct 2022 with upper torso cellulitis with Serratia marcescens septicemia. He is still followed by wound care and is getting the prior drain site packed, etc. He has not followed up with ID as of yet    1/4/2023:  - Patient was previously seen by Dr Abarca at [facility] since last visit and his recommendation was to hold off on further rituximab therapy and proceed with just surveillance scanning. He has telemed visit with him in Feb 2023  -  He is getting PET tomorrow and seeing pulmonary again in Feb 2023  - he has been on IV IgG  - He saw Dr Washington last in Nov 2022 and is seeing ID at [facility] in Jan 2023    3/1/2023:  - He had telemed with Dr Abarca and he wants him off the rituximab for another 3 months; patient is concerned about holding off on the rituximab maintenance.   - He last had rituximab in Sept 2022  - He has been on IV IgG  - Recent PET on 1/5/2023 with no evidence of lymphoma  - will discuss with Dr Abarca, but I think it is reasonable to resume rituximab in near future if ok with Pulm and ID, but will spread out to therapy every 3 months instead of every 2 months    4/26/2023:  - discussed with patient about resuming the Rituximab and he is anxious to do so  - will give every 12 weeks    6/21/2023:  - PET due tomorrow  - IV IgG on Friday  - rituximab since resumed but to be given every 12 weeks now  - f/\u with Dr Veliz in Aug 2023    9/13/2023:  - He has since resumed Rituximab but it is now at every 12 weeks; getting IV IGg this Friday and rituimab due again in Oct 2023  - He had PEt on 6/22/2023 12/13/2023:  - he is continued on IV IgG with next one on Jan 5th  - he gets Rituximab every 3 months now - latest one was 11/10  - he has PET this coming Dec 18th    3/18/2024:  - He has been having some upper GI pressure and  discomfort issues since Jan 2024 and had upper and lower endoscopies with Dr Campbell; he has f/u visit with GI to go over the results in near future; he has diverticulosis  - He had recent repeat PET on 3/11/2024 is negative  - he is on rituximab every 3 months (due Apr 25th 2024)  - He saw Dr Nails in Jan 2024 with [facility]    6/18/2024:  - schedule next PET in Sept 2024  - continued on rituximab every 12 weeks with next one due in mid-July 2024  - saw Dr Nails in Apr 2024  - had scopes with Dr griffith in May 2024    9/10/2024:  -  PEt scheduled for Oct 2024 and has next infusion this coming Friday  - He saw Dr Nails on 8/23; he was seen by Dr Interiano with vascular for his right neck while in ED on 8/26  - He has been on Rituximab  at every 12 weeks; next one is on is this coming Friday he has also been getting IV IGg  as well    12/10/2024:  - latest PET on 10/2/2024 stable and negative for any active disease  - labs are all adequate  - continued on rituximab maintenance every 12 weeks      (2) HTN     (3) Neuropathy     (4) GERD     (5) DM - on metformin per Dr Nunez     (6) Hypogammaglobulinemia - on Iv IgG monthly     (7) Steatosis of liver     (8) Degenerative disc disease of back     (9) Diverticular disease    (10) Mild bronchiectasis in the left lower lobe and tree-in-bud micro nodules at the left lung base suggesting underlying bronchiolitis  - seeing Lucia Georges           VISIT DIAGNOSES:      Follicular lymphoma grade IIIa, unspecified body region    Non-Hodgkin's lymphoma, unspecified body region, unspecified non-Hodgkin lymphoma type    Pancytopenia    Diffuse large b-cell lymphoma, lymph nodes of multiple sites    Hypogammaglobulinemia          PLAN:  1. continue rituximab maintenance  therapy every 12 weeks (due again the first week of Jan 2025)  2. continue IV IgG monthly    3. F/u with Dr Nasreen Abarca at [facility] as directed   4. Check labs every 8-12 weeks  5. Repeat PET every 6 months (due  Apr 2025)   6. F/u with PCP, Pulm etc    7. F/u with GI as directed  8. PCP addressing his diabetes           RTC in 12 weeks  with myself   Fax note to Jesu sims);; José Manuel [facility]; Rima; Adrian/Chelsea         Discussion:       I spent over 25 mins of time with the patient. Reviewed results of the recently ordered labs, tests and studies; made directives with regards to the results. Over half of this time was spent couseling and coordinating care.      COVID-19 Discussion:    I had long discussion with patient and any applicable family about the COVID-19 coronavirus epidemic and the recommended precautions with regard to cancer and/or hematology patients. I have re-iterated the CDC recommendations for adequate hand washing, use of hand -like products, and coughing into elbow, etc. In addition, especially for our patients who are on chemotherapy and/or our otherwise immunocompromised patients, I have recommended avoidance of crowds, including movie theaters, restaurants, churches, etc. I have recommended avoidance of any sick or symptomatic family members and/or friends. I have also recommended avoidance of any raw and unwashed food products, and general avoidance of food items that have not been prepared by themselves. The patient has been asked to call us immediately with any symptom developments, issues, questions or other general concerns.     I have explained all of the above in detail and the patient understands all of the current recommendation(s). I have answered all of their questions to the best of my ability and to their complete satisfaction.   The patient is to continue with the current management plan.            Electronically signed by Jefferson Ibarra MD                       Answers submitted by the patient for this visit:  Review of Systems Questionnaire (Submitted on 12/3/2024)  appetite change : No  unexpected weight change: No  mouth sores: No  visual disturbance: No  cough:  Yes  shortness of breath: No  chest pain: No  abdominal pain: No  diarrhea: No  frequency: No  back pain: Yes  rash: No  headaches: No  adenopathy: No  nervous/ anxious: No

## 2024-12-10 ENCOUNTER — OFFICE VISIT (OUTPATIENT)
Facility: CLINIC | Age: 69
End: 2024-12-10
Payer: MEDICARE

## 2024-12-10 VITALS
HEART RATE: 66 BPM | TEMPERATURE: 98 F | WEIGHT: 204.5 LBS | RESPIRATION RATE: 18 BRPM | SYSTOLIC BLOOD PRESSURE: 128 MMHG | BODY MASS INDEX: 30.99 KG/M2 | HEIGHT: 68 IN | DIASTOLIC BLOOD PRESSURE: 76 MMHG

## 2024-12-10 DIAGNOSIS — C85.90 NON-HODGKIN'S LYMPHOMA, UNSPECIFIED BODY REGION, UNSPECIFIED NON-HODGKIN LYMPHOMA TYPE: ICD-10-CM

## 2024-12-10 DIAGNOSIS — D61.818 PANCYTOPENIA: ICD-10-CM

## 2024-12-10 DIAGNOSIS — C83.38 DIFFUSE LARGE B-CELL LYMPHOMA, LYMPH NODES OF MULTIPLE SITES: ICD-10-CM

## 2024-12-10 DIAGNOSIS — C82.30 FOLLICULAR LYMPHOMA GRADE IIIA, UNSPECIFIED BODY REGION: Primary | ICD-10-CM

## 2024-12-10 DIAGNOSIS — D80.1 HYPOGAMMAGLOBULINEMIA: ICD-10-CM

## 2024-12-10 PROCEDURE — 99999 PR PBB SHADOW E&M-EST. PATIENT-LVL III: CPT | Mod: PBBFAC,,, | Performed by: INTERNAL MEDICINE

## 2024-12-10 PROCEDURE — G2211 COMPLEX E/M VISIT ADD ON: HCPCS | Mod: S$PBB,,, | Performed by: INTERNAL MEDICINE

## 2024-12-10 PROCEDURE — 99213 OFFICE O/P EST LOW 20 MIN: CPT | Mod: PBBFAC,PN | Performed by: INTERNAL MEDICINE

## 2024-12-10 PROCEDURE — 99215 OFFICE O/P EST HI 40 MIN: CPT | Mod: S$PBB,,, | Performed by: INTERNAL MEDICINE

## 2024-12-20 ENCOUNTER — PATIENT MESSAGE (OUTPATIENT)
Dept: ADMINISTRATIVE | Facility: OTHER | Age: 69
End: 2024-12-20
Payer: MEDICARE

## 2024-12-27 ENCOUNTER — PATIENT MESSAGE (OUTPATIENT)
Dept: ADMINISTRATIVE | Facility: OTHER | Age: 69
End: 2024-12-27
Payer: MEDICARE

## 2024-12-31 RX ORDER — HEPARIN 100 UNIT/ML
500 SYRINGE INTRAVENOUS
Status: CANCELLED | OUTPATIENT
Start: 2025-01-02

## 2024-12-31 RX ORDER — SODIUM CHLORIDE 0.9 % (FLUSH) 0.9 %
10 SYRINGE (ML) INJECTION
Status: CANCELLED | OUTPATIENT
Start: 2025-01-02

## 2024-12-31 RX ORDER — MEPERIDINE HYDROCHLORIDE 25 MG/ML
25 INJECTION INTRAMUSCULAR; INTRAVENOUS; SUBCUTANEOUS
Status: CANCELLED | OUTPATIENT
Start: 2025-01-02 | End: 2025-01-02

## 2024-12-31 RX ORDER — FAMOTIDINE 10 MG/ML
20 INJECTION INTRAVENOUS
Status: CANCELLED | OUTPATIENT
Start: 2025-01-02

## 2024-12-31 RX ORDER — ACETAMINOPHEN 325 MG/1
650 TABLET ORAL
Status: CANCELLED | OUTPATIENT
Start: 2025-01-02

## 2025-01-02 ENCOUNTER — INFUSION (OUTPATIENT)
Dept: INFUSION THERAPY | Facility: HOSPITAL | Age: 70
End: 2025-01-02
Attending: INTERNAL MEDICINE
Payer: MEDICARE

## 2025-01-02 VITALS
SYSTOLIC BLOOD PRESSURE: 148 MMHG | HEIGHT: 68 IN | TEMPERATURE: 98 F | HEART RATE: 75 BPM | DIASTOLIC BLOOD PRESSURE: 76 MMHG | BODY MASS INDEX: 30.69 KG/M2 | OXYGEN SATURATION: 98 % | RESPIRATION RATE: 15 BRPM | WEIGHT: 202.5 LBS

## 2025-01-02 DIAGNOSIS — C82.30 FOLLICULAR LYMPHOMA GRADE IIIA, UNSPECIFIED BODY REGION: Primary | ICD-10-CM

## 2025-01-02 DIAGNOSIS — D80.1 HYPOGAMMAGLOBULINEMIA: ICD-10-CM

## 2025-01-02 PROCEDURE — A4216 STERILE WATER/SALINE, 10 ML: HCPCS | Performed by: INTERNAL MEDICINE

## 2025-01-02 PROCEDURE — 25000003 PHARM REV CODE 250: Performed by: INTERNAL MEDICINE

## 2025-01-02 PROCEDURE — 96375 TX/PRO/DX INJ NEW DRUG ADDON: CPT

## 2025-01-02 PROCEDURE — 63600175 PHARM REV CODE 636 W HCPCS: Mod: JZ,TB | Performed by: INTERNAL MEDICINE

## 2025-01-02 PROCEDURE — 96415 CHEMO IV INFUSION ADDL HR: CPT

## 2025-01-02 PROCEDURE — 96367 TX/PROPH/DG ADDL SEQ IV INF: CPT

## 2025-01-02 PROCEDURE — 96413 CHEMO IV INFUSION 1 HR: CPT

## 2025-01-02 RX ORDER — HEPARIN 100 UNIT/ML
500 SYRINGE INTRAVENOUS
Status: DISCONTINUED | OUTPATIENT
Start: 2025-01-02 | End: 2025-01-02 | Stop reason: HOSPADM

## 2025-01-02 RX ORDER — MEPERIDINE HYDROCHLORIDE 25 MG/ML
25 INJECTION INTRAMUSCULAR; INTRAVENOUS; SUBCUTANEOUS
Status: DISCONTINUED | OUTPATIENT
Start: 2025-01-02 | End: 2025-01-02 | Stop reason: HOSPADM

## 2025-01-02 RX ORDER — SODIUM CHLORIDE 0.9 % (FLUSH) 0.9 %
10 SYRINGE (ML) INJECTION
Status: DISCONTINUED | OUTPATIENT
Start: 2025-01-02 | End: 2025-01-02 | Stop reason: HOSPADM

## 2025-01-02 RX ORDER — ACETAMINOPHEN 325 MG/1
650 TABLET ORAL
Status: COMPLETED | OUTPATIENT
Start: 2025-01-02 | End: 2025-01-02

## 2025-01-02 RX ORDER — FAMOTIDINE 10 MG/ML
20 INJECTION INTRAVENOUS
Status: COMPLETED | OUTPATIENT
Start: 2025-01-02 | End: 2025-01-02

## 2025-01-02 RX ADMIN — SODIUM CHLORIDE 50 MG: 9 INJECTION, SOLUTION INTRAVENOUS at 07:01

## 2025-01-02 RX ADMIN — SODIUM CHLORIDE, PRESERVATIVE FREE 10 ML: 5 INJECTION INTRAVENOUS at 10:01

## 2025-01-02 RX ADMIN — RITUXIMAB 800 MG: 10 INJECTION, SOLUTION INTRAVENOUS at 07:01

## 2025-01-02 RX ADMIN — ACETAMINOPHEN 650 MG: 325 TABLET ORAL at 07:01

## 2025-01-02 RX ADMIN — SODIUM CHLORIDE: 9 INJECTION, SOLUTION INTRAVENOUS at 07:01

## 2025-01-02 RX ADMIN — HEPARIN 500 UNITS: 100 SYRINGE at 10:01

## 2025-01-02 RX ADMIN — FAMOTIDINE 20 MG: 10 INJECTION INTRAVENOUS at 07:01

## 2025-01-02 NOTE — PLAN OF CARE
Problem: Fatigue  Goal: Improved Activity Tolerance  Outcome: Progressing  Intervention: Promote Improved Energy  Flowsheets (Taken 1/2/2025 1730)  Fatigue Management:   fatigue-related activity identified   frequent rest breaks encouraged   paced activity encouraged  Sleep/Rest Enhancement:   regular sleep/rest pattern promoted   relaxation techniques promoted  Activity Management: Ambulated -L4  Environmental Support: rest periods encouraged

## 2025-01-03 ENCOUNTER — INFUSION (OUTPATIENT)
Dept: INFUSION THERAPY | Facility: HOSPITAL | Age: 70
End: 2025-01-03
Attending: INTERNAL MEDICINE
Payer: MEDICARE

## 2025-01-03 VITALS
HEART RATE: 68 BPM | OXYGEN SATURATION: 96 % | WEIGHT: 206.13 LBS | DIASTOLIC BLOOD PRESSURE: 68 MMHG | BODY MASS INDEX: 31.24 KG/M2 | TEMPERATURE: 98 F | SYSTOLIC BLOOD PRESSURE: 122 MMHG | HEIGHT: 68 IN | RESPIRATION RATE: 17 BRPM

## 2025-01-03 DIAGNOSIS — D80.1 HYPOGAMMAGLOBULINEMIA: Primary | ICD-10-CM

## 2025-01-03 DIAGNOSIS — D80.1 NONFAMILIAL HYPOGAMMAGLOBULINEMIA: ICD-10-CM

## 2025-01-03 DIAGNOSIS — C82.30 FOLLICULAR LYMPHOMA GRADE IIIA, UNSPECIFIED BODY REGION: ICD-10-CM

## 2025-01-03 PROCEDURE — 96366 THER/PROPH/DIAG IV INF ADDON: CPT

## 2025-01-03 PROCEDURE — 63600175 PHARM REV CODE 636 W HCPCS: Mod: JZ,JA,TB | Performed by: NURSE PRACTITIONER

## 2025-01-03 PROCEDURE — 25000003 PHARM REV CODE 250: Performed by: NURSE PRACTITIONER

## 2025-01-03 PROCEDURE — 96367 TX/PROPH/DG ADDL SEQ IV INF: CPT

## 2025-01-03 PROCEDURE — A4216 STERILE WATER/SALINE, 10 ML: HCPCS | Performed by: NURSE PRACTITIONER

## 2025-01-03 PROCEDURE — 96365 THER/PROPH/DIAG IV INF INIT: CPT

## 2025-01-03 RX ORDER — HEPARIN 100 UNIT/ML
500 SYRINGE INTRAVENOUS
Status: DISCONTINUED | OUTPATIENT
Start: 2025-01-03 | End: 2025-01-03 | Stop reason: HOSPADM

## 2025-01-03 RX ORDER — SODIUM CHLORIDE 0.9 % (FLUSH) 0.9 %
10 SYRINGE (ML) INJECTION
Status: DISCONTINUED | OUTPATIENT
Start: 2025-01-03 | End: 2025-01-03 | Stop reason: HOSPADM

## 2025-01-03 RX ORDER — SODIUM CHLORIDE 0.9 % (FLUSH) 0.9 %
10 SYRINGE (ML) INJECTION
OUTPATIENT
Start: 2025-01-31

## 2025-01-03 RX ORDER — HEPARIN 100 UNIT/ML
500 SYRINGE INTRAVENOUS
OUTPATIENT
Start: 2025-01-31

## 2025-01-03 RX ADMIN — IMMUNE GLOBULIN (HUMAN) 50 G: 10 INJECTION INTRAVENOUS; SUBCUTANEOUS at 07:01

## 2025-01-03 RX ADMIN — HEPARIN 500 UNITS: 100 SYRINGE at 10:01

## 2025-01-03 RX ADMIN — Medication 10 ML: at 10:01

## 2025-01-03 RX ADMIN — SODIUM CHLORIDE: 9 INJECTION, SOLUTION INTRAVENOUS at 07:01

## 2025-01-03 RX ADMIN — SODIUM CHLORIDE 25 MG: 9 INJECTION, SOLUTION INTRAVENOUS at 07:01

## 2025-01-03 NOTE — PLAN OF CARE
Problem: Fall Injury Risk  Goal: Absence of Fall and Fall-Related Injury  Outcome: Progressing  Intervention: Identify and Manage Contributors  Flowsheets (Taken 1/3/2025 0731)  Self-Care Promotion:   independence encouraged   BADL personal objects within reach   adaptive equipment use encouraged  Medication Review/Management: medications reviewed  Intervention: Promote Injury-Free Environment  Flowsheets (Taken 1/3/2025 0731)  Safety Promotion/Fall Prevention: medications reviewed

## 2025-01-06 ENCOUNTER — OFFICE VISIT (OUTPATIENT)
Dept: SPINE | Facility: CLINIC | Age: 70
End: 2025-01-06
Payer: MEDICARE

## 2025-01-06 VITALS — BODY MASS INDEX: 31.24 KG/M2 | WEIGHT: 206.13 LBS | HEIGHT: 68 IN

## 2025-01-06 DIAGNOSIS — M54.50 CHRONIC RIGHT-SIDED LOW BACK PAIN WITHOUT SCIATICA: Primary | ICD-10-CM

## 2025-01-06 DIAGNOSIS — G89.29 CHRONIC RIGHT-SIDED LOW BACK PAIN WITHOUT SCIATICA: Primary | ICD-10-CM

## 2025-01-06 PROCEDURE — 99999 PR PBB SHADOW E&M-EST. PATIENT-LVL III: CPT | Mod: PBBFAC,,, | Performed by: PHYSICAL MEDICINE & REHABILITATION

## 2025-01-06 PROCEDURE — 99213 OFFICE O/P EST LOW 20 MIN: CPT | Mod: PBBFAC,PN | Performed by: PHYSICAL MEDICINE & REHABILITATION

## 2025-01-06 PROCEDURE — 99213 OFFICE O/P EST LOW 20 MIN: CPT | Mod: S$PBB,,, | Performed by: PHYSICAL MEDICINE & REHABILITATION

## 2025-01-06 NOTE — PROGRESS NOTES
SUBJECTIVE:    Patient ID: Sivakumar Thacker is a 69 y.o. male.    Chief Complaint: Follow-up    He is here for follow-up status post radiofrequency ablation right L4-5 and L5-S1 on 2024 with Dr. Rae.  Excellent response to that procedure.  He describes 90% improvement in his right-sided low back pain with improved functional mobility.  He does have some residual discomfort on the right side.  Pain level is 2/10.  He can live with that.  Has not needed to take any naproxen or Zanaflex.  He has no new or progressive problems.          Past Medical History:   Diagnosis Date    Chest wall abscess 2022    Chronic obstructive pulmonary disease, unspecified COPD type 2022    Diabetes mellitus, type 2     Encounter for antineoplastic chemotherapy 2020    Hypertension     Hypogammaglobulinemia 2019    Neuropathy     Non Hodgkin's lymphoma     Reflux esophagitis     Ringing in ear, bilateral      Social History     Socioeconomic History    Marital status:    Tobacco Use    Smoking status: Former     Current packs/day: 0.00     Average packs/day: 0.5 packs/day for 2.0 years (1.0 ttl pk-yrs)     Types: Cigarettes     Start date:      Quit date:      Years since quittin.0    Smokeless tobacco: Never   Substance and Sexual Activity    Alcohol use: Yes     Comment: occ    Drug use: No    Sexual activity: Not Currently     Social Drivers of Health     Financial Resource Strain: Low Risk  (2024)    Overall Financial Resource Strain (CARDIA)     Difficulty of Paying Living Expenses: Not hard at all   Food Insecurity: No Food Insecurity (2024)    Hunger Vital Sign     Worried About Running Out of Food in the Last Year: Never true     Ran Out of Food in the Last Year: Never true   Transportation Needs: No Transportation Needs (2024)    PRAPARE - Transportation     Lack of Transportation (Medical): No     Lack of Transportation (Non-Medical): No   Physical  Activity: Insufficiently Active (1/2/2024)    Exercise Vital Sign     Days of Exercise per Week: 1 day     Minutes of Exercise per Session: 30 min   Stress: No Stress Concern Present (1/2/2024)    [employer] [place] [employer] - Occupational Stress Questionnaire     Feeling of Stress : Not at all   Housing Stability: Low Risk  (8/21/2024)    Housing Stability Vital Sign     Unable to Pay for Housing in the Last Year: No     Homeless in the Last Year: No     Past Surgical History:   Procedure Laterality Date    COLONOSCOPY  2018    COLONOSCOPY N/A 02/29/2024    Procedure: COLONOSCOPY;  Surgeon: Nitesh Campbell MD;  Location: [facility] ENDO;  Service: Endoscopy;  Laterality: N/A;    ENDOSCOPIC ULTRASOUND OF UPPER GASTROINTESTINAL TRACT N/A 5/7/2024    Procedure: ULTRASOUND, UPPER GI TRACT, ENDOSCOPIC;  Surgeon: Jose De Jesus Tran III, MD;  Location: [facility];  Service: Endoscopy;  Laterality: N/A;    ENDOSCOPIC ULTRASOUND OF UPPER GASTROINTESTINAL TRACT N/A 5/14/2024    Procedure: ULTRASOUND, UPPER GI TRACT, ENDOSCOPIC;  Surgeon: Jose De Jesus Tran III, MD;  Location: [facility];  Service: Endoscopy;  Laterality: N/A;    ESOPHAGOGASTRODUODENOSCOPY N/A 02/29/2024    Procedure: EGD (ESOPHAGOGASTRODUODENOSCOPY);  Surgeon: Nitesh Campbell MD;  Location: [facility];  Service: Endoscopy;  Laterality: N/A;    EYE SURGERY  Approximately 3 years ago    Cataract    HAND SURGERY Left     amputation left index finger    INCISION AND DRAINAGE OF ABSCESS Left 10/03/2022    Procedure: INCISION AND DRAINAGE, ABSCESS;  Surgeon: Franco Venegas III, MD;  Location: [facility];  Service: General;  Laterality: Left;  axilla    INJECTION OF ANESTHETIC AGENT AROUND MEDIAL BRANCH NERVES INNERVATING LUMBAR FACET JOINT Right 10/17/2024    Procedure: Block-nerve-medial branch-lumbar;  Surgeon: Zander Rae MD;  Location: [facility] OR;  Service: Pain Management;  Laterality: Right;    INJECTION OF ANESTHETIC AGENT AROUND MEDIAL BRANCH  "NERVES INNERVATING LUMBAR FACET JOINT Right 11/7/2024    Procedure: Block-nerve-medial branch-lumbar;  Surgeon: Zander Rae MD;  Location: [facility] OR;  Service: Pain Management;  Laterality: Right;    INSERTION OF TUNNELED CENTRAL VENOUS CATHETER (CVC) WITH SUBCUTANEOUS PORT Left 02/01/2023    Procedure: [id number];  Surgeon: Franco Venegas III, MD;  Location: [facility] OR;  Service: General;  Laterality: Left;    MEDIPORT REMOVAL Right 10/03/2022    Procedure: REMOVAL, CATHETER, CENTRAL VENOUS, TUNNELED, WITH PORT;  Surgeon: Franco Venegas III, MD;  Location: [facility] OR;  Service: General;  Laterality: Right;    PORTACATH PLACEMENT      RADIOFREQUENCY ABLATION OF LUMBAR MEDIAL BRANCH NERVE AT SINGLE LEVEL Right 12/3/2024    Procedure: Radiofrequency Ablation, Nerve, Spinal, Lumbar, Medial Branch, 1 Level;  Surgeon: Zander Rae MD;  Location: [facility] OR;  Service: Pain Management;  Laterality: Right;    SKIN CANCER EXCISION  09/30/2020    [facility]    SPINE SURGERY      VASECTOMY  1985 ?     Family History   Problem Relation Name Age of Onset    Diabetes Father Tae      Vitals:    01/06/25 1027   Weight: 93.5 kg (206 lb 2.1 oz)   Height: 5' 8" (1.727 m)       Review of Systems   Constitutional:  Negative for chills, diaphoresis, fatigue, fever and unexpected weight change.   HENT:  Negative for trouble swallowing.    Eyes:  Negative for visual disturbance.   Respiratory:  Negative for shortness of breath.    Cardiovascular:  Negative for chest pain.   Gastrointestinal:  Negative for abdominal pain, constipation, diarrhea, nausea and vomiting.   Genitourinary:  Negative for difficulty urinating.   Musculoskeletal:  Negative for arthralgias, back pain, gait problem, joint swelling, myalgias, neck pain and neck stiffness.   Neurological:  Negative for dizziness, speech difficulty, weakness, light-headedness, numbness and headaches.          Objective:      Physical Exam  Neurological:      Mental Status: He " is alert and oriented to person, place, and time.             Assessment:       1. Chronic right-sided low back pain without sciatica           Plan:     Improved to his satisfaction status post radiofrequency ablation right L4-5 and L5-S1.  We can repeat that procedure as needed.  Follow up here as needed      Chronic right-sided low back pain without sciatica

## 2025-01-07 ENCOUNTER — OFFICE VISIT (OUTPATIENT)
Dept: PODIATRY | Facility: CLINIC | Age: 70
End: 2025-01-07
Payer: MEDICARE

## 2025-01-07 VITALS
SYSTOLIC BLOOD PRESSURE: 115 MMHG | RESPIRATION RATE: 18 BRPM | WEIGHT: 204 LBS | HEART RATE: 86 BPM | BODY MASS INDEX: 30.92 KG/M2 | HEIGHT: 68 IN | DIASTOLIC BLOOD PRESSURE: 72 MMHG

## 2025-01-07 DIAGNOSIS — E11.49 TYPE II DIABETES MELLITUS WITH NEUROLOGICAL MANIFESTATIONS: ICD-10-CM

## 2025-01-07 DIAGNOSIS — G57.93 NEUROPATHY OF BOTH FEET: ICD-10-CM

## 2025-01-07 DIAGNOSIS — E11.9 COMPREHENSIVE DIABETIC FOOT EXAMINATION, TYPE 2 DM, ENCOUNTER FOR: Primary | ICD-10-CM

## 2025-01-07 PROCEDURE — 99215 OFFICE O/P EST HI 40 MIN: CPT | Mod: PBBFAC | Performed by: PODIATRIST

## 2025-01-07 PROCEDURE — 99214 OFFICE O/P EST MOD 30 MIN: CPT | Mod: S$PBB,,, | Performed by: PODIATRIST

## 2025-01-07 PROCEDURE — 99999 PR PBB SHADOW E&M-EST. PATIENT-LVL V: CPT | Mod: PBBFAC,,, | Performed by: PODIATRIST

## 2025-01-07 RX ORDER — TIRZEPATIDE 2.5 MG/.5ML
INJECTION, SOLUTION SUBCUTANEOUS
COMMUNITY
Start: 2024-09-19 | End: 2025-01-08 | Stop reason: ALTCHOICE

## 2025-01-07 RX ORDER — AMOXICILLIN AND CLAVULANATE POTASSIUM 875; 125 MG/1; MG/1
1 TABLET, FILM COATED ORAL 2 TIMES DAILY
COMMUNITY
End: 2025-01-08

## 2025-01-07 RX ORDER — BENZONATATE 200 MG/1
CAPSULE ORAL
COMMUNITY
End: 2025-01-08

## 2025-01-07 RX ORDER — GABAPENTIN 300 MG/1
900 CAPSULE ORAL
COMMUNITY
Start: 2024-12-04 | End: 2025-12-03

## 2025-01-07 RX ORDER — FAMOTIDINE 20 MG/1
20 TABLET, FILM COATED ORAL
COMMUNITY
Start: 2024-08-20

## 2025-01-07 RX ORDER — PROMETHAZINE HYDROCHLORIDE AND DEXTROMETHORPHAN HYDROBROMIDE 6.25; 15 MG/5ML; MG/5ML
5 SYRUP ORAL EVERY 6 HOURS PRN
COMMUNITY

## 2025-01-07 RX ORDER — LEVOFLOXACIN 500 MG/1
TABLET, FILM COATED ORAL
COMMUNITY
End: 2025-01-08

## 2025-01-07 RX ORDER — MUPIROCIN 20 MG/G
OINTMENT TOPICAL
COMMUNITY
Start: 2024-08-15

## 2025-01-08 NOTE — PROGRESS NOTES
Subjective:       Patient ID: Sivakumar Thacker is a 69 y.o. male.    Chief Complaint: Follow-up, Nail Problem, Callouses, Foot Pain, and Diabetes Mellitus  Patient presents for annual diabetic foot exam.  Has a 6-8 year history of diabetes, relates it has always been fairly well-controlled with oral medication and he recently just started mounjaro.  Relates over the last year diabetes has not been as well-controlled as usual.  Glucose recently he has been about 100 in the morning recently  Relates he has been doing well, no changes with the last year no health changes.  He does relate the normal burning and tingling in feet which he feels is well-controlled with gabapentin, taking 900 mg gabapentin in the morning, 600 mg at night, no side effects.  But does relate his feet always feel cold        Past Medical History:   Diagnosis Date    Chest wall abscess 09/30/2022    Chronic obstructive pulmonary disease, unspecified COPD type 03/28/2022    Diabetes mellitus, type 2 2019    Encounter for antineoplastic chemotherapy 04/01/2020    Hypertension     Hypogammaglobulinemia 12/13/2019    Neuropathy     Non Hodgkin's lymphoma 2011    Reflux esophagitis     Ringing in ear, bilateral      Past Surgical History:   Procedure Laterality Date    COLONOSCOPY  2018    COLONOSCOPY N/A 02/29/2024    Procedure: COLONOSCOPY;  Surgeon: Nitesh Campbell MD;  Location: [facility];  Service: Endoscopy;  Laterality: N/A;    ENDOSCOPIC ULTRASOUND OF UPPER GASTROINTESTINAL TRACT N/A 5/7/2024    Procedure: ULTRASOUND, UPPER GI TRACT, ENDOSCOPIC;  Surgeon: Jose De Jesus Tran III, MD;  Location: [facility];  Service: Endoscopy;  Laterality: N/A;    ENDOSCOPIC ULTRASOUND OF UPPER GASTROINTESTINAL TRACT N/A 5/14/2024    Procedure: ULTRASOUND, UPPER GI TRACT, ENDOSCOPIC;  Surgeon: Jose De Jesus Tran III, MD;  Location: [facility];  Service: Endoscopy;  Laterality: N/A;    ESOPHAGOGASTRODUODENOSCOPY N/A 02/29/2024    Procedure: EGD  (ESOPHAGOGASTRODUODENOSCOPY);  Surgeon: Nitesh aCmpbell MD;  Location: [facility] ENDO;  Service: Endoscopy;  Laterality: N/A;    EYE SURGERY  Approximately 3 years ago    Cataract    HAND SURGERY Left     amputation left index finger    INCISION AND DRAINAGE OF ABSCESS Left 10/03/2022    Procedure: INCISION AND DRAINAGE, ABSCESS;  Surgeon: Franco Venegas III, MD;  Location: [facility] OR;  Service: General;  Laterality: Left;  axilla    INJECTION OF ANESTHETIC AGENT AROUND MEDIAL BRANCH NERVES INNERVATING LUMBAR FACET JOINT Right 10/17/2024    Procedure: Block-nerve-medial branch-lumbar;  Surgeon: Zander Rae MD;  Location: [facility] OR;  Service: Pain Management;  Laterality: Right;    INJECTION OF ANESTHETIC AGENT AROUND MEDIAL BRANCH NERVES INNERVATING LUMBAR FACET JOINT Right 11/7/2024    Procedure: Block-nerve-medial branch-lumbar;  Surgeon: Zander Rae MD;  Location: [facility] OR;  Service: Pain Management;  Laterality: Right;    INSERTION OF TUNNELED CENTRAL VENOUS CATHETER (CVC) WITH SUBCUTANEOUS PORT Left 02/01/2023    Procedure: [id number];  Surgeon: Franco Venegas III, MD;  Location: [facility] OR;  Service: General;  Laterality: Left;    MEDIPORT REMOVAL Right 10/03/2022    Procedure: REMOVAL, CATHETER, CENTRAL VENOUS, TUNNELED, WITH PORT;  Surgeon: Franco Venegas III, MD;  Location: [facility] OR;  Service: General;  Laterality: Right;    PORTACATH PLACEMENT      RADIOFREQUENCY ABLATION OF LUMBAR MEDIAL BRANCH NERVE AT SINGLE LEVEL Right 12/3/2024    Procedure: Radiofrequency Ablation, Nerve, Spinal, Lumbar, Medial Branch, 1 Level;  Surgeon: Zander Rae MD;  [facility];  Service: Pain Management;  Laterality: Right;    SKIN CANCER EXCISION  09/30/2020    [facility]    SPINE SURGERY      VASECTOMY  1985 ?     Family History   Problem Relation Name Age of Onset    Diabetes Father Tae      Social History     Socioeconomic History    Marital status:    Tobacco Use    Smoking status: Former      Current packs/day: 0.00     Average packs/day: 0.5 packs/day for 2.0 years (1.0 ttl pk-yrs)     Types: Cigarettes     Start date:      Quit date:      Years since quittin.0    Smokeless tobacco: Never   Substance and Sexual Activity    Alcohol use: Yes     Comment: occ    Drug use: No    Sexual activity: Not Currently     Social Drivers of Health     Financial Resource Strain: Low Risk  (2024)    Overall Financial Resource Strain (CARDIA)     Difficulty of Paying Living Expenses: Not hard at all   Food Insecurity: No Food Insecurity (2024)    Hunger Vital Sign     Worried About Running Out of Food in the Last Year: Never true     Ran Out of Food in the Last Year: Never true   Transportation Needs: No Transportation Needs (2024)    PRAPARE - Transportation     Lack of Transportation (Medical): No     Lack of Transportation (Non-Medical): No   Physical Activity: Insufficiently Active (2024)    Exercise Vital Sign     Days of Exercise per Week: 1 day     Minutes of Exercise per Session: 30 min   Stress: No Stress Concern Present (2024)    [employer] [place] [employer] - Occupational Stress Questionnaire     Feeling of Stress : Not at all   Housing Stability: Low Risk  (2024)    Housing Stability Vital Sign     Unable to Pay for Housing in the Last Year: No     Homeless in the Last Year: No       Current Outpatient Medications   Medication Sig Dispense Refill    amLODIPine (NORVASC) 10 MG tablet Take 1 tablet (10 mg total) by mouth once daily. 90 tablet 3    aspirin (ECOTRIN) 81 MG EC tablet Take 81 mg by mouth nightly.      atorvastatin (LIPITOR) 20 MG tablet Take 1 tablet (20 mg total) by mouth once daily. 90 tablet 2    diclofenac sodium (VOLTAREN) 1 % Gel Apply 2 g topically 3 (three) times daily. 200 g 2    famotidine (PEPCID) 20 MG tablet Take 20 mg by mouth.      fluticasone propionate (FLONASE) 50 mcg/actuation nasal spray 1 spray (50 mcg total) by Each Nostril  "route 2 (two) times daily. 48 g 11    gabapentin (NEURONTIN) 300 MG capsule Take 900 mg by mouth.      metFORMIN (GLUCOPHAGE) 500 MG tablet Take 2 tablets twice a day by oral route with meals for 90 days. 360 tablet 0    montelukast (SINGULAIR) 10 mg tablet Take 1 tablet (10 mg total) by mouth nightly. 90 tablet 3    MULTIVITAMIN ORAL Take 1 tablet by mouth once daily.      mupirocin (BACTROBAN) 2 % ointment Apply topically.      naproxen (NAPROSYN) 500 MG tablet Take 1 tablet (500 mg total) by mouth 2 (two) times daily with meals. 180 tablet 3    omeprazole (PRILOSEC) 40 MG capsule Take 1 capsule (40 mg total) by mouth once daily. 90 capsule 3    promethazine-dextromethorphan (PROMETHAZINE-DM) 6.25-15 mg/5 mL Syrp Take 5 mLs by mouth every 6 (six) hours as needed.      rifAXIMin (XIFAXAN) 550 mg Tab May cause drowsiness/dizziness.May impair driving.      tadalafiL (CIALIS) 20 MG Tab Take 1 tablet (20 mg total) by mouth once daily. 90 tablet 3    tirzepatide (MOUNJARO) 5 mg/0.5 mL PnIj Inject 5 mg into the skin every 7 days. 6 mL 0    tiZANidine (ZANAFLEX) 4 MG tablet Take 1 tablet (4 mg total) by mouth daily as needed (back pain). 90 tablet 3    valsartan (DIOVAN) 320 MG tablet Take 1 tablet (320 mg total) by mouth once daily. 90 tablet 3     No current facility-administered medications for this visit.     Review of patient's allergies indicates:   Allergen Reactions    Bactrim [sulfamethoxazole-trimethoprim] Rash       Review of Systems   Cardiovascular:  Negative for leg swelling.   Musculoskeletal:  Negative for gait problem.   Neurological:         Numbness, tingling feet   All other systems reviewed and are negative.      Objective:      Vitals:    01/07/25 0852   BP: 115/72   Pulse: 86   Resp: 18   Weight: 92.5 kg (204 lb)   Height: 5' 8" (1.727 m)     Physical Exam  Vitals and nursing note reviewed.   Constitutional:       General: He is not in acute distress.     Appearance: Normal appearance. "   Cardiovascular:      Pulses:           Dorsalis pedis pulses are 2+ on the right side and 2+ on the left side.        Posterior tibial pulses are 1+ on the right side and 1+ on the left side.   Pulmonary:      Effort: Pulmonary effort is normal.   Musculoskeletal:         General: No tenderness. Normal range of motion.   Feet:      Right foot:      Protective Sensation: 5 sites tested.  5 sites sensed.      Skin integrity: No skin breakdown.      Toenail Condition: Fungal disease present.     Left foot:      Protective Sensation: 5 sites tested.  5 sites sensed.      Skin integrity: No skin breakdown.      Toenail Condition: Fungal disease present.  Skin:     Capillary Refill: Capillary refill takes 2 to 3 seconds.   Neurological:      General: No focal deficit present.      Mental Status: He is alert.      Comments: Sensation intact all areas bilateral feet, pain, paresthesias neuropathic pain feet controlled with meds   Psychiatric:         Behavior: Behavior normal.         Thought Content: Thought content normal.                 Contains abnormal data Hemoglobin A1c          Component Ref Range & Units 4 mo ago  (8/13/24) 10 mo ago  (2/27/24) 1 yr ago  (8/15/23) 1 yr ago  (4/14/23)   Hemoglobin A1C 4.0 - 5.6 % 7.8 High  7.7 High   6.8 High  6.8 High       Estimated Avg Glucose 68 - 131 mg/dL 177 High  174 High  148 High  148 High             Assessment:       1. Comprehensive diabetic foot examination, type 2 DM, encounter for    2. Type II diabetes mellitus with neurological manifestations    3. Neuropathy of both feet              Plan:           Comprehensive diabetic pedal exam performed  Reviewed good sensation in feet with monofilament testing  Reviewed A1c, elevated from 6.8-7.8 over the last 12 months  Already starting on Mounjaro for just a short period of time patient is already noted improved glucose readings  We reviewed diabetic neuropathy at length, how his daily glucose/A1c as directly related  to neuropathic sensations in his feet  Reviewed cold sensation in feet is very common  We discussed keeping feet warm, wearing socks  Reviewed diabetic education  Reviewed benefit of controled glucose/diabetes regarding potential foot complications    Reviewed gabapentin and how he is taking this medication  We discussed how this medication works, dose, frequency and potential complications  With having neuropathy for a few years even though he has only been diabetic for short period of time explained how important it is to do daily stretching and walking  Reviewed tennis shoes with thick sole for shock absorption  Reviewed appropriate shoes indoors, absolutely no flat shoes or walking barefoot  Reviewed fungal changes, left great toe, needs to start treatment are this will progress.  Were reviewed topical treatments recommended apply at least once daily  Reviewed care and maintenance of skin and nails  Low risk for foot complications related to diabetes  Reviewed need for daily foot checks and instructed patient to contact the office with any area of concern which has not improved in a few days  Patient was in understanding and agreement with treatment plan.  Counseled the patient on their conditions, implications and medical management.  Instructed patient to contact the office with any changes, questions, concerns, worsening of symptoms.   Total face to face time 30 minutes, exam, assessment, treatment, discussion, additional time for review of chart prior to and following appointment and visit documentation, consultation and coordination of care.    Follow up as needed       This note was created using M*Modal voice recognition software that occasionally misinterpreted phrases or words.

## 2025-01-30 DIAGNOSIS — Z00.00 ENCOUNTER FOR MEDICARE ANNUAL WELLNESS EXAM: ICD-10-CM

## 2025-01-31 ENCOUNTER — INFUSION (OUTPATIENT)
Dept: INFUSION THERAPY | Facility: HOSPITAL | Age: 70
End: 2025-01-31
Attending: INTERNAL MEDICINE
Payer: MEDICARE

## 2025-01-31 VITALS
HEIGHT: 68 IN | OXYGEN SATURATION: 99 % | BODY MASS INDEX: 30.82 KG/M2 | WEIGHT: 203.38 LBS | DIASTOLIC BLOOD PRESSURE: 68 MMHG | HEART RATE: 71 BPM | RESPIRATION RATE: 18 BRPM | TEMPERATURE: 97 F | SYSTOLIC BLOOD PRESSURE: 106 MMHG

## 2025-01-31 DIAGNOSIS — D80.1 HYPOGAMMAGLOBULINEMIA: Primary | ICD-10-CM

## 2025-01-31 DIAGNOSIS — D80.1 NONFAMILIAL HYPOGAMMAGLOBULINEMIA: ICD-10-CM

## 2025-01-31 DIAGNOSIS — C82.30 FOLLICULAR LYMPHOMA GRADE IIIA, UNSPECIFIED BODY REGION: ICD-10-CM

## 2025-01-31 PROCEDURE — 96366 THER/PROPH/DIAG IV INF ADDON: CPT

## 2025-01-31 PROCEDURE — 25000003 PHARM REV CODE 250: Performed by: NURSE PRACTITIONER

## 2025-01-31 PROCEDURE — 96367 TX/PROPH/DG ADDL SEQ IV INF: CPT

## 2025-01-31 PROCEDURE — 96365 THER/PROPH/DIAG IV INF INIT: CPT

## 2025-01-31 PROCEDURE — 63600175 PHARM REV CODE 636 W HCPCS: Performed by: NURSE PRACTITIONER

## 2025-01-31 PROCEDURE — A4216 STERILE WATER/SALINE, 10 ML: HCPCS | Performed by: NURSE PRACTITIONER

## 2025-01-31 RX ORDER — SODIUM CHLORIDE 0.9 % (FLUSH) 0.9 %
10 SYRINGE (ML) INJECTION
OUTPATIENT
Start: 2025-02-28

## 2025-01-31 RX ORDER — SODIUM CHLORIDE 0.9 % (FLUSH) 0.9 %
10 SYRINGE (ML) INJECTION
Status: DISCONTINUED | OUTPATIENT
Start: 2025-01-31 | End: 2025-01-31 | Stop reason: HOSPADM

## 2025-01-31 RX ORDER — HEPARIN 100 UNIT/ML
500 SYRINGE INTRAVENOUS
Status: DISCONTINUED | OUTPATIENT
Start: 2025-01-31 | End: 2025-01-31 | Stop reason: HOSPADM

## 2025-01-31 RX ORDER — HEPARIN 100 UNIT/ML
500 SYRINGE INTRAVENOUS
OUTPATIENT
Start: 2025-02-28

## 2025-01-31 RX ADMIN — SODIUM CHLORIDE: 9 INJECTION, SOLUTION INTRAVENOUS at 07:01

## 2025-01-31 RX ADMIN — HEPARIN 500 UNITS: 100 SYRINGE at 10:01

## 2025-01-31 RX ADMIN — SODIUM CHLORIDE, PRESERVATIVE FREE 10 ML: 5 INJECTION INTRAVENOUS at 10:01

## 2025-01-31 RX ADMIN — IMMUNE GLOBULIN (HUMAN) 50 G: 10 INJECTION INTRAVENOUS; SUBCUTANEOUS at 07:01

## 2025-01-31 RX ADMIN — SODIUM CHLORIDE 25 MG: 9 INJECTION, SOLUTION INTRAVENOUS at 07:01

## 2025-01-31 NOTE — PLAN OF CARE
Problem: Fatigue  Goal: Improved Activity Tolerance  Outcome: Progressing  Intervention: Promote Improved Energy  Flowsheets (Taken 1/31/2025 6303)  Fatigue Management: frequent rest breaks encouraged  Sleep/Rest Enhancement: regular sleep/rest pattern promoted  Activity Management: Ambulated -L4  Environmental Support: calm environment promoted

## 2025-02-05 ENCOUNTER — LAB VISIT (OUTPATIENT)
Dept: LAB | Facility: HOSPITAL | Age: 70
End: 2025-02-05
Attending: FAMILY MEDICINE
Payer: MEDICARE

## 2025-02-05 DIAGNOSIS — E11.9 TYPE 2 DIABETES MELLITUS WITHOUT COMPLICATION, WITHOUT LONG-TERM CURRENT USE OF INSULIN: ICD-10-CM

## 2025-02-05 LAB
ESTIMATED AVG GLUCOSE: 120 MG/DL (ref 68–131)
HBA1C MFR BLD: 5.8 % (ref 4–5.6)

## 2025-02-05 PROCEDURE — 83036 HEMOGLOBIN GLYCOSYLATED A1C: CPT | Performed by: FAMILY MEDICINE

## 2025-02-05 PROCEDURE — 36415 COLL VENOUS BLD VENIPUNCTURE: CPT | Performed by: FAMILY MEDICINE

## 2025-02-26 ENCOUNTER — OFFICE VISIT (OUTPATIENT)
Dept: FAMILY MEDICINE | Facility: CLINIC | Age: 70
End: 2025-02-26
Payer: MEDICARE

## 2025-02-26 VITALS
OXYGEN SATURATION: 96 % | DIASTOLIC BLOOD PRESSURE: 60 MMHG | HEIGHT: 68 IN | BODY MASS INDEX: 30.1 KG/M2 | WEIGHT: 198.63 LBS | SYSTOLIC BLOOD PRESSURE: 120 MMHG | TEMPERATURE: 98 F | HEART RATE: 89 BPM

## 2025-02-26 DIAGNOSIS — C82.30 FOLLICULAR LYMPHOMA GRADE IIIA, UNSPECIFIED BODY REGION: ICD-10-CM

## 2025-02-26 DIAGNOSIS — E11.49 TYPE II DIABETES MELLITUS WITH NEUROLOGICAL MANIFESTATIONS: ICD-10-CM

## 2025-02-26 DIAGNOSIS — C83.38 DIFFUSE LARGE B-CELL LYMPHOMA, LYMPH NODES OF MULTIPLE SITES: ICD-10-CM

## 2025-02-26 DIAGNOSIS — I70.0 AORTIC ATHEROSCLEROSIS: ICD-10-CM

## 2025-02-26 DIAGNOSIS — E11.9 TYPE 2 DIABETES MELLITUS WITHOUT COMPLICATION, WITHOUT LONG-TERM CURRENT USE OF INSULIN: ICD-10-CM

## 2025-02-26 DIAGNOSIS — Z00.00 ENCOUNTER FOR MEDICARE ANNUAL WELLNESS EXAM: Primary | ICD-10-CM

## 2025-02-26 DIAGNOSIS — J47.9 BRONCHIECTASIS WITHOUT COMPLICATION: ICD-10-CM

## 2025-02-26 DIAGNOSIS — D61.818 PANCYTOPENIA: ICD-10-CM

## 2025-02-26 DIAGNOSIS — J44.9 CHRONIC OBSTRUCTIVE PULMONARY DISEASE, UNSPECIFIED COPD TYPE: ICD-10-CM

## 2025-02-26 DIAGNOSIS — C85.90 NON-HODGKIN'S LYMPHOMA, UNSPECIFIED BODY REGION, UNSPECIFIED NON-HODGKIN LYMPHOMA TYPE: ICD-10-CM

## 2025-02-26 DIAGNOSIS — E11.59 HYPERTENSION ASSOCIATED WITH DIABETES: ICD-10-CM

## 2025-02-26 DIAGNOSIS — D80.1 HYPOGAMMAGLOBULINEMIA: ICD-10-CM

## 2025-02-26 DIAGNOSIS — D80.1 NONFAMILIAL HYPOGAMMAGLOBULINEMIA: ICD-10-CM

## 2025-02-26 DIAGNOSIS — G47.33 OBSTRUCTIVE SLEEP APNEA: ICD-10-CM

## 2025-02-26 DIAGNOSIS — Z12.5 PROSTATE CANCER SCREENING: ICD-10-CM

## 2025-02-26 DIAGNOSIS — I15.2 HYPERTENSION ASSOCIATED WITH DIABETES: ICD-10-CM

## 2025-02-26 PROBLEM — R73.03 PREDIABETES: Status: RESOLVED | Noted: 2019-12-30 | Resolved: 2025-02-26

## 2025-02-26 PROBLEM — E78.41 ELEVATED LIPOPROTEIN(A): Status: RESOLVED | Noted: 2019-12-30 | Resolved: 2025-02-26

## 2025-02-26 PROBLEM — J32.9 SINUSITIS: Status: RESOLVED | Noted: 2023-01-17 | Resolved: 2025-02-26

## 2025-02-26 PROBLEM — Z51.11 ENCOUNTER FOR ANTINEOPLASTIC CHEMOTHERAPY: Status: RESOLVED | Noted: 2020-04-01 | Resolved: 2025-02-26

## 2025-02-26 PROBLEM — R93.89 ABNORMAL CXR: Status: RESOLVED | Noted: 2023-02-20 | Resolved: 2025-02-26

## 2025-02-26 PROCEDURE — 99999 PR PBB SHADOW E&M-EST. PATIENT-LVL V: CPT | Mod: PBBFAC,,, | Performed by: PHYSICIAN ASSISTANT

## 2025-02-26 PROCEDURE — 99215 OFFICE O/P EST HI 40 MIN: CPT | Mod: PBBFAC,PN | Performed by: PHYSICIAN ASSISTANT

## 2025-02-26 NOTE — PATIENT INSTRUCTIONS
Counseling and Referral of Other Preventative  (Italic type indicates deductible and co-insurance are waived)    Patient Name: Sivakumar Thacker  Today's Date: 2/26/2025    Health Maintenance       Date Due Completion Date    Shingles Vaccine (1 of 2) Never done ---    RSV Vaccine (Age 60+ and Pregnant patients) (1 - Risk 60-74 years 1-dose series) Never done ---    Pneumococcal Vaccines (Age 50+) (4 of 4 - PCV20 or PCV21) 11/11/2021 11/11/2016    PROSTATE-SPECIFIC ANTIGEN 04/14/2024 4/14/2023    Diabetic Eye Exam 08/16/2024 8/16/2023    Influenza Vaccine (1) 09/01/2024 10/28/2019    Hemoglobin A1c 08/05/2025 2/5/2025    Lipid Panel 08/13/2025 8/13/2024    Foot Exam 01/07/2026 1/7/2025    Diabetes Urine Screening 02/05/2026 2/5/2025    High Dose Statin 02/26/2026 2/26/2025    TETANUS VACCINE 07/14/2033 7/14/2023    Colorectal Cancer Screening 02/28/2034 2/29/2024        Orders Placed This Encounter   Procedures    PSA, Screening       The following information is provided to all patients.  This information is to help you find resources for any of the problems found today that may be affecting your health:                  Living healthy guide: ms.gov    Understanding Diabetes: www.diabetes.org      Eating healthy: www.cdc.gov/healthyweight      CDC home safety checklist: www.cdc.gov/steadi/patient.html      Agency on Aging: [place].gov    Alcoholics anonymous (AA): www.aa.org      Physical Activity: www.akira.nih.gov/kz2amsh      Tobacco use: ms.gov

## 2025-02-26 NOTE — PROGRESS NOTES
"  Sivakumar Thacker presented for an initial Medicare AWV today. The following components were reviewed and updated:    Medical history  Family History  Social history  Allergies and Current Medications  Health Risk Assessment  Health Maintenance  Care Team    **See Completed Assessments for Annual Wellness visit with in the encounter summary    The following assessments were completed:  Depression Screening  Cognitive function Screening  Timed Get Up Test  Whisper Test      Opioid documentation:      Patient does not have a current opioid prescription.          Vitals:    02/26/25 0838   BP: 120/60   BP Location: Left arm   Patient Position: Sitting   Pulse: 89   Temp: 98 °F (36.7 °C)   TempSrc: Oral   SpO2: 96%   Weight: 90.1 kg (198 lb 9.6 oz)   Height: 5' 8" (1.727 m)     Body mass index is 30.2 kg/m².       Physical Exam  Constitutional:       Appearance: Normal appearance.   HENT:      Head: Normocephalic and atraumatic.   Pulmonary:      Effort: Pulmonary effort is normal.      Breath sounds: Normal breath sounds.   Neurological:      General: No focal deficit present.      Mental Status: He is alert and oriented to person, place, and time. Mental status is at baseline.   Psychiatric:         Mood and Affect: Mood normal.         Behavior: Behavior normal.         Thought Content: Thought content normal.         Judgment: Judgment normal.            Diagnoses and health risks identified today and associated recommendations/orders:  Encounter for Medicare annual wellness exam  -     Referral to Enhanced Annual Wellness Visit (eAWV) W+1    Diffuse large b-cell lymphoma, lymph nodes of multiple sites  Comments:  Stable, patient receiving chemo.  Followed by Oncology    Hypogammaglobulinemia  Comments:  Stable, patient receiving chemotherapy.  Followed by Oncology    Aortic atherosclerosis  Comments:  Stable, continue 81 mg aspirin and atorvastatin    Chronic obstructive pulmonary disease, unspecified COPD " type  Comments:  Stable, patient not currently using inhalers.  Continue Singulair    Type II diabetes mellitus with neurological manifestations  Comments:  Controlled, continue metformin and Mounjaro    Hypertension associated with diabetes  Comments:  Controlled, continue amlodipine and valsartan    Bronchiectasis without complication  Comments:  Stable, followed by pulmonology.  Continue Singulair    Follicular lymphoma grade IIIa, unspecified body region  Comments:  Stable, receiving chemo.  Followed by Oncology    Non-Hodgkin's lymphoma, unspecified body region, unspecified non-Hodgkin lymphoma type  Comments:  Stable, receiving chemo.  Followed by Oncology    Nonfamilial hypogammaglobulinemia  Comments:  Stable, followed by Oncology    Obstructive sleep apnea  Comments:  Controlled, continue CPAP use    Pancytopenia  Comments:  Stable, followed by Oncology    Type 2 diabetes mellitus without complication, without long-term current use of insulin  Comments:  Controlled, continue metformin and Mounjaro.  Patient will call and schedule eye exam for next month    Prostate cancer screening  Comments:  PSA order given to do with next blood work  Orders:  -     PSA, Screening; Future; Expected date: 02/26/2025         Provided Sivakumar with a 5-10 year written screening schedule and personal prevention plan. Recommendations were developed using the USPSTF age appropriate recommendations. Education, counseling, and referrals were provided as needed.  After Visit Summary printed and given to patient which includes a list of additional screenings\tests needed.    No follow-ups on file.      LINDA Katz      I offered to discuss advanced care planning, including how to pick a person who would make decisions for you if you were unable to make them for yourself, called a health care power of , and what kind of decisions you might make such as use of life sustaining treatments such as ventilators and tube  feeding when faced with a life limiting illness recorded on a living will that they will need to know. (How you want to be cared for as you near the end of your natural life)     X Patient is interested in learning more about how to make advanced directives.  I provided them paperwork and offered to discuss this with them.

## 2025-02-28 ENCOUNTER — INFUSION (OUTPATIENT)
Dept: INFUSION THERAPY | Facility: HOSPITAL | Age: 70
End: 2025-02-28
Attending: INTERNAL MEDICINE
Payer: MEDICARE

## 2025-02-28 VITALS
HEART RATE: 66 BPM | OXYGEN SATURATION: 97 % | SYSTOLIC BLOOD PRESSURE: 118 MMHG | HEIGHT: 68 IN | TEMPERATURE: 98 F | DIASTOLIC BLOOD PRESSURE: 68 MMHG | WEIGHT: 203.13 LBS | RESPIRATION RATE: 18 BRPM | BODY MASS INDEX: 30.79 KG/M2

## 2025-02-28 DIAGNOSIS — D80.1 NONFAMILIAL HYPOGAMMAGLOBULINEMIA: ICD-10-CM

## 2025-02-28 DIAGNOSIS — D80.1 HYPOGAMMAGLOBULINEMIA: Primary | ICD-10-CM

## 2025-02-28 DIAGNOSIS — C82.30 FOLLICULAR LYMPHOMA GRADE IIIA, UNSPECIFIED BODY REGION: ICD-10-CM

## 2025-02-28 PROCEDURE — 63600175 PHARM REV CODE 636 W HCPCS: Performed by: NURSE PRACTITIONER

## 2025-02-28 PROCEDURE — 96367 TX/PROPH/DG ADDL SEQ IV INF: CPT

## 2025-02-28 PROCEDURE — 96366 THER/PROPH/DIAG IV INF ADDON: CPT

## 2025-02-28 PROCEDURE — A4216 STERILE WATER/SALINE, 10 ML: HCPCS | Performed by: NURSE PRACTITIONER

## 2025-02-28 PROCEDURE — 96365 THER/PROPH/DIAG IV INF INIT: CPT

## 2025-02-28 PROCEDURE — 25000003 PHARM REV CODE 250: Performed by: NURSE PRACTITIONER

## 2025-02-28 RX ORDER — HEPARIN 100 UNIT/ML
500 SYRINGE INTRAVENOUS
Status: DISCONTINUED | OUTPATIENT
Start: 2025-02-28 | End: 2025-02-28 | Stop reason: HOSPADM

## 2025-02-28 RX ORDER — SODIUM CHLORIDE 0.9 % (FLUSH) 0.9 %
10 SYRINGE (ML) INJECTION
Status: DISCONTINUED | OUTPATIENT
Start: 2025-02-28 | End: 2025-02-28 | Stop reason: HOSPADM

## 2025-02-28 RX ORDER — SODIUM CHLORIDE 0.9 % (FLUSH) 0.9 %
10 SYRINGE (ML) INJECTION
OUTPATIENT
Start: 2025-03-28

## 2025-02-28 RX ORDER — HEPARIN 100 UNIT/ML
500 SYRINGE INTRAVENOUS
OUTPATIENT
Start: 2025-03-28

## 2025-02-28 RX ADMIN — IMMUNE GLOBULIN (HUMAN) 50 G: 10 INJECTION INTRAVENOUS; SUBCUTANEOUS at 08:02

## 2025-02-28 RX ADMIN — SODIUM CHLORIDE 25 MG: 9 INJECTION, SOLUTION INTRAVENOUS at 07:02

## 2025-02-28 RX ADMIN — SODIUM CHLORIDE, PRESERVATIVE FREE 10 ML: 5 INJECTION INTRAVENOUS at 10:02

## 2025-02-28 RX ADMIN — HEPARIN 500 UNITS: 100 SYRINGE at 10:02

## 2025-02-28 RX ADMIN — SODIUM CHLORIDE: 9 INJECTION, SOLUTION INTRAVENOUS at 07:02

## 2025-02-28 NOTE — PLAN OF CARE
Problem: Fatigue  Goal: Improved Activity Tolerance  2/28/2025 0727 by Nicky Veliz, RN  Outcome: Met  2/28/2025 0727 by Nicky Veliz, RN  Outcome: Progressing

## 2025-03-06 ENCOUNTER — RESULTS FOLLOW-UP (OUTPATIENT)
Dept: FAMILY MEDICINE | Facility: CLINIC | Age: 70
End: 2025-03-06

## 2025-03-06 ENCOUNTER — LAB VISIT (OUTPATIENT)
Dept: LAB | Facility: HOSPITAL | Age: 70
End: 2025-03-06
Attending: INTERNAL MEDICINE
Payer: MEDICARE

## 2025-03-06 DIAGNOSIS — C85.90 NON-HODGKIN'S LYMPHOMA, UNSPECIFIED BODY REGION, UNSPECIFIED NON-HODGKIN LYMPHOMA TYPE: ICD-10-CM

## 2025-03-06 DIAGNOSIS — C83.38 DIFFUSE LARGE B-CELL LYMPHOMA, LYMPH NODES OF MULTIPLE SITES: ICD-10-CM

## 2025-03-06 LAB
ALBUMIN SERPL BCP-MCNC: 3.9 G/DL (ref 3.5–5.2)
ALP SERPL-CCNC: 85 U/L (ref 40–150)
ALT SERPL W/O P-5'-P-CCNC: 24 U/L (ref 10–44)
ANION GAP SERPL CALC-SCNC: 11 MMOL/L (ref 8–16)
AST SERPL-CCNC: 26 U/L (ref 10–40)
BASOPHILS # BLD AUTO: 0.03 K/UL (ref 0–0.2)
BASOPHILS NFR BLD: 0.6 % (ref 0–1.9)
BILIRUB SERPL-MCNC: 0.3 MG/DL (ref 0.1–1)
BUN SERPL-MCNC: 14 MG/DL (ref 8–23)
CALCIUM SERPL-MCNC: 9.2 MG/DL (ref 8.7–10.5)
CHLORIDE SERPL-SCNC: 107 MMOL/L (ref 95–110)
CO2 SERPL-SCNC: 24 MMOL/L (ref 23–29)
CREAT SERPL-MCNC: 0.9 MG/DL (ref 0.5–1.4)
DIFFERENTIAL METHOD BLD: ABNORMAL
EOSINOPHIL # BLD AUTO: 0.1 K/UL (ref 0–0.5)
EOSINOPHIL NFR BLD: 2.2 % (ref 0–8)
ERYTHROCYTE [DISTWIDTH] IN BLOOD BY AUTOMATED COUNT: 13.1 % (ref 11.5–14.5)
EST. GFR  (NO RACE VARIABLE): >60 ML/MIN/1.73 M^2
GLUCOSE SERPL-MCNC: 116 MG/DL (ref 70–110)
HCT VFR BLD AUTO: 44.9 % (ref 40–54)
HGB BLD-MCNC: 15 G/DL (ref 14–18)
IMM GRANULOCYTES # BLD AUTO: 0.01 K/UL (ref 0–0.04)
IMM GRANULOCYTES NFR BLD AUTO: 0.2 % (ref 0–0.5)
LYMPHOCYTES # BLD AUTO: 1.8 K/UL (ref 1–4.8)
LYMPHOCYTES NFR BLD: 36.5 % (ref 18–48)
MCH RBC QN AUTO: 29.5 PG (ref 27–31)
MCHC RBC AUTO-ENTMCNC: 33.4 G/DL (ref 32–36)
MCV RBC AUTO: 88 FL (ref 82–98)
MONOCYTES # BLD AUTO: 0.5 K/UL (ref 0.3–1)
MONOCYTES NFR BLD: 8.9 % (ref 4–15)
NEUTROPHILS # BLD AUTO: 2.6 K/UL (ref 1.8–7.7)
NEUTROPHILS NFR BLD: 51.6 % (ref 38–73)
NRBC BLD-RTO: 0 /100 WBC
PLATELET # BLD AUTO: 202 K/UL (ref 150–450)
PMV BLD AUTO: 9.1 FL (ref 9.2–12.9)
POTASSIUM SERPL-SCNC: 4.5 MMOL/L (ref 3.5–5.1)
PROT SERPL-MCNC: 7.4 G/DL (ref 6–8.4)
RBC # BLD AUTO: 5.09 M/UL (ref 4.6–6.2)
SODIUM SERPL-SCNC: 142 MMOL/L (ref 136–145)
WBC # BLD AUTO: 5.04 K/UL (ref 3.9–12.7)

## 2025-03-06 PROCEDURE — 80053 COMPREHEN METABOLIC PANEL: CPT | Performed by: INTERNAL MEDICINE

## 2025-03-06 PROCEDURE — 36415 COLL VENOUS BLD VENIPUNCTURE: CPT | Performed by: INTERNAL MEDICINE

## 2025-03-06 PROCEDURE — 85025 COMPLETE CBC W/AUTO DIFF WBC: CPT | Performed by: INTERNAL MEDICINE

## 2025-03-12 ENCOUNTER — OFFICE VISIT (OUTPATIENT)
Facility: CLINIC | Age: 70
End: 2025-03-12
Payer: MEDICARE

## 2025-03-12 VITALS
RESPIRATION RATE: 16 BRPM | WEIGHT: 202.63 LBS | HEIGHT: 68 IN | OXYGEN SATURATION: 97 % | DIASTOLIC BLOOD PRESSURE: 67 MMHG | HEART RATE: 79 BPM | SYSTOLIC BLOOD PRESSURE: 120 MMHG | TEMPERATURE: 98 F | BODY MASS INDEX: 30.71 KG/M2

## 2025-03-12 DIAGNOSIS — C83.38 DIFFUSE LARGE B-CELL LYMPHOMA, LYMPH NODES OF MULTIPLE SITES: ICD-10-CM

## 2025-03-12 DIAGNOSIS — D61.818 PANCYTOPENIA: ICD-10-CM

## 2025-03-12 DIAGNOSIS — D80.1 NONFAMILIAL HYPOGAMMAGLOBULINEMIA: ICD-10-CM

## 2025-03-12 DIAGNOSIS — C82.30 FOLLICULAR LYMPHOMA GRADE IIIA, UNSPECIFIED BODY REGION: Primary | ICD-10-CM

## 2025-03-12 DIAGNOSIS — C85.90 NON-HODGKIN'S LYMPHOMA, UNSPECIFIED BODY REGION, UNSPECIFIED NON-HODGKIN LYMPHOMA TYPE: ICD-10-CM

## 2025-03-12 PROCEDURE — 99999 PR PBB SHADOW E&M-EST. PATIENT-LVL IV: CPT | Mod: PBBFAC,,, | Performed by: INTERNAL MEDICINE

## 2025-03-12 PROCEDURE — 99214 OFFICE O/P EST MOD 30 MIN: CPT | Mod: PBBFAC,PN | Performed by: INTERNAL MEDICINE

## 2025-03-12 NOTE — PROGRESS NOTES
[facility] Hematology/Oncology  PROGRESS NOTE -  Follow-up Visit      Subjective:       Patient ID:   NAME: Sivakumar Thacker : 1955     70 y.o. male    Referring Doc: Doe (new PCP)  Other Physicians: Vinod Chen/José Manuel      Chief Complaint:  NHL f/u    History of Present Illness:     Patient returns today for a regularly scheduled follow-up visit.  The patient is here today to go over the results of the recently ordered labs, tests and studies. He is here by himself today    He has lost some weight and his glucose and BP are better since being on Monjaro    He has been doing well overall ;No new respiratory issues,      PEt  10/2/2024 was stable with no active FDG disease identified     He saw luann MCKEON (PCP) in 2025    He has been seeing Dr Rae and Dr Kohli for his lower back issues        He has been on Rituximab  at every 12 weeks;  continued on IV IgG      He denies any CP, SOB, HA's or N/V       He is continued on Gabapentin for the neuropathy issues in his feet.              ROS:   GEN: normal without any fever, night sweats or weight loss  HEENT: normal with no HA's, sore throat, stiff neck, changes in vision;    CV: normal with no CP, SOB, PND, VASQUEZ or orthopnea  PULM: normal with no SOB,  hemoptysis, sputum or pleuritic pain;  breathing better    GI: no new GI issues with no nausea, vomiting, constipation, diarrhea, melanotic stools, BRBPR, or hematemesis  : normal with no hematuria, dysuria  BREAST: normal with no mass, discharge, pain  SKIN: normal with no rash, erythema, bruising, or swelling; no current wound issues    Allergies:  Review of patient's allergies indicates:  No Known Allergies    Medications:    Current Outpatient Medications:     amLODIPine (NORVASC) 10 MG tablet, Take 1 tablet (10 mg total) by mouth once daily., Disp: 90 tablet, Rfl: 3    aspirin (ECOTRIN) 81 MG EC tablet, Take 81 mg by mouth nightly., Disp: , Rfl:     atorvastatin (LIPITOR) 20 MG tablet, Take 1  tablet (20 mg total) by mouth once daily., Disp: 90 tablet, Rfl: 2    fluticasone propionate (FLONASE) 50 mcg/actuation nasal spray, 1 spray (50 mcg total) by Each Nostril route 2 (two) times daily., Disp: 48 g, Rfl: 11    gabapentin (NEURONTIN) 300 MG capsule, Take 900 mg by mouth., Disp: , Rfl:     metFORMIN (GLUCOPHAGE) 500 MG tablet, Take 2 tablets twice a day by oral route with meals for 90 days., Disp: 360 tablet, Rfl: 0    montelukast (SINGULAIR) 10 mg tablet, Take 1 tablet (10 mg total) by mouth nightly., Disp: 90 tablet, Rfl: 3    MULTIVITAMIN ORAL, Take 1 tablet by mouth once daily., Disp: , Rfl:     naproxen (NAPROSYN) 500 MG tablet, Take 1 tablet (500 mg total) by mouth 2 (two) times daily with meals., Disp: 180 tablet, Rfl: 3    omeprazole (PRILOSEC) 40 MG capsule, Take 1 capsule (40 mg total) by mouth once daily., Disp: 90 capsule, Rfl: 3    tadalafiL (CIALIS) 20 MG Tab, Take 1 tablet (20 mg total) by mouth once daily., Disp: 90 tablet, Rfl: 3    tirzepatide (MOUNJARO) 5 mg/0.5 mL PnIj, Inject 5 mg into the skin every 7 days., Disp: 6 mL, Rfl: 0    tiZANidine (ZANAFLEX) 4 MG tablet, Take 1 tablet (4 mg total) by mouth daily as needed (back pain)., Disp: 90 tablet, Rfl: 3    valsartan (DIOVAN) 320 MG tablet, Take 1 tablet (320 mg total) by mouth once daily., Disp: 90 tablet, Rfl: 3    PMHx/PSHx Updates:  See patient's last visit with me on  12/10/2024  See H&P on 1/8/2019        Pathology:  Cancer Staging  No matching staging information was found for the patient.    EGD 2/29/2024:  1. LIPOMA, BIOPSY:   - GASTRIC ANTRAL MUCOSA WITH REACTIVE GASTROPATHY.   - NEGATIVE FOR SUBMUCOSA TO EVALUATE FOR A DEEPER SEATED LESION.   - NEGATIVE FOR HELICOBACTER PYLORI TYPE ORGANISMS.     2. STOMACH, ANTRUM, BIOPSY:   - GASTRIC ANTRAL MUCOSA WITH REACTIVE GASTROPATHY.   - GASTRIC OXYNTIC MUCOSA WITH NO SIGNIFICANT HISTOPATHOLOGIC FINDINGS.   - NEGATIVE FOR HELICOBACTER PYLORI TYPE ORGANISMS.     3. GASTROESOPHAGEAL  "JUNCTION, BIOPSY:   - SQUAMOUS MUCOSA WITH REFLUX ESOPHAGITIS.   - COLUMNAR MUCOSA WITH NO SIGNIFICANT HISTOPATHOLOGIC FINDINGS.   - NEGATIVE FOR INTESTINAL METAPLASIA.       Objective:     Vitals:  Blood pressure 120/67, pulse 79, temperature 98.3 °F (36.8 °C), temperature source Temporal, resp. rate 16, height 5' 8" (1.727 m), weight 91.9 kg (202 lb 9.6 oz), SpO2 97%.    Physical Examination:   GEN: no apparent distress, comfortable; AAOx3  HEAD: atraumatic and normocephalic  EYES: no pallor, no icterus, PERRLA  ENT: OMM, no pharyngeal erythema, external ears WNL; no nasal discharge; no thrush;    NECK: no masses, thyroid normal, trachea midline, no LAD/LN's, supple  CV: RRR with no murmur; normal pulse; normal S1 and S2; no pedal edema; portacath since removed  CHEST: Normal respiratory effort; CTAB  ABDOM: nontender and nondistended; soft; normal bowel sounds; no rebound/guarding  MUSC/Skeletal: ROM normal; no crepitus; joints normal; no deformities or arthropathy  EXTREM: no clubbing, cyanosis, inflammation or swelling  SKIN: no rashes, lesions, ulcers, petechiae or subcutaneous nodules; no current wound issues  : no gibbs  NEURO: grossly intact; motor/sensory WNL; AAOx3; no tremors  PSYCH: normal mood, affect and behavior  LYMPH: normal cervical, supraclavicular, axillary and groin LN's            Labs:              Lab Results   Component Value Date    WBC 5.04 03/06/2025    HGB 15.0 03/06/2025    HCT 44.9 03/06/2025    MCV 88 03/06/2025     03/06/2025     CMP  Sodium   Date Value Ref Range Status   03/06/2025 142 136 - 145 mmol/L Final   04/25/2019 144 134 - 144 mmol/L      Potassium   Date Value Ref Range Status   03/06/2025 4.5 3.5 - 5.1 mmol/L Final     Chloride   Date Value Ref Range Status   03/06/2025 107 95 - 110 mmol/L Final   04/25/2019 107 98 - 110 mmol/L      CO2   Date Value Ref Range Status   03/06/2025 24 23 - 29 mmol/L Final     Glucose   Date Value Ref Range Status   03/06/2025 116 " (H) 70 - 110 mg/dL Final   04/25/2019 177 (H) 70 - 99 mg/dL      BUN   Date Value Ref Range Status   03/06/2025 14 8 - 23 mg/dL Final     Creatinine   Date Value Ref Range Status   03/06/2025 0.9 0.5 - 1.4 mg/dL Final   04/25/2019 0.83 0.60 - 1.40 mg/dL      Calcium   Date Value Ref Range Status   03/06/2025 9.2 8.7 - 10.5 mg/dL Final     Total Protein   Date Value Ref Range Status   03/06/2025 7.4 6.0 - 8.4 g/dL Final     Albumin   Date Value Ref Range Status   03/06/2025 3.9 3.5 - 5.2 g/dL Final   04/25/2019 4.4 3.1 - 4.7 g/dL      Total Bilirubin   Date Value Ref Range Status   03/06/2025 0.3 0.1 - 1.0 mg/dL Final     Comment:     For infants and newborns, interpretation of results should be based  on gestational age, weight and in agreement with clinical  observations.    Premature Infant recommended reference ranges:  Up to 24 hours.............<8.0 mg/dL  Up to 48 hours............<12.0 mg/dL  3-5 days..................<15.0 mg/dL  6-29 days.................<15.0 mg/dL       Alkaline Phosphatase   Date Value Ref Range Status   03/06/2025 85 40 - 150 U/L Final     AST   Date Value Ref Range Status   03/06/2025 26 10 - 40 U/L Final     ALT   Date Value Ref Range Status   03/06/2025 24 10 - 44 U/L Final     Anion Gap   Date Value Ref Range Status   03/06/2025 11 8 - 16 mmol/L Final     eGFR if    Date Value Ref Range Status   07/21/2022 >60.0 >60 mL/min/1.73 m^2 Final     eGFR if non    Date Value Ref Range Status   07/21/2022 >60.0 >60 mL/min/1.73 m^2 Final     Comment:     Calculation used to obtain the estimated glomerular filtration  rate (eGFR) is the CKD-EPI equation.            Radiology/Diagnostic Studies:    PET 10/2/2024:    Impression:     No PET-CT evidence of active FDG avid malignancy.     Atherosclerosis, colonic diverticulosis, and other incidental findings as above.          PET 3/11/2024:    IMPRESSION:  1. Interval resolution of the previously described  nonspecific small bowel mesenteric fat stranding.  2. No concerning FDG avid mass or lymphadenopathy.        PET 12/18/2023:  IMPRESSION:  1. Mild faint nonspecific fat stranding in the small bowel mesentery, possibly from low-grade/subclinical enteritis or edema, noting an early lymphoproliferative disorder is not entirely excluded and attention on follow-up imaging may be warranted.     2. No FDG avid mass or lymphadenopathy.      PET 6/22/2023:    IMPRESSION:     1. Negative for FDG avid lymphoproliferative disease.  2. Resolution of prior bilateral pulmonary opacities.        PET 1/5/2023:    IMPRESSION: Diffuse reticular nodular and groundglass opacities the left lower lobe, posterior right upper lobe and posterior medial right lower lobe compatible with multifocal pneumonia. Follow-up chest CT in three months is recommended     Diffuse FDG activity throughout the axial skeleton suggestive of bone marrow hyperstimulation     No evidence of recurrent or metastatic disease        PET  4/5/2022:    IMPRESSION: No evidence of recurrent or active lymphoproliferative disease        PET 10/4/2021:  Impression:     1. Negative for FDG avid recurrent lymphoproliferative disease.  2. New left maxillary sinusitis.  3. New bilateral reticulonodular pulmonary opacities suggesting infectious or inflammatory pneumonitis.  Clinical and laboratory correlation is requested.  4. Coronary artery calcification.         CXR  3/25/2021:    Impression:     Mild interstitial prominence, similar to previous exams.  No focal consolidation      PET 3/19/2021:  IMPRESSION:  1. Mild patchy airspace opacity in the superior segment of the left  lower lobe with increased FDG activity from background suggestive of a  small focal pneumonia (possibly viral in etiology). Consider  correlation with the symptoms, history and CT follow-up in 3 months to  document resolution.  2. No FDG avid lymphadenopathy or additional sites of disease.         PET  9/2/2020:      Impression:     Negative for active lymphoproliferative disease.         Chest CT  3/19/2020:      Impression       1. Mild bronchiectasis in the left lower lobe and tree-in-bud micro nodules at the left lung base suggesting underlying bronchiolitis, possibly due to a non tuberculous mycobacterial infection or viral bronchiolitis.  2. Fatty infiltration of the liver  3. Small hiatal hernia.           CXR  1/31/2020    Impression       1. No acute chest disease.  2. Left subclavian Port-A-Cath.               PET 9/11/2019   Impression       No evidence of FDG avid residual or recurrent lymphoma       I have reviewed all available lab results and radiology reports.    Assessment/Plan:   (1) 70 y.o. male with diagnosis of NHL Follicular Stage IIIA who has been referred by Dr Gary Chen with [facility] for continuation of care by medical hematology/oncology.   - He originally was diagnosed in 2012 and had parotid excision at [facility]. He subsequently went to [facility] and was treated with bendamustine-rituximab. He has been on rituximab maintenance every 8 weeks and IV IgG monthly. Dr Chen's plan was to keep him on maintenace therapy for as long as he could tolerate the treatments  - s/p 6 cycles of Bendamustine and Retuximab with subsequent complete remission  - 1st maintenance cycle rituximab was in March 2016  - he has been on maintenance rituximab and IV IgG - last rituximab 11/15/2018; last IV IgG on Dec 14th 2018  - last PET was on 9/26/2018 with no evidence of recurrence  - originally diagnosed in Dec 2012 with left parotid and left periparotid LN excision on 12/11/2012  - PET scan done on  9/11/2019 was good    7/23/2020:  - new nodule on auricle of left ear suspicious for a skin cancer - will refer him to Dr Avilez with ENT  - he is due for repeat PET    9/17/2020:  - PET scan on 9/2/2020 is adequate  - [facility]     11/12/2020:  -  continued on the current regimen  - doing well with no new issues    1/7/2021:  - he is having some persistent cough issues for which he has been obtaining sputum for José Manuel  - last PET was Sept 2020    3/4/2021:  - doing ok  - chronic cough issues - followed by José Manuel  - will set up f/u PET    4/29/2021:  - he had PET scan on 3/19/2021  - He has been seeing pulmonary about his chronic cough  Lucia Georges with pulmonary has seen the recent PET and reported to him that the CXR on 3/25 was stable and he is on some oral antibiotics with improvement of the symptoms  - He is also on Gabapentin for the neuropathy issues in his feet.   - He saw Dr Barry Mcmahon on 3/24/2021 with family [facility]    8/19/2021:  - He has been seeing pulmonary about his chronic cough - Lucia Georges with pulmonary and he has been on periodic treatments of antibiotics. He saw her last just yesterday on 8/17/2021 and is currently on antibiotics.  - Pulmonary is planning to refer him to an ID specialist  - he is on the rituximab every 8 weeks; I am not convinced that this is contributing to the infection issues as it is not reducing his WBC; however,if pulmonary and/or ID feel it is a contributing factor then we can discontinue. The risk of his lymphoma recurring or progressing would be higher if he were to discontinue the therapy      10/14/2021:  - continued on oral antibiotics per pulmonary and plans to see Pulmonary ID specialist in near future  - continued on current therapy with rituximab  - recent PEt on 10/4/2021    12/9/2021:  - continued on rituximab  - on new antibiotics per pulmonary and he sees them again in Feb 2022  - repeat scans in Feb 2022 or sooner if pulmonary says otherwise    2/2/2022:  - he is seeing pulmonary again in two weeks  - he does not think that the new triple antibiotic regimen is working  - we can get PET in Feb/mar 2022 if pulmonary is inclined, otherwise 6 month surveillance scan in April/May 2022    3/31/2022:  - He  has been seeing pulmonary about his chronic cough and TONY issues; he is on antibiotics 3x/week and he reports that pulmonary feels that he is stable  - PET scan scheduled for 4/14/2022  - He last saw Dr Barry Mcmahon on 3/28/2021 and José Manuel on 2/17/2022  - pulmonary is fine with him continuing the ritxuimab per his report    5/26/2022:  - He has been seeing pulmonary about his chronic cough and TONY issues; he saw Rose on 5/17/2022 and is now on a new antibiotic and he reports that pulmonary said the PEt scan was better than the last one; he is planning to see Dr Veliz in the near future   - PET scan was done on 4/5/2022 with no evidence of recurrence  - continued on rituximab maintenance therapy    7/21/2022:  - continued under the care of pulmonary  - he is seeing Dr Veliz in Aug 2022  - continued on monthly IV IgG and rituximab every other month  - repeat PET in Oct 2022 expected    9/15/2022:  - He has been seeing pulmonary about his chronic cough and TONY issues; he has been seeing Rose and saw Dr Veliz on 8/15/2022;   - Dr Veliz is looking at increasing his Ig dose or changing it to SQ weekly  - Last PET scan was done on 4/5/2022 and repeat has been ordered and is due this Oct 2022  - discussed with patient about referral to Dr Nasreen Abarca at [facility] for not only evaluation for 2nd opinion for his chronic lymphoma but also the options of IV IgG infusion therapy, patient is agreeable    11/10/2022:  - Patient was seen by Dr Abarca at [facility] since last visit and his recommendation was to hold off on further rituximab therapy and proceed with just surveillance scanning.  - He was recently hospitalized at [facility] in Oct 2022 with upper torso cellulitis with Serratia marcescens septicemia  - He was recently hospitalized at [facility] in Oct 2022 with upper torso cellulitis with Serratia marcescens septicemia. He is still followed by wound care and is getting the prior drain site packed, etc. He has not followed up with ID as of  yet    1/4/2023:  - Patient was previously seen by Dr Abarca at [facility] since last visit and his recommendation was to hold off on further rituximab therapy and proceed with just surveillance scanning. He has telemed visit with him in Feb 2023  -  He is getting PET tomorrow and seeing pulmonary again in Feb 2023  - he has been on IV IgG  - He saw Dr Washington last in Nov 2022 and is seeing ID at [facility] in Jan 2023    3/1/2023:  - He had telemed with Dr Abarca and he wants him off the rituximab for another 3 months; patient is concerned about holding off on the rituximab maintenance.   - He last had rituximab in Sept 2022  - He has been on IV IgG  - Recent PET on 1/5/2023 with no evidence of lymphoma  - will discuss with Dr Abarca, but I think it is reasonable to resume rituximab in near future if ok with Pulm and ID, but will spread out to therapy every 3 months instead of every 2 months    4/26/2023:  - discussed with patient about resuming the Rituximab and he is anxious to do so  - will give every 12 weeks    6/21/2023:  - PET due tomorrow  - IV IgG on Friday  - rituximab since resumed but to be given every 12 weeks now  - f/\u with Dr Veliz in Aug 2023    9/13/2023:  - He has since resumed Rituximab but it is now at every 12 weeks; getting IV IGg this Friday and rituimab due again in Oct 2023  - He had PEt on 6/22/2023 12/13/2023:  - he is continued on IV IgG with next one on Jan 5th  - he gets Rituximab every 3 months now - latest one was 11/10  - he has PET this coming Dec 18th    3/18/2024:  - He has been having some upper GI pressure and discomfort issues since Jan 2024 and had upper and lower endoscopies with Dr Campbell; he has f/u visit with GI to go over the results in near future; he has diverticulosis  - He had recent repeat PET on 3/11/2024 is negative  - he is on rituximab every 3 months (due Apr 25th 2024)  - He saw Dr Nails in Jan 2024 with [facility]    6/18/2024:  - schedule next PET in Sept 2024  -  continued on rituximab every 12 weeks with next one due in mid-July 2024  - saw Dr Nails in Apr 2024  - had scopes with Dr griffith in May 2024    9/10/2024:  -  PEt scheduled for Oct 2024 and has next infusion this coming Friday  - He saw Dr Nails on 8/23; he was seen by Dr Interiano with vascular for his right neck while in ED on 8/26  - He has been on Rituximab  at every 12 weeks; next one is on is this coming Friday he has also been getting IV IGg  as well    12/10/2024:  - latest PET on 10/2/2024 stable and negative for any active disease  - labs are all adequate  - continued on rituximab maintenance every 12 weeks    3/11/2025:  - repeat PET panned for Oct 2025  - continued on rituximab every 12 weeks and IV IgG monthly  - labs adequate      (2) HTN     (3) Neuropathy     (4) GERD     (5) DM - on metformin per Dr Nunez     (6) Hypogammaglobulinemia - on Iv IgG monthly     (7) Steatosis of liver     (8) Degenerative disc disease of back     (9) Diverticular disease    (10) Mild bronchiectasis in the left lower lobe and tree-in-bud micro nodules at the left lung base suggesting underlying bronchiolitis  - seeing Lucia Georges           VISIT DIAGNOSES:      Follicular lymphoma grade IIIa, unspecified body region    Non-Hodgkin's lymphoma, unspecified body region, unspecified non-Hodgkin lymphoma type    Pancytopenia    Nonfamilial hypogammaglobulinemia    Diffuse large b-cell lymphoma, lymph nodes of multiple sites          PLAN:  1. continue rituximab maintenance  therapy every 12 weeks   2. continue IV IgG monthly    3. F/u with Dr Nasreen Abarca at [facility] as directed   4. Check labs every 8-12 weeks  5. Repeat PET in Oct 2025   6. F/u with PCP, Pulm etc    7. F/u with GI as directed  8. PCP addressing his diabetes           RTC in 12 weeks  with myself   Fax note to Jesu (new);; José Manuel Veliz; Rima; Adrian/Chelsea         Discussion:       I spent over 25 mins of time with the patient. Reviewed results  of the recently ordered labs, tests and studies; made directives with regards to the results. Over half of this time was spent couseling and coordinating care.      COVID-19 Discussion:    I had long discussion with patient and any applicable family about the COVID-19 coronavirus epidemic and the recommended precautions with regard to cancer and/or hematology patients. I have re-iterated the CDC recommendations for adequate hand washing, use of hand -like products, and coughing into elbow, etc. In addition, especially for our patients who are on chemotherapy and/or our otherwise immunocompromised patients, I have recommended avoidance of crowds, including movie theaters, restaurants, churches, etc. I have recommended avoidance of any sick or symptomatic family members and/or friends. I have also recommended avoidance of any raw and unwashed food products, and general avoidance of food items that have not been prepared by themselves. The patient has been asked to call us immediately with any symptom developments, issues, questions or other general concerns.     I have explained all of the above in detail and the patient understands all of the current recommendation(s). I have answered all of their questions to the best of my ability and to their complete satisfaction.   The patient is to continue with the current management plan.            Electronically signed by Jefferson Ibarra MD                          Answers submitted by the patient for this visit:  Review of Systems Questionnaire (Submitted on 3/11/2025)  appetite change : No  unexpected weight change: No  mouth sores: No  visual disturbance: No  cough: No  shortness of breath: No  chest pain: No  abdominal pain: No  diarrhea: No  frequency: No  back pain: No  rash: No  headaches: No  adenopathy: No  nervous/ anxious: No

## 2025-03-21 ENCOUNTER — PATIENT MESSAGE (OUTPATIENT)
Dept: FAMILY MEDICINE | Facility: CLINIC | Age: 70
End: 2025-03-21
Payer: MEDICARE

## 2025-03-21 DIAGNOSIS — J30.89 NON-SEASONAL ALLERGIC RHINITIS DUE TO OTHER ALLERGIC TRIGGER: ICD-10-CM

## 2025-03-21 DIAGNOSIS — J32.9 SINUSITIS, UNSPECIFIED CHRONICITY, UNSPECIFIED LOCATION: ICD-10-CM

## 2025-03-21 DIAGNOSIS — I10 ESSENTIAL HYPERTENSION: ICD-10-CM

## 2025-03-21 DIAGNOSIS — E11.9 TYPE 2 DIABETES MELLITUS WITHOUT COMPLICATION, WITHOUT LONG-TERM CURRENT USE OF INSULIN: ICD-10-CM

## 2025-03-21 RX ORDER — TIRZEPATIDE 5 MG/.5ML
5 INJECTION, SOLUTION SUBCUTANEOUS
Qty: 6 ML | Refills: 0 | OUTPATIENT
Start: 2025-03-21

## 2025-03-21 RX ORDER — METFORMIN HYDROCHLORIDE 500 MG/1
TABLET ORAL
Qty: 360 TABLET | Refills: 1 | Status: SHIPPED | OUTPATIENT
Start: 2025-03-21

## 2025-03-21 NOTE — TELEPHONE ENCOUNTER
No care due was identified.  [facility] Embedded Care Due Messages. Reference number: [id number].   3/21/2025 3:59:10 PM CDT

## 2025-03-21 NOTE — TELEPHONE ENCOUNTER
Refill Decision Note   Sivakumar Thacker  is requesting a refill authorization.  Brief Assessment and Rationale for Refill:  Approve     Medication Therapy Plan:         Comments:     Note composed:4:28 PM 03/21/2025

## 2025-03-21 NOTE — TELEPHONE ENCOUNTER
No care due was identified.  [facility] Embedded Care Due Messages. Reference number: [id number].   3/21/2025 3:58:44 PM CDT

## 2025-03-24 ENCOUNTER — OFFICE VISIT (OUTPATIENT)
Dept: FAMILY MEDICINE | Facility: CLINIC | Age: 70
End: 2025-03-24
Payer: MEDICARE

## 2025-03-24 ENCOUNTER — PATIENT MESSAGE (OUTPATIENT)
Dept: FAMILY MEDICINE | Facility: CLINIC | Age: 70
End: 2025-03-24

## 2025-03-24 VITALS
BODY MASS INDEX: 31.6 KG/M2 | OXYGEN SATURATION: 96 % | RESPIRATION RATE: 18 BRPM | SYSTOLIC BLOOD PRESSURE: 120 MMHG | WEIGHT: 196.63 LBS | HEART RATE: 71 BPM | HEIGHT: 66 IN | DIASTOLIC BLOOD PRESSURE: 62 MMHG

## 2025-03-24 DIAGNOSIS — E11.9 TYPE 2 DIABETES MELLITUS WITHOUT COMPLICATION, WITHOUT LONG-TERM CURRENT USE OF INSULIN: ICD-10-CM

## 2025-03-24 DIAGNOSIS — I15.2 HYPERTENSION ASSOCIATED WITH DIABETES: Primary | ICD-10-CM

## 2025-03-24 DIAGNOSIS — J32.9 SINUSITIS, UNSPECIFIED CHRONICITY, UNSPECIFIED LOCATION: ICD-10-CM

## 2025-03-24 DIAGNOSIS — J30.89 NON-SEASONAL ALLERGIC RHINITIS DUE TO OTHER ALLERGIC TRIGGER: ICD-10-CM

## 2025-03-24 DIAGNOSIS — E11.59 HYPERTENSION ASSOCIATED WITH DIABETES: Primary | ICD-10-CM

## 2025-03-24 DIAGNOSIS — G47.33 OBSTRUCTIVE SLEEP APNEA: ICD-10-CM

## 2025-03-24 DIAGNOSIS — I10 ESSENTIAL HYPERTENSION: ICD-10-CM

## 2025-03-24 PROCEDURE — 99214 OFFICE O/P EST MOD 30 MIN: CPT | Mod: S$PBB,,, | Performed by: FAMILY MEDICINE

## 2025-03-24 PROCEDURE — 99214 OFFICE O/P EST MOD 30 MIN: CPT | Mod: PBBFAC,PN | Performed by: FAMILY MEDICINE

## 2025-03-24 PROCEDURE — 99999 PR PBB SHADOW E&M-EST. PATIENT-LVL IV: CPT | Mod: PBBFAC,,, | Performed by: FAMILY MEDICINE

## 2025-03-24 RX ORDER — TIRZEPATIDE 5 MG/.5ML
5 INJECTION, SOLUTION SUBCUTANEOUS
Qty: 6 ML | Refills: 1 | Status: SHIPPED | OUTPATIENT
Start: 2025-03-24 | End: 2025-03-24 | Stop reason: SDUPTHER

## 2025-03-24 RX ORDER — METFORMIN HYDROCHLORIDE 500 MG/1
TABLET ORAL
Qty: 360 TABLET | Refills: 1 | Status: CANCELLED | OUTPATIENT
Start: 2025-03-24

## 2025-03-24 RX ORDER — TIRZEPATIDE 5 MG/.5ML
5 INJECTION, SOLUTION SUBCUTANEOUS
Qty: 6 ML | Refills: 1 | Status: SHIPPED | OUTPATIENT
Start: 2025-03-24 | End: 2025-03-26

## 2025-03-24 RX ORDER — METHYLPREDNISOLONE 4 MG/1
TABLET ORAL
Qty: 21 EACH | Refills: 0 | Status: SHIPPED | OUTPATIENT
Start: 2025-03-24 | End: 2025-04-14

## 2025-03-24 RX ORDER — CETIRIZINE HYDROCHLORIDE 10 MG/1
10 TABLET ORAL DAILY
Qty: 90 TABLET | Refills: 1 | Status: SHIPPED | OUTPATIENT
Start: 2025-03-24 | End: 2026-03-24

## 2025-03-24 NOTE — PROGRESS NOTES
"  Subjective:       Patient ID: Sivakumar Thacker is a 70 y.o. male.    Chief Complaint: Follow-up    History of Present Illness    CHIEF COMPLAINT:  Mr. Thacker presents today with worsening allergy symptoms and cough    RESPIRATORY AND ALLERGY:  He reports nighttime coughing, though symptoms are not as severe as previous episode requiring hospitalization 3 years ago. He was previously misdiagnosed with COPD, which was later determined to be an allergic infection in the lungs. He currently takes generic Singulair and Flonase for symptom management.    SLEEP APNEA:  He continues to use CPAP machine for sleep apnea management.    CURRENT MEDICATIONS:  He takes Mounjaro 5mg and Metformin.      ROS:  ENT: +nasal congestion, +nasal discharge, +post nasal drip, +ear pressure  Respiratory: +cough, +waking at night coughing, +apnea  Allergic: +allergic reactions         Review of Systems    Problem List[1]  Sivakumar has a current medication list which includes the following prescription(s): amlodipine, aspirin, atorvastatin, fluticasone propionate, gabapentin, metformin, montelukast, multivitamin, naproxen, omeprazole, tadalafil, tizanidine, valsartan, cetirizine, methylprednisolone, and mounjaro.        Health Maintenance Due   Topic Date Due    Diabetic Eye Exam  08/16/2024      Health Maintenance reviewed and discussed  Objective:      Vitals:    03/24/25 0709   BP: 120/62   Pulse: 71   Resp: 18   SpO2: 96%   Weight: 89.2 kg (196 lb 9.6 oz)   Height: 5' 6" (1.676 m)   PainSc: 0-No pain     Body mass index is 31.73 kg/m².  Physical Exam  Physical Exam    Constitutional: No acute distress. Well-appearing.  HENT: Pharyngeal erythema from coughing.  Cardiovascular: Regular rate. Regular rhythm. No murmurs.  Pulmonary: Normal respiratory effort. No wheezing. Normal breath sounds.  Skin: Warm. Dry. No rash.  Neurological: Alert.  Psychiatric: Normal mood. Normal behavior.         Assessment:       Assessment & Plan    1. " Assessed persistent cough and allergy symptoms, likely due to significant pollen season.  2. Evaluated lungs, found clear with no signs of infection; noted redness in throat due to frequent coughing.  3. Determined symptoms are likely allergic rather than infectious.  4. Considered adding antihistamine (Zyrtec or Claritin) to current regimen of Singulair and Flonase for better symptom control.  5. Prescribed short-term steroid pack for significant symptom management.  6. Noted potential for temporary increase in blood sugar and blood pressure with steroid use, but deemed acceptable given well-controlled diabetes.           Plan:       1. Hypertension associated with diabetes    2. Essential hypertension    3. Type 2 diabetes mellitus without complication, without long-term current use of insulin  -     tirzepatide (MOUNJARO) 5 mg/0.5 mL PnIj; Inject 5 mg into the skin every 7 days.  Dispense: 6 mL; Refill: 1    4. Sinusitis, unspecified chronicity, unspecified location    5. Non-seasonal allergic rhinitis due to other allergic trigger  -     methylPREDNISolone (MEDROL DOSEPACK) 4 mg tablet; use as directed  Dispense: 21 each; Refill: 0  -     cetirizine (ZYRTEC) 10 MG tablet; Take 1 tablet (10 mg total) by mouth once daily.  Dispense: 90 tablet; Refill: 1    6. Obstructive sleep apnea         This note was generated with the assistance of ambient listening technology. Verbal consent was obtained by the patient and accompanying visitor(s) for the recording of patient appointment to facilitate this note. I attest to having reviewed and edited the generated note for accuracy, though some syntax or spelling errors may persist. Please contact the author of this note for any clarification.         [1]   Patient Active Problem List  Diagnosis    Follicular lymphoma grade IIIa    Nonfamilial hypogammaglobulinemia    Hypogammaglobulinemia    Hypertension associated with diabetes    Type II diabetes mellitus with neurological  manifestations    Type 2 diabetes mellitus without complication, without long-term current use of insulin    Chronic obstructive pulmonary disease, unspecified COPD type    Pancytopenia    Non-Hodgkin's lymphoma    Aortic atherosclerosis    Bronchiectasis without complication    Obstructive sleep apnea    Diffuse large b-cell lymphoma, lymph nodes of multiple sites

## 2025-03-24 NOTE — TELEPHONE ENCOUNTER
Refill Decision Note   Sivakumar Kirstie  is requesting a refill authorization.  Brief Assessment and Rationale for Refill:  Approve     Medication Therapy Plan:  Approved due to pharmacy change - lack of inventory at original pharmacy.      Comments:     Note composed:5:44 PM 03/24/2025

## 2025-03-24 NOTE — TELEPHONE ENCOUNTER
No care due was identified.  [facility] Embedded Care Due Messages. Reference number: [id number].   3/24/2025 2:14:31 PM CDT

## 2025-03-26 ENCOUNTER — PATIENT MESSAGE (OUTPATIENT)
Dept: FAMILY MEDICINE | Facility: CLINIC | Age: 70
End: 2025-03-26
Payer: MEDICARE

## 2025-03-26 DIAGNOSIS — E11.9 TYPE 2 DIABETES MELLITUS WITHOUT COMPLICATION, WITHOUT LONG-TERM CURRENT USE OF INSULIN: ICD-10-CM

## 2025-03-26 RX ORDER — TIRZEPATIDE 5 MG/.5ML
5 INJECTION, SOLUTION SUBCUTANEOUS
Qty: 6 ML | Refills: 1 | Status: SHIPPED | OUTPATIENT
Start: 2025-03-26 | End: 2025-03-27 | Stop reason: SDUPTHER

## 2025-03-26 RX ORDER — HEPARIN 100 UNIT/ML
500 SYRINGE INTRAVENOUS
Status: CANCELLED | OUTPATIENT
Start: 2025-03-27

## 2025-03-26 RX ORDER — ACETAMINOPHEN 325 MG/1
650 TABLET ORAL
Status: CANCELLED | OUTPATIENT
Start: 2025-03-27

## 2025-03-26 RX ORDER — SODIUM CHLORIDE 0.9 % (FLUSH) 0.9 %
10 SYRINGE (ML) INJECTION
Status: CANCELLED | OUTPATIENT
Start: 2025-03-27

## 2025-03-26 RX ORDER — MEPERIDINE HYDROCHLORIDE 25 MG/ML
25 INJECTION INTRAMUSCULAR; INTRAVENOUS; SUBCUTANEOUS
Status: CANCELLED | OUTPATIENT
Start: 2025-03-27 | End: 2025-03-27

## 2025-03-26 RX ORDER — FAMOTIDINE 10 MG/ML
20 INJECTION, SOLUTION INTRAVENOUS
Status: CANCELLED | OUTPATIENT
Start: 2025-03-27

## 2025-03-26 NOTE — TELEPHONE ENCOUNTER
Refill Decision Note   Sivakumar Kirstie  is requesting a refill authorization.  Brief Assessment and Rationale for Refill:  Approve     Medication Therapy Plan: Approved due to pharmacy change per patient's request      Comments:     Note composed:3:42 PM 03/26/2025

## 2025-03-26 NOTE — TELEPHONE ENCOUNTER
No care due was identified.  [facility] Embedded Care Due Messages. Reference number: [id number].   3/26/2025 1:40:22 PM CDT

## 2025-03-27 ENCOUNTER — INFUSION (OUTPATIENT)
Dept: INFUSION THERAPY | Facility: HOSPITAL | Age: 70
End: 2025-03-27
Attending: INTERNAL MEDICINE
Payer: MEDICARE

## 2025-03-27 VITALS
DIASTOLIC BLOOD PRESSURE: 75 MMHG | HEIGHT: 66 IN | HEART RATE: 76 BPM | RESPIRATION RATE: 16 BRPM | BODY MASS INDEX: 31.61 KG/M2 | OXYGEN SATURATION: 97 % | TEMPERATURE: 98 F | SYSTOLIC BLOOD PRESSURE: 117 MMHG | WEIGHT: 196.69 LBS

## 2025-03-27 DIAGNOSIS — D80.1 HYPOGAMMAGLOBULINEMIA: ICD-10-CM

## 2025-03-27 DIAGNOSIS — C82.30 FOLLICULAR LYMPHOMA GRADE IIIA, UNSPECIFIED BODY REGION: Primary | ICD-10-CM

## 2025-03-27 PROCEDURE — 96415 CHEMO IV INFUSION ADDL HR: CPT

## 2025-03-27 PROCEDURE — 96413 CHEMO IV INFUSION 1 HR: CPT

## 2025-03-27 PROCEDURE — 96375 TX/PRO/DX INJ NEW DRUG ADDON: CPT

## 2025-03-27 PROCEDURE — 96367 TX/PROPH/DG ADDL SEQ IV INF: CPT

## 2025-03-27 PROCEDURE — A4216 STERILE WATER/SALINE, 10 ML: HCPCS | Performed by: INTERNAL MEDICINE

## 2025-03-27 PROCEDURE — 63600175 PHARM REV CODE 636 W HCPCS: Performed by: INTERNAL MEDICINE

## 2025-03-27 PROCEDURE — 25000003 PHARM REV CODE 250: Performed by: INTERNAL MEDICINE

## 2025-03-27 RX ORDER — FAMOTIDINE 10 MG/ML
20 INJECTION, SOLUTION INTRAVENOUS
Status: COMPLETED | OUTPATIENT
Start: 2025-03-27 | End: 2025-03-27

## 2025-03-27 RX ORDER — ACETAMINOPHEN 325 MG/1
650 TABLET ORAL
Status: COMPLETED | OUTPATIENT
Start: 2025-03-27 | End: 2025-03-27

## 2025-03-27 RX ORDER — SODIUM CHLORIDE 0.9 % (FLUSH) 0.9 %
10 SYRINGE (ML) INJECTION
Status: DISCONTINUED | OUTPATIENT
Start: 2025-03-27 | End: 2025-03-27 | Stop reason: HOSPADM

## 2025-03-27 RX ORDER — HEPARIN 100 UNIT/ML
500 SYRINGE INTRAVENOUS
Status: DISCONTINUED | OUTPATIENT
Start: 2025-03-27 | End: 2025-03-27 | Stop reason: HOSPADM

## 2025-03-27 RX ADMIN — FAMOTIDINE 20 MG: 10 INJECTION INTRAVENOUS at 07:03

## 2025-03-27 RX ADMIN — SODIUM CHLORIDE, PRESERVATIVE FREE 10 ML: 5 INJECTION INTRAVENOUS at 10:03

## 2025-03-27 RX ADMIN — SODIUM CHLORIDE 50 MG: 9 INJECTION, SOLUTION INTRAVENOUS at 07:03

## 2025-03-27 RX ADMIN — RITUXIMAB 800 MG: 10 INJECTION, SOLUTION INTRAVENOUS at 07:03

## 2025-03-27 RX ADMIN — ACETAMINOPHEN 650 MG: 325 TABLET ORAL at 07:03

## 2025-03-27 RX ADMIN — SODIUM CHLORIDE: 9 INJECTION, SOLUTION INTRAVENOUS at 07:03

## 2025-03-27 RX ADMIN — HEPARIN 500 UNITS: 100 SYRINGE at 10:03

## 2025-03-27 NOTE — TELEPHONE ENCOUNTER
No care due was identified.  [facility] Embedded Care Due Messages. Reference number: [id number].   3/27/2025 9:33:49 AM CDT

## 2025-03-27 NOTE — PLAN OF CARE
Problem: Fatigue  Goal: Improved Activity Tolerance  Outcome: Progressing  Intervention: Promote Improved Energy  Flowsheets (Taken 3/27/2025 1107)  Fatigue Management: frequent rest breaks encouraged  Activity Management: Ambulated -L4  Environmental Support: rest periods encouraged

## 2025-03-28 ENCOUNTER — INFUSION (OUTPATIENT)
Dept: INFUSION THERAPY | Facility: HOSPITAL | Age: 70
End: 2025-03-28
Attending: INTERNAL MEDICINE
Payer: MEDICARE

## 2025-03-28 VITALS
SYSTOLIC BLOOD PRESSURE: 134 MMHG | TEMPERATURE: 98 F | OXYGEN SATURATION: 99 % | RESPIRATION RATE: 16 BRPM | BODY MASS INDEX: 32.14 KG/M2 | HEIGHT: 66 IN | HEART RATE: 65 BPM | WEIGHT: 200 LBS | DIASTOLIC BLOOD PRESSURE: 83 MMHG

## 2025-03-28 DIAGNOSIS — D80.1 NONFAMILIAL HYPOGAMMAGLOBULINEMIA: ICD-10-CM

## 2025-03-28 DIAGNOSIS — C82.30 FOLLICULAR LYMPHOMA GRADE IIIA, UNSPECIFIED BODY REGION: ICD-10-CM

## 2025-03-28 DIAGNOSIS — D80.1 HYPOGAMMAGLOBULINEMIA: Primary | ICD-10-CM

## 2025-03-28 PROCEDURE — 63600175 PHARM REV CODE 636 W HCPCS: Mod: JZ,JA,TB | Performed by: NURSE PRACTITIONER

## 2025-03-28 PROCEDURE — 96367 TX/PROPH/DG ADDL SEQ IV INF: CPT

## 2025-03-28 PROCEDURE — 25000003 PHARM REV CODE 250: Performed by: NURSE PRACTITIONER

## 2025-03-28 PROCEDURE — A4216 STERILE WATER/SALINE, 10 ML: HCPCS | Performed by: NURSE PRACTITIONER

## 2025-03-28 PROCEDURE — 96365 THER/PROPH/DIAG IV INF INIT: CPT

## 2025-03-28 PROCEDURE — 96366 THER/PROPH/DIAG IV INF ADDON: CPT

## 2025-03-28 RX ORDER — HEPARIN 100 UNIT/ML
500 SYRINGE INTRAVENOUS
OUTPATIENT
Start: 2025-04-25

## 2025-03-28 RX ORDER — SODIUM CHLORIDE 0.9 % (FLUSH) 0.9 %
10 SYRINGE (ML) INJECTION
OUTPATIENT
Start: 2025-04-25

## 2025-03-28 RX ORDER — HEPARIN 100 UNIT/ML
500 SYRINGE INTRAVENOUS
Status: DISCONTINUED | OUTPATIENT
Start: 2025-03-28 | End: 2025-03-28 | Stop reason: HOSPADM

## 2025-03-28 RX ORDER — SODIUM CHLORIDE 0.9 % (FLUSH) 0.9 %
10 SYRINGE (ML) INJECTION
Status: DISCONTINUED | OUTPATIENT
Start: 2025-03-28 | End: 2025-03-28 | Stop reason: HOSPADM

## 2025-03-28 RX ORDER — TIRZEPATIDE 5 MG/.5ML
5 INJECTION, SOLUTION SUBCUTANEOUS
Qty: 12 PEN | Refills: 3 | Status: SHIPPED | OUTPATIENT
Start: 2025-03-28

## 2025-03-28 RX ADMIN — SODIUM CHLORIDE 25 MG: 9 INJECTION, SOLUTION INTRAVENOUS at 07:03

## 2025-03-28 RX ADMIN — IMMUNE GLOBULIN (HUMAN) 50 G: 10 INJECTION INTRAVENOUS; SUBCUTANEOUS at 07:03

## 2025-03-28 RX ADMIN — SODIUM CHLORIDE: 9 INJECTION, SOLUTION INTRAVENOUS at 07:03

## 2025-03-28 RX ADMIN — Medication 10 ML: at 10:03

## 2025-03-28 RX ADMIN — HEPARIN 500 UNITS: 100 SYRINGE at 10:03

## 2025-03-28 NOTE — PLAN OF CARE
Problem: Fatigue  Goal: Improved Activity Tolerance  Outcome: Progressing  Intervention: Promote Improved Energy  Flowsheets (Taken 3/28/2025 0324)  Fatigue Management: frequent rest breaks encouraged  Sleep/Rest Enhancement: regular sleep/rest pattern promoted  Activity Management: Ambulated -L4  Environmental Support: calm environment promoted

## 2025-04-25 ENCOUNTER — INFUSION (OUTPATIENT)
Dept: INFUSION THERAPY | Facility: HOSPITAL | Age: 70
End: 2025-04-25
Attending: INTERNAL MEDICINE
Payer: MEDICARE

## 2025-04-25 VITALS
OXYGEN SATURATION: 97 % | RESPIRATION RATE: 16 BRPM | DIASTOLIC BLOOD PRESSURE: 68 MMHG | HEIGHT: 68 IN | WEIGHT: 194.63 LBS | BODY MASS INDEX: 29.5 KG/M2 | HEART RATE: 67 BPM | SYSTOLIC BLOOD PRESSURE: 122 MMHG | TEMPERATURE: 98 F

## 2025-04-25 DIAGNOSIS — D80.1 NONFAMILIAL HYPOGAMMAGLOBULINEMIA: ICD-10-CM

## 2025-04-25 DIAGNOSIS — C82.30 FOLLICULAR LYMPHOMA GRADE IIIA, UNSPECIFIED BODY REGION: ICD-10-CM

## 2025-04-25 DIAGNOSIS — D80.1 HYPOGAMMAGLOBULINEMIA: Primary | ICD-10-CM

## 2025-04-25 PROCEDURE — 63600175 PHARM REV CODE 636 W HCPCS: Performed by: NURSE PRACTITIONER

## 2025-04-25 PROCEDURE — 96367 TX/PROPH/DG ADDL SEQ IV INF: CPT

## 2025-04-25 PROCEDURE — 96366 THER/PROPH/DIAG IV INF ADDON: CPT

## 2025-04-25 PROCEDURE — A4216 STERILE WATER/SALINE, 10 ML: HCPCS | Performed by: NURSE PRACTITIONER

## 2025-04-25 PROCEDURE — 25000003 PHARM REV CODE 250: Performed by: NURSE PRACTITIONER

## 2025-04-25 PROCEDURE — 96365 THER/PROPH/DIAG IV INF INIT: CPT

## 2025-04-25 RX ORDER — SODIUM CHLORIDE 0.9 % (FLUSH) 0.9 %
10 SYRINGE (ML) INJECTION
OUTPATIENT
Start: 2025-05-23

## 2025-04-25 RX ORDER — HEPARIN 100 UNIT/ML
500 SYRINGE INTRAVENOUS
OUTPATIENT
Start: 2025-05-23

## 2025-04-25 RX ORDER — SODIUM CHLORIDE 0.9 % (FLUSH) 0.9 %
10 SYRINGE (ML) INJECTION
Status: DISCONTINUED | OUTPATIENT
Start: 2025-04-25 | End: 2025-04-25 | Stop reason: HOSPADM

## 2025-04-25 RX ORDER — HEPARIN 100 UNIT/ML
500 SYRINGE INTRAVENOUS
Status: DISCONTINUED | OUTPATIENT
Start: 2025-04-25 | End: 2025-04-25 | Stop reason: HOSPADM

## 2025-04-25 RX ADMIN — Medication 10 ML: at 10:04

## 2025-04-25 RX ADMIN — SODIUM CHLORIDE: 9 INJECTION, SOLUTION INTRAVENOUS at 07:04

## 2025-04-25 RX ADMIN — SODIUM CHLORIDE 25 MG: 9 INJECTION, SOLUTION INTRAVENOUS at 07:04

## 2025-04-25 RX ADMIN — IMMUNE GLOBULIN (HUMAN) 50 G: 10 INJECTION INTRAVENOUS; SUBCUTANEOUS at 07:04

## 2025-04-25 RX ADMIN — HEPARIN 500 UNITS: 100 SYRINGE at 10:04

## 2025-04-25 NOTE — PLAN OF CARE
Problem: Fall Injury Risk  Goal: Absence of Fall and Fall-Related Injury  Outcome: Progressing  Intervention: Identify and Manage Contributors  Flowsheets (Taken 4/25/2025 0710)  Self-Care Promotion: independence encouraged  Medication Review/Management: medications reviewed  Intervention: Promote Injury-Free Environment  Flowsheets (Taken 4/25/2025 0710)  Safety Promotion/Fall Prevention: medications reviewed

## 2025-05-23 ENCOUNTER — INFUSION (OUTPATIENT)
Dept: INFUSION THERAPY | Facility: HOSPITAL | Age: 70
End: 2025-05-23
Attending: INTERNAL MEDICINE
Payer: MEDICARE

## 2025-05-23 VITALS
BODY MASS INDEX: 29.04 KG/M2 | WEIGHT: 191.63 LBS | OXYGEN SATURATION: 98 % | SYSTOLIC BLOOD PRESSURE: 129 MMHG | TEMPERATURE: 98 F | RESPIRATION RATE: 16 BRPM | HEART RATE: 62 BPM | HEIGHT: 68 IN | DIASTOLIC BLOOD PRESSURE: 66 MMHG

## 2025-05-23 DIAGNOSIS — C82.30 FOLLICULAR LYMPHOMA GRADE IIIA, UNSPECIFIED BODY REGION: ICD-10-CM

## 2025-05-23 DIAGNOSIS — D80.1 HYPOGAMMAGLOBULINEMIA: Primary | ICD-10-CM

## 2025-05-23 DIAGNOSIS — D80.1 NONFAMILIAL HYPOGAMMAGLOBULINEMIA: ICD-10-CM

## 2025-05-23 PROCEDURE — 96367 TX/PROPH/DG ADDL SEQ IV INF: CPT

## 2025-05-23 PROCEDURE — 63600175 PHARM REV CODE 636 W HCPCS: Performed by: NURSE PRACTITIONER

## 2025-05-23 PROCEDURE — 96366 THER/PROPH/DIAG IV INF ADDON: CPT

## 2025-05-23 PROCEDURE — A4216 STERILE WATER/SALINE, 10 ML: HCPCS | Performed by: NURSE PRACTITIONER

## 2025-05-23 PROCEDURE — 96365 THER/PROPH/DIAG IV INF INIT: CPT

## 2025-05-23 PROCEDURE — 25000003 PHARM REV CODE 250: Performed by: NURSE PRACTITIONER

## 2025-05-23 RX ORDER — HEPARIN 100 UNIT/ML
500 SYRINGE INTRAVENOUS
OUTPATIENT
Start: 2025-06-20

## 2025-05-23 RX ORDER — HEPARIN 100 UNIT/ML
500 SYRINGE INTRAVENOUS
Status: DISCONTINUED | OUTPATIENT
Start: 2025-05-23 | End: 2025-05-23 | Stop reason: HOSPADM

## 2025-05-23 RX ORDER — SODIUM CHLORIDE 0.9 % (FLUSH) 0.9 %
10 SYRINGE (ML) INJECTION
Status: DISCONTINUED | OUTPATIENT
Start: 2025-05-23 | End: 2025-05-23 | Stop reason: HOSPADM

## 2025-05-23 RX ORDER — SODIUM CHLORIDE 0.9 % (FLUSH) 0.9 %
10 SYRINGE (ML) INJECTION
OUTPATIENT
Start: 2025-06-20

## 2025-05-23 RX ADMIN — SODIUM CHLORIDE, PRESERVATIVE FREE 10 ML: 5 INJECTION INTRAVENOUS at 10:05

## 2025-05-23 RX ADMIN — IMMUNE GLOBULIN (HUMAN) 50 G: 10 INJECTION INTRAVENOUS; SUBCUTANEOUS at 07:05

## 2025-05-23 RX ADMIN — SODIUM CHLORIDE 25 MG: 9 INJECTION, SOLUTION INTRAVENOUS at 07:05

## 2025-05-23 RX ADMIN — HEPARIN 500 UNITS: 100 SYRINGE at 10:05

## 2025-05-23 RX ADMIN — SODIUM CHLORIDE: 9 INJECTION, SOLUTION INTRAVENOUS at 07:05

## 2025-05-23 NOTE — PLAN OF CARE
Problem: Fatigue  Goal: Improved Activity Tolerance  Outcome: Progressing  Intervention: Promote Improved Energy  Flowsheets (Taken 5/23/2025 7583)  Fatigue Management: frequent rest breaks encouraged  Activity Management: Ambulated -L4  Environmental Support: rest periods encouraged

## 2025-06-05 ENCOUNTER — TELEPHONE (OUTPATIENT)
Facility: CLINIC | Age: 70
End: 2025-06-05
Payer: MEDICARE

## 2025-06-05 DIAGNOSIS — C82.30 FOLLICULAR LYMPHOMA GRADE IIIA, UNSPECIFIED BODY REGION: Primary | ICD-10-CM

## 2025-06-10 ENCOUNTER — LAB VISIT (OUTPATIENT)
Dept: LAB | Facility: HOSPITAL | Age: 70
End: 2025-06-10
Attending: INTERNAL MEDICINE
Payer: MEDICARE

## 2025-06-10 DIAGNOSIS — C83.38 DIFFUSE LARGE B-CELL LYMPHOMA, LYMPH NODES OF MULTIPLE SITES: ICD-10-CM

## 2025-06-10 DIAGNOSIS — C85.90 NON-HODGKIN'S LYMPHOMA, UNSPECIFIED BODY REGION, UNSPECIFIED NON-HODGKIN LYMPHOMA TYPE: ICD-10-CM

## 2025-06-10 LAB
ABSOLUTE EOSINOPHIL (OHS): 0.12 K/UL
ABSOLUTE MONOCYTE (OHS): 0.46 K/UL (ref 0.3–1)
ABSOLUTE NEUTROPHIL COUNT (OHS): 3.28 K/UL (ref 1.8–7.7)
ALBUMIN SERPL BCP-MCNC: 3.6 G/DL (ref 3.5–5.2)
ALP SERPL-CCNC: 90 UNIT/L (ref 40–150)
ALT SERPL W/O P-5'-P-CCNC: 18 UNIT/L (ref 10–44)
ANION GAP (OHS): 10 MMOL/L (ref 8–16)
AST SERPL-CCNC: 18 UNIT/L (ref 11–45)
BASOPHILS # BLD AUTO: 0.04 K/UL
BASOPHILS NFR BLD AUTO: 0.7 %
BILIRUB SERPL-MCNC: 0.4 MG/DL (ref 0.1–1)
BUN SERPL-MCNC: 15 MG/DL (ref 8–23)
CALCIUM SERPL-MCNC: 9.5 MG/DL (ref 8.7–10.5)
CHLORIDE SERPL-SCNC: 108 MMOL/L (ref 95–110)
CO2 SERPL-SCNC: 24 MMOL/L (ref 23–29)
CREAT SERPL-MCNC: 0.8 MG/DL (ref 0.5–1.4)
ERYTHROCYTE [DISTWIDTH] IN BLOOD BY AUTOMATED COUNT: 12.8 % (ref 11.5–14.5)
GFR SERPLBLD CREATININE-BSD FMLA CKD-EPI: >60 ML/MIN/1.73/M2
GLUCOSE SERPL-MCNC: 127 MG/DL (ref 70–110)
HCT VFR BLD AUTO: 46.8 % (ref 40–54)
HGB BLD-MCNC: 15.6 GM/DL (ref 14–18)
IMM GRANULOCYTES # BLD AUTO: 0.05 K/UL (ref 0–0.04)
IMM GRANULOCYTES NFR BLD AUTO: 0.9 % (ref 0–0.5)
LEFT EYE DM RETINOPATHY: NEGATIVE
LYMPHOCYTES # BLD AUTO: 1.63 K/UL (ref 1–4.8)
MCH RBC QN AUTO: 29.2 PG (ref 27–31)
MCHC RBC AUTO-ENTMCNC: 33.3 G/DL (ref 32–36)
MCV RBC AUTO: 88 FL (ref 82–98)
NUCLEATED RBC (/100WBC) (OHS): 0 /100 WBC
PLATELET # BLD AUTO: 211 K/UL (ref 150–450)
PMV BLD AUTO: 9.4 FL (ref 9.2–12.9)
POTASSIUM SERPL-SCNC: 4.5 MMOL/L (ref 3.5–5.1)
PROT SERPL-MCNC: 6.6 GM/DL (ref 6–8.4)
RBC # BLD AUTO: 5.34 M/UL (ref 4.6–6.2)
RELATIVE EOSINOPHIL (OHS): 2.2 %
RELATIVE LYMPHOCYTE (OHS): 29.2 % (ref 18–48)
RELATIVE MONOCYTE (OHS): 8.2 % (ref 4–15)
RELATIVE NEUTROPHIL (OHS): 58.8 % (ref 38–73)
RIGHT EYE DM RETINOPATHY: NEGATIVE
SODIUM SERPL-SCNC: 142 MMOL/L (ref 136–145)
WBC # BLD AUTO: 5.58 K/UL (ref 3.9–12.7)

## 2025-06-10 PROCEDURE — 80053 COMPREHEN METABOLIC PANEL: CPT

## 2025-06-10 PROCEDURE — 85025 COMPLETE CBC W/AUTO DIFF WBC: CPT

## 2025-06-10 PROCEDURE — 36415 COLL VENOUS BLD VENIPUNCTURE: CPT

## 2025-06-11 NOTE — PROGRESS NOTES
[facility] Hematology/Oncology  PROGRESS NOTE -  Follow-up Visit      Subjective:       Patient ID:   NAME: Sivakumar Thacker : 1955     70 y.o. male    Referring Doc: Doe (new PCP)  Other Physicians: Vinod Chen/José Manuel      Chief Complaint:  NHL f/u    History of Present Illness:     Patient returns today for a regularly scheduled follow-up visit.  The patient is here today to go over the results of the recently ordered labs, tests and studies. He is here with his wife today    He continues on Monjaro    He has been doing well overall ; No significant  respiratory issues, mild cough and s/p course of steroids by Dr Nails     PEt  10/2/2024 was stable with no active FDG disease identified          He has been on Rituximab  at every 12 weeks;  continued on IV IgG  - next rituximab due on     He denies any CP, SOB, HA's or N/V       He is now off Gabapentin              ROS:   GEN: normal without any fever, night sweats or weight loss  HEENT: normal with no HA's, sore throat, stiff neck, changes in vision;    CV: normal with no CP, SOB, PND, VASQUEZ or orthopnea  PULM: normal with no SOB,  hemoptysis, sputum or pleuritic pain;  breathing better    GI: no new GI issues with no nausea, vomiting, constipation, diarrhea, melanotic stools, BRBPR, or hematemesis  : normal with no hematuria, dysuria  BREAST: normal with no mass, discharge, pain  SKIN: normal with no rash, erythema, bruising, or swelling; no current wound issues    Allergies:  Review of patient's allergies indicates:  No Known Allergies    Medications:    Current Outpatient Medications:     amLODIPine (NORVASC) 10 MG tablet, Take 1 tablet (10 mg total) by mouth once daily., Disp: 90 tablet, Rfl: 3    aspirin (ECOTRIN) 81 MG EC tablet, Take 81 mg by mouth nightly., Disp: , Rfl:     atorvastatin (LIPITOR) 20 MG tablet, Take 1 tablet (20 mg total) by mouth once daily., Disp: 90 tablet, Rfl: 2    cetirizine (ZYRTEC) 10 MG tablet, Take 1 tablet (10  mg total) by mouth once daily., Disp: 90 tablet, Rfl: 1    fluticasone propionate (FLONASE) 50 mcg/actuation nasal spray, 1 spray (50 mcg total) by Each Nostril route 2 (two) times daily., Disp: 48 g, Rfl: 11    gabapentin (NEURONTIN) 300 MG capsule, Take 900 mg by mouth., Disp: , Rfl:     metFORMIN (GLUCOPHAGE) 500 MG tablet, Take 2 tablets twice a day by oral route with meals for 90 days., Disp: 360 tablet, Rfl: 1    montelukast (SINGULAIR) 10 mg tablet, Take 1 tablet (10 mg total) by mouth nightly., Disp: 90 tablet, Rfl: 3    MULTIVITAMIN ORAL, Take 1 tablet by mouth once daily., Disp: , Rfl:     naproxen (NAPROSYN) 500 MG tablet, Take 1 tablet (500 mg total) by mouth 2 (two) times daily with meals., Disp: 180 tablet, Rfl: 3    omeprazole (PRILOSEC) 40 MG capsule, Take 1 capsule (40 mg total) by mouth once daily., Disp: 90 capsule, Rfl: 3    tadalafiL (CIALIS) 20 MG Tab, Take 1 tablet (20 mg total) by mouth once daily., Disp: 90 tablet, Rfl: 3    tirzepatide (MOUNJARO) 5 mg/0.5 mL PnIj, Inject 5 mg into the skin every 7 days., Disp: 12 Pen, Rfl: 3    tiZANidine (ZANAFLEX) 4 MG tablet, Take 1 tablet (4 mg total) by mouth daily as needed (back pain)., Disp: 90 tablet, Rfl: 3    valsartan (DIOVAN) 320 MG tablet, Take 1 tablet (320 mg total) by mouth once daily., Disp: 90 tablet, Rfl: 3    PMHx/PSHx Updates:  See patient's last visit with me on  3/12/2025  See H&P on 1/8/2019        Pathology:  Cancer Staging  No matching staging information was found for the patient.    EGD 2/29/2024:  1. LIPOMA, BIOPSY:   - GASTRIC ANTRAL MUCOSA WITH REACTIVE GASTROPATHY.   - NEGATIVE FOR SUBMUCOSA TO EVALUATE FOR A DEEPER SEATED LESION.   - NEGATIVE FOR HELICOBACTER PYLORI TYPE ORGANISMS.     2. STOMACH, ANTRUM, BIOPSY:   - GASTRIC ANTRAL MUCOSA WITH REACTIVE GASTROPATHY.   - GASTRIC OXYNTIC MUCOSA WITH NO SIGNIFICANT HISTOPATHOLOGIC FINDINGS.   - NEGATIVE FOR HELICOBACTER PYLORI TYPE ORGANISMS.     3. GASTROESOPHAGEAL JUNCTION,  "BIOPSY:   - SQUAMOUS MUCOSA WITH REFLUX ESOPHAGITIS.   - COLUMNAR MUCOSA WITH NO SIGNIFICANT HISTOPATHOLOGIC FINDINGS.   - NEGATIVE FOR INTESTINAL METAPLASIA.       Objective:     Vitals:  Blood pressure 132/70, pulse 68, temperature 97.6 °F (36.4 °C), temperature source Temporal, resp. rate 18, height 5' 8" (1.727 m), weight 86.7 kg (191 lb 1.6 oz), SpO2 95%.    Physical Examination:   GEN: no apparent distress, comfortable; AAOx3  HEAD: atraumatic and normocephalic  EYES: no pallor, no icterus, PERRLA  ENT: OMM, no pharyngeal erythema, external ears WNL; no nasal discharge; no thrush;    NECK: no masses, thyroid normal, trachea midline, no LAD/LN's, supple  CV: RRR with no murmur; normal pulse; normal S1 and S2; no pedal edema; portacath since removed  CHEST: Normal respiratory effort; CTAB  ABDOM: nontender and nondistended; soft; normal bowel sounds; no rebound/guarding  MUSC/Skeletal: ROM normal; no crepitus; joints normal; no deformities or arthropathy  EXTREM: no clubbing, cyanosis, inflammation or swelling  SKIN: no rashes, lesions, ulcers, petechiae or subcutaneous nodules; no current wound issues  : no gibbs  NEURO: grossly intact; motor/sensory WNL; AAOx3; no tremors  PSYCH: normal mood, affect and behavior  LYMPH: normal cervical, supraclavicular, axillary and groin LN's            Labs:              Lab Results   Component Value Date    WBC 5.58 06/10/2025    HGB 15.6 06/10/2025    HCT 46.8 06/10/2025    MCV 88 06/10/2025     06/10/2025     CMP  Sodium   Date Value Ref Range Status   06/10/2025 142 136 - 145 mmol/L Final   03/06/2025 142 136 - 145 mmol/L Final   04/25/2019 144 134 - 144 mmol/L      Potassium   Date Value Ref Range Status   06/10/2025 4.5 3.5 - 5.1 mmol/L Final   03/06/2025 4.5 3.5 - 5.1 mmol/L Final     Chloride   Date Value Ref Range Status   06/10/2025 108 95 - 110 mmol/L Final   03/06/2025 107 95 - 110 mmol/L Final   04/25/2019 107 98 - 110 mmol/L      CO2   Date Value Ref " Range Status   06/10/2025 24 23 - 29 mmol/L Final   03/06/2025 24 23 - 29 mmol/L Final     Glucose   Date Value Ref Range Status   06/10/2025 127 (H) 70 - 110 mg/dL Final   03/06/2025 116 (H) 70 - 110 mg/dL Final   04/25/2019 177 (H) 70 - 99 mg/dL      BUN   Date Value Ref Range Status   06/10/2025 15 8 - 23 mg/dL Final     Creatinine   Date Value Ref Range Status   06/10/2025 0.8 0.5 - 1.4 mg/dL Final   04/25/2019 0.83 0.60 - 1.40 mg/dL      Calcium   Date Value Ref Range Status   06/10/2025 9.5 8.7 - 10.5 mg/dL Final   03/06/2025 9.2 8.7 - 10.5 mg/dL Final     Protein Total   Date Value Ref Range Status   06/10/2025 6.6 6.0 - 8.4 gm/dL Final     Total Protein   Date Value Ref Range Status   03/06/2025 7.4 6.0 - 8.4 g/dL Final     Albumin   Date Value Ref Range Status   06/10/2025 3.6 3.5 - 5.2 g/dL Final   03/06/2025 3.9 3.5 - 5.2 g/dL Final   04/25/2019 4.4 3.1 - 4.7 g/dL      Total Bilirubin   Date Value Ref Range Status   03/06/2025 0.3 0.1 - 1.0 mg/dL Final     Comment:     For infants and newborns, interpretation of results should be based  on gestational age, weight and in agreement with clinical  observations.    Premature Infant recommended reference ranges:  Up to 24 hours.............<8.0 mg/dL  Up to 48 hours............<12.0 mg/dL  3-5 days..................<15.0 mg/dL  6-29 days.................<15.0 mg/dL       Bilirubin Total   Date Value Ref Range Status   06/10/2025 0.4 0.1 - 1.0 mg/dL Final     Comment:     For infants and newborns, interpretation of results should be based   on gestational age, weight and in agreement with clinical   observations.    Premature Infant recommended reference ranges:   0-24 hours:  <8.0 mg/dL   24-48 hours: <12.0 mg/dL   3-5 days:    <15.0 mg/dL   6-29 days:   <15.0 mg/dL     Alkaline Phosphatase   Date Value Ref Range Status   03/06/2025 85 40 - 150 U/L Final     ALP   Date Value Ref Range Status   06/10/2025 90 40 - 150 unit/L Final     AST   Date Value Ref Range  Status   06/10/2025 18 11 - 45 unit/L Final   03/06/2025 26 10 - 40 U/L Final     ALT   Date Value Ref Range Status   06/10/2025 18 10 - 44 unit/L Final   03/06/2025 24 10 - 44 U/L Final     Anion Gap   Date Value Ref Range Status   06/10/2025 10 8 - 16 mmol/L Final     eGFR if    Date Value Ref Range Status   07/21/2022 >60.0 >60 mL/min/1.73 m^2 Final     eGFR if non    Date Value Ref Range Status   07/21/2022 >60.0 >60 mL/min/1.73 m^2 Final     Comment:     Calculation used to obtain the estimated glomerular filtration  rate (eGFR) is the CKD-EPI equation.            Radiology/Diagnostic Studies:    PET 10/2/2024:    Impression:     No PET-CT evidence of active FDG avid malignancy.     Atherosclerosis, colonic diverticulosis, and other incidental findings as above.          PET 3/11/2024:    IMPRESSION:  1. Interval resolution of the previously described nonspecific small bowel mesenteric fat stranding.  2. No concerning FDG avid mass or lymphadenopathy.        PET 12/18/2023:  IMPRESSION:  1. Mild faint nonspecific fat stranding in the small bowel mesentery, possibly from low-grade/subclinical enteritis or edema, noting an early lymphoproliferative disorder is not entirely excluded and attention on follow-up imaging may be warranted.     2. No FDG avid mass or lymphadenopathy.      PET 6/22/2023:    IMPRESSION:     1. Negative for FDG avid lymphoproliferative disease.  2. Resolution of prior bilateral pulmonary opacities.        PET 1/5/2023:    IMPRESSION: Diffuse reticular nodular and groundglass opacities the left lower lobe, posterior right upper lobe and posterior medial right lower lobe compatible with multifocal pneumonia. Follow-up chest CT in three months is recommended     Diffuse FDG activity throughout the axial skeleton suggestive of bone marrow hyperstimulation     No evidence of recurrent or metastatic disease        PET  4/5/2022:    IMPRESSION: No evidence of  recurrent or active lymphoproliferative disease        PET 10/4/2021:  Impression:     1. Negative for FDG avid recurrent lymphoproliferative disease.  2. New left maxillary sinusitis.  3. New bilateral reticulonodular pulmonary opacities suggesting infectious or inflammatory pneumonitis.  Clinical and laboratory correlation is requested.  4. Coronary artery calcification.         CXR  3/25/2021:    Impression:     Mild interstitial prominence, similar to previous exams.  No focal consolidation      PET 3/19/2021:  IMPRESSION:  1. Mild patchy airspace opacity in the superior segment of the left  lower lobe with increased FDG activity from background suggestive of a  small focal pneumonia (possibly viral in etiology). Consider  correlation with the symptoms, history and CT follow-up in 3 months to  document resolution.  2. No FDG avid lymphadenopathy or additional sites of disease.         PET 9/2/2020:      Impression:     Negative for active lymphoproliferative disease.         Chest CT  3/19/2020:      Impression       1. Mild bronchiectasis in the left lower lobe and tree-in-bud micro nodules at the left lung base suggesting underlying bronchiolitis, possibly due to a non tuberculous mycobacterial infection or viral bronchiolitis.  2. Fatty infiltration of the liver  3. Small hiatal hernia.           CXR  1/31/2020    Impression       1. No acute chest disease.  2. Left subclavian Port-A-Cath.               PET 9/11/2019   Impression       No evidence of FDG avid residual or recurrent lymphoma       I have reviewed all available lab results and radiology reports.    Assessment/Plan:   (1) 70 y.o. male with diagnosis of NHL Follicular Stage IIIA who has been referred by Dr Gary Chen with [facility] for continuation of care by medical hematology/oncology.   - He originally was diagnosed in 2012 and had parotid excision at [facility]. He subsequently went to [facility] and was treated with bendamustine-rituximab. He has  been on rituximab maintenance every 8 weeks and IV IgG monthly. Dr Chen's plan was to keep him on maintenace therapy for as long as he could tolerate the treatments  - s/p 6 cycles of Bendamustine and Retuximab with subsequent complete remission  - 1st maintenance cycle rituximab was in March 2016  - he has been on maintenance rituximab and IV IgG - last rituximab 11/15/2018; last IV IgG on Dec 14th 2018  - last PET was on 9/26/2018 with no evidence of recurrence  - originally diagnosed in Dec 2012 with left parotid and left periparotid LN excision on 12/11/2012  - PET scan done on  9/11/2019 was good    7/23/2020:  - new nodule on auricle of left ear suspicious for a skin cancer - will refer him to Dr Avilez with ENT  - he is due for repeat PET    9/17/2020:  - PET scan on 9/2/2020 is adequate  - [facility]     11/12/2020:  - continued on the current regimen  - doing well with no new issues    1/7/2021:  - he is having some persistent cough issues for which he has been obtaining sputum for José Manuel  - last PET was Sept 2020    3/4/2021:  - doing ok  - chronic cough issues - followed by José Manuel  - will set up f/u PET    4/29/2021:  - he had PET scan on 3/19/2021  - He has been seeing pulmonary about his chronic cough  Lucia Georges with pulmonary has seen the recent PET and reported to him that the CXR on 3/25 was stable and he is on some oral antibiotics with improvement of the symptoms  - He is also on Gabapentin for the neuropathy issues in his feet.   - He saw Dr Barry Mcmahon on 3/24/2021 with [facility]    8/19/2021:  - He has been seeing pulmonary about his chronic cough - Lucia Georges with pulmonary and he has been on periodic treatments of antibiotics. He saw her last just yesterday on 8/17/2021 and is currently on antibiotics.  - Pulmonary is planning to refer him to an ID specialist  - he is on the rituximab every 8 weeks; I am not convinced that this  is contributing to the infection issues as it is not reducing his WBC; however,if pulmonary and/or ID feel it is a contributing factor then we can discontinue. The risk of his lymphoma recurring or progressing would be higher if he were to discontinue the therapy      10/14/2021:  - continued on oral antibiotics per pulmonary and plans to see Pulmonary ID specialist in near future  - continued on current therapy with rituximab  - recent PEt on 10/4/2021    12/9/2021:  - continued on rituximab  - on new antibiotics per pulmonary and he sees them again in Feb 2022  - repeat scans in Feb 2022 or sooner if pulmonary says otherwise    2/2/2022:  - he is seeing pulmonary again in two weeks  - he does not think that the new triple antibiotic regimen is working  - we can get PET in Feb/mar 2022 if pulmonary is inclined, otherwise 6 month surveillance scan in April/May 2022    3/31/2022:  - He has been seeing pulmonary about his chronic cough and TONY issues; he is on antibiotics 3x/week and he reports that pulmonary feels that he is stable  - PET scan scheduled for 4/14/2022  - He last saw Dr Barry Mcmahon on 3/28/2021 and José Manuel on 2/17/2022  - pulmonary is fine with him continuing the ritxuimab per his report    5/26/2022:  - He has been seeing pulmonary about his chronic cough and TONY issues; he saw Rose on 5/17/2022 and is now on a new antibiotic and he reports that pulmonary said the PEt scan was better than the last one; he is planning to see Dr Veliz in the near future   - PET scan was done on 4/5/2022 with no evidence of recurrence  - continued on rituximab maintenance therapy    7/21/2022:  - continued under the care of pulmonary  - he is seeing Dr Veliz in Aug 2022  - continued on monthly IV IgG and rituximab every other month  - repeat PET in Oct 2022 expected    9/15/2022:  - He has been seeing pulmonary about his chronic cough and TONY issues; he has been seeing Rose and saw Dr Veliz on 8/15/2022;   - Dr Veliz  is looking at increasing his Ig dose or changing it to SQ weekly  - Last PET scan was done on 4/5/2022 and repeat has been ordered and is due this Oct 2022  - discussed with patient about referral to Dr Nasreen Abarca at [facility] for not only evaluation for 2nd opinion for his chronic lymphoma but also the options of IV IgG infusion therapy, patient is agreeable    11/10/2022:  - Patient was seen by Dr Abarca at [facility] since last visit and his recommendation was to hold off on further rituximab therapy and proceed with just surveillance scanning.  - He was recently hospitalized at [facility] in Oct 2022 with upper torso cellulitis with Serratia marcescens septicemia  - He was recently hospitalized at [facility] in Oct 2022 with upper torso cellulitis with Serratia marcescens septicemia. He is still followed by wound care and is getting the prior drain site packed, etc. He has not followed up with ID as of yet    1/4/2023:  - Patient was previously seen by Dr Abarca at [facility] since last visit and his recommendation was to hold off on further rituximab therapy and proceed with just surveillance scanning. He has telemed visit with him in Feb 2023  -  He is getting PET tomorrow and seeing pulmonary again in Feb 2023  - he has been on IV IgG  - He saw Dr Washington last in Nov 2022 and is seeing ID at [facility] in Jan 2023    3/1/2023:  - He had telemed with Dr Abarca and he wants him off the rituximab for another 3 months; patient is concerned about holding off on the rituximab maintenance.   - He last had rituximab in Sept 2022  - He has been on IV IgG  - Recent PET on 1/5/2023 with no evidence of lymphoma  - will discuss with Dr Abarca, but I think it is reasonable to resume rituximab in near future if ok with Pulm and ID, but will spread out to therapy every 3 months instead of every 2 months    4/26/2023:  - discussed with patient about resuming the Rituximab and he is anxious to do so  - will give every 12 weeks    6/21/2023:  - PET due  tomorrow  - IV IgG on Friday  - rituximab since resumed but to be given every 12 weeks now  - f/\u with Dr Veliz in Aug 2023    9/13/2023:  - He has since resumed Rituximab but it is now at every 12 weeks; getting IV IGg this Friday and rituimab due again in Oct 2023  - He had PEt on 6/22/2023 12/13/2023:  - he is continued on IV IgG with next one on Jan 5th  - he gets Rituximab every 3 months now - latest one was 11/10  - he has PET this coming Dec 18th    3/18/2024:  - He has been having some upper GI pressure and discomfort issues since Jan 2024 and had upper and lower endoscopies with Dr Campbell; he has f/u visit with GI to go over the results in near future; he has diverticulosis  - He had recent repeat PET on 3/11/2024 is negative  - he is on rituximab every 3 months (due Apr 25th 2024)  - He saw Dr Nails in Jan 2024 with [facility]    6/18/2024:  - schedule next PET in Sept 2024  - continued on rituximab every 12 weeks with next one due in mid-July 2024  - saw Dr Nails in Apr 2024  - had scopes with Dr griffith in May 2024    9/10/2024:  -  PEt scheduled for Oct 2024 and has next infusion this coming Friday  - He saw Dr Nails on 8/23; he was seen by Dr Interiano with vascular for his right neck while in ED on 8/26  - He has been on Rituximab  at every 12 weeks; next one is on is this coming Friday he has also been getting IV IGg  as well    12/10/2024:  - latest PET on 10/2/2024 stable and negative for any active disease  - labs are all adequate  - continued on rituximab maintenance every 12 weeks    3/11/2025:  - repeat PET panned for Oct 2025  - continued on rituximab every 12 weeks and IV IgG monthly  - labs adequate    6/12/2025:  - doing well  - continued on Monjaro  - due for next rituximab on 6/19  - set up next PET for Oct 2025      (2) HTN     (3) Neuropathy     (4) GERD     (5) DM - on metformin per Dr Nunez     (6) Hypogammaglobulinemia - on Iv IgG monthly     (7) Steatosis of liver     (8)  Degenerative disc disease of back     (9) Diverticular disease    (10) Mild bronchiectasis in the left lower lobe and tree-in-bud micro nodules at the left lung base suggesting underlying bronchiolitis  - seeing Lucia Georges           VISIT DIAGNOSES:      Non-Hodgkin's lymphoma, unspecified body region, unspecified non-Hodgkin lymphoma type    Follicular lymphoma grade IIIa, unspecified body region    Diffuse large b-cell lymphoma, lymph nodes of multiple sites    Pancytopenia          PLAN:  1. continue rituximab maintenance  therapy every 12 weeks   2. continue IV IgG monthly    3. F/u with Dr Nasreen Abarca at [facility] as directed   4. Check labs every 8-12 weeks  5. Repeat PET in Oct 2025   6. F/u with PCP, Pulm etc    7. F/u with GI as directed  8. PCP addressing his diabetes           RTC in 12 weeks  with myself   Fax note to Jesu (mica);; José Manuel Veliz; Rima; Adrian/Dauterive         Discussion:       I spent over 25 mins of time with the patient. Reviewed results of the recently ordered labs, tests and studies; made directives with regards to the results. Over half of this time was spent couseling and coordinating care.      COVID-19 Discussion:    I had long discussion with patient and any applicable family about the COVID-19 coronavirus epidemic and the recommended precautions with regard to cancer and/or hematology patients. I have re-iterated the CDC recommendations for adequate hand washing, use of hand -like products, and coughing into elbow, etc. In addition, especially for our patients who are on chemotherapy and/or our otherwise immunocompromised patients, I have recommended avoidance of crowds, including movie theaters, restaurants, churches, etc. I have recommended avoidance of any sick or symptomatic family members and/or friends. I have also recommended avoidance of any raw and unwashed food products, and general avoidance of food items that have not been prepared by themselves. The  patient has been asked to call us immediately with any symptom developments, issues, questions or other general concerns.     I have explained all of the above in detail and the patient understands all of the current recommendation(s). I have answered all of their questions to the best of my ability and to their complete satisfaction.   The patient is to continue with the current management plan.            Electronically signed by Jefferson Ibarra MD                        Answers submitted by the patient for this visit:  Review of Systems Questionnaire (Submitted on 6/5/2025)  appetite change : No  unexpected weight change: No  mouth sores: No  visual disturbance: No  cough: No  shortness of breath: No  chest pain: No  abdominal pain: No  diarrhea: No  frequency: No  back pain: No  rash: No  headaches: No  adenopathy: No  nervous/ anxious: No

## 2025-06-12 ENCOUNTER — OFFICE VISIT (OUTPATIENT)
Facility: CLINIC | Age: 70
End: 2025-06-12
Payer: MEDICARE

## 2025-06-12 VITALS
HEIGHT: 68 IN | SYSTOLIC BLOOD PRESSURE: 132 MMHG | OXYGEN SATURATION: 95 % | RESPIRATION RATE: 18 BRPM | DIASTOLIC BLOOD PRESSURE: 70 MMHG | HEART RATE: 68 BPM | BODY MASS INDEX: 28.97 KG/M2 | TEMPERATURE: 98 F | WEIGHT: 191.13 LBS

## 2025-06-12 DIAGNOSIS — C85.90 NON-HODGKIN'S LYMPHOMA, UNSPECIFIED BODY REGION, UNSPECIFIED NON-HODGKIN LYMPHOMA TYPE: Primary | ICD-10-CM

## 2025-06-12 DIAGNOSIS — D61.818 PANCYTOPENIA: ICD-10-CM

## 2025-06-12 DIAGNOSIS — C82.30 FOLLICULAR LYMPHOMA GRADE IIIA, UNSPECIFIED BODY REGION: ICD-10-CM

## 2025-06-12 DIAGNOSIS — C83.38 DIFFUSE LARGE B-CELL LYMPHOMA, LYMPH NODES OF MULTIPLE SITES: ICD-10-CM

## 2025-06-12 PROCEDURE — 99214 OFFICE O/P EST MOD 30 MIN: CPT | Mod: PBBFAC,PN | Performed by: INTERNAL MEDICINE

## 2025-06-12 PROCEDURE — 99999 PR PBB SHADOW E&M-EST. PATIENT-LVL IV: CPT | Mod: PBBFAC,,, | Performed by: INTERNAL MEDICINE

## 2025-06-18 RX ORDER — MEPERIDINE HYDROCHLORIDE 25 MG/ML
25 INJECTION INTRAMUSCULAR; INTRAVENOUS; SUBCUTANEOUS
Status: CANCELLED | OUTPATIENT
Start: 2025-06-19 | End: 2025-06-19

## 2025-06-18 RX ORDER — ACETAMINOPHEN 325 MG/1
650 TABLET ORAL
Status: CANCELLED | OUTPATIENT
Start: 2025-06-19

## 2025-06-18 RX ORDER — SODIUM CHLORIDE 0.9 % (FLUSH) 0.9 %
10 SYRINGE (ML) INJECTION
Status: CANCELLED | OUTPATIENT
Start: 2025-06-19

## 2025-06-18 RX ORDER — FAMOTIDINE 10 MG/ML
20 INJECTION, SOLUTION INTRAVENOUS
Status: CANCELLED | OUTPATIENT
Start: 2025-06-19

## 2025-06-18 RX ORDER — HEPARIN 100 UNIT/ML
500 SYRINGE INTRAVENOUS
Status: CANCELLED | OUTPATIENT
Start: 2025-06-19

## 2025-06-19 ENCOUNTER — INFUSION (OUTPATIENT)
Dept: INFUSION THERAPY | Facility: HOSPITAL | Age: 70
End: 2025-06-19
Attending: INTERNAL MEDICINE
Payer: MEDICARE

## 2025-06-19 VITALS
HEART RATE: 65 BPM | HEIGHT: 68 IN | BODY MASS INDEX: 28.44 KG/M2 | OXYGEN SATURATION: 97 % | TEMPERATURE: 98 F | WEIGHT: 187.63 LBS | DIASTOLIC BLOOD PRESSURE: 67 MMHG | RESPIRATION RATE: 16 BRPM | SYSTOLIC BLOOD PRESSURE: 119 MMHG

## 2025-06-19 DIAGNOSIS — D80.1 HYPOGAMMAGLOBULINEMIA: ICD-10-CM

## 2025-06-19 DIAGNOSIS — C82.30 FOLLICULAR LYMPHOMA GRADE IIIA, UNSPECIFIED BODY REGION: Primary | ICD-10-CM

## 2025-06-19 PROCEDURE — 25000003 PHARM REV CODE 250: Performed by: INTERNAL MEDICINE

## 2025-06-19 PROCEDURE — 96415 CHEMO IV INFUSION ADDL HR: CPT

## 2025-06-19 PROCEDURE — 63600175 PHARM REV CODE 636 W HCPCS: Performed by: INTERNAL MEDICINE

## 2025-06-19 PROCEDURE — A4216 STERILE WATER/SALINE, 10 ML: HCPCS | Performed by: INTERNAL MEDICINE

## 2025-06-19 PROCEDURE — 96375 TX/PRO/DX INJ NEW DRUG ADDON: CPT

## 2025-06-19 PROCEDURE — 96413 CHEMO IV INFUSION 1 HR: CPT

## 2025-06-19 PROCEDURE — 96367 TX/PROPH/DG ADDL SEQ IV INF: CPT

## 2025-06-19 RX ORDER — ACETAMINOPHEN 325 MG/1
650 TABLET ORAL
Status: COMPLETED | OUTPATIENT
Start: 2025-06-19 | End: 2025-06-19

## 2025-06-19 RX ORDER — SODIUM CHLORIDE 0.9 % (FLUSH) 0.9 %
10 SYRINGE (ML) INJECTION
Status: DISCONTINUED | OUTPATIENT
Start: 2025-06-19 | End: 2025-06-19 | Stop reason: HOSPADM

## 2025-06-19 RX ORDER — HEPARIN 100 UNIT/ML
500 SYRINGE INTRAVENOUS
Status: DISCONTINUED | OUTPATIENT
Start: 2025-06-19 | End: 2025-06-19 | Stop reason: HOSPADM

## 2025-06-19 RX ORDER — FAMOTIDINE 10 MG/ML
20 INJECTION, SOLUTION INTRAVENOUS
Status: COMPLETED | OUTPATIENT
Start: 2025-06-19 | End: 2025-06-19

## 2025-06-19 RX ADMIN — FAMOTIDINE 20 MG: 10 INJECTION INTRAVENOUS at 07:06

## 2025-06-19 RX ADMIN — DIPHENHYDRAMINE HYDROCHLORIDE 50 MG: 50 INJECTION, SOLUTION INTRAMUSCULAR; INTRAVENOUS at 07:06

## 2025-06-19 RX ADMIN — SODIUM CHLORIDE: 9 INJECTION, SOLUTION INTRAVENOUS at 07:06

## 2025-06-19 RX ADMIN — Medication 10 ML: at 11:06

## 2025-06-19 RX ADMIN — RITUXIMAB 800 MG: 10 INJECTION, SOLUTION INTRAVENOUS at 08:06

## 2025-06-19 RX ADMIN — ACETAMINOPHEN 650 MG: 325 TABLET ORAL at 07:06

## 2025-06-19 RX ADMIN — HEPARIN 500 UNITS: 100 SYRINGE at 11:06

## 2025-06-19 NOTE — PLAN OF CARE
Problem: Fall Injury Risk  Goal: Absence of Fall and Fall-Related Injury  Outcome: Progressing  Intervention: Identify and Manage Contributors  Flowsheets (Taken 6/19/2025 0709)  Self-Care Promotion: independence encouraged  Medication Review/Management: medications reviewed  Intervention: Promote Injury-Free Environment  Flowsheets (Taken 6/19/2025 0709)  Safety Promotion/Fall Prevention: medications reviewed

## 2025-06-20 ENCOUNTER — INFUSION (OUTPATIENT)
Dept: INFUSION THERAPY | Facility: HOSPITAL | Age: 70
End: 2025-06-20
Attending: INTERNAL MEDICINE
Payer: MEDICARE

## 2025-06-20 VITALS
OXYGEN SATURATION: 99 % | SYSTOLIC BLOOD PRESSURE: 119 MMHG | HEIGHT: 68 IN | HEART RATE: 60 BPM | DIASTOLIC BLOOD PRESSURE: 65 MMHG | RESPIRATION RATE: 17 BRPM | TEMPERATURE: 98 F | WEIGHT: 190.63 LBS | BODY MASS INDEX: 28.89 KG/M2

## 2025-06-20 DIAGNOSIS — D80.1 HYPOGAMMAGLOBULINEMIA: Primary | ICD-10-CM

## 2025-06-20 DIAGNOSIS — D80.1 NONFAMILIAL HYPOGAMMAGLOBULINEMIA: ICD-10-CM

## 2025-06-20 DIAGNOSIS — C82.30 FOLLICULAR LYMPHOMA GRADE IIIA, UNSPECIFIED BODY REGION: ICD-10-CM

## 2025-06-20 PROCEDURE — 63600175 PHARM REV CODE 636 W HCPCS: Performed by: NURSE PRACTITIONER

## 2025-06-20 PROCEDURE — A4216 STERILE WATER/SALINE, 10 ML: HCPCS | Performed by: NURSE PRACTITIONER

## 2025-06-20 PROCEDURE — 96366 THER/PROPH/DIAG IV INF ADDON: CPT

## 2025-06-20 PROCEDURE — 96365 THER/PROPH/DIAG IV INF INIT: CPT

## 2025-06-20 PROCEDURE — 25000003 PHARM REV CODE 250: Performed by: NURSE PRACTITIONER

## 2025-06-20 PROCEDURE — 96367 TX/PROPH/DG ADDL SEQ IV INF: CPT

## 2025-06-20 RX ORDER — SODIUM CHLORIDE 0.9 % (FLUSH) 0.9 %
10 SYRINGE (ML) INJECTION
OUTPATIENT
Start: 2025-07-18

## 2025-06-20 RX ORDER — HEPARIN 100 UNIT/ML
500 SYRINGE INTRAVENOUS
OUTPATIENT
Start: 2025-07-18

## 2025-06-20 RX ORDER — SODIUM CHLORIDE 0.9 % (FLUSH) 0.9 %
10 SYRINGE (ML) INJECTION
Status: DISCONTINUED | OUTPATIENT
Start: 2025-06-20 | End: 2025-06-20 | Stop reason: HOSPADM

## 2025-06-20 RX ORDER — HEPARIN 100 UNIT/ML
500 SYRINGE INTRAVENOUS
Status: DISCONTINUED | OUTPATIENT
Start: 2025-06-20 | End: 2025-06-20 | Stop reason: HOSPADM

## 2025-06-20 RX ADMIN — SODIUM CHLORIDE, PRESERVATIVE FREE 10 ML: 5 INJECTION INTRAVENOUS at 09:06

## 2025-06-20 RX ADMIN — SODIUM CHLORIDE: 9 INJECTION, SOLUTION INTRAVENOUS at 07:06

## 2025-06-20 RX ADMIN — SODIUM CHLORIDE 25 MG: 9 INJECTION, SOLUTION INTRAVENOUS at 07:06

## 2025-06-20 RX ADMIN — HEPARIN 500 UNITS: 100 SYRINGE at 09:06

## 2025-06-20 RX ADMIN — IMMUNE GLOBULIN (HUMAN) 50 G: 10 INJECTION INTRAVENOUS; SUBCUTANEOUS at 07:06

## 2025-06-20 NOTE — PLAN OF CARE
Problem: Fatigue  Goal: Improved Activity Tolerance  Outcome: Progressing  Intervention: Promote Improved Energy  Flowsheets (Taken 6/20/2025 0304)  Fatigue Management: frequent rest breaks encouraged  Sleep/Rest Enhancement: regular sleep/rest pattern promoted  Activity Management:   Ambulated -L4   Up in chair - L3  Environmental Support:   calm environment promoted   rest periods encouraged

## 2025-06-27 ENCOUNTER — PATIENT OUTREACH (OUTPATIENT)
Dept: ADMINISTRATIVE | Facility: HOSPITAL | Age: 70
End: 2025-06-27
Payer: MEDICARE

## 2025-06-27 NOTE — PROGRESS NOTES
Population Health Chart Review & Patient Outreach Details      Additional [facility] Health Notes:               Updates Requested / Reviewed:      Updated Care Coordination Note, Care Everywhere, Care Team Updated, and Immunizations Reconciliation Completed or Queried: [place]         Health Maintenance Topics Overdue:      VB Score: 0     Patient is not due for any topics at this time.    RSV Vaccine                  Health Maintenance Topic(s) Outreach Outcomes & Actions Taken:    Eye Exam - Outreach Outcomes & Actions Taken  : Diabetic Eye External Records Uploaded, Care Team & History Updated if Applicable

## 2025-07-15 ENCOUNTER — TELEPHONE (OUTPATIENT)
Dept: PULMONOLOGY | Facility: CLINIC | Age: 70
End: 2025-07-15
Payer: MEDICARE

## 2025-07-18 ENCOUNTER — INFUSION (OUTPATIENT)
Dept: INFUSION THERAPY | Facility: HOSPITAL | Age: 70
End: 2025-07-18
Attending: INTERNAL MEDICINE
Payer: MEDICARE

## 2025-07-18 VITALS
HEART RATE: 75 BPM | SYSTOLIC BLOOD PRESSURE: 112 MMHG | WEIGHT: 191.31 LBS | RESPIRATION RATE: 18 BRPM | DIASTOLIC BLOOD PRESSURE: 68 MMHG | HEIGHT: 68 IN | OXYGEN SATURATION: 97 % | TEMPERATURE: 98 F | BODY MASS INDEX: 29 KG/M2

## 2025-07-18 DIAGNOSIS — D80.1 HYPOGAMMAGLOBULINEMIA: Primary | ICD-10-CM

## 2025-07-18 DIAGNOSIS — C82.30 FOLLICULAR LYMPHOMA GRADE IIIA, UNSPECIFIED BODY REGION: ICD-10-CM

## 2025-07-18 DIAGNOSIS — D80.1 NONFAMILIAL HYPOGAMMAGLOBULINEMIA: ICD-10-CM

## 2025-07-18 PROCEDURE — 96366 THER/PROPH/DIAG IV INF ADDON: CPT

## 2025-07-18 PROCEDURE — 96367 TX/PROPH/DG ADDL SEQ IV INF: CPT

## 2025-07-18 PROCEDURE — 96365 THER/PROPH/DIAG IV INF INIT: CPT

## 2025-07-18 PROCEDURE — 63600175 PHARM REV CODE 636 W HCPCS: Performed by: NURSE PRACTITIONER

## 2025-07-18 PROCEDURE — A4216 STERILE WATER/SALINE, 10 ML: HCPCS | Performed by: NURSE PRACTITIONER

## 2025-07-18 PROCEDURE — 25000003 PHARM REV CODE 250: Performed by: NURSE PRACTITIONER

## 2025-07-18 RX ORDER — HEPARIN 100 UNIT/ML
500 SYRINGE INTRAVENOUS
Status: DISCONTINUED | OUTPATIENT
Start: 2025-07-18 | End: 2025-07-18 | Stop reason: HOSPADM

## 2025-07-18 RX ORDER — SODIUM CHLORIDE 0.9 % (FLUSH) 0.9 %
10 SYRINGE (ML) INJECTION
OUTPATIENT
Start: 2025-08-15

## 2025-07-18 RX ORDER — HEPARIN 100 UNIT/ML
500 SYRINGE INTRAVENOUS
OUTPATIENT
Start: 2025-08-15

## 2025-07-18 RX ORDER — SODIUM CHLORIDE 0.9 % (FLUSH) 0.9 %
10 SYRINGE (ML) INJECTION
Status: DISCONTINUED | OUTPATIENT
Start: 2025-07-18 | End: 2025-07-18 | Stop reason: HOSPADM

## 2025-07-18 RX ADMIN — DIPHENHYDRAMINE HYDROCHLORIDE 25 MG: 50 INJECTION INTRAMUSCULAR; INTRAVENOUS at 07:07

## 2025-07-18 RX ADMIN — HEPARIN 500 UNITS: 100 SYRINGE at 10:07

## 2025-07-18 RX ADMIN — SODIUM CHLORIDE: 9 INJECTION, SOLUTION INTRAVENOUS at 07:07

## 2025-07-18 RX ADMIN — IMMUNE GLOBULIN (HUMAN) 50 G: 10 INJECTION INTRAVENOUS; SUBCUTANEOUS at 08:07

## 2025-07-18 RX ADMIN — SODIUM CHLORIDE, PRESERVATIVE FREE 10 ML: 5 INJECTION INTRAVENOUS at 10:07

## 2025-07-18 NOTE — PLAN OF CARE
Problem: Fatigue  Goal: Improved Activity Tolerance  Outcome: Progressing  Intervention: Promote Improved Energy  Flowsheets (Taken 7/18/2025 3403)  Fatigue Management: frequent rest breaks encouraged  Sleep/Rest Enhancement: regular sleep/rest pattern promoted  Environmental Support: rest periods encouraged

## 2025-08-15 ENCOUNTER — INFUSION (OUTPATIENT)
Dept: INFUSION THERAPY | Facility: HOSPITAL | Age: 70
End: 2025-08-15
Attending: INTERNAL MEDICINE
Payer: MEDICARE

## 2025-08-15 VITALS
SYSTOLIC BLOOD PRESSURE: 120 MMHG | RESPIRATION RATE: 17 BRPM | DIASTOLIC BLOOD PRESSURE: 68 MMHG | BODY MASS INDEX: 28.79 KG/M2 | HEIGHT: 68 IN | WEIGHT: 190 LBS | TEMPERATURE: 98 F | OXYGEN SATURATION: 98 % | HEART RATE: 60 BPM

## 2025-08-15 DIAGNOSIS — D80.1 HYPOGAMMAGLOBULINEMIA: Primary | ICD-10-CM

## 2025-08-15 DIAGNOSIS — D80.1 NONFAMILIAL HYPOGAMMAGLOBULINEMIA: ICD-10-CM

## 2025-08-15 DIAGNOSIS — C82.30 FOLLICULAR LYMPHOMA GRADE IIIA, UNSPECIFIED BODY REGION: ICD-10-CM

## 2025-08-15 PROCEDURE — 96367 TX/PROPH/DG ADDL SEQ IV INF: CPT

## 2025-08-15 PROCEDURE — 25000003 PHARM REV CODE 250: Performed by: NURSE PRACTITIONER

## 2025-08-15 PROCEDURE — 63600175 PHARM REV CODE 636 W HCPCS: Mod: JZ,JA,TB | Performed by: INTERNAL MEDICINE

## 2025-08-15 PROCEDURE — A4216 STERILE WATER/SALINE, 10 ML: HCPCS | Performed by: NURSE PRACTITIONER

## 2025-08-15 PROCEDURE — 63600175 PHARM REV CODE 636 W HCPCS: Performed by: NURSE PRACTITIONER

## 2025-08-15 PROCEDURE — 96365 THER/PROPH/DIAG IV INF INIT: CPT

## 2025-08-15 PROCEDURE — 96366 THER/PROPH/DIAG IV INF ADDON: CPT

## 2025-08-15 RX ORDER — HEPARIN 100 UNIT/ML
500 SYRINGE INTRAVENOUS
Status: DISCONTINUED | OUTPATIENT
Start: 2025-08-15 | End: 2025-08-15 | Stop reason: HOSPADM

## 2025-08-15 RX ORDER — SODIUM CHLORIDE 0.9 % (FLUSH) 0.9 %
10 SYRINGE (ML) INJECTION
OUTPATIENT
Start: 2025-09-12

## 2025-08-15 RX ORDER — SODIUM CHLORIDE 0.9 % (FLUSH) 0.9 %
10 SYRINGE (ML) INJECTION
Status: DISCONTINUED | OUTPATIENT
Start: 2025-08-15 | End: 2025-08-15 | Stop reason: HOSPADM

## 2025-08-15 RX ORDER — HEPARIN 100 UNIT/ML
500 SYRINGE INTRAVENOUS
OUTPATIENT
Start: 2025-09-12

## 2025-08-15 RX ADMIN — IMMUNE GLOBULIN (HUMAN) 50 G: 10 INJECTION INTRAVENOUS; SUBCUTANEOUS at 07:08

## 2025-08-15 RX ADMIN — HEPARIN 500 UNITS: 100 SYRINGE at 10:08

## 2025-08-15 RX ADMIN — DIPHENHYDRAMINE HYDROCHLORIDE 25 MG: 50 INJECTION INTRAMUSCULAR; INTRAVENOUS at 07:08

## 2025-08-15 RX ADMIN — SODIUM CHLORIDE: 9 INJECTION, SOLUTION INTRAVENOUS at 07:08

## 2025-08-15 RX ADMIN — SODIUM CHLORIDE, PRESERVATIVE FREE 10 ML: 5 INJECTION INTRAVENOUS at 10:08

## 2025-08-26 ENCOUNTER — TELEPHONE (OUTPATIENT)
Facility: CLINIC | Age: 70
End: 2025-08-26
Payer: MEDICARE

## 2025-08-29 ENCOUNTER — LAB VISIT (OUTPATIENT)
Dept: LAB | Facility: HOSPITAL | Age: 70
End: 2025-08-29
Attending: INTERNAL MEDICINE
Payer: MEDICARE

## 2025-08-29 ENCOUNTER — PATIENT MESSAGE (OUTPATIENT)
Facility: CLINIC | Age: 70
End: 2025-08-29
Payer: MEDICARE

## 2025-08-29 ENCOUNTER — TELEPHONE (OUTPATIENT)
Facility: CLINIC | Age: 70
End: 2025-08-29
Payer: MEDICARE

## 2025-08-29 DIAGNOSIS — C85.90 NON-HODGKIN'S LYMPHOMA, UNSPECIFIED BODY REGION, UNSPECIFIED NON-HODGKIN LYMPHOMA TYPE: ICD-10-CM

## 2025-08-29 DIAGNOSIS — C85.90 NON-HODGKIN'S LYMPHOMA, UNSPECIFIED BODY REGION, UNSPECIFIED NON-HODGKIN LYMPHOMA TYPE: Primary | ICD-10-CM

## 2025-08-29 LAB
ABSOLUTE EOSINOPHIL (OHS): 0.04 K/UL
ABSOLUTE MONOCYTE (OHS): 0.6 K/UL (ref 0.3–1)
ABSOLUTE NEUTROPHIL COUNT (OHS): 3.55 K/UL (ref 1.8–7.7)
ALBUMIN SERPL BCP-MCNC: 4.3 G/DL (ref 3.5–5.2)
ALP SERPL-CCNC: 87 UNIT/L (ref 40–150)
ALT SERPL W/O P-5'-P-CCNC: 15 UNIT/L (ref 10–44)
ANION GAP (OHS): 9 MMOL/L (ref 8–16)
AST SERPL-CCNC: 23 UNIT/L (ref 11–45)
BASOPHILS # BLD AUTO: 0.03 K/UL
BASOPHILS NFR BLD AUTO: 0.5 %
BILIRUB SERPL-MCNC: 0.4 MG/DL (ref 0.1–1)
BUN SERPL-MCNC: 24 MG/DL (ref 8–23)
CALCIUM SERPL-MCNC: 11 MG/DL (ref 8.7–10.5)
CHLORIDE SERPL-SCNC: 103 MMOL/L (ref 95–110)
CO2 SERPL-SCNC: 28 MMOL/L (ref 23–29)
CREAT SERPL-MCNC: 1.2 MG/DL (ref 0.5–1.4)
ERYTHROCYTE [DISTWIDTH] IN BLOOD BY AUTOMATED COUNT: 12.5 % (ref 11.5–14.5)
GFR SERPLBLD CREATININE-BSD FMLA CKD-EPI: >60 ML/MIN/1.73/M2
GLUCOSE SERPL-MCNC: 112 MG/DL (ref 70–110)
HCT VFR BLD AUTO: 47.4 % (ref 40–54)
HGB BLD-MCNC: 16 GM/DL (ref 14–18)
IMM GRANULOCYTES # BLD AUTO: 0.08 K/UL (ref 0–0.04)
IMM GRANULOCYTES NFR BLD AUTO: 1.3 % (ref 0–0.5)
LYMPHOCYTES # BLD AUTO: 1.66 K/UL (ref 1–4.8)
MCH RBC QN AUTO: 29.9 PG (ref 27–31)
MCHC RBC AUTO-ENTMCNC: 33.8 G/DL (ref 32–36)
MCV RBC AUTO: 88 FL (ref 82–98)
NUCLEATED RBC (/100WBC) (OHS): 0 /100 WBC
PLATELET # BLD AUTO: 238 K/UL (ref 150–450)
PMV BLD AUTO: 8.8 FL (ref 9.2–12.9)
POTASSIUM SERPL-SCNC: 4.6 MMOL/L (ref 3.5–5.1)
PROT SERPL-MCNC: 7.5 GM/DL (ref 6–8.4)
RBC # BLD AUTO: 5.36 M/UL (ref 4.6–6.2)
RELATIVE EOSINOPHIL (OHS): 0.7 %
RELATIVE LYMPHOCYTE (OHS): 27.9 % (ref 18–48)
RELATIVE MONOCYTE (OHS): 10.1 % (ref 4–15)
RELATIVE NEUTROPHIL (OHS): 59.5 % (ref 38–73)
SODIUM SERPL-SCNC: 140 MMOL/L (ref 136–145)
WBC # BLD AUTO: 5.96 K/UL (ref 3.9–12.7)

## 2025-08-29 PROCEDURE — 85025 COMPLETE CBC W/AUTO DIFF WBC: CPT

## 2025-08-29 PROCEDURE — 36415 COLL VENOUS BLD VENIPUNCTURE: CPT

## 2025-08-29 PROCEDURE — 82040 ASSAY OF SERUM ALBUMIN: CPT

## 2025-09-02 ENCOUNTER — OFFICE VISIT (OUTPATIENT)
Facility: CLINIC | Age: 70
End: 2025-09-02
Payer: MEDICARE

## 2025-09-02 VITALS
TEMPERATURE: 98 F | DIASTOLIC BLOOD PRESSURE: 70 MMHG | HEART RATE: 65 BPM | WEIGHT: 186 LBS | HEIGHT: 68 IN | RESPIRATION RATE: 16 BRPM | BODY MASS INDEX: 28.19 KG/M2 | SYSTOLIC BLOOD PRESSURE: 110 MMHG | OXYGEN SATURATION: 96 %

## 2025-09-02 DIAGNOSIS — C83.38 DIFFUSE LARGE B-CELL LYMPHOMA, LYMPH NODES OF MULTIPLE SITES: ICD-10-CM

## 2025-09-02 DIAGNOSIS — D61.818 PANCYTOPENIA: ICD-10-CM

## 2025-09-02 DIAGNOSIS — R73.09 OTHER ABNORMAL GLUCOSE: ICD-10-CM

## 2025-09-02 DIAGNOSIS — Z12.5 ENCOUNTER FOR SCREENING FOR MALIGNANT NEOPLASM OF PROSTATE: ICD-10-CM

## 2025-09-02 DIAGNOSIS — K21.00 GASTROESOPHAGEAL REFLUX DISEASE WITH ESOPHAGITIS WITHOUT HEMORRHAGE: ICD-10-CM

## 2025-09-02 DIAGNOSIS — C85.90 NON-HODGKIN'S LYMPHOMA, UNSPECIFIED BODY REGION, UNSPECIFIED NON-HODGKIN LYMPHOMA TYPE: Primary | ICD-10-CM

## 2025-09-02 DIAGNOSIS — R79.89 OTHER SPECIFIED ABNORMAL FINDINGS OF BLOOD CHEMISTRY: ICD-10-CM

## 2025-09-02 DIAGNOSIS — C82.30 FOLLICULAR LYMPHOMA GRADE IIIA, UNSPECIFIED BODY REGION: ICD-10-CM

## 2025-09-02 PROCEDURE — 99214 OFFICE O/P EST MOD 30 MIN: CPT | Mod: PBBFAC,PN | Performed by: INTERNAL MEDICINE

## 2025-09-02 PROCEDURE — 99999 PR PBB SHADOW E&M-EST. PATIENT-LVL IV: CPT | Mod: PBBFAC,,, | Performed by: INTERNAL MEDICINE

## 2025-09-05 ENCOUNTER — TELEPHONE (OUTPATIENT)
Facility: CLINIC | Age: 70
End: 2025-09-05
Payer: MEDICARE

## (undated) DEVICE — SPONGE BULKEE II ABSRB 6X6.75

## (undated) DEVICE — PAD BOVIE ADULT

## (undated) DEVICE — STRAP OR TABLE 5IN X 72IN

## (undated) DEVICE — SOLUTION IRRI NS BOTTLE 1000ML R5200-01

## (undated) DEVICE — NDL SPINAL SPINOCAN 22GX3.5

## (undated) DEVICE — TOWEL OR DISP STRL BLUE 4/PK

## (undated) DEVICE — SYRINGE HYPODERMC LL 22G 1.5 ECLIPSE 30

## (undated) DEVICE — UNDERGLOVE BIOGEL PI MICRO BLUE SZ 8

## (undated) DEVICE — SYS LABEL CORRECT MED

## (undated) DEVICE — BLADE SCALPEL #11 371111

## (undated) DEVICE — GLOVE BIOGEL MICRO SURGEON PINK SZ 7.5

## (undated) DEVICE — SYR 10CC LUER LOCK

## (undated) DEVICE — SUTURE ETHILON 2-0 PS 18 585H

## (undated) DEVICE — DRAPE MEDIUM SHEET 40X70IN

## (undated) DEVICE — TRAY MINOR SLIDELL MEMORIAL HOSPITAL

## (undated) DEVICE — TRAY GENERAL SURGERY

## (undated) DEVICE — PAD GROUNDING DISPER ELECTRODE

## (undated) DEVICE — CHLORAPREP 10.5 ML APPLICATOR

## (undated) DEVICE — SUTURE VICRYL 3-0 18 SH VCP864D

## (undated) DEVICE — NDL ECLIPSE SAF REG 25GX1.5IN

## (undated) DEVICE — APPLICATOR CHLORAPREP ORN 26ML

## (undated) DEVICE — DRAPE LAPAROTOMY TRANSVERSE 89281

## (undated) DEVICE — SUTURE PROLENE 2-0 SH 36 8523H

## (undated) DEVICE — GLOVE SENSICARE PI ALOE 7.5

## (undated) DEVICE — SPONGE GAUZE 10S 4X4  442214

## (undated) DEVICE — BAG DECANTER 10-102

## (undated) DEVICE — TUBE CULTURE AMIES 220117

## (undated) DEVICE — TAPE PAPER MICROPORE 3IN 1530-3

## (undated) DEVICE — GLOVE SENSICARE PI GRN 7.5

## (undated) DEVICE — Device

## (undated) DEVICE — COVER LIGHT HANDLE LB53

## (undated) DEVICE — SYRINGE 10ML 302995

## (undated) DEVICE — DRESSING TEGADERM 2 1/3 X 2 3/4 TD1002

## (undated) DEVICE — DRAIN PENROSE 18IN X 3/4 0918050

## (undated) DEVICE — NEEDLE SAFETY ECLIPSE 25G 1-1/2IN 305767

## (undated) DEVICE — DRAPE C-ARM (FITS NEW C-ARM) 07-CA108

## (undated) DEVICE — PREP CHLORA 26ML

## (undated) DEVICE — CANNULA RADIOPAQUE 20G CURVED

## (undated) DEVICE — SUTURE MONOCRYL 4-0 PS-2 27 MCP426H

## (undated) DEVICE — TIP BOVIE TEFLON E1450X

## (undated) DEVICE — DERMABOND HVD MINI  DHVM12

## (undated) DEVICE — CONTAINER SPECIMEN OR STER 4OZ

## (undated) DEVICE — ADHESIVE TISSUE EXOFIN